# Patient Record
Sex: FEMALE | Race: WHITE | NOT HISPANIC OR LATINO | Employment: OTHER | URBAN - METROPOLITAN AREA
[De-identification: names, ages, dates, MRNs, and addresses within clinical notes are randomized per-mention and may not be internally consistent; named-entity substitution may affect disease eponyms.]

---

## 2017-01-10 ENCOUNTER — ANTICOAGULATION THERAPY VISIT (OUTPATIENT)
Dept: ANTICOAGULATION | Facility: CLINIC | Age: 82
End: 2017-01-10
Payer: COMMERCIAL

## 2017-01-10 DIAGNOSIS — I48.20 CHRONIC ATRIAL FIBRILLATION (H): ICD-10-CM

## 2017-01-10 DIAGNOSIS — Z79.01 LONG-TERM (CURRENT) USE OF ANTICOAGULANTS: Primary | ICD-10-CM

## 2017-01-10 LAB — INR POINT OF CARE: 2.1 (ref 0.86–1.14)

## 2017-01-10 PROCEDURE — 85610 PROTHROMBIN TIME: CPT | Mod: QW

## 2017-01-10 PROCEDURE — 99207 ZZC NO CHARGE NURSE ONLY: CPT

## 2017-01-10 PROCEDURE — 36416 COLLJ CAPILLARY BLOOD SPEC: CPT

## 2017-01-10 NOTE — PROGRESS NOTES
"  ANTICOAGULATION FOLLOW-UP CLINIC VISIT    Patient Name:  Leonor Mercado  Date:  1/10/2017  Contact Type:  Face to Face    SUBJECTIVE:     Patient Findings     Positives No Problem Findings           OBJECTIVE    INR PROTIME   Date Value Ref Range Status   01/10/2017 2.1* 0.86 - 1.14 Final       ASSESSMENT / PLAN  INR assessment THER    Recheck INR In: 4 WEEKS    INR Location Clinic      Anticoagulation Summary as of 1/10/2017     INR goal 2.0-3.0   Selected INR 2.1 (1/10/2017)   Maintenance plan 2 mg (2 mg x 1) every day   Full instructions 2 mg every day   Weekly total 14 mg   No change documented Dilma Rider, RN   Plan last modified Yoana Waddell RN (12/15/2016)   Next INR check 2/7/2017   Priority INR   Target end date     Indications   Long-term (current) use of anticoagulants [Z79.01] [Z79.01]  Chronic atrial fibrillation (H) [I48.2]         Anticoagulation Episode Summary     INR check location     Preferred lab     Send INR reminders to [facility]    Comments Pt's 1st name is pronounced \"ondray\"      Anticoagulation Care Providers     Provider Role Specialty Phone number    RavinderOracio Dannielle Fraser MD Responsible Internal Medicine [phone]            See the Encounter Report to view Anticoagulation Flowsheet and Dosing Calendar (Go to Encounters tab in chart review, and find the Anticoagulation Therapy Visit)        Dilma Rider RN                 "

## 2017-01-10 NOTE — MR AVS SNAPSHOT
Leonor Mercado   1/10/2017 2:30 PM   Anticoagulation Therapy Visit    Description:  89 year old female   Provider:  [facility]   Department:  [facility]           INR as of 1/10/2017     Selected INR 2.1 (1/10/2017)      Anticoagulation Summary as of 1/10/2017     INR goal 2.0-3.0   Selected INR 2.1 (1/10/2017)   Full instructions 2 mg every day   Next INR check 2/7/2017    Indications   Long-term (current) use of anticoagulants [Z79.01] [Z79.01]  Chronic atrial fibrillation (H) [I48.2]         Your next Anticoagulation Clinic appointment(s)     Feb 07, 2017  2:00 PM   [facility]tico[facility] Visit with [facility]   [facility]    [address] [facility] [address]  [facility] [address] [phone]              Contact Numbers     [facility] Phone Numbers:  Anticoagulation Clinic Appointments : [phone]  Anticoagulation Nurse: [phone]         January 2017 Details    Sun Mon Tue Wed Thu Fri Sat     1               2               3               4               5               6               7                 8               9               10      2 mg   See details      11      2 mg         12      2 mg         13      2 mg         14      2 mg           15      2 mg         16      2 mg         17      2 mg         18      2 mg         19      2 mg         20      2 mg         21      2 mg           22      2 mg         23      2 mg         24      2 mg         25      2 mg         26      2 mg         27      2 mg         28      2 mg           29      2 mg         30      2 mg         31      2 mg              Date Details   01/10 This INR check               How to take your warfarin dose     To take:  2 mg Take 1 of the 2 mg tablets.           February 2017 Details    Sun Mon Tue Wed Thu Fri Sat        1      2 mg         2      2 mg         3      2 mg         4      2 mg           5      2 mg         6      2  mg         7            8               9               10               11                 12               13               14               15               16               17               18                 19               20               21               22               23               24               25                 26               27               28                    Date Details   No additional details    Date of next INR:  2/7/2017         How to take your warfarin dose     To take:  2 mg Take 1 of the 2 mg tablets.

## 2017-01-13 ENCOUNTER — OFFICE VISIT (OUTPATIENT)
Dept: INTERNAL MEDICINE | Facility: CLINIC | Age: 82
End: 2017-01-13
Payer: COMMERCIAL

## 2017-01-13 VITALS
SYSTOLIC BLOOD PRESSURE: 130 MMHG | DIASTOLIC BLOOD PRESSURE: 66 MMHG | HEIGHT: 63 IN | HEART RATE: 86 BPM | BODY MASS INDEX: 23.74 KG/M2 | OXYGEN SATURATION: 96 % | WEIGHT: 134 LBS | TEMPERATURE: 98 F

## 2017-01-13 DIAGNOSIS — J44.9 CHRONIC OBSTRUCTIVE PULMONARY DISEASE, UNSPECIFIED COPD TYPE (H): ICD-10-CM

## 2017-01-13 DIAGNOSIS — M54.50 ACUTE LEFT-SIDED LOW BACK PAIN WITHOUT SCIATICA: Primary | ICD-10-CM

## 2017-01-13 LAB
ALBUMIN UR-MCNC: NEGATIVE MG/DL
APPEARANCE UR: ABNORMAL
BILIRUB UR QL STRIP: NEGATIVE
COLOR UR AUTO: YELLOW
GLUCOSE UR STRIP-MCNC: NEGATIVE MG/DL
HGB UR QL STRIP: NEGATIVE
KETONES UR STRIP-MCNC: NEGATIVE MG/DL
LEUKOCYTE ESTERASE UR QL STRIP: ABNORMAL
MUCOUS THREADS #/AREA URNS LPF: PRESENT /LPF
NITRATE UR QL: NEGATIVE
NON-SQ EPI CELLS #/AREA URNS LPF: ABNORMAL /LPF
PH UR STRIP: 6 PH (ref 5–7)
RBC #/AREA URNS AUTO: ABNORMAL /HPF (ref 0–2)
SP GR UR STRIP: 1.02 (ref 1–1.03)
URN SPEC COLLECT METH UR: ABNORMAL
UROBILINOGEN UR STRIP-ACNC: 0.2 EU/DL (ref 0.2–1)
WBC #/AREA URNS AUTO: ABNORMAL /HPF (ref 0–2)

## 2017-01-13 PROCEDURE — 81001 URINALYSIS AUTO W/SCOPE: CPT | Performed by: INTERNAL MEDICINE

## 2017-01-13 PROCEDURE — 99214 OFFICE O/P EST MOD 30 MIN: CPT | Performed by: INTERNAL MEDICINE

## 2017-01-13 NOTE — NURSING NOTE
"Chief Complaint   Patient presents with     Medication Problem     Back Pain     Imm/Inj       Initial /66 mmHg  Pulse 86  Temp(Src) 98  F (36.7  C) (Oral)  Ht 5' 3\" (1.6 m)  Wt 134 lb (60.782 kg)  BMI 23.74 kg/m2  SpO2 96%  LMP  (LMP Unknown)  Breastfeeding? No Estimated body mass index is 23.74 kg/(m^2) as calculated from the following:    Height as of this encounter: 5' 3\" (1.6 m).    Weight as of this encounter: 134 lb (60.782 kg).  BP completed using cuff size: christine Hillman CMA      "

## 2017-01-13 NOTE — PATIENT INSTRUCTIONS
Plan:  1. Use the nebulizers 3-4 times a day as needed  2. Lung disease doctor referral   [facility]N: [facility] [phone]    3. Use the Lidocaine patches to the back   4. Urine test today   5. Watch the skin for rash

## 2017-01-13 NOTE — PROGRESS NOTES
"Dr Young's note      Patient's instructions / PLAN:                                                        Plan:  1. Use the nebulizers 3-4 times a day as needed  2. Lung disease doctor referral   [facility]: [facility] [phone]    3. Use the Lidocaine patches to the back   4. Urine test today   5. Watch the skin for rash       ASSESSMENT & PLAN:                                                      (M54.5) Acute left-sided low back pain without sciatica  (primary encounter diagnosis)  Comment: MSK. UA is neg   Plan: UA with Microscopic reflex to Culture  as above             (J44.9) Chronic obstructive pulmonary disease, unspecified COPD type (H)  Comment:   Plan: PULMONARY MEDICINE REFERRAL               Chief complaint:                                                      COPD  Back pain    SUBJECTIVE:                                                    Leonor Mercado is a 89 year old female who presents to clinic today for the following health issues:      *COPD-She was on Spiriva for 10 years until recently the cost went from $100 to $700 and she was switched to Incruse. She does not feel this works nearly as well, has been having a lot of trouble breathing lately. \"The breathing has been just miserable\".  *Back Pain-  Severe pain under ribs in left side of back yesterday. Has improved today, but she still has a lot af achyness in what she thinks is her kidneys. Has been having a lot of trouble with urinating, she barely has anything come out, urine is very dark and smells.   Exam: Tender on the L paraspinal muscle. No rash. She has scoliosis  No nsaids because Coumadin  I discussed the muscle relaxant pros and possible side effects. Doesn't want muscle relaxant     *Imm/Inj-She is ok to have the Prevnar today if Dr. Young wants her to have this. Or she can get this when she comes for her monthly INR.       Review of Systems:                                                  " "    ROS: negative for fever, chills, cough, wheezes, chest pain,  vomiting, abdominal pain, leg swelling pos for chr SOB, as above     A 10-point review of systems was obtained.  Those pertinent are above and in the in the Subjective section.  The rest of the systems are negative.           OBJECTIVE:             Physical exam:  Blood pressure 130/66, pulse 86, temperature 98  F (36.7  C), temperature source Oral, height 5' 3\" (1.6 m), weight 134 lb (60.782 kg), SpO2 96 %, not currently breastfeeding.   NAD, appears comfortable  Skin: no rashes   Neck: supple, no JVD,No thyroidmegaly. Lymph nodes nonpalpable cervical and supraclavicular.  Chest: clear to auscultation bilaterally, good respiratory effort  Heart: S1 S2, RRR, no mgr appreciated  Abdomen: soft, not tender,   Extremities: no edema,   Neurologic: A, Ox3, no focal signs appreciated  Back: as above      PMHx: reviewed  Past Medical History   Diagnosis Date     Hypertension      COPD (chronic obstructive pulmonary disease) (H)      Atrial fibrillation (H)      Sick sinus syndrome, PAT, AF     Irritable bowel      BCC (basal cell carcinoma of skin)      Face     Vertigo      Pulmonary fibrosis (H)      do not use amiodarone     Coronary artery disease      2-05[place]-CAD-taxus stentx2 2.5-12,2.5-12 in LADp and LADm, 80%nonDomRCA-LcxDom-mild,EF60%     SSS (sick sinus syndrome) (H)      DDD pacer (see surgery history section)     Osteoporosis      Hyperlipidemia      Panic attack      questionable diagnosis     CHF (congestive heart failure) (H)      mild in past     IBS (irritable bowel syndrome)      Chronic renal insufficiency       PSHx: reviewed  Past Surgical History   Procedure Laterality Date     Appendectomy  1956     Hernia repair Left 1991     [facility]     Cardiac surgery       PPM, 2 STENTS2-05[place]-CAD-taxus stentx2 2.5-12,2.5-12 in LADp and LADm, 80%nonDomRCA-LcxDom-mild,EF60%     H lead revision dual  4/2003     Revised in [place]-due to diaphram stim " (original pacer placed in [place])     Replace pacemaker generator  6/27/2013     Dual-chamber pacemaker by Dr SETH SOMERS lead revision dual  7/2/2014     Correction of reversal of A and V leads in [place]     Implant pacemaker  2/19/2003     Placed in [place] for sick sinus syndrome     Coronary angiography adult order  7/1994     mild CAD,Normal LV function, No Mitral regurgitation, Prox LAD 30% stenosis. RCA prox and mid, 20-30%     Heart cath, angioplasty  02/2005     LEIGH ANN mid and proximal LAD, ongoing 80% RCA; mild circumflex     Esophagoscopy, gastroscopy, duodenoscopy (egd), combined N/A 8/19/2015     Procedure: COMBINED ESOPHAGOSCOPY, GASTROSCOPY, DUODENOSCOPY (EGD), BIOPSY SINGLE OR MULTIPLE;  Surgeon: Genevieve Leon MD;  Location:  GI        Meds: reviewed  Current Outpatient Prescriptions   Medication Sig Dispense Refill     umeclidinium (INCRUSE ELLIPTA) 62.5 MCG/INH oral inhaler Inhale 1 puff into the lungs daily 3 Inhaler 1     Acetaminophen (TYLENOL PO) Take 1,000 mg by mouth At Bedtime       DOCUSATE SODIUM PO Take 100 mg by mouth At Bedtime       menthol (ICY HOT) 5 % PADS Apply 1 patch topically every 8 hours as needed for muscle soreness  0     warfarin (COUMADIN) 2 MG tablet Take 1 tablet (2 mg) daily except take 1.5 tablet (3 mg) on Monday and Wednesday, or as instructed.  Hold your coumadin for (Thursday), then take 2mg until your INR is checked next week 102 tablet 1     polyethylene glycol (MIRALAX/GLYCOLAX) packet Take 1 packet by mouth 2 times daily        losartan (COZAAR) 50 MG tablet TAKE ONE TABLET BY MOUTH ONCE DAILY 90 tablet 0     diltiazem 240 MG 24 hr ER capsule Take 1 capsule (240 mg) by mouth daily 90 capsule 3     Misc Natural Products (TART CHERRY ADVANCED) CAPS Cherry tart liquid       ZOLPIDEM TARTRATE PO Take 5 mg by mouth nightly as needed for sleep       ASPIRIN NOT PRESCRIBED, INTENTIONAL, Antiplatelet medication not prescribed intentionally due to Current  anticoagulant therapy (warfarin/enoxaparin)  0     levalbuterol (XOPENEX) 0.63 MG/3ML nebulizer solution Take 3 mLs (0.63 mg) by nebulization every 6 hours as needed for shortness of breath / dyspnea or wheezing 120 mL 5     nitroglycerin (NITROSTAT) 0.4 MG SL tablet Place 1 tablet (0.4 mg) under the tongue every 5 minutes as needed 25 tablet 1     calcium carbonate (CALCIUM CARBONATE) 600 MG TABS tablet Take 1 tablet by mouth daily        folic acid (FOLVITE) 400 MCG tablet Take 400 mcg by mouth daily       Cholecalciferol (VITAMIN D) 2000 UNITS tablet Take 2,000 Units by mouth daily       ascorbic acid (VITAMIN C) 500 MG tablet Take 500 mg by mouth daily       Probiotic Product (PROBIOTIC & ACIDOPHILUS EX ST PO) Take 1 tablet by mouth daily.       Multiple Vitamins-Minerals (OCUVITE PO) Take 1 capsule by mouth daily.         Soc Hx: reviewed  Fam Hx: reviewed          Dannielle Young MD  Internal Medicine

## 2017-01-13 NOTE — MR AVS SNAPSHOT
After Visit Summary   1/13/2017    Leonor Mercado    MRN: [id number]           Patient Information     Date Of Birth          5/6/1926        Visit Information        Provider Department      1/13/2017 12:00 PM Dannielle Darby MD [facility]        Today's Diagnoses     Chronic obstructive pulmonary disease, unspecified COPD type (H)           Care Instructions    Plan:  1. Use the nebulizers 3-4 times a day as needed  2. Lung disease doctor referral   N: [facility] [phone]    3. Use the Lidocaine patches to the back   4. Urine test today   5. Watch the skin for rash         Follow-ups after your visit        Additional Services     PULMONARY MEDICINE REFERRAL       Your provider has referred you to: N: [facility] [phone]   [email or web address]    Please be aware that coverage of these services is subject to the terms and limitations of your health insurance plan.  Call member services at your health plan with any benefit or coverage questions.      Please bring the following with you to your appointment:    (1) Any X-Rays, CTs or MRIs which have been performed.  Contact the facility where they were done to arrange for  prior to your scheduled appointment.    (2) List of current medications   (3) This referral request   (4) Any documents/labs given to you for this referral                  Your next 10 appointments already scheduled     Feb 07, 2017  2:00 PM   Anticoagulation Visit with [facility]   [facility] ([facility])    [address]  [facility] [id number] [phone]            Mar 09, 2017  8:45 AM   Phone Device Check with NUÑEZ TECH2   [facility] [place] ([facility])    [address] [place] [address] [address]  [facility] [id number] [phone]              Who to contact     If you  "have questions or need follow up information about today's clinic visit or your schedule please contact [facility] [address] [facility] directly at [phone].  Normal or non-critical lab and imaging results will be communicated to you by MyChart, letter or phone within 4 business days after the clinic has received the results. If you do not hear from us within 7 days, please contact the clinic through [employer] or phone. If you have a critical or abnormal lab result, we will notify you by phone as soon as possible.  Submit refill requests through [employer] or call your pharmacy and they will forward the refill request to us. Please allow 3 business days for your refill to be completed.          Additional Information About Your Visit        [employer] Information     [employer] lets you send messages to your doctor, view your test results, renew your prescriptions, schedule appointments and more. To sign up, go to [email or web address] . Click on \"Log in\" on the left side of the screen, which will take you to the Welcome page. Then click on \"Sign up Now\" on the right side of the page.     You will be asked to enter the access code listed below, as well as some personal information. Please follow the directions to create your username and password.     Your access code is: [email or web address]  Expires: 2017  7:49 AM     Your access code will  in 90 days. If you need help or a new code, please call your [facility] clinic or [phone].        Care EveryWhere ID     This is your Care EveryWhere ID. This could be used by other organizations to access your [facility] medical records  [id number]        Your Vitals Were     Pulse Temperature Height BMI (Body Mass Index) Pulse Oximetry Last Period    86 98  F (36.7  C) (Oral) 5' 3\" (1.6 m) 23.74 kg/m2 96% (LMP Unknown)    Breastfeeding?                   No            Blood Pressure from Last 3 Encounters:   17 130/66   16 134/60   11/15/16 113/55    Weight from Last " 3 Encounters:   01/13/17 134 lb (60.782 kg)   12/08/16 136 lb 14.4 oz (62.097 kg)   11/15/16 131 lb (59.421 kg)              We Performed the Following     PULMONARY MEDICINE REFERRAL        Primary Care Provider Office Phone # Fax #    Dannielle Darby [phone]       [facility] [address] [facility] [address]        Thank you!     Thank you for choosing [facility]  for your care. Our goal is always to provide you with excellent care. Hearing back from our patients is one way we can continue to improve our services. Please take a few minutes to complete the written survey that you may receive in the mail after your visit with us. Thank you!             Your Updated Medication List - Protect others around you: Learn how to safely use, store and throw away your medicines at www.disposemymeds.org.          This list is accurate as of: 1/13/17 12:41 PM.  Always use your most recent med list.                   Brand Name Dispense Instructions for use    ascorbic acid 500 MG tablet    VITAMIN C     Take 500 mg by mouth daily       ASPIRIN NOT PRESCRIBED    INTENTIONAL     Antiplatelet medication not prescribed intentionally due to Current anticoagulant therapy (warfarin/enoxaparin)       calcium carbonate 600 MG tablet   Generic drug:  calcium carbonate      Take 1 tablet by mouth daily       COUMADIN 2 MG tablet   Generic drug:  warfarin     102 tablet    Take 1 tablet (2 mg) daily except take 1.5 tablet (3 mg) on Monday and Wednesday, or as instructed. Hold your coumadin for (Thursday), then take 2mg until your INR is checked next week       diltiazem 240 MG 24 hr capsule     90 capsule    Take 1 capsule (240 mg) by mouth daily       DOCUSATE SODIUM PO      Take 100 mg by mouth At Bedtime       folic acid 400 MCG tablet    FOLVITE     Take 400 mcg by mouth daily       levalbuterol 0.63 MG/3ML neb solution    XOPENEX    120 mL    Take 3 mLs (0.63 mg)  by nebulization every 6 hours as needed for shortness of breath / dyspnea or wheezing       losartan 50 MG tablet    COZAAR    90 tablet    TAKE ONE TABLET BY MOUTH ONCE DAILY       menthol 5 % Pads    ICY HOT     Apply 1 patch topically every 8 hours as needed for muscle soreness       nitroglycerin 0.4 MG sublingual tablet    NITROSTAT    25 tablet    Place 1 tablet (0.4 mg) under the tongue every 5 minutes as needed       OCUVITE PO      Take 1 capsule by mouth daily.       polyethylene glycol Packet    MIRALAX/GLYCOLAX     Take 1 packet by mouth 2 times daily       PROBIOTIC & ACIDOPHILUS EX ST PO      Take 1 tablet by mouth daily.       TART CHERRY ADVANCED Caps      Aiken tart liquid       TYLENOL PO      Take 1,000 mg by mouth At Bedtime       umeclidinium 62.5 MCG/INH oral inhaler    INCRUSE ELLIPTA    3 Inhaler    Inhale 1 puff into the lungs daily       vitamin D 2000 UNITS tablet      Take 2,000 Units by mouth daily       ZOLPIDEM TARTRATE PO      Take 5 mg by mouth nightly as needed for sleep

## 2017-01-24 ENCOUNTER — TELEPHONE (OUTPATIENT)
Dept: INTERNAL MEDICINE | Facility: CLINIC | Age: 82
End: 2017-01-24

## 2017-01-24 DIAGNOSIS — J44.9 CHRONIC OBSTRUCTIVE PULMONARY DISEASE, UNSPECIFIED COPD TYPE (H): Primary | ICD-10-CM

## 2017-01-24 DIAGNOSIS — I10 ESSENTIAL HYPERTENSION WITH GOAL BLOOD PRESSURE LESS THAN 140/90: ICD-10-CM

## 2017-01-24 RX ORDER — LOSARTAN POTASSIUM 50 MG/1
50 TABLET ORAL DAILY
Qty: 90 TABLET | Refills: 1 | Status: SHIPPED | OUTPATIENT
Start: 2017-01-24 | End: 2017-07-20

## 2017-01-24 RX ORDER — LEVALBUTEROL INHALATION SOLUTION 0.63 MG/3ML
1 SOLUTION RESPIRATORY (INHALATION) EVERY 6 HOURS PRN
Qty: 120 ML | Refills: 5 | Status: SHIPPED | OUTPATIENT
Start: 2017-01-24 | End: 2018-01-25

## 2017-01-24 NOTE — TELEPHONE ENCOUNTER
Losartan       Last Written Prescription Date: 10/26/16  Last Fill Quantity: 90, # refills: 1  Last Office Visit with [facility] or [facility] prescribing provider: 1/13/2017       POTASSIUM   Date Value Ref Range Status   11/15/2016 3.8 3.4 - 5.3 mmol/L Final     CREATININE   Date Value Ref Range Status   11/15/2016 1.17* 0.52 - 1.04 mg/dL Final     BP Readings from Last 3 Encounters:   01/13/17 130/66   12/08/16 134/60   11/15/16 113/55       Xopenex 0.63mg/ 3 ml       Last Written Prescription Date: 11/11/2015  Last Fill Quantity: 120, # refills: 5    Last Office Visit with [facility] or [facility] prescribing provider:  1/13/17   Future Office Visit:       Date of Last Asthma Action Plan Letter:   There are no preventive care reminders to display for this patient.   Asthma Control Test: No flowsheet data found.    Date of Last Spirometry Test:   No results found for this or any previous visit.

## 2017-01-24 NOTE — TELEPHONE ENCOUNTER
Letter/fax recieved from [facility] (in formulary folder/basket) states xopenex is not covered under patient's insurance.      Can medication be changed or should a PA be initiated?    (Provider may request we ask patient to check their formulary book or call their insurance company to find out what medications are on their formulary. Ask patient to call back within 2-3 days.  If they are not able to call back in this time frame this encounter will be closed.  Open new encounter when/if patient calls back.)      If PA call [phone]  Pt id [id number]

## 2017-01-25 NOTE — TELEPHONE ENCOUNTER
Ask the patient if she had side effects from Albuterol   If not - we will do albuterol Rx  If yes - do PA

## 2017-01-27 ENCOUNTER — TRANSFERRED RECORDS (OUTPATIENT)
Dept: HEALTH INFORMATION MANAGEMENT | Facility: CLINIC | Age: 82
End: 2017-01-27

## 2017-01-30 DIAGNOSIS — Z79.01 LONG TERM CURRENT USE OF ANTICOAGULANT THERAPY: Primary | ICD-10-CM

## 2017-01-30 NOTE — TELEPHONE ENCOUNTER
Coumadin 2 mg    Last Written Prescription Date: 11/3/2016  Last Fill Qty: 102, # refills: 1  Last Office Visit with [facility], [facility] or [facility] prescribing provider: 1/13/2017 [place]       Date and Result of Last PT/INR:   INR      2.1   1/10/2017  INR      2.7   12/15/2016  INR     2.60   11/15/2016  INR     3.25   11/3/2016  PT      21.1   9/6/2012  PT      21.2   8/7/2012           Lab Results   Component Value Date    INR 2.1* 01/10/2017    INR 2.7* 12/15/2016    INR 3.4* 12/08/2016    INR 5.4* 12/01/2016    INR 3.3* 11/22/2016

## 2017-02-01 RX ORDER — WARFARIN SODIUM 2 MG/1
TABLET ORAL
Qty: 102 TABLET | Refills: 1 | Status: SHIPPED | OUTPATIENT
Start: 2017-02-01 | End: 2017-08-25

## 2017-02-02 NOTE — TELEPHONE ENCOUNTER
Pharmacy called back saying they ran this under a different insurance and it went through.  No prior authorization needed at this time.

## 2017-02-07 ENCOUNTER — ANTICOAGULATION THERAPY VISIT (OUTPATIENT)
Dept: ANTICOAGULATION | Facility: CLINIC | Age: 82
End: 2017-02-07
Payer: COMMERCIAL

## 2017-02-07 DIAGNOSIS — Z79.01 LONG-TERM (CURRENT) USE OF ANTICOAGULANTS: Primary | ICD-10-CM

## 2017-02-07 DIAGNOSIS — I48.20 CHRONIC ATRIAL FIBRILLATION (H): ICD-10-CM

## 2017-02-07 LAB — INR POINT OF CARE: 2.1 (ref 0.86–1.14)

## 2017-02-07 PROCEDURE — 36416 COLLJ CAPILLARY BLOOD SPEC: CPT

## 2017-02-07 PROCEDURE — 85610 PROTHROMBIN TIME: CPT | Mod: QW

## 2017-02-07 PROCEDURE — 99207 ZZC NO CHARGE NURSE ONLY: CPT

## 2017-02-07 NOTE — PROGRESS NOTES
"  ANTICOAGULATION FOLLOW-UP CLINIC VISIT    Patient Name:  Leonor Mercado  Date:  2/7/2017  Contact Type:  Face to Face    SUBJECTIVE:     Patient Findings     Positives No Problem Findings           OBJECTIVE    INR PROTIME   Date Value Ref Range Status   02/07/2017 2.1* 0.86 - 1.14 Final       ASSESSMENT / PLAN  INR assessment THER    Recheck INR In: 4 WEEKS    INR Location Clinic      Anticoagulation Summary as of 2/7/2017     INR goal 2.0-3.0   Selected INR 2.1 (2/7/2017)   Maintenance plan 2 mg (2 mg x 1) every day   Full instructions 2 mg every day   Weekly total 14 mg   No change documented Dilma Rider, RN   Plan last modified Yoana Waddell RN (12/15/2016)   Next INR check 3/7/2017   Priority INR   Target end date     Indications   Long-term (current) use of anticoagulants [Z79.01] [Z79.01]  Chronic atrial fibrillation (H) [I48.2]         Anticoagulation Episode Summary     INR check location     Preferred lab     Send INR reminders to [facility]    Comments Pt's 1st name is pronounced \"ondray\"      Anticoagulation Care Providers     Provider Role Specialty Phone number    RavinderOracio Dannielle Fraser MD Responsible Internal Medicine [phone]            See the Encounter Report to view Anticoagulation Flowsheet and Dosing Calendar (Go to Encounters tab in chart review, and find the Anticoagulation Therapy Visit)        Dilma Rider RN                 "

## 2017-02-07 NOTE — MR AVS SNAPSHOT
Leonor Mercado   2/7/2017 2:00 PM   Anticoagulation Therapy Visit    Description:  89 year old female   Provider[facility]  [facility]   Department:  [facility]           INR as of 2/7/2017     Selected INR 2.1 (2/7/2017)      Anticoagulation Summary as of 2/7/2017     INR goal 2.0-3.0   Selected INR 2.1 (2/7/2017)   Full instructions 2 mg every day   Next INR check 3/7/2017    Indications   Long-term (current) use of anticoagulants [Z79.01] [Z79.01]  Chronic atrial fibrillation (H) [I48.2]         Your next Anticoagulation Clinic appointment(s)     Mar 07, 2017  2:00 PM   [facility] with [facility]   [facility] ([facility])    [address]  [facility] [address] [phone]              Contact Numbers     [facility] Phone Numbers:  Anticoagulation Clinic Appointments : [phone]  Anticoagulation Nurse: [phone]         February 2017 Details    Sun Mon Tue Wed Thu Fri Sat        1               2               3               4                 5               6               7      2 mg   See details      8      2 mg         9      2 mg         10      2 mg         11      2 mg           12      2 mg         13      2 mg         14      2 mg         15      2 mg         16      2 mg         17      2 mg         18      2 mg           19      2 mg         20      2 mg         21      2 mg         22      2 mg         23      2 mg         24      2 mg         25      2 mg           26      2 mg         27      2 mg         28      2 mg              Date Details   02/07 This INR check               How to take your warfarin dose     To take:  2 mg Take 1 of the 2 mg tablets.           March 2017 Details    Sun Mon Tue Wed Thu Fri Sat        1      2 mg         2      2 mg         3      2 mg         4      2 mg           5      2 mg         6      2 mg         7            8               9                10               11                 12               13               14               15               16               17               18                 19               20               21               22               23               24               25                 26               27               28               29               30               31                 Date Details   No additional details    Date of next INR:  3/7/2017         How to take your warfarin dose     To take:  2 mg Take 1 of the 2 mg tablets.

## 2017-03-07 ENCOUNTER — ANTICOAGULATION THERAPY VISIT (OUTPATIENT)
Dept: ANTICOAGULATION | Facility: CLINIC | Age: 82
End: 2017-03-07
Payer: COMMERCIAL

## 2017-03-07 DIAGNOSIS — Z79.01 LONG-TERM (CURRENT) USE OF ANTICOAGULANTS: ICD-10-CM

## 2017-03-07 DIAGNOSIS — I48.20 CHRONIC ATRIAL FIBRILLATION (H): ICD-10-CM

## 2017-03-07 LAB — INR POINT OF CARE: 1.5 (ref 0.86–1.14)

## 2017-03-07 PROCEDURE — 99207 ZZC NO CHARGE NURSE ONLY: CPT

## 2017-03-07 PROCEDURE — 36416 COLLJ CAPILLARY BLOOD SPEC: CPT

## 2017-03-07 PROCEDURE — 85610 PROTHROMBIN TIME: CPT | Mod: QW

## 2017-03-07 NOTE — PROGRESS NOTES
"  ANTICOAGULATION FOLLOW-UP CLINIC VISIT    Patient Name:  Leonor Mercado  Date:  3/7/2017  Contact Type:  Face to Face    SUBJECTIVE:     Patient Findings     Positives Missed doses (Pt reports missing at least 1 dose this week)           OBJECTIVE    INR Protime   Date Value Ref Range Status   03/07/2017 1.5 (A) 0.86 - 1.14 Final       ASSESSMENT / PLAN  INR assessment SUB    Recheck INR In: 2 WEEKS    INR Location Clinic      Anticoagulation Summary as of 3/7/2017     INR goal 2.0-3.0   Today's INR 1.5!   Maintenance plan 2 mg (2 mg x 1) every day   Full instructions 3/7: 4 mg; 3/8: 4 mg; Otherwise 2 mg every day   Weekly total 14 mg   Plan last modified Yoana Waddell RN (12/15/2016)   Next INR check 3/21/2017   Priority INR   Target end date     Indications   Long-term (current) use of anticoagulants [Z79.01] [Z79.01]  Chronic atrial fibrillation (H) [I48.2]         Anticoagulation Episode Summary     INR check location     Preferred lab     Send INR reminders to [facility]    Comments Pt's 1st name is pronounced \"ondray\"      Anticoagulation Care Providers     Provider Role Specialty Phone number    Dannielle Darby MD Responsible Internal Medicine [phone]            See the Encounter Report to view Anticoagulation Flowsheet and Dosing Calendar (Go to Encounters tab in chart review, and find the Anticoagulation Therapy Visit)    Dosage adjustment made based on physician directed care plan.    Dilma Rider RN               "

## 2017-03-07 NOTE — MR AVS SNAPSHOT
Leonor Mercado   3/7/2017 2:00 PM   Anticoagulation Therapy Visit    Description:  89 year old female   Provider[facility]   Department:  [facility]           INR as of 3/7/2017     Today's INR 1.5!      Anticoagulation Summary as of 3/7/2017     INR goal 2.0-3.0   Today's INR 1.5!   Full instructions 3/7: 4 mg; 3/8: 4 mg; Otherwise 2 mg every day   Next INR check 3/21/2017    Indications   Long-term (current) use of anticoagulants [Z79.01] [Z79.01]  Chronic atrial fibrillation (H) [I48.2]         Your next Anticoagulation Clinic appointment(s)     Mar 21, 2017  2:00 PM CDT   [facility]   [facility]    [address]  [facility] [id number] [phone]              Contact Numbers     [facility] Phone Numbers:  Anticoagulation Clinic Appointments : [phone]  Anticoagulation Nurse: [phone]         March 2017 Details    Sun Mon Tue Wed Thu Fri Sat        1               2               3               4                 5               6               7      4 mg   See details      8      4 mg         9      2 mg         10      2 mg         11      2 mg           12      2 mg         13      2 mg         14      2 mg         15      2 mg         16      2 mg         17      2 mg         18      2 mg           19      2 mg         20      2 mg         21            22               23               24               25                 26               27               28               29               30               31                 Date Details   03/07 This INR check       Date of next INR:  3/21/2017         How to take your warfarin dose     To take:  2 mg Take 1 of the 2 mg tablets.    To take:  4 mg Take 2 of the 2 mg tablets.

## 2017-03-09 ENCOUNTER — ALLIED HEALTH/NURSE VISIT (OUTPATIENT)
Dept: CARDIOLOGY | Facility: CLINIC | Age: 82
End: 2017-03-09
Payer: COMMERCIAL

## 2017-03-09 DIAGNOSIS — Z95.0 CARDIAC PACEMAKER IN SITU: Primary | ICD-10-CM

## 2017-03-09 PROCEDURE — 93293 PM PHONE R-STRIP DEVICE EVAL: CPT | Performed by: INTERNAL MEDICINE

## 2017-03-09 NOTE — MR AVS SNAPSHOT
"              After Visit Summary   3/9/2017    Leonor Mercado    MRN: [id number]           Patient Information     Date Of Birth          5/6/1926        Visit Information        Provider Department      3/9/2017 8:45 AM NUÑEZ TECH2 [facility]        Today's Diagnoses     Cardiac pacemaker in situ    -  1       Follow-ups after your visit        Your next 10 appointments already scheduled     Mar 21, 2017  2:00 PM CDT   [facility] Visit with [facility]   [facility]    [address]  [facility] [address] [phone]            Cristhian 15, 2017  8:45 AM CDT   Phone Device Check with NUÑEZ TECH2   [facility] ([facility])    [address]  [facility] [address] [phone]              Who to contact     If you have questions or need follow up information about today's clinic visit or your schedule please contact [facility] directly at [phone].  Normal or non-critical lab and imaging results will be communicated to you by InRiverhart, letter or phone within 4 business days after the clinic has received the results. If you do not hear from us within 7 days, please contact the clinic through InRiverhart or phone. If you have a critical or abnormal lab result, we will notify you by phone as soon as possible.  Submit refill requests through Talentology or call your pharmacy and they will forward the refill request to us. Please allow 3 business days for your refill to be completed.          Additional Information About Your Visit        InRiverhart Information     Talentology lets you send messages to your doctor, view your test results, renew your prescriptions, schedule appointments and more. To sign up, go to [email or web address] . Click on \"Log in\" on the left side of the screen, which will take " "you to the Welcome page. Then click on \"Sign up Now\" on the right side of the page.     You will be asked to enter the access code listed below, as well as some personal information. Please follow the directions to create your username and password.     Your access code is: [email or web address]  Expires: 2017  8:56 AM     Your access code will  in 90 days. If you need help or a new code, please call your [place] clinic or [phone].        Care EveryWhere ID     This is your Care EveryWhere ID. This could be used by other organizations to access your [place] medical records  [id number]        Your Vitals Were     Last Period                   (LMP Unknown)            Blood Pressure from Last 3 Encounters:   17 130/66   16 134/60   11/15/16 113/55    Weight from Last 3 Encounters:   17 60.8 kg (134 lb)   16 62.1 kg (136 lb 14.4 oz)   11/15/16 59.4 kg (131 lb)              We Performed the Following     PM PHONE R STRIP EVAL UP TO 90 DAYS ([phone])        Primary Care Provider Office Phone # Fax #    Dannielle Darby [phone]       [place] [address] [place] [address]        Thank you!     Thank you for choosing [facility] [place]  for your care. Our goal is always to provide you with excellent care. Hearing back from our patients is one way we can continue to improve our services. Please take a few minutes to complete the written survey that you may receive in the mail after your visit with us. Thank you!             Your Updated Medication List - Protect others around you: Learn how to safely use, store and throw away your medicines at www.disposemymeds.org.          This list is accurate as of: 3/9/17  8:56 AM.  Always use your most recent med list.                   Brand Name Dispense Instructions for use    ascorbic acid 500 MG tablet    VITAMIN C     Take 500 mg by mouth daily       " ASPIRIN NOT PRESCRIBED    INTENTIONAL     Antiplatelet medication not prescribed intentionally due to Current anticoagulant therapy (warfarin/enoxaparin)       calcium carbonate 600 MG tablet   Generic drug:  calcium carbonate      Take 1 tablet by mouth daily       diltiazem 240 MG 24 hr capsule     90 capsule    Take 1 capsule (240 mg) by mouth daily       DOCUSATE SODIUM PO      Take 100 mg by mouth At Bedtime       folic acid 400 MCG tablet    FOLVITE     Take 400 mcg by mouth daily       levalbuterol 0.63 MG/3ML neb solution    XOPENEX    120 mL    Take 3 mLs (0.63 mg) by nebulization every 6 hours as needed for shortness of breath / dyspnea or wheezing       losartan 50 MG tablet    COZAAR    90 tablet    Take 1 tablet (50 mg) by mouth daily       menthol 5 % Pads    ICY HOT     Apply 1 patch topically every 8 hours as needed for muscle soreness       nitroglycerin 0.4 MG sublingual tablet    NITROSTAT    25 tablet    Place 1 tablet (0.4 mg) under the tongue every 5 minutes as needed       OCUVITE PO      Take 1 capsule by mouth daily.       polyethylene glycol Packet    MIRALAX/GLYCOLAX     Take 1 packet by mouth 2 times daily       PROBIOTIC & ACIDOPHILUS EX ST PO      Take 1 tablet by mouth daily.       TART CHERRY ADVANCED Caps      Aiken tart liquid       TYLENOL PO      Take 1,000 mg by mouth At Bedtime       umeclidinium 62.5 MCG/INH oral inhaler    INCRUSE ELLIPTA    3 Inhaler    Inhale 1 puff into the lungs daily       vitamin D 2000 UNITS tablet      Take 2,000 Units by mouth daily       warfarin 2 MG tablet    COUMADIN    102 tablet    Take one tablet (2 mg) daily or as directed by INR Clinic       ZOLPIDEM TARTRATE PO      Take 5 mg by mouth nightly as needed for sleep

## 2017-03-09 NOTE — NURSING NOTE
PHONE TELETRAC  Intrinsic Rhythm at time of check. Magnet response WNL.  F/U scheduled q 3 months.  For annual threshold.

## 2017-03-21 ENCOUNTER — ANTICOAGULATION THERAPY VISIT (OUTPATIENT)
Dept: ANTICOAGULATION | Facility: CLINIC | Age: 82
End: 2017-03-21
Payer: COMMERCIAL

## 2017-03-21 DIAGNOSIS — I48.20 CHRONIC ATRIAL FIBRILLATION (H): ICD-10-CM

## 2017-03-21 DIAGNOSIS — Z79.01 LONG-TERM (CURRENT) USE OF ANTICOAGULANTS: ICD-10-CM

## 2017-03-21 LAB — INR POINT OF CARE: 1.9 (ref 0.86–1.14)

## 2017-03-21 PROCEDURE — 85610 PROTHROMBIN TIME: CPT | Mod: QW

## 2017-03-21 PROCEDURE — 36416 COLLJ CAPILLARY BLOOD SPEC: CPT

## 2017-03-21 PROCEDURE — 99207 ZZC NO CHARGE NURSE ONLY: CPT

## 2017-03-21 NOTE — PROGRESS NOTES
"  ANTICOAGULATION FOLLOW-UP CLINIC VISIT    Patient Name:  Leonor Mercado  Date:  3/21/2017  Contact Type:  Face to Face    SUBJECTIVE:     Patient Findings     Positives No Problem Findings           OBJECTIVE    INR Protime   Date Value Ref Range Status   03/21/2017 1.9 (A) 0.86 - 1.14 Final       ASSESSMENT / PLAN  INR assessment THER    Recheck INR In: 2 WEEKS    INR Location Clinic      Anticoagulation Summary as of 3/21/2017     INR goal 2.0-3.0   Today's INR 1.9!   Maintenance plan 2 mg (2 mg x 1) every day   Full instructions 3/21: 4 mg; 3/28: 4 mg; Otherwise 2 mg every day   Weekly total 14 mg   Plan last modified Yoana Waddell RN (12/15/2016)   Next INR check 4/4/2017   Priority INR   Target end date     Indications   Long-term (current) use of anticoagulants [Z79.01] [Z79.01]  Chronic atrial fibrillation (H) [I48.2]         Anticoagulation Episode Summary     INR check location     Preferred lab     Send INR reminders to [facility]    Comments Pt's 1st name is pronounced \"ondray\"      Anticoagulation Care Providers     Provider Role Specialty Phone number    Dannielle Darby MD Responsible Internal Medicine [phone]            See the Encounter Report to view Anticoagulation Flowsheet and Dosing Calendar (Go to Encounters tab in chart review, and find the Anticoagulation Therapy Visit)    Dosage adjustment made based on physician directed care plan.    Dilma Rider RN               "

## 2017-03-21 NOTE — MR AVS SNAPSHOT
Leonor Mercado   3/21/2017 2:00 PM   Anticoagulation Therapy Visit    Description:  89 year old female   [facility]   Department:  [facility]           INR as of 3/21/2017     Today's INR 1.9!      Anticoagulation Summary as of 3/21/2017     INR goal 2.0-3.0   Today's INR 1.9!   Full instructions 3/21: 4 mg; 3/28: 4 mg; Otherwise 2 mg every day   Next INR check 4/4/2017    Indications   Long-term (current) use of anticoagulants [Z79.01] [Z79.01]  Chronic atrial fibrillation (H) [I48.2]         Your next Anticoagulation Clinic appointment(s)     Apr 04, 2017  2:00 PM CDT   [facility] with [facility]   [facility] ([facility])    [address]  [facility] [address] [phone]              Contact Numbers     [facility] [address] Phone Numbers:  Anticoagulation Clinic Appointments : [phone]  Anticoagulation Nurse: [phone]         March 2017 Details    Sun Mon Tue Wed Thu Fri Sat        1               2               3               4                 5               6               7               8               9               10               11                 12               13               14               15               16               17               18                 19               20               21      4 mg   See details      22      2 mg         23      2 mg         24      2 mg         25      2 mg           26      2 mg         27      2 mg         28      4 mg         29      2 mg         30      2 mg         31      2 mg           Date Details   03/21 This INR check               How to take your warfarin dose     To take:  2 mg Take 1 of the 2 mg tablets.    To take:  4 mg Take 2 of the 2 mg tablets.           April 2017 Details    Sun Mon Tue Wed Thu Fri Sat           1      2 mg           2      2 mg         3      2 mg         4            5               6                7               8                 9               10               11               12               13               14               15                 16               17               18               19               20               21               22                 23               24               25               26               27               28               29                 30                      Date Details   No additional details    Date of next INR:  4/4/2017         How to take your warfarin dose     To take:  2 mg Take 1 of the 2 mg tablets.

## 2017-04-04 ENCOUNTER — ANTICOAGULATION THERAPY VISIT (OUTPATIENT)
Dept: ANTICOAGULATION | Facility: CLINIC | Age: 82
End: 2017-04-04
Payer: COMMERCIAL

## 2017-04-04 DIAGNOSIS — Z79.01 LONG-TERM (CURRENT) USE OF ANTICOAGULANTS: ICD-10-CM

## 2017-04-04 DIAGNOSIS — I48.20 CHRONIC ATRIAL FIBRILLATION (H): ICD-10-CM

## 2017-04-04 LAB — INR POINT OF CARE: 2.1 (ref 0.86–1.14)

## 2017-04-04 PROCEDURE — 99207 ZZC NO CHARGE NURSE ONLY: CPT

## 2017-04-04 PROCEDURE — 36416 COLLJ CAPILLARY BLOOD SPEC: CPT

## 2017-04-04 PROCEDURE — 85610 PROTHROMBIN TIME: CPT | Mod: QW

## 2017-04-04 NOTE — PROGRESS NOTES
"  ANTICOAGULATION FOLLOW-UP CLINIC VISIT    Patient Name:  Leonor Mercado  Date:  4/4/2017  Contact Type:  Face to Face    SUBJECTIVE:     Patient Findings     Positives No Problem Findings           OBJECTIVE    INR Protime   Date Value Ref Range Status   03/21/2017 1.9 (A) 0.86 - 1.14 Final       ASSESSMENT / PLAN  INR assessment THER    Recheck INR In: 4 WEEKS    INR Location Clinic      Anticoagulation Summary as of 4/4/2017     INR goal 2.0-3.0   Today's INR    Maintenance plan 2 mg (2 mg x 1) every day   Full instructions 2 mg every day   Weekly total 14 mg   No change documented Dilma Rider, RN   Plan last modified Yoana Waddell RN (12/15/2016)   Next INR check 5/2/2017   Priority INR   Target end date     Indications   Long-term (current) use of anticoagulants [Z79.01] [Z79.01]  Chronic atrial fibrillation (H) [I48.2]         Anticoagulation Episode Summary     INR check location     Preferred lab     Send INR reminders to [facility]    Comments Pt's 1st name is pronounced \"ondray\"      Anticoagulation Care Providers     Provider Role Specialty Phone number    Dannielle Darby MD Responsible Internal Medicine [phone]            See the Encounter Report to view Anticoagulation Flowsheet and Dosing Calendar (Go to Encounters tab in chart review, and find the Anticoagulation Therapy Visit)    Dosage adjustment made based on physician directed care plan.    Dilma Rider, RN               "

## 2017-04-04 NOTE — MR AVS SNAPSHOT
Leonor Mercado   4/4/2017 2:00 PM   Anticoagulation Therapy Visit    Description:  89 year old female   Provider[facility]   Department:  [facility]           INR as of 4/4/2017     Today's INR       Anticoagulation Summary as of 4/4/2017     INR goal 2.0-3.0   Today's INR    Full instructions 2 mg every day   Next INR check 5/2/2017    Indications   Long-term (current) use of anticoagulants [Z79.01] [Z79.01]  Chronic atrial fibrillation (H) [I48.2]         Your next Anticoagulation Clinic appointment(s)     May 02, 2017  2:00 PM CDT   [facility]    [address]  [facility] [address] [phone]              Contact Numbers     [facility] Phone Numbers:  Anticoagulation Clinic Appointments : [phone]  Anticoagulation Nurse: [phone]         April 2017 Details    Sun Mon Tue Wed Thu Fri Sat           1                 2               3               4      2 mg   See details      5      2 mg         6      2 mg         7      2 mg         8      2 mg           9      2 mg         10      2 mg         11      2 mg         12      2 mg         13      2 mg         14      2 mg         15      2 mg           16      2 mg         17      2 mg         18      2 mg         19      2 mg         20      2 mg         21      2 mg         22      2 mg           23      2 mg         24      2 mg         25      2 mg         26      2 mg         27      2 mg         28      2 mg         29      2 mg           30      2 mg                Date Details   04/04 This INR check               How to take your warfarin dose     To take:  2 mg Take 1 of the 2 mg tablets.           May 2017 Details    Sun Mon Tue Wed Thu Fri Sat      1      2 mg         2            3               4               5               6                 7               8               9                10               11               12               13                 14               15               16               17               18               19               20                 21               22               23               24               25               26               27                 28               29               30               31                   Date Details   No additional details    Date of next INR:  5/2/2017         How to take your warfarin dose     To take:  2 mg Take 1 of the 2 mg tablets.

## 2017-04-11 ENCOUNTER — TELEPHONE (OUTPATIENT)
Dept: INTERNAL MEDICINE | Facility: CLINIC | Age: 82
End: 2017-04-11

## 2017-04-11 NOTE — TELEPHONE ENCOUNTER
Shaye from [facility] calling ([phone]). Pt's copay for Incruse is $400/3 mo--tier 3 drug.  Pt is asking to get lowered to a tier 2 drug.    Shaye asked for diagnosis codes for med.  Given to her.    Was transferred to Ary @ [facility].  Ary was able to get med lowered to a tier 2 copay.    Ref # [id number]    Authorized from 4-11-17 thru 4-11-18.

## 2017-04-27 ENCOUNTER — OFFICE VISIT (OUTPATIENT)
Dept: INTERNAL MEDICINE | Facility: CLINIC | Age: 82
End: 2017-04-27
Payer: COMMERCIAL

## 2017-04-27 VITALS
HEART RATE: 84 BPM | OXYGEN SATURATION: 96 % | TEMPERATURE: 98.1 F | HEIGHT: 63 IN | WEIGHT: 132.7 LBS | SYSTOLIC BLOOD PRESSURE: 112 MMHG | DIASTOLIC BLOOD PRESSURE: 64 MMHG | BODY MASS INDEX: 23.51 KG/M2

## 2017-04-27 DIAGNOSIS — R07.9 CHEST PAIN, UNSPECIFIED TYPE: Primary | ICD-10-CM

## 2017-04-27 DIAGNOSIS — J44.9 CHRONIC OBSTRUCTIVE PULMONARY DISEASE, UNSPECIFIED COPD TYPE (H): ICD-10-CM

## 2017-04-27 DIAGNOSIS — E78.5 HYPERLIPIDEMIA LDL GOAL <100: ICD-10-CM

## 2017-04-27 DIAGNOSIS — I25.10 CORONARY ARTERY DISEASE INVOLVING NATIVE CORONARY ARTERY OF NATIVE HEART WITHOUT ANGINA PECTORIS: ICD-10-CM

## 2017-04-27 PROCEDURE — 99214 OFFICE O/P EST MOD 30 MIN: CPT | Performed by: INTERNAL MEDICINE

## 2017-04-27 RX ORDER — NITROGLYCERIN 0.4 MG/1
TABLET SUBLINGUAL
Qty: 25 TABLET | Refills: 0 | Status: SHIPPED | OUTPATIENT
Start: 2017-04-27 | End: 2017-06-27

## 2017-04-27 RX ORDER — ALBUTEROL SULFATE 90 UG/1
2 AEROSOL, METERED RESPIRATORY (INHALATION) EVERY 6 HOURS PRN
Qty: 1 INHALER | Refills: 1 | Status: SHIPPED | OUTPATIENT
Start: 2017-04-27 | End: 2018-11-30

## 2017-04-27 RX ORDER — PRAVASTATIN SODIUM 40 MG
40 TABLET ORAL DAILY
Qty: 30 TABLET | Refills: 1 | Status: SHIPPED | OUTPATIENT
Start: 2017-04-27 | End: 2017-06-20

## 2017-04-27 NOTE — MR AVS SNAPSHOT
After Visit Summary   4/27/2017    Leonor Mercado    MRN: [id number]           Patient Information     Date Of Birth          5/6/1926        Visit Information        Provider Department      4/27/2017 2:00 PM Dannielle Darby MD [facility]        Today's Diagnoses     Chest pain, unspecified type    -  1    Hyperlipidemia LDL goal <100        Coronary artery disease involving native coronary artery of native heart without angina pectoris        Chronic obstructive pulmonary disease, unspecified COPD type (H)          Care Instructions    Plan:  1. Heart stress test To schedule this test please call [facility] at: [phone]   2. Make an appointment with cardiologist   3. Use the Nitroglycerine if you have pain   4. Pravastatin 40 mg daily - for cholesterol         Follow-ups after your visit        Your next 10 appointments already scheduled     May 02, 2017  2:00 PM CDT   Anticoagulation Visit with [facility]   [facility]    [address]  [facility] [address] [phone]            Cristhian 15, 2017  8:45 AM CDT   Phone Device Check with NUÑEZ TECH2   [facility] [place] ([facility])    [address] [place] [address] [address]  [facility] [id number] [phone]              Future tests that were ordered for you today     Open Future Orders        Priority Expected Expires Ordered    NM Lexiscan stress test Routine  4/27/2018 4/27/2017            Who to contact     If you have questions or need follow up information about today's clinic visit or your schedule please contact [facility] directly at [phone].  Normal or non-critical lab and imaging results will be communicated to you by MyChart, letter or phone within 4 business days after the clinic has received the results. If you do not hear from us within 7 days, please contact the  "clinic through [employer] or phone. If you have a critical or abnormal lab result, we will notify you by phone as soon as possible.  Submit refill requests through [employer] or call your pharmacy and they will forward the refill request to us. Please allow 3 business days for your refill to be completed.          Additional Information About Your Visit        [employer] Information     [employer] lets you send messages to your doctor, view your test results, renew your prescriptions, schedule appointments and more. To sign up, go to [email or web address] . Click on \"Log in\" on the left side of the screen, which will take you to the Welcome page. Then click on \"Sign up Now\" on the right side of the page.     You will be asked to enter the access code listed below, as well as some personal information. Please follow the directions to create your username and password.     Your access code is: [email or web address]  Expires: 2017  9:56 AM     Your access code will  in 90 days. If you need help or a new code, please call your [facility] clinic or [phone].        Care EveryWhere ID     This is your Care EveryWhere ID. This could be used by other organizations to access your [facility] medical records  [id number]        Your Vitals Were     Pulse Temperature Height Last Period Pulse Oximetry Breastfeeding?    84 98.1  F (36.7  C) (Oral) 5' 3\" (1.6 m) (LMP Unknown) 96% No    BMI (Body Mass Index)                   23.51 kg/m2            Blood Pressure from Last 3 Encounters:   17 112/64   17 130/66   16 134/60    Weight from Last 3 Encounters:   17 132 lb 11.2 oz (60.2 kg)   17 134 lb (60.8 kg)   16 136 lb 14.4 oz (62.1 kg)                 Today's Medication Changes          These changes are accurate as of: 17  2:41 PM.  If you have any questions, ask your nurse or doctor.               Start taking these medicines.        Dose/Directions    albuterol 108 (90 BASE) MCG/ACT Inhaler "   Commonly known as:  PROAIR HFA/PROVENTIL HFA/VENTOLIN HFA   Used for:  Chronic obstructive pulmonary disease, unspecified COPD type (H)   Started by:  Dannielle Darby MD        Dose:  2 puff   Inhale 2 puffs into the lungs every 6 hours as needed for shortness of breath / dyspnea or wheezing   Quantity:  1 Inhaler   Refills:  1       pravastatin 40 MG tablet   Commonly known as:  PRAVACHOL   Used for:  Chest pain, unspecified type, Hyperlipidemia LDL goal <100, Coronary artery disease involving native coronary artery of native heart without angina pectoris   Started by:  Dannielle Darby MD        Dose:  40 mg   Take 1 tablet (40 mg) by mouth daily   Quantity:  30 tablet   Refills:  1            Where to get your medicines      These medications were sent to [facility] [address] [place] [address] [id number] [place] [address]  [id number] [place] [address]     Phone:  [phone]     albuterol 108 (90 BASE) MCG/ACT Inhaler    pravastatin 40 MG tablet                Primary Care Provider Office Phone # Fax #    Dannielle Darby [phone]       [facility] [address] [place] [address]        Thank you!     Thank you for choosing [facility]  for your care. Our goal is always to provide you with excellent care. Hearing back from our patients is one way we can continue to improve our services. Please take a few minutes to complete the written survey that you may receive in the mail after your visit with us. Thank you!             Your Updated Medication List - Protect others around you: Learn how to safely use, store and throw away your medicines at www.disposemymeds.org.          This list is accurate as of: 4/27/17  2:41 PM.  Always use your most recent med list.                   Brand Name Dispense Instructions for use    albuterol 108 (90 BASE) MCG/ACT Inhaler    PROAIR HFA/PROVENTIL HFA/VENTOLIN HFA     1 Inhaler    Inhale 2 puffs into the lungs every 6 hours as needed for shortness of breath / dyspnea or wheezing       ascorbic acid 500 MG tablet    VITAMIN C     Take 500 mg by mouth daily       ASPIRIN NOT PRESCRIBED    INTENTIONAL     Antiplatelet medication not prescribed intentionally due to Current anticoagulant therapy (warfarin/enoxaparin)       calcium carbonate 600 MG tablet   Generic drug:  calcium carbonate      Take 1 tablet by mouth daily       diltiazem 240 MG 24 hr capsule     90 capsule    Take 1 capsule (240 mg) by mouth daily       DOCUSATE SODIUM PO      Take 100 mg by mouth At Bedtime       folic acid 400 MCG tablet    FOLVITE     Take 400 mcg by mouth daily       levalbuterol 0.63 MG/3ML neb solution    XOPENEX    120 mL    Take 3 mLs (0.63 mg) by nebulization every 6 hours as needed for shortness of breath / dyspnea or wheezing       losartan 50 MG tablet    COZAAR    90 tablet    Take 1 tablet (50 mg) by mouth daily       menthol 5 % Pads    ICY HOT     Apply 1 patch topically every 8 hours as needed for muscle soreness       nitroglycerin 0.4 MG sublingual tablet    NITROSTAT    25 tablet    Place 1 tablet (0.4 mg) under the tongue every 5 minutes as needed       OCUVITE PO      Take 1 capsule by mouth daily.       polyethylene glycol Packet    MIRALAX/GLYCOLAX     Take 1 packet by mouth 2 times daily       pravastatin 40 MG tablet    PRAVACHOL    30 tablet    Take 1 tablet (40 mg) by mouth daily       PROBIOTIC & ACIDOPHILUS EX ST PO      Take 1 tablet by mouth daily.       TART CHERRY ADVANCED Caps      Aiken tart liquid       TYLENOL PO      Take 1,000 mg by mouth At Bedtime       umeclidinium 62.5 MCG/INH oral inhaler    INCRUSE ELLIPTA    3 Inhaler    Inhale 1 puff into the lungs daily       vitamin D 2000 UNITS tablet      Take 2,000 Units by mouth daily       warfarin 2 MG tablet    COUMADIN    102 tablet    Take one tablet (2 mg) daily or as directed by INR Clinic       ZOLPIDEM  TARTRATE PO      Take 5 mg by mouth nightly as needed for sleep

## 2017-04-27 NOTE — PATIENT INSTRUCTIONS
Plan:  1. Heart stress test To schedule this test please call [facility] at: [phone]   2. Make an appointment with cardiologist   3. Use the Nitroglycerine if you have pain   4. Pravastatin 40 mg daily - for cholesterol

## 2017-04-27 NOTE — PROGRESS NOTES
"Dr Young's note      Patient's instructions / PLAN:                                                        Plan:  1. Heart stress test To schedule this test please call [facility] at: [phone]   2. Make an appointment with cardiologist   3. Use the Nitroglycerine if you have pain   4. Pravastatin 40 mg daily - for cholesterol         ASSESSMENT & PLAN:                                                      (R07.9) Chest pain, unspecified type  (primary encounter diagnosis)  Comment: possible ungina or stress  Plan: NM Lexiscan stress test, pravastatin         (PRAVACHOL) 40 MG tablet            (I25.10) Coronary artery disease involving native coronary artery of native heart without angina pectoris  Comment:   Plan: NM Lexiscan stress test, pravastatin         (PRAVACHOL) 40 MG tablet, nitroglycerin         (NITROSTAT) 0.4 MG sublingual tablet            (E78.5) Hyperlipidemia LDL goal <100  Comment: Not controlled   Plan: NM Lexiscan stress test, pravastatin         (PRAVACHOL) 40 MG tablet            (J44.9) Chronic obstructive pulmonary disease, unspecified COPD type (H)  Comment: pulm fibrosis  Plan: albuterol (PROAIR HFA/PROVENTIL HFA/VENTOLIN         HFA) 108 (90 BASE) MCG/ACT Inhaler               Chief complaint:                                                      Spells     SUBJECTIVE:                                                    Leonor Mercado is a 89 year old female who presents to clinic today for the following health issues:    \"Spells\"  -- suddenly she feels hot from the waist up, pain across the chest and both shoulders. She feels disoriented/spacy  for a minute.   -- almost every day ( not yesterday)   -- for the last month  -- /84 and hours later 111/50  -- no palpitations when she has the spells  -- 2003 pacemaker  -- she had 2 stents 2 y ago ( at that time the symptoms were palpitations at bed time)  -- she feels more SOB then  A year ago.  -- she has been using more Albuterol " "inhaler. She can't use ALbuterol inhlaer because it makes her feel shaky     A Lot of stress:  of 67 y he needs to move to assisted living    Hyperlipidemia  -- had muscle aches and leg weakness with Lipitor       *Colon screening -She has seen commercials on TV for a colon cancer screening and is wondering what she would need to do to have this done? Reports she has had bowel issues her whole life. No screening at her age      Hypertension Follow-up      Outpatient blood pressures are being checked at home.  Results are varied greatly, even in one day can have drastically different readings, as high as 160's.    Low Salt Diet: not monitoring salt       Amount of exercise or physical activity: No formal exercise, but she stays active.    Problems taking medications regularly: No    Medication side effects: none    Diet: regular (no restrictions)        Review of Systems:                                                      ROS: negative for fever, chills, cough, wheezes,  vomiting, abdominal pain, leg swelling pos for SOB, CP, as above     A 10-point review of systems was obtained.  Those pertinent are above and in the in the Subjective section.  The rest of the systems are negative.           OBJECTIVE:             Physical exam:  Blood pressure 112/64, pulse 84, temperature 98.1  F (36.7  C), temperature source Oral, height 5' 3\" (1.6 m), weight 132 lb 11.2 oz (60.2 kg), SpO2 96 %, not currently breastfeeding.   NAD, appears comfortable  Skin: no rashes   Neck: supple, no JVD,  No thyroidmegaly. Lymph nodes nonpalpable cervical and supraclavicular.  Chest: clear to auscultation bilaterally, good respiratory effort  Heart: S1 S2, RRR, no mgr appreciated  Abdomen: soft, not tender, no hepatosplenomegaly or masses appreciated, no abdominal bruit, present bowel sounds  Extremities: no edema,   Neurologic: A, Ox3, no focal signs appreciated    PMHx: reviewed  Past Medical History:   Diagnosis Date     Atrial " fibrillation (H)     Sick sinus syndrome, PAT, AF     BCC (basal cell carcinoma of skin)     Face     CHF (congestive heart failure) (H)     mild in past     Chronic renal insufficiency      COPD (chronic obstructive pulmonary disease) (H)      Coronary artery disease     2-05[place]-CAD-taxus stentx2 2.5-12,2.5-12 in LADp and LADm, 80%nonDomRCA-LcxDom-mild,EF60%     Hyperlipidemia      Hypertension      IBS (irritable bowel syndrome)      Irritable bowel      Osteoporosis      Panic attack     questionable diagnosis     Pulmonary fibrosis (H)     do not use amiodarone     SSS (sick sinus syndrome) (H)     DDD pacer (see surgery history section)     Vertigo       PSHx: reviewed  Past Surgical History:   Procedure Laterality Date     APPENDECTOMY  1956     CARDIAC SURGERY      PPM, 2 STENTS2-05[place]-CAD-taxus stentx2 2.5-12,2.5-12 in LADp and LADm, 80%nonDomRCA-LcxDom-mild,EF60%     CORONARY ANGIOGRAPHY ADULT ORDER  7/1994    mild CAD,Normal LV function, No Mitral regurgitation, Prox LAD 30% stenosis. RCA prox and mid, 20-30%     ESOPHAGOSCOPY, GASTROSCOPY, DUODENOSCOPY (EGD), COMBINED N/A 8/19/2015    Procedure: COMBINED ESOPHAGOSCOPY, GASTROSCOPY, DUODENOSCOPY (EGD), BIOPSY SINGLE OR MULTIPLE;  Surgeon: Genevieve Leon MD;  Location: [facility]     H LEAD REVISION DUAL  4/2003    Revised in [place]-due to diaphram stim (original pacer placed in [place])     H LEAD REVISION DUAL  7/2/2014    Correction of reversal of A and V leads in Header     HEART CATH, ANGIOPLASTY  02/2005    LEIGH ANN mid and proximal LAD, ongoing 80% RCA; mild circumflex     HERNIA REPAIR Left 1991    [facility]     IMPLANT PACEMAKER  2/19/2003    Placed in [place] for sick sinus syndrome     REPLACE PACEMAKER GENERATOR  6/27/2013    Dual-chamber pacemaker by Dr SETH Pierre        Meds: reviewed  Current Outpatient Prescriptions   Medication Sig Dispense Refill     warfarin (COUMADIN) 2 MG tablet Take one tablet (2 mg) daily or as directed by INR Clinic 102 tablet 1      levalbuterol (XOPENEX) 0.63 MG/3ML neb solution Take 3 mLs (0.63 mg) by nebulization every 6 hours as needed for shortness of breath / dyspnea or wheezing 120 mL 5     losartan (COZAAR) 50 MG tablet Take 1 tablet (50 mg) by mouth daily 90 tablet 1     umeclidinium (INCRUSE ELLIPTA) 62.5 MCG/INH oral inhaler Inhale 1 puff into the lungs daily 3 Inhaler 1     Acetaminophen (TYLENOL PO) Take 1,000 mg by mouth At Bedtime       DOCUSATE SODIUM PO Take 100 mg by mouth At Bedtime       menthol (ICY HOT) 5 % PADS Apply 1 patch topically every 8 hours as needed for muscle soreness  0     polyethylene glycol (MIRALAX/GLYCOLAX) packet Take 1 packet by mouth 2 times daily        diltiazem 240 MG 24 hr ER capsule Take 1 capsule (240 mg) by mouth daily 90 capsule 3     Misc Natural Products (TART CHERRY ADVANCED) CAPS Cherry tart liquid       ZOLPIDEM TARTRATE PO Take 5 mg by mouth nightly as needed for sleep       ASPIRIN NOT PRESCRIBED, INTENTIONAL, Antiplatelet medication not prescribed intentionally due to Current anticoagulant therapy (warfarin/enoxaparin)  0     nitroglycerin (NITROSTAT) 0.4 MG SL tablet Place 1 tablet (0.4 mg) under the tongue every 5 minutes as needed 25 tablet 1     calcium carbonate (CALCIUM CARBONATE) 600 MG TABS tablet Take 1 tablet by mouth daily        folic acid (FOLVITE) 400 MCG tablet Take 400 mcg by mouth daily       Cholecalciferol (VITAMIN D) 2000 UNITS tablet Take 2,000 Units by mouth daily       ascorbic acid (VITAMIN C) 500 MG tablet Take 500 mg by mouth daily       Probiotic Product (PROBIOTIC & ACIDOPHILUS EX ST PO) Take 1 tablet by mouth daily.       Multiple Vitamins-Minerals (OCUVITE PO) Take 1 capsule by mouth daily.         Soc Hx: reviewed  Fam Hx: reviewed          Dannielle Young MD  Internal Medicine

## 2017-04-27 NOTE — NURSING NOTE
"Chief Complaint   Patient presents with     Hypertension     Blood Draw       Initial /64 (BP Location: Right arm, Patient Position: Chair, Cuff Size: Adult Regular)  Pulse 84  Temp 98.1  F (36.7  C) (Oral)  Ht 5' 3\" (1.6 m)  Wt 132 lb 11.2 oz (60.2 kg)  LMP  (LMP Unknown)  SpO2 96%  Breastfeeding? No  BMI 23.51 kg/m2 Estimated body mass index is 23.51 kg/(m^2) as calculated from the following:    Height as of this encounter: 5' 3\" (1.6 m).    Weight as of this encounter: 132 lb 11.2 oz (60.2 kg).  Medication Reconciliation: complete   Aurea Hillman CMA      "

## 2017-04-28 ASSESSMENT — PATIENT HEALTH QUESTIONNAIRE - PHQ9: SUM OF ALL RESPONSES TO PHQ QUESTIONS 1-9: 9

## 2017-05-02 ENCOUNTER — TELEPHONE (OUTPATIENT)
Dept: INTERNAL MEDICINE | Facility: CLINIC | Age: 82
End: 2017-05-02

## 2017-05-02 ENCOUNTER — ANTICOAGULATION THERAPY VISIT (OUTPATIENT)
Dept: ANTICOAGULATION | Facility: CLINIC | Age: 82
End: 2017-05-02
Payer: COMMERCIAL

## 2017-05-02 LAB — INR POINT OF CARE: 2 (ref 0.86–1.14)

## 2017-05-02 PROCEDURE — 85610 PROTHROMBIN TIME: CPT | Mod: QW

## 2017-05-02 PROCEDURE — 36416 COLLJ CAPILLARY BLOOD SPEC: CPT

## 2017-05-02 PROCEDURE — 99207 ZZC NO CHARGE NURSE ONLY: CPT

## 2017-05-02 NOTE — TELEPHONE ENCOUNTER
Pt stating have a NM Lexiscan stress test done Friday asking which medications should she hold for that day.  Provider please review and advise.

## 2017-05-02 NOTE — TELEPHONE ENCOUNTER
She doesn't take betablocker. I haven't told her to hold any meds. Did the cardiology told her to hold a med?

## 2017-05-02 NOTE — PROGRESS NOTES
"  ANTICOAGULATION FOLLOW-UP CLINIC VISIT    Patient Name:  Leonor Mercado  Date:  5/2/2017  Contact Type:  Face to Face    SUBJECTIVE:     Patient Findings     Positives No Problem Findings           OBJECTIVE    INR Protime   Date Value Ref Range Status   05/02/2017 2.0 (A) 0.86 - 1.14 Final       ASSESSMENT / PLAN  INR assessment THER    Recheck INR In: 4 WEEKS    INR Location Clinic      Anticoagulation Summary as of 5/2/2017     INR goal 2.0-3.0   Today's INR 2.0   Maintenance plan 2 mg (2 mg x 1) every day   Full instructions 2 mg every day   Weekly total 14 mg   No change documented Dilma Rider, RN   Plan last modified Yoana Waddell RN (12/15/2016)   Next INR check 5/30/2017   Priority INR   Target end date     Indications   Long-term (current) use of anticoagulants [Z79.01] [Z79.01]  Chronic atrial fibrillation (H) [I48.2]         Anticoagulation Episode Summary     INR check location     Preferred lab     Send INR reminders to [facility]    Comments Pt's 1st name is pronounced \"ondray\"      Anticoagulation Care Providers     Provider Role Specialty Phone number    Dannielle Darby MD Responsible Internal Medicine [phone]            See the Encounter Report to view Anticoagulation Flowsheet and Dosing Calendar (Go to Encounters tab in chart review, and find the Anticoagulation Therapy Visit)    Dosage adjustment made based on physician directed care plan.  Dilma Rider RN               "

## 2017-05-02 NOTE — MR AVS SNAPSHOT
Leonor Mercado   5/2/2017 2:00 PM   Anticoagulation Therapy Visit    Description:  89 year old female   Provider:  [facility]   Department:  [facility]           INR as of 5/2/2017     Today's INR 2.0      Anticoagulation Summary as of 5/2/2017     INR goal 2.0-3.0   Today's INR 2.0   Full instructions 2 mg every day   Next INR check 5/30/2017    Indications   Long-term (current) use of anticoagulants [Z79.01] [Z79.01]  Chronic atrial fibrillation (H) [I48.2]         Your next Anticoagulation Clinic appointment(s)     May 30, 2017  2:00 PM CDT   Anticoagulation Visit with [facility]   [facility]    [address]  [facility] [address] [phone]              Contact Numbers     [facility] Phone Numbers:  Anticoagulation Clinic Appointments : [phone]  Anticoagulation Nurse: [phone]         May 2017 Details    Sun Mon Tue Wed Thu Fri Sat      1               2      2 mg   See details      3      2 mg         4      2 mg         5      2 mg         6      2 mg           7      2 mg         8      2 mg         9      2 mg         10      2 mg         11      2 mg         12      2 mg         13      2 mg           14      2 mg         15      2 mg         16      2 mg         17      2 mg         18      2 mg         19      2 mg         20      2 mg           21      2 mg         22      2 mg         23      2 mg         24      2 mg         25      2 mg         26      2 mg         27      2 mg           28      2 mg         29      2 mg         30            31                   Date Details   05/02 This INR check       Date of next INR:  5/30/2017         How to take your warfarin dose     To take:  2 mg Take 1 of the 2 mg tablets.

## 2017-05-04 NOTE — TELEPHONE ENCOUNTER
Call to pt. States letter she received stated to check with PCP to see if she needs to hold anything. Advised MD did not recommend her to hold anything.

## 2017-05-05 ENCOUNTER — HOSPITAL ENCOUNTER (OUTPATIENT)
Dept: NUCLEAR MEDICINE | Facility: CLINIC | Age: 82
Setting detail: NUCLEAR MEDICINE
End: 2017-05-05
Attending: INTERNAL MEDICINE
Payer: MEDICARE

## 2017-05-05 ENCOUNTER — HOSPITAL ENCOUNTER (OUTPATIENT)
Dept: CARDIOLOGY | Facility: CLINIC | Age: 82
Discharge: HOME | End: 2017-05-05
Attending: INTERNAL MEDICINE | Admitting: INTERNAL MEDICINE
Payer: MEDICARE

## 2017-05-05 DIAGNOSIS — E78.5 HYPERLIPIDEMIA LDL GOAL <100: ICD-10-CM

## 2017-05-05 DIAGNOSIS — R07.9 CHEST PAIN, UNSPECIFIED TYPE: ICD-10-CM

## 2017-05-05 DIAGNOSIS — I25.10 CORONARY ARTERY DISEASE INVOLVING NATIVE CORONARY ARTERY OF NATIVE HEART WITHOUT ANGINA PECTORIS: ICD-10-CM

## 2017-05-05 PROCEDURE — 93018 CV STRESS TEST I&R ONLY: CPT | Performed by: INTERNAL MEDICINE

## 2017-05-05 PROCEDURE — 78452 HT MUSCLE IMAGE SPECT MULT: CPT | Mod: 26 | Performed by: INTERNAL MEDICINE

## 2017-05-05 PROCEDURE — 78452 HT MUSCLE IMAGE SPECT MULT: CPT

## 2017-05-05 PROCEDURE — 93016 CV STRESS TEST SUPVJ ONLY: CPT | Performed by: INTERNAL MEDICINE

## 2017-05-05 PROCEDURE — 34300033 ZZH RX 343: Performed by: INTERNAL MEDICINE

## 2017-05-05 PROCEDURE — A9502 TC99M TETROFOSMIN: HCPCS | Performed by: INTERNAL MEDICINE

## 2017-05-05 RX ORDER — REGADENOSON 0.08 MG/ML
INJECTION, SOLUTION INTRAVENOUS
Status: COMPLETED
Start: 2017-05-05 | End: 2017-05-05

## 2017-05-05 RX ADMIN — REGADENOSON 0.4 MG: 0.08 INJECTION, SOLUTION INTRAVENOUS at 12:43

## 2017-05-05 RX ADMIN — TETROFOSMIN 11 MCI.: 0.23 INJECTION, POWDER, LYOPHILIZED, FOR SOLUTION INTRAVENOUS at 11:00

## 2017-05-05 RX ADMIN — TETROFOSMIN 31 MCI.: 0.23 INJECTION, POWDER, LYOPHILIZED, FOR SOLUTION INTRAVENOUS at 13:35

## 2017-05-05 NOTE — PROGRESS NOTES
Pre-procedure:    Initial vital signs: /80, HR 95, RR 16  Allergies: reviewed   Rhythm: Sinus  Medications taken within 48 hours of procedure: NA   Last Caffeine: afternoon  Lung sounds: CTA, no wheezing, crackles or rtx  Health History (COPD, Asthma, etc): NA    Procedure: Lexiscan  Reaction/symptoms after receiving Noa injection: Shortness of breath, chest pressure 3/10  Vital Signs:/70, HR 89, RR 18  Reversal agent: N/A    Post:   Resolution of symptoms?: YES  Vital signs: /70, HR 93, RR 16  Walk: NO  Return to Radiology

## 2017-05-12 ENCOUNTER — TELEPHONE (OUTPATIENT)
Dept: INTERNAL MEDICINE | Facility: CLINIC | Age: 82
End: 2017-05-12

## 2017-05-12 NOTE — TELEPHONE ENCOUNTER
Called pt, relay provider note below.     Pt states she continues to have the symptoms of if she gets up to the BR and then lays back down at night she has pounding heartbeat with SOB similar to when she needed stents a couple years ago.     See 04/27 OV note.     Sent to MDs as JACI.

## 2017-05-12 NOTE — TELEPHONE ENCOUNTER
Plan was for her to see cardiology next for next steps   I see no appointment as yet for this   If she worsens should go to ER

## 2017-05-12 NOTE — TELEPHONE ENCOUNTER
Pt calls, left vm asking for her 05/05 stress test results. No result note at this time.    Please advise, thanks.

## 2017-05-15 NOTE — TELEPHONE ENCOUNTER
"Patient advised, states she will call cardiology now and schedule appt.  States she refused to be seen in ER stating \"they don't do the things you want them to do anyway\".  Again advised that MD states pt is to be seen in ER of worsening of symptoms.  JADIEL Engle R.N.    "

## 2017-05-16 ENCOUNTER — HOSPITAL ENCOUNTER (OUTPATIENT)
Dept: CARDIOLOGY | Facility: CLINIC | Age: 82
Discharge: HOME | End: 2017-05-16
Attending: INTERNAL MEDICINE | Admitting: INTERNAL MEDICINE
Payer: MEDICARE

## 2017-05-16 ENCOUNTER — OFFICE VISIT (OUTPATIENT)
Dept: CARDIOLOGY | Facility: CLINIC | Age: 82
End: 2017-05-16
Payer: COMMERCIAL

## 2017-05-16 VITALS
HEART RATE: 66 BPM | DIASTOLIC BLOOD PRESSURE: 62 MMHG | HEIGHT: 63 IN | SYSTOLIC BLOOD PRESSURE: 144 MMHG | BODY MASS INDEX: 23.92 KG/M2 | WEIGHT: 135 LBS

## 2017-05-16 DIAGNOSIS — R00.2 PALPITATIONS: ICD-10-CM

## 2017-05-16 DIAGNOSIS — R00.2 PALPITATIONS: Primary | ICD-10-CM

## 2017-05-16 PROCEDURE — 99215 OFFICE O/P EST HI 40 MIN: CPT | Mod: 25 | Performed by: INTERNAL MEDICINE

## 2017-05-16 PROCEDURE — 0298T ZZC EXT ECG > 48HR TO 21 DAY REVIEW AND INTERPRETATN: CPT | Performed by: INTERNAL MEDICINE

## 2017-05-16 PROCEDURE — 0296T ZIO PATCH HOLTER: CPT

## 2017-05-16 NOTE — PROGRESS NOTES
HPI and Plan:   See dictation    Orders Placed This Encounter   Procedures     Zio Patch Monitor     No orders of the defined types were placed in this encounter.    There are no discontinued medications.      Encounter Diagnosis   Name Primary?     Palpitations Yes       CURRENT MEDICATIONS:  Current Outpatient Prescriptions   Medication Sig Dispense Refill     pravastatin (PRAVACHOL) 40 MG tablet Take 1 tablet (40 mg) by mouth daily 30 tablet 1     albuterol (PROAIR HFA/PROVENTIL HFA/VENTOLIN HFA) 108 (90 BASE) MCG/ACT Inhaler Inhale 2 puffs into the lungs every 6 hours as needed for shortness of breath / dyspnea or wheezing 1 Inhaler 1     nitroglycerin (NITROSTAT) 0.4 MG sublingual tablet For chest pain place 1 tablet under the tongue every 5 minutes for 3 doses. If symptoms persist 5 minutes after 1st dose call 911. 25 tablet 0     warfarin (COUMADIN) 2 MG tablet Take one tablet (2 mg) daily or as directed by INR Clinic 102 tablet 1     levalbuterol (XOPENEX) 0.63 MG/3ML neb solution Take 3 mLs (0.63 mg) by nebulization every 6 hours as needed for shortness of breath / dyspnea or wheezing 120 mL 5     losartan (COZAAR) 50 MG tablet Take 1 tablet (50 mg) by mouth daily 90 tablet 1     umeclidinium (INCRUSE ELLIPTA) 62.5 MCG/INH oral inhaler Inhale 1 puff into the lungs daily 3 Inhaler 1     Acetaminophen (TYLENOL PO) Take 1,000 mg by mouth At Bedtime       DOCUSATE SODIUM PO Take 100 mg by mouth At Bedtime       menthol (ICY HOT) 5 % PADS Apply 1 patch topically every 8 hours as needed for muscle soreness  0     polyethylene glycol (MIRALAX/GLYCOLAX) packet Take 1 packet by mouth 2 times daily        diltiazem 240 MG 24 hr ER capsule Take 1 capsule (240 mg) by mouth daily 90 capsule 3     Misc Natural Products (TART CHERRY ADVANCED) CAPS Cherry tart liquid       ZOLPIDEM TARTRATE PO Take 5 mg by mouth nightly as needed for sleep       ASPIRIN NOT PRESCRIBED, INTENTIONAL, Antiplatelet medication not prescribed  intentionally due to Current anticoagulant therapy (warfarin/enoxaparin)  0     nitroglycerin (NITROSTAT) 0.4 MG SL tablet Place 1 tablet (0.4 mg) under the tongue every 5 minutes as needed 25 tablet 1     calcium carbonate (CALCIUM CARBONATE) 600 MG TABS tablet Take 1 tablet by mouth daily        folic acid (FOLVITE) 400 MCG tablet Take 400 mcg by mouth daily       Cholecalciferol (VITAMIN D) 2000 UNITS tablet Take 2,000 Units by mouth daily       ascorbic acid (VITAMIN C) 500 MG tablet Take 500 mg by mouth daily       Probiotic Product (PROBIOTIC & ACIDOPHILUS EX ST PO) Take 1 tablet by mouth daily.       Multiple Vitamins-Minerals (OCUVITE PO) Take 1 capsule by mouth daily.         ALLERGIES     Allergies   Allergen Reactions     Chocolate Shortness Of Breath     Peanuts [Nuts] Shortness Of Breath     Amiodarone      pulm fibrosis       Baclofen      Confusion      Bactrim [Sulfamethoxazole W-Trimethoprim]      Difficulty swallowing     Doxycycline Other (See Comments)     Multiple complaints; she does not want this again.      Levaquin [Levofloxacin] Other (See Comments)     Multiple complaints; she will not take this again     Linzess [Linaclotide] Diarrhea       PAST MEDICAL HISTORY:  Past Medical History:   Diagnosis Date     Atrial fibrillation (H)     Sick sinus syndrome, PAT, AF     BCC (basal cell carcinoma of skin)     Face     CHF (congestive heart failure) (H)     mild in past     Chronic renal insufficiency      COPD (chronic obstructive pulmonary disease) (H)      Coronary artery disease     2-05[place]-CAD-taxus stentx2 2.5-12,2.5-12 in L[place]p and [place]AD[place], 80%nonDomRCA-LcxDom-mild,EF60%     Hyperlipidemia      Hypertension      IBS (irritable bowel syndrome)      Irritable bowel      Osteoporosis      Panic attack     questionable diagnosis     Pulmonary fibrosis (H)     do not use amiodarone     SSS (sick sinus syndrome) (H)     DDD pacer (see surgery history section)     Vertigo        PAST SURGICAL  HISTORY:  Past Surgical History:   Procedure Laterality Date     APPENDECTOMY       CARDIAC SURGERY      PPM, 2 STENTS2-05[place]-CAD-taxus stentx2 2.5-12,2.5-12 in LADp and LADm, 80%nonDomRCA-LcxDom-mild,EF60%     CORONARY ANGIOGRAPHY ADULT ORDER  1994    mild CAD,Normal LV function, No Mitral regurgitation, Prox LAD 30% stenosis. RCA prox and mid, 20-30%     ESOPHAGOSCOPY, GASTROSCOPY, DUODENOSCOPY (EGD), COMBINED N/A 2015    Procedure: COMBINED ESOPHAGOSCOPY, GASTROSCOPY, DUODENOSCOPY (EGD), BIOPSY SINGLE OR MULTIPLE;  Surgeon: Genevieve Leon MD;  Location: [facility]     H LEAD REVISION DUAL  2003    Revised in [place]-due to diaphram stim (original pacer placed in [place])     H LEAD REVISION DUAL  2014    Correction of reversal of A and V leads in [place]     HEART CATH, ANGIOPLASTY  2005    LEIGH ANN mid and proximal LAD, ongoing 80% RCA; mild circumflex     HERNIA REPAIR Left     [facility]     IMPLANT PACEMAKER  2003    Placed in [place] for sick sinus syndrome     REPLACE PACEMAKER GENERATOR  2013    Dual-chamber pacemaker by Dr SETH Pierre       FAMILY HISTORY:  Family History   Problem Relation Age of Onset     HEART DISEASE Father       age 84     Neurologic Disorder Mother      dementia     GASTROINTESTINAL DISEASE Daughter      liver transplant     Crohn Disease Daughter        SOCIAL HISTORY:  Social History     Social History     Marital status:      Spouse name: N/A     Number of children: 3     Years of education: N/A     Occupational History      Retired     Social History Main Topics     Smoking status: Former Smoker     Quit date: 1987     Smokeless tobacco: Never Used     Alcohol use 0.0 oz/week     0 Standard drinks or equivalent per week      Comment: 1 wkly     Drug use: No     Sexual activity: No     Other Topics Concern     Parent/Sibling W/ Cabg, Mi Or Angioplasty Before 65f 55m? Yes     Caffeine Concern Yes     decaf - some 1/2 caff     Sleep Concern Yes      "nocturia every 2 hours     Special Diet No     Exercise No     some walking in the house     Social History Narrative       Review of Systems:  Skin:  Negative pigmentation;bruising   Eyes:  Positive for glasses  ENT:  Positive for hearing loss  Respiratory:  Positive for shortness of breath;dyspnea on exertion  Cardiovascular:  Negative Positive for;edema;fatigue  Gastroenterology: Positive for constipation  Genitourinary:  Positive for retention  Musculoskeletal:  Positive for joint pain;back pain  Neurologic:  Positive for headaches  Psychiatric:  Positive for sleep disturbances;anxiety;excessive stress  Heme/Lymph/Imm:  Negative    Endocrine:  Negative      Physical Exam:  Vitals: /62  Pulse 66  Ht 1.6 m (5' 3\")  Wt 61.2 kg (135 lb)  LMP  (LMP Unknown)  BMI 23.91 kg/m2    Constitutional:  cooperative        Skin:  warm and dry to the touch        Head:  normocephalic        Eyes:  no xanthalasma        ENT:  no pallor or cyanosis        Neck:  carotid pulses are full and equal bilaterally, JVP normal, no carotid bruit, no thyromegaly        Chest:  normal breath sounds, clear to auscultation, normal A-P diameter, normal symmetry, normal respiratory excursion, no use of accessory muscles        Cardiac: normal S1 and S2;no S3 or S4;apical impulse not displaced irregular rhythm     systolic murmur;grade 1;apical          Abdomen:  abdomen soft, non-tender, BS normoactive, no mass, no HSM, no bruits        Vascular: pulses full and equal                                      Extremities and Back:           Neurological:  affect appropriate, oriented to time, person and place;no gross motor deficits          Recent Lab Results:  LIPID RESULTS:  Lab Results   Component Value Date    CHOL 153 03/15/2015    HDL 52 03/15/2015    LDL 75 03/15/2015    TRIG 129 03/15/2015    CHOLHDLRATIO 2.9 03/15/2015       LIVER ENZYME RESULTS:  Lab Results   Component Value Date    AST 18 11/15/2016    ALT 18 11/15/2016 "       CBC RESULTS:  Lab Results   Component Value Date    WBC 6.6 11/15/2016    RBC 4.31 11/15/2016    HGB 12.7 11/15/2016    HCT 39.1 11/15/2016    MCV 91 11/15/2016    MCH 29.5 11/15/2016    MCHC 32.5 11/15/2016    RDW 14.0 11/15/2016     11/15/2016       BMP RESULTS:  Lab Results   Component Value Date     11/15/2016    POTASSIUM 3.8 11/15/2016    CHLORIDE 104 11/15/2016    CO2 28 11/15/2016    ANIONGAP 6 11/15/2016    GLC 98 11/15/2016    BUN 24 11/15/2016    CR 1.17 (H) 11/15/2016    GFRESTIMATED 43 (L) 11/15/2016    GFRESTBLACK 53 (L) 11/15/2016    TRI 8.9 11/15/2016        A1C RESULTS:  Lab Results   Component Value Date    A1C 6.1 (H) 04/30/2014       INR RESULTS:  Lab Results   Component Value Date    INR 2.0 (A) 05/02/2017    INR 2.1 (A) 04/04/2017    INR 2.60 (H) 11/15/2016    INR 3.25 (H) 11/03/2016           CC  Dannielle Darby MD  [facility]  [address] [place] [address]

## 2017-05-16 NOTE — LETTER
"5/16/2017    Dannielle Darby MD  [address] [place] [address]    RE: Leonor Mercado       Dear Colleague,    I had the pleasure of seeing Leonor Mercado in the [facility].    I am seeing Leonor Mercado in Cardiology Clinic for an add-on visit.  Ms. Mercado is a delightful 89-year-old female accompanied by her nice and very attentive daughter.  Ms. Mercado has been followed through our clinic, primarily by Dr. Horton, for more than a decade.  She has a history of coronary artery disease which presented itself in [place] back in about 2004 and required placement of 2 stents, exact details unknown.  She also has a history of sick sinus syndrome with paroxysmal atrial fibrillation for which she is anticoagulated, previous intermittent bradycardia with pacemaker placed in 2003, and intermittent PAT.   In addition, she has a history of some pulmonary fibrosis and had been on amiodarone for a short period of time.  Other medical problems include chronic renal insufficiency, bilateral lower extremity venostasis, hyperlipidemia, hypertension and a history of COPD.  She is an ex-smoker and quit 30 years ago and is no longer exposed to secondhand smoke.      Ms. Mercado has been  for over 60 years and her  has become progressively more ill.  She is his primary caregiver and he is extremely dependent on her.  She spends most of her time helping him and is very stressed by this.  [facility] is planning [facility] within the next week or 2.      Ms. Mercado is here to see me about 2 types of events that are occurring.  Starting 5 weeks ago, she began having what she describes as \"spells\" of feeling hot pressure in her chest radiating into both of her arms, nonpleuritic.  This tends to come on after large meals but also occurs randomly.  She has not noticed any association with activity and since she is relatively " "active in caring for her , she is quite certain it is not related to activity.  These episodes last only about a minute and are associated with profound shortness of breath but not nausea or diaphoresis.  She feels disoriented during these episodes and feels like she is \"out of my body.\"  Her daughter witnessed one of these episodes a day or 2 ago and described her as being \"not there.\"  The onset of these symptoms are abrupt and they resolve abruptly as well.  She does not have associated sense of palpitations. On some days she has multiple events, some days no events.     The patient also describes some episodes of racing, pounding heart at night.  This occurs when she gets up to go to the bathroom and can last anywhere from a few minutes up to hours.  These are sometimes associated with some of the pressure in her chest but not usually.  She has been having these episodes for many years and last year when seen by Dr. Horton, a ZIO Patch was ordered, but because of intervening other medical problems, she never had that study completed.      The patient reports that with neither of these types of spells does she have any notable increase in her wheezing.  When she exerts herself, she does not develop any of the chest, arm, neck, shoulder or jaw discomfort, but she does recall that prior to her stenting, she was having a pounding chest at night, similar to what she has been experiencing more recently.      A nuclear stress test was done through our office and demonstrated normal LV function with normal perfusion.      The patient's risk factors are reasonably well controlled with a blood pressure of 144/62 and the most recent cholesterol, now 2 years old, showing a total of 153 with an LDL of 75.  The patient continues to use Pravachol for control of cholesterol as well as warfarin for stroke prevention and Cozaar for hypertension. She is not a smoker and there is not a strong family history of " CAD.    Outpatient Encounter Prescriptions as of 5/16/2017   Medication Sig Dispense Refill     albuterol (PROAIR HFA/PROVENTIL HFA/VENTOLIN HFA) 108 (90 BASE) MCG/ACT Inhaler Inhale 2 puffs into the lungs every 6 hours as needed for shortness of breath / dyspnea or wheezing 1 Inhaler 1     [DISCONTINUED] pravastatin (PRAVACHOL) 40 MG tablet Take 1 tablet (40 mg) by mouth daily 30 tablet 1     [DISCONTINUED] nitroglycerin (NITROSTAT) 0.4 MG sublingual tablet For chest pain place 1 tablet under the tongue every 5 minutes for 3 doses. If symptoms persist 5 minutes after 1st dose call 911. (Patient not taking: Reported on 6/27/2017) 25 tablet 0     warfarin (COUMADIN) 2 MG tablet Take one tablet (2 mg) daily or as directed by INR Clinic 102 tablet 1     levalbuterol (XOPENEX) 0.63 MG/3ML neb solution Take 3 mLs (0.63 mg) by nebulization every 6 hours as needed for shortness of breath / dyspnea or wheezing 120 mL 5     [DISCONTINUED] losartan (COZAAR) 50 MG tablet Take 1 tablet (50 mg) by mouth daily 90 tablet 1     [DISCONTINUED] umeclidinium (INCRUSE ELLIPTA) 62.5 MCG/INH oral inhaler Inhale 1 puff into the lungs daily 3 Inhaler 1     Acetaminophen (TYLENOL PO) Take 1,000 mg by mouth At Bedtime       DOCUSATE SODIUM PO Take 100 mg by mouth At Bedtime       menthol (ICY HOT) 5 % PADS Apply 1 patch topically every 8 hours as needed for muscle soreness  0     polyethylene glycol (MIRALAX/GLYCOLAX) packet Take 1 packet by mouth 2 times daily        [DISCONTINUED] diltiazem 240 MG 24 hr ER capsule Take 1 capsule (240 mg) by mouth daily 90 capsule 3     Misc Natural Products (TART CHERRY ADVANCED) CAPS Cherry tart liquid       ZOLPIDEM TARTRATE PO Take 5 mg by mouth nightly as needed for sleep       ASPIRIN NOT PRESCRIBED, INTENTIONAL, Antiplatelet medication not prescribed intentionally due to Current anticoagulant therapy (warfarin/enoxaparin)  0     nitroglycerin (NITROSTAT) 0.4 MG SL tablet Place 1 tablet (0.4 mg) under  the tongue every 5 minutes as needed 25 tablet 1     calcium carbonate (CALCIUM CARBONATE) 600 MG TABS tablet Take 1 tablet by mouth daily        folic acid (FOLVITE) 400 MCG tablet Take 400 mcg by mouth daily       Cholecalciferol (VITAMIN D) 2000 UNITS tablet Take 2,000 Units by mouth daily       ascorbic acid (VITAMIN C) 500 MG tablet Take 500 mg by mouth daily       Probiotic Product (PROBIOTIC & ACIDOPHILUS EX ST PO) Take 1 tablet by mouth daily.       Multiple Vitamins-Minerals (OCUVITE PO) Take 1 capsule by mouth daily.       No facility-administered encounter medications on file as of 5/16/2017.       ASSESSMENT AND PLAN:   1.  Daytime spells.  These consist of disorientation, pressure in the chest and radiation into the arms with associated shortness of breath.  These episodes last only a minute and often come on randomly but big meals tend to bring them.  I am not certain what these spells represent.  She seems to be aware of when her heart races and does not have any associated sense of palpitations.  She reports that this pain is nonpleuritic.  Possibilities include that this may be rhythm related or related to coronary artery disease.  This also could be Pulmonary.  The rapid onset would argue against this, however.  This could also conceivably represent a manifestation of the severe stress that she is under with her 's current medical conditions or it may be musculoskeletal discomfort that is severe.     I think our approach should be to do an event monitor to make sure that this is not rhythm and if that study shows no associated arrhythmias, plan on doing a coronary angiogram.  The nuclear stress test could represent a false-negative study.  She has a history of coronary artery disease, and believes her symptoms are similar to those experienced before her stenting procedure 14 years ago.   2.  Nighttime heart pounding.  She describes really a different sensation at night which has been  bothering her for quite some time.  A ZIO Patch was ordered in the past to evaluate this and I have reordered it.  Again, we will correlate symptoms with rhythm and decide on medication adjustments from there.  She is using Cardizem now.  We certainly could add a beta blocker to her medical regimen without difficulty, especially since she is supported by a pacemaker.   3.  Hyperlipidemia.  Given her advanced age, I think do not think we need to carefully monitor lipid levels.  Continue Pravachol indefinitely.   4.  Hypertension.  Blood pressure today is a bit high at 144/62 but given her age of 89 years, this is very acceptable and no medication changes are needed.   5.  Intermittent atrial fibrillation.  Review of her last full pacemaker interrogation, which was done in December, showed a 1300 mode switches with 42 minutes  being the longest mode switch.  Her pacemaker mode switchs at 140 beats per minute.  She is currently anticoagulated, which is certainly appropriate and as far as I can tell from her history, she is not aware of  her atrial fibrillation.      I appreciate the opportunity of participating in Ms. Mercado's care.  We will arrange a monitor to look for heart rate changes and if there is no correlation with her symptoms, I think an angiogram will be the next step despite a normal nuclear stress test.  I spent 45 minutes face to face, greater than 50% counseling.  Please do not hesitate to call if I can be of further assistance.     Sincerely,    Bryan Garcia MD     [facility]

## 2017-05-16 NOTE — PROGRESS NOTES
"HISTORY OF PRESENT ILLNESS:  I am seeing Leonor Mercado in Cardiology Clinic for an add-on visit.  Ms. Mercado is a delightful 89-year-old female accompanied by her nice and very attentive daughter.  Ms. Mercado has been followed through our clinic, primarily by Dr. Horton, for more than a decade.  She has a history of coronary artery disease which presented itself in [place] back in about 2004 and required placement of 2 stents, exact details unknown.  She also has a history of sick sinus syndrome with paroxysmal atrial fibrillation for which she is anticoagulated, previous intermittent bradycardia with pacemaker placed in 2003, and intermittent PAT.   In addition, she has a history of some pulmonary fibrosis and had been on amiodarone for a short period of time.  Other medical problems include chronic renal insufficiency, bilateral lower extremity venostasis, hyperlipidemia, hypertension and a history of COPD.  She is an ex-smoker and quit 30 years ago and is no longer exposed to secondhand smoke.      Ms. Mercado has been  for over 60 years and her  has become progressively more ill.  She is his primary caregiver and he is extremely dependent on her.  She spends most of her time helping him and is very stressed by this.  [facility] is planning on [facility] within the next week or 2.      Ms. Mercado is here to see me about 2 types of events that are occurring.  Starting 5 weeks ago, she began having what she describes as \"spells\" of feeling hot pressure in her chest radiating into both of her arms, nonpleuritic.  This tends to come on after large meals but also occurs randomly.  She has not noticed any association with activity and since she is relatively active in caring for her , she is quite certain it is not related to activity.  These episodes last only about a minute and are associated with profound shortness of breath but not nausea or diaphoresis.  She " "feels disoriented during these episodes and feels like she is \"out of my body.\"  Her daughter witnessed one of these episodes a day or 2 ago and described her as being \"not there.\"  The onset of these symptoms are abrupt and they resolve abruptly as well.  She does not have associated sense of palpitations. On some days she has multiple events, some days no events.     The patient also describes some episodes of racing, pounding heart at night.  This occurs when she gets up to go to the bathroom and can last anywhere from a few minutes up to hours.  These are sometimes associated with some of the pressure in her chest but not usually.  She has been having these episodes for many years and last year when seen by Dr. Horton, a ZIO Patch was ordered, but because of intervening other medical problems, she never had that study completed.      The patient reports that with neither of these types of spells does she have any notable increase in her wheezing.  When she exerts herself, she does not develop any of the chest, arm, neck, shoulder or jaw discomfort, but she does recall that prior to her stenting, she was having a pounding chest at night, similar to what she has been experiencing more recently.      A nuclear stress test was done through our office and demonstrated normal LV function with normal perfusion.      The patient's risk factors are reasonably well controlled with a blood pressure of 144/62 and the most recent cholesterol, now 2 years old, showing a total of 153 with an LDL of 75.  The patient continues to use Pravachol for control of cholesterol as well as warfarin for stroke prevention and Cozaar for hypertension. She is not a smoker and there is not a strong family history of CAD.     ASSESSMENT AND PLAN:   1.  Daytime spells.  These consist of disorientation, pressure in the chest and radiation into the arms with associated shortness of breath.  These episodes last only a minute and often come on " randomly but big meals tend to bring them.  I am not certain what these spells represent.  She seems to be aware of when her heart races and does not have any associated sense of palpitations.  She reports that this pain is nonpleuritic.  Possibilities include that this may be rhythm related or related to coronary artery disease.  This also could be Pulmonary.  The rapid onset would argue against this, however.  This could also conceivably represent a manifestation of the severe stress that she is under with her 's current medical conditions or it may be musculoskeletal discomfort that is severe.     I think our approach should be to do an event monitor to make sure that this is not rhythm and if that study shows no associated arrhythmias, plan on doing a coronary angiogram.  The nuclear stress test could represent a false-negative study.  She has a history of coronary artery disease, and believes her symptoms are similar to those experienced before her stenting procedure 14 years ago.   2.  Nighttime heart pounding.  She describes really a different sensation at night which has been bothering her for quite some time.  A ZIO Patch was ordered in the past to evaluate this and I have reordered it.  Again, we will correlate symptoms with rhythm and decide on medication adjustments from there.  She is using Cardizem now.  We certainly could add a beta blocker to her medical regimen without difficulty, especially since she is supported by a pacemaker.   3.  Hyperlipidemia.  Given her advanced age, I think do not think we need to carefully monitor lipid levels.  Continue Pravachol indefinitely.   4.  Hypertension.  Blood pressure today is a bit high at 144/62 but given her age of 89 years, this is very acceptable and no medication changes are needed.   5.  Intermittent atrial fibrillation.  Review of her last full pacemaker interrogation, which was done in December, showed a 1300 mode switches with 42 minutes   being the longest mode switch.  Her pacemaker mode switchs at 140 beats per minute.  She is currently anticoagulated, which is certainly appropriate and as far as I can tell from her history, she is not aware of  her atrial fibrillation.      I appreciate the opportunity of participating in Ms. Marcus's care.  We will arrange a monitor to look for heart rate changes and if there is no correlation with her symptoms, I think an angiogram will be the next step despite a normal nuclear stress test.  I spent 45 minutes face to face, greater than 50% counseling.  Please do not hesitate to call if I can be of further assistance.      cc:      Dannielle Darby MD    [facility]   [address]   [place] [address]         LINNETTE BARTH [facility], [facility]             D: 2017 09:30   T: 2017 14:54   MT: [id number]      Name:     AILYN MARCUS   MRN:      [id number]        Account:      [id number]   :      1926           Service Date: 2017      Document: [id number]

## 2017-05-16 NOTE — MR AVS SNAPSHOT
After Visit Summary   5/16/2017    Leonor Mercado    MRN: [id number]           Patient Information     Date Of Birth          5/6/1926        Visit Information        Provider Department      5/16/2017 8:30 AM Bryan Garcia MD [facility] [place]        Today's Diagnoses     Palpitations    -  1       Follow-ups after your visit        Your next 10 appointments already scheduled     May 16, 2017  9:30 AM CDT   ZIOPATCH MONITOR with [facility] DEVICE [facility] ([facility]    [address]  [facility] [address]   [phone]           LOCATION - [address], [address] [place] [address] **Please check-in at the [facility] Registration Office, [address], in the [facility] building. When you are finished registering, please go to [address] and have a seat.            May 30, 2017  2:00 PM CDT   Anticoagulation Visit with [facility]   [facility]    [address]  [facility] [id number] [phone]            Cristhian 15, 2017  8:45 AM CDT   Phone Device Check with NUÑEZ TECH2   [facility] ([facility])    [address]  [facility] [id number] [phone]              Future tests that were ordered for you today     Open Future Orders        Priority Expected Expires Ordered    Zio Patch Monitor Routine 5/23/2017 5/16/2018 5/16/2017            Who to contact     If you have questions or need follow up information about today's clinic visit or your schedule please contact [facility] [place] directly at [phone].  Normal or non-critical lab and imaging results will be communicated to you by MyChart, letter or phone within 4 business days after the clinic has received the results. If you do not hear  "from us within 7 days, please contact the clinic through [facility] or phone. If you have a critical or abnormal lab result, we will notify you by phone as soon as possible.  Submit refill requests through [facility] Party or call your pharmacy and they will forward the refill request to us. Please allow 3 business days for your refill to be completed.          Additional Information About Your Visit        IForem[employer]ista Information     House Party lets you send messages to your doctor, view your test results, renew your prescriptions, schedule appointments and more. To sign up, go to [email or web address] . Click on \"Log in\" on the left side of the screen, which will take you to the Welcome page. Then click on \"Sign up Now\" on the right side of the page.     You will be asked to enter the access code listed below, as well as some personal information. Please follow the directions to create your username and password.     Your access code is: [email or web address]  Expires: 2017  9:56 AM     Your access code will  in 90 days. If you need help or a new code, please call your [place] clinic or [phone].        Care EveryWhere ID     This is your Care EveryWhere ID. This could be used by other organizations to access your [place] medical records  [id number]        Your Vitals Were     Pulse Height Last Period BMI (Body Mass Index)          66 1.6 m (5' 3\") (LMP Unknown) 23.91 kg/m2         Blood Pressure from Last 3 Encounters:   17 144/62   17 112/64   17 130/66    Weight from Last 3 Encounters:   17 61.2 kg (135 lb)   17 60.2 kg (132 lb 11.2 oz)   17 60.8 kg (134 lb)               Primary Care Provider Office Phone # Fax #    Dannielle Darby [phone]       [facility] [address] [facility] [address]        Thank you!     Thank you for choosing [facility] [place]  for your care. Our goal is always to " provide you with excellent care. Hearing back from our patients is one way we can continue to improve our services. Please take a few minutes to complete the written survey that you may receive in the mail after your visit with us. Thank you!             Your Updated Medication List - Protect others around you: Learn how to safely use, store and throw away your medicines at www.disposemymeds.org.          This list is accurate as of: 5/16/17  9:25 AM.  Always use your most recent med list.                   Brand Name Dispense Instructions for use    albuterol 108 (90 BASE) MCG/ACT Inhaler    PROAIR HFA/PROVENTIL HFA/VENTOLIN HFA    1 Inhaler    Inhale 2 puffs into the lungs every 6 hours as needed for shortness of breath / dyspnea or wheezing       ascorbic acid 500 MG tablet    VITAMIN C     Take 500 mg by mouth daily       ASPIRIN NOT PRESCRIBED    INTENTIONAL     Antiplatelet medication not prescribed intentionally due to Current anticoagulant therapy (warfarin/enoxaparin)       calcium carbonate 600 MG tablet   Generic drug:  calcium carbonate      Take 1 tablet by mouth daily       diltiazem 240 MG 24 hr capsule     90 capsule    Take 1 capsule (240 mg) by mouth daily       DOCUSATE SODIUM PO      Take 100 mg by mouth At Bedtime       folic acid 400 MCG tablet    FOLVITE     Take 400 mcg by mouth daily       levalbuterol 0.63 MG/3ML neb solution    XOPENEX    120 mL    Take 3 mLs (0.63 mg) by nebulization every 6 hours as needed for shortness of breath / dyspnea or wheezing       losartan 50 MG tablet    COZAAR    90 tablet    Take 1 tablet (50 mg) by mouth daily       menthol 5 % Pads    ICY HOT     Apply 1 patch topically every 8 hours as needed for muscle soreness       * nitroglycerin 0.4 MG sublingual tablet    NITROSTAT    25 tablet    Place 1 tablet (0.4 mg) under the tongue every 5 minutes as needed       * nitroglycerin 0.4 MG sublingual tablet    NITROSTAT    25 tablet    For chest pain place 1  tablet under the tongue every 5 minutes for 3 doses. If symptoms persist 5 minutes after 1st dose call 911.       OCUVITE PO      Take 1 capsule by mouth daily.       polyethylene glycol Packet    MIRALAX/GLYCOLAX     Take 1 packet by mouth 2 times daily       pravastatin 40 MG tablet    PRAVACHOL    30 tablet    Take 1 tablet (40 mg) by mouth daily       PROBIOTIC & ACIDOPHILUS EX ST PO      Take 1 tablet by mouth daily.       TART CHERRY ADVANCED Caps      Aiken tart liquid       TYLENOL PO      Take 1,000 mg by mouth At Bedtime       umeclidinium 62.5 MCG/INH oral inhaler    INCRUSE ELLIPTA    3 Inhaler    Inhale 1 puff into the lungs daily       vitamin D 2000 UNITS tablet      Take 2,000 Units by mouth daily       warfarin 2 MG tablet    COUMADIN    102 tablet    Take one tablet (2 mg) daily or as directed by INR Clinic       ZOLPIDEM TARTRATE PO      Take 5 mg by mouth nightly as needed for sleep       * Notice:  This list has 2 medication(s) that are the same as other medications prescribed for you. Read the directions carefully, and ask your doctor or other care provider to review them with you.

## 2017-05-30 ENCOUNTER — ANTICOAGULATION THERAPY VISIT (OUTPATIENT)
Dept: ANTICOAGULATION | Facility: CLINIC | Age: 82
End: 2017-05-30
Payer: COMMERCIAL

## 2017-05-30 DIAGNOSIS — I48.20 CHRONIC ATRIAL FIBRILLATION (H): ICD-10-CM

## 2017-05-30 DIAGNOSIS — Z79.01 LONG-TERM (CURRENT) USE OF ANTICOAGULANTS: ICD-10-CM

## 2017-05-30 LAB — INR POINT OF CARE: 2.1 (ref 0.86–1.14)

## 2017-05-30 PROCEDURE — 36416 COLLJ CAPILLARY BLOOD SPEC: CPT

## 2017-05-30 PROCEDURE — 85610 PROTHROMBIN TIME: CPT | Mod: QW

## 2017-05-30 PROCEDURE — 99207 ZZC NO CHARGE NURSE ONLY: CPT

## 2017-05-30 NOTE — MR AVS SNAPSHOT
Leonor Mercado   5/30/2017 2:00 PM   Anticoagulation Therapy Visit    Description:  89 year old female   Provider:  [facility]   Department:  [facility]           INR as of 5/30/2017     Today's INR 2.1      Anticoagulation Summary as of 5/30/2017     INR goal 2.0-3.0   Today's INR 2.1   Full instructions 2 mg every day   Next INR check 6/27/2017    Indications   Long-term (current) use of anticoagulants [Z79.01] [Z79.01]  Chronic atrial fibrillation (H) [I48.2]         Your next Anticoagulation Clinic appointment(s)     Jun 27, 2017  2:00 PM CDT   Anticoagulation Visit with [facility]   [facility]    [address] [facility] [address]  [facility] [address] [phone]              Contact Numbers     [facility] Phone Numbers:  Anticoagulation Clinic Appointments : [phone]  Anticoagulation Nurse: [phone]         May 2017 Details    Sun Mon Tue Wed Thu Fri Sat      1               2               3               4               5               6                 7               8               9               10               11               12               13                 14               15               16               17               18               19               20                 21               22               23               24               25               26               27                 28               29               30      2 mg   See details      31      2 mg             Date Details   05/30 This INR check               How to take your warfarin dose     To take:  2 mg Take 1 of the 2 mg tablets.           June 2017 Details    Sun Mon Tue Wed Thu Fri Sat         1      2 mg         2      2 mg         3      2 mg           4      2 mg         5      2 mg         6      2 mg         7      2 mg         8      2 mg         9      2 mg         10      2 mg           11      2 mg          12      2 mg         13      2 mg         14      2 mg         15      2 mg         16      2 mg         17      2 mg           18      2 mg         19      2 mg         20      2 mg         21      2 mg         22      2 mg         23      2 mg         24      2 mg           25      2 mg         26      2 mg         27            28               29               30                 Date Details   No additional details    Date of next INR:  6/27/2017         How to take your warfarin dose     To take:  2 mg Take 1 of the 2 mg tablets.

## 2017-05-30 NOTE — PROGRESS NOTES
"  ANTICOAGULATION FOLLOW-UP CLINIC VISIT    Patient Name:  Leonor Mercado  Date:  5/30/2017  Contact Type:  Face to Face    SUBJECTIVE:     Patient Findings     Positives No Problem Findings           OBJECTIVE    INR Protime   Date Value Ref Range Status   05/30/2017 2.1 (A) 0.86 - 1.14 Final       ASSESSMENT / PLAN  INR assessment THER    Recheck INR In: 4 WEEKS    INR Location Clinic      Anticoagulation Summary as of 5/30/2017     INR goal 2.0-3.0   Today's INR 2.1   Maintenance plan 2 mg (2 mg x 1) every day   Full instructions 2 mg every day   Weekly total 14 mg   No change documented Dilma Rider, RN   Plan last modified Yoana Waddell RN (12/15/2016)   Next INR check 6/27/2017   Priority INR   Target end date     Indications   Long-term (current) use of anticoagulants [Z79.01] [Z79.01]  Chronic atrial fibrillation (H) [I48.2]         Anticoagulation Episode Summary     INR check location     Preferred lab     Send INR reminders to [facility]    Comments Pt's 1st name is pronounced \"ondray\"      Anticoagulation Care Providers     Provider Role Specialty Phone number    Dannielle Darby MD Responsible Internal Medicine [phone]            See the Encounter Report to view Anticoagulation Flowsheet and Dosing Calendar (Go to Encounters tab in chart review, and find the Anticoagulation Therapy Visit)    Dosage adjustment made based on physician directed care plan.    Dilma Rider RN               "

## 2017-06-15 ENCOUNTER — ALLIED HEALTH/NURSE VISIT (OUTPATIENT)
Dept: CARDIOLOGY | Facility: CLINIC | Age: 82
End: 2017-06-15
Payer: COMMERCIAL

## 2017-06-15 DIAGNOSIS — Z95.0 CARDIAC PACEMAKER IN SITU: Primary | ICD-10-CM

## 2017-06-15 PROCEDURE — 93293 PM PHONE R-STRIP DEVICE EVAL: CPT | Performed by: INTERNAL MEDICINE

## 2017-06-15 NOTE — NURSING NOTE
PHONE TELETRACE  Appropriate pacemaker function at time of check. Magnet response WNL.  F/U scheduled q 3 months.

## 2017-06-15 NOTE — MR AVS SNAPSHOT
After Visit Summary   6/15/2017    Leonor Mercado    MRN: [id number]           Patient Information     Date Of Birth          5/6/1926        Visit Information        Provider Department      6/15/2017 8:45 AM NUÑEZ TECH2 [facility] HEART [facility]        Today's Diagnoses     Cardiac pacemaker in situ    -  1       Follow-ups after your visit        Your next 10 appointments already scheduled     Jun 27, 2017  1:40 PM CDT   SHORT with Dannielle Darby MD   [facility]    [address]  [facility] [id number] [phone]            Jun 27, 2017  2:00 PM CDT   Anticoagulation Visit with [facility]   [facility]    [address]  [facility] [id number] [phone]            Sep 21, 2017  9:00 AM CDT   Phone Device Check with NUÑEZ TECH2   [facility] ([facility])    [address] [place], [place] [id number]  [facility] [address] [phone]              Who to contact     If you have questions or need follow up information about today's clinic visit or your schedule please contact [facility] directly at [phone].  Normal or non-critical lab and imaging results will be communicated to you by MyChart, letter or phone within 4 business days after the clinic has received the results. If you do not hear from us within 7 days, please contact the clinic through MyChart or phone. If you have a critical or abnormal lab result, we will notify you by phone as soon as possible.  Submit refill requests through [employer] or call your pharmacy and they will forward the refill request to us. Please allow 3 business days for your refill to be completed.          Additional Information About Your Visit        [employer] Information     [employer] lets you send  "messages to your doctor, view your test results, renew your prescriptions, schedule appointments and more. To sign up, go to [email or web address] . Click on \"Log in\" on the left side of the screen, which will take you to the Welcome page. Then click on \"Sign up Now\" on the right side of the page.     You will be asked to enter the access code listed below, as well as some personal information. Please follow the directions to create your username and password.     Your access code is: [email or web address]  Expires: 2017  8:56 AM     Your access code will  in 90 days. If you need help or a new code, please call your [facility] clinic or [phone].        Care EveryWhere ID     This is your Care EveryWhere ID. This could be used by other organizations to access your [facility] medical records  [id number]        Your Vitals Were     Last Period                   (LMP Unknown)            Blood Pressure from Last 3 Encounters:   17 144/62   17 112/64   17 130/66    Weight from Last 3 Encounters:   17 61.2 kg (135 lb)   17 60.2 kg (132 lb 11.2 oz)   17 60.8 kg (134 lb)              We Performed the Following     PM PHONE R STRIP EVAL UP TO 90 DAYS ([phone])        Primary Care Provider Office Phone # Fax #    Dannielle Bria Darby [phone]       [facility] [address] [place] [address]        Thank you!     Thank you for choosing [facility] AT [place]  for your care. Our goal is always to provide you with excellent care. Hearing back from our patients is one way we can continue to improve our services. Please take a few minutes to complete the written survey that you may receive in the mail after your visit with us. Thank you!             Your Updated Medication List - Protect others around you: Learn how to safely use, store and throw away your medicines at www.disposemymeds.org.          This list " is accurate as of: 6/15/17  8:56 AM.  Always use your most recent med list.                   Brand Name Dispense Instructions for use    albuterol 108 (90 BASE) MCG/ACT Inhaler    PROAIR HFA/PROVENTIL HFA/VENTOLIN HFA    1 Inhaler    Inhale 2 puffs into the lungs every 6 hours as needed for shortness of breath / dyspnea or wheezing       ascorbic acid 500 MG tablet    VITAMIN C     Take 500 mg by mouth daily       ASPIRIN NOT PRESCRIBED    INTENTIONAL     Antiplatelet medication not prescribed intentionally due to Current anticoagulant therapy (warfarin/enoxaparin)       calcium carbonate 600 MG tablet   Generic drug:  calcium carbonate      Take 1 tablet by mouth daily       diltiazem 240 MG 24 hr capsule     90 capsule    Take 1 capsule (240 mg) by mouth daily       DOCUSATE SODIUM PO      Take 100 mg by mouth At Bedtime       folic acid 400 MCG tablet    FOLVITE     Take 400 mcg by mouth daily       levalbuterol 0.63 MG/3ML neb solution    XOPENEX    120 mL    Take 3 mLs (0.63 mg) by nebulization every 6 hours as needed for shortness of breath / dyspnea or wheezing       losartan 50 MG tablet    COZAAR    90 tablet    Take 1 tablet (50 mg) by mouth daily       menthol 5 % Pads    ICY HOT     Apply 1 patch topically every 8 hours as needed for muscle soreness       * nitroglycerin 0.4 MG sublingual tablet    NITROSTAT    25 tablet    Place 1 tablet (0.4 mg) under the tongue every 5 minutes as needed       * nitroglycerin 0.4 MG sublingual tablet    NITROSTAT    25 tablet    For chest pain place 1 tablet under the tongue every 5 minutes for 3 doses. If symptoms persist 5 minutes after 1st dose call 911.       OCUVITE PO      Take 1 capsule by mouth daily.       polyethylene glycol Packet    MIRALAX/GLYCOLAX     Take 1 packet by mouth 2 times daily       pravastatin 40 MG tablet    PRAVACHOL    30 tablet    Take 1 tablet (40 mg) by mouth daily       PROBIOTIC & ACIDOPHILUS EX ST PO      Take 1 tablet by mouth  daily.       TART CHERRY ADVANCED Caps      Aiken tart liquid       TYLENOL PO      Take 1,000 mg by mouth At Bedtime       umeclidinium 62.5 MCG/INH oral inhaler    INCRUSE ELLIPTA    3 Inhaler    Inhale 1 puff into the lungs daily       vitamin D 2000 UNITS tablet      Take 2,000 Units by mouth daily       warfarin 2 MG tablet    COUMADIN    102 tablet    Take one tablet (2 mg) daily or as directed by INR Clinic       ZOLPIDEM TARTRATE PO      Take 5 mg by mouth nightly as needed for sleep       * Notice:  This list has 2 medication(s) that are the same as other medications prescribed for you. Read the directions carefully, and ask your doctor or other care provider to review them with you.

## 2017-06-20 ENCOUNTER — TELEPHONE (OUTPATIENT)
Dept: CARDIOLOGY | Facility: CLINIC | Age: 82
End: 2017-06-20

## 2017-06-20 DIAGNOSIS — R07.9 CHEST PAIN, UNSPECIFIED TYPE: ICD-10-CM

## 2017-06-20 DIAGNOSIS — R07.89 CHEST PRESSURE: ICD-10-CM

## 2017-06-20 DIAGNOSIS — I25.10 CORONARY ARTERY DISEASE INVOLVING NATIVE CORONARY ARTERY OF NATIVE HEART WITHOUT ANGINA PECTORIS: ICD-10-CM

## 2017-06-20 DIAGNOSIS — E78.5 HYPERLIPIDEMIA LDL GOAL <100: ICD-10-CM

## 2017-06-20 DIAGNOSIS — R06.02 SOB (SHORTNESS OF BREATH): Primary | ICD-10-CM

## 2017-06-20 NOTE — TELEPHONE ENCOUNTER
Dr. Garcia reviewed Zio Patch from 05-30-17.     Notes Recorded by Malaika Yusuf RN on 6/15/2017 at 11:56 AM  Dr. Garcia- Patient was seen on 05-16-17 for daytime spells. Symptoms: disorientation, pressure on her chest radiating to arms, and shortness of breath. She also c/o night-time (heart) pounding. Patient under severe stress due to her husbands medical condition.      Review Zio Patch: 4-12 beat SVT, symptoms did not correlate with arrhythmia.      Order left coronary angiogram (per your notes)?     Recommendations? F/u? TGarbersRN    Notes Recorded by Bryan Garcia MD on 6/16/2017 at 8:56 AM  I was hoping the monitor would give us our answer.  She is old but still fit.  Let's move forward with the angiogram.    I called patient to review her results and recommendations. Patient was not available. I left patient a message to return my call.     TGarbers RN  [facility]

## 2017-06-20 NOTE — TELEPHONE ENCOUNTER
Pravastatin     Last Written Prescription Date: 04/27/17  Last Fill Quantity: 30, # refills: 1  Last Office Visit with G, P or [facility] prescribing provider: 04/27/17  Next 5 appointments (look out 90 days)     Jun 27, 2017  1:40 PM CDT   SHORT with Dannielle Darby MD   [facility] ([facility]    [address]  [facility] [address]   [phone]                   Lab Results   Component Value Date    CHOL 153 03/15/2015     Lab Results   Component Value Date    HDL 52 03/15/2015     Lab Results   Component Value Date    LDL 75 03/15/2015     Lab Results   Component Value Date    TRIG 129 03/15/2015     Lab Results   Component Value Date    CHOLHDLRATIO 2.9 03/15/2015       Labs showing if normal/abnormal  Lab Results   Component Value Date    CHOL 153 03/15/2015    TRIG 129 03/15/2015    HDL 52 03/15/2015    LDL 75 03/15/2015    VLDL 26 03/15/2015    CHOLHDLRATIO 2.9 03/15/2015

## 2017-06-20 NOTE — TELEPHONE ENCOUNTER
Last OV discussing hyperlipidemia: 04/27/17.    Routing refill request to provider for review/approval because:  Labs not current:  Lipid Panel    Pt's upcoming appt isn't for hyperlipidemia. Would you like pt to get fasting labs before upcoming appt?    Please advise, thanks.

## 2017-06-21 RX ORDER — PRAVASTATIN SODIUM 40 MG
TABLET ORAL
Qty: 30 TABLET | Refills: 3 | Status: SHIPPED | OUTPATIENT
Start: 2017-06-21 | End: 2017-11-05

## 2017-06-21 NOTE — TELEPHONE ENCOUNTER
I tried to call patient to review her recommendations. I left patient another message to return my call.  I also left a message for Les (patient's daughter) to return my call, Les my be able to assist me in getting a hold of patient.      Pamela SALGADO  [facility]

## 2017-06-22 NOTE — TELEPHONE ENCOUNTER
I spoke to patient. Zio Patch results and recommendations were reviewed with patient. Patient agreed to undergo a Left Heart Cath. Patient needs and H+P visit to review her risks and benefits. Messaged Dr. Garcia to review if he would like to review this over the phone or have patient coming in and see him or an BARNEY.    Pamela SALGADO  [facility]

## 2017-06-23 NOTE — TELEPHONE ENCOUNTER
Dr. Garcia would like to see patient to review her Zio Patch results and discuss his recommendations of proceeding with a left heart cath. I called patient to review. Patient was not available. I left patient a message to return my call. Dr. Garcia has openings on Thursday 06-29-17.     TGarbers RN  [facility]

## 2017-06-27 ENCOUNTER — OFFICE VISIT (OUTPATIENT)
Dept: INTERNAL MEDICINE | Facility: CLINIC | Age: 82
End: 2017-06-27
Payer: COMMERCIAL

## 2017-06-27 ENCOUNTER — ANTICOAGULATION THERAPY VISIT (OUTPATIENT)
Dept: ANTICOAGULATION | Facility: CLINIC | Age: 82
End: 2017-06-27
Payer: COMMERCIAL

## 2017-06-27 VITALS
HEIGHT: 63 IN | SYSTOLIC BLOOD PRESSURE: 118 MMHG | WEIGHT: 135 LBS | OXYGEN SATURATION: 96 % | HEART RATE: 91 BPM | TEMPERATURE: 98.4 F | DIASTOLIC BLOOD PRESSURE: 68 MMHG | BODY MASS INDEX: 23.92 KG/M2

## 2017-06-27 DIAGNOSIS — J44.9 CHRONIC OBSTRUCTIVE PULMONARY DISEASE, UNSPECIFIED COPD TYPE (H): ICD-10-CM

## 2017-06-27 DIAGNOSIS — J84.10 PULMONARY FIBROSIS (H): ICD-10-CM

## 2017-06-27 DIAGNOSIS — I48.20 CHRONIC ATRIAL FIBRILLATION (H): ICD-10-CM

## 2017-06-27 DIAGNOSIS — Z79.01 LONG-TERM (CURRENT) USE OF ANTICOAGULANTS: ICD-10-CM

## 2017-06-27 DIAGNOSIS — Z91.89 DRIVING SAFETY ISSUE: Primary | ICD-10-CM

## 2017-06-27 LAB — INR POINT OF CARE: 2.8 (ref 0.86–1.14)

## 2017-06-27 PROCEDURE — 99207 ZZC NO CHARGE LOS: CPT | Performed by: INTERNAL MEDICINE

## 2017-06-27 PROCEDURE — 99207 ZZC NO CHARGE NURSE ONLY: CPT

## 2017-06-27 PROCEDURE — 85610 PROTHROMBIN TIME: CPT | Mod: QW

## 2017-06-27 PROCEDURE — 36416 COLLJ CAPILLARY BLOOD SPEC: CPT
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
## 2017-06-27 NOTE — PROGRESS NOTES
"  ANTICOAGULATION FOLLOW-UP CLINIC VISIT    Patient Name:  Leonor Mercado  Date:  6/27/2017  Contact Type:  Face to Face    SUBJECTIVE:     Patient Findings     Positives No Problem Findings           OBJECTIVE    INR Protime   Date Value Ref Range Status   06/27/2017 2.8 (A) 0.86 - 1.14 Final       ASSESSMENT / PLAN  INR assessment THER    Recheck INR In: 3 WEEKS    INR Location Clinic      Anticoagulation Summary as of 6/27/2017     INR goal 2.0-3.0   Today's INR 2.8   Maintenance plan 2 mg (2 mg x 1) every day   Full instructions 2 mg every day   Weekly total 14 mg   No change documented Dilma Rider, RN   Plan last modified Yoana Waddell RN (12/15/2016)   Next INR check 7/18/2017   Priority INR   Target end date     Indications   Long-term (current) use of anticoagulants [Z79.01] [Z79.01]  Chronic atrial fibrillation (H) [I48.2]         Anticoagulation Episode Summary     INR check location     Preferred lab     Send INR reminders to [facility]    Comments Pt's 1st name is pronounced \"ondray\"      Anticoagulation Care Providers     Provider Role Specialty Phone number    Dannielle Darby MD Responsible Internal Medicine [phone]            See the Encounter Report to view Anticoagulation Flowsheet and Dosing Calendar (Go to Encounters tab in chart review, and find the Anticoagulation Therapy Visit)    Dosage adjustment made based on physician directed care plan.    Dilma Rider RN               "
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
## 2017-06-27 NOTE — NURSING NOTE
"Chief Complaint   Patient presents with     Forms     Driving form to be completed       Initial /68  Pulse 91  Temp 98.4  F (36.9  C) (Oral)  Ht 5' 3\" (1.6 m)  Wt 135 lb (61.2 kg)  LMP  (LMP Unknown)  SpO2 96%  BMI 23.91 kg/m2 Estimated body mass index is 23.91 kg/(m^2) as calculated from the following:    Height as of this encounter: 5' 3\" (1.6 m).    Weight as of this encounter: 135 lb (61.2 kg).  Medication Reconciliation: complete   Clementina Morin MA      "

## 2017-06-27 NOTE — MR AVS SNAPSHOT
Leonor Mercado   6/27/2017 2:00 PM   Anticoagulation Therapy Visit    Description:  89 year old female   Provider:  [facility]   Department:  [facility] Coagulation           INR as of 6/27/2017     Today's INR 2.8      Anticoagulation Summary as of 6/27/2017     INR goal 2.0-3.0   Today's INR 2.8   Full instructions 2 mg every day   Next INR check 7/18/2017    Indications   Long-term (current) use of anticoagulants [Z79.01] [Z79.01]  Chronic atrial fibrillation (H) [I48.2]         Your next Anticoagulation Clinic appointment(s)     Jul 18, 2017  2:00 PM CDT   Anticoagulation Visit with [facility]   [facility]    [address] [facility] [address]  [facility] [address] [phone]              Contact Numbers     [facility] Phone Numbers:  Anticoagulation Clinic Appointments : [phone]  Anticoagulation Nurse: [phone]         June 2017 Details    Sun Mon Tue Wed Thu Fri Sat         1               2               3                 4               5               6               7               8               9               10                 11               12               13               14               15               16               17                 18               19               20               21               22               23               24                 25               26               27      2 mg   See details      28      2 mg         29      2 mg         30      2 mg           Date Details   06/27 This INR check               How to take your warfarin dose     To take:  2 mg Take 1 of the 2 mg tablets.           July 2017 Details    Sun Mon Tue Wed Thu Fri Sat           1      2 mg           2      2 mg         3      2 mg         4      2 mg         5      2 mg         6      2 mg         7      2 mg         8      2 mg           9      2 mg         10      2 mg         11      2 mg          12      2 mg         13      2 mg         14      2 mg         15      2 mg           16      2 mg         17      2 mg         18            19               20               21               22                 23               24               25               26               27               28               29                 30               31                     Date Details   No additional details    Date of next INR:  7/18/2017         How to take your warfarin dose     To take:  2 mg Take 1 of the 2 mg tablets.
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
## 2017-06-29 NOTE — TELEPHONE ENCOUNTER
It has been difficult to reach patient. I left patient a detailed message on her phone today. I reviewed that Dr. Garcia would like to her to review her ZioPatch and go over his recommendations of proceeding with a coronary angiogram. I provided patient scheduling's number to call to set up her appt. Patient to call me back if she has any questions.     Pamela SALGADO  [facility]

## 2017-06-30 NOTE — TELEPHONE ENCOUNTER
Pt called back. Advised Dr. Garcia would like to see her in clinic to review ziopatch results and discuss left heart cath. Pt agreed and is now scheduled for 7/11/17 at 1515.   Celso SALGADO

## 2017-07-11 ENCOUNTER — OFFICE VISIT (OUTPATIENT)
Dept: CARDIOLOGY | Facility: CLINIC | Age: 82
End: 2017-07-11
Payer: COMMERCIAL

## 2017-07-11 VITALS
DIASTOLIC BLOOD PRESSURE: 64 MMHG | HEART RATE: 68 BPM | HEIGHT: 63 IN | WEIGHT: 136.4 LBS | BODY MASS INDEX: 24.17 KG/M2 | SYSTOLIC BLOOD PRESSURE: 130 MMHG

## 2017-07-11 DIAGNOSIS — R06.02 SHORTNESS OF BREATH: Primary | ICD-10-CM

## 2017-07-11 PROCEDURE — 99215 OFFICE O/P EST HI 40 MIN: CPT | Performed by: INTERNAL MEDICINE

## 2017-07-11 RX ORDER — LORAZEPAM 0.5 MG/1
0.5 TABLET ORAL EVERY 6 HOURS PRN
COMMUNITY
End: 2017-11-17 | Stop reason: ALTCHOICE

## 2017-07-11 NOTE — PROGRESS NOTES
"HISTORY:    Leonor Mercado is a pleasant 89-year-old female accompanied by her daughter today. She has a history of coronary artery disease with 2 stents placed in 2004 while living in [place]. Details unknown. She also has a history of sick sinus syndrome with paroxysmal atrial fibrillation for which she has long-term anticoagulation and a pacemaker which was placed in 2003 initially. She also has had intermittent PAT as well as pulmonary fibrosis and a history of use of amiodarone. Other medical problems include chronic renal insufficiency, bilateral lower extremity venous stasis, hyperlipidemia, hypertension, and COPD. She hasn't asked smoke or having quit 30 years ago    Since our last visit nearly 2 months ago, the patient's , who had been severely ill, has moved to a nursing home. This has been hard on the patient since she was his primary caregiver and dedicated most of her time to his care.    Mrs. Mercado's main complaint is that she continues to feel short of breath with activity. It sounds like much of the time she can remain active but particularly when she gets up to go to the bathroom at night she gets short of breath when she comes back. She denies any chest discomfort.  I reviewed the results of her nuclear stress test with her today. It shows an ejection fraction of 77% with normal perfusion, no ischemia or infarct.    Patient also complains of ankle edema, worse on the right than the left. She asked a doesn't notice the left but she states that the ankle edema on the right is new. This is a dependent edema and resolves overnight and then bothers her toward the end of the day. She doesn't recall ever noticing this before. She also complains of having to get up to go to the bathroom every 2 hours at night.    The patient complained of having spells of unresponsiveness in the past. She says that she stills has spells in which she feels \"funny from the waist up with numbness\". With these " spells she feels shaky and they tend to last about one to 2 minutes. She has these relatively frequently, up to 4-5 times per day. She underwent a 7 day monitor and had a couple of episodes which she reported and are noted on that study to have a normal rhythm. I reviewed the 7 day monitor with her in detail. There were multiple episodes of brief SVT which were not significantly symptomatic      ASSESSMENT/PLAN:    1.  Spells. These appear to have improved. She previously complained of disorientation, pressure in the chest with radiation to the arms and associated shortness of breath. She currently describes it as feeling funny from the waist up with a sense of numbness and shakiness. She had these spells while she was monitored in the were no associated arrhythmias. These do not appear to be of cardiac origin although I cannot definitively say what is the cause.  2. SVT. Episodes of SVT are noted on monitor but are asymptomatic. The patient denies significant palpitations. I don't think we should pursue these further at this time but certainly can consider treatment if they become symptomatic.  3. Chest pain. The patient had previously complained of chest pain but currently denies it. She had a negative nuclear stress test confirming adequate perfusion of her myocardium. No further plans are made for coronary artery disease evaluation.  4. Hyperlipidemia. Continue Pravachol indefinitely  5. Hypertension under good control, continue current medications  6. Paroxysmal atrial fibrillation. The patient is currently anticoagulated which is appropriate. No medication changes needed  7. Dyspnea on exertion. The patient is a somewhat poor historian and mixes up various symptoms but it sounds like she is having problems with shortness of breath, and I will have an echocardiogram checked to make sure she didn't has not developed worsening of her previously documented mild diastolic dysfunction, or other cardiac abnormalities  that might explain these symptoms.    I appreciate the opportunity of dissipating in Mrs. Mercado's care. She does not appear to have any active cardiology issues but I will have her back in 3 months. Her echocardiogram will be done and I will contact her by phone and adjust therapy if necessary.        Orders Placed This Encounter   Procedures     Follow-Up with Cardiologist     Echocardiogram     Orders Placed This Encounter   Medications     LORazepam (ATIVAN) 0.5 MG tablet     Sig: Take 0.5 mg by mouth every 6 hours as needed for anxiety     There are no discontinued medications.      Encounter Diagnosis   Name Primary?     Shortness of breath Yes       CURRENT MEDICATIONS:  Current Outpatient Prescriptions   Medication Sig Dispense Refill     LORazepam (ATIVAN) 0.5 MG tablet Take 0.5 mg by mouth every 6 hours as needed for anxiety       umeclidinium (INCRUSE ELLIPTA) 62.5 MCG/INH oral inhaler Inhale 1 puff into the lungs daily 1 Inhaler 11     pravastatin (PRAVACHOL) 40 MG tablet TAKE ONE TABLET BY MOUTH ONCE DAILY. 30 tablet 3     albuterol (PROAIR HFA/PROVENTIL HFA/VENTOLIN HFA) 108 (90 BASE) MCG/ACT Inhaler Inhale 2 puffs into the lungs every 6 hours as needed for shortness of breath / dyspnea or wheezing 1 Inhaler 1     warfarin (COUMADIN) 2 MG tablet Take one tablet (2 mg) daily or as directed by INR Clinic 102 tablet 1     levalbuterol (XOPENEX) 0.63 MG/3ML neb solution Take 3 mLs (0.63 mg) by nebulization every 6 hours as needed for shortness of breath / dyspnea or wheezing 120 mL 5     losartan (COZAAR) 50 MG tablet Take 1 tablet (50 mg) by mouth daily 90 tablet 1     Acetaminophen (TYLENOL PO) Take 1,000 mg by mouth At Bedtime       DOCUSATE SODIUM PO Take 100 mg by mouth At Bedtime       menthol (ICY HOT) 5 % PADS Apply 1 patch topically every 8 hours as needed for muscle soreness  0     polyethylene glycol (MIRALAX/GLYCOLAX) packet Take 1 packet by mouth 2 times daily        diltiazem 240 MG 24  hr ER capsule Take 1 capsule (240 mg) by mouth daily 90 capsule 3     Misc Natural Products (TART CHERRY ADVANCED) CAPS Cherry tart liquid       ZOLPIDEM TARTRATE PO Take 5 mg by mouth nightly as needed for sleep       ASPIRIN NOT PRESCRIBED, INTENTIONAL, Antiplatelet medication not prescribed intentionally due to Current anticoagulant therapy (warfarin/enoxaparin)  0     nitroglycerin (NITROSTAT) 0.4 MG SL tablet Place 1 tablet (0.4 mg) under the tongue every 5 minutes as needed 25 tablet 1     calcium carbonate (CALCIUM CARBONATE) 600 MG TABS tablet Take 1 tablet by mouth daily        folic acid (FOLVITE) 400 MCG tablet Take 400 mcg by mouth daily       Cholecalciferol (VITAMIN D) 2000 UNITS tablet Take 2,000 Units by mouth daily       ascorbic acid (VITAMIN C) 500 MG tablet Take 500 mg by mouth daily       Probiotic Product (PROBIOTIC & ACIDOPHILUS EX ST PO) Take 1 tablet by mouth daily.       Multiple Vitamins-Minerals (OCUVITE PO) Take 1 capsule by mouth daily.         ALLERGIES     Allergies   Allergen Reactions     Chocolate Shortness Of Breath     Peanuts [Nuts] Shortness Of Breath     Amiodarone      pulm fibrosis       Baclofen      Confusion      Bactrim [Sulfamethoxazole W-Trimethoprim]      Difficulty swallowing     Doxycycline Other (See Comments)     Multiple complaints; she does not want this again.      Levaquin [Levofloxacin] Other (See Comments)     Multiple complaints; she will not take this again     Linzess [Linaclotide] Diarrhea     Liquid Adhesive Itching and Rash     Bleeding when tape removed after pacemaker placement..itched and burned for weeks.       PAST MEDICAL HISTORY:  Past Medical History:   Diagnosis Date     Atrial fibrillation (H)     Sick sinus syndrome, PAT, AF     BCC (basal cell carcinoma of skin)     Face     CHF (congestive heart failure) (H)     mild in past     Chronic renal insufficiency      COPD (chronic obstructive pulmonary disease) (H)      Coronary artery disease      2-05[place]-CAD-taxus stentx2 2.5-12,2.5-12 in LADp and LADm, 80%nonDomRCA-LcxDom-mild,EF60%     Hyperlipidemia      Hypertension      IBS (irritable bowel syndrome)      Irritable bowel      Osteoporosis      Panic attack     questionable diagnosis     Pulmonary fibrosis (H)     do not use amiodarone     SSS (sick sinus syndrome) (H)     DDD pacer (see surgery history section)     Vertigo        PAST SURGICAL HISTORY:  Past Surgical History:   Procedure Laterality Date     APPENDECTOMY       CARDIAC SURGERY      PPM, 2 STENTS2-05[place]-CAD-taxus stentx2 2.5-12,2.5-12 in LADp and LADm, 80%nonDomRCA-LcxDom-mild,EF60%     CORONARY ANGIOGRAPHY ADULT ORDER  1994    mild CAD,Normal LV function, No Mitral regurgitation, Prox LAD 30% stenosis. RCA prox and mid, 20-30%     ESOPHAGOSCOPY, GASTROSCOPY, DUODENOSCOPY (EGD), COMBINED N/A 2015    Procedure: COMBINED ESOPHAGOSCOPY, GASTROSCOPY, DUODENOSCOPY (EGD), BIOPSY SINGLE OR MULTIPLE;  Surgeon: Genevieve Leon MD;  Location: [facility]     H LEAD REVISION DUAL  2003    Revised in [place]-due to diaphram stim (original pacer placed in [place])     H LEAD REVISION DUAL  2014    Correction of reversal of A and V leads in [place]     HEART CATH, ANGIOPLASTY  2005    LEIGH ANN mid and proximal LAD, ongoing 80% RCA; mild circumflex     HERNIA REPAIR Left     [facility]     IMPLANT PACEMAKER  2003    Placed in [place] for sick sinus syndrome     REPLACE PACEMAKER GENERATOR  2013    Dual-chamber pacemaker by Dr SETH Pierre       FAMILY HISTORY:  Family History   Problem Relation Age of Onset     HEART DISEASE Father       age 84     Neurologic Disorder Mother      dementia     GASTROINTESTINAL DISEASE Daughter      liver transplant     Crohn Disease Daughter        SOCIAL HISTORY:  Social History     Social History     Marital status:      Spouse name: N/A     Number of children: 3     Years of education: N/A     Occupational History      Retired     Social  "History Main Topics     Smoking status: Former Smoker     Quit date: 5/9/1987     Smokeless tobacco: Never Used     Alcohol use 0.0 oz/week     0 Standard drinks or equivalent per week      Comment: 1 wkly     Drug use: No     Sexual activity: No     Other Topics Concern     Parent/Sibling W/ Cabg, Mi Or Angioplasty Before 65f 55m? Yes     Caffeine Concern Yes     decaf - some 1/2 caff     Sleep Concern Yes     nocturia every 2 hours     Special Diet No     Exercise No     some walking in the house     Social History Narrative       Review of Systems:  Skin:  Positive for lumps or bumps;itching   Eyes:  Positive for glasses  ENT:  Positive for hearing loss  Respiratory:  Positive for shortness of breath;dyspnea on exertion;wheezing;cough  Cardiovascular:    edema;Positive for  Gastroenterology: Positive for constipation  Genitourinary:  Positive for urinary frequency  Musculoskeletal:  Positive for joint pain;back pain;neck pain  Neurologic:  Negative    Psychiatric:  Positive for sleep disturbances;anxiety;depression;excessive stress  Heme/Lymph/Imm:  Negative    Endocrine:  Negative      Physical Exam:  Vitals: /64 (BP Location: Left arm, Patient Position: Sitting, Cuff Size: Adult Regular)  Pulse 68  Ht 1.6 m (5' 3\")  Wt 61.9 kg (136 lb 6.4 oz)  LMP  (LMP Unknown)  Breastfeeding? No  BMI 24.16 kg/m2    Constitutional:  cooperative frail      Skin:  warm and dry to the touch        Head:  normocephalic        Eyes:  no xanthalasma        ENT:  no pallor or cyanosis        Neck:  carotid pulses are full and equal bilaterally, JVP normal, no carotid bruit, no thyromegaly        Chest:  normal breath sounds, clear to auscultation, normal A-P diameter, normal symmetry, normal respiratory excursion, no use of accessory muscles        Cardiac: regular rhythm;normal S1 and S2;no S3 or S4;apical impulse not displaced irregular rhythm     systolic murmur;grade 1          Abdomen:  abdomen soft, non-tender, BS " normoactive, no mass, no HSM, no bruits        Vascular:                                   reduced PT pulses bilateral    Extremities and Back:           Neurological:  affect appropriate, oriented to time, person and place;no gross motor deficits          Recent Lab Results:  LIPID RESULTS:  Lab Results   Component Value Date    CHOL 153 03/15/2015    HDL 52 03/15/2015    LDL 75 03/15/2015    TRIG 129 03/15/2015    CHOLHDLRATIO 2.9 03/15/2015       LIVER ENZYME RESULTS:  Lab Results   Component Value Date    AST 18 11/15/2016    ALT 18 11/15/2016       CBC RESULTS:  Lab Results   Component Value Date    WBC 6.6 11/15/2016    RBC 4.31 11/15/2016    HGB 12.7 11/15/2016    HCT 39.1 11/15/2016    MCV 91 11/15/2016    MCH 29.5 11/15/2016    MCHC 32.5 11/15/2016    RDW 14.0 11/15/2016     11/15/2016       BMP RESULTS:  Lab Results   Component Value Date     11/15/2016    POTASSIUM 3.8 11/15/2016    CHLORIDE 104 11/15/2016    CO2 28 11/15/2016    ANIONGAP 6 11/15/2016    GLC 98 11/15/2016    BUN 24 11/15/2016    CR 1.17 (H) 11/15/2016    GFRESTIMATED 43 (L) 11/15/2016    GFRESTBLACK 53 (L) 11/15/2016    TRI 8.9 11/15/2016        A1C RESULTS:  Lab Results   Component Value Date    A1C 6.1 (H) 04/30/2014       INR RESULTS:  Lab Results   Component Value Date    INR 2.8 (A) 06/27/2017    INR 2.1 (A) 05/30/2017    INR 2.60 (H) 11/15/2016    INR 3.25 (H) 11/03/2016         Bryan Garcia MD, [facility]    CC  No referring provider defined for this encounter.

## 2017-07-11 NOTE — MR AVS SNAPSHOT
After Visit Summary   7/11/2017    Leonor Mercado    MRN: [id number]           Patient Information     Date Of Birth          5/6/1926        Visit Information        Provider Department      7/11/2017 3:15 PM Bryan Garcia MD [facility] [place]        Today's Diagnoses     Shortness of breath    -  1       Follow-ups after your visit        Additional Services     Follow-Up with Cardiologist                 Your next 10 appointments already scheduled     Jul 18, 2017  2:00 PM CDT   Anticoagulation Visit with [facility]   [facility]    [address]  [facility] [id number] [phone]            Jul 25, 2017  3:00 PM CDT   Ech Complete with [facility] ([facility])    [address] [facility] [address]  [facility] [id number] [phone]           1.  Please bring or wear a comfortable two-piece outfit. 2.  You may eat, drink and take your normal medicines. 3.  For any questions that cannot be answered, please contact the ordering physician ***Please check-in at the [place] Registration Office located in [address] in the [facility] building. When you are finished registering, please go to [address] and have a seat. The technician will call your name for the test.            Sep 21, 2017  9:00 AM CDT   Phone Device Check with NUÑEZ TECH2   [facility] [place] ([facility])    [address] [place] [address] [address]  [facility] [id number] [phone]              Future tests that were ordered for you today     Open Future Orders        Priority Expected Expires Ordered    Follow-Up with Cardiologist Routine 10/9/2017 7/11/2018 7/11/2017    Echocardiogram Routine 7/18/2017 7/11/2018 7/11/2017            Who to contact     If you have questions or need follow up information  "about today's clinic visit or your schedule please contact [facility] [place] directly at [phone].  Normal or non-critical lab and imaging results will be communicated to you by [employer], letter or phone within 4 business days after the clinic has received the results. If you do not hear from us within 7 days, please contact the clinic through [employer] or phone. If you have a critical or abnormal lab result, we will notify you by phone as soon as possible.  Submit refill requests through [employer] or call your pharmacy and they will forward the refill request to us. Please allow 3 business days for your refill to be completed.          Additional Information About Your Visit        [employer] Information     [employer] lets you send messages to your doctor, view your test results, renew your prescriptions, schedule appointments and more. To sign up, go to [email or web address] . Click on \"Log in\" on the left side of the screen, which will take you to the Welcome page. Then click on \"Sign up Now\" on the right side of the page.     You will be asked to enter the access code listed below, as well as some personal information. Please follow the directions to create your username and password.     Your access code is: [email or web address]  Expires: 2017  8:56 AM     Your access code will  in 90 days. If you need help or a new code, please call your [place] clinic or [phone].        Care EveryWhere ID     This is your Care EveryWhere ID. This could be used by other organizations to access your [place] medical records  [id number]        Your Vitals Were     Pulse Height Last Period Breastfeeding? BMI (Body Mass Index)       68 1.6 m (5' 3\") (LMP Unknown) No 24.16 kg/m2        Blood Pressure from Last 3 Encounters:   17 130/64   17 118/68   17 144/62    Weight from Last 3 Encounters:   17 61.9 kg (136 lb 6.4 oz)   17 61.2 kg (135 lb)   17 61.2 kg " (135 lb)               Primary Care Provider Office Phone # Fax #    Dannielle Darby [phone]       [facility] [address] [facility] [address]        Equal Access to Services     NALDO OVALLE : Hadii aad ku hadgrego Soomaali, waaxda luqadaha, qaybta kaalmada adeegyada, waxousmane idiin hayaan shaquillepramod beasleywalker hwang. So [facility] [phone].    ATENCIÓN: Si habla español, tiene a spicer disposición servicios gratuitos de asistencia lingüística. Llame al [phone].    We comply with applicable federal civil rights laws and [place] laws. We do not discriminate on the basis of race, color, national origin, age, disability sex, sexual orientation or gender identity.            Thank you!     Thank you for choosing [facility] [place]  for your care. Our goal is always to provide you with excellent care. Hearing back from our patients is one way we can continue to improve our services. Please take a few minutes to complete the written survey that you may receive in the mail after your visit with us. Thank you!             Your Updated Medication List - Protect others around you: Learn how to safely use, store and throw away your medicines at www.disposemymeds.org.          This list is accurate as of: 7/11/17  4:09 PM.  Always use your most recent med list.                   Brand Name Dispense Instructions for use Diagnosis    albuterol 108 (90 BASE) MCG/ACT Inhaler    PROAIR HFA/PROVENTIL HFA/VENTOLIN HFA    1 Inhaler    Inhale 2 puffs into the lungs every 6 hours as needed for shortness of breath / dyspnea or wheezing    Chronic obstructive pulmonary disease, unspecified COPD type (H)       ascorbic acid 500 MG tablet    VITAMIN C     Take 500 mg by mouth daily        ASPIRIN NOT PRESCRIBED    INTENTIONAL     Antiplatelet medication not prescribed intentionally due to Current anticoagulant therapy (warfarin/enoxaparin)    Chronic atrial  fibrillation (H)       calcium carbonate 600 MG tablet   Generic drug:  calcium carbonate      Take 1 tablet by mouth daily        diltiazem 240 MG 24 hr capsule     90 capsule    Take 1 capsule (240 mg) by mouth daily    Chronic atrial fibrillation (H)       DOCUSATE SODIUM PO      Take 100 mg by mouth At Bedtime        folic acid 400 MCG tablet    FOLVITE     Take 400 mcg by mouth daily        levalbuterol 0.63 MG/3ML neb solution    XOPENEX    120 mL    Take 3 mLs (0.63 mg) by nebulization every 6 hours as needed for shortness of breath / dyspnea or wheezing    Chronic obstructive pulmonary disease, unspecified COPD type (H)       LORazepam 0.5 MG tablet    ATIVAN     Take 0.5 mg by mouth every 6 hours as needed for anxiety        losartan 50 MG tablet    COZAAR    90 tablet    Take 1 tablet (50 mg) by mouth daily    Essential hypertension with goal blood pressure less than 140/90       menthol 5 % Pads    ICY HOT     Apply 1 patch topically every 8 hours as needed for muscle soreness    Flank pain       nitroGLYcerin 0.4 MG sublingual tablet    NITROSTAT    25 tablet    Place 1 tablet (0.4 mg) under the tongue every 5 minutes as needed    Other chest pain       OCUVITE PO      Take 1 capsule by mouth daily.        polyethylene glycol Packet    MIRALAX/GLYCOLAX     Take 1 packet by mouth 2 times daily        pravastatin 40 MG tablet    PRAVACHOL    30 tablet    TAKE ONE TABLET BY MOUTH ONCE DAILY.    Chest pain, unspecified type, Hyperlipidemia LDL goal <100, Coronary artery disease involving native coronary artery of native heart without angina pectoris       PROBIOTIC & ACIDOPHILUS EX ST PO      Take 1 tablet by mouth daily.        TART CHERRY ADVANCED Caps      [place] tart liquid        TYLENOL PO      Take 1,000 mg by mouth At Bedtime        umeclidinium 62.5 MCG/INH oral inhaler    INCRUSE ELLIPTA    1 Inhaler    Inhale 1 puff into the lungs daily    Pulmonary fibrosis (H), Chronic obstructive pulmonary  disease, unspecified COPD type (H)       vitamin D 2000 UNITS tablet      Take 2,000 Units by mouth daily        warfarin 2 MG tablet    COUMADIN    102 tablet    Take one tablet (2 mg) daily or as directed by INR Clinic    Long term current use of anticoagulant therapy       ZOLPIDEM TARTRATE PO      Take 5 mg by mouth nightly as needed for sleep

## 2017-07-18 ENCOUNTER — ANTICOAGULATION THERAPY VISIT (OUTPATIENT)
Dept: ANTICOAGULATION | Facility: CLINIC | Age: 82
End: 2017-07-18
Payer: COMMERCIAL

## 2017-07-18 DIAGNOSIS — I48.20 CHRONIC ATRIAL FIBRILLATION (H): ICD-10-CM

## 2017-07-18 DIAGNOSIS — Z79.01 LONG-TERM (CURRENT) USE OF ANTICOAGULANTS: ICD-10-CM

## 2017-07-18 LAB — INR POINT OF CARE: 1.8 (ref 0.86–1.14)

## 2017-07-18 PROCEDURE — 99207 ZZC NO CHARGE NURSE ONLY: CPT

## 2017-07-18 PROCEDURE — 36416 COLLJ CAPILLARY BLOOD SPEC: CPT

## 2017-07-18 PROCEDURE — 85610 PROTHROMBIN TIME: CPT | Mod: QW

## 2017-07-18 NOTE — MR AVS SNAPSHOT
Leonor Mercado   7/18/2017 2:00 PM   Anticoagulation Therapy Visit    Description:  89 year old female   Provider[facility]  [facility]   Department:  [facility]           INR as of 7/18/2017     Today's INR 1.8!      Anticoagulation Summary as of 7/18/2017     INR goal 2.0-3.0   Today's INR 1.8!   Full instructions 7/18: 3 mg; Otherwise 2 mg every day   Next INR check 8/15/2017    Indications   Long-term (current) use of anticoagulants [Z79.01] [Z79.01]  Chronic atrial fibrillation (H) [I48.2]         Your next Anticoagulation Clinic appointment(s)     Aug 15, 2017  2:00 PM CDT   Anticoagulation Visit with [facility]   [facility]    [address] [facility] [address]  [facility] [address] [phone]              Contact Numbers     [facility] Phone Numbers:  Anticoagulation Clinic Appointments : [phone]  Anticoagulation Nurse: [phone]         July 2017 Details    Sun Mon Tue Wed Thu Fri Sat           1                 2               3               4               5               6               7               8                 9               10               11               12               13               14               15                 16               17               18      3 mg   See details      19      2 mg         20      2 mg         21      2 mg         22      2 mg           23      2 mg         24      2 mg         25      2 mg         26      2 mg         27      2 mg         28      2 mg         29      2 mg           30      2 mg         31      2 mg               Date Details   07/18 This INR check               How to take your warfarin dose     To take:  2 mg Take 1 of the 2 mg tablets.    To take:  3 mg Take 1.5 of the 2 mg tablets.           August 2017 Details    Sun Mon Tue Wed Thu Fri Sat       1      2 mg         2      2 mg         3      2 mg         4      2 mg         5      2  mg           6      2 mg         7      2 mg         8      2 mg         9      2 mg         10      2 mg         11      2 mg         12      2 mg           13      2 mg         14      2 mg         15            16               17               18               19                 20               21               22               23               24               25               26                 27               28               29               30               31                  Date Details   No additional details    Date of next INR:  8/15/2017         How to take your warfarin dose     To take:  2 mg Take 1 of the 2 mg tablets.

## 2017-07-20 DIAGNOSIS — I10 ESSENTIAL HYPERTENSION WITH GOAL BLOOD PRESSURE LESS THAN 140/90: ICD-10-CM

## 2017-07-20 RX ORDER — LOSARTAN POTASSIUM 50 MG/1
TABLET ORAL
Qty: 90 TABLET | Refills: 0 | Status: SHIPPED | OUTPATIENT
Start: 2017-07-20 | End: 2017-10-23

## 2017-07-20 NOTE — TELEPHONE ENCOUNTER
Losartan      Last Written Prescription Date: 1-24-17  Last Fill Quantity: 90, # refills: 1  Last Office Visit with [facility], [facility] or [facility] prescribing provider: 6-27-17       Potassium   Date Value Ref Range Status   11/15/2016 3.8 3.4 - 5.3 mmol/L Final     Creatinine   Date Value Ref Range Status   11/15/2016 1.17 (H) 0.52 - 1.04 mg/dL Final     BP Readings from Last 3 Encounters:   07/11/17 130/64   06/27/17 118/68   05/16/17 144/62       Routing refill request to provider for review/approval because:  Labs out of range:  Creat    Please advise, thanks.

## 2017-07-27 ENCOUNTER — TELEPHONE (OUTPATIENT)
Dept: INTERNAL MEDICINE | Facility: CLINIC | Age: 82
End: 2017-07-27

## 2017-07-27 DIAGNOSIS — I10 ESSENTIAL HYPERTENSION WITH GOAL BLOOD PRESSURE LESS THAN 140/90: ICD-10-CM

## 2017-07-27 NOTE — TELEPHONE ENCOUNTER
Patient's son dropped off these forms.  Patient requesting forms be completed as soon as possible so [facility]. She had been using [employer] under her husbands name but they will no longer allow that. Please call when done.

## 2017-07-31 RX ORDER — LOSARTAN POTASSIUM 50 MG/1
TABLET ORAL
Qty: 90 TABLET | Refills: 1 | OUTPATIENT
Start: 2017-07-31

## 2017-07-31 NOTE — TELEPHONE ENCOUNTER
She says DMV says all she really needs for 's license is a letter from Dr Young.  They took her drivers license today.  She says she doesn't drive much, but would like to have license in case of emergency.    Also since she doesn't have drivers license, she needs the forms completed even more.   She is asking if this can be done ASAP.

## 2017-08-02 NOTE — TELEPHONE ENCOUNTER
[employer] form completed/signed/mailed. Copied to chart.   form incomplete and given back to physician for completion.

## 2017-08-08 ENCOUNTER — HOSPITAL ENCOUNTER (OUTPATIENT)
Dept: CARDIOLOGY | Facility: CLINIC | Age: 82
Discharge: HOME | End: 2017-08-08
Attending: INTERNAL MEDICINE | Admitting: INTERNAL MEDICINE
Payer: MEDICARE

## 2017-08-08 DIAGNOSIS — R06.02 SHORTNESS OF BREATH: ICD-10-CM

## 2017-08-08 PROCEDURE — 93306 TTE W/DOPPLER COMPLETE: CPT | Mod: 26 | Performed by: INTERNAL MEDICINE

## 2017-08-08 PROCEDURE — 93306 TTE W/DOPPLER COMPLETE: CPT
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
## 2017-08-10 ENCOUNTER — TELEPHONE (OUTPATIENT)
Dept: CARDIOLOGY | Facility: CLINIC | Age: 82
End: 2017-08-10

## 2017-08-10 NOTE — TELEPHONE ENCOUNTER
Echo results were reviewed with patient over the phone. EF 60-65%. Compared to the prior study dated 3-, there have been no changes. Patient had no questions regarding her results. Patient to f/u as planned.     TGarbers RN  [facility]
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
## 2017-08-11 DIAGNOSIS — I48.20 CHRONIC ATRIAL FIBRILLATION (H): ICD-10-CM

## 2017-08-11 NOTE — TELEPHONE ENCOUNTER
Diltiazem         Last Written Prescription Date: 08/09/16  Last Fill Quantity: 90, # refills: 3    Last Office Visit with [facility], [facility] or [facility] prescribing provider:  06/27/17   Future Office Visit:    Next 5 appointments (look out 90 days)     Oct 09, 2017  9:00 AM CDT   Return Visit with Collins Horton MD   [facility] [place] ([facility])    [address] [address] [address] [phone]                  BP Readings from Last 3 Encounters:   07/11/17 130/64   06/27/17 118/68   05/16/17 144/62     Lab Results   Component Value Date    ALT 18 11/15/2016     Lab Results   Component Value Date    CHOL 153 03/15/2015     Lab Results   Component Value Date    HDL 52 03/15/2015     Lab Results   Component Value Date    LDL 75 03/15/2015     Lab Results   Component Value Date    TRIG 129 03/15/2015     Lab Results   Component Value Date    CHOLHDLRATIO 2.9 03/15/2015       Labs showing if normal/abnormal  Lab Results   Component Value Date    ALT 18 11/15/2016     Lab Results   Component Value Date    CHOL 153 03/15/2015    TRIG 129 03/15/2015    HDL 52 03/15/2015    LDL 75 03/15/2015    VLDL 26 03/15/2015    CHOLHDLRATIO 2.9 03/15/2015

## 2017-08-14 RX ORDER — DILTIAZEM HYDROCHLORIDE 240 MG/1
CAPSULE, EXTENDED RELEASE ORAL
Qty: 90 CAPSULE | Refills: 0 | Status: SHIPPED | OUTPATIENT
Start: 2017-08-14 | End: 2017-11-06

## 2017-08-15 ENCOUNTER — ANTICOAGULATION THERAPY VISIT (OUTPATIENT)
Dept: ANTICOAGULATION | Facility: CLINIC | Age: 82
End: 2017-08-15
Payer: COMMERCIAL

## 2017-08-15 DIAGNOSIS — I48.20 CHRONIC ATRIAL FIBRILLATION (H): ICD-10-CM

## 2017-08-15 DIAGNOSIS — Z79.01 LONG-TERM (CURRENT) USE OF ANTICOAGULANTS: ICD-10-CM

## 2017-08-15 LAB — INR POINT OF CARE: 3.5 (ref 0.86–1.14)

## 2017-08-15 PROCEDURE — 85610 PROTHROMBIN TIME: CPT | Mod: QW

## 2017-08-15 PROCEDURE — 99207 ZZC NO CHARGE NURSE ONLY: CPT

## 2017-08-15 PROCEDURE — 36416 COLLJ CAPILLARY BLOOD SPEC: CPT

## 2017-08-15 NOTE — MR AVS SNAPSHOT
Leonor Mercado   8/15/2017 2:00 PM   Anticoagulation Therapy Visit    Description:  89 year old female   Provider[facility]  [facility]   Department:  [facility]           INR as of 8/15/2017     Today's INR 3.5!      Anticoagulation Summary as of 8/15/2017     INR goal 2.0-3.0   Today's INR 3.5!   Full instructions 8/15: 1 mg; Otherwise 2 mg every day   Next INR check 8/29/2017    Indications   Long-term (current) use of anticoagulants [Z79.01] [Z79.01]  Chronic atrial fibrillation (H) [I48.2]         Your next Anticoagulation Clinic appointment(s)     Aug 29, 2017  1:45 PM CDT   Anticoagulation Visit with [facility]   [facility] ([facility]    [address] [facility] [address]  [facility] [address] [phone]              Contact Numbers     [facility] Phone Numbers:  Anticoagulation Clinic Appointments : [phone]  Anticoagulation Nurse: [phone]         August 2017 Details    Sun Mon Tue Wed Thu Fri Sat       1               2               3               4               5                 6               7               8               9               10               11               12                 13               14               15      1 mg   See details      16      2 mg         17      2 mg         18      2 mg         19      2 mg           20      2 mg         21      2 mg         22      2 mg         23      2 mg         24      2 mg         25      2 mg         26      2 mg           27      2 mg         28      2 mg         29            30               31                  Date Details   08/15 This INR check       Date of next INR:  8/29/2017         How to take your warfarin dose     To take:  1 mg Take 0.5 of a 2 mg tablet.    To take:  2 mg Take 1 of the 2 mg tablets.

## 2017-08-15 NOTE — PROGRESS NOTES
"  ANTICOAGULATION FOLLOW-UP CLINIC VISIT    Patient Name:  Leonor Mercado  Date:  8/15/2017  Contact Type:  Face to Face    SUBJECTIVE:     Patient Findings     Comments Pt has been under a lot of stress recently. Spouse is in a nursing home. Pt's son has been in the hospital for 10 days, was transferred to a different hospital yesterday and is going to have surgery to remove a lung today. Pt is unsure if pt will have a partial side removed or the entire lung.            OBJECTIVE    INR Protime   Date Value Ref Range Status   08/15/2017 3.5 (A) 0.86 - 1.14 Final       ASSESSMENT / PLAN  INR assessment SUPRA    Recheck INR In: 2 WEEKS    INR Location Clinic      Anticoagulation Summary as of 8/15/2017     INR goal 2.0-3.0   Today's INR 3.5!   Maintenance plan 2 mg (2 mg x 1) every day   Full instructions 8/15: 1 mg; Otherwise 2 mg every day   Weekly total 14 mg   Plan last modified Yoana Waddell RN (12/15/2016)   Next INR check 8/29/2017   Priority INR   Target end date     Indications   Long-term (current) use of anticoagulants [Z79.01] [Z79.01]  Chronic atrial fibrillation (H) [I48.2]         Anticoagulation Episode Summary     INR check location     Preferred lab     Send INR reminders to [facility]    Comments Pt's 1st name is pronounced \"ondray\"      Anticoagulation Care Providers     Provider Role Specialty Phone number    RavinderJuliánmindy Dannielle Fraser MD [facility] Internal Medicine [phone]            See the Encounter Report to view Anticoagulation Flowsheet and Dosing Calendar (Go to Encounters tab in chart review, and find the Anticoagulation Therapy Visit)    Dosage adjustment made based on physician directed care plan.    Dilma Rider RN               "

## 2017-08-25 DIAGNOSIS — Z79.01 LONG TERM CURRENT USE OF ANTICOAGULANT THERAPY: ICD-10-CM

## 2017-08-25 RX ORDER — WARFARIN SODIUM 2 MG/1
TABLET ORAL
Qty: 90 TABLET | Refills: 1 | Status: SHIPPED | OUTPATIENT
Start: 2017-08-25 | End: 2017-11-17

## 2017-08-25 NOTE — TELEPHONE ENCOUNTER
Warfarin 2 mg    Last Written Prescription Date: 2/1/17  Last Fill Qty: 102, # refills: 1  Last Office Visit with [facility], [facility] or [facility] prescribing provider: 6/27/17  Next 5 appointments (look out 90 days)     Oct 09, 2017  9:00 AM CDT   Return Visit with Collins Horton MD   [facility] ([facility])    [address] [facility] [address]  [facility] [address] [phone]                   Date and Result of Last PT/INR:   Lab Results   Component Value Date    INR 3.5 08/15/2017    INR 1.8 07/18/2017    INR 2.60 11/15/2016    INR 3.25 11/03/2016    PT 21.1 09/06/2012    PT 21.2 08/07/2012              Lab Results   Component Value Date    INR 3.5 (A) 08/15/2017    INR 1.8 (A) 07/18/2017    INR 2.8 (A) 06/27/2017    INR 2.1 (A) 05/30/2017    INR 2.0 (A) 05/02/2017     Routing refill request to provider for review/approval because:  Routed to INR nurse per protocol

## 2017-08-29 ENCOUNTER — ANTICOAGULATION THERAPY VISIT (OUTPATIENT)
Dept: ANTICOAGULATION | Facility: CLINIC | Age: 82
End: 2017-08-29
Payer: COMMERCIAL

## 2017-08-29 DIAGNOSIS — I48.20 CHRONIC ATRIAL FIBRILLATION (H): ICD-10-CM

## 2017-08-29 DIAGNOSIS — Z79.01 LONG-TERM (CURRENT) USE OF ANTICOAGULANTS: ICD-10-CM

## 2017-08-29 LAB — INR POINT OF CARE: 2.6 (ref 0.86–1.14)

## 2017-08-29 PROCEDURE — 99207 ZZC NO CHARGE NURSE ONLY: CPT

## 2017-08-29 PROCEDURE — 36416 COLLJ CAPILLARY BLOOD SPEC: CPT

## 2017-08-29 PROCEDURE — 85610 PROTHROMBIN TIME: CPT | Mod: QW

## 2017-08-29 NOTE — MR AVS SNAPSHOT
Leonor Mercado   8/29/2017 1:45 PM   Anticoagulation Therapy Visit    Description:  89 year old female   Provider[facility]  [facility]   Department:  [facility]           INR as of 8/29/2017     Today's INR 2.6      Anticoagulation Summary as of 8/29/2017     INR goal 2.0-3.0   Today's INR 2.6   Full instructions 2 mg every day   Next INR check 9/26/2017    Indications   Long-term (current) use of anticoagulants [Z79.01] [Z79.01]  Chronic atrial fibrillation (H) [I48.2]         Your next Anticoagulation Clinic appointment(s)     Sep 26, 2017  2:00 PM CDT   [facility]ticoagulation Visit with [facility]   [facility]    [address] [facility] [address]  [facility] [address] [phone]              Contact Numbers     [facility] Phone Numbers:  Anticoagulation Clinic Appointments : [phone]  Anticoagulation Nurse: [phone]         August 2017 Details    Sun Mon Tue Wed Thu Fri Sat       1               2               3               4               5                 6               7               8               9               10               11               12                 13               14               15               16               17               18               19                 20               21               22               23               24               25               26                 27               28               29      2 mg   See details      30      2 mg         31      2 mg            Date Details   08/29 This INR check               How to take your warfarin dose     To take:  2 mg Take 1 of the 2 mg tablets.           September 2017 Details    Sun Mon Tue Wed Thu Fri Sat          1      2 mg         2      2 mg           3      2 mg         4      2 mg         5      2 mg         6      2 mg         7      2 mg         8      2 mg         9      2 mg           10      2 mg          11      2 mg         12      2 mg         13      2 mg         14      2 mg         15      2 mg         16      2 mg           17      2 mg         18      2 mg         19      2 mg         20      2 mg         21      2 mg         22      2 mg         23      2 mg           24      2 mg         25      2 mg         26            27               28               29               30                Date Details   No additional details    Date of next INR:  9/26/2017         How to take your warfarin dose     To take:  2 mg Take 1 of the 2 mg tablets.

## 2017-08-29 NOTE — PROGRESS NOTES
"  ANTICOAGULATION FOLLOW-UP CLINIC VISIT    Patient Name:  Leonor Mercado  Date:  8/29/2017  Contact Type:  Face to Face    SUBJECTIVE:     Patient Findings     Positives No Problem Findings           OBJECTIVE    INR Protime   Date Value Ref Range Status   08/29/2017 2.6 (A) 0.86 - 1.14 Final       ASSESSMENT / PLAN  INR assessment THER    Recheck INR In: 4 WEEKS    INR Location Clinic      Anticoagulation Summary as of 8/29/2017     INR goal 2.0-3.0   Today's INR 2.6   Maintenance plan 2 mg (2 mg x 1) every day   Full instructions 2 mg every day   Weekly total 14 mg   Plan last modified Yoana Waddell RN (12/15/2016)   Next INR check 9/26/2017   Priority INR   Target end date     Indications   Long-term (current) use of anticoagulants [Z79.01] [Z79.01]  Chronic atrial fibrillation (H) [I48.2]         Anticoagulation Episode Summary     INR check location     Preferred lab     Send INR reminders to [facility]    Comments Pt's 1st name is pronounced \"ondray\"      Anticoagulation Care Providers     Provider Role Specialty Phone number    Dannielle Darby MD [facility] Internal Medicine [phone]            See the Encounter Report to view Anticoagulation Flowsheet and Dosing Calendar (Go to Encounters tab in chart review, and find the Anticoagulation Therapy Visit)    Dosage adjustment made based on physician directed care plan.    Dilma Rider RN               "

## 2017-09-21 ENCOUNTER — ALLIED HEALTH/NURSE VISIT (OUTPATIENT)
Dept: CARDIOLOGY | Facility: CLINIC | Age: 82
End: 2017-09-21
Payer: COMMERCIAL

## 2017-09-21 DIAGNOSIS — Z95.0 CARDIAC PACEMAKER IN SITU: Primary | ICD-10-CM

## 2017-09-21 PROCEDURE — 93293 PM PHONE R-STRIP DEVICE EVAL: CPT | Performed by: INTERNAL MEDICINE

## 2017-09-21 NOTE — NURSING NOTE
PHONE TELETRACE  Appropriate pacemaker function at time of check. Magnet response WNL.  F/U scheduled q 3 months. For annual threshold in [place].  Order placed for this annual threshold to be schdeuled.

## 2017-09-21 NOTE — MR AVS SNAPSHOT
After Visit Summary   9/21/2017    Leonor Mercado    MRN: [id number]           Patient Information     Date Of Birth          5/6/1926        Visit Information        Provider Department      9/21/2017 9:00 AM NUÑEZ TECH2 [facility] [place]        Today's Diagnoses     Cardiac pacemaker in situ    -  1       Follow-ups after your visit        Additional Services     Follow-Up with Device Clinic                 Your next 10 appointments already scheduled     Sep 26, 2017  2:00 PM CDT   [facility]ticoagulation [facility] with [facility]    [address]  [facility] [address] [phone]            Oct 09, 2017  9:00 AM CDT   Return Visit with Collins Horton MD   [facility] ([facility])    [address] [facility] [address]  [facility] [address] [phone]              Future tests that were ordered for you today     Open Future Orders        Priority Expected Expires Ordered    Follow-Up with Device Clinic Routine 12/21/2017 10/26/2018 9/21/2017            Who to contact     If you have questions or need follow up information about today's clinic visit or your schedule please contact [facility] directly at [phone].  Normal or non-critical lab and imaging results will be communicated to you by MyChart, letter or phone within 4 business days after the clinic has received the results. If you do not hear from us within 7 days, please contact the clinic through MyChart or phone. If you have a critical or abnormal lab result, we will notify you by phone as soon as possible.  Submit refill requests through [facility] or call your pharmacy and they will forward the refill request to us. Please allow 3 business days for your refill to be completed.          Additional Information About Your Visit    "     MyChart Information     [facility] lets you send messages to your doctor, view your test results, renew your prescriptions, schedule appointments and more. To sign up, go to [email or web address] . Click on \"Log in\" on the left side of the screen, which will take you to the Welcome page. Then click on \"Sign up Now\" on the right side of the page.     You will be asked to enter the access code listed below, as well as some personal information. Please follow the directions to create your username and password.     Your access code is: [email or web address]  Expires: 2017  9:15 AM     Your access code will  in 90 days. If you need help or a new code, please call your [place] clinic or [phone].        Care EveryWhere ID     This is your Care EveryWhere ID. This could be used by other organizations to access your [place] medical records  [id number]        Your Vitals Were     Last Period                   (LMP Unknown)            Blood Pressure from Last 3 Encounters:   17 130/64   17 118/68   17 144/62    Weight from Last 3 Encounters:   17 61.9 kg (136 lb 6.4 oz)   17 61.2 kg (135 lb)   17 61.2 kg (135 lb)              We Performed the Following     PM PHONE R STRIP EVAL UP TO 90 DAYS ([phone])        Primary Care Provider Office Phone # Fax #    Dannielle Bria Darby [phone]       [address] [facility] [address]        Equal Access to Services     [facility]: Hadii aad ku hadasho Soomaali, waaxda luqadaha, qaybta kaalmada kyara, antonia cruz . So [facility] [phone].    ATENCIÓN: Si habla español, tiene a spicer disposición servicios gratuitos de asistencia lingüística. Llame al [phone].    We comply with applicable federal civil rights laws and [place] laws. We do not discriminate on the basis of race, color, national origin, age, disability sex, sexual orientation or gender identity.          "   Thank you!     Thank you for choosing [facility] [place]  for your care. Our goal is always to provide you with excellent care. Hearing back from our patients is one way we can continue to improve our services. Please take a few minutes to complete the written survey that you may receive in the mail after your visit with us. Thank you!             Your Updated Medication List - Protect others around you: Learn how to safely use, store and throw away your medicines at www.disposemymeds.org.          This list is accurate as of: 9/21/17  9:15 AM.  Always use your most recent med list.                   Brand Name Dispense Instructions for use Diagnosis    albuterol 108 (90 BASE) MCG/ACT Inhaler    PROAIR HFA/PROVENTIL HFA/VENTOLIN HFA    1 Inhaler    Inhale 2 puffs into the lungs every 6 hours as needed for shortness of breath / dyspnea or wheezing    Chronic obstructive pulmonary disease, unspecified COPD type (H)       ascorbic acid 500 MG tablet    VITAMIN C     Take 500 mg by mouth daily        ASPIRIN NOT PRESCRIBED    INTENTIONAL     Antiplatelet medication not prescribed intentionally due to Current anticoagulant therapy (warfarin/enoxaparin)    Chronic atrial fibrillation (H)       calcium carbonate 600 MG tablet   Generic drug:  calcium carbonate      Take 1 tablet by mouth daily        diltiazem 240 MG 24 hr capsule     90 capsule    TAKE ONE CAPSULE BY MOUTH ONCE DAILY    Chronic atrial fibrillation (H)       DOCUSATE SODIUM PO      Take 100 mg by mouth At Bedtime        folic acid 400 MCG tablet    FOLVITE     Take 400 mcg by mouth daily        levalbuterol 0.63 MG/3ML neb solution    XOPENEX    120 mL    Take 3 mLs (0.63 mg) by nebulization every 6 hours as needed for shortness of breath / dyspnea or wheezing    Chronic obstructive pulmonary disease, unspecified COPD type (H)       LORazepam 0.5 MG tablet    ATIVAN     Take 0.5 mg by mouth every 6 hours as needed for  anxiety        losartan 50 MG tablet    COZAAR    90 tablet    TAKE ONE TABLET BY MOUTH ONCE DAILY    Essential hypertension with goal blood pressure less than 140/90       menthol 5 % Pads    ICY HOT     Apply 1 patch topically every 8 hours as needed for muscle soreness    Flank pain       nitroGLYcerin 0.4 MG sublingual tablet    NITROSTAT    25 tablet    Place 1 tablet (0.4 mg) under the tongue every 5 minutes as needed    Other chest pain       OCUVITE PO      Take 1 capsule by mouth daily.        polyethylene glycol Packet    MIRALAX/GLYCOLAX     Take 1 packet by mouth 2 times daily        pravastatin 40 MG tablet    PRAVACHOL    30 tablet    TAKE ONE TABLET BY MOUTH ONCE DAILY.    Chest pain, unspecified type, Hyperlipidemia LDL goal <100, Coronary artery disease involving native coronary artery of native heart without angina pectoris       PROBIOTIC & ACIDOPHILUS EX ST PO      Take 1 tablet by mouth daily.        TART CHERRY ADVANCED Caps      Aiken tart liquid        TYLENOL PO      Take 1,000 mg by mouth At Bedtime        umeclidinium 62.5 MCG/INH oral inhaler    INCRUSE ELLIPTA    1 Inhaler    Inhale 1 puff into the lungs daily    Pulmonary fibrosis (H), Chronic obstructive pulmonary disease, unspecified COPD type (H)       vitamin D 2000 UNITS tablet      Take 2,000 Units by mouth daily        warfarin 2 MG tablet    COUMADIN    90 tablet    TAKE ONE TABLET BY MOUTH ONCE DAILY OR  AS  DIRECTED  BY  INR  CLINIC    Long term current use of anticoagulant therapy       ZOLPIDEM TARTRATE PO      Take 5 mg by mouth nightly as needed for sleep

## 2017-09-26 ENCOUNTER — ANTICOAGULATION THERAPY VISIT (OUTPATIENT)
Dept: ANTICOAGULATION | Facility: CLINIC | Age: 82
End: 2017-09-26
Payer: COMMERCIAL

## 2017-09-26 ENCOUNTER — TELEPHONE (OUTPATIENT)
Dept: ANTICOAGULATION | Facility: CLINIC | Age: 82
End: 2017-09-26

## 2017-09-26 DIAGNOSIS — I48.20 CHRONIC ATRIAL FIBRILLATION (H): ICD-10-CM

## 2017-09-26 DIAGNOSIS — Z79.01 LONG-TERM (CURRENT) USE OF ANTICOAGULANTS: ICD-10-CM

## 2017-09-26 DIAGNOSIS — I48.20 CHRONIC ATRIAL FIBRILLATION (H): Primary | ICD-10-CM

## 2017-09-26 DIAGNOSIS — Z23 NEED FOR PROPHYLACTIC VACCINATION AND INOCULATION AGAINST INFLUENZA: Primary | ICD-10-CM

## 2017-09-26 LAB — INR POINT OF CARE: 2.8 (ref 0.86–1.14)

## 2017-09-26 PROCEDURE — G0008 ADMIN INFLUENZA VIRUS VAC: HCPCS

## 2017-09-26 PROCEDURE — 85610 PROTHROMBIN TIME: CPT | Mod: QW

## 2017-09-26 PROCEDURE — 99207 ZZC NO CHARGE NURSE ONLY: CPT | Mod: 25

## 2017-09-26 PROCEDURE — 90662 IIV NO PRSV INCREASED AG IM: CPT

## 2017-09-26 PROCEDURE — 36416 COLLJ CAPILLARY BLOOD SPEC: CPT

## 2017-09-26 NOTE — PROGRESS NOTES
"  ANTICOAGULATION FOLLOW-UP CLINIC VISIT    Patient Name:  Leonor Mercado  Date:  9/26/2017  Contact Type:  Face to Face    SUBJECTIVE:     Patient Findings     Positives No Problem Findings           OBJECTIVE    INR Protime   Date Value Ref Range Status   09/26/2017 2.8 (A) 0.86 - 1.14 Final       ASSESSMENT / PLAN  INR assessment THER    Recheck INR In: 4 WEEKS    INR Location Clinic      Anticoagulation Summary as of 9/26/2017     INR goal 2.0-3.0   Today's INR 2.8   Maintenance plan 2 mg (2 mg x 1) every day   Full instructions 2 mg every day   Weekly total 14 mg   No change documented Dilma Rider RN   Plan last modified Yoana Waddell RN (12/15/2016)   Next INR check 10/24/2017   Priority INR   Target end date     Indications   Long-term (current) use of anticoagulants [Z79.01] [Z79.01]  Chronic atrial fibrillation (H) [I48.2]         Anticoagulation Episode Summary     INR check location     Preferred lab     Send INR reminders to [facility]    Comments Pt's 1st name is pronounced \"ondray\"  Pt does not want print out, appt card only      Anticoagulation Care Providers     Provider Role Specialty Phone number    Dannielle Darby MD Responsible Internal Medicine [phone]            See the Encounter Report to view Anticoagulation Flowsheet and Dosing Calendar (Go to Encounters tab in chart review, and find the Anticoagulation Therapy Visit)    Dosage adjustment made based on physician directed care plan.    Dilma Rider RN               Injectable Influenza Immunization Documentation    1.  Is the person to be vaccinated sick today?   No    2. Does the person to be vaccinated have an allergy to a component   of the vaccine?   No    3. Has the person to be vaccinated ever had a serious reaction   to influenza vaccine in the past?   No    4. Has the person to be vaccinated ever had Guillain-Barré syndrome?   No    Form completed by Dilma Rider RN           "

## 2017-09-26 NOTE — TELEPHONE ENCOUNTER
For insurance purposes, an annual INR referral is required. Please sign the order and route back to the INR Clinic. Dilma Rider RN

## 2017-09-26 NOTE — MR AVS SNAPSHOT
Leonor Mercado   9/26/2017 2:00 PM   Anticoagulation Therapy Visit    Description:  89 year old female   Provider[facility]  [facility]   Department:  [facility]           INR as of 9/26/2017     Today's INR 2.8      Anticoagulation Summary as of 9/26/2017     INR goal 2.0-3.0   Today's INR 2.8   Full instructions 2 mg every day   Next INR check 10/24/2017    Indications   Long-term (current) use of anticoagulants [Z79.01] [Z79.01]  Chronic atrial fibrillation (H) [I48.2]         Your next Anticoagulation Clinic appointment(s)     Oct 24, 2017  2:00 PM CDT   Anticoagulation Visit with [facility]   [facility]    [address] [facility] [address]  [facility] [address] [phone]              Contact Numbers     [facility] Phone Numbers:  Anticoagulation Clinic Appointments : [phone]  Anticoagulation Nurse: [phone]         September 2017 Details    Sun Mon Tue Wed Thu Fri Sat          1               2                 3               4               5               6               7               8               9                 10               11               12               13               14               15               16                 17               18               19               20               21               22               23                 24               25               26      2 mg   See details      27      2 mg         28      2 mg         29      2 mg         30      2 mg          Date Details   09/26 This INR check               How to take your warfarin dose     To take:  2 mg Take 1 of the 2 mg tablets.           October 2017 Details    Sun Mon Tue Wed Thu Fri Sat     1      2 mg         2      2 mg         3      2 mg         4      2 mg         5      2 mg         6      2 mg         7      2 mg           8      2 mg         9      2 mg         10      2 mg         11      2 mg          12      2 mg         13      2 mg         14      2 mg           15      2 mg         16      2 mg         17      2 mg         18      2 mg         19      2 mg         20      2 mg         21      2 mg           22      2 mg         23      2 mg         24            25               26               27               28                 29               30               31                    Date Details   No additional details    Date of next INR:  10/24/2017         How to take your warfarin dose     To take:  2 mg Take 1 of the 2 mg tablets.

## 2017-10-09 ENCOUNTER — OFFICE VISIT (OUTPATIENT)
Dept: CARDIOLOGY | Facility: CLINIC | Age: 82
End: 2017-10-09
Attending: INTERNAL MEDICINE
Payer: COMMERCIAL

## 2017-10-09 VITALS
HEIGHT: 63 IN | BODY MASS INDEX: 24.56 KG/M2 | WEIGHT: 138.6 LBS | SYSTOLIC BLOOD PRESSURE: 140 MMHG | DIASTOLIC BLOOD PRESSURE: 70 MMHG | HEART RATE: 72 BPM

## 2017-10-09 DIAGNOSIS — R06.02 SHORTNESS OF BREATH: ICD-10-CM

## 2017-10-09 PROCEDURE — 99215 OFFICE O/P EST HI 40 MIN: CPT | Performed by: INTERNAL MEDICINE

## 2017-10-09 RX ORDER — HYDROCHLOROTHIAZIDE 25 MG/1
25 TABLET ORAL DAILY
Qty: 30 TABLET | Refills: 1 | Status: SHIPPED | OUTPATIENT
Start: 2017-10-09 | End: 2017-11-17

## 2017-10-09 NOTE — PROGRESS NOTES
HPI and Plan:   See dictation    Orders Placed This Encounter   Procedures     Basic metabolic panel     N terminal pro BNP outpatient     Follow-Up with Cardiologist     Orders Placed This Encounter   Medications     hydrochlorothiazide (HYDRODIURIL) 25 MG tablet     Sig: Take 1 tablet (25 mg) by mouth daily Take 1 tab by mouth at noon     Dispense:  30 tablet     Refill:  1     There are no discontinued medications.      Encounter Diagnosis   Name Primary?     Shortness of breath        CURRENT MEDICATIONS:  Current Outpatient Prescriptions   Medication Sig Dispense Refill     hydrochlorothiazide (HYDRODIURIL) 25 MG tablet Take 1 tablet (25 mg) by mouth daily Take 1 tab by mouth at noon 30 tablet 1     warfarin (COUMADIN) 2 MG tablet TAKE ONE TABLET BY MOUTH ONCE DAILY OR  AS  DIRECTED  BY  INR  CLINIC 90 tablet 1     diltiazem 240 MG 24 hr capsule TAKE ONE CAPSULE BY MOUTH ONCE DAILY 90 capsule 0     losartan (COZAAR) 50 MG tablet TAKE ONE TABLET BY MOUTH ONCE DAILY 90 tablet 0     LORazepam (ATIVAN) 0.5 MG tablet Take 0.5 mg by mouth every 6 hours as needed for anxiety       umeclidinium (INCRUSE ELLIPTA) 62.5 MCG/INH oral inhaler Inhale 1 puff into the lungs daily 1 Inhaler 11     pravastatin (PRAVACHOL) 40 MG tablet TAKE ONE TABLET BY MOUTH ONCE DAILY. 30 tablet 3     albuterol (PROAIR HFA/PROVENTIL HFA/VENTOLIN HFA) 108 (90 BASE) MCG/ACT Inhaler Inhale 2 puffs into the lungs every 6 hours as needed for shortness of breath / dyspnea or wheezing 1 Inhaler 1     levalbuterol (XOPENEX) 0.63 MG/3ML neb solution Take 3 mLs (0.63 mg) by nebulization every 6 hours as needed for shortness of breath / dyspnea or wheezing 120 mL 5     Acetaminophen (TYLENOL PO) Take 1,000 mg by mouth At Bedtime       DOCUSATE SODIUM PO Take 100 mg by mouth At Bedtime       menthol (ICY HOT) 5 % PADS Apply 1 patch topically every 8 hours as needed for muscle soreness  0     polyethylene glycol (MIRALAX/GLYCOLAX) packet Take 1 packet by  mouth 2 times daily        Misc Natural Products (TART CHERRY ADVANCED) CAPS Cherry tart liquid       ZOLPIDEM TARTRATE PO Take 5 mg by mouth nightly as needed for sleep       ASPIRIN NOT PRESCRIBED, INTENTIONAL, Antiplatelet medication not prescribed intentionally due to Current anticoagulant therapy (warfarin/enoxaparin)  0     nitroglycerin (NITROSTAT) 0.4 MG SL tablet Place 1 tablet (0.4 mg) under the tongue every 5 minutes as needed 25 tablet 1     calcium carbonate (CALCIUM CARBONATE) 600 MG TABS tablet Take 1 tablet by mouth daily        folic acid (FOLVITE) 400 MCG tablet Take 400 mcg by mouth daily       Cholecalciferol (VITAMIN D) 2000 UNITS tablet Take 2,000 Units by mouth daily       ascorbic acid (VITAMIN C) 500 MG tablet Take 500 mg by mouth daily       Probiotic Product (PROBIOTIC & ACIDOPHILUS EX ST PO) Take 1 tablet by mouth daily.       Multiple Vitamins-Minerals (OCUVITE PO) Take 1 capsule by mouth daily.         ALLERGIES     Allergies   Allergen Reactions     Chocolate Shortness Of Breath     Peanuts [Nuts] Shortness Of Breath     Amiodarone      pulm fibrosis       Baclofen      Confusion      Bactrim [Sulfamethoxazole W-Trimethoprim]      Difficulty swallowing     Doxycycline Other (See Comments)     Multiple complaints; she does not want this again.      Levaquin [Levofloxacin] Other (See Comments)     Multiple complaints; she will not take this again     Linzess [Linaclotide] Diarrhea     Liquid Adhesive Itching and Rash     Bleeding when tape removed after pacemaker placement..itched and burned for weeks.       PAST MEDICAL HISTORY:  Past Medical History:   Diagnosis Date     Atrial fibrillation (H)     Sick sinus syndrome, PAT, AF     BCC (basal cell carcinoma of skin)     Face     CHF (congestive heart failure) (H)     mild in past     Chronic renal insufficiency      COPD (chronic obstructive pulmonary disease) (H)      Coronary artery disease     2-05[place]-CAD-taxus stentx2  2.5-12,2.5-12 in [place] and [place], 80%nonDomRCA-LcxDom-mild,EF60%     Hyperlipidemia      Hypertension      IBS (irritable bowel syndrome)      Irritable bowel      Osteoporosis      Panic attack     questionable diagnosis     Pulmonary fibrosis (H)     do not use amiodarone     SSS (sick sinus syndrome) (H)     DDD pacer (see surgery history section)     Vertigo        PAST SURGICAL HISTORY:  Past Surgical History:   Procedure Laterality Date     APPENDECTOMY       CARDIAC SURGERY      PPM, 2 STENTS2-05[place]-CAD-taxus stentx2 2.5-12,2.5-12 in LADp and LADm, 80%nonDomRCA-LcxDom-mild,EF60%     CORONARY ANGIOGRAPHY ADULT ORDER  1994    mild CAD,Normal LV function, No Mitral regurgitation, Prox LAD 30% stenosis. RCA prox and mid, 20-30%     ESOPHAGOSCOPY, GASTROSCOPY, DUODENOSCOPY (EGD), COMBINED N/A 2015    Procedure: COMBINED ESOPHAGOSCOPY, GASTROSCOPY, DUODENOSCOPY (EGD), BIOPSY SINGLE OR MULTIPLE;  Surgeon: Genevieve Leon MD;  Location:  GI     H LEAD REVISION DUAL  2003    Revised in [place]-due to diaphram stim (original pacer placed in [place])     H LEAD REVISION DUAL  2014    Correction of reversal of A and V leads in [place]     HEART CATH, ANGIOPLASTY  2005    LEIGH ANN mid and proximal LAD, ongoing 80% RCA; mild circumflex     HERNIA REPAIR Left     [facility]     IMPLANT PACEMAKER  2003    Placed in [place] for sick sinus syndrome     REPLACE PACEMAKER GENERATOR  2013    Dual-chamber pacemaker by Dr SETH Pierre       FAMILY HISTORY:  Family History   Problem Relation Age of Onset     HEART DISEASE Father       age 84     Neurologic Disorder Mother      dementia     GASTROINTESTINAL DISEASE Daughter      liver transplant     Crohn Disease Daughter        SOCIAL HISTORY:  Social History     Social History     Marital status:      Spouse name: N/A     Number of children: 3     Years of education: N/A     Occupational History      Retired     Social History Main Topics     Smoking  "status: Former Smoker     Quit date: 5/9/1987     Smokeless tobacco: Never Used     Alcohol use 0.0 oz/week     0 Standard drinks or equivalent per week      Comment: 1 wkly     Drug use: No     Sexual activity: No     Other Topics Concern     Parent/Sibling W/ Cabg, Mi Or Angioplasty Before 65f 55m? Yes     Caffeine Concern Yes     decaf - some 1/2 caff     Sleep Concern Yes     nocturia every 2 hours     Special Diet No     Exercise No     some walking in the house     Social History Narrative       Review of Systems:  Skin:  Positive for itching   Eyes:  Positive for glasses  ENT:  Positive for hearing loss  Respiratory:  Positive for shortness of breath;dyspnea on exertion;wheezing;cough  Cardiovascular:    heaviness;Positive for  Gastroenterology: not assessed    Genitourinary:  not assessed    Musculoskeletal:  not assessed    Neurologic:  not assessed    Psychiatric:  not assessed    Heme/Lymph/Imm:  not assessed    Endocrine:  Negative      Physical Exam:  Vitals: /70 (BP Location: Right arm, Patient Position: Sitting, Cuff Size: Adult Regular)  Pulse 72  Ht 1.6 m (5' 3\")  Wt 62.9 kg (138 lb 9.6 oz)  LMP  (LMP Unknown)  Breastfeeding? No  BMI 24.55 kg/m2    Constitutional:  cooperative, alert and oriented, well developed, well nourished, in no acute distress        Skin:  warm and dry to the touch, no apparent skin lesions or masses noted        Head:  normocephalic, no masses or lesions        Eyes:  pupils equal and round, conjunctivae and lids unremarkable, sclera white, no xanthalasma, EOMS intact, no nystagmus        ENT:  no pallor or cyanosis, dentition good        Neck:  carotid pulses are full and equal bilaterally, JVP normal, no carotid bruit, no thyromegaly        Chest:  normal breath sounds, clear to auscultation, normal A-P diameter, normal symmetry, normal respiratory excursion, no use of accessory muscles   pacemaker incision in the left infraclavicular area was well-healed " distant BS, O2 sat 96% and after childress walk 95% and marked wheeze    Cardiac:         systolic murmur;grade 1     bigeminal rhythm    Abdomen:           Vascular:                                          Extremities and Back:      bilateral LE edema;1+;R greater than L          Neurological:  affect appropriate, oriented to time, person and place          Recent Lab Results:  LIPID RESULTS:  Lab Results   Component Value Date    CHOL 153 03/15/2015    HDL 52 03/15/2015    LDL 75 03/15/2015    TRIG 129 03/15/2015    CHOLHDLRATIO 2.9 03/15/2015       LIVER ENZYME RESULTS:  Lab Results   Component Value Date    AST 18 11/15/2016    ALT 18 11/15/2016       CBC RESULTS:  Lab Results   Component Value Date    WBC 6.6 11/15/2016    RBC 4.31 11/15/2016    HGB 12.7 11/15/2016    HCT 39.1 11/15/2016    MCV 91 11/15/2016    MCH 29.5 11/15/2016    MCHC 32.5 11/15/2016    RDW 14.0 11/15/2016     11/15/2016       BMP RESULTS:  Lab Results   Component Value Date     11/15/2016    POTASSIUM 3.8 11/15/2016    CHLORIDE 104 11/15/2016    CO2 28 11/15/2016    ANIONGAP 6 11/15/2016    GLC 98 11/15/2016    BUN 24 11/15/2016    CR 1.17 (H) 11/15/2016    GFRESTIMATED 43 (L) 11/15/2016    GFRESTBLACK 53 (L) 11/15/2016    TRI 8.9 11/15/2016        A1C RESULTS:  Lab Results   Component Value Date    A1C 6.1 (H) 04/30/2014       INR RESULTS:  Lab Results   Component Value Date    INR 2.8 (A) 09/26/2017    INR 2.6 (A) 08/29/2017    INR 2.60 (H) 11/15/2016    INR 3.25 (H) 11/03/2016           CC  Bryan Garcia MD  [address] [place] [id number]

## 2017-10-09 NOTE — MR AVS SNAPSHOT
After Visit Summary   10/9/2017    Leonor Mercado    MRN: [id number]           Patient Information     Date Of Birth          5/6/1926        Visit Information        Provider Department      10/9/2017 9:00 AM Collins Horton MD [facility]        Today's Diagnoses     Shortness of breath           Follow-ups after your visit        Additional Services     Follow-Up with Cardiologist                 Your next 10 appointments already scheduled     Oct 24, 2017  2:00 PM CDT   Anticoagulation Visit with [facility]   [facility]    [address]  [facility] [id number] [phone]              Future tests that were ordered for you today     Open Future Orders        Priority Expected Expires Ordered    Basic metabolic panel Routine 4/7/2018 10/9/2018 10/9/2017    N terminal pro BNP outpatient Routine 4/7/2018 10/9/2018 10/9/2017    Follow-Up with Cardiologist Routine 4/7/2018 10/9/2018 10/9/2017            Who to contact     If you have questions or need follow up information about today's clinic visit or your schedule please contact [facility] [place] directly at [phone].  Normal or non-critical lab and imaging results will be communicated to you by MyChart, letter or phone within 4 business days after the clinic has received the results. If you do not hear from us within 7 days, please contact the clinic through MyChart or phone. If you have a critical or abnormal lab result, we will notify you by phone as soon as possible.  Submit refill requests through [employer] or call your pharmacy and they will forward the refill request to us. Please allow 3 business days for your refill to be completed.          Additional Information About Your Visit        GIROPTIChart Information     [employer] lets you send messages to your doctor, view your test  "results, renew your prescriptions, schedule appointments and more. To sign up, go to [email or web address] . Click on \"Log in\" on the left side of the screen, which will take you to the Welcome page. Then click on \"Sign up Now\" on the right side of the page.     You will be asked to enter the access code listed below, as well as some personal information. Please follow the directions to create your username and password.     Your access code is: [email or web address]  Expires: 2017  9:15 AM     Your access code will  in 90 days. If you need help or a new code, please call your [place] clinic or [phone].        Care EveryWhere ID     This is your Care EveryWhere ID. This could be used by other organizations to access your [place] medical records  [id number]        Your Vitals Were     Pulse Height Last Period Breastfeeding? BMI (Body Mass Index)       72 1.6 m (5' 3\") (LMP Unknown) No 24.55 kg/m2        Blood Pressure from Last 3 Encounters:   10/09/17 140/70   17 130/64   17 118/68    Weight from Last 3 Encounters:   10/09/17 62.9 kg (138 lb 9.6 oz)   17 61.9 kg (136 lb 6.4 oz)   17 61.2 kg (135 lb)              We Performed the Following     Follow-Up with Cardiologist          Today's Medication Changes          These changes are accurate as of: 10/9/17  9:57 AM.  If you have any questions, ask your nurse or doctor.               Start taking these medicines.        Dose/Directions    hydrochlorothiazide 25 MG tablet   Commonly known as:  HYDRODIURIL   Used for:  Shortness of breath   Started by:  Collins Horton MD        Dose:  25 mg   Take 1 tablet (25 mg) by mouth daily Take 1 tab by mouth at noon   Quantity:  30 tablet   Refills:  1            Where to get your medicines      These medications were sent to [facility] [address] [place] [address] [id number] [place] [address]     Phone:  [phone]     hydrochlorothiazide 25 MG " tablet                Primary Care Provider Office Phone # Fax #    Dannielle Darby [phone]       [facility] [address]        Equal Access to Services     NALDO OVALLE : Leni joyner ammono Sonatividad, waaxda luqadaha, qaybta kaalmada adepramodyada, antonia vasquezfranco dexter marcum nancy hwang. So [facility] [phone].    ATENCIÓN: Si habla español, tiene a spicer disposición servicios gratuitos de asistencia lingüística. Llame al [phone].    We comply with applicable federal civil rights laws and [place] laws. We do not discriminate on the basis of race, color, national origin, age, disability, sex, sexual orientation, or gender identity.            Thank you!     Thank you for choosing [facility] [place]  for your care. Our goal is always to provide you with excellent care. Hearing back from our patients is one way we can continue to improve our services. Please take a few minutes to complete the written survey that you may receive in the mail after your visit with us. Thank you!             Your Updated Medication List - Protect others around you: Learn how to safely use, store and throw away your medicines at www.disposemymeds.org.          This list is accurate as of: 10/9/17  9:57 AM.  Always use your most recent med list.                   Brand Name Dispense Instructions for use Diagnosis    albuterol 108 (90 BASE) MCG/ACT Inhaler    PROAIR HFA/PROVENTIL HFA/VENTOLIN HFA    1 Inhaler    Inhale 2 puffs into the lungs every 6 hours as needed for shortness of breath / dyspnea or wheezing    Chronic obstructive pulmonary disease, unspecified COPD type (H)       ascorbic acid 500 MG tablet    VITAMIN C     Take 500 mg by mouth daily        ASPIRIN NOT PRESCRIBED    INTENTIONAL     Antiplatelet medication not prescribed intentionally due to Current anticoagulant therapy (warfarin/enoxaparin)    Chronic atrial fibrillation (H)        calcium carbonate 600 MG tablet   Generic drug:  calcium carbonate      Take 1 tablet by mouth daily        diltiazem 240 MG 24 hr capsule     90 capsule    TAKE ONE CAPSULE BY MOUTH ONCE DAILY    Chronic atrial fibrillation (H)       DOCUSATE SODIUM PO      Take 100 mg by mouth At Bedtime        folic acid 400 MCG tablet    FOLVITE     Take 400 mcg by mouth daily        hydrochlorothiazide 25 MG tablet    HYDRODIURIL    30 tablet    Take 1 tablet (25 mg) by mouth daily Take 1 tab by mouth at noon    Shortness of breath       levalbuterol 0.63 MG/3ML neb solution    XOPENEX    120 mL    Take 3 mLs (0.63 mg) by nebulization every 6 hours as needed for shortness of breath / dyspnea or wheezing    Chronic obstructive pulmonary disease, unspecified COPD type (H)       LORazepam 0.5 MG tablet    ATIVAN     Take 0.5 mg by mouth every 6 hours as needed for anxiety        losartan 50 MG tablet    COZAAR    90 tablet    TAKE ONE TABLET BY MOUTH ONCE DAILY    Essential hypertension with goal blood pressure less than 140/90       menthol 5 % Pads    ICY HOT     Apply 1 patch topically every 8 hours as needed for muscle soreness    Flank pain       nitroGLYcerin 0.4 MG sublingual tablet    NITROSTAT    25 tablet    Place 1 tablet (0.4 mg) under the tongue every 5 minutes as needed    Other chest pain       OCUVITE PO      Take 1 capsule by mouth daily.        polyethylene glycol Packet    MIRALAX/GLYCOLAX     Take 1 packet by mouth 2 times daily        pravastatin 40 MG tablet    PRAVACHOL    30 tablet    TAKE ONE TABLET BY MOUTH ONCE DAILY.    Chest pain, unspecified type, Hyperlipidemia LDL goal <100, Coronary artery disease involving native coronary artery of native heart without angina pectoris       PROBIOTIC & ACIDOPHILUS EX ST PO      Take 1 tablet by mouth daily.        TART CHERRY ADVANCED Caps      [place] tart liquid        TYLENOL PO      Take 1,000 mg by mouth At Bedtime        umeclidinium 62.5 MCG/INH oral inhaler     INCRUSE ELLIPTA    1 Inhaler    Inhale 1 puff into the lungs daily    Pulmonary fibrosis (H), Chronic obstructive pulmonary disease, unspecified COPD type (H)       vitamin D 2000 UNITS tablet      Take 2,000 Units by mouth daily        warfarin 2 MG tablet    COUMADIN    90 tablet    TAKE ONE TABLET BY MOUTH ONCE DAILY OR  AS  DIRECTED  BY  INR  CLINIC    Long term current use of anticoagulant therapy       ZOLPIDEM TARTRATE PO      Take 5 mg by mouth nightly as needed for sleep

## 2017-10-09 NOTE — PROGRESS NOTES
HISTORY OF PRESENT ILLNESS:  It was a pleasure following up on our mutual patient, Leonor Mercado.  She is a delightful 89-year-old woman accompanied by her daughter.  She has a remote history of stenting more than 10 years ago in [place].  We never had the full details of that.  She also has emphysema and I think that is going to be the main issue here.  She sees Dr. Lujan and Dr. Jackie Darby.  All of her questions today centered around her inhalers and her lung and I tried desperately to get her to go back to see Dr. Lujan.  A couple burnett findings I note today:  Her lungs were completely clear at rest and at her O2 sat was 96%.  I had her walk in the childress and her O2 sat went to 95% which is normal but then she had audible wheezing.  Cardiac-wise, she had an echocardiogram done on 08/08/2017.  It showed normal left ventricular size and systolic function, ejection fraction of 60% and grade 1 diastolic dysfunction which is almost normal for age.  The right ventricle was normal.  She had normal left and right atrial dimensions.  She had normal mitral valve function, normal tricuspid valve function.  Her RVSP was 17+ RA pressure suggesting no pulmonary hypertension.  Her aortic valve was normal.  Her IVC was normal in size suggesting normal central venous pressure.  She had a nuclear stress test done in 05/2017 and that was negative for ischemia and again showed normal ejection fraction.  Her pacer is working well.  She does have breakthrough episodes of atrial fibrillation and PAT, however.  She wore a heart monitor and her PAT or atrial fibrillation episodes did not correlate with her symptoms of dizziness.  She does note very mild ankle edema, right greater than left.  Today, it is trivial on the right.  She has a typical pattern where it is fine in the morning, worse at night.  She freely admits that she uses too much salt.  Her blood pressure numbers are within reasonable values.  She does not like the  "new inhaler Dr. Lujan gave her.  It has albuterol in it but it through a nebulizer and she gets shaky.  However, she took some Ventolin here after the wheeze with the childress walk and had no problems, so I think it is more of the dose.  She did well on Spiriva but the cost was prohibitive and she would like to get back on that.  Again, I cannot help her that much.  She is on an atropine-like inhaler now and apparently it is not working as well.  I suggested she call Dr. Lujan to determine if there are any other medications she might try.  I do not know if using a steroid inhaler for long-term suppression would be useful.  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  The mild ankle edema could be a transient fluid retention from mild right heart failure but again, as I mentioned, her RVSP and her RA-estimated pressures were both normal.  I do not think this represents an anginal equivalent, i.e. that she goes into heart failure with walking.  There was no chest pain and again a nuclear stress test recently was negative.  There was no valve dysfunction.  If Dr. Lujan is very concerned about that, we could potentially do a stress echo Doppler but I do not know how much she is actually going to be able to walk on the treadmill at age 89.  I am going to go ahead and put her on low-dose diuretic, hydrochlorothiazide 25 mg a day starting at noon time.  We will see if that helps with ankle edema and by any chance if it helps with the wheezing.  I gave her a 1-month supply with 1 refill and she can call us if she wants to continue it or not.  She is on diltiazem which potentially could lead to some ankle edema but she states she has been on it for years and years with no problem.  I will see her back in 6 months otherwise.      This was a 45-minute visit, greater than 50% counseling. "      Collins Galan MD       cc:   Dannielle Darby MD    [facility]    [address], [address]   [place] [address]      Rodrick Lujan MD    [facility] [address] [address]         COLLINS GALAN MD             D: 10/09/2017 09:56   T: 10/09/2017 12:00   MT: JOSE R      Name:     AILYN MARCUS   MRN:      [id number]        Account:      [id number]   :      1926           Service Date: 10/09/2017      Document: [id number]

## 2017-10-09 NOTE — LETTER
10/9/2017    aDnnielle Darby MD    [facility]    [address], [address]   [place] [address]     RE: Leonor Mercado       Dear Colleague,    It was a pleasure following up on our mutual patient, Leonor Mercado.  She is a delightful 89-year-old woman accompanied by her daughter.  She has a remote history of stenting more than 10 years ago in [place].  We never had the full details of that.  She also has emphysema and I think that is going to be the main issue here.  She sees Dr. Lujan and Dr. Jackie Darby.  All of her questions today centered around her inhalers and her lung and I tried desperately to get her to go back to see Dr. Lujan.  A couple burnett findings I note today:  Her lungs were completely clear at rest and at her O2 sat was 96%.  I had her walk in the childress and her O2 sat went to 95% which is normal but then she had audible wheezing.  Cardiac-wise, she had an echocardiogram done on 08/08/2017.  It showed normal left ventricular size and systolic function, ejection fraction of 60% and grade 1 diastolic dysfunction which is almost normal for age.  The right ventricle was normal.  She had normal left and right atrial dimensions.  She had normal mitral valve function, normal tricuspid valve function.  Her RVSP was 17+ RA pressure suggesting no pulmonary hypertension.  Her aortic valve was normal.  Her IVC was normal in size suggesting normal central venous pressure.  She had a nuclear stress test done in 05/2017 and that was negative for ischemia and again showed normal ejection fraction.  Her pacer is working well.  She does have breakthrough episodes of atrial fibrillation and PAT, however.  She wore a heart monitor and her PAT or atrial fibrillation episodes did not correlate with her symptoms of dizziness.  She does note very mild ankle edema, right greater than left.  Today, it is trivial on the right.  She has a typical pattern where it is fine in the morning,  worse at night.    Outpatient Encounter Prescriptions as of 10/9/2017   Medication Sig Dispense Refill     hydrochlorothiazide (HYDRODIURIL) 25 MG tablet Take 1 tablet (25 mg) by mouth daily Take 1 tab by mouth at noon 30 tablet 1     warfarin (COUMADIN) 2 MG tablet TAKE ONE TABLET BY MOUTH ONCE DAILY OR  AS  DIRECTED  BY  INR  CLINIC 90 tablet 1     diltiazem 240 MG 24 hr capsule TAKE ONE CAPSULE BY MOUTH ONCE DAILY 90 capsule 0     losartan (COZAAR) 50 MG tablet TAKE ONE TABLET BY MOUTH ONCE DAILY 90 tablet 0     LORazepam (ATIVAN) 0.5 MG tablet Take 0.5 mg by mouth every 6 hours as needed for anxiety       umeclidinium (INCRUSE ELLIPTA) 62.5 MCG/INH oral inhaler Inhale 1 puff into the lungs daily 1 Inhaler 11     pravastatin (PRAVACHOL) 40 MG tablet TAKE ONE TABLET BY MOUTH ONCE DAILY. 30 tablet 3     albuterol (PROAIR HFA/PROVENTIL HFA/VENTOLIN HFA) 108 (90 BASE) MCG/ACT Inhaler Inhale 2 puffs into the lungs every 6 hours as needed for shortness of breath / dyspnea or wheezing 1 Inhaler 1     levalbuterol (XOPENEX) 0.63 MG/3ML neb solution Take 3 mLs (0.63 mg) by nebulization every 6 hours as needed for shortness of breath / dyspnea or wheezing 120 mL 5     Acetaminophen (TYLENOL PO) Take 1,000 mg by mouth At Bedtime       DOCUSATE SODIUM PO Take 100 mg by mouth At Bedtime       menthol (ICY HOT) 5 % PADS Apply 1 patch topically every 8 hours as needed for muscle soreness  0     polyethylene glycol (MIRALAX/GLYCOLAX) packet Take 1 packet by mouth 2 times daily        Misc Natural Products (TART CHERRY ADVANCED) CAPS Cherry tart liquid       ZOLPIDEM TARTRATE PO Take 5 mg by mouth nightly as needed for sleep       ASPIRIN NOT PRESCRIBED, INTENTIONAL, Antiplatelet medication not prescribed intentionally due to Current anticoagulant therapy (warfarin/enoxaparin)  0     nitroglycerin (NITROSTAT) 0.4 MG SL tablet Place 1 tablet (0.4 mg) under the tongue every 5 minutes as needed 25 tablet 1     calcium carbonate  "(CALCIUM CARBONATE) 600 MG TABS tablet Take 1 tablet by mouth daily        folic acid (FOLVITE) 400 MCG tablet Take 400 mcg by mouth daily       Cholecalciferol (VITAMIN D) 2000 UNITS tablet Take 2,000 Units by mouth daily       ascorbic acid (VITAMIN C) 500 MG tablet Take 500 mg by mouth daily       Probiotic Product (PROBIOTIC & ACIDOPHILUS EX ST PO) Take 1 tablet by mouth daily.       Multiple Vitamins-Minerals (OCUVITE PO) Take 1 capsule by mouth daily.       No facility-administered encounter medications on file as of 10/9/2017.      She freely admits that she uses too much salt.  Her blood pressure numbers are within reasonable values.  She does not like the new inhaler Dr. Lujan gave her.    It has albuterol in it but it through a nebulizer and she gets shaky.  However, she took some Ventolin here after the wheeze with the childress walk and had no problems, so I think it is more of the dose.  She did well on Spiriva but the cost was prohibitive and she would like to get back on that.  Again, I cannot help her that much.    She is on an atropine-like inhaler now and apparently it is not working as well.  I suggested she call Dr. Lujan to determine if there are any other medications she might try.  I do not know if using a steroid inhaler for long-term suppression would be useful.  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  The mild ankle edema could be a transient fluid retention from mild right heart failure but again, as I mentioned, her RVSP and her RA-estimated pressures were both normal.  I do not think this represents an anginal equivalent, i.e. that she goes into heart failure with walking.  There was no chest pain and again a nuclear stress test recently was negative.  There was no valve dysfunction.  If Dr. Lujan is very concerned about that, we could " potentially do a stress echo Doppler but I do not know how much she is actually going to be able to walk on the treadmill at age 89.  I am going to go ahead and put her on low-dose diuretic, hydrochlorothiazide 25 mg a day starting at noon time.  We will see if that helps with ankle edema and by any chance if it helps with the wheezing.  I gave her a 1-month supply with 1 refill and she can call us if she wants to continue it or not.  She is on diltiazem which potentially could lead to some ankle edema but she states she has been on it for years and years with no problem.  I will see her back in 6 months otherwise.      This was a 45-minute visit, greater than 50% counseling.      Again, thank you for allowing me to participate in the care of your patient.      Sincerely,    Collins Horton MD     [facility]    cc:   Rodrick Lujan MD    [facility] [place] [phone]

## 2017-10-20 ENCOUNTER — TRANSFERRED RECORDS (OUTPATIENT)
Dept: HEALTH INFORMATION MANAGEMENT | Facility: CLINIC | Age: 82
End: 2017-10-20

## 2017-10-23 DIAGNOSIS — I10 ESSENTIAL HYPERTENSION WITH GOAL BLOOD PRESSURE LESS THAN 140/90: ICD-10-CM

## 2017-10-24 ENCOUNTER — ANTICOAGULATION THERAPY VISIT (OUTPATIENT)
Dept: ANTICOAGULATION | Facility: CLINIC | Age: 82
End: 2017-10-24
Payer: COMMERCIAL

## 2017-10-24 DIAGNOSIS — Z79.01 LONG-TERM (CURRENT) USE OF ANTICOAGULANTS: ICD-10-CM

## 2017-10-24 DIAGNOSIS — I48.20 CHRONIC ATRIAL FIBRILLATION (H): ICD-10-CM

## 2017-10-24 LAB — INR POINT OF CARE: 3.4 (ref 0.86–1.14)

## 2017-10-24 PROCEDURE — 99207 ZZC NO CHARGE NURSE ONLY: CPT

## 2017-10-24 PROCEDURE — 36416 COLLJ CAPILLARY BLOOD SPEC: CPT

## 2017-10-24 PROCEDURE — 85610 PROTHROMBIN TIME: CPT | Mod: QW

## 2017-10-24 NOTE — PROGRESS NOTES
"  ANTICOAGULATION FOLLOW-UP CLINIC VISIT    Patient Name:  Leonor Mercado  Date:  10/24/2017  Contact Type:  Face to Face    SUBJECTIVE:     Patient Findings     Positives Change in medications (Pt received Rx for Prednisone 10/20/17, will complete 10/27/17.), Change in diet/appetite (Pt reports decreased greens lately, will resume usual diet. ), Other complaints (Pt reports has had diarrhea the last 3 days)           OBJECTIVE    INR Protime   Date Value Ref Range Status   10/24/2017 3.4 (A) 0.86 - 1.14 Final       ASSESSMENT / PLAN  INR assessment SUPRA    Recheck INR In: 3 WEEKS    INR Location Clinic      Anticoagulation Summary as of 10/24/2017     INR goal 2.0-3.0   Today's INR 3.4!   Maintenance plan 2 mg (2 mg x 1) every day   Full instructions 10/24: 1 mg; Otherwise 2 mg every day   Weekly total 14 mg   Plan last modified Yoana Waddell RN (12/15/2016)   Next INR check 11/14/2017   Priority INR   Target end date     Indications   Long-term (current) use of anticoagulants [Z79.01] [Z79.01]  Chronic atrial fibrillation (H) [I48.2]         Anticoagulation Episode Summary     INR check location     Preferred lab     Send INR reminders to [facility]    Comments Pt's 1st name is pronounced \"ondray\"  Pt does not want print out, appt card only      Anticoagulation Care Providers     Provider Role Specialty Phone number    BarisueDannielle Narayanan MD Responsible Internal Medicine [phone]            See the Encounter Report to view Anticoagulation Flowsheet and Dosing Calendar (Go to Encounters tab in chart review, and find the Anticoagulation Therapy Visit)    Dosage adjustment made based on physician directed care plan.    Dilma Rider RN             "

## 2017-10-24 NOTE — MR AVS SNAPSHOT
Leonor Mercado   10/24/2017 2:00 PM   Anticoagulation Therapy Visit    Description:  89 year old female   Provider:  [facility]   Department:  [facility]           INR as of 10/24/2017     Today's INR 3.4!      Anticoagulation Summary as of 10/24/2017     INR goal 2.0-3.0   Today's INR 3.4!   Full instructions 10/24: 1 mg; Otherwise 2 mg every day   Next INR check 11/14/2017    Indications   Long-term (current) use of anticoagulants [Z79.01] [Z79.01]  Chronic atrial fibrillation (H) [I48.2]         Your next Anticoagulation Clinic appointment(s)     Nov 14, 2017  2:00 PM CST   Anticoagulation Visit with [facility]   [facility]    [address] [facility] [address]  [facility] [address] [phone]              Contact Numbers     [facility] Phone Numbers:  Anticoagulation Clinic Appointments : [phone]  Anticoagulation Nurse: [phone]         October 2017 Details    Sun Mon Tue Wed Thu Fri Sat     1               2               3               4               5               6               7                 8               9               10               11               12               13               14                 15               16               17               18               19               20               21                 22               23               24      1 mg   See details      25      2 mg         26      2 mg         27      2 mg         28      2 mg           29      2 mg         30      2 mg         31      2 mg              Date Details   10/24 This INR check               How to take your warfarin dose     To take:  1 mg Take 0.5 of a 2 mg tablet.    To take:  2 mg Take 1 of the 2 mg tablets.           November 2017 Details    Sun Mon Tue Wed Thu Fri Sat        1      2 mg         2      2 mg         3      2 mg         4      2 mg           5      2 mg         6      2 mg          7      2 mg         8      2 mg         9      2 mg         10      2 mg         11      2 mg           12      2 mg         13      2 mg         14            15               16               17               18                 19               20               21               22               23               24               25                 26               27               28               29               30                  Date Details   No additional details    Date of next INR:  11/14/2017         How to take your warfarin dose     To take:  2 mg Take 1 of the 2 mg tablets.

## 2017-10-25 RX ORDER — LOSARTAN POTASSIUM 50 MG/1
TABLET ORAL
Qty: 90 TABLET | Refills: 0 | Status: SHIPPED | OUTPATIENT
Start: 2017-10-25 | End: 2017-11-17

## 2017-10-26 ENCOUNTER — TELEPHONE (OUTPATIENT)
Dept: INTERNAL MEDICINE | Facility: CLINIC | Age: 82
End: 2017-10-26

## 2017-10-26 NOTE — TELEPHONE ENCOUNTER
"Patient is scheduled for extraction of an eye tooth tomorrow but just realized that she forgot to check about stopping her Coumadin.  Patient states that her gums bleed \"a lot\" even when brushing her teeth.  Last INR Tues. 10/24 was 3.1 and dose was reduced to 1 mg for 1 day then back to 2 mg daily and recheck in 3 wks.  Asking how long she really needs to be off Coumadin and if she needs to be bridged.  Knows she will need to cancel the extraction tomorrow.  JADIEL Engle R.N.    "

## 2017-10-26 NOTE — TELEPHONE ENCOUNTER
Hold coumadin 3 days   No bridging needed   Restart day of procedure   INR 3-5 days after restart

## 2017-10-27 NOTE — TELEPHONE ENCOUNTER
"Patient states she is going ahead with the extraction as dentist told her the days of holding coumadin for extractions is \"long gone\" and that they just pack the site.  Did advise pt of recommendations to hold but she states she is going to go ahead with extraction.  JADIEL Engle R.N.    "

## 2017-11-05 DIAGNOSIS — R07.9 CHEST PAIN, UNSPECIFIED TYPE: ICD-10-CM

## 2017-11-05 DIAGNOSIS — E78.5 HYPERLIPIDEMIA LDL GOAL <100: ICD-10-CM

## 2017-11-05 DIAGNOSIS — I25.10 CORONARY ARTERY DISEASE INVOLVING NATIVE CORONARY ARTERY OF NATIVE HEART WITHOUT ANGINA PECTORIS: ICD-10-CM

## 2017-11-06 DIAGNOSIS — I48.20 CHRONIC ATRIAL FIBRILLATION (H): ICD-10-CM

## 2017-11-07 RX ORDER — PRAVASTATIN SODIUM 40 MG
TABLET ORAL
Qty: 30 TABLET | Refills: 0 | Status: SHIPPED | OUTPATIENT
Start: 2017-11-07 | End: 2017-11-17

## 2017-11-07 NOTE — TELEPHONE ENCOUNTER
Hector-last FLP 2015. Also see below. Appt?         Per 6/27 dict-We will cancel the appointment today and we will reschedule after the driving eval if more papers to fill out or sign

## 2017-11-07 NOTE — TELEPHONE ENCOUNTER
Ok?      Per 6/27 dict-We will cancel the appointment today and we will reschedule after the driving eval if more papers to fill out or sign

## 2017-11-13 ENCOUNTER — TELEPHONE (OUTPATIENT)
Dept: CARDIOLOGY | Facility: CLINIC | Age: 82
End: 2017-11-13

## 2017-11-13 RX ORDER — DILTIAZEM HYDROCHLORIDE 240 MG/1
CAPSULE, EXTENDED RELEASE ORAL
Qty: 90 CAPSULE | Refills: 0 | Status: SHIPPED
Start: 2017-11-13 | End: 2017-11-14

## 2017-11-13 NOTE — TELEPHONE ENCOUNTER
[facility] pharmacist called to check on a refill request for diltiazem that was to have been transferred from Dr. Darby's office to Dr. Horton. I told them There is no note in the chart regarding this. I gave her the nursing fax number for Dr. Horton' nurse to have him order it.

## 2017-11-13 NOTE — TELEPHONE ENCOUNTER
Call from [facility] asking about below refill request. Updated on below. They will send request to cardiology.

## 2017-11-14 ENCOUNTER — ANTICOAGULATION THERAPY VISIT (OUTPATIENT)
Dept: ANTICOAGULATION | Facility: CLINIC | Age: 82
End: 2017-11-14
Payer: COMMERCIAL

## 2017-11-14 DIAGNOSIS — I48.20 CHRONIC ATRIAL FIBRILLATION (H): ICD-10-CM

## 2017-11-14 DIAGNOSIS — Z79.01 LONG-TERM (CURRENT) USE OF ANTICOAGULANTS: ICD-10-CM

## 2017-11-14 LAB — INR POINT OF CARE: 3.1 (ref 0.86–1.14)

## 2017-11-14 PROCEDURE — 36416 COLLJ CAPILLARY BLOOD SPEC: CPT

## 2017-11-14 PROCEDURE — 85610 PROTHROMBIN TIME: CPT | Mod: QW

## 2017-11-14 PROCEDURE — 99207 ZZC NO CHARGE NURSE ONLY: CPT

## 2017-11-14 RX ORDER — DILTIAZEM HYDROCHLORIDE 240 MG/1
240 CAPSULE, EXTENDED RELEASE ORAL DAILY
Qty: 90 CAPSULE | Refills: 3 | Status: SHIPPED | OUTPATIENT
Start: 2017-11-14 | End: 2018-01-11 | Stop reason: ALTCHOICE

## 2017-11-14 NOTE — PROGRESS NOTES
"  ANTICOAGULATION FOLLOW-UP CLINIC VISIT    Patient Name:  Leonor Mercado  Date:  11/14/2017  Contact Type:  Face to Face    SUBJECTIVE:     Patient Findings     Positives No Problem Findings           OBJECTIVE    INR Protime   Date Value Ref Range Status   11/14/2017 3.1 (A) 0.86 - 1.14 Final       ASSESSMENT / PLAN  INR assessment THER    Recheck INR In: 3 WEEKS    INR Location Clinic      Anticoagulation Summary as of 11/14/2017     INR goal 2.0-3.0   Today's INR 3.1!   Maintenance plan 2 mg (2 mg x 1) every day   Full instructions 2 mg every day   Weekly total 14 mg   No change documented Dilma Rider, RN   Plan last modified Yoana Waddell RN (12/15/2016)   Next INR check 12/5/2017   Priority INR   Target end date     Indications   Long-term (current) use of anticoagulants [Z79.01] [Z79.01]  Chronic atrial fibrillation (H) [I48.2]         Anticoagulation Episode Summary     INR check location     Preferred lab     Send INR reminders to [facility]    Comments Pt's 1st name is pronounced \"ondray\"  Pt does not want print out, appt card only      Anticoagulation Care Providers     Provider Role Specialty Phone number    Dannielle Darby MD Responsible Internal Medicine [phone]            See the Encounter Report to view Anticoagulation Flowsheet and Dosing Calendar (Go to Encounters tab in chart review, and find the Anticoagulation Therapy Visit)    Dosage adjustment made based on physician directed care plan.    Dilma Rider, RN               "

## 2017-11-14 NOTE — MR AVS SNAPSHOT
Leonor Mercado   11/14/2017 2:00 PM   Anticoagulation Therapy Visit    Description:  89 year old female   Provider[facility]  [facility]   Department:  [facility]           INR as of 11/14/2017     Today's INR 3.1!      Anticoagulation Summary as of 11/14/2017     INR goal 2.0-3.0   Today's INR 3.1!   Full instructions 2 mg every day   Next INR check 12/5/2017    Indications   Long-term (current) use of anticoagulants [Z79.01] [Z79.01]  Chronic atrial fibrillation (H) [I48.2]         Your next Anticoagulation Clinic appointment(s)     Dec 05, 2017  2:00 PM CST   [facility] Visit with [facility]   [facility]    [address]  [facility] [address] [phone]              Contact Numbers     [facility] Phone Numbers:  Anticoagulation Clinic Appointments : [phone]  Anticoagulation Nurse: [phone]         November 2017 Details    Sun Mon Tue Wed Thu Fri Sat        1               2               3               4                 5               6               7               8               9               10               11                 12               13               14      2 mg   See details      15      2 mg         16      2 mg         17      2 mg         18      2 mg           19      2 mg         20      2 mg         21      2 mg         22      2 mg         23      2 mg         24      2 mg         25      2 mg           26      2 mg         27      2 mg         28      2 mg         29      2 mg         30      2 mg            Date Details   11/14 This INR check               How to take your warfarin dose     To take:  2 mg Take 1 of the 2 mg tablets.           December 2017 Details    Sun Mon Tue Wed Thu Fri Sat          1      2 mg         2      2 mg           3      2 mg         4      2 mg         5            6               7               8               9                  10               11               12               13               14               15               16                 17               18               19               20               21               22               23                 24               25               26               27               28               29               30                 31                      Date Details   No additional details    Date of next INR:  12/5/2017         How to take your warfarin dose     To take:  2 mg Take 1 of the 2 mg tablets.

## 2017-11-17 ENCOUNTER — OFFICE VISIT (OUTPATIENT)
Dept: INTERNAL MEDICINE | Facility: CLINIC | Age: 82
End: 2017-11-17
Payer: COMMERCIAL

## 2017-11-17 VITALS
SYSTOLIC BLOOD PRESSURE: 112 MMHG | OXYGEN SATURATION: 95 % | DIASTOLIC BLOOD PRESSURE: 64 MMHG | HEIGHT: 63 IN | TEMPERATURE: 98.5 F | WEIGHT: 137.1 LBS | HEART RATE: 66 BPM | BODY MASS INDEX: 24.29 KG/M2

## 2017-11-17 DIAGNOSIS — R06.02 SHORTNESS OF BREATH: ICD-10-CM

## 2017-11-17 DIAGNOSIS — I25.10 CORONARY ARTERY DISEASE INVOLVING NATIVE CORONARY ARTERY OF NATIVE HEART WITHOUT ANGINA PECTORIS: ICD-10-CM

## 2017-11-17 DIAGNOSIS — J84.10 PULMONARY FIBROSIS (H): ICD-10-CM

## 2017-11-17 DIAGNOSIS — Z79.01 LONG TERM CURRENT USE OF ANTICOAGULANT THERAPY: ICD-10-CM

## 2017-11-17 DIAGNOSIS — R35.0 URINARY FREQUENCY: ICD-10-CM

## 2017-11-17 DIAGNOSIS — E78.5 HYPERLIPIDEMIA LDL GOAL <100: ICD-10-CM

## 2017-11-17 DIAGNOSIS — I10 ESSENTIAL HYPERTENSION WITH GOAL BLOOD PRESSURE LESS THAN 140/90: ICD-10-CM

## 2017-11-17 DIAGNOSIS — I50.32 CHRONIC DIASTOLIC CONGESTIVE HEART FAILURE (H): ICD-10-CM

## 2017-11-17 DIAGNOSIS — J44.9 CHRONIC OBSTRUCTIVE PULMONARY DISEASE, UNSPECIFIED COPD TYPE (H): Primary | ICD-10-CM

## 2017-11-17 LAB
ALBUMIN UR-MCNC: NEGATIVE MG/DL
AMORPH CRY #/AREA URNS HPF: ABNORMAL /HPF
APPEARANCE UR: CLEAR
BACTERIA #/AREA URNS HPF: ABNORMAL /HPF
BILIRUB UR QL STRIP: NEGATIVE
COLOR UR AUTO: YELLOW
GLUCOSE UR STRIP-MCNC: NEGATIVE MG/DL
HGB UR QL STRIP: ABNORMAL
KETONES UR STRIP-MCNC: NEGATIVE MG/DL
LEUKOCYTE ESTERASE UR QL STRIP: ABNORMAL
NITRATE UR QL: NEGATIVE
NON-SQ EPI CELLS #/AREA URNS LPF: ABNORMAL /LPF
PH UR STRIP: 6 PH (ref 5–7)
RBC #/AREA URNS AUTO: ABNORMAL /HPF
SOURCE: ABNORMAL
SP GR UR STRIP: 1.02 (ref 1–1.03)
UROBILINOGEN UR STRIP-ACNC: 0.2 EU/DL (ref 0.2–1)
WBC #/AREA URNS AUTO: ABNORMAL /HPF

## 2017-11-17 PROCEDURE — 81001 URINALYSIS AUTO W/SCOPE: CPT | Performed by: INTERNAL MEDICINE

## 2017-11-17 PROCEDURE — 99215 OFFICE O/P EST HI 40 MIN: CPT | Performed by: INTERNAL MEDICINE

## 2017-11-17 PROCEDURE — 87086 URINE CULTURE/COLONY COUNT: CPT | Performed by: INTERNAL MEDICINE

## 2017-11-17 RX ORDER — HYDROCHLOROTHIAZIDE 25 MG/1
25 TABLET ORAL DAILY
Qty: 90 TABLET | Refills: 0 | Status: SHIPPED | OUTPATIENT
Start: 2017-11-17 | End: 2018-01-11

## 2017-11-17 RX ORDER — LOSARTAN POTASSIUM 50 MG/1
25 TABLET ORAL DAILY
Qty: 1 TABLET | Refills: 0
Start: 2017-11-17 | End: 2018-10-05 | Stop reason: DRUGHIGH

## 2017-11-17 RX ORDER — PRAVASTATIN SODIUM 40 MG
40 TABLET ORAL DAILY
Qty: 90 TABLET | Refills: 0 | Status: SHIPPED | OUTPATIENT
Start: 2017-11-17 | End: 2018-04-16

## 2017-11-17 RX ORDER — WARFARIN SODIUM 2 MG/1
TABLET ORAL
Qty: 90 TABLET | Refills: 0 | Status: SHIPPED | OUTPATIENT
Start: 2017-11-17 | End: 2018-05-28

## 2017-11-17 ASSESSMENT — PATIENT HEALTH QUESTIONNAIRE - PHQ9: SUM OF ALL RESPONSES TO PHQ QUESTIONS 1-9: 12

## 2017-11-17 NOTE — PATIENT INSTRUCTIONS
Plan:  1. Continue Incruse. Later in the day you may take the inhaler or nebulizer  2. Decrease Losartan to 1/2 tab = 25 mg daily   3. Please make a lab appointment for fasting labs  By Jan 1  4. Continue same meds, same doses for now   5. Urine test today

## 2017-11-17 NOTE — PROGRESS NOTES
Dr Young's note      Patient's instructions / PLAN:                                                        Plan:  1. Continue Incruse. Later in the day you may take the inhaler or nebulizer  2. Decrease Losartan to 1/2 tab = 25 mg daily   3. Please make a lab appointment for fasting labs  By Jan 1  4. Continue same meds, same doses for now   5. Urine test today       ASSESSMENT & PLAN:                                                      89-year-old woman with complex medical problems: CAD, diastolic CHF, COPD, chr AFib, HTN, hyperlipidemia, osteoporosis     Today she has questions about COPD.  We clarify her inhalers and nebulizer.  She has weakness spells when her blood pressure is low.  We decreased her losartan dose.  For the foot pain she will take only Tylenol.  She had severe reaction to lorazepam in the past so we will not start gabapentin.  Chronic diastolic CHF.  She will continue the diuretics.  She will watch the salt intake she'll follow up with cardiology    (J44.9) Chronic obstructive pulmonary disease, unspecified COPD type (H)  (primary encounter diagnosis)  Comment:   Plan: umeclidinium (INCRUSE ELLIPTA) 62.5 MCG/INH         oral inhaler            (I50.32) Chronic diastolic congestive heart failure (H)  Comment:   Plan:     (E78.5) Hyperlipidemia LDL goal <100  Comment: Controlled    LDL Cholesterol Calculated   Date Value Ref Range Status   03/15/2015 75 0 - 129 mg/dL Final     Comment:     LDL Cholesterol is the primary guide to therapy: LDL-cholesterol goal in high   risk patients is <100 mg/dL and in very high risk patients is <70 mg/dL.     ]   Plan: pravastatin (PRAVACHOL) 40 MG tablet            (I25.10) Coronary artery disease involving native coronary artery of native heart without angina pectoris  Comment:   Plan: pravastatin (PRAVACHOL) 40 MG tablet            (R06.02) Shortness of breath  Comment:   Plan: hydrochlorothiazide (HYDRODIURIL) 25 MG tablet            (I10) Essential  hypertension with goal blood pressure less than 140/90  Comment:   Plan: losartan (COZAAR) 50 MG tablet            (J84.10) Pulmonary fibrosis  Comment:   Plan: umeclidinium (INCRUSE ELLIPTA) 62.5 MCG/INH         oral inhaler            (Z79.01) Long term current use of anticoagulant therapy  Comment:   Plan: warfarin (COUMADIN) 2 MG tablet            (R35.0) Urinary frequency  Comment:   Plan: UA with Microscopic reflex to Culture, Urine         Culture Aerobic Bacterial               Chief complaint:                                                      Follow up chronic medical problems      SUBJECTIVE:   Leonor Mercado is a 89 year old female who presents to clinic today for the following health issues:      Diastolic CHF  -- Cardiology started her on HCTZ for 10/9/17 for swelling in her legs, but she does not think this has much of a difference. Has not been able to urinate more, and when she does it is only a few drops and burns at times.     Hyperlipidemia Tries to take her Pravastatin 3 times per week (was advised she could do this by another doctor when she was having problems), but she admits she forgets this one a lot because she doesn't remember right before bed.       *Foot pain very painful at night. Takes Tylenol pm. I will not start Gabapentin since she had severe reaction with Lorazepam that brought her to emergency room    COPD  -- she was doing better with Spiriva, but the price was increased  -- the Incruse - she doesn't think it last 24h  -- she gets very shaky with the nebs   -- She also felt like the pulmonologist was very confusing about what she can take for her breathing medications? She knows that she feels very shaky after taking some of these medications. Feels like her breathing is very bad lately. She does not understand if she takes the Incruse, if she can still take any of the other medications?  I reviewed Dr. Lujan note and plan with the patient    Spells   -- she suddenly  "feels very weak. It starts in the legs and goes up in the arms  -- can come at rest  -- BP is 90s during the episodes  -- the spells lasts up to a minunte  -- no palpitations during these episodes  -- she gets rapid palpitaions that wake her up at night. She makes herself cough   -- she admits to anxciety since mary is in [place]. Yesterday it took 2h to get home with [employer]   -- chemo May 2017    Hypertension Follow-up      Outpatient blood pressures are being checked at home.  Results are usually in the 130's/70's. Has been concerned because she has been having some spells where she feels really weak. When she checks her BP at that time, it has been 90's/60's.    Low Salt Diet: low salt        Amount of exercise or physical activity: None.     Problems taking medications regularly: See above    Medication side effects: none    Diet: low salt    Doesn't drive , depends on fam mb        Review of Systems:                                                      ROS: negative for fever, chills, cough, wheezes, chest pain,  vomiting, abdominal pain, positive for leg swelling and chronic shortness of breath    A 10-point review of systems was obtained.  Those pertinent are above and in the in the Subjective section.  The rest of the systems are negative.           OBJECTIVE:             Physical exam:  Blood pressure 112/64, pulse 66, temperature 98.5  F (36.9  C), temperature source Oral, height 5' 3\" (1.6 m), weight 137 lb 1.6 oz (62.2 kg), SpO2 95 %, not currently breastfeeding.     NAD, appears comfortable  Chest: clear to auscultation bilaterally, good respiratory effort  Heart: S1 S2, RRR, no mgr appreciated  Abdomen: soft, not tender,   Extremities: Trace edema, no cellulitis, no tenderness on palpation, no signs of gout  Neurologic: A, Ox3, no focal signs appreciated    PMHx: reviewed  Past Medical History:   Diagnosis Date     Atrial fibrillation (H)     Sick sinus syndrome, PAT, AF     BCC (basal cell " carcinoma of skin)     Face     CHF (congestive heart failure) (H)     mild in past     Chronic renal insufficiency      COPD (chronic obstructive pulmonary disease) (H)      Coronary artery disease     2-05[place]-CAD-taxus stentx2 2.5-12,2.5-12 in LADp and LADm, 80%nonDomRCA-LcxDom-mild,EF60%     Hyperlipidemia      Hypertension      IBS (irritable bowel syndrome)      Irritable bowel      Osteoporosis      Panic attack     questionable diagnosis     Pulmonary fibrosis (H)     do not use amiodarone     SSS (sick sinus syndrome) (H)     DDD pacer (see surgery history section)     Vertigo       PSHx: reviewed  Past Surgical History:   Procedure Laterality Date     APPENDECTOMY  1956     CARDIAC SURGERY      PPM, 2 STENTS2-05[place]-CAD-taxus stentx2 2.5-12,2.5-12 in LADp and LADm, 80%nonDomRCA-LcxDom-mild,EF60%     CORONARY ANGIOGRAPHY ADULT ORDER  7/1994    mild CAD,Normal LV function, No Mitral regurgitation, Prox LAD 30% stenosis. RCA prox and mid, 20-30%     ESOPHAGOSCOPY, GASTROSCOPY, DUODENOSCOPY (EGD), COMBINED N/A 8/19/2015    Procedure: COMBINED ESOPHAGOSCOPY, GASTROSCOPY, DUODENOSCOPY (EGD), BIOPSY SINGLE OR MULTIPLE;  Surgeon: Genevieve Leon MD;  Location: [facility]     H LEAD REVISION DUAL  4/2003    Revised in [place]-due to diaphram stim (original pacer placed in [place])     H LEAD REVISION DUAL  7/2/2014    Correction of reversal of A and V leads in Header     HEART CATH, ANGIOPLASTY  02/2005    LEIGH ANN mid and proximal LAD, ongoing 80% RCA; mild circumflex     HERNIA REPAIR Left 1991    [facility]     IMPLANT PACEMAKER  2/19/2003    Placed in [place] for sick sinus syndrome     REPLACE PACEMAKER GENERATOR  6/27/2013    Dual-chamber pacemaker by Dr SETH Pierre        Meds: reviewed  Current Outpatient Prescriptions   Medication Sig Dispense Refill     diltiazem 240 MG 24 hr capsule Take 1 capsule (240 mg) by mouth daily 90 capsule 3     pravastatin (PRAVACHOL) 40 MG tablet TAKE ONE TABLET BY MOUTH ONCE DAILY 30 tablet 0      losartan (COZAAR) 50 MG tablet TAKE ONE TABLET BY MOUTH ONCE DAILY 90 tablet 0     hydrochlorothiazide (HYDRODIURIL) 25 MG tablet Take 1 tablet (25 mg) by mouth daily Take 1 tab by mouth at noon 30 tablet 1     warfarin (COUMADIN) 2 MG tablet TAKE ONE TABLET BY MOUTH ONCE DAILY OR  AS  DIRECTED  BY  INR  CLINIC 90 tablet 1     umeclidinium (INCRUSE ELLIPTA) 62.5 MCG/INH oral inhaler Inhale 1 puff into the lungs daily 1 Inhaler 11     albuterol (PROAIR HFA/PROVENTIL HFA/VENTOLIN HFA) 108 (90 BASE) MCG/ACT Inhaler Inhale 2 puffs into the lungs every 6 hours as needed for shortness of breath / dyspnea or wheezing 1 Inhaler 1     Acetaminophen (TYLENOL PO) Take 1,000 mg by mouth At Bedtime       DOCUSATE SODIUM PO Take 100 mg by mouth At Bedtime       polyethylene glycol (MIRALAX/GLYCOLAX) packet Take 1 packet by mouth 2 times daily        Misc Natural Products (TART CHERRY ADVANCED) CAPS Cherry tart liquid       ZOLPIDEM TARTRATE PO Take 5 mg by mouth nightly as needed for sleep       ASPIRIN NOT PRESCRIBED, INTENTIONAL, Antiplatelet medication not prescribed intentionally due to Current anticoagulant therapy (warfarin/enoxaparin)  0     nitroglycerin (NITROSTAT) 0.4 MG SL tablet Place 1 tablet (0.4 mg) under the tongue every 5 minutes as needed 25 tablet 1     calcium carbonate (CALCIUM CARBONATE) 600 MG TABS tablet Take 1 tablet by mouth daily        folic acid (FOLVITE) 400 MCG tablet Take 400 mcg by mouth daily       Cholecalciferol (VITAMIN D) 2000 UNITS tablet Take 2,000 Units by mouth daily       ascorbic acid (VITAMIN C) 500 MG tablet Take 500 mg by mouth daily       Probiotic Product (PROBIOTIC & ACIDOPHILUS EX ST PO) Take 1 tablet by mouth daily.       Multiple Vitamins-Minerals (OCUVITE PO) Take 1 capsule by mouth daily.       levalbuterol (XOPENEX) 0.63 MG/3ML neb solution Take 3 mLs (0.63 mg) by nebulization every 6 hours as needed for shortness of breath / dyspnea or wheezing 120 mL 5       Soc Hx:  reviewed  Fam Hx: reviewed      Time spent with the patient  40 min, more than 50% in counseling and coordinating care, Re above medical problems chronic diastolic CHF, COPD, spells, hyperlipidemia, hypertension, foot pain     Dannielle Young MD  Internal Medicine

## 2017-11-17 NOTE — NURSING NOTE
"Chief Complaint   Patient presents with     Recheck Medication     Fatigue     Foot Problems       Initial /64 (BP Location: Right arm, Patient Position: Chair, Cuff Size: Adult Regular)  Pulse 66  Temp 98.5  F (36.9  C) (Oral)  Ht 5' 3\" (1.6 m)  Wt 137 lb 1.6 oz (62.2 kg)  LMP  (LMP Unknown)  SpO2 95%  BMI 24.29 kg/m2 Estimated body mass index is 24.29 kg/(m^2) as calculated from the following:    Height as of this encounter: 5' 3\" (1.6 m).    Weight as of this encounter: 137 lb 1.6 oz (62.2 kg).  Medication Reconciliation: complete   Aurea Hillman CMA      "

## 2017-11-17 NOTE — MR AVS SNAPSHOT
After Visit Summary   11/17/2017    Leonor Mercado    MRN: [id number]           Patient Information     Date Of Birth          5/6/1926        Visit Information        Provider Department      11/17/2017 4:00 PM Dannielle Darby MD [facility]        Today's Diagnoses     Urinary frequency    -  1    Chest pain, unspecified type        Hyperlipidemia LDL goal <100        Coronary artery disease involving native coronary artery of native heart without angina pectoris        Shortness of breath        Essential hypertension with goal blood pressure less than 140/90        Pulmonary fibrosis        Chronic obstructive pulmonary disease, unspecified COPD type (H)        Long term current use of anticoagulant therapy          Care Instructions      Plan:  1. Continue Incruse. Later in the day you may take the inhaler or nebulizer  2. Decrease Losartan to 1/2 tab = 25 mg daily   3. Please make a lab appointment for fasting labs  By Jan 1  4. Continue same meds, same doses for now   5. Urine test today           Follow-ups after your visit        Your next 10 appointments already scheduled     Dec 05, 2017  2:00 PM CST   Anticoagulation Visit with [facility]   [facility] ([facility]    [address] [facility] [address]  [facility] [address] [phone]              Who to contact     If you have questions or need follow up information about today's clinic visit or your schedule please contact [facility] directly at [phone].  Normal or non-critical lab and imaging results will be communicated to you by MyChart, letter or phone within 4 business days after the clinic has received the results. If you do not hear from us within 7 days, please contact the clinic through MyChart or phone. If you have a critical or abnormal lab result, we will notify you by phone as soon as possible.  Submit refill requests  "through [employer] or call your pharmacy and they will forward the refill request to us. Please allow 3 business days for your refill to be completed.          Additional Information About Your Visit        [employer] [employer] Information     [employer] lets you send messages to your doctor, view your test results, renew your prescriptions, schedule appointments and more. To sign up, go to [email or web address] . Click on \"Log in\" on the left side of the screen, which will take you to the Welcome page. Then click on \"Sign up Now\" on the right side of the page.     You will be asked to enter the access code listed below, as well as some personal information. Please follow the directions to create your username and password.     Your access code is: [email or web address]  Expires: 2017  8:15 AM     Your access code will  in 90 days. If you need help or a new code, please call your [facility] clinic or [phone].        Care EveryWhere ID     This is your Care EveryWhere ID. This could be used by other organizations to access your [facility] medical records  [id number]        Your Vitals Were     Pulse Last Period                66 (LMP Unknown)           Blood Pressure from Last 3 Encounters:   17 112/64   10/09/17 140/70   17 130/64    Weight from Last 3 Encounters:   10/09/17 138 lb 9.6 oz (62.9 kg)   17 136 lb 6.4 oz (61.9 kg)   17 135 lb (61.2 kg)              We Performed the Following     UA with Microscopic reflex to Culture          Today's Medication Changes          These changes are accurate as of: 17  4:45 PM.  If you have any questions, ask your nurse or doctor.               These medicines have changed or have updated prescriptions.        Dose/Directions    losartan 50 MG tablet   Commonly known as:  COZAAR   This may have changed:  See the new instructions.   Used for:  Essential hypertension with goal blood pressure less than 140/90   Changed by:  Dannielle aDrby MD "        Dose:  25 mg   Take 0.5 tablets (25 mg) by mouth daily   Quantity:  1 tablet   Refills:  0       pravastatin 40 MG tablet   Commonly known as:  PRAVACHOL   This may have changed:  See the new instructions.   Used for:  Chest pain, unspecified type, Hyperlipidemia LDL goal <100, Coronary artery disease involving native coronary artery of native heart without angina pectoris   Changed by:  Dannielle Darby MD        Dose:  40 mg   Take 1 tablet (40 mg) by mouth daily   Quantity:  90 tablet   Refills:  0       warfarin 2 MG tablet   Commonly known as:  COUMADIN   This may have changed:  See the new instructions.   Used for:  Long term current use of anticoagulant therapy   Changed by:  Dannielle Darby MD        TAKE ONE TABLET BY MOUTH ONCE DAILY OR  AS  DIRECTED  BY  INR  CLINIC   Quantity:  90 tablet   Refills:  0         Stop taking these medicines if you haven't already. Please contact your care team if you have questions.     LORazepam 0.5 MG tablet   Commonly known as:  ATIVAN   Stopped by:  Dannielle Darby MD                Where to get your medicines      These medications were sent to [facility] [address] [place], [place] [address]     Phone:  [phone]     pravastatin 40 MG tablet    umeclidinium 62.5 MCG/INH oral inhaler    warfarin 2 MG tablet         Some of these will need a paper prescription and others can be bought over the counter.  Ask your nurse if you have questions.     You don't need a prescription for these medications     losartan 50 MG tablet                Primary Care Provider Office Phone # Fax #    Dannielle Darby [phone]       [address] [place] [address]        Equal Access to Services     NALDO OVALLE AH: Leni cervantes Sonatividad, waaxda luqadaha, qaybta kaalmachris sparrow, antonia hwang. So  [facility] [phone].    ATENCIÓN: Si nelala ashley, tiene a spicer disposición servicios gratuitos de asistencia lingüística. Pollo marina [phone].    We comply with applicable federal civil rights laws and [place] laws. We do not discriminate on the basis of race, color, national origin, age, disability, sex, sexual orientation, or gender identity.            Thank you!     Thank you for choosing [facility]  for your care. Our goal is always to provide you with excellent care. Hearing back from our patients is one way we can continue to improve our services. Please take a few minutes to complete the written survey that you may receive in the mail after your visit with us. Thank you!             Your Updated Medication List - Protect others around you: Learn how to safely use, store and throw away your medicines at www.disposemymeds.org.          This list is accurate as of: 11/17/17  4:45 PM.  Always use your most recent med list.                   Brand Name Dispense Instructions for use Diagnosis    albuterol 108 (90 BASE) MCG/ACT Inhaler    PROAIR HFA/PROVENTIL HFA/VENTOLIN HFA    1 Inhaler    Inhale 2 puffs into the lungs every 6 hours as needed for shortness of breath / dyspnea or wheezing    Chronic obstructive pulmonary disease, unspecified COPD type (H)       ascorbic acid 500 MG tablet    VITAMIN C     Take 500 mg by mouth daily        ASPIRIN NOT PRESCRIBED    INTENTIONAL     Antiplatelet medication not prescribed intentionally due to Current anticoagulant therapy (warfarin/enoxaparin)    Chronic atrial fibrillation (H)       calcium carbonate 600 MG tablet   Generic drug:  calcium carbonate      Take 1 tablet by mouth daily        diltiazem 240 MG 24 hr capsule     90 capsule    Take 1 capsule (240 mg) by mouth daily    Chronic atrial fibrillation (H)       DOCUSATE SODIUM PO      Take 100 mg by mouth At Bedtime        folic acid 400 MCG tablet    FOLVITE     Take 400 mcg by mouth daily         hydrochlorothiazide 25 MG tablet    HYDRODIURIL    30 tablet    Take 1 tablet (25 mg) by mouth daily Take 1 tab by mouth at noon    Shortness of breath       levalbuterol 0.63 MG/3ML neb solution    XOPENEX    120 mL    Take 3 mLs (0.63 mg) by nebulization every 6 hours as needed for shortness of breath / dyspnea or wheezing    Chronic obstructive pulmonary disease, unspecified COPD type (H)       losartan 50 MG tablet    COZAAR    1 tablet    Take 0.5 tablets (25 mg) by mouth daily    Essential hypertension with goal blood pressure less than 140/90       nitroGLYcerin 0.4 MG sublingual tablet    NITROSTAT    25 tablet    Place 1 tablet (0.4 mg) under the tongue every 5 minutes as needed    Other chest pain       OCUVITE PO      Take 1 capsule by mouth daily.        polyethylene glycol Packet    MIRALAX/GLYCOLAX     Take 1 packet by mouth 2 times daily        pravastatin 40 MG tablet    PRAVACHOL    90 tablet    Take 1 tablet (40 mg) by mouth daily    Chest pain, unspecified type, Hyperlipidemia LDL goal <100, Coronary artery disease involving native coronary artery of native heart without angina pectoris       PROBIOTIC & ACIDOPHILUS EX ST PO      Take 1 tablet by mouth daily.        TART CHERRY ADVANCED Caps      Aiken tart liquid        TYLENOL PO      Take 1,000 mg by mouth At Bedtime        umeclidinium 62.5 MCG/INH oral inhaler    INCRUSE ELLIPTA    3 Inhaler    Inhale 1 puff into the lungs daily    Pulmonary fibrosis (H), Chronic obstructive pulmonary disease, unspecified COPD type (H)       vitamin D 2000 UNITS tablet      Take 2,000 Units by mouth daily        warfarin 2 MG tablet    COUMADIN    90 tablet    TAKE ONE TABLET BY MOUTH ONCE DAILY OR  AS  DIRECTED  BY  INR  CLINIC    Long term current use of anticoagulant therapy       ZOLPIDEM TARTRATE PO      Take 5 mg by mouth nightly as needed for sleep

## 2017-11-18 LAB
BACTERIA SPEC CULT: NORMAL
SPECIMEN SOURCE: NORMAL

## 2017-11-21 PROBLEM — I50.32 CHRONIC DIASTOLIC CONGESTIVE HEART FAILURE (H): Status: ACTIVE | Noted: 2017-11-21

## 2017-11-29 ENCOUNTER — TELEPHONE (OUTPATIENT)
Dept: INTERNAL MEDICINE | Facility: CLINIC | Age: 82
End: 2017-11-29

## 2017-11-29 NOTE — TELEPHONE ENCOUNTER
1.  Complains of increased SOB over the past 3 months or so.  Can hardly walk from one room to another or the 65 steps to the elevator without wheezing and panting.  Would like CXR ordered to be done some time in the next 1-2 wks when she can get a ride.  She last had CXR a year ago and just wants to make sure it isn't something more than COPD going on.  Will not return to see pulmonary MD as   2.   Does not want to use the Incruse anymore as it makes her cough and choke so after using it.  Wants to go on a different med or back on Spiriva.  Even though Spiriva is not affordable through the [place] ($700 for 3 months) she wants to try to get it from [place] or [place].  JADIEL Engle R.N.

## 2017-12-01 NOTE — TELEPHONE ENCOUNTER
VM received from pt re: below. States she never heard back from us. Call to home number left brief message. Call to pt. Updated. Apt changed to same day and time with PCP per pt request. Of note, pt was noted to be very SOB while on phone.

## 2017-12-01 NOTE — TELEPHONE ENCOUNTER
Daughter Cleo, (Consent to Communicate in EPIC)  calls back,  informed of message below from provider. Scheduled pt with Dr. More 12/5/17 at 3 pm.  Daughter reports she was out of town for the last 12 days and has not seen her mother yet. Cleo Is going to see her in an hour and assess her breathing, Cleo states she will give patient a nebulizer treatment. Advised daughter if breathing is labored and nebulizer not effective to either call back to clinic or present to the ED for further evaluation. Patient's daughter verbalizes understanding, agrees to plan of care, and has no further questions.

## 2017-12-05 ENCOUNTER — OFFICE VISIT (OUTPATIENT)
Dept: INTERNAL MEDICINE | Facility: CLINIC | Age: 82
End: 2017-12-05
Payer: COMMERCIAL

## 2017-12-05 ENCOUNTER — ANTICOAGULATION THERAPY VISIT (OUTPATIENT)
Dept: ANTICOAGULATION | Facility: CLINIC | Age: 82
End: 2017-12-05
Payer: COMMERCIAL

## 2017-12-05 ENCOUNTER — RADIANT APPOINTMENT (OUTPATIENT)
Dept: GENERAL RADIOLOGY | Facility: CLINIC | Age: 82
End: 2017-12-05
Attending: INTERNAL MEDICINE
Payer: COMMERCIAL

## 2017-12-05 VITALS
SYSTOLIC BLOOD PRESSURE: 124 MMHG | TEMPERATURE: 98.4 F | DIASTOLIC BLOOD PRESSURE: 66 MMHG | BODY MASS INDEX: 24.7 KG/M2 | OXYGEN SATURATION: 97 % | HEIGHT: 63 IN | WEIGHT: 139.4 LBS | HEART RATE: 83 BPM

## 2017-12-05 DIAGNOSIS — I50.32 CHRONIC DIASTOLIC CONGESTIVE HEART FAILURE (H): Chronic | ICD-10-CM

## 2017-12-05 DIAGNOSIS — I25.10 CORONARY ARTERY DISEASE INVOLVING NATIVE CORONARY ARTERY OF NATIVE HEART WITHOUT ANGINA PECTORIS: Chronic | ICD-10-CM

## 2017-12-05 DIAGNOSIS — J44.9 CHRONIC OBSTRUCTIVE PULMONARY DISEASE, UNSPECIFIED COPD TYPE (H): ICD-10-CM

## 2017-12-05 DIAGNOSIS — I48.20 CHRONIC ATRIAL FIBRILLATION (H): Chronic | ICD-10-CM

## 2017-12-05 DIAGNOSIS — J44.9 CHRONIC OBSTRUCTIVE PULMONARY DISEASE, UNSPECIFIED COPD TYPE (H): Primary | ICD-10-CM

## 2017-12-05 DIAGNOSIS — Z79.01 LONG-TERM (CURRENT) USE OF ANTICOAGULANTS: ICD-10-CM

## 2017-12-05 DIAGNOSIS — J44.9 CHRONIC OBSTRUCTIVE PULMONARY DISEASE, UNSPECIFIED COPD TYPE (H): Chronic | ICD-10-CM

## 2017-12-05 DIAGNOSIS — I48.20 CHRONIC ATRIAL FIBRILLATION (H): ICD-10-CM

## 2017-12-05 LAB — INR POINT OF CARE: 2.6 (ref 0.86–1.14)

## 2017-12-05 PROCEDURE — 71020 XR CHEST 2 VW: CPT

## 2017-12-05 PROCEDURE — 99207 ZZC NO CHARGE NURSE ONLY: CPT

## 2017-12-05 PROCEDURE — 85610 PROTHROMBIN TIME: CPT | Mod: QW

## 2017-12-05 PROCEDURE — 36416 COLLJ CAPILLARY BLOOD SPEC: CPT

## 2017-12-05 PROCEDURE — 99214 OFFICE O/P EST MOD 30 MIN: CPT | Performed by: INTERNAL MEDICINE

## 2017-12-05 ASSESSMENT — ANXIETY QUESTIONNAIRES
7. FEELING AFRAID AS IF SOMETHING AWFUL MIGHT HAPPEN: NEARLY EVERY DAY
1. FEELING NERVOUS, ANXIOUS, OR ON EDGE: SEVERAL DAYS
6. BECOMING EASILY ANNOYED OR IRRITABLE: SEVERAL DAYS
3. WORRYING TOO MUCH ABOUT DIFFERENT THINGS: MORE THAN HALF THE DAYS
5. BEING SO RESTLESS THAT IT IS HARD TO SIT STILL: SEVERAL DAYS
GAD7 TOTAL SCORE: 11
IF YOU CHECKED OFF ANY PROBLEMS ON THIS QUESTIONNAIRE, HOW DIFFICULT HAVE THESE PROBLEMS MADE IT FOR YOU TO DO YOUR WORK, TAKE CARE OF THINGS AT HOME, OR GET ALONG WITH OTHER PEOPLE: SOMEWHAT DIFFICULT
2. NOT BEING ABLE TO STOP OR CONTROL WORRYING: MORE THAN HALF THE DAYS

## 2017-12-05 ASSESSMENT — PATIENT HEALTH QUESTIONNAIRE - PHQ9: 5. POOR APPETITE OR OVEREATING: SEVERAL DAYS

## 2017-12-05 NOTE — PROGRESS NOTES
"  ANTICOAGULATION FOLLOW-UP CLINIC VISIT    Patient Name:  Leonor Mercado  Date:  12/5/2017  Contact Type:  Face to Face    SUBJECTIVE:     Patient Findings     Positives No Problem Findings           OBJECTIVE    INR Protime   Date Value Ref Range Status   12/05/2017 2.6 (A) 0.86 - 1.14 Final       ASSESSMENT / PLAN  INR assessment THER    Recheck INR In: 4 WEEKS    INR Location Clinic      Anticoagulation Summary as of 12/5/2017     INR goal 2.0-3.0   Today's INR 2.6   Maintenance plan 2 mg (2 mg x 1) every day   Full instructions 2 mg every day   Weekly total 14 mg   No change documented Dilma Rider, RN   Plan last modified Yoana Waddell RN (12/15/2016)   Next INR check 1/2/2018   Priority INR   Target end date     Indications   Long-term (current) use of anticoagulants [Z79.01] [Z79.01]  Chronic atrial fibrillation (H) [I48.2]         Anticoagulation Episode Summary     INR check location     Preferred lab     Send INR reminders to [facility]    Comments Pt's 1st name is pronounced \"ondray\"  Pt does not want print out, appt card only      Anticoagulation Care Providers     Provider Role Specialty Phone number    Dannielle Darby MD Responsible Internal Medicine [phone]            See the Encounter Report to view Anticoagulation Flowsheet and Dosing Calendar (Go to Encounters tab in chart review, and find the Anticoagulation Therapy Visit)    Dosage adjustment made based on physician directed care plan.    Dilma Rider, RN               "

## 2017-12-05 NOTE — NURSING NOTE
"Chief Complaint   Patient presents with     Shortness of Breath       Initial /66 (BP Location: Right arm, Patient Position: Chair, Cuff Size: Adult Regular)  Pulse 83  Temp 98.4  F (36.9  C) (Oral)  Ht 5' 3\" (1.6 m)  Wt 139 lb 6.4 oz (63.2 kg)  LMP  (LMP Unknown)  SpO2 97%  Breastfeeding? No  BMI 24.69 kg/m2 Estimated body mass index is 24.69 kg/(m^2) as calculated from the following:    Height as of this encounter: 5' 3\" (1.6 m).    Weight as of this encounter: 139 lb 6.4 oz (63.2 kg).  Medication Reconciliation: complete   Aurea Hillman CMA      "

## 2017-12-05 NOTE — MR AVS SNAPSHOT
Leonor Mercado   12/5/2017 2:00 PM   Anticoagulation Therapy Visit    Description:  89 year old female   Provider:  [facility]   Department:  [facility]           INR as of 12/5/2017     Today's INR 2.6      Anticoagulation Summary as of 12/5/2017     INR goal 2.0-3.0   Today's INR 2.6   Full instructions 2 mg every day   Next INR check 1/2/2018    Indications   Long-term (current) use of anticoagulants [Z79.01] [Z79.01]  Chronic atrial fibrillation (H) [I48.2]         Your next Anticoagulation Clinic appointment(s)     Jan 02, 2018  2:00 PM CST   Anticoagulation Visit with [facility]   [facility] ([facility]    [address]  [facility] [address] [phone]              Contact Numbers     [facility] Phone Numbers:  Anticoagulation Clinic Appointments : [phone]  Anticoagulation Nurse: [phone]         December 2017 Details    Sun Mon Tue Wed Thu Fri Sat          1               2                 3               4               5      2 mg   See details      6      2 mg         7      2 mg         8      2 mg         9      2 mg           10      2 mg         11      2 mg         12      2 mg         13      2 mg         14      2 mg         15      2 mg         16      2 mg           17      2 mg         18      2 mg         19      2 mg         20      2 mg         21      2 mg         22      2 mg         23      2 mg           24      2 mg         25      2 mg         26      2 mg         27      2 mg         28      2 mg         29      2 mg         30      2 mg           31      2 mg                Date Details   12/05 This INR check               How to take your warfarin dose     To take:  2 mg Take 1 of the 2 mg tablets.           January 2018 Details    Sun Mon Tue Wed Thu Fri Sat      1      2 mg         2            3               4               5               6                 7                8               9               10               11               12               13                 14               15               16               17               18               19               20                 21               22               23               24               25               26               27                 28               29               30               31                   Date Details   No additional details    Date of next INR:  1/2/2018         How to take your warfarin dose     To take:  2 mg Take 1 of the 2 mg tablets.

## 2017-12-05 NOTE — PROGRESS NOTES
Dr Young's note      Patient's instructions / PLAN:                                                        Plan:  1. CXR today - [address]  2. I will ask Shakira Blackman our pharmacist to call you and see what suggestion she has   3.Continue same meds, same doses for now         ASSESSMENT & PLAN:                                                      89-year-old woman with complex medical problems: CAD ( 2 stents 2000s) , diastolic CHF, COPD, chr AFib, PPM ( 2003 for sinus sick syndrome), HTN, hyperlipidemia, osteoporosis   She has  been struggling with shortness of breath.  Her COPD is not controlled.  She has side effects from some medications (she does not tolerate duonebs more than 5 minutes), she can tolerate Incruse, the insurance does not cover Spiriva, which it helped better, and she presented better as well.  The lightheadedness is better whit decreasing the losartan dose      (J44.9) Chronic obstructive pulmonary disease, unspecified COPD type (H)  (primary encounter diagnosis)  Comment: Not controlled   Plan: XR Chest 2 Views, CBC with platelets    (I48.2) A Fib - chr Coumadin, s/p PPM (H)  Comment: Rate controlled  Plan: Continue same meds, same doses for now     (I50.32) CHF - Chr Diastolic (H)  Comment: stable no signs of fluid overload,   Plan: Continue same meds, same doses for now     (I25.10) CAD - 2 stents in [place] in 2000s.   Comment: stable   Plan: Continue same meds, same doses for now        Chief complaint:                                                      Shortness of breath    SUBJECTIVE:   Leonor Mercado is a 89 year old female who presents to clinic today for the following health issues:    89-year-old woman with complex medical problems: CAD ( 2 stents 2000s) , diastolic CHF, COPD, chr AFib, PPM ( 2003 for sinus sick syndrome), HTN, hyperlipidemia, osteoporosis      SOB:  --Chronic shortness of breath progressively got worse for the last 3 months.  She has more shortness of breath  "and wheezing is with short distances.  -- Echo Aug 2017: diast dysfunction - LVEF good   --Today We walked: POx remains 94-96%. HR 60 - 80s. But she wheezes a lot of walking.     COPD  -- spiriva used to help her a lot ( she was buying in [place]) It is not ccovered at all by insurance. Incruse causes a lot of cough She does not want to continue incruise  -- Nebs help. She can't take it more than 5 min, because she develps shakiness   -- She is frustrated and anxious because she does not know if her breathing problems are her lungs or her heart? The lung doctor told her it was her heart, the heart doctor said it was her lungs.     LOV:Plan:  1. Continue Incruse. Later in the day you may take the inhaler or nebulizer  2. Decrease Losartan to 1/2 tab = 25 mg daily   3. Please make a lab appointment for fasting labs  By Jan 1  4. Continue same meds, same doses for now   5. Urine test today       Review of Systems:                                                      ROS: negative for fever, chills,  wheezes, chest pain,  vomiting, abdominal pain, leg swelling positive for chronic cough and shortness of breath    A 10-point review of systems was obtained.  Those pertinent are above and in the in the Subjective section.  The rest of the systems are negative.           OBJECTIVE:             Physical exam:  Blood pressure 124/66, pulse 83, temperature 98.4  F (36.9  C), temperature source Oral, height 5' 3\" (1.6 m), weight 139 lb 6.4 oz (63.2 kg), SpO2 97 %, not currently breastfeeding.   NAD, appears comfortable, but she develops shortness of breath and feels exhausted with short distance walking with me  Skin: no rashes   Chest: clear to auscultation bilaterally, good respiratory effort  Heart: S1 S2, RRR, no mgr appreciated  Abdomen: soft, not tender,   Extremities: no edema,  Neurologic: A, Ox3, no focal signs appreciated    PMHx: reviewed  Past Medical History:   Diagnosis Date     Atrial fibrillation (H)     Sick " sinus syndrome, PAT, AF     BCC (basal cell carcinoma of skin)     Face     CHF (congestive heart failure) (H)     mild in past     Chronic renal insufficiency      COPD (chronic obstructive pulmonary disease) (H)      Coronary artery disease     2-05[place]-CAD-taxus stentx2 2.5-12,2.5-12 in LADp and LADm, 80%nonDomRCA-LcxDom-mild,EF60%     Hyperlipidemia      Hypertension      IBS (irritable bowel syndrome)      Irritable bowel      Osteoporosis      Panic attack     questionable diagnosis     Pulmonary fibrosis (H)     do not use amiodarone     SSS (sick sinus syndrome) (H)     DDD pacer (see surgery history section)     Vertigo       PSHx: reviewed  Past Surgical History:   Procedure Laterality Date     APPENDECTOMY  1956     CARDIAC SURGERY      PPM, 2 STENTS2-05[place]-CAD-taxus stentx2 2.5-12,2.5-12 in LADp and LADm, 80%nonDomRCA-LcxDom-mild,EF60%     CORONARY ANGIOGRAPHY ADULT ORDER  7/1994    mild CAD,Normal LV function, No Mitral regurgitation, Prox LAD 30% stenosis. RCA prox and mid, 20-30%     ESOPHAGOSCOPY, GASTROSCOPY, DUODENOSCOPY (EGD), COMBINED N/A 8/19/2015    Procedure: COMBINED ESOPHAGOSCOPY, GASTROSCOPY, DUODENOSCOPY (EGD), BIOPSY SINGLE OR MULTIPLE;  Surgeon: Genevieve Leon MD;  Location: [facility]     H LEAD REVISION DUAL  4/2003    Revised in [place]-due to diaphram stim (original pacer placed in [place])     H LEAD REVISION DUAL  7/2/2014    Correction of reversal of A and V leads in Header     HEART CATH, ANGIOPLASTY  02/2005    LEIGH ANN mid and proximal LAD, ongoing 80% RCA; mild circumflex     HERNIA REPAIR Left 1991    [facility]     IMPLANT PACEMAKER  2/19/2003    Placed in [place] for sick sinus syndrome     REPLACE PACEMAKER GENERATOR  6/27/2013    Dual-chamber pacemaker by Dr SETH Pierre        Meds: reviewed  Current Outpatient Prescriptions   Medication Sig Dispense Refill     pravastatin (PRAVACHOL) 40 MG tablet Take 1 tablet (40 mg) by mouth daily 90 tablet 0     losartan (COZAAR) 50 MG tablet Take 0.5  tablets (25 mg) by mouth daily 1 tablet 0     umeclidinium (INCRUSE ELLIPTA) 62.5 MCG/INH oral inhaler Inhale 1 puff into the lungs daily 3 Inhaler 0     warfarin (COUMADIN) 2 MG tablet TAKE ONE TABLET BY MOUTH ONCE DAILY OR  AS  DIRECTED  BY  INR  CLINIC 90 tablet 0     hydrochlorothiazide (HYDRODIURIL) 25 MG tablet Take 1 tablet (25 mg) by mouth daily Take 1 tab by mouth at noon 90 tablet 0     diltiazem 240 MG 24 hr capsule Take 1 capsule (240 mg) by mouth daily 90 capsule 3     albuterol (PROAIR HFA/PROVENTIL HFA/VENTOLIN HFA) 108 (90 BASE) MCG/ACT Inhaler Inhale 2 puffs into the lungs every 6 hours as needed for shortness of breath / dyspnea or wheezing 1 Inhaler 1     levalbuterol (XOPENEX) 0.63 MG/3ML neb solution Take 3 mLs (0.63 mg) by nebulization every 6 hours as needed for shortness of breath / dyspnea or wheezing 120 mL 5     Acetaminophen (TYLENOL PO) Take 1,000 mg by mouth At Bedtime       DOCUSATE SODIUM PO Take 100 mg by mouth At Bedtime       polyethylene glycol (MIRALAX/GLYCOLAX) packet Take 1 packet by mouth 2 times daily        Misc Natural Products (TART CHERRY ADVANCED) CAPS Cherry tart liquid       ZOLPIDEM TARTRATE PO Take 5 mg by mouth nightly as needed for sleep       ASPIRIN NOT PRESCRIBED, INTENTIONAL, Antiplatelet medication not prescribed intentionally due to Current anticoagulant therapy (warfarin/enoxaparin)  0     nitroglycerin (NITROSTAT) 0.4 MG SL tablet Place 1 tablet (0.4 mg) under the tongue every 5 minutes as needed 25 tablet 1     calcium carbonate (CALCIUM CARBONATE) 600 MG TABS tablet Take 1 tablet by mouth daily        folic acid (FOLVITE) 400 MCG tablet Take 400 mcg by mouth daily       Cholecalciferol (VITAMIN D) 2000 UNITS tablet Take 2,000 Units by mouth daily       ascorbic acid (VITAMIN C) 500 MG tablet Take 500 mg by mouth daily       Probiotic Product (PROBIOTIC & ACIDOPHILUS EX ST PO) Take 1 tablet by mouth daily.       Multiple Vitamins-Minerals (OCUVITE PO) Take  1 capsule by mouth daily.         Soc Hx: reviewed  Fam Hx: reviewed          Dannielle Young MD  Internal Medicine

## 2017-12-05 NOTE — LETTER
My Depression Action Plan  Name: Leonor Mercado   Date of Birth 5/6/1926  Date: 12/5/2017    My doctor: Dannielle Darby   [facility] [place] [address] [facility] [address]  [phone]          GREEN    ZONE   Good Control    What it looks like:     Things are going generally well. You have normal up s and down s. You may even feel depressed from time to time, but bad moods usually last less than a day.   What you need to do:  1. Continue to care for yourself (see self care plan)  2. Check your depression survival kit and update it as needed  3. Follow your physician s recommendations including any medication.  4. Do not stop taking medication unless you consult with your physician first.           YELLOW         ZONE Getting Worse    What it looks like:     Depression is starting to interfere with your life.     It may be hard to get out of bed; you may be starting to isolate yourself from others.    Symptoms of depression are starting to last most all day and this has happened for several days.     You may have suicidal thoughts but they are not constant.   What you need to do:     1. Call your care team, your response to treatment will improve if you keep your care team informed of your progress. Yellow periods are signs an adjustment may need to be made.     2. Continue your self-care, even if you have to fake it!    3. Talk to someone in your support network    4. Open up your depression survival kit           RED    ZONE Medical Alert - Get Help    What it looks like:     Depression is seriously interfering with your life.     You may experience these or other symptoms: You can t get out of bed most days, can t work or engage in other necessary activities, you have trouble taking care of basic hygiene, or basic responsibilities, thoughts of suicide or death that will not go away, self-injurious behavior.     What you need to do:  1. Call  your care team and request a same-day appointment. If they are not available (weekends or after hours) call your local crisis line, emergency room or 911.      Electronically signed by: Aurea Hillman, December 5, 2017    Depression Self Care Plan / Survival Kit    Self-Care for Depression  Here s the deal. Your body and mind are really not as separate as most people think.  What you do and think affects how you feel and how you feel influences what you do and think. This means if you do things that people who feel good do, it will help you feel better.  Sometimes this is all it takes.  There is also a place for medication and therapy depending on how severe your depression is, so be sure to consult with your medical provider and/ or Behavioral Health Consultant if your symptoms are worsening or not improving.     In order to better manage my stress, I will:    Exercise  Get some form of exercise, every day. This will help reduce pain and release endorphins, the  feel good  chemicals in your brain. This is almost as good as taking antidepressants!  This is not the same as joining a gym and then never going! (they count on that by the way ) It can be as simple as just going for a walk or doing some gardening, anything that will get you moving.      Hygiene   Maintain good hygiene (Get out of bed in the morning, Make your bed, Brush your teeth, Take a shower, and Get dressed like you were going to work, even if you are unemployed).  If your clothes don't fit try to get ones that do.    Diet  I will strive to eat foods that are good for me, drink plenty of water, and avoid excessive sugar, caffeine, alcohol, and other mood-altering substances.  Some foods that are helpful in depression are: complex carbohydrates, B vitamins, flaxseed, fish or fish oil, fresh fruits and vegetables.    Psychotherapy  I agree to participate in Individual Therapy (if recommended).    Medication  If prescribed medications, I agree to  take them.  Missing doses can result in serious side effects.  I understand that drinking alcohol, or other illicit drug use, may cause potential side effects.  I will not stop my medication abruptly without first discussing it with my provider.    Staying Connected With Others  I will stay in touch with my friends, family members, and my primary care provider/team.    Use your imagination  Be creative.  We all have a creative side; it doesn t matter if it s oil painting, sand castles, or mud pies! This will also kick up the endorphins.    Witness Beauty  (AKA stop and smell the roses) Take a look outside, even in mid-winter. Notice colors, textures. Watch the squirrels and birds.     Service to others  Be of service to others.  There is always someone else in need.  By helping others we can  get out of ourselves  and remember the really important things.  This also provides opportunities for practicing all the other parts of the program.    Humor  Laugh and be silly!  Adjust your TV habits for less news and crime-drama and more comedy.    Control your stress  Try breathing deep, massage therapy, biofeedback, and meditation. Find time to relax each day.     My support system    Clinic Contact:  Phone number:    Contact 1:  Phone number:    Contact 2:  Phone number:    Spiritism/:  Phone number:    Therapist:  Phone number:    Local crisis center:    Phone number:    Other community support:  Phone number:

## 2017-12-05 NOTE — MR AVS SNAPSHOT
After Visit Summary   12/5/2017    Leonor Mercado    MRN: [id number]           Patient Information     Date Of Birth          5/6/1926        Visit Information        Provider Department      12/5/2017 3:00 PM Dannielle Darby MD [facility]        Today's Diagnoses     Chronic obstructive pulmonary disease, unspecified COPD type (H)    -  1    Adjustment disorder with anxiety          Care Instructions    Plan:  1. CXR today - [address]  2. I will ask Shakira Blackman our pharmacist to call you and see what suggestion she has   3.Continue same meds, same doses for now           Follow-ups after your visit        Additional Services     MED THERAPY MANAGE REFERRAL       Your provider has referred you to: **[facility] Scheduling (numerous locations) [phone]   [email or web address]  [place]: [facility] [place] [phone]   [email or web address]    Reason for Referral: COPD -can't afford the present meds    The [facility] department will contact you to schedule an appointment.  You may also schedule the appointment by calling [phone].  For [facility] patients, please call [phone] to confirm/schedule your appointment on the next business day.    This service is designed to help you get the most from your medications.  A specially trained Pharmacist will work closely with you and your providers to solve any questions, concerns, issues or problems related to your medications.    Please bring all of your prescription and non-prescription medications (such as vitamins, over-the-counter medications, and herbals) or a detailed medication list to your appointment.    If you have a glucose meter or other home monitoring information, please also bring this to your appointment (i.e. blood glucose  "log, blood pressure log, pain log, etc.).                  Your next 10 appointments already scheduled     2018  2:00 PM CST   Anticoagulation Visit with [facility]   [facility] ([facility]    [address]  [facility] [id number] [phone]              Future tests that were ordered for you today     Open Future Orders        Priority Expected Expires Ordered    XR Chest 2 Views Routine 2017            Who to contact     If you have questions or need follow up information about today's clinic visit or your schedule please contact [facility] directly at [phone].  Normal or non-critical lab and imaging results will be communicated to you by MyChart, letter or phone within 4 business days after the clinic has received the results. If you do not hear from us within 7 days, please contact the clinic through Ticket ABChart or phone. If you have a critical or abnormal lab result, we will notify you by phone as soon as possible.  Submit refill requests through zoomsquare or call your pharmacy and they will forward the refill request to us. Please allow 3 business days for your refill to be completed.          Additional Information About Your Visit        MyChart Information     zoomsquare lets you send messages to your doctor, view your test results, renew your prescriptions, schedule appointments and more. To sign up, go to [email or web address] . Click on \"Log in\" on the left side of the screen, which will take you to the Welcome page. Then click on \"Sign up Now\" on the right side of the page.     You will be asked to enter the access code listed below, as well as some personal information. Please follow the directions to create your username and password.     Your access code is: [email or web address]  Expires: 2017  8:15 AM     Your access code will  in 90 days. If you need help or a new code, please " "call your [facility] or [phone].        Care EveryWhere ID     This is your Care EveryWhere ID. This could be used by other organizations to access your [place] medical records  [id number]        Your Vitals Were     Pulse Temperature Height Last Period Pulse Oximetry Breastfeeding?    83 98.4  F (36.9  C) (Oral) 5' 3\" (1.6 m) (LMP Unknown) 97% No    BMI (Body Mass Index)                   24.69 kg/m2            Blood Pressure from Last 3 Encounters:   12/05/17 124/66   11/17/17 112/64   10/09/17 140/70    Weight from Last 3 Encounters:   12/05/17 139 lb 6.4 oz (63.2 kg)   11/17/17 137 lb 1.6 oz (62.2 kg)   10/09/17 138 lb 9.6 oz (62.9 kg)              We Performed the Following     DEPRESSION ACTION PLAN (DAP)     MED THERAPY MANAGE REFERRAL        Primary Care Provider Office Phone # Fax #    Dannielle Bria Darby [phone]       [address] [facility] [address]        Equal Access to Services     [facility]: Hadii aad ku hadasho Soomaali, waaxda luqadaha, qaybta kaalmada adeegyada, antonia cruz . So [facility] [phone].    ATENCIÓN: Si habla español, tiene a spicer disposición servicios gratuitos de asistencia lingüística. Ll[facility] [phone].    We comply with applicable federal civil rights laws and [place] laws. We do not discriminate on the basis of race, color, national origin, age, disability, sex, sexual orientation, or gender identity.            Thank you!     Thank you for choosing [facility]  for your care. Our goal is always to provide you with excellent care. Hearing back from our patients is one way we can continue to improve our services. Please take a few minutes to complete the written survey that you may receive in the mail after your visit with us. Thank you!             Your Updated Medication List - Protect others around you: Learn how to safely use, store and throw away your medicines at " www.disposemymeds.org.          This list is accurate as of: 12/5/17  3:30 PM.  Always use your most recent med list.                   Brand Name Dispense Instructions for use Diagnosis    albuterol 108 (90 BASE) MCG/ACT Inhaler    PROAIR HFA/PROVENTIL HFA/VENTOLIN HFA    1 Inhaler    Inhale 2 puffs into the lungs every 6 hours as needed for shortness of breath / dyspnea or wheezing    Chronic obstructive pulmonary disease, unspecified COPD type (H)       ascorbic acid 500 MG tablet    VITAMIN C     Take 500 mg by mouth daily        ASPIRIN NOT PRESCRIBED    INTENTIONAL     Antiplatelet medication not prescribed intentionally due to Current anticoagulant therapy (warfarin/enoxaparin)    Chronic atrial fibrillation (H)       calcium carbonate 600 MG tablet   Generic drug:  calcium carbonate      Take 1 tablet by mouth daily        diltiazem 240 MG 24 hr capsule     90 capsule    Take 1 capsule (240 mg) by mouth daily    Chronic atrial fibrillation (H)       DOCUSATE SODIUM PO      Take 100 mg by mouth At Bedtime        folic acid 400 MCG tablet    FOLVITE     Take 400 mcg by mouth daily        hydrochlorothiazide 25 MG tablet    HYDRODIURIL    90 tablet    Take 1 tablet (25 mg) by mouth daily Take 1 tab by mouth at noon    Shortness of breath       levalbuterol 0.63 MG/3ML neb solution    XOPENEX    120 mL    Take 3 mLs (0.63 mg) by nebulization every 6 hours as needed for shortness of breath / dyspnea or wheezing    Chronic obstructive pulmonary disease, unspecified COPD type (H)       losartan 50 MG tablet    COZAAR    1 tablet    Take 0.5 tablets (25 mg) by mouth daily    Essential hypertension with goal blood pressure less than 140/90       nitroGLYcerin 0.4 MG sublingual tablet    NITROSTAT    25 tablet    Place 1 tablet (0.4 mg) under the tongue every 5 minutes as needed    Other chest pain       OCUVITE PO      Take 1 capsule by mouth daily.        polyethylene glycol Packet    MIRALAX/GLYCOLAX     Take 1  packet by mouth 2 times daily        pravastatin 40 MG tablet    PRAVACHOL    90 tablet    Take 1 tablet (40 mg) by mouth daily    Hyperlipidemia LDL goal <100, Coronary artery disease involving native coronary artery of native heart without angina pectoris       PROBIOTIC & ACIDOPHILUS EX ST PO      Take 1 tablet by mouth daily.        TART CHERRY ADVANCED Caps      Aiken tart liquid        TYLENOL PO      Take 1,000 mg by mouth At Bedtime        umeclidinium 62.5 MCG/INH oral inhaler    INCRUSE ELLIPTA    3 Inhaler    Inhale 1 puff into the lungs daily    Pulmonary fibrosis (H), Chronic obstructive pulmonary disease, unspecified COPD type (H)       vitamin D 2000 UNITS tablet      Take 2,000 Units by mouth daily        warfarin 2 MG tablet    COUMADIN    90 tablet    TAKE ONE TABLET BY MOUTH ONCE DAILY OR  AS  DIRECTED  BY  INR  CLINIC    Long term current use of anticoagulant therapy       ZOLPIDEM TARTRATE PO      Take 5 mg by mouth nightly as needed for sleep

## 2017-12-05 NOTE — PATIENT INSTRUCTIONS
Plan:  1. CXR today - [address]  2. I will ask Shakira Blackman our pharmacist to call you and see what suggestion she has   3.Continue same meds, same doses for now

## 2017-12-06 ENCOUNTER — TELEPHONE (OUTPATIENT)
Dept: PHARMACY | Facility: OTHER | Age: 82
End: 2017-12-06

## 2017-12-06 ASSESSMENT — ANXIETY QUESTIONNAIRES: GAD7 TOTAL SCORE: 11

## 2017-12-06 NOTE — TELEPHONE ENCOUNTER
MTM referral from: [place] clinic visit (referral by provider)    MTM referral outreach attempt #1 on December 6, 2017 at 12:39 PM      Outcome: Left Message    Tammie Jacobs MTM Coordinator

## 2017-12-11 ENCOUNTER — ALLIED HEALTH/NURSE VISIT (OUTPATIENT)
Dept: PHARMACY | Facility: CLINIC | Age: 82
End: 2017-12-11
Payer: COMMERCIAL

## 2017-12-11 DIAGNOSIS — R07.9 CHEST PAIN, UNSPECIFIED TYPE: ICD-10-CM

## 2017-12-11 DIAGNOSIS — E78.5 HYPERLIPIDEMIA LDL GOAL <100: ICD-10-CM

## 2017-12-11 DIAGNOSIS — I25.10 CORONARY ARTERY DISEASE INVOLVING NATIVE CORONARY ARTERY OF NATIVE HEART WITHOUT ANGINA PECTORIS: ICD-10-CM

## 2017-12-11 DIAGNOSIS — E63.9 NUTRITIONAL DEFICIENCY: ICD-10-CM

## 2017-12-11 DIAGNOSIS — I50.32 CHRONIC DIASTOLIC CONGESTIVE HEART FAILURE (H): ICD-10-CM

## 2017-12-11 DIAGNOSIS — I10 HYPERTENSION GOAL BP (BLOOD PRESSURE) < 140/80: ICD-10-CM

## 2017-12-11 DIAGNOSIS — K58.9 IRRITABLE BOWEL SYNDROME, UNSPECIFIED TYPE: ICD-10-CM

## 2017-12-11 DIAGNOSIS — J44.9 CHRONIC OBSTRUCTIVE PULMONARY DISEASE, UNSPECIFIED COPD TYPE (H): Primary | ICD-10-CM

## 2017-12-11 DIAGNOSIS — M54.5 LOW BACK PAIN, UNSPECIFIED BACK PAIN LATERALITY, UNSPECIFIED CHRONICITY, WITH SCIATICA PRESENCE UNSPECIFIED: ICD-10-CM

## 2017-12-11 DIAGNOSIS — I48.20 CHRONIC ATRIAL FIBRILLATION (H): ICD-10-CM

## 2017-12-11 DIAGNOSIS — J44.9 CHRONIC OBSTRUCTIVE PULMONARY DISEASE, UNSPECIFIED COPD TYPE (H): ICD-10-CM

## 2017-12-11 DIAGNOSIS — G47.00 INSOMNIA, UNSPECIFIED TYPE: ICD-10-CM

## 2017-12-11 LAB
ERYTHROCYTE [DISTWIDTH] IN BLOOD BY AUTOMATED COUNT: 14.5 % (ref 10–15)
HCT VFR BLD AUTO: 41.3 % (ref 35–47)
HGB BLD-MCNC: 13.3 G/DL (ref 11.7–15.7)
MCH RBC QN AUTO: 29.7 PG (ref 26.5–33)
MCHC RBC AUTO-ENTMCNC: 32.2 G/DL (ref 31.5–36.5)
MCV RBC AUTO: 92 FL (ref 78–100)
PLATELET # BLD AUTO: 217 10E9/L (ref 150–450)
RBC # BLD AUTO: 4.48 10E12/L (ref 3.8–5.2)
WBC # BLD AUTO: 6.6 10E9/L (ref 4–11)

## 2017-12-11 PROCEDURE — 80061 LIPID PANEL: CPT | Performed by: INTERNAL MEDICINE

## 2017-12-11 PROCEDURE — 99607 MTMS BY PHARM ADDL 15 MIN: CPT | Performed by: PHARMACIST

## 2017-12-11 PROCEDURE — 99605 MTMS BY PHARM NP 15 MIN: CPT | Performed by: PHARMACIST

## 2017-12-11 PROCEDURE — 36415 COLL VENOUS BLD VENIPUNCTURE: CPT | Performed by: INTERNAL MEDICINE

## 2017-12-11 PROCEDURE — 85027 COMPLETE CBC AUTOMATED: CPT | Performed by: INTERNAL MEDICINE

## 2017-12-11 NOTE — PATIENT INSTRUCTIONS
Recommendations from today's MTM visit:                                                    MTM (medication therapy management) is a service provided by a clinical pharmacist designed to help you get the most of out of your medicines.   Today we reviewed what your medicines are for, how to know if they are working, that your medicines are safe and how to make your medicine regimen as easy as possible.     1. Get Nitrostat refilled.     2. Stop taking Tylenol PM and Benadryl.     3. Can take plain Tylenol 500-1000 mg as needed for pain (max 3000 mg/day).     4. Can start melatonin 5 mg nightly as needed for sleep. Take 1-2 hours prior to bedtime.     5. Please follow-up with [facility] Prescription Assistance Program ([phone]) to see if they can help you find ways to better afford your medications.     Next MTM visit: 1 week by phone     To schedule another MTM appointment, please call the clinic directly or you may call the MTM scheduling line at [phone] or toll-free at [phone].     My Clinical Pharmacist's contact information:                                                      It was a pleasure seeing you today!  Please feel free to contact me with any questions or concerns you have.      Chiquita Abebe, PharmD  Pharmaceutical Care Resident   Pager: [phone]    You may receive a survey about the MTM services you received.  I would appreciate your feedback to help me serve you better in the future. Please fill it out and return it when you can. Your comments will be anonymous.

## 2017-12-11 NOTE — PROGRESS NOTES
SUBJECTIVE/OBJECTIVE:                           Leonor Mercado is a 89 year old female called for an initial visit for Medication Therapy Management.  She was referred to me from Dr. Young.     Chief Complaint: Breathing is getting worse after using the Incruse in AM.    Allergies/ADRs: Reviewed in Epic  Tobacco: History of tobacco dependence - quit 1987  Alcohol: 1-3 beverages / week  Caffeine: 1 cups/day of coffee (usually decaf)   Activity: Don't walk very well, will walk when she visits her  but doesn't do activity at home. When it was nice out, would push walker to grocery store and back. Will walk with daughter when running errands (difficulty with breathing).   PMH: Reviewed in Epic    Medication Adherence/Access  Pill box that she fills once weekly and puts Incruse in front of pill box to remember to take every day. Seldom forgets to take medications.   The patient misses their medication 0 times per week.    Patient is responsible for his/her own medications.   Adherence/Compliance is described as excellent.  Medication barriers: no issues reported by patient.  The patient [facility] at  [facility]     COPD: Current medications: Incruse Ellipta 1 puff daily in AM, Albuterol MDI and Levalbuterol (Xopenex) Nebs. If feeling SOB in afternoon, will take nebulizing solution (making sure at least 6 hours later from Incruse). Becomes more SOB when lying down at night. Was taking Spiriva Handihaler but became too expensive (really liked the Spiriva Handihaler and thought it really helped her breathing), so switched to Incruse Ellipta. When first started Incruse, it was working but now it doesn't work well. Had prednisone burst a month ago which helped breathing, but now worse again. Have an appointment with Dr. Mckenzie, Pulmonologist in February.   Pt reports the following symptoms: increased SOB upon exertion  and when she gets up and goes to the bathroom when lays back down she will start  becoming SOB.  COPD Action Plan on file has is overdue.     Atrial Fibrillation/HTN/CAD/HFpEF: Current medications include: warfarin 2 mg once daily, diltiazem 240 mg daily, losartan 25 mg daily (recent adjustment, was previously 50 mg daily), hydrochlorothiazide 25 mg daily in AM. Has been having dizzy/almost numbing spells for a long time, and patient said that it was thought to be attributed to BP, so losartan was lowered. Since lowering the losartan she does admit that these spells have lessened. Has Nitrostat but has not had to use in 1 year and thinks that her bottle is . Has previously had stentx2 placed 10+ years ago and has pacemaker. No swelling or ankle edema and attributes her SOB largely to her unmanaged COPD.     Lab Results   Component Value Date    INR 2.6 2017    INR 3.1 2017    INR 3.4 10/24/2017    INR 2.8 2017     ECHO:  Date 17, EF 60-65%  Pt is complaining of sx of HFof: shortness of breath and dyspnea on exertion.  Pt will sometimes measure weight, but not daily Is not aware of goal weight range.   Patient does not self-monitor BP.     Hyperlipidemia: Current therapy includes pravastatin 40mg once daily.  Pt reports no significant myalgias or other side effects. She finds that she has some weakness in her legs, but finds that it is more old age than side effect from mediation.     IBS: Currently taking probiotic daily, Miralax 1 heaping tablespoon daily, docusate 100 mg PRN (only uses rarely). Will take prune juice as well. Used to have issues with diarrhea, but now has issues with constipation. Finds that the regimen works well and has a bowel movement almost every day.    Supplement: Currently taking tart cherry supplement every day, multivitamin (Ocuvite) daily, vitamin C 500 mg daily, folic acid 400 mg daily. Is these supplements because she heard that they help with nutrition and health. She started up folic acid herself and thinks that they help with her  general health and well being. No medication side effects.     Senile Osteoporosis: Current therapy includes: calcium carbonate 600 mg/Vitamin D 2000 IU daily. Pt is experiencing side effects: constipation (thinks calcium is contributing factor). Was on alendronate from 2011 to 2014 but discontinued due to renal insufficiency.  Pt is getting the following sources of dietary calcium: cheese, some leafy vegetables, will put milk in her coffee and cooks with it often.   DEXA History: April 2013 - From scanned document: Osteopenia, T-score -2.3   Risk factors: post-menopausal    Pain: Currently taking acetaminophen 1000 mg HS prn. She will take as needed for pain. Sometimes she will take Tylenol PM at night for pain and to help her sleep. Sometimes has general pain, but usually in back. Tylenol does help with managing pain. She will only take at night (never more than 1000 mg at a time).     Insomnia: Current medications include: Tylenol PM or Benadryl 25 mg nightly as needed. Pt reports trouble falling asleep and difficulty getting back to sleep once she gets up to go to the bathroom. She finds that the tylenol PM is helpful for her sleep. Will not take every night, but does take relatively often.     Current labs include:BP Readings from Last 3 Encounters:   12/05/17 124/66   11/17/17 112/64   10/09/17 140/70     Today's Vitals: LMP  (LMP Unknown) Telemed Visit - no vitals  Lab Results   Component Value Date    A1C 6.1 04/30/2014   .  Lab Results   Component Value Date    CHOL 153 03/15/2015     Lab Results   Component Value Date    TRIG 129 03/15/2015     Lab Results   Component Value Date    HDL 52 03/15/2015     Lab Results   Component Value Date    LDL 75 03/15/2015       Liver Function Studies -   Recent Labs   Lab Test  11/15/16   0050   PROTTOTAL  6.7*   ALBUMIN  3.7   BILITOTAL  0.6   ALKPHOS  55   AST  18   ALT  18     Last Basic Metabolic Panel:  Lab Results   Component Value Date     11/15/2016       Lab Results   Component Value Date    POTASSIUM 3.8 11/15/2016     Lab Results   Component Value Date    CHLORIDE 104 11/15/2016     Lab Results   Component Value Date    BUN 24 11/15/2016     Lab Results   Component Value Date    CR 1.17 11/15/2016     GFR Estimate   Date Value Ref Range Status   11/15/2016 43 (L) >60 mL/min/1.7m2 Final     Comment:     Non  GFR Calc   11/02/2016 42 (L) >60 mL/min/1.7m2 Final     Comment:     Non  GFR Calc   11/01/2016 35 (L) >60 mL/min/1.7m2 Final     Comment:     Non  GFR Calc     TSH   Date Value Ref Range Status   03/14/2015 2.40 0.40 - 4.00 mU/L Final     Comment:     Effective 7/30/2014, the reference range for this assay has changed to reflect   new instrumentation/methodology.         Most Recent Immunizations   Administered Date(s) Administered     Influenza (High Dose) 3 valent vaccine 09/26/2017     Influenza (IIV3) PF 10/01/2014     Pneumococcal 23 valent 10/15/2012     Tdap (Adacel,Boostrix) 10/15/2012       ASSESSMENT:                             Current medications were reviewed today.       Medication Adherence: no issues identified    COPD: Needs Improvement. Patient would benefit from reaching out the [facility] Prescription Assistance Program - gave patient the phone # to reach out to them. This will hopefully assist patient with management of costs of medications. Since patient feels like the Incruse Ellipta is not working as well as the Spiriva had previously, should it be fiscally possible with the prescription assistance program, recommend changing back to Spiriva handihaler. If not, will need to discuss other medication options with patient.  Patient is due for COPD action plan.     Atrial Fibrillation/HTN/CAD/HFpEF: Stable. Patient is meeting BP goal of < 140/90mmHg. Patient recommended to refill Nitrostat and replace at least every 1 year.     Hyperlipidemia: Stable.    IBS: Stable.     Supplement:  "Stable.    Senile Osteoporosis: Stable. Pt is meeting RDI of calcium 1200mg/day with food and dietary supplementation.     Pain: Needs improvement. Due to patients age, recommend patient stop Tylenol PM and use solely Tylenol for as needed pain (500-1000 mg every 6 hours as needed, max 3000 mg/day). Diphenhydramine is on the Beer's List and has anticholinergic effects that have been associated with cognitive impairment and delirium in elderly.     Insomnia: Needs improvement. Patient recommended to stop Tylenol PM and Benadryl (reasoning above in \"pain\" section). Recommend trying melatonin 5 mg daily as needed for sleep.     PLAN:                            1. Patient to get Nitrostat refilled.     2. Patient should stop taking Tylenol PM and Benadryl.     3. Patient can take plain Tylenol 500-1000 mg as needed for pain (max 3000 mg/day).     4. Patient can start melatonin 5 mg nightly as needed for sleep. Take 1-2 hours prior to bedtime.     5. Patient to follow-up with [facility] Prescription Assistance Program ([phone]) to get help with medication costs.     Future considerations: Changing from Incruse Ellipta to Spiriva - depending on coverage and what develops from prescription assistance program.    I spent 60 minutes with this patient today. I offer these suggestions for consideration by the PCP. A copy of the visit note was provided to the patient's primary care provider.    Will follow up in 1 week via phone.    The patient was mailed a summary of these recommendations as an after visit summary.     I concur with the note as dictated above which reflects our joint assessment and plan.   Shakira Blackman, CelinaD    Chiquita Abebe, PharmD  Pharmaceutical Care Resident   Pager: [phone]    "

## 2017-12-11 NOTE — Clinical Note
Hi Dr. Young,   Please see note from MT appt with Leonor yesterday, 12/12. Will follow-up in 1 week to see if she called [facility] Prescription Savings Program. Having issues with Incruse (see note) - if insurance allows, what are your thoughts about changing back to Spiriva Handihaler?  Chiquita Abebe, PharmD Pharmaceutical Care Resident  Pager: [phone]

## 2017-12-12 LAB
CHOLEST SERPL-MCNC: 161 MG/DL
HDLC SERPL-MCNC: 85 MG/DL
LDLC SERPL CALC-MCNC: 54 MG/DL
NONHDLC SERPL-MCNC: 76 MG/DL
TRIGL SERPL-MCNC: 109 MG/DL

## 2017-12-16 PROBLEM — Z76.89 ENCOUNTER TO ESTABLISH CARE: Chronic | Status: ACTIVE | Noted: 2017-12-16

## 2017-12-16 PROBLEM — I49.5 SICK SINUS SYNDROME (H): Chronic | Status: ACTIVE | Noted: 2017-12-16

## 2017-12-16 PROBLEM — I50.32 CHRONIC DIASTOLIC CONGESTIVE HEART FAILURE (H): Chronic | Status: ACTIVE | Noted: 2017-11-21

## 2018-01-06 ENCOUNTER — HOSPITAL ENCOUNTER (EMERGENCY)
Facility: CLINIC | Age: 83
Discharge: HOME | End: 2018-01-06
Attending: EMERGENCY MEDICINE | Admitting: EMERGENCY MEDICINE
Payer: MEDICARE

## 2018-01-06 ENCOUNTER — APPOINTMENT (OUTPATIENT)
Dept: GENERAL RADIOLOGY | Facility: CLINIC | Age: 83
End: 2018-01-06
Attending: EMERGENCY MEDICINE
Payer: MEDICARE

## 2018-01-06 VITALS
HEIGHT: 60 IN | TEMPERATURE: 98.1 F | DIASTOLIC BLOOD PRESSURE: 74 MMHG | WEIGHT: 135 LBS | SYSTOLIC BLOOD PRESSURE: 120 MMHG | OXYGEN SATURATION: 96 % | RESPIRATION RATE: 20 BRPM | HEART RATE: 75 BPM | BODY MASS INDEX: 26.5 KG/M2

## 2018-01-06 DIAGNOSIS — J44.1 COPD EXACERBATION (H): ICD-10-CM

## 2018-01-06 LAB
ANION GAP SERPL CALCULATED.3IONS-SCNC: 8 MMOL/L (ref 3–14)
BASOPHILS # BLD AUTO: 0.1 10E9/L (ref 0–0.2)
BASOPHILS NFR BLD AUTO: 0.7 %
BUN SERPL-MCNC: 28 MG/DL (ref 7–30)
CALCIUM SERPL-MCNC: 10.2 MG/DL (ref 8.5–10.1)
CHLORIDE SERPL-SCNC: 98 MMOL/L (ref 94–109)
CO2 SERPL-SCNC: 29 MMOL/L (ref 20–32)
CREAT SERPL-MCNC: 1.51 MG/DL (ref 0.52–1.04)
DIFFERENTIAL METHOD BLD: NORMAL
EOSINOPHIL # BLD AUTO: 0.5 10E9/L (ref 0–0.7)
EOSINOPHIL NFR BLD AUTO: 6.4 %
ERYTHROCYTE [DISTWIDTH] IN BLOOD BY AUTOMATED COUNT: 13.6 % (ref 10–15)
FLUAV+FLUBV AG SPEC QL: NEGATIVE
FLUAV+FLUBV AG SPEC QL: NEGATIVE
GFR SERPL CREATININE-BSD FRML MDRD: 32 ML/MIN/1.7M2
GLUCOSE SERPL-MCNC: 101 MG/DL (ref 70–99)
HCT VFR BLD AUTO: 39.8 % (ref 35–47)
HGB BLD-MCNC: 12.7 G/DL (ref 11.7–15.7)
IMM GRANULOCYTES # BLD: 0 10E9/L (ref 0–0.4)
IMM GRANULOCYTES NFR BLD: 0.4 %
INR PPP: 3.3 (ref 0.86–1.14)
INTERPRETATION ECG - MUSE: NORMAL
LYMPHOCYTES # BLD AUTO: 1.4 10E9/L (ref 0.8–5.3)
LYMPHOCYTES NFR BLD AUTO: 18.4 %
MCH RBC QN AUTO: 29.1 PG (ref 26.5–33)
MCHC RBC AUTO-ENTMCNC: 31.9 G/DL (ref 31.5–36.5)
MCV RBC AUTO: 91 FL (ref 78–100)
MONOCYTES # BLD AUTO: 0.6 10E9/L (ref 0–1.3)
MONOCYTES NFR BLD AUTO: 7.8 %
NEUTROPHILS # BLD AUTO: 4.9 10E9/L (ref 1.6–8.3)
NEUTROPHILS NFR BLD AUTO: 66.3 %
NRBC # BLD AUTO: 0 10*3/UL
NRBC BLD AUTO-RTO: 0 /100
NT-PROBNP SERPL-MCNC: 5872 PG/ML (ref 0–1800)
PLATELET # BLD AUTO: 268 10E9/L (ref 150–450)
POTASSIUM SERPL-SCNC: 3.8 MMOL/L (ref 3.4–5.3)
RBC # BLD AUTO: 4.37 10E12/L (ref 3.8–5.2)
SODIUM SERPL-SCNC: 135 MMOL/L (ref 133–144)
SPECIMEN SOURCE: NORMAL
TROPONIN I SERPL-MCNC: <0.015 UG/L (ref 0–0.04)
WBC # BLD AUTO: 7.4 10E9/L (ref 4–11)

## 2018-01-06 PROCEDURE — 85025 COMPLETE CBC W/AUTO DIFF WBC: CPT | Performed by: EMERGENCY MEDICINE

## 2018-01-06 PROCEDURE — 87804 INFLUENZA ASSAY W/OPTIC: CPT | Mod: 91 | Performed by: EMERGENCY MEDICINE

## 2018-01-06 PROCEDURE — 83880 ASSAY OF NATRIURETIC PEPTIDE: CPT | Performed by: EMERGENCY MEDICINE

## 2018-01-06 PROCEDURE — 25000125 ZZHC RX 250: Performed by: EMERGENCY MEDICINE

## 2018-01-06 PROCEDURE — 85610 PROTHROMBIN TIME: CPT | Performed by: EMERGENCY MEDICINE

## 2018-01-06 PROCEDURE — 94640 AIRWAY INHALATION TREATMENT: CPT

## 2018-01-06 PROCEDURE — 93005 ELECTROCARDIOGRAM TRACING: CPT

## 2018-01-06 PROCEDURE — 84484 ASSAY OF TROPONIN QUANT: CPT | Performed by: EMERGENCY MEDICINE

## 2018-01-06 PROCEDURE — 71046 X-RAY EXAM CHEST 2 VIEWS: CPT

## 2018-01-06 PROCEDURE — 80048 BASIC METABOLIC PNL TOTAL CA: CPT | Performed by: EMERGENCY MEDICINE

## 2018-01-06 PROCEDURE — 99285 EMERGENCY DEPT VISIT HI MDM: CPT | Mod: 25

## 2018-01-06 RX ORDER — IPRATROPIUM BROMIDE AND ALBUTEROL SULFATE 2.5; .5 MG/3ML; MG/3ML
6 SOLUTION RESPIRATORY (INHALATION) ONCE
Status: COMPLETED | OUTPATIENT
Start: 2018-01-06 | End: 2018-01-06

## 2018-01-06 RX ORDER — PREDNISONE 20 MG/1
60 TABLET ORAL ONCE
Status: COMPLETED | OUTPATIENT
Start: 2018-01-06 | End: 2018-01-06

## 2018-01-06 RX ORDER — PREDNISONE 50 MG/1
TABLET ORAL
Qty: 4 TABLET | Refills: 0 | Status: SHIPPED | OUTPATIENT
Start: 2018-01-06 | End: 2018-01-11

## 2018-01-06 RX ORDER — IPRATROPIUM BROMIDE AND ALBUTEROL SULFATE 2.5; .5 MG/3ML; MG/3ML
3 SOLUTION RESPIRATORY (INHALATION) ONCE
Status: DISCONTINUED | OUTPATIENT
Start: 2018-01-06 | End: 2018-01-06 | Stop reason: HOSPADM

## 2018-01-06 RX ADMIN — PREDNISONE 60 MG: 20 TABLET ORAL at 15:36

## 2018-01-06 RX ADMIN — IPRATROPIUM BROMIDE AND ALBUTEROL SULFATE 6 ML: .5; 3 SOLUTION RESPIRATORY (INHALATION) at 15:00

## 2018-01-06 ASSESSMENT — ENCOUNTER SYMPTOMS
SHORTNESS OF BREATH: 1
VOMITING: 0
DYSURIA: 0
FEVER: 0
DIARRHEA: 0
COUGH: 1

## 2018-01-06 NOTE — ED AVS SNAPSHOT
[facility]    [address] [facility] [address]    Phone:  [phone]    Fax:  [phone]                                       Leonor Mercado   MRN: [id number]    Department:  [facility]   Date of Visit:  1/6/2018           Patient Information     Date Of Birth          5/6/1926        Your diagnoses for this visit were:     COPD exacerbation (H)        You were seen by Bryan De León MD, Adela Chen MD, and Nathalie Joseph MD.      Follow-up Information     Follow up with Dannielle Darby MD In 3 days.    Specialty:  Internal Medicine    Contact information:    [address] [facility] [address]  [phone]          Follow up with Pulmonology In 1 month.    Why:  As scheduled         Follow up with Cardiology In 1 week.        Follow up with [facility].    Specialty:  EMERGENCY MEDICINE    Why:  If symptoms worsen    Contact information:    [address] [facility] [address] [phone]        Discharge Instructions       Take steroids as prescribed. Continue nebulizer every 4-6 hours and albuterol inhaler as needed as well.   Follow up with your primary care provider to discuss your continued shortness of breath.   Also be sure to keep your appointment with the pulmonologist.  Also arrange to see your cardiologist to see if he thinks your shortness of breath may be related to your heart.  Continue INR checks. It was 3.3 today.  Return with new or concerning symptoms.    Discharge References/Attachments     COPD FLARE (ENGLISH)    COPD, WHAT IS (ENGLISH)    LUNG DISEASE, CHRONIC: AVOIDING IRRITANTS AND ALLERGENS (ENGLISH)    LUNG DISEASE, CHRONIC: TIPS FOR SAFE EXERCISE (ENGLISH)      Future Appointments        Provider Department Dept Phone Center    2/7/2018 10:10 AM SISSY COOKP Heart Device RN [facility]  [facility] [phone] [facility] CL[facility]      24 Hour Appointment Hotline       To make an appointment at any [facility], call [phone] ([phone]). If you don't have a family doctor or clinic, we will help you find one. [place] clinics are conveniently located to serve the needs of you and your family.             Review of your medicines      START taking        Dose / Directions Last dose taken    predniSONE 50 MG tablet   Commonly known as:  DELTASONE   Quantity:  4 tablet        Take 1 tablet by mouth daily for 4 days.   Refills:  0          Our records show that you are taking the medicines listed below. If these are incorrect, please call your family doctor or clinic.        Dose / Directions Last dose taken    albuterol 108 (90 BASE) MCG/ACT Inhaler   Commonly known as:  PROAIR HFA/PROVENTIL HFA/VENTOLIN HFA   Dose:  2 puff   Quantity:  1 Inhaler        Inhale 2 puffs into the lungs every 6 hours as needed for shortness of breath / dyspnea or wheezing   Refills:  1        ascorbic acid 500 MG tablet   Commonly known as:  VITAMIN C   Dose:  500 mg        Take 500 mg by mouth daily   Refills:  0        ASPIRIN NOT PRESCRIBED   Commonly known as:  INTENTIONAL        Antiplatelet medication not prescribed intentionally due to Current anticoagulant therapy (warfarin/enoxaparin)   Refills:  0        calcium carbonate 600 MG tablet   Dose:  1 tablet   Generic drug:  calcium carbonate        Take 1 tablet by mouth daily   Refills:  0        diltiazem 240 MG 24 hr capsule   Dose:  240 mg   Quantity:  90 capsule        Take 1 capsule (240 mg) by mouth daily   Refills:  3        DOCUSATE SODIUM PO   Dose:  100 mg        Take 100 mg by mouth daily as needed   Refills:  0        folic acid 400 MCG tablet   Commonly known as:  FOLVITE   Dose:  400 mcg        Take 400 mcg by mouth daily   Refills:  0        hydrochlorothiazide 25 MG tablet   Commonly known as:  HYDRODIURIL   Dose:  25 mg   Quantity:  90  tablet        Take 1 tablet (25 mg) by mouth daily Take 1 tab by mouth at noon   Refills:  0        levalbuterol 0.63 MG/3ML neb solution   Commonly known as:  XOPENEX   Dose:  1 ampule   Quantity:  120 mL        Take 3 mLs (0.63 mg) by nebulization every 6 hours as needed for shortness of breath / dyspnea or wheezing   Refills:  5        losartan 50 MG tablet   Commonly known as:  COZAAR   Dose:  25 mg   Quantity:  1 tablet        Take 0.5 tablets (25 mg) by mouth daily   Refills:  0        MELATONIN PO   Dose:  5 mg        Take 5 mg by mouth At Bedtime   Refills:  0        nitroGLYcerin 0.4 MG sublingual tablet   Commonly known as:  NITROSTAT   Dose:  0.4 mg   Quantity:  25 tablet        Place 1 tablet (0.4 mg) under the tongue every 5 minutes as needed   Refills:  1        OCUVITE PO   Dose:  1 capsule        Take 1 capsule by mouth daily.   Refills:  0        polyethylene glycol Packet   Commonly known as:  MIRALAX/GLYCOLAX   Dose:  1 packet        Take 1 packet by mouth 2 times daily   Refills:  0        pravastatin 40 MG tablet   Commonly known as:  PRAVACHOL   Dose:  40 mg   Quantity:  90 tablet        Take 1 tablet (40 mg) by mouth daily   Refills:  0        PROBIOTIC & ACIDOPHILUS EX ST PO   Dose:  1 tablet        Take 1 tablet by mouth daily.   Refills:  0        TART CHERRY ADVANCED Caps        Cherry tart liquid   Refills:  0        TYLENOL PO   Dose:  1000 mg        Take 1,000 mg by mouth At Bedtime   Refills:  0        umeclidinium 62.5 MCG/INH oral inhaler   Commonly known as:  INCRUSE ELLIPTA   Dose:  1 puff   Quantity:  3 Inhaler        Inhale 1 puff into the lungs daily   Refills:  0        vitamin D 2000 UNITS tablet   Dose:  2000 Units        Take 2,000 Units by mouth daily   Refills:  0        warfarin 2 MG tablet   Commonly known as:  COUMADIN   Quantity:  90 tablet        TAKE ONE TABLET BY MOUTH ONCE DAILY OR  AS  DIRECTED  BY  INR  CLINIC   Refills:  0                Prescriptions were sent  or printed at these locations (1 Prescription)                   Other Prescriptions                Printed at Department/Unit printer (1 of 1)         predniSONE (DELTASONE) 50 MG tablet                Procedures and tests performed during your visit     Basic metabolic panel    CBC with platelets differential    Cardiac Continuous Monitoring    EKG 12 lead    INR    Influenza A/B antigen    Nt probnp inpatient (BNP)    Peripheral IV catheter    Pulse oximetry nursing    Troponin I    XR Chest 2 Views      Orders Needing Specimen Collection     None      Pending Results     Date and Time Order Name Status Description    1/6/2018 1508 EKG 12 lead Preliminary             Pending Culture Results     No orders found from 1/4/2018 to 1/7/2018.            Pending Results Instructions     If you had any lab results that were not finalized at the time of your Discharge, you can call the ED Lab Result RN at [phone]. You will be contacted by this team for any positive Lab results or changes in treatment. The nurses are available 7 days a week from 10A to 6:30P.  You can leave a message 24 hours per day and they will return your call.        Test Results From Your Hospital Stay        1/6/2018  4:27 PM      Narrative     CHEST TWO VIEWS   1/6/2018 4:15 PM    HISTORY: Dyspnea, COPD, rule out CHF and pneumonia.      COMPARISON: 12/5/2017.        Impression     IMPRESSION: Again seen is a left subclavian intracardiac stimulator  device. No other mediastinal abnormality is noted. There is  hyperexpansion of the lungs suggesting obstructive pulmonary disease.  The lungs are otherwise clear. No other abnormality is noted. I see no  definite change since the previous examination.      GERRI CALIX MD         1/6/2018  4:19 PM      Component Results     Component Value Ref Range & Units Status    Influenza A/B Agn Specimen Nasal  Final    Influenza A Negative NEG^Negative Final    Influenza B Negative NEG^Negative Final     Test results must be correlated with clinical data. If necessary, results   should be confirmed by a molecular assay or viral culture.           1/6/2018  3:55 PM      Component Results     Component Value Ref Range & Units Status    N-Terminal Pro BNP Inpatient 5872 (H) 0 - 1800 pg/mL Final       Reference range shown and results flagged as abnormal are suggested inpatient   cut points for confirming diagnosis if CHF in an acute setting. Establishing a   baseline value for each individual patient is useful for follow-up. An   inpatient or emergency department NT-proPBNP <300 pg/mL effectively rules out   acute CHF, with 99% negative predictive value.  The outpatient non-acute reference range for ruling out CHF is:   0-125 pg/mL (age 18 to less than 75)   0-450 pg/mL (age 75 yrs and older)           1/6/2018  3:55 PM      Component Results     Component Value Ref Range & Units Status    Troponin I ES <0.015 0.000 - 0.045 ug/L Final    The 99th percentile for upper reference range is 0.045 ug/L.  Troponin values   in the range of 0.045 - 0.120 ug/L may be associated with risks of adverse   clinical events.           1/6/2018  3:55 PM      Component Results     Component Value Ref Range & Units Status    Sodium 135 133 - 144 mmol/L Final    Potassium 3.8 3.4 - 5.3 mmol/L Final    Chloride 98 94 - 109 mmol/L Final    Carbon Dioxide 29 20 - 32 mmol/L Final    Anion Gap 8 3 - 14 mmol/L Final    Glucose 101 (H) 70 - 99 mg/dL Final    Urea Nitrogen 28 7 - 30 mg/dL Final    Creatinine 1.51 (H) 0.52 - 1.04 mg/dL Final    GFR Estimate 32 (L) >60 mL/min/1.7m2 Final    Non  GFR Calc    GFR Estimate If Black 39 (L) >60 mL/min/1.7m2 Final    African American GFR Calc    Calcium 10.2 (H) 8.5 - 10.1 mg/dL Final         1/6/2018  3:37 PM      Component Results     Component Value Ref Range & Units Status    WBC 7.4 4.0 - 11.0 10e9/L Final    RBC Count 4.37 3.8 - 5.2 10e12/L Final    Hemoglobin 12.7 11.7 - 15.7  g/dL Final    Hematocrit 39.8 35.0 - 47.0 % Final    MCV 91 78 - 100 fl Final    MCH 29.1 26.5 - 33.0 pg Final    MCHC 31.9 31.5 - 36.5 g/dL Final    RDW 13.6 10.0 - 15.0 % Final    Platelet Count 268 150 - 450 10e9/L Final    Diff Method Automated Method  Final    % Neutrophils 66.3 % Final    % Lymphocytes 18.4 % Final    % Monocytes 7.8 % Final    % Eosinophils 6.4 % Final    % Basophils 0.7 % Final    % Immature Granulocytes 0.4 % Final    Nucleated RBCs 0 0 /100 Final    Absolute Neutrophil 4.9 1.6 - 8.3 10e9/L Final    Absolute Lymphocytes 1.4 0.8 - 5.3 10e9/L Final    Absolute Monocytes 0.6 0.0 - 1.3 10e9/L Final    Absolute Eosinophils 0.5 0.0 - 0.7 10e9/L Final    Absolute Basophils 0.1 0.0 - 0.2 10e9/L Final    Abs Immature Granulocytes 0.0 0 - 0.4 10e9/L Final    Absolute Nucleated RBC 0.0  Final         1/6/2018  5:23 PM      Component Results     Component Value Ref Range & Units Status    INR 3.30 (H) 0.86 - 1.14 Final                Clinical Quality Measure: Blood Pressure Screening     Your blood pressure was checked while you were in the emergency department today. The last reading we obtained was  BP: 120/74 . Please read the guidelines below about what these numbers mean and what you should do about them.  If your systolic blood pressure (the top number) is less than 120 and your diastolic blood pressure (the bottom number) is less than 80, then your blood pressure is normal. There is nothing more that you need to do about it.  If your systolic blood pressure (the top number) is 120-139 or your diastolic blood pressure (the bottom number) is 80-89, your blood pressure may be higher than it should be. You should have your blood pressure rechecked within a year by a primary care provider.  If your systolic blood pressure (the top number) is 140 or greater or your diastolic blood pressure (the bottom number) is 90 or greater, you may have high blood pressure. High blood pressure is treatable, but if  "left untreated over time it can put you at risk for heart attack, stroke, or kidney failure. You should have your blood pressure rechecked by a primary care provider within the next 4 weeks.  If your provider in the emergency department today gave you specific instructions to follow-up with your doctor or provider even sooner than that, you should follow that instruction and not wait for up to 4 weeks for your follow-up visit.        Thank you for choosing [facility]       Thank you for choosing [facility] for your care. Our goal is always to provide you with excellent care. Hearing back from our patients is one way we can continue to improve our services. Please take a few minutes to complete the written survey that you may receive in the mail after you visit with us. Thank you!        [employer] Information     [employer] lets you send messages to your doctor, view your test results, renew your prescriptions, schedule appointments and more. To sign up, go to [email or web address] . Click on \"Log in\" on the left side of the screen, which will take you to the Welcome page. Then click on \"Sign up Now\" on the right side of the page.     You will be asked to enter the access code listed below, as well as some personal information. Please follow the directions to create your username and password.     Your access code is: [email or web address]  Expires: 2018  6:20 PM     Your access code will  in 90 days. If you need help or a new code, please call your [facility] clinic or [phone].        Care EveryWhere ID     This is your Care EveryWhere ID. This could be used by other organizations to access your [facility] medical records  [id number]        Equal Access to Services     [facility]: Hadii marisel Dasilva, wamadiha bentley, qaybantonia leigh. So [facility] [phone].    ATENCIÓN: Si habla español, tiene a spicer disposición servicios gratuitos de asistencia " daysi [place]ohanh al [phone].    We comply with applicable federal civil rights laws and [place] laws. We do not discriminate on the basis of race, color, national origin, age, disability, sex, sexual orientation, or gender identity.            After Visit Summary       This is your record. Keep this with you and show to your community pharmacist(s) and doctor(s) at your next visit.

## 2018-01-06 NOTE — ED AVS SNAPSHOT
[facility] Emergency Department    [address]    [facility] [address]    Phone:  [phone]    Fax:  [phone]                                       Leonor Mercado   MRN: [id number]    Department:  [facility] Emergency Department   Date of Visit:  1/6/2018           After Visit Summary Signature Page     I have received my discharge instructions, and my questions have been answered. I have discussed any challenges I see with this plan with the nurse or doctor.    ..........................................................................................................................................  Patient/Patient Representative Signature      ..........................................................................................................................................  Patient Representative Print Name and Relationship to Patient    ..................................................               ................................................  Date                                            Time    ..........................................................................................................................................  Reviewed by Signature/Title    ...................................................              ..............................................  Date                                                            Time

## 2018-01-06 NOTE — ED NOTES
Patient arrives with daughter c/o increased shortness of breath.  Daughter states has progressively gotten worse since end of October.  Has been on steroids for 2 courses, now increased shortness of breath with any movement.      ABCs intact. AOx4

## 2018-01-06 NOTE — ED PROVIDER NOTES
"  History     Chief Complaint:  Shortness of Breath    The history is provided by the patient and a relative.      Leonor Mercado is an 89 year old female on Coumadin for atrial fibrillation with history of COPD who presents with shortness of breath. The patient reports that her shortness of breath has been worsening since October 2017 (3 months). She was seen by a pulmonologist and prescribed a 10 day course of steroids. She improved but as soon as her prescription was finished her shortness of breath returned. She was placed on another 7 day course of steroid (completing this about 2-3 weeks ago) with improvement, but again worsened after she was finished. Here in the ED the patient reports that her shortness of breath is worse with exertion and since October she has had to sleep with a \"doubled up pillow\" to help her sleep. She also endorses a chronic cough that is productive of yellow phlegm and she states that the mucous has possibly increased in the past 3 weeks, though overall cough is unchanged. Her last INR was checked 1 month ago and was therapeutic, but she missed her check this month due to her shortness of breath. Of note, the patient reports a brief episode of nausea yesterday afternoon that has since resolved. She denies chest pain, new leg swelling, fever, vomiting, diarrhea, dysuria, or other medical concerns. The patient has been using her at home nebulizer every 6 hours with no significant relief. Her daughter, who is here with the patient, has been sick with a cough/cold/runny nose for the past week. The patient states she did received her influenza vaccination this year.    Allergies:  Chocolate  Peanuts [Nuts]  Amiodarone  Baclofen  Bactrim [Sulfamethoxazole W-Trimethoprim]  Doxycycline  Levaquin [Levofloxacin]  Linzess [Linaclotide]  Lorazepam  Liquid Adhesive      Medications:    Pravachol  Cozaar  Umeclidinium inhaler  Coumadin  Hydrodiuril  Diltiazem  Albuterol " inhaler  Levalbuterol  Docusate  Miralax  Nitrostat  Folvite    Past Medical History:    A Fib - chr Coumadin, s/p PPM    Atrial fibrillation (H)   BCC (basal cell carcinoma of skin)   Coronary artery disease - 2 stents in [place] in 2000s.    CHF (congestive heart failure) - Diastolic      Chronic renal insufficiency   COPD (chronic obstructive pulmonary disease)         Hyperlipidemia   Hypertension   IBS (irritable bowel syndrome)      Osteoporosis   Panic attack   Pulmonary fibrosis    Sick sinus syndrome - s/p PPM 2003   SSS (sick sinus syndrome)    Vertigo     Past Surgical History:    Appendectomy  Cardiac surgery  EGD combined  Lead rescission dual  Heart cath, angioplasty  Implant pacemaker  Hernia repair [facility]  Replace pacemaker generator     Family History:    Heart disease  Dementia  GI Disease  Crohn disease    Social History:  Presents with daughter   Lives alone  Tobacco use: Former smoker 5/9/1987  Alcohol use: 1 weekly  PCP: Dannielle Darby    Marital Status:        Review of Systems   Constitutional: Negative for fever.   Respiratory: Positive for cough and shortness of breath.    Cardiovascular: Negative for chest pain and leg swelling.   Gastrointestinal: Negative for diarrhea, nausea (resolved) and vomiting.   Genitourinary: Negative for dysuria.   All other systems reviewed and are negative.    Physical Exam     Patient Vitals for the past 24 hrs:   BP Temp Temp src Pulse Heart Rate Resp SpO2 Height Weight   01/06/18 1821 - - - - - 20 - - -   01/06/18 1815 - - - - - - 96 % - -   01/06/18 1700 120/74 - - - - - 95 % - -   01/06/18 1645 132/72 - - - - - 96 % - -   01/06/18 1630 122/83 - - - - - 97 % - -   01/06/18 1600 122/69 - - - - - 96 % - -   01/06/18 1545 132/72 - - - - - - - -   01/06/18 1530 120/61 - - - - - - - -   01/06/18 1515 123/80 - - - - - 97 % - -   01/06/18 1503 - - - - - - 100 % - -   01/06/18 1502 140/65 98.1  F (36.7  C) Oral - 67 20 96 % - -   01/06/18 1459 134/73  96.9  F (36.1  C) Axillary 75 75 22 93 % 1.524 m (5') 61.2 kg (135 lb)      Physical Exam  General: Well-developed and well-nourished; well appearing elderly  female; cooperative  Head:  Atraumatic  Eyes:  Extraocular movements intact; conjunctivae, lids, and sclerae are normal  ENT:    Normal nose; moist mucous membranes  Neck:  Supple; normal range of motion  CV:  Regular rate and rhythm; normal heart sounds with no murmurs, rubs, or gallops detected  Resp:  No respiratory distress; expiratory wheezing in all lung fields without decreased breath sounds, rales, or rhonchi.  GI:  Soft; non-distended; non-tender    MS:  Normal ROM; no bilateral lower extremity edema  Skin:  Warm; non-diaphoretic; no pallor  Neuro:  Awake; A&Ox3; normal strength  Psych: Normal mood and affect; normal speech  Vitals reviewed.    Emergency Department Course   EKG  Indication: Shortness of Breath  Time: 15:01:52  Rate 70 bpm. QRS duration 76. QT/QTc 370/399   Atrial fibrillation. Nonspecific T wave abnormality. Abnormal ECG. Agree with computer interpretation.    No acute ST changes.  AFib has replaced sinus rhythm as compared to prior, dated 11/15/16.     Imaging:  Radiographic findings were communicated with the patient and family who voiced understanding of the findings.  XR Chest 2 Views  IMPRESSION: Again seen is a left subclavian intracardiac stimulator device. No other mediastinal abnormality is noted. There is hyperexpansion of the lungs suggesting obstructive pulmonary disease. The lungs are otherwise clear. No other abnormality is noted. I see no definite change since the previous examination.      GERRI CALIX MD    Imaging independently reviewed and agree with radiologist interpretation.     Laboratory:  CBC: WNL (WBC 7.4, HGB 12.7, )    BMP: Creatinine 1.51 (H), Glucose 101 (H), GFR Estimate 32 (L), Calcium 10.2 (H), o/w WNL   1555: Troponin: <0.015   BNP: 5872 (H)    Influenza A/B antigen: Both  "Negative    Interventions:  1500: Duoneb 6 mLs Nebulization  1536: Deltasone tablet 60 mg PO    Emergency Department Course:  Past medical records, nursing notes, and vitals reviewed.  1515: I performed an exam of the patient and obtained history, as documented above.    1702: I rechecked the patient. Explained findings to patient and daughter.   Patient ambulated with ED tech. No hypoxia on pulse oximetry.  1800: I rechecked the patient. Findings and plan explained to the patient and her daughter. Patient discharged home with instructions regarding supportive care, medications, and reasons to return. The importance of close follow-up was reviewed.      I personally reviewed the laboratory results with the patient and her daughter and answered all related questions prior to discharge.   Impression & Plan    Medical Decision Making:  Leonor Mercado is a 89 year old female who presents with her daughter for increasing shortness of breath over the last three months.  Patient states she has been on steroids twice and both times they helped improve her symptoms, though she had recurrence of dyspnea when she stopped steroids.  She notes somewhat increased mucus production with her cough though overall she states her biggest change is in her dyspnea.  Notes dyspnea is worse with exertion and she has had to \"double\" her pillow when sleeping which may be due to orthopnea.  However, she does not have any lower extremity edema, chest pain, or other concerns.  Of note, patient is anticoagulated on Coumadin for history of atrial fibrillation.  EKG shows rate controlled atrial fibrillation with no acute ST changes.  On exam, patient does not seem to be in any respiratory distress and she is not tachypneic. However, she does have expiratory wheezing in all lung fields and despite DuoNeb already administered.    Patient was given a second DuoNeb as well as 60 mg of prednisone during her workup.  Influenza is negative.  " Troponin is negative.  CBC is unremarkable with no leukocytosis.  BMP reveals a creatinine of 1.51 which appears to be within patient's range of normal.  BNP is elevated at 5872 although baseline is unknown.  I did review patient's most recent echocardiogram (<1 year ago) which does not reveal systolic dysfunction and only mild diastolic dysfunction.  Further, patient's chest x-ray shows no acute abnormality including no pulmonary edema or pneumonia.  INR is minimally supratherapeutic at 3.3 making PE highly unlikely as etiology of patient's dyspnea.    Patient was reevaluated after DuoNeb and states she feels about the same though she has not ambulated and she believes this is when her dyspnea will increase.  Lung exam is improved but she continues to have end expiratory wheezing in all fields.  Patient was then ambulated in the department with pulse oximetry.  She did not have any decreased SpO2 readings though she did have increased work of breathing per patient and ED tech.  Thus, I offered admission to the hospital for further workup as well as monitoring of patient's respiratory status, steroids, and breathing treatments.  However, patient states she would prefer to be discharged and follow up with her primary care provider.  She also notes she has a pulmonology appointment next month and believes they may be able to further help.  I discussed the patient that she should follow-up with her cardiologist as well although I favor a pulmonary etiology rather than cardiac etiology. She has no evidence of acute CHF exacerbation at this time though her BNP is elevated. Diuresis is not indicated at this time.  I discussed patient's minimally elevated INR with her and recommended she follow closely for continued checks.  I discussed appropriate use of her nebulizer and albuterol inhaler and provided prescription for prednisone for the next four days for a total of a five day burst for treatment of her COPD  exacerbation.  I provided strict return precautions and answered the patient and her daughter questions.  They verbalized understanding and are amenable to discharge.    Diagnosis:    ICD-10-CM   1. COPD exacerbation (H) J44.1       Disposition:  Discharged home with plan as outlined.    Discharge Medications:  New Prescriptions    PREDNISONE (DELTASONE) 50 MG TABLET    Take 1 tablet by mouth daily for 4 days.         Primo Parkinson  1/6/2018   [facility] EMERGENCY DEPARTMENT  I, Primo Parkinson, am serving as a scribe at 3:15 PM on 1/6/2018 to document services personally performed by Nathalie Joseph MD based on my observations and the provider's statements to me.       Nathalie Joseph MD  01/07/18 031

## 2018-01-06 NOTE — ED NOTES
Ambulated pt with pulse oximeter.  Pt waslked to bathroom A1 and back to room.  Audible wheezing but sats maintained at 96%.  Pt states she feels safe going home.  MD notified.

## 2018-01-07 ASSESSMENT — ENCOUNTER SYMPTOMS: NAUSEA: 0

## 2018-01-07 NOTE — DISCHARGE INSTRUCTIONS
Take steroids as prescribed. Continue nebulizer every 4-6 hours and albuterol inhaler as needed as well.   Follow up with your primary care provider to discuss your continued shortness of breath.   Also be sure to keep your appointment with the pulmonologist.  Also arrange to see your cardiologist to see if he thinks your shortness of breath may be related to your heart.  Continue INR checks. It was 3.3 today.  Return with new or concerning symptoms.

## 2018-01-10 PROBLEM — R06.02 SOB (SHORTNESS OF BREATH): Status: ACTIVE | Noted: 2018-01-10

## 2018-01-11 ENCOUNTER — OFFICE VISIT (OUTPATIENT)
Dept: CARDIOLOGY | Facility: CLINIC | Age: 83
End: 2018-01-11
Payer: COMMERCIAL

## 2018-01-11 ENCOUNTER — OFFICE VISIT (OUTPATIENT)
Dept: INTERNAL MEDICINE | Facility: CLINIC | Age: 83
End: 2018-01-11
Payer: COMMERCIAL

## 2018-01-11 VITALS
TEMPERATURE: 98.3 F | OXYGEN SATURATION: 97 % | BODY MASS INDEX: 27.05 KG/M2 | HEART RATE: 95 BPM | WEIGHT: 137.8 LBS | DIASTOLIC BLOOD PRESSURE: 50 MMHG | SYSTOLIC BLOOD PRESSURE: 116 MMHG | HEIGHT: 60 IN

## 2018-01-11 VITALS
WEIGHT: 139 LBS | BODY MASS INDEX: 24.63 KG/M2 | SYSTOLIC BLOOD PRESSURE: 131 MMHG | HEIGHT: 63 IN | DIASTOLIC BLOOD PRESSURE: 76 MMHG | HEART RATE: 84 BPM | OXYGEN SATURATION: 96 %

## 2018-01-11 DIAGNOSIS — I50.32 CHRONIC DIASTOLIC CONGESTIVE HEART FAILURE (H): Primary | ICD-10-CM

## 2018-01-11 DIAGNOSIS — I48.20 CHRONIC ATRIAL FIBRILLATION (H): Chronic | ICD-10-CM

## 2018-01-11 DIAGNOSIS — I50.32 CHRONIC DIASTOLIC CONGESTIVE HEART FAILURE (H): Chronic | ICD-10-CM

## 2018-01-11 DIAGNOSIS — J44.9 CHRONIC OBSTRUCTIVE PULMONARY DISEASE, UNSPECIFIED COPD TYPE (H): Primary | Chronic | ICD-10-CM

## 2018-01-11 PROCEDURE — 99214 OFFICE O/P EST MOD 30 MIN: CPT | Performed by: INTERNAL MEDICINE

## 2018-01-11 PROCEDURE — 99215 OFFICE O/P EST HI 40 MIN: CPT | Performed by: INTERNAL MEDICINE

## 2018-01-11 RX ORDER — PREDNISONE 10 MG/1
TABLET ORAL
Qty: 13 TABLET | Refills: 0 | Status: SHIPPED | OUTPATIENT
Start: 2018-01-11 | End: 2018-01-25

## 2018-01-11 RX ORDER — FUROSEMIDE 20 MG
20 TABLET ORAL DAILY
Qty: 30 TABLET | Refills: 3 | Status: SHIPPED | OUTPATIENT
Start: 2018-01-11 | End: 2018-02-20

## 2018-01-11 NOTE — NURSING NOTE
Chief Complaint   Patient presents with     Hospital F/U     COPD flared up       Initial /50 (BP Location: Right arm, Patient Position: Sitting, Cuff Size: Adult Regular)  Pulse 95  Temp 98.3  F (36.8  C) (Oral)  Ht 5' (1.524 m)  Wt 137 lb 12.8 oz (62.5 kg)  LMP  (LMP Unknown)  SpO2 97%  BMI 26.91 kg/m2 Estimated body mass index is 26.91 kg/(m^2) as calculated from the following:    Height as of this encounter: 5' (1.524 m).    Weight as of this encounter: 137 lb 12.8 oz (62.5 kg).  Medication Reconciliation: andrew Heredia, CMA

## 2018-01-11 NOTE — PATIENT INSTRUCTIONS
Plan:  1. Prednisone 10 mg tablet   -- take 2 tabs daily for 3 days then   -- take 1 tablet daily for 7 days, then stop it  2. Continue the other meds, same doses for now.  3. Follow up 2-3 weeks

## 2018-01-11 NOTE — PROGRESS NOTES
HISTORY:    Leonor Mercado is a very pleasant 89-year-old female with a complex past medical history. She underwent stenting in 2004 while living in [place], details unknown. She also has a history of sick sinus syndrome with atrial fibrillation for which she uses long-term anticoagulation. She has a pacemaker in place. She also has had previous intermittent PAT documented and has a history of severe emphysema, pulmonary fibrosis, and a history of amiodarone use. Other medical problems include renal insufficiency, bilateral lower extremity venous stasis, hyperlipidemia, hypertension. She is an ex-smoker having smoked for about 30 years and having quit about 30 years ago.    Today Leonor is seen in cardiology clinic at the request of the emergency room. She was seen in the emergency room over the weekend for worsening dyspnea. She had noticed that her shortness of breath had been worse over the last 3 months. She had been given several courses of steroid therapy and the ER report suggest that she felt that this improved her breathing but today she tells me that she didn't notice any difference. She has a chronic cough as well. She reports that she has had no episodes of PND or orthopnea. She gets up to go the bathroom about 3 times a night and is very short of breath when she gets back to her bed but after resting her while her dyspnea improves. She has wheezing most of the time. She is not having any exertional chest, arm, or neck discomfort. At the time of her visit to the emergency room she was not felt to be volume overloaded but a proBNP was found to be elevated to 3 times normal.    The patient admits that she loves salt but states that she tries to avoid salt in her diet. However, on careful questioning I'm not sure she is completely successful. One of her favorite foods is Prego spaghetti sauce, high in salt. She reports that she has good days and bad days from a breathing standpoint and her bad  days has trouble walking 5 or 10 feet, and on a good day she can walk further. Most days she walks to her mailbox which is 150-200 steps away. She uses a walker. She has some peripheral edema and wears compression stockings which seems to help.      ASSESSMENT/PLAN:    1.  Severe dyspnea. This is likely primarily secondary to underlying lung problems but there may be an element of diastolic heart failure. She had an echo done this summer showing normal ejection fraction and just grade 1 diastolic dysfunction. Her RV pressure was estimated to be normal. I think it would be worthwhile to try a little bit more aggressive diuresis to see if it helps her breathing. To that end I have asked her to stop her hydrochlorothiazide and instead have started her on furosemide 20 mg per day. I will arrange a BMP to be done in about a week, with special attention to her renal function. She is also scheduled to see pulmonology in the near future.  2. Atrial fibrillation. Irregular pulse today, on chronic anticoagulation, continue same. Rate is well controlled.  3. HFpEF. Her exam does not suggest severe volume overload, but she does have some lower extremity edema and borderline JVD. Her BNP was 3X normal at the time of her recent ER visit.  She may benefit from diuresis as discussed above with plans for Lasix and initiation, also discussed above.    Thank you for asking me to participate in your patient's care. Please don't hesitate to call if I can be of further assistance. She is a patient of Dr. Le and he will have a scheduled visit in about 3 months. I would be happy to see her sooner if the need arises.    Orders Placed This Encounter   Procedures     Basic metabolic panel     Orders Placed This Encounter   Medications     furosemide (LASIX) 20 MG tablet     Sig: Take 1 tablet (20 mg) by mouth daily     Dispense:  30 tablet     Refill:  3     Medications Discontinued During This Encounter   Medication Reason     diltiazem  240 MG 24 hr capsule Alternate therapy         Encounter Diagnosis   Name Primary?     Chronic diastolic congestive heart failure (H) Yes       CURRENT MEDICATIONS:  Current Outpatient Prescriptions   Medication Sig Dispense Refill     furosemide (LASIX) 20 MG tablet Take 1 tablet (20 mg) by mouth daily 30 tablet 3     predniSONE (DELTASONE) 50 MG tablet Take 1 tablet by mouth daily for 4 days. 4 tablet 0     MELATONIN PO Take 5 mg by mouth At Bedtime       pravastatin (PRAVACHOL) 40 MG tablet Take 1 tablet (40 mg) by mouth daily 90 tablet 0     losartan (COZAAR) 50 MG tablet Take 0.5 tablets (25 mg) by mouth daily 1 tablet 0     umeclidinium (INCRUSE ELLIPTA) 62.5 MCG/INH oral inhaler Inhale 1 puff into the lungs daily 3 Inhaler 0     warfarin (COUMADIN) 2 MG tablet TAKE ONE TABLET BY MOUTH ONCE DAILY OR  AS  DIRECTED  BY  INR  CLINIC 90 tablet 0     hydrochlorothiazide (HYDRODIURIL) 25 MG tablet Take 1 tablet (25 mg) by mouth daily Take 1 tab by mouth at noon 90 tablet 0     albuterol (PROAIR HFA/PROVENTIL HFA/VENTOLIN HFA) 108 (90 BASE) MCG/ACT Inhaler Inhale 2 puffs into the lungs every 6 hours as needed for shortness of breath / dyspnea or wheezing 1 Inhaler 1     levalbuterol (XOPENEX) 0.63 MG/3ML neb solution Take 3 mLs (0.63 mg) by nebulization every 6 hours as needed for shortness of breath / dyspnea or wheezing 120 mL 5     Acetaminophen (TYLENOL PO) Take 1,000 mg by mouth At Bedtime       DOCUSATE SODIUM PO Take 100 mg by mouth daily as needed        polyethylene glycol (MIRALAX/GLYCOLAX) packet Take 1 packet by mouth 2 times daily        Misc Natural Products (TART CHERRY ADVANCED) CAPS Cherry tart liquid       ASPIRIN NOT PRESCRIBED, INTENTIONAL, Antiplatelet medication not prescribed intentionally due to Current anticoagulant therapy (warfarin/enoxaparin)  0     nitroglycerin (NITROSTAT) 0.4 MG SL tablet Place 1 tablet (0.4 mg) under the tongue every 5 minutes as needed 25 tablet 1     calcium  carbonate (CALCIUM CARBONATE) 600 MG TABS tablet Take 1 tablet by mouth daily        folic acid (FOLVITE) 400 MCG tablet Take 400 mcg by mouth daily       Cholecalciferol (VITAMIN D) 2000 UNITS tablet Take 2,000 Units by mouth daily       ascorbic acid (VITAMIN C) 500 MG tablet Take 500 mg by mouth daily       Probiotic Product (PROBIOTIC & ACIDOPHILUS EX ST PO) Take 1 tablet by mouth daily.       Multiple Vitamins-Minerals (OCUVITE PO) Take 1 capsule by mouth daily.         ALLERGIES     Allergies   Allergen Reactions     Chocolate Shortness Of Breath     Peanuts [Nuts] Shortness Of Breath     Amiodarone      pulm fibrosis       Baclofen      Confusion      Bactrim [Sulfamethoxazole W-Trimethoprim]      Difficulty swallowing     Doxycycline Other (See Comments)     Multiple complaints; she does not want this again.      Levaquin [Levofloxacin] Other (See Comments)     Multiple complaints; she will not take this again     Linzess [Linaclotide] Diarrhea     Lorazepam        Prolonged drowsiness with ER visit      Liquid Adhesive Itching and Rash     Bleeding when tape removed after pacemaker placement..itched and burned for weeks.       PAST MEDICAL HISTORY:  Past Medical History:   Diagnosis Date     A Fib - chr Coumadin, s/p PPM (H) 5/8/2013     Atrial fibrillation (H)     Sick sinus syndrome, PAT, AF     BCC (basal cell carcinoma of skin)     Face     CAD - 2 stents in [place] in 2000s.  1/5/2016     CHF (congestive heart failure) (H)     mild in past     CHF - Chr Diastolic (H) 11/21/2017     Chronic renal insufficiency      COPD (chronic obstructive pulmonary disease) (H)      COPD (H) 5/13/2013     Coronary artery disease     2-05[place]-CAD-taxus stentx2 2.5-12,2.5-12 in LADp and LADm, 80%nonDomRCA-LcxDom-mild,EF60%     Hyperlipidemia      Hypertension      IBS (irritable bowel syndrome)      Irritable bowel      Osteoporosis      Panic attack     questionable diagnosis     Pulmonary fibrosis (H)     do not use  amiodarone     Shortness of breath      Sick sinus syndrome - s/p PPM 2003 (H) 2017     SSS (sick sinus syndrome) (H)     DDD pacer (see surgery history section)     Vertigo        PAST SURGICAL HISTORY:  Past Surgical History:   Procedure Laterality Date     APPENDECTOMY       CARDIAC SURGERY      PPM, 2 STENTS2-05[place]-CAD-taxus stentx2 2.5-12,2.5-12 in LADp and LADm, 80%nonDomRCA-LcxDom-mild,EF60%     CORONARY ANGIOGRAPHY ADULT ORDER  1994    mild CAD,Normal LV function, No Mitral regurgitation, Prox LAD 30% stenosis. RCA prox and mid, 20-30%     ESOPHAGOSCOPY, GASTROSCOPY, DUODENOSCOPY (EGD), COMBINED N/A 2015    Procedure: COMBINED ESOPHAGOSCOPY, GASTROSCOPY, DUODENOSCOPY (EGD), BIOPSY SINGLE OR MULTIPLE;  Surgeon: Genevieve Leon MD;  Location:  GI     H LEAD REVISION DUAL  2003    Revised in [place]-due to diaphram stim (original pacer placed in [place])     H LEAD REVISION DUAL  2014    Correction of reversal of A and V leads in Header     HEART CATH, ANGIOPLASTY  2005    LEIGH ANN mid and proximal LAD, ongoing 80% RCA; mild circumflex     HERNIA REPAIR Left     [facility]     IMPLANT PACEMAKER  2003    Placed in [place] for sick sinus syndrome     REPLACE PACEMAKER GENERATOR  2013    Dual-chamber pacemaker by Dr SETH Pierre       FAMILY HISTORY:  Family History   Problem Relation Age of Onset     HEART DISEASE Father       age 84     Neurologic Disorder Mother      dementia     GASTROINTESTINAL DISEASE Daughter      liver transplant     Crohn Disease Daughter        SOCIAL HISTORY:  Social History     Social History     Marital status:      Spouse name: N/A     Number of children: 3     Years of education: N/A     Occupational History      Retired     Social History Main Topics     Smoking status: Former Smoker     Types: Cigarettes     Quit date: 1987     Smokeless tobacco: Never Used     Alcohol use 0.0 oz/week     0 Standard drinks or equivalent per week       "Comment: 1 wkly     Drug use: No     Sexual activity: No     Other Topics Concern     Parent/Sibling W/ Cabg, Mi Or Angioplasty Before 65f 55m? Yes     Caffeine Concern Yes     decaf - some 1/2 caff     Sleep Concern Yes     nocturia every 2 hours     Special Diet No     Exercise No     some walking in the house     Social History Narrative       Review of Systems:  Skin:  Negative     Eyes:  Positive for glasses  ENT:  Positive for nasal congestion  Respiratory:  Positive for shortness of breath;dyspnea on exertion;cough;wheezing  Cardiovascular:  Negative    Gastroenterology: Negative    Genitourinary:  not assessed    Musculoskeletal:  Negative    Neurologic:  Negative    Psychiatric:  Positive for sleep disturbances  Heme/Lymph/Imm:  Negative    Endocrine:  Negative      Physical Exam:  Vitals: /76 (BP Location: Right arm, Patient Position: Sitting, Cuff Size: Adult Regular)  Pulse 84  Ht 1.6 m (5' 3\")  Wt 63 kg (139 lb)  LMP  (LMP Unknown)  SpO2 96%  Breastfeeding? No  BMI 24.62 kg/m2    Constitutional:           Skin:           Head:           Eyes:           ENT:           Neck:           Chest:           Cardiac:                    Abdomen:           Vascular:                                        Extremities and Back:           Neurological:             Recent Lab Results:  LIPID RESULTS:  Lab Results   Component Value Date    CHOL 161 12/11/2017    HDL 85 12/11/2017    LDL 54 12/11/2017    TRIG 109 12/11/2017    CHOLHDLRATIO 2.9 03/15/2015       LIVER ENZYME RESULTS:  Lab Results   Component Value Date    AST 18 11/15/2016    ALT 18 11/15/2016       CBC RESULTS:  Lab Results   Component Value Date    WBC 7.4 01/06/2018    RBC 4.37 01/06/2018    HGB 12.7 01/06/2018    HCT 39.8 01/06/2018    MCV 91 01/06/2018    MCH 29.1 01/06/2018    MCHC 31.9 01/06/2018    RDW 13.6 01/06/2018     01/06/2018       BMP RESULTS:  Lab Results   Component Value Date     01/06/2018    POTASSIUM 3.8 " 01/06/2018    CHLORIDE 98 01/06/2018    CO2 29 01/06/2018    ANIONGAP 8 01/06/2018     (H) 01/06/2018    BUN 28 01/06/2018    CR 1.51 (H) 01/06/2018    GFRESTIMATED 32 (L) 01/06/2018    GFRESTBLACK 39 (L) 01/06/2018    TRI 10.2 (H) 01/06/2018        A1C RESULTS:  Lab Results   Component Value Date    A1C 6.1 (H) 04/30/2014       INR RESULTS:  Lab Results   Component Value Date    INR 3.30 (H) 01/06/2018    INR 2.6 (A) 12/05/2017    INR 3.1 (A) 11/14/2017    INR 2.60 (H) 11/15/2016         Bryan Garcia MD, FAC    CC  Dannielle Darby MD  [address] [place] [address]

## 2018-01-11 NOTE — PROGRESS NOTES
"Patient's instructions / PLAN:                                                        Plan:  1. Prednisone 10 mg tablet   -- take 2 tabs daily for 3 days then   -- take 1 tablet daily for 7 days, then stop it  2. Continue the other meds, same doses for now.  3. Follow up 2-3 weeks        ASSESSMENT & PLAN:                                                      (J44.9) COPD (H)  (primary encounter diagnosis)  Comment: Not controlled   Plan: predniSONE (DELTASONE) 10 MG tablet        Continue nebulizer Incruse    (I50.32) CHF - Chr Diastolic (H)  Comment: stable   Plan: Continue diuretic as per cardiologist    (I48.2) A Fib - chr Coumadin, s/p PPM (H)  Comment: Rate controlled  Plan: Continue same meds, same doses for now        Chief Complaint:                                                      SOB      SUBJECTIVE:                                                    History of present illness     SOB  She presented in the emergency room on January 6 with shortness of breath. She refused admission because \" they don't do nothing on SUnday\"  She feels worse today   She feels well at rest. As soon as she does few steps she feels SOB. Last OV POx was stable. We checked it again today and it is 94% with walking  Finished Prednisone yesterday. Took 50 mg daily for 5 days takes Nebs q 4 h C/o that prednisone causes insomnia   She is confused about inhaler and nebulizer.  She states that after meeting with pulmonologist, Dr. Lujan, she did not understand all of which nebulizer and inhaler to use.  She is to feel better with Spiriva, but her insurance does not cover it.    Saw cardio this am . Stopped HCTZ. Added Furosemide.  She understood that her heart was fine but she did not understand why her meds were changed      Has many questions that they as she has questions about each med on her list        ROS:                                                      ROS: negative for fever, chills, wheezes, chest pain vomiting, " abdominal pain, leg swelling positive for shortness of breath, chronic cough as above      OBJECTIVE:                                                    Physical Exam :    Blood pressure 116/50, pulse 95, temperature 98.3  F (36.8  C), temperature source Oral, height 5' (1.524 m), weight 137 lb 12.8 oz (62.5 kg), SpO2 97 %, not currently breastfeeding.   NAD, appears comfortable and indeed, appears short of breath with walking  Skin: no rashes     Chest: clear to auscultation bilaterally, good respiratory effort  Heart: S1 S2, iRiRR, no mgr appreciated  Abdomen: soft, not tender,   Extremities: +1 edema,   Neurologic: A, Ox3, no focal signs appreciated    PMHx: reviewed  Past Medical History:   Diagnosis Date     A Fib - chr Coumadin, s/p PPM (H) 5/8/2013     Atrial fibrillation (H)     Sick sinus syndrome, PAT, AF     BCC (basal cell carcinoma of skin)     Face     CAD - 2 stents in [place] in 2000s.  1/5/2016     CHF (congestive heart failure) (H)     mild in past     CHF - Chr Diastolic (H) 11/21/2017     Chronic renal insufficiency      COPD (chronic obstructive pulmonary disease) (H)      COPD (H) 5/13/2013     Coronary artery disease     2-05[place]-CAD-taxus stentx2 2.5-12,2.5-12 in LADp and LADm, 80%nonDomRCA-LcxDom-mild,EF60%     Hyperlipidemia      Hypertension      IBS (irritable bowel syndrome)      Irritable bowel      Osteoporosis      Panic attack     questionable diagnosis     Pulmonary fibrosis (H)     do not use amiodarone     Shortness of breath      Sick sinus syndrome - s/p PPM 2003 (H) 12/16/2017     SSS (sick sinus syndrome) (H)     DDD pacer (see surgery history section)     Vertigo       PSHx: reviewed  Past Surgical History:   Procedure Laterality Date     APPENDECTOMY  1956     CARDIAC SURGERY      PPM, 2 STENTS2-05[place]-CAD-taxus stentx2 2.5-12,2.5-12 in LADp and LADm, 80%nonDomRCA-LcxDom-mild,EF60%     CORONARY ANGIOGRAPHY ADULT ORDER  7/1994    mild CAD,Normal LV function, No Mitral  regurgitation, Prox LAD 30% stenosis. RCA prox and mid, 20-30%     ESOPHAGOSCOPY, GASTROSCOPY, DUODENOSCOPY (EGD), COMBINED N/A 8/19/2015    Procedure: COMBINED ESOPHAGOSCOPY, GASTROSCOPY, DUODENOSCOPY (EGD), BIOPSY SINGLE OR MULTIPLE;  Surgeon: Genevieve Leon MD;  Location: [facility]     H LEAD REVISION DUAL  4/2003    Revised in [place]-due to diaphram stim (original pacer placed in [place])     H LEAD REVISION DUAL  7/2/2014    Correction of reversal of A and V leads in Header     HEART CATH, ANGIOPLASTY  02/2005    LEIGH ANN mid and proximal LAD, ongoing 80% RCA; mild circumflex     HERNIA REPAIR Left 1991    [facility]     IMPLANT PACEMAKER  2/19/2003    Placed in [place] for sick sinus syndrome     REPLACE PACEMAKER GENERATOR  6/27/2013    Dual-chamber pacemaker by Dr SETH Pierre        Meds: reviewed  Current Outpatient Prescriptions   Medication Sig Dispense Refill     furosemide (LASIX) 20 MG tablet Take 1 tablet (20 mg) by mouth daily 30 tablet 3     nebulizer nebulization 4 times daily instead of 6 times       MELATONIN PO Take 5 mg by mouth At Bedtime       pravastatin (PRAVACHOL) 40 MG tablet Take 1 tablet (40 mg) by mouth daily 90 tablet 0     losartan (COZAAR) 50 MG tablet Take 0.5 tablets (25 mg) by mouth daily 1 tablet 0     umeclidinium (INCRUSE ELLIPTA) 62.5 MCG/INH oral inhaler Inhale 1 puff into the lungs daily 3 Inhaler 0     warfarin (COUMADIN) 2 MG tablet TAKE ONE TABLET BY MOUTH ONCE DAILY OR  AS  DIRECTED  BY  INR  CLINIC 90 tablet 0     albuterol (PROAIR HFA/PROVENTIL HFA/VENTOLIN HFA) 108 (90 BASE) MCG/ACT Inhaler Inhale 2 puffs into the lungs every 6 hours as needed for shortness of breath / dyspnea or wheezing 1 Inhaler 1     levalbuterol (XOPENEX) 0.63 MG/3ML neb solution Take 3 mLs (0.63 mg) by nebulization every 6 hours as needed for shortness of breath / dyspnea or wheezing 120 mL 5     Acetaminophen (TYLENOL PO) Take 1,000 mg by mouth At Bedtime       DOCUSATE SODIUM PO Take 100 mg by mouth daily as  needed        polyethylene glycol (MIRALAX/GLYCOLAX) packet Take 1 packet by mouth 2 times daily        ASPIRIN NOT PRESCRIBED, INTENTIONAL, Antiplatelet medication not prescribed intentionally due to Current anticoagulant therapy (warfarin/enoxaparin)  0     nitroglycerin (NITROSTAT) 0.4 MG SL tablet Place 1 tablet (0.4 mg) under the tongue every 5 minutes as needed 25 tablet 1     calcium carbonate (CALCIUM CARBONATE) 600 MG TABS tablet Take 1 tablet by mouth daily        Cholecalciferol (VITAMIN D) 2000 UNITS tablet Take 2,000 Units by mouth daily       Probiotic Product (PROBIOTIC & ACIDOPHILUS EX ST PO) Take 1 tablet by mouth daily.       Multiple Vitamins-Minerals (OCUVITE PO) Take 1 capsule by mouth daily.         Soc Hx: reviewed  Fam Hx: reviewed      Time spent with the patient  40 min, more than 50% in counseling and coordinating care, Re above medical problems shortness of breath, cough, COPD, but most of the time reviewing medications and answering questions    Dannielle Young MD  Internal Medicine

## 2018-01-11 NOTE — NURSING NOTE
Faxed Rx Prednisone to [facility] pharmacy  [place] fax# [phone]  Damian Heredia, [facility]

## 2018-01-11 NOTE — MR AVS SNAPSHOT
After Visit Summary   1/11/2018    Leonor Mercado    MRN: [id number]           Patient Information     Date Of Birth          5/6/1926        Visit Information        Provider Department      1/11/2018 10:15 AM Bryan Garcia MD [facility]        Today's Diagnoses     Chronic diastolic congestive heart failure (H)    -  1       Follow-ups after your visit        Your next 10 appointments already scheduled     Jan 11, 2018  1:20 PM CST   [facility]by [facility]   [facility]    [address]  [facility] [id number] [phone]           Bring a current list of meds and any records pertaining to this visit. For Physicals, please bring immunization records and any forms needing to be filled out. Please arrive 10 minutes early to complete paperwork.            Feb 07, 2018 10:10 AM CST   Pacemaker Check with SISSY LEE   [facility] ([facility])    [address]  [facility] [id number] [phone]              Future tests that were ordered for you today     Open Future Orders        Priority Expected Expires Ordered    Basic metabolic panel Routine 1/18/2018 1/11/2019 1/11/2018            Who to contact     If you have questions or need follow up information about today's clinic visit or your schedule please contact [facility] directly at [phone].  Normal or non-critical lab and imaging results will be communicated to you by MyChart, letter or phone within 4 business days after the clinic has received the results. If you do not hear from us within 7 days, please contact the clinic through MyChart or phone. If you have a critical or abnormal lab result, we will notify you by phone as soon as possible.  Submit refill requests through [employer] or  "call your pharmacy and they will forward the refill request to us. Please allow 3 business days for your refill to be completed.          Additional Information About Your Visit        MyChart Information     [employer] lets you send messages to your doctor, view your test results, renew your prescriptions, schedule appointments and more. To sign up, go to [email or web address] . Click on \"Log in\" on the left side of the screen, which will take you to the Welcome page. Then click on \"Sign up Now\" on the right side of the page.     You will be asked to enter the access code listed below, as well as some personal information. Please follow the directions to create your username and password.     Your access code is: [email or web address]  Expires: 2018  6:20 PM     Your access code will  in 90 days. If you need help or a new code, please call your [facility] clinic or [phone].        Care EveryWhere ID     This is your Care EveryWhere ID. This could be used by other organizations to access your [facility] medical records  [id number]        Your Vitals Were     Pulse Height Last Period Pulse Oximetry Breastfeeding? BMI (Body Mass Index)    84 1.6 m (5' 3\") (LMP Unknown) 96% No 24.62 kg/m2       Blood Pressure from Last 3 Encounters:   18 131/76   18 120/74   17 124/66    Weight from Last 3 Encounters:   18 63 kg (139 lb)   18 61.2 kg (135 lb)   17 63.2 kg (139 lb 6.4 oz)                 Today's Medication Changes          These changes are accurate as of: 18 10:53 AM.  If you have any questions, ask your nurse or doctor.               Start taking these medicines.        Dose/Directions    furosemide 20 MG tablet   Commonly known as:  LASIX   Used for:  Chronic diastolic congestive heart failure (H)   Started by:  Bryan Garcia MD        Dose:  20 mg   Take 1 tablet (20 mg) by mouth daily   Quantity:  30 tablet   Refills:  3         Stop taking these medicines if " you haven't already. Please contact your care team if you have questions.     diltiazem 240 MG 24 hr capsule   Stopped by:  Bryan Garcia MD                Where to get your medicines      These medications were sent to [facility] - [place], [place] - [address] [address] [facility]  [address] [address] [facility] [address]     Phone:  [phone]     furosemide 20 MG tablet                Primary Care Provider Office Phone # Fax #    Dannielle Brai Darby [phone]       [address] [facility] [address]        Equal Access to Services     [facility]: Hadii aad ku hadasho Soomaali, waaxda luqadaha, qaybta kaalmada adeegyada, waxay idiin hayaan adeeg khdominik cruz . So [facility] [phone].    ATENCIÓN: Si habla español, tiene a spicer disposición servicios gratuitos de asistencia lingüística. Llame al [phone].    We comply with applicable federal civil rights laws and [place] laws. We do not discriminate on the basis of race, color, national origin, age, disability, sex, sexual orientation, or gender identity.            Thank you!     Thank you for choosing [facility]  for your care. Our goal is always to provide you with excellent care. Hearing back from our patients is one way we can continue to improve our services. Please take a few minutes to complete the written survey that you may receive in the mail after your visit with us. Thank you!             Your Updated Medication List - Protect others around you: Learn how to safely use, store and throw away your medicines at www.disposemymeds.org.          This list is accurate as of: 1/11/18 10:53 AM.  Always use your most recent med list.                   Brand Name Dispense Instructions for use Diagnosis    albuterol 108 (90 BASE) MCG/ACT Inhaler    PROAIR HFA/PROVENTIL HFA/VENTOLIN HFA    1 Inhaler    Inhale 2 puffs into the lungs every 6 hours as needed for  shortness of breath / dyspnea or wheezing    Chronic obstructive pulmonary disease, unspecified COPD type (H)       ascorbic acid 500 MG tablet    VITAMIN C     Take 500 mg by mouth daily        ASPIRIN NOT PRESCRIBED    INTENTIONAL     Antiplatelet medication not prescribed intentionally due to Current anticoagulant therapy (warfarin/enoxaparin)    Chronic atrial fibrillation (H)       calcium carbonate 600 MG tablet   Generic drug:  calcium carbonate      Take 1 tablet by mouth daily        DOCUSATE SODIUM PO      Take 100 mg by mouth daily as needed        folic acid 400 MCG tablet    FOLVITE     Take 400 mcg by mouth daily        furosemide 20 MG tablet    LASIX    30 tablet    Take 1 tablet (20 mg) by mouth daily    Chronic diastolic congestive heart failure (H)       hydrochlorothiazide 25 MG tablet    HYDRODIURIL    90 tablet    Take 1 tablet (25 mg) by mouth daily Take 1 tab by mouth at noon    Shortness of breath       levalbuterol 0.63 MG/3ML neb solution    XOPENEX    120 mL    Take 3 mLs (0.63 mg) by nebulization every 6 hours as needed for shortness of breath / dyspnea or wheezing    Chronic obstructive pulmonary disease, unspecified COPD type (H)       losartan 50 MG tablet    COZAAR    1 tablet    Take 0.5 tablets (25 mg) by mouth daily    Essential hypertension with goal blood pressure less than 140/90       MELATONIN PO      Take 5 mg by mouth At Bedtime        nitroGLYcerin 0.4 MG sublingual tablet    NITROSTAT    25 tablet    Place 1 tablet (0.4 mg) under the tongue every 5 minutes as needed    Other chest pain       OCUVITE PO      Take 1 capsule by mouth daily.        polyethylene glycol Packet    MIRALAX/GLYCOLAX     Take 1 packet by mouth 2 times daily        pravastatin 40 MG tablet    PRAVACHOL    90 tablet    Take 1 tablet (40 mg) by mouth daily    Hyperlipidemia LDL goal <100, Coronary artery disease involving native coronary artery of native heart without angina pectoris       predniSONE  50 MG tablet    DELTASONE    4 tablet    Take 1 tablet by mouth daily for 4 days.        PROBIOTIC & ACIDOPHILUS EX ST PO      Take 1 tablet by mouth daily.        TART CHERRY ADVANCED Caps      Aiken tart liquid        TYLENOL PO      Take 1,000 mg by mouth At Bedtime        umeclidinium 62.5 MCG/INH oral inhaler    INCRUSE ELLIPTA    3 Inhaler    Inhale 1 puff into the lungs daily    Pulmonary fibrosis (H), Chronic obstructive pulmonary disease, unspecified COPD type (H)       vitamin D 2000 UNITS tablet      Take 2,000 Units by mouth daily        warfarin 2 MG tablet    COUMADIN    90 tablet    TAKE ONE TABLET BY MOUTH ONCE DAILY OR  AS  DIRECTED  BY  INR  CLINIC    Long term current use of anticoagulant therapy

## 2018-01-11 NOTE — LETTER
1/11/2018    Dannielle Darby MD  [address] [facility] [address]    RE: Leonor Mercado       Dear Colleague,    I had the pleasure of seeing Leonor Mercado in the [facility].    HISTORY:    Leonor Mercado is a very pleasant 89-year-old female with a complex past medical history. She underwent stenting in 2004 while living in [place], details unknown. She also has a history of sick sinus syndrome with atrial fibrillation for which she uses long-term anticoagulation. She has a pacemaker in place. She also has had previous intermittent PAT documented and has a history of severe emphysema, pulmonary fibrosis, and a history of amiodarone use. Other medical problems include renal insufficiency, bilateral lower extremity venous stasis, hyperlipidemia, hypertension. She is an ex-smoker having smoked for about 30 years and having quit about 30 years ago.    Today Leonor is seen in cardiology clinic at the request of the emergency room. She was seen in the emergency room over the weekend for worsening dyspnea. She had noticed that her shortness of breath had been worse over the last 3 months. She had been given several courses of steroid therapy and the ER report suggest that she felt that this improved her breathing but today she tells me that she didn't notice any difference. She has a chronic cough as well. She reports that she has had no episodes of PND or orthopnea. She gets up to go the bathroom about 3 times a night and is very short of breath when she gets back to her bed but after resting her while her dyspnea improves. She has wheezing most of the time. She is not having any exertional chest, arm, or neck discomfort. At the time of her visit to the emergency room she was not felt to be volume overloaded but a proBNP was found to be elevated to 3 times normal.    The patient admits that she loves salt but states that she tries to avoid salt  in her diet. However, on careful questioning I'm not sure she is completely successful. One of her favorite foods is Prego spaghetti sauce, high in salt. She reports that she has good days and bad days from a breathing standpoint and her bad days has trouble walking 5 or 10 feet, and on a good day she can walk further. Most days she walks to her mailbox which is 150-200 steps away. She uses a walker. She has some peripheral edema and wears compression stockings which seems to help.      ASSESSMENT/PLAN:    1.  Severe dyspnea. This is likely primarily secondary to underlying lung problems but there may be an element of diastolic heart failure. She had an echo done this summer showing normal ejection fraction and just grade 1 diastolic dysfunction. Her RV pressure was estimated to be normal. I think it would be worthwhile to try a little bit more aggressive diuresis to see if it helps her breathing. To that end I have asked her to stop her hydrochlorothiazide and instead have started her on furosemide 20 mg per day. I will arrange a BMP to be done in about a week, with special attention to her renal function. She is also scheduled to see pulmonology in the near future.  2. Atrial fibrillation. Irregular pulse today, on chronic anticoagulation, continue same. Rate is well controlled.  3. HFpEF. Her exam does not suggest severe volume overload, but she does have some lower extremity edema and borderline JVD. She may benefit from diuresis as discussed above with plans for Lasix and initiation, also discussed above.    Thank you for asking me to participate in your patient's care. Please don't hesitate to call if I can be of further assistance. She is a patient of Dr. Le and he will have a scheduled visit in about 3 months. I would be happy to see her sooner if the need arises.    Orders Placed This Encounter   Procedures     Basic metabolic panel     Orders Placed This Encounter   Medications     furosemide (LASIX)  20 MG tablet     Sig: Take 1 tablet (20 mg) by mouth daily     Dispense:  30 tablet     Refill:  3     Medications Discontinued During This Encounter   Medication Reason     diltiazem 240 MG 24 hr capsule Alternate therapy         Encounter Diagnosis   Name Primary?     Chronic diastolic congestive heart failure (H) Yes       CURRENT MEDICATIONS:  Current Outpatient Prescriptions   Medication Sig Dispense Refill     furosemide (LASIX) 20 MG tablet Take 1 tablet (20 mg) by mouth daily 30 tablet 3     predniSONE (DELTASONE) 50 MG tablet Take 1 tablet by mouth daily for 4 days. 4 tablet 0     MELATONIN PO Take 5 mg by mouth At Bedtime       pravastatin (PRAVACHOL) 40 MG tablet Take 1 tablet (40 mg) by mouth daily 90 tablet 0     losartan (COZAAR) 50 MG tablet Take 0.5 tablets (25 mg) by mouth daily 1 tablet 0     umeclidinium (INCRUSE ELLIPTA) 62.5 MCG/INH oral inhaler Inhale 1 puff into the lungs daily 3 Inhaler 0     warfarin (COUMADIN) 2 MG tablet TAKE ONE TABLET BY MOUTH ONCE DAILY OR  AS  DIRECTED  BY  INR  CLINIC 90 tablet 0     hydrochlorothiazide (HYDRODIURIL) 25 MG tablet Take 1 tablet (25 mg) by mouth daily Take 1 tab by mouth at noon 90 tablet 0     albuterol (PROAIR HFA/PROVENTIL HFA/VENTOLIN HFA) 108 (90 BASE) MCG/ACT Inhaler Inhale 2 puffs into the lungs every 6 hours as needed for shortness of breath / dyspnea or wheezing 1 Inhaler 1     levalbuterol (XOPENEX) 0.63 MG/3ML neb solution Take 3 mLs (0.63 mg) by nebulization every 6 hours as needed for shortness of breath / dyspnea or wheezing 120 mL 5     Acetaminophen (TYLENOL PO) Take 1,000 mg by mouth At Bedtime       DOCUSATE SODIUM PO Take 100 mg by mouth daily as needed        polyethylene glycol (MIRALAX/GLYCOLAX) packet Take 1 packet by mouth 2 times daily        Misc Natural Products (TART CHERRY ADVANCED) CAPS Cherry tart liquid       ASPIRIN NOT PRESCRIBED, INTENTIONAL, Antiplatelet medication not prescribed intentionally due to Current  anticoagulant therapy (warfarin/enoxaparin)  0     nitroglycerin (NITROSTAT) 0.4 MG SL tablet Place 1 tablet (0.4 mg) under the tongue every 5 minutes as needed 25 tablet 1     calcium carbonate (CALCIUM CARBONATE) 600 MG TABS tablet Take 1 tablet by mouth daily        folic acid (FOLVITE) 400 MCG tablet Take 400 mcg by mouth daily       Cholecalciferol (VITAMIN D) 2000 UNITS tablet Take 2,000 Units by mouth daily       ascorbic acid (VITAMIN C) 500 MG tablet Take 500 mg by mouth daily       Probiotic Product (PROBIOTIC & ACIDOPHILUS EX ST PO) Take 1 tablet by mouth daily.       Multiple Vitamins-Minerals (OCUVITE PO) Take 1 capsule by mouth daily.         ALLERGIES     Allergies   Allergen Reactions     Chocolate Shortness Of Breath     Peanuts [Nuts] Shortness Of Breath     Amiodarone      pulm fibrosis       Baclofen      Confusion      Bactrim [Sulfamethoxazole W-Trimethoprim]      Difficulty swallowing     Doxycycline Other (See Comments)     Multiple complaints; she does not want this again.      Levaquin [Levofloxacin] Other (See Comments)     Multiple complaints; she will not take this again     Linzess [Linaclotide] Diarrhea     Lorazepam        Prolonged drowsiness with ER visit      Liquid Adhesive Itching and Rash     Bleeding when tape removed after pacemaker placement..itched and burned for weeks.       PAST MEDICAL HISTORY:  Past Medical History:   Diagnosis Date     A Fib - chr Coumadin, s/p PPM (H) 5/8/2013     Atrial fibrillation (H)     Sick sinus syndrome, PAT, AF     BCC (basal cell carcinoma of skin)     Face     CAD - 2 stents in [place] in 2000s.  1/5/2016     CHF (congestive heart failure) (H)     mild in past     CHF - Chr Diastolic (H) 11/21/2017     Chronic renal insufficiency      COPD (chronic obstructive pulmonary disease) (H)      COPD (H) 5/13/2013     Coronary artery disease     2-05[place]-CAD-taxus stentx2 2.5-12,2.5-12 in LADp and LADm, 80%nonDomRCA-LcxDom-mild,EF60%     Hyperlipidemia       Hypertension      IBS (irritable bowel syndrome)      Irritable bowel      Osteoporosis      Panic attack     questionable diagnosis     Pulmonary fibrosis (H)     do not use amiodarone     Shortness of breath      Sick sinus syndrome - s/p PPM  (H) 2017     SSS (sick sinus syndrome) (H)     DDD pacer (see surgery history section)     Vertigo        PAST SURGICAL HISTORY:  Past Surgical History:   Procedure Laterality Date     APPENDECTOMY       CARDIAC SURGERY      PPM, 2 STENTS2-05[place]-CAD-taxus stentx2 2.5-12,2.5-12 in LADp and LADm, 80%nonDomRCA-LcxDom-mild,EF60%     CORONARY ANGIOGRAPHY ADULT ORDER  1994    mild CAD,Normal LV function, No Mitral regurgitation, Prox LAD 30% stenosis. RCA prox and mid, 20-30%     ESOPHAGOSCOPY, GASTROSCOPY, DUODENOSCOPY (EGD), COMBINED N/A 2015    Procedure: COMBINED ESOPHAGOSCOPY, GASTROSCOPY, DUODENOSCOPY (EGD), BIOPSY SINGLE OR MULTIPLE;  Surgeon: Genevieve Leon MD;  Location: [facility]     H LEAD REVISION DUAL  2003    Revised in [place]-due to diaphram stim (original pacer placed in [place])     H LEAD REVISION DUAL  2014    Correction of reversal of A and V leads in [place]     HEART CATH, ANGIOPLASTY  2005    LEIGH ANN mid and proximal LAD, ongoing 80% RCA; mild circumflex     HERNIA REPAIR Left     [facility]     IMPLANT PACEMAKER  2003    Placed in [place] for sick sinus syndrome     REPLACE PACEMAKER GENERATOR  2013    Dual-chamber pacemaker by Dr SETH Pierre       FAMILY HISTORY:  Family History   Problem Relation Age of Onset     HEART DISEASE Father       age 84     Neurologic Disorder Mother      dementia     GASTROINTESTINAL DISEASE Daughter      liver transplant     Crohn Disease Daughter        SOCIAL HISTORY:  Social History     Social History     Marital status:      Spouse name: N/A     Number of children: 3     Years of education: N/A     Occupational History      Retired     Social History Main Topics     Smoking status:  "Former Smoker     Types: Cigarettes     Quit date: 5/9/1987     Smokeless tobacco: Never Used     Alcohol use 0.0 oz/week     0 Standard drinks or equivalent per week      Comment: 1 wkly     Drug use: No     Sexual activity: No     Other Topics Concern     Parent/Sibling W/ Cabg, Mi Or Angioplasty Before 65f 55m? Yes     Caffeine Concern Yes     decaf - some 1/2 caff     Sleep Concern Yes     nocturia every 2 hours     Special Diet No     Exercise No     some walking in the house     Social History Narrative       Review of Systems:  Skin:  Negative     Eyes:  Positive for glasses  ENT:  Positive for nasal congestion  Respiratory:  Positive for shortness of breath;dyspnea on exertion;cough;wheezing  Cardiovascular:  Negative    Gastroenterology: Negative    Genitourinary:  not assessed    Musculoskeletal:  Negative    Neurologic:  Negative    Psychiatric:  Positive for sleep disturbances  Heme/Lymph/Imm:  Negative    Endocrine:  Negative      Physical Exam:  Vitals: /76 (BP Location: Right arm, Patient Position: Sitting, Cuff Size: Adult Regular)  Pulse 84  Ht 1.6 m (5' 3\")  Wt 63 kg (139 lb)  LMP  (LMP Unknown)  SpO2 96%  Breastfeeding? No  BMI 24.62 kg/m2    Constitutional:           Skin:           Head:           Eyes:           ENT:           Neck:           Chest:           Cardiac:                    Abdomen:           Vascular:                                        Extremities and Back:           Neurological:             Recent Lab Results:  LIPID RESULTS:  Lab Results   Component Value Date    CHOL 161 12/11/2017    HDL 85 12/11/2017    LDL 54 12/11/2017    TRIG 109 12/11/2017    CHOLHDLRATIO 2.9 03/15/2015       LIVER ENZYME RESULTS:  Lab Results   Component Value Date    AST 18 11/15/2016    ALT 18 11/15/2016       CBC RESULTS:  Lab Results   Component Value Date    WBC 7.4 01/06/2018    RBC 4.37 01/06/2018    HGB 12.7 01/06/2018    HCT 39.8 01/06/2018    MCV 91 01/06/2018    MCH 29.1 " 01/06/2018    MCHC 31.9 01/06/2018    RDW 13.6 01/06/2018     01/06/2018       BMP RESULTS:  Lab Results   Component Value Date     01/06/2018    POTASSIUM 3.8 01/06/2018    CHLORIDE 98 01/06/2018    CO2 29 01/06/2018    ANIONGAP 8 01/06/2018     (H) 01/06/2018    BUN 28 01/06/2018    CR 1.51 (H) 01/06/2018    GFRESTIMATED 32 (L) 01/06/2018    GFRESTBLACK 39 (L) 01/06/2018    TRI 10.2 (H) 01/06/2018        A1C RESULTS:  Lab Results   Component Value Date    A1C 6.1 (H) 04/30/2014       INR RESULTS:  Lab Results   Component Value Date    INR 3.30 (H) 01/06/2018    INR 2.6 (A) 12/05/2017    INR 3.1 (A) 11/14/2017    INR 2.60 (H) 11/15/2016     Thank you for allowing me to participate in the care of your patient.    Sincerely,     Bryan Garcia MD     [facility]

## 2018-01-11 NOTE — MR AVS SNAPSHOT
After Visit Summary   1/11/2018    Leonor Mercado    MRN: [id number]           Patient Information     Date Of Birth          5/6/1926        Visit Information        Provider Department      1/11/2018 1:20 PM Dannielle Darby MD [facility]        Today's Diagnoses     COPD (H)    -  1      Care Instructions    Plan:  1. Prednisone 10 mg tablet   -- take 2 tabs daily for 3 days then   -- take 1 tablet daily for 7 days, then stop it  2. Continue the other meds, same doses for now.  3. Follow up 2-3 weeks          Follow-ups after your visit        Additional Services     MED THERAPY MANAGE REFERRAL       Your provider has referred you to: **[facility] (numerous locations) [phone]   [email or web address]  FMG: [facility] [phone]   [email or web address]    Reason for Referral: polypharmacy      The [facility] department will contact you to schedule an appointment.  You may also schedule the appointment by calling [phone].  For [facility] [place] patients, please call [phone] to confirm/schedule your appointment on the next business day.    This service is designed to help you get the most from your medications.  A specially trained Pharmacist will work closely with you and your providers to solve any questions, concerns, issues or problems related to your medications.    Please bring all of your prescription and non-prescription medications (such as vitamins, over-the-counter medications, and herbals) or a detailed medication list to your appointment.    If you have a glucose meter or other home monitoring information, please also bring this to your appointment (i.e. blood glucose log, blood pressure log, pain log, etc.).                  Your next 10 appointments  "already scheduled     2018 10:10 AM CST   Pacemaker Check with SISSY LEE   [facility] ([facility])    [address]  [facility] [address] [phone]              Future tests that were ordered for you today     Open Future Orders        Priority Expected Expires Ordered    Basic metabolic panel Routine 2018            Who to contact     If you have questions or need follow up information about today's clinic visit or your schedule please contact [facility] directly at [phone].  Normal or non-critical lab and imaging results will be communicated to you by Itiv[employer], letter or phone within 4 business days after the clinic has received the results. If you do not hear from us within 7 days, please contact the clinic through Itivahart or phone. If you have a critical or abnormal lab result, we will notify you by phone as soon as possible.  Submit refill requests through [employer] or call your pharmacy and they will forward the refill request to us. Please allow 3 business days for your refill to be completed.          Additional Information About Your Visit        MyChart Information     [employer] lets you send messages to your doctor, view your test results, renew your prescriptions, schedule appointments and more. To sign up, go to [email or web address] . Click on \"Log in\" on the left side of the screen, which will take you to the Welcome page. Then click on \"Sign up Now\" on the right side of the page.     You will be asked to enter the access code listed below, as well as some personal information. Please follow the directions to create your username and password.     Your access code is: [email or web address]  Expires: 2018  6:20 PM     Your access code will  in 90 days. If you need help or a new code, please call your [facility]) or [phone].        Care EveryWhere ID     This is your " Care EveryWhere ID. This could be used by other organizations to access your [facility] medical records  [id number]        Your Vitals Were     Pulse Temperature Height Last Period Pulse Oximetry BMI (Body Mass Index)    95 98.3  F (36.8  C) (Oral) 5' (1.524 m) (LMP Unknown) 97% 26.91 kg/m2       Blood Pressure from Last 3 Encounters:   01/11/18 116/50   01/11/18 131/76   01/06/18 120/74    Weight from Last 3 Encounters:   01/11/18 137 lb 12.8 oz (62.5 kg)   01/11/18 139 lb (63 kg)   01/06/18 135 lb (61.2 kg)              We Performed the Following     MED THERAPY MANAGE REFERRAL          Today's Medication Changes          These changes are accurate as of: 1/11/18  2:28 PM.  If you have any questions, ask your nurse or doctor.               Start taking these medicines.        Dose/Directions    furosemide 20 MG tablet   Commonly known as:  LASIX   Used for:  Chronic diastolic congestive heart failure (H)   Started by:  Bryan Garcia MD        Dose:  20 mg   Take 1 tablet (20 mg) by mouth daily   Quantity:  30 tablet   Refills:  3       predniSONE 10 MG tablet   Commonly known as:  DELTASONE   Used for:  Chronic obstructive pulmonary disease, unspecified COPD type (H)   Started by:  Dannielle Darby MD        -- take 2 tabs daily for 3 days then  -- take 1 tablet daily for 7 days, then stop it   Quantity:  13 tablet   Refills:  0         Stop taking these medicines if you haven't already. Please contact your care team if you have questions.     diltiazem 240 MG 24 hr capsule   Stopped by:  Bryan Garcia MD                Where to get your medicines      These medications were sent to [facility] [address] [place] [address] [address]  [address] [address] [address]     Phone:  [phone]     furosemide 20 MG tablet         Some of these will need a paper prescription and others can be bought over the counter.  Ask your nurse if you have questions.      Bring a paper prescription for each of these medications     predniSONE 10 MG tablet                Primary Care Provider Office Phone # Fax #    Dannielle Bria Darby [phone]       Dmitriy GALLOJASPREET STEFFEN  [facility] [address]        Equal Access to Services     NALDO OVALLE : Hadii aad ku hadasho Soomaali, waaxda luqadaha, qaybta kaalmada adeegyada, waxousmane kapoorin haywileyn adepramod galewalker hwang. So [facility] [phone].    ATENCIÓN: Si habla español, tiene a spicer disposición servicios gratuitos de asistencia lingüística. Llame al [phone].    We comply with applicable federal civil rights laws and [place] laws. We do not discriminate on the basis of race, color, national origin, age, disability, sex, sexual orientation, or gender identity.            Thank you!     Thank you for choosing [facility]  for your care. Our goal is always to provide you with excellent care. Hearing back from our patients is one way we can continue to improve our services. Please take a few minutes to complete the written survey that you may receive in the mail after your visit with us. Thank you!             Your Updated Medication List - Protect others around you: Learn how to safely use, store and throw away your medicines at www.disposemymeds.org.          This list is accurate as of: 1/11/18  2:28 PM.  Always use your most recent med list.                   Brand Name Dispense Instructions for use Diagnosis    albuterol 108 (90 BASE) MCG/ACT Inhaler    PROAIR HFA/PROVENTIL HFA/VENTOLIN HFA    1 Inhaler    Inhale 2 puffs into the lungs every 6 hours as needed for shortness of breath / dyspnea or wheezing    Chronic obstructive pulmonary disease, unspecified COPD type (H)       ASPIRIN NOT PRESCRIBED    INTENTIONAL     Antiplatelet medication not prescribed intentionally due to Current anticoagulant therapy (warfarin/enoxaparin)    Chronic atrial fibrillation (H)       calcium carbonate 600 MG  tablet   Generic drug:  calcium carbonate      Take 1 tablet by mouth daily        furosemide 20 MG tablet    LASIX    30 tablet    Take 1 tablet (20 mg) by mouth daily    Chronic diastolic congestive heart failure (H)       levalbuterol 0.63 MG/3ML neb solution    XOPENEX    120 mL    Take 3 mLs (0.63 mg) by nebulization every 6 hours as needed for shortness of breath / dyspnea or wheezing    Chronic obstructive pulmonary disease, unspecified COPD type (H)       losartan 50 MG tablet    COZAAR    1 tablet    Take 0.5 tablets (25 mg) by mouth daily    Essential hypertension with goal blood pressure less than 140/90       MELATONIN PO      Take 5 mg by mouth At Bedtime        nebulizer nebulization      4 times daily instead of 6 times        nitroGLYcerin 0.4 MG sublingual tablet    NITROSTAT    25 tablet    Place 1 tablet (0.4 mg) under the tongue every 5 minutes as needed    Other chest pain       OCUVITE PO      Take 1 capsule by mouth daily.        polyethylene glycol Packet    MIRALAX/GLYCOLAX     Take 1 packet by mouth 2 times daily        pravastatin 40 MG tablet    PRAVACHOL    90 tablet    Take 1 tablet (40 mg) by mouth daily    Hyperlipidemia LDL goal <100, Coronary artery disease involving native coronary artery of native heart without angina pectoris       predniSONE 10 MG tablet    DELTASONE    13 tablet    -- take 2 tabs daily for 3 days then  -- take 1 tablet daily for 7 days, then stop it    Chronic obstructive pulmonary disease, unspecified COPD type (H)       TYLENOL PO      Take 1,000 mg by mouth At Bedtime        umeclidinium 62.5 MCG/INH oral inhaler    INCRUSE ELLIPTA    3 Inhaler    Inhale 1 puff into the lungs daily    Pulmonary fibrosis (H), Chronic obstructive pulmonary disease, unspecified COPD type (H)       vitamin D 2000 UNITS tablet      Take 2,000 Units by mouth daily        warfarin 2 MG tablet    COUMADIN    90 tablet    TAKE ONE TABLET BY MOUTH ONCE DAILY OR  AS  DIRECTED  BY  INR   CLINIC    Long term current use of anticoagulant therapy

## 2018-01-16 ENCOUNTER — TELEPHONE (OUTPATIENT)
Dept: INTERNAL MEDICINE | Facility: CLINIC | Age: 83
End: 2018-01-16

## 2018-01-16 NOTE — TELEPHONE ENCOUNTER
JACI~ Jan 16, 2018 I spoke with Leonor, she is in need of financial assistance for medication.    We reviewed the Prescription Assistance Program for manfacturer brand name assistance programs, gross income, insurance and Rx list.    Leonor states she is on Incruse Ellipta because Spiriva is too expensive.  The Spiriva works very well for her.    I will be completing applications for Spiriva Handihaler and Proventil HFA.    When approved, Leonor will receive these medications free through December 2018.  I will note EPIC when approved and estimated delivery time to her home.    Reny Snachez  Prescription

## 2018-01-17 DIAGNOSIS — I50.32 CHRONIC DIASTOLIC CONGESTIVE HEART FAILURE (H): ICD-10-CM

## 2018-01-17 LAB
ANION GAP SERPL CALCULATED.3IONS-SCNC: 8 MMOL/L (ref 3–14)
BUN SERPL-MCNC: 38 MG/DL (ref 7–30)
CALCIUM SERPL-MCNC: 9 MG/DL (ref 8.5–10.1)
CHLORIDE SERPL-SCNC: 100 MMOL/L (ref 94–109)
CO2 SERPL-SCNC: 28 MMOL/L (ref 20–32)
CREAT SERPL-MCNC: 1.65 MG/DL (ref 0.52–1.04)
GFR SERPL CREATININE-BSD FRML MDRD: 29 ML/MIN/1.7M2
GLUCOSE SERPL-MCNC: 123 MG/DL (ref 70–99)
POTASSIUM SERPL-SCNC: 4.2 MMOL/L (ref 3.4–5.3)
SODIUM SERPL-SCNC: 136 MMOL/L (ref 133–144)

## 2018-01-17 PROCEDURE — 80048 BASIC METABOLIC PNL TOTAL CA: CPT | Performed by: INTERNAL MEDICINE

## 2018-01-17 PROCEDURE — 36415 COLL VENOUS BLD VENIPUNCTURE: CPT | Performed by: INTERNAL MEDICINE

## 2018-01-24 ENCOUNTER — TELEPHONE (OUTPATIENT)
Dept: CARDIOLOGY | Facility: CLINIC | Age: 83
End: 2018-01-24

## 2018-01-24 DIAGNOSIS — I50.32 CHRONIC DIASTOLIC HEART FAILURE (H): Primary | ICD-10-CM

## 2018-01-24 DIAGNOSIS — N28.9 RENAL INSUFFICIENCY: ICD-10-CM

## 2018-01-24 NOTE — TELEPHONE ENCOUNTER
RESULTS    Test: BMP    Component      Latest Ref Rng & Units 1/17/2018   Sodium      133 - 144 mmol/L 136   Potassium      3.4 - 5.3 mmol/L 4.2   Chloride      94 - 109 mmol/L 100   Carbon Dioxide      20 - 32 mmol/L 28   Anion Gap      3 - 14 mmol/L 8   Glucose      70 - 99 mg/dL 123 (H)   Urea Nitrogen      7 - 30 mg/dL 38 (H)   Creatinine      0.52 - 1.04 mg/dL 1.65 (H)   GFR Estimate      >60 mL/min/1.7m2 29 (L)   GFR Estimate If Black      >60 mL/min/1.7m2 35 (L)   Calcium      8.5 - 10.1 mg/dL 9.0        HCTZ was stopped  Furosemide 20 mg per day was added.   Creatinine went up from 1.5 to 1.6      Notes Recorded by Bryan Garcia MD on 1/18/2018 at 5:09 PM  Her Creat is up a bit, but not a great concern.  Her next visit with Dr. Horton is in April, quite a long time.  Let's have her see me with f/u BMP in another month.       TGaarchie SALGADO  [facility]

## 2018-01-25 ENCOUNTER — OFFICE VISIT (OUTPATIENT)
Dept: INTERNAL MEDICINE | Facility: CLINIC | Age: 83
End: 2018-01-25
Payer: COMMERCIAL

## 2018-01-25 ENCOUNTER — ANTICOAGULATION THERAPY VISIT (OUTPATIENT)
Dept: ANTICOAGULATION | Facility: CLINIC | Age: 83
End: 2018-01-25
Payer: COMMERCIAL

## 2018-01-25 ENCOUNTER — OFFICE VISIT (OUTPATIENT)
Dept: PHARMACY | Facility: CLINIC | Age: 83
End: 2018-01-25
Payer: COMMERCIAL

## 2018-01-25 VITALS
HEART RATE: 90 BPM | HEIGHT: 63 IN | BODY MASS INDEX: 23.71 KG/M2 | WEIGHT: 133.8 LBS | TEMPERATURE: 98 F | OXYGEN SATURATION: 95 % | DIASTOLIC BLOOD PRESSURE: 63 MMHG | SYSTOLIC BLOOD PRESSURE: 101 MMHG

## 2018-01-25 VITALS — OXYGEN SATURATION: 96 % | HEART RATE: 77 BPM | SYSTOLIC BLOOD PRESSURE: 101 MMHG | DIASTOLIC BLOOD PRESSURE: 63 MMHG

## 2018-01-25 DIAGNOSIS — J44.9 CHRONIC OBSTRUCTIVE PULMONARY DISEASE, UNSPECIFIED COPD TYPE (H): Primary | Chronic | ICD-10-CM

## 2018-01-25 DIAGNOSIS — I48.20 CHRONIC ATRIAL FIBRILLATION (H): Primary | Chronic | ICD-10-CM

## 2018-01-25 DIAGNOSIS — I25.10 CORONARY ARTERY DISEASE INVOLVING NATIVE CORONARY ARTERY OF NATIVE HEART WITHOUT ANGINA PECTORIS: ICD-10-CM

## 2018-01-25 DIAGNOSIS — J44.9 CHRONIC OBSTRUCTIVE PULMONARY DISEASE, UNSPECIFIED COPD TYPE (H): Chronic | ICD-10-CM

## 2018-01-25 DIAGNOSIS — N18.30 CKD (CHRONIC KIDNEY DISEASE) STAGE 3, GFR 30-59 ML/MIN (H): ICD-10-CM

## 2018-01-25 DIAGNOSIS — I10 HYPERTENSION GOAL BP (BLOOD PRESSURE) < 140/80: ICD-10-CM

## 2018-01-25 DIAGNOSIS — I48.20 CHRONIC ATRIAL FIBRILLATION (H): ICD-10-CM

## 2018-01-25 DIAGNOSIS — E78.5 HYPERLIPIDEMIA LDL GOAL <100: ICD-10-CM

## 2018-01-25 DIAGNOSIS — E63.9 NUTRITIONAL DEFICIENCY: ICD-10-CM

## 2018-01-25 DIAGNOSIS — I50.32 CHRONIC DIASTOLIC CONGESTIVE HEART FAILURE (H): ICD-10-CM

## 2018-01-25 DIAGNOSIS — Z79.01 LONG-TERM (CURRENT) USE OF ANTICOAGULANTS: ICD-10-CM

## 2018-01-25 DIAGNOSIS — G47.00 INSOMNIA, UNSPECIFIED TYPE: ICD-10-CM

## 2018-01-25 DIAGNOSIS — K58.9 IRRITABLE BOWEL SYNDROME, UNSPECIFIED TYPE: ICD-10-CM

## 2018-01-25 DIAGNOSIS — I50.32 CHRONIC DIASTOLIC CONGESTIVE HEART FAILURE (H): Chronic | ICD-10-CM

## 2018-01-25 DIAGNOSIS — M54.5 LOW BACK PAIN, UNSPECIFIED BACK PAIN LATERALITY, UNSPECIFIED CHRONICITY, WITH SCIATICA PRESENCE UNSPECIFIED: ICD-10-CM

## 2018-01-25 LAB — INR POINT OF CARE: 5.6 (ref 0.86–1.14)

## 2018-01-25 PROCEDURE — 99207 ZZC NO CHARGE NURSE ONLY: CPT

## 2018-01-25 PROCEDURE — 99606 MTMS BY PHARM EST 15 MIN: CPT | Performed by: PHARMACIST

## 2018-01-25 PROCEDURE — 99214 OFFICE O/P EST MOD 30 MIN: CPT | Performed by: INTERNAL MEDICINE

## 2018-01-25 PROCEDURE — 85610 PROTHROMBIN TIME: CPT | Mod: QW

## 2018-01-25 PROCEDURE — 36416 COLLJ CAPILLARY BLOOD SPEC: CPT

## 2018-01-25 PROCEDURE — 99607 MTMS BY PHARM ADDL 15 MIN: CPT | Performed by: PHARMACIST

## 2018-01-25 RX ORDER — GUAIFENESIN 600 MG/1
600 TABLET, EXTENDED RELEASE ORAL 2 TIMES DAILY PRN
Qty: 180 TABLET | Refills: 0 | Status: SHIPPED | OUTPATIENT
Start: 2018-01-25 | End: 2018-04-03

## 2018-01-25 RX ORDER — DILTIAZEM HYDROCHLORIDE 240 MG/1
240 CAPSULE, COATED, EXTENDED RELEASE ORAL DAILY
Qty: 30 CAPSULE
Start: 2018-01-25 | End: 2018-10-23

## 2018-01-25 RX ORDER — PREDNISONE 10 MG/1
TABLET ORAL
Qty: 30 TABLET | Refills: 0 | Status: SHIPPED | OUTPATIENT
Start: 2018-01-25 | End: 2018-02-20

## 2018-01-25 RX ORDER — IPRATROPIUM BROMIDE AND ALBUTEROL SULFATE 2.5; .5 MG/3ML; MG/3ML
1 SOLUTION RESPIRATORY (INHALATION) EVERY 6 HOURS PRN
Qty: 360 ML | Status: SHIPPED
Start: 2018-01-25 | End: 2018-11-30

## 2018-01-25 NOTE — MR AVS SNAPSHOT
Leonor Mercado   1/25/2018 2:45 PM   Anticoagulation Therapy Visit    Description:  89 year old female   Provider:  [facility]   Department:  [facility]           INR as of 1/25/2018     Today's INR 5.6!      Anticoagulation Summary as of 1/25/2018     INR goal 2.0-3.0   Today's INR 5.6!   Full instructions 1/25: Hold; 1/26: Hold; 1/27: 1 mg; Otherwise 2 mg every day   Next INR check 1/29/2018    Indications   Long-term (current) use of anticoagulants [Z79.01] [Z79.01]  A Fib - chr Coumadin  s/p PPM (H) [I48.2]         Your next Anticoagulation Clinic appointment(s)     Jan 29, 2018  4:15 PM CST   Anticoagulation Visit with [facility]   [facility]    [address]  [facility] [address] [phone]              Contact Numbers     [facility] Phone Numbers:  Anticoagulation Clinic Appointments : [phone]  Anticoagulation Nurse: [phone]         January 2018 Details    Sun Mon Tue Wed Thu Fri Sat      1               2               3               4               5               6                 7               8               9               10               11               12               13                 14               15               16               17               18               19               20                 21               22               23               24               25      Hold   See details      26      Hold         27      1 mg           28      2 mg         29            30               31                   Date Details   01/25 This INR check       Date of next INR:  1/29/2018         How to take your warfarin dose     To take:  1 mg Take 0.5 of a 2 mg tablet.    To take:  2 mg Take 1 of the 2 mg tablets.    Hold Do not take your warfarin dose. See the Details table to the right for additional instructions.

## 2018-01-25 NOTE — PROGRESS NOTES
Dr Young's note      Patient's instructions / PLAN:                                                        Plan:  1. Mucinex 600 mg twice a day  2. Prednisone 10 mg   -- take 2 tabs daily for 4 days then   -- take 1 tablet daily  -- ask dr Her about the next Prednisone dose  3. Increased Furosemide to 2 tabs daily = 40 mg for a week then go back to 1 tab = 20 mg daily   4.  Continue the other meds, same doses for now.  5. Follow up in a month        ASSESSMENT & PLAN:                                                      89-year-old woman with complex medical problems: CAD ( 2 stents 2000s) , diastolic CHF, COPD, chr AFib, PPM ( 2003 for sinus sick syndrome), HTN, hyperlipidemia, osteoporosis   She has  been struggling with shortness of breath.  Her COPD is not controlled.  She has side effects from some medications (she does not tolerate duonebs more than 5 minutes), she can tolerate Incruse, the insurance does not cover Spiriva, which it helped better, and she presented better as well.    She continues to struggle with her COPD symptoms and today she also has more leg edema than usual.  Her diuretic treatment will be a challenge because of CKD    (J44.9) COPD (H)  (primary encounter diagnosis)  Comment: Not controlled   Plan: guaiFENesin (MUCINEX) 600 MG 12 hr tablet,         predniSONE (DELTASONE) 10 MG tablet            (I50.32) CHF - Chr Diastolic (H)  Comment: Not controlled   Plan: as above     (N18.3) CKD (chronic kidney disease) stage 3, GFR 30-59 ml/min  Comment:   Plan:        Chief complaint:                                                      SOB          SUBJECTIVE:   Leonor Mercado is a 89 year old female who presents to clinic today for the following health issues:    *Results-Labs done 1/17/18. -Discussed    COPD  --  COPD exacerbation. Breathing is still very bad, still has swelling in feet (especially right one).  Has appt with Dr. Her 2/1/18. Met with Shakira Blackman earlier  "today  Shakira thought maybe Mucinex might help? Pt was wondering if more prednisone might help?  She feels that the Prednisone helped. She doesn't take any now     Quit taking Spiriva and Encruse. She uses duonebs and it helps     She is coughing and wheezing now in the office       Leg edema: + 1 left , + 2 on the R leg. Takes Furosemide 20 mg daily     Creatinine   Date Value Ref Range Status   01/17/2018 1.65 (H) 0.52 - 1.04 mg/dL Final   ]     LOV: Plan:  1. Prednisone 10 mg tablet   -- take 2 tabs daily for 3 days then   -- take 1 tablet daily for 7 days, then stop it  2. Continue the other meds, same doses for now.  3. Follow up 2-3 weeks       Review of Systems:                                                      ROS: negative for fever, chills,  chest pain,  vomiting, abdominal pain,  positive for cough, wheezes, shortness of breath and leg edema        OBJECTIVE:             Physical exam:  Blood pressure 101/63, pulse 90, temperature 98  F (36.7  C), temperature source Oral, height 5' 3\" (1.6 m), weight 133 lb 12.8 oz (60.7 kg), SpO2 95 %, not currently breastfeeding.   NAD, appears comfortable  Skin: no rashes   Neck: supple, no JVD,  No thyroidmegaly. Lymph nodes nonpalpable cervical and supraclavicular.  Chest: clear to auscultation bilaterally, good respiratory effort. A lot of wheezes with coughing, otherwise the lungs are clear  Heart: S1 S2, RRR, no mgr appreciated  Abdomen: soft, not tender,  Extremities: Leg edema: + 1 left , + 2 on the R leg.   Neurologic: A, Ox3, no focal signs appreciated    PMHx: reviewed  Past Medical History:   Diagnosis Date     A Fib - chr Coumadin, s/p PPM (H) 5/8/2013     Atrial fibrillation (H)     Sick sinus syndrome, PAT, AF     BCC (basal cell carcinoma of skin)     Face     CAD - 2 stents in [place] in 2000s.  1/5/2016     CHF (congestive heart failure) (H)     mild in past     CHF - Chr Diastolic (H) 11/21/2017     Chronic renal insufficiency      COPD (chronic " obstructive pulmonary disease) (H)      COPD (H) 5/13/2013     Coronary artery disease     2-05[place]-CAD-taxus stentx2 2.5-12,2.5-12 in LADp and LADm, 80%nonDomRCA-LcxDom-mild,EF60%     Hyperlipidemia      Hypertension      IBS (irritable bowel syndrome)      Irritable bowel      Osteoporosis      Panic attack     questionable diagnosis     Pulmonary fibrosis (H)     do not use amiodarone     Shortness of breath      Sick sinus syndrome - s/p PPM 2003 (H) 12/16/2017     SSS (sick sinus syndrome) (H)     DDD pacer (see surgery history section)     Vertigo       PSHx: reviewed  Past Surgical History:   Procedure Laterality Date     APPENDECTOMY  1956     CARDIAC SURGERY      PPM, 2 STENTS2-05[place]-CAD-taxus stentx2 2.5-12,2.5-12 in LADp and LADm, 80%nonDomRCA-LcxDom-mild,EF60%     CORONARY ANGIOGRAPHY ADULT ORDER  7/1994    mild CAD,Normal LV function, No Mitral regurgitation, Prox LAD 30% stenosis. RCA prox and mid, 20-30%     ESOPHAGOSCOPY, GASTROSCOPY, DUODENOSCOPY (EGD), COMBINED N/A 8/19/2015    Procedure: COMBINED ESOPHAGOSCOPY, GASTROSCOPY, DUODENOSCOPY (EGD), BIOPSY SINGLE OR MULTIPLE;  Surgeon: Genevieve Leon MD;  Location: [facility]     H LEAD REVISION DUAL  4/2003    Revised in [place]-due to diaphram stim (original pacer placed in [place])     H LEAD REVISION DUAL  7/2/2014    Correction of reversal of A and V leads in Header     HEART CATH, ANGIOPLASTY  02/2005    LEIGH ANN mid and proximal LAD, ongoing 80% RCA; mild circumflex     HERNIA REPAIR Left 1991    [facility]     IMPLANT PACEMAKER  2/19/2003    Placed in [place] for sick sinus syndrome     REPLACE PACEMAKER GENERATOR  6/27/2013    Dual-chamber pacemaker by Dr SETH Pierre        Meds: reviewed  Current Outpatient Prescriptions   Medication Sig Dispense Refill     Lactobacillus (PROBIOTIC ACIDOPHILUS) TABS Take 1 tablet by mouth daily       diltiazem (DILTIAZEM CD) 240 MG 24 hr capsule Take 1 capsule (240 mg) by mouth daily 30 capsule      ipratropium - albuterol 0.5  mg/2.5 mg/3 mL (DUONEB) 0.5-2.5 (3) MG/3ML neb solution Take 1 vial (3 mLs) by nebulization every 6 hours as needed for shortness of breath / dyspnea or wheezing 360 mL      furosemide (LASIX) 20 MG tablet Take 1 tablet (20 mg) by mouth daily 30 tablet 3     nebulizer nebulization 4 times daily instead of 6 times       MELATONIN PO Take 5 mg by mouth At Bedtime       pravastatin (PRAVACHOL) 40 MG tablet Take 1 tablet (40 mg) by mouth daily 90 tablet 0     losartan (COZAAR) 50 MG tablet Take 0.5 tablets (25 mg) by mouth daily 1 tablet 0     umeclidinium (INCRUSE ELLIPTA) 62.5 MCG/INH oral inhaler Inhale 1 puff into the lungs daily 3 Inhaler 0     warfarin (COUMADIN) 2 MG tablet TAKE ONE TABLET BY MOUTH ONCE DAILY OR  AS  DIRECTED  BY  INR  CLINIC 90 tablet 0     albuterol (PROAIR HFA/PROVENTIL HFA/VENTOLIN HFA) 108 (90 BASE) MCG/ACT Inhaler Inhale 2 puffs into the lungs every 6 hours as needed for shortness of breath / dyspnea or wheezing 1 Inhaler 1     Acetaminophen (TYLENOL PO) Take 1,000 mg by mouth At Bedtime       polyethylene glycol (MIRALAX/GLYCOLAX) packet Take 1 packet by mouth daily        ASPIRIN NOT PRESCRIBED, INTENTIONAL, Antiplatelet medication not prescribed intentionally due to Current anticoagulant therapy (warfarin/enoxaparin)  0     nitroglycerin (NITROSTAT) 0.4 MG SL tablet Place 1 tablet (0.4 mg) under the tongue every 5 minutes as needed 25 tablet 1     calcium carbonate (CALCIUM CARBONATE) 600 MG TABS tablet Take 1 tablet by mouth daily        Cholecalciferol (VITAMIN D) 2000 UNITS tablet Take 2,000 Units by mouth daily       Multiple Vitamins-Minerals (OCUVITE PO) Take 1 capsule by mouth daily.         Soc Hx: reviewed  Fam Hx: reviewed          Dannielle Young MD  Internal Medicine

## 2018-01-25 NOTE — PROGRESS NOTES
SUBJECTIVE/OBJECTIVE:                           Leonor Mercado is a 89 year old female seen for a follow-up visit for Medication Therapy Management.  She was referred to me from Dr. Young.   Her daughter, Cleo joined us today      Chief Complaint: questions about meds - when to take, any interactions    Allergies/ADRs: Reviewed in Epic  Tobacco: History of tobacco dependence - quit 1987  Alcohol: 1-3 beverages / week  Caffeine: 1 cups/day of coffee (usually decaf)   Activity: Don't walk very well, will walk when she visits her  but doesn't do activity at home. When it was nice out, would push walker to grocery store and back. Will walk with daughter when running errands (difficulty with breathing).   PMH: Reviewed in Epic    Medication Adherence/Access  Pill box that she fills once weekly.   Will forget meds taken at bedtime - pravastatin    The patient misses their medication 0 times per week.    Patient is responsible for his/her own medications.   Adherence/Compliance is described as excellent.  Medication barriers: no issues reported by patient.  The patient fills general medications at  [facility]     COPD: Current medications: DuoNeb Q6 hours and Albuterol MDI as needed.  Will not take nebs sooner than 6 hours or use albuterol inbetween, even if she is struggling to breath.  Feels like she has phlegm stuck between her nose and throat and unable to expel.    Stopped Incruse because it was making her feel worse.  Tried Spiriva again but had a coughing fit.  Have an appointment with Dr. Mckenzie, Pulmonologist in February.   Pt reports the following symptoms: increased SOB upon exertion  and when she gets up and goes to the bathroom when lays back down she will start becoming SOB.  COPD Action Plan on file has is overdue.   Just finished 4th course of prednisone.  Seeing PCP today.    Atrial Fibrillation/HTN/CAD/HFpEF: Current medications include: warfarin 2 mg once daily, diltiazem 240 mg daily, losartan  25 mg daily (recent adjustment, was previously 50 mg daily), furosemide 20 mg daily. No concerns at this time.  INR will be rechecked today.  Lab Results   Component Value Date    INR 3.30 01/06/2018    INR 2.6 12/05/2017    INR 3.1 11/14/2017    INR 3.4 10/24/2017    INR 2.8 09/26/2017    INR 2.6 08/29/2017     ECHO:  Date 8/8/17, EF 60-65%  Pt is complaining of sx of HFof: shortness of breath and dyspnea on exertion.  Pt will sometimes measure weight, but not daily Is not aware of goal weight range.   Patient does not self-monitor BP.     Hyperlipidemia: Current therapy includes pravastatin 40mg once daily - has this separate from other evening meds because she was told to take at bedtime.  Frequently forgets that dose.    IBS: Currently taking probiotic daily (brought 3 different bottles today), Miralax 1 heaping tablespoon daily. Will take prune juice as well. Used to have issues with diarrhea, but now has issues with constipation. Finds that the regimen works well and has a bowel movement almost every day.    Supplement: Currently taking multivitamin (Ocuvite) daily, vitamin C 500 mg daily, folic acid 400 mg daily. Unsure why she started some of these.    Senile Osteoporosis: Current therapy includes: calcium carbonate 600 mg/Vitamin D 2000 IU daily. Pt is experiencing side effects: constipation (thinks calcium is contributing factor). Was on alendronate from 2011 to 2014 but discontinued due to renal insufficiency.  Pt is getting the following sources of dietary calcium: cheese, some leafy vegetables, will put milk in her coffee and cooks with it often.   DEXA History: April 2013 - From scanned document: Osteopenia, T-score -2.3   Risk factors: post-menopausal    Pain: Currently taking acetaminophen 1000 mg HS prn. She will take as needed for pain. Sometimes has general pain, but usually in back. Tylenol does help with managing pain. She will only take at night (never more than 1000 mg at a time).      Insomnia: Current medications include: Melatonin 5 mg at 8pm. Able to fall asleep, but then awakens to use the bathroom. Unable to breath once she goes back to bed.      Current labs include:  BP Readings from Last 3 Encounters:   01/25/18 101/63   01/11/18 116/50   01/11/18 131/76     Today's Vitals: /63  Pulse 77  LMP  (LMP Unknown)  SpO2 96%   Lab Results   Component Value Date    A1C 6.1 04/30/2014   .  Lab Results   Component Value Date    CHOL 153 03/15/2015     Lab Results   Component Value Date    TRIG 129 03/15/2015     Lab Results   Component Value Date    HDL 52 03/15/2015     Lab Results   Component Value Date    LDL 75 03/15/2015       Liver Function Studies -   Recent Labs   Lab Test  11/15/16   0050   PROTTOTAL  6.7*   ALBUMIN  3.7   BILITOTAL  0.6   ALKPHOS  55   AST  18   ALT  18     Last Basic Metabolic Panel:  Lab Results   Component Value Date     11/15/2016      Lab Results   Component Value Date    POTASSIUM 3.8 11/15/2016     Lab Results   Component Value Date    CHLORIDE 104 11/15/2016     Lab Results   Component Value Date    BUN 24 11/15/2016     Lab Results   Component Value Date    CR 1.17 11/15/2016     GFR Estimate   Date Value Ref Range Status   01/17/2018 29 (L) >60 mL/min/1.7m2 Final     Comment:     Non  GFR Calc   01/06/2018 32 (L) >60 mL/min/1.7m2 Final     Comment:     Non  GFR Calc   11/15/2016 43 (L) >60 mL/min/1.7m2 Final     Comment:     Non  GFR Calc     TSH   Date Value Ref Range Status   03/14/2015 2.40 0.40 - 4.00 mU/L Final     Comment:     Effective 7/30/2014, the reference range for this assay has changed to reflect   new instrumentation/methodology.         Most Recent Immunizations   Administered Date(s) Administered     Influenza (High Dose) 3 valent vaccine 09/26/2017     Influenza (IIV3) PF 10/01/2014     Pneumococcal 23 valent 10/15/2012     Tdap (Adacel,Boostrix) 10/15/2012       ASSESSMENT:                              Current medications were reviewed today.       Medication Adherence: no issues identified    COPD: seeing PCP and scheduled with Pulmonologist.  Discussed using inhaler as needed, not waiting for scheduled neb time if really struggling for breath.  May benefit from Mucinex trial.    Atrial Fibrillation/HTN/CAD/HFpEF: Blood pressure at goal and plenty low for her age; not symptomatic today but will continue to assess for ongoing need of therapy. INR rechecked today.  Continue current regimen.    Hyperlipidemia: okay to take pravastatin with evening pills so she remembers.    IBS: stable    Supplement: No indication for vitamin C or folic acid     Senile Osteoporosis: stable    Pain: stable    Insomnia: stable    PLAN:                            1. Probiotic 2 Billion Active Cultures daily    2. Discontinue folic acid and vitamin C    3. Recommend 1200 mg calcium daily including diet    4. Pravastatin okay to take with supper pills    5. If you can't breath, use your albuterol or nebulizer DO NOT WAIT     6.  Ask Dr. Young and Dr. Her if Mucinex is worth a trial      I spent 45 minutes with this patient today. I offer these suggestions for consideration by the PCP. A copy of the visit note was provided to the patient's primary care provider.    Will follow up in 2 months.    The patient was given a summary of these recommendations as an after visit summary.       Shakira Blackman , Pharm D  [phone] (phone)  [phone] (pager)  Medication Therapy Management Pharmacist

## 2018-01-25 NOTE — MR AVS SNAPSHOT
After Visit Summary   1/25/2018    Leonor Mercado    MRN: [id number]           Patient Information     Date Of Birth          5/6/1926        Visit Information        Provider Department      1/25/2018 2:00 PM Dannielle Darby MD [facility]        Today's Diagnoses     COPD (H)    -  1      Care Instructions    Plan:  1. Mucinex 600 mg twice a day  2. Prednisone 10 mg   -- take 2 tabs daily for 4 days then   -- take 1 tablet daily  -- ask dr Her about the next Prednisone dose  3. Increased Furosemide to 2 tabs daily = 40 mg for a week then go back to 1 tab = 20 mg daily   4.  Continue the other meds, same doses for now.  5. Follow up in a month          Follow-ups after your visit        Your next 10 appointments already scheduled     Jan 25, 2018  2:45 PM CST   Anticoagulation Visit with [facility]   [facility]    [address]  [facility] [address]   [phone]            Feb 07, 2018 10:10 AM CST   Pacemaker Check with RU MAXXN   [facility] ([facility])    [id number] [place] [address] [address]  [facility] [address]   [phone]            Apr 24, 2018  9:00 AM CDT   LAB with RU LAB   [facility] ([facility])    [address] [place] [address] [address]  [facility] [address]   [phone]           Please do not eat 10-12 hours before your appointment if you are coming in fasting for labs on lipids, cholesterol, or glucose (sugar). This does not apply to pregnant women. Water, hot tea and black coffee (with nothing added) are okay. Do not drink other fluids, diet soda or chew gum.            Apr 24, 2018 10:00 AM CDT   Return Visit with Collins Horton MD   [facility] ([facility])    [address] [place] [address] [address]  [facility] [address]  "  [phone]              Who to contact     If you have questions or need follow up information about today's clinic visit or your schedule please contact [facility] directly at [phone].  Normal or non-critical lab and imaging results will be communicated to you by MyChart, letter or phone within 4 business days after the clinic has received the results. If you do not hear from us within 7 days, please contact the clinic through MyChart or phone. If you have a critical or abnormal lab result, we will notify you by phone as soon as possible.  Submit refill requests through [employer] or call your pharmacy and they will forward the refill request to us. Please allow 3 business days for your refill to be completed.          Additional Information About Your Visit        [facility] Information     [employer] lets you send messages to your doctor, view your test results, renew your prescriptions, schedule appointments and more. To sign up, go to [email or web address] . Click on \"Log in\" on the left side of the screen, which will take you to the Welcome page. Then click on \"Sign up Now\" on the right side of the page.     You will be asked to enter the access code listed below, as well as some personal information. Please follow the directions to create your username and password.     Your access code is: [email or web address]  Expires: 2018  6:20 PM     Your access code will  in 90 days. If you need help or a new code, please call your [facility] clinic or [phone].        Care EveryWhere ID     This is your Care EveryWhere ID. This could be used by other organizations to access your [facility] medical records  [id number]        Your Vitals Were     Pulse Temperature Height Last Period Pulse Oximetry Breastfeeding?    90 98  F (36.7  C) (Oral) 5' 3\" (1.6 m) (LMP Unknown) 95% No    BMI (Body Mass Index)                   23.7 kg/m2            Blood Pressure from Last 3 Encounters:   18 101/63 "   01/25/18 101/63   01/11/18 116/50    Weight from Last 3 Encounters:   01/25/18 133 lb 12.8 oz (60.7 kg)   01/11/18 137 lb 12.8 oz (62.5 kg)   01/11/18 139 lb (63 kg)              Today, you had the following     No orders found for display         Today's Medication Changes          These changes are accurate as of 1/25/18  2:35 PM.  If you have any questions, ask your nurse or doctor.               Start taking these medicines.        Dose/Directions    diltiazem 240 MG 24 hr capsule   Commonly known as:  DILTIAZEM CD   Used for:  Chronic atrial fibrillation (H)   Started by:  Shakira Blackman RPH        Dose:  240 mg   Take 1 capsule (240 mg) by mouth daily   Quantity:  30 capsule   Refills:  0       guaiFENesin 600 MG 12 hr tablet   Commonly known as:  MUCINEX   Used for:  Chronic obstructive pulmonary disease, unspecified COPD type (H)   Started by:  Dannielle Darby MD        Dose:  600 mg   Take 1 tablet (600 mg) by mouth 2 times daily as needed for congestion   Quantity:  180 tablet   Refills:  0       ipratropium - albuterol 0.5 mg/2.5 mg/3 mL 0.5-2.5 (3) MG/3ML neb solution   Commonly known as:  DUONEB   Used for:  Chronic obstructive pulmonary disease, unspecified COPD type (H)   Started by:  Shakira Blackman RPH        Dose:  1 vial   Take 1 vial (3 mLs) by nebulization every 6 hours as needed for shortness of breath / dyspnea or wheezing   Quantity:  360 mL   Refills:  0       predniSONE 10 MG tablet   Commonly known as:  DELTASONE   Used for:  Chronic obstructive pulmonary disease, unspecified COPD type (H)   Started by:  Dannielle Darby MD        Use as directed by the doctor   Quantity:  30 tablet   Refills:  0            Where to get your medicines      These medications were sent to [facility] [address] [place] [address] [id number] [place] [address]  [id number] [place] [address]     Phone:  [phone]     guaiFENesin 600 MG 12 hr tablet    predniSONE  10 MG tablet         Some of these will need a paper prescription and others can be bought over the counter.  Ask your nurse if you have questions.     You don't need a prescription for these medications     diltiazem 240 MG 24 hr capsule    ipratropium - albuterol 0.5 mg/2.5 mg/3 mL 0.5-2.5 (3) MG/3ML neb solution                Primary Care Provider Office Phone # Fax #    Dannielle Darby [phone]       [address] [facility] [address]        Equal Access to Services     [facility]: Hadii aad ku hadasho Soomaali, waaxda luqadaha, qaybta kaalmada aderichy, antonia cruz . So [facility] [phone].    ATENCIÓN: Si habla español, tiene a spicer disposición servicios gratuitos de asistencia lingüística. [facility] [phone].    We comply with applicable federal civil rights laws and [place] laws. We do not discriminate on the basis of race, color, national origin, age, disability, sex, sexual orientation, or gender identity.            Thank you!     Thank you for choosing [facility]  for your care. Our goal is always to provide you with excellent care. Hearing back from our patients is one way we can continue to improve our services. Please take a few minutes to complete the written survey that you may receive in the mail after your visit with us. Thank you!             Your Updated Medication List - Protect others around you: Learn how to safely use, store and throw away your medicines at www.disposemymeds.org.          This list is accurate as of 1/25/18  2:35 PM.  Always use your most recent med list.                   Brand Name Dispense Instructions for use Diagnosis    albuterol 108 (90 BASE) MCG/ACT Inhaler    PROAIR HFA/PROVENTIL HFA/VENTOLIN HFA    1 Inhaler    Inhale 2 puffs into the lungs every 6 hours as needed for shortness of breath / dyspnea or wheezing    Chronic obstructive pulmonary disease, unspecified COPD type  (H)       ASPIRIN NOT PRESCRIBED    INTENTIONAL     Antiplatelet medication not prescribed intentionally due to Current anticoagulant therapy (warfarin/enoxaparin)    Chronic atrial fibrillation (H)       calcium carbonate 600 MG tablet   Generic drug:  calcium carbonate      Take 1 tablet by mouth daily        diltiazem 240 MG 24 hr capsule    DILTIAZEM CD    30 capsule    Take 1 capsule (240 mg) by mouth daily    Chronic atrial fibrillation (H)       furosemide 20 MG tablet    LASIX    30 tablet    Take 1 tablet (20 mg) by mouth daily    Chronic diastolic congestive heart failure (H)       guaiFENesin 600 MG 12 hr tablet    MUCINEX    180 tablet    Take 1 tablet (600 mg) by mouth 2 times daily as needed for congestion    Chronic obstructive pulmonary disease, unspecified COPD type (H)       ipratropium - albuterol 0.5 mg/2.5 mg/3 mL 0.5-2.5 (3) MG/3ML neb solution    DUONEB    360 mL    Take 1 vial (3 mLs) by nebulization every 6 hours as needed for shortness of breath / dyspnea or wheezing    Chronic obstructive pulmonary disease, unspecified COPD type (H)       losartan 50 MG tablet    COZAAR    1 tablet    Take 0.5 tablets (25 mg) by mouth daily    Essential hypertension with goal blood pressure less than 140/90       MELATONIN PO      Take 5 mg by mouth At Bedtime        nebulizer nebulization      4 times daily instead of 6 times        nitroGLYcerin 0.4 MG sublingual tablet    NITROSTAT    25 tablet    Place 1 tablet (0.4 mg) under the tongue every 5 minutes as needed    Other chest pain       OCUVITE PO      Take 1 capsule by mouth daily.        polyethylene glycol Packet    MIRALAX/GLYCOLAX     Take 1 packet by mouth daily        pravastatin 40 MG tablet    PRAVACHOL    90 tablet    Take 1 tablet (40 mg) by mouth daily    Hyperlipidemia LDL goal <100, Coronary artery disease involving native coronary artery of native heart without angina pectoris       predniSONE 10 MG tablet    DELTASONE    30 tablet     Use as directed by the doctor    Chronic obstructive pulmonary disease, unspecified COPD type (H)       PROBIOTIC ACIDOPHILUS Tabs      Take 1 tablet by mouth daily        TYLENOL PO      Take 1,000 mg by mouth At Bedtime        umeclidinium 62.5 MCG/INH oral inhaler    INCRUSE ELLIPTA    3 Inhaler    Inhale 1 puff into the lungs daily    Pulmonary fibrosis (H), Chronic obstructive pulmonary disease, unspecified COPD type (H)       vitamin D 2000 UNITS tablet      Take 2,000 Units by mouth daily        warfarin 2 MG tablet    COUMADIN    90 tablet    TAKE ONE TABLET BY MOUTH ONCE DAILY OR  AS  DIRECTED  BY  INR  CLINIC    Long term current use of anticoagulant therapy

## 2018-01-25 NOTE — PROGRESS NOTES
"  ANTICOAGULATION FOLLOW-UP CLINIC VISIT    Patient Name:  Leonor Mercado  Date:  1/25/2018  Contact Type:  Face to Face    SUBJECTIVE:     Patient Findings     Positives Change in medications (Pt states has been taking Prednisone for breathing issue, has been out for a couple days. Pt saw MD today, Prednisone  Rx sent for 30 days.  Prednisone can increase INR. ), Change in diet/appetite (Pt states has decreased appetite)           OBJECTIVE    INR Protime   Date Value Ref Range Status   01/25/2018 5.6 (A) 0.86 - 1.14 Final       ASSESSMENT / PLAN  INR assessment SUPRA    Recheck INR In: 4 DAYS    INR Location Clinic      Anticoagulation Summary as of 1/25/2018     INR goal 2.0-3.0   Today's INR 5.6!   Maintenance plan 2 mg (2 mg x 1) every day   Full instructions 1/25: Hold; 1/26: Hold; 1/27: 1 mg; Otherwise 2 mg every day   Weekly total 14 mg   Plan last modified Yoana Waddell RN (12/15/2016)   Next INR check 1/29/2018   Priority INR   Target end date     Indications   Long-term (current) use of anticoagulants [Z79.01] [Z79.01]  A Fib - chr Coumadin  s/p PPM (H) [I48.2]         Anticoagulation Episode Summary     INR check location     Preferred lab     Send INR reminders to [facility]    Comments Pt's 1st name is pronounced \"ondray\"  Pt does not want print out, appt card only      Anticoagulation Care Providers     Provider Role Specialty Phone number    Dannielle Darby MD [facility] Internal Medicine [phone]            See the Encounter Report to view Anticoagulation Flowsheet and Dosing Calendar (Go to Encounters tab in chart review, and find the Anticoagulation Therapy Visit)    Dosage adjustment made based on physician directed care plan.    Dilma Rider RN               "

## 2018-01-25 NOTE — NURSING NOTE
"Chief Complaint   Patient presents with     Results       Initial /63 (BP Location: Right arm, Patient Position: Chair, Cuff Size: Adult Regular)  Pulse 90  Temp 98  F (36.7  C) (Oral)  Ht 5' 3\" (1.6 m)  Wt 133 lb 12.8 oz (60.7 kg)  LMP  (LMP Unknown)  SpO2 95%  Breastfeeding? No  BMI 23.7 kg/m2 Estimated body mass index is 23.7 kg/(m^2) as calculated from the following:    Height as of this encounter: 5' 3\" (1.6 m).    Weight as of this encounter: 133 lb 12.8 oz (60.7 kg).  Medication Reconciliation: complete   Aurea Hillman CMA      "

## 2018-01-25 NOTE — PATIENT INSTRUCTIONS
Plan:  1. Mucinex 600 mg twice a day  2. Prednisone 10 mg   -- take 2 tabs daily for 4 days then   -- take 1 tablet daily  -- ask dr Her about the next Prednisone dose  3. Increased Furosemide to 2 tabs daily = 40 mg for a week then go back to 1 tab = 20 mg daily   4.  Continue the other meds, same doses for now.  5. Follow up in a month

## 2018-01-25 NOTE — PATIENT INSTRUCTIONS
Recommendations from today's MTM visit:                                                        1. Probiotic 2 Billion Active Cultures daily    2. Discontinue folic acid and vitamin C    3. Recommend 1200 mg calcium daily including diet    4. Pravastatin okay to take with supper pills    5. If you can't breath, use your albuterol or nebulizer DO NOT WAIT     6.  Ask Dr. Young and Dr. Her if Mucinex is worth a trial      Next MTM visit: 2 months    To schedule another MTM appointment, please call the clinic directly or you may call the MTM scheduling line at [phone] or toll-free at [phone].     My Clinical Pharmacist's contact information:                                                      It was a pleasure seeing you today!  Please feel free to contact me with any questions or concerns you have.      Shakira Blackman , Pharm D  [phone] (phone)  [phone] (pager)  Medication Therapy Management Pharmacist     You may receive a survey about the MTM services you received.  I would appreciate your feedback to help me serve you better in the future. Please fill it out and return it when you can. Your comments will be anonymous.

## 2018-01-25 NOTE — MR AVS SNAPSHOT
After Visit Summary   1/25/2018    Leonor Mercado    MRN: [id number]           Patient Information     Date Of Birth          5/6/1926        Visit Information        Provider Department      1/25/2018 1:30 PM Shakira Blackman, [facility]        Today's Diagnoses     A Fib - chr Coumadin, s/p PPM (H)    -  1    COPD (H)          Care Instructions    Recommendations from today's MTM visit:                                                        1. Probiotic 2 Billion Active Cultures daily    2. Discontinue folic acid and vitamin C    3. Recommend 1200 mg calcium daily including diet    4. Pravastatin okay to take with supper pills    5. If you can't breath, use your albuterol or nebulizer DO NOT WAIT     6.  Ask Dr. Young and Dr. Her if Mucinex is worth a trial      Next MTM visit: 2 months    To schedule another MTM appointment, please call the clinic directly or you may call the MTM scheduling line at [phone] or toll-free at [phone].     My Clinical Pharmacist's contact information:                                                      It was a pleasure seeing you today!  Please feel free to contact me with any questions or concerns you have.      Shakira Blackman , Pharm D  [phone] (phone)  [phone] (pager)  Medication Therapy Management Pharmacist     You may receive a survey about the MTM services you received.  I would appreciate your feedback to help me serve you better in the future. Please fill it out and return it when you can. Your comments will be anonymous.                      Follow-ups after your visit        Your next 10 appointments already scheduled     Jan 25, 2018  2:00 PM CST   SHORT with Dannielle Darby MD   [facility] ([facility])    [address]  [facility] [address]   [phone]            Jan 25, 2018  2:45 PM CST   Anticoagulation Visit with [facility]    [facility] ([facility]    [address]  [facility] [id number] [phone]            Feb 07, 2018 10:10 AM CST   Pacemaker Check with SISSY LILLYN   [facility] ([facility])    [address]  [facility] [address]   [phone]            Apr 24, 2018  9:00 AM CDT   LAB with RU LAB   [facility] [place] ([facility])    [address]  [facility] [id number] [phone]           Please do not eat 10-12 hours before your appointment if you are coming in fasting for labs on lipids, cholesterol, or glucose (sugar). This does not apply to pregnant women. Water, hot tea and black coffee (with nothing added) are okay. Do not drink other fluids, diet soda or chew gum.            Apr 24, 2018 10:00 AM CDT   Return Visit with Collins Horton MD   [facility] ([facility])    [address]  [facility] [address]   [phone]              Who to contact     If you have questions or need follow up information about today's clinic visit or your schedule please contact [facility] directly at [phone].  Normal or non-critical lab and imaging results will be communicated to you by CereSofthart, letter or phone within 4 business days after the clinic has received the results. If you do not hear from us within 7 days, please contact the clinic through CereSofthart or phone. If you have a critical or abnormal lab result, we will notify you by phone as soon as possible.  Submit refill requests through Leversense or call your pharmacy and they will forward the refill request to us. Please allow 3 business days for your refill to be completed.          Additional Information About Your Visit        Leversense Information     Leversense lets you send messages to your doctor, view your test  "results, renew your prescriptions, schedule appointments and more. To sign up, go to [email or web address] . Click on \"Log in\" on the left side of the screen, which will take you to the Welcome page. Then click on \"Sign up Now\" on the right side of the page.     You will be asked to enter the access code listed below, as well as some personal information. Please follow the directions to create your username and password.     Your access code is: [email or web address]  Expires: 2018  6:20 PM     Your access code will  in 90 days. If you need help or a new code, please call your [facility] clinic or [phone].        Care EveryWhere ID     This is your Care EveryWhere ID. This could be used by other organizations to access your [facility] medical records  [id number]        Your Vitals Were     Last Period                   (LMP Unknown)            Blood Pressure from Last 3 Encounters:   18 116/50   18 131/76   18 120/74    Weight from Last 3 Encounters:   18 137 lb 12.8 oz (62.5 kg)   18 139 lb (63 kg)   18 135 lb (61.2 kg)              Today, you had the following     No orders found for display         Today's Medication Changes          These changes are accurate as of 18  1:53 PM.  If you have any questions, ask your nurse or doctor.               Start taking these medicines.        Dose/Directions    diltiazem 240 MG 24 hr capsule   Commonly known as:  DILTIAZEM CD   Used for:  Chronic atrial fibrillation (H)   Started by:  Shakira Blackman RPH        Dose:  240 mg   Take 1 capsule (240 mg) by mouth daily   Quantity:  30 capsule   Refills:  0       ipratropium - albuterol 0.5 mg/2.5 mg/3 mL 0.5-2.5 (3) MG/3ML neb solution   Commonly known as:  DUONEB   Used for:  Chronic obstructive pulmonary disease, unspecified COPD type (H)   Started by:  Shakira Blackman RPH        Dose:  1 vial   Take 1 vial (3 mLs) by nebulization every 6 hours as needed for shortness of " breath / dyspnea or wheezing   Quantity:  360 mL   Refills:  0            Where to get your medicines      Some of these will need a paper prescription and others can be bought over the counter.  Ask your nurse if you have questions.     You don't need a prescription for these medications     diltiazem 240 MG 24 hr capsule    ipratropium - albuterol 0.5 mg/2.5 mg/3 mL 0.5-2.5 (3) MG/3ML neb solution                Primary Care Provider Office Phone # Fax #    Dannielle Bria Darby [phone]       [address] [facility] [address]        Equal Access to Services     [facility]: Hadii aad anya hadgrego Vidya, waaxda lutaranadaha, qaybta kaalmada kyara, antonia cruz . So [facility] [phone].    ATENCIÓN: Si habla español, tiene a spicer disposición servicios gratuitos de asistencia lingüística. [facility] [phone].    We comply with applicable federal civil rights laws and [place] laws. We do not discriminate on the basis of race, color, national origin, age, disability, sex, sexual orientation, or gender identity.            Thank you!     Thank you for choosing [facility][note 3]  for your care. Our goal is always to provide you with excellent care. Hearing back from our patients is one way we can continue to improve our services. Please take a few minutes to complete the written survey that you may receive in the mail after your visit with us. Thank you!             Your Updated Medication List - Protect others around you: Learn how to safely use, store and throw away your medicines at www.disposemymeds.org.          This list is accurate as of 1/25/18  1:53 PM.  Always use your most recent med list.                   Brand Name Dispense Instructions for use Diagnosis    albuterol 108 (90 BASE) MCG/ACT Inhaler    PROAIR HFA/PROVENTIL HFA/VENTOLIN HFA    1 Inhaler    Inhale 2 puffs into the lungs every 6 hours as needed for shortness of breath / dyspnea  or wheezing    Chronic obstructive pulmonary disease, unspecified COPD type (H)       ASPIRIN NOT PRESCRIBED    INTENTIONAL     Antiplatelet medication not prescribed intentionally due to Current anticoagulant therapy (warfarin/enoxaparin)    Chronic atrial fibrillation (H)       calcium carbonate 600 MG tablet   Generic drug:  calcium carbonate      Take 1 tablet by mouth daily        diltiazem 240 MG 24 hr capsule    DILTIAZEM CD    30 capsule    Take 1 capsule (240 mg) by mouth daily    Chronic atrial fibrillation (H)       furosemide 20 MG tablet    LASIX    30 tablet    Take 1 tablet (20 mg) by mouth daily    Chronic diastolic congestive heart failure (H)       ipratropium - albuterol 0.5 mg/2.5 mg/3 mL 0.5-2.5 (3) MG/3ML neb solution    DUONEB    360 mL    Take 1 vial (3 mLs) by nebulization every 6 hours as needed for shortness of breath / dyspnea or wheezing    Chronic obstructive pulmonary disease, unspecified COPD type (H)       losartan 50 MG tablet    COZAAR    1 tablet    Take 0.5 tablets (25 mg) by mouth daily    Essential hypertension with goal blood pressure less than 140/90       MELATONIN PO      Take 5 mg by mouth At Bedtime        nebulizer nebulization      4 times daily instead of 6 times        nitroGLYcerin 0.4 MG sublingual tablet    NITROSTAT    25 tablet    Place 1 tablet (0.4 mg) under the tongue every 5 minutes as needed    Other chest pain       OCUVITE PO      Take 1 capsule by mouth daily.        polyethylene glycol Packet    MIRALAX/GLYCOLAX     Take 1 packet by mouth daily        pravastatin 40 MG tablet    PRAVACHOL    90 tablet    Take 1 tablet (40 mg) by mouth daily    Hyperlipidemia LDL goal <100, Coronary artery disease involving native coronary artery of native heart without angina pectoris       PROBIOTIC ACIDOPHILUS Tabs      Take 1 tablet by mouth daily        TYLENOL PO      Take 1,000 mg by mouth At Bedtime        umeclidinium 62.5 MCG/INH oral inhaler    INCRUSE ELLIPTA     3 Inhaler    Inhale 1 puff into the lungs daily    Pulmonary fibrosis (H), Chronic obstructive pulmonary disease, unspecified COPD type (H)       vitamin D 2000 UNITS tablet      Take 2,000 Units by mouth daily        warfarin 2 MG tablet    COUMADIN    90 tablet    TAKE ONE TABLET BY MOUTH ONCE DAILY OR  AS  DIRECTED  BY  INR  CLINIC    Long term current use of anticoagulant therapy

## 2018-01-26 NOTE — TELEPHONE ENCOUNTER
Outgoing Call    To: Patient.     Attempted to call patient x 2 days. Home line rings, than hangs up. Cell phone rings, no VM.     Called Les. Left Les a message, asking if she can reach patient and have her return my call.     TGarbers RN  [facility]

## 2018-01-28 PROBLEM — N18.30 CKD (CHRONIC KIDNEY DISEASE) STAGE 3, GFR 30-59 ML/MIN (H): Status: ACTIVE | Noted: 2018-01-28

## 2018-01-29 ENCOUNTER — ANTICOAGULATION THERAPY VISIT (OUTPATIENT)
Dept: ANTICOAGULATION | Facility: CLINIC | Age: 83
End: 2018-01-29
Payer: COMMERCIAL

## 2018-01-29 DIAGNOSIS — I48.20 CHRONIC ATRIAL FIBRILLATION (H): ICD-10-CM

## 2018-01-29 DIAGNOSIS — Z79.01 LONG-TERM (CURRENT) USE OF ANTICOAGULANTS: ICD-10-CM

## 2018-01-29 LAB — INR POINT OF CARE: 2.4 (ref 0.86–1.14)

## 2018-01-29 PROCEDURE — 99207 ZZC NO CHARGE NURSE ONLY: CPT

## 2018-01-29 PROCEDURE — 85610 PROTHROMBIN TIME: CPT | Mod: QW

## 2018-01-29 PROCEDURE — 36416 COLLJ CAPILLARY BLOOD SPEC: CPT

## 2018-01-29 NOTE — MR AVS SNAPSHOT
Leonor Mercado   1/29/2018 4:15 PM   Anticoagulation Therapy Visit    Description:  89 year old female   Provider:  [facility]   Department:  [facility] Anti Coagulation           INR as of 1/29/2018     Today's INR 2.4      Anticoagulation Summary as of 1/29/2018     INR goal 2.0-3.0   Today's INR 2.4   Full instructions 1/30: 1 mg; Otherwise 2 mg every day   Next INR check 2/6/2018    Indications   Long-term (current) use of anticoagulants [Z79.01] [Z79.01]  A Fib - chr Coumadin  s/p PPM (H) [I48.2]         Your next Anticoagulation Clinic appointment(s)     Feb 06, 2018  2:45 PM CST   Anticoagulation Visit with [facility]   [facility]    [address] [facility] [address]  [facility] [address] [phone]              Contact Numbers     [facility] Phone Numbers:  Anticoagulation Clinic Appointments : [phone]  Anticoagulation Nurse: [phone]         January 2018 Details    Sun Mon Tue Wed Thu Fri Sat      1               2               3               4               5               6                 7               8               9               10               11               12               13                 14               15               16               17               18               19               20                 21               22               23               24               25               26               27                 28               29      2 mg   See details      30      1 mg         31      2 mg             Date Details   01/29 This INR check               How to take your warfarin dose     To take:  1 mg Take 0.5 of a 2 mg tablet.    To take:  2 mg Take 1 of the 2 mg tablets.           February 2018 Details    Sun Mon Tue Wed Thu Fri Sat         1      2 mg         2      2 mg         3      2 mg           4      2 mg         5      2 mg         6            7               8                9               10                 11               12               13               14               15               16               17                 18               19               20               21               22               23               24                 25               26               27               28                   Date Details   No additional details    Date of next INR:  2/6/2018         How to take your warfarin dose     To take:  2 mg Take 1 of the 2 mg tablets.

## 2018-01-29 NOTE — PROGRESS NOTES
"  ANTICOAGULATION FOLLOW-UP CLINIC VISIT    Patient Name:  Leonor Mercado  Date:  1/29/2018  Contact Type:  Face to Face    SUBJECTIVE:     Patient Findings     Positives Change in medications (Pt reports beginning another round of Prednisone 1/25/18), No Problem Findings           OBJECTIVE    INR Protime   Date Value Ref Range Status   01/29/2018 2.4 (A) 0.86 - 1.14 Final       ASSESSMENT / PLAN  INR assessment THER    Recheck INR In: 8 DAYS    INR Location Clinic      Anticoagulation Summary as of 1/29/2018     INR goal 2.0-3.0   Today's INR 2.4   Maintenance plan 2 mg (2 mg x 1) every day   Full instructions 1/30: 1 mg; Otherwise 2 mg every day   Weekly total 14 mg   Plan last modified Yoana Waddell RN (12/15/2016)   Next INR check 2/6/2018   Priority INR   Target end date     Indications   Long-term (current) use of anticoagulants [Z79.01] [Z79.01]  A Fib - chr Coumadin  s/p PPM (H) [I48.2]         Anticoagulation Episode Summary     INR check location     Preferred lab     Send INR reminders to [facility]    Comments Pt's 1st name is pronounced \"ondray\"  Pt does not want print out, appt card only      Anticoagulation Care Providers     Provider Role Specialty Phone number    Dannielle Darby MD Responsible Internal Medicine [phone]            See the Encounter Report to view Anticoagulation Flowsheet and Dosing Calendar (Go to Encounters tab in chart review, and find the Anticoagulation Therapy Visit)    Dosage adjustment made based on physician directed care plan.    Dilma Rider RN               "

## 2018-02-01 ENCOUNTER — TELEPHONE (OUTPATIENT)
Dept: CARDIOLOGY | Facility: CLINIC | Age: 83
End: 2018-02-01

## 2018-02-01 ENCOUNTER — TELEPHONE (OUTPATIENT)
Dept: INTERNAL MEDICINE | Facility: CLINIC | Age: 83
End: 2018-02-01

## 2018-02-01 ENCOUNTER — TRANSFERRED RECORDS (OUTPATIENT)
Dept: HEALTH INFORMATION MANAGEMENT | Facility: CLINIC | Age: 83
End: 2018-02-01

## 2018-02-01 DIAGNOSIS — I50.32 CHRONIC DIASTOLIC CONGESTIVE HEART FAILURE (H): ICD-10-CM

## 2018-02-01 DIAGNOSIS — F41.9 ANXIETY: Primary | ICD-10-CM

## 2018-02-01 NOTE — TELEPHONE ENCOUNTER
Outgoing Call    To: Patient    Reviewed BMP and Dr. Garcia's recommendations.  Dr. Garcia would like to see patient this month with a BMP.   Patient was not feeling well today.   Patient wanted to wait until next week to schedule her appts.   I will have scheduling call patient next week.     Pamela SALGADO  [facility]

## 2018-02-01 NOTE — TELEPHONE ENCOUNTER
Received a call from [facility], requesting a refill for Lasix.     Dr. Darby increased patient's dose on 01-25-18. (Increased Furosemide to 2 tabs daily = 40 mg for a week then go back to 1 tab = 20 mg daily)    Patient was not given a prescription.     I will call Dr. Darby's office tomorrow, to let them know patient did not increase her dose, due to the fact that she did not have enough pills. If they want her to increase her dose. They will need to provide a prescription, since we did not make the change.     Les (patient's daughter) would like an update tomorrow. She would also like a call from Dr. Darby's RN when the prescription will be ready, so she can pick it up.     TGaarchie SALGADO  [facility]

## 2018-02-01 NOTE — TELEPHONE ENCOUNTER
Pt's daughter Les calling.  Pt saw Dr. Her today and Les is updating PCP.    1.  Dr. Her suggested to daughter to ask PCP to prescribe either an antidepressant with anxiolytic qualities or an anti-anxiety med.  Pt was prescribed Ativan in the past.  Dr. Her suggested Ativan 0.5mg BID for anxiety.    2.  Dr. Her started pt on Breo 200/25mcg 1 puff daily.  Historically entered into chart.    Please advise re: #1, thanks.

## 2018-02-02 RX ORDER — FUROSEMIDE 20 MG
20 TABLET ORAL 2 TIMES DAILY
Qty: 45 TABLET | Refills: 0 | Status: SHIPPED | OUTPATIENT
Start: 2018-02-02 | End: 2018-02-20

## 2018-02-02 RX ORDER — LORAZEPAM 0.5 MG/1
0.5 TABLET ORAL 2 TIMES DAILY PRN
Qty: 60 TABLET | Refills: 0 | Status: SHIPPED | OUTPATIENT
Start: 2018-02-02 | End: 2018-03-13

## 2018-02-02 NOTE — TELEPHONE ENCOUNTER
Outgoing Call    To: Les    Spoke to Susan SALGADO at Dr. Darby's office.   Susan sent a Lasix prescription.   Informed Les that she can call the pharmacy to set up a  time.   Scheduled patient to see Dr. Radha sky/Luis on 02-20-18.     Pamela SALGADO  [facility]

## 2018-02-02 NOTE — TELEPHONE ENCOUNTER
Spoke to Malaika, they request Rx for qty 45 so pt can double up for 1 week and then have enough left over in case she needs to do this again soon.  Sent in script for qty 45.

## 2018-02-06 ENCOUNTER — ANTICOAGULATION THERAPY VISIT (OUTPATIENT)
Dept: ANTICOAGULATION | Facility: CLINIC | Age: 83
End: 2018-02-06
Payer: COMMERCIAL

## 2018-02-06 DIAGNOSIS — Z79.01 LONG-TERM (CURRENT) USE OF ANTICOAGULANTS: ICD-10-CM

## 2018-02-06 DIAGNOSIS — I48.20 CHRONIC ATRIAL FIBRILLATION (H): ICD-10-CM

## 2018-02-06 LAB — INR POINT OF CARE: 6.9 (ref 0.86–1.14)

## 2018-02-06 PROCEDURE — 36416 COLLJ CAPILLARY BLOOD SPEC: CPT

## 2018-02-06 PROCEDURE — 99207 ZZC NO CHARGE NURSE ONLY: CPT

## 2018-02-06 PROCEDURE — 85610 PROTHROMBIN TIME: CPT | Mod: QW

## 2018-02-06 NOTE — MR AVS SNAPSHOT
Leonor Mercado   2/6/2018 2:45 PM   Anticoagulation Therapy Visit    Description:  89 year old female   Provider[facility]   Department:  [facility]           INR as of 2/6/2018     Today's INR 6.9!      Anticoagulation Summary as of 2/6/2018     INR goal 2.0-3.0   Today's INR 6.9!   Full instructions 2/6: Hold; 2/7: Hold; Otherwise 2 mg every day   Next INR check 2/8/2018    Indications   Long-term (current) use of anticoagulants [Z79.01] [Z79.01]  A Fib - chr Coumadin  s/p PPM (H) [I48.2]         Your next Anticoagulation Clinic appointment(s)     Feb 08, 2018  7:45 AM CST   Anticoagulation Visit with [facility]   [facility] ([facility])    [address] [facility] [address]  [facility] [address] [phone]              Contact Numbers     [facility] Phone Numbers:  Anticoagulation Clinic Appointments : [phone]  Anticoagulation Nurse: [phone]         February 2018 Details    Sun Mon Tue Wed Thu Fri Sat         1               2               3                 4               5               6      Hold   See details      7      Hold         8            9               10                 11               12               13               14               15               16               17                 18               19               20               21               22               23               24                 25               26               27               28                   Date Details   02/06 This INR check       Date of next INR:  2/8/2018         How to take your warfarin dose     To take:  2 mg Take 1 of the 2 mg tablets.    Hold Do not take your warfarin dose. See the Details table to the right for additional instructions.

## 2018-02-06 NOTE — PROGRESS NOTES
"  ANTICOAGULATION FOLLOW-UP CLINIC VISIT    Patient Name:  Leonor Mercado  Date:  2/6/2018  Contact Type:  Face to Face    SUBJECTIVE:     Patient Findings     Positives Change in medications (Pt began a Prednisone taper 1/25/18)           OBJECTIVE    INR Protime   Date Value Ref Range Status   02/06/2018 6.9 (A) 0.86 - 1.14 Final       ASSESSMENT / PLAN  No question data found.  Anticoagulation Summary as of 2/6/2018     INR goal 2.0-3.0   Today's INR 6.9!   Maintenance plan 2 mg (2 mg x 1) every day   Full instructions 2/6: Hold; 2/7: Hold; Otherwise 2 mg every day   Weekly total 14 mg   Plan last modified Yoana Waddell RN (12/15/2016)   Next INR check 2/8/2018   Priority INR   Target end date     Indications   Long-term (current) use of anticoagulants [Z79.01] [Z79.01]  A Fib - chr Coumadin  s/p PPM (H) [I48.2]         Anticoagulation Episode Summary     INR check location     Preferred lab     Send INR reminders to [facility]    Comments Pt's 1st name is pronounced \"ondray\"  Pt does not want print out, appt card only      Anticoagulation Care Providers     Provider Role Specialty Phone number    Dannielle Darby MD [facility] Internal Medicine [phone]            See the Encounter Report to view Anticoagulation Flowsheet and Dosing Calendar (Go to Encounters tab in chart review, and find the Anticoagulation Therapy Visit)    Dosage adjustment made based on physician directed care plan.    Dilma Rider RN               "

## 2018-02-07 ENCOUNTER — ALLIED HEALTH/NURSE VISIT (OUTPATIENT)
Dept: CARDIOLOGY | Facility: CLINIC | Age: 83
End: 2018-02-07
Payer: COMMERCIAL

## 2018-02-07 DIAGNOSIS — Z95.0 CARDIAC PACEMAKER IN SITU: Primary | ICD-10-CM

## 2018-02-07 PROCEDURE — 93280 PM DEVICE PROGR EVAL DUAL: CPT | Performed by: INTERNAL MEDICINE

## 2018-02-07 NOTE — MR AVS SNAPSHOT
After Visit Summary   2/7/2018    Leonor Mercado    MRN: [id number]           Patient Information     Date Of Birth          5/6/1926        Visit Information        Provider Department      2/7/2018 10:10 AM RU MAXXN [facility]        Today's Diagnoses     Cardiac pacemaker in situ    -  1       Follow-ups after your visit        Your next 10 appointments already scheduled     Feb 08, 2018  7:45 AM CST   Anticoagulation Visit with [facility]   [facility] ([facility]    [address]  [facility] [address]   [phone]            Feb 20, 2018  1:15 PM CST   LAB with RU LAB   [facility] ([facility])    [address]  [facility] [address]   [phone]           Please do not eat 10-12 hours before your appointment if you are coming in fasting for labs on lipids, cholesterol, or glucose (sugar). This does not apply to pregnant women. Water, hot tea and black coffee (with nothing added) are okay. Do not drink other fluids, diet soda or chew gum.            Feb 20, 2018  2:15 PM CST   Return Visit with Bryan Garcia MD   [facility] ([facility])    [address]  [facility] [address] [phone]            Apr 24, 2018  9:00 AM CDT   LAB with RU LAB   [facility] ([facility])    [address]  [facility] [address] [phone]           Please do not eat 10-12 hours before your appointment if you are coming in fasting for labs on lipids, cholesterol, or glucose (sugar). This does not apply to pregnant women. Water, hot tea and black coffee (with nothing added) are okay. Do not drink other fluids, diet soda or chew gum.            Apr 24, 2018 10:00 AM CDT   Return Visit with  "Collins Horton MD   [facility] ([facility])    [address]  [facility] [address] [phone]            May 10, 2018  9:30 AM CDT   Phone Device Check with NUÑEZ TECH2   [facility] ([facility])    [address]  [facility] [address] [phone]              Who to contact     If you have questions or need follow up information about today's clinic visit or your schedule please contact [facility] directly at [phone].  Normal or non-critical lab and imaging results will be communicated to you by [employer], letter or phone within 4 business days after the clinic has received the results. If you do not hear from us within 7 days, please contact the clinic through [employer] or phone. If you have a critical or abnormal lab result, we will notify you by phone as soon as possible.  Submit refill requests through [employer] or call your pharmacy and they will forward the refill request to us. Please allow 3 business days for your refill to be completed.          Additional Information About Your Visit        MyChart Information     [employer] lets you send messages to your doctor, view your test results, renew your prescriptions, schedule appointments and more. To sign up, go to [email or web address] . Click on \"Log in\" on the left side of the screen, which will take you to the Welcome page. Then click on \"Sign up Now\" on the right side of the page.     You will be asked to enter the access code listed below, as well as some personal information. Please follow the directions to create your username and password.     Your access code is: [email or web address]  Expires: 2018  6:20 PM     Your access code will  in 90 days. If you need help or a new code, please call your [place] clinic or [phone].        Care EveryWhere ID     This is your " Care EveryWhere ID. This could be used by other organizations to access your [place] medical records  [id number]        Your Vitals Were     Last Period                   (LMP Unknown)            Blood Pressure from Last 3 Encounters:   01/25/18 101/63   01/25/18 101/63   01/11/18 116/50    Weight from Last 3 Encounters:   01/25/18 60.7 kg (133 lb 12.8 oz)   01/11/18 62.5 kg (137 lb 12.8 oz)   01/11/18 63 kg (139 lb)              We Performed the Following     PM DEVICE PROGRAMMING EVAL, DUAL LEAD PACER (09404)        Primary Care Provider Office Phone # Fax #    Dannielle Darby [phone]       [address]  [facility] [address]        Equal Access to Services     NALDO OVALLE : Hadii aad ku hadasho Soomaali, waaxda luqadaha, qaybta kaalmada adeegyada, waxay antwonin hayaan dexter cruz . So [facility] [phone].    ATENCIÓN: Si habla español, tiene a spicer disposición servicios gratuitos de asistencia lingüística. Pollo al [phone].    We comply with applicable federal civil rights laws and [place] laws. We do not discriminate on the basis of race, color, national origin, age, disability, sex, sexual orientation, or gender identity.            Thank you!     Thank you for choosing [facility]  for your care. Our goal is always to provide you with excellent care. Hearing back from our patients is one way we can continue to improve our services. Please take a few minutes to complete the written survey that you may receive in the mail after your visit with us. Thank you!             Your Updated Medication List - Protect others around you: Learn how to safely use, store and throw away your medicines at www.disposemymeds.org.          This list is accurate as of 2/7/18 10:47 AM.  Always use your most recent med list.                   Brand Name Dispense Instructions for use Diagnosis    albuterol 108 (90 BASE) MCG/ACT Inhaler     PROAIR HFA/PROVENTIL HFA/VENTOLIN HFA    1 Inhaler    Inhale 2 puffs into the lungs every 6 hours as needed for shortness of breath / dyspnea or wheezing    Chronic obstructive pulmonary disease, unspecified COPD type (H)       ASPIRIN NOT PRESCRIBED    INTENTIONAL     Antiplatelet medication not prescribed intentionally due to Current anticoagulant therapy (warfarin/enoxaparin)    Chronic atrial fibrillation (H)       BREO ELLIPTA 200-25 MCG/INH oral inhaler   Generic drug:  fluticasone-vilanterol      Inhale 1 puff into the lungs daily    Anxiety       calcium carbonate 600 MG tablet   Generic drug:  calcium carbonate      Take 1 tablet by mouth daily        diltiazem 240 MG 24 hr capsule    DILTIAZEM CD    30 capsule    Take 1 capsule (240 mg) by mouth daily    Chronic atrial fibrillation (H)       * furosemide 20 MG tablet    LASIX    30 tablet    Take 1 tablet (20 mg) by mouth daily    Chronic diastolic congestive heart failure (H)       * furosemide 20 MG tablet    LASIX    45 tablet    Take 1 tablet (20 mg) by mouth 2 times daily For 1 week    Chronic diastolic congestive heart failure (H)       guaiFENesin 600 MG 12 hr tablet    MUCINEX    180 tablet    Take 1 tablet (600 mg) by mouth 2 times daily as needed for congestion    Chronic obstructive pulmonary disease, unspecified COPD type (H)       ipratropium - albuterol 0.5 mg/2.5 mg/3 mL 0.5-2.5 (3) MG/3ML neb solution    DUONEB    360 mL    Take 1 vial (3 mLs) by nebulization every 6 hours as needed for shortness of breath / dyspnea or wheezing    Chronic obstructive pulmonary disease, unspecified COPD type (H)       LORazepam 0.5 MG tablet    ATIVAN    60 tablet    Take 1 tablet (0.5 mg) by mouth 2 times daily as needed for anxiety    Anxiety       losartan 50 MG tablet    COZAAR    1 tablet    Take 0.5 tablets (25 mg) by mouth daily    Essential hypertension with goal blood pressure less than 140/90       MELATONIN PO      Take 5 mg by mouth At Bedtime         nebulizer nebulization      4 times daily instead of 6 times        nitroGLYcerin 0.4 MG sublingual tablet    NITROSTAT    25 tablet    Place 1 tablet (0.4 mg) under the tongue every 5 minutes as needed    Other chest pain       OCUVITE PO      Take 1 capsule by mouth daily.        polyethylene glycol Packet    MIRALAX/GLYCOLAX     Take 1 packet by mouth daily        pravastatin 40 MG tablet    PRAVACHOL    90 tablet    Take 1 tablet (40 mg) by mouth daily    Hyperlipidemia LDL goal <100, Coronary artery disease involving native coronary artery of native heart without angina pectoris       predniSONE 10 MG tablet    DELTASONE    30 tablet    Use as directed by the doctor    Chronic obstructive pulmonary disease, unspecified COPD type (H)       PROBIOTIC ACIDOPHILUS Tabs      Take 1 tablet by mouth daily        TYLENOL PO      Take 1,000 mg by mouth At Bedtime        umeclidinium 62.5 MCG/INH oral inhaler    INCRUSE ELLIPTA    3 Inhaler    Inhale 1 puff into the lungs daily    Pulmonary fibrosis (H), Chronic obstructive pulmonary disease, unspecified COPD type (H)       vitamin D 2000 UNITS tablet      Take 2,000 Units by mouth daily        warfarin 2 MG tablet    COUMADIN    90 tablet    TAKE ONE TABLET BY MOUTH ONCE DAILY OR  AS  DIRECTED  BY  INR  CLINIC    Long term current use of anticoagulant therapy       * Notice:  This list has 2 medication(s) that are the same as other medications prescribed for you. Read the directions carefully, and ask your doctor or other care provider to review them with you.

## 2018-02-07 NOTE — PROGRESS NOTES
Medtronic Adapta L ADDRL1 Pacemaker Device Check  AP: 1.6 % : 96.5 %  Mode: DDD  bpm        Underlying Rhythm: AF with variable VR  Heart Rate: Stable with good variability.  Sensing: WNL    Pacing Threshold: WNL   Impedance: WNL  Battery Status: Approximately 11.5 years longevity.  Device Site:  Intact  Atrial Arrhythmia: Since last reset 12/7/16, 2108 mode switches comprising of 8.8% of the time. EGMs showed a PAF with controlled VR, longest lasting >96 hours(has been in AF continuously since 1/2/18).  Patient also has had an exacerbation of COPD and is on prednisone.  Ventricular Arrhythmia: 0  Setting Change: 0    Care Plan: Follow up with Q 3 month Phone [employer]. DAMIAN Castillo

## 2018-02-08 ENCOUNTER — ANTICOAGULATION THERAPY VISIT (OUTPATIENT)
Dept: ANTICOAGULATION | Facility: CLINIC | Age: 83
End: 2018-02-08
Payer: COMMERCIAL

## 2018-02-08 DIAGNOSIS — Z79.01 LONG-TERM (CURRENT) USE OF ANTICOAGULANTS: ICD-10-CM

## 2018-02-08 DIAGNOSIS — I48.20 CHRONIC ATRIAL FIBRILLATION (H): ICD-10-CM

## 2018-02-08 LAB — INR POINT OF CARE: 2.9 (ref 0.86–1.14)

## 2018-02-08 PROCEDURE — 85610 PROTHROMBIN TIME: CPT | Mod: QW

## 2018-02-08 PROCEDURE — 36416 COLLJ CAPILLARY BLOOD SPEC: CPT

## 2018-02-08 PROCEDURE — 99207 ZZC NO CHARGE NURSE ONLY: CPT

## 2018-02-08 NOTE — MR AVS SNAPSHOT
Leonor Mercado   2/8/2018 7:45 AM   Anticoagulation Therapy Visit    Description:  89 year old female   Provider[facility]  [facility]   Department:  [facility] Anti Coagulation           INR as of 2/8/2018     Today's INR 2.9      Anticoagulation Summary as of 2/8/2018     INR goal 2.0-3.0   Today's INR 2.9   Full instructions 2/8: 1 mg; Otherwise 2 mg every day   Next INR check 2/14/2018    Indications   Long-term (current) use of anticoagulants [Z79.01] [Z79.01]  A Fib - chr Coumadin  s/p PPM (H) [I48.2]         Your next Anticoagulation Clinic appointment(s)     Feb 14, 2018  2:45 PM CST   Anticoagulation Visit with [facility]   [facility]    [address]  [facility] [address] [phone]              Contact Numbers     [facility] Phone Numbers:  Anticoagulation Clinic Appointments : [phone]  Anticoagulation Nurse: [phone]         February 2018 Details    Sun Mon Tue Wed Thu Fri Sat         1               2               3                 4               5               6               7               8      1 mg   See details      9      2 mg         10      2 mg           11      2 mg         12      2 mg         13      2 mg         14            15               16               17                 18               19               20               21               22               23               24                 25               26               27               28                   Date Details   02/08 This INR check       Date of next INR:  2/14/2018         How to take your warfarin dose     To take:  1 mg Take 0.5 of a 2 mg tablet.    To take:  2 mg Take 1 of the 2 mg tablets.

## 2018-02-08 NOTE — PROGRESS NOTES
"  ANTICOAGULATION FOLLOW-UP CLINIC VISIT    Patient Name:  Leonor Mercado  Date:  2/8/2018  Contact Type:  Face to Face    SUBJECTIVE:     Patient Findings     Positives Change in medications (Prednisone dose decrased. ), Intentional hold of therapy (Held 2/6/7/18 and 2/7/18 d/t elevated INR), No Problem Findings           OBJECTIVE    INR Protime   Date Value Ref Range Status   02/08/2018 2.9 (A) 0.86 - 1.14 Final       ASSESSMENT / PLAN  INR assessment THER    Recheck INR In: 6 DAYS    INR Location Clinic      Anticoagulation Summary as of 2/8/2018     INR goal 2.0-3.0   Today's INR 2.9   Maintenance plan 2 mg (2 mg x 1) every day   Full instructions 2/8: 1 mg; Otherwise 2 mg every day   Weekly total 14 mg   Plan last modified Yoana Waddell RN (12/15/2016)   Next INR check 2/14/2018   Priority INR   Target end date     Indications   Long-term (current) use of anticoagulants [Z79.01] [Z79.01]  A Fib - chr Coumadin  s/p PPM (H) [I48.2]         Anticoagulation Episode Summary     INR check location     Preferred lab     Send INR reminders to [facility]    Comments Pt's 1st name is pronounced \"ondray\"  Pt does not want print out, appt card only      Anticoagulation Care Providers     Provider Role Specialty Phone number    Dannielle Darby MD [facility] Internal Medicine [phone]            See the Encounter Report to view Anticoagulation Flowsheet and Dosing Calendar (Go to Encounters tab in chart review, and find the Anticoagulation Therapy Visit)    Dosage adjustment made based on physician directed care plan.    Dilma Rider RN               "

## 2018-02-15 ENCOUNTER — ANTICOAGULATION THERAPY VISIT (OUTPATIENT)
Dept: ANTICOAGULATION | Facility: CLINIC | Age: 83
End: 2018-02-15
Payer: COMMERCIAL

## 2018-02-15 DIAGNOSIS — I48.20 CHRONIC ATRIAL FIBRILLATION (H): ICD-10-CM

## 2018-02-15 DIAGNOSIS — Z79.01 LONG-TERM (CURRENT) USE OF ANTICOAGULANTS: ICD-10-CM

## 2018-02-15 LAB — INR POINT OF CARE: 4.4 (ref 0.86–1.14)

## 2018-02-15 PROCEDURE — 99207 ZZC NO CHARGE NURSE ONLY: CPT

## 2018-02-15 PROCEDURE — 36416 COLLJ CAPILLARY BLOOD SPEC: CPT

## 2018-02-15 PROCEDURE — 85610 PROTHROMBIN TIME: CPT | Mod: QW

## 2018-02-15 NOTE — MR AVS SNAPSHOT
Leonor Mercado   2/15/2018 11:00 AM   Anticoagulation Therapy Visit    Description:  89 year old female   Provider[facility]  [facility]   Department:  [facility]           INR as of 2/15/2018     Today's INR 4.4!      Anticoagulation Summary as of 2/15/2018     INR goal 2.0-3.0   Today's INR 4.4!   Full instructions 2/15: Hold; 2/16: 1 mg; Otherwise 2 mg every day   Next INR check 2/19/2018    Indications   Long-term (current) use of anticoagulants [Z79.01] [Z79.01]  A Fib - chr Coumadin  s/p PPM (H) [I48.2]         Your next Anticoagulation Clinic appointment(s)     Feb 19, 2018  2:45 PM CST   [facility]    [address]  [facility] [address] [phone]            Feb 22, 2018  1:45 PM CST   Anticoagulation Visit with [facility]   [facility]    [address]  [facility] [address] [phone]              Contact Numbers     [facility] Phone Numbers:  Anticoagulation Clinic Appointments : [phone]  Anticoagulation Nurse: [phone]         February 2018 Details    Sun Mon Tue Wed Thu Fri Sat         1               2               3                 4               5               6               7               8               9               10                 11               12               13               14               15      Hold   See details      16      1 mg         17      2 mg           18      2 mg         19            20               21               22               23               24                 25               26               27               28                   Date Details   02/15 This INR check       Date of next INR:  2/19/2018         How to take your warfarin dose     To take:  1 mg Take 0.5 of a 2 mg tablet.    To take:  2 mg Take 1 of the  2 mg tablets.    Hold Do not take your warfarin dose. See the Details table to the right for additional instructions.

## 2018-02-15 NOTE — PROGRESS NOTES
"  ANTICOAGULATION FOLLOW-UP CLINIC VISIT    Patient Name:  Leonor Mercado  Date:  2/15/2018  Contact Type:  Face to Face    SUBJECTIVE:     Patient Findings     Positives Change in medications (Pt continues on her Prednisone taper)           OBJECTIVE    INR Protime   Date Value Ref Range Status   02/15/2018 4.4 (A) 0.86 - 1.14 Final       ASSESSMENT / PLAN  INR assessment SUPRA    Recheck INR In: 4 DAYS    INR Location Clinic      Anticoagulation Summary as of 2/15/2018     INR goal 2.0-3.0   Today's INR 4.4!   Maintenance plan 2 mg (2 mg x 1) every day   Full instructions 2/15: Hold; 2/16: 1 mg; Otherwise 2 mg every day   Weekly total 14 mg   Plan last modified Yoana Waddell RN (12/15/2016)   Next INR check 2/19/2018   Priority INR   Target end date     Indications   Long-term (current) use of anticoagulants [Z79.01] [Z79.01]  A Fib - chr Coumadin  s/p PPM (H) [I48.2]         Anticoagulation Episode Summary     INR check location     Preferred lab     Send INR reminders to [facility]    Comments Pt's 1st name is pronounced \"ondray\"  Pt does not want print out, appt card only      Anticoagulation Care Providers     Provider Role Specialty Phone number    Dannielle Darby MD Responsible Internal Medicine [phone]            See the Encounter Report to view Anticoagulation Flowsheet and Dosing Calendar (Go to Encounters tab in chart review, and find the Anticoagulation Therapy Visit)    Dosage adjustment made based on physician directed care plan.    Prabha Deleon RN               "

## 2018-02-19 ENCOUNTER — ANTICOAGULATION THERAPY VISIT (OUTPATIENT)
Dept: ANTICOAGULATION | Facility: CLINIC | Age: 83
End: 2018-02-19
Payer: COMMERCIAL

## 2018-02-19 DIAGNOSIS — I48.20 CHRONIC ATRIAL FIBRILLATION (H): ICD-10-CM

## 2018-02-19 DIAGNOSIS — Z79.01 LONG-TERM (CURRENT) USE OF ANTICOAGULANTS: ICD-10-CM

## 2018-02-19 LAB — INR POINT OF CARE: 2.6 (ref 0.86–1.14)

## 2018-02-19 PROCEDURE — 99207 ZZC NO CHARGE NURSE ONLY: CPT

## 2018-02-19 PROCEDURE — 36416 COLLJ CAPILLARY BLOOD SPEC: CPT

## 2018-02-19 PROCEDURE — 85610 PROTHROMBIN TIME: CPT | Mod: QW

## 2018-02-19 NOTE — PROGRESS NOTES
"  ANTICOAGULATION FOLLOW-UP CLINIC VISIT    Patient Name:  Leonor Mercado  Date:  2/19/2018  Contact Type:  Face to Face    SUBJECTIVE:     Patient Findings     Positives Change in medications (Pt states she thinks she finished Prednisone about 3 days ago. )           OBJECTIVE    INR Protime   Date Value Ref Range Status   02/19/2018 2.6 (A) 0.86 - 1.14 Final       ASSESSMENT / PLAN  INR assessment THER    Recheck INR In: 8 DAYS    INR Location Clinic      Anticoagulation Summary as of 2/19/2018     INR goal 2.0-3.0   Today's INR 2.6   Maintenance plan 2 mg (2 mg x 1) every day   Full instructions 2 mg every day   Weekly total 14 mg   No change documented Dilma Rider RN   Plan last modified Yoana Waddell RN (12/15/2016)   Next INR check 2/27/2018   Priority INR   Target end date     Indications   Long-term (current) use of anticoagulants [Z79.01] [Z79.01]  A Fib - chr Coumadin  s/p PPM (H) [I48.2]         Anticoagulation Episode Summary     INR check location     Preferred lab     Send INR reminders to [facility]    Comments Pt's 1st name is pronounced \"ondray\"  Pt does not want print out, appt card only      Anticoagulation Care Providers     Provider Role Specialty Phone number    Dannielle Darby MD Responsible Internal Medicine [phone]            See the Encounter Report to view Anticoagulation Flowsheet and Dosing Calendar (Go to Encounters tab in chart review, and find the Anticoagulation Therapy Visit)    Dosage adjustment made based on physician directed care plan.    Dilma Rider RN               "

## 2018-02-19 NOTE — MR AVS SNAPSHOT
Leonor Mercado   2/19/2018 2:45 PM   Anticoagulation Therapy Visit    Description:  89 year old female   Provider[facility]  [facility]   Department:  [facility]           INR as of 2/19/2018     Today's INR 2.6      Anticoagulation Summary as of 2/19/2018     INR goal 2.0-3.0   Today's INR 2.6   Full instructions 2 mg every day   Next INR check 2/27/2018    Indications   Long-term (current) use of anticoagulants [Z79.01] [Z79.01]  A Fib - chr Coumadin  s/p PPM (H) [I48.2]         Your next Anticoagulation Clinic appointment(s)     Feb 22, 2018  1:45 PM CST   Anticoagulation Visit with [facility]   [facility]    [address] [facility] [address]  [facility] [address] [phone]            Feb 27, 2018  2:00 PM CST   Anticoagulation Visit with [facility]   [facility]    [address]  [facility] [address] [phone]              Contact Numbers     [facility] Phone Numbers:  Anticoagulation Clinic Appointments : [phone]  Anticoagulation Nurse: [phone]         February 2018 Details    Sun Mon Tue Wed Thu Fri Sat         1               2               3                 4               5               6               7               8               9               10                 11               12               13               14               15               16               17                 18               19      2 mg   See details      20      2 mg         21      2 mg         22      2 mg         23      2 mg         24      2 mg           25      2 mg         26      2 mg         27            28                   Date Details   02/19 This INR check       Date of next INR:  2/27/2018         How to take your warfarin dose     To take:  2 mg Take 1 of the 2 mg tablets.

## 2018-02-20 ENCOUNTER — OFFICE VISIT (OUTPATIENT)
Dept: CARDIOLOGY | Facility: CLINIC | Age: 83
End: 2018-02-20
Payer: COMMERCIAL

## 2018-02-20 VITALS
WEIGHT: 137.5 LBS | BODY MASS INDEX: 24.36 KG/M2 | DIASTOLIC BLOOD PRESSURE: 76 MMHG | HEIGHT: 63 IN | SYSTOLIC BLOOD PRESSURE: 128 MMHG | HEART RATE: 88 BPM

## 2018-02-20 DIAGNOSIS — I50.32 CHRONIC DIASTOLIC CONGESTIVE HEART FAILURE (H): ICD-10-CM

## 2018-02-20 DIAGNOSIS — I50.32 CHRONIC DIASTOLIC HEART FAILURE (H): ICD-10-CM

## 2018-02-20 DIAGNOSIS — N28.9 RENAL INSUFFICIENCY: ICD-10-CM

## 2018-02-20 LAB
ANION GAP SERPL CALCULATED.3IONS-SCNC: 4 MMOL/L (ref 3–14)
BUN SERPL-MCNC: 30 MG/DL (ref 7–30)
CALCIUM SERPL-MCNC: 9.2 MG/DL (ref 8.5–10.1)
CHLORIDE SERPL-SCNC: 102 MMOL/L (ref 94–109)
CO2 SERPL-SCNC: 33 MMOL/L (ref 20–32)
CREAT SERPL-MCNC: 1.48 MG/DL (ref 0.52–1.04)
GFR SERPL CREATININE-BSD FRML MDRD: 33 ML/MIN/1.7M2
GLUCOSE SERPL-MCNC: 133 MG/DL (ref 70–99)
POTASSIUM SERPL-SCNC: 4.3 MMOL/L (ref 3.4–5.3)
SODIUM SERPL-SCNC: 139 MMOL/L (ref 133–144)

## 2018-02-20 PROCEDURE — 99214 OFFICE O/P EST MOD 30 MIN: CPT | Performed by: INTERNAL MEDICINE

## 2018-02-20 PROCEDURE — 80048 BASIC METABOLIC PNL TOTAL CA: CPT | Performed by: INTERNAL MEDICINE

## 2018-02-20 PROCEDURE — 36415 COLL VENOUS BLD VENIPUNCTURE: CPT | Performed by: INTERNAL MEDICINE

## 2018-02-20 RX ORDER — FUROSEMIDE 20 MG
40 TABLET ORAL DAILY
Qty: 90 TABLET | Refills: 3 | Status: SHIPPED | OUTPATIENT
Start: 2018-02-20 | End: 2018-02-21

## 2018-02-20 NOTE — MR AVS SNAPSHOT
After Visit Summary   2/20/2018    Leonor Mercado    MRN: [id number]           Patient Information     Date Of Birth          5/6/1926        Visit Information        Provider Department      2/20/2018 2:15 PM Bryan Garcia MD [facility]        Today's Diagnoses     Chronic diastolic heart failure (H)        Renal insufficiency        Chronic diastolic congestive heart failure (H)           Follow-ups after your visit        Additional Services     Follow-Up with Cardiologist                 Your next 10 appointments already scheduled     Feb 22, 2018  1:45 PM CST   Anticoagulation Visit with [facility] CLINIC   [facility] ([facility])    [address]  [facility] [address]   [phone]            Feb 27, 2018  2:00 PM CST   Anticoagulation Visit with [facility] ANTICOAGULATION CLINIC   [facility] ([facility])    [address]  [facility] [address]   [phone]            Apr 24, 2018  9:00 AM CDT   LAB with RU LAB   [facility] ([facility])    [address]  [facility] [id number] [phone]           Please do not eat 10-12 hours before your appointment if you are coming in fasting for labs on lipids, cholesterol, or glucose (sugar). This does not apply to pregnant women. Water, hot tea and black coffee (with nothing added) are okay. Do not drink other fluids, diet soda or chew gum.            Apr 24, 2018 10:00 AM CDT   Return Visit with Collins Horton MD   [facility] ([facility])    [address]  [facility] [id number] [phone]            May 10, 2018  9:30 AM CDT   Phone Device Check with NUÑEZ TECH2   [facility]   [place] ([facility])    [address]  "[facility] [address]  [facility] [address]   [phone]              Future tests that were ordered for you today     Open Future Orders        Priority Expected Expires Ordered    Follow-Up with Cardiologist Routine 2018            Who to contact     If you have questions or need follow up information about today's clinic visit or your schedule please contact [facility] directly at [phone].  Normal or non-critical lab and imaging results will be communicated to you by MyChart, letter or phone within 4 business days after the clinic has received the results. If you do not hear from us within 7 days, please contact the clinic through [employer] or phone. If you have a critical or abnormal lab result, we will notify you by phone as soon as possible.  Submit refill requests through [employer] or call your pharmacy and they will forward the refill request to us. Please allow 3 business days for your refill to be completed.          Additional Information About Your Visit        [employer] Information     [employer] lets you send messages to your doctor, view your test results, renew your prescriptions, schedule appointments and more. To sign up, go to [email or web address] . Click on \"Log in\" on the left side of the screen, which will take you to the Welcome page. Then click on \"Sign up Now\" on the right side of the page.     You will be asked to enter the access code listed below, as well as some personal information. Please follow the directions to create your username and password.     Your access code is: [email or web address]  Expires: 2018  6:20 PM     Your access code will  in 90 days. If you need help or a new code, please call your [place] clinic or [phone].        Care EveryWhere ID     This is your Care EveryWhere ID. This could be used by other organizations to access your [facility] medical records  [id number]        Your Vitals Were     " "Pulse Height Last Period BMI (Body Mass Index)          88 1.6 m (5' 3\") (LMP Unknown) 24.36 kg/m2         Blood Pressure from Last 3 Encounters:   02/20/18 128/76   01/25/18 101/63   01/25/18 101/63    Weight from Last 3 Encounters:   02/20/18 62.4 kg (137 lb 8 oz)   01/25/18 60.7 kg (133 lb 12.8 oz)   01/11/18 62.5 kg (137 lb 12.8 oz)              We Performed the Following     Follow-Up with Cardiologist          Today's Medication Changes          These changes are accurate as of 2/20/18  3:14 PM.  If you have any questions, ask your nurse or doctor.               Stop taking these medicines if you haven't already. Please contact your care team if you have questions.     umeclidinium 62.5 MCG/INH oral inhaler   Commonly known as:  INCRUSE ELLIPTA   Stopped by:  Bryan Garcia MD                Where to get your medicines      These medications were sent to [facility] [address] [place], [place] [address]     Phone:  [phone]     furosemide 20 MG tablet                Primary Care Provider Office Phone # Fax #    Dannielle Bria Darby [phone]       [address] [place] [address]        Equal Access to Services     NALDO OVALLE AH: Hadii marisel cervantes Sonatividad, waaxda luqadaha, qaybta kaalmada kyara, antonia hwang. So [facility] [phone].    ATENCIÓN: Si habla español, tiene a spicer disposición servicios gratuitos de asistencia lingüística. Pollo al [phone].    We comply with applicable federal civil rights laws and [place] laws. We do not discriminate on the basis of race, color, national origin, age, disability, sex, sexual orientation, or gender identity.            Thank you!     Thank you for choosing [facility]  for your care. Our goal is always to provide you with excellent care. Hearing back from our patients is one " way we can continue to improve our services. Please take a few minutes to complete the written survey that you may receive in the mail after your visit with us. Thank you!             Your Updated Medication List - Protect others around you: Learn how to safely use, store and throw away your medicines at www.disposemymeds.org.          This list is accurate as of 2/20/18  3:14 PM.  Always use your most recent med list.                   Brand Name Dispense Instructions for use Diagnosis    albuterol 108 (90 BASE) MCG/ACT Inhaler    PROAIR HFA/PROVENTIL HFA/VENTOLIN HFA    1 Inhaler    Inhale 2 puffs into the lungs every 6 hours as needed for shortness of breath / dyspnea or wheezing    Chronic obstructive pulmonary disease, unspecified COPD type (H)       ASPIRIN NOT PRESCRIBED    INTENTIONAL     Antiplatelet medication not prescribed intentionally due to Current anticoagulant therapy (warfarin/enoxaparin)    Chronic atrial fibrillation (H)       BREO ELLIPTA 200-25 MCG/INH oral inhaler   Generic drug:  fluticasone-vilanterol      Inhale 1 puff into the lungs daily    Anxiety       calcium carbonate 600 MG tablet   Generic drug:  calcium carbonate      Take 1 tablet by mouth daily        diltiazem 240 MG 24 hr capsule    DILTIAZEM CD    30 capsule    Take 1 capsule (240 mg) by mouth daily    Chronic atrial fibrillation (H)       furosemide 20 MG tablet    LASIX    90 tablet    Take 2 tablets (40 mg) by mouth daily    Chronic diastolic congestive heart failure (H)       guaiFENesin 600 MG 12 hr tablet    MUCINEX    180 tablet    Take 1 tablet (600 mg) by mouth 2 times daily as needed for congestion    Chronic obstructive pulmonary disease, unspecified COPD type (H)       ipratropium - albuterol 0.5 mg/2.5 mg/3 mL 0.5-2.5 (3) MG/3ML neb solution    DUONEB    360 mL    Take 1 vial (3 mLs) by nebulization every 6 hours as needed for shortness of breath / dyspnea or wheezing    Chronic obstructive pulmonary disease,  unspecified COPD type (H)       LORazepam 0.5 MG tablet    ATIVAN    60 tablet    Take 1 tablet (0.5 mg) by mouth 2 times daily as needed for anxiety    Anxiety       losartan 50 MG tablet    COZAAR    1 tablet    Take 0.5 tablets (25 mg) by mouth daily    Essential hypertension with goal blood pressure less than 140/90       MELATONIN PO      Take 5 mg by mouth At Bedtime        nebulizer nebulization      4 times daily instead of 6 times        nitroGLYcerin 0.4 MG sublingual tablet    NITROSTAT    25 tablet    Place 1 tablet (0.4 mg) under the tongue every 5 minutes as needed    Other chest pain       OCUVITE PO      Take 1 capsule by mouth daily.        polyethylene glycol Packet    MIRALAX/GLYCOLAX     Take 1 packet by mouth daily        pravastatin 40 MG tablet    PRAVACHOL    90 tablet    Take 1 tablet (40 mg) by mouth daily    Hyperlipidemia LDL goal <100, Coronary artery disease involving native coronary artery of native heart without angina pectoris       PROBIOTIC ACIDOPHILUS Tabs      Take 1 tablet by mouth daily        STOOL SOFTENER PO      Take by mouth as needed        TYLENOL PO      Take 1,000 mg by mouth At Bedtime        vitamin D 2000 UNITS tablet      Take 2,000 Units by mouth daily        warfarin 2 MG tablet    COUMADIN    90 tablet    TAKE ONE TABLET BY MOUTH ONCE DAILY OR  AS  DIRECTED  BY  INR  CLINIC    Long term current use of anticoagulant therapy

## 2018-02-20 NOTE — LETTER
2/20/2018    Dannielle Darby MD  [address] [facility] [address]    RE: Leonor Mercado       Dear Colleague,    I had the pleasure of seeing Leonor Mercado in the [facility].    HISTORY:    Leonor Mercado is a pleasant elderly female accompanied by her daughter again today. She has a complex past medical history having undergone stenting in 2004 while living in [facility], exact details unknown. She has a history of sick sinus syndrome with atrial fibrillation and has a pacemaker in place. She uses long-term anticoagulation. She also has previous intermittent PAT documented and has a history of severe emphysema. Her previous records have suggested pulmonary fibrosis and she has a history of amiodarone use. In addition she has renal insufficiency, bilateral lower extremity venous stasis, hyperlipidemia, hypertension, and is an ex-smoker having smoked for just 30 years and having quit roughly 30 years ago.    Today the patient is seen in cardiology clinic for follow-up of a visit about 6 weeks ago. She has had significant problems with worsening dyspnea in the recent past. I reviewed a note from Dr. Her, pulmonology, discussing her recent office visit. She was put on nebulizers and is currently using those 2 or 3 times a day and gets very substantial relief from them. She has also been using intermittent prednisone with some improvement. She has been struggling with worsening dyspnea for the last few months. She is also now using nighttime oxygen but not daytime.    Overall the patient reports that she is weak and tired but is still staying very active. Her  was recently transferred to a nursing home and she is cleaning out some of his belongings. It seems that she is always busy with something. She is up on her feet most of the time. Her main concern is that she has noticed an increased in her edema. While she has had edema in the  past, she doesn't feel that is ever been as bad as it is now. She is continuing to use her furosemide dose of 40 mg in the morning. I reviewed her laboratory studies today and they show continued mild renal dysfunction but numbers are stable.    We've had some conflicting data in the past about possible heart failure. Her echocardiogram did not suggest such a problem but her BNP was elevated modestly. At her last visit I started her on Lasix thinking that she may have some degree of volume overload. This improved her peripheral edema some, but didn't really help her breathing to any significant degree.    Today's exam is listed below but other then some peripheral edema she does not seem to be volume overloaded.      ASSESSMENT/PLAN:    1.  COPD with severe dyspnea. It seems that her primary cause of her dyspnea is pulmonary and not cardiac. When we put her on diuretics her creatinine jeferson substantially but has remains stable. I don't want to push her diuretics any further. Continue Lasix 40 mg per day. Prescription renewed.  2. Peripheral edema. She is on her feet most of the day and I suggest that she try to take some breaks and elevate her feet I also talked her about the importance of sodium limitation and suggested that she get compression stockings to minimize edema. As stated above, I do not want to push diuretics any further because I expect it would worsen renal function further in her current GFR is just 30.  3. Atrial fibrillation. Chronically anticoagulated, having some trouble with her INR as she goes on and off prednisone. If this remains a problem we may consider one of the NOACS, all I'm hesitant to make the switch at her age.  4. HFpEF.  Table and probably contributing minimally to her current symptoms. See discussion above.    Thank you for allowing me to dissipate in your patient's care. Please don't hesitate to call if I can be of further assistance.    Orders Placed This Encounter   Procedures      Follow-Up with Cardiologist     Orders Placed This Encounter   Medications     Docusate Calcium (STOOL SOFTENER PO)     Sig: Take by mouth as needed     furosemide (LASIX) 20 MG tablet     Sig: Take 2 tablets (40 mg) by mouth daily     Dispense:  90 tablet     Refill:  3     Medications Discontinued During This Encounter   Medication Reason     umeclidinium (INCRUSE ELLIPTA) 62.5 MCG/INH oral inhaler Alternate therapy     furosemide (LASIX) 20 MG tablet Medication Reconciliation Clean Up     predniSONE (DELTASONE) 10 MG tablet Therapy completed     furosemide (LASIX) 20 MG tablet Reorder         Encounter Diagnoses   Name Primary?     Chronic diastolic heart failure (H)      Renal insufficiency      Chronic diastolic congestive heart failure (H)        CURRENT MEDICATIONS:  Current Outpatient Prescriptions   Medication Sig Dispense Refill     Docusate Calcium (STOOL SOFTENER PO) Take by mouth as needed       furosemide (LASIX) 20 MG tablet Take 2 tablets (40 mg) by mouth daily 90 tablet 3     LORazepam (ATIVAN) 0.5 MG tablet Take 1 tablet (0.5 mg) by mouth 2 times daily as needed for anxiety 60 tablet 0     fluticasone-vilanterol (BREO ELLIPTA) 200-25 MCG/INH oral inhaler Inhale 1 puff into the lungs daily       Lactobacillus (PROBIOTIC ACIDOPHILUS) TABS Take 1 tablet by mouth daily       diltiazem (DILTIAZEM CD) 240 MG 24 hr capsule Take 1 capsule (240 mg) by mouth daily 30 capsule      ipratropium - albuterol 0.5 mg/2.5 mg/3 mL (DUONEB) 0.5-2.5 (3) MG/3ML neb solution Take 1 vial (3 mLs) by nebulization every 6 hours as needed for shortness of breath / dyspnea or wheezing 360 mL      guaiFENesin (MUCINEX) 600 MG 12 hr tablet Take 1 tablet (600 mg) by mouth 2 times daily as needed for congestion 180 tablet 0     nebulizer nebulization 4 times daily instead of 6 times       MELATONIN PO Take 5 mg by mouth At Bedtime       pravastatin (PRAVACHOL) 40 MG tablet Take 1 tablet (40 mg) by mouth daily 90 tablet 0      losartan (COZAAR) 50 MG tablet Take 0.5 tablets (25 mg) by mouth daily 1 tablet 0     warfarin (COUMADIN) 2 MG tablet TAKE ONE TABLET BY MOUTH ONCE DAILY OR  AS  DIRECTED  BY  INR  CLINIC 90 tablet 0     albuterol (PROAIR HFA/PROVENTIL HFA/VENTOLIN HFA) 108 (90 BASE) MCG/ACT Inhaler Inhale 2 puffs into the lungs every 6 hours as needed for shortness of breath / dyspnea or wheezing 1 Inhaler 1     Acetaminophen (TYLENOL PO) Take 1,000 mg by mouth At Bedtime       polyethylene glycol (MIRALAX/GLYCOLAX) packet Take 1 packet by mouth daily        nitroglycerin (NITROSTAT) 0.4 MG SL tablet Place 1 tablet (0.4 mg) under the tongue every 5 minutes as needed 25 tablet 1     calcium carbonate (CALCIUM CARBONATE) 600 MG TABS tablet Take 1 tablet by mouth daily        Cholecalciferol (VITAMIN D) 2000 UNITS tablet Take 2,000 Units by mouth daily       Multiple Vitamins-Minerals (OCUVITE PO) Take 1 capsule by mouth daily.       [DISCONTINUED] furosemide (LASIX) 20 MG tablet Take 1 tablet (20 mg) by mouth 2 times daily For 1 week 45 tablet 0     [DISCONTINUED] furosemide (LASIX) 20 MG tablet Take 1 tablet (20 mg) by mouth daily (Patient taking differently: Take 40 mg by mouth daily ) 30 tablet 3     ASPIRIN NOT PRESCRIBED, INTENTIONAL, Antiplatelet medication not prescribed intentionally due to Current anticoagulant therapy (warfarin/enoxaparin)  0       ALLERGIES     Allergies   Allergen Reactions     Chocolate Shortness Of Breath     Peanuts [Nuts] Shortness Of Breath     Amiodarone      pulm fibrosis       Baclofen      Confusion      Bactrim [Sulfamethoxazole W-Trimethoprim]      Difficulty swallowing     Doxycycline Other (See Comments)     Multiple complaints; she does not want this again.      Levaquin [Levofloxacin] Other (See Comments)     Multiple complaints; she will not take this again     Linzess [Linaclotide] Diarrhea     Lorazepam        Prolonged drowsiness with ER visit      Liquid Adhesive Itching and Rash      Bleeding when tape removed after pacemaker placement..itched and burned for weeks.       PAST MEDICAL HISTORY:  Past Medical History:   Diagnosis Date     A Fib - chr Coumadin, s/p PPM (H) 5/8/2013     Atrial fibrillation (H)     Sick sinus syndrome, PAT, AF     BCC (basal cell carcinoma of skin)     Face     CAD - 2 stents in [place] in 2000s.  1/5/2016     CHF (congestive heart failure) (H)     mild in past     CHF - Chr Diastolic (H) 11/21/2017     Chronic renal insufficiency      COPD (chronic obstructive pulmonary disease) (H)      COPD (H) 5/13/2013     Coronary artery disease     2-05[place]-CAD-taxus stentx2 2.5-12,2.5-12 in LADp and LADm, 80%nonDomRCA-LcxDom-mild,EF60%     Hyperlipidemia      Hypertension      IBS (irritable bowel syndrome)      Irritable bowel      Osteoporosis      Panic attack     questionable diagnosis     Pulmonary fibrosis (H)     do not use amiodarone     Shortness of breath      Sick sinus syndrome - s/p PPM 2003 (H) 12/16/2017     SSS (sick sinus syndrome) (H)     DDD pacer (see surgery history section)     Vertigo        PAST SURGICAL HISTORY:  Past Surgical History:   Procedure Laterality Date     APPENDECTOMY  1956     CARDIAC SURGERY      PPM, 2 STENTS2-05[place]-CAD-taxus stentx2 2.5-12,2.5-12 in LADp and LADm, 80%nonDomRCA-LcxDom-mild,EF60%     CORONARY ANGIOGRAPHY ADULT ORDER  7/1994    mild CAD,Normal LV function, No Mitral regurgitation, Prox LAD 30% stenosis. RCA prox and mid, 20-30%     ESOPHAGOSCOPY, GASTROSCOPY, DUODENOSCOPY (EGD), COMBINED N/A 8/19/2015    Procedure: COMBINED ESOPHAGOSCOPY, GASTROSCOPY, DUODENOSCOPY (EGD), BIOPSY SINGLE OR MULTIPLE;  Surgeon: Genevieve Leon MD;  Location: [facility]     H LEAD REVISION DUAL  4/2003    Revised in [place]-due to diaphram stim (original pacer placed in [place])     H LEAD REVISION DUAL  7/2/2014    Correction of reversal of A and V leads in Header     HEART CATH, ANGIOPLASTY  02/2005    LEIGH ANN mid and proximal LAD, ongoing 80% RCA;  "mild circumflex     HERNIA REPAIR Left     LI     IMPLANT PACEMAKER  2003    Placed in [place] for sick sinus syndrome     REPLACE PACEMAKER GENERATOR  2013    Dual-chamber pacemaker by Dr SETH Pierre       FAMILY HISTORY:  Family History   Problem Relation Age of Onset     HEART DISEASE Father       age 84     Neurologic Disorder Mother      dementia     GASTROINTESTINAL DISEASE Daughter      liver transplant     Crohn Disease Daughter        SOCIAL HISTORY:  Social History     Social History     Marital status:      Spouse name: N/A     Number of children: 3     Years of education: N/A     Occupational History      Retired     Social History Main Topics     Smoking status: Former Smoker     Types: Cigarettes     Quit date: 1987     Smokeless tobacco: Never Used     Alcohol use 0.0 oz/week     0 Standard drinks or equivalent per week      Comment: 1 drink per month     Drug use: No     Sexual activity: No     Other Topics Concern     Parent/Sibling W/ Cabg, Mi Or Angioplasty Before 65f 55m? Yes     Caffeine Concern Yes     decaf - some 1/2 caff     Sleep Concern Yes     nocturia every 2 hours     Special Diet No     Exercise No     some walking in the house     Social History Narrative       Review of Systems:  Skin:  Negative     Eyes:  Positive for glasses  ENT:  Positive for hearing loss;nasal congestion  Respiratory:  Positive for dyspnea on exertion;cough;wheezing  Cardiovascular:    Positive for;edema;palpitations;chest pain;heaviness;fatigue;dizziness  Gastroenterology: Positive for nausea  Genitourinary:  Positive for nocturia;urinary frequency  Musculoskeletal:  Positive for muscular weakness;nocturnal cramping  Neurologic:       Psychiatric:  not assessed    Heme/Lymph/Imm:  Positive for easy bruising  Endocrine:  Negative      Physical Exam:  Vitals: /76 (BP Location: Left arm, Patient Position: Chair, Cuff Size: Adult Regular)  Pulse 88  Ht 1.6 m (5' 3\")  Wt 62.4 kg (137 " lb 8 oz)  LMP  (LMP Unknown)  BMI 24.36 kg/m2    Constitutional:  cooperative, alert and oriented, well developed, well nourished, in no acute distress frail      Skin:  warm and dry to the touch        Head:  normocephalic        Eyes:  no xanthalasma        ENT:  no pallor or cyanosis        Neck:  carotid pulses are full and equal bilaterally        Chest:      Lungs sounds are distant in all fields with dyspnea and diffuse expiratory wheezes    Cardiac: apical impulse not displaced;normal S1 and S2;no S3 or S4;no murmurs, gallops or rubs detected irregular rhythm                Abdomen:  abdomen soft, non-tender, BS normoactive, no mass, no HSM, no bruits        Vascular: pulses full and equal                                      Extremities and Back:           Neurological:  no gross motor deficits          Recent Lab Results:  LIPID RESULTS:  Lab Results   Component Value Date    CHOL 161 12/11/2017    HDL 85 12/11/2017    LDL 54 12/11/2017    TRIG 109 12/11/2017    CHOLHDLRATIO 2.9 03/15/2015       LIVER ENZYME RESULTS:  Lab Results   Component Value Date    AST 18 11/15/2016    ALT 18 11/15/2016       CBC RESULTS:  Lab Results   Component Value Date    WBC 7.4 01/06/2018    RBC 4.37 01/06/2018    HGB 12.7 01/06/2018    HCT 39.8 01/06/2018    MCV 91 01/06/2018    MCH 29.1 01/06/2018    MCHC 31.9 01/06/2018    RDW 13.6 01/06/2018     01/06/2018       BMP RESULTS:  Lab Results   Component Value Date     02/20/2018    POTASSIUM 4.3 02/20/2018    CHLORIDE 102 02/20/2018    CO2 33 (H) 02/20/2018    ANIONGAP 4 02/20/2018     (H) 02/20/2018    BUN 30 02/20/2018    CR 1.48 (H) 02/20/2018    GFRESTIMATED 33 (L) 02/20/2018    GFRESTBLACK 40 (L) 02/20/2018    TRI 9.2 02/20/2018        A1C RESULTS:  Lab Results   Component Value Date    A1C 6.1 (H) 04/30/2014       INR RESULTS:  Lab Results   Component Value Date    INR 2.6 (A) 02/19/2018    INR 4.4 (A) 02/15/2018    INR 3.30 (H) 01/06/2018    INR  2.60 (H) 11/15/2016       Thank you for allowing me to participate in the care of your patient.    Sincerely,     Bryan Garcia MD     [facility]

## 2018-02-20 NOTE — PROGRESS NOTES
HISTORY:    Leonor Mercado is a pleasant elderly female accompanied by her daughter again today. She has a complex past medical history having undergone stenting in 2004 while living in [facility], exact details unknown. She has a history of sick sinus syndrome with atrial fibrillation and has a pacemaker in place. She uses long-term anticoagulation. She also has previous intermittent PAT documented and has a history of severe emphysema. Her previous records have suggested pulmonary fibrosis and she has a history of amiodarone use. In addition she has renal insufficiency, bilateral lower extremity venous stasis, hyperlipidemia, hypertension, and is an ex-smoker having smoked for just 30 years and having quit roughly 30 years ago.    Today the patient is seen in cardiology clinic for follow-up of a visit about 6 weeks ago. She has had significant problems with worsening dyspnea in the recent past. I reviewed a note from Dr. Her, pulmonology, discussing her recent office visit. She was put on nebulizers and is currently using those 2 or 3 times a day and gets very substantial relief from them. She has also been using intermittent prednisone with some improvement. She has been struggling with worsening dyspnea for the last few months. She is also now using nighttime oxygen but not daytime.    Overall the patient reports that she is weak and tired but is still staying very active. Her  was recently transferred to a nursing home and she is cleaning out some of his belongings. It seems that she is always busy with something. She is up on her feet most of the time. Her main concern is that she has noticed an increased in her edema. While she has had edema in the past, she doesn't feel that is ever been as bad as it is now. She is continuing to use her furosemide dose of 40 mg in the morning. I reviewed her laboratory studies today and they show continued mild renal dysfunction but numbers are  stable.    We've had some conflicting data in the past about possible heart failure. Her echocardiogram did not suggest such a problem but her BNP was elevated modestly. At her last visit I started her on Lasix thinking that she may have some degree of volume overload. This improved her peripheral edema some, but didn't really help her breathing to any significant degree.    Today's exam is listed below but other then some peripheral edema she does not seem to be volume overloaded.      ASSESSMENT/PLAN:    1.  COPD with severe dyspnea. It seems that her primary cause of her dyspnea is pulmonary and not cardiac. When we put her on diuretics her creatinine jeferson substantially but has remains stable. I don't want to push her diuretics any further. Continue Lasix 40 mg per day. Prescription renewed.  2. Peripheral edema. She is on her feet most of the day and I suggest that she try to take some breaks and elevate her feet I also talked her about the importance of sodium limitation and suggested that she get compression stockings to minimize edema. As stated above, I do not want to push diuretics any further because I expect it would worsen renal function further in her current GFR is just 30.  3. Atrial fibrillation. Chronically anticoagulated, having some trouble with her INR as she goes on and off prednisone. If this remains a problem we may consider one of the NOACS, all I'm hesitant to make the switch at her age.  4. HFpEF.  Table and probably contributing minimally to her current symptoms. See discussion above.    Thank you for allowing me to dissipate in your patient's care. Please don't hesitate to call if I can be of further assistance.    Orders Placed This Encounter   Procedures     Follow-Up with Cardiologist     Orders Placed This Encounter   Medications     Docusate Calcium (STOOL SOFTENER PO)     Sig: Take by mouth as needed     furosemide (LASIX) 20 MG tablet     Sig: Take 2 tablets (40 mg) by mouth daily      Dispense:  90 tablet     Refill:  3     Medications Discontinued During This Encounter   Medication Reason     umeclidinium (INCRUSE ELLIPTA) 62.5 MCG/INH oral inhaler Alternate therapy     furosemide (LASIX) 20 MG tablet Medication Reconciliation Clean Up     predniSONE (DELTASONE) 10 MG tablet Therapy completed     furosemide (LASIX) 20 MG tablet Reorder         Encounter Diagnoses   Name Primary?     Chronic diastolic heart failure (H)      Renal insufficiency      Chronic diastolic congestive heart failure (H)        CURRENT MEDICATIONS:  Current Outpatient Prescriptions   Medication Sig Dispense Refill     Docusate Calcium (STOOL SOFTENER PO) Take by mouth as needed       furosemide (LASIX) 20 MG tablet Take 2 tablets (40 mg) by mouth daily 90 tablet 3     LORazepam (ATIVAN) 0.5 MG tablet Take 1 tablet (0.5 mg) by mouth 2 times daily as needed for anxiety 60 tablet 0     fluticasone-vilanterol (BREO ELLIPTA) 200-25 MCG/INH oral inhaler Inhale 1 puff into the lungs daily       Lactobacillus (PROBIOTIC ACIDOPHILUS) TABS Take 1 tablet by mouth daily       diltiazem (DILTIAZEM CD) 240 MG 24 hr capsule Take 1 capsule (240 mg) by mouth daily 30 capsule      ipratropium - albuterol 0.5 mg/2.5 mg/3 mL (DUONEB) 0.5-2.5 (3) MG/3ML neb solution Take 1 vial (3 mLs) by nebulization every 6 hours as needed for shortness of breath / dyspnea or wheezing 360 mL      guaiFENesin (MUCINEX) 600 MG 12 hr tablet Take 1 tablet (600 mg) by mouth 2 times daily as needed for congestion 180 tablet 0     nebulizer nebulization 4 times daily instead of 6 times       MELATONIN PO Take 5 mg by mouth At Bedtime       pravastatin (PRAVACHOL) 40 MG tablet Take 1 tablet (40 mg) by mouth daily 90 tablet 0     losartan (COZAAR) 50 MG tablet Take 0.5 tablets (25 mg) by mouth daily 1 tablet 0     warfarin (COUMADIN) 2 MG tablet TAKE ONE TABLET BY MOUTH ONCE DAILY OR  AS  DIRECTED  BY  INR  CLINIC 90 tablet 0     albuterol (PROAIR HFA/PROVENTIL  HFA/VENTOLIN HFA) 108 (90 BASE) MCG/ACT Inhaler Inhale 2 puffs into the lungs every 6 hours as needed for shortness of breath / dyspnea or wheezing 1 Inhaler 1     Acetaminophen (TYLENOL PO) Take 1,000 mg by mouth At Bedtime       polyethylene glycol (MIRALAX/GLYCOLAX) packet Take 1 packet by mouth daily        nitroglycerin (NITROSTAT) 0.4 MG SL tablet Place 1 tablet (0.4 mg) under the tongue every 5 minutes as needed 25 tablet 1     calcium carbonate (CALCIUM CARBONATE) 600 MG TABS tablet Take 1 tablet by mouth daily        Cholecalciferol (VITAMIN D) 2000 UNITS tablet Take 2,000 Units by mouth daily       Multiple Vitamins-Minerals (OCUVITE PO) Take 1 capsule by mouth daily.       [DISCONTINUED] furosemide (LASIX) 20 MG tablet Take 1 tablet (20 mg) by mouth 2 times daily For 1 week 45 tablet 0     [DISCONTINUED] furosemide (LASIX) 20 MG tablet Take 1 tablet (20 mg) by mouth daily (Patient taking differently: Take 40 mg by mouth daily ) 30 tablet 3     ASPIRIN NOT PRESCRIBED, INTENTIONAL, Antiplatelet medication not prescribed intentionally due to Current anticoagulant therapy (warfarin/enoxaparin)  0       ALLERGIES     Allergies   Allergen Reactions     Chocolate Shortness Of Breath     Peanuts [Nuts] Shortness Of Breath     Amiodarone      pulm fibrosis       Baclofen      Confusion      Bactrim [Sulfamethoxazole W-Trimethoprim]      Difficulty swallowing     Doxycycline Other (See Comments)     Multiple complaints; she does not want this again.      Levaquin [Levofloxacin] Other (See Comments)     Multiple complaints; she will not take this again     Linzess [Linaclotide] Diarrhea     Lorazepam        Prolonged drowsiness with ER visit      Liquid Adhesive Itching and Rash     Bleeding when tape removed after pacemaker placement..itched and burned for weeks.       PAST MEDICAL HISTORY:  Past Medical History:   Diagnosis Date     A Fib - chr Coumadin, s/p PPM (H) 5/8/2013     Atrial fibrillation (H)     Sick  sinus syndrome, PAT, AF     BCC (basal cell carcinoma of skin)     Face     CAD - 2 stents in [place] in 2000s.  1/5/2016     CHF (congestive heart failure) (H)     mild in past     CHF - Chr Diastolic (H) 11/21/2017     Chronic renal insufficiency      COPD (chronic obstructive pulmonary disease) (H)      COPD (H) 5/13/2013     Coronary artery disease     2-05[place]-CAD-taxus stentx2 2.5-12,2.5-12 in LADp and LADm, 80%nonDomRCA-LcxDom-mild,EF60%     Hyperlipidemia      Hypertension      IBS (irritable bowel syndrome)      Irritable bowel      Osteoporosis      Panic attack     questionable diagnosis     Pulmonary fibrosis (H)     do not use amiodarone     Shortness of breath      Sick sinus syndrome - s/p PPM 2003 (H) 12/16/2017     SSS (sick sinus syndrome) (H)     DDD pacer (see surgery history section)     Vertigo        PAST SURGICAL HISTORY:  Past Surgical History:   Procedure Laterality Date     APPENDECTOMY  1956     CARDIAC SURGERY      PPM, 2 STENTS2-05[place]-CAD-taxus stentx2 2.5-12,2.5-12 in LADp and LADm, 80%nonDomRCA-LcxDom-mild,EF60%     CORONARY ANGIOGRAPHY ADULT ORDER  7/1994    mild CAD,Normal LV function, No Mitral regurgitation, Prox LAD 30% stenosis. RCA prox and mid, 20-30%     ESOPHAGOSCOPY, GASTROSCOPY, DUODENOSCOPY (EGD), COMBINED N/A 8/19/2015    Procedure: COMBINED ESOPHAGOSCOPY, GASTROSCOPY, DUODENOSCOPY (EGD), BIOPSY SINGLE OR MULTIPLE;  Surgeon: Genevieve Leon MD;  Location: [facility]     H LEAD REVISION DUAL  4/2003    Revised in [place]-due to diaphram stim (original pacer placed in [place])     H LEAD REVISION DUAL  7/2/2014    Correction of reversal of A and V leads in Header     HEART CATH, ANGIOPLASTY  02/2005    LEIGH ANN mid and proximal LAD, ongoing 80% RCA; mild circumflex     HERNIA REPAIR Left 1991    [facility]     IMPLANT PACEMAKER  2/19/2003    Placed in [place] for sick sinus syndrome     REPLACE PACEMAKER GENERATOR  6/27/2013    Dual-chamber pacemaker by Dr SETH Pierre       FAMILY HISTORY:  Family  "History   Problem Relation Age of Onset     HEART DISEASE Father       age 84     Neurologic Disorder Mother      dementia     GASTROINTESTINAL DISEASE Daughter      liver transplant     Crohn Disease Daughter        SOCIAL HISTORY:  Social History     Social History     Marital status:      Spouse name: N/A     Number of children: 3     Years of education: N/A     Occupational History      Retired     Social History Main Topics     Smoking status: Former Smoker     Types: Cigarettes     Quit date: 1987     Smokeless tobacco: Never Used     Alcohol use 0.0 oz/week     0 Standard drinks or equivalent per week      Comment: 1 drink per month     Drug use: No     Sexual activity: No     Other Topics Concern     Parent/Sibling W/ Cabg, Mi Or Angioplasty Before 65f 55m? Yes     Caffeine Concern Yes     decaf - some 1/2 caff     Sleep Concern Yes     nocturia every 2 hours     Special Diet No     Exercise No     some walking in the house     Social History Narrative       Review of Systems:  Skin:  Negative     Eyes:  Positive for glasses  ENT:  Positive for hearing loss;nasal congestion  Respiratory:  Positive for dyspnea on exertion;cough;wheezing  Cardiovascular:    Positive for;edema;palpitations;chest pain;heaviness;fatigue;dizziness  Gastroenterology: Positive for nausea  Genitourinary:  Positive for nocturia;urinary frequency  Musculoskeletal:  Positive for muscular weakness;nocturnal cramping  Neurologic:       Psychiatric:  not assessed    Heme/Lymph/Imm:  Positive for easy bruising  Endocrine:  Negative      Physical Exam:  Vitals: /76 (BP Location: Left arm, Patient Position: Chair, Cuff Size: Adult Regular)  Pulse 88  Ht 1.6 m (5' 3\")  Wt 62.4 kg (137 lb 8 oz)  LMP  (LMP Unknown)  BMI 24.36 kg/m2    Constitutional:  cooperative, alert and oriented, well developed, well nourished, in no acute distress frail      Skin:  warm and dry to the touch        Head:  normocephalic        Eyes:  " no xanthalasma        ENT:  no pallor or cyanosis        Neck:  carotid pulses are full and equal bilaterally        Chest:      Lungs sounds are distant in all fields with dyspnea and diffuse expiratory wheezes    Cardiac: apical impulse not displaced;normal S1 and S2;no S3 or S4;no murmurs, gallops or rubs detected irregular rhythm                Abdomen:  abdomen soft, non-tender, BS normoactive, no mass, no HSM, no bruits        Vascular: pulses full and equal                                      Extremities and Back:           Neurological:  no gross motor deficits          Recent Lab Results:  LIPID RESULTS:  Lab Results   Component Value Date    CHOL 161 12/11/2017    HDL 85 12/11/2017    LDL 54 12/11/2017    TRIG 109 12/11/2017    CHOLHDLRATIO 2.9 03/15/2015       LIVER ENZYME RESULTS:  Lab Results   Component Value Date    AST 18 11/15/2016    ALT 18 11/15/2016       CBC RESULTS:  Lab Results   Component Value Date    WBC 7.4 01/06/2018    RBC 4.37 01/06/2018    HGB 12.7 01/06/2018    HCT 39.8 01/06/2018    MCV 91 01/06/2018    MCH 29.1 01/06/2018    MCHC 31.9 01/06/2018    RDW 13.6 01/06/2018     01/06/2018       BMP RESULTS:  Lab Results   Component Value Date     02/20/2018    POTASSIUM 4.3 02/20/2018    CHLORIDE 102 02/20/2018    CO2 33 (H) 02/20/2018    ANIONGAP 4 02/20/2018     (H) 02/20/2018    BUN 30 02/20/2018    CR 1.48 (H) 02/20/2018    GFRESTIMATED 33 (L) 02/20/2018    GFRESTBLACK 40 (L) 02/20/2018    TRI 9.2 02/20/2018        A1C RESULTS:  Lab Results   Component Value Date    A1C 6.1 (H) 04/30/2014       INR RESULTS:  Lab Results   Component Value Date    INR 2.6 (A) 02/19/2018    INR 4.4 (A) 02/15/2018    INR 3.30 (H) 01/06/2018    INR 2.60 (H) 11/15/2016         Bryan Garcia MD, FACC    CC  Bryan Garcia MD  [address] [place] [id number]

## 2018-02-21 DIAGNOSIS — I50.32 CHRONIC DIASTOLIC CONGESTIVE HEART FAILURE (H): ICD-10-CM

## 2018-02-21 RX ORDER — FUROSEMIDE 20 MG
40 TABLET ORAL DAILY
Qty: 180 TABLET | Refills: 3 | Status: SHIPPED | OUTPATIENT
Start: 2018-02-21 | End: 2018-07-31

## 2018-02-21 NOTE — TELEPHONE ENCOUNTER
Received refill request for:  Lasix clarification on dispensing amount  Last OV was: 2/20/2018 with Dr. Garcia  Labs/EKG: last BMP 2/20/2018   F/U scheduled: 4/24/2018 with Dr. Horton  New script sent to: [facility]

## 2018-02-27 ENCOUNTER — ANTICOAGULATION THERAPY VISIT (OUTPATIENT)
Dept: ANTICOAGULATION | Facility: CLINIC | Age: 83
End: 2018-02-27
Payer: COMMERCIAL

## 2018-02-27 DIAGNOSIS — I48.20 CHRONIC ATRIAL FIBRILLATION (H): ICD-10-CM

## 2018-02-27 DIAGNOSIS — Z79.01 LONG-TERM (CURRENT) USE OF ANTICOAGULANTS: ICD-10-CM

## 2018-02-27 LAB — INR POINT OF CARE: 3.1 (ref 0.86–1.14)

## 2018-02-27 PROCEDURE — 36416 COLLJ CAPILLARY BLOOD SPEC: CPT

## 2018-02-27 PROCEDURE — 85610 PROTHROMBIN TIME: CPT | Mod: QW

## 2018-02-27 PROCEDURE — 99207 ZZC NO CHARGE NURSE ONLY: CPT

## 2018-02-27 NOTE — PROGRESS NOTES
"  ANTICOAGULATION FOLLOW-UP CLINIC VISIT    Patient Name:  Leonor Mercado  Date:  2/27/2018  Contact Type:  Face to Face    SUBJECTIVE:     Patient Findings     Positives No Problem Findings    Comments Pt and dtr reports pt will be moving into a senior living, [facility] in [place].            OBJECTIVE    INR Protime   Date Value Ref Range Status   02/27/2018 3.1 (A) 0.86 - 1.14 Final       ASSESSMENT / PLAN  INR assessment THER    Recheck INR In: 2 WEEKS    INR Location Clinic      Anticoagulation Summary as of 2/27/2018     INR goal 2.0-3.0   Today's INR 3.1!   Maintenance plan 2 mg (2 mg x 1) every day   Full instructions 2 mg every day   Weekly total 14 mg   No change documented Dilma Rider RN   Plan last modified Yoana Waddell RN (12/15/2016)   Next INR check 3/13/2018   Priority INR   Target end date     Indications   Long-term (current) use of anticoagulants [Z79.01] [Z79.01]  A Fib - chr Coumadin  s/p PPM (H) [I48.2]         Anticoagulation Episode Summary     INR check location     Preferred lab     Send INR reminders to [facility]    Comments Pt's 1st name is pronounced \"ondray\"  Pt does not want print out, appt card only      Anticoagulation Care Providers     Provider Role Specialty Phone number    BarintmercedesNuzhatDannielle Narayanan MD [facility] Internal Medicine [phone]            See the Encounter Report to view Anticoagulation Flowsheet and Dosing Calendar (Go to Encounters tab in chart review, and find the Anticoagulation Therapy Visit)    Dosage adjustment made based on physician directed care plan.    Dilma Rider RN               "

## 2018-02-27 NOTE — MR AVS SNAPSHOT
Leonor Mercado   2/27/2018 2:00 PM   Anticoagulation Therapy Visit    Description:  89 year old female   Provider:  [facility]   Department:  [facility]           INR as of 2/27/2018     Today's INR 3.1!      Anticoagulation Summary as of 2/27/2018     INR goal 2.0-3.0   Today's INR 3.1!   Full instructions 2 mg every day   Next INR check 3/13/2018    Indications   Long-term (current) use of anticoagulants [Z79.01] [Z79.01]  A Fib - chr Coumadin  s/p PPM (H) [I48.2]         Your next Anticoagulation Clinic appointment(s)     Mar 13, 2018  1:45 PM CDT   [facility]ticoagulation Visit with [facility]   [facility]    [address] [facility] [address]  [facility] [address] [phone]              Contact Numbers     [facility] Phone Numbers:  Anticoagulation Clinic Appointments : [phone]  Anticoagulation Nurse: [phone]         February 2018 Details    Sun Mon Tue Wed Thu Fri Sat         1               2               3                 4               5               6               7               8               9               10                 11               12               13               14               15               16               17                 18               19               20               21               22               23               24                 25               26               27      2 mg   See details      28      2 mg             Date Details   02/27 This INR check               How to take your warfarin dose     To take:  2 mg Take 1 of the 2 mg tablets.           March 2018 Details    Sun Mon Tue Wed Thu Fri Sat         1      2 mg         2      2 mg         3      2 mg           4      2 mg         5      2 mg         6      2 mg         7      2 mg         8      2 mg         9      2 mg         10      2 mg           11      2 mg         12      2 mg         13             14               15               16               17                 18               19               20               21               22               23               24                 25               26               27               28               29               30               31                Date Details   No additional details    Date of next INR:  3/13/2018         How to take your warfarin dose     To take:  2 mg Take 1 of the 2 mg tablets.

## 2018-03-08 ENCOUNTER — TRANSFERRED RECORDS (OUTPATIENT)
Dept: HEALTH INFORMATION MANAGEMENT | Facility: CLINIC | Age: 83
End: 2018-03-08

## 2018-03-13 ENCOUNTER — ANTICOAGULATION THERAPY VISIT (OUTPATIENT)
Dept: ANTICOAGULATION | Facility: CLINIC | Age: 83
End: 2018-03-13
Payer: COMMERCIAL

## 2018-03-13 DIAGNOSIS — I48.20 CHRONIC ATRIAL FIBRILLATION (H): ICD-10-CM

## 2018-03-13 DIAGNOSIS — F41.9 ANXIETY: ICD-10-CM

## 2018-03-13 DIAGNOSIS — Z79.01 LONG-TERM (CURRENT) USE OF ANTICOAGULANTS: ICD-10-CM

## 2018-03-13 LAB — INR POINT OF CARE: 2.7 (ref 0.86–1.14)

## 2018-03-13 PROCEDURE — 99207 ZZC NO CHARGE NURSE ONLY: CPT

## 2018-03-13 PROCEDURE — 85610 PROTHROMBIN TIME: CPT | Mod: QW

## 2018-03-13 PROCEDURE — 36416 COLLJ CAPILLARY BLOOD SPEC: CPT

## 2018-03-13 NOTE — MR AVS SNAPSHOT
Leonor Mercado   3/13/2018 1:45 PM   Anticoagulation Therapy Visit    Description:  89 year old female   Provider:  [facility]   Department:  [facility]           INR as of 3/13/2018     Today's INR 2.7      Anticoagulation Summary as of 3/13/2018     INR goal 2.0-3.0   Today's INR 2.7   Full instructions 3/16: 1 mg; Otherwise 2 mg every day   Next INR check 4/3/2018    Indications   Long-term (current) use of anticoagulants [Z79.01] [Z79.01]  A Fib - chr Coumadin  s/p PPM (H) [I48.2]         Your next Anticoagulation Clinic appointment(s)     Apr 03, 2018  2:00 PM CDT   Anticoagulation Visit with [facility]   [facility]    [address]  [facility] [address] [phone]              Contact Numbers     [facility] Phone Numbers:  Anticoagulation Clinic Appointments : [phone]  Anticoagulation Nurse: [phone]         March 2018 Details    Sun Mon Tue Wed Thu Fri Sat         1               2               3                 4               5               6               7               8               9               10                 11               12               13      2 mg   See details      14      2 mg         15      2 mg         16      1 mg         17      2 mg           18      2 mg         19      2 mg         20      2 mg         21      2 mg         22      2 mg         23      2 mg         24      2 mg           25      2 mg         26      2 mg         27      2 mg         28      2 mg         29      2 mg         30      2 mg         31      2 mg          Date Details   03/13 This INR check               How to take your warfarin dose     To take:  1 mg Take 0.5 of a 2 mg tablet.    To take:  2 mg Take 1 of the 2 mg tablets.           April 2018 Details    Sun Mon Tue Wed Thu Fri Sat     1      2 mg         2      2 mg         3            4               5                6               7                 8               9               10               11               12               13               14                 15               16               17               18               19               20               21                 22               23               24               25               26               27               28                 29               30                     Date Details   No additional details    Date of next INR:  4/3/2018         How to take your warfarin dose     To take:  2 mg Take 1 of the 2 mg tablets.

## 2018-03-14 RX ORDER — LORAZEPAM 0.5 MG/1
TABLET ORAL
Qty: 60 TABLET | Refills: 0 | Status: SHIPPED | OUTPATIENT
Start: 2018-03-14 | End: 2018-04-11

## 2018-03-23 DIAGNOSIS — I10 ESSENTIAL HYPERTENSION WITH GOAL BLOOD PRESSURE LESS THAN 140/90: ICD-10-CM

## 2018-03-26 RX ORDER — LOSARTAN POTASSIUM 50 MG/1
TABLET ORAL
Qty: 90 TABLET | Refills: 0 | Status: SHIPPED | OUTPATIENT
Start: 2018-03-26 | End: 2018-04-03

## 2018-03-26 NOTE — TELEPHONE ENCOUNTER
"Requested Prescriptions   Pending Prescriptions Disp Refills     losartan (COZAAR) 50 MG tablet [Pharmacy Med Name: LOSARTAN 50MG TAB] 90 tablet 0     Sig: TAKE ONE TABLET BY MOUTH ONCE DAILY    Angiotensin-II Receptors Failed    3/23/2018  7:00 PM       Failed - Normal serum creatinine on file in past 12 months    Recent Labs   Lab Test  02/20/18   1317   CR  1.48*            Passed - Blood pressure under 140/90 in past 12 months    BP Readings from Last 3 Encounters:   02/20/18 128/76   01/25/18 101/63   01/25/18 101/63                Passed - Recent (12 mo) or future (30 days) visit within the authorizing provider's specialty    Patient had office visit in the last 12 months or has a visit in the next 30 days with authorizing provider or within the authorizing provider's specialty.  See \"Patient Info\" tab in inbasket, or \"Choose Columns\" in Meds & Orders section of the refill encounter.           Passed - Patient is age 18 or older       Passed - No active pregnancy on record       Passed - Normal serum potassium on file in past 12 months    Recent Labs   Lab Test  02/20/18   1317   POTASSIUM  4.3                   Passed - No positive pregnancy test in past 12 months      Migue morales from [facility] pharmacy. Asks to ras as urgent.  Routing refill request to provider for review/approval because:  Labs out of range:  Cr          "

## 2018-04-03 ENCOUNTER — OFFICE VISIT (OUTPATIENT)
Dept: PHARMACY | Facility: CLINIC | Age: 83
End: 2018-04-03
Payer: COMMERCIAL

## 2018-04-03 ENCOUNTER — ANTICOAGULATION THERAPY VISIT (OUTPATIENT)
Dept: ANTICOAGULATION | Facility: CLINIC | Age: 83
End: 2018-04-03
Payer: COMMERCIAL

## 2018-04-03 VITALS — SYSTOLIC BLOOD PRESSURE: 102 MMHG | DIASTOLIC BLOOD PRESSURE: 68 MMHG

## 2018-04-03 DIAGNOSIS — I25.10 CORONARY ARTERY DISEASE INVOLVING NATIVE CORONARY ARTERY OF NATIVE HEART WITHOUT ANGINA PECTORIS: ICD-10-CM

## 2018-04-03 DIAGNOSIS — K58.9 IRRITABLE BOWEL SYNDROME, UNSPECIFIED TYPE: ICD-10-CM

## 2018-04-03 DIAGNOSIS — Z79.01 LONG-TERM (CURRENT) USE OF ANTICOAGULANTS: ICD-10-CM

## 2018-04-03 DIAGNOSIS — J44.9 CHRONIC OBSTRUCTIVE PULMONARY DISEASE, UNSPECIFIED COPD TYPE (H): ICD-10-CM

## 2018-04-03 DIAGNOSIS — F43.22 ADJUSTMENT DISORDER WITH ANXIOUS MOOD: ICD-10-CM

## 2018-04-03 DIAGNOSIS — I50.32 CHRONIC DIASTOLIC CONGESTIVE HEART FAILURE (H): ICD-10-CM

## 2018-04-03 DIAGNOSIS — I48.20 CHRONIC ATRIAL FIBRILLATION (H): ICD-10-CM

## 2018-04-03 DIAGNOSIS — G47.00 INSOMNIA, UNSPECIFIED TYPE: ICD-10-CM

## 2018-04-03 DIAGNOSIS — F32.A DEPRESSION, UNSPECIFIED DEPRESSION TYPE: Primary | ICD-10-CM

## 2018-04-03 DIAGNOSIS — I10 HYPERTENSION GOAL BP (BLOOD PRESSURE) < 140/80: ICD-10-CM

## 2018-04-03 LAB — INR POINT OF CARE: 3 (ref 0.86–1.14)

## 2018-04-03 PROCEDURE — 85610 PROTHROMBIN TIME: CPT | Mod: QW

## 2018-04-03 PROCEDURE — 99607 MTMS BY PHARM ADDL 15 MIN: CPT | Performed by: PHARMACIST

## 2018-04-03 PROCEDURE — 36416 COLLJ CAPILLARY BLOOD SPEC: CPT

## 2018-04-03 PROCEDURE — 99207 ZZC NO CHARGE NURSE ONLY: CPT

## 2018-04-03 PROCEDURE — 99606 MTMS BY PHARM EST 15 MIN: CPT | Performed by: PHARMACIST

## 2018-04-03 RX ORDER — SERTRALINE HYDROCHLORIDE 25 MG/1
25 TABLET, FILM COATED ORAL DAILY
Qty: 30 TABLET | Refills: 1 | Status: SHIPPED | OUTPATIENT
Start: 2018-04-03 | End: 2018-04-17 | Stop reason: SINTOL

## 2018-04-03 NOTE — PROGRESS NOTES
SUBJECTIVE/OBJECTIVE:                           Leonor Mercado is a 89 year old female seen for a follow-up visit for Medication Therapy Management.  She was referred to me from Dr. Young.   Her daughter, lCeo joined us today    Chief Complaint: Last [facility] visit 1/25/18.  Depression is her and her daughter's main concern today    Allergies/ADRs: Reviewed in Epic  Tobacco: History of tobacco dependence - quit 1987  Alcohol: 1-3 beverages / week      Medication Adherence/Access  Pill box that she fills once weekly.   Will forget meds taken at bedtime - pravastatin    The patient misses their medication 0 times per week.    Patient is responsible for his/her own medications.   Adherence/Compliance is described as excellent.  Medication barriers: no issues reported by patient.  The patient fills general medications at  [facility]    Depression/Anxiety:  Current medications include: lorazepam BID. Pt reports that depression symptoms are worsened. Patient reports the following stressors:  recent move to senior living,  [facility]  Very tearful during visit.  Reports just not 'being happy'.  Feels confused at new home, like she is a visitor.  Has not met new friends yet.  The home offers activities and she has tried to join but no one else shows up.  Daughter really feels like she needs something for her mood.  Initially when asked if she has tried anything antidepressants in the past she replied no; then when writer told her the name sertraline (zoloft) she thought maybe she had tried that in the past but unable to recall response or why it was stopped.  Sleeping 2 hrs at the most.  PHQ-9 SCORE 4/27/2017 11/17/2017 4/3/2018   Total Score - - -   Total Score 9 12 16        COPD: Current medications: Breo once daily, DuoNeb BID, oxygen at night and Albuterol MDI as needed.  Tried Mucinex but didn't feel it was helpful so stopped.  Has met with Dr. Her.    Pt reports the following symptoms: minimal  SOB with exertion compared to how she was feeling this winter.    COPD Action Plan on file      Atrial Fibrillation/HTN/CAD/HFpEF: Current medications include: warfarin 2 mg once daily, diltiazem 240 mg daily, losartan 25 mg daily, furosemide 40 mg daily. No concerns at this time.    Lab Results   Component Value Date    INR 3.0 04/03/2018    INR 2.7 03/13/2018    INR 3.1 02/27/2018    INR 2.6 02/19/2018    INR 4.4 02/15/2018    INR 2.9 02/08/2018     ECHO:  Date 8/8/17, EF 60-65%  Pt is complaining of sx of HFof: shortness of breath and dyspnea on exertion.  Pt will sometimes measure weight, but not daily Is not aware of goal weight range.   Patient does not self-monitor BP.     IBS: Currently taking probiotic daily, Miralax 1 heaping tablespoon daily. Reports symptoms are okay.    Insomnia: Current medications include: Melatonin 5 mg at 4pm.   Not able to sleep very well at well, up throughout multiple times per night.      Current labs include:  BP Readings from Last 3 Encounters:   04/03/18 102/68   02/20/18 128/76   01/25/18 101/63     Today's Vitals: /68  LMP  (LMP Unknown)   Lab Results   Component Value Date    A1C 6.1 04/30/2014   .  Lab Results   Component Value Date    CHOL 153 03/15/2015     Lab Results   Component Value Date    TRIG 129 03/15/2015     Lab Results   Component Value Date    HDL 52 03/15/2015     Lab Results   Component Value Date    LDL 75 03/15/2015       Liver Function Studies -   Recent Labs   Lab Test  11/15/16   0050   PROTTOTAL  6.7*   ALBUMIN  3.7   BILITOTAL  0.6   ALKPHOS  55   AST  18   ALT  18     Last Basic Metabolic Panel:  Lab Results   Component Value Date     11/15/2016      Lab Results   Component Value Date    POTASSIUM 3.8 11/15/2016     Lab Results   Component Value Date    CHLORIDE 104 11/15/2016     Lab Results   Component Value Date    BUN 24 11/15/2016     Lab Results   Component Value Date    CR 1.17 11/15/2016     GFR Estimate   Date Value Ref Range  Status   02/20/2018 33 (L) >60 mL/min/1.7m2 Final     Comment:     Non  GFR Calc   01/17/2018 29 (L) >60 mL/min/1.7m2 Final     Comment:     Non  GFR Calc   01/06/2018 32 (L) >60 mL/min/1.7m2 Final     Comment:     Non  GFR Calc     TSH   Date Value Ref Range Status   03/14/2015 2.40 0.40 - 4.00 mU/L Final     Comment:     Effective 7/30/2014, the reference range for this assay has changed to reflect   new instrumentation/methodology.         Most Recent Immunizations   Administered Date(s) Administered     Influenza (High Dose) 3 valent vaccine 09/26/2017     Influenza (IIV3) PF 10/01/2014     Pneumococcal 23 valent 10/15/2012     Tdap (Adacel,Boostrix) 10/15/2012       ASSESSMENT:                             Current medications were reviewed today.       Medication Adherence: no issues identified    Depression/Anxiety:  Needs improvement.  PHQ9 not at goal.  After discussion with Dr. Young. Will start sertraline and reduce use of benzo.  Discussed short-term and long-term risk of daily benzo use today.    COPD: improved.  Continue current regimen    Atrial Fibrillation/HTN/CAD/HFpEF: stable.  Blood pressure remains on the lower side. Pt not symptomatic at this time.  May consider reducing her dose of losartan to prevent hypotension if reading remains low at follow-up.    IBS: stable    Insomnia: unimproved.  See depression section above.     PLAN:                            1. Sertraline 25 mg every other day for 7 days, then increase to once daily in the morning    2. Try to reduce use of lorazepam to once daily and then as needed    I spent 45 minutes with this patient today. I offer these suggestions for consideration by the PCP. A copy of the visit note was provided to the patient's primary care provider.    Follow-up with PCP in 2 weeks.  Will follow up in 2 months.    The patient was given a summary of these recommendations as an after visit summary.        Shakira Blackman , Pharm D  [phone] (phone)  [phone] (pager)  Medication Therapy Management Pharmacist

## 2018-04-03 NOTE — PROGRESS NOTES
"  ANTICOAGULATION FOLLOW-UP CLINIC VISIT    Patient Name:  Leonor Mercado  Date:  4/3/2018  Contact Type:  Face to Face    SUBJECTIVE:     Patient Findings     Positives Change in diet/appetite (Pt reports decreased green intake. ), No Problem Findings    Comments Pt reports extra stress d/t moving from home to apartment and really did not want to.            OBJECTIVE    INR Protime   Date Value Ref Range Status   04/03/2018 3.0 (A) 0.86 - 1.14 Final       ASSESSMENT / PLAN  INR assessment THER    Recheck INR In: 3 WEEKS    INR Location Clinic      Anticoagulation Summary as of 4/3/2018     INR goal 2.0-3.0   Today's INR 3.0   Maintenance plan 2 mg (2 mg x 1) every day   Full instructions 2 mg every day   Weekly total 14 mg   No change documented Dilma Rider RN   Plan last modified Yoana Waddell RN (12/15/2016)   Next INR check 4/24/2018   Priority INR   Target end date     Indications   Long-term (current) use of anticoagulants [Z79.01] [Z79.01]  A Fib - chr Coumadin  s/p PPM (H) [I48.2]         Anticoagulation Episode Summary     INR check location     Preferred lab     Send INR reminders to [facility]    Comments Pt's 1st name is pronounced \"ondray\"  Pt does not want print out, appt card only      Anticoagulation Care Providers     Provider Role Specialty Phone number    Dannielle Darby MD [facility] Internal Medicine [phone]            See the Encounter Report to view Anticoagulation Flowsheet and Dosing Calendar (Go to Encounters tab in chart review, and find the Anticoagulation Therapy Visit)    Dosage adjustment made based on physician directed care plan.    Dilma Rider RN               "

## 2018-04-03 NOTE — PATIENT INSTRUCTIONS
Recommendations from today's MTM visit:                                                        1. Sertraline 25 mg every other day for 7 days, then increase to once daily in the morning    2. Try to reduce use of lorazepam to once daily and then as needed      Follow-up with Dr. Young in 2-3 weeks    Next MTM visit: 2 months    To schedule another MTM appointment, please call the clinic directly or you may call the MTM scheduling line at [phone] or toll-free at [phone].     My Clinical Pharmacist's contact information:                                                      It was a pleasure seeing you today!  Please feel free to contact me with any questions or concerns you have.      Shakira Blackman , Pharm D  [phone] (phone)  [phone] (pager)  Medication Therapy Management Pharmacist     You may receive a survey about the MTM services you received.  I would appreciate your feedback to help me serve you better in the future. Please fill it out and return it when you can. Your comments will be anonymous.

## 2018-04-03 NOTE — MR AVS SNAPSHOT
After Visit Summary   4/3/2018    Leonor Mercado    MRN: [id number]           Patient Information     Date Of Birth          5/6/1926        Visit Information        Provider Department      4/3/2018 2:30 PM Shakira Blackman, [facility]        Today's Diagnoses     Depression, unspecified depression type    -  1      Care Instructions    Recommendations from today's MTM visit:                                                        1. Sertraline 25 mg every other day for 7 days, then increase to once daily in the morning    2. Try to reduce use of lorazepam to once daily and then as needed      Follow-up with Dr. Young in 2-3 weeks    Next MTM visit: 2 months    To schedule another MTM appointment, please call the clinic directly or you may call the MTM scheduling line at [phone] or toll-free at [phone].     My Clinical Pharmacist's contact information:                                                      It was a pleasure seeing you today!  Please feel free to contact me with any questions or concerns you have.      Shakira Blackman , Pharm D  [phone] (phone)  [phone] (pager)  Medication Therapy Management Pharmacist     You may receive a survey about the MTM services you received.  I would appreciate your feedback to help me serve you better in the future. Please fill it out and return it when you can. Your comments will be anonymous.                      Follow-ups after your visit        Your next 10 appointments already scheduled     Apr 20, 2018  1:40 PM CDT   SHORT with Dannielle Darby MD   [facility] ([facility])    [address]  [facility] [address] [phone]            Apr 24, 2018  9:00 AM CDT   LAB with [facility]   [facility] ([facility])    [address]  [facility] [address] [phone]           Please do not eat  "10-12 hours before your appointment if you are coming in fasting for labs on lipids, cholesterol, or glucose (sugar). This does not apply to pregnant women. Water, hot tea and black coffee (with nothing added) are okay. Do not drink other fluids, diet soda or chew gum.            Apr 24, 2018 10:00 AM CDT   Return Visit with Collins H[facility] MD   [facility]    [address]  [facility] [id number] [phone]            Apr 24, 2018  5:00 PM CDT   Anticoagulation Visit with [facility]   [facility] ([facility])    [address]  [facility] [address] [phone]            May 10, 2018  9:30 AM CDT   Phone Device Check with NUÑEZ TECH2   [facility] ([facility])    [address]  [facility] [address] [phone] OPT 2              Who to contact     If you have questions or need follow up information about today's clinic visit or your schedule please contact [facility] directly at [phone].  Normal or non-critical lab and imaging results will be communicated to you by [employer], letter or phone within 4 business days after the clinic has received the results. If you do not hear from us within 7 days, please contact the clinic through [employer] or phone. If you have a critical or abnormal lab result, we will notify you by phone as soon as possible.  Submit refill requests through [employer] or call your pharmacy and they will forward the refill request to us. Please allow 3 business days for your refill to be completed.          Additional Information About Your Visit        [employer] Information     [employer] lets you send messages to your doctor, view your test results, renew your prescriptions, schedule appointments and more. To sign up, go to [email or web address] . Click on \"Log in\" on the left " "side of the screen, which will take you to the Welcome page. Then click on \"Sign up Now\" on the right side of the page.     You will be asked to enter the access code listed below, as well as some personal information. Please follow the directions to create your username and password.     Your access code is: [email or web address]  Expires: 2018  7:20 PM     Your access code will  in 90 days. If you need help or a new code, please call your [facility] clinic or [phone].        Care EveryWhere ID     This is your Care EveryWhere ID. This could be used by other organizations to access your [facility] medical records  [id number]        Your Vitals Were     Last Period                   (LMP Unknown)            Blood Pressure from Last 3 Encounters:   18 102/68   18 128/76   18 101/63    Weight from Last 3 Encounters:   18 137 lb 8 oz (62.4 kg)   18 133 lb 12.8 oz (60.7 kg)   18 137 lb 12.8 oz (62.5 kg)              Today, you had the following     No orders found for display         Today's Medication Changes          These changes are accurate as of 4/3/18  3:09 PM.  If you have any questions, ask your nurse or doctor.               Start taking these medicines.        Dose/Directions    sertraline 25 MG tablet   Commonly known as:  ZOLOFT   Used for:  Depression, unspecified depression type   Started by:  Shakira Blackman RPH        Dose:  25 mg   Take 1 tablet (25 mg) by mouth daily   Quantity:  30 tablet   Refills:  1         Stop taking these medicines if you haven't already. Please contact your care team if you have questions.     guaiFENesin 600 MG 12 hr tablet   Commonly known as:  MUCINEX   Stopped by:  Shakira Blackman RPH                Where to get your medicines      These medications were sent to [facility] [place], [place] - [address]  [address] [facility] [address]     Phone:  [phone]     sertraline 25 MG tablet       "          Primary Care Provider Office Phone # Fax #    Dannielle Darby [phone]       [address]  [facility] [address]        Equal Access to Services     NALDO OVALLE : Hadii marisel ku hadgrego Soomaali, waaxda luqadaha, qaybta kaalmada adeegyada, antonia marcum nancy hwang. So [facility] [phone].    ATENCIÓN: Si habla español, tiene a spicer disposición servicios gratuitos de asistencia lingüística. Llame al [phone].    We comply with applicable federal civil rights laws and [place] laws. We do not discriminate on the basis of race, color, national origin, age, disability, sex, sexual orientation, or gender identity.            Thank you!     Thank you for choosing [facility]  for your care. Our goal is always to provide you with excellent care. Hearing back from our patients is one way we can continue to improve our services. Please take a few minutes to complete the written survey that you may receive in the mail after your visit with us. Thank you!             Your Updated Medication List - Protect others around you: Learn how to safely use, store and throw away your medicines at www.disposemymeds.org.          This list is accurate as of 4/3/18  3:09 PM.  Always use your most recent med list.                   Brand Name Dispense Instructions for use Diagnosis    albuterol 108 (90 BASE) MCG/ACT Inhaler    PROAIR HFA/PROVENTIL HFA/VENTOLIN HFA    1 Inhaler    Inhale 2 puffs into the lungs every 6 hours as needed for shortness of breath / dyspnea or wheezing    Chronic obstructive pulmonary disease, unspecified COPD type (H)       ASPIRIN NOT PRESCRIBED    INTENTIONAL     Antiplatelet medication not prescribed intentionally due to Current anticoagulant therapy (warfarin/enoxaparin)    Chronic atrial fibrillation (H)       BREO ELLIPTA 200-25 MCG/INH oral inhaler   Generic drug:  fluticasone-vilanterol      Inhale 1 puff into the lungs daily     Anxiety       calcium carbonate 600 MG tablet   Generic drug:  calcium carbonate      Take 1 tablet by mouth daily        diltiazem 240 MG 24 hr capsule    DILTIAZEM CD    30 capsule    Take 1 capsule (240 mg) by mouth daily    Chronic atrial fibrillation (H)       furosemide 20 MG tablet    LASIX    180 tablet    Take 2 tablets (40 mg) by mouth daily    Chronic diastolic congestive heart failure (H)       ipratropium - albuterol 0.5 mg/2.5 mg/3 mL 0.5-2.5 (3) MG/3ML neb solution    DUONEB    360 mL    Take 1 vial (3 mLs) by nebulization every 6 hours as needed for shortness of breath / dyspnea or wheezing    Chronic obstructive pulmonary disease, unspecified COPD type (H)       LORazepam 0.5 MG tablet    ATIVAN    60 tablet    TAKE 1 TABLET BY MOUTH TWICE DAILY AS NEEDED FOR ANXIETY    Anxiety       losartan 50 MG tablet    COZAAR    1 tablet    Take 0.5 tablets (25 mg) by mouth daily    Essential hypertension with goal blood pressure less than 140/90       MELATONIN PO      Take 5 mg by mouth At Bedtime        nebulizer nebulization      4 times daily instead of 6 times        nitroGLYcerin 0.4 MG sublingual tablet    NITROSTAT    25 tablet    Place 1 tablet (0.4 mg) under the tongue every 5 minutes as needed    Other chest pain       OCUVITE PO      Take 1 capsule by mouth daily.        polyethylene glycol Packet    MIRALAX/GLYCOLAX     Take 1 packet by mouth daily        pravastatin 40 MG tablet    PRAVACHOL    90 tablet    Take 1 tablet (40 mg) by mouth daily    Hyperlipidemia LDL goal <100, Coronary artery disease involving native coronary artery of native heart without angina pectoris       PROBIOTIC ACIDOPHILUS Tabs      Take 1 tablet by mouth daily        sertraline 25 MG tablet    ZOLOFT    30 tablet    Take 1 tablet (25 mg) by mouth daily    Depression, unspecified depression type       STOOL SOFTENER PO      Take by mouth as needed        TYLENOL PO      Take 1,000 mg by mouth At Bedtime        vitamin D  2000 UNITS tablet      Take 2,000 Units by mouth daily        warfarin 2 MG tablet    COUMADIN    90 tablet    TAKE ONE TABLET BY MOUTH ONCE DAILY OR  AS  DIRECTED  BY  INR  CLINIC    Long term current use of anticoagulant therapy

## 2018-04-03 NOTE — MR AVS SNAPSHOT
Leonor Mercado   4/3/2018 2:00 PM   Anticoagulation Therapy Visit    Description:  89 year old female   Provider:  [facility]   Department:  [facility]           INR as of 4/3/2018     Today's INR 3.0      Anticoagulation Summary as of 4/3/2018     INR goal 2.0-3.0   Today's INR 3.0   Full instructions 2 mg every day   Next INR check 4/24/2018    Indications   Long-term (current) use of anticoagulants [Z79.01] [Z79.01]  A Fib - chr Coumadin  s/p PPM (H) [I48.2]         Your next Anticoagulation Clinic appointment(s)     Apr 24, 2018  5:00 PM CDT   Anticoagulation Visit with [facility]   [facility] ([facility])    [address]  [facility] [address] [phone]              Contact Numbers     [facility] Phone Numbers:  Anticoagulation Clinic Appointments : [phone]  Anticoagulation Nurse: [phone]         April 2018 Details    Sun Mon Tue Wed Thu Fri Sat     1               2               3      2 mg   See details      4      2 mg         5      2 mg         6      2 mg         7      2 mg           8      2 mg         9      2 mg         10      2 mg         11      2 mg         12      2 mg         13      2 mg         14      2 mg           15      2 mg         16      2 mg         17      2 mg         18      2 mg         19      2 mg         20      2 mg         21      2 mg           22      2 mg         23      2 mg         24            25               26               27               28                 29               30                     Date Details   04/03 This INR check       Date of next INR:  4/24/2018         How to take your warfarin dose     To take:  2 mg Take 1 of the 2 mg tablets.

## 2018-04-04 ASSESSMENT — PATIENT HEALTH QUESTIONNAIRE - PHQ9: SUM OF ALL RESPONSES TO PHQ QUESTIONS 1-9: 16

## 2018-04-11 DIAGNOSIS — F41.9 ANXIETY: ICD-10-CM

## 2018-04-12 RX ORDER — LORAZEPAM 0.5 MG/1
TABLET ORAL
Qty: 60 TABLET | Refills: 0 | Status: SHIPPED | OUTPATIENT
Start: 2018-04-12 | End: 2018-04-24

## 2018-04-12 NOTE — TELEPHONE ENCOUNTER
Lorazepam      Last Written Prescription Date:  3-14-18  Last Fill Quantity: 60,   # refills: 0  Last Office Visit: 1-25-18  Future Office visit:    Next 5 appointments (look out 90 days)     Apr 20, 2018  1:40 PM CDT   SHORT with Dannielle Darby MD   [facility]    [address]  [facility] [address]   [phone]            Apr 24, 2018 10:00 AM CDT   Return Visit with Collins Horton MD   [facility] ([facility])    [address]  [facility] [address]   [phone]                   Routing refill request to provider for review/approval because:  Drug not on the [facility], [facility] or [facility] refill protocol or controlled substance    No signed CSA form in chart.    [place] reviewed:  reviewed- no concerns    [place]:  [email or web address]    Please advise, thanks.

## 2018-04-13 ENCOUNTER — TELEPHONE (OUTPATIENT)
Dept: INTERNAL MEDICINE | Facility: CLINIC | Age: 83
End: 2018-04-13

## 2018-04-13 NOTE — TELEPHONE ENCOUNTER
"Leonor Mercado is a 89 year old female who calls with abdominal pain.    NURSING ASSESSMENT:  The pain began 3 days ago.    Pain scale (0-10): 3/10  The pain is described as \"gnawing\"  and is located umbilical, which is without radiation.  Symptom associated with the abdominal pain: nausea, decreased appetite, green diarrhea x2 episodes (Monday and Wednesday).  Patient has had previous abdominal surgery, including hernia repair on left side of abd.  Pain is aggravated by pushing on umbilicus, and relieved by passing gas, eating, TUMs (both for a few minutes before pain returns).    Reports the pain is annoying and wakes her up at night at times. Having small (not hard or loose) stools with each bathroom trip. Feels mild hardness under umbilicus. States she started taking sertraline last week. Reports high anxiety.    Denies: blood/mucus in stool, fever, heart burn, change in gas.  Allergies:   Allergies   Allergen Reactions     Chocolate Shortness Of Breath     Peanuts [Nuts] Shortness Of Breath     Amiodarone      pulm fibrosis       Baclofen      Confusion      Bactrim [Sulfamethoxazole W-Trimethoprim]      Difficulty swallowing     Doxycycline Other (See Comments)     Multiple complaints; she does not want this again.      Levaquin [Levofloxacin] Other (See Comments)     Multiple complaints; she will not take this again     Linzess [Linaclotide] Diarrhea     Lorazepam        Prolonged drowsiness with ER visit      Liquid Adhesive Itching and Rash     Bleeding when tape removed after pacemaker placement..itched and burned for weeks.       MEDICATIONS:   Taking medication(s) as prescribed? Yes , takes docusate daily  Taking over the counter medication(s)? Yes, TUMS  Medication(s) improving/managing symptoms?  for a few minutes    NURSING PLAN: Nursing advice to patient do not take aspirin, ibuprofen, naproxen. Avoid caffeine. Discussed BRAT diet. Instructed to go to ER if symptoms worsening. Verbalized " understanding.    RECOMMENDED DISPOSITION: Discussed going to UC vs appt on Monday. Pt requested appt on Monday as she doesn't have access to a ride this weekend. Scheduled appt for pt on Monday in [place] per appt availability. Instructed to go to ER if symptoms worsening. Will comply with recommendation: Yes  If further questions/concerns or if symptoms do not improve, worsen or new symptoms develop, call your PCP or [facility] as soon as possible.    Guideline used:  Telephone Triage Protocols for Nurses, Fourth Edition, Vinita Chamorro RN

## 2018-04-15 ENCOUNTER — NURSE TRIAGE (OUTPATIENT)
Dept: NURSING | Facility: CLINIC | Age: 83
End: 2018-04-15

## 2018-04-16 ENCOUNTER — RADIANT APPOINTMENT (OUTPATIENT)
Dept: GENERAL RADIOLOGY | Facility: CLINIC | Age: 83
End: 2018-04-16
Attending: NURSE PRACTITIONER
Payer: COMMERCIAL

## 2018-04-16 ENCOUNTER — TELEPHONE (OUTPATIENT)
Dept: PEDIATRICS | Facility: CLINIC | Age: 83
End: 2018-04-16

## 2018-04-16 ENCOUNTER — OFFICE VISIT (OUTPATIENT)
Dept: PEDIATRICS | Facility: CLINIC | Age: 83
End: 2018-04-16
Payer: COMMERCIAL

## 2018-04-16 VITALS
HEIGHT: 63 IN | DIASTOLIC BLOOD PRESSURE: 58 MMHG | BODY MASS INDEX: 23.07 KG/M2 | SYSTOLIC BLOOD PRESSURE: 112 MMHG | WEIGHT: 130.2 LBS | HEART RATE: 82 BPM | OXYGEN SATURATION: 96 % | TEMPERATURE: 97.7 F

## 2018-04-16 DIAGNOSIS — R10.84 ABDOMINAL PAIN, GENERALIZED: Primary | ICD-10-CM

## 2018-04-16 DIAGNOSIS — R19.00 ABDOMINAL MASS, UNSPECIFIED ABDOMINAL LOCATION: ICD-10-CM

## 2018-04-16 DIAGNOSIS — F43.20 ADJUSTMENT DISORDER, UNSPECIFIED TYPE: ICD-10-CM

## 2018-04-16 DIAGNOSIS — E78.5 HYPERLIPIDEMIA LDL GOAL <100: ICD-10-CM

## 2018-04-16 DIAGNOSIS — I25.10 CORONARY ARTERY DISEASE INVOLVING NATIVE CORONARY ARTERY OF NATIVE HEART WITHOUT ANGINA PECTORIS: ICD-10-CM

## 2018-04-16 DIAGNOSIS — R82.90 NONSPECIFIC FINDING ON EXAMINATION OF URINE: ICD-10-CM

## 2018-04-16 LAB
ALBUMIN SERPL-MCNC: 3.8 G/DL (ref 3.4–5)
ALBUMIN UR-MCNC: NEGATIVE MG/DL
ALP SERPL-CCNC: 44 U/L (ref 40–150)
ALT SERPL W P-5'-P-CCNC: 22 U/L (ref 0–50)
ANION GAP SERPL CALCULATED.3IONS-SCNC: 11 MMOL/L (ref 3–14)
APPEARANCE UR: CLEAR
AST SERPL W P-5'-P-CCNC: 29 U/L (ref 0–45)
BACTERIA #/AREA URNS HPF: ABNORMAL /HPF
BASOPHILS # BLD AUTO: 0 10E9/L (ref 0–0.2)
BASOPHILS NFR BLD AUTO: 0.4 %
BILIRUB SERPL-MCNC: 1.1 MG/DL (ref 0.2–1.3)
BILIRUB UR QL STRIP: NEGATIVE
BUN SERPL-MCNC: 19 MG/DL (ref 7–30)
CALCIUM SERPL-MCNC: 9.7 MG/DL (ref 8.5–10.1)
CHLORIDE SERPL-SCNC: 95 MMOL/L (ref 94–109)
CO2 SERPL-SCNC: 29 MMOL/L (ref 20–32)
COLOR UR AUTO: YELLOW
CREAT SERPL-MCNC: 1.6 MG/DL (ref 0.52–1.04)
DIFFERENTIAL METHOD BLD: NORMAL
EOSINOPHIL # BLD AUTO: 0.1 10E9/L (ref 0–0.7)
EOSINOPHIL NFR BLD AUTO: 1.1 %
ERYTHROCYTE [DISTWIDTH] IN BLOOD BY AUTOMATED COUNT: 14.5 % (ref 10–15)
ERYTHROCYTE [SEDIMENTATION RATE] IN BLOOD BY WESTERGREN METHOD: 8 MM/H (ref 0–30)
GFR SERPL CREATININE-BSD FRML MDRD: 30 ML/MIN/1.7M2
GLUCOSE SERPL-MCNC: 101 MG/DL (ref 70–99)
GLUCOSE UR STRIP-MCNC: NEGATIVE MG/DL
HCT VFR BLD AUTO: 41.8 % (ref 35–47)
HGB BLD-MCNC: 13.3 G/DL (ref 11.7–15.7)
HGB UR QL STRIP: NEGATIVE
KETONES UR STRIP-MCNC: NEGATIVE MG/DL
LEUKOCYTE ESTERASE UR QL STRIP: ABNORMAL
LYMPHOCYTES # BLD AUTO: 1.4 10E9/L (ref 0.8–5.3)
LYMPHOCYTES NFR BLD AUTO: 17.5 %
MCH RBC QN AUTO: 29.2 PG (ref 26.5–33)
MCHC RBC AUTO-ENTMCNC: 31.8 G/DL (ref 31.5–36.5)
MCV RBC AUTO: 92 FL (ref 78–100)
MONOCYTES # BLD AUTO: 0.7 10E9/L (ref 0–1.3)
MONOCYTES NFR BLD AUTO: 8.9 %
MUCOUS THREADS #/AREA URNS LPF: PRESENT /LPF
NEUTROPHILS # BLD AUTO: 5.8 10E9/L (ref 1.6–8.3)
NEUTROPHILS NFR BLD AUTO: 72.1 %
NITRATE UR QL: NEGATIVE
NON-SQ EPI CELLS #/AREA URNS LPF: ABNORMAL /LPF
PH UR STRIP: 6 PH (ref 5–7)
PLATELET # BLD AUTO: 257 10E9/L (ref 150–450)
POTASSIUM SERPL-SCNC: 4.2 MMOL/L (ref 3.4–5.3)
PROT SERPL-MCNC: 6.6 G/DL (ref 6.8–8.8)
RBC # BLD AUTO: 4.56 10E12/L (ref 3.8–5.2)
RBC #/AREA URNS AUTO: ABNORMAL /HPF
SODIUM SERPL-SCNC: 135 MMOL/L (ref 133–144)
SOURCE: ABNORMAL
SP GR UR STRIP: 1.01 (ref 1–1.03)
UROBILINOGEN UR STRIP-ACNC: 0.2 EU/DL (ref 0.2–1)
WBC # BLD AUTO: 8 10E9/L (ref 4–11)
WBC #/AREA URNS AUTO: ABNORMAL /HPF

## 2018-04-16 PROCEDURE — 85025 COMPLETE CBC W/AUTO DIFF WBC: CPT | Performed by: NURSE PRACTITIONER

## 2018-04-16 PROCEDURE — 81001 URINALYSIS AUTO W/SCOPE: CPT | Performed by: NURSE PRACTITIONER

## 2018-04-16 PROCEDURE — 85652 RBC SED RATE AUTOMATED: CPT | Performed by: NURSE PRACTITIONER

## 2018-04-16 PROCEDURE — 36415 COLL VENOUS BLD VENIPUNCTURE: CPT | Performed by: NURSE PRACTITIONER

## 2018-04-16 PROCEDURE — 80053 COMPREHEN METABOLIC PANEL: CPT | Performed by: NURSE PRACTITIONER

## 2018-04-16 PROCEDURE — 74019 RADEX ABDOMEN 2 VIEWS: CPT

## 2018-04-16 PROCEDURE — 99215 OFFICE O/P EST HI 40 MIN: CPT | Performed by: NURSE PRACTITIONER

## 2018-04-16 PROCEDURE — 87086 URINE CULTURE/COLONY COUNT: CPT | Performed by: NURSE PRACTITIONER

## 2018-04-16 RX ORDER — PRAVASTATIN SODIUM 40 MG
40 TABLET ORAL DAILY
Qty: 90 TABLET | Refills: 0 | Status: SHIPPED | OUTPATIENT
Start: 2018-04-16 | End: 2018-06-04

## 2018-04-16 RX ORDER — CEPHALEXIN 500 MG/1
500 CAPSULE ORAL 2 TIMES DAILY
Qty: 20 CAPSULE | Refills: 0 | Status: SHIPPED | OUTPATIENT
Start: 2018-04-16 | End: 2018-04-16

## 2018-04-16 RX ORDER — CEPHALEXIN 500 MG/1
500 CAPSULE ORAL 2 TIMES DAILY
Qty: 20 CAPSULE | Refills: 0 | Status: SHIPPED | OUTPATIENT
Start: 2018-04-16 | End: 2018-04-20

## 2018-04-16 NOTE — MR AVS SNAPSHOT
After Visit Summary   4/16/2018    Leonor Mercado    MRN: [id number]           Patient Information     Date Of Birth          5/6/1926        Visit Information        Provider Department      4/16/2018 12:00 PM Maria Isabel Rosario APRN CNP [facility]        Today's Diagnoses     Abdominal mass, unspecified abdominal location    -  1    Nonspecific finding on examination of urine          Care Instructions    -Go back to taking Zoloft every other day then please follow up with your primary.        -Try to eat a little something every 2-3 hours  -Take another Senna today  -Call tomorrow with an update.          Follow-ups after your visit        Your next 10 appointments already scheduled     Apr 20, 2018  1:40 PM CDT   SHORT with Dannielle Darby MD   [facility] ([facility])    [address]  [facility] [id number] [phone]            Apr 24, 2018  9:00 AM CDT   LAB with RU LAB   [facility] ([facility])    [address]  [facility] [id number] [phone]           Please do not eat 10-12 hours before your appointment if you are coming in fasting for labs on lipids, cholesterol, or glucose (sugar). This does not apply to pregnant women. Water, hot tea and black coffee (with nothing added) are okay. Do not drink other fluids, diet soda or chew gum.            Apr 24, 2018 10:00 AM CDT   Return Visit with Collins Horton MD   [facility] ([facility])    [address]  [facility] [id number] [phone]            Apr 24, 2018  5:00 PM CDT   Anticoagulation Visit with [facility] ANTICOAGULATION CLINIC   [facility] ([facility])    [address]  [facility] [id number] [phone]            May 10, 2018  9:30 AM CDT   Phone Device Check  "with NUÑEZ TECH2   [facility] ([facility])    [address]  [facility] [address] [phone] OPT 2              Who to contact     If you have questions or need follow up information about today's clinic visit or your schedule please contact [facility] directly at [phone].  Normal or non-critical lab and imaging results will be communicated to you by MyChart, letter or phone within 4 business days after the clinic has received the results. If you do not hear from us within 7 days, please contact the clinic through [employer] or phone. If you have a critical or abnormal lab result, we will notify you by phone as soon as possible.  Submit refill requests through [employer] or call your pharmacy and they will forward the refill request to us. Please allow 3 business days for your refill to be completed.          Additional Information About Your Visit        [employer] Information     [employer] lets you send messages to your doctor, view your test results, renew your prescriptions, schedule appointments and more. To sign up, go to [email or web address] . Click on \"Log in\" on the left side of the screen, which will take you to the Welcome page. Then click on \"Sign up Now\" on the right side of the page.     You will be asked to enter the access code listed below, as well as some personal information. Please follow the directions to create your username and password.     Your access code is: [email or web address]  Expires: 7/15/2018 12:42 PM     Your access code will  in 90 days. If you need help or a new code, please call your [place] clinic or [phone].        Care EveryWhere ID     This is your Care EveryWhere ID. This could be used by other organizations to access your [facility] medical records  [id number]        Your Vitals Were     Pulse Temperature Height Last Period Pulse Oximetry BMI (Body Mass Index)    82 97.7  F (36.5  C) (Tympanic) " "5' 3\" (1.6 m) (LMP Unknown) 96% 23.06 kg/m2       Blood Pressure from Last 3 Encounters:   04/16/18 112/58   04/03/18 102/68   02/20/18 128/76    Weight from Last 3 Encounters:   04/16/18 130 lb 3.2 oz (59.1 kg)   02/20/18 137 lb 8 oz (62.4 kg)   01/25/18 133 lb 12.8 oz (60.7 kg)              We Performed the Following     *UA reflex to Microscopic and Culture ([facility] (except [place] and [place])     CBC with platelets differential     Comprehensive metabolic panel     ESR: Erythrocyte sedimentation rate     Urine Culture Aerobic Bacterial     Urine Microscopic        Primary Care Provider Office Phone # Fax #    Dannielle Darby [phone]       [address]  [facility] [address]        Equal Access to Services     [facility]: Hadii aad ku hadasho Soomaali, waaxda luqadaha, qaybta kaalmada adeegyada, waxay antwonin hayaan adepramod cruz . So [facility] [phone].    ATENCIÓN: Si habla español, tiene a spicer disposición servicios gratuitos de asistencia lingüística. Llame al [phone].    We comply with applicable federal civil rights laws and [place] laws. We do not discriminate on the basis of race, color, national origin, age, disability, sex, sexual orientation, or gender identity.            Thank you!     Thank you for choosing [facility]  for your care. Our goal is always to provide you with excellent care. Hearing back from our patients is one way we can continue to improve our services. Please take a few minutes to complete the written survey that you may receive in the mail after your visit with us. Thank you!             Your Updated Medication List - Protect others around you: Learn how to safely use, store and throw away your medicines at www.disposemymeds.org.          This list is accurate as of 4/16/18 12:42 PM.  Always use your most recent med list.                   Brand Name Dispense Instructions for use Diagnosis    " albuterol 108 (90 Base) MCG/ACT Inhaler    PROAIR HFA/PROVENTIL HFA/VENTOLIN HFA    1 Inhaler    Inhale 2 puffs into the lungs every 6 hours as needed for shortness of breath / dyspnea or wheezing    Chronic obstructive pulmonary disease, unspecified COPD type (H)       ASPIRIN NOT PRESCRIBED    INTENTIONAL     Antiplatelet medication not prescribed intentionally due to Current anticoagulant therapy (warfarin/enoxaparin)    Chronic atrial fibrillation (H)       BREO ELLIPTA 200-25 MCG/INH oral inhaler   Generic drug:  fluticasone-vilanterol      Inhale 1 puff into the lungs daily    Anxiety       calcium carbonate 600 MG tablet   Generic drug:  calcium carbonate      Take 1 tablet by mouth daily        diltiazem 240 MG 24 hr capsule    DILTIAZEM CD    30 capsule    Take 1 capsule (240 mg) by mouth daily    Chronic atrial fibrillation (H)       furosemide 20 MG tablet    LASIX    180 tablet    Take 2 tablets (40 mg) by mouth daily    Chronic diastolic congestive heart failure (H)       ipratropium - albuterol 0.5 mg/2.5 mg/3 mL 0.5-2.5 (3) MG/3ML neb solution    DUONEB    360 mL    Take 1 vial (3 mLs) by nebulization every 6 hours as needed for shortness of breath / dyspnea or wheezing    Chronic obstructive pulmonary disease, unspecified COPD type (H)       LORazepam 0.5 MG tablet    ATIVAN    60 tablet    TAKE 1 TABLET BY MOUTH TWICE DAILY AS NEEDED FOR ANXIETY    Anxiety       losartan 50 MG tablet    COZAAR    1 tablet    Take 0.5 tablets (25 mg) by mouth daily    Essential hypertension with goal blood pressure less than 140/90       MELATONIN PO      Take 5 mg by mouth At Bedtime        nebulizer nebulization      4 times daily instead of 6 times        nitroGLYcerin 0.4 MG sublingual tablet    NITROSTAT    25 tablet    Place 1 tablet (0.4 mg) under the tongue every 5 minutes as needed    Other chest pain       OCUVITE PO      Take 1 capsule by mouth daily.        polyethylene glycol Packet    MIRALAX/GLYCOLAX      Take 1 packet by mouth daily        pravastatin 40 MG tablet    PRAVACHOL    90 tablet    Take 1 tablet (40 mg) by mouth daily    Hyperlipidemia LDL goal <100, Coronary artery disease involving native coronary artery of native heart without angina pectoris       PROBIOTIC ACIDOPHILUS Tabs      Take 1 tablet by mouth daily        sertraline 25 MG tablet    ZOLOFT    30 tablet    Take 1 tablet (25 mg) by mouth daily    Depression, unspecified depression type       STOOL SOFTENER PO      Take by mouth as needed        TYLENOL PO      Take 1,000 mg by mouth At Bedtime        vitamin D 2000 units tablet      Take 2,000 Units by mouth daily        warfarin 2 MG tablet    COUMADIN    90 tablet    TAKE ONE TABLET BY MOUTH ONCE DAILY OR  AS  DIRECTED  BY  INR  CLINIC    Long term current use of anticoagulant therapy

## 2018-04-16 NOTE — PATIENT INSTRUCTIONS
-Go back to taking Zoloft every other day then please follow up with your primary.        -Try to eat a little something every 2-3 hours  -Take another Senna today  -Call tomorrow with an update.

## 2018-04-16 NOTE — TELEPHONE ENCOUNTER
Can you call this patient 4/17 around 10am to get an update on abdominal pain/pooping?    Thanks!    Maria Isabel

## 2018-04-16 NOTE — PROGRESS NOTES
"  SUBJECTIVE:   Leonor Mercado is a 89 year old female who presents to clinic today with daughter - Cleo - for the following health issues:    Abdominal Pain      Duration: over a week    Description (location/character/radiation): lower mid abdominal       Associated flank pain: yes lower back pain    Intensity:  severe    Accompanying signs and symptoms:        Fever/Chills: no        Gas/Bloating: YES- bloating, gas, burp       Nausea/vomitting: YES- nausea         Diarrhea: YES - had green diarrhea Wednesday morning  - not eating, no BM since Wednesday    History (previous similar pain/trauma/previous testing): had surgery Tuesday - dental, just started zoloft    Precipitating or alleviating factors:       Pain worse with eating/BM/urination: none         Pain relieved by BM: no - can't tell    Therapies tried and outcome: Tums, senokot, dulcolax, suppository, prune juice    LMP:  not applicable      Started Zoloft a few weeks ago.    Last week had an episode of green diarrhea and had low appetite. Throughout the week didn't eat much. Had progressively worsening umbilical abdominal pain. Had been taking miralax, prune juice, senna, and ducolax. No further BM until last night, when she had large a soft (semi-formed) brown stool. States her pain went down dramatically.    No fever, chills, blood in the stool, vomiting, etc.    Has a history of diverticulitis x 1 according to the patient.    No dysuria, vaginal dc, etc.     ROS: const/gi/gu/gyn/psych otherwise negative     OBJECTIVE:  /58 (Cuff Size: Adult Regular)  Pulse 82  Temp 97.7  F (36.5  C) (Tympanic)  Ht 5' 3\" (1.6 m)  Wt 130 lb 3.2 oz (59.1 kg)  LMP  (LMP Unknown)  SpO2 96%  BMI 23.06 kg/m2  CONSTITUTIONAL: Alert, well-nourished, well-groomed, NAD  RESP: Lungs CTA. No wheeze, rhonchi, rales.  CV: HRRR S1 S2 No MRG. No peripheral edema  GI: Abdomen flat. BS x 4. Diffuse TTP lower abdomen.  No HSM or masses. No CVAT  PSYCH: Bright affect. " Appropriate mentation and speech.       ASSESSMENT/PLAN:  (R10.84) Abdominal pain, generalized  (primary encounter diagnosis)  Comment: Patient with 1 week of progressive melanie-umbilical abdominal pain associated with nausea, constipation, and a green watery stool the day before the symptoms began. Her symptoms dramatically improved last night after taking laxatives and having a large, brown, semi-formed stool. XR today shows normal bowel gas pattern, labs are reassuring that there is no major infection/inflammation etiology. UA is positive, but likely false positive as she has no urinary sx.   Plan: CBC with platelets differential, ESR:         Erythrocyte sedimentation rate, Comprehensive         metabolic panel, *UA reflex to Microscopic and         Culture ([facility] [facility] (except         [place] and [place]), XR Abdomen 2 Views,         Urine Culture Aerobic Bacterial, Urine         Microscopic          -Continue Senna, miralax, water. Take senna again today and hopefully have another large BM  -RN to call pt tomorrow mid morning to get an update on stool and pain  -Eat every 2-3 hours, small bland snack to avoid weakness and to stimulate bowel movement.  -Discussed reasons to seek care urgently.   -If pain continues could consider CT to evaluate for possible diverticulitis.     (R82.90) Nonspecific finding on examination of urine  Comment: Likely false positive. Will treat then stop ABX if culture comes back negative.   Plan: Urine Culture Aerobic Bacterial, cephALEXin         (KEFLEX) 500 MG capsule, DISCONTINUED:         cephALEXin (KEFLEX) 500 MG capsule, CANCELED:         Urine Culture Aerobic Bacterial      (F43.20) Adjustment disorder, unspecified type  Comment: Has felt terrible since starting SSRI. Thinks constipation is related to zoloft.   Plan: Slowly wean zoloft. Ok to start taking it every other day again. Then discuss with PCP next week.       (E78.5) Hyperlipidemia LDL goal  <100  Comment: Needs refill.   Plan: pravastatin (PRAVACHOL) 40 MG tablet          50 minutes spent with patient, over 1/2 in counseling and coordination of care regarding abdominal pain                   Maria Isabel Rosario, BEKAHP-DNP.

## 2018-04-16 NOTE — TELEPHONE ENCOUNTER
"Caller has had abdominal discomfort for > 1 week; thought she was constipated, last nl BM  7 days ago) but has not been eating much  and continues to take stool softeners, suppositories and prune juice; passing small amounts  Loose green stools; has no  rectal pressure, abdominal distention or vomiting,or  fever; Does have  Mild nausea   Inquiring if she should take an enema or suppository tonight  Advised that she  should  not treat constipation any further until abdominal pain is evaluated by provider during scheduled appointment tomorrow  Advised to call EMS if  Has vomiting, fever  weakness or increased abdominal pain   Understands and will comply  Shanda Albarran RN  FNA    Reason for Disposition    [1] MODERATE (e.g., interferes with normal activities) AND [2] pain comes and goes (cramps) AND [3] present > 24 hours  (Exception: pain with Vomiting or Diarrhea - see that Guideline)    Additional Information    Negative: Shock suspected (e.g., cold/pale/clammy skin, too weak to stand, low BP, rapid pulse)    Negative: Difficult to awaken or acting confused  (e.g., disoriented, slurred speech)    Negative: Passed out (i.e., lost consciousness, collapsed and was not responding)    Negative: Sounds like a life-threatening emergency to the triager    Negative: Chest pain    Negative: Pain is mainly in upper abdomen  (if needed ask: \"is it mainly above the belly button?\")    Negative: Followed an abdomen (stomach) injury    Negative: [1] Abdominal pain AND [2] pregnant < 20 weeks    Negative: [1] Abdominal pain AND [2] pregnant > 20 weeks    Negative: [1] Abdominal pain AND [2] postpartum < 1 month since delivery    Negative: [1] SEVERE pain (e.g., excruciating) AND [2] present > 1 hour    Negative: [1] SEVERE pain AND [2] age > 60    Negative: [1] Vomiting AND [2] contains red blood or black (\"coffee ground\") material  (Exception: few red streaks in vomit that only happened once)    Negative: Blood in bowel movements "   (Exception: blood on surface of BM with constipation)    Negative: Black or tarry bowel movements  (Exception: chronic-unchanged  black-grey bowel movements AND is taking iron pills or Pepto-bismol)    Negative: Patient sounds very sick or weak to the triager    Negative: [1] MILD-MODERATE pain AND [2] constant AND [3] present > 2 hours    Negative: [1] Vomiting AND [2] abdomen looks much more swollen than usual    Negative: [1] Vomiting AND [2] contains bile (green color)    Negative: White of the eyes have turned yellow (i.e., jaundice)    Negative: Fever > 103 F (39.4 C)    Negative: [1] Fever > 101 F (38.3 C) AND [2] age > 60    Negative: [1] Fever > 101 F (38.3 C) AND [2] bedridden (e.g., nursing home patient, CVA, chronic illness, recovering from surgery)    Negative: [1] Fever > 100.5 F (38.1 C) AND [2] diabetes mellitus or weak immune system (e.g., HIV positive, cancer chemo, splenectomy, organ transplant, chronic steroids)    Negative: [1] SEVERE abdominal pain AND [2] present < 1 hour  (all triage questions negative)    Protocols used: ABDOMINAL PAIN - FEMALE-ADULT-

## 2018-04-17 ENCOUNTER — TELEPHONE (OUTPATIENT)
Dept: PHARMACY | Facility: CLINIC | Age: 83
End: 2018-04-17

## 2018-04-17 ENCOUNTER — HOSPITAL ENCOUNTER (OUTPATIENT)
Dept: CT IMAGING | Facility: CLINIC | Age: 83
Discharge: HOME | End: 2018-04-17
Attending: NURSE PRACTITIONER | Admitting: NURSE PRACTITIONER
Payer: MEDICARE

## 2018-04-17 ENCOUNTER — HOSPITAL ENCOUNTER (EMERGENCY)
Facility: CLINIC | Age: 83
Discharge: HOME | End: 2018-04-17
Attending: EMERGENCY MEDICINE | Admitting: EMERGENCY MEDICINE
Payer: MEDICARE

## 2018-04-17 ENCOUNTER — APPOINTMENT (OUTPATIENT)
Dept: GENERAL RADIOLOGY | Facility: CLINIC | Age: 83
End: 2018-04-17
Attending: EMERGENCY MEDICINE
Payer: MEDICARE

## 2018-04-17 VITALS
HEART RATE: 58 BPM | RESPIRATION RATE: 16 BRPM | SYSTOLIC BLOOD PRESSURE: 153 MMHG | DIASTOLIC BLOOD PRESSURE: 91 MMHG | TEMPERATURE: 98.1 F | OXYGEN SATURATION: 96 %

## 2018-04-17 DIAGNOSIS — K59.00 CONSTIPATION, UNSPECIFIED CONSTIPATION TYPE: ICD-10-CM

## 2018-04-17 DIAGNOSIS — R07.89 OTHER CHEST PAIN: ICD-10-CM

## 2018-04-17 DIAGNOSIS — R10.32 ABDOMINAL PAIN, LEFT LOWER QUADRANT: ICD-10-CM

## 2018-04-17 DIAGNOSIS — R10.32 ABDOMINAL PAIN, LEFT LOWER QUADRANT: Primary | ICD-10-CM

## 2018-04-17 DIAGNOSIS — R10.84 ABDOMINAL PAIN, GENERALIZED: ICD-10-CM

## 2018-04-17 DIAGNOSIS — R79.89 ELEVATED BRAIN NATRIURETIC PEPTIDE (BNP) LEVEL: ICD-10-CM

## 2018-04-17 LAB
ANION GAP SERPL CALCULATED.3IONS-SCNC: 8 MMOL/L (ref 3–14)
BACTERIA SPEC CULT: NORMAL
BACTERIA SPEC CULT: NORMAL
BASOPHILS # BLD AUTO: 0 10E9/L (ref 0–0.2)
BASOPHILS NFR BLD AUTO: 0.4 %
BUN SERPL-MCNC: 21 MG/DL (ref 7–30)
CALCIUM SERPL-MCNC: 9.4 MG/DL (ref 8.5–10.1)
CHLORIDE SERPL-SCNC: 97 MMOL/L (ref 94–109)
CO2 SERPL-SCNC: 27 MMOL/L (ref 20–32)
CREAT SERPL-MCNC: 1.37 MG/DL (ref 0.52–1.04)
DIFFERENTIAL METHOD BLD: NORMAL
EOSINOPHIL # BLD AUTO: 0.1 10E9/L (ref 0–0.7)
EOSINOPHIL NFR BLD AUTO: 1.4 %
ERYTHROCYTE [DISTWIDTH] IN BLOOD BY AUTOMATED COUNT: 14.5 % (ref 10–15)
GFR SERPL CREATININE-BSD FRML MDRD: 36 ML/MIN/1.7M2
GLUCOSE SERPL-MCNC: 100 MG/DL (ref 70–99)
HCT VFR BLD AUTO: 38.1 % (ref 35–47)
HGB BLD-MCNC: 12.5 G/DL (ref 11.7–15.7)
IMM GRANULOCYTES # BLD: 0 10E9/L (ref 0–0.4)
IMM GRANULOCYTES NFR BLD: 0.3 %
INR PPP: 3.61 (ref 0.86–1.14)
LIPASE SERPL-CCNC: 186 U/L (ref 73–393)
LYMPHOCYTES # BLD AUTO: 1.4 10E9/L (ref 0.8–5.3)
LYMPHOCYTES NFR BLD AUTO: 19.9 %
MCH RBC QN AUTO: 29.3 PG (ref 26.5–33)
MCHC RBC AUTO-ENTMCNC: 32.8 G/DL (ref 31.5–36.5)
MCV RBC AUTO: 89 FL (ref 78–100)
MONOCYTES # BLD AUTO: 0.7 10E9/L (ref 0–1.3)
MONOCYTES NFR BLD AUTO: 9.7 %
NEUTROPHILS # BLD AUTO: 4.8 10E9/L (ref 1.6–8.3)
NEUTROPHILS NFR BLD AUTO: 68.3 %
NRBC # BLD AUTO: 0 10*3/UL
NRBC BLD AUTO-RTO: 0 /100
NT-PROBNP SERPL-MCNC: 3247 PG/ML (ref 0–1800)
PLATELET # BLD AUTO: 244 10E9/L (ref 150–450)
POTASSIUM SERPL-SCNC: 4 MMOL/L (ref 3.4–5.3)
RBC # BLD AUTO: 4.27 10E12/L (ref 3.8–5.2)
SODIUM SERPL-SCNC: 132 MMOL/L (ref 133–144)
SPECIMEN SOURCE: NORMAL
TROPONIN I SERPL-MCNC: <0.015 UG/L (ref 0–0.04)
TROPONIN I SERPL-MCNC: <0.015 UG/L (ref 0–0.04)
WBC # BLD AUTO: 7.1 10E9/L (ref 4–11)

## 2018-04-17 PROCEDURE — 84484 ASSAY OF TROPONIN QUANT: CPT | Performed by: EMERGENCY MEDICINE

## 2018-04-17 PROCEDURE — 85025 COMPLETE CBC W/AUTO DIFF WBC: CPT | Performed by: EMERGENCY MEDICINE

## 2018-04-17 PROCEDURE — 93005 ELECTROCARDIOGRAM TRACING: CPT

## 2018-04-17 PROCEDURE — 83690 ASSAY OF LIPASE: CPT | Performed by: EMERGENCY MEDICINE

## 2018-04-17 PROCEDURE — 99285 EMERGENCY DEPT VISIT HI MDM: CPT | Mod: 25

## 2018-04-17 PROCEDURE — 74176 CT ABD & PELVIS W/O CONTRAST: CPT

## 2018-04-17 PROCEDURE — 71046 X-RAY EXAM CHEST 2 VIEWS: CPT

## 2018-04-17 PROCEDURE — 80048 BASIC METABOLIC PNL TOTAL CA: CPT | Performed by: EMERGENCY MEDICINE

## 2018-04-17 PROCEDURE — 83880 ASSAY OF NATRIURETIC PEPTIDE: CPT | Performed by: EMERGENCY MEDICINE

## 2018-04-17 PROCEDURE — 85610 PROTHROMBIN TIME: CPT | Performed by: EMERGENCY MEDICINE

## 2018-04-17 ASSESSMENT — ENCOUNTER SYMPTOMS
FEVER: 0
COUGH: 0
CONSTIPATION: 1
NAUSEA: 1
VOMITING: 0
ABDOMINAL PAIN: 1
SHORTNESS OF BREATH: 1

## 2018-04-17 NOTE — TELEPHONE ENCOUNTER
Well lets have her stop the Sertraline all together.  Based on other phone call from today it sounds like she has only been taking it every other day.    She needs to eat every few hours at least a small meal/large snack.  Stop the senna (could be causing cramping) but continue the Miralax, prune juice and fluids (at least 2L per day).  Call if still having cramping/pain tomorrow and will consider CT to look for diverticulitis.   I still think this is likely just constipation.

## 2018-04-17 NOTE — ED AVS SNAPSHOT
[facility] Emergency Department    [address]    [facility] [address]    Phone:  [phone]    Fax:  [phone]                                       Leonor Mercado   MRN: [id number]    Department:  [facility] Emergency Department   Date of Visit:  4/17/2018           Patient Information     Date Of Birth          5/6/1926        Your diagnoses for this visit were:     Other chest pain     Abdominal pain, generalized     Elevated brain natriuretic peptide (BNP) level     Constipation, unspecified constipation type        You were seen by Primo Zarate MD.      Follow-up Information     Follow up with Dannielle Darby MD. Schedule an appointment as soon as possible for a visit in 1 day.    Specialty:  Internal Medicine    Contact information:    [address]  [facility] [address]  [phone]          Discharge Instructions         *Abdominal Pain, Unknown Cause (Female)    The exact cause of your abdominal (stomach) pain is not certain. This does not mean that this is something to worry about, or the right tests were not done. Everyone likes to know the exact cause of the problem, but sometimes with abdominal pain, there is no clear-cut cause, and this could be a good thing. The good news is that your symptoms can be treated, and you will feel better.   Your condition does not seem serious now; however, sometimes the signs of a serious problem may take more time to appear. For this reason, it is important for you to watch for any new symptoms, problems, or worsening of your condition.  Over the next few days, the abdominal pain may come and go, or be continuous. Other common symptoms can include nausea and vomiting. Sometimes it can be difficult to tell if you feel nauseous, you may just feel bad and not associate that feeling with nausea. Constipation, diarrhea, and a fever may go along with the pain.  The pain may continue even if  treated correctly over the following days. Depending on how things go, sometimes the cause can become clear and may require further or different treatment. Additional evaluations, medications, or tests may be needed.  Home care  Your health care provider may prescribe medications for pain, symptoms, or an infection.  Follow the health care provider's instructions for taking these medications.  General care    Rest until your next exam. No strenuous activities.    Try to find positions that ease discomfort. A small pillow placed on the abdomen may help relieve pain.    Something warm on your abdomen (such as a heating pad) may help, but be careful not to burn yourself.  Diet    Do not force yourself to eat, especially if having cramps, vomiting, or diarrhea.    Water is important so you do not get dehydrated. Soup may also be good. Sports drinks may also help, especially if they are not too acidic. Make sure you don't drink sugary drinks as this can make things worse. Take liquids in small amounts. Do not guzzle them.    Caffeine sometimes makes the pain and cramping worse.    Avoid dairy products if you have vomiting or diarrhea.    Don't eat large amounts at a time. Wait a few minutes between bites.    Eat a diet low in fiber (called a low-residue diet). Foods allowed include refined breads, white rice, fruit and vegetable juices without pulp, tender meats. These foods will pass more easily through the intestine.    Avoid fried or fatty foods, dairy, alcohol and spicy foods until your symptoms go away.  Follow-up care  Follow up with your health care provider as instructed, or if your pain does not begin to improve in the next 24 hours.  When to seek medical care  Seek prompt medical care if any of the following occur:    Pain gets worse or moves to the right lower abdomen    New or worsening vomiting or diarrhea    Swelling of the abdomen    Unable to pass stool for more than three days    New fever over 101  F  (38.3 C), or rising fever    Blood in vomit or bowel movements (dark red or black color)    Jaundice (yellow color of eyes and skin)    Weakness, dizziness    Chest, arm, back, neck or jaw pain    Unexpected vaginal bleeding or missed period  Call 911  Call emergency services if any of the following occur:    Trouble breathing    Confusion    Fainting or loss of consciousness    Rapid heart rate    Seizure    5352-1984 [employer], [address] [place] [address]. All rights reserved. This information is not intended as a substitute for professional medical care. Always follow your healthcare professional's instructions.           *CHEST PAIN, UNCERTAIN CAUSE    Based on your exam today, the exact cause of your chest pain is not certain. Your condition does not seem serious at this time, and your pain does not appear to be coming from your heart. However, sometimes the signs of a serious problem take more time to appear. Therefore, watch for the warning signs listed below.  HOME CARE:  1. Rest today and avoid strenuous activity.  2. Take any prescribed medicine as directed.  FOLLOW UP with your doctor in 1-3 days.   GET PROMPT MEDICAL ATTENTION if any of the following occur:    A change in the type of pain: if it feels different, becomes more severe, lasts longer, or begins to spread into your shoulder, arm, neck, jaw or back    Shortness of breath or increased pain with breathing    Weakness, dizziness, or fainting    Cough with blood or dark colored sputum (phlegm)    Fever over 101  F (38.3  C)    Swelling, pain or redness in one leg    8820-6257 [employer]. [address] [place] [address]. All rights reserved. This information is not intended as a substitute for professional medical care. Always follow your healthcare professional's instructions.  This information has been modified by your health care provider with permission from the publisher.      Your next 10  appointments already scheduled     Apr 20, 2018  1:40 PM CDT   SHORT with Dannielle Darby MD   [facility]    [address]  [facility] [id number] [phone]            Apr 24, 2018  9:00 AM CDT   LAB with RU LAB   [facility] [place] ([facility])    [address]  [facility] [id number] [phone]           Please do not eat 10-12 hours before your appointment if you are coming in fasting for labs on lipids, cholesterol, or glucose (sugar). This does not apply to pregnant women. Water, hot tea and black coffee (with nothing added) are okay. Do not drink other fluids, diet soda or chew gum.            Apr 24, 2018 10:00 AM CDT   Return Visit with Collins Horton MD   [facility] ([facility])    [address]  [facility] [id number] [phone]            Apr 24, 2018  5:00 PM CDT   Anticoagulation Visit with [facility] CLINIC   [facility] ([facility])    [address]  [facility] [id number] [phone]            May 10, 2018  9:30 AM CDT   Phone Device Check with NUÑEZ TECH2   [facility] ([facility])    [address] [place] [address] [address]  [facility] [id number] [phone] OPT 2              24 Hour Appointment Hotline       To make an appointment at any [facility], call [phone] ([phone]). If you don't have a family doctor or clinic, we will help you find one. [facility] are conveniently located to serve the needs of you and your family.             Review of your medicines      START taking        Dose / Directions Last dose taken    polyethylene glycol 236 g suspension   Commonly known as:  GoLYTELY/NuLYTELY   Dose:  4 L   Quantity:  4000 mL        Take 4,000 mLs (4 L) by mouth  once for 1 dose Take in 8 oz cups until bowel movement then you can stop.   Refills:  0          Our records show that you are taking the medicines listed below. If these are incorrect, please call your family doctor or clinic.        Dose / Directions Last dose taken    albuterol 108 (90 Base) MCG/ACT Inhaler   Commonly known as:  PROAIR HFA/PROVENTIL HFA/VENTOLIN HFA   Dose:  2 puff   Quantity:  1 Inhaler        Inhale 2 puffs into the lungs every 6 hours as needed for shortness of breath / dyspnea or wheezing   Refills:  1        ASPIRIN NOT PRESCRIBED   Commonly known as:  INTENTIONAL        Antiplatelet medication not prescribed intentionally due to Current anticoagulant therapy (warfarin/enoxaparin)   Refills:  0        BREO ELLIPTA 200-25 MCG/INH oral inhaler   Dose:  1 puff   Generic drug:  fluticasone-vilanterol        Inhale 1 puff into the lungs daily   Refills:  0        calcium carbonate 600 MG tablet   Dose:  1 tablet   Generic drug:  calcium carbonate        Take 1 tablet by mouth daily   Refills:  0        cephALEXin 500 MG capsule   Commonly known as:  KEFLEX   Dose:  500 mg   Quantity:  20 capsule        Take 1 capsule (500 mg) by mouth 2 times daily   Refills:  0        diltiazem 240 MG 24 hr capsule   Commonly known as:  DILTIAZEM CD   Dose:  240 mg   Quantity:  30 capsule        Take 1 capsule (240 mg) by mouth daily   Refills:  0        furosemide 20 MG tablet   Commonly known as:  LASIX   Dose:  40 mg   Quantity:  180 tablet        Take 2 tablets (40 mg) by mouth daily   Refills:  3        ipratropium - albuterol 0.5 mg/2.5 mg/3 mL 0.5-2.5 (3) MG/3ML neb solution   Commonly known as:  DUONEB   Dose:  1 vial   Quantity:  360 mL        Take 1 vial (3 mLs) by nebulization every 6 hours as needed for shortness of breath / dyspnea or wheezing   Refills:  0        LORazepam 0.5 MG tablet   Commonly known as:  ATIVAN   Quantity:  60 tablet        TAKE 1 TABLET BY MOUTH TWICE DAILY AS NEEDED FOR  ANXIETY   Refills:  0        losartan 50 MG tablet   Commonly known as:  COZAAR   Dose:  25 mg   Quantity:  1 tablet        Take 0.5 tablets (25 mg) by mouth daily   Refills:  0        MELATONIN PO   Dose:  5 mg        Take 5 mg by mouth At Bedtime   Refills:  0        nebulizer nebulization        4 times daily instead of 6 times   Refills:  0        nitroGLYcerin 0.4 MG sublingual tablet   Commonly known as:  NITROSTAT   Dose:  0.4 mg   Quantity:  25 tablet        Place 1 tablet (0.4 mg) under the tongue every 5 minutes as needed   Refills:  1        OCUVITE PO   Dose:  1 capsule        Take 1 capsule by mouth daily.   Refills:  0        polyethylene glycol Packet   Commonly known as:  MIRALAX/GLYCOLAX   Dose:  1 packet        Take 1 packet by mouth daily   Refills:  0        pravastatin 40 MG tablet   Commonly known as:  PRAVACHOL   Dose:  40 mg   Quantity:  90 tablet        Take 1 tablet (40 mg) by mouth daily   Refills:  0        PROBIOTIC ACIDOPHILUS Tabs   Dose:  1 tablet        Take 1 tablet by mouth daily   Refills:  0        STOOL SOFTENER PO        Take by mouth as needed   Refills:  0        TYLENOL PO   Dose:  1000 mg        Take 1,000 mg by mouth At Bedtime   Refills:  0        vitamin D 2000 units tablet   Dose:  2000 Units        Take 2,000 Units by mouth daily   Refills:  0        warfarin 2 MG tablet   Commonly known as:  COUMADIN   Quantity:  90 tablet        TAKE ONE TABLET BY MOUTH ONCE DAILY OR  AS  DIRECTED  BY  INR  CLINIC   Refills:  0                Prescriptions were sent or printed at these locations (1 Prescription)                   Other Prescriptions                Printed at Department/Unit printer (1 of 1)         polyethylene glycol (GOLYTELY/NULYTELY) 236 g suspension                Procedures and tests performed during your visit     Procedure/Test Number of Times Performed    Basic metabolic panel 1    CBC with platelets differential 1    EKG 12 lead 1    INR 1    Lipase 1    Nt  probnp inpatient (BNP) 1    Troponin I 2    XR Chest 2 Views 1      Orders Needing Specimen Collection     None      Pending Results     Date and Time Order Name Status Description    4/17/2018 1832 EKG 12 lead Preliminary     4/17/2018 1730 CT Abdomen Pelvis w/o Contrast Preliminary     4/16/2018 1218 URINE CULTURE AEROBIC BACTERIAL In process             Pending Culture Results     Date and Time Order Name Status Description    4/16/2018 1218 URINE CULTURE AEROBIC BACTERIAL In process             Pending Results Instructions     If you had any lab results that were not finalized at the time of your Discharge, you can call the ED Lab Result RN at [phone]. You will be contacted by this team for any positive Lab results or changes in treatment. The nurses are available 7 days a week from 10A to 6:30P.  You can leave a message 24 hours per day and they will return your call.        Test Results From Your Hospital Stay        4/17/2018  6:22 PM      Component Results     Component Value Ref Range & Units Status    WBC 7.1 4.0 - 11.0 10e9/L Final    RBC Count 4.27 3.8 - 5.2 10e12/L Final    Hemoglobin 12.5 11.7 - 15.7 g/dL Final    Hematocrit 38.1 35.0 - 47.0 % Final    MCV 89 78 - 100 fl Final    MCH 29.3 26.5 - 33.0 pg Final    MCHC 32.8 31.5 - 36.5 g/dL Final    RDW 14.5 10.0 - 15.0 % Final    Platelet Count 244 150 - 450 10e9/L Final    Diff Method Automated Method  Final    % Neutrophils 68.3 % Final    % Lymphocytes 19.9 % Final    % Monocytes 9.7 % Final    % Eosinophils 1.4 % Final    % Basophils 0.4 % Final    % Immature Granulocytes 0.3 % Final    Nucleated RBCs 0 0 /100 Final    Absolute Neutrophil 4.8 1.6 - 8.3 10e9/L Final    Absolute Lymphocytes 1.4 0.8 - 5.3 10e9/L Final    Absolute Monocytes 0.7 0.0 - 1.3 10e9/L Final    Absolute Eosinophils 0.1 0.0 - 0.7 10e9/L Final    Absolute Basophils 0.0 0.0 - 0.2 10e9/L Final    Abs Immature Granulocytes 0.0 0 - 0.4 10e9/L Final    Absolute Nucleated RBC 0.0   Final         4/17/2018  6:34 PM      Component Results     Component Value Ref Range & Units Status    INR 3.61 (H) 0.86 - 1.14 Final         4/17/2018  6:40 PM      Component Results     Component Value Ref Range & Units Status    Sodium 132 (L) 133 - 144 mmol/L Final    Potassium 4.0 3.4 - 5.3 mmol/L Final    Chloride 97 94 - 109 mmol/L Final    Carbon Dioxide 27 20 - 32 mmol/L Final    Anion Gap 8 3 - 14 mmol/L Final    Glucose 100 (H) 70 - 99 mg/dL Final    Urea Nitrogen 21 7 - 30 mg/dL Final    Creatinine 1.37 (H) 0.52 - 1.04 mg/dL Final    GFR Estimate 36 (L) >60 mL/min/1.7m2 Final    Non  GFR Calc    GFR Estimate If Black 44 (L) >60 mL/min/1.7m2 Final    African American GFR Calc    Calcium 9.4 8.5 - 10.1 mg/dL Final         4/17/2018  6:40 PM      Component Results     Component Value Ref Range & Units Status    Lipase 186 73 - 393 U/L Final         4/17/2018  6:40 PM      Component Results     Component Value Ref Range & Units Status    Troponin I ES <0.015 0.000 - 0.045 ug/L Final    The 99th percentile for upper reference range is 0.045 ug/L.  Troponin values   in the range of 0.045 - 0.120 ug/L may be associated with risks of adverse   clinical events.           4/17/2018  6:40 PM      Component Results     Component Value Ref Range & Units Status    N-Terminal Pro BNP Inpatient 3247 (H) 0 - 1800 pg/mL Final       Reference range shown and results flagged as abnormal are suggested inpatient   cut points for confirming diagnosis if CHF in an acute setting. Establishing a   baseline value for each individual patient is useful for follow-up. An   inpatient or emergency department NT-proPBNP <300 pg/mL effectively rules out   acute CHF, with 99% negative predictive value.  The outpatient non-acute reference range for ruling out CHF is:   0-125 pg/mL (age 18 to less than 75)   0-450 pg/mL (age 75 yrs and older)           4/17/2018  6:57 PM      Narrative     CHEST TWO VIEWS  4/17/2018  6:44  PM     HISTORY: Chest pain.     COMPARISON: 1/6/2018        Impression     IMPRESSION: No active disease, unchanged. 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 Lungs are also hyperinflated and  degenerative changes noted.    TAMARA BRYAN MD         4/17/2018  8:22 PM      Component Results     Component Value Ref Range & Units Status    Troponin I ES <0.015 0.000 - 0.045 ug/L Final    The 99th percentile for upper reference range is 0.045 ug/L.  Troponin values   in the range of 0.045 - 0.120 ug/L may be associated with risks of adverse   clinical events.                  Clinical Quality Measure: Blood Pressure Screening     Your blood pressure was checked while you were in the emergency department today. The last reading we obtained was  BP: (!) 146/102 . Please read the guidelines below about what these numbers mean and what you should do about them.  If your systolic blood pressure (the top number) is less than 120 and your diastolic blood pressure (the bottom number) is less than 80, then your blood pressure is normal. There is nothing more that you need to do about it.  If your systolic blood pressure (the top number) is 120-139 or your diastolic blood pressure (the bottom number) is 80-89, your blood pressure may be higher than it should be. You should have your blood pressure rechecked within a year by a primary care provider.  If your systolic blood pressure (the top number) is 140 or greater or your diastolic blood pressure (the bottom number) is 90 or greater, you may have high blood pressure. High blood pressure is treatable, but if left untreated over time it can put you at risk for heart attack, stroke, or kidney failure. You should have your blood pressure rechecked by a primary care provider within the next 4 weeks.  If your provider in the emergency department today gave you specific instructions to follow-up with  "your doctor or provider even sooner than that, you should follow that instruction and not wait for up to 4 weeks for your follow-up visit.        Thank you for choosing [facility]       Thank you for choosing [facility] for your care. Our goal is always to provide you with excellent care. Hearing back from our patients is one way we can continue to improve our services. Please take a few minutes to complete the written survey that you may receive in the mail after you visit with us. Thank you!        [facility] Information     [facility] lets you send messages to your doctor, view your test results, renew your prescriptions, schedule appointments and more. To sign up, go to [email or web address] . Click on \"Log in\" on the left side of the screen, which will take you to the Welcome page. Then click on \"Sign up Now\" on the right side of the page.     You will be asked to enter the access code listed below, as well as some personal information. Please follow the directions to create your username and password.     Your access code is: [email or web address]  Expires: 7/15/2018 12:42 PM     Your access code will  in 90 days. If you need help or a new code, please call your [facility] clinic or [phone].        Care EveryWhere ID     This is your Care EveryWhere ID. This could be used by other organizations to access your [facility] medical records  [id number]        Equal Access to Services     NALDO OVALLE AH: Hadjane Dasilva, waaxda mc, qaybta leeroyalantonia mathur. So [facility] [phone].    ATENCIÓN: Si habla español, tiene a spicer disposición servicios gratuitos de asistencia lingüística. Pollo al [phone].    We comply with applicable federal civil rights laws and [place] laws. We do not discriminate on the basis of race, color, national origin, age, disability, sex, sexual orientation, or gender identity.            After Visit Summary       This is your record. " Keep this with you and show to your community pharmacist(s) and doctor(s) at your next visit.

## 2018-04-17 NOTE — ED NOTES
Pt reports having constipation and nausea that began 9 days ago. Pt has had difficulty eating and was seen for this in the clinic, where the PCP ordered an outpatient CT scan. Pt had CT done today, just prior to coming to the ED. Pt also has been complaining of chest pain and shoulder pain. Pt A&Ox4. ABCDs intact.

## 2018-04-17 NOTE — ED PROVIDER NOTES
History     Chief Complaint:  Abdominal Pain and Chest Pain      HPI   Leonor Mercado is a 89 year old female who presents with abdominal pain and chest pain. The patient reports having constipation, diffuse abdominal pain, and nausea for the last 9 days. She reports having a decreased appetite and was seen in clinic. Her primary had ordered an outpatient abdominal CT which she had completed today. After the CT scan, she presented to the ED due to bilateral shoulder and chest pain. She states that the chest pain has been intermittent. The patient reports shortness of breath at baseline and does require oxygen at night, however, she denies any increased shortness of breath. Additionally, the patient denies any fevers, cough, or leg swelling.      Outpatient CT Abdomen/Pelvis without contrast from Earlier Today:   No cause of acute pain identified in the abdomen or  pelvis.      Allergies:  Amiodarone  Baclofen  Bactrim   Doxycyline  Levaquin  Linzess  Lorazepam  Liquid adhesive     Medications:    Keflex  Pravachol  Ativan   Lasix  Stool softener  Breo ellipta  Diltiazem  Duoneb  Melatonin   Cozaar  Coumadin   Albuterol inhaler  Tylenol   Miralax  Nitrostat  Calcium Carbonate  Vitamin D    Past Medical History:    A fib  BCC  CAD  CHF  Chronic renal insufficiency   COPD  CAD  Hyperlipidemia  Hypertension   IBS  Osteoporosis   Panic attack  Pulmonary fibrosis   Sick sinus syndrome    Past Surgical History:    Appendectomy   Cardiac Surgery   Left hernia repair  Implant pacemaker  Replace pacemaker generator     Family History:    Heart Disease-Father  Neurologic Disorder-Mother  GI disease-Daughter    Social History:  Marital Status:   Presents to the ED with her daughter  Tobacco Use: Former Smoker  Alcohol Use: Yes  PCP: Dannielle Darby      Review of Systems   Constitutional: Negative for fever.   Respiratory: Positive for shortness of breath. Negative for cough.    Cardiovascular:  Positive for chest pain. Negative for leg swelling.   Gastrointestinal: Positive for abdominal pain, constipation and nausea. Negative for vomiting.   All other systems reviewed and are negative.    Physical Exam   First Vitals:  BP: 137/86  Pulse: 58  Heart Rate: 58  Temp: 98.1  F (36.7  C)  Resp: 16  SpO2: 98 %      Physical Exam  Constitutional: Patient is well appearing. No distress.  Head: Atraumatic.  Mouth/Throat: Oropharynx is clear and moist. No oropharyngeal exudate.  Eyes: Conjunctivae and EOM are normal. No scleral icterus.  Neck: Normal range of motion. Neck supple.   Cardiovascular: Normal rate, regular rhythm, normal heart sounds and intact distal pulses.   Pulmonary/Chest: Breath sounds normal. No respiratory distress.  Abdominal: Soft. Bowel sounds are normal. No distension. No tenderness. No rebound or guarding.   Musculoskeletal: Normal range of motion. No edema or tenderness.   Neurological: Alert and orientated to person only. No observable focal neuro deficit  Skin: Warm and dry. No rash noted. Not diaphoretic.      Emergency Department Course   ECG:  @ 1758  Indication: Chest pain  Vent. Rate 66 bpm. ID interval * ms. QRS duration 84 ms. QT/QTc 352/369 ms. P-R-T axis * 15 96.   Atrial fibrillation with frequent ventricular-paced complexes. ST & T wave abnormality, consider anterior ischemia.  Abnormal ECG.    Read by Dr. Zarate.     Imaging:  Chest x-ray, 2 views:   No active disease, unchanged. 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 Lungs are also hyperinflated and  degenerative changes noted.  Report per radiology.   Radiographic findings were communicated with the patient who voiced understanding of the findings.    Laboratory:  BNP: 3247 (H)   (1813) Troponin I: <0.015  Lipase: 186  BMP:  (L), Glucose 100 (H), Creatinine 1.37 (H), GFR 36 (L), otherwise WNL   CBC:  WBC 7.1, HGB 12.5, ,  otherwise WNL   INR: 3.61 (H)   (1943) Troponin I: <0.015    Emergency Department Course:  Nursing notes and vitals reviewed.  (1756) I performed an exam of the patient as documented above.    EKG was done, interpretation as above.   A peripheral IV was established. Blood was drawn from the patient. This was sent for laboratory testing, findings above.    The patient was sent for a chest x-ray while in the emergency department, findings above.    (2055) I updated the patient   Findings and plan explained to the patient. Patient discharged home with instructions regarding supportive care, medications, and reasons to return. The importance of close follow-up was reviewed.   I personally reviewed the laboratory results with the patient and answered all related questions prior to discharge.      Impression & Plan    Medical Decision Making:  Many days to weeks of abd pain and chest pain.  Although pt has fluctuating hx neither of which sounds acute.  Above the diaphragm ACS PE and other CV resp causes screening neg at this time with many hours to days to weeks of sx.  BNP appears in low range for pt and CXR clear and O2 sat perfect and not tachycardic.  Below the diaphragm outpt workup completed prior to arriving and reassured here with normal CT and labs.  Appears functional constipation.  Offered enema vs go lytely.  Going with latter.   All questions and concerns were answered. The patient was discharged home and recommended to follow up with her primary physician and return with any new or worsening symptoms.      Diagnosis:    ICD-10-CM    1. Other chest pain R07.89    2. Abdominal pain, generalized R10.84    3. Elevated brain natriuretic peptide (BNP) level R79.89    4. Constipation, unspecified constipation type K59.00        Disposition:  discharged to home      I, Uzma Muro, am serving as a scribe on 4/17/2018 at 5:56 PM to personally document services performed by  based on my observations and  the provider's statements to me.    4/17/2018   [facility] EMERGENCY DEPARTMENT       Primo Zarate MD  04/18/18 0046

## 2018-04-17 NOTE — TELEPHONE ENCOUNTER
"Patient took the Sennokot-had one small soft stool, and then has been passing small stools and flatus.  Abdominal cramping is continuous, but is worse when trying to have a bowel movement.  No known fever.  Has been \"shaking and starving\" for over one week.    Patient is going out to lunch with her  for his birthday.  Unable to call her back until after 3 pm.  MAXIMINO Lopez RN   .      "

## 2018-04-17 NOTE — TELEPHONE ENCOUNTER
Alfredo called to follow-up.  Seen by Maria Isabel Rosario yesterday for constipation, nausea and ab pain - was advised to reduce sertraline to every other day since symptoms seems to have started when she started the sertraline.  However she has been dosing every other day, never increased to daily as recommended.       Today asking what she should do because she is confused, was told not to abruptly stop the sertraline.    Awaiting results of urine culture.    Agreed that she should not stop SSRI abruptly but because she was only taking it every other day, okay to stop now and then follow-up with Dr. Young next week as scheduled.

## 2018-04-17 NOTE — ED AVS SNAPSHOT
[facility] Emergency Department    [address]    [facility] [address]    Phone:  [phone]    Fax:  [phone]                                       Leonor Mercado   MRN: [id number]    Department:  [facility] Emergency Department   Date of Visit:  4/17/2018           After Visit Summary Signature Page     I have received my discharge instructions, and my questions have been answered. I have discussed any challenges I see with this plan with the nurse or doctor.    ..........................................................................................................................................  Patient/Patient Representative Signature      ..........................................................................................................................................  Patient Representative Print Name and Relationship to Patient    ..................................................               ................................................  Date                                            Time    ..........................................................................................................................................  Reviewed by Signature/Title    ...................................................              ..............................................  Date                                                            Time

## 2018-04-17 NOTE — TELEPHONE ENCOUNTER
"Called patient back to notify of message below and check in on abdominal cramping. Patient stated she was not able to enjoy her 's birthday party and has not been eating or drinking much at all because of her extreme pain. At 1445 patient had a small juma and about 2 Tbsp of applesauce. When asked to clarify where her pain is, she said all over, but the cramping is the worse in lower abdomen. Due to the pain she has not slept since 0345.     Huddled with Maria Isabel De La Torre regarding increased pain. Abdominal CT was ordered. Suggested we call daughters to get clearer view on recent symptoms. Consent to discuss is signed for both Les and Cleo. Called Les who stated her mother often \"cries quintero\" but also stated they have had to take her into emergency in the past for obstructions. This is an ongoing issue. Les confirmed her mother's cramping is continuing today and that she did not eat or drink anything at the birthday party. Called Cleo who lives closer to discuss need for CT versus possible need to visit ED. Cleo felt comfortable with scheduling the CT and continuing from there and would be able to drive Leonor into the appointment.    Abdominal CT has been scheduled at [facility] for 1800. Patient and daughter, Cleo, notified to check in at 1730. Both were also notified to remain NPO from now until CT. Leonor and Cleo verbalized their understanding. Radiology notified patient did have small amount of food at 1430. They just verified she should remain NPO from now on.     Maria Isabel De La Torre notified of appointment. Dr. Teresa Camacho's pager given to Radiology for read-in call.  "

## 2018-04-17 NOTE — TELEPHONE ENCOUNTER
She can stop Sertraline - she was taking such a small dose    Urine culture is pending     The labs were ordered by Maria Isabel Rosario  And she will be the one to call her with results

## 2018-04-18 LAB — INTERPRETATION ECG - MUSE: NORMAL

## 2018-04-18 NOTE — DISCHARGE INSTRUCTIONS
*Abdominal Pain, Unknown Cause (Female)    The exact cause of your abdominal (stomach) pain is not certain. This does not mean that this is something to worry about, or the right tests were not done. Everyone likes to know the exact cause of the problem, but sometimes with abdominal pain, there is no clear-cut cause, and this could be a good thing. The good news is that your symptoms can be treated, and you will feel better.   Your condition does not seem serious now; however, sometimes the signs of a serious problem may take more time to appear. For this reason, it is important for you to watch for any new symptoms, problems, or worsening of your condition.  Over the next few days, the abdominal pain may come and go, or be continuous. Other common symptoms can include nausea and vomiting. Sometimes it can be difficult to tell if you feel nauseous, you may just feel bad and not associate that feeling with nausea. Constipation, diarrhea, and a fever may go along with the pain.  The pain may continue even if treated correctly over the following days. Depending on how things go, sometimes the cause can become clear and may require further or different treatment. Additional evaluations, medications, or tests may be needed.  Home care  Your health care provider may prescribe medications for pain, symptoms, or an infection.  Follow the health care provider's instructions for taking these medications.  General care    Rest until your next exam. No strenuous activities.    Try to find positions that ease discomfort. A small pillow placed on the abdomen may help relieve pain.    Something warm on your abdomen (such as a heating pad) may help, but be careful not to burn yourself.  Diet    Do not force yourself to eat, especially if having cramps, vomiting, or diarrhea.    Water is important so you do not get dehydrated. Soup may also be good. Sports drinks may also help, especially if they are not too acidic. Make sure you  don't drink sugary drinks as this can make things worse. Take liquids in small amounts. Do not guzzle them.    Caffeine sometimes makes the pain and cramping worse.    Avoid dairy products if you have vomiting or diarrhea.    Don't eat large amounts at a time. Wait a few minutes between bites.    Eat a diet low in fiber (called a low-residue diet). Foods allowed include refined breads, white rice, fruit and vegetable juices without pulp, tender meats. These foods will pass more easily through the intestine.    Avoid fried or fatty foods, dairy, alcohol and spicy foods until your symptoms go away.  Follow-up care  Follow up with your health care provider as instructed, or if your pain does not begin to improve in the next 24 hours.  When to seek medical care  Seek prompt medical care if any of the following occur:    Pain gets worse or moves to the right lower abdomen    New or worsening vomiting or diarrhea    Swelling of the abdomen    Unable to pass stool for more than three days    New fever over 101  F (38.3 C), or rising fever    Blood in vomit or bowel movements (dark red or black color)    Jaundice (yellow color of eyes and skin)    Weakness, dizziness    Chest, arm, back, neck or jaw pain    Unexpected vaginal bleeding or missed period  Call 911  Call emergency services if any of the following occur:    Trouble breathing    Confusion    Fainting or loss of consciousness    Rapid heart rate    Seizure    6674-1089 [facility], [address] [place] [address], [place] [address]. All rights reserved. This information is not intended as a substitute for professional medical care. Always follow your healthcare professional's instructions.           *CHEST PAIN, UNCERTAIN CAUSE    Based on your exam today, the exact cause of your chest pain is not certain. Your condition does not seem serious at this time, and your pain does not appear to be coming from your heart. However, sometimes the signs of a serious problem take  more time to appear. Therefore, watch for the warning signs listed below.  HOME CARE:  1. Rest today and avoid strenuous activity.  2. Take any prescribed medicine as directed.  FOLLOW UP with your doctor in 1-3 days.   GET PROMPT MEDICAL ATTENTION if any of the following occur:    A change in the type of pain: if it feels different, becomes more severe, lasts longer, or begins to spread into your shoulder, arm, neck, jaw or back    Shortness of breath or increased pain with breathing    Weakness, dizziness, or fainting    Cough with blood or dark colored sputum (phlegm)    Fever over 101  F (38.3  C)    Swelling, pain or redness in one leg    9938-1562 [employer]. [address] [place] [address], [place] [address]. All rights reserved. This information is not intended as a substitute for professional medical care. Always follow your healthcare professional's instructions.  This information has been modified by your health care provider with permission from the publisher.

## 2018-04-20 ENCOUNTER — OFFICE VISIT (OUTPATIENT)
Dept: INTERNAL MEDICINE | Facility: CLINIC | Age: 83
End: 2018-04-20
Payer: COMMERCIAL

## 2018-04-20 VITALS
RESPIRATION RATE: 16 BRPM | BODY MASS INDEX: 23.23 KG/M2 | HEART RATE: 85 BPM | WEIGHT: 131.1 LBS | HEIGHT: 63 IN | DIASTOLIC BLOOD PRESSURE: 58 MMHG | SYSTOLIC BLOOD PRESSURE: 96 MMHG | OXYGEN SATURATION: 96 % | TEMPERATURE: 97.5 F

## 2018-04-20 DIAGNOSIS — J44.9 CHRONIC OBSTRUCTIVE PULMONARY DISEASE, UNSPECIFIED COPD TYPE (H): Chronic | ICD-10-CM

## 2018-04-20 DIAGNOSIS — F41.9 ANXIETY: Primary | ICD-10-CM

## 2018-04-20 DIAGNOSIS — K59.00 CONSTIPATION, UNSPECIFIED CONSTIPATION TYPE: ICD-10-CM

## 2018-04-20 PROCEDURE — 99214 OFFICE O/P EST MOD 30 MIN: CPT | Performed by: INTERNAL MEDICINE

## 2018-04-20 RX ORDER — PAROXETINE 10 MG/1
10 TABLET, FILM COATED ORAL AT BEDTIME
Qty: 30 TABLET | Refills: 1 | Status: SHIPPED | OUTPATIENT
Start: 2018-04-20 | End: 2018-04-24

## 2018-04-20 ASSESSMENT — ANXIETY QUESTIONNAIRES
6. BECOMING EASILY ANNOYED OR IRRITABLE: SEVERAL DAYS
7. FEELING AFRAID AS IF SOMETHING AWFUL MIGHT HAPPEN: MORE THAN HALF THE DAYS
1. FEELING NERVOUS, ANXIOUS, OR ON EDGE: MORE THAN HALF THE DAYS
2. NOT BEING ABLE TO STOP OR CONTROL WORRYING: SEVERAL DAYS
3. WORRYING TOO MUCH ABOUT DIFFERENT THINGS: SEVERAL DAYS
GAD7 TOTAL SCORE: 10
IF YOU CHECKED OFF ANY PROBLEMS ON THIS QUESTIONNAIRE, HOW DIFFICULT HAVE THESE PROBLEMS MADE IT FOR YOU TO DO YOUR WORK, TAKE CARE OF THINGS AT HOME, OR GET ALONG WITH OTHER PEOPLE: SOMEWHAT DIFFICULT
5. BEING SO RESTLESS THAT IT IS HARD TO SIT STILL: SEVERAL DAYS

## 2018-04-20 ASSESSMENT — PATIENT HEALTH QUESTIONNAIRE - PHQ9: 5. POOR APPETITE OR OVEREATING: MORE THAN HALF THE DAYS

## 2018-04-20 NOTE — PATIENT INSTRUCTIONS
Plan:  1. Continue same meds, same doses for now   2. Avoid Lorazepam   3. Paroxetine 10 mg at bed time - for anxiety - if you decide to take

## 2018-04-20 NOTE — PROGRESS NOTES
Dr Young's note      Patient's instructions / PLAN:                                                        Plan:  1. Continue same meds, same doses for now   2. Avoid Lorazepam   3. Paroxetine 10 mg at bed time - for anxiety - if you decide to take       ASSESSMENT & PLAN:                                                        89-year-old woman with complex medical problems: CAD ( 2 stents 2000s) , diastolic CHF, COPD, chr AFib, PPM ( 2003 for sinus sick syndrome), HTN, hyperlipidemia, osteoporosis     Most recently she has been struggling with anxiety and depression.  The SSRIs are helping, but they cause constipation which brought her to emergency room    (F41.9) Anxiety  (primary encounter diagnosis)  Comment: Not controlled   Plan: PARoxetine (PAXIL) 10 MG tablet            (J44.9) COPD (H)  Comment: better Controlled    Plan: Continue same meds, same doses for now     (K59.00) Constipation, unspecified constipation type  Comment: sec SSRi   Plan: Continue MiraLAX and Colace as needed       Chief complaint:                                                      Depression   Follow up chronic medical problems      SUBJECTIVE:   Leonor Mercado is a 89 year old female who presents to clinic today for the following health issues:      She will have a party for her and  Age 89. Misses  [facility]     Constipation  -- Takes Miralax daily and still needs dulcolax or Mg Citrate   --The constipation gets worse when she tries SSRIs.  She ended up in the emergency room April 17 because of the constipation  --   COPD  The breathing much better since meds were changed by dr Her       Very unhappy she moved to an assisted living.  She hope she can go home.  With her COPD and getting older and more fragile, I doubt she can return to live safely at home.  She is very frustrated about this.  Daughter is involved with her care.    Depression Followup    Status since last visit: Worsened medication not  "working, looking for alternative meds  The meds that help the anxiety slow down the constipation.   She has tried Zoloft and Citalopram. She doesn't want to try a diff med     See PHQ-9 for current symptoms.  Other associated symptoms: None    Complicating factors:   Significant life event:  No   Current substance abuse:  None  Anxiety or Panic symptoms:  No    PHQ-9 4/27/2017 11/17/2017 4/3/2018   Total Score 9 12 16   Q9: Suicide Ideation Not at all Several days Not at all       LOV: Plan:  1. Mucinex 600 mg twice a day  2. Prednisone 10 mg   -- take 2 tabs daily for 4 days then   -- take 1 tablet daily  -- ask dr Her about the next Prednisone dose  3. Increased Furosemide to 2 tabs daily = 40 mg for a week then go back to 1 tab = 20 mg daily   4.  Continue the other meds, same doses for now.  5. Follow up in a month      Review of Systems:                                                      ROS: negative for fever, chills, cough, wheezes, chest pain,  vomiting, abdominal pain, leg swelling positive for chronic shortness of breath         OBJECTIVE:             Physical exam:  Blood pressure 96/58, pulse 85, temperature 97.5  F (36.4  C), temperature source Oral, resp. rate 16, height 5' 3\" (1.6 m), weight 131 lb 1.6 oz (59.5 kg), SpO2 96 %, not currently breastfeeding.   NAD, appears comfortable, but fragile  Skin: no rashes   Chest: clear to auscultation bilaterally, good respiratory effort  Heart: S1 S2, RRR, no mgr appreciated  Abdomen: soft, not tender,   Extremities: no edema,  Neurologic: A, Ox3, no focal signs appreciated    PMHx: reviewed  Past Medical History:   Diagnosis Date     A Fib - chr Coumadin, s/p PPM (H) 5/8/2013     Atrial fibrillation (H)     Sick sinus syndrome, PAT, AF     BCC (basal cell carcinoma of skin)     Face     CAD - 2 stents in [place] in 2000s.  1/5/2016     CHF (congestive heart failure) (H)     mild in past     CHF - Chr Diastolic (H) 11/21/2017     Chronic renal insufficiency  "     COPD (chronic obstructive pulmonary disease) (H)      COPD (H) 5/13/2013     Coronary artery disease     2-05[place]-CAD-taxus stentx2 2.5-12,2.5-12 in LADp and LADm, 80%nonDomRCA-LcxDom-mild,EF60%     Hyperlipidemia      Hypertension      IBS (irritable bowel syndrome)      Irritable bowel      Osteoporosis      Panic attack     questionable diagnosis     Pulmonary fibrosis (H)     do not use amiodarone     Shortness of breath      Sick sinus syndrome - s/p PPM 2003 (H) 12/16/2017     SSS (sick sinus syndrome) (H)     DDD pacer (see surgery history section)     Vertigo       PSHx: reviewed  Past Surgical History:   Procedure Laterality Date     APPENDECTOMY  1956     CARDIAC SURGERY      PPM, 2 STENTS2-05[place]-CAD-taxus stentx2 2.5-12,2.5-12 in LADp and LADm, 80%nonDomRCA-LcxDom-mild,EF60%     CORONARY ANGIOGRAPHY ADULT ORDER  7/1994    mild CAD,Normal LV function, No Mitral regurgitation, Prox LAD 30% stenosis. RCA prox and mid, 20-30%     ESOPHAGOSCOPY, GASTROSCOPY, DUODENOSCOPY (EGD), COMBINED N/A 8/19/2015    Procedure: COMBINED ESOPHAGOSCOPY, GASTROSCOPY, DUODENOSCOPY (EGD), BIOPSY SINGLE OR MULTIPLE;  Surgeon: Genevieve Leon MD;  Location: [facility]     H LEAD REVISION DUAL  4/2003    Revised in [place]-due to diaphram stim (original pacer placed in [place])     H LEAD REVISION DUAL  7/2/2014    Correction of reversal of A and V leads in Header     HEART CATH, ANGIOPLASTY  02/2005    LEIGH ANN mid and proximal LAD, ongoing 80% RCA; mild circumflex     HERNIA REPAIR Left 1991    [facility]     IMPLANT PACEMAKER  2/19/2003    Placed in [place] for sick sinus syndrome     REPLACE PACEMAKER GENERATOR  6/27/2013    Dual-chamber pacemaker by Dr SETH Pierre        Meds: reviewed  Current Outpatient Prescriptions   Medication Sig Dispense Refill     Acetaminophen (TYLENOL PO) Take 1,000 mg by mouth At Bedtime       albuterol (PROAIR HFA/PROVENTIL HFA/VENTOLIN HFA) 108 (90 BASE) MCG/ACT Inhaler Inhale 2 puffs into the lungs every 6 hours  as needed for shortness of breath / dyspnea or wheezing 1 Inhaler 1     calcium carbonate (CALCIUM CARBONATE) 600 MG TABS tablet Take 1 tablet by mouth daily        Cholecalciferol (VITAMIN D) 2000 UNITS tablet Take 2,000 Units by mouth daily       diltiazem (DILTIAZEM CD) 240 MG 24 hr capsule Take 1 capsule (240 mg) by mouth daily 30 capsule      Docusate Calcium (STOOL SOFTENER PO) Take by mouth as needed       fluticasone-vilanterol (BREO ELLIPTA) 200-25 MCG/INH oral inhaler Inhale 1 puff into the lungs daily       furosemide (LASIX) 20 MG tablet Take 2 tablets (40 mg) by mouth daily 180 tablet 3     ipratropium - albuterol 0.5 mg/2.5 mg/3 mL (DUONEB) 0.5-2.5 (3) MG/3ML neb solution Take 1 vial (3 mLs) by nebulization every 6 hours as needed for shortness of breath / dyspnea or wheezing 360 mL      Lactobacillus (PROBIOTIC ACIDOPHILUS) TABS Take 1 tablet by mouth daily       LORazepam (ATIVAN) 0.5 MG tablet TAKE 1 TABLET BY MOUTH TWICE DAILY AS NEEDED FOR ANXIETY 60 tablet 0     losartan (COZAAR) 50 MG tablet Take 0.5 tablets (25 mg) by mouth daily 1 tablet 0     MELATONIN PO Take 5 mg by mouth At Bedtime       Multiple Vitamins-Minerals (OCUVITE PO) Take 1 capsule by mouth daily.       nebulizer nebulization 4 times daily instead of 6 times       nitroglycerin (NITROSTAT) 0.4 MG SL tablet Place 1 tablet (0.4 mg) under the tongue every 5 minutes as needed 25 tablet 1     polyethylene glycol (MIRALAX/GLYCOLAX) packet Take 1 packet by mouth daily        pravastatin (PRAVACHOL) 40 MG tablet Take 1 tablet (40 mg) by mouth daily 90 tablet 0     warfarin (COUMADIN) 2 MG tablet TAKE ONE TABLET BY MOUTH ONCE DAILY OR  AS  DIRECTED  BY  INR  CLINIC 90 tablet 0       Soc Hx: reviewed  Fam Hx: reviewed          Dannielle Young MD  Internal Medicine

## 2018-04-20 NOTE — NURSING NOTE
"Chief Complaint   Patient presents with     Follow Up For     depression, medication is not working, looking for an alternative.       Initial BP 96/58 (BP Location: Right arm, Patient Position: Sitting, Cuff Size: Adult Regular)  Pulse 85  Temp 97.5  F (36.4  C) (Oral)  Resp 16  Ht 5' 3\" (1.6 m)  Wt 131 lb 1.6 oz (59.5 kg)  LMP  (LMP Unknown)  SpO2 96%  BMI 23.22 kg/m2 Estimated body mass index is 23.22 kg/(m^2) as calculated from the following:    Height as of this encounter: 5' 3\" (1.6 m).    Weight as of this encounter: 131 lb 1.6 oz (59.5 kg).  Medication Reconciliation: complete    "

## 2018-04-20 NOTE — MR AVS SNAPSHOT
After Visit Summary   4/20/2018    Leonor Mercado    MRN: [id number]           Patient Information     Date Of Birth          5/6/1926        Visit Information        Provider Department      4/20/2018 1:40 PM Dannielle Darby MD [facility]        Today's Diagnoses     Anxiety    -  1      Care Instructions    Plan:  1. Continue same meds, same doses for now   2. Avoid Lorazepam   3. Paroxetine 10 mg at bed time - for anxiety - if you decide to take           Follow-ups after your visit        Your next 10 appointments already scheduled     Apr 24, 2018  9:00 AM CDT   LAB with RU LAB   [facility] [place] ([facility])    [address]  [facility] [id number] [phone]           Please do not eat 10-12 hours before your appointment if you are coming in fasting for labs on lipids, cholesterol, or glucose (sugar). This does not apply to pregnant women. Water, hot tea and black coffee (with nothing added) are okay. Do not drink other fluids, diet soda or chew gum.            Apr 24, 2018 10:00 AM CDT   Return Visit with Collins Horton MD   [facility] ([facility])    [address]  [place] [id number] [phone]            Apr 24, 2018  5:00 PM CDT   Anticoagulation Visit with [facility]TICOAGULATION CLINIC   [facility]    [address]  [facility] [id number] [phone]            May 10, 2018  9:30 AM CDT   Phone Device Check with NUÑEZ TECH2   [facility] ([facility])    [address] [place] [address] [address]  [facility] [id number] [phone] OPT 2              Who to contact     If you have questions or need follow up information about today's clinic visit or your schedule please contact [facility] directly at  "[phone].  Normal or non-critical lab and imaging results will be communicated to you by Penemarie K [employer], letter or phone within 4 business days after the clinic has received the results. If you do not hear from us within 7 days, please contact the clinic through Penemarie K Murphyhart or phone. If you have a critical or abnormal lab result, we will notify you by phone as soon as possible.  Submit refill requests through [employer] or call your pharmacy and they will forward the refill request to us. Please allow 3 business days for your refill to be completed.          Additional Information About Your Visit        Penemarie K Murphyhart Information     [employer] lets you send messages to your doctor, view your test results, renew your prescriptions, schedule appointments and more. To sign up, go to [email or web address] . Click on \"Log in\" on the left side of the screen, which will take you to the Welcome page. Then click on \"Sign up Now\" on the right side of the page.     You will be asked to enter the access code listed below, as well as some personal information. Please follow the directions to create your username and password.     Your access code is: [email or web address]  Expires: 7/15/2018 12:42 PM     Your access code will  in 90 days. If you need help or a new code, please call your [facility] clinic or [phone].        Care EveryWhere ID     This is your Care EveryWhere ID. This could be used by other organizations to access your [facility] medical records  [id number]        Your Vitals Were     Pulse Temperature Respirations Height Last Period Pulse Oximetry    85 97.5  F (36.4  C) (Oral) 16 5' 3\" (1.6 m) (LMP Unknown) 96%    BMI (Body Mass Index)                   23.22 kg/m2            Blood Pressure from Last 3 Encounters:   18 96/58   18 (!) 153/91   18 112/58    Weight from Last 3 Encounters:   18 131 lb 1.6 oz (59.5 kg)   18 130 lb 3.2 oz (59.1 kg)   18 137 lb 8 oz (62.4 kg)              Today, you " had the following     No orders found for display         Today's Medication Changes          These changes are accurate as of 4/20/18  2:36 PM.  If you have any questions, ask your nurse or doctor.               Start taking these medicines.        Dose/Directions    PARoxetine 10 MG tablet   Commonly known as:  PAXIL   Used for:  Anxiety   Started by:  Dannielle Darby MD        Dose:  10 mg   Take 1 tablet (10 mg) by mouth At Bedtime   Quantity:  30 tablet   Refills:  1            Where to get your medicines      Some of these will need a paper prescription and others can be bought over the counter.  Ask your nurse if you have questions.     Bring a paper prescription for each of these medications     PARoxetine 10 MG tablet                Primary Care Provider Office Phone # Fax #    Dannielle Darby [phone]       [address] [facility] [address]        Equal Access to Services     [facility]: Hadii marisel joyner hadasho Soomaali, waaxda luqadaha, qaybta kaalmada adeegyada, antonia freeman haybryon cruz . So [facility] [phone].    ATENCIÓN: Si habla español, tiene a spicer disposición servicios gratuitos de asistencia lingüística. Llame al [phone].    We comply with applicable federal civil rights laws and [place] laws. We do not discriminate on the basis of race, color, national origin, age, disability, sex, sexual orientation, or gender identity.            Thank you!     Thank you for choosing [facility]  for your care. Our goal is always to provide you with excellent care. Hearing back from our patients is one way we can continue to improve our services. Please take a few minutes to complete the written survey that you may receive in the mail after your visit with us. Thank you!             Your Updated Medication List - Protect others around you: Learn how to safely use, store and throw away your medicines at  www.disposemymeds.org.          This list is accurate as of 4/20/18  2:36 PM.  Always use your most recent med list.                   Brand Name Dispense Instructions for use Diagnosis    albuterol 108 (90 Base) MCG/ACT Inhaler    PROAIR HFA/PROVENTIL HFA/VENTOLIN HFA    1 Inhaler    Inhale 2 puffs into the lungs every 6 hours as needed for shortness of breath / dyspnea or wheezing    Chronic obstructive pulmonary disease, unspecified COPD type (H)       BREO ELLIPTA 200-25 MCG/INH oral inhaler   Generic drug:  fluticasone-vilanterol      Inhale 1 puff into the lungs daily    Anxiety       calcium carbonate 600 MG tablet   Generic drug:  calcium carbonate      Take 1 tablet by mouth daily        diltiazem 240 MG 24 hr capsule    DILTIAZEM CD    30 capsule    Take 1 capsule (240 mg) by mouth daily    Chronic atrial fibrillation (H)       furosemide 20 MG tablet    LASIX    180 tablet    Take 2 tablets (40 mg) by mouth daily    Chronic diastolic congestive heart failure (H)       ipratropium - albuterol 0.5 mg/2.5 mg/3 mL 0.5-2.5 (3) MG/3ML neb solution    DUONEB    360 mL    Take 1 vial (3 mLs) by nebulization every 6 hours as needed for shortness of breath / dyspnea or wheezing    Chronic obstructive pulmonary disease, unspecified COPD type (H)       LORazepam 0.5 MG tablet    ATIVAN    60 tablet    TAKE 1 TABLET BY MOUTH TWICE DAILY AS NEEDED FOR ANXIETY    Anxiety       losartan 50 MG tablet    COZAAR    1 tablet    Take 0.5 tablets (25 mg) by mouth daily    Essential hypertension with goal blood pressure less than 140/90       MELATONIN PO      Take 5 mg by mouth At Bedtime        nebulizer nebulization      4 times daily instead of 6 times        nitroGLYcerin 0.4 MG sublingual tablet    NITROSTAT    25 tablet    Place 1 tablet (0.4 mg) under the tongue every 5 minutes as needed    Other chest pain       OCUVITE PO      Take 1 capsule by mouth daily.        PARoxetine 10 MG tablet    PAXIL    30 tablet    Take  1 tablet (10 mg) by mouth At Bedtime    Anxiety       polyethylene glycol Packet    MIRALAX/GLYCOLAX     Take 1 packet by mouth daily        pravastatin 40 MG tablet    PRAVACHOL    90 tablet    Take 1 tablet (40 mg) by mouth daily    Hyperlipidemia LDL goal <100, Coronary artery disease involving native coronary artery of native heart without angina pectoris       PROBIOTIC ACIDOPHILUS Tabs      Take 1 tablet by mouth daily        STOOL SOFTENER PO      Take by mouth as needed        TYLENOL PO      Take 1,000 mg by mouth At Bedtime        vitamin D 2000 units tablet      Take 2,000 Units by mouth daily        warfarin 2 MG tablet    COUMADIN    90 tablet    TAKE ONE TABLET BY MOUTH ONCE DAILY OR  AS  DIRECTED  BY  INR  CLINIC    Long term current use of anticoagulant therapy

## 2018-04-21 ASSESSMENT — PATIENT HEALTH QUESTIONNAIRE - PHQ9: SUM OF ALL RESPONSES TO PHQ QUESTIONS 1-9: 8

## 2018-04-21 ASSESSMENT — ANXIETY QUESTIONNAIRES: GAD7 TOTAL SCORE: 10

## 2018-04-24 ENCOUNTER — HOSPITAL ENCOUNTER (OUTPATIENT)
Facility: CLINIC | Age: 83
Setting detail: OBSERVATION
Discharge: HOME | End: 2018-04-29
Attending: EMERGENCY MEDICINE | Admitting: INTERNAL MEDICINE
Payer: MEDICARE

## 2018-04-24 ENCOUNTER — TELEPHONE (OUTPATIENT)
Dept: BEHAVIORAL HEALTH | Facility: CLINIC | Age: 83
End: 2018-04-24

## 2018-04-24 ENCOUNTER — APPOINTMENT (OUTPATIENT)
Dept: ULTRASOUND IMAGING | Facility: CLINIC | Age: 83
End: 2018-04-24
Attending: NURSE PRACTITIONER
Payer: MEDICARE

## 2018-04-24 DIAGNOSIS — R10.32 ABDOMINAL PAIN, LEFT LOWER QUADRANT: ICD-10-CM

## 2018-04-24 DIAGNOSIS — K59.00 CONSTIPATION, UNSPECIFIED CONSTIPATION TYPE: ICD-10-CM

## 2018-04-24 DIAGNOSIS — T50.904A DRUG OVERDOSE, UNDETERMINED INTENT, INITIAL ENCOUNTER: ICD-10-CM

## 2018-04-24 DIAGNOSIS — F43.20 ADJUSTMENT DISORDER, UNSPECIFIED TYPE: Primary | ICD-10-CM

## 2018-04-24 DIAGNOSIS — G47.00 INSOMNIA, UNSPECIFIED TYPE: ICD-10-CM

## 2018-04-24 PROBLEM — T65.91XA INGESTION OF SUBSTANCE: Status: ACTIVE | Noted: 2018-04-24

## 2018-04-24 LAB
AMPHETAMINES UR QL SCN: NEGATIVE
AMYLASE SERPL-CCNC: 24 U/L (ref 30–110)
ANION GAP SERPL CALCULATED.3IONS-SCNC: 8 MMOL/L (ref 3–14)
APAP SERPL-MCNC: 2 MG/L (ref 10–20)
BARBITURATES UR QL: NEGATIVE
BASOPHILS # BLD AUTO: 0 10E9/L (ref 0–0.2)
BASOPHILS NFR BLD AUTO: 0.3 %
BENZODIAZ UR QL: NEGATIVE
BUN SERPL-MCNC: 15 MG/DL (ref 7–30)
CALCIUM SERPL-MCNC: 8.2 MG/DL (ref 8.5–10.1)
CANNABINOIDS UR QL SCN: NEGATIVE
CHLORIDE SERPL-SCNC: 95 MMOL/L (ref 94–109)
CO2 SERPL-SCNC: 26 MMOL/L (ref 20–32)
COCAINE UR QL: NEGATIVE
CREAT SERPL-MCNC: 1.17 MG/DL (ref 0.52–1.04)
DIFFERENTIAL METHOD BLD: NORMAL
EOSINOPHIL # BLD AUTO: 0.1 10E9/L (ref 0–0.7)
EOSINOPHIL NFR BLD AUTO: 0.8 %
ERYTHROCYTE [DISTWIDTH] IN BLOOD BY AUTOMATED COUNT: 14.4 % (ref 10–15)
ETHANOL SERPL-MCNC: <0.01 G/DL
GFR SERPL CREATININE-BSD FRML MDRD: 43 ML/MIN/1.7M2
GLUCOSE SERPL-MCNC: 112 MG/DL (ref 70–99)
HCT VFR BLD AUTO: 35.7 % (ref 35–47)
HGB BLD-MCNC: 11.9 G/DL (ref 11.7–15.7)
IMM GRANULOCYTES # BLD: 0 10E9/L (ref 0–0.4)
IMM GRANULOCYTES NFR BLD: 0.2 %
INR PPP: 3.83 (ref 0.86–1.14)
INTERPRETATION ECG - MUSE: NORMAL
LACTATE BLD-SCNC: 0.8 MMOL/L (ref 0.7–2)
LIPASE SERPL-CCNC: 90 U/L (ref 73–393)
LYMPHOCYTES # BLD AUTO: 0.8 10E9/L (ref 0.8–5.3)
LYMPHOCYTES NFR BLD AUTO: 13.4 %
MCH RBC QN AUTO: 29.3 PG (ref 26.5–33)
MCHC RBC AUTO-ENTMCNC: 33.3 G/DL (ref 31.5–36.5)
MCV RBC AUTO: 88 FL (ref 78–100)
MONOCYTES # BLD AUTO: 0.4 10E9/L (ref 0–1.3)
MONOCYTES NFR BLD AUTO: 6.9 %
NEUTROPHILS # BLD AUTO: 4.6 10E9/L (ref 1.6–8.3)
NEUTROPHILS NFR BLD AUTO: 78.4 %
NRBC # BLD AUTO: 0 10*3/UL
NRBC BLD AUTO-RTO: 0 /100
OPIATES UR QL SCN: NEGATIVE
PCP UR QL SCN: NEGATIVE
PLATELET # BLD AUTO: 196 10E9/L (ref 150–450)
POTASSIUM SERPL-SCNC: 3.7 MMOL/L (ref 3.4–5.3)
RBC # BLD AUTO: 4.06 10E12/L (ref 3.8–5.2)
SALICYLATES SERPL-MCNC: <2 MG/DL
SODIUM SERPL-SCNC: 129 MMOL/L (ref 133–144)
TSH SERPL DL<=0.005 MIU/L-ACNC: 3.01 MU/L (ref 0.4–4)
WBC # BLD AUTO: 5.9 10E9/L (ref 4–11)

## 2018-04-24 PROCEDURE — 40000915 ZZH STATISTIC SITTER, EVENING HOURS

## 2018-04-24 PROCEDURE — 80329 ANALGESICS NON-OPIOID 1 OR 2: CPT | Performed by: EMERGENCY MEDICINE

## 2018-04-24 PROCEDURE — 96374 THER/PROPH/DIAG INJ IV PUSH: CPT

## 2018-04-24 PROCEDURE — 25000128 H RX IP 250 OP 636: Performed by: EMERGENCY MEDICINE

## 2018-04-24 PROCEDURE — 85610 PROTHROMBIN TIME: CPT | Performed by: EMERGENCY MEDICINE

## 2018-04-24 PROCEDURE — A9270 NON-COVERED ITEM OR SERVICE: HCPCS | Mod: GY | Performed by: NURSE PRACTITIONER

## 2018-04-24 PROCEDURE — 99285 EMERGENCY DEPT VISIT HI MDM: CPT | Mod: 25

## 2018-04-24 PROCEDURE — 76700 US EXAM ABDOM COMPLETE: CPT

## 2018-04-24 PROCEDURE — 25000125 ZZHC RX 250: Performed by: NURSE PRACTITIONER

## 2018-04-24 PROCEDURE — 93005 ELECTROCARDIOGRAM TRACING: CPT

## 2018-04-24 PROCEDURE — 40000916 ZZH STATISTIC SITTER, NIGHT HOURS

## 2018-04-24 PROCEDURE — 80048 BASIC METABOLIC PNL TOTAL CA: CPT | Performed by: EMERGENCY MEDICINE

## 2018-04-24 PROCEDURE — 99220 ZZC INITIAL OBSERVATION CARE,LEVL III: CPT | Performed by: NURSE PRACTITIONER

## 2018-04-24 PROCEDURE — 90791 PSYCH DIAGNOSTIC EVALUATION: CPT

## 2018-04-24 PROCEDURE — 83690 ASSAY OF LIPASE: CPT | Performed by: EMERGENCY MEDICINE

## 2018-04-24 PROCEDURE — 80320 DRUG SCREEN QUANTALCOHOLS: CPT | Mod: 59 | Performed by: EMERGENCY MEDICINE

## 2018-04-24 PROCEDURE — 99207 ZZC CDG-CHARGE REQUIRED MANUAL ENTRY: CPT | Performed by: NURSE PRACTITIONER

## 2018-04-24 PROCEDURE — 96361 HYDRATE IV INFUSION ADD-ON: CPT

## 2018-04-24 PROCEDURE — 83605 ASSAY OF LACTIC ACID: CPT | Performed by: NURSE PRACTITIONER

## 2018-04-24 PROCEDURE — G0378 HOSPITAL OBSERVATION PER HR: HCPCS

## 2018-04-24 PROCEDURE — 25000132 ZZH RX MED GY IP 250 OP 250 PS 637: Mod: GY | Performed by: NURSE PRACTITIONER

## 2018-04-24 PROCEDURE — 84443 ASSAY THYROID STIM HORMONE: CPT | Performed by: EMERGENCY MEDICINE

## 2018-04-24 PROCEDURE — 99222 1ST HOSP IP/OBS MODERATE 55: CPT | Performed by: PSYCHIATRY & NEUROLOGY

## 2018-04-24 PROCEDURE — 36415 COLL VENOUS BLD VENIPUNCTURE: CPT | Performed by: NURSE PRACTITIONER

## 2018-04-24 PROCEDURE — 85025 COMPLETE CBC W/AUTO DIFF WBC: CPT | Performed by: EMERGENCY MEDICINE

## 2018-04-24 PROCEDURE — 80307 DRUG TEST PRSMV CHEM ANLYZR: CPT | Performed by: EMERGENCY MEDICINE

## 2018-04-24 PROCEDURE — 82150 ASSAY OF AMYLASE: CPT | Performed by: EMERGENCY MEDICINE

## 2018-04-24 RX ORDER — ONDANSETRON 4 MG/1
4 TABLET, ORALLY DISINTEGRATING ORAL EVERY 6 HOURS PRN
Status: DISCONTINUED | OUTPATIENT
Start: 2018-04-24 | End: 2018-04-29 | Stop reason: HOSPADM

## 2018-04-24 RX ORDER — WARFARIN SODIUM 1 MG/1
1 TABLET ORAL
Status: DISCONTINUED | OUTPATIENT
Start: 2018-04-24 | End: 2018-04-24

## 2018-04-24 RX ORDER — POLYETHYLENE GLYCOL 3350 17 G/17G
17 POWDER, FOR SOLUTION ORAL
Status: DISCONTINUED | OUTPATIENT
Start: 2018-04-25 | End: 2018-04-29 | Stop reason: HOSPADM

## 2018-04-24 RX ORDER — SODIUM CHLORIDE 9 MG/ML
1000 INJECTION, SOLUTION INTRAVENOUS CONTINUOUS
Status: DISCONTINUED | OUTPATIENT
Start: 2018-04-24 | End: 2018-04-24 | Stop reason: CLARIF

## 2018-04-24 RX ORDER — DILTIAZEM HYDROCHLORIDE 240 MG/1
240 CAPSULE, EXTENDED RELEASE ORAL DAILY
Status: DISCONTINUED | OUTPATIENT
Start: 2018-04-24 | End: 2018-04-29 | Stop reason: HOSPADM

## 2018-04-24 RX ORDER — ACETAMINOPHEN 650 MG/1
650 SUPPOSITORY RECTAL EVERY 4 HOURS PRN
Status: DISCONTINUED | OUTPATIENT
Start: 2018-04-24 | End: 2018-04-29 | Stop reason: HOSPADM

## 2018-04-24 RX ORDER — IPRATROPIUM BROMIDE AND ALBUTEROL SULFATE 2.5; .5 MG/3ML; MG/3ML
1 SOLUTION RESPIRATORY (INHALATION) EVERY 6 HOURS PRN
Status: DISCONTINUED | OUTPATIENT
Start: 2018-04-24 | End: 2018-04-29 | Stop reason: HOSPADM

## 2018-04-24 RX ORDER — GUAIFENESIN 600 MG/1
1200 TABLET, EXTENDED RELEASE ORAL 2 TIMES DAILY PRN
COMMUNITY
End: 2018-05-07

## 2018-04-24 RX ORDER — NALOXONE HYDROCHLORIDE 0.4 MG/ML
.1-.4 INJECTION, SOLUTION INTRAMUSCULAR; INTRAVENOUS; SUBCUTANEOUS
Status: DISCONTINUED | OUTPATIENT
Start: 2018-04-24 | End: 2018-04-29 | Stop reason: HOSPADM

## 2018-04-24 RX ORDER — ACETAMINOPHEN 325 MG/1
650 TABLET ORAL EVERY 4 HOURS PRN
Status: DISCONTINUED | OUTPATIENT
Start: 2018-04-24 | End: 2018-04-29 | Stop reason: HOSPADM

## 2018-04-24 RX ORDER — LOSARTAN POTASSIUM 25 MG/1
25 TABLET ORAL DAILY
Status: DISCONTINUED | OUTPATIENT
Start: 2018-04-24 | End: 2018-04-29 | Stop reason: HOSPADM

## 2018-04-24 RX ORDER — FUROSEMIDE 40 MG
40 TABLET ORAL DAILY
Status: DISCONTINUED | OUTPATIENT
Start: 2018-04-24 | End: 2018-04-29 | Stop reason: HOSPADM

## 2018-04-24 RX ORDER — ONDANSETRON 2 MG/ML
4 INJECTION INTRAMUSCULAR; INTRAVENOUS EVERY 6 HOURS PRN
Status: DISCONTINUED | OUTPATIENT
Start: 2018-04-24 | End: 2018-04-29 | Stop reason: HOSPADM

## 2018-04-24 RX ORDER — ONDANSETRON 2 MG/ML
4 INJECTION INTRAMUSCULAR; INTRAVENOUS ONCE
Status: COMPLETED | OUTPATIENT
Start: 2018-04-24 | End: 2018-04-24

## 2018-04-24 RX ADMIN — FUROSEMIDE 40 MG: 40 TABLET ORAL at 17:56

## 2018-04-24 RX ADMIN — FLUTICASONE FUROATE AND VILANTEROL TRIFENATATE 1 PUFF: 200; 25 POWDER RESPIRATORY (INHALATION) at 17:58

## 2018-04-24 RX ADMIN — ONDANSETRON 4 MG: 2 INJECTION INTRAMUSCULAR; INTRAVENOUS at 06:43

## 2018-04-24 RX ADMIN — DILTIAZEM HYDROCHLORIDE 240 MG: 240 CAPSULE, EXTENDED RELEASE ORAL at 17:56

## 2018-04-24 RX ADMIN — LOSARTAN POTASSIUM 25 MG: 25 TABLET ORAL at 17:56

## 2018-04-24 RX ADMIN — ONDANSETRON 4 MG: 4 TABLET, ORALLY DISINTEGRATING ORAL at 17:57

## 2018-04-24 RX ADMIN — SODIUM CHLORIDE 1000 ML: 9 INJECTION, SOLUTION INTRAVENOUS at 06:43

## 2018-04-24 ASSESSMENT — ACTIVITIES OF DAILY LIVING (ADL)
RETIRED_COMMUNICATION: 0-->UNDERSTANDS/COMMUNICATES WITHOUT DIFFICULTY
RETIRED_EATING: 0-->INDEPENDENT
SWALLOWING: 0-->SWALLOWS FOODS/LIQUIDS WITHOUT DIFFICULTY
DRESS: 0-->INDEPENDENT
WHICH_OF_THE_ABOVE_FUNCTIONAL_RISKS_HAD_A_RECENT_ONSET_OR_CHANGE?: AMBULATION
BATHING: 0-->INDEPENDENT
TOILETING: 1-->ASSISTIVE EQUIPMENT

## 2018-04-24 ASSESSMENT — ENCOUNTER SYMPTOMS
NERVOUS/ANXIOUS: 1
SLEEP DISTURBANCE: 1

## 2018-04-24 NOTE — ED NOTES
Pt resting comfortably. C/o intermittent nausea. Pt chilled and shaking at times. Additional blankets given. Daughter at bedside tearful and discussing recent life change events for pt.

## 2018-04-24 NOTE — ED NOTES
[facility]  ED Nurse Handoff Report    ED Chief complaint: Drug Overdose (Patient recently given a bottle of paraxetine on 4/20 for some anxiety; tonight she was fed up with living and took the whole bottle- 25 tablet, 10mg each)      ED Diagnosis:   Final diagnoses:   Drug overdose, undetermined intent, initial encounter   Insomnia, unspecified type   Abdominal pain, left lower quadrant   Constipation, unspecified constipation type       Code Status: Full Code    Allergies:   Allergies   Allergen Reactions     Chocolate Shortness Of Breath     Peanuts [Nuts] Shortness Of Breath     Amiodarone      pulm fibrosis       Baclofen      Confusion      Bactrim [Sulfamethoxazole W-Trimethoprim]      Difficulty swallowing     Doxycycline Other (See Comments)     Multiple complaints; she does not want this again.      Levaquin [Levofloxacin] Other (See Comments)     Multiple complaints; she will not take this again     Linzess [Linaclotide] Diarrhea     Lorazepam        Prolonged drowsiness with ER visit      Liquid Adhesive Itching and Rash     Bleeding when tape removed after pacemaker placement..itched and burned for weeks.       Activity level - Baseline/Home:  Independent    Activity Level - Current:   Unable to Assess     Needed?: No    Isolation: No  Infection: Not Applicable    Bariatric?: No    Vital Signs:   Vitals:    04/24/18 0830 04/24/18 0900 04/24/18 0930 04/24/18 1000   BP: 112/71 125/85 120/69 130/78   Pulse:       Resp:  24 17 21   Temp:       TempSrc:       SpO2:  90% 97% 94%   Weight:       Height:           Cardiac Rhythm: ,   Cardiac  Cardiac Rhythm: Atrial flutter    Pain level: 0-10 Pain Scale: 3    Is this patient confused?: No     Patient Report: Initial Complaint: Pt is a 89 year old female with a history of atrial fibrillation anticoagulated on Coumadin who presents to the ED for evaluation of drug overdose. On 4/20, the patient was prescribed Paroxetine for anxiety.  "This morning at 0300, she was \"fed up with not being able to sleep\" and took the whole bottle-25 tablets of 10 mg each. Here in the ED, she says she is \"tired of my belly ache and of being sick\".   Focused Assessment: Pt denies suicide attempt. Pt has recently had to have  placed in another living facility and she just had to move as well. Pt has ongoing abdominal pain that keeps her from sleeping and is bothersome to pt per the daughter. Pt is sleepy in the ED but is arousable. Pt shaky at times. Placed on 2L oxygen to maintain sats in mid 90's due to sleepiness. Seen by DEC in the ED and was going to be admitted for nusrat-psych but Poison Control recommended medical observation for longer period of time first.  Tests Performed: bloodwork  Abnormal Results: see EPIC  Treatments provided: 1L NS, 4mg Zofran    Family Comments: daughter at bedside    OBS brochure/video discussed/provided to patient: N/A    ED Medications:   Medications   ondansetron (ZOFRAN) injection 4 mg (4 mg Intravenous Given 4/24/18 2643)   0.9% sodium chloride BOLUS (0 mLs Intravenous Stopped 4/24/18 0810)       Drips infusing?:  No    For the majority of the shift this patient was Green.   Interventions performed were N/A.    Severe Sepsis OR Septic Shock Diagnosis Present: No      ED NURSE PHONE NUMBER: [phone]         "

## 2018-04-24 NOTE — PHARMACY-ANTICOAGULATION SERVICE
Clinical Pharmacy - Warfarin Dosing Consult     Pharmacy has been consulted to manage this patient s warfarin therapy.  Indication: Atrial Fibrillation  Therapy Goal: INR 2-3  Provider/Team: Estephanie Roque APRN CNP  Warfarin Prior to Admission: Yes  Warfarin PTA Regimen: 2mg daily  Recent documented change in oral intake/nutrition: No    INR   Date Value Ref Range Status   04/24/2018 3.83 (H) 0.86 - 1.14 Final   04/17/2018 3.61 (H) 0.86 - 1.14 Final       Recommend no dose of warfarin since INR=3.83.  Pharmacy will monitor Leonor REBOLLEDO Brendanrogelio daily and order warfarin doses to achieve specified goal.      Please contact pharmacy as soon as possible if the warfarin needs to be held for a procedure or if the warfarin goals change.

## 2018-04-24 NOTE — PROGRESS NOTES
SW:  D:  Order for d/c planning.  Admitting H&P reviewed.   Will await psychiatry evaluation and recommendation prior to meeting with patient or family.

## 2018-04-24 NOTE — ED NOTES
Pt placed on 2 L oxygen per NC due to being sleepy and to keep sats above 94% consistently. Daughter states pt sometimes uses oxygen at night. Pt eating ice chips.

## 2018-04-24 NOTE — IP AVS SNAPSHOT
MRN:[id number]                      After Visit Summary   4/24/2018    Leonor Mercado    MRN: [id number]           Thank you!     Thank you for choosing [facility] for your care. Our goal is always to provide you with excellent care. Hearing back from our patients is one way we can continue to improve our services. Please take a few minutes to complete the written survey that you may receive in the mail after you visit with us. Thank you!        Patient Information     Date Of Birth          5/6/1926        Designated Caregiver       Most Recent Value    Caregiver    Will someone help with your care after discharge? yes    Name of designated caregiver Les     Phone number of caregiver [phone]    Caregiver address [place]      About your hospital stay     You were admitted on:  April 24, 2018 You last received care in the:  [place] [facility] [address] [facility]    You were discharged on:  April 29, 2018        Reason for your hospital stay       This is a 89 year old female admitted following an intentional Paxil overdose.                  Who to Call     For medical emergencies, please call 911.  For non-urgent questions about your medical care, please call your primary care provider or clinic, [phone]          Attending Provider     Provider Specialty    Kyle Sharp MD Emergency Medicine    [facility]       Primary Care Provider Office Phone # Fax #    Dannielle Bria Darby [phone]      After Care Instructions     Activity       Your activity upon discharge: activity as tolerated, up with wheeled walker            Activity       Your activity upon discharge: activity as tolerated            Diet       Follow this diet upon discharge: Orders Placed This Encounter      Regular Diet Adult            Diet       Follow this diet upon discharge: Orders Placed This Encounter      Regular Diet Adult      Diet                   Follow-up Appointments     Follow-up and recommended labs and tests        Follow up with primary care provider, Dannielle Darby, within 7 days for hospital follow- up.  The following labs/tests are recommended: BMP, INR.            Follow-up and recommended labs and tests        Follow up with primary care provider, Dannielle Darby, within 1-2 weeks, for hospital follow- up.   F/u OP psychiatry as recommended                  Your next 10 appointments already scheduled     May 10, 2018  9:30 AM CDT   Phone Device Check with NUÑEZ TECH2   [facility]   [place] ([facility])    [address]  [place] [address]   [phone] OPT 2            May 11, 2018  2:20 PM CDT   Office Visit with Dannielle Darby MD   [facility] ([facility]    [address]  [facility] [address]   [phone]           Bring a current list of meds and any records pertaining to this visit. For Physicals, please bring immunization records and any forms needing to be filled out. Please arrive 10 minutes early to complete paperwork.              Additional Services     Home Care OT Referral for Hospital Discharge       OT to eval and treat    Your provider has ordered home care - occupational therapy. If you have not been contacted within 2 days of your discharge please call the department phone number listed on the top of this document.            Home Care PT Referral for Hospital Discharge       PT to eval and treat    Your provider has ordered home care - physical therapy. If you have not been contacted within 2 days of your discharge please call the department phone number listed on the top of this document.            Home care nursing referral       RN   Home health aide    Your provider has ordered home care nursing services. If you have not been contacted within 2 days of your  "discharge please call the inpatient department phone number at [phone] .                  Further instructions from your care team       [facility] RN, OCCUPATIONAL AND PHYSICAL THERAPY AND HOME HEALTH AIDE FOR BATHING SUPERVISION.  THE NURSE WILL CALL TO SCHEDULE THE FIRST VISIT FOR EITHER Monday OR Tuesday.  THEIR STAFFING OFFICE  NUMBER [phone].    [facility] ARE RECOMMENDED. THE PROGRAM IS CALLED 55+.  TO FIND OUT MORE ABOUT THE PROGRAM AND E[place]OLL CALL [phone].        Pending Results     No orders found from 4/22/2018 to 4/25/2018.            Statement of Approval     Ordered          04/29/18 7687  I have reviewed and agree with all the recommendations and orders detailed in this document.  EFFECTIVE NOW     Approved and electronically signed by:  Justen Paiz MD           04/27/18 9167  I have reviewed and agree with all the recommendations and orders detailed in this document.  EFFECTIVE NOW     Approved and electronically signed by:  Quan Rodriguez MD             Admission Information     Date & Time Provider Department Dept. Phone    4/24/2018 Quan Rodriguez MD [place] [address] [facility] [phone]      Your Vitals Were     Blood Pressure Pulse Temperature Respirations Height Weight    110/62 (BP Location: Left arm) 56 97.7  F (36.5  C) (Oral) 16 1.6 m (5' 3\") 60.2 kg (132 lb 11.5 oz)    Last Period Pulse Oximetry BMI (Body Mass Index)             (LMP Unknown) 98% 23.51 kg/m2         MyChart Information     [employer] lets you send messages to your doctor, view your test results, renew your prescriptions, schedule appointments and more. To sign up, go to [email or web address] . Click on \"Log in\" on the left side of the screen, which will take you to the Welcome page. Then click on \"Sign up Now\" on the right side of the page.     You will be asked to enter the access code listed below, as well as some " personal information. Please follow the directions to create your username and password.     Your access code is: [email or web address]  Expires: 7/15/2018 12:42 PM     Your access code will  in 90 days. If you need help or a new code, please call your [place] clinic or [phone].        Care EveryWhere ID     This is your Care EveryWhere ID. This could be used by other organizations to access your [place] medical records  [id number]        Equal Access to Services     [facility] Hospital[facility]ENNIFER : Hadii marisel joyner hadasho Soomaali, waaxda luqadaha, qaybta kaalmada adeegyada, waxousmane cruz . So [facility] [phone].    ATENCIÓN: Si habla español, tiene a spicer disposición servicios gratuitos de asistencia lingüística. Llame al [phone].    We comply with applicable federal civil rights laws and [place] laws. We do not discriminate on the basis of race, color, national origin, age, disability, sex, sexual orientation, or gender identity.               Review of your medicines      START taking        Dose / Directions    mirtazapine 15 MG tablet   Commonly known as:  REMERON   Used for:  Adjustment disorder, unspecified type        Dose:  15 mg   Take 1 tablet (15 mg) by mouth At Bedtime   Quantity:  30 tablet   Refills:  0       simethicone 40 MG/0.6ML suspension   Commonly known as:  MYLICON   Used for:  Abdominal pain, left lower quadrant        Dose:  40 mg   Take 0.6 mLs (40 mg) by mouth every 6 hours as needed for cramping   Quantity:  500 mL   Refills:  0         CONTINUE these medicines which have NOT CHANGED        Dose / Directions    albuterol 108 (90 Base) MCG/ACT Inhaler   Commonly known as:  PROAIR HFA/PROVENTIL HFA/VENTOLIN HFA   Used for:  Chronic obstructive pulmonary disease, unspecified COPD type (H)   Notes to Patient:  Resume home dose         Dose:  2 puff   Inhale 2 puffs into the lungs every 6 hours as needed for shortness of breath / dyspnea or wheezing   Quantity:  1 Inhaler    Refills:  1       BREO ELLIPTA 200-25 MCG/INH oral inhaler   Used for:  Anxiety   Generic drug:  fluticasone-vilanterol        Dose:  1 puff   Inhale 1 puff into the lungs daily   Refills:  0       calcium carbonate 600 MG tablet   Generic drug:  calcium carbonate   Notes to Patient:  Resume home dose         Dose:  1 tablet   Take 1 tablet by mouth daily   Refills:  0       diltiazem 240 MG 24 hr capsule   Commonly known as:  DILTIAZEM CD   Used for:  Chronic atrial fibrillation (H)        Dose:  240 mg   Take 1 capsule (240 mg) by mouth daily   Quantity:  30 capsule   Refills:  0       furosemide 20 MG tablet   Commonly known as:  LASIX   Used for:  Chronic diastolic congestive heart failure (H)        Dose:  40 mg   Take 2 tablets (40 mg) by mouth daily   Quantity:  180 tablet   Refills:  3       guaiFENesin 600 MG 12 hr tablet   Commonly known as:  MUCINEX   Notes to Patient:  Resume home dose         Dose:  1200 mg   Take 1,200 mg by mouth 2 times daily as needed for congestion   Refills:  0       ipratropium - albuterol 0.5 mg/2.5 mg/3 mL 0.5-2.5 (3) MG/3ML neb solution   Commonly known as:  DUONEB   Used for:  Chronic obstructive pulmonary disease, unspecified COPD type (H)        Dose:  1 vial   Take 1 vial (3 mLs) by nebulization every 6 hours as needed for shortness of breath / dyspnea or wheezing   Quantity:  360 mL   Refills:  0       losartan 50 MG tablet   Commonly known as:  COZAAR   Used for:  Essential hypertension with goal blood pressure less than 140/90        Dose:  25 mg   Take 0.5 tablets (25 mg) by mouth daily   Quantity:  1 tablet   Refills:  0       MELATONIN PO        Dose:  5 mg   Take 5 mg by mouth nightly as needed   Refills:  0       nitroGLYcerin 0.4 MG sublingual tablet   Commonly known as:  NITROSTAT   Used for:  Other chest pain        Dose:  0.4 mg   Place 1 tablet (0.4 mg) under the tongue every 5 minutes as needed   Quantity:  25 tablet   Refills:  1       OCUVITE PO   Notes  to Patient:  Resume home dose         Dose:  1 capsule   Take 1 capsule by mouth daily.   Refills:  0       polyethylene glycol Packet   Commonly known as:  MIRALAX/GLYCOLAX        Dose:  1 packet   Take 1 packet by mouth daily (with breakfast)   Refills:  0       pravastatin 40 MG tablet   Commonly known as:  PRAVACHOL   Used for:  Hyperlipidemia LDL goal <100, Coronary artery disease involving native coronary artery of native heart without angina pectoris   Notes to Patient:  Resume home dose         Dose:  40 mg   Take 1 tablet (40 mg) by mouth daily   Quantity:  90 tablet   Refills:  0       PROBIOTIC ACIDOPHILUS Tabs   Notes to Patient:  Resume home dose         Dose:  1 tablet   Take 1 tablet by mouth daily   Refills:  0       STOOL SOFTENER PO   Notes to Patient:  Resume home schedule         Dose:  1 tablet   Take 1 tablet by mouth daily as needed   Refills:  0       TYLENOL PO        Dose:  1000 mg   Take 1,000 mg by mouth nightly as needed   Refills:  0       vitamin D 2000 units tablet   Notes to Patient:  Resume home schedule         Dose:  2000 Units   Take 2,000 Units by mouth daily   Refills:  0       warfarin 2 MG tablet   Commonly known as:  COUMADIN   Used for:  Long term current use of anticoagulant therapy   Notes to Patient:  Take 2 mg today        TAKE ONE TABLET BY MOUTH ONCE DAILY OR  AS  DIRECTED  BY  INR  CLINIC   Quantity:  90 tablet   Refills:  0            Where to get your medicines      These medications were sent to [facility] - [address]  [address] [facility] [address], [place] [address]     Phone:  [phone]     mirtazapine 15 MG tablet    simethicone 40 MG/0.6ML suspension                Protect others around you: Learn how to safely use, store and throw away your medicines at www.disposemymeds.org.             Medication List: This is a list of all your medications and when to take them. Check marks below indicate your daily home schedule. Keep  this list as a reference.      Medications           Morning Afternoon Evening Bedtime As Needed    albuterol 108 (90 Base) MCG/ACT Inhaler   Commonly known as:  PROAIR HFA/PROVENTIL HFA/VENTOLIN HFA   Inhale 2 puffs into the lungs every 6 hours as needed for shortness of breath / dyspnea or wheezing   Notes to Patient:  Resume home dose                                 BREO ELLIPTA 200-25 MCG/INH oral inhaler   Inhale 1 puff into the lungs daily   Last time this was given:  1 puff on 4/29/2018  8:17 AM   Generic drug:  fluticasone-vilanterol   Next Dose Due:  4/30/2018 9am                                 calcium carbonate 600 MG tablet   Take 1 tablet by mouth daily   Generic drug:  calcium carbonate   Notes to Patient:  Resume home dose                                 diltiazem 240 MG 24 hr capsule   Commonly known as:  DILTIAZEM CD   Take 1 capsule (240 mg) by mouth daily   Next Dose Due:  4/30/2018 9am                                   furosemide 20 MG tablet   Commonly known as:  LASIX   Take 2 tablets (40 mg) by mouth daily   Last time this was given:  40 mg on 4/29/2018  8:17 AM   Next Dose Due:  4/30/2018 9am                                    guaiFENesin 600 MG 12 hr tablet   Commonly known as:  MUCINEX   Take 1,200 mg by mouth 2 times daily as needed for congestion   Notes to Patient:  Resume home dose                                    ipratropium - albuterol 0.5 mg/2.5 mg/3 mL 0.5-2.5 (3) MG/3ML neb solution   Commonly known as:  DUONEB   Take 1 vial (3 mLs) by nebulization every 6 hours as needed for shortness of breath / dyspnea or wheezing   Last time this was given:  3 mLs on 4/25/2018  5:45 PM                                   losartan 50 MG tablet   Commonly known as:  COZAAR   Take 0.5 tablets (25 mg) by mouth daily   Last time this was given:  25 mg on 4/29/2018  8:17 AM   Next Dose Due:  4/30/2018 9am                                    MELATONIN PO   Take 5 mg by mouth nightly as needed                                    mirtazapine 15 MG tablet   Commonly known as:  REMERON   Take 1 tablet (15 mg) by mouth At Bedtime   Last time this was given:  15 mg on 4/28/2018  9:46 PM   Next Dose Due:  4/29/2018 bedtime                                   nitroGLYcerin 0.4 MG sublingual tablet   Commonly known as:  NITROSTAT   Place 1 tablet (0.4 mg) under the tongue every 5 minutes as needed                                   OCUVITE PO   Take 1 capsule by mouth daily.   Notes to Patient:  Resume home dose                                    polyethylene glycol Packet   Commonly known as:  MIRALAX/GLYCOLAX   Take 1 packet by mouth daily (with breakfast)   Last time this was given:  17 g on 4/29/2018  8:17 AM   Next Dose Due:  4/30/2018 9am                                    pravastatin 40 MG tablet   Commonly known as:  PRAVACHOL   Take 1 tablet (40 mg) by mouth daily   Notes to Patient:  Resume home dose                                 PROBIOTIC ACIDOPHILUS Tabs   Take 1 tablet by mouth daily   Notes to Patient:  Resume home dose                                 simethicone 40 MG/0.6ML suspension   Commonly known as:  MYLICON   Take 0.6 mLs (40 mg) by mouth every 6 hours as needed for cramping                                   STOOL SOFTENER PO   Take 1 tablet by mouth daily as needed   Notes to Patient:  Resume home schedule                                    TYLENOL PO   Take 1,000 mg by mouth nightly as needed                                   vitamin D 2000 units tablet   Take 2,000 Units by mouth daily   Notes to Patient:  Resume home schedule                                    warfarin 2 MG tablet   Commonly known as:  COUMADIN   TAKE ONE TABLET BY MOUTH ONCE DAILY OR  AS  DIRECTED  BY  INR  CLINIC   Last time this was given:  2 mg on 4/28/2018  5:58 PM   Next Dose Due:  4/29/2018 6pm   Notes to Patient:  Take 2 mg today

## 2018-04-24 NOTE — PLAN OF CARE
"Problem: Patient Care Overview  Goal: Plan of Care/Patient Progress Review  Outcome: No Change  RN [phone]  Pt alert and oriented x4.  Sleeping between cares, hypoactive. Does not endorse thought of self harm but did state \" last night I didn't care if I woke up\"-  States she has been \"not sleeping, eating, depressed for weeks:\".  C/o minimal abd pain, nausea, poor appetite.  VSS.  Up with 1-2, gaitbelt, walker-  tremulous BUE and generalized weakness.  IV SL.  Plan for psych consult tomorrow.      "

## 2018-04-24 NOTE — PHARMACY-ADMISSION MEDICATION HISTORY
Admission medication history interview status for the 4/24/2018  admission is complete. See EPIC admission navigator for prior to admission medications     Medication history source reliability:Good    Actions taken by pharmacist (provider contacted, etc):None     Additional medication history information not noted on PTA med list : daughter had an updated medication list and knew all about her mother's meds  Discontinued:  Lorazepam   Paxil    Added  Guaifenesin    Medication reconciliation/reorder completed by provider prior to medication history? No    Time spent in this activity: 20 mins    Prior to Admission medications    Medication Sig Last Dose Taking? Auth Provider   Acetaminophen (TYLENOL PO) Take 1,000 mg by mouth nightly as needed  prn at prn Yes Unknown, Entered By History   albuterol (PROAIR HFA/PROVENTIL HFA/VENTOLIN HFA) 108 (90 BASE) MCG/ACT Inhaler Inhale 2 puffs into the lungs every 6 hours as needed for shortness of breath / dyspnea or wheezing prn at prn Yes Dannielle Darby MD   calcium carbonate (CALCIUM CARBONATE) 600 MG TABS tablet Take 1 tablet by mouth daily  4/23/2018 at am Yes Unknown, Entered By History   Cholecalciferol (VITAMIN D) 2000 UNITS tablet Take 2,000 Units by mouth daily 4/23/2018 at am Yes Unknown, Entered By History   diltiazem (DILTIAZEM CD) 240 MG 24 hr capsule Take 1 capsule (240 mg) by mouth daily 4/23/2018 at am Yes Dannielle Darby MD   Docusate Calcium (STOOL SOFTENER PO) Take 1 tablet by mouth daily as needed  prn at prn Yes Reported, Patient   fluticasone-vilanterol (BREO ELLIPTA) 200-25 MCG/INH oral inhaler Inhale 1 puff into the lungs daily 4/23/2018 at am Yes Dannielle Darby MD   furosemide (LASIX) 20 MG tablet Take 2 tablets (40 mg) by mouth daily 4/23/2018 at am Yes Bryan Garcia MD   guaiFENesin (MUCINEX) 600 MG 12 hr tablet Take 1,200 mg by mouth 2 times daily as needed for congestion prn at prn Yes  Unknown, Entered By History   ipratropium - albuterol 0.5 mg/2.5 mg/3 mL (DUONEB) 0.5-2.5 (3) MG/3ML neb solution Take 1 vial (3 mLs) by nebulization every 6 hours as needed for shortness of breath / dyspnea or wheezing prn at prn Yes Dannielle Darby MD   Lactobacillus (PROBIOTIC ACIDOPHILUS) TABS Take 1 tablet by mouth daily 4/23/2018 at am Yes Reported, Patient   losartan (COZAAR) 50 MG tablet Take 0.5 tablets (25 mg) by mouth daily 4/23/2018 at am Yes Dannielle Darby MD   MELATONIN PO Take 5 mg by mouth nightly as needed  prn at prn Yes Reported, Patient   Multiple Vitamins-Minerals (OCUVITE PO) Take 1 capsule by mouth daily. 4/23/2018 at am Yes Reported, Patient   nitroglycerin (NITROSTAT) 0.4 MG SL tablet Place 1 tablet (0.4 mg) under the tongue every 5 minutes as needed prn at prn Yes Naty Clark APRN CNP   polyethylene glycol (MIRALAX/GLYCOLAX) packet Take 1 packet by mouth daily (with breakfast)  4/23/2018 at am Yes Unknown, Entered By History   pravastatin (PRAVACHOL) 40 MG tablet Take 1 tablet (40 mg) by mouth daily 4/23/2018 at pm Yes Maria Isabel Rosario APRN CNP   warfarin (COUMADIN) 2 MG tablet TAKE ONE TABLET BY MOUTH ONCE DAILY OR  AS  DIRECTED  BY  INR  CLINIC 4/23/2018 at pm Yes Dannielle Darby MD

## 2018-04-24 NOTE — H&P
"[facility]    History and Physical  Hospitalist       Date of Admission:  4/24/2018    Assessment & Plan   Leonor Mercado is a 89 year old female who presents with an intentional paxil ingestion overnight at 0300. The patient has a PMH of A Fib - chronic Coumadin, s/p PPM, basal cell carcinoma, CAD - 2 stents in [place] in 2000s, CHF - Chr Diastolic, chronic renal insufficiency, COPD on supplemental nighttime oxygen, Hyperlipidemia, HTN, IBS, osteoporosis, anxiety, depression, pulmonary fibrosis, sick sinus syndrome s/p PP< 2003 and vertigo.                          Intentional Substance Ingestion  Depression  Anxiety: The patient was recently evaluated by her primary care provider with complaints of increased anxiety and depression related to her  moving into a nursing home and her disdain for her current living situation (recently moved into assisted living near her daughter) on 4/20/18. She was prescribed Paxil for her anxiety and insomnia. Her most recent PHQ-9 score is decreased from 16 on 4/3/18 down to 9 on 4/27/18. At 0300 last night, the patient was suffering from her recurrent, persistent abdominal pain and ingested 25 tablets of 10mg Paxil in hopes she would \"fall asleep and not wake up\". The patient endorses she understands she could have ended her life by taking these, and states she was \"out of her mind\". ETOH negative, acetaminophen and salicylate negative. TSH 3.01. Poison control notified and recommends observation given the patient's age and drowsiness.  -- Psychiatry Consult  -- Care Coordinator consult for disposition  -- TSH add on    Chronic Abdominal Pain: Ms. Mercado states the generalized abdominal \"ache\" for 4 weeks. She endorses nausea, weight loss, constipation, but denies vomiting, chest pain. This pain was thoroughly evaluated in the ED on 4/17/18 including an abdominal CT scan which was inconclusive. EKG non ischemic, troponins negative x 2, chemistries and " CBC without significant abnormalities. She has never had pain like this before, and this has caused such distress she was willing to end her life last night. She endorses increased stressors in her life over the past 7 months including moving her  into a NH and moving herself into an assisted living.   DDx: The etiology of her abdominal pain is unclear. Her abdominal CT does not clearly described the gallbladder, so we will evaluate for cholecystitis. I have also considered chronic abdominal ischemia, or gastric/duodenal ulcerations. I suspect there may be a somatic component to her pain.   -- Abdominal ultrasound.   -- Miralax daily  -- antiemetics available  -- Follow up with GI  Addendum: I am highly suspicious this patient has a chronic mesenteric ischemia. Her lactic acid was normal when checked, however the patient was not having any abdominal pain when checked.   -- lipase and amylase add on  -- recommend asking vascular surgery to evaluate this patient to determine if there are any available options for her chronic pain.    Hyponatremia: Baseline is approximately 135-139. During her most recent ED admission she was 132 and today is 129. She endorses taking Golytely yesterday for constipation and has a history of diastolic heart failure on Lasix daily. I suspect this is also related to her Paxil overdose today.   -- Continue lasix daily  -- Recheck BMP tomorrow AM    Chronic Renal Insufficiency: Baseline creatinine 1.40-1.65. Her lasix has been titrated in the past but has caused a worsening creatnine. Today her creatinine is actually improved from last week and is 1.17.   -- BMP tomorrow    CAD s/p 2 stents in [place] in 2004  Chronic Diastolic Heart Failure  Sick Sinus Syndrome s/p Dual Chamber PPM 2003  Atrial Fibrillation on Chronic Coumadin  Hypertension: Follows with Dr. Garcia. She has had her most recent echocardgiogram demonstrates EF 60-65%, early diastolic dysfunction, no valvular disease,  otherwise stable echo. The patient has been having some difficulty managing her INR as she is on and off prednisone. She has a history of a LEIGH ANN mid and proximal LAD, ongoing 80% RCA; mild circ disease.   -- Pharmacy to dose warfarin for Afib, goal INR 2-3  -- continue diltiazem, losartan    Pulmonary Fibrosis  COPD: Follows with Dr. Her, pulmonology. She was recently started on Duonebs BID which has dramatically improved her symptoms and quality of life from a respiratory standpoint. She wears supplemental oxygen at night.   -- supplemental oxygen at night  -- continue Breo Elipta and Duonebs    # Pain Assessment:  Current Pain Score 4/24/2018   Patient currently in pain? -   Pain score (0-10) 3   Pain location -   Pain descriptors -   - Leonor is experiencing pain due to abdominal pain. Pain management was discussed with Leonor and her daughter and the plan was created in a collaborative fashion.  Leonor's response to the current recommendations: engaged  compliant  - Non-pharmacologic adjuvants: Heat    DVT Prophylaxis: Warfarin  Code Status: Full Code    Disposition: Expected discharge in 1 days once disposition has been managed.    [facility]  Hospitalist Service  House Officer  Pager: [phone] (7a - 6p)      Primary Care Physician   Dannielle Darby    Chief Complaint   Suicidal Ideation and Abdominal Pain    History is obtained from the patient and her daughter     History of Present Illness   Leonor Mercado is a 89 year old female who presents after ingesting a bottle of paxil in hopes to sleep. She has been suffering from chronic abdominal pain, and unable to sleep. She states her mindset was if she could fall asleep, she didn't care if she didn't wake up. The patient endorses increased stressors lately (discussed above). She is evaluated with her daughter in the Emergency Department. She appears depressed, speaks with her eyes closed most of the time and ruminates on  her continued abdominal pain. Given her mental health instability and paxil ingestions, she will be admitted overnight for observation while she is medically stabilized and requires bed placement for nusrat-psych.     Past Medical History    I have reviewed this patient's medical history and updated it with pertinent information if needed.   Past Medical History:   Diagnosis Date     A Fib - chr Coumadin, s/p PPM (H) 5/8/2013     Atrial fibrillation (H)     Sick sinus syndrome, PAT, AF     BCC (basal cell carcinoma of skin)     Face     CAD - 2 stents in [place] in 2000s.  1/5/2016     CHF (congestive heart failure) (H)     mild in past     CHF - Chr Diastolic (H) 11/21/2017     Chronic renal insufficiency      COPD (chronic obstructive pulmonary disease) (H)      COPD (H) 5/13/2013     Coronary artery disease     2-05[place]-CAD-taxus stentx2 2.5-12,2.5-12 in LADp and LADm, 80%nonDomRCA-LcxDom-mild,EF60%     Hyperlipidemia      Hypertension      IBS (irritable bowel syndrome)      Irritable bowel      Osteoporosis      Panic attack     questionable diagnosis     Pulmonary fibrosis (H)     do not use amiodarone     Shortness of breath      Sick sinus syndrome - s/p PPM 2003 (H) 12/16/2017     SSS (sick sinus syndrome) (H)     DDD pacer (see surgery history section)     Vertigo        Past Surgical History   I have reviewed this patient's surgical history and updated it with pertinent information if needed.  Past Surgical History:   Procedure Laterality Date     APPENDECTOMY  1956     CARDIAC SURGERY      PPM, 2 STENTS2-05[place]-CAD-taxus stentx2 2.5-12,2.5-12 in LADp and LADm, 80%nonDomRCA-LcxDom-mild,EF60%     CORONARY ANGIOGRAPHY ADULT ORDER  7/1994    mild CAD,Normal LV function, No Mitral regurgitation, Prox LAD 30% stenosis. RCA prox and mid, 20-30%     ESOPHAGOSCOPY, GASTROSCOPY, DUODENOSCOPY (EGD), COMBINED N/A 8/19/2015    Procedure: COMBINED ESOPHAGOSCOPY, GASTROSCOPY, DUODENOSCOPY (EGD), BIOPSY SINGLE OR MULTIPLE;   Surgeon: Genevieve Leon MD;  Location: [facility]     H LEAD REVISION DUAL  4/2003    Revised in [place]-due to diaphram stim (original pacer placed in [place])     H LEAD REVISION DUAL  7/2/2014    Correction of reversal of A and V leads in Head     HEART CATH, ANGIOPLASTY  02/2005    LEIGH ANN mid and proximal LAD, ongoing 80% RCA; mild circumflex     HERNIA REPAIR Left 1991    [facility]     IMPLANT PACEMAKER  2/19/2003    Placed in [place] for sick sinus syndrome     REPLACE PACEMAKER GENERATOR  6/27/2013    Dual-chamber pacemaker by Dr SETH Pierre       Prior to Admission Medications   Prior to Admission Medications   Prescriptions Last Dose Informant Patient Reported? Taking?   Acetaminophen (TYLENOL PO)   Yes No   Sig: Take 1,000 mg by mouth At Bedtime   Cholecalciferol (VITAMIN D) 2000 UNITS tablet   Yes No   Sig: Take 2,000 Units by mouth daily   Docusate Calcium (STOOL SOFTENER PO)   Yes No   Sig: Take by mouth as needed   LORazepam (ATIVAN) 0.5 MG tablet   No No   Sig: TAKE 1 TABLET BY MOUTH TWICE DAILY AS NEEDED FOR ANXIETY   Lactobacillus (PROBIOTIC ACIDOPHILUS) TABS   Yes No   Sig: Take 1 tablet by mouth daily   MELATONIN PO   Yes No   Sig: Take 5 mg by mouth At Bedtime   Multiple Vitamins-Minerals (OCUVITE PO)   Yes No   Sig: Take 1 capsule by mouth daily.   PARoxetine (PAXIL) 10 MG tablet   No No   Sig: Take 1 tablet (10 mg) by mouth At Bedtime   albuterol (PROAIR HFA/PROVENTIL HFA/VENTOLIN HFA) 108 (90 BASE) MCG/ACT Inhaler   No No   Sig: Inhale 2 puffs into the lungs every 6 hours as needed for shortness of breath / dyspnea or wheezing   calcium carbonate (CALCIUM CARBONATE) 600 MG TABS tablet   Yes No   Sig: Take 1 tablet by mouth daily    diltiazem (DILTIAZEM CD) 240 MG 24 hr capsule   No No   Sig: Take 1 capsule (240 mg) by mouth daily   fluticasone-vilanterol (BREO ELLIPTA) 200-25 MCG/INH oral inhaler   Yes No   Sig: Inhale 1 puff into the lungs daily   furosemide (LASIX) 20 MG tablet   No No   Sig: Take 2 tablets  (40 mg) by mouth daily   ipratropium - albuterol 0.5 mg/2.5 mg/3 mL (DUONEB) 0.5-2.5 (3) MG/3ML neb solution   No No   Sig: Take 1 vial (3 mLs) by nebulization every 6 hours as needed for shortness of breath / dyspnea or wheezing   losartan (COZAAR) 50 MG tablet   No No   Sig: Take 0.5 tablets (25 mg) by mouth daily   nebulizer nebulization   Yes No   Si times daily instead of 6 times   nitroglycerin (NITROSTAT) 0.4 MG SL tablet   No No   Sig: Place 1 tablet (0.4 mg) under the tongue every 5 minutes as needed   polyethylene glycol (MIRALAX/GLYCOLAX) packet   Yes No   Sig: Take 1 packet by mouth daily    pravastatin (PRAVACHOL) 40 MG tablet   No No   Sig: Take 1 tablet (40 mg) by mouth daily   warfarin (COUMADIN) 2 MG tablet   No No   Sig: TAKE ONE TABLET BY MOUTH ONCE DAILY OR  AS  DIRECTED  BY  INR  CLINIC      Facility-Administered Medications: None     Allergies   Allergies   Allergen Reactions     Chocolate Shortness Of Breath     Peanuts [Nuts] Shortness Of Breath     Amiodarone      pulm fibrosis       Baclofen      Confusion      Bactrim [Sulfamethoxazole W-Trimethoprim]      Difficulty swallowing     Doxycycline Other (See Comments)     Multiple complaints; she does not want this again.      Levaquin [Levofloxacin] Other (See Comments)     Multiple complaints; she will not take this again     Linzess [Linaclotide] Diarrhea     Lorazepam        Prolonged drowsiness with ER visit      Liquid Adhesive Itching and Rash     Bleeding when tape removed after pacemaker placement..itched and burned for weeks.       Social History   I have reviewed this patient's social history and updated it with pertinent information if needed. Leonor Mercado  reports that she quit smoking about 30 years ago. Her smoking use included Cigarettes. She has never used smokeless tobacco. She reports that she does not drink alcohol or use illicit drugs.    Family History   I have reviewed this patient's family history and  updated it with pertinent information if needed.   Family History   Problem Relation Age of Onset     HEART DISEASE Father       age 84     Neurologic Disorder Mother      dementia     GASTROINTESTINAL DISEASE Daughter      liver transplant     Crohn Disease Daughter        Review of Systems   CONSTITUTIONAL:POSITIVE  for weight loss  INTEGUMENTARY/SKIN: NEGATIVE for worrisome rashes, moles or lesions  EYES: NEGATIVE for vision changes or irritation  RESP: NEGATIVE for significant cough or SOB  CV: NEGATIVE for chest pain, palpitations or peripheral edema  GI: POSITIVE for abdominal pain generalized, constipation, gas or bloating, nausea, poor appetite and weight loss  : NEGATIVE for frequency, dysuria, or hematuria  ENDOCRINE: NEGATIVE for temperature intolerance, skin/hair changes  HEME/ALLERGY/IMMUNE: POSITIVE  for bruising  PSYCHIATRIC: NEGATIVE for changes in mood or affect    Physical Exam   Temp: 98.1  F (36.7  C) Temp src: Oral BP: 130/78 Pulse: 65 Heart Rate: 68 Resp: 21 SpO2: 94 % O2 Device: Nasal cannula Oxygen Delivery: 2 LPM  Vital Signs with Ranges  Temp:  [98.1  F (36.7  C)] 98.1  F (36.7  C)  Pulse:  [65] 65  Heart Rate:  [67-80] 68  Resp:  [14-26] 21  BP: (112-132)/(69-88) 130/78  SpO2:  [90 %-97 %] 94 %  131 lbs 0 oz    Constitutional: Awake, alert, cooperative, no apparent distress.  Eyes: Conjunctiva and pupils examined and normal.  HEENT: Dry mucous membranes, normal dentition.  Respiratory: Clear to auscultation bilaterally, no crackles or wheezing. Diminished air movement.  Cardiovascular: Regular rate and Irregular rhythm, normal S1 and S2, and no murmur noted.  GI: Soft, mildly distended, non-tender to palpation, normal bowel sounds. No rigidity. Tolerates abdominal exam easily.   Skin: Warm and dry.   Neurologic: No gross neuro deficits, normal strength and sensation.  Psychiatric: Alert, oriented to person, place and time, obvious depression.    Data   Data reviewed today:  I  personally reviewed the EKG tracing showing atrial fib CVR and the abdominal CT image(s) showing no acute findings.    Recent Labs  Lab 04/24/18  0625 04/17/18  1943 04/17/18  1813   WBC 5.9  --  7.1   HGB 11.9  --  12.5   MCV 88  --  89     --  244   INR 3.83*  --  3.61*   *  --  132*   POTASSIUM 3.7  --  4.0   CHLORIDE 95  --  97   CO2 26  --  27   BUN 15  --  21   CR 1.17*  --  1.37*   ANIONGAP 8  --  8   TRI 8.2*  --  9.4   *  --  100*   LIPASE  --   --  186   TROPI  --  <0.015 <0.015       Imaging:  No results found for this or any previous visit (from the past 24 hour(s)).

## 2018-04-24 NOTE — TELEPHONE ENCOUNTER
S: Pt is a 89 yo female in the  ED with SI    B:  Dealing with chronic stomach pain, unknown source. Difficulty sleeping due to pain. PCP gave paroxetine for anxiety and sleep. Decided to take the whole bottle(25 tablets at 10mg each). Stated she wanted to sleep and knew that she could also die and was ok with that. Upset that she wasn't able to sleep or die. Feels guilty about being burden on kids Did recently move into senior living facility about 1 month ago. Pt takes care of self, does all ADLS.  [facility] due [facility]. history of depression, post partum. At 34 yo she also OD on pills after last child was born. If she were going to go back home she wouldn't take more pills or commit suicide but also unsure on what she would do if she cant sleep. Pt is cooperative and wants admission. Good family support, kids alternate being on call just in case she needs them.     A: Voluntary, cleared by poison control. Pt on 2 L of o2 starting at 9am. Contacted DEC and they found out that pt intermittently uses O2 at night.    A: 02 sats at 90%@ 9am. Unit nurse stated this is of some concern due to being an overdose. Continue to monitor.      R: Pt going to medical for o2 sats being low,  admission not needed at this time.

## 2018-04-24 NOTE — IP AVS SNAPSHOT
[facility] [address] [facility]    [address] [place] [address]    Phone:  [phone]                                       After Visit Summary   4/24/2018    Leonor Mercado    MRN: [id number]           After Visit Summary Signature Page     I have received my discharge instructions, and my questions have been answered. I have discussed any challenges I see with this plan with the nurse or doctor.    ..........................................................................................................................................  Patient/Patient Representative Signature      ..........................................................................................................................................  Patient Representative Print Name and Relationship to Patient    ..................................................               ................................................  Date                                            Time    ..........................................................................................................................................  Reviewed by Signature/Title    ...................................................              ..............................................  Date                                                            Time

## 2018-04-24 NOTE — PROGRESS NOTES
Admission    Patient arrives to room 603-2 via cart from cart.  Care plan note: done    Inpatient nursing criteria listed below were met:    PCD's Documented: n/a  Skin issues/needs documented :yes  Isolation education started/completed n/a  Fall Prevention: Care plan updated, Education given and documented yes  Care Plan initiated:yes  Home medications documented in belongings flowsheet: n/a  Patient belongings documented in belongings flowsheet: yes  Reminder note (belongings/ medications) placed in discharge instructions:n/a  Admission profile/ required documentation complete: no-pt going to procedure

## 2018-04-24 NOTE — ED PROVIDER NOTES
"  History     Chief Complaint:  Drug Overdose       The history is provided by the patient and a relative.      Leonor Mercado is a 89 year old female with a history of atrial fibrillation currently anticoagulated on Coumadin  who presents to the ED for evaluation of drug overdose. On 4/20, the patient was recently prescribed a bottle of Paroxetine for anxiety. This morning at 0300, she was \"fed up with not being able to sleep, but I just kept going\" and took the whole bottle-25 tablets of 10 mg each. Here in the ED, she says she is \"tired of my belly ache and of being sick\".       Allergies:  Chocolate  Peanuts [Nuts]  Amiodarone  Baclofen  Bactrim [Sulfamethoxazole W-Trimethoprim]  Doxycycline  Levaquin [Levofloxacin]  Linzess [Linaclotide]  Lorazepam  Liquid Adhesive    Medications:    Albuterol inhaler  Vitamin d  Lasix  Albuterol neb  Ativan  Cozaar  Melatonin  paxil  Pravachol  Coumadin     Past Medical History:    A Fib - chr Coumadin, s/p PPM (H)   Atrial fibrillation (H)   BCC (basal cell carcinoma of skin)   CAD - 2 stents in [place] in 2000s.    CHF (congestive heart failure) (H)   CHF - Chr Diastolic (H)   Chronic renal insufficiency   COPD (chronic obstructive pulmonary disease) (H)   COPD (H)   Coronary artery disease   Hyperlipidemia   Hypertension   IBS (irritable bowel syndrome)   Irritable bowel   Osteoporosis   Panic attack   Pulmonary fibrosis (H)   Shortness of breath   Sick sinus syndrome - s/p PPM 2003 (H)   SSS (sick sinus syndrome)  Vertigo     Past Surgical History:    Appendectomy  Cardiac: PPM, 2 STENTS2-05[place]-CAD-taxus stentx2 2.5-12,2.5-12 in LADp and LADm, 80%nonDomRCA-LcxDom-mild,EF60%  Coronary angiography: mild CAD,Normal LV function, No Mitral regurgitation, Prox LAD 30% stenosis. RCA prox and mid, 20-30%  Lead revision dual: Revised in [place]-due to diaphram stim (original pacer placed in [place])  Lead revision dual: Correction of reversal of A and V leads in Header  Heart cath, " "angioplasty: LEIGH ANN mid and proximal LAD, ongoing 80% RCA; mild circumflex  Hernia repair: LIH, left  Implant pacemaker: for sick sinus syndrome  Replace pacemaker generator: dual-chamber     Family History:    Father: heart disease  Mother: dementia  Daughter: liver transplant, Crohn's disease    Social History:  Presents with her daughter.   Former smoker: quit in 1987  Negative for smokeless tobacco.   Negative for alcohol use.  Marital Status:   [2]     Review of Systems   Psychiatric/Behavioral: Positive for self-injury, sleep disturbance and suicidal ideas. The patient is nervous/anxious.    All other systems reviewed and are negative.    Physical Exam   First Vitals:  BP: 127/88  Pulse: 65  Heart Rate: 80  Temp: 98.1  F (36.7  C)  Resp: 14  Height: 160 cm (5' 3\")  Weight: 59.4 kg (131 lb)  SpO2: 93 %      Physical Exam  GENERAL: well developed, pleasant, eyes closed but awake and talks  HEAD: atraumatic  EYES: pupils reactive, extraocular muscles intact, conjunctivae normal  ENT:  mucus membranes moist  NECK:  trachea midline, normal range of motion  RESPIRATORY: no tachypnea, breath sounds clear to auscultation   CVS: normal S1/S2, no murmurs, intact distal pulses  ABDOMEN: soft, nondistention, Mild LLQ pain almost into hip  MUSCULOSKELETAL: no deformities  SKIN: warm and dry, no acute rashes or ulceration  NEURO: GCS 15, cranial nerves intact, alert and oriented x3  PSYCH:  Mood normal, flat affect  Emergency Department Course   ECG:  Indication: Drug overdose  Time: 0622  Vent. Rate 70 bpm. DC interval *. QRS duration 64. QT/QTc 410/442. P-R-T axis * 38 114. Atrial fibrillation. Anteroseptal infarct, age undetermined, Abnormal ECG. Read time: 0721.    Laboratory:  CBC: WBC: 5.9, HGB: 11.9, PLT: 196  BMP: Glucose 112,  (L), calcium 8.2 (L), creatinine 1.17 (H), GFR 43 (L), o/w WNL     Acetaminophen: 2  Alcohol ethyl: <0.01  INR: 3.83 (H)  Salicylate: <2  TSH: 3.01    Interventions:  0643 NS 1L " IV   Zofran 4 mg IV    Emergency Department Course:  Nursing notes and vitals reviewed. 0721 I performed an exam of the patient as documented above. EKG obtained in the ED, see results above.     IV inserted. Medicine administered as documented above. Blood drawn. This was sent to the lab for further testing, results above. The patient provided a urine sample here in the emergency department. This was sent for laboratory testing, findings above.     0728 I discussed the case with Poison control.    1100 I discussed the case with Poison control.    1102 I rechecked the patient and discussed the results of her workup thus far.     1112  I consulted with Dr. Rodriguez of the hospitalist services. They are in agreement to accept the patient for admission.    Findings and plan explained to the Patient who consents to admission. Discussed the patient with Dr. Rodriguez, who will admit the patient to a medical bed for further monitoring, evaluation, and treatment.    Impression & Plan    Medical Decision Making:  Leonor Mercado is a 89 year old female presents with ingestion of medication.  She notes that she has been struggling with chronic abdominal pain with no clear diagnosis.  I see that she just had a CT of her abdomen and pelvis and she notes that she had insomnia last night.  She stated taking Paxil and noted that a couple did not work to help her sleep so she took more and eventually emptied the bottle.  I did talk to poison control.  Patient has some chronic health issues, but looks to be cleared from the ingestion.  Certainly, the patient will need ongoing care given the intent and that she was tired of not being able to sleep due to the pain and feels that there is an intent to harm.  Patient seen by DEC and is currently waiting to see if we can get her into a senior mental health type situation.    Addendum: the patient initially was going to be admitted to a senior mental health unit. Poison control called  back inquiring about an update. Patient in general is asleep, but awakens with voice or some mild stimulation, but in general seems mildly sedated. Poison control's suggestion is, given the ingestion and mild sedation and that we are seeing peak affects, to admit to observation to continue to see that patient is safe. Spoke with the hospitalist regarding the patient. No change of plans.    Diagnosis:    ICD-10-CM   1. Drug overdose, undetermined intent, initial encounter T50.904A   2. Insomnia, unspecified type G47.00   3. Abdominal pain, left lower quadrant R10.32   4. Constipation, unspecified constipation type K59.00       Disposition:  Admitted to Dr. Michael ANGEL, Soraya Mejia, am serving as a scribe on 4/24/2018 at 7:21 AM to personally document services performed by Kyle Sharp MD based on my observations and the provider's statements to me.       Soraya Mejia  4/24/2018    EMERGENCY DEPARTMENT       Kyle Sharp MD  04/24/18 3948

## 2018-04-24 NOTE — ED NOTES
Bed: ED04  Expected date: 4/24/18  Expected time: 6:10 AM  Means of arrival: Ambulance  Comments:  HE Amb. 89F Abd. Pain; poss OD

## 2018-04-25 LAB
ANION GAP SERPL CALCULATED.3IONS-SCNC: 9 MMOL/L (ref 3–14)
BUN SERPL-MCNC: 17 MG/DL (ref 7–30)
CALCIUM SERPL-MCNC: 8.8 MG/DL (ref 8.5–10.1)
CHLORIDE SERPL-SCNC: 94 MMOL/L (ref 94–109)
CO2 SERPL-SCNC: 29 MMOL/L (ref 20–32)
CREAT SERPL-MCNC: 1.36 MG/DL (ref 0.52–1.04)
ERYTHROCYTE [DISTWIDTH] IN BLOOD BY AUTOMATED COUNT: 14.5 % (ref 10–15)
GFR SERPL CREATININE-BSD FRML MDRD: 36 ML/MIN/1.7M2
GLUCOSE SERPL-MCNC: 88 MG/DL (ref 70–99)
HCT VFR BLD AUTO: 37.6 % (ref 35–47)
HGB BLD-MCNC: 12.2 G/DL (ref 11.7–15.7)
INR PPP: 3.73 (ref 0.86–1.14)
MCH RBC QN AUTO: 28.7 PG (ref 26.5–33)
MCHC RBC AUTO-ENTMCNC: 32.4 G/DL (ref 31.5–36.5)
MCV RBC AUTO: 89 FL (ref 78–100)
PLATELET # BLD AUTO: 220 10E9/L (ref 150–450)
POTASSIUM SERPL-SCNC: 3.5 MMOL/L (ref 3.4–5.3)
RBC # BLD AUTO: 4.25 10E12/L (ref 3.8–5.2)
SODIUM SERPL-SCNC: 132 MMOL/L (ref 133–144)
WBC # BLD AUTO: 6.7 10E9/L (ref 4–11)

## 2018-04-25 PROCEDURE — 25000132 ZZH RX MED GY IP 250 OP 250 PS 637: Mod: GY | Performed by: PSYCHIATRY & NEUROLOGY

## 2018-04-25 PROCEDURE — G0378 HOSPITAL OBSERVATION PER HR: HCPCS

## 2018-04-25 PROCEDURE — A9270 NON-COVERED ITEM OR SERVICE: HCPCS | Mod: GY | Performed by: PSYCHIATRY & NEUROLOGY

## 2018-04-25 PROCEDURE — 80048 BASIC METABOLIC PNL TOTAL CA: CPT | Performed by: NURSE PRACTITIONER

## 2018-04-25 PROCEDURE — 25000125 ZZHC RX 250: Performed by: NURSE PRACTITIONER

## 2018-04-25 PROCEDURE — A9270 NON-COVERED ITEM OR SERVICE: HCPCS | Mod: GY | Performed by: NURSE PRACTITIONER

## 2018-04-25 PROCEDURE — 85027 COMPLETE CBC AUTOMATED: CPT | Performed by: NURSE PRACTITIONER

## 2018-04-25 PROCEDURE — 99207 ZZC CDG-CODE CATEGORY CHANGED: CPT | Performed by: INTERNAL MEDICINE

## 2018-04-25 PROCEDURE — 40000914 ZZH STATISTIC SITTER, DAY HOURS

## 2018-04-25 PROCEDURE — 85610 PROTHROMBIN TIME: CPT | Performed by: NURSE PRACTITIONER

## 2018-04-25 PROCEDURE — 36415 COLL VENOUS BLD VENIPUNCTURE: CPT | Performed by: NURSE PRACTITIONER

## 2018-04-25 PROCEDURE — 25000132 ZZH RX MED GY IP 250 OP 250 PS 637: Mod: GY | Performed by: NURSE PRACTITIONER

## 2018-04-25 PROCEDURE — 94640 AIRWAY INHALATION TREATMENT: CPT

## 2018-04-25 PROCEDURE — 99225 ZZC SUBSEQUENT OBSERVATION CARE,LEVEL II: CPT | Performed by: INTERNAL MEDICINE

## 2018-04-25 PROCEDURE — 40000275 ZZH STATISTIC RCP TIME EA 10 MIN

## 2018-04-25 RX ORDER — MIRTAZAPINE 7.5 MG/1
7.5 TABLET, FILM COATED ORAL AT BEDTIME
Status: DISCONTINUED | OUTPATIENT
Start: 2018-04-25 | End: 2018-04-27

## 2018-04-25 RX ORDER — SIMETHICONE 40MG/0.6ML
40 SUSPENSION, DROPS(FINAL DOSAGE FORM)(ML) ORAL EVERY 6 HOURS PRN
Status: DISCONTINUED | OUTPATIENT
Start: 2018-04-25 | End: 2018-04-29 | Stop reason: HOSPADM

## 2018-04-25 RX ADMIN — MIRTAZAPINE 7.5 MG: 7.5 TABLET, FILM COATED ORAL at 22:30

## 2018-04-25 RX ADMIN — FUROSEMIDE 40 MG: 40 TABLET ORAL at 10:03

## 2018-04-25 RX ADMIN — ONDANSETRON 4 MG: 4 TABLET, ORALLY DISINTEGRATING ORAL at 13:07

## 2018-04-25 RX ADMIN — FLUTICASONE FUROATE AND VILANTEROL TRIFENATATE 1 PUFF: 200; 25 POWDER RESPIRATORY (INHALATION) at 10:02

## 2018-04-25 RX ADMIN — LOSARTAN POTASSIUM 25 MG: 25 TABLET ORAL at 10:03

## 2018-04-25 RX ADMIN — POLYETHYLENE GLYCOL 3350 17 G: 17 POWDER, FOR SOLUTION ORAL at 10:03

## 2018-04-25 RX ADMIN — DILTIAZEM HYDROCHLORIDE 240 MG: 240 CAPSULE, EXTENDED RELEASE ORAL at 10:03

## 2018-04-25 RX ADMIN — IPRATROPIUM BROMIDE AND ALBUTEROL SULFATE 3 ML: .5; 3 SOLUTION RESPIRATORY (INHALATION) at 17:45

## 2018-04-25 NOTE — PROGRESS NOTES
SW:  D: DEC assessment/recommendation noted; referral made to  [facility] for [facility] once medically cleared.    Per [facility] [address] Charge RN, Dr Jameson is aware of above.  Charge nurse will wait till patient medically clear and Dr Jameson see's patient, then she will contact Central Intake.  P: Will follow to assist if needed with d/c plan.

## 2018-04-25 NOTE — PROGRESS NOTES
"[facility]    Hospitalist Progress Note    Date of Service (when I saw the patient): 04/25/2018    Assessment & Plan   Leonor Mercado is a 89 year old female who was admitted on 4/24/2018 following intentional Paxil overdose.    1. Paxil overdose.  Patient is hemodynamically stable.  Psychiatry recommends one additional night for stabilization.    2. Depression.  Started on Remeron.    3. Abdominal pain.  Long standing with extensive negative evaluation.  Abdominal US negative.  Likely somatization.   Follow up as outpatient.  Start simethicone for gas and bloating.    4. Hyponatremia.  Improving.  Follow.    5. Afib/CAD/chronic diastolic heart failure. Continue coumadin per pharmacy, diltiazem, furosemide, and losartan.    6. COPD/pulmonary fibrosis. Continue Breo Ellipta and Duonebs.    # Pain Assessment:  Current Pain Score 4/25/2018   Patient currently in pain? denies   Pain score (0-10) -   Pain location -   Pain descriptors -   Leonor watson pain level was assessed and she currently denies pain.        DVT Prophylaxis: Pneumatic Compression Devices  Code Status: Full Code    Disposition: Expected discharge in 1-2 days.    Quan Rodriguez  Text Page (7 am to 6 pm)    Interval History   The patient continues to complains of abdominal discomfort, \"Like a toothache\".  She complains of poor appetite, burping, bloating, and gas.    -Data reviewed today: I reviewed all new labs and imaging results over the last 24 hours. I personally reviewed no images or EKG's today.    Physical Exam   Temp: 98.9  F (37.2  C) Temp src: Oral BP: 119/73 Pulse: 64 Heart Rate: 67 Resp: 18 SpO2: 93 % O2 Device: None (Room air) Oxygen Delivery: 2 LPM  Vitals:    04/24/18 0624 04/24/18 1443   Weight: 59.4 kg (131 lb) 60.2 kg (132 lb 11.5 oz)     Vital Signs with Ranges  Temp:  [98.3  F (36.8  C)-99  F (37.2  C)] 98.9  F (37.2  C)  Pulse:  [64] 64  Heart Rate:  [67-85] 67  Resp:  [18-20] 18  BP: (119-133)/(63-84) " 119/73  SpO2:  [90 %-97 %] 93 %  I/O last 3 completed shifts:  In: 1120 [P.O.:120; IV Piggyback:1000]  Out: 1550 [Urine:1550]    Gen: Thin elderly female, alert and oriented x 3, no acute distressed  HEENT: Atraumatic, normocephalic  Lungs: Clear to ausculation without wheezes, rhonchi, or rales  Heart: Regular rate and rhythm, no murmurs, gallops, or rubs  GI: Bowel sound normal, no hepatosplenomegaly or masses  Lymph: No lymphadenopathy or edema  Skin: No rashes     Medications     Warfarin Therapy Reminder         diltiazem  240 mg Oral Daily     fluticasone-vilanterol  1 puff Inhalation Daily     furosemide  40 mg Oral Daily     losartan  25 mg Oral Daily     mirtazapine  7.5 mg Oral At Bedtime     polyethylene glycol  17 g Oral Daily with breakfast     sodium chloride (PF)  3 mL Intracatheter Q8H     warfarin-No DOSE today  1 each Does not apply no dose today (warfarin)       Data     Recent Labs  Lab 04/25/18  0750 04/24/18  0625   WBC 6.7 5.9   HGB 12.2 11.9   MCV 89 88    196   INR 3.73* 3.83*   * 129*   POTASSIUM 3.5 3.7   CHLORIDE 94 95   CO2 29 26   BUN 17 15   CR 1.36* 1.17*   ANIONGAP 9 8   TRI 8.8 8.2*   GLC 88 112*   LIPASE  --  90       Recent Results (from the past 24 hour(s))   US Abdomen Complete    Narrative    ABDOMINAL ULTRASOUND  4/24/2018 3:48 PM     HISTORY: Generalized abdominal pain, worse in the lower quadrants,  worse with eating.     COMPARISON: 4/17/2018    FINDINGS:    Gallbladder: Normal with no cholelithiasis, wall thickening or focal  tenderness.     Bile ducts: CHD is normal diameter. No intrahepatic biliary  dilatation.    Liver: Normal.     Pancreas: Normal    Spleen: Normal.     Right kidney: Simple 1.3 cm cyst noted. Otherwise normal.    Left kidney: Normal.    Aorta and IVC: Normal.       Impression    IMPRESSION: Unremarkable abdominal ultrasound. If there is concern for  mesenteric ischemia, consider Doppler evaluation of the mesenteric  arteries.    RAHUL RODRIGUEZ  MD [place]

## 2018-04-25 NOTE — PLAN OF CARE
Problem: Patient Care Overview  Goal: Plan of Care/Patient Progress Review  Outcome: Improving  Patient A&Ox4.  Calm and cooperative throughout shift.  Denies suicidal thoughts.  Daughters present at bedside throughout shift.  Vitals stable. Up with 1 assist with gait belt/walker. C/o intermittent abdominal pain, feeling full of gas and intermittent nausea.  Zofran given with relief.  Last BM yesterday.  +BS.  Sitter discontinued at bedside, VPM in place and bed alarm on.  Psyche saw pt and to start on Remeron at HS today.  Appetite fair for lunch.  Calls appropriately.  Continue to monitor.

## 2018-04-25 NOTE — PLAN OF CARE
Problem: Patient Care Overview  Goal: Plan of Care/Patient Progress Review  Outcome: No Change  [id number]: Suicide precautions with sitter.  A&Ox4.  Depressed mood.  Reportedly stating how stupid she is.  Fatigued and sleeping outside of when up to bathroom.  [place]; HA's sent home with family this afternoon.  Heavy Ax1 with ELSY walker to [facility].  Weak and tremulous in BUE and BLE's.  Regular diet, but no desire to eat.  Does drink fluids well.  No reports of pain.  Psych and SW consulted for safe discharge planning.

## 2018-04-25 NOTE — PLAN OF CARE
Problem: Patient Care Overview  Goal: Plan of Care/Patient Progress Review  Outcome: No Change   A&Ox4. VSS on room air tolerating in the lower 90's. Denies pain. CALDERON and slight dizziness reported upon standing. Up with assist of one walker and GB to bedside commode. Weak on feet with tremors in bilateral lower extremities. Suicide precautions maintained with sitter at bedside.  Calm and cooperative throughout shift, sleeping most of the night.  [place]; bilateral ears, hearing aides sent home with family yesterday. Tolerating regular diet, but very little desire to eat. Adequate fluid intake this shift.  Psych and SW consulted for discharge planning.

## 2018-04-25 NOTE — CONSULTS
"CLINICAL NUTRITION SERVICES  -  ASSESSMENT NOTE      Recommendations Ordered by Registered Dietitian (RD):   Medical Food Supplement: pt able to order oral nutrition supplements as desires with meals; chocolate Boost at 10am and GelateinPlus at 2pm    Malnutrition: Non-Severe malnutrition  In Context of:  Acute illness or injury        REASON FOR ASSESSMENT  Leonor Mercado is a 89 year old female seen by Registered Dietitian for Admission Nutrition Risk Screen - Unintentional loss of 10lbs or more over the last 2 months       NUTRITION HISTORY  - Information obtained from pt  -Pt states that she follows a regular diet consuming 3 meals per day. Pt lives alone and does all of her own grocery shopping and meal preparation.      CURRENT NUTRITION ORDERS  Diet Order:     Regular       Current Intake/Tolerance:  -Pt states that she has a very poor appetite and has been like this for the last 2-3 weeks due to abdominal pain  -Pt states that she has had about a 7 lbs wt loss over the last week and reports a UBW of 130-136 lbs  -Per RN flowsheet, pt consumed 25% of meal ordered       PHYSICAL FINDINGS  Observed  Muscle Wasting   Subcutaneous fat loss   Obtained from Chart/Interdisciplinary Team  None noted    ANTHROPOMETRICS  Height: 5' 3\"  Weight: 132 lbs 11.47 oz (60.2 kg)  Body mass index is 23.51 kg/(m^2).  Weight Status:  Normal BMI  IBW: 52.3 kg  % IBW: 115%  Weight History: wt loss of 2.2kg (3.5%) over the last 2 months   Wt Readings from Last 10 Encounters:   04/24/18 60.2 kg (132 lb 11.5 oz)   04/20/18 59.5 kg (131 lb 1.6 oz)   04/16/18 59.1 kg (130 lb 3.2 oz)   02/20/18 62.4 kg (137 lb 8 oz)   01/25/18 60.7 kg (133 lb 12.8 oz)   01/11/18 62.5 kg (137 lb 12.8 oz)   01/11/18 63 kg (139 lb)   01/06/18 61.2 kg (135 lb)   12/05/17 63.2 kg (139 lb 6.4 oz)   11/17/17 62.2 kg (137 lb 1.6 oz)       LABS  Labs reviewed    MEDICATIONS  Medications reviewed      ASSESSED NUTRITION NEEDS PER APPROVED PRACTICE " GUIDELINES:    Dosing Weight 60.2 kg (actual wt)   Estimated Energy Needs: 5472-8104 kcals (25-30 Kcal/Kg)  Justification: maintenance  Estimated Protein Needs: 60-72 grams protein (1-1.2 g pro/Kg)  Justification: preservation of lean body mass  Estimated Fluid Needs: 7498-7925 mL (1 mL/Kcal)  Justification: maintenance    MALNUTRITION:  % Weight Loss:  Up to 7.5% in 3 months (non-severe malnutrition)  % Intake:  <75% for >/= 1 month (non-severe malnutrition)  Subcutaneous Fat Loss:  Orbital region mild depletion  Muscle Loss:  Temporal region mild depletion and Clavicle bone region mild-moderate depletion  Fluid Retention:  None noted    Malnutrition Diagnosis: Non-Severe malnutrition  In Context of:  Acute illness or injury    NUTRITION DIAGNOSIS:  Inadequate oral intake related to decreased appetite and abdominal pain as evidenced by wt loss of 2.2kg (3.5%) over the last 2 months       NUTRITION INTERVENTIONS  Recommendations / Nutrition Prescription  Continue regular diet as ordered   Oral nutrition supplements as pt desires       Implementation  1. Nutrition education: Provided education on oral nutrition supplements and increasing protein   2. Medical Food Supplement: pt able to order oral nutrition supplements as desires with meals; chocolate Boost at 10am and GelateinPlus at 2pm   3. Collaboration and Referral of Nutrition care: spoke with RN about chocolate allergy- pt and family reports no true allergy to chocolate. Sound like pt had difficulty swallowing a chocolate bar one time however still consumes chocolate, pt reports recently consuming a chocolate Ensure recently PTA      Nutrition Goals  Pt to consume at least 75% of meals ordered TID and oral nutrition supplements BID       MONITORING AND EVALUATION:  Progress towards goals will be monitored and evaluated per protocol and Practice Guidelines        Adela De León RD, LD

## 2018-04-25 NOTE — CONSULTS
[facility] Initial Psychiatric Consult Note      TIME SPENT IN PSYCHIATRY INITIAL CONSULT: 55 MINUTES    Consult ordered by: BAY Schultz CNP.  Reason: Roxana-psych, intentional ingestion of Paxil.     Initial History     The patient's care was discussed, patient seen and chart notes were reviewed.    Patient examined for psychiatric consultation.     IDENTIFICATION    Pt is a 89 year old   female currently living in [place]. Pt sees PCP Dannielle Morfin. She has never seen a psychiatrist. Pt seen on 66 for intentional ingestion of Paxil.    HISTORY OF PRESENT ILLNESS    Ms. Leonor Mercado is a 89 year old female who presented after overdosing on several tablets of Paxil. She states that she had been unable to sleep for the past several weeks. She stated that on the night prior to admission, she decided to take approximately 25 tablets of Paxil 10mg in a desperate attempt to sleep. She had felt frustrated and angry after  She began to panic and vomited her medications. She called 911 to have EMS bring her to the hospital. Pt has severely depressed mood, motivation poor, concentration poor, low energy, no plan, able to contract for safety. She reports poor sleep architecture. Pt is an anxious person, a worrier. Pt endorses consistent, pervasive sense of anxiety and worry. Pt finds this anxiety difficult to control, often resulting in restlessness, feeling on edge, irritability, and sleep disturbance.  Pt confirms having panic attacks, where pt states they feel like their heart is racing, chest pounding, sweating palms, and cannot breathe. Another significant stressor is the fact that pt has been having abdominal pain for the past four weeks without relief. Abdominal CT was inconclusive and etiology is unclear.    CHEMICAL DEPENDENCY HISTORY    She states she has drank alcohol socially in the past, she does not drink now. No illicit substance use.    PAST PSYCHIATRIC  "HISTORY    Past medication trials include Ativan, Prozac, Paxil. History of depression and attempted suicide via OD at the age of 35.    FAMILY HISTORY    Mother with a \"nervous breakdown\" and locked in the hospital for several months.     SOCIAL HISTORY    Pt was born and raised as the youngest of three children with both parents. She described her parents as supportive. Pt graduated from high school and only attended three months of college. She worked a  for a number of years. Her  worked in computers. Pt has been  for 70 years and they had three children together. They have four grandchildren and seven great-grandchildren. She presently lives in [facility] living in [place] while [facility].     Medications     Prescriptions Prior to Admission   Medication Sig Dispense Refill Last Dose     Acetaminophen (TYLENOL PO) Take 1,000 mg by mouth nightly as needed    prn at prn     albuterol (PROAIR HFA/PROVENTIL HFA/VENTOLIN HFA) 108 (90 BASE) MCG/ACT Inhaler Inhale 2 puffs into the lungs every 6 hours as needed for shortness of breath / dyspnea or wheezing 1 Inhaler 1 prn at prn     calcium carbonate (CALCIUM CARBONATE) 600 MG TABS tablet Take 1 tablet by mouth daily    4/23/2018 at am     Cholecalciferol (VITAMIN D) 2000 UNITS tablet Take 2,000 Units by mouth daily   4/23/2018 at am     diltiazem (DILTIAZEM CD) 240 MG 24 hr capsule Take 1 capsule (240 mg) by mouth daily 30 capsule  4/23/2018 at am     Docusate Calcium (STOOL SOFTENER PO) Take 1 tablet by mouth daily as needed    prn at prn     fluticasone-vilanterol (BREO ELLIPTA) 200-25 MCG/INH oral inhaler Inhale 1 puff into the lungs daily   4/23/2018 at am     furosemide (LASIX) 20 MG tablet Take 2 tablets (40 mg) by mouth daily 180 tablet 3 4/23/2018 at am     guaiFENesin (MUCINEX) 600 MG 12 hr tablet Take 1,200 mg by mouth 2 times daily as needed for congestion   prn at prn     ipratropium - albuterol 0.5 mg/2.5 mg/3 mL " (DUONEB) 0.5-2.5 (3) MG/3ML neb solution Take 1 vial (3 mLs) by nebulization every 6 hours as needed for shortness of breath / dyspnea or wheezing 360 mL  prn at prn     Lactobacillus (PROBIOTIC ACIDOPHILUS) TABS Take 1 tablet by mouth daily   4/23/2018 at am     losartan (COZAAR) 50 MG tablet Take 0.5 tablets (25 mg) by mouth daily 1 tablet 0 4/23/2018 at am     MELATONIN PO Take 5 mg by mouth nightly as needed    prn at prn     Multiple Vitamins-Minerals (OCUVITE PO) Take 1 capsule by mouth daily.   4/23/2018 at am     nitroglycerin (NITROSTAT) 0.4 MG SL tablet Place 1 tablet (0.4 mg) under the tongue every 5 minutes as needed 25 tablet 1 prn at prn     polyethylene glycol (MIRALAX/GLYCOLAX) packet Take 1 packet by mouth daily (with breakfast)    4/23/2018 at am     pravastatin (PRAVACHOL) 40 MG tablet Take 1 tablet (40 mg) by mouth daily 90 tablet 0 4/23/2018 at pm     warfarin (COUMADIN) 2 MG tablet TAKE ONE TABLET BY MOUTH ONCE DAILY OR  AS  DIRECTED  BY  INR  CLINIC 90 tablet 0 4/23/2018 at pm       Scheduled Medications    diltiazem  240 mg Oral Daily     fluticasone-vilanterol  1 puff Inhalation Daily     furosemide  40 mg Oral Daily     losartan  25 mg Oral Daily     polyethylene glycol  17 g Oral Daily with breakfast     sodium chloride (PF)  3 mL Intracatheter Q8H     PRNs:  acetaminophen, acetaminophen, ipratropium - albuterol 0.5 mg/2.5 mg/3 mL, melatonin tablet 5 mg, naloxone, ondansetron **OR** ondansetron, sodium chloride (PF), Warfarin Therapy Reminder      Allergies        Allergies   Allergen Reactions     Peanuts [Nuts] Shortness Of Breath     Amiodarone      pulm fibrosis       Baclofen      Confusion      Bactrim [Sulfamethoxazole W-Trimethoprim]      Difficulty swallowing     Doxycycline Other (See Comments)     Multiple complaints; she does not want this again.      Levaquin [Levofloxacin] Other (See Comments)     Multiple complaints; she will not take this again     Linzess [Linaclotide]  "Diarrhea     Lorazepam        Prolonged drowsiness with ER visit      Liquid Adhesive Itching and Rash     Bleeding when tape removed after pacemaker placement..itched and burned for weeks.        Previous Medical History     Past Medical History:   Diagnosis Date     A Fib - chr Coumadin, s/p PPM (H) 5/8/2013     Atrial fibrillation (H)      BCC (basal cell carcinoma of skin)      CAD - 2 stents in [place] in 2000s.  1/5/2016     CHF (congestive heart failure) (H)      CHF - Chr Diastolic (H) 11/21/2017     Chronic renal insufficiency      COPD (chronic obstructive pulmonary disease) (H)      COPD (H) 5/13/2013     Coronary artery disease      Hyperlipidemia      Hypertension      IBS (irritable bowel syndrome)      Irritable bowel      Osteoporosis      Panic attack      Pulmonary fibrosis (H)      Shortness of breath      Sick sinus syndrome - s/p PPM 2003 (H) 12/16/2017     SSS (sick sinus syndrome) (H)      Vertigo         Medical Review of Systems     /73 (BP Location: Left arm)  Pulse 64  Temp 98.9  F (37.2  C) (Oral)  Resp 18  Ht 1.6 m (5' 3\")  Wt 60.2 kg (132 lb 11.5 oz)  LMP  (LMP Unknown)  SpO2 93%  BMI 23.51 kg/m2  Body mass index is 23.51 kg/(m^2).    Previous 10-point ROS completed by BAY Schultz CNP on 4/24/18 reviewed by Parker Jameson MD on April 25, 2018 and is unchanged except for those problems mentioned within the HPI.      Mental Status Examination     Appearance Lying in bed, dressed in gown. Appears stated age.   Attitude Cooperative   Orientation Oriented to person, place, time   Eye Contact Poor   Speech Lowered rate and prosody   Language Normal   Psychomotor Behavior Normal   Thought Process Intact   Associations Intact   Thought Content Positive for SI on admission   Mood Depressed, anxious   Affect Flat   Fund of Knowledge Average   Insight Fair   Judgement Impaired   Attention Span & Concentration Limited   Recent & Remote Memory Limited   Gait Unable to assess "   Muscle Tone Intact      Labs     Labs reviewed.  Recent Results (from the past 24 hour(s))   Lactic acid whole blood    Collection Time: 04/24/18  5:00 PM   Result Value Ref Range    Lactic Acid 0.8 0.7 - 2.0 mmol/L   Drug abuse screen 77 urine ([place],[facility])    Collection Time: 04/24/18  6:50 PM   Result Value Ref Range    Amphetamine Qual Urine Negative NEG^Negative    Barbiturates Qual Urine Negative NEG^Negative    Benzodiazepine Qual Urine Negative NEG^Negative    Cannabinoids Qual Urine Negative NEG^Negative    Cocaine Qual Urine Negative NEG^Negative    Opiates Qualitative Urine Negative NEG^Negative    PCP Qual Urine Negative NEG^Negative   Basic metabolic panel    Collection Time: 04/25/18  7:50 AM   Result Value Ref Range    Sodium 132 (L) 133 - 144 mmol/L    Potassium 3.5 3.4 - 5.3 mmol/L    Chloride 94 94 - 109 mmol/L    Carbon Dioxide 29 20 - 32 mmol/L    Anion Gap 9 3 - 14 mmol/L    Glucose 88 70 - 99 mg/dL    Urea Nitrogen 17 7 - 30 mg/dL    Creatinine 1.36 (H) 0.52 - 1.04 mg/dL    GFR Estimate 36 (L) >60 mL/min/1.7m2    GFR Estimate If Black 44 (L) >60 mL/min/1.7m2    Calcium 8.8 8.5 - 10.1 mg/dL   CBC with platelets    Collection Time: 04/25/18  7:50 AM   Result Value Ref Range    WBC 6.7 4.0 - 11.0 10e9/L    RBC Count 4.25 3.8 - 5.2 10e12/L    Hemoglobin 12.2 11.7 - 15.7 g/dL    Hematocrit 37.6 35.0 - 47.0 %    MCV 89 78 - 100 fl    MCH 28.7 26.5 - 33.0 pg    MCHC 32.4 31.5 - 36.5 g/dL    RDW 14.5 10.0 - 15.0 %    Platelet Count 220 150 - 450 10e9/L   INR    Collection Time: 04/25/18  7:50 AM   Result Value Ref Range    INR 3.73 (H) 0.86 - 1.14        Impression     This is a 89 year old female with a history of depression and anxiety who presented after an intentional overdose on Paxil in an effort to obtain sleep after several days of insomnia. Discussed starting pt on Remeron.  Reviewed the side effects, benefits, and complications of medication. Pt gave verbal agreement to begin Remeron 7.5mg qhs  with intent to titrate to 15mg qhs. She denies suicidal ideation, sitter to be discontinued but recommend VPM in place for further monitoring. Referral was made to the senior outpatient program at [facility] for further stabilization, pt and her family are in agreement with this plan.     Diagnoses     1. Major depression, recurrent, severe, without psychotic features.   2. Panic disorder without agoraphobia.  3. Paroxetine overdose.     Plan     1. Explained side effects, benefits and complications of medications to the patient.  2. Medication Changes: Begin Remeron 7.5mg qhs with intent to titrate to 15mg qhs.   3. Discussed treatment plan with patient and team.  4. Re-consult Psychiatry.  5. Sitter discontinued, VPM in place.  6. Referral made to [facility] senior inpatient problem.      TIME SPENT IN PSYCHIATRY INITIAL CONSULT: 55 MINUTES     Attestation:   Patient has been seen and evaluated by me, Parker Jameson MD.    Patient ID:  Name: Leonor Mercado MRN: [id number]  Admission: 4/24/2018   YOB: 1926

## 2018-04-25 NOTE — PLAN OF CARE
Problem: Patient Care Overview  Goal: Plan of Care/Patient Progress Review  Outcome: Improving  Pt A/O x4. VSS on RA ex bradycardic at times, asymptomatic. Denies suicidal ideation. Pt denies pain. Regular diet. Voiding adequately. IV SL. VPM in place, pt calling appropriately. Ambulated hallway x1, CALDERON, PRN neb with relief of SOB. Psych added Remeron for tonight. Anticipated D/C pending. Nursing continue to monitor.

## 2018-04-26 LAB
ANION GAP SERPL CALCULATED.3IONS-SCNC: 7 MMOL/L (ref 3–14)
BUN SERPL-MCNC: 23 MG/DL (ref 7–30)
CALCIUM SERPL-MCNC: 8.9 MG/DL (ref 8.5–10.1)
CHLORIDE SERPL-SCNC: 96 MMOL/L (ref 94–109)
CO2 SERPL-SCNC: 30 MMOL/L (ref 20–32)
CREAT SERPL-MCNC: 1.38 MG/DL (ref 0.52–1.04)
GFR SERPL CREATININE-BSD FRML MDRD: 36 ML/MIN/1.7M2
GLUCOSE SERPL-MCNC: 88 MG/DL (ref 70–99)
INR PPP: 3.08 (ref 0.86–1.14)
MAGNESIUM SERPL-MCNC: 1.6 MG/DL (ref 1.6–2.3)
POTASSIUM SERPL-SCNC: 3.1 MMOL/L (ref 3.4–5.3)
POTASSIUM SERPL-SCNC: 4.2 MMOL/L (ref 3.4–5.3)
SODIUM SERPL-SCNC: 133 MMOL/L (ref 133–144)

## 2018-04-26 PROCEDURE — 83735 ASSAY OF MAGNESIUM: CPT | Performed by: NURSE PRACTITIONER

## 2018-04-26 PROCEDURE — 36415 COLL VENOUS BLD VENIPUNCTURE: CPT | Performed by: NURSE PRACTITIONER

## 2018-04-26 PROCEDURE — 99225 ZZC SUBSEQUENT OBSERVATION CARE,LEVEL II: CPT | Performed by: INTERNAL MEDICINE

## 2018-04-26 PROCEDURE — A9270 NON-COVERED ITEM OR SERVICE: HCPCS | Mod: GY | Performed by: PSYCHIATRY & NEUROLOGY

## 2018-04-26 PROCEDURE — A9270 NON-COVERED ITEM OR SERVICE: HCPCS | Mod: GY | Performed by: INTERNAL MEDICINE

## 2018-04-26 PROCEDURE — 25000132 ZZH RX MED GY IP 250 OP 250 PS 637: Mod: GY | Performed by: NURSE PRACTITIONER

## 2018-04-26 PROCEDURE — 85610 PROTHROMBIN TIME: CPT | Performed by: NURSE PRACTITIONER

## 2018-04-26 PROCEDURE — 84132 ASSAY OF SERUM POTASSIUM: CPT | Performed by: INTERNAL MEDICINE

## 2018-04-26 PROCEDURE — 99207 ZZC CDG-CODE CATEGORY CHANGED: CPT | Performed by: INTERNAL MEDICINE

## 2018-04-26 PROCEDURE — A9270 NON-COVERED ITEM OR SERVICE: HCPCS | Mod: GY | Performed by: NURSE PRACTITIONER

## 2018-04-26 PROCEDURE — 25000132 ZZH RX MED GY IP 250 OP 250 PS 637: Mod: GY | Performed by: PSYCHIATRY & NEUROLOGY

## 2018-04-26 PROCEDURE — 80048 BASIC METABOLIC PNL TOTAL CA: CPT | Performed by: NURSE PRACTITIONER

## 2018-04-26 PROCEDURE — G0378 HOSPITAL OBSERVATION PER HR: HCPCS

## 2018-04-26 PROCEDURE — 25000132 ZZH RX MED GY IP 250 OP 250 PS 637: Mod: GY | Performed by: INTERNAL MEDICINE

## 2018-04-26 PROCEDURE — 36415 COLL VENOUS BLD VENIPUNCTURE: CPT | Performed by: INTERNAL MEDICINE

## 2018-04-26 RX ORDER — POTASSIUM CHLORIDE 29.8 MG/ML
20 INJECTION INTRAVENOUS
Status: DISCONTINUED | OUTPATIENT
Start: 2018-04-26 | End: 2018-04-29 | Stop reason: HOSPADM

## 2018-04-26 RX ORDER — POTASSIUM CL/LIDO/0.9 % NACL 10MEQ/0.1L
10 INTRAVENOUS SOLUTION, PIGGYBACK (ML) INTRAVENOUS
Status: DISCONTINUED | OUTPATIENT
Start: 2018-04-26 | End: 2018-04-29 | Stop reason: HOSPADM

## 2018-04-26 RX ORDER — WARFARIN SODIUM 1 MG/1
1 TABLET ORAL
Status: COMPLETED | OUTPATIENT
Start: 2018-04-26 | End: 2018-04-26

## 2018-04-26 RX ORDER — POTASSIUM CHLORIDE 1.5 G/1.58G
20-40 POWDER, FOR SOLUTION ORAL
Status: DISCONTINUED | OUTPATIENT
Start: 2018-04-26 | End: 2018-04-29 | Stop reason: HOSPADM

## 2018-04-26 RX ORDER — MAGNESIUM SULFATE HEPTAHYDRATE 40 MG/ML
4 INJECTION, SOLUTION INTRAVENOUS EVERY 4 HOURS PRN
Status: DISCONTINUED | OUTPATIENT
Start: 2018-04-26 | End: 2018-04-29 | Stop reason: HOSPADM

## 2018-04-26 RX ORDER — POTASSIUM CHLORIDE 1500 MG/1
20-40 TABLET, EXTENDED RELEASE ORAL
Status: DISCONTINUED | OUTPATIENT
Start: 2018-04-26 | End: 2018-04-29 | Stop reason: HOSPADM

## 2018-04-26 RX ORDER — POTASSIUM CHLORIDE 7.45 MG/ML
10 INJECTION INTRAVENOUS
Status: DISCONTINUED | OUTPATIENT
Start: 2018-04-26 | End: 2018-04-29 | Stop reason: HOSPADM

## 2018-04-26 RX ADMIN — DILTIAZEM HYDROCHLORIDE 240 MG: 240 CAPSULE, EXTENDED RELEASE ORAL at 09:01

## 2018-04-26 RX ADMIN — WARFARIN SODIUM 1 MG: 1 TABLET ORAL at 17:37

## 2018-04-26 RX ADMIN — FUROSEMIDE 40 MG: 40 TABLET ORAL at 09:01

## 2018-04-26 RX ADMIN — POTASSIUM CHLORIDE 20 MEQ: 1500 TABLET, EXTENDED RELEASE ORAL at 13:37

## 2018-04-26 RX ADMIN — FLUTICASONE FUROATE AND VILANTEROL TRIFENATATE 1 PUFF: 200; 25 POWDER RESPIRATORY (INHALATION) at 09:47

## 2018-04-26 RX ADMIN — MIRTAZAPINE 7.5 MG: 7.5 TABLET, FILM COATED ORAL at 22:06

## 2018-04-26 RX ADMIN — LOSARTAN POTASSIUM 25 MG: 25 TABLET ORAL at 09:01

## 2018-04-26 RX ADMIN — POLYETHYLENE GLYCOL 3350 17 G: 17 POWDER, FOR SOLUTION ORAL at 09:01

## 2018-04-26 RX ADMIN — POTASSIUM CHLORIDE 40 MEQ: 1500 TABLET, EXTENDED RELEASE ORAL at 11:31

## 2018-04-26 NOTE — PROGRESS NOTES
SW:  D:  Writer spoke with daughter Cleo.  She is aware Dr Jameson will see patient today and re-assess whether he recommends patient transfer to abdi-psychiatry.    Daughter  shares her mother's depression started last summer when she placed her  into the [facility] and gradually increased.  The move into [facility] in [facility] has not gone as well as daughter's or patient expected.  Patient views the other residents as old people.  Patient at baseline is independent within her apartment.    Both daughter and patient identify patient being below baseline from a strength perspective.  Nursing has walked patient in the childress with a walker.  Daughter does not feel patient is able to return directly to her apartment.  She would like to see her mother transfer to the  abdi-psychiatry in patient program.  Writer did call and place patient on the wait list based on both DEC and Dr Jameson's initial assessment.    P:  Awaiting Dr Jameson to round today.

## 2018-04-26 NOTE — PROGRESS NOTES
SW;  D:  The referral to the Senior Treatment program was made by the DEC worker in the ED.  Yesterday Dr Jameson wanted to wait till today to assess patient and then decide if he recommends the need to transfer to the [facility] in-patient program.    A:   Nursing notes state patient up with assist of one, gait belt and walker.  If she is d/c'd directly back to her [place] apartment we need to assess what her baseline mobility is.    P:  Unit care team will await Dr Jameson rounding today for his decision.

## 2018-04-26 NOTE — PLAN OF CARE
Problem: Patient Care Overview  Goal: Plan of Care/Patient Progress Review  Outcome: No Change  Patient A/O x4. VSS on RA. LS clear. Denies pain. Up with A1, GB and walker. Voiding adequately in BR. VPM in room, pt slept well overnight. Plan on possible discharge today.

## 2018-04-26 NOTE — PROGRESS NOTES
[facility]    Hospitalist Progress Note    Date of Service (when I saw the patient): 04/26/2018    Assessment & Plan   Leonor Mercado is a 89 year old female who was admitted on 4/24/2018 following intentional Paxil overdose.    1. Paxil overdose.  Patient is medically stable for discharge, awaiting Psychiatry consult for possible discharge to nusrat psych unit.    2. Depression.  Started on Remeron.    3. Abdominal pain.  Long standing with extensive negative evaluation.  Abdominal US negative.  Likely somatization.   Follow up as outpatient.  Simethicone for gas and bloating.    4. Hyponatremia.  Resolved.    5. Afib/CAD/chronic diastolic heart failure. Continue coumadin per pharmacy, diltiazem, furosemide, and losartan.    6. COPD/pulmonary fibrosis. Continue Breo Ellipta and Duonebs.    7. Hypokalemia.  Start K replacement protocol.    # Pain Assessment:  Current Pain Score 4/26/2018   Patient currently in pain? denies   Pain score (0-10) -   Pain location -   Pain descriptors -   Leonor watson pain level was assessed and she currently denies pain.        DVT Prophylaxis: Pneumatic Compression Devices  Code Status: Full Code    Disposition: Expected discharge in 1-2 days.    Quan Rodriguez  Text Page (7 am to 6 pm)    Interval History   The patient ate breakfast today.  She states she is feels weak today, but denies dizziness or lightheadedness.    -Data reviewed today: I reviewed all new labs and imaging results over the last 24 hours. I personally reviewed no images or EKG's today.    Physical Exam   Temp: 98.4  F (36.9  C) Temp src: Oral BP: 138/77 Pulse: 56 Heart Rate: 56 Resp: 16 SpO2: 91 % O2 Device: None (Room air)    Vitals:    04/24/18 0624 04/24/18 1443   Weight: 59.4 kg (131 lb) 60.2 kg (132 lb 11.5 oz)     Vital Signs with Ranges  Temp:  [98.4  F (36.9  C)-98.6  F (37  C)] 98.4  F (36.9  C)  Pulse:  [56] 56  Heart Rate:  [56-88] 56  Resp:  [14-16] 16  BP: (111-138)/(64-77)  138/77  SpO2:  [90 %-94 %] 91 %  I/O last 3 completed shifts:  In: 370 [P.O.:370]  Out: -     Gen: Thin elderly female, alert and oriented x 3, no acute distressed  HEENT: Atraumatic, normocephalic  Lungs: Clear to ausculation without wheezes, rhonchi, or rales  Heart: Regular rate and rhythm, no murmurs, gallops, or rubs  GI: Bowel sound normal, no hepatosplenomegaly or masses  Lymph: No lymphadenopathy or edema  Skin: No rashes     Medications     Warfarin Therapy Reminder         diltiazem  240 mg Oral Daily     fluticasone-vilanterol  1 puff Inhalation Daily     furosemide  40 mg Oral Daily     losartan  25 mg Oral Daily     mirtazapine  7.5 mg Oral At Bedtime     polyethylene glycol  17 g Oral Daily with breakfast     sodium chloride (PF)  3 mL Intracatheter Q8H     warfarin  1 mg Oral ONCE at 18:00       Data     Recent Labs  Lab 04/26/18  0825 04/25/18  0750 04/24/18  0625   WBC  --  6.7 5.9   HGB  --  12.2 11.9   MCV  --  89 88   PLT  --  220 196   INR 3.08* 3.73* 3.83*    132* 129*   POTASSIUM 3.1* 3.5 3.7   CHLORIDE 96 94 95   CO2 30 29 26   BUN 23 17 15   CR 1.38* 1.36* 1.17*   ANIONGAP 7 9 8   TRI 8.9 8.8 8.2*   GLC 88 88 112*   LIPASE  --   --  90       No results found for this or any previous visit (from the past 24 hour(s)).

## 2018-04-26 NOTE — PLAN OF CARE
Problem: Patient Care Overview  Goal: Plan of Care/Patient Progress Review  Outcome: Improving  A&O x4. Vital signs stable. Lung sounds clear. IV saline locked. Denies pain. Voids adequately. Small BM this AM. Walked down childress x1, standby assist, uses walker and gait belt. Potassium low, gave supplements per protocol, check again at 1730. Complains of weakness. Encourage ambulation. Daughter and grandson here to visit. Likes ice chips. Awaiting psyche eval for dc recommendations. Will continue to monitor.

## 2018-04-27 LAB
INR PPP: 2.29 (ref 0.86–1.14)
POTASSIUM SERPL-SCNC: 4.3 MMOL/L (ref 3.4–5.3)

## 2018-04-27 PROCEDURE — 99207 ZZC CDG-CODE CATEGORY CHANGED: CPT | Performed by: INTERNAL MEDICINE

## 2018-04-27 PROCEDURE — A9270 NON-COVERED ITEM OR SERVICE: HCPCS | Mod: GY | Performed by: INTERNAL MEDICINE

## 2018-04-27 PROCEDURE — A9270 NON-COVERED ITEM OR SERVICE: HCPCS | Mod: GY | Performed by: PSYCHIATRY & NEUROLOGY

## 2018-04-27 PROCEDURE — A9270 NON-COVERED ITEM OR SERVICE: HCPCS | Mod: GY | Performed by: NURSE PRACTITIONER

## 2018-04-27 PROCEDURE — 84132 ASSAY OF SERUM POTASSIUM: CPT | Performed by: INTERNAL MEDICINE

## 2018-04-27 PROCEDURE — 25000132 ZZH RX MED GY IP 250 OP 250 PS 637: Mod: GY | Performed by: PSYCHIATRY & NEUROLOGY

## 2018-04-27 PROCEDURE — G0378 HOSPITAL OBSERVATION PER HR: HCPCS

## 2018-04-27 PROCEDURE — 25000132 ZZH RX MED GY IP 250 OP 250 PS 637: Mod: GY | Performed by: INTERNAL MEDICINE

## 2018-04-27 PROCEDURE — 25000132 ZZH RX MED GY IP 250 OP 250 PS 637: Mod: GY | Performed by: NURSE PRACTITIONER

## 2018-04-27 PROCEDURE — 85610 PROTHROMBIN TIME: CPT | Performed by: NURSE PRACTITIONER

## 2018-04-27 PROCEDURE — 36415 COLL VENOUS BLD VENIPUNCTURE: CPT | Performed by: NURSE PRACTITIONER

## 2018-04-27 PROCEDURE — 99232 SBSQ HOSP IP/OBS MODERATE 35: CPT | Performed by: PSYCHIATRY & NEUROLOGY

## 2018-04-27 PROCEDURE — 99225 ZZC SUBSEQUENT OBSERVATION CARE,LEVEL II: CPT | Performed by: INTERNAL MEDICINE

## 2018-04-27 RX ORDER — SIMETHICONE 40MG/0.6ML
40 SUSPENSION, DROPS(FINAL DOSAGE FORM)(ML) ORAL EVERY 6 HOURS PRN
Qty: 500 ML | Refills: 0 | Status: SHIPPED | OUTPATIENT
Start: 2018-04-27 | End: 2018-06-14

## 2018-04-27 RX ORDER — MIRTAZAPINE 15 MG/1
15 TABLET, FILM COATED ORAL AT BEDTIME
Qty: 30 TABLET | Refills: 0 | Status: SHIPPED | OUTPATIENT
Start: 2018-04-27 | End: 2018-05-07

## 2018-04-27 RX ORDER — WARFARIN SODIUM 2 MG/1
2 TABLET ORAL
Status: COMPLETED | OUTPATIENT
Start: 2018-04-27 | End: 2018-04-27

## 2018-04-27 RX ORDER — MIRTAZAPINE 15 MG/1
15 TABLET, FILM COATED ORAL AT BEDTIME
Status: DISCONTINUED | OUTPATIENT
Start: 2018-04-27 | End: 2018-04-29 | Stop reason: HOSPADM

## 2018-04-27 RX ADMIN — MIRTAZAPINE 15 MG: 15 TABLET, FILM COATED ORAL at 21:02

## 2018-04-27 RX ADMIN — WARFARIN SODIUM 2 MG: 2 TABLET ORAL at 17:30

## 2018-04-27 RX ADMIN — FLUTICASONE FUROATE AND VILANTEROL TRIFENATATE 1 PUFF: 200; 25 POWDER RESPIRATORY (INHALATION) at 09:07

## 2018-04-27 RX ADMIN — LOSARTAN POTASSIUM 25 MG: 25 TABLET ORAL at 09:07

## 2018-04-27 RX ADMIN — FUROSEMIDE 40 MG: 40 TABLET ORAL at 09:07

## 2018-04-27 RX ADMIN — DILTIAZEM HYDROCHLORIDE 240 MG: 240 CAPSULE, EXTENDED RELEASE ORAL at 09:07

## 2018-04-27 RX ADMIN — POLYETHYLENE GLYCOL 3350 17 G: 17 POWDER, FOR SOLUTION ORAL at 09:07

## 2018-04-27 NOTE — CONSULTS
[facility] Psychiatric Consult Follow-up Note      TIME SPENT IN PSYCHIATRY CONSULT: 15 MINUTES.     Interim History     The patient's care was discussed, patient seen and chart notes were reviewed. Pt examined on 66. Tolerating medications without side effects. Side effects, risks, and benefits of medications reviewed with patient. She reports she did not sleep well last night. Plan to increase Remeron to 15mg qhs. She is presently amenable to transferring to the [facility] psychiatric program, however they will not take pt as they do not have a bed for her today. She will remain on the unit until a bed for her can be established, Psychiatry will re-assess tomorrow.      Medications     Current Facility-Administered Medications Ordered in Epic   Medication Dose Route Frequency Last Rate Last Dose     acetaminophen (TYLENOL) Suppository 650 mg  650 mg Rectal Q4H PRN         acetaminophen (TYLENOL) tablet 650 mg  650 mg Oral Q4H PRN         diltiazem 24 hr capsule 240 mg  240 mg Oral Daily   240 mg at 04/26/18 0901     fluticasone-vilanterol (BREO ELLIPTA) 200-25 MCG/INH oral inhaler 1 puff  1 puff Inhalation Daily   1 puff at 04/26/18 0947     furosemide (LASIX) tablet 40 mg  40 mg Oral Daily   40 mg at 04/26/18 0901     ipratropium - albuterol 0.5 mg/2.5 mg/3 mL (DUONEB) neb solution 3 mL  1 vial Nebulization Q6H PRN   3 mL at 04/25/18 1745     losartan (COZAAR) tablet 25 mg  25 mg Oral Daily   25 mg at 04/26/18 0901     magnesium sulfate 4 g in 100 mL sterile water (premade)  4 g Intravenous Q4H PRN         melatonin tablet 5 mg  5 mg Oral At Bedtime PRN         mirtazapine (REMERON) tablet TABS 7.5 mg  7.5 mg Oral At Bedtime   7.5 mg at 04/26/18 2206     naloxone (NARCAN) injection 0.1-0.4 mg  0.1-0.4 mg Intravenous Q2 Min PRN         ondansetron (ZOFRAN-ODT) ODT tab 4 mg  4 mg Oral Q6H PRN   4 mg at 04/25/18 1307    Or     ondansetron (ZOFRAN) injection 4 mg  4 mg Intravenous Q6H PRN      "    polyethylene glycol (MIRALAX/GLYCOLAX) Packet 17 g  17 g Oral Daily with breakfast   17 g at 04/26/18 0901     potassium chloride (KLOR-CON) Packet 20-40 mEq  20-40 mEq Oral or Feeding Tube Q2H PRN         potassium chloride 10 mEq in 100 mL intermittent infusion with 10 mg lidocaine  10 mEq Intravenous Q1H PRN         potassium chloride 10 mEq in 100 mL sterile water intermittent infusion (premix)  10 mEq Intravenous Q1H PRN         potassium chloride 20 mEq in 50 mL intermittent infusion  20 mEq Intravenous Q1H PRN         potassium chloride SA (K-DUR/KLOR-CON M) CR tablet 20-40 mEq  20-40 mEq Oral Q2H PRN   20 mEq at 04/26/18 1337     simethicone (MYLICON) suspension 40 mg  40 mg Oral Q6H PRN         sodium chloride (PF) 0.9% PF flush 3 mL  3 mL Intracatheter Q1H PRN         sodium chloride (PF) 0.9% PF flush 3 mL  3 mL Intracatheter Q8H   3 mL at 04/27/18 0438     Warfarin Therapy Reminder (Check START DATE - warfarin may be starting in the FUTURE)  1 each Does not apply Continuous PRN         No current [facility]-ordered outpatient prescriptions on file.         Allergies      Allergies   Allergen Reactions     Peanuts [Nuts] Shortness Of Breath     Amiodarone      pulm fibrosis       Baclofen      Confusion      Bactrim [Sulfamethoxazole W-Trimethoprim]      Difficulty swallowing     Doxycycline Other (See Comments)     Multiple complaints; she does not want this again.      Levaquin [Levofloxacin] Other (See Comments)     Multiple complaints; she will not take this again     Linzess [Linaclotide] Diarrhea     Lorazepam        Prolonged drowsiness with ER visit      Liquid Adhesive Itching and Rash     Bleeding when tape removed after pacemaker placement..itched and burned for weeks.        Medical Review of Systems     /67 (BP Location: Right arm)  Pulse 56  Temp 98.4  F (36.9  C) (Oral)  Resp 16  Ht 1.6 m (5' 3\")  Wt 60.2 kg (132 lb 11.5 oz)  LMP  (LMP Unknown)  SpO2 90%  BMI 23.51 kg/m2  Body " mass index is 23.51 kg/(m^2).  A 10-point review of systems was performed by Parker Jameson MD and is negative, no new findings.      Psychiatric Examination     Appearance Sitting in chair, dressed in gown. Appears stated age.   Attitude Cooperative   Orientation Oriented to person, place, time   Eye Contact Better   Speech Regular rate, rhythm, volume and tone   Language Normal   Psychomotor Behavior Normal   Mood Slightly less depressed   Affect Flat   Thought Process Goal Oriented, Intact   Associations Intact   Thought Content Patient is currently negative for suicidal ideation, negative for plan or intent, able to contract no self harm and identify barriers to suicide.  Negative for obsessions, compulsions or psychosis.      Fund of Knowledge Average   Insight Improving   Judgement Improving   Attention Span & Concentration Fair   Recent & Remote Memory Fair   Gait Unable to assess   Muscle Tone Intact on visual inspection       Labs     Labs reviewed.  Recent Results (from the past 24 hour(s))   Potassium    Collection Time: 04/26/18  5:52 PM   Result Value Ref Range    Potassium 4.2 3.4 - 5.3 mmol/L        Impression     This is a 89 year old female with a history of depression and anxiety who presented after an intentional overdose on Paxil in an effort to obtain sleep after several days of insomnia. Discussed starting pt on Remeron.  Reviewed the side effects, benefits, and complications of medication. Pt gave verbal agreement to begin Remeron 7.5mg qhs with intent to titrate to 15mg qhs. Referral was made to the senior outpatient program at [facility] for further stabilization, pt and her family are in agreement with this plan.       Diagnoses     1. Major depression, recurrent, severe, without psychotic features.   2. Panic disorder without agoraphobia.  3. Paroxetine overdose.     Plan     1. Explained Side effects, benefits and complications of medications to the patient.   2. Medication  changes: Increase Remeron to 15mg qhs.  3. Discussed treatment plan with patient and team.  4. Pt to remain on the unit until a bed can be arranged at [facility].  5. Re-consult Psychiatry.      TIME SPENT IN PSYCHIATRY CONSULT: 15 MINUTES.    Attestation:   Patient has been seen and evaluated by me, Parker Jameson MD.    Patient ID:  Name: Leonor Mercado MRN: [id number]  Admission: 4/24/2018   YOB: 1926

## 2018-04-27 NOTE — PLAN OF CARE
Problem: Patient Care Overview  Goal: Plan of Care/Patient Progress Review  Outcome: Improving  A&Ox4. VSS on RA. Lungs clear. Denies pain. IV saline locked. SBA with walker and GB. Bilateral hearing aides and glasses on. Regular diet tolerated, appetite increasing. VPM in room for safety. Denies suicidal thoughts. Walked down childress once. Daughter at bedside throughout shift. No transfer to [place] today due to no beds open, family not happy. Will continue to monitor.

## 2018-04-27 NOTE — PLAN OF CARE
Problem: Patient Care Overview  Goal: Plan of Care/Patient Progress Review  Outcome: Improving  A&Ox4.  SBA with GB, walker.  Walked halls x1.  Up to chair for most of evening.  [place]. Hearing aids out and glasses off at HS.  VPM in place.  Tolerates regular diet.  VSS on RA.  Missed urine output for output monitoring.  Recheck K 4.2 following AM replacement.  IV GRANT'd.  Psych consult.  Discharge pending, likely to [place].

## 2018-04-27 NOTE — PLAN OF CARE
Problem: Patient Care Overview  Goal: Plan of Care/Patient Progress Review  Outcome: Improving  Patient A/Ox4. Calm and cooperative no abnormal behavior. VSS on room air. Pt. Denies any pain. Up with SBA walker and GB to the bathroom. Bilateral hearing aides and glasses at bedside. Tolerating regular diet. LS clear. VPM in room for safety due to impulsivity getting out of bed. D/C pending pysch recommendation on placement. Possibly considering bed at [place]. Nursing continue to monitor.

## 2018-04-27 NOTE — DISCHARGE SUMMARY
"Discharge Summary    Leonor Mercado MRN# [id number]   YOB: 1926 Age: 89 year old     Date of Admission:  4/24/2018  Date of Discharge:  4/27/2018  Admitting Physician:  Quan Rodriguez MD  Discharge Physician:  Quan Rodriguez MD  Discharging Service:  Hospitalist     Primary Provider: Dannielle Darby          Admission Diagnoses:   Abdominal pain, left lower quadrant [R10.32]  Drug overdose, undetermined intent, initial encounter [T50.904A]  Constipation, unspecified constipation type [K59.00]  Insomnia, unspecified type [G47.00]          Discharge Diagnosis:   Patient Active Problem List   Diagnosis     Hyperlipidemia LDL goal <100     A Fib - chr Coumadin, s/p PPM (H)     Cardiac pacemaker in situ     Advanced directives, counseling/discussion     Senile osteoporosis     Irritable bowel syndrome (IBS)     Fear of flying     Anemia     Eosinophilia     COPD (H)     CAD - 2 stents in [place] in 2000s.      Long-term (current) use of anticoagulants [Z79.01]     Degeneration of lumbar intervertebral disc     CHF - Chr Diastolic (H)     Sick sinus syndrome - s/p PPM 2003 (H)     PMHx 2017: CAD ( 2 stents 2000s) , diastolic CHF, COPD, chr AFib, PPM ( 2003 for sinus sick syndrome), HTN, hyperlipidemia, osteoporosis      SOB (shortness of breath)     CKD (chronic kidney disease) stage 3, GFR 30-59 ml/min     Ingestion of substance             Condition on Discharge:       Discharge vitals: Blood pressure 131/76, pulse 56, temperature 98.5  F (36.9  C), temperature source Oral, resp. rate 16, height 1.6 m (5' 3\"), weight 60.2 kg (132 lb 11.5 oz), SpO2 94 %, not currently breastfeeding.         Gen: Well nourished, elderly female, alert and oriented x 3, no acute distressed  HEENT: Atraumatic, normocephalic  Lungs: Clear to ausculation without wheezes, rhonchi, or rales  Heart: Regular rate and rhythm, no murmurs, gallops, or rubs  GI: Bowel sound normal, no hepatosplenomegaly or " masses  Lymph: No lymphadenopathy or edema  Skin: No rashes    # Discharge Pain Plan:   - Patient currently has NO PAIN and is not being prescribed pain medications on discharge.            Procedures / Labs / Imaging:   Most Recent 3 CBC's:  Recent Labs   Lab Test  04/25/18   0750  04/24/18   0625  04/17/18   1813   WBC  6.7  5.9  7.1   HGB  12.2  11.9  12.5   MCV  89  88  89   PLT  220  196  244      Most Recent 3 BMP's:  Recent Labs   Lab Test  04/27/18   0845  04/26/18   1752  04/26/18   0825  04/25/18   0750  04/24/18   0625   NA   --    --   133  132*  129*   POTASSIUM  4.3  4.2  3.1*  3.5  3.7   CHLORIDE   --    --   96  94  95   CO2   --    --   30  29  26   BUN   --    --   23  17  15   CR   --    --   1.38*  1.36*  1.17*   ANIONGAP   --    --   7  9  8   TRI   --    --   8.9  8.8  8.2*   GLC   --    --   88  88  112*     Most Recent 3 Troponin's:  Recent Labs   Lab Test  04/17/18   1943  04/17/18   1813  01/06/18   1502   03/13/15   2234   TROPI  <0.015  <0.015  <0.015   < >   --    TROPONIN   --    --    --    --   0.01    < > = values in this interval not displayed.     Most Recent 3 INR's:  Recent Labs   Lab Test  04/27/18   0845  04/26/18   0825  04/25/18   0750   INR  2.29*  3.08*  3.73*     Most Recent 2 LFT's:  Recent Labs   Lab Test  04/16/18   1153  11/15/16   0050   AST  29  18   ALT  22  18   ALKPHOS  44  55   BILITOTAL  1.1  0.6     Most Recent Cholesterol Panel:  Recent Labs   Lab Test  12/11/17   0959   CHOL  161   LDL  54   HDL  85   TRIG  109     Most Recent 6 Bacteria Isolates From Any Culture (See EPIC Reports for Culture Details):  Recent Labs   Lab Test  04/16/18   1218  11/17/17   1650  11/15/16   0106  10/22/16   0800  06/10/16   1140  10/14/13   1216   CULT  <10,000 colonies/mL  urogenital dwaine    Susceptibility testing not routinely done  10,000 to 50,000 colonies/mL  mixed urogenital dwaine  Susceptibility testing not routinely done    No growth  10,000 to 50,000 colonies/mL mixed  urogenital dwaine  10,000 to 50,000 colonies/mL Coagulase negative Staphylococcus  10,000 to 50,000 colonies/mL Beta hemolytic Streptococcus group B Beta Hemolytic   Streptococcus groups A and B are susceptible to ampicillin, penicillin,   vancomycin, and the cephalosporins.  Susceptibility testing is not routinely   done on these organisms isolated from urine.  <10,000 colonies/mL mixed urogenital dwaine  *  50,000 to 100,000 colonies/mL Mixed gram positive dwaine Multiple species present, probable perineal contamination.     Most Recent TSH, T4 and HgbA1c:   Recent Labs   Lab Test  04/24/18   0625   04/30/14   1222   TSH  3.01   < >   --    A1C   --    --   6.1*    < > = values in this interval not displayed.     Results for orders placed or performed during the hospital encounter of 04/24/18   US Abdomen Complete    Narrative    ABDOMINAL ULTRASOUND  4/24/2018 3:48 PM     HISTORY: Generalized abdominal pain, worse in the lower quadrants,  worse with eating.     COMPARISON: 4/17/2018    FINDINGS:    Gallbladder: Normal with no cholelithiasis, wall thickening or focal  tenderness.     Bile ducts: CHD is normal diameter. No intrahepatic biliary  dilatation.    Liver: Normal.     Pancreas: Normal    Spleen: Normal.     Right kidney: Simple 1.3 cm cyst noted. Otherwise normal.    Left kidney: Normal.    Aorta and IVC: Normal.       Impression    IMPRESSION: Unremarkable abdominal ultrasound. If there is concern for  mesenteric ischemia, consider Doppler evaluation of the mesenteric  arteries.    RAHUL BARTON MD             Medications Prior to Admission:     Prescriptions Prior to Admission   Medication Sig Dispense Refill Last Dose     Acetaminophen (TYLENOL PO) Take 1,000 mg by mouth nightly as needed    prn at prn     albuterol (PROAIR HFA/PROVENTIL HFA/VENTOLIN HFA) 108 (90 BASE) MCG/ACT Inhaler Inhale 2 puffs into the lungs every 6 hours as needed for shortness of breath / dyspnea or wheezing 1 Inhaler 1 prn at  prn     calcium carbonate (CALCIUM CARBONATE) 600 MG TABS tablet Take 1 tablet by mouth daily    4/23/2018 at am     Cholecalciferol (VITAMIN D) 2000 UNITS tablet Take 2,000 Units by mouth daily   4/23/2018 at am     diltiazem (DILTIAZEM CD) 240 MG 24 hr capsule Take 1 capsule (240 mg) by mouth daily 30 capsule  4/23/2018 at am     Docusate Calcium (STOOL SOFTENER PO) Take 1 tablet by mouth daily as needed    prn at prn     fluticasone-vilanterol (BREO ELLIPTA) 200-25 MCG/INH oral inhaler Inhale 1 puff into the lungs daily   4/23/2018 at am     furosemide (LASIX) 20 MG tablet Take 2 tablets (40 mg) by mouth daily 180 tablet 3 4/23/2018 at am     guaiFENesin (MUCINEX) 600 MG 12 hr tablet Take 1,200 mg by mouth 2 times daily as needed for congestion   prn at prn     ipratropium - albuterol 0.5 mg/2.5 mg/3 mL (DUONEB) 0.5-2.5 (3) MG/3ML neb solution Take 1 vial (3 mLs) by nebulization every 6 hours as needed for shortness of breath / dyspnea or wheezing 360 mL  prn at prn     Lactobacillus (PROBIOTIC ACIDOPHILUS) TABS Take 1 tablet by mouth daily   4/23/2018 at am     losartan (COZAAR) 50 MG tablet Take 0.5 tablets (25 mg) by mouth daily 1 tablet 0 4/23/2018 at am     MELATONIN PO Take 5 mg by mouth nightly as needed    prn at prn     Multiple Vitamins-Minerals (OCUVITE PO) Take 1 capsule by mouth daily.   4/23/2018 at am     nitroglycerin (NITROSTAT) 0.4 MG SL tablet Place 1 tablet (0.4 mg) under the tongue every 5 minutes as needed 25 tablet 1 prn at prn     polyethylene glycol (MIRALAX/GLYCOLAX) packet Take 1 packet by mouth daily (with breakfast)    4/23/2018 at am     pravastatin (PRAVACHOL) 40 MG tablet Take 1 tablet (40 mg) by mouth daily 90 tablet 0 4/23/2018 at pm     warfarin (COUMADIN) 2 MG tablet TAKE ONE TABLET BY MOUTH ONCE DAILY OR  AS  DIRECTED  BY  INR  CLINIC 90 tablet 0 4/23/2018 at pm             Discharge Medications:     Current Discharge Medication List      START taking these medications     Details   mirtazapine (REMERON) 15 MG tablet Take 1 tablet (15 mg) by mouth At Bedtime  Qty: 30 tablet, Refills: 0    Associated Diagnoses: Adjustment disorder, unspecified type      simethicone (MYLICON) 40 MG/0.6ML suspension Take 0.6 mLs (40 mg) by mouth every 6 hours as needed for cramping  Qty: 500 mL, Refills: 0    Associated Diagnoses: Abdominal pain, left lower quadrant         CONTINUE these medications which have NOT CHANGED    Details   Acetaminophen (TYLENOL PO) Take 1,000 mg by mouth nightly as needed       albuterol (PROAIR HFA/PROVENTIL HFA/VENTOLIN HFA) 108 (90 BASE) MCG/ACT Inhaler Inhale 2 puffs into the lungs every 6 hours as needed for shortness of breath / dyspnea or wheezing  Qty: 1 Inhaler, Refills: 1    Associated Diagnoses: Chronic obstructive pulmonary disease, unspecified COPD type (H)      calcium carbonate (CALCIUM CARBONATE) 600 MG TABS tablet Take 1 tablet by mouth daily       Cholecalciferol (VITAMIN D) 2000 UNITS tablet Take 2,000 Units by mouth daily      diltiazem (DILTIAZEM CD) 240 MG 24 hr capsule Take 1 capsule (240 mg) by mouth daily  Qty: 30 capsule    Associated Diagnoses: Chronic atrial fibrillation (H)      Docusate Calcium (STOOL SOFTENER PO) Take 1 tablet by mouth daily as needed       fluticasone-vilanterol (BREO ELLIPTA) 200-25 MCG/INH oral inhaler Inhale 1 puff into the lungs daily    Associated Diagnoses: Anxiety      furosemide (LASIX) 20 MG tablet Take 2 tablets (40 mg) by mouth daily  Qty: 180 tablet, Refills: 3    Associated Diagnoses: Chronic diastolic congestive heart failure (H)      guaiFENesin (MUCINEX) 600 MG 12 hr tablet Take 1,200 mg by mouth 2 times daily as needed for congestion      ipratropium - albuterol 0.5 mg/2.5 mg/3 mL (DUONEB) 0.5-2.5 (3) MG/3ML neb solution Take 1 vial (3 mLs) by nebulization every 6 hours as needed for shortness of breath / dyspnea or wheezing  Qty: 360 mL    Associated Diagnoses: Chronic obstructive pulmonary disease,  unspecified COPD type (H)      Lactobacillus (PROBIOTIC ACIDOPHILUS) TABS Take 1 tablet by mouth daily      losartan (COZAAR) 50 MG tablet Take 0.5 tablets (25 mg) by mouth daily  Qty: 1 tablet, Refills: 0    Associated Diagnoses: Essential hypertension with goal blood pressure less than 140/90      MELATONIN PO Take 5 mg by mouth nightly as needed       Multiple Vitamins-Minerals (OCUVITE PO) Take 1 capsule by mouth daily.      nitroglycerin (NITROSTAT) 0.4 MG SL tablet Place 1 tablet (0.4 mg) under the tongue every 5 minutes as needed  Qty: 25 tablet, Refills: 1    Associated Diagnoses: Other chest pain      polyethylene glycol (MIRALAX/GLYCOLAX) packet Take 1 packet by mouth daily (with breakfast)       pravastatin (PRAVACHOL) 40 MG tablet Take 1 tablet (40 mg) by mouth daily  Qty: 90 tablet, Refills: 0    Associated Diagnoses: Hyperlipidemia LDL goal <100; Coronary artery disease involving native coronary artery of native heart without angina pectoris      warfarin (COUMADIN) 2 MG tablet TAKE ONE TABLET BY MOUTH ONCE DAILY OR  AS  DIRECTED  BY  INR  CLINIC  Qty: 90 tablet, Refills: 0    Associated Diagnoses: Long term current use of anticoagulant therapy                   Brief History of Illness:   Leonor Mercado is a 89 year old female who was admitted for intentional overdose.          Hospital Course:   Patient presented to the emergency room after intentionally taking approximately 25 tablets of 10 mg Paxil.  Poison control was notified and recommended observation given patient's drowsiness.  This resolved overnight and patient was awake and alert in the morning.  Psychiatry was consulted and recommended starting the patient on Remeron at bedtime.  Recommendation was made for hospitalization at a geriatric psychiatry facility.  During hospitalization patient complained of abdominal pain.  This is been a long-standing issue for the patient and has been evaluated extensively as an outpatient.  Recent  imaging including abdominal 2 view x-ray and CT of the abdomen and pelvis were unrevealing as to etiology of patient's abdominal pain.  Abdominal ultrasound was performed inpatient and was unremarkable.  Laboratory evaluation including liver function tests amylase lipase and urinalysis were unremarkable.  Patient was prescribed simethicone as needed at discharge due to abdominal bloating.  Recommend follow-up with primary physician.    Greater than 35 minutes were spent in discharge planning.             Pending Results:   Unresulted Labs Ordered in the Past 30 Days of this Admission     No orders found from 2/23/2018 to 4/25/2018.

## 2018-04-27 NOTE — PROGRESS NOTES
SW:  Per Charge nurse, [facility] has beds however due to the current acuity of patient's in their unit they are not accepting for today.  Writer contacted [facility], which is similar to 's. however the unit is at capacity today.  Writer ruled out other options with Dr Jameson as they are not in patient's best interest; these are the [facility]behavior[facility] units or [facility].  Writer explained to patient and daughter Cleo.  They are displeased with this information.  Writer explained I will be calling both units in the morning to check on availability however for now patient will stay here and be seen by psychiatry daily.  Daughter is hesitant to consider the  [facility] program.    Dr Jameson and Dr Rodriguez notified along with bedside nurse.

## 2018-04-27 NOTE — PROGRESS NOTES
[facility]    Hospitalist Progress Note    Date of Service (when I saw the patient): 04/27/2018    Assessment & Plan   Leonor Mercado is a 89 year old female who was admitted on 4/24/2018 following intentional Paxil overdose.    1. Paxil overdose.  Patient is medically stable for discharge, awaiting Psychiatry consult for possible discharge to [facility] unit.    2. Depression.  Started on Remeron.    3. Abdominal pain.  Long standing with extensive negative evaluation.  Abdominal US negative.  Likely somatization.   Follow up as outpatient.  Simethicone for gas and bloating.    4. Hyponatremia.  Resolved.    5. Afib/CAD/chronic diastolic heart failure. Continue coumadin per pharmacy, diltiazem, furosemide, and losartan.    6. COPD/pulmonary fibrosis. Continue Breo Ellipta and Duonebs.    7. Hypokalemia.  Start K replacement protocol.    # Pain Assessment:  Current Pain Score 4/27/2018   Patient currently in pain? denies   Pain score (0-10) -   Pain location -   Pain descriptors -   Leonor watson pain level was assessed and she currently denies pain.        DVT Prophylaxis: Pneumatic Compression Devices  Code Status: Prior    Disposition: Expected discharge in 1-2 days.    Quan Rodriguez  Text Page (7 am to 6 pm)    Interval History   The patient sitting comfortably in a chair.  She has no acute complaints.    -Data reviewed today: I reviewed all new labs and imaging results over the last 24 hours. I personally reviewed no images or EKG's today.    Physical Exam   Temp: 98.5  F (36.9  C) Temp src: Oral BP: 131/76   Heart Rate: 61 Resp: 16 SpO2: 94 % O2 Device: None (Room air)    Vitals:    04/24/18 0624 04/24/18 1443   Weight: 59.4 kg (131 lb) 60.2 kg (132 lb 11.5 oz)     Vital Signs with Ranges  Temp:  [97.8  F (36.6  C)-98.5  F (36.9  C)] 98.5  F (36.9  C)  Heart Rate:  [61-85] 61  Resp:  [16-18] 16  BP: (118-131)/(65-76) 131/76  SpO2:  [90 %-95 %] 94 %  I/O last 3 completed shifts:  In: 560  [P.O.:560]  Out: 50 [Urine:50]    Gen: Thin elderly female, alert and oriented x 3, no acute distressed  HEENT: Atraumatic, normocephalic  Lungs: Clear to ausculation without wheezes, rhonchi, or rales  Heart: Regular rate and rhythm, no murmurs, gallops, or rubs  GI: Bowel sound normal, no hepatosplenomegaly or masses  Lymph: No lymphadenopathy or edema  Skin: No rashes     Medications     Warfarin Therapy Reminder         diltiazem  240 mg Oral Daily     fluticasone-vilanterol  1 puff Inhalation Daily     furosemide  40 mg Oral Daily     losartan  25 mg Oral Daily     mirtazapine  15 mg Oral At Bedtime     polyethylene glycol  17 g Oral Daily with breakfast     sodium chloride (PF)  3 mL Intracatheter Q8H     warfarin  2 mg Oral ONCE at 18:00       Data     Recent Labs  Lab 04/27/18  0845 04/26/18  1752 04/26/18  0825 04/25/18  0750 04/24/18  0625   WBC  --   --   --  6.7 5.9   HGB  --   --   --  12.2 11.9   MCV  --   --   --  89 88   PLT  --   --   --  220 196   INR 2.29*  --  3.08* 3.73* 3.83*   NA  --   --  133 132* 129*   POTASSIUM 4.3 4.2 3.1* 3.5 3.7   CHLORIDE  --   --  96 94 95   CO2  --   --  30 29 26   BUN  --   --  23 17 15   CR  --   --  1.38* 1.36* 1.17*   ANIONGAP  --   --  7 9 8   TRI  --   --  8.9 8.8 8.2*   GLC  --   --  88 88 112*   LIPASE  --   --   --   --  90       No results found for this or any previous visit (from the past 24 hour(s)).

## 2018-04-28 ENCOUNTER — APPOINTMENT (OUTPATIENT)
Dept: GENERAL RADIOLOGY | Facility: CLINIC | Age: 83
End: 2018-04-28
Attending: INTERNAL MEDICINE
Payer: MEDICARE

## 2018-04-28 LAB — INR PPP: 2.17 (ref 0.86–1.14)

## 2018-04-28 PROCEDURE — 25000132 ZZH RX MED GY IP 250 OP 250 PS 637: Mod: GY | Performed by: NURSE PRACTITIONER

## 2018-04-28 PROCEDURE — A9270 NON-COVERED ITEM OR SERVICE: HCPCS | Mod: GY | Performed by: PSYCHIATRY & NEUROLOGY

## 2018-04-28 PROCEDURE — G0378 HOSPITAL OBSERVATION PER HR: HCPCS

## 2018-04-28 PROCEDURE — 99231 SBSQ HOSP IP/OBS SF/LOW 25: CPT | Performed by: PSYCHIATRY & NEUROLOGY

## 2018-04-28 PROCEDURE — 74019 RADEX ABDOMEN 2 VIEWS: CPT

## 2018-04-28 PROCEDURE — 85610 PROTHROMBIN TIME: CPT | Performed by: NURSE PRACTITIONER

## 2018-04-28 PROCEDURE — 99207 ZZC CDG-CODE CATEGORY CHANGED: CPT | Performed by: INTERNAL MEDICINE

## 2018-04-28 PROCEDURE — 99225 ZZC SUBSEQUENT OBSERVATION CARE,LEVEL II: CPT | Performed by: INTERNAL MEDICINE

## 2018-04-28 PROCEDURE — A9270 NON-COVERED ITEM OR SERVICE: HCPCS | Mod: GY | Performed by: INTERNAL MEDICINE

## 2018-04-28 PROCEDURE — 25000132 ZZH RX MED GY IP 250 OP 250 PS 637: Mod: GY | Performed by: PSYCHIATRY & NEUROLOGY

## 2018-04-28 PROCEDURE — A9270 NON-COVERED ITEM OR SERVICE: HCPCS | Mod: GY | Performed by: NURSE PRACTITIONER

## 2018-04-28 PROCEDURE — 25000132 ZZH RX MED GY IP 250 OP 250 PS 637: Mod: GY | Performed by: INTERNAL MEDICINE

## 2018-04-28 PROCEDURE — 36415 COLL VENOUS BLD VENIPUNCTURE: CPT | Performed by: NURSE PRACTITIONER

## 2018-04-28 PROCEDURE — 99207 ZZC MOONLIGHTING INDICATOR: CPT | Performed by: INTERNAL MEDICINE

## 2018-04-28 RX ORDER — WARFARIN SODIUM 2 MG/1
2 TABLET ORAL
Status: COMPLETED | OUTPATIENT
Start: 2018-04-28 | End: 2018-04-28

## 2018-04-28 RX ADMIN — FUROSEMIDE 40 MG: 40 TABLET ORAL at 10:28

## 2018-04-28 RX ADMIN — MIRTAZAPINE 15 MG: 15 TABLET, FILM COATED ORAL at 21:46

## 2018-04-28 RX ADMIN — POLYETHYLENE GLYCOL 3350 17 G: 17 POWDER, FOR SOLUTION ORAL at 10:28

## 2018-04-28 RX ADMIN — WARFARIN SODIUM 2 MG: 2 TABLET ORAL at 17:58

## 2018-04-28 RX ADMIN — LOSARTAN POTASSIUM 25 MG: 25 TABLET ORAL at 10:28

## 2018-04-28 RX ADMIN — FLUTICASONE FUROATE AND VILANTEROL TRIFENATATE 1 PUFF: 200; 25 POWDER RESPIRATORY (INHALATION) at 09:50

## 2018-04-28 RX ADMIN — DILTIAZEM HYDROCHLORIDE 240 MG: 240 CAPSULE, EXTENDED RELEASE ORAL at 10:28

## 2018-04-28 NOTE — CONSULTS
[facility] Psychiatric Consult Follow-up Note      TIME SPENT IN PSYCHIATRY CONSULT: 15 MINUTES.     Interim History     The patient's care was discussed, patient seen and chart notes were reviewed. Pt examined on 66. Tolerating medications without side effects. Side effects, risks, and benefits of medications reviewed with patient. She reports she did sleep well last night. She took awhile to fall asleep but slpet 9 hours through the night and did not wake up. Pt no longer suicidal. No Senior beds available in [facility] future [facility] help adjust to multiple losses. Pt complain of constipation, she is getting miralax daily.     Medications     Current Facility-Administered Medications Ordered in Epic   Medication Dose Route Frequency Last Rate Last Dose     acetaminophen (TYLENOL) Suppository 650 mg  650 mg Rectal Q4H PRN         acetaminophen (TYLENOL) tablet 650 mg  650 mg Oral Q4H PRN         diltiazem 24 hr capsule 240 mg  240 mg Oral Daily   240 mg at 04/28/18 1028     fluticasone-vilanterol (BREO ELLIPTA) 200-25 MCG/INH oral inhaler 1 puff  1 puff Inhalation Daily   1 puff at 04/28/18 0950     furosemide (LASIX) tablet 40 mg  40 mg Oral Daily   40 mg at 04/28/18 1028     ipratropium - albuterol 0.5 mg/2.5 mg/3 mL (DUONEB) neb solution 3 mL  1 vial Nebulization Q6H PRN   3 mL at 04/25/18 1745     losartan (COZAAR) tablet 25 mg  25 mg Oral Daily   25 mg at 04/28/18 1028     magnesium sulfate 4 g in 100 mL sterile water (premade)  4 g Intravenous Q4H PRN         melatonin tablet 5 mg  5 mg Oral At Bedtime PRN         mirtazapine (REMERON) tablet 15 mg  15 mg Oral At Bedtime   15 mg at 04/27/18 2102     naloxone (NARCAN) injection 0.1-0.4 mg  0.1-0.4 mg Intravenous Q2 Min PRN         ondansetron (ZOFRAN-ODT) ODT tab 4 mg  4 mg Oral Q6H PRN   4 mg at 04/25/18 1307    Or     ondansetron (ZOFRAN) injection 4 mg  4 mg Intravenous Q6H PRN         polyethylene  glycol (MIRALAX/GLYCOLAX) Packet 17 g  17 g Oral Daily with breakfast   17 g at 04/28/18 1028     potassium chloride (KLOR-CON) Packet 20-40 mEq  20-40 mEq Oral or Feeding Tube Q2H PRN         potassium chloride 10 mEq in 100 mL intermittent infusion with 10 mg lidocaine  10 mEq Intravenous Q1H PRN         potassium chloride 10 mEq in 100 mL sterile water intermittent infusion (premix)  10 mEq Intravenous Q1H PRN         potassium chloride 20 mEq in 50 mL intermittent infusion  20 mEq Intravenous Q1H PRN         potassium chloride SA (K-DUR/KLOR-CON M) CR tablet 20-40 mEq  20-40 mEq Oral Q2H PRN   20 mEq at 04/26/18 1337     simethicone (MYLICON) suspension 40 mg  40 mg Oral Q6H PRN         sodium chloride (PF) 0.9% PF flush 3 mL  3 mL Intracatheter Q1H PRN         sodium chloride (PF) 0.9% PF flush 3 mL  3 mL Intracatheter Q8H   3 mL at 04/28/18 0952     warfarin (COUMADIN) tablet 2 mg  2 mg Oral ONCE at 18:00         Warfarin Therapy Reminder (Check START DATE - warfarin may be starting in the FUTURE)  1 each Does not apply Continuous PRN         Current Outpatient Prescriptions Ordered in Epic   Medication     mirtazapine (REMERON) 15 MG tablet     simethicone (MYLICON) 40 MG/0.6ML suspension         Allergies      Allergies   Allergen Reactions     Peanuts [Nuts] Shortness Of Breath     Amiodarone      pulm fibrosis       Baclofen      Confusion      Bactrim [Sulfamethoxazole W-Trimethoprim]      Difficulty swallowing     Doxycycline Other (See Comments)     Multiple complaints; she does not want this again.      Levaquin [Levofloxacin] Other (See Comments)     Multiple complaints; she will not take this again     Linzess [Linaclotide] Diarrhea     Lorazepam        Prolonged drowsiness with ER visit      Liquid Adhesive Itching and Rash     Bleeding when tape removed after pacemaker placement..itched and burned for weeks.        Medical Review of Systems     /57 (BP Location: Left arm)  Pulse 56  Temp  "98.7  F (37.1  C)  Resp 16  Ht 1.6 m (5' 3\")  Wt 60.2 kg (132 lb 11.5 oz)  LMP  (LMP Unknown)  SpO2 96%  BMI 23.51 kg/m2  Body mass index is 23.51 kg/(m^2).  A 10-point review of systems was performed by Parker Jameson MD and is negative, no new findings.      Psychiatric Examination     Appearance Sitting in chair, dressed in gown. Appears stated age.   Attitude Cooperative   Orientation Oriented to person, place, time   Eye Contact Better   Speech Regular rate, rhythm, volume and tone   Language Normal   Psychomotor Behavior Normal   Mood Slightly less depressed   Affect Flat   Thought Process Goal Oriented, Intact   Associations Intact   Thought Content Patient is currently negative for suicidal ideation, negative for plan or intent, able to contract no self harm and identify barriers to suicide.  Negative for obsessions, compulsions or psychosis.      Fund of Knowledge Average   Insight Improving   Judgement Improving   Attention Span & Concentration Fair   Recent & Remote Memory Fair   Gait Unable to assess   Muscle Tone Intact on visual inspection       Labs     Labs reviewed.  Recent Results (from the past 24 hour(s))   INR    Collection Time: 04/28/18  7:40 AM   Result Value Ref Range    INR 2.17 (H) 0.86 - 1.14        Impression     This is a 89 year old female with a history of depression and anxiety who presented after an intentional overdose on Paxil in an effort to obtain sleep after several days of insomnia. Remeron is helping with sleep and she is less depressed.  She still has all the same circumstances that she has had a hard time adjusting to. ( in [place], no longer able to go to [place] every winte like she had done for 25 years etc.Pt would benefit in referral to [facility] to get support helping her adjust to losses. Pt able to contract no self-harm  Reviewed the side effects, benefits, and complications of medication. .       Diagnoses     1. 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   2. Panic disorder without agoraphobia.  3. Paroxetine overdose.     Plan     1. Explained Side effects, benefits and complications of medications to the patient.   2. Medication changes: Continue Remeron to 15mg qhs.  3. Discussed treatment plan with patient and team.  4. Pt discharged to out patient Senior program.  5.       TIME SPENT IN PSYCHIATRY CONSULT: 15 MINUTES.    Attestation:   Patient has been seen and evaluated by me, Parker Jameson MD.    Patient ID:  Name: Leonor Mercado MRN: [id number]  Admission: 4/24/2018   YOB: 1926

## 2018-04-28 NOTE — PROGRESS NOTES
[facility]    Hospitalist Progress Note    Date of Service (when I saw the patient): 04/28/2018    Assessment & Plan   Leonor Mercado is a 89 year old female who was admitted on 4/24/2018 following intentional Paxil overdose.    1. Paxil overdose.  Patient is medically stable for discharge, awaiting Psychiatry consult for possible discharge to nusrat psych unit.    2. Depression.  Continue  Remeron.    3. Abdominal pain.  Long standing with extensive negative evaluation.  Abdominal US negative.  Likely somatization.   Follow up as outpatient.  Simethicone for gas and bloating.    4. Hyponatremia.  Resolved.    5. Afib/CAD/chronic diastolic heart failure. Continue coumadin per pharmacy, diltiazem, furosemide, and losartan.    6. COPD/pulmonary fibrosis. Continue Breo Ellipta and Duonebs.    7. Hypokalemia.  Start K replacement protocol.     8. CONSTIPATION  abd soft and non tender. Pt says she is not eating much too. Continue bowel regimen.  -will get xray abd--if stool present, will try mag citrate       DVT Prophylaxis: Pneumatic Compression Devices  Code Status: Prior    Disposition: Expected discharge in 1-2 days.    Justen Paiz  Text Page (7 am to 6 pm)    Interval History   Laying in bed.  Has no BM 2 weeks--also not eating well    -Data reviewed today: I reviewed all new labs and imaging results over the last 24 hours. I personally reviewed no images or EKG's today.    Physical Exam   Temp: 98.7  F (37.1  C) Temp src: Oral BP: 119/57   Heart Rate: 53 Resp: 16 SpO2: 96 % O2 Device: None (Room air)    Vitals:    04/24/18 0624 04/24/18 1443   Weight: 59.4 kg (131 lb) 60.2 kg (132 lb 11.5 oz)     Vital Signs with Ranges  Temp:  [97.3  F (36.3  C)-98.7  F (37.1  C)] 98.7  F (37.1  C)  Heart Rate:  [53-75] 53  Resp:  [16] 16  BP: (109-140)/(57-67) 119/57  SpO2:  [95 %-97 %] 96 %  I/O last 3 completed shifts:  In: 800 [P.O.:800]  Out: 10 [Urine:10]    Gen: Thin elderly female, alert and oriented x  3, no acute distressed  HEENT: Atraumatic, normocephalic  Lungs: Clear to ausculation without wheezes, rhonchi, or rales  Heart: Regular rate and rhythm, no murmurs, gallops, or rubs  GI: Bowel sound normal, no hepatosplenomegaly or masses  Lymph: No lymphadenopathy or edema  Skin: No rashes     Medications     Warfarin Therapy Reminder         diltiazem  240 mg Oral Daily     fluticasone-vilanterol  1 puff Inhalation Daily     furosemide  40 mg Oral Daily     losartan  25 mg Oral Daily     mirtazapine  15 mg Oral At Bedtime     polyethylene glycol  17 g Oral Daily with breakfast     sodium chloride (PF)  3 mL Intracatheter Q8H     warfarin  2 mg Oral ONCE at 18:00       Data     Recent Labs  Lab 04/28/18  0740 04/27/18  0845 04/26/18  1752 04/26/18  0825 04/25/18  0750 04/24/18  0625   WBC  --   --   --   --  6.7 5.9   HGB  --   --   --   --  12.2 11.9   MCV  --   --   --   --  89 88   PLT  --   --   --   --  220 196   INR 2.17* 2.29*  --  3.08* 3.73* 3.83*   NA  --   --   --  133 132* 129*   POTASSIUM  --  4.3 4.2 3.1* 3.5 3.7   CHLORIDE  --   --   --  96 94 95   CO2  --   --   --  30 29 26   BUN  --   --   --  23 17 15   CR  --   --   --  1.38* 1.36* 1.17*   ANIONGAP  --   --   --  7 9 8   TRI  --   --   --  8.9 8.8 8.2*   GLC  --   --   --  88 88 112*   LIPASE  --   --   --   --   --  90       No results found for this or any previous visit (from the past 24 hour(s)).

## 2018-04-28 NOTE — PLAN OF CARE
Problem: Patient Care Overview  Goal: Plan of Care/Patient Progress Review  Outcome: No Change  A&O x4 but impulsive @ times jumping out of bed when needing to void.  Up w/assist of 1 +gb/walker.  VSS on room air.  Denies pain.  Regular diet.  VPM in the room for safety.  PIV saline locked.  Nursing will continue to monitor.

## 2018-04-28 NOTE — PROGRESS NOTES
SW:  D:  Writer contacted  [facility] at [phone] regarding status of the In-patient abdi-psychiatry program.  The unit continues to be closed for admissions due to the acuity of current patients.  Writer contacted [facility] Intake line at [phone] and their unit is at capacity with no vacancies thru the w/e.  The unit does have possible vacancy on Monday based on planned discharges.  The [facility] unit will not assess patient until they have a vacancy.    On Friday, bedside nurse worked with patient as assessed her as being independent with walking while use her walker and patient was able to provide for her personal cares.    A: Depending upon patient progress here and lack of in-patient psychiatry, discharge back to patient's senior apartment with out patient abdi psychiatry support may become the plan.        P: Daily psychiatry consults to assess for medication efficacy and ongoing assessment of depression and to assess when patient becomes stable for d/c back to her senior apartment.  ([facility] with Assisted Living services available)

## 2018-04-28 NOTE — PROGRESS NOTES
Sx of numbness all over body on and off--for a long lime when she is anxious/ stressed. Discharge plan was discussed with her and daughter--immediately pt started having sx of numbness in legs. Quickly resolved. Able to walk with nurse. Feel pt is unsafe to discharge to senior living and needs geripsych. Will discuss with psych again today.

## 2018-04-28 NOTE — PROGRESS NOTES
SW:  I:  : Prior to patient's episode of numbness in her legs, writer spoke with daughter Cleo over the phone explaining psychiatry is satisfied with patient's progress and recommends d/c back to her assisted living facility and attend out patient [facility].    Cleo was strongly opposed to an out patient plan and did not feel her mother was mentally or physically ready to return to living alone in her [place] apartment.  Shortly later, her other daughter Les came and we have a lengthy conversation to address the daughter's concerns and patient's concern. Daughter expressed the opinion that patient's multiple physical health complaints are related to her mental health.  Patient reports she is unhappy.  Daughter shared patient has said she wants to move back to her Condo.  Daughter would like patient to be more physically strong and mentally better before she makes the decision.Daughter told patient that she doesn't think moving back to her condo will make her happy. She believes there is a larger component of depression that won't be fixed by moving back to the condo.    Daughter is not comfortable with patient returning home alone and wondering if she will take too many pills again.      Following d/c options reviewed; going to TCU and paying privately for the daily room and board charges. going and staying with one of her daughter's temporarily or going back to her snf apartment with a referral to [facility] for RN, PT and OT.  Explained [facility] does have behavioral health home RN's.  Per [facility], they also work with [facility] which can provide home mental health counseling to home bound patients.   Patient declined TCU due to the cost.  She states she doesn't believe in staying with her children for any period of time.  She stated she had no choice but to go back to her apartment.  Writer corrected this by saying patient has choices and she is choosing the home option as it will not  "have an additional expenses.  Writer asked patient what her safety plan is if she becomes extremely despondent and wants to take an overdose again, she responded \"I won't do that again\", however could not name a plan such as calling one of her daughter's for help.    Writer suggested consideration of the [facility] nurse managing her medication so she would not have large quanities available should she become despondent again.    Patient was willing to accept this service.    Writer explained about the [facility] Out Patient program which meets 2 times a week for 5 hours and provides both individual and group therapy.  Patient was in favor of the home care plan and then transitioning to the [facility] when there is a vacancy.  \"I'll do what ever I need to do\".    Thus d/c plan appeared to be  home with home care, behavioral nurse, PT and OT.  Possible referral to home counseling or attending the [facility] Out Patient Mental Health Program.   On Monday daughter would talk with the [facility] nurse regarding medication management and talk with the [facility] program.    Daughter Les appeared accepting of the plan.  As plan was reviewed again, patient stated \"I am having one of those spells again, my legs are numb\".  Writer left room to find nurse.  Later daughter came out crying, saying her mother started crying after writer left and once again blamed her for patient's move to the [facility].  Daughter said they have been to the ED multiple times in the last six months with patient complaining of health problems that have no physical etiology.  Only once did a provider say the numbness may be due to a drop in blood pressure.  Daughter feels strongly that her mother's medical complaints are related to her mental health.  Daughter said after  her mother have this outburst she continues to not be comfortable with d/c home, further she does not feel her mother is mentally stable to d/c home alone.   In speaking with Dr Paiz, she does not plan to " discharge patient.  Writer did place a call to Dr Jameson to speak with Dr Paiz.    Updated daughter that patient will not discharge today.  Les has left and updated dtr. Cleo.  Cleo will be the  from here on in.      A: Patient will benefit from mental health support.  Daughters strongly prefer a transfer in in-patient mental health before discharging back to her apartment.  Currently no vacancies and Dr Jameson feels patient is stable for out patient support.  Prior to d/c, a PT evaluation will be recommended as both patient's and daughter's are concerned with her weakness.    P:  Will follow tomorrow.

## 2018-04-28 NOTE — PLAN OF CARE
Problem: Patient Care Overview  Goal: Plan of Care/Patient Progress Review  Outcome: Improving  Pt A&Ox4. VSS. On RA. Cooperative, pleasant, with some anxiety at times. Pt aware of reasons that caused hospitalization. Good appetite and oral intake. 1PA with walker and belt. Pt ambulated in unit x2 this evening. Some c/o constipation. Up in chair most of evening. Calls as needed. Bed alarm on for safety.

## 2018-04-29 ENCOUNTER — APPOINTMENT (OUTPATIENT)
Dept: PHYSICAL THERAPY | Facility: CLINIC | Age: 83
End: 2018-04-29
Attending: INTERNAL MEDICINE
Payer: MEDICARE

## 2018-04-29 VITALS
BODY MASS INDEX: 23.52 KG/M2 | WEIGHT: 132.72 LBS | TEMPERATURE: 97.7 F | RESPIRATION RATE: 16 BRPM | HEART RATE: 56 BPM | OXYGEN SATURATION: 98 % | DIASTOLIC BLOOD PRESSURE: 62 MMHG | HEIGHT: 63 IN | SYSTOLIC BLOOD PRESSURE: 110 MMHG

## 2018-04-29 LAB — INR PPP: 2.14 (ref 0.86–1.14)

## 2018-04-29 PROCEDURE — 99207 ZZC CDG-CODE CATEGORY CHANGED: CPT | Performed by: INTERNAL MEDICINE

## 2018-04-29 PROCEDURE — 40000894 ZZH STATISTIC OT IP EVAL DEFER: Performed by: OCCUPATIONAL THERAPIST

## 2018-04-29 PROCEDURE — A9270 NON-COVERED ITEM OR SERVICE: HCPCS | Mod: GY | Performed by: NURSE PRACTITIONER

## 2018-04-29 PROCEDURE — 99217 ZZC OBSERVATION CARE DISCHARGE: CPT | Performed by: INTERNAL MEDICINE

## 2018-04-29 PROCEDURE — 36415 COLL VENOUS BLD VENIPUNCTURE: CPT | Performed by: NURSE PRACTITIONER

## 2018-04-29 PROCEDURE — 97161 PT EVAL LOW COMPLEX 20 MIN: CPT | Mod: GP | Performed by: PHYSICAL THERAPIST

## 2018-04-29 PROCEDURE — 40000193 ZZH STATISTIC PT WARD VISIT: Performed by: PHYSICAL THERAPIST

## 2018-04-29 PROCEDURE — 99232 SBSQ HOSP IP/OBS MODERATE 35: CPT | Performed by: PSYCHIATRY & NEUROLOGY

## 2018-04-29 PROCEDURE — G0378 HOSPITAL OBSERVATION PER HR: HCPCS

## 2018-04-29 PROCEDURE — 97110 THERAPEUTIC EXERCISES: CPT | Mod: GP | Performed by: PHYSICAL THERAPIST

## 2018-04-29 PROCEDURE — 85610 PROTHROMBIN TIME: CPT | Performed by: NURSE PRACTITIONER

## 2018-04-29 PROCEDURE — 25000132 ZZH RX MED GY IP 250 OP 250 PS 637: Mod: GY | Performed by: NURSE PRACTITIONER

## 2018-04-29 RX ORDER — WARFARIN SODIUM 2 MG/1
2 TABLET ORAL
Status: DISCONTINUED | OUTPATIENT
Start: 2018-04-29 | End: 2018-04-29 | Stop reason: HOSPADM

## 2018-04-29 RX ADMIN — LOSARTAN POTASSIUM 25 MG: 25 TABLET ORAL at 08:17

## 2018-04-29 RX ADMIN — DILTIAZEM HYDROCHLORIDE 240 MG: 240 CAPSULE, EXTENDED RELEASE ORAL at 08:17

## 2018-04-29 RX ADMIN — POLYETHYLENE GLYCOL 3350 17 G: 17 POWDER, FOR SOLUTION ORAL at 08:17

## 2018-04-29 RX ADMIN — FLUTICASONE FUROATE AND VILANTEROL TRIFENATATE 1 PUFF: 200; 25 POWDER RESPIRATORY (INHALATION) at 08:17

## 2018-04-29 RX ADMIN — FUROSEMIDE 40 MG: 40 TABLET ORAL at 08:17

## 2018-04-29 NOTE — CONSULTS
[facility] Psychiatric Consult Follow-up Note      TIME SPENT IN PSYCHIATRY CONSULT: 25 MINUTES.     Interim History     The patient's care was discussed, patient seen and chart notes were reviewed. Pt examined on 66. Tolerating medications without side effects. Side effects, risks, and benefits of medications reviewed with patient. She reports she did sleep well last night. She took awhile to fall asleep but slpet 9 hours through the night and did not wake up. Pt continues to complain of constipation which her daughter confirms is a chronic complaint. Pt was seen by and examined abdomen by Dr. Newell and unremarkable exam. Pt was oferred Magnesium Citrate that was available as a prn but she did not take it. Pt lv suicidal ideation and contracted no self-harm. Pt only overdosed on non-lethal medication Paxil and did not overdose on the multiple other medications which could have been lethal. Pt's daughter Cleo was contacted and reviewed her medications and treatment plan. Cleo confirmed pt has been self defeating and self-sabotaging for some time. But she was not always like that and was in fact the person who was always in control.  Her  was much more accepting of his being sent to nursing home. Pointed out that since pt was always the one that was in control, and she now was not, that this been a bif loss of independence and could easily by a precipitant of her frustration. When pt was told she could benefit from the 55 and above program she complained she had no way to get there. Her daughter Cleo indicated that she would make sure she got there.     Current Facility-Administered Medications Ordered in Epic   Medication Dose Route Frequency Last Rate Last Dose     acetaminophen (TYLENOL) Suppository 650 mg  650 mg Rectal Q4H PRN         acetaminophen (TYLENOL) tablet 650 mg  650 mg Oral Q4H PRN         diltiazem 24 hr capsule 240 mg  240 mg Oral Daily   240 mg at 04/29/18 0817      fluticasone-vilanterol (BREO ELLIPTA) 200-25 MCG/INH oral inhaler 1 puff  1 puff Inhalation Daily   1 puff at 04/29/18 0817     furosemide (LASIX) tablet 40 mg  40 mg Oral Daily   40 mg at 04/29/18 0817     ipratropium - albuterol 0.5 mg/2.5 mg/3 mL (DUONEB) neb solution 3 mL  1 vial Nebulization Q6H PRN   3 mL at 04/25/18 1745     losartan (COZAAR) tablet 25 mg  25 mg Oral Daily   25 mg at 04/29/18 0817     magnesium sulfate 4 g in 100 mL sterile water (premade)  4 g Intravenous Q4H PRN         melatonin tablet 5 mg  5 mg Oral At Bedtime PRN         mirtazapine (REMERON) tablet 15 mg  15 mg Oral At Bedtime   15 mg at 04/28/18 2146     naloxone (NARCAN) injection 0.1-0.4 mg  0.1-0.4 mg Intravenous Q2 Min PRN         ondansetron (ZOFRAN-ODT) ODT tab 4 mg  4 mg Oral Q6H PRN   4 mg at 04/25/18 1307    Or     ondansetron (ZOFRAN) injection 4 mg  4 mg Intravenous Q6H PRN         polyethylene glycol (MIRALAX/GLYCOLAX) Packet 17 g  17 g Oral Daily with breakfast   17 g at 04/29/18 0817     potassium chloride (KLOR-CON) Packet 20-40 mEq  20-40 mEq Oral or Feeding Tube Q2H PRN         potassium chloride 10 mEq in 100 mL intermittent infusion with 10 mg lidocaine  10 mEq Intravenous Q1H PRN         potassium chloride 10 mEq in 100 mL sterile water intermittent infusion (premix)  10 mEq Intravenous Q1H PRN         potassium chloride 20 mEq in 50 mL intermittent infusion  20 mEq Intravenous Q1H PRN         potassium chloride SA (K-DUR/KLOR-CON M) CR tablet 20-40 mEq  20-40 mEq Oral Q2H PRN   20 mEq at 04/26/18 1337     simethicone (MYLICON) suspension 40 mg  40 mg Oral Q6H PRN         sodium chloride (PF) 0.9% PF flush 3 mL  3 mL Intracatheter Q1H PRN         sodium chloride (PF) 0.9% PF flush 3 mL  3 mL Intracatheter Q8H   3 mL at 04/29/18 0815     Warfarin Therapy Reminder (Check START DATE - warfarin may be starting in the FUTURE)  1 each Does not apply Continuous PRN         Current Outpatient Prescriptions Ordered in Epic  "  Medication     mirtazapine (REMERON) 15 MG tablet     simethicone (MYLICON) 40 MG/0.6ML suspension         Allergies      Allergies   Allergen Reactions     Peanuts [Nuts] Shortness Of Breath     Amiodarone      pulm fibrosis       Baclofen      Confusion      Bactrim [Sulfamethoxazole W-Trimethoprim]      Difficulty swallowing     Doxycycline Other (See Comments)     Multiple complaints; she does not want this again.      Levaquin [Levofloxacin] Other (See Comments)     Multiple complaints; she will not take this again     Linzess [Linaclotide] Diarrhea     Lorazepam        Prolonged drowsiness with ER visit      Liquid Adhesive Itching and Rash     Bleeding when tape removed after pacemaker placement..itched and burned for weeks.        Medical Review of Systems     /62 (BP Location: Left arm)  Pulse 56  Temp 97.7  F (36.5  C) (Oral)  Resp 16  Ht 1.6 m (5' 3\")  Wt 60.2 kg (132 lb 11.5 oz)  LMP  (LMP Unknown)  SpO2 98%  BMI 23.51 kg/m2  Body mass index is 23.51 kg/(m^2).  A 10-point review of systems was performed by Parker Jameson MD and is negative, no new findings.      Psychiatric Examination     Appearance Sitting in chair, dressed in gown. Appears stated age.   Attitude Cooperative   Orientation Oriented to person, place, time   Eye Contact Better   Speech Regular rate, rhythm, volume and tone   Language Normal   Psychomotor Behavior Normal   Mood Slightly less depressed   Affect Flat   Thought Process Goal Oriented, Intact   Associations Intact   Thought Content Patient is currently negative for suicidal ideation, negative for plan or intent, able to contract no self harm and identify barriers to suicide.  Negative for obsessions, compulsions or psychosis.      Fund of Knowledge Average   Insight Improving   Judgement Improving   Attention Span & Concentration Fair   Recent & Remote Memory Fair   Gait Unable to assess   Muscle Tone Intact on visual inspection       Labs     Labs " reviewed.  No results found for this or any previous visit (from the past 24 hour(s)).     Impression     This is a 89 year old female with a history of depression and anxiety who presented after an intentional overdose on Paxil in an effort to obtain sleep after several days of insomnia. Remeron is helping with sleep and she is less depressed.  She still has all the same circumstances that she has had a hard time adjusting to. ( in [place], no longer able to go to [place] every winte like she had done for 25 years etc.Pt would benefit in referral to [facility] to get support helping her adjust to losses. Pt able to contract no self-harm  Reviewed the side effects, benefits, and complications of medication. .       Diagnoses     1. Major depression, recurrent, severe, without psychotic features.   2. Panic disorder without agoraphobia.  3. Paroxetine overdose.     Plan     1. Explained Side effects, benefits and complications of medications to the patient.   2. Medication changes: Continue Remeron to 15mg qhs.  3. Discussed treatment plan with patient and team.  4. Dr Jameson Contacted Pt's daughter and reviewed hospital course and treatment plan  5.       TIME SPENT IN PSYCHIATRY CONSULT: 25 MINUTES.    Attestation:   Patient has been seen and evaluated by me, Parker Jameson MD.    Patient ID:  Name: Leonor Mercado MRN: [id number]  Admission: 4/24/2018   YOB: 1926

## 2018-04-29 NOTE — DISCHARGE INSTRUCTIONS
[facility] RN, OCCUPATIONAL AND PHYSICAL THERAPY AND HOME HEALTH AIDE FOR BATHING SUPERVISION.  THE NURSE WILL CALL TO SCHEDULE THE FIRST VISIT FOR EITHER Monday OR Tuesday.  THEIR STAFFING OFFICE  NUMBER [phone].    [facility] ARE RECOMMENDED. THE PROGRAM IS CALLED 55+.  TO FIND OUT MORE ABOUT THE PROGRAM AND E[place]OLL CALL [phone].

## 2018-04-29 NOTE — PROGRESS NOTES
SW:  I:  Contacted daughter Cleo and reviewed d/c arrangements with her.  Cleo plans to call 55+ on Monday to check into insurance coverage and availability.  Explained sister Les was seeking referrals for abdi-psychiatrist.  Only abdi-psychiatrist writer is familiar with is Dr Feroz Ceballos and per Dr Jameson, Dr Ceballos is not accepting new patients.Suggested she will have access to psychiatry care if she attends the 55+ or is seen thru [facility].    Reviewed home care referral with patient.  Patient is fine with [facility] as her PCP is thru .  Patient updated that Cleo will be here to transport her home today.  Patient continues to have a flat affect however did listen and thanked writer.

## 2018-04-29 NOTE — PROGRESS NOTES
SW:  D:  4/28 note documents d/c planning conversation with patient and daughter.  Today writer requested PT and or OT see patient as she and daughters yesterday continued to express concern that patient is weak.   Nursing notes reflect patient cooperative but withdrawn.  Dr Newell has met with patient.  She reports she told patient today she may be discharged home today and patient did not voice concern.  Psychiatry has been consulted again given patient's episode of leg numbness with no apparent physical etiology.

## 2018-04-29 NOTE — DISCHARGE SUMMARY
[facility] Discharge Summary    Leonor Mercado MRN# [id number]   Age: 89 year old YOB: 1926     Date of Admission:  4/24/2018  Date of Discharge::  4/29/2018  Admitting Physician:  Quan Rodriguez MD  Discharge Physician:  Justen Paiz MD  Primary Physician: Dannielle Darby  Transferring Facility: N/A     Home clinic: unknown          Admission Diagnoses:   Abdominal pain, left lower quadrant [R10.32]  Drug overdose, undetermined intent, initial encounter [T50.904A]  Constipation, unspecified constipation type [K59.00]  Insomnia, unspecified type [G47.00]          Discharge Diagnosis:   Principle diagnosis:  Paxil overdose  Secondary diagnoses:  Patient Active Problem List   Diagnosis     Hyperlipidemia LDL goal <100     A Fib - chr Coumadin, s/p PPM (H)     Cardiac pacemaker in situ     Advanced directives, counseling/discussion     Senile osteoporosis     Irritable bowel syndrome (IBS)     Fear of flying     Anemia     Eosinophilia     COPD (H)     CAD - 2 stents in [place] in 2000s.      Long-term (current) use of anticoagulants [Z79.01]     Degeneration of lumbar intervertebral disc     CHF - Chr Diastolic (H)     Sick sinus syndrome - s/p PPM 2003 (H)     PMHx 2017: CAD ( 2 stents 2000s) , diastolic CHF, COPD, chr AFib, PPM ( 2003 for sinus sick syndrome), HTN, hyperlipidemia, osteoporosis      SOB (shortness of breath)     CKD (chronic kidney disease) stage 3, GFR 30-59 ml/min     Ingestion of substance          Brief History of Presenting Illness:   As per admit hx  Leonor Mercado is a 89 year old female who presents after ingesting a bottle of paxil in hopes to sleep. She has been suffering from chronic abdominal pain, and unable to sleep. She states her mindset was if she could fall asleep, she didn't care if she didn't wake up. The patient endorses increased stressors lately (discussed above). She is evaluated with her daughter in the Emergency Department.  She appears depressed, speaks with her eyes closed most of the time and ruminates on her continued abdominal pain. Given her mental health instability and paxil ingestions, she will be admitted overnight for observation while she is medically stabilized and requires bed placement for nusrat-psych.        Recent Results (from the past 24 hour(s))   XR Abdomen 2 Views    Narrative    ABDOMEN TWO VIEWS  4/28/2018 1:12 PM     COMPARISON: Two-view abdomen 4/16/2018.    HISTORY: No bowel movements x two weeks. Rule out constipation.    FINDINGS: Abdominal bowel gas pattern is nonspecific. There is no  evidence for free intraperitoneal air.      Impression    IMPRESSION: No evidence for bowel obstruction or free air. No  significant stool noted in the colon.    MEY EVANS MD            Hospital Course:   Leonor Mercado is a 89 year old female who was admitted on 4/24/2018 following intentional Paxil overdose.     1. Paxil overdose.   Was intentional. Psych seen pt-- dont think she is suicidal at this time. remeron increased to 15 mg qhs.     2. Depression.  Continue  Remeron--dose as above.     3. Abdominal pain.  Long standing with extensive negative evaluation.  Abdominal US negative.  Likely somatization.   Follow up as outpatient.  Simethicone for gas and bloating.     4. Hyponatremia.  Resolved.     5. Afib/CAD/chronic diastolic heart failure. Continue coumadin per pharmacy, diltiazem, furosemide, and losartan.     6. COPD/pulmonary fibrosis. Continue Breo Ellipta and Duonebs.     7. Hypokalemia.  resolved      8. CONSTIPATION  abd soft and non tender. Pt says she is not eating much too. Continue bowel regimen.  xrays did not show any significant stool.    DISPO  Pt was awaiting bed at [facility] last few days. Bed unavailable. Psych reassessed pt and felt she was ok to be discharged to [facility] with services. SW/pt/ot seen. Pt will be discharged to [facility] today.             Procedures:   No  procedures performed during this admission         Allergies:      Allergies   Allergen Reactions     Peanuts [Nuts] Shortness Of Breath     Amiodarone      pulm fibrosis       Baclofen      Confusion      Bactrim [Sulfamethoxazole W-Trimethoprim]      Difficulty swallowing     Doxycycline Other (See Comments)     Multiple complaints; she does not want this again.      Levaquin [Levofloxacin] Other (See Comments)     Multiple complaints; she will not take this again     Linzess [Linaclotide] Diarrhea     Lorazepam        Prolonged drowsiness with ER visit      Liquid Adhesive Itching and Rash     Bleeding when tape removed after pacemaker placement..itched and burned for weeks.             Medications Prior to Admission:     Prescriptions Prior to Admission   Medication Sig Dispense Refill Last Dose     Acetaminophen (TYLENOL PO) Take 1,000 mg by mouth nightly as needed    prn at prn     albuterol (PROAIR HFA/PROVENTIL HFA/VENTOLIN HFA) 108 (90 BASE) MCG/ACT Inhaler Inhale 2 puffs into the lungs every 6 hours as needed for shortness of breath / dyspnea or wheezing 1 Inhaler 1 prn at prn     calcium carbonate (CALCIUM CARBONATE) 600 MG TABS tablet Take 1 tablet by mouth daily    4/23/2018 at am     Cholecalciferol (VITAMIN D) 2000 UNITS tablet Take 2,000 Units by mouth daily   4/23/2018 at am     diltiazem (DILTIAZEM CD) 240 MG 24 hr capsule Take 1 capsule (240 mg) by mouth daily 30 capsule  4/23/2018 at am     Docusate Calcium (STOOL SOFTENER PO) Take 1 tablet by mouth daily as needed    prn at prn     fluticasone-vilanterol (BREO ELLIPTA) 200-25 MCG/INH oral inhaler Inhale 1 puff into the lungs daily   4/23/2018 at am     furosemide (LASIX) 20 MG tablet Take 2 tablets (40 mg) by mouth daily 180 tablet 3 4/23/2018 at am     guaiFENesin (MUCINEX) 600 MG 12 hr tablet Take 1,200 mg by mouth 2 times daily as needed for congestion   prn at prn     ipratropium - albuterol 0.5 mg/2.5 mg/3 mL (DUONEB) 0.5-2.5 (3) MG/3ML neb  solution Take 1 vial (3 mLs) by nebulization every 6 hours as needed for shortness of breath / dyspnea or wheezing 360 mL  prn at prn     Lactobacillus (PROBIOTIC ACIDOPHILUS) TABS Take 1 tablet by mouth daily   4/23/2018 at am     losartan (COZAAR) 50 MG tablet Take 0.5 tablets (25 mg) by mouth daily 1 tablet 0 4/23/2018 at am     MELATONIN PO Take 5 mg by mouth nightly as needed    prn at prn     Multiple Vitamins-Minerals (OCUVITE PO) Take 1 capsule by mouth daily.   4/23/2018 at am     nitroglycerin (NITROSTAT) 0.4 MG SL tablet Place 1 tablet (0.4 mg) under the tongue every 5 minutes as needed 25 tablet 1 prn at prn     polyethylene glycol (MIRALAX/GLYCOLAX) packet Take 1 packet by mouth daily (with breakfast)    4/23/2018 at am     pravastatin (PRAVACHOL) 40 MG tablet Take 1 tablet (40 mg) by mouth daily 90 tablet 0 4/23/2018 at pm     warfarin (COUMADIN) 2 MG tablet TAKE ONE TABLET BY MOUTH ONCE DAILY OR  AS  DIRECTED  BY  INR  CLINIC 90 tablet 0 4/23/2018 at pm             Discharge Medications:     Current Discharge Medication List      START taking these medications    Details   mirtazapine (REMERON) 15 MG tablet Take 1 tablet (15 mg) by mouth At Bedtime  Qty: 30 tablet, Refills: 0    Associated Diagnoses: Adjustment disorder, unspecified type      simethicone (MYLICON) 40 MG/0.6ML suspension Take 0.6 mLs (40 mg) by mouth every 6 hours as needed for cramping  Qty: 500 mL, Refills: 0    Associated Diagnoses: Abdominal pain, left lower quadrant         CONTINUE these medications which have NOT CHANGED    Details   Acetaminophen (TYLENOL PO) Take 1,000 mg by mouth nightly as needed       albuterol (PROAIR HFA/PROVENTIL HFA/VENTOLIN HFA) 108 (90 BASE) MCG/ACT Inhaler Inhale 2 puffs into the lungs every 6 hours as needed for shortness of breath / dyspnea or wheezing  Qty: 1 Inhaler, Refills: 1    Associated Diagnoses: Chronic obstructive pulmonary disease, unspecified COPD type (H)      calcium carbonate  (CALCIUM CARBONATE) 600 MG TABS tablet Take 1 tablet by mouth daily       Cholecalciferol (VITAMIN D) 2000 UNITS tablet Take 2,000 Units by mouth daily      diltiazem (DILTIAZEM CD) 240 MG 24 hr capsule Take 1 capsule (240 mg) by mouth daily  Qty: 30 capsule    Associated Diagnoses: Chronic atrial fibrillation (H)      Docusate Calcium (STOOL SOFTENER PO) Take 1 tablet by mouth daily as needed       fluticasone-vilanterol (BREO ELLIPTA) 200-25 MCG/INH oral inhaler Inhale 1 puff into the lungs daily    Associated Diagnoses: Anxiety      furosemide (LASIX) 20 MG tablet Take 2 tablets (40 mg) by mouth daily  Qty: 180 tablet, Refills: 3    Associated Diagnoses: Chronic diastolic congestive heart failure (H)      guaiFENesin (MUCINEX) 600 MG 12 hr tablet Take 1,200 mg by mouth 2 times daily as needed for congestion      ipratropium - albuterol 0.5 mg/2.5 mg/3 mL (DUONEB) 0.5-2.5 (3) MG/3ML neb solution Take 1 vial (3 mLs) by nebulization every 6 hours as needed for shortness of breath / dyspnea or wheezing  Qty: 360 mL    Associated Diagnoses: Chronic obstructive pulmonary disease, unspecified COPD type (H)      Lactobacillus (PROBIOTIC ACIDOPHILUS) TABS Take 1 tablet by mouth daily      losartan (COZAAR) 50 MG tablet Take 0.5 tablets (25 mg) by mouth daily  Qty: 1 tablet, Refills: 0    Associated Diagnoses: Essential hypertension with goal blood pressure less than 140/90      MELATONIN PO Take 5 mg by mouth nightly as needed       Multiple Vitamins-Minerals (OCUVITE PO) Take 1 capsule by mouth daily.      nitroglycerin (NITROSTAT) 0.4 MG SL tablet Place 1 tablet (0.4 mg) under the tongue every 5 minutes as needed  Qty: 25 tablet, Refills: 1    Associated Diagnoses: Other chest pain      polyethylene glycol (MIRALAX/GLYCOLAX) packet Take 1 packet by mouth daily (with breakfast)       pravastatin (PRAVACHOL) 40 MG tablet Take 1 tablet (40 mg) by mouth daily  Qty: 90 tablet, Refills: 0    Associated Diagnoses:  "Hyperlipidemia LDL goal <100; Coronary artery disease involving native coronary artery of native heart without angina pectoris      warfarin (COUMADIN) 2 MG tablet TAKE ONE TABLET BY MOUTH ONCE DAILY OR  AS  DIRECTED  BY  INR  CLINIC  Qty: 90 tablet, Refills: 0    Associated Diagnoses: Long term current use of anticoagulant therapy                   Consultations:   Consultation during this admission received from psychiatry            Discharge Exam:   Blood pressure 110/62, pulse 56, temperature 97.7  F (36.5  C), temperature source Oral, resp. rate 16, height 1.6 m (5' 3\"), weight 60.2 kg (132 lb 11.5 oz), SpO2 98 %, not currently breastfeeding.  GENERAL APPEARANCE: healthy, alert and no distress  EYES: conjunctiva clear, eyes grossly normal  HENT: external ears and nose normal   NECK: supple, no masses or adenopathy  RESP: lungs clear to auscultation - no rales, rhonchi or wheezes  CV: regular rate and rhythm, normal S1 S2, no S3 or S4 and no murmur, click or rub   ABDOMEN: soft, nontender, no HSM or masses and bowel sounds normal  MS: no clubbing, cyanosis; no edema  SKIN: clear without significant rashes or lesions  NEURO: Normal strength and tone, sensory exam grossly normal, mentation intact and speech normal    Unresulted Labs Ordered in the Past 30 Days of this Admission     No orders found from 2/23/2018 to 4/25/2018.          Recent Results (from the past 24 hour(s))   XR Abdomen 2 Views    Narrative    ABDOMEN TWO VIEWS  4/28/2018 1:12 PM     COMPARISON: Two-view abdomen 4/16/2018.    HISTORY: No bowel movements x two weeks. Rule out constipation.    FINDINGS: Abdominal bowel gas pattern is nonspecific. There is no  evidence for free intraperitoneal air.      Impression    IMPRESSION: No evidence for bowel obstruction or free air. No  significant stool noted in the colon.    MEY EVANS MD            Pending Tests at Discharge:   None         Discharge Instructions and Follow-Up:   Discharge diet: " Regular   Discharge activity: Activity as tolerated   Discharge follow-up: Follow up with primary care provider in 2 weeks           Discharge Disposition:   Discharged to home      Attestation:  I have reviewed today's vital signs, notes, medications, labs and imaging.    Time Spent on this Encounter   I, Justen aPiz, personally saw the patient today and spent greater than 30 minutes discharging this patient.    Justen Paiz MD

## 2018-04-29 NOTE — PLAN OF CARE
"Problem: Patient Care Overview  Goal: Plan of Care/Patient Progress Review  Outcome: Improving  Pt A&Ox4. VSS. On RA. Good appetite and oral intake. Some moments of anxiety and withdrawn, but pleasant and cooperative. 1 small BM today, voiding WDL. Up with SBA, walker, and belt. Walked unit x2 with no SOB or fatigue. Pt reports that \"I'm not 100% yet\". Calls as needed. Bed and chair alarm on for safety. VPM in room for safety.       "

## 2018-04-29 NOTE — PLAN OF CARE
Problem: Patient Care Overview  Goal: Plan of Care/Patient Progress Review  Outcome: Improving  Pt A&Ox4.  VSS on RA, ex allison at times. [place], uses HA. Denied pain. Up with SBA, walker to bathroom. Regular diet, good appetite. Withdrawn, but cooperative. VPM for safety. Discharge pending, continue to monitor.

## 2018-04-29 NOTE — PROGRESS NOTES
"   04/29/18 1130   Quick Adds   Type of Visit Initial PT Evaluation   Living Environment   Lives With alone   Living Arrangements assisted living   Home Accessibility no concerns   Transportation Available family or friend will provide   Living Environment Comment Daugher provides, pt states she just moved to the [place] and is not fond of it. STates she doesn't like the bathtub, \"I had a better one in my condo.\"    Self-Care   Usual Activity Tolerance moderate   Current Activity Tolerance moderate   Regular Exercise no   Equipment Currently Used at Home (4WW)   Activity/Exercise/Self-Care Comment Pt states there are activities she can participate in, just doesn't. Walks to get the mail and for the paper daily.    Functional Level Prior   Ambulation 1-->assistive equipment   Transferring 1-->assistive equipment   Toileting 0-->independent   Bathing 0-->independent   Dressing 0-->independent   Eating 0-->independent   Fall history within last six months no   Prior Functional Level Comment Pt is independent/mod I with all funcitonal mobility tasks at baseline. Admits she has had difficulty getting out of the tub as of late.   General Information   Onset of Illness/Injury or Date of Surgery - Date 04/24/18   Referring Physician Justen Paiz MD   Patient/Family Goals Statement None stated.   Pertinent History of Current Problem (include personal factors and/or comorbidities that impact the POC) 89 yo female adm following ingestion of 25 Paxil pills, with the hope that she would \"fall asleep and not wake up.\" PMH includes anxiety and depression.   General Observations Up in the chair, facing the sun, snoozing. Awakens and participates.   Cognitive Status Examination   Orientation orientation to person, place and time   Level of Consciousness alert  (flat affect)   Follows Commands and Answers Questions 100% of the time;able to follow multistep instructions   Personal Safety and Judgment intact  (From a mobility stand point, " "makes good safety choices)   Posture    Posture Forward head position   Range of Motion (ROM)   ROM Comment B LEs and UEs WFL, some limitations in juan pablo fingers and decreased cervical rotation, slight trunk flexion likely secohndary to arthritic changes.   Strength   Strength Comments DEmonstrates antigravity strength, able to hold SLR up  x 3 seconds, alternating x 3 reps each with ease.  equal B   Transfer Skills   Transfer Comments Mod I, use of hands on chair up to walker.   Gait   Gait Comments Mod I with ambulation of 500+ feet and use of WW.   Balance   Balance Comments Static stance no increase in sway, dynamic stance, pt moves chair in juan pablo room while standing-pushing it over and adjusting for a better view-no LOB, moves well.   Sensory Examination   Sensory Perception Comments Reports some numbness in the hands, had been asleep with arms onarm rests of the chair upon PT entry- still intact.   General Therapy Interventions   Planned Therapy Interventions home program guidelines;ROM;strengthening;stretching   Clinical Impression   Criteria for Skilled Therapeutic Intervention yes, treatment indicated   PT Diagnosis Impaired activity tolerance   Influenced by the following impairments Prolonged time without mobility, generalized weakness   Functional limitations due to impairments Decreased functional lindependence.   Clinical Presentation Stable/Uncomplicated   Clinical Presentation Rationale see MR   Clinical Decision Making (Complexity) Low complexity   Predicted Duration of Therapy Intervention (days/wks) Eval and single treatment.   Anticipated Discharge Disposition Home with Home Therapy   Risk & Benefits of therapy have been explained Yes   Patient, Family & other staff in agreement with plan of care Yes   Therapy Certification   Start of care date 04/24/18   Certification date from 04/29/18   Certification date to 04/29/18   Medical Diagnosis Adjustment Disorder   [facility] AM-PAC TM \"6 " "Clicks\"   2016, Trustees of [facility], under license to [employer].  All rights reserved.   6 Clicks Short Forms Basic Mobility Inpatient Short Form   [facility] AM-PAC  \"6 Clicks\" V.2 Basic Mobility Inpatient Short Form   1. Turning from your back to your side while in a flat bed without using bedrails? 4 - None   2. Moving from lying on your back to sitting on the side of a flat bed without using bedrails? 4 - None   3. Moving to and from a bed to a chair (including a wheelchair)? 4 - None   4. Standing up from a chair using your arms (e.g., wheelchair, or bedside chair)? 4 - None   5. To walk in hospital room? 4 - None   6. Climbing 3-5 steps with a railing? 4 - None   Basic Mobility Raw Score (Score out of 24.Lower scores equate to lower levels of function) 24   Total Evaluation Time   Total Evaluation Time (Minutes) 10     "

## 2018-04-29 NOTE — PROGRESS NOTES
"SW:  D: Patient seen by PT this morning and assessed as safe to return to return to her snf apartment.  Patient asked PT for \"Nanci, so I can get my d/c papers\".  Writer met briefly with patient.  Explained Dr Paiz would like Dr Jameson to visit her before d/c.  Asked patient if she feels she needs to be here or discharge home.  Patient replied \"I don't know, I don't feel anything, I might as well go home\".  Flat affect.    P:  Will try to reach Dr Jameson to see patient as soon as possible today.  Also if she discharges home have asked if the [facility] RN can make their first visit on Monday.  "

## 2018-04-29 NOTE — PLAN OF CARE
Discharge    Patient discharged to home via own transportaion with daughter   Care plan note:  vss, alert x4, denied pain. +BS, lungs clear, on RA. PIV patent and SL. PT evaluated and singed off. SBA x1 with GB/WK to bathroom. Ambulated once in hallway with PT, otherwise, refused with writer. Signed off by psych. Will dc/ to home. D/c instruction given to pt and daughter, whom verbalized understanding.        Listed belongings gathered and returned to patient. Yes  Care Plan and Patient education resolved: Yes  Prescriptions if needed, hard copies sent with patient  No  Home and hospital acquired medications returned to patient: Yes  Medication Bin checked and emptied on discharge Yes  Follow up appointment made for patient: Yes

## 2018-04-29 NOTE — PLAN OF CARE
"Problem: Patient Care Overview  Goal: Plan of Care/Patient Progress Review  Discharge Planner PT   Patient plan for discharge: Home (Return to her current apartment).  Current status: Evaluation completed, treatment provided. 89 yo female adm following an intentional paxil ingestion, notes indicate the pt stated she thought she could  \"fall asleep and not wake up\".  Currently lives in an retirement, but sounds like she does not have assist with much. Reports use of a 4WW, no falls in the last 6 months. States she has grab bars in her bathroom, likes to take baths, but \"I don't like the bathtub I have now.\"  States her daughter drives her places as needed, she eats one meal a day in the retirement, otherwise eats in her apartment. Uses a 4WW at baseline.   Modified independent with transfers, sit<>stand up to and from WW. Able to complete 5x sit<> <20 seconds from 17\" jaren chair. No LOB with ambulation x 500+ feet. Edu pt on benefits of activity, walking, getting outside, participating in activities in the [place]. Pt states \"I've just been laying aronud for 7 days, I'm weak.\" Discussed with pt the motivation to want to move, participate and get out. Pt states tearfully, \"It's my head, it's just blank these days.\" Provided re-assurance and recommended more activity, walking and changing positions frequently. Discussed the tools for improving her strength and activity tolerance, but that she would need to carry them out-pt verbalized understanding and states: \"You can lead a horse to water, but you can't make them drink it.\"   No further acute skilled PT needs identified, screened for OT needs-pt able to bend down and adjust socks, reach behind back and straightens blankets without LOB or assist.    Barriers to return to prior living situation: From a PT perspective, based on functional mobility, no barriers identified.  Recommendations for discharge: Return to retirement, would benefit from home PT services.  Rationale for " recommendations:  To implement a HEP for strength and activity tolerance benefits.       Entered by: Allie Milligan 04/29/2018 11:17 AM        Physical Therapy Discharge Summary    Reason for therapy discharge:    All goals and outcomes met, no further needs identified.    Progress towards therapy goal(s). See goals on Care Plan in [facility] electronic health record for goal details.  Goals met    Therapy recommendation(s):    Continued therapy is recommended.  Rationale/Recommendations:  Pt will benefit from HOME PT SERVICES to continue to progress HEP and increase strength and activity tolerance with daily activities. .

## 2018-04-29 NOTE — PROGRESS NOTES
[facility]      OUTPATIENT PHYSICAL THERAPY EVALUATION  PLAN OF TREATMENT FOR OUTPATIENT REHABILITATION  (COMPLETE FOR INITIAL CLAIMS ONLY)  Patient's Last Name, First Name, M.I.  YOB: 1926  Leonor Mercado                        Provider's Name  [facility] Medical Record No.  [id number]                               Onset Date:  04/24/18   Start of Care Date:  04/24/18      Type:     _X_PT   ___OT   ___SLP Medical Diagnosis:  Adjustment Disorder                        PT Diagnosis:  Impaired activity tolerance   Visits from SOC:  1   _________________________________________________________________________________  Plan of Treatment/Functional Goals    Planned Interventions:  ,    home program guidelines, ROM, strengthening, stretching,       Goals: See Physical Therapy Goals on Care Plan in [facility] electronic health record.    Therapy Frequency:    Predicted Duration of Therapy Intervention: Eval and single treatment.  _________________________________________________________________________________    I CERTIFY THE NEED FOR THESE SERVICES FURNISHED UNDER        THIS PLAN OF TREATMENT AND WHILE UNDER MY CARE     (Physician co-signature of this document indicates review and certification of the therapy plan).                Certification date from: 04/29/18, Certification date to: 04/29/18    Referring Physician: Justen Paiz MD            Initial Assessment        See Physical Therapy evaluation dated 04/24/18 in Epic electronic health record.

## 2018-04-30 ENCOUNTER — TELEPHONE (OUTPATIENT)
Dept: INTERNAL MEDICINE | Facility: CLINIC | Age: 83
End: 2018-04-30

## 2018-04-30 ENCOUNTER — PATIENT OUTREACH (OUTPATIENT)
Dept: CARE COORDINATION | Facility: CLINIC | Age: 83
End: 2018-04-30

## 2018-04-30 ASSESSMENT — ACTIVITIES OF DAILY LIVING (ADL): DEPENDENT_IADLS:: TRANSPORTATION;MEDICATION MANAGEMENT

## 2018-04-30 NOTE — TELEPHONE ENCOUNTER
ED / Discharge Outreach Protocol    Patient Contact    Attempt # 1    Was call answered?  No.  Unable to leave message.

## 2018-04-30 NOTE — LETTER
Health Care Home - Access Care Plan    About Me  Patient Name:  Leonor Mercado    YOB: 1926  Age:                             89 year old   Yesica MRN:            [id number] Telephone Information:     Home Phone [phone]   Mobile [phone]       Address:    [address] [place] [address] Email address:  No e-mail address on record      Emergency Contact(s)  Name Relationship Lgl Grd Work Phone Home Phone Mobile Phone   1. ZOE COULTER Daughter  [phone]   2. YOVANY EMRCADO Son   [phone]   3. FERNANDA BUTLER Daughter   [phone]             Health Maintenance: Routine Health maintenance Reviewed: Not assessed    My Access Plan  Medical Emergency 911   Questions or concerns during clinic hours Primary Clinic Line, I will call the clinic directly: [facility] - [phone]   24 Hour Appointment Line [phone] or  [phone]) (toll free)   24 Hour Nurse Line [phone] (toll free)   Questions or concerns outside clinic hours 24 Hour Appointment Line, I will call the after-hours on-call line:   [facility] [phone] or [phone]) (toll-free)   Preferred Urgent Care     Preferred Hospital [facility]  [phone]   Preferred Pharmacy [facility] [address] [place] [address] [facility]     Behavioral Health Crisis Line The National Suicide Prevention Lifeline at 1-716.825.1438 or 911     My Care Team Members  Patient Care Team       Relationship Specialty Notifications Start End    Hector-Dannielle Narayanan MD PCP - General Internal Medicine  12/1/16     Phone: [phone] Pager: [phone] Fax: [phone]        [address] [facility] [address]    Collins Horton MD MD Cardiology  3/17/15     Phone: [phone] Fax: [phone] [address] [place] [address]    [facility]  Jennifer Mace   Gastroenterology  3/17/15     Phone: [phone] Fax: [phone]         [facility] [address] [place] [address]    [facility] ([facility]), ([place])  4/29/18     Fax: [phone]          [facility] [address] [address]    Yaquelin Varela, RN Clinic Care Coordinator Primary Care - [facility] Admissions 4/30/18     Phone: [phone] Fax: [phone]               My Medical and Care Information  Problem List   Patient Active Problem List   Diagnosis     Hyperlipidemia LDL goal <100     A Fib - chr Coumadin, s/p PPM (H)     Cardiac pacemaker in situ     Advanced directives, counseling/discussion     Senile osteoporosis     Irritable bowel syndrome (IBS)     Fear of flying     Anemia     Eosinophilia     COPD (H)     CAD - 2 stents in [place] in 2000s.      Long-term (current) use of anticoagulants [Z79.01]     Degeneration of lumbar intervertebral disc     CHF - Chr Diastolic (H)     Sick sinus syndrome - s/p PPM 2003 (H)     PMHx 2017: CAD ( 2 stents 2000s) , diastolic CHF, COPD, chr AFib, PPM ( 2003 for sinus sick syndrome), HTN, hyperlipidemia, osteoporosis      SOB (shortness of breath)     CKD (chronic kidney disease) stage 3, GFR 30-59 ml/min     Ingestion of substance      Current Medications and Allergies:  See printed Medication Report

## 2018-04-30 NOTE — PROGRESS NOTES
Clinic Care Coordination Contact  Care Coordination Communication    Referral Source: IP Handoff    Clinical Data: Patient was hospitalized at [facility] from 4/24/18 to 4/29/18 with diagnosis of drug overdose, chronic abdominal pain.     Home Care Contact:              Home Care Agency: [facility]              Contact name () and phone number: Beata KAY  [phone]              Care Coordination contacted home care: Yes              Anticipated start of care date: 4/30/18    Patient Contact:               Introduced self and role of care coordination.               Discharge instructions were reviewed with patient/caregiver.               Do you have any questions about your medications? No              Follow up appointment is scheduled for 5-11-18.              Provided 24 Hour Nurse Line and/or 24 Hour Appointment Scheduling: Yes              Home care has contacted patient: Yes              Patient questions/concerns: Denies questions. Patient is very short with responses and does not choose to talk.    Plan: RN/SW Care Coordinator will await notification from home care staff informing RN/SW Care Coordinator of patients discharge plans/needs. RN/SW Care Coordinator will review chart and outreach to home care every 4 weeks and as needed.      Yaquelin Varela RN, CCM - Care Coordinator     4/30/2018    10:16 AM  [phone]

## 2018-04-30 NOTE — TELEPHONE ENCOUNTER
IP F/U    Date: 04/29/18  Diagnosis: Drug Overdose, Undertermined Intent, Initial Encounter, Adjustment Disorder  Is patient active in care coordination? No  Was patient in TCU? No    Next 5 appointments (look out 90 days)     May 11, 2018  2:20 PM CDT   Office Visit with Dannielle Darby MD   [facility]    [address]  [facility] [address] [phone]

## 2018-04-30 NOTE — TELEPHONE ENCOUNTER
Cleo, RN with [facility] calls to request admission orders:  Skilled nursing - 2w3, 1w1, 2PRN  PT, OT, SW - eval and treat.     Face to face done and pt has hospital f/u scheduled with Dr. Young on 5/11/18. Verbal authorization given for home care orders per [facility] protocol.

## 2018-05-02 NOTE — TELEPHONE ENCOUNTER
"ED/Discharge Protocol    \"Hi, my name is Maria G Pedro Pablo, a registered nurse, and I am calling on behalf of Dr. Young's office at [facility].  I am calling to follow up and see how things are going for you after your recent visit.\"    \"I see that you were in the (ER/UC/IP) on 04/24.    How are you doing now that you are home?\" \"pretty good\"    Is patient experiencing symptoms that may require a hospital visit?  No    Discharge Instructions    \"Let's review your discharge instructions.  What is/are the follow-up recommendations?  Pt. Response: f/u with PCP, home care    \"Were you instructed to make a follow-up appointment?\"  Pt. Response: Yes.  Has appointment been made?   Yes      \"When you see the provider, I would recommend that you bring your discharge instructions with you.    Medications    \"How many new medications are you on since your hospitalization/ED visit?\"    2 or more - Epic MTM referral needed  \"How many of your current medicines changed (dose, timing, name, etc.) while you were in the hospital/ED visit?\"   0-1  \"Do you have questions about your medications?\"   No  \"Were you newly diagnosed with heart failure, COPD, diabetes or did you have a heart attack?\"   No  For patients on insulin: \"Did you start on insulin in the hospital or did you have your insulin dose changed?\"   No    Medication reconciliation completed? Yes    Was MTM referral placed (*Make sure to put transitions as reason for referral)?   No, pt reports she's not taking the simethicone    Call Summary    \"Do you have any questions or concerns about your condition or care plan at the moment?\"    No  Triage nurse advice given: Call clinic with questions/concerns.    Patient was in ER 3 in the past year (assess appropriateness of ER visits.)      \"If you have questions or things don't continue to improve, we encourage you contact us through the main clinic number,  [phone].  Even if the clinic is not open, triage nurses are available " "24/7 to help you.     We would like you to know that our clinic has extended hours (provide information).  We also have urgent care (provide details on closest location and hours/contact info)\"      \"Thank you for your time and take care!\"        "

## 2018-05-03 DIAGNOSIS — E78.5 HYPERLIPIDEMIA LDL GOAL <100: ICD-10-CM

## 2018-05-03 DIAGNOSIS — I25.10 CORONARY ARTERY DISEASE INVOLVING NATIVE CORONARY ARTERY OF NATIVE HEART WITHOUT ANGINA PECTORIS: ICD-10-CM

## 2018-05-04 ENCOUNTER — TELEPHONE (OUTPATIENT)
Dept: INTERNAL MEDICINE | Facility: CLINIC | Age: 83
End: 2018-05-04

## 2018-05-04 DIAGNOSIS — F41.9 ANXIETY: Primary | ICD-10-CM

## 2018-05-04 DIAGNOSIS — F33.2 SEVERE EPISODE OF RECURRENT MAJOR DEPRESSIVE DISORDER, WITHOUT PSYCHOTIC FEATURES (H): ICD-10-CM

## 2018-05-04 NOTE — TELEPHONE ENCOUNTER
Laura--soc worker with [facility] calling.  Pt was recently hospitalized for over-dose of Paxil.      Would like a referral for pt to the collaborative psych program. Pt is very anxious and over-whelmed with home care.    She has an appt with a counselor on 5-10-18 @ the [facility].  Laura states she was told that psychiatry was very sparse in that clinic.    Please advise if appropriate for Collaborative psych care. Please place order if so.     Next 5 appointments (look out 90 days)     May 07, 2018  1:20 PM CDT   SHORT with Dannielle Darby MD   [facility]    [address]  [facility] [address] [phone]

## 2018-05-04 NOTE — TELEPHONE ENCOUNTER
G: [facility] [phone]    Laura informed referral made.

## 2018-05-05 RX ORDER — PRAVASTATIN SODIUM 40 MG
TABLET ORAL
Qty: 90 TABLET | Refills: 0 | OUTPATIENT
Start: 2018-05-05

## 2018-05-05 NOTE — TELEPHONE ENCOUNTER
"Requested Prescriptions   Pending Prescriptions Disp Refills     pravastatin (PRAVACHOL) 40 MG tablet [Pharmacy Med Name: PRAVASTATIN 40MG    TAB] 90 tablet 0     Sig: TAKE ONE TABLET BY MOUTH ONCE DAILY    Statins Protocol Passed    5/3/2018  4:50 PM       Passed - LDL on file in past 12 months    Recent Labs   Lab Test  12/11/17   0959   LDL  54          Passed - No abnormal creatine kinase in past 12 months    No lab results found.        Passed - Recent (12 mo) or future (30 days) visit within the authorizing provider's specialty    Patient had office visit in the last 12 months or has a visit in the next 30 days with authorizing provider or within the authorizing provider's specialty.  See \"Patient Info\" tab in inbasket, or \"Choose Columns\" in Meds & Orders section of the refill encounter.    Last OV: 04/20/18       Passed - Patient is age 18 or older       Passed - No active pregnancy on record       Passed - No positive pregnancy test in past 12 months        Last rx sent 04/16/18 for 3 month supply to [facility]. Denial sent to pharm.        "

## 2018-05-07 ENCOUNTER — OFFICE VISIT (OUTPATIENT)
Dept: INTERNAL MEDICINE | Facility: CLINIC | Age: 83
End: 2018-05-07
Payer: COMMERCIAL

## 2018-05-07 VITALS
TEMPERATURE: 99.2 F | DIASTOLIC BLOOD PRESSURE: 60 MMHG | OXYGEN SATURATION: 92 % | HEART RATE: 94 BPM | BODY MASS INDEX: 23.87 KG/M2 | SYSTOLIC BLOOD PRESSURE: 104 MMHG | HEIGHT: 63 IN | WEIGHT: 134.7 LBS | RESPIRATION RATE: 16 BRPM

## 2018-05-07 DIAGNOSIS — J44.9 CHRONIC OBSTRUCTIVE PULMONARY DISEASE, UNSPECIFIED COPD TYPE (H): Chronic | ICD-10-CM

## 2018-05-07 DIAGNOSIS — Z09 HOSPITAL DISCHARGE FOLLOW-UP: Primary | ICD-10-CM

## 2018-05-07 DIAGNOSIS — F43.20 ADJUSTMENT DISORDER, UNSPECIFIED TYPE: ICD-10-CM

## 2018-05-07 PROCEDURE — 99495 TRANSJ CARE MGMT MOD F2F 14D: CPT | Performed by: INTERNAL MEDICINE

## 2018-05-07 RX ORDER — MIRTAZAPINE 15 MG/1
15 TABLET, FILM COATED ORAL AT BEDTIME
Qty: 30 TABLET | Refills: 3 | Status: SHIPPED | OUTPATIENT
Start: 2018-05-07 | End: 2018-09-17

## 2018-05-07 NOTE — NURSING NOTE
"Patient here today to follow up on recent hospital stay. Vitals are as follows:  Vital Signs 5/7/2018   Systolic 104   Diastolic 60   Pulse 94   Temperature 99.2   Respirations 16   Weight (LB) 134 lb 11.2 oz   Height 5' 3\"   BMI (Calculated) 23.91   O2 92   Medication reconciliation: completed.   Aurea Hillman CMA      "

## 2018-05-07 NOTE — MR AVS SNAPSHOT
After Visit Summary   5/7/2018    Leonor Mercado    MRN: [id number]           Patient Information     Date Of Birth          5/6/1926        Visit Information        Provider Department      5/7/2018 1:20 PM Dannielle Darby MD [facility]        Today's Diagnoses     Adjustment disorder, unspecified type          Care Instructions    Plan:  1. Continue same meds, same doses for now   2. Follow up in about 3 months          Follow-ups after your visit        Your next 10 appointments already scheduled     May 10, 2018  9:30 AM CDT   Phone Device Check with NUÑEZ TECH2   [facility] ([facility])    [address]  [facility] [id number] [phone] OPT 2            May 15, 2018  1:00 PM CDT   Evaluation with JEFFREY Baires   [facility] ([facility])    [address] [id number] [phone]              Who to contact     If you have questions or need follow up information about today's clinic visit or your schedule please contact [facility] directly at [phone].  Normal or non-critical lab and imaging results will be communicated to you by MyChart, letter or phone within 4 business days after the clinic has received the results. If you do not hear from us within 7 days, please contact the clinic through MyChart or phone. If you have a critical or abnormal lab result, we will notify you by phone as soon as possible.  Submit refill requests through [employer] or call your pharmacy and they will forward the refill request to us. Please allow 3 business days for your refill to be completed.          Additional Information About Your Visit        MyChart Information     [employer] lets you send messages to your doctor, view your test results, renew your prescriptions, schedule appointments and more.  "To sign up, go to [email or web address] . Click on \"Log in\" on the left side of the screen, which will take you to the Welcome page. Then click on \"Sign up Now\" on the right side of the page.     You will be asked to enter the access code listed below, as well as some personal information. Please follow the directions to create your username and password.     Your access code is: [email or web address]  Expires: 7/15/2018 12:42 PM     Your access code will  in 90 days. If you need help or a new code, please call your [place] clinic or [phone].        Care EveryWhere ID     This is your Care EveryWhere ID. This could be used by other organizations to access your [place] medical records  [id number]        Your Vitals Were     Pulse Temperature Respirations Height Last Period Pulse Oximetry    94 99.2  F (37.3  C) (Oral) 16 5' 3\" (1.6 m) (LMP Unknown) 92%    Breastfeeding? BMI (Body Mass Index)                No 23.86 kg/m2           Blood Pressure from Last 3 Encounters:   18 104/60   18 110/62   18 96/58    Weight from Last 3 Encounters:   18 134 lb 11.2 oz (61.1 kg)   18 132 lb 11.5 oz (60.2 kg)   18 131 lb 1.6 oz (59.5 kg)              Today, you had the following     No orders found for display         Where to get your medicines      These medications were sent to [facility] [address] [place] [address]  [id number] [place] [address]     Phone:  [phone]     mirtazapine 15 MG tablet          Primary Care Provider Office Phone # Fax #    Dannielle Darby [phone]       [address] [place] [address]        Goals        General    Medication 1 (pt-stated)     Notes - Note created  2018 10:05 AM by Yaquelin Varela RN    Goal Statement: I will take medications only are ordered.     Measure of Success: No more overdose episodes    Supportive Steps to Achieve: I will accept help from home " care.     Barriers: Depressed     Strengths: Family support    Date to Achieve By: 6/1/18        Equal Access to Services     NALDO OVALLE : Hadii marisel Dasilva, wamadiha bentley, qaybevelyn umañaoseaschris sparrow, antonia abywardwalker hwang. So [facility] [phone].    ATENCIÓN: Si habla español, tiene a spicer disposición servicios gratuitos de asistencia lingüística. Llame al [phone].    We comply with applicable federal civil rights laws and [place] laws. We do not discriminate on the basis of race, color, national origin, age, disability, sex, sexual orientation, or gender identity.            Thank you!     Thank you for choosing [facility]  for your care. Our goal is always to provide you with excellent care. Hearing back from our patients is one way we can continue to improve our services. Please take a few minutes to complete the written survey that you may receive in the mail after your visit with us. Thank you!             Your Updated Medication List - Protect others around you: Learn how to safely use, store and throw away your medicines at www.disposemymeds.org.          This list is accurate as of 5/7/18  2:02 PM.  Always use your most recent med list.                   Brand Name Dispense Instructions for use Diagnosis    albuterol 108 (90 Base) MCG/ACT Inhaler    PROAIR HFA/PROVENTIL HFA/VENTOLIN HFA    1 Inhaler    Inhale 2 puffs into the lungs every 6 hours as needed for shortness of breath / dyspnea or wheezing    Chronic obstructive pulmonary disease, unspecified COPD type (H)       BREO ELLIPTA 200-25 MCG/INH oral inhaler   Generic drug:  fluticasone-vilanterol      Inhale 1 puff into the lungs daily    Anxiety       calcium carbonate 600 MG tablet   Generic drug:  calcium carbonate      Take 1 tablet by mouth daily        diltiazem 240 MG 24 hr capsule    DILTIAZEM CD    30 capsule    Take 1 capsule (240 mg) by mouth daily    Chronic atrial fibrillation (H)        furosemide 20 MG tablet    LASIX    180 tablet    Take 2 tablets (40 mg) by mouth daily    Chronic diastolic congestive heart failure (H)       ipratropium - albuterol 0.5 mg/2.5 mg/3 mL 0.5-2.5 (3) MG/3ML neb solution    DUONEB    360 mL    Take 1 vial (3 mLs) by nebulization every 6 hours as needed for shortness of breath / dyspnea or wheezing    Chronic obstructive pulmonary disease, unspecified COPD type (H)       losartan 50 MG tablet    COZAAR    1 tablet    Take 0.5 tablets (25 mg) by mouth daily    Essential hypertension with goal blood pressure less than 140/90       MELATONIN PO      Take 5 mg by mouth nightly as needed        mirtazapine 15 MG tablet    REMERON    30 tablet    Take 1 tablet (15 mg) by mouth At Bedtime    Adjustment disorder, unspecified type       nitroGLYcerin 0.4 MG sublingual tablet    NITROSTAT    25 tablet    Place 1 tablet (0.4 mg) under the tongue every 5 minutes as needed    Other chest pain       OCUVITE PO      Take 1 capsule by mouth daily.        pravastatin 40 MG tablet    PRAVACHOL    90 tablet    Take 1 tablet (40 mg) by mouth daily    Hyperlipidemia LDL goal <100, Coronary artery disease involving native coronary artery of native heart without angina pectoris       PROBIOTIC ACIDOPHILUS Tabs      Take 1 tablet by mouth daily        simethicone 40 MG/0.6ML suspension    MYLICON    500 mL    Take 0.6 mLs (40 mg) by mouth every 6 hours as needed for cramping    Abdominal pain, left lower quadrant       STOOL SOFTENER PO      Take 1 tablet by mouth daily as needed        TYLENOL PO      Take 1,000 mg by mouth nightly as needed        vitamin D 2000 units tablet      Take 2,000 Units by mouth daily        warfarin 2 MG tablet    COUMADIN    90 tablet    TAKE ONE TABLET BY MOUTH ONCE DAILY OR  AS  DIRECTED  BY  INR  CLINIC    Long term current use of anticoagulant therapy

## 2018-05-07 NOTE — PROGRESS NOTES
"Dr Young's note      Patient's instructions / PLAN:                                                        Plan:  1. Continue same meds, same doses for now   2. Follow up in about 3 months      ASSESSMENT & PLAN:                                                      89-year-old woman with complex medical problems: CAD ( 2 stents 2000s) , diastolic CHF, COPD, chr AFib, PPM ( 2003 for sinus sick syndrome), HTN, hyperlipidemia, osteoporosis      She has been struggling with anxiety and depression.  The SSRIs are helping, but they cause constipation which brought her to emergency room.  Most recently she took overdose of paroxetine. She was discharged on Remeron and she feels better     (Z09) Hospital discharge follow-up  (primary encounter diagnosis)  (F43.20) Adjustment disorder, unspecified type  Comment: much better  Plan: mirtazapine (REMERON) 15 MG tablet            (J44.9) COPD (H)  Comment: stable   Plan: Continue same meds, same doses for now        Chief complaint:                                                      hosp f/u     Daughter present     SUBJECTIVE:   Leonor Mercado is a 89 year old female who presents to clinic today for the following health issues:    \"Dam thing to do\" and now she pays the price: social workers and counselor are coming to her home. She states she is really doing well. She doesn't know why that incident happeneded     Tolerates Remeron Sleeps better       Breathing is much better    She really wants to go back to the old apt. She talked to her family - pros and cons. I told her that her problem is that Her mind is too sharp to leave in an assisted living. And she gets bored there       DcDx: Abdominal pain, left lower quadrant [R10.32]  Drug overdose, undetermined intent, initial encounter [T50.904A]  Constipation, unspecified constipation type [K59.00]  Insomnia, unspecified type [G47.00]    Review of Systems:                                                      ROS: " "negative for fever, chills, cough, wheezes, chest pain, vomiting, abdominal pain, leg swelling Pos for chr SOB          OBJECTIVE:             Physical exam:  Blood pressure 104/60, pulse 94, temperature 99.2  F (37.3  C), temperature source Oral, resp. rate 16, height 5' 3\" (1.6 m), weight 134 lb 11.2 oz (61.1 kg), SpO2 92 %, not currently breastfeeding.   NAD, appears comfortable  Skin: no rashes   Neck: supple, no JVD, No thyroidmegaly. Lymph nodes nonpalpable cervical and supraclavicular.  Chest: clear to auscultation bilaterally, good respiratory effort  Heart: S1 S2, RRR, no mgr appreciated  Abdomen: soft, not tender  Extremities: trace L leg edema -- chr   Neurologic: A, Ox3, no focal signs appreciated    PMHx: reviewed  Past Medical History:   Diagnosis Date     A Fib - chr Coumadin, s/p PPM (H) 5/8/2013     Atrial fibrillation (H)     Sick sinus syndrome, PAT, AF     BCC (basal cell carcinoma of skin)     Face     CAD - 2 stents in [place] in 2000s.  1/5/2016     CHF (congestive heart failure) (H)     mild in past     CHF - Chr Diastolic (H) 11/21/2017     Chronic renal insufficiency      COPD (chronic obstructive pulmonary disease) (H)      COPD (H) 5/13/2013     Coronary artery disease     2-05[place]-CAD-taxus stentx2 2.5-12,2.5-12 in LADp and LADm, 80%nonDomRCA-LcxDom-mild,EF60%     Hyperlipidemia      Hypertension      IBS (irritable bowel syndrome)      Irritable bowel      Osteoporosis      Panic attack     questionable diagnosis     Pulmonary fibrosis (H)     do not use amiodarone     Shortness of breath      Sick sinus syndrome - s/p PPM 2003 (H) 12/16/2017     SSS (sick sinus syndrome) (H)     DDD pacer (see surgery history section)     Vertigo       PSHx: reviewed  Past Surgical History:   Procedure Laterality Date     APPENDECTOMY  1956     CARDIAC SURGERY      PPM, 2 STENTS2-05[place]-CAD-taxus stentx2 2.5-12,2.5-12 in LADp and LADm, 80%nonDomRCA-LcxDom-mild,EF60%     CORONARY ANGIOGRAPHY ADULT ORDER  " 7/1994    mild CAD,Normal LV function, No Mitral regurgitation, Prox LAD 30% stenosis. RCA prox and mid, 20-30%     ESOPHAGOSCOPY, GASTROSCOPY, DUODENOSCOPY (EGD), COMBINED N/A 8/19/2015    Procedure: COMBINED ESOPHAGOSCOPY, GASTROSCOPY, DUODENOSCOPY (EGD), BIOPSY SINGLE OR MULTIPLE;  Surgeon: Genevieve Leon MD;  Location: [facility]     H LEAD REVISION DUAL  4/2003    Revised in [place]-due to diaphram stim (original pacer placed in [place])     H LEAD REVISION DUAL  7/2/2014    Correction of reversal of A and V leads in Header     HEART CATH, ANGIOPLASTY  02/2005    LEIGH ANN mid and proximal LAD, ongoing 80% RCA; mild circumflex     HERNIA REPAIR Left 1991    [facility]     IMPLANT PACEMAKER  2/19/2003    Placed in [place] for sick sinus syndrome     REPLACE PACEMAKER GENERATOR  6/27/2013    Dual-chamber pacemaker by Dr SETH Pierre        Meds: reviewed  Current Outpatient Prescriptions   Medication Sig Dispense Refill     Acetaminophen (TYLENOL PO) Take 1,000 mg by mouth nightly as needed        albuterol (PROAIR HFA/PROVENTIL HFA/VENTOLIN HFA) 108 (90 BASE) MCG/ACT Inhaler Inhale 2 puffs into the lungs every 6 hours as needed for shortness of breath / dyspnea or wheezing 1 Inhaler 1     calcium carbonate (CALCIUM CARBONATE) 600 MG TABS tablet Take 1 tablet by mouth daily        Cholecalciferol (VITAMIN D) 2000 UNITS tablet Take 2,000 Units by mouth daily       diltiazem (DILTIAZEM CD) 240 MG 24 hr capsule Take 1 capsule (240 mg) by mouth daily 30 capsule      Docusate Calcium (STOOL SOFTENER PO) Take 1 tablet by mouth daily as needed        fluticasone-vilanterol (BREO ELLIPTA) 200-25 MCG/INH oral inhaler Inhale 1 puff into the lungs daily       furosemide (LASIX) 20 MG tablet Take 2 tablets (40 mg) by mouth daily 180 tablet 3     ipratropium - albuterol 0.5 mg/2.5 mg/3 mL (DUONEB) 0.5-2.5 (3) MG/3ML neb solution Take 1 vial (3 mLs) by nebulization every 6 hours as needed for shortness of breath / dyspnea or wheezing 360 mL       Lactobacillus (PROBIOTIC ACIDOPHILUS) TABS Take 1 tablet by mouth daily       losartan (COZAAR) 50 MG tablet Take 0.5 tablets (25 mg) by mouth daily 1 tablet 0     MELATONIN PO Take 5 mg by mouth nightly as needed        mirtazapine (REMERON) 15 MG tablet Take 1 tablet (15 mg) by mouth At Bedtime 30 tablet 0     Multiple Vitamins-Minerals (OCUVITE PO) Take 1 capsule by mouth daily.       nitroglycerin (NITROSTAT) 0.4 MG SL tablet Place 1 tablet (0.4 mg) under the tongue every 5 minutes as needed 25 tablet 1     pravastatin (PRAVACHOL) 40 MG tablet Take 1 tablet (40 mg) by mouth daily 90 tablet 0     simethicone (MYLICON) 40 MG/0.6ML suspension Take 0.6 mLs (40 mg) by mouth every 6 hours as needed for cramping 500 mL 0     warfarin (COUMADIN) 2 MG tablet TAKE ONE TABLET BY MOUTH ONCE DAILY OR  AS  DIRECTED  BY  INR  CLINIC 90 tablet 0       Soc Hx: reviewed  Fam Hx: reviewed          Dannielle Young MD  Internal Medicine       Hospital Follow-up Visit:    Hospital/Nursing Home/IP Rehab Facility: [facility]  Date of Admission: 4/24/18  Date of Discharge: 4/29/18  Reason(s) for Admission: Abdominal pain, left lower quadrant [R10.32]  Drug overdose, undetermined intent, initial encounter [T50.904A]  Constipation, unspecified constipation type [K59.00]  Insomnia, unspecified type [G47.00]            Problems taking medications regularly:  None       Medication changes since discharge: None       Problems adhering to non-medication therapy:  None    Summary of hospitalization:  [facility] discharge summary reviewed  Diagnostic Tests/Treatments reviewed.  Follow up needed: as above   Other Healthcare Providers Involved in Patient s Care:         None  Update since discharge: improved.     Post Discharge Medication Reconciliation: discharge medications reconciled and changed, per note/orders (see AVS).  Plan of care communicated with patient and family     Coding guidelines for this visit:  Type of  Medical   Decision Making Face-to-Face Visit       within 7 Days of discharge Face-to-Face Visit        within 14 days of discharge   Moderate Complexity [id number]   High Complexity [id number] [id number]

## 2018-05-10 ENCOUNTER — ALLIED HEALTH/NURSE VISIT (OUTPATIENT)
Dept: CARDIOLOGY | Facility: CLINIC | Age: 83
End: 2018-05-10
Payer: COMMERCIAL

## 2018-05-10 DIAGNOSIS — Z95.0 CARDIAC PACEMAKER IN SITU: Primary | ICD-10-CM

## 2018-05-10 PROCEDURE — 93293 PM PHONE R-STRIP DEVICE EVAL: CPT | Performed by: INTERNAL MEDICINE

## 2018-05-10 NOTE — MR AVS SNAPSHOT
"              After Visit Summary   5/10/2018    Leonor Mercado    MRN: [id number]           Patient Information     Date Of Birth          5/6/1926        Visit Information        Provider Department      5/10/2018 9:30 AM SAVANNAH ROB [facility]        Today's Diagnoses     Cardiac pacemaker in situ    -  1       Follow-ups after your visit        Your next 10 appointments already scheduled     May 15, 2018  1:00 PM CDT   Evaluation with JEFFREY Baires   [facility] ([facility])    [address] [address] [phone]            Aug 16, 2018  9:30 AM CDT   Phone Device Check with SAVANNAH ROB   [facility]   [place] [facility])    [id number]  [facility] [id number] [phone] OPT 2              Who to contact     If you have questions or need follow up information about today's clinic visit or your schedule please contact [facility] directly at [phone].  Normal or non-critical lab and imaging results will be communicated to you by [employer], letter or phone within 4 business days after the clinic has received the results. If you do not hear from us within 7 days, please contact the clinic through [employer] or phone. If you have a critical or abnormal lab result, we will notify you by phone as soon as possible.  Submit refill requests through [employer] or call your pharmacy and they will forward the refill request to us. Please allow 3 business days for your refill to be completed.          Additional Information About Your Visit        MyChart Information     [employer] lets you send messages to your doctor, view your test results, renew your prescriptions, schedule appointments and more. To sign up, go to [email or web address] . Click on \"Log in\" on the left side of the screen, which " "will take you to the Welcome page. Then click on \"Sign up Now\" on the right side of the page.     You will be asked to enter the access code listed below, as well as some personal information. Please follow the directions to create your username and password.     Your access code is: [email or web address]  Expires: 7/15/2018 12:42 PM     Your access code will  in 90 days. If you need help or a new code, please call your [place] clinic or [phone].        Care EveryWhere ID     This is your Care EveryWhere ID. This could be used by other organizations to access your [place] medical records  [id number]        Your Vitals Were     Last Period                   (LMP Unknown)            Blood Pressure from Last 3 Encounters:   18 104/60   18 110/62   18 96/58    Weight from Last 3 Encounters:   18 61.1 kg (134 lb 11.2 oz)   18 60.2 kg (132 lb 11.5 oz)   18 59.5 kg (131 lb 1.6 oz)              We Performed the Following     PM PHONE R STRIP EVAL UP TO 90 DAYS ([phone])        Primary Care Provider Office Phone # Fax #    Dannielle Bria Darby [phone]       [address] [facility] [address]        Goals        General    Medication 1 (pt-stated)     Notes - Note created  2018 10:05 AM by Yaquelin Varela RN    Goal Statement: I will take medications only are ordered.     Measure of Success: No more overdose episodes    Supportive Steps to Achieve: I will accept help from home care.     Barriers: Depressed     Strengths: Family support    Date to Achieve By: 18        Equal Access to Services     NALDO OVALLE AH: Hadjane Dasilva, william bentley, keily umañaalantonia mathur. So [facility] [phone].    ATENCIÓN: Si habla español, tiene a spicer disposición servicios gratuitos de asistencia lingüística. Llame al [phone].    We comply with applicable federal civil rights laws and [place] laws. " We do not discriminate on the basis of race, color, national origin, age, disability, sex, sexual orientation, or gender identity.            Thank you!     Thank you for choosing [facility]  for your care. Our goal is always to provide you with excellent care. Hearing back from our patients is one way we can continue to improve our services. Please take a few minutes to complete the written survey that you may receive in the mail after your visit with us. Thank you!             Your Updated Medication List - Protect others around you: Learn how to safely use, store and throw away your medicines at www.disposemymeds.org.          This list is accurate as of 5/10/18  9:49 AM.  Always use your most recent med list.                   Brand Name Dispense Instructions for use Diagnosis    albuterol 108 (90 Base) MCG/ACT Inhaler    PROAIR HFA/PROVENTIL HFA/VENTOLIN HFA    1 Inhaler    Inhale 2 puffs into the lungs every 6 hours as needed for shortness of breath / dyspnea or wheezing    Chronic obstructive pulmonary disease, unspecified COPD type (H)       BREO ELLIPTA 200-25 MCG/INH oral inhaler   Generic drug:  fluticasone-vilanterol      Inhale 1 puff into the lungs daily    Anxiety       calcium carbonate 600 MG tablet   Generic drug:  calcium carbonate      Take 1 tablet by mouth daily        diltiazem 240 MG 24 hr capsule    DILTIAZEM CD    30 capsule    Take 1 capsule (240 mg) by mouth daily    Chronic atrial fibrillation (H)       furosemide 20 MG tablet    LASIX    180 tablet    Take 2 tablets (40 mg) by mouth daily    Chronic diastolic congestive heart failure (H)       ipratropium - albuterol 0.5 mg/2.5 mg/3 mL 0.5-2.5 (3) MG/3ML neb solution    DUONEB    360 mL    Take 1 vial (3 mLs) by nebulization every 6 hours as needed for shortness of breath / dyspnea or wheezing    Chronic obstructive pulmonary disease, unspecified COPD type (H)       losartan 50 MG tablet    COZAAR     1 tablet    Take 0.5 tablets (25 mg) by mouth daily    Essential hypertension with goal blood pressure less than 140/90       MELATONIN PO      Take 5 mg by mouth nightly as needed        mirtazapine 15 MG tablet    REMERON    30 tablet    Take 1 tablet (15 mg) by mouth At Bedtime    Adjustment disorder, unspecified type       nitroGLYcerin 0.4 MG sublingual tablet    NITROSTAT    25 tablet    Place 1 tablet (0.4 mg) under the tongue every 5 minutes as needed    Other chest pain       OCUVITE PO      Take 1 capsule by mouth daily.        pravastatin 40 MG tablet    PRAVACHOL    90 tablet    Take 1 tablet (40 mg) by mouth daily    Hyperlipidemia LDL goal <100, Coronary artery disease involving native coronary artery of native heart without angina pectoris       PROBIOTIC ACIDOPHILUS Tabs      Take 1 tablet by mouth daily        simethicone 40 MG/0.6ML suspension    MYLICON    500 mL    Take 0.6 mLs (40 mg) by mouth every 6 hours as needed for cramping    Abdominal pain, left lower quadrant       STOOL SOFTENER PO      Take 1 tablet by mouth daily as needed        TYLENOL PO      Take 1,000 mg by mouth nightly as needed        vitamin D 2000 units tablet      Take 2,000 Units by mouth daily        warfarin 2 MG tablet    COUMADIN    90 tablet    TAKE ONE TABLET BY MOUTH ONCE DAILY OR  AS  DIRECTED  BY  INR  CLINIC    Long term current use of anticoagulant therapy

## 2018-05-11 ENCOUNTER — ANTICOAGULATION THERAPY VISIT (OUTPATIENT)
Dept: ANTICOAGULATION | Facility: CLINIC | Age: 83
End: 2018-05-11
Payer: COMMERCIAL

## 2018-05-11 DIAGNOSIS — Z79.01 LONG-TERM (CURRENT) USE OF ANTICOAGULANTS: ICD-10-CM

## 2018-05-11 DIAGNOSIS — I48.20 CHRONIC ATRIAL FIBRILLATION (H): ICD-10-CM

## 2018-05-11 LAB — INR PPP: 3.1

## 2018-05-11 PROCEDURE — 99207 ZZC NO CHARGE NURSE ONLY: CPT | Performed by: INTERNAL MEDICINE

## 2018-05-11 NOTE — PROGRESS NOTES
"  ANTICOAGULATION FOLLOW-UP CLINIC VISIT    Patient Name:  Leonor Mercado  Date:  5/11/2018  Contact Type:  Telephone/ orders given to Beata with [facility]    SUBJECTIVE:     Patient Findings     Positives No Problem Findings (dischaged from hospital (4/29) s/p overdose.  Pt has been doing well since discharge)           OBJECTIVE    INR   Date Value Ref Range Status   05/11/2018 3.1  Final       ASSESSMENT / PLAN  INR assessment THER    Recheck INR In: 2 WEEKS    INR Location [facility] INR      Anticoagulation Summary as of 5/11/2018     INR goal 2.0-3.0   Today's INR 3.1!   Maintenance plan 2 mg (2 mg x 1) every day   Full instructions 2 mg every day   Weekly total 14 mg   No change documented Yoana Waddell RN   Plan last modified Yoana Waddell RN (12/15/2016)   Next INR check 5/25/2018   Priority INR   Target end date     Indications   Long-term (current) use of anticoagulants [Z79.01] [Z79.01]  A Fib - chr Coumadin  s/p PPM (H) [I48.2]         Anticoagulation Episode Summary     INR check location     Preferred lab     Send INR reminders to [facility]    Comments Pt's 1st name is pronounced \"ondray\"  Pt does not want print out, appt card only      Anticoagulation Care Providers     Provider Role Specialty Phone number    Dannielle Darby MD [facility] Internal Medicine [phone]            See the Encounter Report to view Anticoagulation Flowsheet and Dosing Calendar (Go to Encounters tab in chart review, and find the Anticoagulation Therapy Visit)    Dosage adjustment made based on physician directed care plan.    Yoana Waddell RN               "

## 2018-05-11 NOTE — MR AVS SNAPSHOT
Leonor AMAURY Mercado   5/11/2018   Anticoagulation Therapy Visit    Description:  89 year old female   Provider:  Dannielle Darby MD   Department:  [facility]           INR as of 5/11/2018     Today's INR 3.1!      Anticoagulation Summary as of 5/11/2018     INR goal 2.0-3.0   Today's INR 3.1!   Full instructions 2 mg every day   Next INR check 5/25/2018    Indications   Long-term (current) use of anticoagulants [Z79.01] [Z79.01]  A Fib - chr Coumadin  s/p PPM (H) [I48.2]         Contact Numbers     [facility] Phone Numbers:  Anticoagulation Clinic Appointments : [phone]  Anticoagulation Nurse: [phone]         May 2018 Details    Sun Mon Tue Wed Thu Fri Sat       1               2               3               4               5                 6               7               8               9               10               11      2 mg   See details      12      2 mg           13      2 mg         14      2 mg         15      2 mg         16      2 mg         17      2 mg         18      2 mg         19      2 mg           20      2 mg         21      2 mg         22      2 mg         23      2 mg         24      2 mg         25            26                 27               28               29               30               31                  Date Details   05/11 This INR check       Date of next INR:  5/25/2018         How to take your warfarin dose     To take:  2 mg Take 1 of the 2 mg tablets.

## 2018-05-16 ENCOUNTER — TELEPHONE (OUTPATIENT)
Dept: INTERNAL MEDICINE | Facility: CLINIC | Age: 83
End: 2018-05-16

## 2018-05-18 ENCOUNTER — MEDICAL CORRESPONDENCE (OUTPATIENT)
Dept: HEALTH INFORMATION MANAGEMENT | Facility: CLINIC | Age: 83
End: 2018-05-18

## 2018-05-18 ENCOUNTER — TELEPHONE (OUTPATIENT)
Dept: INTERNAL MEDICINE | Facility: CLINIC | Age: 83
End: 2018-05-18

## 2018-05-21 DIAGNOSIS — Z53.9 DIAGNOSIS NOT YET DEFINED: Primary | ICD-10-CM

## 2018-05-21 PROCEDURE — G0180 MD CERTIFICATION HHA PATIENT: HCPCS | Performed by: INTERNAL MEDICINE

## 2018-05-25 ENCOUNTER — ANTICOAGULATION THERAPY VISIT (OUTPATIENT)
Dept: ANTICOAGULATION | Facility: CLINIC | Age: 83
End: 2018-05-25
Payer: COMMERCIAL

## 2018-05-25 DIAGNOSIS — I48.20 CHRONIC ATRIAL FIBRILLATION (H): ICD-10-CM

## 2018-05-25 DIAGNOSIS — Z79.01 LONG-TERM (CURRENT) USE OF ANTICOAGULANTS: ICD-10-CM

## 2018-05-25 LAB — INR PPP: 3.6

## 2018-05-25 PROCEDURE — 99207 ZZC NO CHARGE NURSE ONLY: CPT | Performed by: INTERNAL MEDICINE

## 2018-05-25 NOTE — MR AVS SNAPSHOT
Leonor AMAURY Mercado   5/25/2018   Anticoagulation Therapy Visit    Description:  89 year old female   Provider:  Dannielle Darby MD   Department:  [facility]           INR as of 5/25/2018     Today's INR 3.6!      Anticoagulation Summary as of 5/25/2018     INR goal 2.0-3.0   Today's INR 3.6!   Full warfarin instructions 5/25: Hold; 6/1: 1 mg; Otherwise 2 mg every day   Next INR check 6/4/2018    Indications   Long-term (current) use of anticoagulants [Z79.01] [Z79.01]  A Fib - chr Coumadin  s/p PPM (H) [I48.2]         Contact Numbers     [facility] Phone Numbers:  Anticoagulation Clinic Appointments : [phone]  Anticoagulation Nurse: [phone]         May 2018 Details    Sun Mon Tue Wed Thu Fri Sat       1               2               3               4               5                 6               7               8               9               10               11               12                 13               14               15               16               17               18               19                 20               21               22               23               24               25      Hold   See details      26      2 mg           27      2 mg         28      2 mg         29      2 mg         30      2 mg         31      2 mg            Date Details   05/25 This INR check               How to take your warfarin dose     To take:  2 mg Take 1 of the 2 mg tablets.    Hold Do not take your warfarin dose. See the Details table to the right for additional instructions.                June 2018 Details    Sun Mon Tue Wed Thu Fri Sat          1      1 mg         2      2 mg           3      2 mg         4            5               6               7               8               9                 10               11               12               13               14               15               16                 17               18               19                20               21               22               23                 24               25               26               27               28               29               30                Date Details   No additional details    Date of next INR:  6/4/2018         How to take your warfarin dose     To take:  1 mg Take 0.5 of a 2 mg tablet.    To take:  2 mg Take 1 of the 2 mg tablets.

## 2018-05-25 NOTE — PROGRESS NOTES
"  ANTICOAGULATION FOLLOW-UP CLINIC VISIT    Patient Name:  Leonor Mercado  Date:  5/25/2018  Contact Type:  Telephone/ Beata with [facility] advised.    SUBJECTIVE:     Patient Findings     Positives Change in diet/appetite (Pt has been eating less green veggies recently)    Comments Pt will be discharged from home care today.             OBJECTIVE    INR   Date Value Ref Range Status   05/25/2018 3.6  Final       ASSESSMENT / PLAN  INR assessment SUPRA    Recheck INR In: 10 DAYS    INR Location [facility]R      Anticoagulation Summary as of 5/25/2018     INR goal 2.0-3.0   Today's INR 3.6!   Warfarin maintenance plan 2 mg (2 mg x 1) every day   Full warfarin instructions 5/25: Hold; 6/1: 1 mg; Otherwise 2 mg every day   Weekly warfarin total 14 mg   Plan last modified Yoana Waddell RN (12/15/2016)   Next INR check 6/4/2018   Priority INR   Target end date     Indications   Long-term (current) use of anticoagulants [Z79.01] [Z79.01]  A Fib - chr Coumadin  s/p PPM (H) [I48.2]         Anticoagulation Episode Summary     INR check location     Preferred lab     Send INR reminders to [facility]    Comments Pt's 1st name is pronounced \"ondray\"  Pt does not want print out, appt card only      Anticoagulation Care Providers     Provider Role Specialty Phone number    Dannielle Darby MD [facility] Internal Medicine [phone]            See the Encounter Report to view Anticoagulation Flowsheet and Dosing Calendar (Go to Encounters tab in chart review, and find the Anticoagulation Therapy Visit)    Dosage adjustment made based on physician directed care plan.    Yoana Waddell RN               "

## 2018-05-28 DIAGNOSIS — Z79.01 LONG TERM CURRENT USE OF ANTICOAGULANT THERAPY: ICD-10-CM

## 2018-05-30 RX ORDER — WARFARIN SODIUM 2 MG/1
TABLET ORAL
Qty: 90 TABLET | Refills: 1 | Status: SHIPPED | OUTPATIENT
Start: 2018-05-30 | End: 2018-11-23

## 2018-05-30 NOTE — TELEPHONE ENCOUNTER
"Warfarin   2 mg    Last Written Prescription Date:  11/17/17  Last Fill Quantity: 90,   # refills: 0  Last Office Visit: 5/7/18  Future Office visit:       Requested Prescriptions   Pending Prescriptions Disp Refills     warfarin (COUMADIN) 2 MG tablet [Pharmacy Med Name: WARFARIN 2MG        TAB] 90 tablet 0     Sig: TAKE ONE TABLET BY MOUTH ONCE DAILY OR  AS  DIRECTED  BY  INR  CLINIC    Vitamin K Antagonists Failed    5/28/2018  9:53 AM       Failed - INR is within goal in the past 6 weeks    Confirm INR is within goal in the past 6 weeks.     Recent Labs   Lab Test 05/25/18   INR  3.6                      Passed - Recent (12 mo) or future (30 days) visit within the authorizing provider's specialty    Patient had office visit in the last 12 months or has a visit in the next 30 days with authorizing provider or within the authorizing provider's specialty.  See \"Patient Info\" tab in inbasket, or \"Choose Columns\" in Meds & Orders section of the refill encounter.           Passed - Patient is 18 years of age or older       Passed - Patient is not pregnant       Passed - No positive pregnancy on file in past 12 months        Prescription approved per [facility] Refill Protocol.  Dilma Rider RN    "

## 2018-05-31 ENCOUNTER — TELEPHONE (OUTPATIENT)
Dept: INTERNAL MEDICINE | Facility: CLINIC | Age: 83
End: 2018-05-31

## 2018-05-31 DIAGNOSIS — I25.10 CORONARY ARTERY DISEASE INVOLVING NATIVE CORONARY ARTERY OF NATIVE HEART WITHOUT ANGINA PECTORIS: ICD-10-CM

## 2018-05-31 DIAGNOSIS — E78.5 HYPERLIPIDEMIA LDL GOAL <100: ICD-10-CM

## 2018-06-04 ENCOUNTER — ALLIED HEALTH/NURSE VISIT (OUTPATIENT)
Dept: PHARMACY | Facility: CLINIC | Age: 83
End: 2018-06-04
Payer: COMMERCIAL

## 2018-06-04 ENCOUNTER — PATIENT OUTREACH (OUTPATIENT)
Dept: CARE COORDINATION | Facility: CLINIC | Age: 83
End: 2018-06-04

## 2018-06-04 DIAGNOSIS — E78.5 HYPERLIPIDEMIA LDL GOAL <100: ICD-10-CM

## 2018-06-04 DIAGNOSIS — J44.9 CHRONIC OBSTRUCTIVE PULMONARY DISEASE, UNSPECIFIED COPD TYPE (H): Primary | ICD-10-CM

## 2018-06-04 DIAGNOSIS — F41.9 ANXIETY: ICD-10-CM

## 2018-06-04 DIAGNOSIS — G47.00 INSOMNIA, UNSPECIFIED TYPE: ICD-10-CM

## 2018-06-04 DIAGNOSIS — I25.10 CORONARY ARTERY DISEASE INVOLVING NATIVE CORONARY ARTERY OF NATIVE HEART WITHOUT ANGINA PECTORIS: ICD-10-CM

## 2018-06-04 DIAGNOSIS — F43.22 ADJUSTMENT DISORDER WITH ANXIOUS MOOD: ICD-10-CM

## 2018-06-04 PROCEDURE — 99606 MTMS BY PHARM EST 15 MIN: CPT | Performed by: PHARMACIST

## 2018-06-04 RX ORDER — PRAVASTATIN SODIUM 40 MG
40 TABLET ORAL DAILY
Qty: 90 TABLET | Refills: 0 | Status: SHIPPED | OUTPATIENT
Start: 2018-06-04 | End: 2018-10-21

## 2018-06-04 RX ORDER — PRAVASTATIN SODIUM 40 MG
TABLET ORAL
Qty: 90 TABLET | Refills: 0 | OUTPATIENT
Start: 2018-06-04

## 2018-06-04 NOTE — PROGRESS NOTES
"Clinic Care Coordination Contact  Care Team Conversations    RN CC follow up call.  Verified pt is no long being seen by  home care.      Spoke with patient.  States she is doing ok. Uses her walker \"all the time\".  She has had 2 recent falls, no serious injury just \"sore legs\".  Patient states she has had increased swelling in her legs, tried to get into see PCP but cold not get anything soon.  Denies CP or SOB.  Patient states everything else is fine.  She eats a regular diet.  Encouraged her to eat low last due to her hx of CHF.  Pt states she does watch her weight closely and takes her lasix.  She has no questions about her medications.      Pt seemed in a hurry.  When asked, she states she has a lot going on.  She is trying to pack up her Condo so she can sell it. She would like to move into an long-term, but can't afford it.  Her monthly income is $1700/month.  She had also asked about house keeping, stating its very difficult for her to maintain her apartment.      RN CC will send info to patient regarding county services and how to get assessment done. Will also include DARTS, and senior linkage line.    Patient has f/u scheduled with PCP on 6/14.  Patient encouraged to keep this appointment. RN CC will f/u with patient after PCP appointment to see if there are any other needs.  Care coordination letter and access plan to be mailed.  Pt given contact info and encouraged to call with any other questions or needs.    Yaz Ojeda RN-BSN   Care Coordination  Phone:  [phone]  Email: [email or web address]      "

## 2018-06-04 NOTE — LETTER
Health Care Home - Access Care Plan    About Me  Patient Name:  Leonor Mercado    YOB: 1926  Age:                             89 year old   Yesica MRN:            [id number] Telephone Information:     Home Phone [phone]   Mobile [phone]       Address:    [address] [place] [address] Email address:  No e-mail address on record      Emergency Contact(s)  Name Relationship Lgl Grd Work Phone Home Phone Mobile Phone   1. FERNANDA BUTLER Daughter   [phone]   2. ZOE COULTER Daughter  [phone]   3. YOVANY MERCADO Son   [phone]             Health Maintenance:      My Access Plan  Medical Emergency 911   Questions or concerns during clinic hours Primary Clinic Line, I will call the clinic directly: [facility] - [phone]   24 Hour Appointment Line [phone] or  [phone]) (toll free)   24 Hour Nurse Line [phone] (toll free)   Questions or concerns outside clinic hours 24 Hour Appointment Line, I will call the after-hours on-call line:   [facility] [phone] or [phone]) (toll-free)   Preferred Urgent Care     Preferred Hospital     Preferred Pharmacy [facility] [address] [address] [facility]     Behavioral Health Crisis Line The National Suicide Prevention Lifeline at 1-165.844.1713 or 911     My Care Team Members  Patient Care Team       Relationship Specialty Notifications Start End    Dannielle Darby MD PCP - General Internal Medicine  12/1/16     Phone: [phone] Pager: [phone] Fax: [phone]        [address] [facility] [address]    Collins Horton MD MD Cardiology  3/17/15     Phone: [phone] Fax: [phone] [address] [place] [address]    Jennifer Payne   Gastroenterology  3/17/15     Phone: [phone] Fax: [phone] [place]  [facility] [address] [place] [address]    Yaz Ojeda, RN Clinic Care Coordinator Primary Care - CC  5/1/18     Phone: [phone] Fax: [phone]               My Medical and Care Information  Problem List   Patient Active Problem List   Diagnosis     Hyperlipidemia LDL goal <100     A Fib - chr Coumadin, s/p PPM (H)     Cardiac pacemaker in situ     Advanced directives, counseling/discussion     Senile osteoporosis     Irritable bowel syndrome (IBS)     Fear of flying     Anemia     Eosinophilia     COPD (H)     CAD - 2 stents in [place] in 2000s.      Long-term (current) use of anticoagulants [Z79.01]     Degeneration of lumbar intervertebral disc     CHF - Chr Diastolic (H)     Sick sinus syndrome - s/p PPM 2003 (H)     PMHx 2017: CAD ( 2 stents 2000s) , diastolic CHF, COPD, chr AFib, PPM ( 2003 for sinus sick syndrome), HTN, hyperlipidemia, osteoporosis      SOB (shortness of breath)     CKD (chronic kidney disease) stage 3, GFR 30-59 ml/min     Ingestion of substance      Current Medications and Allergies:  See printed Medication Report

## 2018-06-04 NOTE — TELEPHONE ENCOUNTER
"  Per chart-Pharm D did refill     Requested Prescriptions   Pending Prescriptions Disp Refills     pravastatin (PRAVACHOL) 40 MG tablet [Pharmacy Med Name: PRAVASTATIN 40MG    TAB] 90 tablet 0     Sig: TAKE ONE TABLET BY MOUTH ONCE DAILY    Statins Protocol Passed    5/31/2018  5:30 AM       Passed - LDL on file in past 12 months    Recent Labs   Lab Test  12/11/17   0959   LDL  54            Passed - No abnormal creatine kinase in past 12 months    No lab results found.            Passed - Recent (12 mo) or future (30 days) visit within the authorizing provider's specialty    Patient had office visit in the last 12 months or has a visit in the next 30 days with authorizing provider or within the authorizing provider's specialty.  See \"Patient Info\" tab in inbasket, or \"Choose Columns\" in Meds & Orders section of the refill encounter.           Passed - Patient is age 18 or older       Passed - No active pregnancy on record       Passed - No positive pregnancy test in past 12 months          "

## 2018-06-04 NOTE — PROGRESS NOTES
SUBJECTIVE/OBJECTIVE:                           Leonor Mercado is a 89 year old female called for a follow-up visit for Medication Therapy Management.  She was referred to me from Dr. Young.     Chief Complaint: Last MTM visit 4/3/18.  She has been out of pravastatin    Allergies/ADRs: Reviewed in Epic  Tobacco: History of tobacco dependence - quit 1987  Alcohol: 1-3 beverages / week      Hyperlipidemia: Current therapy includes pravastatin 40mg once daily - per patient she has been out of this; I did call the  [facility] pharmacy and they have records of it being filled on 4/16/18 for 90 days.  Pt doesn't recall ever picking anything up from the [facility] pharmacy. She was seen that day by Maria Isabel Rosario for an acute issue.    Depression/Anxiety/Insomnia:  Current medications include: mirtazapine 15 mg HS and Melatonin 5 mg HS.  Prior she was on both sertraline (constipation, nausea) and paroxetine; she was admitted in April for an intentional overdose after swallowing the bottle of paroxetine because she wanted to sleep.  Reports sleep is on and off.  She told her family she was going to stop the mirtazapine because she wasn't sleeping but they wanted her to continue because it has made a difference in her mood.  No concerns for side effects.  PHQ-9 SCORE 11/17/2017 4/3/2018 4/20/2018   Total Score - - -   Total Score 12 16 8        COPD: Current medications: Breo once daily, DuoNeb BID, oxygen at night and Albuterol MDI as needed.  Tried Mucinex but didn't feel it was helpful so stopped.  Has met with Dr. Her.    Pt reports the following symptoms: minimal SOB with exertion compared to how she was feeling this winter.    COPD Action Plan on file      Current labs include:  BP Readings from Last 3 Encounters:   05/07/18 104/60   04/29/18 110/62   04/20/18 96/58     Today's Vitals: LMP  (LMP Unknown) phone visit      Liver Function Studies -   Recent Labs   Lab Test  11/15/16   0050   PROTTOTAL  6.7*   ALBUMIN  3.7    BILITOTAL  0.6   ALKPHOS  55   AST  18   ALT  18     Last Basic Metabolic Panel:    GFR Estimate   Date Value Ref Range Status   04/26/2018 36 (L) >60 mL/min/1.7m2 Final     Comment:     Non  GFR Calc   04/25/2018 36 (L) >60 mL/min/1.7m2 Final     Comment:     Non  GFR Calc   04/24/2018 43 (L) >60 mL/min/1.7m2 Final     Comment:     Non  GFR Calc     TSH   Date Value Ref Range Status   04/24/2018 3.01 0.40 - 4.00 mU/L Final       Most Recent Immunizations   Administered Date(s) Administered     Influenza (High Dose) 3 valent vaccine 09/26/2017     Influenza (IIV3) PF 10/01/2014     Pneumococcal 23 valent 10/15/2012     Tdap (Adacel,Boostrix) 10/15/2012       ASSESSMENT:                             Current medications were reviewed today.       Medication Adherence: issues identified    Hyperlipidemia: Needs Improvement. Sent refill for pravastatin for pt to resume daily    Depression/Anxiety/Insomnia:  Improved.  Continue current regimen    COPD: improved.  Continue current regimen.  Refill sent for Breo  .     PLAN:                            1. Pravastatin and Breo refill sent today    I spent 15 minutes with this patient today. I offer these suggestions for consideration by the PCP. A copy of the visit note was provided to the patient's primary care provider.    Will follow up in 2 months.      Shakira Blackman , Pharm D  [phone] (phone)  [phone] (pager)  Medication Therapy Management Pharmacist

## 2018-06-04 NOTE — LETTER
[place] CARE COORDINATION  [address]  [facility] [address]    June 4, 2018    Leonor Mercado  [address]    [facility] [address]      Dear Leonor,    I am a clinic care coordinator who works with Dannielle Darby MD at [facility]. I wanted to thank you for spending the time to talk with me.  I wanted to introduce myself and provide you with my contact information so that you can call me with questions or concerns about your health care. Below is a description of clinic care coordination and how I can further assist you.     The clinic care coordinator is a registered nurse and/or  who understand the health care system. The goal of clinic care coordination is to help you manage your health and improve access to the [facility] system in the most efficient manner. The registered nurse can assist you in meeting your health care goals by providing education, coordinating services, and strengthening the communication among your providers. The  can assist you with financial, behavioral, psychosocial, chemical dependency, counseling, and/or psychiatric resources.    Please feel free to contact me at [phone], with any questions or concerns. We at [facility] are focused on providing you with the highest-quality healthcare experience possible and that all starts with you.     Sincerely,     Yaz Ojeda RN-BSN   Care Coordination  Phone:  [phone]  Email: [email or web address]    Enclosed: I have enclosed a copy of a 24 Hour Access Plan. This has helpful phone numbers for you to call when needed. Please keep this in an easy to access place to use as needed.

## 2018-06-04 NOTE — MR AVS SNAPSHOT
After Visit Summary   6/4/2018    Leonor Mercado    MRN: [id number]           Patient Information     Date Of Birth          5/6/1926        Visit Information        Provider Department      6/4/2018 1:00 PM Shakira Blackman, [facility]        Today's Diagnoses     COPD (H)    -  1    Anxiety        Hyperlipidemia LDL goal <100        Coronary artery disease involving native coronary artery of native heart without angina pectoris        Adjustment disorder with anxious mood        Insomnia, unspecified type           Follow-ups after your visit        Your next 10 appointments already scheduled     Jun 04, 2018  1:00 PM CDT   TELEMEDICINE with Shakira Blackman [facility] ([facility])    [address]  [address]  [facility] [address] [phone]           Note: this is not an onsite visit; there is no need to come to the facility.            Jun 06, 2018 10:00 AM CDT   Anticoagulation Visit with [facility]   [facility] ([facility])    [address]  [facility] [address] [phone]            Jun 14, 2018 12:00 PM CDT   SHORT with Dannielle Darby MD   [facility] ([facility])    [address] [place] [address] [phone]            Aug 16, 2018  9:30 AM CDT   Phone Device Check with NUÑEZ TECH2   [facility] ([facility])    [address] [place], [place] [address]  [facility] [id number] [phone] OPT 2              Who to contact     If you have questions or need follow up information about today's clinic visit or your schedule please contact [facility] directly at [phone].  Normal or non-critical lab and imaging results will be communicated to you by MyChart, letter or phone within 4 business days after the clinic has received the results. If  "you do not hear from us within 7 days, please contact the clinic through [employer] or phone. If you have a critical or abnormal lab result, we will notify you by phone as soon as possible.  Submit refill requests through [employer] or call your pharmacy and they will forward the refill request to us. Please allow 3 business days for your refill to be completed.          Additional Information About Your Visit        [email or web address] Information     [employer] lets you send messages to your doctor, view your test results, renew your prescriptions, schedule appointments and more. To sign up, go to [email or web address] . Click on \"Log in\" on the left side of the screen, which will take you to the Welcome page. Then click on \"Sign up Now\" on the right side of the page.     You will be asked to enter the access code listed below, as well as some personal information. Please follow the directions to create your username and password.     Your access code is: [email or web address]  Expires: 7/15/2018 12:42 PM     Your access code will  in 90 days. If you need help or a new code, please call your [place] clinic or [phone].        Care EveryWhere ID     This is your Care EveryWhere ID. This could be used by other organizations to access your [facility] medical records  [id number]        Your Vitals Were     Last Period                   (LMP Unknown)            Blood Pressure from Last 3 Encounters:   18 104/60   18 110/62   18 96/58    Weight from Last 3 Encounters:   18 134 lb 11.2 oz (61.1 kg)   18 132 lb 11.5 oz (60.2 kg)   18 131 lb 1.6 oz (59.5 kg)              Today, you had the following     No orders found for display         Where to get your medicines      These medications were sent to [facility] [place], [place] - [address]  [address] [facility] [address]     Phone:  [phone]     fluticasone-vilanterol 200-25 MCG/INH oral inhaler    " pravastatin 40 MG tablet          Primary Care Provider Office Phone # Fax #    Dannielle Bria Darby [phone]       [address] [facility] [address]        Goals        General    Medication 1 (pt-stated)     Notes - Note created  4/30/2018 10:05 AM by Yaquelin Varela, RN    Goal Statement: I will take medications only are ordered.     Measure of Success: No more overdose episodes    Supportive Steps to Achieve: I will accept help from home care.     Barriers: Depressed     Strengths: Family support    Date to Achieve By: 6/1/18        Equal Access to Services     [facility]: Hadii aad ku hadasho Soomaali, waaxda luqadaha, qaybta kaalmada adeegyada, antonia cruz . So [facility] [phone].    ATENCIÓN: Si habla español, tiene a spicer disposición servicios gratuitos de asistencia lingüística. Llame al [phone].    We comply with applicable federal civil rights laws and [place] laws. We do not discriminate on the basis of race, color, national origin, age, disability, sex, sexual orientation, or gender identity.            Thank you!     Thank you for choosing [facility]  for your care. Our goal is always to provide you with excellent care. Hearing back from our patients is one way we can continue to improve our services. Please take a few minutes to complete the written survey that you may receive in the mail after your visit with us. Thank you!             Your Updated Medication List - Protect others around you: Learn how to safely use, store and throw away your medicines at www.disposemymeds.org.          This list is accurate as of 6/4/18 11:06 AM.  Always use your most recent med list.                   Brand Name Dispense Instructions for use Diagnosis    albuterol 108 (90 Base) MCG/ACT Inhaler    PROAIR HFA/PROVENTIL HFA/VENTOLIN HFA    1 Inhaler    Inhale 2 puffs into the lungs every 6 hours as needed for shortness of breath / dyspnea  or wheezing    Chronic obstructive pulmonary disease, unspecified COPD type (H)       calcium carbonate 600 MG tablet   Generic drug:  calcium carbonate      Take 1 tablet by mouth daily        diltiazem 240 MG 24 hr capsule    DILTIAZEM CD    30 capsule    Take 1 capsule (240 mg) by mouth daily    Chronic atrial fibrillation (H)       fluticasone-vilanterol 200-25 MCG/INH oral inhaler    BREO ELLIPTA    1 Inhaler    Inhale 1 puff into the lungs daily    Anxiety       furosemide 20 MG tablet    LASIX    180 tablet    Take 2 tablets (40 mg) by mouth daily    Chronic diastolic congestive heart failure (H)       ipratropium - albuterol 0.5 mg/2.5 mg/3 mL 0.5-2.5 (3) MG/3ML neb solution    DUONEB    360 mL    Take 1 vial (3 mLs) by nebulization every 6 hours as needed for shortness of breath / dyspnea or wheezing    Chronic obstructive pulmonary disease, unspecified COPD type (H)       losartan 50 MG tablet    COZAAR    1 tablet    Take 0.5 tablets (25 mg) by mouth daily    Essential hypertension with goal blood pressure less than 140/90       MELATONIN PO      Take 5 mg by mouth nightly as needed        mirtazapine 15 MG tablet    REMERON    30 tablet    Take 1 tablet (15 mg) by mouth At Bedtime    Adjustment disorder, unspecified type       nitroGLYcerin 0.4 MG sublingual tablet    NITROSTAT    25 tablet    Place 1 tablet (0.4 mg) under the tongue every 5 minutes as needed    Other chest pain       OCUVITE PO      Take 1 capsule by mouth daily.        pravastatin 40 MG tablet    PRAVACHOL    90 tablet    Take 1 tablet (40 mg) by mouth daily    Hyperlipidemia LDL goal <100, Coronary artery disease involving native coronary artery of native heart without angina pectoris       PROBIOTIC ACIDOPHILUS Tabs      Take 1 tablet by mouth daily        simethicone 40 MG/0.6ML suspension    MYLICON    500 mL    Take 0.6 mLs (40 mg) by mouth every 6 hours as needed for cramping    Abdominal pain, left lower quadrant       STOOL  SOFTENER PO      Take 1 tablet by mouth daily as needed        TYLENOL PO      Take 1,000 mg by mouth nightly as needed        vitamin D 2000 units tablet      Take 2,000 Units by mouth daily        warfarin 2 MG tablet    COUMADIN    90 tablet    TAKE ONE TABLET BY MOUTH ONCE DAILY OR  AS  DIRECTED  BY  INR  CLINIC    Long term current use of anticoagulant therapy

## 2018-06-06 ENCOUNTER — ANTICOAGULATION THERAPY VISIT (OUTPATIENT)
Dept: ANTICOAGULATION | Facility: CLINIC | Age: 83
End: 2018-06-06
Payer: COMMERCIAL

## 2018-06-06 DIAGNOSIS — I48.20 CHRONIC ATRIAL FIBRILLATION (H): ICD-10-CM

## 2018-06-06 DIAGNOSIS — Z79.01 LONG-TERM (CURRENT) USE OF ANTICOAGULANTS: ICD-10-CM

## 2018-06-06 LAB — INR POINT OF CARE: 1.7 (ref 0.86–1.14)

## 2018-06-06 PROCEDURE — 99207 ZZC NO CHARGE NURSE ONLY: CPT

## 2018-06-06 PROCEDURE — 36416 COLLJ CAPILLARY BLOOD SPEC: CPT

## 2018-06-06 PROCEDURE — 85610 PROTHROMBIN TIME: CPT | Mod: QW

## 2018-06-06 NOTE — PROGRESS NOTES
"  ANTICOAGULATION FOLLOW-UP CLINIC VISIT    Patient Name:  Leonor Mercado  Date:  6/6/2018  Contact Type:  Face to Face    SUBJECTIVE:     Patient Findings     Comments Recent dose decrease d/t elevated INR.            OBJECTIVE    INR Protime   Date Value Ref Range Status   06/06/2018 1.7 (A) 0.86 - 1.14 Final       ASSESSMENT / PLAN  INR assessment SUB    Recheck INR In: 8 DAYS    INR Location Clinic      Anticoagulation Summary as of 6/6/2018     INR goal 2.0-3.0   Today's INR 1.7!   Warfarin maintenance plan 2 mg (2 mg x 1) every day   Full warfarin instructions 6/6: 3 mg; Otherwise 2 mg every day   Weekly warfarin total 14 mg   Plan last modified Yoana Waddell RN (12/15/2016)   Next INR check 6/14/2018   Priority INR   Target end date     Indications   Long-term (current) use of anticoagulants [Z79.01] [Z79.01]  A Fib - chr Coumadin  s/p PPM (H) [I48.2]         Anticoagulation Episode Summary     INR check location     Preferred lab     Send INR reminders to [facility]    Comments Pt's 1st name is pronounced \"ondray\"  Pt does not want print out, appt card only      Anticoagulation Care Providers     Provider Role Specialty Phone number    Dannielle Darby MD [facility] [phone]            See the Encounter Report to view Anticoagulation Flowsheet and Dosing Calendar (Go to Encounters tab in chart review, and find the Anticoagulation Therapy Visit)    Dosage adjustment made based on physician directed care plan.    Dilma Rider RN               "

## 2018-06-06 NOTE — MR AVS SNAPSHOT
Leonor Mercado   6/6/2018 10:00 AM   Anticoagulation Therapy Visit    Description:  89 year old female   Provider:  [facility]   Department:  [facility] Anti Coagulation           INR as of 6/6/2018     Today's INR 1.7!      Anticoagulation Summary as of 6/6/2018     INR goal 2.0-3.0   Today's INR 1.7!   Full warfarin instructions 6/6: 3 mg; Otherwise 2 mg every day   Next INR check 6/14/2018    Indications   Long-term (current) use of anticoagulants [Z79.01] [Z79.01]  A Fib - chr Coumadin  s/p PPM (H) [I48.2]         Your next Anticoagulation Clinic appointment(s)     Jun 14, 2018  5:30 PM CDT   Anticoagulation Visit with [facility]   [facility]    [address] [facility] [address]  [facility] [id number] [phone]              Contact Numbers     [facility] Phone Numbers:  Anticoagulation Clinic Appointments : [phone]  Anticoagulation Nurse: [phone]         June 2018 Details    Sun Mon Tue Wed Thu Fri Sat          1               2                 3               4               5               6      3 mg   See details      7      2 mg         8      2 mg         9      2 mg           10      2 mg         11      2 mg         12      2 mg         13      2 mg         14            15               16                 17               18               19               20               21               22               23                 24               25               26               27               28               29               30                Date Details   06/06 This INR check       Date of next INR:  6/14/2018         How to take your warfarin dose     To take:  2 mg Take 1 of the 2 mg tablets.    To take:  3 mg Take 1.5 of the 2 mg tablets.

## 2018-06-14 ENCOUNTER — OFFICE VISIT (OUTPATIENT)
Dept: INTERNAL MEDICINE | Facility: CLINIC | Age: 83
End: 2018-06-14
Payer: COMMERCIAL

## 2018-06-14 ENCOUNTER — ANTICOAGULATION THERAPY VISIT (OUTPATIENT)
Dept: ANTICOAGULATION | Facility: CLINIC | Age: 83
End: 2018-06-14
Payer: COMMERCIAL

## 2018-06-14 VITALS
TEMPERATURE: 98 F | WEIGHT: 138 LBS | BODY MASS INDEX: 24.45 KG/M2 | DIASTOLIC BLOOD PRESSURE: 72 MMHG | RESPIRATION RATE: 20 BRPM | OXYGEN SATURATION: 97 % | HEART RATE: 79 BPM | SYSTOLIC BLOOD PRESSURE: 118 MMHG

## 2018-06-14 DIAGNOSIS — I48.20 CHRONIC ATRIAL FIBRILLATION (H): ICD-10-CM

## 2018-06-14 DIAGNOSIS — Z79.01 LONG-TERM (CURRENT) USE OF ANTICOAGULANTS: ICD-10-CM

## 2018-06-14 DIAGNOSIS — M25.511 ACUTE PAIN OF RIGHT SHOULDER: Primary | ICD-10-CM

## 2018-06-14 DIAGNOSIS — H61.23 BILATERAL IMPACTED CERUMEN: ICD-10-CM

## 2018-06-14 LAB — INR POINT OF CARE: 2.8 (ref 0.86–1.14)

## 2018-06-14 PROCEDURE — 85610 PROTHROMBIN TIME: CPT | Mod: QW

## 2018-06-14 PROCEDURE — 99213 OFFICE O/P EST LOW 20 MIN: CPT | Mod: 25 | Performed by: INTERNAL MEDICINE

## 2018-06-14 PROCEDURE — 99207 ZZC NO CHARGE NURSE ONLY: CPT

## 2018-06-14 PROCEDURE — 69210 REMOVE IMPACTED EAR WAX UNI: CPT | Performed by: INTERNAL MEDICINE

## 2018-06-14 PROCEDURE — 36416 COLLJ CAPILLARY BLOOD SPEC: CPT

## 2018-06-14 NOTE — MR AVS SNAPSHOT
After Visit Summary   6/14/2018    Leonor Mercado    MRN: [id number]           Patient Information     Date Of Birth          5/6/1926        Visit Information        Provider Department      6/14/2018 12:00 PM Dannielle Darby MD [facility]        Today's Diagnoses     Acute pain of right shoulder    -  1    Bilateral impacted cerumen          Care Instructions    Plan:  1. Use the Lidocaine patches   2. Your provider has referred you to: [facility], [phone]  3. The following vaccines are recommended for you.   Medicare covers better if you have these vaccines at the pharmacy:  -- Prevnar 13 ( pneumonia vaccine)  -- Shingerix vaccine - the newest vaccine for shingles           Follow-ups after your visit        Additional Services     [facility] PT, HAND, AND CHIROPRACTIC REFERRAL       === This order will print in the [facility] Scheduling  Office ===    Your provider has referred you to: [facility], [phone]     Indication/Reason for Referral: R shoulder   Onset of Illness:  1 month  Therapy Orders: Physical Therapy per protocol or clinical pathway.  Special Programs None   Special Request:Eval & Treat  Modalities: As indicated  Equipment: As indicated    Additional Comments:       Please be aware that coverage of these services is subject to the terms and limitations of your health insurance plan.  Call member services at your health plan with any benefit or coverage questions.      Please bring the following to your appointment:    >>   Any x-rays, CTs or MRIs which have been performed.  Contact the facility where they were done to arrange for  prior to your scheduled appointment.  Any new CT, MRI or other procedures ordered by your specialist must be performed at a [facility] [place] facility or coordinated by your clinic's referral office.    >>   List of current medications   >>   This referral request   >>   Any  documents/labs given to you for this referral                  Your next 10 appointments already scheduled     Jul 03, 2018  2:00 PM CDT   Anticoagulation [facility] with [facility]    [address]  [facility] [address] [phone]            Aug 16, 2018  9:30 AM CDT   Phone Device Check with NUÑEZ TECH2   [facility]   [place] ([facility])    [address]  [place] [address] [phone] OPT 2              Who to contact     If you have questions or need follow up information about today's clinic visit or your schedule please contact [facility] directly at [phone].  Normal or non-critical lab and imaging results will be communicated to you by MyChart, letter or phone within 4 business days after the clinic has received the results. If you do not hear from us within 7 days, please contact the clinic through MyChart or phone. If you have a critical or abnormal lab result, we will notify you by phone as soon as possible.  Submit refill requests through [employer] or call your pharmacy and they will forward the refill request to us. Please allow 3 business days for your refill to be completed.          Additional Information About Your Visit        Care EveryWhere ID     This is your Care EveryWhere ID. This could be used by other organizations to access your [place] medical records  [id number]        Your Vitals Were     Pulse Temperature Respirations Last Period Pulse Oximetry BMI (Body Mass Index)    79 98  F (36.7  C) (Oral) 20 (LMP Unknown) 97% 24.45 kg/m2       Blood Pressure from Last 3 Encounters:   06/14/18 118/72   05/07/18 104/60   04/29/18 110/62    Weight from Last 3 Encounters:   06/14/18 138 lb (62.6 kg)   05/07/18 134 lb 11.2 oz (61.1 kg)   04/24/18 132 lb 11.5 oz (60.2 kg)              We Performed the Following     [facility] PT, HAND, AND  CHIROPRACTIC REFERRAL          Today's Medication Changes          These changes are accurate as of 6/14/18 12:33 PM.  If you have any questions, ask your nurse or doctor.               Stop taking these medicines if you haven't already. Please contact your care team if you have questions.     simethicone 40 MG/0.6ML suspension   Commonly known as:  MYLICON   Stopped by:  Dannielle Darby MD                    Primary Care Provider Office Phone # Fax #    Dannielle Darby [phone]       [address] [facility] [address]        Goals        General    Medication 1 (pt-stated)     Notes - Note created  4/30/2018 10:05 AM by Yaquelin Varela RN    Goal Statement: I will take medications only are ordered.     Measure of Success: No more overdose episodes    Supportive Steps to Achieve: I will accept help from home care.     Barriers: Depressed     Strengths: Family support    Date to Achieve By: 6/1/18        Equal Access to Services     [facility]: Hadii aad ku hadasho Soomaali, waaxda luqadaha, qaybta kaalmada adeegyada, waxay idiin haywileyn dexter cruz . So [facility] [phone].    ATENCIÓN: Si habla español, tiene a spicer disposición servicios gratuitos de asistencia lingüística. Llame al [phone].    We comply with applicable federal civil rights laws and [place] laws. We do not discriminate on the basis of race, color, national origin, age, disability, sex, sexual orientation, or gender identity.            Thank you!     Thank you for choosing [facility]  for your care. Our goal is always to provide you with excellent care. Hearing back from our patients is one way we can continue to improve our services. Please take a few minutes to complete the written survey that you may receive in the mail after your visit with us. Thank you!             Your Updated Medication List - Protect others around you: Learn how to safely use, store and  throw away your medicines at www.disposemymeds.org.          This list is accurate as of 6/14/18 12:33 PM.  Always use your most recent med list.                   Brand Name Dispense Instructions for use Diagnosis    albuterol 108 (90 Base) MCG/ACT Inhaler    PROAIR HFA/PROVENTIL HFA/VENTOLIN HFA    1 Inhaler    Inhale 2 puffs into the lungs every 6 hours as needed for shortness of breath / dyspnea or wheezing    Chronic obstructive pulmonary disease, unspecified COPD type (H)       calcium carbonate 600 MG tablet   Generic drug:  calcium carbonate      Take 1 tablet by mouth daily        diltiazem 240 MG 24 hr capsule    DILTIAZEM CD    30 capsule    Take 1 capsule (240 mg) by mouth daily    Chronic atrial fibrillation (H)       fluticasone-vilanterol 200-25 MCG/INH oral inhaler    BREO ELLIPTA    1 Inhaler    Inhale 1 puff into the lungs daily    Anxiety       furosemide 20 MG tablet    LASIX    180 tablet    Take 2 tablets (40 mg) by mouth daily    Chronic diastolic congestive heart failure (H)       ipratropium - albuterol 0.5 mg/2.5 mg/3 mL 0.5-2.5 (3) MG/3ML neb solution    DUONEB    360 mL    Take 1 vial (3 mLs) by nebulization every 6 hours as needed for shortness of breath / dyspnea or wheezing    Chronic obstructive pulmonary disease, unspecified COPD type (H)       losartan 50 MG tablet    COZAAR    1 tablet    Take 0.5 tablets (25 mg) by mouth daily    Essential hypertension with goal blood pressure less than 140/90       MELATONIN PO      Take 5 mg by mouth nightly as needed        mirtazapine 15 MG tablet    REMERON    30 tablet    Take 1 tablet (15 mg) by mouth At Bedtime    Adjustment disorder, unspecified type       nitroGLYcerin 0.4 MG sublingual tablet    NITROSTAT    25 tablet    Place 1 tablet (0.4 mg) under the tongue every 5 minutes as needed    Other chest pain       OCUVITE PO      Take 1 capsule by mouth daily.        pravastatin 40 MG tablet    PRAVACHOL    90 tablet    Take 1 tablet (40  mg) by mouth daily    Hyperlipidemia LDL goal <100, Coronary artery disease involving native coronary artery of native heart without angina pectoris       PROBIOTIC ACIDOPHILUS Tabs      Take 1 tablet by mouth daily        STOOL SOFTENER PO      Take 1 tablet by mouth daily as needed        TYLENOL PO      Take 1,000 mg by mouth nightly as needed        vitamin D 2000 units tablet      Take 2,000 Units by mouth daily        warfarin 2 MG tablet    COUMADIN    90 tablet    TAKE ONE TABLET BY MOUTH ONCE DAILY OR  AS  DIRECTED  BY  INR  CLINIC    Long term current use of anticoagulant therapy

## 2018-06-14 NOTE — PROGRESS NOTES
Dr Young's note      Patient's instructions / PLAN:                                                        Plan:  1. Use the Lidocaine patches   2. Your provider has referred you to: [facility], [phone]  3. The following vaccines are recommended for you.   Medicare covers better if you have these vaccines at the pharmacy:  -- Prevnar 13 ( pneumonia vaccine)  -- Shingerix vaccine - the newest vaccine for shingles         ASSESSMENT & PLAN:                                                      (G25.568) Acute pain of right shoulder  (primary encounter diagnosis)  Comment:   Plan: [facility], [facility]CTIC REFERRAL            (H61.23) Bilateral impacted cerumen  Comment:   Plan: REMOVE IMPACTED CERUMEN               Chief complaint:                                                      Right shoulder pain  Decreased hearing      SUBJECTIVE:                                                    Leonor Mercado is a 89 year old female who presents to clinic today for the following health issues:    Right shoulder pain   -- can't sleep on the R shoulder   -- x 1-2 weeks  --No trauma  --No swelling  --     Decreased hearing  --She has hearing aids and the cardiologist told her she needs to have the ears checked  -- The auditory external canals have impacted wax bilaterally  The patient gave me verbal consent and I remove part of it with a curet and the rest was removed by MA by washing.  No complications       Review of Systems:                                                      ROS: negative for fever, chills, cough, wheezes, chest pain,  vomiting, abdominal pain, leg swelling positive for chronic shortness of breath, stable      OBJECTIVE:             Physical exam:  Blood pressure 118/72, pulse 79, temperature 98  F (36.7  C), temperature source Oral, resp. rate 20, weight 138 lb (62.6 kg), SpO2 97 %, not currently breastfeeding.   NAD, appears comfortable  Skin: no rashes   Neck:  supple, no JVD,  No thyroidmegaly. Lymph nodes nonpalpable cervical and supraclavicular.  Chest: clear to auscultation bilaterally, good respiratory effort  Heart: S1 S2, RRR, no mgr appreciated  Abdomen: soft, not tender,   Extremities: no edema  Neurologic: A, Ox3, no focal signs appreciated  Right shoulder with no swelling.  Some tenderness on the posterior side and over the deltoid muscles.  Decreased range of motion because of the pain    PMHx: reviewed  Past Medical History:   Diagnosis Date     A Fib - chr Coumadin, s/p PPM (H) 5/8/2013     Atrial fibrillation (H)     Sick sinus syndrome, PAT, AF     BCC (basal cell carcinoma of skin)     Face     CAD - 2 stents in [place] in 2000s.  1/5/2016     CHF (congestive heart failure) (H)     mild in past     CHF - Chr Diastolic (H) 11/21/2017     Chronic renal insufficiency      COPD (chronic obstructive pulmonary disease) (H)      COPD (H) 5/13/2013     Coronary artery disease     2-05[place]-CAD-taxus stentx2 2.5-12,2.5-12 in LADp and LADm, 80%nonDomRCA-LcxDom-mild,EF60%     Hyperlipidemia      Hypertension      IBS (irritable bowel syndrome)      Irritable bowel      Osteoporosis      Panic attack     questionable diagnosis     Pulmonary fibrosis (H)     do not use amiodarone     Shortness of breath      Sick sinus syndrome - s/p PPM 2003 (H) 12/16/2017     SSS (sick sinus syndrome) (H)     DDD pacer (see surgery history section)     Vertigo       PSHx: reviewed  Past Surgical History:   Procedure Laterality Date     APPENDECTOMY  1956     CARDIAC SURGERY      PPM, 2 STENTS2-05[place]-CAD-taxus stentx2 2.5-12,2.5-12 in LADp and LADm, 80%nonDomRCA-LcxDom-mild,EF60%     CORONARY ANGIOGRAPHY ADULT ORDER  7/1994    mild CAD,Normal LV function, No Mitral regurgitation, Prox LAD 30% stenosis. RCA prox and mid, 20-30%     ESOPHAGOSCOPY, GASTROSCOPY, DUODENOSCOPY (EGD), COMBINED N/A 8/19/2015    Procedure: COMBINED ESOPHAGOSCOPY, GASTROSCOPY, DUODENOSCOPY (EGD), BIOPSY SINGLE OR  MULTIPLE;  Surgeon: Genevieve Leon MD;  Location: [facility]     H LEAD REVISION DUAL  4/2003    Revised in [place]-due to diaphram stim (original pacer placed in [place])     H LEAD REVISION DUAL  7/2/2014    Correction of reversal of A and V leads in [place]     HEART CATH, ANGIOPLASTY  02/2005    LEIGH ANN mid and proximal LAD, ongoing 80% RCA; mild circumflex     HERNIA REPAIR Left 1991    [facility]     IMPLANT PACEMAKER  2/19/2003    Placed in [place] for sick sinus syndrome     REPLACE PACEMAKER GENERATOR  6/27/2013    Dual-chamber pacemaker by Dr SETH Pierre        Meds: reviewed  Current Outpatient Prescriptions   Medication Sig Dispense Refill     Acetaminophen (TYLENOL PO) Take 1,000 mg by mouth nightly as needed        albuterol (PROAIR HFA/PROVENTIL HFA/VENTOLIN HFA) 108 (90 BASE) MCG/ACT Inhaler Inhale 2 puffs into the lungs every 6 hours as needed for shortness of breath / dyspnea or wheezing 1 Inhaler 1     calcium carbonate (CALCIUM CARBONATE) 600 MG TABS tablet Take 1 tablet by mouth daily        Cholecalciferol (VITAMIN D) 2000 UNITS tablet Take 2,000 Units by mouth daily       diltiazem (DILTIAZEM CD) 240 MG 24 hr capsule Take 1 capsule (240 mg) by mouth daily 30 capsule      Docusate Calcium (STOOL SOFTENER PO) Take 1 tablet by mouth daily as needed        fluticasone-vilanterol (BREO ELLIPTA) 200-25 MCG/INH oral inhaler Inhale 1 puff into the lungs daily 1 Inhaler 3     furosemide (LASIX) 20 MG tablet Take 2 tablets (40 mg) by mouth daily 180 tablet 3     ipratropium - albuterol 0.5 mg/2.5 mg/3 mL (DUONEB) 0.5-2.5 (3) MG/3ML neb solution Take 1 vial (3 mLs) by nebulization every 6 hours as needed for shortness of breath / dyspnea or wheezing 360 mL      Lactobacillus (PROBIOTIC ACIDOPHILUS) TABS Take 1 tablet by mouth daily       losartan (COZAAR) 50 MG tablet Take 0.5 tablets (25 mg) by mouth daily 1 tablet 0     MELATONIN PO Take 5 mg by mouth nightly as needed        mirtazapine (REMERON) 15 MG tablet Take 1 tablet  (15 mg) by mouth At Bedtime 30 tablet 3     Multiple Vitamins-Minerals (OCUVITE PO) Take 1 capsule by mouth daily.       nitroglycerin (NITROSTAT) 0.4 MG SL tablet Place 1 tablet (0.4 mg) under the tongue every 5 minutes as needed 25 tablet 1     pravastatin (PRAVACHOL) 40 MG tablet Take 1 tablet (40 mg) by mouth daily 90 tablet 0     warfarin (COUMADIN) 2 MG tablet TAKE ONE TABLET BY MOUTH ONCE DAILY OR  AS  DIRECTED  BY  INR  CLINIC 90 tablet 1       Soc Hx: reviewed  Fam Hx: reviewed          Dannielle Young MD  Internal Medicine

## 2018-06-14 NOTE — MR AVS SNAPSHOT
Leonor Mercado   6/14/2018 11:30 AM   Anticoagulation Therapy Visit    Description:  89 year old female   Provider[facility]  [facility]   Department:  [facility]           INR as of 6/14/2018     Today's INR 2.8      Anticoagulation Summary as of 6/14/2018     INR goal 2.0-3.0   Today's INR 2.8   Full warfarin instructions 2 mg every day   Next INR check 7/3/2018    Indications   Long-term (current) use of anticoagulants [Z79.01] [Z79.01]  A Fib - chr Coumadin  s/p PPM (H) [I48.2]         Your next Anticoagulation Clinic appointment(s)     Jul 03, 2018  2:00 PM CDT   [facility] with [facility]    [address] [facility] [address]  [facility] [address] [phone]              Contact Numbers     [facility] Phone Numbers:  Anticoagulation Clinic Appointments : [phone]  Anticoagulation Nurse: [phone]         June 2018 Details    Sun Mon Tue Wed Thu Fri Sat          1               2                 3               4               5               6               7               8               9                 10               11               12               13               14      2 mg   See details      15      2 mg         16      2 mg           17      2 mg         18      2 mg         19      2 mg         20      2 mg         21      2 mg         22      2 mg         23      2 mg           24      2 mg         25      2 mg         26      2 mg         27      2 mg         28      2 mg         29      2 mg         30      2 mg          Date Details   06/14 This INR check               How to take your warfarin dose     To take:  2 mg Take 1 of the 2 mg tablets.           July 2018 Details    Sun Mon Tue Wed Thu Fri Sat     1      2 mg         2      2 mg         3            4               5               6               7                 8               9               10                11               12               13               14                 15               16               17               18               19               20               21                 22               23               24               25               26               27               28                 29               30               31                    Date Details   No additional details    Date of next INR:  7/3/2018         How to take your warfarin dose     To take:  2 mg Take 1 of the 2 mg tablets.

## 2018-06-14 NOTE — PROGRESS NOTES
"  ANTICOAGULATION FOLLOW-UP CLINIC VISIT    Patient Name:  Leonor Mercado  Date:  6/14/2018  Contact Type:  Face to Face    SUBJECTIVE:     Patient Findings     Positives No Problem Findings           OBJECTIVE    INR Protime   Date Value Ref Range Status   06/14/2018 2.8 (A) 0.86 - 1.14 Final       ASSESSMENT / PLAN  INR assessment THER    Recheck INR In: 3 WEEKS    INR Location Clinic      Anticoagulation Summary as of 6/14/2018     INR goal 2.0-3.0   Today's INR 2.8   Warfarin maintenance plan 2 mg (2 mg x 1) every day   Full warfarin instructions 2 mg every day   Weekly warfarin total 14 mg   No change documented Dilma Rider, RN   Plan last modified Yoana Waddell RN (12/15/2016)   Next INR check 7/3/2018   Priority INR   Target end date     Indications   Long-term (current) use of anticoagulants [Z79.01] [Z79.01]  A Fib - chr Coumadin  s/p PPM (H) [I48.2]         Anticoagulation Episode Summary     INR check location     Preferred lab     Send INR reminders to [facility]    Comments Pt's 1st name is pronounced \"ondray\"  Pt does not want print out, appt card only      Anticoagulation Care Providers     Provider Role Specialty Phone number    Dannielle Darby MD [facility] Internal Medicine [phone]            See the Encounter Report to view Anticoagulation Flowsheet and Dosing Calendar (Go to Encounters tab in chart review, and find the Anticoagulation Therapy Visit)    Dosage adjustment made based on physician directed care plan.    Dilma Rider RN               "

## 2018-06-14 NOTE — PATIENT INSTRUCTIONS
Plan:  1. Use the Lidocaine patches   2. Your provider has referred you to: [facility], [phone]  3. The following vaccines are recommended for you.   Medicare covers better if you have these vaccines at the pharmacy:  -- Prevnar 13 ( pneumonia vaccine)  -- Shingerix vaccine - the newest vaccine for shingles

## 2018-06-21 ENCOUNTER — PATIENT OUTREACH (OUTPATIENT)
Dept: CARE COORDINATION | Facility: CLINIC | Age: 83
End: 2018-06-21

## 2018-06-21 ASSESSMENT — ACTIVITIES OF DAILY LIVING (ADL): DEPENDENT_IADLS:: TRANSPORTATION;SHOPPING

## 2018-06-21 NOTE — PROGRESS NOTES
"Clinic Care Coordination Contact    Clinic Care Coordination Contact  OUTREACH    RN CC Follow UP.  Chart reviewed.   Spoke with patient over phone.  Patient has recently moved to [facility].  Patient states she is in the independent living building and does not have any services through them other than 1 meal per day and call pendant.       Chief Complaint   Patient presents with     Clinic Care Coordination - Follow-up        Universal Utilization: Patient states sometimes she has transportation issues.  Patient does not drive.  Clinic Utilization  Difficulty keeping appointments:: Yes  Utilization    Last refreshed: 6/21/2018 10:04 AM:  No Show Count (past year) 2       Last refreshed: 6/21/2018 10:04 AM:  ED visits 2       Last refreshed: 6/21/2018 10:04 AM:  Hospital admissions 1          Current as of: 6/21/2018 10:04 AM             Clinical Concerns:  Current Medical Concerns:  Can't sleep.  Patient states she has been having trouble sleeping for past few days.  She has gone 30+ hours with no sleep.  Patient states she just doesn't feel tired.  \"my mind won't shut off\".  Rightfully, patient is concerned about this.  Patient states she has tried melatonin, benadryl, Tylenol PM, warm milk, but nothing is working.  She would like to sleep at least 8 hours per night.  This is now her goal.  RNCC will contact PCP about this concern.    Current Behavioral Concerns: Patient seemed very anxious and very exhausted.  Patient is at risk for delirium.    Education Provided to patient: taking meds as prescribed.  RN CC role.  The need for using walker all the time.        Health Maintenance Reviewed: Due/Overdue    HF ACTION PLAN Q3 YR  5[birth year]6/1926       PNEUMOCOCCAL (2 of 2 - PCV13)  10/15/2013       OP ANNUAL INR REFERRAL  7/23/2016       COPD ACTION PLAN Q1 YR  8/18/2017         Medication Management:  Reviewed.  Patient manages own mediations.    Functional Status:  Dependent ADLs:: Ambulation-walker -  Does " not use as she should.  At risk for falls.    Dependent IADLs:: Transportation, Shopping  Mobility Status: Independent w/Device  Fallen 2 or more times in the past year?: No  Any fall with injury in the past year?: No    Living Situation:  Recently moved.  Current living arrangement:: I live alone  Type of residence:: Apartment ([facility])    Diet/Exercise/Sleep:  Significant changes in sleep pattern (GOAL): Yes    Transportation:  Transportation concerns (GOAL):: Yes  Transportation means:: [employer], Family  Patient has used [employer] in past, but does't like it because they are always late picking her up.  Patient does not like to burden family with rides either, but daughter has been taking her to appointments.  Will send some other transport options to patient in mail.    Patient has referral out for  out patient physical therapy, but has not scheduled appointment due to needing ride.     Psychosocial:  Baptism or spiritual beliefs that impact treatment:: No  Mental health DX:: No  Mental health management concern (GOAL):: No  Informal Support system:: Children     Financial/Insurance:   Financial/Insurance concerns (GOAL):: No  Patient on fixed income.     Resources and Interventions:  Current Resources:    ;   Community Resources:  (OP PT)  Supplies used at home:: Hearing Aid Batteries  Equipment Currently Used at Home: walker, rolling               Goals:   Goals        General    Medication 1 (pt-stated)     Notes - Note created  4/30/2018 10:05 AM by Yaquelin Varela, RN    Goal Statement: I will take medications only are ordered.     Measure of Success: No more overdose episodes    Supportive Steps to Achieve: I will accept help from home care.     Barriers: Depressed     Strengths: Family support    Date to Achieve By: 6/1/18         Lifestyle    #1 Patient achieves sleep pattern objective     Notes - Note created  6/21/2018 10:10 AM by Yaz Ojeda, RN    Goal Statement: I will  sleep all night without waking.  Measure of Success: Patient will feel sleepy at night and sleep at least 8 hours without waking.  Supportive Steps to Achieve: RN CC support.  Take medications as prescribed.  Will speak with PCP about sleep aids. No napping during day.  Barriers: patient feels exhausted, low energy.    Strengths: good family support.  No pain or restless legs.  Date to Achieve By: 15 July 2018              Pt reports understanding and denies any additional questions or concerns at this times. RN CC engaged in AIDET communication during encounter.    Outreach Frequency: monthly  Future Appointments              In 1 week [facility] [place], [place]    In 1 month [address] - [place], [facility]          Plan: Patient will continue to follow up with providers as scheduled.  Patient will address transportation issue and review resources mailed to her.  Patient will continue to work on goal of sleeping at night. RN CC will message patient about this issue.  RN CC will continue to offer support and outreach monthly.    Yaz Ojeda RN-BSN   Care Coordination  Phone:  [phone]  Email: [email or web address]

## 2018-06-22 NOTE — PROGRESS NOTES
"Clinic Care Coordination Contact  Care Team Conversations    Spoke with patient and advised her of provider recommendation to increase Mirtazapine to 2 tabs at night.  Patient states that she has tried this before and \"it does nothing\".  Patient states she had another horrible night of no sleep.  She thinks she may have finally fell asleep around 5am. Patient voices frustration and feels exhausted.  RN CC will message PCP to update her and see if she has any other recommendations.    Yaz Ojeda RN-BSN   Care Coordination  Phone:  [phone]  Email: [email or web address]  [facility]        "

## 2018-07-03 ENCOUNTER — THERAPY VISIT (OUTPATIENT)
Dept: PHYSICAL THERAPY | Facility: CLINIC | Age: 83
End: 2018-07-03
Payer: MEDICARE

## 2018-07-03 ENCOUNTER — ANTICOAGULATION THERAPY VISIT (OUTPATIENT)
Dept: ANTICOAGULATION | Facility: CLINIC | Age: 83
End: 2018-07-03
Payer: COMMERCIAL

## 2018-07-03 DIAGNOSIS — M25.511 ACUTE PAIN OF RIGHT SHOULDER: Primary | ICD-10-CM

## 2018-07-03 DIAGNOSIS — I48.20 CHRONIC ATRIAL FIBRILLATION (H): ICD-10-CM

## 2018-07-03 DIAGNOSIS — Z79.01 LONG-TERM (CURRENT) USE OF ANTICOAGULANTS: ICD-10-CM

## 2018-07-03 LAB — INR POINT OF CARE: 3.3 (ref 0.86–1.14)

## 2018-07-03 PROCEDURE — G8985 CARRY GOAL STATUS: HCPCS | Mod: GP | Performed by: PHYSICAL THERAPIST

## 2018-07-03 PROCEDURE — 99207 ZZC NO CHARGE NURSE ONLY: CPT

## 2018-07-03 PROCEDURE — 36416 COLLJ CAPILLARY BLOOD SPEC: CPT

## 2018-07-03 PROCEDURE — 97110 THERAPEUTIC EXERCISES: CPT | Mod: GP | Performed by: PHYSICAL THERAPIST

## 2018-07-03 PROCEDURE — 97161 PT EVAL LOW COMPLEX 20 MIN: CPT | Mod: GP | Performed by: PHYSICAL THERAPIST

## 2018-07-03 PROCEDURE — 85610 PROTHROMBIN TIME: CPT | Mod: QW

## 2018-07-03 PROCEDURE — G8984 CARRY CURRENT STATUS: HCPCS | Mod: GP | Performed by: PHYSICAL THERAPIST

## 2018-07-03 NOTE — MR AVS SNAPSHOT
Leonor Mercado   7/3/2018 2:00 PM   Anticoagulation Therapy Visit    Description:  89 year old female   Provider[facility]   Department:  [facility]           INR as of 7/3/2018     Today's INR 3.3!      Anticoagulation Summary as of 7/3/2018     INR goal 2.0-3.0   Today's INR 3.3!   Full warfarin instructions 2 mg every day   Next INR check 7/24/2018    Indications   Long-term (current) use of anticoagulants [Z79.01] [Z79.01]  A Fib - chr Coumadin  s/p PPM (H) [I48.2]         Your next Anticoagulation Clinic appointment(s)     Jul 24, 2018  1:45 PM CDT   [facility] Visit with [facility]   [facility]    [address] [facility] [address]  [facility] [address] [phone]              Contact Numbers     [facility] Phone Numbers:  Anticoagulation Clinic Appointments : [phone]  Anticoagulation Nurse: [phone]         July 2018 Details    Sun Mon Tue Wed Thu Fri Sat     1               2               3      2 mg   See details      4      2 mg         5      2 mg         6      2 mg         7      2 mg           8      2 mg         9      2 mg         10      2 mg         11      2 mg         12      2 mg         13      2 mg         14      2 mg           15      2 mg         16      2 mg         17      2 mg         18      2 mg         19      2 mg         20      2 mg         21      2 mg           22      2 mg         23      2 mg         24            25               26               27               28                 29               30               31                    Date Details   07/03 This INR check       Date of next INR:  7/24/2018         How to take your warfarin dose     To take:  2 mg Take 1 of the 2 mg tablets.

## 2018-07-03 NOTE — LETTER
DEPARTMENT OF HEALTH AND HUMAN SERVICES  CENTERS FOR MEDICARE & MEDICAID SERVICES    PLAN/UPDATED PLAN OF PROGRESS FOR OUTPATIENT REHABILITATION    PATIENTS NAME:  Leonor Mercado     : 1926    [facility] NUMBER:    [id number]    [id number]     PROVIDER NAME: [employer] [facility]    MEDICAL RECORD NUMBER: [id number]     START OF CARE DATE:  SOC Date: 18   TYPE:  PT    PRIMARY/TREATMENT DIAGNOSIS: (Pertinent Medical Diagnosis)  Acute pain of right shoulder    VISITS FROM START OF CARE:  Rxs Used: 1     [facility] Initial Evaluation  Subjective:  Patient is a 89 year old female presenting with rehab right shoulder hpi. The history is provided by the patient. No  was used.   Leonor Mercado is a 89 year old female with a right shoulder condition.  Condition occurred with:  Lifting.  Condition occurred: at home.  This is a new condition  Pt was lifting a foot peddler in 2018 and had pain right lateral shoulder. MD orders from 18.She reports numbness in right arm at night. Pain is increased with reaching and lifting with arm extended. Shoulder feels better at rest. She has been using a rolling walker for the past 4-5 months. She states sh is using this for balance as she had fallen a few times. This does not increase shoulder pain. .    Patient reports pain:  Lateral.  Radiates to:  Lower arm.  Pain is described as aching and is intermittent and reported as 3/10.  Associated symptoms:  Painful arc and numbness.   Symptoms are exacerbated by certain positions, lying on extremity, carrying, lifting, using arm at shoulder level and using arm behind back           General health as reported by patient is good.  Pertinent medical history includes:  Sleep disorder/apnea, emphysema, depression, heart problems, implaned devices and other (pacemaker).    Other surgeries include:  Heart surgery and other (2 stents, pacemaker,  appendectomy).  Current medications:  Cardiac medication, other, high blood pressure medication and anti-depressants (melatonin).  Current occupation is retired.        Barriers include:  Lives alone (lives in senior building).    Red flags:  None as reported by the patient.       Shoulder Evaluation:  ROM:  AROM:    Flexion:  Left:  135    Right:  70  Extension: Left: 60Right: 40  Abduction:  Left: 130   Right:  70    Internal Rotation:  Left:  60    Right:  50  External Rotation:  Left:  73    Right:  70  PATIENTS NAME:  Leonor Mercado   : 1926    Strength:    Flexion: Left:4+/5    Pain: -    Right: 4-/5      Pain:  ++  Extension:  Right: 5-/5     Pain:-  Abduction:  Right: 3+/5     Pain:    Internal Rotation:  Right: 5-/5      Pain:-  External Rotation:   Right:4-/5      Pain:-      Elbow Flexion:  Left:4+/5     Pain:    Right:4-/5     Pain:  Elbow Extension:  Left:5-/5      Pain:-    Right:4/5     Pain:    Palpation:    Right shoulder tenderness present at: Biceps; Supraspinatus and Bicipital Groove     Assessment/Plan:    Patient is a 89 year old female with right side shoulder complaints.    Patient has the following significant findings with corresponding treatment plan.                Diagnosis 1:  Right shoulder impingment  Pain -  hot/cold therapy, self management, education and home program  Decreased ROM/flexibility - manual therapy, therapeutic exercise and home program  Decreased strength - therapeutic exercise, therapeutic activities and home program  Decreased function - therapeutic activities and home program    Therapy Evaluation Codes:   1) History comprised of:   Personal factors that impact the plan of care:      Age.    Comorbidity factors that impact the plan of care are:      Depression, Emphysema, Heart problems, Implanted device and Sleep disorder/apnea.     Medications impacting care: Anti-depressant, Cardiac and High blood pressure.  2) Examination of Body Systems comprised  "of:   Body structures and functions that impact the plan of care:      Shoulder.   Activity limitations that impact the plan of care are:      Bathing, Cooking, Dressing, Lifting and Sleeping.  3) Clinical presentation characteristics are:   Stable/Uncomplicated.  4) Decision-Making    Low complexity using standardized patient assessment instrument and/or measureable assessment of functional outcome.  Cumulative Therapy Evaluation is: Low complexity.    Previous and current functional limitations:  (See Goal Flow Sheet for this information)    Short term and Long term goals: (See Goal Flow Sheet for this information)     Communication ability:  Patient appears to be able to clearly communicate and understand verbal and written communication and follow directions correctly.  Treatment Explanation - The following has been discussed with the patient:   RX ordered/plan of care  Anticipated outcomes  Possible risks and side effects  This patient would benefit from PT intervention to resume normal activities.   Rehab potential is good.      PATIENTS NAME:  Leonor Mercado   : 1926    Frequency:  1 X week, once daily  Duration:  for 8 weeks  Discharge Plan:  Achieve all LTG.  Independent in home treatment program.  Reach maximal therapeutic benefit.    Please refer to the daily flowsheet for treatment today, total treatment time and time spent performing 1:1 timed codes.         Caregiver Signature/Credentials _____________________________ Date ________       Treating Provider: Radha Coker, PT   I have reviewed and certified the need for these services and plan of treatment while under my care.        PHYSICIAN'S SIGNATURE:   _____________________________________  Date___________     Dannielle Darby MD    Certification period:  Beginning of Cert date period: 18 to End of Cert period date: 10/01/18     Functional Level Progress Report: Please see attached \"Goal Flow sheet for Functional " "level.\"    ____X____ Continue Services or       ________ DC Services                Service dates: From  SOC Date: 07/03/18 date to present                         "

## 2018-07-03 NOTE — PROGRESS NOTES
[facility] Initial Evaluation  Subjective:  Patient is a 89 year old female presenting with rehab right shoulder hpi. The history is provided by the patient. No  was used.   Leonor Mercado is a 89 year old female with a right shoulder condition.  Condition occurred with:  Lifting.  Condition occurred: at home.  This is a new condition  Pt was lifting a foot peddler in June 2018 and had pain right lateral shoulder. MD orders from 6-14-18.She reports numbness in right arm at night. Pain is increased with reaching and lifting with arm extended. Shoulder feels better at rest. She has been using a rolling walker for the past 4-5 months. She states sh is using this for balance as she had fallen a few times. This does not increase shoulder pain. .    Patient reports pain:  Lateral.  Radiates to:  Lower arm.  Pain is described as aching and is intermittent and reported as 3/10.  Associated symptoms:  Painful arc and numbness.   Symptoms are exacerbated by certain positions, lying on extremity, carrying, lifting, using arm at shoulder level and using arm behind back           General health as reported by patient is good.  Pertinent medical history includes:  Sleep disorder/apnea, emphysema, depression, heart problems, implaned devices and other (pacemaker).    Other surgeries include:  Heart surgery and other (2 stents, pacemaker, appendectomy).  Current medications:  Cardiac medication, other, high blood pressure medication and anti-depressants (melatonin).  Current occupation is retired.        Barriers include:  Lives alone (lives in senior building).    Red flags:  None as reported by the patient.                        Objective:  System                   Shoulder Evaluation:  ROM:  AROM:    Flexion:  Left:  135    Right:  70  Extension: Left: 60Right: 40  Abduction:  Left: 130   Right:  70    Internal Rotation:  Left:  60    Right:  50  External Rotation:  Left:  73     Right:  70                      Strength:    Flexion: Left:4+/5    Pain: -    Right: 4-/5      Pain:  ++  Extension:  Right: 5-/5     Pain:-  Abduction:  Right: 3+/5     Pain:    Internal Rotation:  Right: 5-/5      Pain:-  External Rotation:   Right:4-/5      Pain:-        Elbow Flexion:  Left:4+/5     Pain:    Right:4-/5     Pain:  Elbow Extension:  Left:5-/5      Pain:-    Right:4/5     Pain:      Palpation:      Right shoulder tenderness present at: Biceps; Supraspinatus and Bicipital Groove                                     General     ROS    Assessment/Plan:    Patient is a 89 year old female with right side shoulder complaints.    Patient has the following significant findings with corresponding treatment plan.                Diagnosis 1:  Right shoulder impingment  Pain -  hot/cold therapy, self management, education and home program  Decreased ROM/flexibility - manual therapy, therapeutic exercise and home program  Decreased strength - therapeutic exercise, therapeutic activities and home program  Decreased function - therapeutic activities and home program    Therapy Evaluation Codes:   1) History comprised of:   Personal factors that impact the plan of care:      Age.    Comorbidity factors that impact the plan of care are:      Depression, Emphysema, Heart problems, Implanted device and Sleep disorder/apnea.     Medications impacting care: Anti-depressant, Cardiac and High blood pressure.  2) Examination of Body Systems comprised of:   Body structures and functions that impact the plan of care:      Shoulder.   Activity limitations that impact the plan of care are:      Bathing, Cooking, Dressing, Lifting and Sleeping.  3) Clinical presentation characteristics are:   Stable/Uncomplicated.  4) Decision-Making    Low complexity using standardized patient assessment instrument and/or measureable assessment of functional outcome.  Cumulative Therapy Evaluation is: Low complexity.    Previous and current  functional limitations:  (See Goal Flow Sheet for this information)    Short term and Long term goals: (See Goal Flow Sheet for this information)     Communication ability:  Patient appears to be able to clearly communicate and understand verbal and written communication and follow directions correctly.  Treatment Explanation - The following has been discussed with the patient:   RX ordered/plan of care  Anticipated outcomes  Possible risks and side effects  This patient would benefit from PT intervention to resume normal activities.   Rehab potential is good.    Frequency:  1 X week, once daily  Duration:  for 8 weeks  Discharge Plan:  Achieve all LTG.  Independent in home treatment program.  Reach maximal therapeutic benefit.    Please refer to the daily flowsheet for treatment today, total treatment time and time spent performing 1:1 timed codes.

## 2018-07-12 ENCOUNTER — TELEPHONE (OUTPATIENT)
Dept: INTERNAL MEDICINE | Facility: CLINIC | Age: 83
End: 2018-07-12

## 2018-07-13 ENCOUNTER — MEDICAL CORRESPONDENCE (OUTPATIENT)
Dept: HEALTH INFORMATION MANAGEMENT | Facility: CLINIC | Age: 83
End: 2018-07-13

## 2018-07-17 ENCOUNTER — OFFICE VISIT (OUTPATIENT)
Dept: PEDIATRICS | Facility: CLINIC | Age: 83
End: 2018-07-17
Payer: COMMERCIAL

## 2018-07-17 ENCOUNTER — TELEPHONE (OUTPATIENT)
Dept: INTERNAL MEDICINE | Facility: CLINIC | Age: 83
End: 2018-07-17

## 2018-07-17 ENCOUNTER — THERAPY VISIT (OUTPATIENT)
Dept: PHYSICAL THERAPY | Facility: CLINIC | Age: 83
End: 2018-07-17
Payer: MEDICARE

## 2018-07-17 VITALS
TEMPERATURE: 97.4 F | OXYGEN SATURATION: 96 % | BODY MASS INDEX: 25.33 KG/M2 | HEART RATE: 77 BPM | DIASTOLIC BLOOD PRESSURE: 70 MMHG | SYSTOLIC BLOOD PRESSURE: 124 MMHG | WEIGHT: 143 LBS

## 2018-07-17 DIAGNOSIS — R60.9 EDEMA, UNSPECIFIED TYPE: Primary | ICD-10-CM

## 2018-07-17 DIAGNOSIS — M25.511 ACUTE PAIN OF RIGHT SHOULDER: ICD-10-CM

## 2018-07-17 DIAGNOSIS — I50.32 CHRONIC DIASTOLIC CONGESTIVE HEART FAILURE (H): ICD-10-CM

## 2018-07-17 DIAGNOSIS — I48.20 CHRONIC ATRIAL FIBRILLATION (H): ICD-10-CM

## 2018-07-17 PROCEDURE — 80048 BASIC METABOLIC PNL TOTAL CA: CPT | Performed by: INTERNAL MEDICINE

## 2018-07-17 PROCEDURE — 97140 MANUAL THERAPY 1/> REGIONS: CPT | Mod: GP | Performed by: PHYSICAL THERAPIST

## 2018-07-17 PROCEDURE — 97110 THERAPEUTIC EXERCISES: CPT | Mod: GP | Performed by: PHYSICAL THERAPIST

## 2018-07-17 PROCEDURE — 36415 COLL VENOUS BLD VENIPUNCTURE: CPT | Performed by: INTERNAL MEDICINE

## 2018-07-17 PROCEDURE — 99214 OFFICE O/P EST MOD 30 MIN: CPT | Performed by: INTERNAL MEDICINE

## 2018-07-17 NOTE — TELEPHONE ENCOUNTER
Form received from: [facility]    Form requesting following info/need: Plan/updated plan of progress    ALLI needed?: No    Location of form: Dr. Young's in basket    When completed the route for return: Fax

## 2018-07-17 NOTE — PROGRESS NOTES
SUBJECTIVE:   Leonor Mercado is a 89 year old female who presents to clinic today for the following health issues:    Leg swelling      Duration: Concerns regarding 3 week history of swelling involving right lower leg.  Denies associated pain.  States that the swelling generally resolves overnight when supine.  Notes the right lower leg is slightly larger than left by the end of each day.  On chronic anticoagulation for atrial fibrillation.  Denies calf pain shortness of breath or chest discomfort.  History of diastolic dysfunction- most recent echocardiogram normal ejection fraction, denies orthopnea.  Habits: more sodium than usual lately.      Description (location/character/radiation): right leg    Intensity:  Mild-moderate    Accompanying signs and symptoms: none    History (similar episodes/previous evaluation): as above    Precipitating or alleviating factors: None    Therapies tried and outcome: None       Patient Active Problem List   Diagnosis     Hyperlipidemia LDL goal <100     A Fib - chr Coumadin, s/p PPM (H)     Cardiac pacemaker in situ     Advanced directives, counseling/discussion     Senile osteoporosis     Irritable bowel syndrome (IBS)     Fear of flying     Anemia     Eosinophilia     COPD (H)     CAD - 2 stents in [place] in 2000s.      Long-term (current) use of anticoagulants [Z79.01]     Degeneration of lumbar intervertebral disc     CHF - Chr Diastolic (H)     Sick sinus syndrome - s/p PPM 2003 (H)     PMHx 2017: CAD ( 2 stents 2000s) , diastolic CHF, COPD, chr AFib, PPM ( 2003 for sinus sick syndrome), HTN, hyperlipidemia, osteoporosis      SOB (shortness of breath)     CKD (chronic kidney disease) stage 3, GFR 30-59 ml/min     Ingestion of substance     Acute pain of right shoulder     Past Surgical History:   Procedure Laterality Date     APPENDECTOMY  1956     CARDIAC SURGERY      PPM, 2 STENTS2-05[place]-CAD-taxus stentx2 2.5-12,2.5-12 in LADp and LADm, 80%nonDomRCA-LcxDom-mild,EF60%      CORONARY ANGIOGRAPHY ADULT ORDER  1994    mild CAD,Normal LV function, No Mitral regurgitation, Prox LAD 30% stenosis. RCA prox and mid, 20-30%     ESOPHAGOSCOPY, GASTROSCOPY, DUODENOSCOPY (EGD), COMBINED N/A 2015    Procedure: COMBINED ESOPHAGOSCOPY, GASTROSCOPY, DUODENOSCOPY (EGD), BIOPSY SINGLE OR MULTIPLE;  Surgeon: Genevieve Leon MD;  Location: [facility]     H LEAD REVISION DUAL  2003    Revised in [place]-due to diaphram stim (original pacer placed in [place])     H LEAD REVISION DUAL  2014    Correction of reversal of A and V leads in Header     HEART CATH, ANGIOPLASTY  2005    LEIGH ANN mid and proximal LAD, ongoing 80% RCA; mild circumflex     HERNIA REPAIR Left     [facility]     IMPLANT PACEMAKER  2003    Placed in [place] for sick sinus syndrome     REPLACE PACEMAKER GENERATOR  2013    Dual-chamber pacemaker by Dr SETH Pierre       Social History   Substance Use Topics     Smoking status: Former Smoker     Types: Cigarettes     Quit date: 1987     Smokeless tobacco: Never Used     Alcohol use No     Family History   Problem Relation Age of Onset     HEART DISEASE Father       age 84     Neurologic Disorder Mother      dementia     GASTROINTESTINAL DISEASE Daughter      liver transplant     Crohn Disease Daughter          Current Outpatient Prescriptions   Medication Sig Dispense Refill     Acetaminophen (TYLENOL PO) Take 1,000 mg by mouth nightly as needed        albuterol (PROAIR HFA/PROVENTIL HFA/VENTOLIN HFA) 108 (90 BASE) MCG/ACT Inhaler Inhale 2 puffs into the lungs every 6 hours as needed for shortness of breath / dyspnea or wheezing 1 Inhaler 1     calcium carbonate (CALCIUM CARBONATE) 600 MG TABS tablet Take 1 tablet by mouth daily        Cholecalciferol (VITAMIN D) 2000 UNITS tablet Take 2,000 Units by mouth daily       diltiazem (DILTIAZEM CD) 240 MG 24 hr capsule Take 1 capsule (240 mg) by mouth daily 30 capsule      Docusate Calcium (STOOL SOFTENER PO) Take 1 tablet by  mouth daily as needed        fluticasone-vilanterol (BREO ELLIPTA) 200-25 MCG/INH oral inhaler Inhale 1 puff into the lungs daily 1 Inhaler 3     furosemide (LASIX) 20 MG tablet Take 2 tablets (40 mg) by mouth daily 180 tablet 3     ipratropium - albuterol 0.5 mg/2.5 mg/3 mL (DUONEB) 0.5-2.5 (3) MG/3ML neb solution Take 1 vial (3 mLs) by nebulization every 6 hours as needed for shortness of breath / dyspnea or wheezing 360 mL      Lactobacillus (PROBIOTIC ACIDOPHILUS) TABS Take 1 tablet by mouth daily       losartan (COZAAR) 50 MG tablet Take 0.5 tablets (25 mg) by mouth daily 1 tablet 0     MELATONIN PO Take 5 mg by mouth nightly as needed        mirtazapine (REMERON) 15 MG tablet Take 1 tablet (15 mg) by mouth At Bedtime 30 tablet 3     Multiple Vitamins-Minerals (OCUVITE PO) Take 1 capsule by mouth daily.       nitroglycerin (NITROSTAT) 0.4 MG SL tablet Place 1 tablet (0.4 mg) under the tongue every 5 minutes as needed 25 tablet 1     pravastatin (PRAVACHOL) 40 MG tablet Take 1 tablet (40 mg) by mouth daily 90 tablet 0     warfarin (COUMADIN) 2 MG tablet TAKE ONE TABLET BY MOUTH ONCE DAILY OR  AS  DIRECTED  BY  INR  CLINIC 90 tablet 1       ROS: The following systems have been completely reviewed and are negative except as noted in the HPI: CONSTITUTIONAL,  CARDIOVASCULAR, PULMONARY    OBJECTIVE:                                                    /70 (Cuff Size: Adult Regular)  Pulse 77  Temp 97.4  F (36.3  C) (Oral)  Wt 143 lb (64.9 kg)  LMP  (LMP Unknown)  SpO2 96%  BMI 25.33 kg/m2 Body mass index is 25.33 kg/(m^2).  GENERAL:  alert,  no distress  NECK: no tenderness, no adenopathy  RESP: lungs clear to auscultation - no rales, no rhonchi, no wheezes  CV: regular rates and rhythm, normal S1 S2  MS: extremities-very mild edema and asymmetry right lower leg, left lower extremity without edema   right lower extremity is nontender without erythema or warmth     ASSESSMENT/PLAN:                                                         ICD-10-CM    1. Edema, unspecified type R60.9 Basic metabolic panel       Differential discussed: CHF, renal/liver disease, lymphedema, hypoproteinemia, DVT, medication side effect, venous insufficiency.   History of diastolic heart failure which appears well compensated.  Check BMP and renal function.  No history of liver abnormality.  DVT unlikely on chronic anti-coagulation.  Edema may be associated with calcium channel blocker, but has otherwise been stable on diltiazem for a number of years.     2. Chronic atrial fibrillation (H) I48.2  stable, rate controlled.    on chronic warfarin.     3. Chronic diastolic congestive heart failure (H) I50.32  well compensated      Edema-suspect venous insufficiency   start wearing compression stocking during daytime, off at night   elevated leg as much as possible   reduce sodium intake   check BMP   follow-up in 2 weeks if no improvement     Arnel Garcia MD  [facility]

## 2018-07-17 NOTE — LETTER
[facility]  [address]  [place] [address]                  [phone]   July 24, 2018    Leonor Mercado  [address]    [place] [address]      Dear Leonor,      Regarding the labs from your most recent visit:         The blood chemistries including blood sugar are stable.     Your test results are enclosed.      Please contact me if you have any questions.           Thank you very much for choosing [facility]    Best regards,    Arnel Garcia MD        Results for orders placed or performed in visit on 07/17/18   Basic metabolic panel   Result Value Ref Range    Sodium 140 133 - 144 mmol/L    Potassium 4.5 3.4 - 5.3 mmol/L    Chloride 102 94 - 109 mmol/L    Carbon Dioxide 32 20 - 32 mmol/L    Anion Gap 6 3 - 14 mmol/L    Glucose 75 70 - 99 mg/dL    Urea Nitrogen 37 (H) 7 - 30 mg/dL    Creatinine 1.49 (H) 0.52 - 1.04 mg/dL    GFR Estimate 33 (L) >60 mL/min/1.7m2    GFR Estimate If Black 40 (L) >60 mL/min/1.7m2    Calcium 9.3 8.5 - 10.1 mg/dL

## 2018-07-17 NOTE — MR AVS SNAPSHOT
After Visit Summary   7/17/2018    Leonor Mercado    MRN: [id number]           Patient Information     Date Of Birth          5/6/1926        Visit Information        Provider Department      7/17/2018 11:00 AM Arnel Garcia MD [facility]        Today's Diagnoses     Right leg swelling    -  1      Care Instructions    INSTRUCTIONS FOR TODAY:     start wearing compression stocking during daytime, off at night   elevated leg as much as possible   reduce sodium intake   we are checking kidney function labwork today   follow-up in 2 weeks if no improvement     Dr Garcia          Follow-ups after your visit        Your next 10 appointments already scheduled     Jul 24, 2018 10:20 AM CDT   FRACISCO Extremity with Radha Coker PT   [facility] ([facility]  )    [address]  [address]  [facility] [address]   [phone]            Jul 24, 2018  1:45 PM CDT   Anticoagulation Visit with [facility]   [facility] ([facility])    [address]  [facility] [id number] [phone]            Jul 31, 2018 10:20 AM CDT   FRACISCO Extremity with Radha Coker PT   [facility] ([facility]  )    [address]  [address]  [facility] [address]   [phone]            Aug 16, 2018  9:30 AM CDT   Phone Device Check with NUÑEZ TECH2   [facility]    [address] [place] [address] [address]  [facility] [id number] [phone] OPT 2              Who to contact     If you have questions or need follow up information about today's clinic visit or your schedule please contact [facility] directly at [phone].  Normal or non-critical lab and imaging results will be communicated to you by MyChart, letter or phone within 4 business days after the clinic has received the results. If you do not hear from us within 7  days, please contact the clinic through [employer] or phone. If you have a critical or abnormal lab result, we will notify you by phone as soon as possible.  Submit refill requests through [employer] or call your pharmacy and they will forward the refill request to us. Please allow 3 business days for your refill to be completed.          Additional Information About Your Visit        Care EveryWhere ID     This is your Care EveryWhere ID. This could be used by other organizations to access your [place] [facility] medical records  [id number]        Your Vitals Were     Pulse Temperature Last Period Pulse Oximetry BMI (Body Mass Index)       77 97.4  F (36.3  C) (Oral) (LMP Unknown) 96% 25.33 kg/m2        Blood Pressure from Last 3 Encounters:   07/17/18 124/70   06/14/18 118/72   05/07/18 104/60    Weight from Last 3 Encounters:   07/17/18 143 lb (64.9 kg)   06/14/18 138 lb (62.6 kg)   05/07/18 134 lb 11.2 oz (61.1 kg)              We Performed the Following     Basic metabolic panel        Primary Care Provider Office Phone # Fax #    Dannielle Bria Darby [phone]       [address] [place] [address]        Goals        General    Medication 1 (pt-stated)     Notes - Note created  4/30/2018 10:05 AM by Yaquelin Varela, DAMIAN    Goal Statement: I will take medications only are ordered.     Measure of Success: No more overdose episodes    Supportive Steps to Achieve: I will accept help from home care.     Barriers: Depressed     Strengths: Family support    Date to Achieve By: 6/1/18         Lifestyle    #1 Patient achieves sleep pattern objective     Notes - Note created  6/21/2018 10:10 AM by Yaz Ojeda RN    Goal Statement: I will sleep all night without waking.  Measure of Success: Patient will feel sleepy at night and sleep at least 8 hours without waking.  Supportive Steps to Achieve: RN CC support.  Take medications as prescribed.  Will speak with PCP about sleep aids. No napping  during day.  Barriers: patient feels exhausted, low energy.    Strengths: good family support.  No pain or restless legs.  Date to Achieve By: 15 July 2018          Equal Access to Services     NALDO OVALLE : Leni Dasilva, william bentley, keily leeroynoe sparrow, antonia antwonin hayaan shaquillepramod marcum nancy hwang. So [facility] [phone].    ATENCIÓN: Si habla español, tiene a spicer disposición servicios gratuitos de asistencia lingüística. Llame al [phone].    We comply with applicable federal civil rights laws and [place] laws. We do not discriminate on the basis of race, color, national origin, age, disability, sex, sexual orientation, or gender identity.            Thank you!     Thank you for choosing [facility]  for your care. Our goal is always to provide you with excellent care. Hearing back from our patients is one way we can continue to improve our services. Please take a few minutes to complete the written survey that you may receive in the mail after your visit with us. Thank you!             Your Updated Medication List - Protect others around you: Learn how to safely use, store and throw away your medicines at www.disposemymeds.org.          This list is accurate as of 7/17/18 11:32 AM.  Always use your most recent med list.                   Brand Name Dispense Instructions for use Diagnosis    albuterol 108 (90 Base) MCG/ACT Inhaler    PROAIR HFA/PROVENTIL HFA/VENTOLIN HFA    1 Inhaler    Inhale 2 puffs into the lungs every 6 hours as needed for shortness of breath / dyspnea or wheezing    Chronic obstructive pulmonary disease, unspecified COPD type (H)       calcium carbonate 600 MG tablet   Generic drug:  calcium carbonate      Take 1 tablet by mouth daily        diltiazem 240 MG 24 hr capsule    DILTIAZEM CD    30 capsule    Take 1 capsule (240 mg) by mouth daily    Chronic atrial fibrillation (H)       fluticasone-vilanterol 200-25 MCG/INH oral inhaler    BREO ELLIPTA    1  Inhaler    Inhale 1 puff into the lungs daily    Anxiety       furosemide 20 MG tablet    LASIX    180 tablet    Take 2 tablets (40 mg) by mouth daily    Chronic diastolic congestive heart failure (H)       ipratropium - albuterol 0.5 mg/2.5 mg/3 mL 0.5-2.5 (3) MG/3ML neb solution    DUONEB    360 mL    Take 1 vial (3 mLs) by nebulization every 6 hours as needed for shortness of breath / dyspnea or wheezing    Chronic obstructive pulmonary disease, unspecified COPD type (H)       losartan 50 MG tablet    COZAAR    1 tablet    Take 0.5 tablets (25 mg) by mouth daily    Essential hypertension with goal blood pressure less than 140/90       MELATONIN PO      Take 5 mg by mouth nightly as needed        mirtazapine 15 MG tablet    REMERON    30 tablet    Take 1 tablet (15 mg) by mouth At Bedtime    Adjustment disorder, unspecified type       nitroGLYcerin 0.4 MG sublingual tablet    NITROSTAT    25 tablet    Place 1 tablet (0.4 mg) under the tongue every 5 minutes as needed    Other chest pain       OCUVITE PO      Take 1 capsule by mouth daily.        pravastatin 40 MG tablet    PRAVACHOL    90 tablet    Take 1 tablet (40 mg) by mouth daily    Hyperlipidemia LDL goal <100, Coronary artery disease involving native coronary artery of native heart without angina pectoris       PROBIOTIC ACIDOPHILUS Tabs      Take 1 tablet by mouth daily        STOOL SOFTENER PO      Take 1 tablet by mouth daily as needed        TYLENOL PO      Take 1,000 mg by mouth nightly as needed        vitamin D 2000 units tablet      Take 2,000 Units by mouth daily        warfarin 2 MG tablet    COUMADIN    90 tablet    TAKE ONE TABLET BY MOUTH ONCE DAILY OR  AS  DIRECTED  BY  INR  CLINIC    Long term current use of anticoagulant therapy

## 2018-07-17 NOTE — PATIENT INSTRUCTIONS
INSTRUCTIONS FOR TODAY:     start wearing compression stocking during daytime, off at night   elevated leg as much as possible   reduce sodium intake   we are checking kidney function labwork today   follow-up in 2 weeks if no improvement     Dr Garcia

## 2018-07-18 LAB
ANION GAP SERPL CALCULATED.3IONS-SCNC: 6 MMOL/L (ref 3–14)
BUN SERPL-MCNC: 37 MG/DL (ref 7–30)
CALCIUM SERPL-MCNC: 9.3 MG/DL (ref 8.5–10.1)
CHLORIDE SERPL-SCNC: 102 MMOL/L (ref 94–109)
CO2 SERPL-SCNC: 32 MMOL/L (ref 20–32)
CREAT SERPL-MCNC: 1.49 MG/DL (ref 0.52–1.04)
GFR SERPL CREATININE-BSD FRML MDRD: 33 ML/MIN/1.7M2
GLUCOSE SERPL-MCNC: 75 MG/DL (ref 70–99)
POTASSIUM SERPL-SCNC: 4.5 MMOL/L (ref 3.4–5.3)
SODIUM SERPL-SCNC: 140 MMOL/L (ref 133–144)

## 2018-07-24 ENCOUNTER — ANTICOAGULATION THERAPY VISIT (OUTPATIENT)
Dept: ANTICOAGULATION | Facility: CLINIC | Age: 83
End: 2018-07-24
Payer: COMMERCIAL

## 2018-07-24 ENCOUNTER — THERAPY VISIT (OUTPATIENT)
Dept: PHYSICAL THERAPY | Facility: CLINIC | Age: 83
End: 2018-07-24
Payer: MEDICARE

## 2018-07-24 DIAGNOSIS — Z79.01 LONG-TERM (CURRENT) USE OF ANTICOAGULANTS: ICD-10-CM

## 2018-07-24 DIAGNOSIS — M25.511 ACUTE PAIN OF RIGHT SHOULDER: ICD-10-CM

## 2018-07-24 DIAGNOSIS — I48.20 CHRONIC ATRIAL FIBRILLATION (H): ICD-10-CM

## 2018-07-24 LAB — INR POINT OF CARE: 2.4 (ref 0.86–1.14)

## 2018-07-24 PROCEDURE — 85610 PROTHROMBIN TIME: CPT | Mod: QW

## 2018-07-24 PROCEDURE — 97110 THERAPEUTIC EXERCISES: CPT | Mod: GP | Performed by: PHYSICAL THERAPIST

## 2018-07-24 PROCEDURE — 99207 ZZC NO CHARGE NURSE ONLY: CPT

## 2018-07-24 PROCEDURE — 97140 MANUAL THERAPY 1/> REGIONS: CPT | Mod: GP | Performed by: PHYSICAL THERAPIST

## 2018-07-24 PROCEDURE — 36416 COLLJ CAPILLARY BLOOD SPEC: CPT

## 2018-07-24 NOTE — PROGRESS NOTES
"  ANTICOAGULATION FOLLOW-UP CLINIC VISIT    Patient Name:  Leonor Mercado  Date:  7/24/2018  Contact Type:  Face to Face    SUBJECTIVE:     Patient Findings     Positives No Problem Findings           OBJECTIVE    INR Protime   Date Value Ref Range Status   07/24/2018 2.4 (A) 0.86 - 1.14 Final       ASSESSMENT / PLAN  INR assessment THER    Recheck INR In: 4 WEEKS    INR Location Clinic      Anticoagulation Summary as of 7/24/2018     INR goal 2.0-3.0   Today's INR 2.4   Warfarin maintenance plan 2 mg (2 mg x 1) every day   Full warfarin instructions 2 mg every day   Weekly warfarin total 14 mg   No change documented Dilma Rider, RN   Plan last modified Yoana Waddell RN (12/15/2016)   Next INR check 8/21/2018   Priority INR   Target end date     Indications   Long-term (current) use of anticoagulants [Z79.01] [Z79.01]  A Fib - chr Coumadin  s/p PPM (H) [I48.2]         Anticoagulation Episode Summary     INR check location     Preferred lab     Send INR reminders to [facility]    Comments Pt's 1st name is pronounced \"ondray\"  Pt does not want print out, appt card only      Anticoagulation Care Providers     Provider Role Specialty Phone number    Dannielle Darby MD [facility] [phone]            See the Encounter Report to view Anticoagulation Flowsheet and Dosing Calendar (Go to Encounters tab in chart review, and find the Anticoagulation Therapy Visit)    Dosage adjustment made based on physician directed care plan.    Dilma Rider RN               "

## 2018-07-24 NOTE — MR AVS SNAPSHOT
Leonor Mercado   7/24/2018 1:45 PM   Anticoagulation Therapy Visit    Description:  89 year old female   Provider:  [facility]   Department:  [facility] Anti Coagulation           INR as of 7/24/2018     Today's INR 2.4      Anticoagulation Summary as of 7/24/2018     INR goal 2.0-3.0   Today's INR 2.4   Full warfarin instructions 2 mg every day   Next INR check 8/21/2018    Indications   Long-term (current) use of anticoagulants [Z79.01] [Z79.01]  A Fib - chr Coumadin  s/p PPM (H) [I48.2]         Your next Anticoagulation Clinic appointment(s)     Aug 21, 2018  2:00 PM CDT   Anticoagulation [facility] with [facility]    [address] [facility] [address]  [facility] [address] [phone]              Contact Numbers     [facility] Phone Numbers:  Anticoagulation Clinic Appointments : [phone]  Anticoagulation Nurse: [phone]         July 2018 Details    Sun Mon Tue Wed Thu Fri Sat     1               2               3               4               5               6               7                 8               9               10               11               12               13               14                 15               16               17               18               19               20               21                 22               23               24      2 mg   See details      25      2 mg         26      2 mg         27      2 mg         28      2 mg           29      2 mg         30      2 mg         31      2 mg              Date Details   07/24 This INR check               How to take your warfarin dose     To take:  2 mg Take 1 of the 2 mg tablets.           August 2018 Details    Sun Mon Tue Wed Thu Fri Sat        1      2 mg         2      2 mg         3      2 mg         4      2 mg           5      2 mg         6      2 mg         7      2 mg         8      2 mg         9      2 mg          10      2 mg         11      2 mg           12      2 mg         13      2 mg         14      2 mg         15      2 mg         16      2 mg         17      2 mg         18      2 mg           19      2 mg         20      2 mg         21            22               23               24               25                 26               27               28               29               30               31                 Date Details   No additional details    Date of next INR:  8/21/2018         How to take your warfarin dose     To take:  2 mg Take 1 of the 2 mg tablets.

## 2018-07-31 ENCOUNTER — OFFICE VISIT (OUTPATIENT)
Dept: PEDIATRICS | Facility: CLINIC | Age: 83
End: 2018-07-31
Payer: COMMERCIAL

## 2018-07-31 VITALS
WEIGHT: 145 LBS | SYSTOLIC BLOOD PRESSURE: 119 MMHG | OXYGEN SATURATION: 97 % | BODY MASS INDEX: 25.69 KG/M2 | TEMPERATURE: 97.5 F | DIASTOLIC BLOOD PRESSURE: 70 MMHG | HEART RATE: 76 BPM

## 2018-07-31 DIAGNOSIS — I51.89 DIASTOLIC DYSFUNCTION: ICD-10-CM

## 2018-07-31 DIAGNOSIS — R35.0 URINARY FREQUENCY: Primary | ICD-10-CM

## 2018-07-31 DIAGNOSIS — I49.5 SICK SINUS SYNDROME (H): ICD-10-CM

## 2018-07-31 DIAGNOSIS — I48.20 CHRONIC ATRIAL FIBRILLATION (H): ICD-10-CM

## 2018-07-31 DIAGNOSIS — Z95.0 CARDIAC PACEMAKER IN SITU: ICD-10-CM

## 2018-07-31 DIAGNOSIS — N18.30 CKD (CHRONIC KIDNEY DISEASE) STAGE 3, GFR 30-59 ML/MIN (H): ICD-10-CM

## 2018-07-31 DIAGNOSIS — I25.10 CORONARY ARTERY DISEASE INVOLVING NATIVE CORONARY ARTERY OF NATIVE HEART WITHOUT ANGINA PECTORIS: ICD-10-CM

## 2018-07-31 DIAGNOSIS — J44.9 CHRONIC OBSTRUCTIVE PULMONARY DISEASE, UNSPECIFIED COPD TYPE (H): ICD-10-CM

## 2018-07-31 DIAGNOSIS — I50.32 CHRONIC DIASTOLIC CONGESTIVE HEART FAILURE (H): ICD-10-CM

## 2018-07-31 PROBLEM — T65.91XA INGESTION OF SUBSTANCE: Status: RESOLVED | Noted: 2018-04-24 | Resolved: 2018-07-31

## 2018-07-31 PROBLEM — M25.511 ACUTE PAIN OF RIGHT SHOULDER: Status: RESOLVED | Noted: 2018-07-03 | Resolved: 2018-07-31

## 2018-07-31 PROBLEM — Z76.89 ENCOUNTER TO ESTABLISH CARE: Chronic | Status: RESOLVED | Noted: 2017-12-16 | Resolved: 2018-07-31

## 2018-07-31 PROBLEM — R06.02 SOB (SHORTNESS OF BREATH): Status: RESOLVED | Noted: 2018-01-10 | Resolved: 2018-07-31

## 2018-07-31 LAB
ALBUMIN UR-MCNC: NEGATIVE MG/DL
APPEARANCE UR: CLEAR
BACTERIA #/AREA URNS HPF: ABNORMAL /HPF
BILIRUB UR QL STRIP: NEGATIVE
COLOR UR AUTO: YELLOW
GLUCOSE UR STRIP-MCNC: NEGATIVE MG/DL
HGB UR QL STRIP: NEGATIVE
KETONES UR STRIP-MCNC: NEGATIVE MG/DL
LEUKOCYTE ESTERASE UR QL STRIP: ABNORMAL
NITRATE UR QL: NEGATIVE
NON-SQ EPI CELLS #/AREA URNS LPF: ABNORMAL /LPF
PH UR STRIP: 7 PH (ref 5–7)
RBC #/AREA URNS AUTO: ABNORMAL /HPF
SOURCE: ABNORMAL
SP GR UR STRIP: 1.01 (ref 1–1.03)
UROBILINOGEN UR STRIP-ACNC: 0.2 EU/DL (ref 0.2–1)
WBC #/AREA URNS AUTO: ABNORMAL /HPF

## 2018-07-31 PROCEDURE — 87086 URINE CULTURE/COLONY COUNT: CPT | Performed by: INTERNAL MEDICINE

## 2018-07-31 PROCEDURE — 81001 URINALYSIS AUTO W/SCOPE: CPT | Performed by: INTERNAL MEDICINE

## 2018-07-31 PROCEDURE — 99214 OFFICE O/P EST MOD 30 MIN: CPT | Performed by: INTERNAL MEDICINE

## 2018-07-31 RX ORDER — FUROSEMIDE 20 MG
20 TABLET ORAL DAILY
Qty: 90 TABLET | Refills: 3 | Status: SHIPPED | OUTPATIENT
Start: 2018-07-31 | End: 2019-02-05

## 2018-07-31 NOTE — PATIENT INSTRUCTIONS
INSTRUCTIONS FOR TODAY:     reduce furosemide to 20mg daily (one tablet rather than 2)   we will contact you with the urine test results     Dr Garcia

## 2018-07-31 NOTE — MR AVS SNAPSHOT
After Visit Summary   7/31/2018    Leonor Mercado    MRN: [id number]           Patient Information     Date Of Birth          5/6/1926        Visit Information        Provider Department      7/31/2018 9:40 AM Arnel Garcia MD [facility]        Today's Diagnoses     Chronic atrial fibrillation (H)    -  1    Chronic obstructive pulmonary disease, unspecified COPD type (H)        Coronary artery disease involving native coronary artery of native heart without angina pectoris        Diastolic dysfunction        Sick sinus syndrome (H)        CKD (chronic kidney disease) stage 3, GFR 30-59 ml/min        Cardiac pacemaker in situ        Chronic diastolic congestive heart failure (H)        Urinary frequency          Care Instructions    INSTRUCTIONS FOR TODAY:     reduce furosemide to 20mg daily (one tablet rather than 2)   we will contact you with the urine test results     Dr Garcia            Follow-ups after your visit        Your next 10 appointments already scheduled     Aug 16, 2018  9:30 AM CDT   Phone Device Check with NUÑEZ TECH2   [facility] ([facility])    [address] [place], [place] [address]  [facility] [id number] [phone] OPT 2            Aug 21, 2018  2:00 PM CDT   Anticoagulation Visit with [facility]   [facility]    [address]  [facility] [address] [phone]              Who to contact     If you have questions or need follow up information about today's clinic visit or your schedule please contact [facility] directly at [phone].  Normal or non-critical lab and imaging results will be communicated to you by MyChart, letter or phone within 4 business days after the clinic has received the results. If you do not hear from us within 7 days, please contact the clinic through MyChart or phone. If you have a critical or  abnormal lab result, we will notify you by phone as soon as possible.  Submit refill requests through "[employer] or call your pharmacy and they will forward the refill request to us. Please allow 3 business days for your refill to be completed.          Additional Information About Your Visit        Care EveryWhere ID     This is your Care EveryWhere ID. This could be used by other organizations to access your [facility] medical records  [id number]        Your Vitals Were     Pulse Temperature Last Period Pulse Oximetry BMI (Body Mass Index)       76 97.5  F (36.4  C) (Oral) (LMP Unknown) 97% 25.69 kg/m2        Blood Pressure from Last 3 Encounters:   07/31/18 119/70   07/17/18 124/70   06/14/18 118/72    Weight from Last 3 Encounters:   07/31/18 145 lb (65.8 kg)   07/17/18 143 lb (64.9 kg)   06/14/18 138 lb (62.6 kg)              We Performed the Following     UA reflex to Microscopic     Urine Culture Aerobic Bacterial          Today's Medication Changes          These changes are accurate as of 7/31/18 10:28 AM.  If you have any questions, ask your nurse or doctor.               These medicines have changed or have updated prescriptions.        Dose/Directions    furosemide 20 MG tablet   Commonly known as:  LASIX   This may have changed:  how much to take   Used for:  Chronic diastolic congestive heart failure (H)   Changed by:  Arnel Garcia MD        Dose:  20 mg   Take 1 tablet (20 mg) by mouth daily   Quantity:  90 tablet   Refills:  3            Where to get your medicines      These medications were sent to [facility] [address] [place] [address] [facility]  [address] [facility] [address]     Phone:  [phone]     furosemide 20 MG tablet                Primary Care Provider Office Phone # Fax #    Arnel Garcia [phone] [address] [place] [address]        Goals        General    Medication 1 (pt-stated)     Notes - Note  created  4/30/2018 10:05 AM by Yaquelin Varela, DAMIAN    Goal Statement: I will take medications only are ordered.     Measure of Success: No more overdose episodes    Supportive Steps to Achieve: I will accept help from home care.     Barriers: Depressed     Strengths: Family support    Date to Achieve By: 6/1/18         Lifestyle    #1 Patient achieves sleep pattern objective     Notes - Note created  6/21/2018 10:10 AM by Yaz Ojeda RN    Goal Statement: I will sleep all night without waking.  Measure of Success: Patient will feel sleepy at night and sleep at least 8 hours without waking.  Supportive Steps to Achieve: RN CC support.  Take medications as prescribed.  Will speak with PCP about sleep aids. No napping during day.  Barriers: patient feels exhausted, low energy.    Strengths: good family support.  No pain or restless legs.  Date to Achieve By: 15 July 2018          Equal Access to Services     NALDO OVALLE : Hadii aad ku hadasho Sonatividad, waaxda luqadaha, qaybta kaalmada adepramodyachris, antonia cruz . So [facility] [phone].    ATENCIÓN: Si habla español, tiene a spicer disposición servicios gratuitos de asistencia lingüística. Llame al [phone].    We comply with applicable federal civil rights laws and [place] laws. We do not discriminate on the basis of race, color, national origin, age, disability, sex, sexual orientation, or gender identity.            Thank you!     Thank you for choosing [facility]  for your care. Our goal is always to provide you with excellent care. Hearing back from our patients is one way we can continue to improve our services. Please take a few minutes to complete the written survey that you may receive in the mail after your visit with us. Thank you!             Your Updated Medication List - Protect others around you: Learn how to safely use, store and throw away your medicines at www.disposemymeds.org.          This list is accurate as  of 7/31/18 10:28 AM.  Always use your most recent med list.                   Brand Name Dispense Instructions for use Diagnosis    albuterol 108 (90 Base) MCG/ACT Inhaler    PROAIR HFA/PROVENTIL HFA/VENTOLIN HFA    1 Inhaler    Inhale 2 puffs into the lungs every 6 hours as needed for shortness of breath / dyspnea or wheezing    Chronic obstructive pulmonary disease, unspecified COPD type (H)       calcium carbonate 600 MG tablet   Generic drug:  calcium carbonate      Take 1 tablet by mouth daily        diltiazem 240 MG 24 hr capsule    DILTIAZEM CD    30 capsule    Take 1 capsule (240 mg) by mouth daily    Chronic atrial fibrillation (H)       fluticasone-vilanterol 200-25 MCG/INH oral inhaler    BREO ELLIPTA    1 Inhaler    Inhale 1 puff into the lungs daily    Anxiety       furosemide 20 MG tablet    LASIX    90 tablet    Take 1 tablet (20 mg) by mouth daily    Chronic diastolic congestive heart failure (H)       ipratropium - albuterol 0.5 mg/2.5 mg/3 mL 0.5-2.5 (3) MG/3ML neb solution    DUONEB    360 mL    Take 1 vial (3 mLs) by nebulization every 6 hours as needed for shortness of breath / dyspnea or wheezing    Chronic obstructive pulmonary disease, unspecified COPD type (H)       losartan 50 MG tablet    COZAAR    1 tablet    Take 0.5 tablets (25 mg) by mouth daily    Essential hypertension with goal blood pressure less than 140/90       MELATONIN PO      Take 5 mg by mouth nightly as needed        mirtazapine 15 MG tablet    REMERON    30 tablet    Take 1 tablet (15 mg) by mouth At Bedtime    Adjustment disorder, unspecified type       nitroGLYcerin 0.4 MG sublingual tablet    NITROSTAT    25 tablet    Place 1 tablet (0.4 mg) under the tongue every 5 minutes as needed    Other chest pain       OCUVITE PO      Take 1 capsule by mouth daily.        pravastatin 40 MG tablet    PRAVACHOL    90 tablet    Take 1 tablet (40 mg) by mouth daily    Hyperlipidemia LDL goal <100, Coronary artery disease involving  native coronary artery of native heart without angina pectoris       PROBIOTIC ACIDOPHILUS Tabs      Take 1 tablet by mouth daily        STOOL SOFTENER PO      Take 1 tablet by mouth daily as needed        TYLENOL PO      Take 1,000 mg by mouth nightly as needed        vitamin D 2000 units tablet      Take 2,000 Units by mouth daily        warfarin 2 MG tablet    COUMADIN    90 tablet    TAKE ONE TABLET BY MOUTH ONCE DAILY OR  AS  DIRECTED  BY  INR  CLINIC    Long term current use of anticoagulant therapy

## 2018-07-31 NOTE — PROGRESS NOTES
SUBJECTIVE:                                                    Leonor Mercado is a 89 year old female who presents to clinic today for the following health issues:    Urinary frequency.   Follow-up urinary sx.  C/o voiding frequently, small amounts.  Denies dysuria hematuria flank pain or fevers    Chronic atrial fibrillation (H)   Rate controlled, on chronic warfarin    Chronic obstructive pulmonary disease, unspecified COPD type (H)    SOB at baseline, denies cough.      Coronary artery disease involving native coronary artery of native heart without angina pectoris      Patient denies any exertional chest pain, dyspnea, palpitations, orthopnea, edema      CKD (chronic kidney disease) stage 3, GFR 30-59 ml/min      H/o CKD.  , renal function has declined slightly.  Med review: lasix 40mg daily  Lab Results   Component Value Date    CR 1.49 07/17/2018    CR 1.38 04/26/2018     Heart Failure/diastolic dysfunction Follow-up    Symptoms:    Shortness of breath: mild    Lower extremity edema: none    Chest pain: No    Using more pillows than normal: No    Cough at night: No    Weight:  Wt Readings from Last 4 Encounters:   07/31/18 145 lb (65.8 kg)   07/17/18 143 lb (64.9 kg)   06/14/18 138 lb (62.6 kg)   05/07/18 134 lb 11.2 oz (61.1 kg)          Cardiology visits, ER/UC, or hospital admissions since last visit: None    Medication side effects: urinary frequency      Patient Active Problem List   Diagnosis     Hyperlipidemia LDL goal <100     Cardiac pacemaker in situ     Advanced directives, counseling/discussion     Senile osteoporosis     Irritable bowel syndrome (IBS)     Anemia     Long-term (current) use of anticoagulants [Z79.01]     Degeneration of lumbar intervertebral disc     CKD (chronic kidney disease) stage 3, GFR 30-59 ml/min     Chronic atrial fibrillation (H)     Chronic obstructive pulmonary disease, unspecified COPD type (H)     Coronary artery disease involving native coronary artery of  native heart without angina pectoris     Diastolic dysfunction     Sick sinus syndrome (H)     Past Surgical History:   Procedure Laterality Date     APPENDECTOMY       CARDIAC SURGERY      PPM, 2 STENTS2-05[place]-CAD-taxus stentx2 2.5-12,2.5-12 in LADp and LADm, 80%nonDomRCA-LcxDom-mild,EF60%     CORONARY ANGIOGRAPHY ADULT ORDER  1994    mild CAD,Normal LV function, No Mitral regurgitation, Prox LAD 30% stenosis. RCA prox and mid, 20-30%     ESOPHAGOSCOPY, GASTROSCOPY, DUODENOSCOPY (EGD), COMBINED N/A 2015    Procedure: COMBINED ESOPHAGOSCOPY, GASTROSCOPY, DUODENOSCOPY (EGD), BIOPSY SINGLE OR MULTIPLE;  Surgeon: Genevieve Leon MD;  Location: [facility]     H LEAD REVISION DUAL  2003    Revised in [place]-due to diaphram stim (original pacer placed in [place])     H LEAD REVISION DUAL  2014    Correction of reversal of A and V leads in Header     HEART CATH, ANGIOPLASTY  2005    LEIGH ANN mid and proximal LAD, ongoing 80% RCA; mild circumflex     HERNIA REPAIR Left     LIH     IMPLANT PACEMAKER  2003    Placed in [place] for sick sinus syndrome     REPLACE PACEMAKER GENERATOR  2013    Dual-chamber pacemaker by Dr SETH Pierre       Social History   Substance Use Topics     Smoking status: Former Smoker     Types: Cigarettes     Quit date: 1987     Smokeless tobacco: Never Used     Alcohol use No     Family History   Problem Relation Age of Onset     HEART DISEASE Father       age 84     Neurologic Disorder Mother      dementia     GASTROINTESTINAL DISEASE Daughter      liver transplant     Crohn Disease Daughter          Current Outpatient Prescriptions   Medication Sig Dispense Refill     Acetaminophen (TYLENOL PO) Take 1,000 mg by mouth nightly as needed        albuterol (PROAIR HFA/PROVENTIL HFA/VENTOLIN HFA) 108 (90 BASE) MCG/ACT Inhaler Inhale 2 puffs into the lungs every 6 hours as needed for shortness of breath / dyspnea or wheezing 1 Inhaler 1     calcium carbonate (CALCIUM CARBONATE)  600 MG TABS tablet Take 1 tablet by mouth daily        Cholecalciferol (VITAMIN D) 2000 UNITS tablet Take 2,000 Units by mouth daily       diltiazem (DILTIAZEM CD) 240 MG 24 hr capsule Take 1 capsule (240 mg) by mouth daily 30 capsule      Docusate Calcium (STOOL SOFTENER PO) Take 1 tablet by mouth daily as needed        fluticasone-vilanterol (BREO ELLIPTA) 200-25 MCG/INH oral inhaler Inhale 1 puff into the lungs daily 1 Inhaler 3     furosemide (LASIX) 20 MG tablet Take 1 tablet (20 mg) by mouth daily 90 tablet 3     ipratropium - albuterol 0.5 mg/2.5 mg/3 mL (DUONEB) 0.5-2.5 (3) MG/3ML neb solution Take 1 vial (3 mLs) by nebulization every 6 hours as needed for shortness of breath / dyspnea or wheezing 360 mL      Lactobacillus (PROBIOTIC ACIDOPHILUS) TABS Take 1 tablet by mouth daily       losartan (COZAAR) 50 MG tablet Take 0.5 tablets (25 mg) by mouth daily 1 tablet 0     MELATONIN PO Take 5 mg by mouth nightly as needed        mirtazapine (REMERON) 15 MG tablet Take 1 tablet (15 mg) by mouth At Bedtime 30 tablet 3     Multiple Vitamins-Minerals (OCUVITE PO) Take 1 capsule by mouth daily.       nitroglycerin (NITROSTAT) 0.4 MG SL tablet Place 1 tablet (0.4 mg) under the tongue every 5 minutes as needed 25 tablet 1     pravastatin (PRAVACHOL) 40 MG tablet Take 1 tablet (40 mg) by mouth daily 90 tablet 0     warfarin (COUMADIN) 2 MG tablet TAKE ONE TABLET BY MOUTH ONCE DAILY OR  AS  DIRECTED  BY  INR  CLINIC 90 tablet 1     ROS: The following systems have been completely reviewed and are negative except as noted in the HPI: CONSTITUTIONAL, CARDIOVASCULAR, PULMONARY    OBJECTIVE:                                                    /70 (Cuff Size: Adult Regular)  Pulse 76  Temp 97.5  F (36.4  C) (Oral)  Wt 145 lb (65.8 kg)  LMP  (LMP Unknown)  SpO2 97%  BMI 25.69 kg/m2 Body mass index is 25.69 kg/(m^2).  GENERAL:  alert,  no distress  NECK: no tenderness, no adenopathy  RESP: lungs clear to auscultation  - no rales, no rhonchi, no wheezes  CV: regular rates and rhythm, normal S1 S2, no S3 or S4 and no murmur, no click or rub   MS: extremities- no edema       ASSESSMENT/PLAN:                                                        ICD-10-CM    1. Urinary frequency R35.0 UA reflex to Microscopic         Ua, ucx sent today  Will contact pt with results     2. Chronic atrial fibrillation (H) I48.2 Chronic warfarin.   Rate control: diltiazem     3. Chronic obstructive pulmonary disease, unspecified COPD type (H) J44.9 Continue Duonebs, Breo Ellipta.  Stable       4. Coronary artery disease involving native coronary artery of native heart without angina pectoris I25.10 2 prior LAD stents  Lab Results   Component Value Date    LDL 54 12/11/2017   Stable on current regimen      5. Diastolic dysfunction I51.9 Stable. Reduce furosemide from 40 to 20mg daily     6. Sick sinus syndrome (H) I49.5 H/o pacemaker, stable     7. CKD (chronic kidney disease) stage 3, GFR 30-59 ml/min N18.3 Reducing diuretic regimen         Arnel Garcia MD  [facility]

## 2018-08-01 LAB
BACTERIA SPEC CULT: NORMAL
SPECIMEN SOURCE: NORMAL

## 2018-08-06 ENCOUNTER — TELEPHONE (OUTPATIENT)
Dept: PEDIATRICS | Facility: CLINIC | Age: 83
End: 2018-08-06

## 2018-08-06 DIAGNOSIS — N39.0 URINARY TRACT INFECTION WITHOUT HEMATURIA, SITE UNSPECIFIED: ICD-10-CM

## 2018-08-06 DIAGNOSIS — R35.0 URINARY FREQUENCY: Primary | ICD-10-CM

## 2018-08-06 RX ORDER — CEFDINIR 300 MG/1
300 CAPSULE ORAL 2 TIMES DAILY
Qty: 10 CAPSULE | Refills: 0 | Status: SHIPPED | OUTPATIENT
Start: 2018-08-06 | End: 2018-08-28

## 2018-08-06 NOTE — TELEPHONE ENCOUNTER
Recent discussed with pt.  Pyuria (50-100WBC)  Ucx normal dwaine.  Still c/o urinary frequency    A/p:   empiric course abx for UTI.  Chronic AC, pt will schedule visit for INR recheck this week after first few days of abx.    Arnel Garcia MD

## 2018-08-14 ENCOUNTER — PATIENT OUTREACH (OUTPATIENT)
Dept: CARE COORDINATION | Facility: CLINIC | Age: 83
End: 2018-08-14

## 2018-08-14 NOTE — PROGRESS NOTES
"Clinic Care Coordination Contact  Care Team Conversations    RNCC follow up outreach.  Spoke to briefly over phone.  Patient states she is \"a mess' right now and didn't want to talk.  Patient explains her  passed away last week.  Writer explained this was just a follow up call and wanted to see how she was doing.  Patient stated she is no longer going to [facility] due to fact it was too hard to get appointments with PCP.  Patient states she will be going to [facility] from now on.  Writer asked if it would be ok if the [facility] care coordinator could outreach to her in a few weeks.  Patient stated that would be fine.    Warm hand off provided to [facility] RNCC.    Yaz Ojeda RN-BSN   Care Coordination  Phone:  [phone]  Email: [email or web address]  [facility]        "

## 2018-08-23 ENCOUNTER — ALLIED HEALTH/NURSE VISIT (OUTPATIENT)
Dept: CARDIOLOGY | Facility: CLINIC | Age: 83
End: 2018-08-23
Payer: COMMERCIAL

## 2018-08-23 DIAGNOSIS — Z95.0 CARDIAC PACEMAKER IN SITU: Primary | ICD-10-CM

## 2018-08-23 PROCEDURE — 93293 PM PHONE R-STRIP DEVICE EVAL: CPT | Performed by: INTERNAL MEDICINE

## 2018-08-23 NOTE — MR AVS SNAPSHOT
After Visit Summary   8/23/2018    Leonor Mercado    MRN: [id number]           Patient Information     Date Of Birth          5/6/1926        Visit Information        Provider Department      8/23/2018 10:15 AM SAVANNAH [facility]        Today's Diagnoses     Cardiac pacemaker in situ    -  1       Follow-ups after your visit        Your next 10 appointments already scheduled     Nov 29, 2018 10:30 AM CST   Phone Device Check with NUÑEZ TECH2   [facility] ([facility])    [address] [place], [place] [address]  [facility] [address] [phone] OPT 2              Who to contact     If you have questions or need follow up information about today's clinic visit or your schedule please contact [facility] directly at [phone].  Normal or non-critical lab and imaging results will be communicated to you by MyChart, letter or phone within 4 business days after the clinic has received the results. If you do not hear from us within 7 days, please contact the clinic through MyChart or phone. If you have a critical or abnormal lab result, we will notify you by phone as soon as possible.  Submit refill requests through [employer] or call your pharmacy and they will forward the refill request to us. Please allow 3 business days for your refill to be completed.          Additional Information About Your Visit        Care EveryWhere ID     This is your Care EveryWhere ID. This could be used by other organizations to access your [place] medical records  [id number]        Your Vitals Were     Last Period                   (LMP Unknown)            Blood Pressure from Last 3 Encounters:   07/31/18 119/70   07/17/18 124/70   06/14/18 118/72    Weight from Last 3 Encounters:   07/31/18 65.8 kg (145 lb)   07/17/18 64.9 kg (143 lb)   06/14/18 62.6 kg (138 lb)              We Performed the Following      PM PHONE R STRIP EVAL UP TO 90 DAYS ([id number])        Primary Care Provider Office Phone # Fax #    Arnel Tj Garcia [phone]       [facility] [address] [place] [address]        Goals        General    Medication 1 (pt-stated)     Notes - Note created  4/30/2018 10:05 AM by Yaquelin Varela, RN    Goal Statement: I will take medications only are ordered.     Measure of Success: No more overdose episodes    Supportive Steps to Achieve: I will accept help from home care.     Barriers: Depressed     Strengths: Family support    Date to Achieve By: 6/1/18         Lifestyle    #1 Patient achieves sleep pattern objective     Notes - Note created  6/21/2018 10:10 AM by Yaz Ojeda RN    Goal Statement: I will sleep all night without waking.  Measure of Success: Patient will feel sleepy at night and sleep at least 8 hours without waking.  Supportive Steps to Achieve: RN CC support.  Take medications as prescribed.  Will speak with PCP about sleep aids. No napping during day.  Barriers: patient feels exhausted, low energy.    Strengths: good family support.  No pain or restless legs.  Date to Achieve By: 15 July 2018          Equal Access to Services     YOSVANY [facility] Hospital[facility]ENNIFER : Hadii marisel joyner hadasho Sonatividad, waaxda luqadaha, qaybta kaalmada aderichy, antonia cruz . So [facility] [phone].    ATENCIÓN: Si habla español, tiene a spicer disposición servicios gratuitos de asistencia lingüística. Llame al [phone].    We comply with applicable federal civil rights laws and [place] laws. We do not discriminate on the basis of race, color, national origin, age, disability, sex, sexual orientation, or gender identity.            Thank you!     Thank you for choosing [facility]  for your care. Our goal is always to provide you with excellent care. Hearing back from our patients is one way we can continue to improve our services. Please take a few  minutes to complete the written survey that you may receive in the mail after your visit with us. Thank you!             Your Updated Medication List - Protect others around you: Learn how to safely use, store and throw away your medicines at www.disposemymeds.org.          This list is accurate as of 8/23/18 10:25 AM.  Always use your most recent med list.                   Brand Name Dispense Instructions for use Diagnosis    albuterol 108 (90 Base) MCG/ACT inhaler    PROAIR HFA/PROVENTIL HFA/VENTOLIN HFA    1 Inhaler    Inhale 2 puffs into the lungs every 6 hours as needed for shortness of breath / dyspnea or wheezing    Chronic obstructive pulmonary disease, unspecified COPD type (H)       calcium carbonate 600 MG tablet   Generic drug:  calcium carbonate      Take 1 tablet by mouth daily        cefdinir 300 MG capsule    OMNICEF    10 capsule    Take 1 capsule (300 mg) by mouth 2 times daily    Urinary tract infection without hematuria, site unspecified       diltiazem 240 MG 24 hr capsule    DILTIAZEM CD    30 capsule    Take 1 capsule (240 mg) by mouth daily    Chronic atrial fibrillation (H)       fluticasone-vilanterol 200-25 MCG/INH inhaler    BREO ELLIPTA    1 Inhaler    Inhale 1 puff into the lungs daily    Anxiety       furosemide 20 MG tablet    LASIX    90 tablet    Take 1 tablet (20 mg) by mouth daily    Chronic diastolic congestive heart failure (H)       ipratropium - albuterol 0.5 mg/2.5 mg/3 mL 0.5-2.5 (3) MG/3ML neb solution    DUONEB    360 mL    Take 1 vial (3 mLs) by nebulization every 6 hours as needed for shortness of breath / dyspnea or wheezing    Chronic obstructive pulmonary disease, unspecified COPD type (H)       losartan 50 MG tablet    COZAAR    1 tablet    Take 0.5 tablets (25 mg) by mouth daily    Essential hypertension with goal blood pressure less than 140/90       MELATONIN PO      Take 5 mg by mouth nightly as needed        mirtazapine 15 MG tablet    REMERON    30 tablet     Take 1 tablet (15 mg) by mouth At Bedtime    Adjustment disorder, unspecified type       nitroGLYcerin 0.4 MG sublingual tablet    NITROSTAT    25 tablet    Place 1 tablet (0.4 mg) under the tongue every 5 minutes as needed    Other chest pain       OCUVITE PO      Take 1 capsule by mouth daily.        pravastatin 40 MG tablet    PRAVACHOL    90 tablet    Take 1 tablet (40 mg) by mouth daily    Hyperlipidemia LDL goal <100, Coronary artery disease involving native coronary artery of native heart without angina pectoris       PROBIOTIC ACIDOPHILUS Tabs      Take 1 tablet by mouth daily        STOOL SOFTENER PO      Take 1 tablet by mouth daily as needed        TYLENOL PO      Take 1,000 mg by mouth nightly as needed        vitamin D 2000 units tablet      Take 2,000 Units by mouth daily        warfarin 2 MG tablet    COUMADIN    90 tablet    TAKE ONE TABLET BY MOUTH ONCE DAILY OR  AS  DIRECTED  BY  INR  CLINIC    Long term current use of anticoagulant therapy

## 2018-08-28 ENCOUNTER — ANTICOAGULATION THERAPY VISIT (OUTPATIENT)
Dept: ANTICOAGULATION | Facility: CLINIC | Age: 83
End: 2018-08-28
Payer: COMMERCIAL

## 2018-08-28 ENCOUNTER — OFFICE VISIT (OUTPATIENT)
Dept: PHARMACY | Facility: CLINIC | Age: 83
End: 2018-08-28
Payer: COMMERCIAL

## 2018-08-28 VITALS — DIASTOLIC BLOOD PRESSURE: 81 MMHG | SYSTOLIC BLOOD PRESSURE: 137 MMHG | HEART RATE: 73 BPM | OXYGEN SATURATION: 99 %

## 2018-08-28 DIAGNOSIS — I48.20 CHRONIC ATRIAL FIBRILLATION (H): ICD-10-CM

## 2018-08-28 DIAGNOSIS — E78.5 HYPERLIPIDEMIA LDL GOAL <100: ICD-10-CM

## 2018-08-28 DIAGNOSIS — R60.9 EDEMA, UNSPECIFIED TYPE: ICD-10-CM

## 2018-08-28 DIAGNOSIS — F41.9 ANXIETY: ICD-10-CM

## 2018-08-28 DIAGNOSIS — J44.9 CHRONIC OBSTRUCTIVE PULMONARY DISEASE, UNSPECIFIED COPD TYPE (H): ICD-10-CM

## 2018-08-28 DIAGNOSIS — F32.A DEPRESSION, UNSPECIFIED DEPRESSION TYPE: ICD-10-CM

## 2018-08-28 DIAGNOSIS — I10 HYPERTENSION GOAL BP (BLOOD PRESSURE) < 140/80: ICD-10-CM

## 2018-08-28 DIAGNOSIS — I25.10 CORONARY ARTERY DISEASE INVOLVING NATIVE CORONARY ARTERY OF NATIVE HEART WITHOUT ANGINA PECTORIS: ICD-10-CM

## 2018-08-28 DIAGNOSIS — Z79.01 LONG-TERM (CURRENT) USE OF ANTICOAGULANTS: ICD-10-CM

## 2018-08-28 DIAGNOSIS — K58.9 IRRITABLE BOWEL SYNDROME, UNSPECIFIED TYPE: ICD-10-CM

## 2018-08-28 DIAGNOSIS — G47.00 INSOMNIA, UNSPECIFIED TYPE: Primary | ICD-10-CM

## 2018-08-28 DIAGNOSIS — E63.9 NUTRITIONAL DEFICIENCY: ICD-10-CM

## 2018-08-28 LAB — INR POINT OF CARE: 2 (ref 0.86–1.14)

## 2018-08-28 PROCEDURE — 85610 PROTHROMBIN TIME: CPT | Mod: QW

## 2018-08-28 PROCEDURE — 99207 ZZC NO CHARGE NURSE ONLY: CPT

## 2018-08-28 PROCEDURE — 36416 COLLJ CAPILLARY BLOOD SPEC: CPT

## 2018-08-28 PROCEDURE — 99606 MTMS BY PHARM EST 15 MIN: CPT | Performed by: PHARMACIST

## 2018-08-28 PROCEDURE — 99607 MTMS BY PHARM ADDL 15 MIN: CPT | Performed by: PHARMACIST

## 2018-08-28 RX ORDER — POLYETHYLENE GLYCOL 3350 17 G/17G
POWDER, FOR SOLUTION ORAL DAILY
COMMUNITY

## 2018-08-28 NOTE — PROGRESS NOTES
SUBJECTIVE/OBJECTIVE:                           Leonor Mercado is a 89 year old female coming in for a follow-up visit for Medication Therapy Management.  She was referred to me from Dr. Young. Accompanied by her daughter at the visit.     Chief Complaint: Last MTM visit 6/4/18. Pt brought all of meds and would like us to review - specifically probiotic and pravastatin.     Allergies/ADRs: Reviewed in Epic  Tobacco: History of tobacco dependence - quit 1987  Alcohol: 1-3 beverages / week    Medication Adherence:   Pt denies any issues besides social stressors (changing living situation,  passed away).      Hyperlipidemia: Current therapy includes pravastatin 40mg once daily.  Denies any concerns.  Liver Function Studies -   Recent Labs   Lab Test  04/16/18   1153   PROTTOTAL  6.6*   ALBUMIN  3.8   BILITOTAL  1.1   ALKPHOS  44   AST  29   ALT  22     Recent Labs   Lab Test  12/11/17   0959  03/15/15   0745   CHOL  161  153   HDL  85  52   LDL  54  75   TRIG  109  129   CHOLHDLRATIO   --   2.9       Depression/Anxiety/Insomnia:  Current medications include: mirtazapine 15 mg HS and Melatonin 5 mg HS PRN. Prior she was on both sertraline (constipation, nausea) and paroxetine; she was admitted in April for an intentional overdose after swallowing the bottle of paroxetine because she wanted to sleep. Sleep has been very poor. Pt is having issues with staying asleep. Pt has nocturia and not able to fall asleep. Pt has a lot of life stressors (mention above) which is impacting her mood.Pt doesn't feel like Remeron makes a difference but her daughter states the family does notice a difference and it has improved her mood.  Has not been taking the melatonin. . No concerns for side effects.    PHQ-9 SCORE 11/17/2017 4/3/2018 4/20/2018   Total Score - - -   Total Score 12 16 8        COPD: Current medications: Breo once daily in the morning, DuoNeb daily-BID, oxygen at night and Albuterol MDI as needed. Tried  Mucinex but didn't feel it was helpful so stopped. Humidity - trigger now uses Duoneb BID rather than every day. Very seldom needs to use rescue albuterol and when she uses it does work. Pt was on Spiriva Handihaler but insurance no longer covers it. Pt would like to go back to Spir[place] if she could. Pt denies rinsing her mouth after uses and knows she is suppose but does not feel she needs to as she has no problems with this. Has plan to see pulmonalogist soon - Dr. Her.    Pt reports the following symptoms: minimal SOB with exertion compared to how she was feeling this winter.    COPD Action Plan on file.    IBS: Currently taking Miralax QAM and prune juice QAM. Has docusate at home and uses PRN. No concerns at this time.     Hypertension/afib/edema/CAD: Current medications include losartan 25 mg daily, furosemide 20 mg daily, warfarin as directed by anticoagulation clinic. Endorses swelling even if not on her feet much that day. Pt elevates her feet regularly and is wearing compression stocks at the visit. Discussed sodium intake, however, pt is not interested in changing that at the moment. Pt has NTG in her purse - has taken in the past but not recently. Patient reports no current medication side effects.  BP Readings from Last 3 Encounters:   08/28/18 137/81   07/31/18 119/70   07/17/18 124/70   INR (today): 2.0    Supplements: Currently taking Ocuvite daily for eye health, calcium daily, vitamin D daily, vitamin C daily. Drinks milk once a while, cheese, creamer in coffee. Taking Ocuvite for the Lutein component per recommended by eye doctor in [place]. Pt states she was instructed to take vitamin with vitamin D and possibly calcium. No side effects noted.     Current labs include:  Today's Vitals: /81  Pulse 73  LMP  (LMP Unknown)  SpO2 99%     GFR Estimate   Date Value Ref Range Status   07/17/2018 33 (L) >60 mL/min/1.7m2 Final     Comment:     Non  GFR Calc   04/26/2018 36 (L) >60  "mL/min/1.7m2 Final     Comment:     Non  GFR Calc   04/25/2018 36 (L) >60 mL/min/1.7m2 Final     Comment:     Non  GFR Calc     TSH   Date Value Ref Range Status   04/24/2018 3.01 0.40 - 4.00 mU/L Final       Most Recent Immunizations   Administered Date(s) Administered     Influenza (High Dose) 3 valent vaccine 09/26/2017     Influenza (IIV3) PF 10/01/2014     Pneumococcal 23 valent 10/15/2012     Tdap (Adacel,Boostrix) 10/15/2012       ASSESSMENT:                             Current medications were reviewed today.       Medication Adherence: issues identified    Hyperlipidemia: Stable.     Depression/Anxiety/Insomnia:  Needs Improvement. Pt would benefit from a trial of melatonin daily to see if this is effective for sleep. May consider increasing Remeron dose in the future - note, generally at higher doses Remeron may not be as effective for sleep but more efficacious as an antidepressant. Discussed sleep hygiene with pt, however pt is not interested in changing sleep habits.     COPD: Stable. Plans to follow up with Dr. Her.     IBS: stable.    Hypertension/afib/edema/CAD: Stable. Pt is at goal BP < 150/90. At goal INR 2-3. Pt has a recent history of kidney infection, which increasing furosemide for edema at this time may not outweigh the risks at this time.     Supplements: Pt would benefit from stopping vitamin C as there's not clear indication for supplementation and pt would like to reduce pill burden. May consider pt to discontinue Ocuvite as cost is a concern and supplement with Lutein only formulation (may be more affordable). Per [facility], lutein is \"possibly effective\" for cataract and age-related macular degeneration.     PLAN:                            1. Pt to discontinue vitamin C daily.     2. Pt to take melatonin 5-7.5 mg daily.     3. Encourage pt to reduce sodium intake to help with edema.     Next visit:  - May recommend finishing Ocuvite and have pt " to discuss her eye doctor if she can take Lutein supplement only formulation instead.       I spent 45 minutes with this patient today. I offer these suggestions for consideration by the PCP. A copy of the visit note was provided to the patient's primary care provider.    Will follow up in 6 months.      Shakira Blackman , Pharm D  [phone] (phone)  [phone] (pager)  Medication Therapy Management Pharmacist     [place] Mistry PharmD  Pharmaceutical Care Resident  Pager: [phone]

## 2018-08-28 NOTE — PATIENT INSTRUCTIONS
Recommendations from today's MTM visit:                                                        1. You can stop taking vitamin C daily to decrease the number of pills you take in a day.     2. Try avoiding additional salt intake. Decreasing salt (sodium) intake may help decrease your lower leg swelling. Keep elevating your feet.     3. Try taking melatonin every night to see if this helps with your sleep. If you need to increase the dose, you can take melatonin 1.5 tablets (7.5 mg) at night as needed.       Next MTM visit: 6 months    To schedule another MTM appointment, please call the clinic directly or you may call the MTM scheduling line at [phone] or toll-free at [phone].     My Clinical Pharmacist's contact information:                                                      It was a pleasure seeing you today!  Please feel free to contact me with any questions or concerns you have.      Shakira Blackman , Pharm D  [phone] (phone)  [phone] (pager)  Medication Therapy Management Pharmacist    You may receive a survey about the MTM services you received.  I would appreciate your feedback to help me serve you better in the future. Please fill it out and return it when you can. Your comments will be anonymous.

## 2018-08-28 NOTE — PROGRESS NOTES
"  ANTICOAGULATION FOLLOW-UP CLINIC VISIT    Patient Name:  Leonor Mercado  Date:  8/28/2018  Contact Type:  Face to Face    SUBJECTIVE:     Patient Findings     Positives No Problem Findings           OBJECTIVE    INR Protime   Date Value Ref Range Status   08/28/2018 2.0 (A) 0.86 - 1.14 Final       ASSESSMENT / PLAN  INR assessment THER    Recheck INR In: 4 WEEKS    INR Location Clinic      Anticoagulation Summary as of 8/28/2018     INR goal 2.0-3.0   Today's INR 2.0   Warfarin maintenance plan 2 mg (2 mg x 1) every day   Full warfarin instructions 2 mg every day   Weekly warfarin total 14 mg   No change documented Yoana Waddell RN   Plan last modified Yoana Waddell RN (12/15/2016)   Next INR check 9/25/2018   Priority INR   Target end date     Indications   Long-term (current) use of anticoagulants [Z79.01] [Z79.01]  A Fib - chr Coumadin  s/p PPM (H) (Resolved) [I48.2]         Anticoagulation Episode Summary     INR check location     Preferred lab     Send INR reminders to [facility]    Comments Pt's 1st name is pronounced \"ondray\"  Pt does not want print out, appt card only      Anticoagulation Care Providers     Provider Role Specialty Phone number    Dannielle Darby MD Responsible Internal Medicine [phone]            See the Encounter Report to view Anticoagulation Flowsheet and Dosing Calendar (Go to Encounters tab in chart review, and find the Anticoagulation Therapy Visit)    Dosage adjustment made based on physician directed care plan.    Yoana Waddell RN               "

## 2018-08-28 NOTE — MR AVS SNAPSHOT
Leonor Mercado   8/28/2018 12:45 PM   Anticoagulation Therapy Visit    Description:  89 year old female   Provider[facility]  [facility]   Department:  [facility]           INR as of 8/28/2018     Today's INR 2.0      Anticoagulation Summary as of 8/28/2018     INR goal 2.0-3.0   Today's INR 2.0   Full warfarin instructions 2 mg every day   Next INR check 9/25/2018    Indications   Long-term (current) use of anticoagulants [Z79.01] [Z79.01]  A Fib - chr Coumadin  s/p PPM (H) (Resolved) [I48.2]         Your next Anticoagulation Clinic appointment(s)     Sep 25, 2018 11:00 AM CDT   Anticoagulation Visit with [facility]   [facility]    [address] [facility] [address]  [facility] [address] [phone]              Contact Numbers     [facility] Phone Numbers:  Anticoagulation Clinic Appointments : [phone]  Anticoagulation Nurse: [phone]         August 2018 Details    Sun Mon Tue Wed Thu Fri Sat        1               2               3               4                 5               6               7               8               9               10               11                 12               13               14               15               16               17               18                 19               20               21               22               23               24               25                 26               27               28      2 mg   See details      29      2 mg         30      2 mg         31      2 mg           Date Details   08/28 This INR check               How to take your warfarin dose     To take:  2 mg Take 1 of the 2 mg tablets.           September 2018 Details    Sun Mon Tue Wed Thu Fri Sat           1      2 mg           2      2 mg         3      2 mg         4      2 mg         5      2 mg         6      2 mg         7      2 mg         8      2 mg           9      2 mg          10      2 mg         11      2 mg         12      2 mg         13      2 mg         14      2 mg         15      2 mg           16      2 mg         17      2 mg         18      2 mg         19      2 mg         20      2 mg         21      2 mg         22      2 mg           23      2 mg         24      2 mg         25            26               27               28               29                 30                      Date Details   No additional details    Date of next INR:  9/25/2018         How to take your warfarin dose     To take:  2 mg Take 1 of the 2 mg tablets.

## 2018-08-28 NOTE — MR AVS SNAPSHOT
After Visit Summary   8/28/2018    Leonor Mercado    MRN: [id number]           Patient Information     Date Of Birth          5/6/1926        Visit Information        Provider Department      8/28/2018 2:00 PM Shakira Blackman, [facility] MTM        Care Instructions    Recommendations from today's MTM visit:                                                        1. You can stop taking vitamin C daily to decrease the number of pills you take in a day.     2. Try avoiding additional salt intake. Decreasing salt (sodium) intake may help decrease your lower leg swelling. Keep elevating your feet.     3. Try taking melatonin every night to see if this helps with your sleep. If you need to increase the dose, you can take melatonin 1.5 tablets (7.5 mg) at night as needed.       Next MTM visit: 6 months    To schedule another MTM appointment, please call the clinic directly or you may call the MTM scheduling line at [phone] or toll-free at [phone].     My Clinical Pharmacist's contact information:                                                      It was a pleasure seeing you today!  Please feel free to contact me with any questions or concerns you have.      Shakira Blackman , Pharm D  [phone] (phone)  [phone] (pager)  Medication Therapy Management Pharmacist    You may receive a survey about the MTM services you received.  I would appreciate your feedback to help me serve you better in the future. Please fill it out and return it when you can. Your comments will be anonymous.                  Follow-ups after your visit        Your next 10 appointments already scheduled     Sep 25, 2018 11:00 AM CDT   Anticoagulation Visit with [facility]   [facility]    [address]  [facility] [address] [phone]            Nov 29, 2018 10:30 AM CST   Phone Device Check with NUÑEZ TECH2   [facility]  [facility] ([facility]    [address]  [place] [id number] [phone] OPT 2              Who to contact     If you have questions or need follow up information about today's clinic visit or your schedule please contact [facility] directly at [phone].  Normal or non-critical lab and imaging results will be communicated to you by MyChart, letter or phone within 4 business days after the clinic has received the results. If you do not hear from us within 7 days, please contact the clinic through MyChart or phone. If you have a critical or abnormal lab result, we will notify you by phone as soon as possible.  Submit refill requests through [employer] or call your pharmacy and they will forward the refill request to us. Please allow 3 business days for your refill to be completed.          Additional Information About Your Visit        Care EveryWhere ID     This is your Care EveryWhere ID. This could be used by other organizations to access your [place] medical records  [id number]        Your Vitals Were     Pulse Last Period Pulse Oximetry             73 (LMP Unknown) 99%          Blood Pressure from Last 3 Encounters:   08/28/18 137/81   07/31/18 119/70   07/17/18 124/70    Weight from Last 3 Encounters:   07/31/18 145 lb (65.8 kg)   07/17/18 143 lb (64.9 kg)   06/14/18 138 lb (62.6 kg)              Today, you had the following     No orders found for display       Primary Care Provider Office Phone # Fax #    Arnel Garcia [phone] [address] [place] [address]        Goals        General    Medication 1 (pt-stated)     Notes - Note created  4/30/2018 10:05 AM by Yaquelin Varela, DAMIAN    Goal Statement: I will take medications only are ordered.     Measure of Success: No more overdose episodes    Supportive Steps to Achieve: I will accept help from home care.     Barriers: Depressed     Strengths: Family  support    Date to Achieve By: 6/1/18         Lifestyle    #1 Patient achieves sleep pattern objective     Notes - Note created  6/21/2018 10:10 AM by Yaz Ojeda RN    Goal Statement: I will sleep all night without waking.  Measure of Success: Patient will feel sleepy at night and sleep at least 8 hours without waking.  Supportive Steps to Achieve: RN CC support.  Take medications as prescribed.  Will speak with PCP about sleep aids. No napping during day.  Barriers: patient feels exhausted, low energy.    Strengths: good family support.  No pain or restless legs.  Date to Achieve By: 15 July 2018          Equal Access to Services     [facility]: Hadii marisel joyner hadasho Soomaali, waaxda luqadaha, qaybta kaalmada dexteryachris, antonia cruz . So [facility] [phone].    ATENCIÓN: Si habla español, tiene a spicer disposición servicios gratuitos de asistencia lingüística. Llame al [phone].    We comply with applicable federal civil rights laws and [place] laws. We do not discriminate on the basis of race, color, national origin, age, disability, sex, sexual orientation, or gender identity.            Thank you!     Thank you for choosing [facility]  for your care. Our goal is always to provide you with excellent care. Hearing back from our patients is one way we can continue to improve our services. Please take a few minutes to complete the written survey that you may receive in the mail after your visit with us. Thank you!             Your Updated Medication List - Protect others around you: Learn how to safely use, store and throw away your medicines at www.disposemymeds.org.          This list is accurate as of 8/28/18  2:32 PM.  Always use your most recent med list.                   Brand Name Dispense Instructions for use Diagnosis    albuterol 108 (90 Base) MCG/ACT inhaler    PROAIR HFA/PROVENTIL HFA/VENTOLIN HFA    1 Inhaler    Inhale 2 puffs into the lungs every 6 hours as  needed for shortness of breath / dyspnea or wheezing    Chronic obstructive pulmonary disease, unspecified COPD type (H)       calcium carbonate 600 MG tablet   Generic drug:  calcium carbonate      Take 1 tablet by mouth daily        diltiazem 240 MG 24 hr capsule    DILTIAZEM CD    30 capsule    Take 1 capsule (240 mg) by mouth daily    Chronic atrial fibrillation (H)       fluticasone-vilanterol 200-25 MCG/INH inhaler    BREO ELLIPTA    1 Inhaler    Inhale 1 puff into the lungs daily    Anxiety       furosemide 20 MG tablet    LASIX    90 tablet    Take 1 tablet (20 mg) by mouth daily    Chronic diastolic congestive heart failure (H)       ipratropium - albuterol 0.5 mg/2.5 mg/3 mL 0.5-2.5 (3) MG/3ML neb solution    DUONEB    360 mL    Take 1 vial (3 mLs) by nebulization every 6 hours as needed for shortness of breath / dyspnea or wheezing    Chronic obstructive pulmonary disease, unspecified COPD type (H)       losartan 50 MG tablet    COZAAR    1 tablet    Take 0.5 tablets (25 mg) by mouth daily    Essential hypertension with goal blood pressure less than 140/90       MELATONIN PO      Take 5 mg by mouth nightly as needed        mirtazapine 15 MG tablet    REMERON    30 tablet    Take 1 tablet (15 mg) by mouth At Bedtime    Adjustment disorder, unspecified type       nitroGLYcerin 0.4 MG sublingual tablet    NITROSTAT    25 tablet    Place 1 tablet (0.4 mg) under the tongue every 5 minutes as needed    Other chest pain       OCUVITE PO      Take 1 capsule by mouth daily.        polyethylene glycol Packet    MIRALAX/GLYCOLAX     Take 1 packet by mouth daily as needed for constipation        pravastatin 40 MG tablet    PRAVACHOL    90 tablet    Take 1 tablet (40 mg) by mouth daily    Hyperlipidemia LDL goal <100, Coronary artery disease involving native coronary artery of native heart without angina pectoris       PROBIOTIC ACIDOPHILUS Tabs      Take 1 tablet by mouth daily        STOOL SOFTENER PO      Take 1  tablet by mouth daily as needed        TYLENOL PO      Take 1,000 mg by mouth nightly as needed        VITAMIN C PO      Take 500 mg by mouth daily        vitamin D 2000 units tablet      Take 2,000 Units by mouth daily        warfarin 2 MG tablet    COUMADIN    90 tablet    TAKE ONE TABLET BY MOUTH ONCE DAILY OR  AS  DIRECTED  BY  INR  CLINIC    Long term current use of anticoagulant therapy

## 2018-09-04 ENCOUNTER — PATIENT OUTREACH (OUTPATIENT)
Dept: CARE COORDINATION | Facility: CLINIC | Age: 83
End: 2018-09-04

## 2018-09-04 NOTE — PROGRESS NOTES
Clinic Care Coordination Contact  [facility]/Voicemail    Patient recently changed primary care providers; RN Clinic Care Coordinator received warm hand off from Care Coordination colleague, Yaz Ojeda.   Outreached to introduce self and role on behalf of [facility] primary care clinic.  Patient is presently enrolled in Care Coordination to promote healthy sleeping pattern.   Chart Reviewed: patient had a recent visit with MTM (08/28/18) and alterations to medication regimen were made.      Clinical Data: Care Coordinator Outreach  Outreach attempted x 1.  Left message on voicemail with call back information and requested return call.  Plan: Care Coordinator will mail out care coordination introduction letter with care coordinator contact information and explanation of care coordination services. Care Coordinator will try to reach patient again in 3-5 business days.    Mariola Hahn RN  Clinic Care Coordinator  [phone]

## 2018-09-04 NOTE — LETTER
[place] CARE COORDINATION  September 4, 2018     Leonor Mercado  [address]    [facility] [address]      Dear Leonor,    I am a clinic care coordinator who works with Arnel Garcia MD. I recently tried to call and was unable to reach you. I wanted to introduce myself and provide you with my contact information so that you can call me with questions or concerns about your health care. Below is a description of clinic care coordination and how I can further assist you.     The clinic care coordinator is a registered nurse and/or  who understand the health care system. The goal of clinic care coordination is to help you manage your health and improve access to the [facility] system in the most efficient manner. The registered nurse can assist you in meeting your health care goals by providing education, coordinating services, and strengthening the communication among your providers. The  can assist you with financial, behavioral, psychosocial, chemical dependency, counseling, and/or psychiatric resources.    Please feel free to contact me at [phone], with any questions or concerns. We at [facility] are focused on providing you with the highest-quality healthcare experience possible and that all starts with you.     Sincerely,     Mariola Hahn

## 2018-09-11 ASSESSMENT — ACTIVITIES OF DAILY LIVING (ADL): DEPENDENT_IADLS:: TRANSPORTATION;SHOPPING

## 2018-09-11 NOTE — PROGRESS NOTES
Clinic Care Coordination Contact    Clinic Care Coordination Contact  OUTREACH    Referral Information:  RN Clinic Care Coordination follow-up outreach the patient was previously enrolled in Care Coordination while receiving primary care under Dr. Young at the [facility];the patient has since transitioned care to [facility] and RN CCC is completing a follow up outreach and introduction to Care Coordination.            Chief Complaint   Patient presents with     Clinic Care Coordination - Follow-up        Universal Utilization:      Utilization    Last refreshed: 9/4/2018  4:22 PM:  No Show Count (past year) 2       Last refreshed: 9/4/2018  4:22 PM:  ED visits 2       Last refreshed: 9/4/2018  4:22 PM:  Hospital admissions 1          Current as of: 9/4/2018  4:22 PM             Clinical Concerns:  Current Medical Concerns:  None reported; the patient states she occasionally gets UTI's, but has not had once recently.  Current Behavioral Concerns: Alert, oriented; speech was clear; no behavioral concerns identified    Functional Status:  Dependent ADLs:: Ambulation-walker  Dependent IADLs:: Transportation, Shopping  Mobility Status: Independent w/Device    Living Situation:  Current living arrangement:: I live alone  Type of residence:: Apartment ([facility])    Diet/Exercise/Sleep:  The patient has identified poor sleeping patterns; she has worked closely with Celina Rosenberg (MTM Program) recently and has added Melatonin to her regimen; she still describes her sleep patterns as somewhat erratic.    Transportation:   Daughters help with most transportation        Psychosocial:  Buddhism or spiritual beliefs that impact treatment:: No  Mental health DX:: No  Mental health management concern (GOAL):: No  Informal Support system:: Children       Community Resources:  (OP PT)  Supplies used at home:: Hearing Aid Batteries  Equipment Currently Used at Home: walker, rolling           Future  Appointments              In 2 weeks [facility] [place]    In 2 months  TE[facility]          Plan:   At this time, the patient denies any outstanding need for resources or assistance navigating follow up care. No further care coordination outreaches will be scheduled unless patient initiates or new referral for care coordination is placed.     Mariola Hahn, RN  Clinic Care Coordinator  [phone]

## 2018-09-17 DIAGNOSIS — F43.20 ADJUSTMENT DISORDER, UNSPECIFIED TYPE: ICD-10-CM

## 2018-09-17 NOTE — TELEPHONE ENCOUNTER
"Requested Prescriptions   Pending Prescriptions Disp Refills     mirtazapine (REMERON) 15 MG tablet [Pharmacy Med Name: MIRTAZAPINE 15MG    TAB]  Last Written Prescription Date:  5/7/2018  Last Fill Quantity: 30,  # refills: 3   Last office visit: 6/14/2018 with prescribing provider:     Future Office Visit:   30 tablet 3     Sig: TAKE 1 TABLET BY MOUTH AT BEDTIME    Atypical Antidepressants Protocol Passed    9/17/2018  5:30 AM       Passed - Recent (12 mo) or future (30 days) visit within the authorizing provider's specialty    Patient had office visit in the last 12 months or has a visit in the next 30 days with authorizing provider or within the authorizing provider's specialty.  See \"Patient Info\" tab in inbasket, or \"Choose Columns\" in Meds & Orders section of the refill encounter.           Passed - Patient is age 18 or older       Passed - No active pregnancy on record       Passed - No positive pregnancy test in past 12 mos        "

## 2018-09-19 DIAGNOSIS — I10 ESSENTIAL HYPERTENSION WITH GOAL BLOOD PRESSURE LESS THAN 140/90: ICD-10-CM

## 2018-09-19 RX ORDER — MIRTAZAPINE 15 MG/1
TABLET, FILM COATED ORAL
Qty: 90 TABLET | Refills: 1 | Status: SHIPPED | OUTPATIENT
Start: 2018-09-19 | End: 2018-10-23

## 2018-09-19 NOTE — TELEPHONE ENCOUNTER
Prescription approved per [facility] Refill Protocol. JADIEL Engle R.N.

## 2018-09-20 NOTE — TELEPHONE ENCOUNTER
"Requested Prescriptions   Pending Prescriptions Disp Refills     losartan (COZAAR) 50 MG tablet [Pharmacy Med Name: LOSARTAN 50MG TAB] 90 tablet 0    Last Written Prescription Date:  11/17/2017  Last Fill Quantity: 1,  # refills: 0   Last office visit: 6/14/2018 with prescribing provider:     Future Office Visit:   Sig: TAKE 1 TABLET BY MOUTH ONCE DAILY    Angiotensin-II Receptors Failed    9/19/2018  6:33 PM       Failed - Normal serum creatinine on file in past 12 months    Recent Labs   Lab Test  07/17/18   1139   06/01/16   2146   CR  1.49*   < >   --    CREAT   --    --   1.7*    < > = values in this interval not displayed.            Passed - Blood pressure under 140/90 in past 12 months    BP Readings from Last 3 Encounters:   08/28/18 137/81   07/31/18 119/70   07/17/18 124/70                Passed - Recent (12 mo) or future (30 days) visit within the authorizing provider's specialty    Patient had office visit in the last 12 months or has a visit in the next 30 days with authorizing provider or within the authorizing provider's specialty.  See \"Patient Info\" tab in inbasket, or \"Choose Columns\" in Meds & Orders section of the refill encounter.           Passed - Patient is age 18 or older       Passed - No active pregnancy on record       Passed - Normal serum potassium on file in past 12 months    Recent Labs   Lab Test  07/17/18   1139   POTASSIUM  4.5                   Passed - No positive pregnancy test in past 12 months        "

## 2018-09-21 NOTE — TELEPHONE ENCOUNTER
Routing refill request to provider for review/approval because:  Labs out of range:  Creatinine  Medication is reported/historical  Anita REED RN

## 2018-09-22 RX ORDER — LOSARTAN POTASSIUM 50 MG/1
TABLET ORAL
Qty: 90 TABLET | Refills: 11 | Status: SHIPPED | OUTPATIENT
Start: 2018-09-22 | End: 2018-10-23

## 2018-09-25 ENCOUNTER — ANTICOAGULATION THERAPY VISIT (OUTPATIENT)
Dept: ANTICOAGULATION | Facility: CLINIC | Age: 83
End: 2018-09-25
Payer: COMMERCIAL

## 2018-09-25 DIAGNOSIS — Z79.01 LONG-TERM (CURRENT) USE OF ANTICOAGULANTS: ICD-10-CM

## 2018-09-25 LAB — INR POINT OF CARE: 2.1 (ref 0.86–1.14)

## 2018-09-25 PROCEDURE — 85610 PROTHROMBIN TIME: CPT | Mod: QW

## 2018-09-25 PROCEDURE — 99207 ZZC NO CHARGE NURSE ONLY: CPT

## 2018-09-25 PROCEDURE — 36416 COLLJ CAPILLARY BLOOD SPEC: CPT

## 2018-09-25 NOTE — MR AVS SNAPSHOT
Leonor Mercado   9/25/2018 11:00 AM   Anticoagulation Therapy Visit    Description:  89 year old female   Provider[facility]  [facility]   Department:  [facility]           INR as of 9/25/2018     Today's INR 2.1      Anticoagulation Summary as of 9/25/2018     INR goal 2.0-3.0   Today's INR 2.1   Full warfarin instructions 2 mg every day   Next INR check 10/23/2018    Indications   Long-term (current) use of anticoagulants [Z79.01] [Z79.01]  A Fib - chr Coumadin  s/p PPM (H) (Resolved) [I48.2]         Your next Anticoagulation Clinic appointment(s)     Oct 23, 2018  1:45 PM CDT   [facility]    [address] [facility] [address]  [facility] [address] [phone]              Contact Numbers     [facility] Phone Numbers:  Anticoagulation Clinic Appointments : [phone]  Anticoagulation Nurse: [phone]         September 2018 Details    Sun Mon Tue Wed Thu Fri Sat           1                 2               3               4               5               6               7               8                 9               10               11               12               13               14               15                 16               17               18               19               20               21               22                 23               24               25      2 mg   See details      26      2 mg         27      2 mg         28      2 mg         29      2 mg           30      2 mg                Date Details   09/25 This INR check               How to take your warfarin dose     To take:  2 mg Take 1 of the 2 mg tablets.           October 2018 Details    Sun Mon Tue Wed Thu Fri Sat      1      2 mg         2      2 mg         3      2 mg         4      2 mg         5      2 mg         6      2 mg           7      2 mg         8      2 mg         9      2 mg          10      2 mg         11      2 mg         12      2 mg         13      2 mg           14      2 mg         15      2 mg         16      2 mg         17      2 mg         18      2 mg         19      2 mg         20      2 mg           21      2 mg         22      2 mg         23            24               25               26               27                 28               29               30               31                   Date Details   No additional details    Date of next INR:  10/23/2018         How to take your warfarin dose     To take:  2 mg Take 1 of the 2 mg tablets.

## 2018-09-25 NOTE — PROGRESS NOTES
"  ANTICOAGULATION FOLLOW-UP CLINIC VISIT    Patient Name:  Leonor Mercado  Date:  9/25/2018  Contact Type:  Face to Face    SUBJECTIVE:     Patient Findings     Positives No Problem Findings    Comments Pt may change to the [facility], it is closer to where she lives.            OBJECTIVE    INR Protime   Date Value Ref [facility] Status   09/25/2018 2.1 (A) 0.86 - 1.14 Final       ASSESSMENT / PLAN  INR assessment THER    Recheck INR In: 4 WEEKS    INR Location Clinic      Anticoagulation Summary as of 9/25/2018     INR goal 2.0-3.0   Today's INR 2.1   Warfarin maintenance plan 2 mg (2 mg x 1) every day   Full warfarin instructions 2 mg every day   Weekly warfarin total 14 mg   No change documented Dilma Rider RN   Plan last modified Yoana Waddell RN (12/15/2016)   Next INR check 10/23/2018   Priority INR   Target end date     Indications   Long-term (current) use of anticoagulants [Z79.01] [Z79.01]  A Fib - chr Coumadin  s/p PPM (H) (Resolved) [I48.2]         Anticoagulation Episode Summary     INR check location     Preferred lab     Send INR reminders to [facility]    Comments Pt's 1st name is pronounced \"ondray\"  Pt does not want print out, appt card only      Anticoagulation Care Providers     Provider Role Specialty Phone number    Dannielle Darby MD [facility] Internal Medicine [phone]            See the Encounter Report to view Anticoagulation Flowsheet and Dosing Calendar (Go to Encounters tab in chart review, and find the Anticoagulation Therapy Visit)    Dosage adjustment made based on physician directed care plan.    Dilma Rider RN               "

## 2018-10-05 ENCOUNTER — OFFICE VISIT (OUTPATIENT)
Dept: PEDIATRICS | Facility: CLINIC | Age: 83
End: 2018-10-05
Payer: COMMERCIAL

## 2018-10-05 VITALS
SYSTOLIC BLOOD PRESSURE: 115 MMHG | BODY MASS INDEX: 25.53 KG/M2 | WEIGHT: 144.1 LBS | HEART RATE: 76 BPM | DIASTOLIC BLOOD PRESSURE: 70 MMHG | TEMPERATURE: 97.8 F

## 2018-10-05 DIAGNOSIS — N39.0 URINARY TRACT INFECTION WITHOUT HEMATURIA, SITE UNSPECIFIED: ICD-10-CM

## 2018-10-05 DIAGNOSIS — R30.0 DYSURIA: Primary | ICD-10-CM

## 2018-10-05 LAB
ALBUMIN UR-MCNC: NEGATIVE MG/DL
APPEARANCE UR: CLEAR
BACTERIA #/AREA URNS HPF: ABNORMAL /HPF
BILIRUB UR QL STRIP: NEGATIVE
COLOR UR AUTO: YELLOW
GLUCOSE UR STRIP-MCNC: NEGATIVE MG/DL
HGB UR QL STRIP: ABNORMAL
KETONES UR STRIP-MCNC: NEGATIVE MG/DL
LEUKOCYTE ESTERASE UR QL STRIP: ABNORMAL
NITRATE UR QL: NEGATIVE
NON-SQ EPI CELLS #/AREA URNS LPF: ABNORMAL /LPF
PH UR STRIP: 6 PH (ref 5–7)
RBC #/AREA URNS AUTO: ABNORMAL /HPF
SOURCE: ABNORMAL
SP GR UR STRIP: 1.01 (ref 1–1.03)
UROBILINOGEN UR STRIP-ACNC: 0.2 EU/DL (ref 0.2–1)
WBC #/AREA URNS AUTO: ABNORMAL /HPF

## 2018-10-05 PROCEDURE — 99213 OFFICE O/P EST LOW 20 MIN: CPT | Performed by: NURSE PRACTITIONER

## 2018-10-05 PROCEDURE — 81001 URINALYSIS AUTO W/SCOPE: CPT | Performed by: NURSE PRACTITIONER

## 2018-10-05 PROCEDURE — 87086 URINE CULTURE/COLONY COUNT: CPT | Performed by: NURSE PRACTITIONER

## 2018-10-05 RX ORDER — CEFDINIR 300 MG/1
300 CAPSULE ORAL 2 TIMES DAILY
Qty: 10 CAPSULE | Refills: 0 | Status: SHIPPED | OUTPATIENT
Start: 2018-10-05 | End: 2019-03-21

## 2018-10-05 RX ORDER — CEFDINIR 300 MG/1
300 CAPSULE ORAL 2 TIMES DAILY
Qty: 20 CAPSULE | Refills: 0 | Status: SHIPPED | OUTPATIENT
Start: 2018-10-05 | End: 2018-10-05

## 2018-10-05 NOTE — PATIENT INSTRUCTIONS
Understanding Urinary Tract Infections (UTIs)  Most UTIs are caused by bacteria, although they may also be caused by viruses or fungi. Bacteria from the bowel are the most common source of infection. The infection may start because of any of the following:    Sexual activity. During sex, bacteria can travel from the penis, vagina, or rectum into the urethra.     Bacteria on the skin outside the rectum may travel into the urethra. This is more common in women since the rectum and urethra are closer to each other than in men. Wiping from front to back after using the toilet and keeping the area clean can help prevent germs from getting to the urethra.    Blockage of urine flow through the urinary tract. If urine sits too long, germs may start to grow out of control.      Parts of the urinary tract  The infection can occur in any part of the urinary tract.    The kidneys collect and store urine.    The ureters carry urine from the kidneys to the bladder.    The bladder holds urine until you are ready to let it out.    The urethra carries urine from the bladder out of the body. It is shorter in women, so bacteria can move through it more easily. The urethra is longer in men, so a UTI is less likely to reach the bladder or kidneys in men.  Date Last Reviewed: 1/1/2017 2000-2017 [employer]. [address] [place] [address], [place] [address]. All rights reserved. This information is not intended as a substitute for professional medical care. Always follow your healthcare professional's instructions.

## 2018-10-05 NOTE — MR AVS SNAPSHOT
After Visit Summary   10/5/2018    Leonor Mercado    MRN: [id number]           Patient Information     Date Of Birth          5/6/1926        Visit Information        Provider Department      10/5/2018 1:15 PM Yuridia Titus APRN [facility]        Today's Diagnoses     Dysuria    -  1    Urinary tract infection without hematuria, site unspecified          Care Instructions      Understanding Urinary Tract Infections (UTIs)  Most UTIs are caused by bacteria, although they may also be caused by viruses or fungi. Bacteria from the bowel are the most common source of infection. The infection may start because of any of the following:    Sexual activity. During sex, bacteria can travel from the penis, vagina, or rectum into the urethra.     Bacteria on the skin outside the rectum may travel into the urethra. This is more common in women since the rectum and urethra are closer to each other than in men. Wiping from front to back after using the toilet and keeping the area clean can help prevent germs from getting to the urethra.    Blockage of urine flow through the urinary tract. If urine sits too long, germs may start to grow out of control.      Parts of the urinary tract  The infection can occur in any part of the urinary tract.    The kidneys collect and store urine.    The ureters carry urine from the kidneys to the bladder.    The bladder holds urine until you are ready to let it out.    The urethra carries urine from the bladder out of the body. It is shorter in women, so bacteria can move through it more easily. The urethra is longer in men, so a UTI is less likely to reach the bladder or kidneys in men.  Date Last Reviewed: 1/1/2017 2000-2017 [employer]. [address] [place] [address], [place] [address]. All rights reserved. This information is not intended as a substitute for professional medical care. Always follow your healthcare professional's  instructions.                Follow-ups after your visit        Your next 10 appointments already scheduled     Oct 23, 2018  1:45 PM CDT   Anticoagulation [facility] with [facility]    [address]  [facility] [address] [phone]            Nov 29, 2018 10:30 AM CST   Phone Device Check with NUÑEZ TECH2   [facility]   [place] ([facility])    [address]  [place] [address] [phone] OPT 2              Who to contact     If you have questions or need follow up information about today's clinic visit or your schedule please contact [facility] directly at [phone].  Normal or non-critical lab and imaging results will be communicated to you by MyChart, letter or phone within 4 business days after the clinic has received the results. If you do not hear from us within 7 days, please contact the clinic through MyChart or phone. If you have a critical or abnormal lab result, we will notify you by phone as soon as possible.  Submit refill requests through [employer] or call your pharmacy and they will forward the refill request to us. Please allow 3 business days for your refill to be completed.          Additional Information About Your Visit        Care EveryWhere ID     This is your Care EveryWhere ID. This could be used by other organizations to access your [facility] medical records  [id number]        Your Vitals Were     Pulse Temperature Last Period BMI (Body Mass Index)          76 97.8  F (36.6  C) (Tympanic) (LMP Unknown) 25.53 kg/m2         Blood Pressure from Last 3 Encounters:   10/05/18 115/70   08/28/18 137/81   07/31/18 119/70    Weight from Last 3 Encounters:   10/05/18 144 lb 1.6 oz (65.4 kg)   07/31/18 145 lb (65.8 kg)   07/17/18 143 lb (64.9 kg)              We Performed the Following     *UA reflex to Microscopic and Culture ([facility] (except [facility] and [place])     Urine Culture Aerobic Bacterial     Urine Microscopic          Today's Medication Changes          These changes are accurate as of 10/5/18  1:36 PM.  If you have any questions, ask your nurse or doctor.               Start taking these medicines.        Dose/Directions    cefdinir 300 MG capsule   Commonly known as:  OMNICEF   Used for:  Urinary tract infection without hematuria, site unspecified   Started by:  Yuridia Titus APRN CNP        Dose:  300 mg   Take 1 capsule (300 mg) by mouth 2 times daily for 5 days   Quantity:  10 capsule   Refills:  0         These medicines have changed or have updated prescriptions.        Dose/Directions    losartan 50 MG tablet   Commonly known as:  COZAAR   This may have changed:  Another medication with the same name was removed. Continue taking this medication, and follow the directions you see here.   Used for:  Essential hypertension with goal blood pressure less than 140/90   Changed by:  Yuridia Titus APRN CNP        TAKE 1 TABLET BY MOUTH ONCE DAILY   Quantity:  90 tablet   Refills:  11            Where to get your medicines      These medications were sent to [facility] - [address]   [address], [place] [address]     Phone:  [phone]     cefdinir 300 MG capsule                Primary Care Provider Office Phone # Fax #    Arnel Garcia [phone]       [facility] [place] [address]        Equal Access to Services     [facility]: Hadii marisel ku hadasho Soomaali, waaxda luqadaha, qaybta kaalmada dexteryada, antonia hwang. So [facility] [phone].    ATENCIÓN: Si habla español, tiene a spicer disposición servicios gratuitos de asistencia lingüística. Pollo al [phone].    We comply with applicable federal civil rights laws and [place] laws. We do not discriminate on  the basis of race, color, national origin, age, disability, sex, sexual orientation, or gender identity.            Thank you!     Thank you for choosing [facility]  for your care. Our goal is always to provide you with excellent care. Hearing back from our patients is one way we can continue to improve our services. Please take a few minutes to complete the written survey that you may receive in the mail after your visit with us. Thank you!             Your Updated Medication List - Protect others around you: Learn how to safely use, store and throw away your medicines at www.disposemymeds.org.          This list is accurate as of 10/5/18  1:36 PM.  Always use your most recent med list.                   Brand Name Dispense Instructions for use Diagnosis    albuterol 108 (90 Base) MCG/ACT inhaler    PROAIR HFA/PROVENTIL HFA/VENTOLIN HFA    1 Inhaler    Inhale 2 puffs into the lungs every 6 hours as needed for shortness of breath / dyspnea or wheezing    Chronic obstructive pulmonary disease, unspecified COPD type (H)       calcium carbonate 600 MG tablet   Generic drug:  calcium carbonate      Take 1 tablet by mouth daily        cefdinir 300 MG capsule    OMNICEF    10 capsule    Take 1 capsule (300 mg) by mouth 2 times daily for 5 days    Urinary tract infection without hematuria, site unspecified       diltiazem 240 MG 24 hr capsule    DILTIAZEM CD    30 capsule    Take 1 capsule (240 mg) by mouth daily    Chronic atrial fibrillation (H)       fluticasone-vilanterol 200-25 MCG/INH inhaler    BREO ELLIPTA    1 Inhaler    Inhale 1 puff into the lungs daily    Anxiety       furosemide 20 MG tablet    LASIX    90 tablet    Take 1 tablet (20 mg) by mouth daily    Chronic diastolic congestive heart failure (H)       ipratropium - albuterol 0.5 mg/2.5 mg/3 mL 0.5-2.5 (3) MG/3ML neb solution    DUONEB    360 mL    Take 1 vial (3 mLs) by nebulization every 6 hours as needed for shortness of breath / dyspnea or  wheezing    Chronic obstructive pulmonary disease, unspecified COPD type (H)       losartan 50 MG tablet    COZAAR    90 tablet    TAKE 1 TABLET BY MOUTH ONCE DAILY    Essential hypertension with goal blood pressure less than 140/90       MELATONIN PO      Take 5 mg by mouth nightly as needed        mirtazapine 15 MG tablet    REMERON    90 tablet    TAKE 1 TABLET BY MOUTH AT BEDTIME    Adjustment disorder, unspecified type       nitroGLYcerin 0.4 MG sublingual tablet    NITROSTAT    25 tablet    Place 1 tablet (0.4 mg) under the tongue every 5 minutes as needed    Other chest pain       OCUVITE PO      Take 1 capsule by mouth daily.        polyethylene glycol Packet    MIRALAX/GLYCOLAX     Take 1 packet by mouth daily as needed for constipation        pravastatin 40 MG tablet    PRAVACHOL    90 tablet    Take 1 tablet (40 mg) by mouth daily    Hyperlipidemia LDL goal <100, Coronary artery disease involving native coronary artery of native heart without angina pectoris       PROBIOTIC ACIDOPHILUS Tabs      Take 1 tablet by mouth daily        STOOL SOFTENER PO      Take 1 tablet by mouth daily as needed        TYLENOL PO      Take 1,000 mg by mouth nightly as needed        VITAMIN C PO      Take 500 mg by mouth daily        vitamin D 2000 units tablet      Take 2,000 Units by mouth daily        warfarin 2 MG tablet    COUMADIN    90 tablet    TAKE ONE TABLET BY MOUTH ONCE DAILY OR  AS  DIRECTED  BY  INR  CLINIC    Long term current use of anticoagulant therapy

## 2018-10-05 NOTE — Clinical Note
She would like to transfer INR order to this clinic - her next one is due 10/22 and she has an appointment with you. If you're ok with this, can you check it then and place an order so she can do it here from then on. Thanks! --haresh

## 2018-10-05 NOTE — PROGRESS NOTES
SUBJECTIVE:   Leonor Mercado is a 89 year old female who presents to clinic today for the following health issues    URINARY TRACT SYMPTOMS      Duration: one month ago    Description  frequency, urgency, odor and back pain    Intensity:  mild    Accompanying signs and symptoms:  Fever/chills: no   Flank pain no   Nausea and vomiting: no   Vaginal symptoms: none  Abdominal/Pelvic Pain: no     History  History of frequent UTI's: no   History of kidney stones: no   Sexually Active: no   Possibility of pregnancy: Yes    Precipitating or alleviating factors: None    Therapies tried and outcome: Tylenol PM   Outcome: not sure, usually in bed        Problem list and histories reviewed & adjusted, as indicated.  Additional history: as documented    Patient Active Problem List   Diagnosis     Hyperlipidemia LDL goal <100     Cardiac pacemaker in situ     Advanced directives, counseling/discussion     Senile osteoporosis     Irritable bowel syndrome (IBS)     Anemia     Long-term (current) use of anticoagulants [Z79.01]     Degeneration of lumbar intervertebral disc     CKD (chronic kidney disease) stage 3, GFR 30-59 ml/min (H)     Chronic atrial fibrillation (H)     Chronic obstructive pulmonary disease, unspecified COPD type (H)     Coronary artery disease involving native coronary artery of native heart without angina pectoris     Diastolic dysfunction     Sick sinus syndrome (H)     Past Surgical History:   Procedure Laterality Date     APPENDECTOMY  1956     CARDIAC SURGERY      PPM, 2 STENTS2-05[place]-CAD-taxus stentx2 2.5-12,2.5-12 in LADp and LADm, 80%nonDomRCA-LcxDom-mild,EF60%     CORONARY ANGIOGRAPHY ADULT ORDER  7/1994    mild CAD,Normal LV function, No Mitral regurgitation, Prox LAD 30% stenosis. RCA prox and mid, 20-30%     ESOPHAGOSCOPY, GASTROSCOPY, DUODENOSCOPY (EGD), COMBINED N/A 8/19/2015    Procedure: COMBINED ESOPHAGOSCOPY, GASTROSCOPY, DUODENOSCOPY (EGD), BIOPSY SINGLE OR MULTIPLE;  Surgeon:  Genevieve Leon MD;  Location: [facility]     H LEAD REVISION DUAL  2003    Revised in [place]-due to diaphram stim (original pacer placed in [place])     H LEAD REVISION DUAL  2014    Correction of reversal of A and V leads in Header     HEART CATH, ANGIOPLASTY  2005    LEIGH ANN mid and proximal LAD, ongoing 80% RCA; mild circumflex     HERNIA REPAIR Left     LIH     IMPLANT PACEMAKER  2003    Placed in [place] for sick sinus syndrome     REPLACE PACEMAKER GENERATOR  2013    Dual-chamber pacemaker by Dr SETH Pierre       Social History   Substance Use Topics     Smoking status: Former Smoker     Types: Cigarettes     Quit date: 1987     Smokeless tobacco: Never Used     Alcohol use No     Family History   Problem Relation Age of Onset     HEART DISEASE Father       age 84     Neurologic Disorder Mother      dementia     GASTROINTESTINAL DISEASE Daughter      liver transplant     Crohn Disease Daughter            Reviewed and updated as needed this visit by clinical staff  Tobacco  Allergies  Meds  Med Hx  Surg Hx  Fam Hx  Soc Hx      Reviewed and updated as needed this visit by Provider         ROS:  Constitutional, HEENT, cardiovascular, pulmonary, gi and gu systems are negative, except as otherwise noted.    OBJECTIVE:     /70  Pulse 76  Temp 97.8  F (36.6  C) (Tympanic)  Wt 144 lb 1.6 oz (65.4 kg)  LMP  (LMP Unknown)  BMI 25.53 kg/m2  Body mass index is 25.53 kg/(m^2).  GENERAL: healthy, alert and no distress    Diagnostic Test Results:  Results for orders placed or performed in visit on 10/05/18 (from the past 24 hour(s))   *UA reflex to Microscopic and Culture ([place] [facility] (except [place] and [place])   Result Value Ref [facility]    Color Urine Yellow     Appearance Urine Clear     Glucose Urine Negative NEG^Negative mg/dL    Bilirubin Urine Negative NEG^Negative    Ketones Urine Negative NEG^Negative mg/dL    Specific Gravity Urine 1.010 1.003 - 1.035    Blood  Urine Trace (A) NEG^Negative    pH Urine 6.0 5.0 - 7.0 pH    Protein Albumin Urine Negative NEG^Negative mg/dL    Urobilinogen Urine 0.2 0.2 - 1.0 EU/dL    Nitrite Urine Negative NEG^Negative    Leukocyte Esterase Urine Moderate (A) NEG^Negative    Source Midstream Urine    Urine Microscopic   Result Value Ref [facility]    WBC Urine 25-50 (A) OTO5^0 - 5 /HPF    RBC Urine O - 2 OTO2^O - 2 /HPF    Squamous Epithelial /LPF Urine Few FEW^Few /LPF    Bacteria Urine Moderate (A) NEG^Negative /HPF       ASSESSMENT/PLAN:     1. Dysuria    - *UA reflex to Microscopic and Culture ([place] and [facility] (except [place] and [place])  - Urine Microscopic  - Urine Culture Aerobic Bacterial    2. Urinary tract infection without hematuria, site unspecified    - cefdinir (OMNICEF) 300 MG capsule; Take 1 capsule (300 mg) by mouth 2 times daily for 5 days  Dispense: 10 capsule; Refill: 0    Patient Instructions       Understanding Urinary Tract Infections (UTIs)  Most UTIs are caused by bacteria, although they may also be caused by viruses or fungi. Bacteria from the bowel are the most common source of infection. The infection may start because of any of the following:    Sexual activity. During sex, bacteria can travel from the penis, vagina, or rectum into the urethra.     Bacteria on the skin outside the rectum may travel into the urethra. This is more common in women since the rectum and urethra are closer to each other than in men. Wiping from front to back after using the toilet and keeping the area clean can help prevent germs from getting to the urethra.    Blockage of urine flow through the urinary tract. If urine sits too long, germs may start to grow out of control.      Parts of the urinary tract  The infection can occur in any part of the urinary tract.    The kidneys collect and store urine.    The ureters carry urine from the kidneys to the bladder.    The bladder holds urine until you are ready to let it  out.    The urethra carries urine from the bladder out of the body. It is shorter in women, so bacteria can move through it more easily. The urethra is longer in men, so a UTI is less likely to reach the bladder or kidneys in men.  Date Last Reviewed: 1/1/2017 2000-2017 [employer]. [address] [place] [address]. All rights reserved. This information is not intended as a substitute for professional medical care. Always follow your healthcare professional's instructions.            BAY Jorgensen [facility]

## 2018-10-06 ENCOUNTER — TELEPHONE (OUTPATIENT)
Dept: PEDIATRICS | Facility: CLINIC | Age: 83
End: 2018-10-06

## 2018-10-06 DIAGNOSIS — I48.20 CHRONIC ATRIAL FIBRILLATION (H): Primary | ICD-10-CM

## 2018-10-06 LAB
BACTERIA SPEC CULT: NORMAL
SPECIMEN SOURCE: NORMAL

## 2018-10-06 NOTE — TELEPHONE ENCOUNTER
Please call pt.   She recently saw [facility] and requested INR checks through [facility].   Please schedule her for a visit with INR clinic later this month

## 2018-10-21 DIAGNOSIS — I25.10 CORONARY ARTERY DISEASE INVOLVING NATIVE CORONARY ARTERY OF NATIVE HEART WITHOUT ANGINA PECTORIS: ICD-10-CM

## 2018-10-21 DIAGNOSIS — E78.5 HYPERLIPIDEMIA LDL GOAL <100: ICD-10-CM

## 2018-10-22 NOTE — TELEPHONE ENCOUNTER
"Requested Prescriptions   Pending Prescriptions Disp Refills     pravastatin (PRAVACHOL) 40 MG tablet [Pharmacy Med Name: PRAVASTATIN 40MG    TAB]  Last Written Prescription Date:  6/4/2018  Last Fill Quantity: 90,  # refills: 0   Last office visit: 6/14/2018 with prescribing provider:     Future Office Visit:   Next 5 appointments (look out 90 days)     Oct 23, 2018  2:00 PM CDT   [facility]   [facility]    [address] [place] [address]   [phone]                90 tablet 0     Sig: TAKE 1 TABLET BY MOUTH ONCE DAILY    Statins Protocol Passed    10/21/2018  4:44 PM       Passed - LDL on file in past 12 months    Recent Labs   Lab Test  12/11/17   0959   LDL  54            Passed - No abnormal creatine kinase in past 12 months    No lab results found.            Passed - Recent (12 mo) or future (30 days) visit within the authorizing provider's specialty    Patient had office visit in the last 12 months or has a visit in the next 30 days with authorizing provider or within the authorizing provider's specialty.  See \"Patient Info\" tab in inbasket, or \"Choose Columns\" in Meds & Orders section of the refill encounter.             Passed - Patient is age 18 or older       Passed - No active pregnancy on record       Passed - No positive pregnancy test in past 12 months        "

## 2018-10-23 ENCOUNTER — ANTICOAGULATION THERAPY VISIT (OUTPATIENT)
Dept: NURSING | Facility: CLINIC | Age: 83
End: 2018-10-23
Payer: COMMERCIAL

## 2018-10-23 ENCOUNTER — OFFICE VISIT (OUTPATIENT)
Dept: PEDIATRICS | Facility: CLINIC | Age: 83
End: 2018-10-23
Payer: COMMERCIAL

## 2018-10-23 VITALS
WEIGHT: 145 LBS | DIASTOLIC BLOOD PRESSURE: 60 MMHG | TEMPERATURE: 98.1 F | SYSTOLIC BLOOD PRESSURE: 108 MMHG | OXYGEN SATURATION: 95 % | BODY MASS INDEX: 25.69 KG/M2 | HEART RATE: 65 BPM

## 2018-10-23 DIAGNOSIS — I51.89 DIASTOLIC DYSFUNCTION: Primary | ICD-10-CM

## 2018-10-23 DIAGNOSIS — R35.0 URINARY FREQUENCY: ICD-10-CM

## 2018-10-23 DIAGNOSIS — Z79.01 LONG TERM CURRENT USE OF ANTICOAGULANT THERAPY: Primary | ICD-10-CM

## 2018-10-23 DIAGNOSIS — I25.10 CORONARY ARTERY DISEASE INVOLVING NATIVE CORONARY ARTERY OF NATIVE HEART WITHOUT ANGINA PECTORIS: ICD-10-CM

## 2018-10-23 DIAGNOSIS — F43.20 ADJUSTMENT DISORDER, UNSPECIFIED TYPE: ICD-10-CM

## 2018-10-23 DIAGNOSIS — I48.20 CHRONIC ATRIAL FIBRILLATION (H): ICD-10-CM

## 2018-10-23 DIAGNOSIS — E78.5 HYPERLIPIDEMIA LDL GOAL <100: ICD-10-CM

## 2018-10-23 DIAGNOSIS — I48.20 CHRONIC ATRIAL FIBRILLATION (H): Chronic | ICD-10-CM

## 2018-10-23 DIAGNOSIS — I10 ESSENTIAL HYPERTENSION WITH GOAL BLOOD PRESSURE LESS THAN 140/90: ICD-10-CM

## 2018-10-23 DIAGNOSIS — J44.9 CHRONIC OBSTRUCTIVE PULMONARY DISEASE, UNSPECIFIED COPD TYPE (H): ICD-10-CM

## 2018-10-23 LAB
ANION GAP SERPL CALCULATED.3IONS-SCNC: 7 MMOL/L (ref 3–14)
BUN SERPL-MCNC: 32 MG/DL (ref 7–30)
CALCIUM SERPL-MCNC: 9.7 MG/DL (ref 8.5–10.1)
CHLORIDE SERPL-SCNC: 103 MMOL/L (ref 94–109)
CO2 SERPL-SCNC: 29 MMOL/L (ref 20–32)
CREAT SERPL-MCNC: 1.52 MG/DL (ref 0.52–1.04)
GFR SERPL CREATININE-BSD FRML MDRD: 32 ML/MIN/1.7M2
GLUCOSE SERPL-MCNC: 93 MG/DL (ref 70–99)
INR PPP: 1.3 (ref 0.86–1.14)
POTASSIUM SERPL-SCNC: 5.1 MMOL/L (ref 3.4–5.3)
SODIUM SERPL-SCNC: 139 MMOL/L (ref 133–144)

## 2018-10-23 PROCEDURE — 36415 COLL VENOUS BLD VENIPUNCTURE: CPT | Performed by: INTERNAL MEDICINE

## 2018-10-23 PROCEDURE — 99214 OFFICE O/P EST MOD 30 MIN: CPT | Performed by: INTERNAL MEDICINE

## 2018-10-23 PROCEDURE — 99207 ZZC NO CHARGE NURSE ONLY: CPT

## 2018-10-23 PROCEDURE — 85610 PROTHROMBIN TIME: CPT | Performed by: INTERNAL MEDICINE

## 2018-10-23 PROCEDURE — 80048 BASIC METABOLIC PNL TOTAL CA: CPT | Performed by: INTERNAL MEDICINE

## 2018-10-23 RX ORDER — PRAVASTATIN SODIUM 40 MG
40 TABLET ORAL DAILY
Qty: 90 TABLET | Refills: 3 | Status: SHIPPED | OUTPATIENT
Start: 2018-10-23 | End: 2019-05-03

## 2018-10-23 RX ORDER — LOSARTAN POTASSIUM 50 MG/1
25 TABLET ORAL DAILY
Qty: 45 TABLET | Refills: 3 | Status: SHIPPED | OUTPATIENT
Start: 2018-10-23 | End: 2019-03-25

## 2018-10-23 RX ORDER — MIRTAZAPINE 15 MG/1
15 TABLET, FILM COATED ORAL AT BEDTIME
Qty: 90 TABLET | Refills: 3 | Status: SHIPPED | OUTPATIENT
Start: 2018-10-23 | End: 2019-02-05

## 2018-10-23 RX ORDER — DILTIAZEM HYDROCHLORIDE 240 MG/1
240 CAPSULE, COATED, EXTENDED RELEASE ORAL DAILY
Qty: 90 CAPSULE | Refills: 3 | Status: SHIPPED | OUTPATIENT
Start: 2018-10-23 | End: 2019-11-11

## 2018-10-23 RX ORDER — PRAVASTATIN SODIUM 40 MG
TABLET ORAL
Qty: 90 TABLET | Refills: 0 | Status: SHIPPED | OUTPATIENT
Start: 2018-10-23 | End: 2018-10-23

## 2018-10-23 NOTE — PATIENT INSTRUCTIONS
INSTRUCTIONS FOR TODAY:     frequent urination:        Start cutting furosemide tablets in half and take one half tablet daily (or 10mg)        Schedule visit with Urology   follow-up visit in 1 month     Dr Garcia

## 2018-10-23 NOTE — TELEPHONE ENCOUNTER
Medication is being filled for 1 time refill only due to:  Patient needs labs --lipid panel due in Dec 2018. Future labs ordered in chart.    Letter mailed.

## 2018-10-23 NOTE — PROGRESS NOTES
SUBJECTIVE:                                                    Leonor Mercado is a 89 year old female who presents to clinic today for the following health issues:    89-year-old woman with a history of diastolic dysfunction, CAD, COPD, chronic atrial fibrillation, hypertension and hyperlipidemia presents today for follow-up.  Most recent visit, furosemide dose was reduced.  Since most recent visit patient has continued complaints of urinary frequency.  Denies dysuria fevers chills, chest pain , cough or shortness of breath.  Wt Readings from Last 4 Encounters:   10/23/18 145 lb (65.8 kg)   10/05/18 144 lb 1.6 oz (65.4 kg)   07/31/18 145 lb (65.8 kg)   07/17/18 143 lb (64.9 kg)        Hypertension Follow-up      Outpatient blood pressures are not being checked.    Low Salt Diet: not monitoring salt    Vascular Disease:  Coronary Artery Disease (CAD)      Chest pain or pressure, left side neck or arm pain: No    Shortness of breath/increased sweats/nausea with exertion: No    Pain in calves walking 1-2 blocks: No    Worsened or new symptoms since last visit: No    Nitroglycerin use: no    Daily aspirin use: no    COPD Follow-Up    Symptoms are currently: stable    Current fatigue or dyspnea with ambulation: none    Shortness of breath: stable    Increased or change in Cough/Sputum: No    Fever(s): No    History   Smoking Status     Former Smoker     Types: Cigarettes     Quit date: 5/9/1987   Smokeless Tobacco     Never Used     Lab Results   Component Value Date    FEV1 0.86 05/09/2013    SHD8NEE 0.56 05/09/2013       Patient Active Problem List   Diagnosis     Hyperlipidemia LDL goal <100     Cardiac pacemaker in situ     Advanced directives, counseling/discussion     Senile osteoporosis     Irritable bowel syndrome (IBS)     Anemia     Long term current use of anticoagulant therapy     Degeneration of lumbar intervertebral disc     CKD (chronic kidney disease) stage 3, GFR 30-59 ml/min (H)     Chronic atrial  fibrillation (H)     Chronic obstructive pulmonary disease, unspecified COPD type (H)     Coronary artery disease involving native coronary artery of native heart without angina pectoris     Diastolic dysfunction     Sick sinus syndrome (H)     Past Surgical History:   Procedure Laterality Date     APPENDECTOMY       CARDIAC SURGERY      PPM, 2 STENTS2-05[place]-CAD-taxus stentx2 2.5-12,2.5-12 in LADp and LADm, 80%nonDomRCA-LcxDom-mild,EF60%     CORONARY ANGIOGRAPHY ADULT ORDER  1994    mild CAD,Normal LV function, No Mitral regurgitation, Prox LAD 30% stenosis. RCA prox and mid, 20-30%     ESOPHAGOSCOPY, GASTROSCOPY, DUODENOSCOPY (EGD), COMBINED N/A 2015    Procedure: COMBINED ESOPHAGOSCOPY, GASTROSCOPY, DUODENOSCOPY (EGD), BIOPSY SINGLE OR MULTIPLE;  Surgeon: Genevieve Leon MD;  Location: [facility]     H LEAD REVISION DUAL  2003    Revised in [place]-due to diaphram stim (original pacer placed in [place])     H LEAD REVISION DUAL  2014    Correction of reversal of A and V leads in Header     HEART CATH, ANGIOPLASTY  2005    LEIGH ANN mid and proximal LAD, ongoing 80% RCA; mild circumflex     HERNIA REPAIR Left     [facility]     IMPLANT PACEMAKER  2003    Placed in [place] for sick sinus syndrome     REPLACE PACEMAKER GENERATOR  2013    Dual-chamber pacemaker by Dr SETH Pierre       Social History   Substance Use Topics     Smoking status: Former Smoker     Types: Cigarettes     Quit date: 1987     Smokeless tobacco: Never Used     Alcohol use No     Family History   Problem Relation Age of Onset     HEART DISEASE Father       age 84     Neurologic Disorder Mother      dementia     GASTROINTESTINAL DISEASE Daughter      liver transplant     Crohn Disease Daughter          Current Outpatient Prescriptions   Medication Sig Dispense Refill     Acetaminophen (TYLENOL PO) Take 1,000 mg by mouth nightly as needed        albuterol (PROAIR HFA/PROVENTIL HFA/VENTOLIN HFA) 108 (90 BASE) MCG/ACT Inhaler  Inhale 2 puffs into the lungs every 6 hours as needed for shortness of breath / dyspnea or wheezing 1 Inhaler 1     Ascorbic Acid (VITAMIN C PO) Take 500 mg by mouth daily       ASPIRIN NOT PRESCRIBED (INTENTIONAL) Please choose reason not prescribed, below 0 each 0     calcium carbonate (CALCIUM CARBONATE) 600 MG TABS tablet Take 1 tablet by mouth daily        Cholecalciferol (VITAMIN D) 2000 UNITS tablet Take 2,000 Units by mouth daily       diltiazem (DILTIAZEM CD) 240 MG 24 hr capsule Take 1 capsule (240 mg) by mouth daily 90 capsule 3     Docusate Calcium (STOOL SOFTENER PO) Take 1 tablet by mouth daily as needed        fluticasone-vilanterol (BREO ELLIPTA) 200-25 MCG/INH oral inhaler Inhale 1 puff into the lungs daily 1 Inhaler 3     furosemide (LASIX) 20 MG tablet Take 1 tablet (20 mg) by mouth daily 90 tablet 3     ipratropium - albuterol 0.5 mg/2.5 mg/3 mL (DUONEB) 0.5-2.5 (3) MG/3ML neb solution Take 1 vial (3 mLs) by nebulization every 6 hours as needed for shortness of breath / dyspnea or wheezing 360 mL      Lactobacillus (PROBIOTIC ACIDOPHILUS) TABS Take 1 tablet by mouth daily       losartan (COZAAR) 50 MG tablet Take 0.5 tablets (25 mg) by mouth daily 45 tablet 3     MELATONIN PO Take 5 mg by mouth nightly as needed        mirtazapine (REMERON) 15 MG tablet Take 1 tablet (15 mg) by mouth At Bedtime 90 tablet 3     Multiple Vitamins-Minerals (OCUVITE PO) Take 1 capsule by mouth daily.       nitroglycerin (NITROSTAT) 0.4 MG SL tablet Place 1 tablet (0.4 mg) under the tongue every 5 minutes as needed 25 tablet 1     polyethylene glycol (MIRALAX/GLYCOLAX) Packet Take 1 packet by mouth daily as needed for constipation       pravastatin (PRAVACHOL) 40 MG tablet Take 1 tablet (40 mg) by mouth daily 90 tablet 3     warfarin (COUMADIN) 2 MG tablet TAKE ONE TABLET BY MOUTH ONCE DAILY OR  AS  DIRECTED  BY  INR  CLINIC 90 tablet 1       ROS: The following systems have been completely reviewed and are negative  except as noted in the HPI: CONSTITUTIONAL,  CARDIOVASCULAR, PULMONARY      OBJECTIVE:                                                    /60 (Cuff Size: Adult Regular)  Pulse 65  Temp 98.1  F (36.7  C) (Oral)  Wt 145 lb (65.8 kg)  LMP  (LMP Unknown)  SpO2 95%  BMI 25.69 kg/m2 Body mass index is 25.69 kg/(m^2).  GENERAL:  alert,  no distress  HENT: ear canals- normal; TMs- normal; oropharynx-normal  NECK: no tenderness, no adenopathy  RESP: lungs clear to auscultation - no rales, no rhonchi, no wheezes  CV: regular rates and rhythm, normal S1 S2  MS: extremities- no edema       ASSESSMENT/PLAN:                                                        ICD-10-CM    1. Diastolic dysfunction I51.9  weight stable.  Tolerating recent reduction in furosemide dose.  Plan to reduce furosemide from 20 to 10 mg daily     2. Urinary frequency R35.0 UROLOGY ADULT REFERRAL  reducing Lasix dose.  If urinary symptoms persist, patient will schedule a visit with urology     3. Coronary artery disease involving native coronary artery of native heart without angina pectoris I25.10 pravastatin (PRAVACHOL) 40 MG tablet     ASPIRIN NOT PRESCRIBED (INTENTIONAL)     Adequate control.  Continue current regimen without changes.      4. Chronic obstructive pulmonary disease, unspecified COPD type (H) J44.9 Stable on current rx       5. A Fib - chr Coumadin, s/p PPM (H) I48.2 diltiazem (DILTIAZEM CD) 240 MG 24 hr capsule     INR      Stable     6. Essential hypertension with goal blood pressure less than 140/90 I10 losartan (COZAAR) 50 MG tablet     Basic metabolic panel     Adequate control.  Continue current regimen without changes.      7. Hyperlipidemia LDL goal <100 E78.5 pravastatin (PRAVACHOL) 40 MG tablet  Lab Results   Component Value Date    LDL 54 12/11/2017     Adequate control.  Continue current regimen without changes.      8. Adjustment disorder, unspecified type F43.20 mirtazapine (REMERON) 15 MG tablet      refill       Follow-up visit in 1 month    Arnel Garcia MD  [facility] [place]

## 2018-10-23 NOTE — PROGRESS NOTES
"  ANTICOAGULATION FOLLOW-UP     Patient Name:  Leonor Mercado  Date:  10/23/2018  Contact Type:  Telephone  Patient had office visit with Dr. Garcia today and had her INR done at lab.  Instructions given to patient over the phone. She verbalized understanding.      SUBJECTIVE:     Patient Findings     Positives Unexplained INR or factor level change    Comments Patient denies missing any doses, or   excess intake of green vegetables, or   medication changes.             OBJECTIVE    INR   Date Value Ref Range Status   10/23/2018 1.30 (H) 0.86 - 1.14 Final     Comment:     This test is intended for monitoring Coumadin therapy.  Results are not   accurate in patients with prolonged INR due to factor deficiency.         ASSESSMENT / PLAN  INR assessment SUB    Recheck INR In: 2 WEEKS    INR Location [facility]     Anticoagulation Summary as of 10/23/2018     INR goal 2.0-3.0   Today's INR 1.30!   Warfarin maintenance plan 2 mg (2 mg x 1) every day   Full warfarin instructions 10/23: 4 mg; Otherwise 2 mg every day   Weekly warfarin total 14 mg   Plan last modified Yoana Waddell RN (12/15/2016)   Next INR check 11/6/2018   Priority INR   Target end date     Indications   Long-term (current) use of anticoagulants [Z79.01] [Z79.01]  A Fib - chr Coumadin  s/p PPM (H) (Resolved) [I48.2]         Anticoagulation Episode Summary     INR check location     Preferred lab     Send INR reminders to [facility]    Comments Pt's 1st name is pronounced \"ondray\"  Pt does not want print out, appt card only      Anticoagulation Care Providers     Provider Role Specialty Phone number    Arnel Garcia MD  Internal Medicine [phone]            See the Encounter Report to view Anticoagulation Flowsheet and Dosing Calendar (Go to Encounters tab in chart review, and find the Anticoagulation Therapy Visit)    Dosage adjustment made based on physician directed care plan.    Ebony Doshi RN               "

## 2018-10-23 NOTE — MR AVS SNAPSHOT
Leonor Mercado   10/23/2018 3:30 PM   Anticoagulation Therapy Visit    Description:  89 year old female   Provider:   ANTICOAGULATION CLINIC   Department:  [facility]           INR as of 10/23/2018     Today's INR 1.30!      Anticoagulation Summary as of 10/23/2018     INR goal 2.0-3.0   Today's INR 1.30!   Full warfarin instructions 10/23: 4 mg; Otherwise 2 mg every day   Next INR check 11/6/2018    Indications   Long term current use of anticoagulant therapy [Z79.01]  A Fib - chr Coumadin  s/p PPM (H) (Resolved) [I48.2]         Your next Anticoagulation Clinic appointment(s)     Nov 06, 2018 11:00 AM CST   Anticoagulation Visit with  ANTICOAGULATION CLINIC   [facility]    [address]  [address]  [place] [address] [phone]              Contact Numbers     [facility]  Please call  [phone] to cancel and/or reschedule your appointment   Please call  [phone] with any problems or questions regarding your therapy.        October 2018 Details    Sun Mon Tue Wed Thu Fri Sat      1               2               3               4               5               6                 7               8               9               10               11               12               13                 14               15               16               17               18               19               20                 21               22               23      4 mg   See details      24      2 mg         25      2 mg         26      2 mg         27      2 mg           28      2 mg         29      2 mg         30      2 mg         31      2 mg             Date Details   10/23 This INR check               How to take your warfarin dose     To take:  2 mg Take 1 of the 2 mg tablets.    To take:  4 mg Take 2 of the 2 mg tablets.           November 2018 Details    Sun Mon Tue Wed Thu Fri Sat         1      2 mg         2      2 mg         3      2  mg           4      2 mg         5      2 mg         6            7               8               9               10                 11               12               13               14               15               16               17                 18               19               20               21               22               23               24                 25               26               27               28               29               30                 Date Details   No additional details    Date of next INR:  11/6/2018         How to take your warfarin dose     To take:  2 mg Take 1 of the 2 mg tablets.

## 2018-10-23 NOTE — LETTER
[facility]  [address] [address]                  [phone]   October 29, 2018    Leonor Mercado  [address]   [place] [address]      Dear Leonor,        Regarding the labs from your most recent visit:       The blood chemistries including blood sugar are stable.   You have impaired kidney function, or CKD (chronic kidney disease).  Your kidney function is stable overall compared to prior labwork.     Your test results are enclosed.      Please contact me if you have any questions.           Thank you very much for choosing [facility]    Best regards,    Arnel Garcia MD        Results for orders placed or performed in visit on 10/23/18   Basic metabolic panel   Result Value Ref Range    Sodium 139 133 - 144 mmol/L    Potassium 5.1 3.4 - 5.3 mmol/L    Chloride 103 94 - 109 mmol/L    Carbon Dioxide 29 20 - 32 mmol/L    Anion Gap 7 3 - 14 mmol/L    Glucose 93 70 - 99 mg/dL    Urea Nitrogen 32 (H) 7 - 30 mg/dL    Creatinine 1.52 (H) 0.52 - 1.04 mg/dL    GFR Estimate 32 (L) >60 mL/min/1.7m2    GFR Estimate If Black 39 (L) >60 mL/min/1.7m2    Calcium 9.7 8.5 - 10.1 mg/dL   INR   Result Value Ref Range    INR 1.30 (H) 0.86 - 1.14

## 2018-10-23 NOTE — MR AVS SNAPSHOT
After Visit Summary   10/23/2018    Leonor Mercado    MRN: [id number]           Patient Information     Date Of Birth          5/6/1926        Visit Information        Provider Department      10/23/2018 2:00 PM Arnel Garcia MD [facility] [place]        Today's Diagnoses     A Fib - chr Coumadin, s/p PPM (H)    -  1    Adjustment disorder, unspecified type        Hyperlipidemia LDL goal <100        Coronary artery disease involving native coronary artery of native heart without angina pectoris        Chronic obstructive pulmonary disease, unspecified COPD type (H)        Dysuria        Essential hypertension with goal blood pressure less than 140/90        Urinary frequency          Care Instructions    INSTRUCTIONS FOR TODAY:     frequent urination:        Start cutting furosemide tablets in half and take one half tablet daily (or 10mg)        Schedule visit with Urology   follow-up visit in 1 month     Dr Garcia            Follow-ups after your visit        Additional Services     UROLOGY ADULT REFERRAL       Your provider has referred you to: [facility]: [facility] [place] [phone]   [email or web address]    Please be aware that coverage of these services is subject to the terms and limitations of your health insurance plan.  Call member services at your health plan with any benefit or coverage questions.      Please bring the following with you to your appointment:    (1) Any X-Rays, CTs or MRIs which have been performed.  Contact the facility where they were done to arrange for  prior to your scheduled appointment.    (2) List of current medications  (3) This referral request   (4) Any documents/labs given to you for this referral                  Your next 10 appointments already scheduled     Oct 23, 2018  3:30 PM CDT   Anticoagulation Visit with  ANTICOAGULATION CLINIC   [facility] ([facility]) [address]  [address]  [place] [address] [phone]            Nov 29, 2018 10:30 AM CST   Phone Device Check with NUÑEZ TECH2   [facility] ([facility])    [address]  [place] [id number] [phone] OPT 2              Who to contact     If you have questions or need follow up information about today's clinic visit or your schedule please contact [facility] directly at [phone].  Normal or non-critical lab and imaging results will be communicated to you by MyChart, letter or phone within 4 business days after the clinic has received the results. If you do not hear from us within 7 days, please contact the clinic through MyChart or phone. If you have a critical or abnormal lab result, we will notify you by phone as soon as possible.  Submit refill requests through [employer] or call your pharmacy and they will forward the refill request to us. Please allow 3 business days for your refill to be completed.          Additional Information About Your Visit        Care EveryWhere ID     This is your Care EveryWhere ID. This could be used by other organizations to access your [facility] medical records  [id number]        Your Vitals Were     Pulse Temperature Last Period Pulse Oximetry BMI (Body Mass Index)       65 98.1  F (36.7  C) (Oral) (LMP Unknown) 95% 25.69 kg/m2        Blood Pressure from Last 3 Encounters:   10/23/18 108/60   10/05/18 115/70   08/28/18 137/81    Weight from Last 3 Encounters:   10/23/18 145 lb (65.8 kg)   10/05/18 144 lb 1.6 oz (65.4 kg)   07/31/18 145 lb (65.8 kg)              We Performed the Following     UROLOGY ADULT REFERRAL          Today's Medication Changes          These changes are accurate as of 10/23/18  2:49 PM.  If you have any questions, ask your nurse or doctor.               These medicines have changed or have updated prescriptions.        Dose/Directions    losartan 50 MG  tablet   Commonly known as:  COZAAR   This may have changed:  See the new instructions.   Used for:  Essential hypertension with goal blood pressure less than 140/90   Changed by:  Arnel Garcia MD        Dose:  25 mg   Take 0.5 tablets (25 mg) by mouth daily   Quantity:  45 tablet   Refills:  3       mirtazapine 15 MG tablet   Commonly known as:  REMERON   This may have changed:  See the new instructions.   Used for:  Adjustment disorder, unspecified type   Changed by:  Arnel Garcia MD        Dose:  15 mg   Take 1 tablet (15 mg) by mouth At Bedtime   Quantity:  90 tablet   Refills:  3       pravastatin 40 MG tablet   Commonly known as:  PRAVACHOL   This may have changed:  See the new instructions.   Used for:  Hyperlipidemia LDL goal <100, Coronary artery disease involving native coronary artery of native heart without angina pectoris   Changed by:  Arnel Garcia MD        Dose:  40 mg   Take 1 tablet (40 mg) by mouth daily   Quantity:  90 tablet   Refills:  3            Where to get your medicines      These medications were sent to [facility] [place], [place] - [address]  [address] [address]     Phone:  [phone]     diltiazem 240 MG 24 hr capsule    losartan 50 MG tablet    mirtazapine 15 MG tablet    pravastatin 40 MG tablet                Primary Care Provider Office Phone # Fax #    Arnel Garcia [phone]       [address] [place] [address]        Equal Access to Services     [facility]: Hadii aad ku hadasho Soomaali, waaxda luqadaha, qaybta kaalmada adeegyada, antonia freeman haybryon cruz . So [facility] [phone].    ATENCIÓN: Si habla español, tiene a spicer disposición servicios gratuitos de asistencia lingüística. Llame al [phone].    We comply with applicable federal civil rights laws and [place] laws. We do not discriminate on the basis of race, color, national origin, age, disability,  sex, sexual orientation, or gender identity.            Thank you!     Thank you for choosing [facility] [place]  for your care. Our goal is always to provide you with excellent care. Hearing back from our patients is one way we can continue to improve our services. Please take a few minutes to complete the written survey that you may receive in the mail after your visit with us. Thank you!             Your Updated Medication List - Protect others around you: Learn how to safely use, store and throw away your medicines at www.disposemymeds.org.          This list is accurate as of 10/23/18  2:49 PM.  Always use your most recent med list.                   Brand Name Dispense Instructions for use Diagnosis    albuterol 108 (90 Base) MCG/ACT inhaler    PROAIR HFA/PROVENTIL HFA/VENTOLIN HFA    1 Inhaler    Inhale 2 puffs into the lungs every 6 hours as needed for shortness of breath / dyspnea or wheezing    Chronic obstructive pulmonary disease, unspecified COPD type (H)       calcium carbonate 600 MG tablet   Generic drug:  calcium carbonate      Take 1 tablet by mouth daily        diltiazem 240 MG 24 hr capsule    DILTIAZEM CD    90 capsule    Take 1 capsule (240 mg) by mouth daily    Chronic atrial fibrillation (H)       fluticasone-vilanterol 200-25 MCG/INH inhaler    BREO ELLIPTA    1 Inhaler    Inhale 1 puff into the lungs daily    Anxiety       furosemide 20 MG tablet    LASIX    90 tablet    Take 1 tablet (20 mg) by mouth daily    Chronic diastolic congestive heart failure (H)       ipratropium - albuterol 0.5 mg/2.5 mg/3 mL 0.5-2.5 (3) MG/3ML neb solution    DUONEB    360 mL    Take 1 vial (3 mLs) by nebulization every 6 hours as needed for shortness of breath / dyspnea or wheezing    Chronic obstructive pulmonary disease, unspecified COPD type (H)       losartan 50 MG tablet    COZAAR    45 tablet    Take 0.5 tablets (25 mg) by mouth daily    Essential hypertension with goal blood pressure less than  140/90       MELATONIN PO      Take 5 mg by mouth nightly as needed        mirtazapine 15 MG tablet    REMERON    90 tablet    Take 1 tablet (15 mg) by mouth At Bedtime    Adjustment disorder, unspecified type       nitroGLYcerin 0.4 MG sublingual tablet    NITROSTAT    25 tablet    Place 1 tablet (0.4 mg) under the tongue every 5 minutes as needed    Other chest pain       OCUVITE PO      Take 1 capsule by mouth daily.        polyethylene glycol Packet    MIRALAX/GLYCOLAX     Take 1 packet by mouth daily as needed for constipation        pravastatin 40 MG tablet    PRAVACHOL    90 tablet    Take 1 tablet (40 mg) by mouth daily    Hyperlipidemia LDL goal <100, Coronary artery disease involving native coronary artery of native heart without angina pectoris       PROBIOTIC ACIDOPHILUS Tabs      Take 1 tablet by mouth daily        STOOL SOFTENER PO      Take 1 tablet by mouth daily as needed        TYLENOL PO      Take 1,000 mg by mouth nightly as needed        VITAMIN C PO      Take 500 mg by mouth daily        vitamin D 2000 units tablet      Take 2,000 Units by mouth daily        warfarin 2 MG tablet    COUMADIN    90 tablet    TAKE ONE TABLET BY MOUTH ONCE DAILY OR  AS  DIRECTED  BY  INR  CLINIC    Long term current use of anticoagulant therapy

## 2018-11-06 ENCOUNTER — ANTICOAGULATION THERAPY VISIT (OUTPATIENT)
Dept: NURSING | Facility: CLINIC | Age: 83
End: 2018-11-06
Payer: COMMERCIAL

## 2018-11-06 DIAGNOSIS — Z79.01 LONG TERM CURRENT USE OF ANTICOAGULANT THERAPY: Primary | ICD-10-CM

## 2018-11-06 DIAGNOSIS — I48.20 CHRONIC ATRIAL FIBRILLATION (H): ICD-10-CM

## 2018-11-06 LAB — INR POINT OF CARE: 2.3 (ref 0.86–1.14)

## 2018-11-06 PROCEDURE — 99207 ZZC NO CHARGE NURSE ONLY: CPT

## 2018-11-06 PROCEDURE — 36416 COLLJ CAPILLARY BLOOD SPEC: CPT

## 2018-11-06 PROCEDURE — 85610 PROTHROMBIN TIME: CPT | Mod: QW

## 2018-11-06 NOTE — PROGRESS NOTES
"  ANTICOAGULATION FOLLOW-UP CLINIC VISIT    Patient Name:  Leonor Mercado  Date:  11/6/2018  Contact Type:  Face to Face  Patient is accompanied in the office by her daughter.    SUBJECTIVE:     Patient Findings     Positives No Problem Findings           OBJECTIVE    INR Protime   Date Value Ref Range Status   11/06/2018 2.3 (A) 0.86 - 1.14 Final       ASSESSMENT / PLAN  INR assessment THER    Recheck INR In: 4 WEEKS    INR Location Clinic      Anticoagulation Summary as of 11/6/2018     INR goal 2.0-3.0   Today's INR 2.3   Warfarin maintenance plan 2 mg (2 mg x 1) every day   Full warfarin instructions 2 mg every day   Weekly warfarin total 14 mg   Plan last modified Yoana Waddell RN (12/15/2016)   Next INR check 12/4/2018   Priority INR   Target end date     Indications   Chronic atrial fibrillation (H) [I48.2]  Long term current use of anticoagulant therapy [Z79.01]         Anticoagulation Episode Summary     INR check location     Preferred lab     Send INR reminders to [facility]    Comments 2mg tabs - nancy / 1st name is pronounced \"ondray\" / APPT CARD ONLY / comes on the bus on Tues      Anticoagulation Care Providers     Provider Role Specialty Phone number    Arnel Garcia MD  Internal Medicine [phone]            See the Encounter Report to view Anticoagulation Flowsheet and Dosing Calendar (Go to Encounters tab in chart review, and find the Anticoagulation Therapy Visit)        Ebony Doshi RN               "

## 2018-11-19 ENCOUNTER — OFFICE VISIT (OUTPATIENT)
Dept: UROLOGY | Facility: CLINIC | Age: 83
End: 2018-11-19
Payer: COMMERCIAL

## 2018-11-19 VITALS — HEART RATE: 62 BPM | OXYGEN SATURATION: 98 % | WEIGHT: 145 LBS | BODY MASS INDEX: 25.69 KG/M2 | HEIGHT: 63 IN

## 2018-11-19 DIAGNOSIS — N18.30 CKD (CHRONIC KIDNEY DISEASE) STAGE 3, GFR 30-59 ML/MIN (H): Primary | ICD-10-CM

## 2018-11-19 LAB
ALBUMIN UR-MCNC: NEGATIVE MG/DL
APPEARANCE UR: CLEAR
BILIRUB UR QL STRIP: NEGATIVE
COLOR UR AUTO: YELLOW
GLUCOSE UR STRIP-MCNC: NEGATIVE MG/DL
HGB UR QL STRIP: NEGATIVE
KETONES UR STRIP-MCNC: NEGATIVE MG/DL
LEUKOCYTE ESTERASE UR QL STRIP: ABNORMAL
NITRATE UR QL: NEGATIVE
PH UR STRIP: 6 PH (ref 5–7)
RESIDUAL VOLUME (RV) (EXTERNAL): 0
SOURCE: ABNORMAL
SP GR UR STRIP: 1.01 (ref 1–1.03)
UROBILINOGEN UR STRIP-ACNC: 0.2 EU/DL (ref 0.2–1)

## 2018-11-19 PROCEDURE — 99203 OFFICE O/P NEW LOW 30 MIN: CPT | Mod: 25 | Performed by: PHYSICIAN ASSISTANT

## 2018-11-19 PROCEDURE — 51798 US URINE CAPACITY MEASURE: CPT | Performed by: PHYSICIAN ASSISTANT

## 2018-11-19 PROCEDURE — 81003 URINALYSIS AUTO W/O SCOPE: CPT | Mod: QW | Performed by: PHYSICIAN ASSISTANT

## 2018-11-19 RX ORDER — ESTRADIOL 0.1 MG/G
CREAM VAGINAL
Qty: 42.5 G | Refills: 3 | Status: SHIPPED | OUTPATIENT
Start: 2018-11-19 | End: 2019-03-25

## 2018-11-19 ASSESSMENT — PAIN SCALES - GENERAL: PAINLEVEL: MODERATE PAIN (4)

## 2018-11-19 NOTE — NURSING NOTE
Referred by dr. Garcia.  Pt has constant back pain.  Pt doesn't think she has an infection now.  Pt denies gross hem.  MAXIMINO Irving, CMA

## 2018-11-19 NOTE — MR AVS SNAPSHOT
After Visit Summary   11/19/2018    Leonor Mercado    MRN: [id number]           Patient Information     Date Of Birth          5/6/1926        Visit Information        Provider Department      11/19/2018 3:00 PM Anushka Tenorio PA-C [facility] [place]        Today's Diagnoses     CKD (chronic kidney disease) stage 3, GFR 30-59 ml/min (H)    -  1      Care Instructions    Below is a list of things that can irritate the bladder and should be avoided:      Caffeinated soft drinks.    Coffee.    Tea.    Chocolate.    Tomato-based foods.    Acidic juices and fruits. (includes cranberry juice)    Alcohol.    Carbonated drinks.    Aspartame/Nutrasweet.    Estrace Cream three times a week at bed time (call me if >$41 and we will send to [facility] pharmacy) [phone]          Follow-ups after your visit        Follow-up notes from your care team     Return in about 3 months (around 2/19/2019).      Your next 10 appointments already scheduled     Nov 29, 2018 10:30 AM CST   Phone Device Check with NUÑEZ TECH2   [facility] ([facility])    [address] [place] [address] [address]  [facility] [address] [phone] OPT 2            Dec 04, 2018  1:30 PM CST   Anticoagulation Visit with  ANTICOAGULATION CLINIC   [facility] ([facility]    [address]  [address]  [place] [id number] [phone]            Dec 11, 2018 12:15 PM CST   Return Visit with Bryan Garcia MD   [facility] ([facility])    [address]  [facility] [id number] [phone]              Who to contact     If you have questions or need follow up information about today's clinic visit or your schedule please contact [facility] [place] directly at [phone].  Normal or non-critical lab and imaging  "results will be communicated to you by MyChart, letter or phone within 4 business days after the clinic has received the results. If you do not hear from us within 7 days, please contact the clinic through MyChart or phone. If you have a critical or abnormal lab result, we will notify you by phone as soon as possible.  Submit refill requests through [employer] or call your pharmacy and they will forward the refill request to us. Please allow 3 business days for your refill to be completed.          Additional Information About Your Visit        Care EveryWhere ID     This is your Care EveryWhere ID. This could be used by other organizations to access your [facility] medical records  [id number]        Your Vitals Were     Pulse Height Last Period Pulse Oximetry BMI (Body Mass Index)       62 1.6 m (5' 3\") (LMP Unknown) 98% 25.69 kg/m2        Blood Pressure from Last 3 Encounters:   10/23/18 108/60   10/05/18 115/70   08/28/18 137/81    Weight from Last 3 Encounters:   11/19/18 65.8 kg (145 lb)   10/23/18 65.8 kg (145 lb)   10/05/18 65.4 kg (144 lb 1.6 oz)              We Performed the Following     Bladder scan     UA without Microscopic          Today's Medication Changes          These changes are accurate as of 11/19/18  4:13 PM.  If you have any questions, ask your nurse or doctor.               Start taking these medicines.        Dose/Directions    estradiol 0.1 MG/GM cream   Commonly known as:  ESTRACE VAGINAL   Used for:  CKD (chronic kidney disease) stage 3, GFR 30-59 ml/min (H)   Started by:  Anushka Tenorio PA-C        Apply small amount to the vaginal opening and urethra M, W, F q. h.s.   Quantity:  42.5 g   Refills:  3            Where to get your medicines      These medications were [facility] [address] [place] [facility] [address]  [address], [place] [address]     Phone:  [phone]     estradiol 0.1 MG/GM cream                Primary Care Provider Office Phone # Fax #    " Arnel Garcia [phone] [address] [place] [address]        Equal Access to Services     NALDO OVALLE : Hadii aad ku hadgregabilio Dasilva, farrahda netteailynha, jeyta leeroynoe sparrow, antonia marcum layudifranco hwang. So [facility] [phone].    ATENCIÓN: Si habla español, tiene a spicer disposición servicios gratuitos de asistencia lingüística. Llame al [phone].    We comply with applicable federal civil rights laws and [place] laws. We do not discriminate on the basis of race, color, national origin, age, disability, sex, sexual orientation, or gender identity.            Thank you!     Thank you for choosing [facility] [place]  for your care. Our goal is always to provide you with excellent care. Hearing back from our patients is one way we can continue to improve our services. Please take a few minutes to complete the written survey that you may receive in the mail after your visit with us. Thank you!             Your Updated Medication List - Protect others around you: Learn how to safely use, store and throw away your medicines at www.disposemymeds.org.          This list is accurate as of 11/19/18  4:13 PM.  Always use your most recent med list.                   Brand Name Dispense Instructions for use Diagnosis    albuterol 108 (90 Base) MCG/ACT inhaler    PROAIR HFA/PROVENTIL HFA/VENTOLIN HFA    1 Inhaler    Inhale 2 puffs into the lungs every 6 hours as needed for shortness of breath / dyspnea or wheezing    Chronic obstructive pulmonary disease, unspecified COPD type (H)       ASPIRIN NOT PRESCRIBED    INTENTIONAL    0 each    Please choose reason not prescribed, below    Coronary artery disease involving native coronary artery of native heart without angina pectoris       calcium carbonate 600 MG tablet   Generic drug:  calcium carbonate      Take 1 tablet by mouth daily        diltiazem 240 MG 24 hr capsule     DILTIAZEM CD    90 capsule    Take 1 capsule (240 mg) by mouth daily    Chronic atrial fibrillation (H)       estradiol 0.1 MG/GM cream    ESTRACE VAGINAL    42.5 g    Apply small amount to the vaginal opening and urethra M, W, F q. h.s.    CKD (chronic kidney disease) stage 3, GFR 30-59 ml/min (H)       fluticasone-vilanterol 200-25 MCG/INH inhaler    BREO ELLIPTA    1 Inhaler    Inhale 1 puff into the lungs daily    Anxiety       furosemide 20 MG tablet    LASIX    90 tablet    Take 1 tablet (20 mg) by mouth daily    Chronic diastolic congestive heart failure (H)       ipratropium - albuterol 0.5 mg/2.5 mg/3 mL 0.5-2.5 (3) MG/3ML neb solution    DUONEB    360 mL    Take 1 vial (3 mLs) by nebulization every 6 hours as needed for shortness of breath / dyspnea or wheezing    Chronic obstructive pulmonary disease, unspecified COPD type (H)       losartan 50 MG tablet    COZAAR    45 tablet    Take 0.5 tablets (25 mg) by mouth daily    Essential hypertension with goal blood pressure less than 140/90       MELATONIN PO      Take 5 mg by mouth nightly as needed        mirtazapine 15 MG tablet    REMERON    90 tablet    Take 1 tablet (15 mg) by mouth At Bedtime    Adjustment disorder, unspecified type       nitroGLYcerin 0.4 MG sublingual tablet    NITROSTAT    25 tablet    Place 1 tablet (0.4 mg) under the tongue every 5 minutes as needed    Other chest pain       OCUVITE PO      Take 1 capsule by mouth daily.        polyethylene glycol Packet    MIRALAX/GLYCOLAX     Take 1 packet by mouth daily as needed for constipation        pravastatin 40 MG tablet    PRAVACHOL    90 tablet    Take 1 tablet (40 mg) by mouth daily    Hyperlipidemia LDL goal <100, Coronary artery disease involving native coronary artery of native heart without angina pectoris       PROBIOTIC ACIDOPHILUS Tabs      Take 1 tablet by mouth daily        STOOL SOFTENER PO      Take 1 tablet by mouth daily as needed        TYLENOL PO      Take 1,000 mg by  mouth nightly as needed        VITAMIN C PO      Take 500 mg by mouth daily        vitamin D3 2000 units tablet    CHOLECALCIFEROL     Take 2,000 Units by mouth daily        warfarin 2 MG tablet    COUMADIN    90 tablet    TAKE ONE TABLET BY MOUTH ONCE DAILY OR  AS  DIRECTED  BY  INR  CLINIC    Long term current use of anticoagulant therapy

## 2018-11-19 NOTE — LETTER
11/19/2018       RE: Leonor Mercado  [address]  [facility] [address]     Dear Colleague,    Thank you for referring your patient, Leonor Mercado, to the [facility] [place] [facility]. Please see a copy of my visit note below.    November 19, 2018      CC: Urgency, frequency    HPI:  Leonor Mercado is a 89 year old female who presents in consultation from Dr. Garcia for evaluation of the above. Ongoing for the past several months. Several UC's with mixed dwaine. Feeling well today. Drinks cranberry juice and is on Lasix. Bowels regular on bowel regimen.       Past Medical History:   Diagnosis Date     A Fib - chr Coumadin, s/p PPM (H) 5/8/2013     Atrial fibrillation (H)     Sick sinus syndrome, PAT, AF     BCC (basal cell carcinoma of skin)     Face     CAD - 2 stents in [place] in 2000s.  1/5/2016     CHF (congestive heart failure) (H)     mild in past     CHF - Chr Diastolic (H) 11/21/2017     Chronic renal insufficiency      COPD (chronic obstructive pulmonary disease) (H)      COPD (H) 5/13/2013     Coronary artery disease     2-05[place]-CAD-taxus stentx2 2.5-12,2.5-12 in LADp and LADm, 80%nonDomRCA-LcxDom-mild,EF60%     Hyperlipidemia      Hypertension      IBS (irritable bowel syndrome)      Irritable bowel      Mumps      Osteoporosis      Palpitations      Panic attack     questionable diagnosis     Pulmonary fibrosis (H)     do not use amiodarone     Shortness of breath      Sick sinus syndrome - s/p PPM 2003 (H) 12/16/2017     SSS (sick sinus syndrome) (H)     DDD pacer (see surgery history section)     Vertigo        Past Surgical History:   Procedure Laterality Date     APPENDECTOMY  1956     CARDIAC SURGERY      PPM, 2 STENTS2-05[place]-CAD-taxus stentx2 2.5-12,2.5-12 in LADp and LADm, 80%nonDomRCA-LcxDom-mild,EF60%     CORONARY ANGIOGRAPHY ADULT ORDER  7/1994    mild CAD,Normal LV function, No Mitral  regurgitation, Prox LAD 30% stenosis. RCA prox and mid, 20-30%     ESOPHAGOSCOPY, GASTROSCOPY, DUODENOSCOPY (EGD), COMBINED N/A 2015    Procedure: COMBINED ESOPHAGOSCOPY, GASTROSCOPY, DUODENOSCOPY (EGD), BIOPSY SINGLE OR MULTIPLE;  Surgeon: Genevieve Leon MD;  Location: [facility]     H LEAD REVISION DUAL  2003    Revised in [place]-due to diaphram stim (original pacer placed in [place])     H LEAD REVISION DUAL  2014    Correction of reversal of A and V leads in Header     HEART CATH, ANGIOPLASTY  2005    LEIGH ANN mid and proximal LAD, ongoing 80% RCA; mild circumflex     HERNIA REPAIR Left     LIH     IMPLANT PACEMAKER  2003    Placed in [place] for sick sinus syndrome     REPLACE PACEMAKER GENERATOR  2013    Dual-chamber pacemaker by Dr SETH Pierre       Social History     Social History     Marital status:      Spouse name: N/A     Number of children: 3     Years of education: N/A     Occupational History      Retired     Social History Main Topics     Smoking status: Former Smoker     Types: Cigarettes     Quit date: 1987     Smokeless tobacco: Never Used     Alcohol use No     Drug use: No     Sexual activity: No     Other Topics Concern     Parent/Sibling W/ Cabg, Mi Or Angioplasty Before 65f 55m? Yes     Caffeine Concern Yes     decaf - some 1/2 caff     Sleep Concern Yes     nocturia every 2 hours     Special Diet No     Exercise No     some walking in the house     Social History Narrative       Family History   Problem Relation Age of Onset     HEART DISEASE Father       age 84     Neurologic Disorder Mother      dementia     GASTROINTESTINAL DISEASE Daughter      liver transplant     Crohn Disease Daughter        Allergies   Allergen Reactions     Peanuts [Nuts] Shortness Of Breath     Amiodarone      pulm fibrosis       Baclofen      Confusion      Bactrim [Sulfamethoxazole W-Trimethoprim]      Difficulty swallowing     Doxycycline Other (See Comments)     Multiple  "complaints; she does not want this again.      Levaquin [Levofloxacin] Other (See Comments)     Multiple complaints; she will not take this again     Linzess [Linaclotide] Diarrhea     Lorazepam        Prolonged drowsiness with ER visit      Liquid Adhesive Itching and Rash     Bleeding when tape removed after pacemaker placement..itched and burned for weeks.       Current Outpatient Prescriptions   Medication     Acetaminophen (TYLENOL PO)     albuterol (PROAIR HFA/PROVENTIL HFA/VENTOLIN HFA) 108 (90 BASE) MCG/ACT Inhaler     Ascorbic Acid (VITAMIN C PO)     ASPIRIN NOT PRESCRIBED (INTENTIONAL)     calcium carbonate (CALCIUM CARBONATE) 600 MG TABS tablet     Cholecalciferol (VITAMIN D) 2000 UNITS tablet     diltiazem (DILTIAZEM CD) 240 MG 24 hr capsule     Docusate Calcium (STOOL SOFTENER PO)     estradiol (ESTRACE VAGINAL) 0.1 MG/GM cream     fluticasone-vilanterol (BREO ELLIPTA) 200-25 MCG/INH oral inhaler     furosemide (LASIX) 20 MG tablet     ipratropium - albuterol 0.5 mg/2.5 mg/3 mL (DUONEB) 0.5-2.5 (3) MG/3ML neb solution     Lactobacillus (PROBIOTIC ACIDOPHILUS) TABS     losartan (COZAAR) 50 MG tablet     MELATONIN PO     mirtazapine (REMERON) 15 MG tablet     Multiple Vitamins-Minerals (OCUVITE PO)     polyethylene glycol (MIRALAX/GLYCOLAX) Packet     pravastatin (PRAVACHOL) 40 MG tablet     warfarin (COUMADIN) 2 MG tablet     nitroglycerin (NITROSTAT) 0.4 MG SL tablet     No current facility-administered medications for this visit.          PEx:   Pulse 62, height 1.6 m (5' 3\"), weight 65.8 kg (145 lb), SpO2 98 %, not currently breastfeeding.  5' 3\", Body mass index is 25.69 kg/(m^2)., 145 lbs 0 oz  GEN: NAD  EYES: EOMI  MOUTH: MMM  NECK: Supple  RESP: Unlabored breathing  CARDIAC: No LE edema  SKIN: Warm  ABD: soft  NEURO: AAO    Urine: Smal leuk est      A/P: Leonor Mercado is a 89 year old female with urgency and frequency.  -Eliminate irritants  -Trial of estrogen cream  -Hesitant to use " anticholinergic given age, but may need to consider if no relief from above.   -Cultures reviewed.     Anushka Tenorio PA-C  [facility]    30 minutes were spent with the patient today, > 50% in counseling and coordination of care

## 2018-11-19 NOTE — PATIENT INSTRUCTIONS
Below is a list of things that can irritate the bladder and should be avoided:      Caffeinated soft drinks.    Coffee.    Tea.    Chocolate.    Tomato-based foods.    Acidic juices and fruits. (includes cranberry juice)    Alcohol.    Carbonated drinks.    Aspartame/Nutrasweet.    Estrace Cream three times a week at bed time (call me if >$41 and we will send to [facility] pharmacy) [phone]

## 2018-11-19 NOTE — PROGRESS NOTES
November 19, 2018      CC: Urgency, frequency    HPI:  Leonor Mercado is a 89 year old female who presents in consultation from Dr. Garcia for evaluation of the above. Ongoing for the past several months. Several UC's with mixed dwaine. Feeling well today. Drinks cranberry juice and is on Lasix. Bowels regular on bowel regimen.       Past Medical History:   Diagnosis Date     A Fib - chr Coumadin, s/p PPM (H) 5/8/2013     Atrial fibrillation (H)     Sick sinus syndrome, PAT, AF     BCC (basal cell carcinoma of skin)     Face     CAD - 2 stents in [place] in 2000s.  1/5/2016     CHF (congestive heart failure) (H)     mild in past     CHF - Chr Diastolic (H) 11/21/2017     Chronic renal insufficiency      COPD (chronic obstructive pulmonary disease) (H)      COPD (H) 5/13/2013     Coronary artery disease     2-05[place]-CAD-taxus stentx2 2.5-12,2.5-12 in LADp and LADm, 80%nonDomRCA-LcxDom-mild,EF60%     Hyperlipidemia      Hypertension      IBS (irritable bowel syndrome)      Irritable bowel      Mumps      Osteoporosis      Palpitations      Panic attack     questionable diagnosis     Pulmonary fibrosis (H)     do not use amiodarone     Shortness of breath      Sick sinus syndrome - s/p PPM 2003 (H) 12/16/2017     SSS (sick sinus syndrome) (H)     DDD pacer (see surgery history section)     Vertigo        Past Surgical History:   Procedure Laterality Date     APPENDECTOMY  1956     CARDIAC SURGERY      PPM, 2 STENTS2-05[place]-CAD-taxus stentx2 2.5-12,2.5-12 in LADp and LADm, 80%nonDomRCA-LcxDom-mild,EF60%     CORONARY ANGIOGRAPHY ADULT ORDER  7/1994    mild CAD,Normal LV function, No Mitral regurgitation, Prox LAD 30% stenosis. RCA prox and mid, 20-30%     ESOPHAGOSCOPY, GASTROSCOPY, DUODENOSCOPY (EGD), COMBINED N/A 8/19/2015    Procedure: COMBINED ESOPHAGOSCOPY, GASTROSCOPY, DUODENOSCOPY (EGD), BIOPSY SINGLE OR MULTIPLE;  Surgeon: Genevieve Leon MD;  Location: [facility]     H LEAD REVISION DUAL  4/2003    Revised in  [place]-due to diaphram stim (original pacer placed in [place])     H LEAD REVISION DUAL  2014    Correction of reversal of A and V leads in Header     HEART CATH, ANGIOPLASTY  2005    LEIGH ANN mid and proximal LAD, ongoing 80% RCA; mild circumflex     HERNIA REPAIR Left     LIH     IMPLANT PACEMAKER  2003    Placed in [place] for sick sinus syndrome     REPLACE PACEMAKER GENERATOR  2013    Dual-chamber pacemaker by Dr SETH Pierre       Social History     Social History     Marital status:      Spouse name: N/A     Number of children: 3     Years of education: N/A     Occupational History      Retired     Social History Main Topics     Smoking status: Former Smoker     Types: Cigarettes     Quit date: 1987     Smokeless tobacco: Never Used     Alcohol use No     Drug use: No     Sexual activity: No     Other Topics Concern     Parent/Sibling W/ Cabg, Mi Or Angioplasty Before 65f 55m? Yes     Caffeine Concern Yes     decaf - some 1/2 caff     Sleep Concern Yes     nocturia every 2 hours     Special Diet No     Exercise No     some walking in the house     Social History Narrative       Family History   Problem Relation Age of Onset     HEART DISEASE Father       age 84     Neurologic Disorder Mother      dementia     GASTROINTESTINAL DISEASE Daughter      liver transplant     Crohn Disease Daughter        ROS:14 point ROS neg other than the symptoms noted above in the HPI.    Allergies   Allergen Reactions     Peanuts [Nuts] Shortness Of Breath     Amiodarone      pulm fibrosis       Baclofen      Confusion      Bactrim [Sulfamethoxazole W-Trimethoprim]      Difficulty swallowing     Doxycycline Other (See Comments)     Multiple complaints; she does not want this again.      Levaquin [Levofloxacin] Other (See Comments)     Multiple complaints; she will not take this again     Linzess [Linaclotide] Diarrhea     Lorazepam        Prolonged drowsiness with ER visit      Liquid Adhesive Itching and Rash  "    Bleeding when tape removed after pacemaker placement..itched and burned for weeks.       Current Outpatient Prescriptions   Medication     Acetaminophen (TYLENOL PO)     albuterol (PROAIR HFA/PROVENTIL HFA/VENTOLIN HFA) 108 (90 BASE) MCG/ACT Inhaler     Ascorbic Acid (VITAMIN C PO)     ASPIRIN NOT PRESCRIBED (INTENTIONAL)     calcium carbonate (CALCIUM CARBONATE) 600 MG TABS tablet     Cholecalciferol (VITAMIN D) 2000 UNITS tablet     diltiazem (DILTIAZEM CD) 240 MG 24 hr capsule     Docusate Calcium (STOOL SOFTENER PO)     estradiol (ESTRACE VAGINAL) 0.1 MG/GM cream     fluticasone-vilanterol (BREO ELLIPTA) 200-25 MCG/INH oral inhaler     furosemide (LASIX) 20 MG tablet     ipratropium - albuterol 0.5 mg/2.5 mg/3 mL (DUONEB) 0.5-2.5 (3) MG/3ML neb solution     Lactobacillus (PROBIOTIC ACIDOPHILUS) TABS     losartan (COZAAR) 50 MG tablet     MELATONIN PO     mirtazapine (REMERON) 15 MG tablet     Multiple Vitamins-Minerals (OCUVITE PO)     polyethylene glycol (MIRALAX/GLYCOLAX) Packet     pravastatin (PRAVACHOL) 40 MG tablet     warfarin (COUMADIN) 2 MG tablet     nitroglycerin (NITROSTAT) 0.4 MG SL tablet     No current facility-administered medications for this visit.          PEx:   Pulse 62, height 1.6 m (5' 3\"), weight 65.8 kg (145 lb), SpO2 98 %, not currently breastfeeding.  5' 3\", Body mass index is 25.69 kg/(m^2)., 145 lbs 0 oz  GEN: NAD  EYES: EOMI  MOUTH: MMM  NECK: Supple  RESP: Unlabored breathing  CARDIAC: No LE edema  SKIN: Warm  ABD: soft  NEURO: AAO    Urine: Smal leuk est      A/P: Leonor Mercado is a 89 year old female with urgency and frequency.  -Eliminate irritants  -Trial of estrogen cream  -Hesitant to use anticholinergic given age, but may need to consider if no relief from above.   -Cultures reviewed.     SAVANNAH Cruz [facility]    30 minutes were spent with the patient today, > 50% in counseling and coordination of care                "

## 2018-11-23 DIAGNOSIS — Z79.01 LONG TERM CURRENT USE OF ANTICOAGULANT THERAPY: ICD-10-CM

## 2018-11-23 RX ORDER — WARFARIN SODIUM 2 MG/1
TABLET ORAL
Qty: 90 TABLET | Refills: 1 | Status: ON HOLD | OUTPATIENT
Start: 2018-11-23 | End: 2019-04-06

## 2018-11-23 NOTE — TELEPHONE ENCOUNTER
Refilled.    Anticoagulation Summary as of 11/6/2018            INR goal 2.0-3.0   Today's INR 2.3   Warfarin maintenance plan 2 mg (2 mg x 1) every day   Full warfarin instructions 2 mg every day   Weekly warfarin total 14 mg   Plan last modified Yoana Waddell RN (12/15/2016)   Next INR check 12/4/2018         [place], RN  Triage Nurse

## 2018-11-23 NOTE — TELEPHONE ENCOUNTER
The pt would like to get this refilled today if possible. Thank you.   Teresa Del Rio on 11/23/2018 at 3:25 PM

## 2018-11-23 NOTE — TELEPHONE ENCOUNTER
"Requested Prescriptions   Pending Prescriptions Disp Refills     warfarin (COUMADIN) 2 MG tablet  Last Written Prescription Date:  05/30/2018  Last Fill Quantity: 90 tablet,  # refills: 1   Last office visit: 10/23/2018 with prescribing provider:      Arnel Garcia MD         Future Office Visit:   Next 5 appointments (look out 90 days)     Dec 11, 2018 12:15 PM CST   Return Visit with Bryan Garcia MD   [facility] ([facility])    [address] [address] [phone]                  90 tablet 1    Vitamin K Antagonists Failed    11/23/2018  3:25 PM       Failed - INR is within goal in the past 6 weeks    Confirm INR is within goal in the past 6 weeks.     Recent Labs   Lab Test 11/06/18   INR  2.3*                      Passed - Recent (12 mo) or future (30 days) visit within the authorizing provider's specialty    Patient had office visit in the last 12 months or has a visit in the next 30 days with authorizing provider or within the authorizing provider's specialty.  See \"Patient Info\" tab in inbasket, or \"Choose Columns\" in Meds & Orders section of the refill encounter.             Passed - Patient is 18 years of age or older       Passed - Patient is not pregnant       Passed - No positive pregnancy on file in past 12 months          "

## 2018-11-29 ENCOUNTER — ALLIED HEALTH/NURSE VISIT (OUTPATIENT)
Dept: CARDIOLOGY | Facility: CLINIC | Age: 83
End: 2018-11-29
Payer: COMMERCIAL

## 2018-11-29 DIAGNOSIS — Z95.0 CARDIAC PACEMAKER IN SITU: Primary | ICD-10-CM

## 2018-11-29 PROCEDURE — 93293 PM PHONE R-STRIP DEVICE EVAL: CPT | Performed by: INTERNAL MEDICINE

## 2018-11-29 NOTE — MR AVS SNAPSHOT
After Visit Summary   11/29/2018    Leonor Mercado    MRN: [id number]           Patient Information     Date Of Birth          5/6/1926        Visit Information        Provider Department      11/29/2018 10:30 AM [facility]        Today's Diagnoses     Cardiac pacemaker in situ    -  1       Follow-ups after your visit        Additional Services     Follow-Up with Device Clinic                 Your next 10 appointments already scheduled     Dec 04, 2018  1:30 PM CST   Anticoagulation Visit with  ANTICOAGULATION CLINIC   [facility]    [address]  [address]  [facility] [address] [phone]            Dec 11, 2018 12:15 PM CST   Return Visit with Bryan Garcia MD   [facility] ([facility])    [address]  [facility] [id number] [phone]              Future tests that were ordered for you today     Open Future Orders        Priority Expected Expires Ordered    Follow-Up with Device Clinic Routine 3/4/2019 11/29/2019 11/29/2018            Who to contact     If you have questions or need follow up information about today's clinic visit or your schedule please contact [facility] directly at [phone].  Normal or non-critical lab and imaging results will be communicated to you by MyChart, letter or phone within 4 business days after the clinic has received the results. If you do not hear from us within 7 days, please contact the clinic through MyChart or phone. If you have a critical or abnormal lab result, we will notify you by phone as soon as possible.  Submit refill requests through [employer] or call your pharmacy and they will forward the refill request to us. Please allow 3 business days for your refill to be completed.          Additional Information About Your  Visit        Care EveryWhere ID     This is your Care EveryWhere ID. This could be used by other organizations to access your [place] medical records  [id number]        Your Vitals Were     Last Period                   (LMP Unknown)            Blood Pressure from Last 3 Encounters:   10/23/18 108/60   10/05/18 115/70   08/28/18 137/81    Weight from Last 3 Encounters:   11/19/18 65.8 kg (145 lb)   10/23/18 65.8 kg (145 lb)   10/05/18 65.4 kg (144 lb 1.6 oz)              We Performed the Following     PM PHONE R STRIP EVAL UP TO 90 DAYS ([phone])        Primary Care Provider Office Phone # Fax #    Arnel Garcia [phone] [address] [place] [address]        Equal Access to Services     [facility]: Hadii aad ku hadasho Soomaali, waaxda luqadaha, qaybta kaalmada adeegyada, waxay antwonin hayaafranco cruz . So [facility] [phone].    ATENCIÓN: Si habla español, tiene a spicer disposición servicios gratuitos de asistencia lingüística. Llame al [phone].    We comply with applicable federal civil rights laws and [place] laws. We do not discriminate on the basis of race, color, national origin, age, disability, sex, sexual orientation, or gender identity.            Thank you!     Thank you for choosing [facility]  for your care. Our goal is always to provide you with excellent care. Hearing back from our patients is one way we can continue to improve our services. Please take a few minutes to complete the written survey that you may receive in the mail after your visit with us. Thank you!             Your Updated Medication List - Protect others around you: Learn how to safely use, store and throw away your medicines at www.disposemymeds.org.          This list is accurate as of 11/29/18 11:01 AM.  Always use your most recent med list.                   Brand Name Dispense Instructions for use Diagnosis    albuterol 108 (90  Base) MCG/ACT inhaler    PROAIR HFA/PROVENTIL HFA/VENTOLIN HFA    1 Inhaler    Inhale 2 puffs into the lungs every 6 hours as needed for shortness of breath / dyspnea or wheezing    Chronic obstructive pulmonary disease, unspecified COPD type (H)       ASPIRIN NOT PRESCRIBED    INTENTIONAL    0 each    Please choose reason not prescribed, below    Coronary artery disease involving native coronary artery of native heart without angina pectoris       calcium carbonate 600 MG tablet   Generic drug:  calcium carbonate      Take 1 tablet by mouth daily        diltiazem ER COATED BEADS 240 MG 24 hr capsule    DILTIAZEM CD    90 capsule    Take 1 capsule (240 mg) by mouth daily    Chronic atrial fibrillation (H)       estradiol 0.1 MG/GM vaginal cream    ESTRACE VAGINAL    42.5 g    Apply small amount to the vaginal opening and urethra M, W, F q. h.s.    CKD (chronic kidney disease) stage 3, GFR 30-59 ml/min (H)       fluticasone-vilanterol 200-25 MCG/INH inhaler    BREO ELLIPTA    1 Inhaler    Inhale 1 puff into the lungs daily    Anxiety       furosemide 20 MG tablet    LASIX    90 tablet    Take 1 tablet (20 mg) by mouth daily    Chronic diastolic congestive heart failure (H)       ipratropium - albuterol 0.5 mg/2.5 mg/3 mL 0.5-2.5 (3) MG/3ML neb solution    DUONEB    360 mL    Take 1 vial (3 mLs) by nebulization every 6 hours as needed for shortness of breath / dyspnea or wheezing    Chronic obstructive pulmonary disease, unspecified COPD type (H)       losartan 50 MG tablet    COZAAR    45 tablet    Take 0.5 tablets (25 mg) by mouth daily    Essential hypertension with goal blood pressure less than 140/90       MELATONIN PO      Take 5 mg by mouth nightly as needed        mirtazapine 15 MG tablet    REMERON    90 tablet    Take 1 tablet (15 mg) by mouth At Bedtime    Adjustment disorder, unspecified type       nitroGLYcerin 0.4 MG sublingual tablet    NITROSTAT    25 tablet    Place 1 tablet (0.4 mg) under the tongue  every 5 minutes as needed    Other chest pain       OCUVITE PO      Take 1 capsule by mouth daily.        polyethylene glycol packet    MIRALAX/GLYCOLAX     Take 1 packet by mouth daily as needed for constipation        pravastatin 40 MG tablet    PRAVACHOL    90 tablet    Take 1 tablet (40 mg) by mouth daily    Hyperlipidemia LDL goal <100, Coronary artery disease involving native coronary artery of native heart without angina pectoris       PROBIOTIC ACIDOPHILUS Tabs      Take 1 tablet by mouth daily        STOOL SOFTENER PO      Take 1 tablet by mouth daily as needed        TYLENOL PO      Take 1,000 mg by mouth nightly as needed        VITAMIN C PO      Take 500 mg by mouth daily        vitamin D3 2000 units tablet    CHOLECALCIFEROL     Take 2,000 Units by mouth daily        warfarin 2 MG tablet    COUMADIN    90 tablet    TAKE ONE TABLET BY MOUTH ONCE DAILY OR  AS  DIRECTED  BY  INR  CLINIC    Long term current use of anticoagulant therapy

## 2018-11-29 NOTE — PROGRESS NOTES
~90 day phone teletrace  Intrinsic Rhythm at time of check. Magnet response WNL.  F/U annual threshold in 3 months, orders placed for BV. E.Mcclellan,CVT

## 2018-11-30 ENCOUNTER — OFFICE VISIT (OUTPATIENT)
Dept: PEDIATRICS | Facility: CLINIC | Age: 83
End: 2018-11-30
Payer: COMMERCIAL

## 2018-11-30 ENCOUNTER — RADIANT APPOINTMENT (OUTPATIENT)
Dept: GENERAL RADIOLOGY | Facility: CLINIC | Age: 83
End: 2018-11-30
Attending: NURSE PRACTITIONER
Payer: COMMERCIAL

## 2018-11-30 VITALS
OXYGEN SATURATION: 96 % | BODY MASS INDEX: 25.91 KG/M2 | WEIGHT: 146.2 LBS | TEMPERATURE: 98.6 F | DIASTOLIC BLOOD PRESSURE: 60 MMHG | HEART RATE: 77 BPM | SYSTOLIC BLOOD PRESSURE: 122 MMHG | HEIGHT: 63 IN

## 2018-11-30 DIAGNOSIS — J44.9 CHRONIC OBSTRUCTIVE PULMONARY DISEASE, UNSPECIFIED COPD TYPE (H): ICD-10-CM

## 2018-11-30 DIAGNOSIS — R05.9 COUGH: ICD-10-CM

## 2018-11-30 DIAGNOSIS — R05.9 COUGH: Primary | ICD-10-CM

## 2018-11-30 PROCEDURE — 71046 X-RAY EXAM CHEST 2 VIEWS: CPT

## 2018-11-30 PROCEDURE — 99214 OFFICE O/P EST MOD 30 MIN: CPT | Mod: 25 | Performed by: NURSE PRACTITIONER

## 2018-11-30 PROCEDURE — 94640 AIRWAY INHALATION TREATMENT: CPT | Performed by: NURSE PRACTITIONER

## 2018-11-30 RX ORDER — ALBUTEROL SULFATE 90 UG/1
2 AEROSOL, METERED RESPIRATORY (INHALATION) EVERY 6 HOURS PRN
Qty: 1 INHALER | Refills: 1 | Status: SHIPPED | OUTPATIENT
Start: 2018-11-30 | End: 2019-03-25

## 2018-11-30 RX ORDER — AZITHROMYCIN 250 MG/1
TABLET, FILM COATED ORAL
Qty: 6 TABLET | Refills: 0 | Status: SHIPPED | OUTPATIENT
Start: 2018-11-30 | End: 2018-11-30

## 2018-11-30 RX ORDER — IPRATROPIUM BROMIDE AND ALBUTEROL SULFATE 2.5; .5 MG/3ML; MG/3ML
1 SOLUTION RESPIRATORY (INHALATION) EVERY 6 HOURS PRN
Qty: 360 ML | Refills: 1 | Status: SHIPPED | OUTPATIENT
Start: 2018-11-30 | End: 2019-06-19

## 2018-11-30 RX ORDER — PREDNISONE 20 MG/1
40 TABLET ORAL DAILY
Qty: 10 TABLET | Refills: 0 | Status: SHIPPED | OUTPATIENT
Start: 2018-11-30 | End: 2018-11-30

## 2018-11-30 RX ORDER — PREDNISONE 20 MG/1
40 TABLET ORAL DAILY
Qty: 10 TABLET | Refills: 0 | Status: SHIPPED | OUTPATIENT
Start: 2018-11-30 | End: 2018-12-11

## 2018-11-30 RX ORDER — AZITHROMYCIN 250 MG/1
TABLET, FILM COATED ORAL
Qty: 6 TABLET | Refills: 0 | Status: SHIPPED | OUTPATIENT
Start: 2018-11-30 | End: 2018-12-11

## 2018-11-30 ASSESSMENT — PATIENT HEALTH QUESTIONNAIRE - PHQ9: SUM OF ALL RESPONSES TO PHQ QUESTIONS 1-9: 14

## 2018-11-30 NOTE — MR AVS SNAPSHOT
After Visit Summary   11/30/2018    Leonor Mercado    MRN: [id number]           Patient Information     Date Of Birth          5/6/1926        Visit Information        Provider Department      11/30/2018 3:40 PM Maria Isabel Rosario APRN CNP [facility]        Today's Diagnoses     Cough    -  1    Chronic obstructive pulmonary disease, unspecified COPD type (H)        COPD (H)          Care Instructions    I don't see signs of pneumonia. I think you are having bronchitis or a COPD flare.    Please start steroid pills-2 pills a day for 5 days  Please increase your nebs to every 4 hours until you start feeling better  Please take antibiotics-Azithromycin  If you notice you are bleeding more please have your INR checked  Please schedule with the resident doctors next week. Dr. Garcia will be supervising them so you will be able to see him.   To the ER for worsening shortness of breath, chest pain, etc.           Follow-ups after your visit        Follow-up notes from your care team     Return for Schedule with residents Thursday. .      Your next 10 appointments already scheduled     Dec 04, 2018  1:30 PM CST   Anticoagulation Visit with  ANTICOAGULATION CLINIC   [facility] ([facility])    [address]  [facility] [place] [facility] [id number] [phone]            Dec 11, 2018 12:15 PM CST   Return Visit with Bryan Garcia MD   [facility] ([facility])    [address]  [facility] [id number] [phone]              Future tests that were ordered for you today     Open Future Orders        Priority Expected Expires Ordered    Follow-Up with Device Clinic Routine 3/4/2019 11/29/2019 11/29/2018            Who to contact     If you have questions or need follow up information about today's clinic visit or your schedule please contact [facility] directly at  "[phone].  Normal or non-critical lab and imaging results will be communicated to you by MyChart, letter or phone within 4 business days after the clinic has received the results. If you do not hear from us within 7 days, please contact the clinic through MyChart or phone. If you have a critical or abnormal lab result, we will notify you by phone as soon as possible.  Submit refill requests through [employer] or call your pharmacy and they will forward the refill request to us. Please allow 3 business days for your refill to be completed.          Additional Information About Your Visit        Care EveryWhere ID     This is your Care EveryWhere ID. This could be used by other organizations to access your [place] medical records  [id number]        Your Vitals Were     Pulse Temperature Height Last Period Pulse Oximetry BMI (Body Mass Index)    77 98.6  F (37  C) (Tympanic) 5' 3\" (1.6 m) (LMP Unknown) 96% 25.9 kg/m2       Blood Pressure from Last 3 Encounters:   11/30/18 122/60   10/23/18 108/60   10/05/18 115/70    Weight from Last 3 Encounters:   11/30/18 146 lb 3.2 oz (66.3 kg)   11/19/18 145 lb (65.8 kg)   10/23/18 145 lb (65.8 kg)              We Performed the Following     ALBUTEROL/IPRATROPIUM 3ML NEB - .001     INHALATION/NEBULIZER TREATMENT, INITIAL          Today's Medication Changes          These changes are accurate as of 11/30/18  4:23 PM.  If you have any questions, ask your nurse or doctor.               Start taking these medicines.        Dose/Directions    azithromycin 250 MG tablet   Commonly known as:  ZITHROMAX   Used for:  Cough   Started by:  Maria Isabel Rosario APRN CNP        Two tablets first day, then one tablet daily for four days.   Quantity:  6 tablet   Refills:  0       predniSONE 20 MG tablet   Commonly known as:  DELTASONE   Used for:  Cough   Started by:  Maria Isabel Rosario APRN CNP        Dose:  40 mg   Take 2 tablets (40 mg) by mouth daily for 5 days   Quantity:  " 10 tablet   Refills:  0            Where to get your medicines      These medications [id number] [place], [place] - [address]  [address], [place] [address]     Phone:  [phone]     albuterol 108 (90 Base) MCG/ACT inhaler    azithromycin 250 MG tablet    predniSONE 20 MG tablet         Call your pharmacy to confirm that your medication is ready for pickup. It may take up to 24 hours for them to receive the prescription. If the prescription is not ready within 3 business days, please contact your clinic or your provider.     We will let you know when these medications are ready. If you don't hear back within 3 business days, please contact us.     ipratropium - albuterol 0.5 mg/2.5 mg/3 mL 0.5-2.5 (3) MG/3ML neb solution                Primary Care Provider Office Phone # Fax #    Arnel Garcia [phone] [address] [place] [address]        Equal Access to Services     [facility]: Hadii aad ku hadasho Soomaali, waaxda luqadaha, qaybta kaalmada adeegyada, waxay idiin haywileyn dexter cruz . So [facility] [phone].    ATENCIÓN: Si habla español, tiene a spicer disposición servicios gratuitos de asistencia lingüística. [facility] [phone].    We comply with applicable federal civil rights laws and [place] laws. We do not discriminate on the basis of race, color, national origin, age, disability, sex, sexual orientation, or gender identity.            Thank you!     Thank you for choosing [facility] [place]  for your care. Our goal is always to provide you with excellent care. Hearing back from our patients is one way we can continue to improve our services. Please take a few minutes to complete the written survey that you may receive in the mail after your visit with us. Thank you!             Your Updated Medication List - Protect others around you: Learn how to safely use, store and throw away your medicines at  www.disposemymeds.org.          This list is accurate as of 11/30/18  4:23 PM.  Always use your most recent med list.                   Brand Name Dispense Instructions for use Diagnosis    albuterol 108 (90 Base) MCG/ACT inhaler    PROAIR HFA/PROVENTIL HFA/VENTOLIN HFA    1 Inhaler    Inhale 2 puffs into the lungs every 6 hours as needed for shortness of breath / dyspnea or wheezing    Chronic obstructive pulmonary disease, unspecified COPD type (H)       ASPIRIN NOT PRESCRIBED    INTENTIONAL    0 each    Please choose reason not prescribed, below    Coronary artery disease involving native coronary artery of native heart without angina pectoris       azithromycin 250 MG tablet    ZITHROMAX    6 tablet    Two tablets first day, then one tablet daily for four days.    Cough       calcium carbonate 600 MG tablet   Generic drug:  calcium carbonate      Take 1 tablet by mouth daily        diltiazem ER COATED BEADS 240 MG 24 hr capsule    DILTIAZEM CD    90 capsule    Take 1 capsule (240 mg) by mouth daily    Chronic atrial fibrillation (H)       estradiol 0.1 MG/GM vaginal cream    ESTRACE VAGINAL    42.5 g    Apply small amount to the vaginal opening and urethra M, W, F q. h.s.    CKD (chronic kidney disease) stage 3, GFR 30-59 ml/min (H)       fluticasone-vilanterol 200-25 MCG/INH inhaler    BREO ELLIPTA    1 Inhaler    Inhale 1 puff into the lungs daily    Anxiety       furosemide 20 MG tablet    LASIX    90 tablet    Take 1 tablet (20 mg) by mouth daily    Chronic diastolic congestive heart failure (H)       ipratropium - albuterol 0.5 mg/2.5 mg/3 mL 0.5-2.5 (3) MG/3ML neb solution    DUONEB    360 mL    Take 1 vial (3 mLs) by nebulization every 6 hours as needed for shortness of breath / dyspnea or wheezing    Chronic obstructive pulmonary disease, unspecified COPD type (H)       losartan 50 MG tablet    COZAAR    45 tablet    Take 0.5 tablets (25 mg) by mouth daily    Essential hypertension with goal blood  pressure less than 140/90       MELATONIN PO      Take 5 mg by mouth nightly as needed        mirtazapine 15 MG tablet    REMERON    90 tablet    Take 1 tablet (15 mg) by mouth At Bedtime    Adjustment disorder, unspecified type       nitroGLYcerin 0.4 MG sublingual tablet    NITROSTAT    25 tablet    Place 1 tablet (0.4 mg) under the tongue every 5 minutes as needed    Other chest pain       OCUVITE PO      Take 1 capsule by mouth daily.        polyethylene glycol packet    MIRALAX/GLYCOLAX     Take 1 packet by mouth daily as needed for constipation        pravastatin 40 MG tablet    PRAVACHOL    90 tablet    Take 1 tablet (40 mg) by mouth daily    Hyperlipidemia LDL goal <100, Coronary artery disease involving native coronary artery of native heart without angina pectoris       predniSONE 20 MG tablet    DELTASONE    10 tablet    Take 2 tablets (40 mg) by mouth daily for 5 days    Cough       PROBIOTIC ACIDOPHILUS Tabs      Take 1 tablet by mouth daily        STOOL SOFTENER PO      Take 1 tablet by mouth daily as needed        TYLENOL PO      Take 1,000 mg by mouth nightly as needed        VITAMIN C PO      Take 500 mg by mouth daily        vitamin D3 2000 units tablet    CHOLECALCIFEROL     Take 2,000 Units by mouth daily        warfarin 2 MG tablet    COUMADIN    90 tablet    TAKE ONE TABLET BY MOUTH ONCE DAILY OR  AS  DIRECTED  BY  INR  CLINIC    Long term current use of anticoagulant therapy

## 2018-11-30 NOTE — PATIENT INSTRUCTIONS
I don't see signs of pneumonia. I think you are having bronchitis or a COPD flare.    Please start steroid pills-2 pills a day for 5 days  Please increase your nebs to every 4 hours until you start feeling better  Please take antibiotics-Azithromycin  If you notice you are bleeding more please have your INR checked  Please schedule with the resident doctors next week. Dr. Garcia will be supervising them so you will be able to see him.   To the ER for worsening shortness of breath, chest pain, etc.

## 2018-11-30 NOTE — NURSING NOTE
The following nebulizer treatment was given:     MEDICATION: Duoneb  : [employer]  LOT #: [id number]  EXPIRATION DATE:  5/31/20  NDC # 9085-6937-11     Nebulizer Start Time:  3:48pm   Nebulizer Stop Time:  3:54pm  See Vital Signs Flowsheet  Karen Rider CMA

## 2018-11-30 NOTE — PROGRESS NOTES
"  SUBJECTIVE:   Leonor Mercado is a 89 year old female who presents to clinic today for the following health issues:  Patient here with daughter Les today.    Acute Illness   Acute illness concerns: cough, greenish phlegm  Onset: since Monday    Fever: no    Chills/Sweats: YES- chills    Headache (location?): YES- intermittent    Sinus Pressure:no    Conjunctivitis:  no    Ear Pain: YES: right    Rhinorrhea: YES    Congestion: YES    Sore Throat: no     Cough: YES-productive of green sputum    Wheeze: YES    Decreased Appetite: YES    Nausea: no    Vomiting: no    Diarrhea:  no    Dysuria/Freq.: no    Fatigue/Achiness: YES- fatigue    Sick/Strep Exposure: no     Therapies Tried and outcome: nebulizer, tylenol    ROS: const/heent/cv/resp otherwise negative     OBJECTIVE:  /60 (BP Location: Right arm, Cuff Size: Adult Regular)  Pulse 77  Temp 98.6  F (37  C) (Tympanic)  Ht 5' 3\" (1.6 m)  Wt 146 lb 3.2 oz (66.3 kg)  LMP  (LMP Unknown)  SpO2 96%  BMI 25.9 kg/m2  CONSTITUTIONAL: Alert, well-nourished, well-groomed, NAD  RESP: Lungs diminished. Occasional expiratory wheeze. No rales. Subjective improvement after duoneb  CV: HRRR S1 S2 No MRG. No peripheral edema  HEENT: Eyes: Conjunctiva pink and moist. Ears: Ear canals unremarkable. TMs pearly gray bilaterally. Bony landmarks and light reflexes intact. No erythema. Nose: Turbinates pink and moist. Throat: OP pink and moist. No tonsillar enlargement or exudates. No postnasal drip.  Neck: No lymphadenopathy or masses. Thyroid smooth, non-tender, and non-enlarged.      ASSESSMENT/PLAN:  (R05) Cough  (primary encounter diagnosis)  Comment: Patient with 4 days of cough, increased sputum production, chills, and fatigue. She does not appear dyspneic but her oxygen is slightly lower than her usual.  No rales. CXR clear. Will treat for COPD exacerbation and have close f/u with PCP  Plan: INHALATION/NEBULIZER TREATMENT, INITIAL, XR         Chest 2 Views, " azithromycin (ZITHROMAX) 250 MG         tablet, predniSONE (DELTASONE) 20 MG tablet,         DISCONTINUED: predniSONE (DELTASONE) 20 MG         tablet, DISCONTINUED: azithromycin (ZITHROMAX)         250 MG tablet          -F/U PCP (with residents) next Thursday  -Discussed r/b/se of therapies  -Discussed reasons to seek care urgently.   -Discussed potential for azithromycin or prednisone to affect her INRs. She is to monitor for easy bleeding, etc and have INR checked if she notices this. However, typically azithromycin doesn't affect the INR as much as other antibiotics. Fall prevention measures discussed    (J44.9) Chronic obstructive pulmonary disease, unspecified COPD type (H)  Plan: albuterol (PROAIR HFA/PROVENTIL HFA/VENTOLIN         HFA) 108 (90 Base) MCG/ACT inhaler,         ALBUTEROL/IPRATROPIUM 3ML [facility] - .001              YESSI Kimbrough-DNP.

## 2018-11-30 NOTE — NURSING NOTE
Called [facility] pharmacy and cancelled prednisone and zithromax.  Karen Rider, CMA

## 2018-12-04 ENCOUNTER — ANTICOAGULATION THERAPY VISIT (OUTPATIENT)
Dept: NURSING | Facility: CLINIC | Age: 83
End: 2018-12-04
Payer: COMMERCIAL

## 2018-12-04 DIAGNOSIS — Z79.01 LONG TERM CURRENT USE OF ANTICOAGULANT THERAPY: Primary | ICD-10-CM

## 2018-12-04 DIAGNOSIS — I48.20 CHRONIC ATRIAL FIBRILLATION (H): ICD-10-CM

## 2018-12-04 LAB — INR POINT OF CARE: 3.9 (ref 0.86–1.14)

## 2018-12-04 PROCEDURE — 85610 PROTHROMBIN TIME: CPT | Mod: QW

## 2018-12-04 PROCEDURE — 36416 COLLJ CAPILLARY BLOOD SPEC: CPT

## 2018-12-04 PROCEDURE — 99207 ZZC NO CHARGE NURSE ONLY: CPT

## 2018-12-04 NOTE — PROGRESS NOTES
"  ANTICOAGULATION FOLLOW-UP CLINIC VISIT    Patient Name:  Leonor Mercado  Date:  12/4/2018  Contact Type:  Face to Face  Patient is accompanied in the office by her daughter.    SUBJECTIVE:     Patient Findings     Positives Change in medications, Antibiotic use or infection    Comments She was seen on Friday for a cough and COPD.  Started on Prednisone and azithromycin.  Is finished with both meds today.    Per micromedex: Severity-moderate,   Concurrent use of PREDNISONE and WARFARIN   may result in increased risk of bleeding or diminished effects of warfarin.   Per micromedex: Severity: Major.   Concurrent use of AZITHROMYCIN and WARFARIN   may result in an increased risk of bleeding.              OBJECTIVE    INR Protime   Date Value Ref Range Status   12/04/2018 3.9 (A) 0.86 - 1.14 Final       ASSESSMENT / PLAN  INR assessment SUPRA    Recheck INR In: 2 WEEKS    INR Location Clinic      Anticoagulation Summary as of 12/4/2018     INR goal 2.0-3.0   Today's INR 3.9!   Warfarin maintenance plan 2 mg (2 mg x 1) every day   Full warfarin instructions 12/4: Hold; Otherwise 2 mg every day   Weekly warfarin total 14 mg   Plan last modified Yoana Waddell, RN (12/15/2016)   Next INR check 12/18/2018   Priority INR   Target end date     Indications   Chronic atrial fibrillation (H) [I48.2]  Long term current use of anticoagulant therapy [Z79.01]         Anticoagulation Episode Summary     INR check location     Preferred lab     Send INR reminders to [facility]    Comments 2mg tabs - nancy / 1st name is pronounced \"ondray\" / APPT CARD ONLY / comes on the bus on Tues      Anticoagulation Care Providers     Provider Role Specialty Phone number    Arnel Garcia MD  Internal Medicine [phone]            See the Encounter Report to view Anticoagulation Flowsheet and Dosing Calendar (Go to Encounters tab in chart review, and find the Anticoagulation Therapy Visit)    Dosage adjustment made based on " physician directed care plan.    Ebony Doshi RN

## 2018-12-04 NOTE — MR AVS SNAPSHOT
Leonor Mercado   12/4/2018 1:30 PM   Anticoagulation Therapy Visit    Description:  89 year old female   Provider:   ANTICOAGULATION CLINIC   Department:   Nurse           INR as of 12/4/2018     Today's INR 3.9!      Anticoagulation Summary as of 12/4/2018     INR goal 2.0-3.0   Today's INR 3.9!   Full warfarin instructions 12/4: Hold; Otherwise 2 mg every day   Next INR check 12/18/2018    Indications   Chronic atrial fibrillation (H) [I48.2]  Long term current use of anticoagulant therapy [Z79.01]         Your next Anticoagulation Clinic appointment(s)     Dec 18, 2018 11:00 AM CST   Anticoagulation Visit with  ANTICOAGULATION CLINIC   [facility]    [address]  [address]  [place] [address] [phone]              Contact Numbers     [facility]  Please call  [phone] to cancel and/or reschedule your appointment   Please call  [phone] with any problems or questions regarding your therapy.        December 2018 Details    Sun Mon Tue Wed Thu Fri Sat           1                 2               3               4      Hold   See details      5      2 mg         6      2 mg         7      2 mg         8      2 mg           9      2 mg         10      2 mg         11      2 mg         12      2 mg         13      2 mg         14      2 mg         15      2 mg           16      2 mg         17      2 mg         18            19               20               21               22                 23               24               25               26               27               28               29                 30               31                     Date Details   12/04 This INR check       Date of next INR:  12/18/2018         How to take your warfarin dose     To take:  2 mg Take 1 of the 2 mg tablets.    Hold Do not take your warfarin dose. See the Details table to the right for additional instructions.

## 2018-12-06 ENCOUNTER — OFFICE VISIT (OUTPATIENT)
Dept: PEDIATRICS | Facility: CLINIC | Age: 83
End: 2018-12-06
Payer: COMMERCIAL

## 2018-12-06 VITALS
SYSTOLIC BLOOD PRESSURE: 132 MMHG | HEART RATE: 79 BPM | TEMPERATURE: 97.9 F | OXYGEN SATURATION: 98 % | WEIGHT: 153 LBS | BODY MASS INDEX: 27.1 KG/M2 | DIASTOLIC BLOOD PRESSURE: 70 MMHG

## 2018-12-06 DIAGNOSIS — J44.1 COPD EXACERBATION (H): Primary | ICD-10-CM

## 2018-12-06 PROCEDURE — 99213 OFFICE O/P EST LOW 20 MIN: CPT | Mod: GE | Performed by: STUDENT IN AN ORGANIZED HEALTH CARE EDUCATION/TRAINING PROGRAM

## 2018-12-06 NOTE — PROGRESS NOTES
SUBJECTIVE:   Leonor Mercado is a 89 year old female who presents to clinic today for the following health issues:    Follow up cough    Patient was seen in clinic on 11/30 for productive cough, dyspnea, URI symptoms.  Diagnosed with COPD exacerbation and prescribed 5 days azithromycin and prednisone burst.  She was told to use duo nebs every 4 hours.  Chest x-ray negative for infiltrate.  She returns for follow-up.    Patient states she is doing well she denies any fevers, chills, chest pain.  Continues to have shortness of breath which is not yet back to her baseline.  She has not needed to use oxygen at home, continues to use oxygen at nighttime at her baseline level.  She has a cough that has persisted but sputum production and color her back to her baseline.  She is not using her duo nebs 2-3 times a day, which is her baseline.  Has not needed to use albuterol for rescue since last week.  She feels the azithromycin and prednisone helped.      Problem list and histories reviewed & adjusted, as indicated.  Additional history: as documented    Patient Active Problem List   Diagnosis     Hyperlipidemia LDL goal <100     Cardiac pacemaker in situ     Advanced directives, counseling/discussion     Senile osteoporosis     Irritable bowel syndrome (IBS)     Anemia     Long term current use of anticoagulant therapy     Degeneration of lumbar intervertebral disc     CKD (chronic kidney disease) stage 3, GFR 30-59 ml/min (H)     Chronic atrial fibrillation (H)     Chronic obstructive pulmonary disease, unspecified COPD type (H)     Coronary artery disease involving native coronary artery of native heart without angina pectoris     Diastolic dysfunction     Sick sinus syndrome (H)     Past Surgical History:   Procedure Laterality Date     APPENDECTOMY  1956     CARDIAC SURGERY      PPM, 2 STENTS2-05[place]-CAD-tax[place] stentx2 2.5-12,2.5-12 in LADp and LADm, 80%nonDomRCA-LcxDom-mild,EF60%     CORONARY ANGIOGRAPHY ADULT  ORDER  1994    mild CAD,Normal LV function, No Mitral regurgitation, Prox LAD 30% stenosis. RCA prox and mid, 20-30%     ESOPHAGOSCOPY, GASTROSCOPY, DUODENOSCOPY (EGD), COMBINED N/A 2015    Procedure: COMBINED ESOPHAGOSCOPY, GASTROSCOPY, DUODENOSCOPY (EGD), BIOPSY SINGLE OR MULTIPLE;  Surgeon: Genevieve Leon MD;  Location: [facility]     H LEAD REVISION DUAL  2003    Revised in [place]-due to diaphram stim (original pacer placed in [place])     H LEAD REVISION DUAL  2014    Correction of reversal of A and V leads in Header     HEART CATH, ANGIOPLASTY  2005    LEIGH ANN mid and proximal LAD, ongoing 80% RCA; mild circumflex     HERNIA REPAIR Left     [facility]     IMPLANT PACEMAKER  2003    Placed in [place] for sick sinus syndrome     REPLACE PACEMAKER GENERATOR  2013    Dual-chamber pacemaker by Dr SETH Pierre       Social History   Substance Use Topics     Smoking status: Former Smoker     Types: Cigarettes     Quit date: 1987     Smokeless tobacco: Never Used     Alcohol use No     Family History   Problem Relation Age of Onset     Heart Disease Father       age 84     Neurologic Disorder Mother      dementia     GASTROINTESTINAL DISEASE Daughter      liver transplant     Crohn Disease Daughter          Current Outpatient Prescriptions   Medication Sig Dispense Refill     Acetaminophen (TYLENOL PO) Take 1,000 mg by mouth nightly as needed        albuterol (PROAIR HFA/PROVENTIL HFA/VENTOLIN HFA) 108 (90 Base) MCG/ACT inhaler Inhale 2 puffs into the lungs every 6 hours as needed for shortness of breath / dyspnea or wheezing 1 Inhaler 1     Ascorbic Acid (VITAMIN C PO) Take 500 mg by mouth daily       ASPIRIN NOT PRESCRIBED (INTENTIONAL) Please choose reason not prescribed, below 0 each 0     azithromycin (ZITHROMAX) 250 MG tablet Two tablets first day, then one tablet daily for four days. 6 tablet 0     calcium carbonate (CALCIUM CARBONATE) 600 MG TABS tablet Take 1 tablet by mouth daily         Cholecalciferol (VITAMIN D) 2000 UNITS tablet Take 2,000 Units by mouth daily       diltiazem (DILTIAZEM CD) 240 MG 24 hr capsule Take 1 capsule (240 mg) by mouth daily 90 capsule 3     Docusate Calcium (STOOL SOFTENER PO) Take 1 tablet by mouth daily as needed        estradiol (ESTRACE VAGINAL) 0.1 MG/GM cream Apply small amount to the vaginal opening and urethra M, W, F q. h.s. 42.5 g 3     fluticasone-vilanterol (BREO ELLIPTA) 200-25 MCG/INH oral inhaler Inhale 1 puff into the lungs daily 1 Inhaler 3     furosemide (LASIX) 20 MG tablet Take 1 tablet (20 mg) by mouth daily 90 tablet 3     ipratropium - albuterol 0.5 mg/2.5 mg/3 mL (DUONEB) 0.5-2.5 (3) MG/3ML neb solution Take 1 vial (3 mLs) by nebulization every 6 hours as needed for shortness of breath / dyspnea or wheezing 360 mL 1     Lactobacillus (PROBIOTIC ACIDOPHILUS) TABS Take 1 tablet by mouth daily       losartan (COZAAR) 50 MG tablet Take 0.5 tablets (25 mg) by mouth daily 45 tablet 3     MELATONIN PO Take 5 mg by mouth nightly as needed        mirtazapine (REMERON) 15 MG tablet Take 1 tablet (15 mg) by mouth At Bedtime 90 tablet 3     Multiple Vitamins-Minerals (OCUVITE PO) Take 1 capsule by mouth daily.       nitroglycerin (NITROSTAT) 0.4 MG SL tablet Place 1 tablet (0.4 mg) under the tongue every 5 minutes as needed 25 tablet 1     polyethylene glycol (MIRALAX/GLYCOLAX) Packet Take 1 packet by mouth daily as needed for constipation       pravastatin (PRAVACHOL) 40 MG tablet Take 1 tablet (40 mg) by mouth daily 90 tablet 3     predniSONE (DELTASONE) 20 MG tablet Take 2 tablets (40 mg) by mouth daily 10 tablet 0     warfarin (COUMADIN) 2 MG tablet TAKE ONE TABLET BY MOUTH ONCE DAILY OR  AS  DIRECTED  BY  INR  CLINIC 90 tablet 1     Allergies   Allergen Reactions     Peanuts [Nuts] Shortness Of Breath     Amiodarone      pulm fibrosis       Baclofen      Confusion      Bactrim [Sulfamethoxazole W-Trimethoprim]      Difficulty swallowing     Doxycycline  Other (See Comments)     Multiple complaints; she does not want this again.      Levaquin [Levofloxacin] Other (See Comments)     Multiple complaints; she will not take this again     Linzess [Linaclotide] Diarrhea     Lorazepam        Prolonged drowsiness with ER visit      Liquid Adhesive Itching and Rash     Bleeding when tape removed after pacemaker placement..itched and burned for weeks.     BP Readings from Last 3 Encounters:   12/06/18 132/70   11/30/18 122/60   10/23/18 108/60    Wt Readings from Last 3 Encounters:   12/06/18 153 lb (69.4 kg)   11/30/18 146 lb 3.2 oz (66.3 kg)   11/19/18 145 lb (65.8 kg)                    Reviewed and updated as needed this visit by clinical staff  Tobacco  Allergies  Meds       Reviewed and updated as needed this visit by Provider         ROS:  Constitutional, HEENT, cardiovascular, pulmonary, GI, , musculoskeletal, neuro, skin, endocrine and psych systems are negative, except as otherwise noted.    OBJECTIVE:     /70 (Cuff Size: Adult Regular)  Pulse 79  Temp 97.9  F (36.6  C) (Oral)  Wt 153 lb (69.4 kg)  LMP  (LMP Unknown)  SpO2 98%  BMI 27.1 kg/m2  Body mass index is 27.1 kg/(m^2).  Physical Exam    General: awake, alert, in no acute distress  HEENT: NCAT, PERRL, EOMI, sclera anicteric, no nasal discharge, MMM, posterior pharynx without erythema or exudates, no cervical lymphadenopathy  CV: RRR, no murmurs noted, peripheral pulses strong  Resp: Fine crackles in bilateral bases, no increased WOB, no wheezing.  Exhales through pursed lips.  Abd: Soft, nontender, nondistended, +BS  MSK: Trace peripheral edema, extremities warm and well perfused, normal pulses  Skin: warm, dry, no jaundice  Neuro: CN II-XII grossly intact. No focal deficits. Alert and oriented x4.    Diagnostic Test Results:  none     ASSESSMENT/PLAN:     1. COPD exacerbation (H), resolved  - Continue Duonebs BID (baseline) and albuterol PRN for rescue.  - Offered medication for cough,  patient declined.  - Instructed to return to clinic if symptoms worsen.      FUTURE APPOINTMENTS:       - Follow-up for annual visit or as needed    Patient was seen and discussed with attending, Dr. Fran Garcia, who agrees with the above assessment and plan.    Yanira Jiménez MD  PGY - 3  Internal Medicine/Pediatrics  Pager [phone]  [facility] [place]    ===========  STAFF NOTE:  Patient discussed with resident physician today.  We discussed burnett portions of the visit and I participated in the evaluation and management of the patient today.     Arnel Garcia MD

## 2018-12-06 NOTE — PATIENT INSTRUCTIONS
Thank you for coming to clinic today. It was a pleasure to see you and I would be happy to see you back at any time for follow up or for new health issues.    1. COPD exacerbation, resolved  - Continue Duonebs twice a day as per your baseline.  - Albuterol for rescue if needed.    Please let me know if there is anything I can do to help!    Yanira Jiménez MD

## 2018-12-11 ENCOUNTER — OFFICE VISIT (OUTPATIENT)
Dept: CARDIOLOGY | Facility: CLINIC | Age: 83
End: 2018-12-11
Payer: COMMERCIAL

## 2018-12-11 VITALS
DIASTOLIC BLOOD PRESSURE: 80 MMHG | SYSTOLIC BLOOD PRESSURE: 118 MMHG | HEIGHT: 63 IN | WEIGHT: 149.6 LBS | BODY MASS INDEX: 26.51 KG/M2 | HEART RATE: 64 BPM

## 2018-12-11 DIAGNOSIS — R06.02 SHORTNESS OF BREATH: ICD-10-CM

## 2018-12-11 DIAGNOSIS — I50.32 CHRONIC DIASTOLIC HEART FAILURE (H): ICD-10-CM

## 2018-12-11 DIAGNOSIS — I48.20 CHRONIC ATRIAL FIBRILLATION (H): ICD-10-CM

## 2018-12-11 DIAGNOSIS — Z95.0 CARDIAC PACEMAKER IN SITU: Primary | ICD-10-CM

## 2018-12-11 LAB
ANION GAP SERPL CALCULATED.3IONS-SCNC: 5 MMOL/L (ref 3–14)
BUN SERPL-MCNC: 33 MG/DL (ref 7–30)
CALCIUM SERPL-MCNC: 9.3 MG/DL (ref 8.5–10.1)
CHLORIDE SERPL-SCNC: 102 MMOL/L (ref 94–109)
CO2 SERPL-SCNC: 30 MMOL/L (ref 20–32)
CREAT SERPL-MCNC: 1.63 MG/DL (ref 0.52–1.04)
GFR SERPL CREATININE-BSD FRML MDRD: 29 ML/MIN/1.7M2
GLUCOSE SERPL-MCNC: 84 MG/DL (ref 70–99)
NT-PROBNP SERPL-MCNC: 2538 PG/ML (ref 0–450)
POTASSIUM SERPL-SCNC: 4.6 MMOL/L (ref 3.4–5.3)
SODIUM SERPL-SCNC: 137 MMOL/L (ref 133–144)

## 2018-12-11 PROCEDURE — 36415 COLL VENOUS BLD VENIPUNCTURE: CPT | Performed by: INTERNAL MEDICINE

## 2018-12-11 PROCEDURE — 83880 ASSAY OF NATRIURETIC PEPTIDE: CPT | Performed by: INTERNAL MEDICINE

## 2018-12-11 PROCEDURE — 99214 OFFICE O/P EST MOD 30 MIN: CPT | Performed by: INTERNAL MEDICINE

## 2018-12-11 PROCEDURE — 80048 BASIC METABOLIC PNL TOTAL CA: CPT | Performed by: INTERNAL MEDICINE

## 2018-12-11 ASSESSMENT — MIFFLIN-ST. JEOR: SCORE: 1057.71

## 2018-12-11 NOTE — LETTER
"12/11/2018    Arnel Garcia MD, MD  [address] [place] [address]    RE: Leonor Mercado       Dear Colleague,    I had the pleasure of seeing Leonor Mrecado in the [facility].        HISTORY:    Leonor Mercado is a very pleasant 89-year-old female with a history of coronary artery disease with stenting in 2004, exact details unknown, as well as a history of sick sinus syndrome with atrial fibrillation and permanent pacemaker.  She is on long-term anticoagulation.  Records document previous PAT and she has a history of her records suggest some history of pulmonary fibrosis.  In addition she has mild renal insufficiency, bilateral lower extremity venous stasis, hyperlipidemia, hypertension, and a history of cigarette use having quit 30 years ago.  She is accompanied by her daughter once again today.    Leonor reports that she continues to do well.  She has been using nebulizers on a regular basis under the care of Dr. Her.  She has had a significant improvement in her dyspnea with this.  She continues to use compression stockings on a daily basis and uses oxygen at night.    Leonor denies any exertional chest, arm, neck, or jaw discomfort.  She does describe some occasional random episodes of pressure involving the left side of her neck and lasting 15-30 minutes at a time.  These generally occur while she is sitting still.  There is no sense of discomfort in her chest with this and there is no associated nausea shortness of breath or diaphoresis.  She last underwent cardiac evaluation summer 2017 with a nuclear stress test showing no inducible ischemia.  She recalls that being a \"horrible\" test.    Laboratory values done today show a sodium of 137, potassium 4.6, BUN 33, creatinine 1.63, estimated GFR 29.  She has had gradual worsening of her renal function throughout the past year.    ASSESSMENT/PLAN:    1.  COPD with severe dyspnea.  Cares per " pulmonary.  2.  Peripheral edema.  Adequately controlled with pression stockings which she uses daily.  There appears to be minimal edema today and the patient is comfortable with this.  3.  Atrial fibrillation.  Chronically anticoagulated and unaware of any sense of palpitations.  4.  HFpEF.  We have tried higher dose diuretics in the past and a cause further renal dysfunction but no improvement in her dyspnea.  I do not think this is a major contributing factor to her sense of dyspnea.  5.  Renal insufficiency.  Her primary care doctor asked her to cut her dose of Lasix in half but she has not done so.  I suggested that she try this since this may be contributing to her rising creatinine.  See no evidence of volume overload on exam today so I do not think decreasing her Lasix will cause any cardiovascular issues.    Thank you for inviting me to participate in your patient's care.  Please do not hesitate to call if I can be of further assistance.    Orders Placed This Encounter   Procedures     Follow-Up with Cardiologist     No orders of the defined types were placed in this encounter.    Medications Discontinued During This Encounter   Medication Reason     azithromycin (ZITHROMAX) 250 MG tablet Therapy completed     predniSONE (DELTASONE) 20 MG tablet Therapy completed       10 year ASCVD risk: The ASCVD Risk score (Burbank DC Jr., et al., 2013) failed to calculate for the following reasons:    The 2013 ASCVD risk score is only valid for ages 40 to 79    Encounter Diagnoses   Name Primary?     Cardiac pacemaker in situ Yes     Chronic diastolic congestive heart failure (H)      Chronic diastolic heart failure (H)      Chronic atrial fibrillation (H)        CURRENT MEDICATIONS:  Current Outpatient Medications   Medication Sig Dispense Refill     Acetaminophen (TYLENOL PO) Take 1,000 mg by mouth nightly as needed        albuterol (PROAIR HFA/PROVENTIL HFA/VENTOLIN HFA) 108 (90 Base) MCG/ACT inhaler Inhale 2 puffs  into the lungs every 6 hours as needed for shortness of breath / dyspnea or wheezing 1 Inhaler 1     Ascorbic Acid (VITAMIN C PO) Take 500 mg by mouth daily       calcium carbonate (CALCIUM CARBONATE) 600 MG TABS tablet Take 1 tablet by mouth daily        Cholecalciferol (VITAMIN D) 2000 UNITS tablet Take 2,000 Units by mouth daily       diltiazem (DILTIAZEM CD) 240 MG 24 hr capsule Take 1 capsule (240 mg) by mouth daily 90 capsule 3     Docusate Calcium (STOOL SOFTENER PO) Take 1 tablet by mouth daily as needed        estradiol (ESTRACE VAGINAL) 0.1 MG/GM cream Apply small amount to the vaginal opening and urethra M, W, F q. h.s. 42.5 g 3     fluticasone-vilanterol (BREO ELLIPTA) 200-25 MCG/INH oral inhaler Inhale 1 puff into the lungs daily 1 Inhaler 3     furosemide (LASIX) 20 MG tablet Take 1 tablet (20 mg) by mouth daily 90 tablet 3     ipratropium - albuterol 0.5 mg/2.5 mg/3 mL (DUONEB) 0.5-2.5 (3) MG/3ML neb solution Take 1 vial (3 mLs) by nebulization every 6 hours as needed for shortness of breath / dyspnea or wheezing 360 mL 1     Lactobacillus (PROBIOTIC ACIDOPHILUS) TABS Take 1 tablet by mouth daily       losartan (COZAAR) 50 MG tablet Take 0.5 tablets (25 mg) by mouth daily 45 tablet 3     MELATONIN PO Take 5 mg by mouth nightly as needed        mirtazapine (REMERON) 15 MG tablet Take 1 tablet (15 mg) by mouth At Bedtime 90 tablet 3     Multiple Vitamins-Minerals (OCUVITE PO) Take 1 capsule by mouth daily.       nitroglycerin (NITROSTAT) 0.4 MG SL tablet Place 1 tablet (0.4 mg) under the tongue every 5 minutes as needed 25 tablet 1     polyethylene glycol (MIRALAX/GLYCOLAX) Packet Take 1 packet by mouth daily as needed for constipation       pravastatin (PRAVACHOL) 40 MG tablet Take 1 tablet (40 mg) by mouth daily 90 tablet 3     warfarin (COUMADIN) 2 MG tablet TAKE ONE TABLET BY MOUTH ONCE DAILY OR  AS  DIRECTED  BY  INR  CLINIC 90 tablet 1     ASPIRIN NOT PRESCRIBED (INTENTIONAL) Please choose reason  not prescribed, below (Patient not taking: Reported on 12/11/2018) 0 each 0       ALLERGIES     Allergies   Allergen Reactions     Peanuts [Nuts] Shortness Of Breath     Amiodarone      pulm fibrosis       Baclofen      Confusion      Bactrim [Sulfamethoxazole W-Trimethoprim]      Difficulty swallowing     Doxycycline Other (See Comments)     Multiple complaints; she does not want this again.      Levaquin [Levofloxacin] Other (See Comments)     Multiple complaints; she will not take this again     Linzess [Linaclotide] Diarrhea     Lorazepam        Prolonged drowsiness with ER visit      Liquid Adhesive Itching and Rash     Bleeding when tape removed after pacemaker placement..itched and burned for weeks.       PAST MEDICAL HISTORY:  Past Medical History:   Diagnosis Date     A Fib - chr Coumadin, s/p PPM (H) 5/8/2013     Atrial fibrillation (H)     Sick sinus syndrome, PAT, AF     BCC (basal cell carcinoma of skin)     Face     CAD - 2 stents in [place] in 2000s.  1/5/2016     CHF (congestive heart failure) (H)     mild in past     CHF - Chr Diastolic (H) 11/21/2017     Chronic renal insufficiency      COPD (chronic obstructive pulmonary disease) (H)      COPD (H) 5/13/2013     Coronary artery disease     2-05[place]-CAD-taxus stentx2 2.5-12,2.5-12 in LADp and LADm, 80%nonDomRCA-LcxDom-mild,EF60%     Hyperlipidemia      Hypertension      IBS (irritable bowel syndrome)      Irritable bowel      Mumps      Osteoporosis      Palpitations      Panic attack     questionable diagnosis     Pulmonary fibrosis (H)     do not use amiodarone     Shortness of breath      Sick sinus syndrome - s/p PPM 2003 (H) 12/16/2017     SSS (sick sinus syndrome) (H)     DDD pacer (see surgery history section)     Vertigo        PAST SURGICAL HISTORY:  Past Surgical History:   Procedure Laterality Date     APPENDECTOMY  1956     CARDIAC SURGERY      PPM, 2 STENTS2-05[place]-CAD-taxus stentx2 2.5-12,2.5-12 in LADp and LADm,  80%nonDomRCA-LcxDom-mild,EF60%     CORONARY ANGIOGRAPHY ADULT ORDER  1994    mild CAD,Normal LV function, No Mitral regurgitation, Prox LAD 30% stenosis. RCA prox and mid, 20-30%     ESOPHAGOSCOPY, GASTROSCOPY, DUODENOSCOPY (EGD), COMBINED N/A 2015    Procedure: COMBINED ESOPHAGOSCOPY, GASTROSCOPY, DUODENOSCOPY (EGD), BIOPSY SINGLE OR MULTIPLE;  Surgeon: Genevieve Leon MD;  Location: [facility]     H LEAD REVISION DUAL  2003    Revised in [place]-due to diaphram stim (original pacer placed in [place])     H LEAD REVISION DUAL  2014    Correction of reversal of A and V leads in [place]     HEART CATH, ANGIOPLASTY  2005    LEIGH ANN mid and proximal LAD, ongoing 80% RCA; mild circumflex     HERNIA REPAIR Left     [facility]     IMPLANT PACEMAKER  2003    Placed in [place] for sick sinus syndrome     REPLACE PACEMAKER GENERATOR  2013    Dual-chamber pacemaker by Dr SETH Pierre       FAMILY HISTORY:  Family History   Problem Relation Age of Onset     Heart Disease Father          age 84     Neurologic Disorder Mother         dementia     Gastrointestinal Disease Daughter         liver transplant     Crohn's Disease Daughter        SOCIAL HISTORY:  Social History     Socioeconomic History     Marital status:      Spouse name: None     Number of children: 3     Years of education: None     Highest education level: None   Social Needs     Financial resource strain: None     Food insecurity - worry: None     Food insecurity - inability: None     Transportation needs - medical: None     Transportation needs - non-medical: None   Occupational History     Employer: RETIRED   Tobacco Use     Smoking status: Former Smoker     Types: Cigarettes     Last attempt to quit: 1987     Years since quittin.6     Smokeless tobacco: Never Used   Substance and Sexual Activity     Alcohol use: No     Alcohol/week: 0.0 oz     Drug use: No     Sexual activity: No     Partners: Male   Other Topics Concern      "Parent/sibling w/ CABG, MI or angioplasty before 65F 55M? Yes      Service Not Asked     Blood Transfusions Not Asked     Caffeine Concern Yes     Comment: decaf - some 1/2 caff     Occupational Exposure Not Asked     Hobby Hazards Not Asked     Sleep Concern Yes     Comment: nocturia every 2 hours     Stress Concern Not Asked     Weight Concern Not Asked     Special Diet No     Back Care Not Asked     Exercise No     Comment: some walking in the house     Bike Helmet Not Asked     Seat Belt Not Asked     Self-Exams Not Asked   Social History Narrative     None       Review of Systems:  Skin:  Negative     Eyes:  Positive for glasses  ENT:  Positive for hearing loss  Respiratory:  Positive for dyspnea on exertion;shortness of breath  Cardiovascular:    palpitations;Positive for;fatigue;dizziness  Gastroenterology: Negative    Genitourinary:       Musculoskeletal:  Positive for arthritis  Neurologic:  Negative    Psychiatric:  Positive for sleep disturbances  Heme/Lymph/Imm:  Negative    Endocrine:  Negative      Physical Exam:  Vitals: /80 (BP Location: Right arm, Patient Position: Sitting, Cuff Size: Adult Regular)   Pulse 64   Ht 1.6 m (5' 3\")   Wt 67.9 kg (149 lb 9.6 oz)   LMP  (LMP Unknown)   Breastfeeding? No   BMI 26.50 kg/m       Constitutional:  cooperative, alert and oriented, well developed, well nourished, in no acute distress frail      Skin:  warm and dry to the touch        Head:  normocephalic        Eyes:  no xanthalasma        ENT:  no pallor or cyanosis        Neck:  carotid pulses are full and equal bilaterally        Chest:  normal breath sounds, clear to auscultation, normal A-P diameter, normal symmetry, normal respiratory excursion, no use of accessory muscles   Lungs sounds are distant in all fields     Cardiac: apical impulse not displaced;normal S1 and S2;no S3 or S4;no murmurs, gallops or rubs detected irregularly irregular rhythm                Abdomen:  abdomen soft, " non-tender, BS normoactive, no mass, no HSM, no bruits        Vascular: pulses full and equal                                      Extremities and Back:  no edema   Wearing compression stockings, not removed.    Neurological:  no gross motor deficits          Recent Lab Results:  LIPID RESULTS:  Lab Results   Component Value Date    CHOL 161 12/11/2017    HDL 85 12/11/2017    LDL 54 12/11/2017    TRIG 109 12/11/2017    CHOLHDLRATIO 2.9 03/15/2015       LIVER ENZYME RESULTS:  Lab Results   Component Value Date    AST 29 04/16/2018    ALT 22 04/16/2018       CBC RESULTS:  Lab Results   Component Value Date    WBC 6.7 04/25/2018    RBC 4.25 04/25/2018    HGB 12.2 04/25/2018    HCT 37.6 04/25/2018    MCV 89 04/25/2018    MCH 28.7 04/25/2018    MCHC 32.4 04/25/2018    RDW 14.5 04/25/2018     04/25/2018       BMP RESULTS:  Lab Results   Component Value Date     12/11/2018    POTASSIUM 4.6 12/11/2018    CHLORIDE 102 12/11/2018    CO2 30 12/11/2018    ANIONGAP 5 12/11/2018    GLC 84 12/11/2018    BUN 33 (H) 12/11/2018    CR 1.63 (H) 12/11/2018    GFRESTIMATED 29 (L) 12/11/2018    GFRESTBLACK 36 (L) 12/11/2018    TRI 9.3 12/11/2018        A1C RESULTS:  Lab Results   Component Value Date    A1C 6.1 (H) 04/30/2014       INR RESULTS:  Lab Results   Component Value Date    INR 3.9 (A) 12/04/2018    INR 2.3 (A) 11/06/2018    INR 1.30 (H) 10/23/2018    INR 3.6 05/25/2018         Bryan Garcia MD, [facility]    CC  No referring provider defined for this encounter.                Thank you for allowing me to participate in the care of your patient.      Sincerely,     Bryan Garcia MD     [facility]    cc:   No referring provider defined for this encounter.

## 2018-12-11 NOTE — PROGRESS NOTES
"HISTORY:    Leonor Mercado is a very pleasant 89-year-old female with a history of coronary artery disease with stenting in 2004, exact details unknown, as well as a history of sick sinus syndrome with atrial fibrillation and permanent pacemaker.  She is on long-term anticoagulation.  Records document previous PAT and she has a history of her records suggest some history of pulmonary fibrosis.  In addition she has mild renal insufficiency, bilateral lower extremity venous stasis, hyperlipidemia, hypertension, and a history of cigarette use having quit 30 years ago.  She is accompanied by her daughter once again today.    Leonor reports that she continues to do well.  She has been using nebulizers on a regular basis under the care of Dr. Her.  She has had a significant improvement in her dyspnea with this.  She continues to use compression stockings on a daily basis and uses oxygen at night.    Leonor denies any exertional chest, arm, neck, or jaw discomfort.  She does describe some occasional random episodes of pressure involving the left side of her neck and lasting 15-30 minutes at a time.  These generally occur while she is sitting still.  There is no sense of discomfort in her chest with this and there is no associated nausea shortness of breath or diaphoresis.  She last underwent cardiac evaluation summer 2017 with a nuclear stress test showing no inducible ischemia.  She recalls that being a \"horrible\" test.    Laboratory values done today show a sodium of 137, potassium 4.6, BUN 33, creatinine 1.63, estimated GFR 29.  She has had gradual worsening of her renal function throughout the past year.    ASSESSMENT/PLAN:    1.  COPD with severe dyspnea.  Cares per pulmonary.  2.  Peripheral edema.  Adequately controlled with pression stockings which she uses daily.  There appears to be minimal edema today and the patient is comfortable with this.  3.  Atrial fibrillation.  Chronically anticoagulated and " unaware of any sense of palpitations.  4.  HFpEF.  We have tried higher dose diuretics in the past and a cause further renal dysfunction but no improvement in her dyspnea.  I do not think this is a major contributing factor to her sense of dyspnea.  5.  Renal insufficiency.  Her primary care doctor asked her to cut her dose of Lasix in half but she has not done so.  I suggested that she try this since this may be contributing to her rising creatinine.  See no evidence of volume overload on exam today so I do not think decreasing her Lasix will cause any cardiovascular issues.    Thank you for inviting me to participate in your patient's care.  Please do not hesitate to call if I can be of further assistance.    Orders Placed This Encounter   Procedures     Follow-Up with Cardiologist     No orders of the defined types were placed in this encounter.    Medications Discontinued During This Encounter   Medication Reason     azithromycin (ZITHROMAX) 250 MG tablet Therapy completed     predniSONE (DELTASONE) 20 MG tablet Therapy completed       10 year ASCVD risk: The ASCVD Risk score (Windham BHANU Jr., et al., 2013) failed to calculate for the following reasons:    The 2013 ASCVD risk score is only valid for ages 40 to 79    Encounter Diagnoses   Name Primary?     Cardiac pacemaker in situ Yes     Chronic diastolic congestive heart failure (H)      Chronic diastolic heart failure (H)      Chronic atrial fibrillation (H)        CURRENT MEDICATIONS:  Current Outpatient Medications   Medication Sig Dispense Refill     Acetaminophen (TYLENOL PO) Take 1,000 mg by mouth nightly as needed        albuterol (PROAIR HFA/PROVENTIL HFA/VENTOLIN HFA) 108 (90 Base) MCG/ACT inhaler Inhale 2 puffs into the lungs every 6 hours as needed for shortness of breath / dyspnea or wheezing 1 Inhaler 1     Ascorbic Acid (VITAMIN C PO) Take 500 mg by mouth daily       calcium carbonate (CALCIUM CARBONATE) 600 MG TABS tablet Take 1 tablet by mouth  daily        Cholecalciferol (VITAMIN D) 2000 UNITS tablet Take 2,000 Units by mouth daily       diltiazem (DILTIAZEM CD) 240 MG 24 hr capsule Take 1 capsule (240 mg) by mouth daily 90 capsule 3     Docusate Calcium (STOOL SOFTENER PO) Take 1 tablet by mouth daily as needed        estradiol (ESTRACE VAGINAL) 0.1 MG/GM cream Apply small amount to the vaginal opening and urethra M, W, F q. h.s. 42.5 g 3     fluticasone-vilanterol (BREO ELLIPTA) 200-25 MCG/INH oral inhaler Inhale 1 puff into the lungs daily 1 Inhaler 3     furosemide (LASIX) 20 MG tablet Take 1 tablet (20 mg) by mouth daily 90 tablet 3     ipratropium - albuterol 0.5 mg/2.5 mg/3 mL (DUONEB) 0.5-2.5 (3) MG/3ML neb solution Take 1 vial (3 mLs) by nebulization every 6 hours as needed for shortness of breath / dyspnea or wheezing 360 mL 1     Lactobacillus (PROBIOTIC ACIDOPHILUS) TABS Take 1 tablet by mouth daily       losartan (COZAAR) 50 MG tablet Take 0.5 tablets (25 mg) by mouth daily 45 tablet 3     MELATONIN PO Take 5 mg by mouth nightly as needed        mirtazapine (REMERON) 15 MG tablet Take 1 tablet (15 mg) by mouth At Bedtime 90 tablet 3     Multiple Vitamins-Minerals (OCUVITE PO) Take 1 capsule by mouth daily.       nitroglycerin (NITROSTAT) 0.4 MG SL tablet Place 1 tablet (0.4 mg) under the tongue every 5 minutes as needed 25 tablet 1     polyethylene glycol (MIRALAX/GLYCOLAX) Packet Take 1 packet by mouth daily as needed for constipation       pravastatin (PRAVACHOL) 40 MG tablet Take 1 tablet (40 mg) by mouth daily 90 tablet 3     warfarin (COUMADIN) 2 MG tablet TAKE ONE TABLET BY MOUTH ONCE DAILY OR  AS  DIRECTED  BY  INR  CLINIC 90 tablet 1     ASPIRIN NOT PRESCRIBED (INTENTIONAL) Please choose reason not prescribed, below (Patient not taking: Reported on 12/11/2018) 0 each 0       ALLERGIES     Allergies   Allergen Reactions     Peanuts [Nuts] Shortness Of Breath     Amiodarone      pulm fibrosis       Baclofen      Confusion      Bactrim  [Sulfamethoxazole W-Trimethoprim]      Difficulty swallowing     Doxycycline Other (See Comments)     Multiple complaints; she does not want this again.      Levaquin [Levofloxacin] Other (See Comments)     Multiple complaints; she will not take this again     Linzess [Linaclotide] Diarrhea     Lorazepam        Prolonged drowsiness with ER visit      Liquid Adhesive Itching and Rash     Bleeding when tape removed after pacemaker placement..itched and burned for weeks.       PAST MEDICAL HISTORY:  Past Medical History:   Diagnosis Date     A Fib - chr Coumadin, s/p PPM (H) 5/8/2013     Atrial fibrillation (H)     Sick sinus syndrome, PAT, AF     BCC (basal cell carcinoma of skin)     Face     CAD - 2 stents in [place] in 2000s.  1/5/2016     CHF (congestive heart failure) (H)     mild in past     CHF - Chr Diastolic (H) 11/21/2017     Chronic renal insufficiency      COPD (chronic obstructive pulmonary disease) (H)      COPD (H) 5/13/2013     Coronary artery disease     2-05[place]-CAD-taxus stentx2 2.5-12,2.5-12 in LADp and LADm, 80%nonDomRCA-LcxDom-mild,EF60%     Hyperlipidemia      Hypertension      IBS (irritable bowel syndrome)      Irritable bowel      Mumps      Osteoporosis      Palpitations      Panic attack     questionable diagnosis     Pulmonary fibrosis (H)     do not use amiodarone     Shortness of breath      Sick sinus syndrome - s/p PPM 2003 (H) 12/16/2017     SSS (sick sinus syndrome) (H)     DDD pacer (see surgery history section)     Vertigo        PAST SURGICAL HISTORY:  Past Surgical History:   Procedure Laterality Date     APPENDECTOMY  1956     CARDIAC SURGERY      PPM, 2 STENTS2-05[place]-CAD-taxus stentx2 2.5-12,2.5-12 in LADp and LADm, 80%nonDomRCA-LcxDom-mild,EF60%     CORONARY ANGIOGRAPHY ADULT ORDER  7/1994    mild CAD,Normal LV function, No Mitral regurgitation, Prox LAD 30% stenosis. RCA prox and mid, 20-30%     ESOPHAGOSCOPY, GASTROSCOPY, DUODENOSCOPY (EGD), COMBINED N/A 8/19/2015     Procedure: COMBINED ESOPHAGOSCOPY, GASTROSCOPY, DUODENOSCOPY (EGD), BIOPSY SINGLE OR MULTIPLE;  Surgeon: Genevieve Leon MD;  Location: [facility]     H LEAD REVISION DUAL  2003    Revised in [place]-due to diaphram stim (original pacer placed in [place])     H LEAD REVISION DUAL  2014    Correction of reversal of A and V leads in [place]     HEART CATH, ANGIOPLASTY  2005    LEIGH ANN mid and proximal LAD, ongoing 80% RCA; mild circumflex     HERNIA REPAIR Left     [facility]     IMPLANT PACEMAKER  2003    Placed in [place] for sick sinus syndrome     REPLACE PACEMAKER GENERATOR  2013    Dual-chamber pacemaker by Dr SETH Pierre       FAMILY HISTORY:  Family History   Problem Relation Age of Onset     Heart Disease Father          age 84     Neurologic Disorder Mother         dementia     Gastrointestinal Disease Daughter         liver transplant     Crohn's Disease Daughter        SOCIAL HISTORY:  Social History     Socioeconomic History     Marital status:      Spouse name: None     Number of children: 3     Years of education: None     Highest education level: None   Social Needs     Financial resource strain: None     Food insecurity - worry: None     Food insecurity - inability: None     Transportation needs - medical: None     Transportation needs - non-medical: None   Occupational History     Employer: RETIRED   Tobacco Use     Smoking status: Former Smoker     Types: Cigarettes     Last attempt to quit: 1987     Years since quittin.6     Smokeless tobacco: Never Used   Substance and Sexual Activity     Alcohol use: No     Alcohol/week: 0.0 oz     Drug use: No     Sexual activity: No     Partners: Male   Other Topics Concern     Parent/sibling w/ CABG, MI or angioplasty before 65F 55M? Yes      Service Not Asked     Blood Transfusions Not Asked     Caffeine Concern Yes     Comment: decaf - some 1/2 caff     Occupational Exposure Not Asked     Hobby Hazards Not Asked     Sleep Concern  "Yes     Comment: nocturia every 2 hours     Stress Concern Not Asked     Weight Concern Not Asked     Special Diet No     Back Care Not Asked     Exercise No     Comment: some walking in the house     Bike Helmet Not Asked     Seat Belt Not Asked     Self-Exams Not Asked   Social History Narrative     None       Review of Systems:  Skin:  Negative     Eyes:  Positive for glasses  ENT:  Positive for hearing loss  Respiratory:  Positive for dyspnea on exertion;shortness of breath  Cardiovascular:    palpitations;Positive for;fatigue;dizziness  Gastroenterology: Negative    Genitourinary:       Musculoskeletal:  Positive for arthritis  Neurologic:  Negative    Psychiatric:  Positive for sleep disturbances  Heme/Lymph/Imm:  Negative    Endocrine:  Negative      Physical Exam:  Vitals: /80 (BP Location: Right arm, Patient Position: Sitting, Cuff Size: Adult Regular)   Pulse 64   Ht 1.6 m (5' 3\")   Wt 67.9 kg (149 lb 9.6 oz)   LMP  (LMP Unknown)   Breastfeeding? No   BMI 26.50 kg/m      Constitutional:  cooperative, alert and oriented, well developed, well nourished, in no acute distress frail      Skin:  warm and dry to the touch        Head:  normocephalic        Eyes:  no xanthalasma        ENT:  no pallor or cyanosis        Neck:  carotid pulses are full and equal bilaterally        Chest:  normal breath sounds, clear to auscultation, normal A-P diameter, normal symmetry, normal respiratory excursion, no use of accessory muscles   Lungs sounds are distant in all fields     Cardiac: apical impulse not displaced;normal S1 and S2;no S3 or S4;no murmurs, gallops or rubs detected irregularly irregular rhythm                Abdomen:  abdomen soft, non-tender, BS normoactive, no mass, no HSM, no bruits        Vascular: pulses full and equal                                      Extremities and Back:  no edema   Wearing compression stockings, not removed.    Neurological:  no gross motor deficits          Recent " Lab Results:  LIPID RESULTS:  Lab Results   Component Value Date    CHOL 161 12/11/2017    HDL 85 12/11/2017    LDL 54 12/11/2017    TRIG 109 12/11/2017    CHOLHDLRATIO 2.9 03/15/2015       LIVER ENZYME RESULTS:  Lab Results   Component Value Date    AST 29 04/16/2018    ALT 22 04/16/2018       CBC RESULTS:  Lab Results   Component Value Date    WBC 6.7 04/25/2018    RBC 4.25 04/25/2018    HGB 12.2 04/25/2018    HCT 37.6 04/25/2018    MCV 89 04/25/2018    MCH 28.7 04/25/2018    MCHC 32.4 04/25/2018    RDW 14.5 04/25/2018     04/25/2018       BMP RESULTS:  Lab Results   Component Value Date     12/11/2018    POTASSIUM 4.6 12/11/2018    CHLORIDE 102 12/11/2018    CO2 30 12/11/2018    ANIONGAP 5 12/11/2018    GLC 84 12/11/2018    BUN 33 (H) 12/11/2018    CR 1.63 (H) 12/11/2018    GFRESTIMATED 29 (L) 12/11/2018    GFRESTBLACK 36 (L) 12/11/2018    TRI 9.3 12/11/2018        A1C RESULTS:  Lab Results   Component Value Date    A1C 6.1 (H) 04/30/2014       INR RESULTS:  Lab Results   Component Value Date    INR 3.9 (A) 12/04/2018    INR 2.3 (A) 11/06/2018    INR 1.30 (H) 10/23/2018    INR 3.6 05/25/2018         Bryan Garcia MD, [facility]    CC  No referring provider defined for this encounter.

## 2018-12-11 NOTE — LETTER
"12/11/2018    Arnel Garcia MD, MD  [address] [place] [address]    RE: Leonor Mercado       Dear Colleague,    I had the pleasure of seeing Leonor Mercado in the [facility].    HISTORY:    Leonor Mercado is a very pleasant 89-year-old female with a history of coronary artery disease with stenting in 2004, exact details unknown, as well as a history of sick sinus syndrome with atrial fibrillation and permanent pacemaker.  She is on long-term anticoagulation.  Records document previous PAT and she has a history of her records suggest some history of pulmonary fibrosis.  In addition she has mild renal insufficiency, bilateral lower extremity venous stasis, hyperlipidemia, hypertension, and a history of cigarette use having quit 30 years ago.  She is accompanied by her daughter once again today.    Leonor reports that she continues to do well.  She has been using nebulizers on a regular basis under the care of Dr. Her.  She has had a significant improvement in her dyspnea with this.  She continues to use compression stockings on a daily basis and uses oxygen at night.    Leonor denies any exertional chest, arm, neck, or jaw discomfort.  She does describe some occasional random episodes of pressure involving the left side of her neck and lasting 15-30 minutes at a time.  These generally occur while she is sitting still.  There is no sense of discomfort in her chest with this and there is no associated nausea shortness of breath or diaphoresis.  She last underwent cardiac evaluation summer 2017 with a nuclear stress test showing no inducible ischemia.  She recalls that being a \"horrible\" test.    Laboratory values done today show a sodium of 137, potassium 4.6, BUN 33, creatinine 1.63, estimated GFR 29.  She has had gradual worsening of her renal function throughout the past year.    ASSESSMENT/PLAN:    1.  COPD with severe dyspnea.  Cares per " pulmonary.  2.  Peripheral edema.  Adequately controlled with pression stockings which she uses daily.  There appears to be minimal edema today and the patient is comfortable with this.  3.  Atrial fibrillation.  Chronically anticoagulated and unaware of any sense of palpitations.  4.  HFpEF.  We have tried higher dose diuretics in the past and a cause further renal dysfunction but no improvement in her dyspnea.  I do not think this is a major contributing factor to her sense of dyspnea.  5.  Renal insufficiency.  Her primary care doctor asked her to cut her dose of Lasix in half but she has not done so.  I suggested that she try this since this may be contributing to her rising creatinine.  See no evidence of volume overload on exam today so I do not think decreasing her Lasix will cause any cardiovascular issues.    Thank you for inviting me to participate in your patient's care.  Please do not hesitate to call if I can be of further assistance.    Orders Placed This Encounter   Procedures     Follow-Up with Cardiologist     No orders of the defined types were placed in this encounter.    Medications Discontinued During This Encounter   Medication Reason     azithromycin (ZITHROMAX) 250 MG tablet Therapy completed     predniSONE (DELTASONE) 20 MG tablet Therapy completed       10 year ASCVD risk: The ASCVD Risk score (Lead DC Jr., et al., 2013) failed to calculate for the following reasons:    The 2013 ASCVD risk score is only valid for ages 40 to 79    Encounter Diagnoses   Name Primary?     Cardiac pacemaker in situ Yes     Chronic diastolic congestive heart failure (H)      Chronic diastolic heart failure (H)      Chronic atrial fibrillation (H)        CURRENT MEDICATIONS:  Current Outpatient Medications   Medication Sig Dispense Refill     Acetaminophen (TYLENOL PO) Take 1,000 mg by mouth nightly as needed        albuterol (PROAIR HFA/PROVENTIL HFA/VENTOLIN HFA) 108 (90 Base) MCG/ACT inhaler Inhale 2 puffs  into the lungs every 6 hours as needed for shortness of breath / dyspnea or wheezing 1 Inhaler 1     Ascorbic Acid (VITAMIN C PO) Take 500 mg by mouth daily       calcium carbonate (CALCIUM CARBONATE) 600 MG TABS tablet Take 1 tablet by mouth daily        Cholecalciferol (VITAMIN D) 2000 UNITS tablet Take 2,000 Units by mouth daily       diltiazem (DILTIAZEM CD) 240 MG 24 hr capsule Take 1 capsule (240 mg) by mouth daily 90 capsule 3     Docusate Calcium (STOOL SOFTENER PO) Take 1 tablet by mouth daily as needed        estradiol (ESTRACE VAGINAL) 0.1 MG/GM cream Apply small amount to the vaginal opening and urethra M, W, F q. h.s. 42.5 g 3     fluticasone-vilanterol (BREO ELLIPTA) 200-25 MCG/INH oral inhaler Inhale 1 puff into the lungs daily 1 Inhaler 3     furosemide (LASIX) 20 MG tablet Take 1 tablet (20 mg) by mouth daily 90 tablet 3     ipratropium - albuterol 0.5 mg/2.5 mg/3 mL (DUONEB) 0.5-2.5 (3) MG/3ML neb solution Take 1 vial (3 mLs) by nebulization every 6 hours as needed for shortness of breath / dyspnea or wheezing 360 mL 1     Lactobacillus (PROBIOTIC ACIDOPHILUS) TABS Take 1 tablet by mouth daily       losartan (COZAAR) 50 MG tablet Take 0.5 tablets (25 mg) by mouth daily 45 tablet 3     MELATONIN PO Take 5 mg by mouth nightly as needed        mirtazapine (REMERON) 15 MG tablet Take 1 tablet (15 mg) by mouth At Bedtime 90 tablet 3     Multiple Vitamins-Minerals (OCUVITE PO) Take 1 capsule by mouth daily.       nitroglycerin (NITROSTAT) 0.4 MG SL tablet Place 1 tablet (0.4 mg) under the tongue every 5 minutes as needed 25 tablet 1     polyethylene glycol (MIRALAX/GLYCOLAX) Packet Take 1 packet by mouth daily as needed for constipation       pravastatin (PRAVACHOL) 40 MG tablet Take 1 tablet (40 mg) by mouth daily 90 tablet 3     warfarin (COUMADIN) 2 MG tablet TAKE ONE TABLET BY MOUTH ONCE DAILY OR  AS  DIRECTED  BY  INR  CLINIC 90 tablet 1     ASPIRIN NOT PRESCRIBED (INTENTIONAL) Please choose reason  not prescribed, below (Patient not taking: Reported on 12/11/2018) 0 each 0       ALLERGIES     Allergies   Allergen Reactions     Peanuts [Nuts] Shortness Of Breath     Amiodarone      pulm fibrosis       Baclofen      Confusion      Bactrim [Sulfamethoxazole W-Trimethoprim]      Difficulty swallowing     Doxycycline Other (See Comments)     Multiple complaints; she does not want this again.      Levaquin [Levofloxacin] Other (See Comments)     Multiple complaints; she will not take this again     Linzess [Linaclotide] Diarrhea     Lorazepam        Prolonged drowsiness with ER visit      Liquid Adhesive Itching and Rash     Bleeding when tape removed after pacemaker placement..itched and burned for weeks.       PAST MEDICAL HISTORY:  Past Medical History:   Diagnosis Date     A Fib - chr Coumadin, s/p PPM (H) 5/8/2013     Atrial fibrillation (H)     Sick sinus syndrome, PAT, AF     BCC (basal cell carcinoma of skin)     Face     CAD - 2 stents in [place] in 2000s.  1/5/2016     CHF (congestive heart failure) (H)     mild in past     CHF - Chr Diastolic (H) 11/21/2017     Chronic renal insufficiency      COPD (chronic obstructive pulmonary disease) (H)      COPD (H) 5/13/2013     Coronary artery disease     2-05[place]-CAD-taxus stentx2 2.5-12,2.5-12 in LADp and LADm, 80%nonDomRCA-LcxDom-mild,EF60%     Hyperlipidemia      Hypertension      IBS (irritable bowel syndrome)      Irritable bowel      Mumps      Osteoporosis      Palpitations      Panic attack     questionable diagnosis     Pulmonary fibrosis (H)     do not use amiodarone     Shortness of breath      Sick sinus syndrome - s/p PPM 2003 (H) 12/16/2017     SSS (sick sinus syndrome) (H)     DDD pacer (see surgery history section)     Vertigo        PAST SURGICAL HISTORY:  Past Surgical History:   Procedure Laterality Date     APPENDECTOMY  1956     CARDIAC SURGERY      PPM, 2 STENTS2-05[place]-CAD-taxus stentx2 2.5-12,2.5-12 in LADp and LADm,  80%nonDomRCA-LcxDom-mild,EF60%     CORONARY ANGIOGRAPHY ADULT ORDER  1994    mild CAD,Normal LV function, No Mitral regurgitation, Prox LAD 30% stenosis. RCA prox and mid, 20-30%     ESOPHAGOSCOPY, GASTROSCOPY, DUODENOSCOPY (EGD), COMBINED N/A 2015    Procedure: COMBINED ESOPHAGOSCOPY, GASTROSCOPY, DUODENOSCOPY (EGD), BIOPSY SINGLE OR MULTIPLE;  Surgeon: Genevieve Leon MD;  Location: [facility]     H LEAD REVISION DUAL  2003    Revised in [place]-due to diaphram stim (original pacer placed in [place])     H LEAD REVISION DUAL  2014    Correction of reversal of A and V leads in [place]     HEART CATH, ANGIOPLASTY  2005    LEIGH ANN mid and proximal LAD, ongoing 80% RCA; mild circumflex     HERNIA REPAIR Left     [facility]     IMPLANT PACEMAKER  2003    Placed in [place] for sick sinus syndrome     REPLACE PACEMAKER GENERATOR  2013    Dual-chamber pacemaker by Dr SETH Pierre       FAMILY HISTORY:  Family History   Problem Relation Age of Onset     Heart Disease Father          age 84     Neurologic Disorder Mother         dementia     Gastrointestinal Disease Daughter         liver transplant     Crohn's Disease Daughter        SOCIAL HISTORY:  Social History     Socioeconomic History     Marital status:      Spouse name: None     Number of children: 3     Years of education: None     Highest education level: None   Social Needs     Financial resource strain: None     Food insecurity - worry: None     Food insecurity - inability: None     Transportation needs - medical: None     Transportation needs - non-medical: None   Occupational History     Employer: RETIRED   Tobacco Use     Smoking status: Former Smoker     Types: Cigarettes     Last attempt to quit: 1987     Years since quittin.6     Smokeless tobacco: Never Used   Substance and Sexual Activity     Alcohol use: No     Alcohol/week: 0.0 oz     Drug use: No     Sexual activity: No     Partners: Male   Other Topics Concern      "Parent/sibling w/ CABG, MI or angioplasty before 65F 55M? Yes      Service Not Asked     Blood Transfusions Not Asked     Caffeine Concern Yes     Comment: decaf - some 1/2 caff     Occupational Exposure Not Asked     Hobby Hazards Not Asked     Sleep Concern Yes     Comment: nocturia every 2 hours     Stress Concern Not Asked     Weight Concern Not Asked     Special Diet No     Back Care Not Asked     Exercise No     Comment: some walking in the house     Bike Helmet Not Asked     Seat Belt Not Asked     Self-Exams Not Asked   Social History Narrative     None       Review of Systems:  Skin:  Negative     Eyes:  Positive for glasses  ENT:  Positive for hearing loss  Respiratory:  Positive for dyspnea on exertion;shortness of breath  Cardiovascular:    palpitations;Positive for;fatigue;dizziness  Gastroenterology: Negative    Genitourinary:       Musculoskeletal:  Positive for arthritis  Neurologic:  Negative    Psychiatric:  Positive for sleep disturbances  Heme/Lymph/Imm:  Negative    Endocrine:  Negative      Physical Exam:  Vitals: /80 (BP Location: Right arm, Patient Position: Sitting, Cuff Size: Adult Regular)   Pulse 64   Ht 1.6 m (5' 3\")   Wt 67.9 kg (149 lb 9.6 oz)   LMP  (LMP Unknown)   Breastfeeding? No   BMI 26.50 kg/m       Constitutional:  cooperative, alert and oriented, well developed, well nourished, in no acute distress frail      Skin:  warm and dry to the touch        Head:  normocephalic        Eyes:  no xanthalasma        ENT:  no pallor or cyanosis        Neck:  carotid pulses are full and equal bilaterally        Chest:  normal breath sounds, clear to auscultation, normal A-P diameter, normal symmetry, normal respiratory excursion, no use of accessory muscles   Lungs sounds are distant in all fields     Cardiac: apical impulse not displaced;normal S1 and S2;no S3 or S4;no murmurs, gallops or rubs detected irregularly irregular rhythm                Abdomen:  abdomen soft, " non-tender, BS normoactive, no mass, no HSM, no bruits        Vascular: pulses full and equal                                      Extremities and Back:  no edema   Wearing compression stockings, not removed.    Neurological:  no gross motor deficits          Recent Lab Results:  LIPID RESULTS:  Lab Results   Component Value Date    CHOL 161 12/11/2017    HDL 85 12/11/2017    LDL 54 12/11/2017    TRIG 109 12/11/2017    CHOLHDLRATIO 2.9 03/15/2015       LIVER ENZYME RESULTS:  Lab Results   Component Value Date    AST 29 04/16/2018    ALT 22 04/16/2018       CBC RESULTS:  Lab Results   Component Value Date    WBC 6.7 04/25/2018    RBC 4.25 04/25/2018    HGB 12.2 04/25/2018    HCT 37.6 04/25/2018    MCV 89 04/25/2018    MCH 28.7 04/25/2018    MCHC 32.4 04/25/2018    RDW 14.5 04/25/2018     04/25/2018       BMP RESULTS:  Lab Results   Component Value Date     12/11/2018    POTASSIUM 4.6 12/11/2018    CHLORIDE 102 12/11/2018    CO2 30 12/11/2018    ANIONGAP 5 12/11/2018    GLC 84 12/11/2018    BUN 33 (H) 12/11/2018    CR 1.63 (H) 12/11/2018    GFRESTIMATED 29 (L) 12/11/2018    GFRESTBLACK 36 (L) 12/11/2018    TRI 9.3 12/11/2018        A1C RESULTS:  Lab Results   Component Value Date    A1C 6.1 (H) 04/30/2014       INR RESULTS:  Lab Results   Component Value Date    INR 3.9 (A) 12/04/2018    INR 2.3 (A) 11/06/2018    INR 1.30 (H) 10/23/2018    INR 3.6 05/25/2018             Thank you for allowing me to participate in the care of your patient.    Sincerely,     Bryan Garcia MD     [facility]

## 2018-12-18 ENCOUNTER — TRANSFERRED RECORDS (OUTPATIENT)
Dept: HEALTH INFORMATION MANAGEMENT | Facility: CLINIC | Age: 83
End: 2018-12-18

## 2018-12-18 ENCOUNTER — ANTICOAGULATION THERAPY VISIT (OUTPATIENT)
Dept: NURSING | Facility: CLINIC | Age: 83
End: 2018-12-18
Payer: COMMERCIAL

## 2018-12-18 DIAGNOSIS — Z79.01 LONG TERM CURRENT USE OF ANTICOAGULANT THERAPY: Primary | ICD-10-CM

## 2018-12-18 DIAGNOSIS — I48.20 CHRONIC ATRIAL FIBRILLATION (H): ICD-10-CM

## 2018-12-18 LAB — INR POINT OF CARE: 2.3 (ref 0.86–1.14)

## 2018-12-18 PROCEDURE — 36416 COLLJ CAPILLARY BLOOD SPEC: CPT

## 2018-12-18 PROCEDURE — 99207 ZZC NO CHARGE NURSE ONLY: CPT

## 2018-12-18 PROCEDURE — 85610 PROTHROMBIN TIME: CPT | Mod: QW

## 2018-12-18 NOTE — PROGRESS NOTES
"ANTICOAGULATION FOLLOW-UP CLINIC VISIT    Patient Name:  Leonor Mercado  Date:  2018  Contact Type:  Face to Face  Patient is accompanied in the office by her daughter.    SUBJECTIVE:     Patient Findings     Positives:   No Problem Findings           OBJECTIVE    INR Protime   Date Value Ref Range Status   2018 2.3 (A) 0.86 - 1.14 Final       ASSESSMENT / PLAN  INR assessment THER    Recheck INR In: 4 WEEKS    INR Location Clinic      Anticoagulation Summary  As of 2018    INR goal:   2.0-3.0   TTR:   65.5 % (2.7 y)   INR used for dosin.3 (2018)   Warfarin maintenance plan:   2 mg (2 mg x 1) every day   Full warfarin instructions:   2 mg every day   Weekly warfarin total:   14 mg   No change documented:   Ebony Doshi RN   Plan last modified:   Yoana Waddell RN (12/15/2016)   Next INR check:   1/15/2019   Priority:   INR   Target end date:       Indications    Chronic atrial fibrillation (H) [I48.2]  Long term current use of anticoagulant therapy [Z79.01]             Anticoagulation Episode Summary     INR check location:       Preferred lab:       Send INR reminders to:   [facility]    Comments:   2mg tabs - nancy / 1st name is pronounced \"ondray\" / APPT CARD ONLY / comes on the bus on       Anticoagulation Care Providers     Provider Role Specialty Phone number    Arnel Garcia MD  Internal Medicine [phone]            See the Encounter Report to view Anticoagulation Flowsheet and Dosing Calendar (Go to Encounters tab in chart review, and find the Anticoagulation Therapy Visit)        Ebony Doshi RN                 "

## 2019-01-10 NOTE — PROGRESS NOTES
Discharge Note    Progress reporting period is from initial eval to Jul 24, 2018.     Leonor failed to return for next follow up visit and current status is unknown.  Please see information below for last relevant information on current status.  Patient seen for 3 visits.  SUBJECTIVE  Subjective changes noted by patient:  Pt reports improvements with right shoulder, less pain and increased motion.  .  Current pain level is 2/10.     Previous pain level was   .   Changes in function:  Yes (See Goal flowsheet attached for changes in current functional level)  Adverse reaction to treatment or activity: None    OBJECTIVE  Changes noted in objective findings: AROM right shoulder flex 98, ext 55, abd 105, PROM right shoulder flex 138, abd 150, ER 70, IR 60     ASSESSMENT/PLAN  Diagnosis: right shoulder pain   DIAGP:  The encounter diagnosis was Acute pain of right shoulder.  Updated problem list and treatment plan:   Pain - HEP  Decreased ROM/flexibility - HEP  Decreased strength - HEP  STG/LTGs have been met or progress has been made towards goals:  Yes, please see goal flowsheet for most current information  Assessment of Progress: current status is unknown.    Last current status: Pt is progressing as expected   Self Management Plans:  HEP  I have re-evaluated this patient and find that the nature, scope, duration and intensity of the therapy is appropriate for the medical condition of the patient.  Leonor continues to require the following intervention to meet STG and LTG's:  HEP.    Recommendations:  Discharge with current home program.  Patient to follow up with MD as needed.    Please refer to the daily flowsheet for treatment today, total treatment time and time spent performing 1:1 timed codes.

## 2019-01-15 ENCOUNTER — ANTICOAGULATION THERAPY VISIT (OUTPATIENT)
Dept: NURSING | Facility: CLINIC | Age: 84
End: 2019-01-15
Payer: MEDICARE

## 2019-01-15 DIAGNOSIS — I48.20 CHRONIC ATRIAL FIBRILLATION (H): ICD-10-CM

## 2019-01-15 DIAGNOSIS — Z79.01 LONG TERM CURRENT USE OF ANTICOAGULANT THERAPY: Primary | ICD-10-CM

## 2019-01-15 LAB — INR POINT OF CARE: 2.9 (ref 0.86–1.14)

## 2019-01-15 PROCEDURE — 36416 COLLJ CAPILLARY BLOOD SPEC: CPT

## 2019-01-15 PROCEDURE — 99207 ZZC NO CHARGE NURSE ONLY: CPT

## 2019-01-15 PROCEDURE — 85610 PROTHROMBIN TIME: CPT | Mod: QW

## 2019-01-15 NOTE — PROGRESS NOTES
"ANTICOAGULATION FOLLOW-UP CLINIC VISIT    Patient Name:  Leonor Mercado  Date:  1/15/2019  Contact Type:  Face to Face  Patient is accompanied in the office by her daughter.    SUBJECTIVE:     Patient Findings     Positives:   No Problem Findings           OBJECTIVE    INR Protime   Date Value Ref Range Status   01/15/2019 2.9 (A) 0.86 - 1.14 Final       ASSESSMENT / PLAN  INR assessment THER    Recheck INR In: 4 WEEKS    INR Location Clinic      Anticoagulation Summary  As of 1/15/2019    INR goal:   2.0-3.0   TTR:   66.4 % (2.7 y)   INR used for dosin.9 (1/15/2019)   Warfarin maintenance plan:   2 mg (2 mg x 1) every day   Full warfarin instructions:   2 mg every day   Weekly warfarin total:   14 mg   No change documented:   Ebony Doshi RN   Plan last modified:   Yoana Waddell RN (12/15/2016)   Next INR check:   2019   Priority:   INR   Target end date:       Indications    Chronic atrial fibrillation (H) [I48.2]  Long term current use of anticoagulant therapy [Z79.01]             Anticoagulation Episode Summary     INR check location:       Preferred lab:       Send INR reminders to:   [facility]    Comments:   2mg tabs - nancy / 1st name is pronounced \"ondray\" / APPT CARD ONLY / comes on the bus on       Anticoagulation Care Providers     Provider Role Specialty Phone number    Arnel Garcia MD  Internal Medicine [phone]            See the Encounter Report to view Anticoagulation Flowsheet and Dosing Calendar (Go to Encounters tab in chart review, and find the Anticoagulation Therapy Visit)        Ebony Doshi RN                 "

## 2019-02-05 ENCOUNTER — OFFICE VISIT (OUTPATIENT)
Dept: PEDIATRICS | Facility: CLINIC | Age: 84
End: 2019-02-05
Payer: MEDICARE

## 2019-02-05 VITALS
BODY MASS INDEX: 28.03 KG/M2 | HEIGHT: 63 IN | HEART RATE: 86 BPM | RESPIRATION RATE: 16 BRPM | TEMPERATURE: 97.9 F | WEIGHT: 158.2 LBS | SYSTOLIC BLOOD PRESSURE: 138 MMHG | OXYGEN SATURATION: 97 % | DIASTOLIC BLOOD PRESSURE: 82 MMHG

## 2019-02-05 DIAGNOSIS — I48.20 CHRONIC ATRIAL FIBRILLATION (H): ICD-10-CM

## 2019-02-05 DIAGNOSIS — I50.32 CHRONIC DIASTOLIC CONGESTIVE HEART FAILURE (H): Primary | ICD-10-CM

## 2019-02-05 DIAGNOSIS — N18.30 CKD (CHRONIC KIDNEY DISEASE) STAGE 3, GFR 30-59 ML/MIN (H): ICD-10-CM

## 2019-02-05 DIAGNOSIS — F43.20 ADJUSTMENT DISORDER, UNSPECIFIED TYPE: ICD-10-CM

## 2019-02-05 DIAGNOSIS — H61.22 IMPACTED CERUMEN OF LEFT EAR: ICD-10-CM

## 2019-02-05 PROCEDURE — 99214 OFFICE O/P EST MOD 30 MIN: CPT | Performed by: INTERNAL MEDICINE

## 2019-02-05 RX ORDER — MIRTAZAPINE 15 MG/1
15 TABLET, FILM COATED ORAL AT BEDTIME
Qty: 90 TABLET | Refills: 3 | Status: SHIPPED | OUTPATIENT
Start: 2019-02-05 | End: 2020-04-29

## 2019-02-05 RX ORDER — FUROSEMIDE 20 MG
20 TABLET ORAL DAILY
Qty: 30 TABLET | Refills: 0 | Status: SHIPPED | OUTPATIENT
Start: 2019-02-05 | End: 2019-02-11

## 2019-02-05 RX ORDER — MIRTAZAPINE 15 MG/1
15 TABLET, FILM COATED ORAL AT BEDTIME
Qty: 30 TABLET | Refills: 0 | Status: SHIPPED | OUTPATIENT
Start: 2019-02-05 | End: 2019-02-05

## 2019-02-05 ASSESSMENT — MIFFLIN-ST. JEOR: SCORE: 1096.72

## 2019-02-05 NOTE — PROGRESS NOTES
SUBJECTIVE:   Leonor Mercado is a 89 year old female who presents to clinic today for the following health issues:    Diastolic dysfunction    Symptoms:    Shortness of breath: no hx orthopnea    Lower extremity edema: none    Chest pain: No    Using more pillows than normal: No    Cough at night: No    Weight:    Checking weight daily:   Wt Readings from Last 4 Encounters:   02/05/19 71.8 kg (158 lb 3.2 oz)   12/11/18 67.9 kg (149 lb 9.6 oz)   12/06/18 69.4 kg (153 lb)   11/30/18 66.3 kg (146 lb 3.2 oz)          Medication side effects: none    Depression Followup    Status since last visit: improved on remeron    See PHQ-9 for current symptoms.  Other associated symptoms: None    Complicating factors:   Significant life event:  No   Current substance abuse:  None  Anxiety or Panic symptoms:  No    PHQ 4/3/2018 4/20/2018 11/30/2018   PHQ-9 Total Score 16 8 14   Q9: Suicide Ideation Not at all Not at all Several days   PHQ-9  English  PHQ-9   Any Language  Suicide Assessment Five-step Evaluation and Treatment (SAFE-T)    Chronic Kidney Disease Follow-up  Lab Results   Component Value Date    CR 1.63 12/11/2018    CR 1.52 10/23/2018     Concern - Left Ear Blockage   Onset: Ongoing for years     Description:   Left Ear is plugged.     Intensity: No pain     Progression of Symptoms:  same    Accompanying Signs & Symptoms:  Hearing troubles out of effected ear    Previous history of similar problem:   Yes-attempted to get cerumen out in the last summer but it was unsuccessful.     Precipitating factors:   Worsened by: See Below     Alleviating factors:  Improved by: See Below     Therapies Tried and outcome: Olive oil has been put in her ears.       Patient Active Problem List   Diagnosis     Hyperlipidemia LDL goal <100     Cardiac pacemaker in situ     Advanced directives, counseling/discussion     Senile osteoporosis     Irritable bowel syndrome (IBS)     Anemia     Long term current use of anticoagulant  therapy     Degeneration of lumbar intervertebral disc     CKD (chronic kidney disease) stage 3, GFR 30-59 ml/min (H)     Chronic atrial fibrillation (H)     Chronic obstructive pulmonary disease, unspecified COPD type (H)     Coronary artery disease involving native coronary artery of native heart without angina pectoris     Diastolic dysfunction     Sick sinus syndrome (H)     Adjustment disorder, unspecified type     Past Surgical History:   Procedure Laterality Date     APPENDECTOMY       CARDIAC SURGERY      PPM, 2 STENTS2-05[place]-CAD-taxus stentx2 2.5-12,2.5-12 in LADp and LADm, 80%nonDomRCA-LcxDom-mild,EF60%     CORONARY ANGIOGRAPHY ADULT ORDER  1994    mild CAD,Normal LV function, No Mitral regurgitation, Prox LAD 30% stenosis. RCA prox and mid, 20-30%     ESOPHAGOSCOPY, GASTROSCOPY, DUODENOSCOPY (EGD), COMBINED N/A 2015    Procedure: COMBINED ESOPHAGOSCOPY, GASTROSCOPY, DUODENOSCOPY (EGD), BIOPSY SINGLE OR MULTIPLE;  Surgeon: Genevieve Leon MD;  Location:  GI     H LEAD REVISION DUAL  2003    Revised in [place]-due to diaphram stim (original pacer placed in [place])     H LEAD REVISION DUAL  2014    Correction of reversal of A and V leads in [place]     HEART CATH, ANGIOPLASTY  2005    LEIGH ANN mid and proximal LAD, ongoing 80% RCA; mild circumflex     HERNIA REPAIR Left     [facility]     IMPLANT PACEMAKER  2003    Placed in [place] for sick sinus syndrome     REPLACE PACEMAKER GENERATOR  2013    Dual-chamber pacemaker by Dr SETH Pierre       Social History     Tobacco Use     Smoking status: Former Smoker     Types: Cigarettes     Last attempt to quit: 1987     Years since quittin.7     Smokeless tobacco: Never Used   Substance Use Topics     Alcohol use: No     Alcohol/week: 0.0 oz     Family History   Problem Relation Age of Onset     Heart Disease Father          age 84     Neurologic Disorder Mother         dementia     Gastrointestinal Disease Daughter         liver  transplant     Crohn's Disease Daughter          Current Outpatient Medications   Medication Sig Dispense Refill     Acetaminophen (TYLENOL PO) Take 1,000 mg by mouth nightly as needed        albuterol (PROAIR HFA/PROVENTIL HFA/VENTOLIN HFA) 108 (90 Base) MCG/ACT inhaler Inhale 2 puffs into the lungs every 6 hours as needed for shortness of breath / dyspnea or wheezing 1 Inhaler 1     Ascorbic Acid (VITAMIN C PO) Take 500 mg by mouth daily       ASPIRIN NOT PRESCRIBED (INTENTIONAL) Please choose reason not prescribed, below 0 each 0     calcium carbonate (CALCIUM CARBONATE) 600 MG TABS tablet Take 1 tablet by mouth daily        Cholecalciferol (VITAMIN D) 2000 UNITS tablet Take 2,000 Units by mouth daily       diltiazem (DILTIAZEM CD) 240 MG 24 hr capsule Take 1 capsule (240 mg) by mouth daily 90 capsule 3     Docusate Calcium (STOOL SOFTENER PO) Take 1 tablet by mouth daily as needed        furosemide (LASIX) 20 MG tablet Take 1 tablet (20 mg) by mouth daily 30 tablet 0     ipratropium - albuterol 0.5 mg/2.5 mg/3 mL (DUONEB) 0.5-2.5 (3) MG/3ML neb solution Take 1 vial (3 mLs) by nebulization every 6 hours as needed for shortness of breath / dyspnea or wheezing 360 mL 1     Lactobacillus (PROBIOTIC ACIDOPHILUS) TABS Take 1 tablet by mouth daily       losartan (COZAAR) 50 MG tablet Take 0.5 tablets (25 mg) by mouth daily 45 tablet 3     MELATONIN PO Take 5 mg by mouth nightly as needed        mirtazapine (REMERON) 15 MG tablet Take 1 tablet (15 mg) by mouth At Bedtime 90 tablet 3     Multiple Vitamins-Minerals (OCUVITE PO) Take 1 capsule by mouth daily.       nitroglycerin (NITROSTAT) 0.4 MG SL tablet Place 1 tablet (0.4 mg) under the tongue every 5 minutes as needed 25 tablet 1     polyethylene glycol (MIRALAX/GLYCOLAX) Packet Take 1 packet by mouth daily as needed for constipation       pravastatin (PRAVACHOL) 40 MG tablet Take 1 tablet (40 mg) by mouth daily 90 tablet 3     warfarin (COUMADIN) 2 MG tablet TAKE ONE  "TABLET BY MOUTH ONCE DAILY OR  AS  DIRECTED  BY  INR  CLINIC 90 tablet 1     estradiol (ESTRACE VAGINAL) 0.1 MG/GM cream Apply small amount to the vaginal opening and urethra M, W, F q. h.s. (Patient not taking: Reported on 2/5/2019) 42.5 g 3     fluticasone-vilanterol (BREO ELLIPTA) 200-25 MCG/INH oral inhaler Inhale 1 puff into the lungs daily (Patient not taking: Reported on 2/5/2019) 1 Inhaler 3       ROS: The following systems have been completely reviewed and are negative except as noted in the HPI: CONSTITUTIONAL, HEAD AND NECK, CARDIOVASCULAR, PULMONARY    OBJECTIVE:                                                    /82 (BP Location: Right arm, Patient Position: Chair, Cuff Size: Adult Regular)   Pulse 86   Temp 97.9  F (36.6  C) (Oral)   Resp 16   Ht 1.6 m (5' 3\")   Wt 71.8 kg (158 lb 3.2 oz)   LMP  (LMP Unknown)   SpO2 97%   BMI 28.02 kg/m   Body mass index is 28.02 kg/m .  GENERAL:  alert,  no distress  HENT: Left TM: Dry impacted cerumen partially occluding EAC  NECK: no tenderness, no adenopathy  RESP: lungs clear to auscultation - no rales, no rhonchi, no wheezes  CV: regular rates and rhythm, normal S1 S2  MS: extremities- no edema     ASSESSMENT/PLAN:                                                        ICD-10-CM    1. Chronic diastolic congestive heart failure (H) I50.32 furosemide (LASIX) 20 MG tablet     Stable continue current rx     2. Adjustment disorder, unspecified type F43.20 mirtazapine (REMERON) 15 MG tablet        Adequate control.  Continue current regimen without changes.      3. Chronic atrial fibrillation (H) I48.2 AC-warfarin.  Rate control-diltiazem     4. CKD (chronic kidney disease) stage 3, GFR 30-59 ml/min (H) N18.3 Stable       5. Impacted cerumen of left ear H61.22  MA unable to flush out impacted cerumen today-patient will try OTC Debrox      Arnel Garcia MD  [facility] [place]    "

## 2019-02-11 ENCOUNTER — TELEPHONE (OUTPATIENT)
Dept: PEDIATRICS | Facility: CLINIC | Age: 84
End: 2019-02-11

## 2019-02-11 DIAGNOSIS — I50.32 CHRONIC DIASTOLIC CONGESTIVE HEART FAILURE (H): ICD-10-CM

## 2019-02-11 RX ORDER — FUROSEMIDE 20 MG
10 TABLET ORAL DAILY
Qty: 45 TABLET | Refills: 1 | Status: SHIPPED | OUTPATIENT
Start: 2019-02-11 | End: 2019-04-16

## 2019-02-11 NOTE — TELEPHONE ENCOUNTER
[facility] pharmacy is calling to clarify Lasix dosing.  Order written on 02/05 for Lasix 20 mg-one tablet daily.  Patient informed pharmacy that she takes 1/2 tablet daily.  Please clarify.  Please see lab result for BNP done on 12/11/18    Per Cardiology note of 12/11/18-Renal insufficiency.  Her primary care doctor asked her to cut her dose of Lasix in half but she has not done so.  I suggested that she try this since this may be contributing to her rising creatinine.  See no evidence of volume overload on exam today so I do not think decreasing her Lasix will cause any cardiovascular issues    Call [facility] pharmacy at [phone] with clarification.  MAXIMINO Lopez RN

## 2019-02-18 ENCOUNTER — ANTICOAGULATION THERAPY VISIT (OUTPATIENT)
Dept: NURSING | Facility: CLINIC | Age: 84
End: 2019-02-18
Payer: MEDICARE

## 2019-02-18 DIAGNOSIS — Z79.01 LONG TERM CURRENT USE OF ANTICOAGULANT THERAPY: ICD-10-CM

## 2019-02-18 DIAGNOSIS — I48.20 CHRONIC ATRIAL FIBRILLATION (H): ICD-10-CM

## 2019-02-18 LAB — INR POINT OF CARE: 2 (ref 0.86–1.14)

## 2019-02-18 PROCEDURE — 36416 COLLJ CAPILLARY BLOOD SPEC: CPT

## 2019-02-18 PROCEDURE — 85610 PROTHROMBIN TIME: CPT | Mod: QW

## 2019-02-18 PROCEDURE — 99207 ZZC NO CHARGE NURSE ONLY: CPT

## 2019-02-18 NOTE — PROGRESS NOTES
"ANTICOAGULATION FOLLOW-UP CLINIC VISIT    Patient Name:  Leonor Mercado  Date:  2019  Contact Type:  Face to Face    SUBJECTIVE:     Patient Findings     Positives:   No Problem Findings           OBJECTIVE    INR Protime   Date Value Ref Range Status   2019 2.0 (A) 0.86 - 1.14 Final       ASSESSMENT / PLAN  INR assessment THER    Recheck INR In: 4 WEEKS    INR Location Clinic      Anticoagulation Summary  As of 2019    INR goal:   2.0-3.0   TTR:   67.5 % (2.8 y)   INR used for dosin.0 (2019)   Warfarin maintenance plan:   2 mg (2 mg x 1) every day   Full warfarin instructions:   2 mg every day   Weekly warfarin total:   14 mg   No change documented:   Ethel Kilgore RN   Plan last modified:   Yoana Waddell RN (12/15/2016)   Next INR check:   3/21/2019   Priority:   INR   Target end date:       Indications    Chronic atrial fibrillation (H) [I48.2]  Long term current use of anticoagulant therapy [Z79.01]             Anticoagulation Episode Summary     INR check location:       Preferred lab:       Send INR reminders to:   [facility] [place] [facility]    Comments:   2mg tabs - nancy / 1st name is pronounced \"ondray\" / APPT CARD ONLY / comes on the bus on       Anticoagulation Care Providers     Provider Role Specialty Phone number    Arnel Garcia MD  Internal Medicine [phone]            See the Encounter Report to view Anticoagulation Flowsheet and Dosing Calendar (Go to Encounters tab in chart review, and find the Anticoagulation Therapy Visit)    Patient INR is therapeutic.  Patient will continue to take 14 mg weekly dosing and follow up in 4 weeks or sooner for any problems or concerns.        Ethel Walter RN                 "

## 2019-03-06 ENCOUNTER — ANCILLARY PROCEDURE (OUTPATIENT)
Dept: CARDIOLOGY | Facility: CLINIC | Age: 84
End: 2019-03-06
Attending: INTERNAL MEDICINE
Payer: MEDICARE

## 2019-03-06 DIAGNOSIS — Z95.0 CARDIAC PACEMAKER IN SITU: ICD-10-CM

## 2019-03-06 PROCEDURE — 93280 PM DEVICE PROGR EVAL DUAL: CPT | Performed by: INTERNAL MEDICINE

## 2019-03-07 ENCOUNTER — TELEPHONE (OUTPATIENT)
Dept: CARDIOLOGY | Facility: CLINIC | Age: 84
End: 2019-03-07

## 2019-03-07 NOTE — TELEPHONE ENCOUNTER
Pt left voicemail about device check yesterday. Called her back. She wanted to know what AF was and what days she had it. I explained what AF is, and that she is on warfarin to help prevent strokes. I told her she has been in AF 49% of the time since last year. I told her that these are the dates she went into AF    And that we don't know how long the episodes lasted, just that it was >96 hours.     Pt states understanding.

## 2019-03-13 LAB
MDC_IDC_LEAD_IMPLANT_DT: NORMAL
MDC_IDC_LEAD_IMPLANT_DT: NORMAL
MDC_IDC_LEAD_LOCATION: NORMAL
MDC_IDC_LEAD_LOCATION: NORMAL
MDC_IDC_LEAD_MFG: NORMAL
MDC_IDC_LEAD_MFG: NORMAL
MDC_IDC_LEAD_MODEL: NORMAL
MDC_IDC_LEAD_MODEL: NORMAL
MDC_IDC_LEAD_POLARITY_TYPE: NORMAL
MDC_IDC_LEAD_POLARITY_TYPE: NORMAL
MDC_IDC_LEAD_SERIAL: NORMAL
MDC_IDC_LEAD_SERIAL: NORMAL
MDC_IDC_LEAD_SPECIAL_FUNCTION: NORMAL
MDC_IDC_LEAD_SPECIAL_FUNCTION: NORMAL
MDC_IDC_MSMT_BATTERY_DTM: NORMAL
MDC_IDC_MSMT_BATTERY_IMPEDANCE: 403 OHM
MDC_IDC_MSMT_BATTERY_REMAINING_LONGEVITY: 106 MO
MDC_IDC_MSMT_BATTERY_STATUS: NORMAL
MDC_IDC_MSMT_BATTERY_VOLTAGE: 2.79 V
MDC_IDC_MSMT_LEADCHNL_RA_IMPEDANCE_VALUE: 459 OHM
MDC_IDC_MSMT_LEADCHNL_RA_PACING_THRESHOLD_AMPLITUDE: 0.38 V
MDC_IDC_MSMT_LEADCHNL_RA_PACING_THRESHOLD_PULSEWIDTH: 0.4 MS
MDC_IDC_MSMT_LEADCHNL_RA_SENSING_INTR_AMPL: 0.35 MV
MDC_IDC_MSMT_LEADCHNL_RV_IMPEDANCE_VALUE: 654 OHM
MDC_IDC_MSMT_LEADCHNL_RV_PACING_THRESHOLD_AMPLITUDE: 1 V
MDC_IDC_MSMT_LEADCHNL_RV_PACING_THRESHOLD_PULSEWIDTH: 0.52 MS
MDC_IDC_MSMT_LEADCHNL_RV_SENSING_INTR_AMPL: 8 MV
MDC_IDC_PG_IMPLANT_DTM: NORMAL
MDC_IDC_PG_MFG: NORMAL
MDC_IDC_PG_MODEL: NORMAL
MDC_IDC_PG_SERIAL: NORMAL
MDC_IDC_PG_TYPE: NORMAL
MDC_IDC_SESS_CLINIC_NAME: NORMAL
MDC_IDC_SESS_DTM: NORMAL
MDC_IDC_SESS_TYPE: NORMAL
MDC_IDC_SET_BRADY_AT_MODE_SWITCH_MODE: NORMAL
MDC_IDC_SET_BRADY_AT_MODE_SWITCH_RATE: 140 {BEATS}/MIN
MDC_IDC_SET_BRADY_LOWRATE: 40 {BEATS}/MIN
MDC_IDC_SET_BRADY_MAX_SENSOR_RATE: 120 {BEATS}/MIN
MDC_IDC_SET_BRADY_MAX_TRACKING_RATE: 120 {BEATS}/MIN
MDC_IDC_SET_BRADY_MODE: NORMAL
MDC_IDC_SET_BRADY_PAV_DELAY_LOW: 250 MS
MDC_IDC_SET_BRADY_SAV_DELAY_LOW: 220 MS
MDC_IDC_SET_LEADCHNL_RA_PACING_AMPLITUDE: 1.5 V
MDC_IDC_SET_LEADCHNL_RA_PACING_ANODE_ELECTRODE_1: NORMAL
MDC_IDC_SET_LEADCHNL_RA_PACING_ANODE_LOCATION_1: NORMAL
MDC_IDC_SET_LEADCHNL_RA_PACING_CAPTURE_MODE: NORMAL
MDC_IDC_SET_LEADCHNL_RA_PACING_CATHODE_ELECTRODE_1: NORMAL
MDC_IDC_SET_LEADCHNL_RA_PACING_CATHODE_LOCATION_1: NORMAL
MDC_IDC_SET_LEADCHNL_RA_PACING_POLARITY: NORMAL
MDC_IDC_SET_LEADCHNL_RA_PACING_PULSEWIDTH: 0.4 MS
MDC_IDC_SET_LEADCHNL_RA_SENSING_ANODE_ELECTRODE_1: NORMAL
MDC_IDC_SET_LEADCHNL_RA_SENSING_ANODE_LOCATION_1: NORMAL
MDC_IDC_SET_LEADCHNL_RA_SENSING_CATHODE_ELECTRODE_1: NORMAL
MDC_IDC_SET_LEADCHNL_RA_SENSING_CATHODE_LOCATION_1: NORMAL
MDC_IDC_SET_LEADCHNL_RA_SENSING_POLARITY: NORMAL
MDC_IDC_SET_LEADCHNL_RA_SENSING_SENSITIVITY: 0.18 MV
MDC_IDC_SET_LEADCHNL_RV_PACING_AMPLITUDE: 2 V
MDC_IDC_SET_LEADCHNL_RV_PACING_ANODE_ELECTRODE_1: NORMAL
MDC_IDC_SET_LEADCHNL_RV_PACING_ANODE_LOCATION_1: NORMAL
MDC_IDC_SET_LEADCHNL_RV_PACING_CAPTURE_MODE: NORMAL
MDC_IDC_SET_LEADCHNL_RV_PACING_CATHODE_ELECTRODE_1: NORMAL
MDC_IDC_SET_LEADCHNL_RV_PACING_CATHODE_LOCATION_1: NORMAL
MDC_IDC_SET_LEADCHNL_RV_PACING_POLARITY: NORMAL
MDC_IDC_SET_LEADCHNL_RV_PACING_PULSEWIDTH: 0.52 MS
MDC_IDC_SET_LEADCHNL_RV_SENSING_ANODE_ELECTRODE_1: NORMAL
MDC_IDC_SET_LEADCHNL_RV_SENSING_ANODE_LOCATION_1: NORMAL
MDC_IDC_SET_LEADCHNL_RV_SENSING_CATHODE_ELECTRODE_1: NORMAL
MDC_IDC_SET_LEADCHNL_RV_SENSING_CATHODE_LOCATION_1: NORMAL
MDC_IDC_SET_LEADCHNL_RV_SENSING_POLARITY: NORMAL
MDC_IDC_SET_LEADCHNL_RV_SENSING_SENSITIVITY: 4 MV
MDC_IDC_SET_ZONE_DETECTION_INTERVAL: 333.33 MS
MDC_IDC_SET_ZONE_DETECTION_INTERVAL: 428.57 MS
MDC_IDC_SET_ZONE_TYPE: NORMAL
MDC_IDC_SET_ZONE_TYPE: NORMAL
MDC_IDC_STAT_AT_BURDEN_PERCENT: 49.5 %
MDC_IDC_STAT_AT_DTM_END: NORMAL
MDC_IDC_STAT_AT_DTM_START: NORMAL
MDC_IDC_STAT_AT_MODE_SW_COUNT: 8
MDC_IDC_STAT_BRADY_AP_VP_PERCENT: 2 %
MDC_IDC_STAT_BRADY_AP_VS_PERCENT: 0 %
MDC_IDC_STAT_BRADY_AS_VP_PERCENT: 0 %
MDC_IDC_STAT_BRADY_AS_VS_PERCENT: 98 %
MDC_IDC_STAT_BRADY_DTM_END: NORMAL
MDC_IDC_STAT_BRADY_DTM_START: NORMAL
MDC_IDC_STAT_EPISODE_RECENT_COUNT: 0
MDC_IDC_STAT_EPISODE_RECENT_COUNT: 690
MDC_IDC_STAT_EPISODE_RECENT_COUNT_DTM_END: NORMAL
MDC_IDC_STAT_EPISODE_RECENT_COUNT_DTM_END: NORMAL
MDC_IDC_STAT_EPISODE_RECENT_COUNT_DTM_START: NORMAL
MDC_IDC_STAT_EPISODE_RECENT_COUNT_DTM_START: NORMAL
MDC_IDC_STAT_EPISODE_TYPE: NORMAL
MDC_IDC_STAT_EPISODE_TYPE: NORMAL

## 2019-03-21 ENCOUNTER — ANTICOAGULATION THERAPY VISIT (OUTPATIENT)
Dept: NURSING | Facility: CLINIC | Age: 84
End: 2019-03-21
Payer: MEDICARE

## 2019-03-21 DIAGNOSIS — I48.20 CHRONIC ATRIAL FIBRILLATION (H): ICD-10-CM

## 2019-03-21 DIAGNOSIS — Z79.01 LONG TERM CURRENT USE OF ANTICOAGULANT THERAPY: Primary | ICD-10-CM

## 2019-03-21 LAB — INR POINT OF CARE: 2.4 (ref 0.86–1.14)

## 2019-03-21 PROCEDURE — 85610 PROTHROMBIN TIME: CPT | Mod: QW

## 2019-03-21 PROCEDURE — 36416 COLLJ CAPILLARY BLOOD SPEC: CPT

## 2019-03-21 NOTE — PROGRESS NOTES
"ANTICOAGULATION FOLLOW-UP CLINIC VISIT    Patient Name:  Leonor Mercado  Date:  3/21/2019  Contact Type:  Face to Face  Patient is accompanied in the office by her daughter.    SUBJECTIVE:     Patient Findings     Positives:   Change in medications    Comments:   Per patient:   Started on Spiriva and Breo inhalers.    The patient was assessed for diet,   activity level changes, missed or extra doses,   bruising or bleeding, with no problem findings.             OBJECTIVE    INR Protime   Date Value Ref Range Status   2019 2.4 (A) 0.86 - 1.14 Final       ASSESSMENT / PLAN  INR assessment THER    Recheck INR In: 4 WEEKS    INR Location Clinic      Anticoagulation Summary  As of 3/21/2019    INR goal:   2.0-3.0   TTR:   68.5 % (2.9 y)   INR used for dosin.4 (3/21/2019)   Warfarin maintenance plan:   2 mg (2 mg x 1) every day   Full warfarin instructions:   2 mg every day   Weekly warfarin total:   14 mg   No change documented:   Ebony Doshi RN   Plan last modified:   Yoana Waddell RN (12/15/2016)   Next INR check:   2019   Priority:   INR   Target end date:       Indications    Chronic atrial fibrillation (H) [I48.2]  Long term current use of anticoagulant therapy [Z79.01]             Anticoagulation Episode Summary     INR check location:       Preferred lab:       Send INR reminders to:   [facility]    Comments:   2mg tabs - nancy / 1st name is pronounced \"ondray\" / APPT CARD ONLY / comes on the bus on       Anticoagulation Care Providers     Provider Role Specialty Phone number    Arnel Garcia MD  Internal Medicine [phone]            See the Encounter Report to view Anticoagulation Flowsheet and Dosing Calendar (Go to Encounters tab in chart review, and find the Anticoagulation Therapy Visit)        Ebony Doshi RN                 "

## 2019-03-25 ENCOUNTER — OFFICE VISIT (OUTPATIENT)
Dept: PHARMACY | Facility: CLINIC | Age: 84
End: 2019-03-25

## 2019-03-25 DIAGNOSIS — M81.0 SENILE OSTEOPOROSIS: ICD-10-CM

## 2019-03-25 DIAGNOSIS — G47.00 INSOMNIA, UNSPECIFIED TYPE: ICD-10-CM

## 2019-03-25 DIAGNOSIS — K58.9 IRRITABLE BOWEL SYNDROME, UNSPECIFIED TYPE: ICD-10-CM

## 2019-03-25 DIAGNOSIS — R60.9 EDEMA, UNSPECIFIED TYPE: ICD-10-CM

## 2019-03-25 DIAGNOSIS — F41.9 ANXIETY: ICD-10-CM

## 2019-03-25 DIAGNOSIS — R07.89 OTHER CHEST PAIN: ICD-10-CM

## 2019-03-25 DIAGNOSIS — Z71.85 VACCINE COUNSELING: ICD-10-CM

## 2019-03-25 DIAGNOSIS — I25.10 CORONARY ARTERY DISEASE INVOLVING NATIVE CORONARY ARTERY OF NATIVE HEART WITHOUT ANGINA PECTORIS: ICD-10-CM

## 2019-03-25 DIAGNOSIS — I48.20 CHRONIC ATRIAL FIBRILLATION (H): ICD-10-CM

## 2019-03-25 DIAGNOSIS — I10 ESSENTIAL HYPERTENSION WITH GOAL BLOOD PRESSURE LESS THAN 140/90: Primary | ICD-10-CM

## 2019-03-25 DIAGNOSIS — E78.5 HYPERLIPIDEMIA LDL GOAL <100: ICD-10-CM

## 2019-03-25 DIAGNOSIS — E63.9 NUTRITIONAL DEFICIENCY: ICD-10-CM

## 2019-03-25 DIAGNOSIS — F32.A DEPRESSION, UNSPECIFIED DEPRESSION TYPE: ICD-10-CM

## 2019-03-25 DIAGNOSIS — J44.9 CHRONIC OBSTRUCTIVE PULMONARY DISEASE, UNSPECIFIED COPD TYPE (H): ICD-10-CM

## 2019-03-25 PROCEDURE — 99605 MTMS BY PHARM NP 15 MIN: CPT | Performed by: PHARMACIST

## 2019-03-25 PROCEDURE — 99607 MTMS BY PHARM ADDL 15 MIN: CPT | Performed by: PHARMACIST

## 2019-03-25 RX ORDER — PREDNISONE 20 MG/1
TABLET ORAL
Status: ON HOLD | COMMUNITY
Start: 2018-11-30 | End: 2019-04-06

## 2019-03-25 RX ORDER — AZITHROMYCIN 250 MG/1
TABLET, FILM COATED ORAL
COMMUNITY
Start: 2018-11-30 | End: 2019-03-25

## 2019-03-25 RX ORDER — LOSARTAN POTASSIUM 25 MG/1
25 TABLET ORAL DAILY
Qty: 30 TABLET | Refills: 3 | Status: SHIPPED | OUTPATIENT
Start: 2019-03-25 | End: 2019-03-25

## 2019-03-25 RX ORDER — DOXYCYCLINE HYCLATE 100 MG
100 TABLET ORAL PRN
Status: ON HOLD | COMMUNITY
End: 2019-04-06

## 2019-03-25 RX ORDER — LOSARTAN POTASSIUM 25 MG/1
25 TABLET ORAL DAILY
Qty: 90 TABLET | Refills: 1 | Status: SHIPPED | OUTPATIENT
Start: 2019-03-25 | End: 2019-07-18

## 2019-03-25 NOTE — PROGRESS NOTES
SUBJECTIVE/OBJECTIVE:                Leonor Mercado is a 89 year old female coming in for a follow-up visit for Medication Therapy Management.  She was referred to me from Dr. Darby. She is joined with her daughter today. First of 2019.    Chief Complaint: Follow up from our visit on 8/28/18. Albuterol inhaler is running low. Wondering about the recall letter she received for losartan. Using more albuterol lately.     Allergies/ADRs: Reviewed in Epic - pt denied having throat swelling or anaphylaxis to doxycycline. Pt cannot recall what the intolerance symptoms were of doxycycline.  Tobacco: History of tobacco dependence - quit 1987  Alcohol: 1-3 beverages / week    Medication Adherence:   Pt denies any issues besides social stressors (changing living situation,  passed away).  However, has a good support from daughters.    Hyperlipidemia: Current therapy includes pravastatin 40mg once daily.  Denies any concerns.  Liver Function Studies -   Recent Labs   Lab Test  04/16/18   1153   PROTTOTAL  6.6*   ALBUMIN  3.8   BILITOTAL  1.1   ALKPHOS  44   AST  29   ALT  22     Recent Labs   Lab Test 12/11/17  0959 03/15/15  0745   CHOL 161 153   HDL 85 52   LDL 54 75   TRIG 109 129   CHOLHDLRATIO  --  2.9       Depression/Anxiety/Insomnia:  Current medications include: mirtazapine 15 mg HS and Melatonin 5 mg HS PRN (forgets to take around 7p, feels it is only effective when taken at this time). She continues to endorse sleep has been very poor. Pt is having issues with staying asleep, still having nocturia and not able to fall asleep. Pt doesn't feel her mood has changed, but again daughter strongly believes her mood has improved. Prior she was on both sertraline (constipation, nausea) and paroxetine; she was admitted in April for an intentional overdose after swallowing the bottle of paroxetine because she wanted to sleep.   No concerns for side effects.    PHQ-9 SCORE 11/17/2017 4/3/2018 4/20/2018    Total Score - - -   Total Score 12 16 8        COPD: Current medications: Spiriva Handihaler: 1 puff once daily, Breo once daily in the morning, DuoNeb daily-BID, oxygen at night (unknown Liters) and Albuterol MDI as needed (using more lately, but still only using 1-2/day). Pt is rinsing mouth after Breo. Previously on Trelegy, but due to cost and no difference in efficacy switch back to 2 inhalers. Recently saw pulmonalogist soon - Dr. Her.  Was prescribed doxycycline and prednisone for PRN exacerbations. Pt has not noticed a difference between Spiriva/Breo combo vs Trelegy switch. Reports Trelegy is not affordable (~$600).  Pt reports the following symptoms: SOB with exertion, but improved as winter is almost over  COPD Action Plan on file, but never actually completed in .  PIF completed today: yes: Spiriva - did not reach green zone (too low of PIF); Breo Ellipta - green zone    IBS-constipation: Currently taking Miralax QAM and docusate daily. No concerns at this time. Having regular BMs.    Hypertension/afib/edema/CAD: Current medications include losartan 25 mg daily, furosemide 20 mg daily, warfarin as directed by anticoagulation clinic. Pt is also frustrated that losartan is not filled for the right directions (filled for losartan 50 mg 1 tablet daily). Pt has NTG in her purse - has taken, last filled maybe 3 years ago (NTG in her purse  10/2018). Patient reports no current medication side effects. Only concern is that she received a letter from pharmacy regarding losartan recall. Also having difficulty splitting furosemide tablets (very small).  BP Readings from Last 3 Encounters:   19 128/68   19 138/82   18 118/80     Lab Results   Component Value Date    INR 2.4 2019    INR 2.0 2019       Supplements/Senile osteoporosis: Currently taking Ocuvite daily for eye health, calcium 600 mg + vitamin D 800 units, vitamin D 800u daily, vitamin C daily still. Pt cannot  remember being instructed to stop vitamin C. No side effects noted.   Diet calcium intake: milk with coffee, leafy greens  Previous osteoporosis agents: tried alendronate [id number], discharge due to renal function.    4/5/2013 DXA:  Lumbar spine T-score = -1.6  Left hip = -2.3    Immunizations:  Pt has not received either pneumococcal vaccines or shingrix. Pt has had shingles before and reports it was terrible.     Most Recent Immunizations   Administered Date(s) Administered     Influenza (High Dose) 3 valent vaccine 08/28/2018     Influenza (IIV3) PF 10/01/2014     Pneumococcal 23 valent 10/15/2012     Tdap (Adacel,Boostrix) 10/15/2012       Today's Vitals: /68   Pulse 85   LMP  (LMP Unknown)   SpO2 99%    Weight declined    Last Comprehensive Metabolic Panel:  Sodium   Date Value Ref Range Status   12/11/2018 137 133 - 144 mmol/L Final     Potassium   Date Value Ref Range Status   12/11/2018 4.6 3.4 - 5.3 mmol/L Final     Chloride   Date Value Ref Range Status   12/11/2018 102 94 - 109 mmol/L Final     Carbon Dioxide   Date Value Ref Range Status   12/11/2018 30 20 - 32 mmol/L Final     Anion Gap   Date Value Ref Range Status   12/11/2018 5 3 - 14 mmol/L Final     Glucose   Date Value Ref Range Status   12/11/2018 84 70 - 99 mg/dL Final     Urea Nitrogen   Date Value Ref Range Status   12/11/2018 33 (H) 7 - 30 mg/dL Final     Creatinine   Date Value Ref Range Status   12/11/2018 1.63 (H) 0.52 - 1.04 mg/dL Final     GFR Estimate   Date Value Ref Range Status   12/11/2018 29 (L) >60 mL/min/1.7m2 Final     Comment:     Non  GFR Calc     Calcium   Date Value Ref Range Status   12/11/2018 9.3 8.5 - 10.1 mg/dL Final           ASSESSMENT:              Current medications were reviewed today as discussed above.      Medication Adherence: fair, no issues identified. May consider discussion of risk vs benefit vs quality of life of some therapies in the future if concern for pill burden (does not  seem to be of concern today).     Hyperlipidemia:  Stable.     Depression/Anxiety/Insomnia:  Needs improved. Pt would benefit from working on adherence techniques such as moving melatonin near other pills to help with adherence. Future considerations, if mood is stable may consider decreasing mirtazapine to help with insomnia. However, would need to monitor mood changes closely.      COPD: Needs improvement. Inhaler technique for Spiriva provided. Pt likely not getting full dose with poor inspiration (severe obstruction) of medication prior to educate on inhaler technique. Would benefit from inhaler technique reinforcement. Would also benefit from a COPD action plan today as pt is unsure when to use prednisone and doxycycline. If low PIF continues to be questionable at next visit, need to consider alternate delivery mechanism (Spiriva respimat?)    IBS-constipation: Stable.    HTN/afib/edema/CAD: Needs improvement. Pt educated on NTG expiration and would benefit from a refill. Pharmacy has been filling wrong Rx for patient, pt would benefit from having pharmacy fill accurate losartan dose (confirmed pt is taking correct dose of losartan). Pt would also benefit from filling larger furosemide tablets to help with splitting pills (current tablets are very small and difficult to split for patient). INR at goal 2-3.    Supplements/Senile Osteoporosis: Stable. May consider DXA in future if patient is considering starting therapy. Would recommend against repeat DXA if pt is not interested in starting therapy. No additional benefit of screening DXA. No indication for vitamin C.    Immunizations: Needs improvement. Pt is due for pneumococcal and shingles vaccine. Discussion of benefits of each (low evidence of use of vaccine in 90 year age group of vaccines, research generally grouping participants in 70 or older group). Pt seemed strongly interested in these vaccines and may benefit from prevention of future episodes.       PLAN:                  1. Reminded pt can stop vitamin c (discussed at last visit).    2. Educated on losartan recall. Refill for losartan 25 mg tablets: Take 1 tablet by mouth once daily sent to pharmacy. Confirmed with pharmacy.    3. MTM called pharmacy to make note to fill furosemide tablets that are larger.    4. Please ask the pharmacy about the following vaccines:   Please get the Prevnar 13 and then 1 year later you can get the Pneumovax.   Please get the Shingrix to prevent shingles.     5. MTM will send Dr. Garcia about a refill request for albuterol inhaler and nitroglycerin.    6. Pt educated/trained inspiration technique with Spiriva resistance on In Check DIAL. COPD action completed.     Next visit:  Re-assess PIF with Spiriva to ensure improved in inspiration.    I spent 75 minutes with this patient today. All changes were made via collaborative practice agreement with Dr. Garcia. A copy of the visit note was provided to the patient's primary care provider.     Will follow up in 6 months.    I concur with the note as dictated above.   Shakira Blackman, CelinaD      The patient was given a summary of these recommendations as an after visit summary.      [place] Mistry, PharmD  Pharmaceutical Care Resident  Pager: [phone]

## 2019-03-25 NOTE — LETTER
My COPD Action Plan     Name: Leonor Mercado    YOB: 1926   Date: 3/25/2019    My doctor: Shakira Blackman [facility]   My clinic: [facility]  [address]  [facility] [id number] [phone]  My Controller Medicine: Tiotropium (Spiriva)  Vilanterol/fluticasone (Breo Ellipta)   Dose: Breo Ellipta 1 puff once daily. Spiriva 1 capsule daily.     My Rescue Medicine: Albuterol (Proair/Ventolin/Proventil) inhaler and DuoNeb.   Dose: albuterol 2 puffs every 6 hours as needed. DuoNeb (ipratropium-albuterol) 1 vial every 6 hours as needed     My Flare Up Medicine: Prednisone and Doxycycline (Doryx, Monodox, Vibramycin)   Dose: as directed FEV-1 (no units)   Date Value   05/09/2013 0.86     FEV1/FVC (no units)   Date Value   05/09/2013 0.56      My COPD Severity: Severe = FeV1 < 30%-49%      Use of Oxygen: oxygen overnight as directed.     Make sure you've had your pneumonia   vaccines.          GREEN ZONE       Doing well today      Usual level of activity and exercise    Usual amount of cough and mucus    No shortness of breath    Usual level of health (thinking clearly, sleeping well, feel like eating) Actions:      Take daily medicines    Use oxygen as prescribed    Follow regular exercise and diet plan    Avoid cigarette smoke and other irritants that harm the lungs           YELLOW ZONE          Having a bad day or flare up      Short of breath more than usual    A lot more sputum (mucus) than usual    Sputum looks yellow, green, tan, brown or bloody    More coughing or wheezing    Fever or chills    Less energy; trouble completing activities    Trouble thinking or focusing    Using quick relief inhaler or nebulizer more often    Poor sleep; symptoms wake me up    Do not feel like eating Actions:      Get plenty of rest    Take daily medicines    Use quick relief inhaler every 6 hours    If you use oxygen, call you doctor to see if you should adjust your  oxygen    Do breathing exercises or other things to help you relax    Let a loved one, friend or neighbor know you are feeling worse    Call your care team if you have 2 or more symptoms.  Start taking steroids or antibiotics if directed by your care team           RED ZONE       Need medical care now      Severe shortness of breath (feel you can't breathe)    Fever, chills    Not enough breath to do any activity    Trouble coughing up mucus, walking or talking    Blood in mucus    Frequent coughing   Rescue medicines are not working    Not able to sleep because of breathing    Feel confused or drowsy    Chest pain    Actions:      Call your health care team.  If you cannot reach your care team, call 911 or go to the emergency room.        Annual Reminders:  Meet with Care Team, Flu Shot every Fall  Pharmacy:    [facility] - [place], [place] - [address] [facility] [place], [place] - [address] [facility]

## 2019-03-25 NOTE — PATIENT INSTRUCTIONS
"Recommendations from today's MTM visit:                                                        1. The accuflora is the similar to acidophilus, which is to provide \"good\" bacteria.     2. After you finish the vitamin c, you do not need to restart this.     3. Pharmacy to fill losartan 25 mg tablets: Take 1 tablet by mouth once daily.     4. I will check with pharmacy to see if they can fill a different brand of the furosemide.    5. Please ask the pharmacy about the following vaccines:   Please get the Prevnar 13 and then 1 year later you can get the Pneumovax.   Please get the Shingrix to prevent shingles.     6. I will send Dr. Garcia about a refill request for albuterol inhaler and nitroglycerin.    Next MTM visit: 6 months    To schedule another MTM appointment, please call the clinic directly or you may call the MTM scheduling line at [phone] or toll-free at [phone].     My Clinical Pharmacist's contact information:                                                      It was a pleasure talking with you today!  Please feel free to contact me with any questions or concerns you have.      Albania Mistry, PharmD  Pharmaceutical Care Resident  Pager: [phone]    Celina Carroll  [phone] (phone)  [phone] (pager)  Medication Therapy Management Pharmacist    You may receive a survey about the MTM services you received by email and/or US Mail.  I would appreciate your feedback to help me serve you better in the future. Your comments will be anonymous.    "

## 2019-03-25 NOTE — TELEPHONE ENCOUNTER
Pt request refill for nitroglycerin (current Rx ) and albuterol HFA PRN. Ordered pended.    Thank you,    Albania Mistry, PharmD  Pharmaceutical Care Resident  Pager: [phone]

## 2019-03-26 VITALS — OXYGEN SATURATION: 99 % | HEART RATE: 85 BPM | DIASTOLIC BLOOD PRESSURE: 68 MMHG | SYSTOLIC BLOOD PRESSURE: 128 MMHG

## 2019-03-26 RX ORDER — ALBUTEROL SULFATE 90 UG/1
2 AEROSOL, METERED RESPIRATORY (INHALATION) EVERY 6 HOURS PRN
Qty: 18 G | Refills: 3 | Status: SHIPPED | OUTPATIENT
Start: 2019-03-26 | End: 2024-08-28

## 2019-03-26 RX ORDER — NITROGLYCERIN 0.4 MG/1
0.4 TABLET SUBLINGUAL EVERY 5 MIN PRN
Qty: 25 TABLET | Refills: 1 | Status: SHIPPED | OUTPATIENT
Start: 2019-03-26 | End: 2019-08-16

## 2019-04-06 ENCOUNTER — HOSPITAL ENCOUNTER (OUTPATIENT)
Facility: CLINIC | Age: 84
Setting detail: OBSERVATION
Discharge: HOME | End: 2019-04-06
Attending: EMERGENCY MEDICINE | Admitting: INTERNAL MEDICINE
Payer: MEDICARE

## 2019-04-06 ENCOUNTER — APPOINTMENT (OUTPATIENT)
Dept: GENERAL RADIOLOGY | Facility: CLINIC | Age: 84
End: 2019-04-06
Attending: EMERGENCY MEDICINE
Payer: MEDICARE

## 2019-04-06 ENCOUNTER — APPOINTMENT (OUTPATIENT)
Dept: CARDIOLOGY | Facility: CLINIC | Age: 84
End: 2019-04-06
Attending: INTERNAL MEDICINE
Payer: MEDICARE

## 2019-04-06 VITALS
RESPIRATION RATE: 18 BRPM | SYSTOLIC BLOOD PRESSURE: 140 MMHG | WEIGHT: 160.2 LBS | HEART RATE: 71 BPM | DIASTOLIC BLOOD PRESSURE: 74 MMHG | BODY MASS INDEX: 28.38 KG/M2 | OXYGEN SATURATION: 95 % | TEMPERATURE: 96.9 F

## 2019-04-06 DIAGNOSIS — R07.9 CHEST PAIN, UNSPECIFIED TYPE: ICD-10-CM

## 2019-04-06 DIAGNOSIS — I25.10 CORONARY ARTERY DISEASE INVOLVING NATIVE CORONARY ARTERY OF NATIVE HEART WITHOUT ANGINA PECTORIS: Primary | ICD-10-CM

## 2019-04-06 DIAGNOSIS — Z86.79 HISTORY OF CORONARY ARTERY DISEASE: ICD-10-CM

## 2019-04-06 LAB
ANION GAP SERPL CALCULATED.3IONS-SCNC: 6 MMOL/L (ref 3–14)
BASOPHILS # BLD AUTO: 0 10E9/L (ref 0–0.2)
BASOPHILS NFR BLD AUTO: 0.7 %
BUN SERPL-MCNC: 33 MG/DL (ref 7–30)
CALCIUM SERPL-MCNC: 9.2 MG/DL (ref 8.5–10.1)
CHLORIDE SERPL-SCNC: 104 MMOL/L (ref 94–109)
CO2 SERPL-SCNC: 29 MMOL/L (ref 20–32)
CREAT SERPL-MCNC: 1.45 MG/DL (ref 0.52–1.04)
DIFFERENTIAL METHOD BLD: NORMAL
EOSINOPHIL # BLD AUTO: 0.2 10E9/L (ref 0–0.7)
EOSINOPHIL NFR BLD AUTO: 2.9 %
ERYTHROCYTE [DISTWIDTH] IN BLOOD BY AUTOMATED COUNT: 14.4 % (ref 10–15)
GFR SERPL CREATININE-BSD FRML MDRD: 31 ML/MIN/{1.73_M2}
GLUCOSE SERPL-MCNC: 100 MG/DL (ref 70–99)
HCT VFR BLD AUTO: 41.2 % (ref 35–47)
HGB BLD-MCNC: 13.5 G/DL (ref 11.7–15.7)
IMM GRANULOCYTES # BLD: 0 10E9/L (ref 0–0.4)
IMM GRANULOCYTES NFR BLD: 0.3 %
INR PPP: 3.02 (ref 0.86–1.14)
LYMPHOCYTES # BLD AUTO: 1.8 10E9/L (ref 0.8–5.3)
LYMPHOCYTES NFR BLD AUTO: 28.8 %
MCH RBC QN AUTO: 30.5 PG (ref 26.5–33)
MCHC RBC AUTO-ENTMCNC: 32.8 G/DL (ref 31.5–36.5)
MCV RBC AUTO: 93 FL (ref 78–100)
MONOCYTES # BLD AUTO: 0.6 10E9/L (ref 0–1.3)
MONOCYTES NFR BLD AUTO: 10.1 %
NEUTROPHILS # BLD AUTO: 3.5 10E9/L (ref 1.6–8.3)
NEUTROPHILS NFR BLD AUTO: 57.2 %
NRBC # BLD AUTO: 0 10*3/UL
NRBC BLD AUTO-RTO: 0 /100
PLATELET # BLD AUTO: 185 10E9/L (ref 150–450)
POTASSIUM SERPL-SCNC: 4.8 MMOL/L (ref 3.4–5.3)
RBC # BLD AUTO: 4.43 10E12/L (ref 3.8–5.2)
SODIUM SERPL-SCNC: 139 MMOL/L (ref 133–144)
TROPONIN I SERPL-MCNC: <0.015 UG/L (ref 0–0.04)
TROPONIN I SERPL-MCNC: NORMAL UG/L (ref 0–0.04)
WBC # BLD AUTO: 6.1 10E9/L (ref 4–11)

## 2019-04-06 PROCEDURE — 94640 AIRWAY INHALATION TREATMENT: CPT

## 2019-04-06 PROCEDURE — 84484 ASSAY OF TROPONIN QUANT: CPT | Performed by: INTERNAL MEDICINE

## 2019-04-06 PROCEDURE — 84484 ASSAY OF TROPONIN QUANT: CPT | Performed by: EMERGENCY MEDICINE

## 2019-04-06 PROCEDURE — 71046 X-RAY EXAM CHEST 2 VIEWS: CPT

## 2019-04-06 PROCEDURE — 93005 ELECTROCARDIOGRAM TRACING: CPT

## 2019-04-06 PROCEDURE — G0378 HOSPITAL OBSERVATION PER HR: HCPCS

## 2019-04-06 PROCEDURE — 85610 PROTHROMBIN TIME: CPT | Performed by: EMERGENCY MEDICINE

## 2019-04-06 PROCEDURE — 85025 COMPLETE CBC W/AUTO DIFF WBC: CPT | Performed by: EMERGENCY MEDICINE

## 2019-04-06 PROCEDURE — 40000264 ECHOCARDIOGRAM COMPLETE

## 2019-04-06 PROCEDURE — 80048 BASIC METABOLIC PNL TOTAL CA: CPT | Performed by: EMERGENCY MEDICINE

## 2019-04-06 PROCEDURE — 25500064 ZZH RX 255 OP 636: Performed by: INTERNAL MEDICINE

## 2019-04-06 PROCEDURE — 93306 TTE W/DOPPLER COMPLETE: CPT | Mod: 26 | Performed by: INTERNAL MEDICINE

## 2019-04-06 PROCEDURE — 99285 EMERGENCY DEPT VISIT HI MDM: CPT | Mod: 25

## 2019-04-06 PROCEDURE — 25000125 ZZHC RX 250

## 2019-04-06 PROCEDURE — 25000132 ZZH RX MED GY IP 250 OP 250 PS 637: Mod: GY | Performed by: INTERNAL MEDICINE

## 2019-04-06 PROCEDURE — A9270 NON-COVERED ITEM OR SERVICE: HCPCS | Mod: GY | Performed by: INTERNAL MEDICINE

## 2019-04-06 PROCEDURE — A9270 NON-COVERED ITEM OR SERVICE: HCPCS | Mod: GY | Performed by: EMERGENCY MEDICINE

## 2019-04-06 PROCEDURE — 25000132 ZZH RX MED GY IP 250 OP 250 PS 637: Mod: GY | Performed by: EMERGENCY MEDICINE

## 2019-04-06 PROCEDURE — 36415 COLL VENOUS BLD VENIPUNCTURE: CPT | Performed by: INTERNAL MEDICINE

## 2019-04-06 PROCEDURE — 99236 HOSP IP/OBS SAME DATE HI 85: CPT | Performed by: INTERNAL MEDICINE

## 2019-04-06 RX ORDER — LOSARTAN POTASSIUM 25 MG/1
25 TABLET ORAL DAILY
Status: DISCONTINUED | OUTPATIENT
Start: 2019-04-06 | End: 2019-04-06 | Stop reason: HOSPADM

## 2019-04-06 RX ORDER — WARFARIN SODIUM 1 MG/1
1 TABLET ORAL
Status: DISCONTINUED | OUTPATIENT
Start: 2019-04-06 | End: 2019-04-06 | Stop reason: HOSPADM

## 2019-04-06 RX ORDER — MIRTAZAPINE 15 MG/1
15 TABLET, FILM COATED ORAL AT BEDTIME
Status: DISCONTINUED | OUTPATIENT
Start: 2019-04-06 | End: 2019-04-06 | Stop reason: HOSPADM

## 2019-04-06 RX ORDER — ALBUTEROL SULFATE 90 UG/1
2 AEROSOL, METERED RESPIRATORY (INHALATION) EVERY 6 HOURS PRN
Status: DISCONTINUED | OUTPATIENT
Start: 2019-04-06 | End: 2019-04-06 | Stop reason: HOSPADM

## 2019-04-06 RX ORDER — ISOSORBIDE MONONITRATE 30 MG/1
30 TABLET, EXTENDED RELEASE ORAL DAILY
Qty: 30 TABLET | Refills: 0 | Status: SHIPPED | OUTPATIENT
Start: 2019-04-06 | End: 2019-04-06

## 2019-04-06 RX ORDER — NALOXONE HYDROCHLORIDE 0.4 MG/ML
.1-.4 INJECTION, SOLUTION INTRAMUSCULAR; INTRAVENOUS; SUBCUTANEOUS
Status: DISCONTINUED | OUTPATIENT
Start: 2019-04-06 | End: 2019-04-06 | Stop reason: HOSPADM

## 2019-04-06 RX ORDER — FUROSEMIDE 20 MG/1
10 TABLET ORAL DAILY
Status: DISCONTINUED | OUTPATIENT
Start: 2019-04-06 | End: 2019-04-06 | Stop reason: HOSPADM

## 2019-04-06 RX ORDER — WARFARIN SODIUM 2 MG/1
2 TABLET ORAL EVERY EVENING
COMMUNITY
End: 2019-07-10

## 2019-04-06 RX ORDER — NITROGLYCERIN 0.4 MG/1
0.4 TABLET SUBLINGUAL EVERY 5 MIN PRN
Status: DISCONTINUED | OUTPATIENT
Start: 2019-04-06 | End: 2019-04-06 | Stop reason: HOSPADM

## 2019-04-06 RX ORDER — ISOSORBIDE MONONITRATE 30 MG/1
30 TABLET, EXTENDED RELEASE ORAL DAILY
Qty: 30 TABLET | Refills: 0 | Status: SHIPPED | OUTPATIENT
Start: 2019-04-06 | End: 2019-05-28

## 2019-04-06 RX ORDER — ACETAMINOPHEN 650 MG/1
650 SUPPOSITORY RECTAL EVERY 4 HOURS PRN
Status: DISCONTINUED | OUTPATIENT
Start: 2019-04-06 | End: 2019-04-06 | Stop reason: HOSPADM

## 2019-04-06 RX ORDER — ASPIRIN 81 MG/1
324 TABLET, CHEWABLE ORAL ONCE
Status: COMPLETED | OUTPATIENT
Start: 2019-04-06 | End: 2019-04-06

## 2019-04-06 RX ORDER — IPRATROPIUM BROMIDE AND ALBUTEROL SULFATE 2.5; .5 MG/3ML; MG/3ML
1 SOLUTION RESPIRATORY (INHALATION) EVERY 6 HOURS PRN
Status: DISCONTINUED | OUTPATIENT
Start: 2019-04-06 | End: 2019-04-06 | Stop reason: HOSPADM

## 2019-04-06 RX ORDER — ONDANSETRON 2 MG/ML
4 INJECTION INTRAMUSCULAR; INTRAVENOUS EVERY 6 HOURS PRN
Status: DISCONTINUED | OUTPATIENT
Start: 2019-04-06 | End: 2019-04-06 | Stop reason: HOSPADM

## 2019-04-06 RX ORDER — IPRATROPIUM BROMIDE AND ALBUTEROL SULFATE 2.5; .5 MG/3ML; MG/3ML
3 SOLUTION RESPIRATORY (INHALATION) ONCE
Status: COMPLETED | OUTPATIENT
Start: 2019-04-06 | End: 2019-04-06

## 2019-04-06 RX ORDER — DILTIAZEM HYDROCHLORIDE 240 MG/1
240 CAPSULE, EXTENDED RELEASE ORAL DAILY
Status: DISCONTINUED | OUTPATIENT
Start: 2019-04-06 | End: 2019-04-06 | Stop reason: HOSPADM

## 2019-04-06 RX ORDER — PRAVASTATIN SODIUM 40 MG
40 TABLET ORAL DAILY
Status: DISCONTINUED | OUTPATIENT
Start: 2019-04-06 | End: 2019-04-06 | Stop reason: HOSPADM

## 2019-04-06 RX ORDER — IPRATROPIUM BROMIDE AND ALBUTEROL SULFATE 2.5; .5 MG/3ML; MG/3ML
SOLUTION RESPIRATORY (INHALATION)
Status: COMPLETED
Start: 2019-04-06 | End: 2019-04-06

## 2019-04-06 RX ORDER — ONDANSETRON 4 MG/1
4 TABLET, ORALLY DISINTEGRATING ORAL EVERY 6 HOURS PRN
Status: DISCONTINUED | OUTPATIENT
Start: 2019-04-06 | End: 2019-04-06 | Stop reason: HOSPADM

## 2019-04-06 RX ORDER — LACTOBACILLUS RHAMNOSUS GG 10B CELL
1 CAPSULE ORAL DAILY
Status: DISCONTINUED | OUTPATIENT
Start: 2019-04-06 | End: 2019-04-06 | Stop reason: HOSPADM

## 2019-04-06 RX ORDER — ACETAMINOPHEN 325 MG/1
650 TABLET ORAL EVERY 4 HOURS PRN
Status: DISCONTINUED | OUTPATIENT
Start: 2019-04-06 | End: 2019-04-06 | Stop reason: HOSPADM

## 2019-04-06 RX ADMIN — LOSARTAN POTASSIUM 25 MG: 25 TABLET ORAL at 08:07

## 2019-04-06 RX ADMIN — FLUTICASONE FUROATE AND VILANTEROL TRIFENATATE 1 PUFF: 200; 25 POWDER RESPIRATORY (INHALATION) at 08:10

## 2019-04-06 RX ADMIN — IPRATROPIUM BROMIDE AND ALBUTEROL SULFATE 3 ML: .5; 2.5 SOLUTION RESPIRATORY (INHALATION) at 03:41

## 2019-04-06 RX ADMIN — DILTIAZEM HYDROCHLORIDE 240 MG: 240 CAPSULE, EXTENDED RELEASE ORAL at 08:07

## 2019-04-06 RX ADMIN — IPRATROPIUM BROMIDE AND ALBUTEROL SULFATE 3 ML: 2.5; .5 SOLUTION RESPIRATORY (INHALATION) at 03:41

## 2019-04-06 RX ADMIN — UMECLIDINIUM 1 PUFF: 62.5 AEROSOL, POWDER ORAL at 08:10

## 2019-04-06 RX ADMIN — PRAVASTATIN SODIUM 40 MG: 40 TABLET ORAL at 08:07

## 2019-04-06 RX ADMIN — ASPIRIN 81 MG 324 MG: 81 TABLET ORAL at 03:33

## 2019-04-06 RX ADMIN — FUROSEMIDE 10 MG: 20 TABLET ORAL at 08:07

## 2019-04-06 RX ADMIN — Medication 1 CAPSULE: at 08:07

## 2019-04-06 RX ADMIN — HUMAN ALBUMIN MICROSPHERES AND PERFLUTREN 3 ML: 10; .22 INJECTION, SOLUTION INTRAVENOUS at 08:35

## 2019-04-06 ASSESSMENT — ENCOUNTER SYMPTOMS
DIAPHORESIS: 0
LIGHT-HEADEDNESS: 0
FEVER: 0
PALPITATIONS: 0
VOMITING: 0
SHORTNESS OF BREATH: 0
NAUSEA: 0
ABDOMINAL PAIN: 0

## 2019-04-06 NOTE — PLAN OF CARE
Arrived at 545am. A/O x4. AVSS on 2L of O2 NC. Tele A-fib w/CVR. Denies pain. Troponin neg x2. Up SBA, GB and walker. Regular diet. Echo today. PT consult. Will continue to monitor.

## 2019-04-06 NOTE — ED PROVIDER NOTES
History     Chief Complaint:  Chest Pain    HPI:   The history is provided by the patient.      Leonor Mercado is a 89 year old female with history of COPD on nightly home O2, chronic atrial fibrillation on coumadin s/p pacemaker, CHF, and hyperlipidemia who presents via EMS for evaluation of chest pain. The patient states that she had an episode of sudden onset chest pain around her right breast around 8:30 PM. She states she took a Tylenol and the pain resolved, but about 30 minutes after going to sleep she woke up with slightly more severe chest pain across her entire chest. She took a nitroglycerin around 12:30 am, which improved her pain for about 10 minutes before the chest pain exacerbated again, and she took another nitroglycerin around 12:40 am and contacted EMS. She denies any shortness of breath, nausea, lightheadedness, or sweating with these episodes. Here in the ED, she states she is chest pain free.     Allergies:  Peanuts   Amiodarone   Baclofen   Bactrim   Doxycycline    Levaquin   Linzess  Lorazepam   Liquid adhesive      Medications:    Acetaminophen    Albuterol   Diltiazem   VIBRA tabs  Breo Ellipta inhaler  Lasix  Cozaar  Melatonin   Remeron   Nitroglycerin   Pravastatin   Coumadin     Past Medical History:    Atrial fibrillation  Basal cell carcinoma of skin  CAD  CHF  Chronic renal insufficiency   COPD  Hyperlipidemia   Hypertension  IBS  IBS  Osteoporosis   Panic attack   Pulmonary fibrosis   Sick sinus syndrome   Vertigo      Past Surgical History:    Appendectomy   Cardiac surgery  (2 stents)  Coronary angiography   LEAD revision dual   Hernia repair   Implant pacemaker     Family History:    Heart disease: father  Dementia: mother  Crohn's disease: daughter    Social History:  PCP: Arnel Garcia MD   Marital Status:    Smoking status: former smoker (quit 1987)  Alcohol use: No      Review of Systems   Constitutional: Negative for diaphoresis and fever.   Respiratory: Negative  for shortness of breath.    Cardiovascular: Positive for chest pain. Negative for palpitations and leg swelling.   Gastrointestinal: Negative for abdominal pain, nausea and vomiting.   Neurological: Negative for light-headedness.   All other systems reviewed and are negative.      Physical Exam     Patient Vitals for the past 24 hrs:   BP Temp Temp src Pulse Heart Rate Resp SpO2 Weight   04/06/19 0542 141/77 95.8  F (35.4  C) Oral -- 74 -- 98 % 72.7 kg (160 lb 3.2 oz)   04/06/19 0400 (!) 132/95 -- -- 71 69 27 94 % --   04/06/19 0305 145/82 97.6  F (36.4  C) Oral -- 84 16 96 % 68 kg (150 lb)        Physical Exam    Eye:  Pupils are equal, round, and reactive.  Extraocular movements intact.    ENT:  No rhinorrhea.  Moist mucus membranes.  Normal tongue and tonsil.    Cardiac:  Regular rate and rhythm.  No murmurs, gallops, or rubs.    Pulmonary:  Clear to auscultation bilaterally.  No wheezes, rales, or rhonchi.    Abdomen:  Positive bowel sounds.  Abdomen is soft and non-distended, without focal tenderness.    Musculoskeletal:  Normal movement of all extremities without evidence for deficit. No lower extremity edema noted.    Skin:  Warm and dry without rashes.    Neurologic:  Non-focal exam without asymmetric weakness or numbness.     Psychiatric:  Normal affect with appropriate interaction with examiner.     Emergency Department Course   ECG (03:02:41):  Rate 76 bpm. NE interval *. QRS duration 72. QT/QTc 382/429. P-R-T axes * 24 55.   Atrial fibrillation with occasional ventricular-paced complexes  Nonspecific T wave abnormality  Abnormal ECG  Interpreted at 0306 by Trierweiler, Chad A, MD.     Imaging:  Radiographic findings were communicated with the patient who voiced understanding of the findings.    XR Chest 2 Views  Impression: No convincing evidence of active cardiopulmonary disease.  As read by Radiology.     Laboratory:  CBC: WNL (WBC 6.1, HGB 13.5, )  BMP: glucose 100, BUN 33, creatinine 1.45, GFR  estimate 31, o/w WNL    Troponin I: <0.015  INR: 3.02    Interventions:  0333: aspirin 324 mg, PO  0341: Duoneb, 3 mL, nebulization       Emergency Department Course:  The patient arrived in the emergency department via EMS.   Past medical records, nursing notes, and vitals reviewed.  0312: I performed an exam of the patient and obtained history, as documented above.  IV started and blood drawn for laboratory testing. Results are as above.    The patient was sent for X-ray imaging while in the emergency department, findings above.       0400: I rechecked the patient. Explained findings to the patinet.    Findings and plan explained to the Patient who consents to admission.     Discussed the patient with Dr. Garner, who will admit the patient to a telemetry bed for further monitoring, evaluation, and treatment.       Impression & Plan      Medical Decision Making:  This delightful elderly woman presents to us with complaints of having chest pain tonight.  She had a spell at 830 and then another that woke her from sleep at midnight which required nitroglycerin to make it go away.  She describes chronic shortness of breath, but no other associated symptoms.  Initial EKG is unchanged from prior.  Her initial troponin is normal.  Her chest x-ray is clear.  However, considering her chest pain was fairly short lasting and resolved with nitroglycerin, I do believe that she requires a formal rule out with possible provocative testing.  Last Lexiscan was 2 years ago which was normal.  The patient is comfortable with the plan for admission to observation as his the admitting physician, Dr. Garner.  Aspirin was provided.  Heparin would not be indicated at this time.  Her INR is therapeutic.    Diagnosis:    ICD-10-CM    1. Chest pain, unspecified type R07.9    2. History of coronary artery disease Z86.79        Disposition:  Admitted to hospital, telemetry bed      I, Megan Beh, am serving as a scribe at 3:12 AM on 4/6/2019 to  document services personally performed by Trierweiler, Chad A, MD based on my observations and the provider's statements to me.      Sherine Beh  4/6/2019    EMERGENCY DEPARTMENT       Trierweiler, Chad A, MD  04/06/19 0733

## 2019-04-06 NOTE — PLAN OF CARE
Discharge Planner PT   Patient plan for discharge: Return to prior living situation  Current status: PT orders received, chart reviewed. Pt is 89 year old female adm under observation on 4/6/19 with c/o chest pain. Cardiology work up currently in process. Spoke with RN caring for patient. Pt is up with nursing staff SBA, appears to be at baseline level of function. No PT indicated at this time, orders completed.  Barriers to return to prior living situation: Defer to medical team  Recommendations for discharge: Defer to medical team  Rationale for recommendations: Pt appears to be at baseline level of function, is mobilizing with nursing staff, no PT indicated at this time.       Entered by: Adela Avila 04/06/2019 9:20 AM

## 2019-04-06 NOTE — H&P
Admitted:     04/06/2019      PRIMARY CARE PHYSICIAN:  Arnel Garcia MD      CHIEF COMPLAINT:  Chest pain.      HISTORY OF PRESENT ILLNESS:  Leonor Mercado is a very pleasant 89-year-old  female with COPD who is at nighttime been on oxygen.  Also, history of chronic atrial fibrillation on Coumadin with history of pacemaker, heart failure., hyperlipidemia who presents to [facility] with multiple episodes of chest pain.      The patient normally has chronic shortness of breath due to her COPD.  Around 8:30, the patient had, while she was watching TV, right-sided chest discomfort over the right breast, going up into her right neck.  This lasted for maybe about 5 minutes and went away on its own.  Later on that night the patient was going to sleep when she was awoken with chest pain across the precordium.  The patient ended up taking nitroglycerin, which did improve her pain for about 10 minutes.  The patient started having chest pain again.  She ended up taking another nitroglycerin and calling EMS.      The patient was brought to [facility] for further assessment.  In the Emergency Department, the patient was seen by Dr. Chad Trierweiler.  The patient was afebrile with stable vital signs and oxygen saturations were at baseline.  Blood work revealed normal electrolytes, BUN of 33, creatinine 1.45 with calculated GFR of 31.  Troponin is negative.  CBC is normal.  Glucose 100, INR 1.  A 2-view chest x-ray showed no convincing evidence of any active cardiopulmonary disease.  EKG showed atrial fibrillation with occasional PVCs, nonspecific T-wave abnormalities.  The patient was given aspirin and DuoNeb and admitted under observation status for rule out.      PAST MEDICAL HISTORY:   1.  History of atrial fibrillation status post permanent pacemaker placement.   2.  Basal cell cancer.   3.  ASCVD.   4.  History of congestive heart failure.   5.  Chronic kidney  disease stage 3.   6.  Chronic obstructive pulmonary disease with nocturnal oxygen dependency.   7.  Hyperlipidemia.   8.  Hypertension.   9.  Irritable bowel syndrome.   10. Osteoporosis.   11.  History of panic attacks.   12.  History of sick sinus syndrome, status post pacemaker placement.   13.  Vertigo.      PAST SURGICAL HISTORY:   1.  Appendectomy.   2.  Two coronary stents.   3.  History of pacemaker with lead revision.   4.  Hernia repair.      FAMILY HISTORY:  Father with coronary artery disease, mother with dementia.  Daughter with Crohn's.      SOCIAL HISTORY:  She is .  Former smoker, quit in 1987.  No alcohol.  She is full code.  She lives in a senior building.  She uses a front-wheeled walker.      ALLERGIES:  PEANUTS, AMIODARONE,  BACLOFEN, BACTRIM, DOXYCYCLINE, LEVAQUIN,  LINZESS, LORAZEPAM, LIQUID ADHESIVE.      CURRENT MEDICATIONS:   1.  Tylenol 1000 mg at nighttime.     2.  Albuterol dose inhaler 2 puffs every 6 hours.   3.  Calcium 1 tablet once a day.   4.  Vitamin D 800 units daily.   5.  Diltiazem  mg daily.   6.  Docusate 1 tablet at bedtime.   7.  Doxycycline 100 mg as needed for COPD exacerbation.   8.  Breo Ellipta 1 puff daily.   8.  Lasix 10 mg daily.   9.  Lactobacillus 1 tablet daily.   10.  Cozaar 25 mg daily.   11.  Melatonin 5 mg at bedtime.   12.  Remeron 50 mg at bedtime.   13.  Multivitamin/Ocuvite 1 capsule once a day.   13.  Sublingual nitroglycerin 0.4 mg under the tongue as needed every 5 minutes.   14.  MiraLax 17 grams daily.   15.  Pravachol 40 mg daily.   16.  Prednisone used for chronic obstructive pulmonary disease exacerbation.   17.  Spiriva 1 puff daily.   18.  Warfarin per nomogram.      REVIEW OF SYSTEMS:  A 10-point review of systems were reviewed.  Positive for chest pain, chronic shortness of breath, no orthopnea, PND or leg edema, syncope or presyncope.  No nausea, vomiting, abdominal pain, no diarrhea, no headache.  No cough or sputum production.   Otherwise, 10-point review of systems were reviewed and otherwise unremarkable.      PHYSICAL EXAMINATION:   VITAL SIGNS:  Temperature 95.8, heart rate is 71, respiration 16, blood pressure is 132/95, sats are 94% on room air.   GENERAL:  The patient is a 89-year-old  female.  She is pleasant, cooperative, in no distress.   HEENT:  Pupils equal.  Sclerae are anicteric.  Oropharynx without lesions.  Mucous members are moist.   NECK:  No JVP elevation.  No cervical lymphadenopathy.   PULMONARY:  Lungs are clear to auscultation.   CARDIOVASCULAR:  S1, S2 irregularly irregular.   ABDOMEN:  Soft, normoactive bowel sounds.   MUSCULOSKELETAL:  No edema.   NEUROLOGIC:  She is grossly nonfocal.  Cranial nerves are grossly intact.   SKIN:  Warm, dry, well perfused.   PSYCHIATRIC:  Mood and affect are stable.      LABORATORY AND IMAGING STUDIES:  As dictated in the history of present illness.      ASSESSMENT:  Leonor Mercado is a 89-year-old with known coronary artery disease and COPD off with nocturnal oxygen dependency and other risk factors including hypertension, hyperlipidemia and atrial fibrillation for which she has pacemaker, admitted with chest pain, angina, better with nitroglycerin, being admitted for further evaluation under observation status.      PLAN:   1.  Chest pain rule out.  The patient will undergo serial troponins in the observation unit.  Currently, she is chest pain-free.  We will obtain echocardiogram looking for any significant structural abnormalities or wall motion abnormalities.  The patient is not interested in any sort of invasive testing or stress testing.  The patient will be seen by Cardiology and I imagine the patient will be treated for medical management and likely initiation of long-acting nitrates.   2.  Chronic obstructive pulmonary disease.  The patient is on nocturnal oxygen.  Currently, her exam is stable and at baseline.  She is not in any COPD exacerbation.  No  significant wheezing.  We will continue the patient on her home inhalers including Incruse DuoNebs and Breo Ellipta   3.  History of atrial fibrillation.  The patient's rate is controlled.  We will continue the patient on her diltiazem.   4.  Hyperlipidemia.  We will continue the patient on her Pravachol.   5.  Depression, anxiety.  We will keep the patient on her Remeron.      CODE STATUS:  Full.  Observation status.         DEO CARBAJAL MD             D: 2019   T: 2019   MT: HARSHA      Name:     AILYN MARCUS   MRN:      [id number]        Account:      [id number]   :      1926        Admitted:     2019                   Document: [id number]       cc: Arnel Garcia MD

## 2019-04-06 NOTE — PLAN OF CARE
PRIMARY DIAGNOSIS: CHEST PAIN  OUTPATIENT/OBSERVATION GOALS TO BE MET BEFORE DISCHARGE:  1. Ruled out acute coronary syndrome (negative or stable Troponin):  No  2. Pain Status: Pain free.  3. Appropriate provocative testing performed: No, echo in process  - Stress Test Procedure: n/a   - Interpretation of cardiac rhythm per telemetry tech: A-fib CVR    4. Cleared by Consultants (if applicable):N/A  5. Return to near baseline physical activity: Yes  Discharge Planner Nurse   Safe discharge environment identified: Yes  Barriers to discharge: Yes       Entered by: Kong Gregorio 04/06/2019 11:14 AM     Please review provider order for any additional goals.   Nurse to notify provider when observation goals have been met and patient is ready for discharge.

## 2019-04-06 NOTE — PHARMACY-ADMISSION MEDICATION HISTORY
Admission medication history interview status for the 4/6/2019  admission is complete. See EPIC admission navigator for prior to admission medications     Medication history source reliability:Good    Actions taken by pharmacist (provider contacted, etc): spoke with patient who knew her meds     Additional medication history information not noted on PTA med list :None    Medication reconciliation/reorder completed by provider prior to medication history? Yes    Time spent in this activity: 20 min    Prior to Admission medications    Medication Sig Last Dose Taking? Auth Provider   Acetaminophen (TYLENOL PO) Take 1,000 mg by mouth nightly as needed  prn Yes Unknown, Entered By History   albuterol (PROAIR HFA/PROVENTIL HFA/VENTOLIN HFA) 108 (90 Base) MCG/ACT inhaler Inhale 2 puffs into the lungs every 6 hours as needed for shortness of breath / dyspnea or wheezing prn Yes Maria Isabel Rosario APRN CNP   calcium carbonate (CALCIUM CARBONATE) 600 MG TABS tablet Take 1 tablet by mouth daily  4/5/2019 at Unknown time Yes Unknown, Entered By History   Cholecalciferol (VITAMIN D PO) Take 800 Units by mouth daily  4/5/2019 at Unknown time Yes Unknown, Entered By History   diltiazem (DILTIAZEM CD) 240 MG 24 hr capsule Take 1 capsule (240 mg) by mouth daily 4/5/2019 at Unknown time Yes Arnel Garcia MD   Docusate Calcium (STOOL SOFTENER PO) Take 1 tablet by mouth At Bedtime  4/5/2019 at Unknown time Yes Reported, Patient   fluticasone-vilanterol (BREO ELLIPTA) 200-25 MCG/INH oral inhaler Inhale 1 puff into the lungs daily 4/5/2019 at Unknown time Yes Dannielle Darby MD   furosemide (LASIX) 20 MG tablet Take 0.5 tablets (10 mg) by mouth daily 4/5/2019 at Unknown time Yes Arnel Garcia MD   ipratropium - albuterol 0.5 mg/2.5 mg/3 mL (DUONEB) 0.5-2.5 (3) MG/3ML neb solution Take 1 vial (3 mLs) by nebulization every 6 hours as needed for shortness of breath / dyspnea or wheezing prn Yes Marlene  BAY Marcelo CNP   Lactobacillus (PROBIOTIC ACIDOPHILUS) TABS Take 1 tablet by mouth daily  4/5/2019 at Unknown time Yes Reported, Patient   losartan (COZAAR) 25 MG tablet Take 1 tablet (25 mg) by mouth daily 4/5/2019 at Unknown time Yes Arnel Garcia MD   MELATONIN PO Take 5 mg by mouth nightly as needed  prn Yes Reported, Patient   mirtazapine (REMERON) 15 MG tablet Take 1 tablet (15 mg) by mouth At Bedtime 4/5/2019 at Unknown time Yes Arnel Garcia MD   Multiple Vitamins-Minerals (OCUVITE PO) Take 1 capsule by mouth daily. 4/5/2019 at Unknown time Yes Reported, Patient   nitroGLYcerin (NITROSTAT) 0.4 MG sublingual tablet Place 1 tablet (0.4 mg) under the tongue every 5 minutes as needed for chest pain 4/5/2019 at Unknown time Yes Maria Isabel Rosario APRN CNP   polyethylene glycol (MIRALAX/GLYCOLAX) Packet Take 1 packet by mouth daily  4/5/2019 at AM Yes Reported, Patient   pravastatin (PRAVACHOL) 40 MG tablet Take 1 tablet (40 mg) by mouth daily 4/5/2019 at Unknown time Yes Arnel Garcia MD   Tiotropium Bromide Monohydrate (SPIRIVA HANDIHALER IN) Inhale 1 puff into the lungs daily  4/5/2019 at Unknown time Yes Reported, Patient   UNABLE TO FIND Apply 1 drop to eye daily as needed MEDICATION NAME:  Eye drop samples from MD, uses PRN in left eye.  Unsure what it's for. prn Yes Unknown, Entered By History   warfarin (COUMADIN) 2 MG tablet Take 2 mg by mouth every evening 4/5/2019 at Unknown time Yes Unknown, Entered By History   ASPIRIN NOT PRESCRIBED (INTENTIONAL) Please choose reason not prescribed, below   Arnel Garcia MD Tiffany M. Reinitz, PharmD

## 2019-04-06 NOTE — PLAN OF CARE
PRIMARY DIAGNOSIS: CHEST PAIN  OUTPATIENT/OBSERVATION GOALS TO BE MET BEFORE DISCHARGE:  1. Ruled out acute coronary syndrome (negative or stable Troponin):  No  2. Pain Status: Pain free.  3. Appropriate provocative testing performed: Yes, echo completed, results pending  - Stress Test Procedure: n/a   - Interpretation of cardiac rhythm per telemetry tech: A-fib CVR    4. Cleared by Consultants (if applicable):N/A  5. Return to near baseline physical activity: Yes  Discharge Planner Nurse   Safe discharge environment identified: Yes  Barriers to discharge: Yes       Entered by: Kong Gregorio 04/06/2019 11:13 AM     Please review provider order for any additional goals.   Nurse to notify provider when observation goals have been met and patient is ready for discharge.

## 2019-04-06 NOTE — PLAN OF CARE
Alert and oriented x 4 , up with SBA. Denies CP, SOB, dizziness. Trops negative X 3. A-fib with CVR on tele. Echo completed, results grossly unremarkable. Tolerating regular diet. Denies current suicidal thoughts/depression.  Discharge pending clearance from hospitalist.

## 2019-04-06 NOTE — ED NOTES
Bed: ED19  Expected date: 4/6/19  Expected time: 2:55 AM  Means of arrival: Ambulance  Comments:  MARK 89F CP

## 2019-04-06 NOTE — PLAN OF CARE
RN:  Patient discharged to home after meeting all observation goals.  VSS on RA.  No further episodes of chest pain since arrival to the unit.  Tolerating diet.  Up at baseline with SBA.  Reviewed all discharge medications and instructions with the patient prior to the discharge to home.  Questions and concerns addressed.  Patient A/O x4 and very on top of her self care needs and medications.  Daughter of the patient set to transport the patient home.

## 2019-04-06 NOTE — PROGRESS NOTES
RECEIVING UNIT ED HANDOFF REVIEW    ED Nurse Handoff Report was reviewed by: Zita Dykes on April 6, 2019 at 5:19 AM

## 2019-04-06 NOTE — ED NOTES
"[facility]  ED Nurse Handoff Report    ED Chief complaint: Chest Pain (Chest pain at home.  8:30pm Right breast and right arm pain.  Went to bed and woke up at midnight with chest pain.  Took 2 Nitro and pain subsided.  Denies CP now.  On Coumadin for afib.  EMS states she mentioned a suicide attempt last year and spent a week in ICU and mentioned current depressed feelings)      ED Diagnosis:   Final diagnoses:   Chest pain, unspecified type   History of coronary artery disease       Code Status: Full Code    Allergies:   Allergies   Allergen Reactions     Peanuts [Nuts] Shortness Of Breath     Amiodarone      pulm fibrosis       Baclofen      Confusion      Bactrim [Sulfamethoxazole W-Trimethoprim]      Difficulty swallowing     Doxycycline Other (See Comments)     Multiple complaints; she does not want this again.      Levaquin [Levofloxacin] Other (See Comments)     Multiple complaints; she will not take this again     Linzess [Linaclotide] Diarrhea     Lorazepam        Prolonged drowsiness with ER visit      Liquid Adhesive Itching and Rash     Bleeding when tape removed after pacemaker placement..itched and burned for weeks.       Activity level - Baseline/Home:  Independent    Activity Level - Current:   Stand with Assist     Needed?: No    Isolation: No  Infection: Not Applicable  Bariatric?: No    Vital Signs:   Vitals:    04/06/19 0305 04/06/19 0400   BP: 145/82 (!) 132/95   Pulse:  71   Resp: 16 27   Temp: 97.6  F (36.4  C)    TempSrc: Oral    SpO2: 96% 94%   Weight: 68 kg (150 lb)        Cardiac Rhythm: ,   Cardiac  Cardiac Rhythm: Atrial fibrillation    Pain level: 0-10 Pain Scale: 0    Is this patient confused?: No   Does this patient have a guardian?  No         If yes, is there guardianship documents in the Epic \"Code/ACP\" activity?  N/A         Guardian Notified?  N/A  [place] - Suicide Severity Rating Scale Completed?  Yes  If yes, what color did the patient score?  " White    Patient Report: Initial Complaint: Pt is a 89 yo female who had sharp CP this evening for a few minutes that initially resolved on it's own and then came back approx 30 min later. Pt took 2 nitroglycerin at home with relief of pain but when it returned she called EMS. Pain had resolved by the time EMS arrived, and pt has not had pain in the ED. Pt did at one point c/o SOB but states it was how she normally feels and it resolved with a breathing treatment.   Focused Assessment: Pt is alert and oriented, c/o SOB at her baseline, wears O2 at home at night. Denies current CP, states it was earlier in her chest and into her right arm, and that it resolved with 2 nitroglycerin.   Tests Performed:   Results for orders placed or performed during the hospital encounter of 04/06/19   XR Chest 2 Views    Narrative    CHEST 2 VIEWS  4/6/2019 3:30 AM     HISTORY: Chest pain.    COMPARISON: 11/30/2018 and 11/11/2016.    FINDINGS: A 5.5 x 3.1 cm hazy ovoid opacity in the left parahilar  region. In retrospect, this has been present on prior studies dating  back to 11/11/2016. It is nonspecific, but likely of benign etiology  given the stability. The lungs are otherwise clear. Normal-sized  cardiac silhouette. Atherosclerotic calcification in the thoracic  aorta. Left anterior chest wall cardiac device with lead tips in the  right atrium and ventricle.      Impression    IMPRESSION: No convincing evidence of active cardiopulmonary disease.    DANIKA GRAVES MD   CBC with platelets differential   Result Value Ref Range    WBC 6.1 4.0 - 11.0 10e9/L    RBC Count 4.43 3.8 - 5.2 10e12/L    Hemoglobin 13.5 11.7 - 15.7 g/dL    Hematocrit 41.2 35.0 - 47.0 %    MCV 93 78 - 100 fl    MCH 30.5 26.5 - 33.0 pg    MCHC 32.8 31.5 - 36.5 g/dL    RDW 14.4 10.0 - 15.0 %    Platelet Count 185 150 - 450 10e9/L    Diff Method Automated Method     % Neutrophils 57.2 %    % Lymphocytes 28.8 %    % Monocytes 10.1 %    % Eosinophils 2.9 %    %  Basophils 0.7 %    % Immature Granulocytes 0.3 %    Nucleated RBCs 0 0 /100    Absolute Neutrophil 3.5 1.6 - 8.3 10e9/L    Absolute Lymphocytes 1.8 0.8 - 5.3 10e9/L    Absolute Monocytes 0.6 0.0 - 1.3 10e9/L    Absolute Eosinophils 0.2 0.0 - 0.7 10e9/L    Absolute Basophils 0.0 0.0 - 0.2 10e9/L    Abs Immature Granulocytes 0.0 0 - 0.4 10e9/L    Absolute Nucleated RBC 0.0    Basic metabolic panel   Result Value Ref Range    Sodium 139 133 - 144 mmol/L    Potassium 4.8 3.4 - 5.3 mmol/L    Chloride 104 94 - 109 mmol/L    Carbon Dioxide 29 20 - 32 mmol/L    Anion Gap 6 3 - 14 mmol/L    Glucose 100 (H) 70 - 99 mg/dL    Urea Nitrogen 33 (H) 7 - 30 mg/dL    Creatinine 1.45 (H) 0.52 - 1.04 mg/dL    GFR Estimate 31 (L) >60 mL/min/[1.73_m2]    GFR Estimate If Black 36 (L) >60 mL/min/[1.73_m2]    Calcium 9.2 8.5 - 10.1 mg/dL   Troponin I   Result Value Ref Range    Troponin I ES <0.015 0.000 - 0.045 ug/L   INR   Result Value Ref Range    INR 3.02 (H) 0.86 - 1.14       Abnormal Results: INR 3.02  Treatments provided: ASA, duoneb, cardiac monitoring    Family Comments: No family present     OBS brochure/video discussed/provided to patient/family: N/A              Name of person given brochure if not patient:               Relationship to patient:     ED Medications:   Medications   aspirin (ASA) chewable tablet 324 mg (324 mg Oral Given 4/6/19 0623)   ipratropium - albuterol 0.5 mg/2.5 mg/3 mL (DUONEB) neb solution 3 mL (3 mLs Nebulization Given 4/6/19 2541)       Drips infusing?:  No    For the majority of the shift this patient was Green.   Interventions performed were .    Severe Sepsis OR Septic Shock Diagnosis Present: No    To be done/followed up on inpatient unit:  Continue to monitor     ED NURSE PHONE NUMBER: *[phone]

## 2019-04-06 NOTE — DISCHARGE SUMMARY
[facility]  Hospitalist Discharge Summary       Date of Admission:  4/6/2019  Date of Discharge:  4/6/2019  Discharging Provider: Marv Hernandez MD      Discharge Diagnoses   Coronary artery disease with chest pain  Chronic obstructive pulmonary disease  Atrial fibrillation  Hyperlipidemia  Depression  Anxiety    Follow-ups Needed After Discharge   Follow-up Appointments     Follow-up and recommended labs and tests       Follow up with primary care provider, Arnel Garcia MD, within 7 days for   hospital follow- up.  No follow up labs or test are needed. Follow up with   cardiology if recurrent chest pain symptoms.               Hospital Course   Leonor Mercado is a 89 year-old with known coronary artery disease, COPD on nocturnal oxygen, hypertension, hyperlipidemia and atrial fibrillation, s/p PPM admitted to observation 4/6/2019 with chest pain.      Coronary artery disease with chest pain  Admitted for chest pain rule out after presenting with pain across her chest awakening her from sleep, improved with nitroglycerin, though would recur and after third nitroglycerin she sought care as she had been directed. Chest pain free by time of arrival. Serial troponins undetectable. Echocardiogram with no new wall motion abnormalities, EF 55-60%. Discussed stress testing for risk stratification, as well as possible angiogram. She was not interested in pursuing either, unless absolutely necessary. She was started on Imdur on discharge and instructed to follow-up with cardiology if she has recurrent symptoms.     Chronic obstructive pulmonary disease  No signs of exacerbation. Continue prior to admission inhaler regimen.     Atrial fibrillation  Continue prior to admission diltiazem, warfarin.     Hyperlipidemia  Continue prior to admission statin.     Depression  Anxiety  Continue prior to admission mirtazapine.    Consultations This Hospital Stay   PHYSICAL THERAPY ADULT IP CONSULT    Code Status    Full Code    Time Spent on this Encounter   I, Marv Hernandez, personally saw the patient today and spent greater than 30 minutes discharging this patient.       Marv Hernandez MD  [facility]  ______________________________________________________________________    Physical Exam   Vital Signs: Temp: 96.9  F (36.1  C) Temp src: Oral BP: 140/74 Pulse: 71 Heart Rate: 70 Resp: 18 SpO2: 95 % O2 Device: None (Room air) Oxygen Delivery: 2 LPM  Weight: 160 lbs 3.2 oz    Constitutional: Elderly female, NAD  Respiratory: Clear to auscultation bilaterally, good air movement bilaterally  Cardiovascular: RRR, no m/r/g.   GI: Soft, non-tender, non-distended.    Skin/Integumen: Warm, dry  Other:         Primary Care Physician   Arnel Garcia MD    Discharge Disposition   Discharged to home  Condition at discharge: Stable      Discharge Orders      Reason for your hospital stay    You were hospitalized for episode of chest pain.     Follow-up and recommended labs and tests     Follow up with primary care provider, Arnel Garcia MD, within 7 days for hospital follow- up.  No follow up labs or test are needed. Follow up with cardiology if recurrent chest pain symptoms.     Activity    Your activity upon discharge: activity as tolerated     Full Code     Diet    Follow this diet upon discharge: Regular diet     Discharge Medications   Discharge Medication List as of 4/6/2019  4:15 PM      CONTINUE these medications which have CHANGED    Details   isosorbide mononitrate (IMDUR) 30 MG 24 hr tablet Take 1 tablet (30 mg) by mouth daily, Disp-30 tablet, R-0, E-Prescribe         CONTINUE these medications which have NOT CHANGED    Details   Acetaminophen (TYLENOL PO) Take 1,000 mg by mouth nightly as needed , Historical      albuterol (PROAIR HFA/PROVENTIL HFA/VENTOLIN HFA) 108 (90 Base) MCG/ACT inhaler Inhale 2 puffs into the lungs every 6 hours as needed for shortness of breath / dyspnea or wheezing, Disp-18 g, R-3,  E-Prescribe      calcium carbonate (CALCIUM CARBONATE) 600 MG TABS tablet Take 1 tablet by mouth daily , Historical      Cholecalciferol (VITAMIN D PO) Take 800 Units by mouth daily , Historical      diltiazem (DILTIAZEM CD) 240 MG 24 hr capsule Take 1 capsule (240 mg) by mouth daily, Disp-90 capsule, R-3, E-Prescribe      Docusate Calcium (STOOL SOFTENER PO) Take 1 tablet by mouth At Bedtime , Historical      fluticasone-vilanterol (BREO ELLIPTA) 200-25 MCG/INH oral inhaler Inhale 1 puff into the lungs daily, Disp-1 Inhaler, R-3, E-Prescribe      furosemide (LASIX) 20 MG tablet Take 0.5 tablets (10 mg) by mouth daily, Disp-45 tablet, R-1, E-Prescribe      ipratropium - albuterol 0.5 mg/2.5 mg/3 mL (DUONEB) 0.5-2.5 (3) MG/3ML neb solution Take 1 vial (3 mLs) by nebulization every 6 hours as needed for shortness of breath / dyspnea or wheezing, Disp-360 mL, R-1, E-Prescribe      Lactobacillus (PROBIOTIC ACIDOPHILUS) TABS Take 1 tablet by mouth daily , Historical      losartan (COZAAR) 25 MG tablet Take 1 tablet (25 mg) by mouth daily, Disp-90 tablet, R-1, E-Prescribe      MELATONIN PO Take 5 mg by mouth nightly as needed , Historical      mirtazapine (REMERON) 15 MG tablet Take 1 tablet (15 mg) by mouth At Bedtime, Disp-90 tablet, R-3, E-Prescribe      Multiple Vitamins-Minerals (OCUVITE PO) Take 1 capsule by mouth daily., Historical      nitroGLYcerin (NITROSTAT) 0.4 MG sublingual tablet Place 1 tablet (0.4 mg) under the tongue every 5 minutes as needed for chest pain, Disp-25 tablet, R-1, E-Prescribe      polyethylene glycol (MIRALAX/GLYCOLAX) Packet Take 1 packet by mouth daily , Historical      pravastatin (PRAVACHOL) 40 MG tablet Take 1 tablet (40 mg) by mouth daily, Disp-90 tablet, R-3, E-Prescribe      Tiotropium Bromide Monohydrate (SPIRIVA HANDIHALER IN) Inhale 1 puff into the lungs daily , Historical      !! UNABLE TO FIND Apply 1 drop to eye daily as needed MEDICATION NAME:  Eye drop samples from MD, uses  PRN in left eye.  Unsure what it's for., Historical      warfarin (COUMADIN) 2 MG tablet Take 2 mg by mouth every evening, Historical      !! ASPIRIN NOT PRESCRIBED (INTENTIONAL) Reason ASA was Not Prescribed: Current anticoagulant therapy (warfarin/enoxaparin)       !! - Potential duplicate medications found. Please discuss with provider.          Significant Results and Procedures   Most Recent 3 CBC's:  Recent Labs   Lab Test 04/06/19  0312 04/25/18  0750 04/24/18  0625   WBC 6.1 6.7 5.9   HGB 13.5 12.2 11.9   MCV 93 89 88    220 196     Most Recent 3 BMP's:  Recent Labs   Lab Test 04/06/19  0312 12/11/18  1110 10/23/18  1456    137 139   POTASSIUM 4.8 4.6 5.1   CHLORIDE 104 102 103   CO2 29 30 29   BUN 33* 33* 32*   CR 1.45* 1.63* 1.52*   ANIONGAP 6 5 7   TRI 9.2 9.3 9.7   * 84 93     Most Recent 2 LFT's:  Recent Labs   Lab Test 04/16/18  1153 11/15/16  0050   AST 29 18   ALT 22 18   ALKPHOS 44 55   BILITOTAL 1.1 0.6     Most Recent 3 INR's:  Recent Labs   Lab Test 04/06/19  0312 03/21/19  1105 02/18/19   INR 3.02* 2.4* 2.0*     Most Recent 3 Troponin's:  Recent Labs   Lab Test 04/06/19  1144 04/06/19  1033 04/06/19  0621  03/13/15  2234   TROPI <0.015 Canceled, Test credited <0.015   < >  --    TROPONIN  --   --   --   --  0.01    < > = values in this interval not displayed.     Most Recent 3 BNP's:  Recent Labs   Lab Test 12/11/18  1110 04/17/18  1813 01/06/18  1502 02/17/15  1636   NTBNPI  --  3,247* 5,872*  --    NTBNP 2,538*  --   --  1,762*     Most Recent Cholesterol Panel:  Recent Labs   Lab Test 12/11/17  0959   CHOL 161   LDL 54   HDL 85   TRIG 109     Most Recent 6 Bacteria Isolates From Any Culture (See EPIC Reports for Culture Details):  Recent Labs   Lab Test 10/05/18  1330 07/31/18  1033 04/16/18  1218 11/17/17  1650 11/15/16  0106 10/22/16  0800   CULT 50,000 to 100,000 colonies/mL  mixed urogenital dwaine   50,000 to 100,000 colonies/mL  mixed urogenital dwaine  Susceptibility  testing not routinely done   <10,000 colonies/mL  urogenital dwaine    Susceptibility testing not routinely done 10,000 to 50,000 colonies/mL  mixed urogenital dwaine  Susceptibility testing not routinely done   No growth 10,000 to 50,000 colonies/mL mixed urogenital dwaine     Most Recent TSH and T4:  Recent Labs   Lab Test 04/24/18  0625   TSH 3.01     Most Recent Hemoglobin A1c:  Recent Labs   Lab Test 04/30/14  1222   A1C 6.1*     Most Recent Urinalysis:  Recent Labs   Lab Test 11/19/18  1504 10/05/18  1307   COLOR Yellow Yellow   APPEARANCE Clear Clear   URINEGLC Negative Negative   URINEBILI Negative Negative   URINEKETONE Negative Negative   SG 1.010 1.010   UBLD Negative Trace*   URINEPH 6.0 6.0   PROTEIN Negative Negative   UROBILINOGEN 0.2 0.2   NITRITE Negative Negative   LEUKEST Small* Moderate*   RBCU  --  O - 2   WBCU  --  25-50*     Most Recent ABG:  Recent Labs   Lab Test 03/14/15  1114   PH 7.44   PO2 99   PCO2 38   HCO3 26   KALPANA 1.7     Most Recent ESR & CRP:  Recent Labs   Lab Test 04/16/18  1153 11/01/16  1100   SED 8  --    CRP  --  <2.9   ,   Results for orders placed or performed during the hospital encounter of 04/06/19   XR Chest 2 Views    Narrative    CHEST 2 VIEWS  4/6/2019 3:30 AM     HISTORY: Chest pain.    COMPARISON: 11/30/2018 and 11/11/2016.    FINDINGS: A 5.5 x 3.1 cm hazy ovoid opacity in the left parahilar  region. In retrospect, this has been present on prior studies dating  back to 11/11/2016. It is nonspecific, but likely of benign etiology  given the stability. The lungs are otherwise clear. Normal-sized  cardiac silhouette. Atherosclerotic calcification in the thoracic  aorta. Left anterior chest wall cardiac device with lead tips in the  right atrium and ventricle.      Impression    IMPRESSION: No convincing evidence of active cardiopulmonary disease.    DANIKA GRAVES MD       Allergies   Allergies   Allergen Reactions     Peanuts [Nuts] Shortness Of Breath     Amiodarone       pulm fibrosis       Baclofen      Confusion      Bactrim [Sulfamethoxazole W-Trimethoprim]      Difficulty swallowing     Doxycycline Other (See Comments)     Multiple complaints; she does not want this again.      Levaquin [Levofloxacin] Other (See Comments)     Multiple complaints; she will not take this again     Linzess [Linaclotide] Diarrhea     Lorazepam        Prolonged drowsiness with ER visit      Liquid Adhesive Itching and Rash     Bleeding when tape removed after pacemaker placement..itched and burned for weeks.

## 2019-04-08 ENCOUNTER — TELEPHONE (OUTPATIENT)
Dept: PEDIATRICS | Facility: CLINIC | Age: 84
End: 2019-04-08

## 2019-04-08 NOTE — TELEPHONE ENCOUNTER
"ED / Discharge Outreach Protocol    Patient Contact    Attempt # 1    Was call answered?  Yes.  \"May I please speak with <patient name>\"  Is patient available?   Yes              ED/Discharge Protocol    \"Hi, my name is Tiffanie Yang, a registered nurse, and I am calling on behalf of Dr. Garcia's office at [place].  I am calling to follow up and see how things are going for you after your recent visit.\"    \"I see that you were in the (ER/UC/IP) on 4/6/19.    How are you doing now that you are home?\" \"doing well\"    Is patient experiencing symptoms that may require a hospital visit?  no    Discharge Instructions    \"Let's review your discharge instructions.  What is/are the follow-up recommendations?  Pt. Response: Reviewed discharge instructions.    \"Were you instructed to make a follow-up appointment?\"  Pt. Response: Yes.  Has appointment been made?   Yes      \"When you see the provider, I would recommend that you bring your discharge instructions with you.    Medications    \"How many new medications are you on since your hospitalization/ED visit?\"    2 or more - Epic MTM referral needed  \"How many of your current medicines changed (dose, timing, name, etc.) while you were in the hospital/ED visit?\"   0-1  \"Do you have questions about your medications?\"   No  \"Were you newly diagnosed with heart failure, COPD, diabetes or did you have a heart attack?\"   No  For patients on insulin: \"Did you start on insulin in the hospital or did you have your insulin dose changed?\"   No    Medication reconciliation completed? Yes    Was MTM referral placed (*Make sure to put transitions as reason for referral)?   No    Call Summary    \"Do you have any questions or concerns about your condition or care plan at the moment?\"    No      Patient was in ER 3 in the past year (assess appropriateness of ER visits.)      \"If you have questions or things don't continue to improve, we encourage you contact us through the main clinic number,  " "[phone].  Even if the clinic is not open, triage nurses are available 24/7 to help you.     We would like you to know that our clinic has extended hours (provide information).  We also have urgent care (provide details on closest location and hours/contact info)\"      \"Thank you for your time and take care!\"        "

## 2019-04-08 NOTE — TELEPHONE ENCOUNTER
Please contact patient for In-patient follow up.  [phone] (home)     Visit date: [id number]  Diagnosis listed: Coronary Artery Disease Involving Native Coronary Artery Of Native Heart Without Angina Pectoris, Chest Pain, Unspeci  Number of visits in past 12 months: 0 ED / 0 IP

## 2019-04-16 ENCOUNTER — OFFICE VISIT (OUTPATIENT)
Dept: PEDIATRICS | Facility: CLINIC | Age: 84
End: 2019-04-16
Payer: MEDICARE

## 2019-04-16 VITALS
WEIGHT: 156 LBS | TEMPERATURE: 97.6 F | BODY MASS INDEX: 27.64 KG/M2 | SYSTOLIC BLOOD PRESSURE: 118 MMHG | HEIGHT: 63 IN | OXYGEN SATURATION: 98 % | HEART RATE: 69 BPM | DIASTOLIC BLOOD PRESSURE: 70 MMHG

## 2019-04-16 DIAGNOSIS — R13.10 DYSPHAGIA, UNSPECIFIED TYPE: ICD-10-CM

## 2019-04-16 DIAGNOSIS — R35.0 URINARY FREQUENCY: ICD-10-CM

## 2019-04-16 DIAGNOSIS — I48.20 CHRONIC ATRIAL FIBRILLATION (H): ICD-10-CM

## 2019-04-16 DIAGNOSIS — J44.9 CHRONIC OBSTRUCTIVE PULMONARY DISEASE, UNSPECIFIED COPD TYPE (H): ICD-10-CM

## 2019-04-16 DIAGNOSIS — M79.671 PAIN IN BOTH FEET: ICD-10-CM

## 2019-04-16 DIAGNOSIS — I25.10 CORONARY ARTERY DISEASE INVOLVING NATIVE CORONARY ARTERY OF NATIVE HEART WITHOUT ANGINA PECTORIS: Primary | ICD-10-CM

## 2019-04-16 DIAGNOSIS — M79.672 PAIN IN BOTH FEET: ICD-10-CM

## 2019-04-16 DIAGNOSIS — N18.30 CKD (CHRONIC KIDNEY DISEASE) STAGE 3, GFR 30-59 ML/MIN (H): ICD-10-CM

## 2019-04-16 PROCEDURE — 99214 OFFICE O/P EST MOD 30 MIN: CPT | Performed by: INTERNAL MEDICINE

## 2019-04-16 ASSESSMENT — MIFFLIN-ST. JEOR: SCORE: 1086.74

## 2019-04-16 NOTE — PATIENT INSTRUCTIONS
1- stop furosemide (should reduce urinary symptoms)  2- schedule with Speech Pathology --they will test your swallowing  3- suspect foot pain is due to severe bunions --schedule visit with Dr Acosta or Sirena if your pain persists

## 2019-04-16 NOTE — PROGRESS NOTES
SUBJECTIVE:   Leonor Mercado is a 89 year old female who presents to clinic today for the following   health issues:    Hospital Follow-up Visit:    Hospital/Nursing Home/ Rehab Facility: [facility]  Date of Admission: 4/6/19  Date of Discharge: 4/6/19  Reason(s) for Admission: CP            Problems taking medications regularly:  None       Medication changes since discharge: None       Problems adhering to non-medication therapy:  None    Summary of hospitalization:  [facility] discharge summary reviewed:    Discharge Diagnoses     Coronary artery disease    Chronic obstructive pulmonary disease  Atrial fibrillation  Hyperlipidemia  Depression  Anxiety      Hospital Course     Leonor Mercado is a 89 year-old with known coronary artery disease, COPD on nocturnal oxygen, hypertension, hyperlipidemia and atrial fibrillation admitted to observation 4/6/2019 with chest pain.      Coronary artery disease with chest pain  Admitted for chest pain rule out after presenting with pain across her chest awakening her from sleep, improved with nitroglycerin, though would recur and after third nitroglycerin she sought care as she had been directed. Chest pain free by time of arrival. Serial troponins undetectable. Echocardiogram with no new wall motion abnormalities, EF 55-60%. Discussed stress testing for risk stratification, as well as possible angiogram. She was not interested in pursuing either, unless absolutely necessary. She was started on Imdur on discharge and instructed to follow-up with cardiology if she has recurrent symptoms.      Chronic obstructive pulmonary disease  No signs of exacerbation. Continue prior to admission inhaler regimen.      Atrial fibrillation  Continued prior to admission diltiazem, warfarin.      Hyperlipidemia  Continue prior to admission statin.   Diagnostic Tests/Treatments reviewed.  Follow up needed:   Other Healthcare Providers Involved in Patient s Care:          cardiology  Update since discharge: improved.     Post Discharge Medication Reconciliation: discharge medications reconciled and changed, per note/orders (see AVS).  Plan of care communicated with patient     Coding guidelines for this visit:  Type of Medical   Decision Making Face-to-Face Visit       within 7 Days of discharge Face-to-Face Visit        within 14 days of discharge   Moderate Complexity [id number]   High Complexity [id number]            Patient Active Problem List   Diagnosis     Hyperlipidemia LDL goal <100     Cardiac pacemaker in situ     Advanced directives, counseling/discussion     Senile osteoporosis     Irritable bowel syndrome (IBS)     Anemia     Long term current use of anticoagulant therapy     Degeneration of lumbar intervertebral disc     CKD (chronic kidney disease) stage 3, GFR 30-59 ml/min (H)     Chronic atrial fibrillation (H)     Chronic obstructive pulmonary disease, unspecified COPD type (H)     Coronary artery disease involving native coronary artery of native heart without angina pectoris     Diastolic dysfunction     Sick sinus syndrome (H)     Adjustment disorder, unspecified type     Past Surgical History:   Procedure Laterality Date     APPENDECTOMY  1956     CARDIAC SURGERY      PPM, 2 STENTS2-05[place]-CAD-taxus stentx2 2.5-12,2.5-12 in LADp and LADm, 80%nonDomRCA-LcxDom-mild,EF60%     CORONARY ANGIOGRAPHY ADULT ORDER  7/1994    mild CAD,Normal LV function, No Mitral regurgitation, Prox LAD 30% stenosis. RCA prox and mid, 20-30%     ESOPHAGOSCOPY, GASTROSCOPY, DUODENOSCOPY (EGD), COMBINED N/A 8/19/2015    Procedure: COMBINED ESOPHAGOSCOPY, GASTROSCOPY, DUODENOSCOPY (EGD), BIOPSY SINGLE OR MULTIPLE;  Surgeon: Genevieve Leon MD;  Location:  GI     H LEAD REVISION DUAL  4/2003    Revised in [place]-due to diaphram stim (original pacer placed in [place])     H LEAD REVISION DUAL  7/2/2014    Correction of reversal of A and V leads in Header     HEART CATH,  ANGIOPLASTY  2005    LEIGH ANN mid and proximal LAD, ongoing 80% RCA; mild circumflex     HERNIA REPAIR Left     LIH     IMPLANT PACEMAKER  2003    Placed in [place] for sick sinus syndrome     REPLACE PACEMAKER GENERATOR  2013    Dual-chamber pacemaker by Dr SETH Pierre       Social History     Tobacco Use     Smoking status: Former Smoker     Types: Cigarettes     Last attempt to quit: 1987     Years since quittin.9     Smokeless tobacco: Never Used   Substance Use Topics     Alcohol use: No     Alcohol/week: 0.0 oz     Family History   Problem Relation Age of Onset     Heart Disease Father          age 84     Neurologic Disorder Mother         dementia     Gastrointestinal Disease Daughter         liver transplant     Crohn's Disease Daughter          Current Outpatient Medications   Medication Sig Dispense Refill     Acetaminophen (TYLENOL PO) Take 1,000 mg by mouth nightly as needed        albuterol (PROAIR HFA/PROVENTIL HFA/VENTOLIN HFA) 108 (90 Base) MCG/ACT inhaler Inhale 2 puffs into the lungs every 6 hours as needed for shortness of breath / dyspnea or wheezing 18 g 3     ASPIRIN NOT PRESCRIBED (INTENTIONAL) Please choose reason not prescribed, below 0 each 0     calcium carbonate (CALCIUM CARBONATE) 600 MG TABS tablet Take 1 tablet by mouth daily        Cholecalciferol (VITAMIN D PO) Take 800 Units by mouth daily        diltiazem (DILTIAZEM CD) 240 MG 24 hr capsule Take 1 capsule (240 mg) by mouth daily 90 capsule 3     Docusate Calcium (STOOL SOFTENER PO) Take 1 tablet by mouth At Bedtime        fluticasone-vilanterol (BREO ELLIPTA) 200-25 MCG/INH oral inhaler Inhale 1 puff into the lungs daily 1 Inhaler 3     ipratropium - albuterol 0.5 mg/2.5 mg/3 mL (DUONEB) 0.5-2.5 (3) MG/3ML neb solution Take 1 vial (3 mLs) by nebulization every 6 hours as needed for shortness of breath / dyspnea or wheezing 360 mL 1     isosorbide mononitrate (IMDUR) 30 MG 24 hr tablet Take 1 tablet (30 mg) by mouth  "daily 30 tablet 0     Lactobacillus (PROBIOTIC ACIDOPHILUS) TABS Take 1 tablet by mouth daily        losartan (COZAAR) 25 MG tablet Take 1 tablet (25 mg) by mouth daily 90 tablet 1     MELATONIN PO Take 5 mg by mouth nightly as needed        mirtazapine (REMERON) 15 MG tablet Take 1 tablet (15 mg) by mouth At Bedtime 90 tablet 3     Multiple Vitamins-Minerals (OCUVITE PO) Take 1 capsule by mouth daily.       nitroGLYcerin (NITROSTAT) 0.4 MG sublingual tablet Place 1 tablet (0.4 mg) under the tongue every 5 minutes as needed for chest pain 25 tablet 1     polyethylene glycol (MIRALAX/GLYCOLAX) Packet Take 1 packet by mouth daily        pravastatin (PRAVACHOL) 40 MG tablet Take 1 tablet (40 mg) by mouth daily 90 tablet 3     Tiotropium Bromide Monohydrate (SPIRIVA HANDIHALER IN) Inhale 1 puff into the lungs daily        UNABLE TO FIND Apply 1 drop to eye daily as needed MEDICATION NAME:  Eye drop samples from MD, uses PRN in left eye.  Unsure what it's for.       warfarin (COUMADIN) 2 MG tablet Take 2 mg by mouth every evening         ROS: The following systems have been completely reviewed and are negative except as noted in the HPI: CONSTITUTIONAL,  CARDIOVASCULAR, PULMONARY, GASTROINTESTINAL     OBJECTIVE:                                                    /70 (Cuff Size: Adult Regular)   Pulse 69   Temp 97.6  F (36.4  C) (Oral)   Ht 1.6 m (5' 3\")   Wt 70.8 kg (156 lb)   LMP  (LMP Unknown)   SpO2 98%   BMI 27.63 kg/m   Body mass index is 27.63 kg/m .  GENERAL:  alert,  no distress  NECK: no tenderness, no adenopathy  RESP: lungs clear to auscultation - no rales, no rhonchi, no wheezes  CV: irregular rates and rhythm, normal S1 S2, no S3 or S4 and no murmur, no click or rub  MS: extremities- no edema     ASSESSMENT/PLAN:                                                        ICD-10-CM    1. Coronary artery disease involving native coronary artery of native heart without angina pectoris I25.10  recent " chest pain evaluation reviewed.  Patient declined stress testing or further cardiac work-up.  Isosorbide added to regimen.     2. Chronic obstructive pulmonary disease, unspecified COPD type (H) J44.9  stable on current regimen, chronic home oxygen.     3. CKD (chronic kidney disease) stage 3, GFR 30-59 ml/min (H) N18.3 Lab Results   Component Value Date    CR 1.45 04/06/2019    CR 1.63 12/11/2018   Stable.        4. Chronic atrial fibrillation (H) I48.2 Rate controlled-diltiazem, chronic anticoagulation     5. Pain in both feet M79.671 PODIATRY/FOOT & ANKLE SURGERY REFERRAL    M79.672    complaints of bilateral foot pain mainly involving the arch and first metatarsal.  Prominent bunion deformities bilaterally.  Recommended adequate support and wide toe box.  Patient will see podiatry if pain persists     6. Dysphagia, unspecified type R13.10 SPEECH THERAPY REFERRAL   additional concerns today regarding difficulty swallowing solid foods several months-improves with adequate fluid intake.  Recommended swallow study-referred to speech therapy         7. Urinary frequency R35.0    complaints of urinary frequency associated with diuretic regimen.  Denies dysuria or urgency.  Recent cardiac work-up reviewed- recommended patient discontinue furosemide        Arnel Garcia MD  [facility]      Patient Instructions   1- stop furosemide (should reduce urinary symptoms)  2- schedule with Speech Pathology --they will test your swallowing  3- suspect foot pain is due to severe bunions --schedule visit with Dr Acosta or Sirena if your pain persists

## 2019-04-18 ENCOUNTER — ANTICOAGULATION THERAPY VISIT (OUTPATIENT)
Dept: NURSING | Facility: CLINIC | Age: 84
End: 2019-04-18
Payer: MEDICARE

## 2019-04-18 DIAGNOSIS — Z79.01 LONG TERM CURRENT USE OF ANTICOAGULANT THERAPY: Primary | ICD-10-CM

## 2019-04-18 DIAGNOSIS — I48.20 CHRONIC ATRIAL FIBRILLATION (H): ICD-10-CM

## 2019-04-18 LAB — INR POINT OF CARE: 3.1 (ref 0.86–1.14)

## 2019-04-18 PROCEDURE — 36416 COLLJ CAPILLARY BLOOD SPEC: CPT

## 2019-04-18 PROCEDURE — 85610 PROTHROMBIN TIME: CPT | Mod: QW

## 2019-04-18 NOTE — PROGRESS NOTES
"ANTICOAGULATION FOLLOW-UP CLINIC VISIT    Patient Name:  Leonor Mercado  Date:  4/18/2019  Contact Type:  Face to Face  Patient is accompanied in the office by her daughter.    SUBJECTIVE:     Patient Findings     Comments:   The patient was assessed for   diet, medication,   missed or extra doses,   bruising or bleeding,   with no problem findings.             OBJECTIVE    INR Protime   Date Value Ref Range Status   04/18/2019 3.1 (A) 0.86 - 1.14 Final       ASSESSMENT / PLAN  INR assessment THER    Recheck INR In: 4 WEEKS    INR Location Clinic      Anticoagulation Summary  As of 4/18/2019    INR goal:   2.0-3.0   TTR:   68.1 % (3 y)   INR used for dosing:   3.1! (4/18/2019)   Warfarin maintenance plan:   2 mg (2 mg x 1) every day   Full warfarin instructions:   2 mg every day   Weekly warfarin total:   14 mg   Plan last modified:   Yoana Waddell RN (12/15/2016)   Next INR check:   5/16/2019   Priority:   INR   Target end date:       Indications    Chronic atrial fibrillation (H) [I48.2]  Long term current use of anticoagulant therapy [Z79.01]             Anticoagulation Episode Summary     INR check location:       Preferred lab:       Send INR reminders to:   [facility]    Comments:   2mg tabs - nancy / 1st name is pronounced \"ondray\" / APPT CARD ONLY / comes on the bus on Tues / RF 5/19      Anticoagulation Care Providers     Provider Role Specialty Phone number    Arnel Garcia MD  Internal Medicine [phone]            See the Encounter Report to view Anticoagulation Flowsheet and Dosing Calendar (Go to Encounters tab in chart review, and find the Anticoagulation Therapy Visit)    INR is therapeutic today. Patient will continue same dose. Follow up in 4 weeks or sooner if needed.    Ebony Doshi RN                 "

## 2019-04-21 PROBLEM — R07.9 CHEST PAIN: Status: RESOLVED | Noted: 2019-04-06 | Resolved: 2019-04-21

## 2019-05-01 DIAGNOSIS — I25.10 CORONARY ARTERY DISEASE INVOLVING NATIVE CORONARY ARTERY OF NATIVE HEART WITHOUT ANGINA PECTORIS: ICD-10-CM

## 2019-05-01 DIAGNOSIS — E78.5 HYPERLIPIDEMIA LDL GOAL <100: ICD-10-CM

## 2019-05-01 NOTE — TELEPHONE ENCOUNTER
"Requested Prescriptions   Pending Prescriptions Disp Refills     pravastatin (PRAVACHOL) 40 MG tablet [Pharmacy Med Name: PRAVASTATIN 40MG    TAB] 90 tablet 0     Sig: TAKE 1 TABLET BY MOUTH ONCE DAILY   Last Written Prescription Date:  10/23/2018  Last Fill Quantity: 90,  # refills: 3   Last office visit: 6/14/2018 with prescribing provider:     Future Office Visit:      Statins Protocol Failed - 5/1/2019  2:03 PM        Failed - LDL on file in past 12 months     Recent Labs   Lab Test 12/11/17  0959   LDL 54             Passed - No abnormal creatine kinase in past 12 months     No lab results found.             Passed - Recent (12 mo) or future (30 days) visit within the authorizing provider's specialty     Patient had office visit in the last 12 months or has a visit in the next 30 days with authorizing provider or within the authorizing provider's specialty.  See \"Patient Info\" tab in inbasket, or \"Choose Columns\" in Meds & Orders section of the refill encounter.              Passed - Medication is active on med list        Passed - Patient is age 18 or older        Passed - No active pregnancy on record        Passed - No positive pregnancy test in past 12 months        "

## 2019-05-02 DIAGNOSIS — I25.10 CORONARY ARTERY DISEASE INVOLVING NATIVE CORONARY ARTERY OF NATIVE HEART WITHOUT ANGINA PECTORIS: ICD-10-CM

## 2019-05-02 DIAGNOSIS — E78.5 HYPERLIPIDEMIA LDL GOAL <100: ICD-10-CM

## 2019-05-02 RX ORDER — PRAVASTATIN SODIUM 40 MG
TABLET ORAL
Qty: 90 TABLET | Refills: 3 | Status: SHIPPED | OUTPATIENT
Start: 2019-05-02 | End: 2020-02-19

## 2019-05-03 RX ORDER — PRAVASTATIN SODIUM 40 MG
40 TABLET ORAL DAILY
Qty: 90 TABLET | Refills: 3 | Status: SHIPPED | OUTPATIENT
Start: 2019-05-03 | End: 2019-06-19

## 2019-05-03 NOTE — TELEPHONE ENCOUNTER
Prescription approved per [facility] Refill Protocol.    LDL is within normal range of <100.    Misa Hernandez RN

## 2019-05-03 NOTE — TELEPHONE ENCOUNTER
"Requested Prescriptions   Pending Prescriptions Disp Refills     pravastatin (PRAVACHOL) 40 MG tablet  Last Written Prescription Date:  05/02/2019  Last Fill Quantity: 90 tablet,  # refills: 3    Last office visit: 4/16/2019 with prescribing provider:  Arnel Garcia MD        Future Office Visit:     90 tablet 3     Sig: Take 1 tablet (40 mg) by mouth daily       Statins Protocol Failed - 5/2/2019  4:53 PM        Failed - LDL on file in past 12 months     Recent Labs   Lab Test 12/11/17  0959   LDL 54             Passed - No abnormal creatine kinase in past 12 months     No lab results found.             Passed - Recent (12 mo) or future (30 days) visit within the authorizing provider's specialty     Patient had office visit in the last 12 months or has a visit in the next 30 days with authorizing provider or within the authorizing provider's specialty.  See \"Patient Info\" tab in inbasket, or \"Choose Columns\" in Meds & Orders section of the refill encounter.              Passed - Medication is active on med list        Passed - Patient is age 18 or older        Passed - No active pregnancy on record        Passed - No positive pregnancy test in past 12 months          "

## 2019-05-16 ENCOUNTER — ANTICOAGULATION THERAPY VISIT (OUTPATIENT)
Dept: NURSING | Facility: CLINIC | Age: 84
End: 2019-05-16
Payer: MEDICARE

## 2019-05-16 DIAGNOSIS — I48.20 CHRONIC ATRIAL FIBRILLATION (H): ICD-10-CM

## 2019-05-16 DIAGNOSIS — Z79.01 LONG TERM CURRENT USE OF ANTICOAGULANT THERAPY: Primary | ICD-10-CM

## 2019-05-16 LAB — INR POINT OF CARE: 3.4 (ref 0.86–1.14)

## 2019-05-16 PROCEDURE — 85610 PROTHROMBIN TIME: CPT | Mod: QW

## 2019-05-16 PROCEDURE — 99207 ZZC NO CHARGE NURSE ONLY: CPT

## 2019-05-16 PROCEDURE — 36416 COLLJ CAPILLARY BLOOD SPEC: CPT

## 2019-05-16 NOTE — PROGRESS NOTES
"ANTICOAGULATION FOLLOW-UP CLINIC VISIT    Patient Name:  Leonor Mercado  Date:  5/16/2019  Contact Type:  Face to Face  Patient is accompanied in the office by her daughter.    SUBJECTIVE:  Patient Findings     Positives:   Change in diet/appetite    Comments:   Has been eating less green vegetables.  Patient denies: extra doses, illness, inflammation,   bleeding/bruises, medication changes        Clinical Outcomes     Comments:   Has been eating less green vegetables.  Patient denies: extra doses, illness, inflammation,   bleeding/bruises, medication changes           OBJECTIVE    INR Protime   Date Value Ref Range Status   05/16/2019 3.4 (A) 0.86 - 1.14 Final       ASSESSMENT / PLAN  INR assessment SUPRA    Recheck INR In: 4 WEEKS    INR Location Clinic      Anticoagulation Summary  As of 5/16/2019    INR goal:   2.0-3.0   TTR:   66.4 % (3.1 y)   INR used for dosing:   3.4! (5/16/2019)   Warfarin maintenance plan:   1 mg (2 mg x 0.5) every Mon; 2 mg (2 mg x 1) all other days   Full warfarin instructions:   5/16: 1 mg; Otherwise 1 mg every Mon; 2 mg all other days   Weekly warfarin total:   13 mg   Plan last modified:   Ebony Doshi, RN (5/16/2019)   Next INR check:   6/13/2019   Priority:   INR   Target end date:       Indications    Chronic atrial fibrillation (H) [I48.2]  Long term current use of anticoagulant therapy [Z79.01]             Anticoagulation Episode Summary     INR check location:       Preferred lab:       Send INR reminders to:   [facility]    Comments:   2mg tabs - nancy / 1st name pronounced \"ondray\" / APPT CARD / comes on the bus on Tues or daughter brings on Thurs      Anticoagulation Care Providers     Provider Role Specialty Phone number    Arnel Garcia MD  Internal Medicine [phone]            See the Encounter Report to view Anticoagulation Flowsheet and Dosing Calendar (Go to Encounters tab in chart review, and find the Anticoagulation Therapy Visit)    INR is " supra therapeutic today.  Patient will take 1 mg today, then maintenance dose will be decreased by 7.1%. Her last 3 INRs were elevated.   Will follow up in 4 weeks (has transportation issues) or sooner if needed.    Dosage adjustment made based on physician directed care plan.      Ebony Doshi RN

## 2019-05-17 ENCOUNTER — TELEPHONE (OUTPATIENT)
Dept: CARDIOLOGY | Facility: CLINIC | Age: 84
End: 2019-05-17

## 2019-05-17 NOTE — TELEPHONE ENCOUNTER
Pt called in stating she had been taken to ER by ambulance 4/6/19 and they sent her home. She saaid she has had pain a few more times since and today had to use nitroglycerin x 3 before pain was gone. Informed Pt she should really go to ER told her ER 3 or 4 times, and she refused and said they laughed at her so she would not go. Gave her an appointment for 5/22/19 but again told her to go to ER if pain comes back and to use nitroglycerin. RAIN Ceja RN

## 2019-05-21 ENCOUNTER — HOSPITAL ENCOUNTER (OUTPATIENT)
Dept: SPEECH THERAPY | Facility: CLINIC | Age: 84
Setting detail: THERAPIES SERIES
End: 2019-05-21
Attending: INTERNAL MEDICINE
Payer: MEDICARE

## 2019-05-21 DIAGNOSIS — R13.10 DYSPHAGIA, UNSPECIFIED TYPE: ICD-10-CM

## 2019-05-21 PROCEDURE — 92526 ORAL FUNCTION THERAPY: CPT | Mod: GN | Performed by: SPEECH-LANGUAGE PATHOLOGIST

## 2019-05-21 PROCEDURE — 92610 EVALUATE SWALLOWING FUNCTION: CPT | Mod: GN | Performed by: SPEECH-LANGUAGE PATHOLOGIST

## 2019-05-22 ENCOUNTER — OFFICE VISIT (OUTPATIENT)
Dept: CARDIOLOGY | Facility: CLINIC | Age: 84
End: 2019-05-22
Payer: MEDICARE

## 2019-05-22 VITALS
WEIGHT: 155 LBS | HEART RATE: 90 BPM | HEIGHT: 63 IN | BODY MASS INDEX: 27.46 KG/M2 | DIASTOLIC BLOOD PRESSURE: 68 MMHG | SYSTOLIC BLOOD PRESSURE: 132 MMHG

## 2019-05-22 DIAGNOSIS — I25.9 CHEST PAIN DUE TO MYOCARDIAL ISCHEMIA, UNSPECIFIED ISCHEMIC CHEST PAIN TYPE: Primary | ICD-10-CM

## 2019-05-22 DIAGNOSIS — R07.9 CHEST PAIN: Primary | ICD-10-CM

## 2019-05-22 PROCEDURE — 93000 ELECTROCARDIOGRAM COMPLETE: CPT | Performed by: INTERNAL MEDICINE

## 2019-05-22 PROCEDURE — 99214 OFFICE O/P EST MOD 30 MIN: CPT | Performed by: INTERNAL MEDICINE

## 2019-05-22 ASSESSMENT — MIFFLIN-ST. JEOR: SCORE: 1077.08

## 2019-05-22 NOTE — PROGRESS NOTES
HPI and Plan:   See dictation    Orders Placed This Encounter   Procedures     Lipid Profile     Basic metabolic panel     Follow-Up with Cardiac Advanced Practice Provider     EKG 12-lead complete w/read - Clinics (performed today)     Exercise Stress Echocardiogram     No orders of the defined types were placed in this encounter.    There are no discontinued medications.      Encounter Diagnoses   Name Primary?     Chest pain Yes     Chest pain due to myocardial ischemia, unspecified ischemic chest pain type        CURRENT MEDICATIONS:  Current Outpatient Medications   Medication Sig Dispense Refill     Acetaminophen (TYLENOL PO) Take 1,000 mg by mouth nightly as needed        albuterol (PROAIR HFA/PROVENTIL HFA/VENTOLIN HFA) 108 (90 Base) MCG/ACT inhaler Inhale 2 puffs into the lungs every 6 hours as needed for shortness of breath / dyspnea or wheezing 18 g 3     ASPIRIN NOT PRESCRIBED (INTENTIONAL) Please choose reason not prescribed, below 0 each 0     calcium carbonate (CALCIUM CARBONATE) 600 MG TABS tablet Take 1 tablet by mouth daily        Cholecalciferol (VITAMIN D PO) Take 800 Units by mouth daily        diltiazem (DILTIAZEM CD) 240 MG 24 hr capsule Take 1 capsule (240 mg) by mouth daily 90 capsule 3     Docusate Calcium (STOOL SOFTENER PO) Take 1 tablet by mouth At Bedtime        fluticasone-vilanterol (BREO ELLIPTA) 200-25 MCG/INH oral inhaler Inhale 1 puff into the lungs daily 1 Inhaler 3     ipratropium - albuterol 0.5 mg/2.5 mg/3 mL (DUONEB) 0.5-2.5 (3) MG/3ML neb solution Take 1 vial (3 mLs) by nebulization every 6 hours as needed for shortness of breath / dyspnea or wheezing 360 mL 1     isosorbide mononitrate (IMDUR) 30 MG 24 hr tablet Take 1 tablet (30 mg) by mouth daily 30 tablet 0     Lactobacillus (PROBIOTIC ACIDOPHILUS) TABS Take 1 tablet by mouth daily        losartan (COZAAR) 25 MG tablet Take 1 tablet (25 mg) by mouth daily 90 tablet 1     MELATONIN PO Take 5 mg by mouth nightly as needed         mirtazapine (REMERON) 15 MG tablet Take 1 tablet (15 mg) by mouth At Bedtime 90 tablet 3     Multiple Vitamins-Minerals (OCUVITE PO) Take 1 capsule by mouth daily.       nitroGLYcerin (NITROSTAT) 0.4 MG sublingual tablet Place 1 tablet (0.4 mg) under the tongue every 5 minutes as needed for chest pain 25 tablet 1     polyethylene glycol (MIRALAX/GLYCOLAX) Packet Take 1 packet by mouth daily        pravastatin (PRAVACHOL) 40 MG tablet Take 1 tablet (40 mg) by mouth daily 90 tablet 3     pravastatin (PRAVACHOL) 40 MG tablet TAKE 1 TABLET BY MOUTH ONCE DAILY 90 tablet 3     Tiotropium Bromide Monohydrate (SPIRIVA HANDIHALER IN) Inhale 1 puff into the lungs daily        UNABLE TO FIND Apply 1 drop to eye daily as needed MEDICATION NAME:  Eye drop samples from MD, uses PRN in left eye.  Unsure what it's for.       warfarin (COUMADIN) 2 MG tablet Take 2 mg by mouth every evening         ALLERGIES     Allergies   Allergen Reactions     Peanuts [Nuts] Shortness Of Breath     Amiodarone      pulm fibrosis       Baclofen      Confusion      Bactrim [Sulfamethoxazole W-Trimethoprim]      Difficulty swallowing     Doxycycline Other (See Comments)     Multiple complaints; she does not want this again.      Levaquin [Levofloxacin] Other (See Comments)     Multiple complaints; she will not take this again     Linzess [Linaclotide] Diarrhea     Lorazepam        Prolonged drowsiness with ER visit      Liquid Adhesive Itching and Rash     Bleeding when tape removed after pacemaker placement..itched and burned for weeks.       PAST MEDICAL HISTORY:  Past Medical History:   Diagnosis Date     A Fib - chr Coumadin, s/p PPM (H) 5/8/2013     Atrial fibrillation (H)     Sick sinus syndrome, PAT, AF     BCC (basal cell carcinoma of skin)     Face     CAD - 2 stents in [place] in 2000s.  1/5/2016     CHF (congestive heart failure) (H)     mild in past     CHF - Chr Diastolic (H) 11/21/2017     Chronic renal insufficiency      COPD (chronic  obstructive pulmonary disease) (H)      COPD (H) 2013     Coronary artery disease     2-05[place]-CAD-taxus stentx2 2.5-12,2.5-12 in LADp and LADm, 80%nonDomRCA-LcxDom-mild,EF60%     Hyperlipidemia      Hypertension      IBS (irritable bowel syndrome)      Irritable bowel      Mumps      Osteoporosis      Palpitations      Panic attack     questionable diagnosis     Pulmonary fibrosis (H)     do not use amiodarone     Shortness of breath      Sick sinus syndrome - s/p PPM  (H) 2017     SSS (sick sinus syndrome) (H)     DDD pacer (see surgery history section)     Vertigo        PAST SURGICAL HISTORY:  Past Surgical History:   Procedure Laterality Date     APPENDECTOMY       CARDIAC SURGERY      PPM, 2 STENTS2-05[place]-CAD-taxus stentx2 2.5-12,2.5-12 in LADp and LADm, 80%nonDomRCA-LcxDom-mild,EF60%     CORONARY ANGIOGRAPHY ADULT ORDER  1994    mild CAD,Normal LV function, No Mitral regurgitation, Prox LAD 30% stenosis. RCA prox and mid, 20-30%     ESOPHAGOSCOPY, GASTROSCOPY, DUODENOSCOPY (EGD), COMBINED N/A 2015    Procedure: COMBINED ESOPHAGOSCOPY, GASTROSCOPY, DUODENOSCOPY (EGD), BIOPSY SINGLE OR MULTIPLE;  Surgeon: Genevieve Leon MD;  Location:  GI     H LEAD REVISION DUAL  2003    Revised in [place]-due to diaphram stim (original pacer placed in [place])     H LEAD REVISION DUAL  2014    Correction of reversal of A and V leads in [place]     HEART CATH, ANGIOPLASTY  2005    LEIGH ANN mid and proximal LAD, ongoing 80% RCA; mild circumflex     HERNIA REPAIR Left     [facility]     IMPLANT PACEMAKER  2003    Placed in [place] for sick sinus syndrome     REPLACE PACEMAKER GENERATOR  2013    Dual-chamber pacemaker by Dr SETH Pierre       FAMILY HISTORY:  Family History   Problem Relation Age of Onset     Heart Disease Father          age 84     Neurologic Disorder Mother         dementia     Gastrointestinal Disease Daughter         liver transplant     Crohn's Disease Daughter         SOCIAL HISTORY:  Social History     Socioeconomic History     Marital status:      Spouse name: None     Number of children: 3     Years of education: None     Highest education level: None   Occupational History     Employer: RETIRED   Social Needs     Financial resource strain: None     Food insecurity:     Worry: None     Inability: None     Transportation needs:     Medical: None     Non-medical: None   Tobacco Use     Smoking status: Former Smoker     Types: Cigarettes     Last attempt to quit: 1987     Years since quittin.0     Smokeless tobacco: Never Used   Substance and Sexual Activity     Alcohol use: No     Alcohol/week: 0.0 oz     Drug use: No     Sexual activity: Never     Partners: Male   Lifestyle     Physical activity:     Days per week: None     Minutes per session: None     Stress: None   Relationships     Social connections:     Talks on phone: None     Gets together: None     Attends Hinduism service: None     Active member of club or organization: None     Attends meetings of clubs or organizations: None     Relationship status: None     Intimate partner violence:     Fear of current or ex partner: None     Emotionally abused: None     Physically abused: None     Forced sexual activity: None   Other Topics Concern     Parent/sibling w/ CABG, MI or angioplasty before 65F 55M? Yes      Service Not Asked     Blood Transfusions Not Asked     Caffeine Concern Yes     Comment: decaf - some 1/2 caff     Occupational Exposure Not Asked     Hobby Hazards Not Asked     Sleep Concern Yes     Comment: nocturia every 2 hours     Stress Concern Not Asked     Weight Concern Not Asked     Special Diet No     Back Care Not Asked     Exercise No     Comment: some walking in the house     Bike Helmet Not Asked     Seat Belt Not Asked     Self-Exams Not Asked   Social History Narrative     None       Review of Systems:  Skin:  Negative     Eyes:  Positive for glasses  ENT:  Positive  "for hearing loss  Respiratory:  Positive for dyspnea on exertion;shortness of breath  Cardiovascular:    palpitations;Positive for;fatigue;dizziness;chest pain  Gastroenterology: Negative    Genitourinary:       Musculoskeletal:  Positive for arthritis  Neurologic:  Negative    Psychiatric:  Positive for sleep disturbances  Heme/Lymph/Imm:  Negative    Endocrine:  Negative      Physical Exam:  Vitals: /68   Pulse 90   Ht 1.6 m (5' 2.99\")   Wt 70.3 kg (155 lb)   LMP  (LMP Unknown)   BMI 27.46 kg/m      Constitutional:  cooperative, alert and oriented, well developed, well nourished, in no acute distress        Skin:  warm and dry to the touch, no apparent skin lesions or masses noted   pacemaker incision in the left infraclavicular area was well-healed      Head:  normocephalic, no masses or lesions        Eyes:  pupils equal and round, conjunctivae and lids unremarkable, sclera white, no xanthalasma, EOMS intact, no nystagmus        Lymph:No Cervical lymphadenopathy present;No thyromegaly     ENT:  no pallor or cyanosis, dentition good        Neck:  carotid pulses are full and equal bilaterally, JVP normal, no carotid bruit        Respiratory:       few wheeze, no rales    Cardiac:   irregularly irregular rhythm   no presence of murmur                        1+             1+   right femoral bruit (-) left femoral bruit (-)      GI:  abdomen soft, non-tender, BS normoactive, no mass, no HSM, no bruits        Extremities and Muscular Skeletal:  no deformities, clubbing, cyanosis, erythema observed;no edema              Neurological:  no gross motor deficits   needs assistance to get to exam table, uses walker    Psych:  Alert and Oriented x 3      Recent Lab Results:  LIPID RESULTS:  Lab Results   Component Value Date    CHOL 161 12/11/2017    HDL 85 12/11/2017    LDL 54 12/11/2017    TRIG 109 12/11/2017    CHOLHDLRATIO 2.9 03/15/2015       LIVER ENZYME RESULTS:  Lab Results   Component Value Date    AST " 29 04/16/2018    ALT 22 04/16/2018       CBC RESULTS:  Lab Results   Component Value Date    WBC 6.1 04/06/2019    RBC 4.43 04/06/2019    HGB 13.5 04/06/2019    HCT 41.2 04/06/2019    MCV 93 04/06/2019    MCH 30.5 04/06/2019    MCHC 32.8 04/06/2019    RDW 14.4 04/06/2019     04/06/2019       BMP RESULTS:  Lab Results   Component Value Date     04/06/2019    POTASSIUM 4.8 04/06/2019    CHLORIDE 104 04/06/2019    CO2 29 04/06/2019    ANIONGAP 6 04/06/2019     (H) 04/06/2019    BUN 33 (H) 04/06/2019    CR 1.45 (H) 04/06/2019    GFRESTIMATED 31 (L) 04/06/2019    GFRESTBLACK 36 (L) 04/06/2019    TRI 9.2 04/06/2019        A1C RESULTS:  Lab Results   Component Value Date    A1C 6.1 (H) 04/30/2014       INR RESULTS:  Lab Results   Component Value Date    INR 3.4 (A) 05/16/2019    INR 3.1 (A) 04/18/2019    INR 3.02 (H) 04/06/2019    INR 1.30 (H) 10/23/2018           CC  No referring provider defined for this encounter.

## 2019-05-22 NOTE — PROGRESS NOTES
"Service Date: 2019      Arnel Garcia MD   [facility]    [address] [place] [address]      RE: Leonor Mercado    MRN: [id number]   : 1926      Dear Dr. Garcia:       I had the pleasure of following up on our mutual patient Leonor Mercado, a delightful, surprisingly young 89 year old accompanied by her daughter.  She has a history of coronary artery disease with stents placed more than a decade ago in [place]; the details were never known to us.  Her echocardiogram has been normal with normal LV size and function.  She was hospitalized overnight at [facility] last month for chest pain.  She has had 1-2 more episodes since that time with variable relief with nitroglycerin.  One time it occurred at night; the other time it occurred during the day.  It should be noted that she has new onset \"food sticking\" in her mid chest.  I am actually more suspicious this is esophageal spasm, and in fact, she is going to be seen by Gastroenterology to have a barium swallow in the near future.  She also has significant COPD, and there was a whole question about if some of that was heart failure, and we have discussed that previously.  We reviewed her echocardiogram, the tests that were done at the hospital and her creatinine, which is slightly high.  In the hospital, she refused stress testing and an angiogram, but she has continued to have episodes since the hospital.  She does not want a Lexiscan nuclear stress test because it makes her breathing so much worse.  The better option probably would be an angiogram to find out if she has coronary artery disease or not.  We believe in [place] in , she had some stenting to her LAD proximal and mid, and she had an 80% nondominant right, and the circumflex, we believe, was normal, but we do not have the actual reports.  I am going to go ahead and order a dobutamine stress echo on diltiazem, although that is not the ideal test.  The " family did not want to go through an angiogram.  They understood that there is a slightly higher risk because she would have to be off Coumadin for that, and they said at age 89, they do not want to be too aggressive, but they want to know if we are sitting on a significant cardiac problem or not.  We will plan on scheduling the dobutamine stress echo at [facility] in the upcoming week, and I encouraged her to see her gastroenterologist and follow through on that.  We will have her call our office for the results of that test after it is complete, and I will see her back in 6 months for blood work at a routine office visit.      Thank you for allowing me to see Ms. Marcus.        Today's visit was 30 minutes, greater than 50% counseling.      Sincerely,      MD JIMMY Clemente MD             D: 2019   T: 2019   MT: asher      Name:     AILYN MARCUS   MRN:      [id number]        Account:      [id number]   :      1926           Service Date: 2019      Document: [id number]

## 2019-05-22 NOTE — LETTER
5/22/2019    Arnel Garcia MD, MD  [address] [place] [address]    RE: Leonor Mercado       Dear Colleague,    I had the pleasure of seeing Leonor Mercado in the [facility].    HPI and Plan:   See dictation    Orders Placed This Encounter   Procedures     Lipid Profile     Basic metabolic panel     Follow-Up with Cardiac Advanced Practice Provider     EKG 12-lead complete w/read - Clinics (performed today)     Exercise Stress Echocardiogram     No orders of the defined types were placed in this encounter.    There are no discontinued medications.      Encounter Diagnoses   Name Primary?     Chest pain Yes     Chest pain due to myocardial ischemia, unspecified ischemic chest pain type        CURRENT MEDICATIONS:  Current Outpatient Medications   Medication Sig Dispense Refill     Acetaminophen (TYLENOL PO) Take 1,000 mg by mouth nightly as needed        albuterol (PROAIR HFA/PROVENTIL HFA/VENTOLIN HFA) 108 (90 Base) MCG/ACT inhaler Inhale 2 puffs into the lungs every 6 hours as needed for shortness of breath / dyspnea or wheezing 18 g 3     ASPIRIN NOT PRESCRIBED (INTENTIONAL) Please choose reason not prescribed, below 0 each 0     calcium carbonate (CALCIUM CARBONATE) 600 MG TABS tablet Take 1 tablet by mouth daily        Cholecalciferol (VITAMIN D PO) Take 800 Units by mouth daily        diltiazem (DILTIAZEM CD) 240 MG 24 hr capsule Take 1 capsule (240 mg) by mouth daily 90 capsule 3     Docusate Calcium (STOOL SOFTENER PO) Take 1 tablet by mouth At Bedtime        fluticasone-vilanterol (BREO ELLIPTA) 200-25 MCG/INH oral inhaler Inhale 1 puff into the lungs daily 1 Inhaler 3     ipratropium - albuterol 0.5 mg/2.5 mg/3 mL (DUONEB) 0.5-2.5 (3) MG/3ML neb solution Take 1 vial (3 mLs) by nebulization every 6 hours as needed for shortness of breath / dyspnea or wheezing 360 mL 1     isosorbide mononitrate (IMDUR) 30 MG 24 hr tablet Take 1 tablet (30 mg) by mouth  daily 30 tablet 0     Lactobacillus (PROBIOTIC ACIDOPHILUS) TABS Take 1 tablet by mouth daily        losartan (COZAAR) 25 MG tablet Take 1 tablet (25 mg) by mouth daily 90 tablet 1     MELATONIN PO Take 5 mg by mouth nightly as needed        mirtazapine (REMERON) 15 MG tablet Take 1 tablet (15 mg) by mouth At Bedtime 90 tablet 3     Multiple Vitamins-Minerals (OCUVITE PO) Take 1 capsule by mouth daily.       nitroGLYcerin (NITROSTAT) 0.4 MG sublingual tablet Place 1 tablet (0.4 mg) under the tongue every 5 minutes as needed for chest pain 25 tablet 1     polyethylene glycol (MIRALAX/GLYCOLAX) Packet Take 1 packet by mouth daily        pravastatin (PRAVACHOL) 40 MG tablet Take 1 tablet (40 mg) by mouth daily 90 tablet 3     pravastatin (PRAVACHOL) 40 MG tablet TAKE 1 TABLET BY MOUTH ONCE DAILY 90 tablet 3     Tiotropium Bromide Monohydrate (SPIRIVA HANDIHALER IN) Inhale 1 puff into the lungs daily        UNABLE TO FIND Apply 1 drop to eye daily as needed MEDICATION NAME:  Eye drop samples from MD, uses PRN in left eye.  Unsure what it's for.       warfarin (COUMADIN) 2 MG tablet Take 2 mg by mouth every evening         ALLERGIES     Allergies   Allergen Reactions     Peanuts [Nuts] Shortness Of Breath     Amiodarone      pulm fibrosis       Baclofen      Confusion      Bactrim [Sulfamethoxazole W-Trimethoprim]      Difficulty swallowing     Doxycycline Other (See Comments)     Multiple complaints; she does not want this again.      Levaquin [Levofloxacin] Other (See Comments)     Multiple complaints; she will not take this again     Linzess [Linaclotide] Diarrhea     Lorazepam        Prolonged drowsiness with ER visit      Liquid Adhesive Itching and Rash     Bleeding when tape removed after pacemaker placement..itched and burned for weeks.       PAST MEDICAL HISTORY:  Past Medical History:   Diagnosis Date     A Fib - chr Coumadin, s/p PPM (H) 5/8/2013     Atrial fibrillation (H)     Sick sinus syndrome, PAT, AF      BCC (basal cell carcinoma of skin)     Face     CAD - 2 stents in [place] in 2000s.  1/5/2016     CHF (congestive heart failure) (H)     mild in past     CHF - Chr Diastolic (H) 11/21/2017     Chronic renal insufficiency      COPD (chronic obstructive pulmonary disease) (H)      COPD (H) 5/13/2013     Coronary artery disease     2-05[place]-CAD-taxus stentx2 2.5-12,2.5-12 in LADp and LADm, 80%nonDomRCA-LcxDom-mild,EF60%     Hyperlipidemia      Hypertension      IBS (irritable bowel syndrome)      Irritable bowel      Mumps      Osteoporosis      Palpitations      Panic attack     questionable diagnosis     Pulmonary fibrosis (H)     do not use amiodarone     Shortness of breath      Sick sinus syndrome - s/p PPM 2003 (H) 12/16/2017     SSS (sick sinus syndrome) (H)     DDD pacer (see surgery history section)     Vertigo        PAST SURGICAL HISTORY:  Past Surgical History:   Procedure Laterality Date     APPENDECTOMY  1956     CARDIAC SURGERY      PPM, 2 STENTS2-05[place]-CAD-taxus stentx2 2.5-12,2.5-12 in LADp and LADm, 80%nonDomRCA-LcxDom-mild,EF60%     CORONARY ANGIOGRAPHY ADULT ORDER  7/1994    mild CAD,Normal LV function, No Mitral regurgitation, Prox LAD 30% stenosis. RCA prox and mid, 20-30%     ESOPHAGOSCOPY, GASTROSCOPY, DUODENOSCOPY (EGD), COMBINED N/A 8/19/2015    Procedure: COMBINED ESOPHAGOSCOPY, GASTROSCOPY, DUODENOSCOPY (EGD), BIOPSY SINGLE OR MULTIPLE;  Surgeon: Genevieve Leon MD;  Location: [facility]     H LEAD REVISION DUAL  4/2003    Revised in [place]-due to diaphram stim (original pacer placed in [place])     H LEAD REVISION DUAL  7/2/2014    Correction of reversal of A and V leads in Header     HEART CATH, ANGIOPLASTY  02/2005    LEIGH ANN mid and proximal LAD, ongoing 80% RCA; mild circumflex     HERNIA REPAIR Left 1991    [facility]     IMPLANT PACEMAKER  2/19/2003    Placed in [place] for sick sinus syndrome     REPLACE PACEMAKER GENERATOR  6/27/2013    Dual-chamber pacemaker by Dr SETH MCKEON  HISTORY:  Family History   Problem Relation Age of Onset     Heart Disease Father          age 84     Neurologic Disorder Mother         dementia     Gastrointestinal Disease Daughter         liver transplant     Crohn's Disease Daughter        SOCIAL HISTORY:  Social History     Socioeconomic History     Marital status:      Spouse name: None     Number of children: 3     Years of education: None     Highest education level: None   Occupational History     Employer: RETIRED   Social Needs     Financial resource strain: None     Food insecurity:     Worry: None     Inability: None     Transportation needs:     Medical: None     Non-medical: None   Tobacco Use     Smoking status: Former Smoker     Types: Cigarettes     Last attempt to quit: 1987     Years since quittin.0     Smokeless tobacco: Never Used   Substance and Sexual Activity     Alcohol use: No     Alcohol/week: 0.0 oz     Drug use: No     Sexual activity: Never     Partners: Male   Lifestyle     Physical activity:     Days per week: None     Minutes per session: None     Stress: None   Relationships     Social connections:     Talks on phone: None     Gets together: None     Attends Sikh service: None     Active member of club or organization: None     Attends meetings of clubs or organizations: None     Relationship status: None     Intimate partner violence:     Fear of current or ex partner: None     Emotionally abused: None     Physically abused: None     Forced sexual activity: None   Other Topics Concern     Parent/sibling w/ CABG, MI or angioplasty before 65F 55M? Yes      Service Not Asked     Blood Transfusions Not Asked     Caffeine Concern Yes     Comment: decaf - some 1/2 caff     Occupational Exposure Not Asked     Hobby Hazards Not Asked     Sleep Concern Yes     Comment: nocturia every 2 hours     Stress Concern Not Asked     Weight Concern Not Asked     Special Diet No     Back Care Not Asked     Exercise  "No     Comment: some walking in the house     Bike Helmet Not Asked     Seat Belt Not Asked     Self-Exams Not Asked   Social History Narrative     None       Review of Systems:  Skin:  Negative     Eyes:  Positive for glasses  ENT:  Positive for hearing loss  Respiratory:  Positive for dyspnea on exertion;shortness of breath  Cardiovascular:    palpitations;Positive for;fatigue;dizziness;chest pain  Gastroenterology: Negative    Genitourinary:       Musculoskeletal:  Positive for arthritis  Neurologic:  Negative    Psychiatric:  Positive for sleep disturbances  Heme/Lymph/Imm:  Negative    Endocrine:  Negative      Physical Exam:  Vitals: /68   Pulse 90   Ht 1.6 m (5' 2.99\")   Wt 70.3 kg (155 lb)   LMP  (LMP Unknown)   BMI 27.46 kg/m       Constitutional:  cooperative, alert and oriented, well developed, well nourished, in no acute distress        Skin:  warm and dry to the touch, no apparent skin lesions or masses noted   pacemaker incision in the left infraclavicular area was well-healed      Head:  normocephalic, no masses or lesions        Eyes:  pupils equal and round, conjunctivae and lids unremarkable, sclera white, no xanthalasma, EOMS intact, no nystagmus        Lymph:No Cervical lymphadenopathy present;No thyromegaly     ENT:  no pallor or cyanosis, dentition good        Neck:  carotid pulses are full and equal bilaterally, JVP normal, no carotid bruit        Respiratory:       few wheeze, no rales    Cardiac:   irregularly irregular rhythm   no presence of murmur                        1+             1+   right femoral bruit (-) left femoral bruit (-)      GI:  abdomen soft, non-tender, BS normoactive, no mass, no HSM, no bruits        Extremities and Muscular Skeletal:  no deformities, clubbing, cyanosis, erythema observed;no edema              Neurological:  no gross motor deficits   needs assistance to get to exam table, uses walker    Psych:  Alert and Oriented x 3      Recent Lab " Results:  LIPID RESULTS:  Lab Results   Component Value Date    CHOL 161 12/11/2017    HDL 85 12/11/2017    LDL 54 12/11/2017    TRIG 109 12/11/2017    CHOLHDLRATIO 2.9 03/15/2015       LIVER ENZYME RESULTS:  Lab Results   Component Value Date    AST 29 04/16/2018    ALT 22 04/16/2018       CBC RESULTS:  Lab Results   Component Value Date    WBC 6.1 04/06/2019    RBC 4.43 04/06/2019    HGB 13.5 04/06/2019    HCT 41.2 04/06/2019    MCV 93 04/06/2019    MCH 30.5 04/06/2019    MCHC 32.8 04/06/2019    RDW 14.4 04/06/2019     04/06/2019       BMP RESULTS:  Lab Results   Component Value Date     04/06/2019    POTASSIUM 4.8 04/06/2019    CHLORIDE 104 04/06/2019    CO2 29 04/06/2019    ANIONGAP 6 04/06/2019     (H) 04/06/2019    BUN 33 (H) 04/06/2019    CR 1.45 (H) 04/06/2019    GFRESTIMATED 31 (L) 04/06/2019    GFRESTBLACK 36 (L) 04/06/2019    TRI 9.2 04/06/2019        A1C RESULTS:  Lab Results   Component Value Date    A1C 6.1 (H) 04/30/2014       INR RESULTS:  Lab Results   Component Value Date    INR 3.4 (A) 05/16/2019    INR 3.1 (A) 04/18/2019    INR 3.02 (H) 04/06/2019    INR 1.30 (H) 10/23/2018           CC  No referring provider defined for this encounter.    Thank you for allowing me to participate in the care of your patient.      Sincerely,     Collins Horton MD     [facility]    cc:   No referring provider defined for this encounter.

## 2019-05-22 NOTE — PROGRESS NOTES
"     Speech-Language Pathology Department  Clinical Dysphagia Evaluation  [facility]  [phone]    05/21/19 1500   General Information   Type Of Visit Initial   Start Of Care Date 05/21/19   Referring Physician Dr. Arnel Garcia   Orders Evaluate And Treat   Medical Diagnosis Dysphagia   Onset Of Illness/injury Or Date Of Surgery 04/16/19  (per MD order date)   Pertinent History of Current Problem/OT: Additional Occupational Profile Info Ms. Mercado is a 89 year old female with a medical history of COPD, CAD, a-fib, and hyperlipidemia.  She is here today for an outpatient clinical swallow evaluation with SLP due to difficulty with swallow over the last 2 months.  Ms. Mercado reports difficulty with swallowing dry textures like bread and potatoes which results in what she describes as \"sticking\" in her esophagus and \"piles up\" near UES, and then she develops severe pain and will stop eating until the pain subsides.  Usually if foods are moist she is able to swallow more easily.  Because of this problem with swallowing she is avoiding certain foods and reports not having much of an appetite. She was recently [facility] in early April 2019 and discharged home the same day.    Respiratory Status Room air   Prior Level Of Function Swallowing   Prior Level Of Function Comment The patient does not have a history of dysphagia and has been eating a regular diet with thin liquids.    Patient Role/employment History Retired   Living Environment Apartment/condo  (Senior apartment)   General Observations Patient is pleasant and cooperative   Patient/family Goals Patient would like to determine why she is having difficulty swallowing certain foods.   Pain Assessment   Pain Reported No   Fall Risk Screen   Fall screen completed by SLP   Have you fallen 2 or more times in the past year? No   Have you fallen and had an injury in the past year? No "   Is patient a fall risk? No   Fall screen comments Patient uses a wheeled walker   Abuse Screen (yes response referral indicated)   Feels Unsafe at Home or Work/School no   Feels Threatened by Someone no   Does Anyone Try to Keep You From Having Contact with Others or Doing Things Outside Your Home? no   Physical Signs of Abuse Present no   Clinical Swallow Evaluation   Oral Musculature generally intact   Structural Abnormalities none present   Dentition present and adequate   Mucosal Quality good   Mandibular Strength and Mobility intact   Oral Labial Strength and Mobility WFL   Lingual Strength and Mobility WFL   Velar Elevation intact   Buccal Strength and Mobility intact   Laryngeal Function Cough;Throat clear;Swallow;Voicing initiated;Dry swallow palpated   Oral Musculature Comments Within normal limits   Additional Documentation Yes   Swallow Eval   Feeding Assistance no assistance needed   Clinical Swallow Eval: Thin Liquid Texture Trial   Mode of Presentation, Thin Liquids cup;self-fed   Volume of Liquid or Food Presented 3 oz water via cup   Oral Phase of Swallow WFL   Pharyngeal Phase of Swallow intact;throat clearing   Diagnostic Statement No overt s/s of aspiration.  Pt clears her throat betwee trials but reports that is out of habit.    Clinical Swallow Eval: Puree Solid Texture Trial   Mode of Presentation, Puree self-fed;cup;spoon   Volume of Puree Presented 3 tbsp of applesauce   Oral Phase, Puree WFL   Pharyngeal Phase, Puree throat clearing;intact   Diagnostic Statement No clinical s/s of aspiration or c/o pharyngeal residue.    Clinical Swallow Eval: Semisolid Texture Trial   Mode of Presentation, Semisolid self-fed;cup   Volume of Semisolid Food Presented 1/2 cup mixed fruit cocktail   Oral Phase, Semisolid WFL   Pharyngeal Phase, Semisolid intact;throat clearing   Diagnostic Statement No clinical s/s of aspiration or c/o pharyngeal residue.    Clinical Swallow Eval: Solid Food Texture Trial  "  Mode of Presentation, Solid self-fed   Volume of Solid Food Presented 1 solid kvng cracker square   Oral Phase, Solid WFL   Pharyngeal Phase, Solid intact;throat clearing   Successful Strategies Trialed During Procedure, Solid other (see comments)  (alternate liquid with solid)   Diagnostic Statement No clinical s/s of aspiration.  Pt needing liquids to help with swallow and \"clear\" residue as patient reports slight \"sticking\" after swallowing.   Swallow Compensations   Swallow Compensations Alternate viscosity of consistencies;Reduce amounts;Multiple swallow   Results Other (see comments)  (Recommend Videoswallow study)   Educational Assessment   Barriers to Learning No barriers   Preferred Learning Style Listening;Reading;Demonstration;Pictures/video   Esophageal Phase of Swallow   Patient reports or presents with symptoms of esophageal dysphagia Yes   Esophageal comments Patient reports problems with esophageal dysphagia, recommend referral for esophagram and/or GI medicine after Videoswallow study   General Therapy Interventions   Planned Therapy Interventions Dysphagia Treatment   Dysphagia treatment Modified diet education;Compensatory strategies for swallowing;Instruction of safe swallow strategies   Intervention Comments Dysphagia treatment only if indicated after results of VFSS.    Swallow Eval: Clinical Impressions   Skilled Criteria for Therapy Intervention Skilled criteria met.  Treatment indicated.   Functional Assessment Scale (FAS) 6   Dysphagia Outcome Severity Scale (RIMA) Level 6 - RIMA   Treatment Diagnosis mild pharyngeal dysphagia   Diet texture recommendations Regular diet;Thin liquids  (moist foods)   Recommended Feeding/Eating Techniques alternate between small bites and sips of food/liquid;small sips/bites;hard swallow w/ each bite or sip;maintain upright posture during/after eating for 30 mins  (Complete Videoswallow study)   Rehab Potential good, to achieve stated therapy goals "   Demonstrates Need for Referral to Another Service   (Videoswallow study)   Therapy Frequency other (see comments)   Predicted Duration of Therapy Intervention (days/wks) pending results of VFSS, if indicated recommend 1x/week x 4 weeks   Risks and Benefits of Treatment have been explained. Yes   Patient, family and/or staff in agreement with Plan of Care Yes   Clinical Impression Comments Based on results of clinical dysphagia evaluation, patient presents with mild pharyngeal dysphagia.  Her oral swallow is within normal limits with adequate mastication and without pocketing or oral residue.  Clinically, her pharyngeal swallow is timely without s/s of aspiration.  She demonstrates frequent throat clearing which she attributes to dry mouth and a habit.  There is clinical s/s of pharyngeal residue per patient report and need for liquid wash after swallowing solid textures.  Based on results of this evaluation and patient report of difficulty with swallowing solid dry textures like breads and potatoes, it is recommended that she complete a videoswallow study with esophagram to fully assess her oral-pharyngeal swallow and further determine etiology of dysphagia.  May also consider GI consult to fully assess esophageal swallow based on her swallowing difficulties after completion of videoswallow study with MD order.      Swallow Goal 1   Goal Identifier LTG    Goal Description Patient will tolerate regular textures with thin liquids without s/s of aspiration or c/o pharyngeal residue   Target Date 08/21/19   Swallow Goal 2   Goal Identifier STG 1: Swallowing   Goal Description Patient will verbalize understanding and demonstrate accuracy of completion of safe swallow strategies during therapy sessions with at least 95% accuracy.   Target Date 07/19/19   Swallow Goal 3   Goal Identifier STG 2: Swallowing   Goal Description Patient will tolerate therapeutic po trials of various solid textures without s/s of aspiration or  c/o pharyngeal residue or pain during therapy sessions with SLP.   Target Date 07/19/19   Swallow Goal 4   Goal Identifier STG 3: Swallowing   Goal Description Patient will verbalize understanding of results of today's evaluation including follow up recommendations, safe swallow strategies, oral-pharyngeal swallow function and aspiration risks.   Target Date 07/21/19   Total Session Time   SLP Eval: oral/pharyngeal swallow function, clinical minutes (84402) 30   Total Evaluation Time 30 min    Therapy Certification   Certification date from 05/21/19   Certification date to 07/19/19   Medical Diagnosis dysphagia   Thank you for the referral of this patient.  If you have any questions regarding the information in this report, please feel free to contact me at [phone].     Elizabeth Last MA, CCC-SLP  Speech-Language Pathologist  [address] [address]  Fax:  [phone]

## 2019-05-22 NOTE — LETTER
"2019      Arnel Garcia MD, MD  [address] [place] [address]      RE: Leonor Mercado       Dear Colleague,    I had the pleasure of seeing Leonor Mercado in the [facility].    Service Date: 2019      Arnel Garcia MD   [facility]    [address] [place] [address]      RE: Leonor Mercado    MRN: [id number]   : 1926      Dear Dr. Garcia:       I had the pleasure of following up on our mutual patient Leonor Mercado, a delightful, surprisingly young 89 year old accompanied by her daughter.  She has a history of coronary artery disease with stents placed more than a decade ago in [place]; the details were never known to us.  Her echocardiogram has been normal with normal LV size and function.  She was hospitalized overnight at [facility] last month for chest pain.  She has had 1-2 more episodes since that time with variable relief with nitroglycerin.  One time it occurred at night; the other time it occurred during the day.  It should be noted that she has new onset \"food sticking\" in her mid chest.  I am actually more suspicious this is esophageal spasm, and in fact, she is going to be seen by Gastroenterology to have a barium swallow in the near future.  She also has significant COPD, and there was a whole question about if some of that was heart failure, and we have discussed that previously.  We reviewed her echocardiogram, the tests that were done at the hospital and her creatinine, which is slightly high.  In the hospital, she refused stress testing and an angiogram, but she has continued to have episodes since the hospital.  She does not want a Lexiscan nuclear stress test because it makes her breathing so much worse.  The better option probably would be an angiogram to find out if she has coronary artery disease or not.  We believe in [place] in , she had some stenting to her LAD proximal and mid, " and she had an 80% nondominant right, and the circumflex, we believe, was normal, but we do not have the actual reports.  I am going to go ahead and order a dobutamine stress echo on diltiazem, although that is not the ideal test.  The family did not want to go through an angiogram.  They understood that there is a slightly higher risk because she would have to be off Coumadin for that, and they said at age 89, they do not want to be too aggressive, but they want to know if we are sitting on a significant cardiac problem or not.  We will plan on scheduling the dobutamine stress echo at [facility] in the upcoming week, and I encouraged her to see her gastroenterologist and follow through on that.  We will have her call our office for the results of that test after it is complete, and I will see her back in 6 months for blood work at a routine office visit.      Thank you for allowing me to see Ms. Marcus.        Today's visit was 30 minutes, greater than 50% counseling.      Sincerely,      MD JIMMY Clemente MD             D: 2019   T: 2019   MT: asher      Name:     AILYN MARCUS   MRN:      [id number]        Account:      [id number]   :      1926           Service Date: 2019      Document: [id number]         Outpatient Encounter Medications as of 2019   Medication Sig Dispense Refill     Acetaminophen (TYLENOL PO) Take 1,000 mg by mouth nightly as needed        albuterol (PROAIR HFA/PROVENTIL HFA/VENTOLIN HFA) 108 (90 Base) MCG/ACT inhaler Inhale 2 puffs into the lungs every 6 hours as needed for shortness of breath / dyspnea or wheezing 18 g 3     ASPIRIN NOT PRESCRIBED (INTENTIONAL) Please choose reason not prescribed, below 0 each 0     calcium carbonate (CALCIUM CARBONATE) 600 MG TABS tablet Take 1 tablet by mouth daily        Cholecalciferol (VITAMIN D PO) Take 800 Units by mouth daily        diltiazem (DILTIAZEM CD) 240 MG 24 hr capsule Take 1  capsule (240 mg) by mouth daily 90 capsule 3     Docusate Calcium (STOOL SOFTENER PO) Take 1 tablet by mouth At Bedtime        fluticasone-vilanterol (BREO ELLIPTA) 200-25 MCG/INH oral inhaler Inhale 1 puff into the lungs daily 1 Inhaler 3     ipratropium - albuterol 0.5 mg/2.5 mg/3 mL (DUONEB) 0.5-2.5 (3) MG/3ML neb solution Take 1 vial (3 mLs) by nebulization every 6 hours as needed for shortness of breath / dyspnea or wheezing 360 mL 1     isosorbide mononitrate (IMDUR) 30 MG 24 hr tablet Take 1 tablet (30 mg) by mouth daily 30 tablet 0     Lactobacillus (PROBIOTIC ACIDOPHILUS) TABS Take 1 tablet by mouth daily        losartan (COZAAR) 25 MG tablet Take 1 tablet (25 mg) by mouth daily 90 tablet 1     MELATONIN PO Take 5 mg by mouth nightly as needed        mirtazapine (REMERON) 15 MG tablet Take 1 tablet (15 mg) by mouth At Bedtime 90 tablet 3     Multiple Vitamins-Minerals (OCUVITE PO) Take 1 capsule by mouth daily.       nitroGLYcerin (NITROSTAT) 0.4 MG sublingual tablet Place 1 tablet (0.4 mg) under the tongue every 5 minutes as needed for chest pain 25 tablet 1     polyethylene glycol (MIRALAX/GLYCOLAX) Packet Take 1 packet by mouth daily        pravastatin (PRAVACHOL) 40 MG tablet Take 1 tablet (40 mg) by mouth daily 90 tablet 3     pravastatin (PRAVACHOL) 40 MG tablet TAKE 1 TABLET BY MOUTH ONCE DAILY 90 tablet 3     Tiotropium Bromide Monohydrate (SPIRIVA HANDIHALER IN) Inhale 1 puff into the lungs daily        UNABLE TO FIND Apply 1 drop to eye daily as needed MEDICATION NAME:  Eye drop samples from MD, uses PRN in left eye.  Unsure what it's for.       warfarin (COUMADIN) 2 MG tablet Take 2 mg by mouth every evening       No facility-administered encounter medications on file as of 5/22/2019.                Again, thank you for allowing me to participate in the care of your patient.      Sincerely,    Collins Horton MD     [facility]

## 2019-05-22 NOTE — PROGRESS NOTES
[facility]          OUTPATIENT SWALLOW  EVALUATION  PLAN OF TREATMENT FOR OUTPATIENT REHABILITATION  (COMPLETE FOR INITIAL CLAIMS ONLY)  Patient's Last Name, First Name, M.I.  YOB: 1926  Leonor Mercado     Provider's Name   [facility]   Medical Record No.  [id number]     Start of Care Date:  05/21/19   Onset Date:  04/16/19(per MD order date)   Type:     ___PT   ____OT  ___X_SLP Medical Diagnosis:  dysphagia     Treatment Diagnosis:  mild pharyngeal dysphagia Visits from SOC:  1     _________________________________________________________________________________  Plan of Treatment/Functional Goals:  Planned Therapy Interventions: Dysphagia Treatment  Dysphagia treatment: Modified diet education, Compensatory strategies for swallowing, Instruction of safe swallow strategies              Intervention Comments: Dysphagia treatment only if indicated after results of VFSS.       Goals   1. Goal Identifier: LTG        Goal Description: Patient will tolerate regular textures with thin liquids without s/s of aspiration or c/o pharyngeal residue       Target Date: 08/21/19           2. Goal Identifier: STG 1: Swallowing       Goal Description: Patient will verbalize understanding and demonstrate accuracy of completion of safe swallow strategies during therapy sessions with at least 95% accuracy.       Target Date: 07/19/19           3. Goal Identifier: STG 2: Swallowing       Goal Description: Patient will tolerate therapeutic po trials of various solid textures without s/s of aspiration or c/o pharyngeal residue or pain during therapy sessions with SLP.       Target Date: 07/19/19           4. Goal Identifier: STG 3: Swallowing       Goal Description: Patient will verbalize understanding of results of today's evaluation including follow up recommendations, safe swallow strategies,  oral-pharyngeal swallow function and aspiration risks.       Target Date: 07/21/19   Therapy Frequency: other (see comments)  Predicted Duration of Therapy Intervention (days/wks): pending results of VFSS, if indicated recommend 1x/week x 4 weeks    Elizabeth Last, SLP       I CERTIFY THE NEED FOR THESE SERVICES FURNISHED UNDER        THIS PLAN OF TREATMENT AND WHILE UNDER MY CARE     (Physician co-signature of this document indicates review and certification of the therapy plan).                  Certification date from: 05/21/19 Certification date to: 07/19/19          Referring Physician: Dr. Arnel Garcia    Initial Assessment        See Epic Evaluation Start Of Care Date: 05/21/19

## 2019-05-28 ENCOUNTER — HOSPITAL ENCOUNTER (OUTPATIENT)
Dept: CARDIOLOGY | Facility: CLINIC | Age: 84
End: 2019-05-28
Attending: INTERNAL MEDICINE | Admitting: INTERNAL MEDICINE
Payer: MEDICARE

## 2019-05-28 ENCOUNTER — HOSPITAL ENCOUNTER (OUTPATIENT)
Facility: CLINIC | Age: 84
Discharge: HOME | End: 2019-05-28
Attending: INTERNAL MEDICINE | Admitting: INTERNAL MEDICINE
Payer: MEDICARE

## 2019-05-28 ENCOUNTER — TELEPHONE (OUTPATIENT)
Dept: CARDIOLOGY | Facility: CLINIC | Age: 84
End: 2019-05-28

## 2019-05-28 VITALS
HEART RATE: 89 BPM | OXYGEN SATURATION: 97 % | RESPIRATION RATE: 18 BRPM | SYSTOLIC BLOOD PRESSURE: 139 MMHG | DIASTOLIC BLOOD PRESSURE: 83 MMHG

## 2019-05-28 DIAGNOSIS — R07.9 CHEST PAIN: ICD-10-CM

## 2019-05-28 DIAGNOSIS — I25.10 CORONARY ARTERY DISEASE INVOLVING NATIVE CORONARY ARTERY OF NATIVE HEART WITHOUT ANGINA PECTORIS: ICD-10-CM

## 2019-05-28 PROCEDURE — 93325 DOPPLER ECHO COLOR FLOW MAPG: CPT | Mod: 26 | Performed by: INTERNAL MEDICINE

## 2019-05-28 PROCEDURE — 40000264 ECHO STRESS ECHOCARDIOGRAM

## 2019-05-28 PROCEDURE — 93018 CV STRESS TEST I&R ONLY: CPT | Performed by: INTERNAL MEDICINE

## 2019-05-28 PROCEDURE — 40000855 ZZH STATISTIC ECHO STRESS OR NM NPI

## 2019-05-28 PROCEDURE — 25000128 H RX IP 250 OP 636: Performed by: INTERNAL MEDICINE

## 2019-05-28 PROCEDURE — 93350 STRESS TTE ONLY: CPT | Mod: 26 | Performed by: INTERNAL MEDICINE

## 2019-05-28 PROCEDURE — 93016 CV STRESS TEST SUPVJ ONLY: CPT | Performed by: INTERNAL MEDICINE

## 2019-05-28 PROCEDURE — 25500064 ZZH RX 255 OP 636: Performed by: INTERNAL MEDICINE

## 2019-05-28 PROCEDURE — 93321 DOPPLER ECHO F-UP/LMTD STD: CPT | Mod: 26 | Performed by: INTERNAL MEDICINE

## 2019-05-28 RX ORDER — DOBUTAMINE HYDROCHLORIDE 200 MG/100ML
10-50 INJECTION INTRAVENOUS CONTINUOUS
Status: ACTIVE | OUTPATIENT
Start: 2019-05-28 | End: 2019-05-28

## 2019-05-28 RX ORDER — METOPROLOL TARTRATE 1 MG/ML
1-20 INJECTION, SOLUTION INTRAVENOUS
Status: ACTIVE | OUTPATIENT
Start: 2019-05-28 | End: 2019-05-28

## 2019-05-28 RX ORDER — SODIUM CHLORIDE 9 MG/ML
INJECTION, SOLUTION INTRAVENOUS CONTINUOUS
Status: ACTIVE | OUTPATIENT
Start: 2019-05-28 | End: 2019-05-28

## 2019-05-28 RX ORDER — ATROPINE SULFATE 0.1 MG/ML
.2-2 INJECTION INTRAVENOUS
Status: ACTIVE | OUTPATIENT
Start: 2019-05-28 | End: 2019-05-28

## 2019-05-28 RX ORDER — ISOSORBIDE MONONITRATE 30 MG/1
30 TABLET, EXTENDED RELEASE ORAL DAILY
Qty: 90 TABLET | Refills: 3 | Status: SHIPPED | OUTPATIENT
Start: 2019-05-28 | End: 2020-05-14

## 2019-05-28 RX ADMIN — DOBUTAMINE IN DEXTROSE 10 MCG/KG/MIN: 200 INJECTION, SOLUTION INTRAVENOUS at 13:58

## 2019-05-28 RX ADMIN — HUMAN ALBUMIN MICROSPHERES AND PERFLUTREN 7 ML: 10; .22 INJECTION, SOLUTION INTRAVENOUS at 14:30

## 2019-05-28 NOTE — TELEPHONE ENCOUNTER
Routing refill request to provider for review/approval because:  New medication at 4/16/2019 OV  Pt to follow up in July 2019  Yoana Ac, RN, BSN

## 2019-05-28 NOTE — TELEPHONE ENCOUNTER
"Requested Prescriptions   Pending Prescriptions Disp Refills     isosorbide mononitrate (IMDUR) 30 MG 24 hr tablet  Last Written Prescription Date:  4/16/2019  Last Fill Quantity: 30,  # refills: 0   Last office visit: 4/16/2019 with prescribing provider:  Arnel Garcia     Future Office Visit:     30 tablet 0     Sig: Take 1 tablet (30 mg) by mouth daily       Nitrates Passed - 5/28/2019 11:36 AM        Passed - Blood pressure under 140/90 in past 12 months     BP Readings from Last 3 Encounters:   05/22/19 132/68   04/16/19 118/70   04/06/19 140/74                 Passed - Pt is not on erectile dysfunction medications        Passed - Recent (12 mo) or future (30 days) visit within the authorizing provider's specialty     Patient had office visit in the last 12 months or has a visit in the next 30 days with authorizing provider or within the authorizing provider's specialty.  See \"Patient Info\" tab in inbasket, or \"Choose Columns\" in Meds & Orders section of the refill encounter.              Passed - Medication is active on med list        Passed - Patient is age 18 or older          "

## 2019-05-28 NOTE — TELEPHONE ENCOUNTER
Called Pt left message asking she call for test result:  Dobutamin stress test-   Interpretation Summary  There was a maximum 1.0mm ST segment depression in the inferior lead(s).  No chest pain and only borderline ST change with dobutamine  Normal resting wall motion and no stress-induced wall motion abnormality.  This was a normal stress echocardiogram with no evidence of stress-induced  Ischemia.  RAIN Ceja RN

## 2019-06-05 DIAGNOSIS — I48.20 CHRONIC ATRIAL FIBRILLATION (H): ICD-10-CM

## 2019-06-05 DIAGNOSIS — Z79.01 LONG TERM CURRENT USE OF ANTICOAGULANT THERAPY: Primary | ICD-10-CM

## 2019-06-06 RX ORDER — WARFARIN SODIUM 2 MG/1
TABLET ORAL
Qty: 90 TABLET | Refills: 1 | Status: SHIPPED | OUTPATIENT
Start: 2019-06-06 | End: 2019-06-19

## 2019-06-06 NOTE — TELEPHONE ENCOUNTER
"Requested Prescriptions   Pending Prescriptions Disp Refills     warfarin (COUMADIN) 2 MG tablet [Pharmacy Med Name: WARFARIN 2MG        TAB]     Last Written Prescription Date:  11/23/18  Last Fill Quantity: 90,  # refills: 1   Last office visit: 4/16/2019 with prescribing provider:  Arnel Garcia MD   Future Office Visit:     90 tablet 1     Sig: TAKE 1 TABLET BY MOUTH ONCE DAILY OR AS DIRECTED BY INR CLINIC       Vitamin K Antagonists Failed - 6/6/2019 11:44 AM        Failed - INR is within goal in the past 6 weeks     Confirm INR is within goal in the past 6 weeks.     Recent Labs   Lab Test 05/16/19  1108   INR 3.4*                       Passed - Recent (12 mo) or future (30 days) visit within the authorizing provider's specialty     Patient had office visit in the last 12 months or has a visit in the next 30 days with authorizing provider or within the authorizing provider's specialty.  See \"Patient Info\" tab in inbasket, or \"Choose Columns\" in Meds & Orders section of the refill encounter.              Passed - Medication is active on med list        Passed - Patient is 18 years of age or older        Passed - Patient is not pregnant        Passed - No positive pregnancy on file in past 12 months          "

## 2019-06-11 NOTE — LETTER
"Pt to ER via EMS from airport after being called for confusion and HA; pt presents to ER AAOx4 states "they told me it's 2019" pt reports having generalized HA, popping in ears and feelings of being trapped in a tube upon descend while on airplane; states she asked for a drink of water prior to episode of confusion and was denied; pt denies use of alcohol or drugs; pt currently appears mildly anxious, AAOx4; cardiac monitor, BP, and pulse ox applied; no acute distress noted; no neuro deficits noted; will monitor closely  " [facility]  [address], [address]  [place] [address]  [phone]        November 22, 2017    Leonor Mercado  [address]  [facility] [address]            Dear Ms. Leonor Mercado:      The recent urine test did not show signs of infection.    Sincerely,    Dannielle Young MD  Internal Medicine

## 2019-06-12 ENCOUNTER — ANCILLARY PROCEDURE (OUTPATIENT)
Dept: CARDIOLOGY | Facility: CLINIC | Age: 84
End: 2019-06-12
Attending: INTERNAL MEDICINE
Payer: MEDICARE

## 2019-06-12 DIAGNOSIS — Z95.0 CARDIAC PACEMAKER IN SITU: ICD-10-CM

## 2019-06-12 PROCEDURE — 93294 REM INTERROG EVL PM/LDLS PM: CPT | Performed by: INTERNAL MEDICINE

## 2019-06-12 PROCEDURE — 93296 REM INTERROG EVL PM/IDS: CPT | Performed by: INTERNAL MEDICINE

## 2019-06-13 ENCOUNTER — ANTICOAGULATION THERAPY VISIT (OUTPATIENT)
Dept: NURSING | Facility: CLINIC | Age: 84
End: 2019-06-13
Payer: MEDICARE

## 2019-06-13 DIAGNOSIS — I48.20 CHRONIC ATRIAL FIBRILLATION (H): ICD-10-CM

## 2019-06-13 DIAGNOSIS — Z79.01 LONG TERM CURRENT USE OF ANTICOAGULANT THERAPY: Primary | ICD-10-CM

## 2019-06-13 LAB — INR POINT OF CARE: 1.9 (ref 0.86–1.14)

## 2019-06-13 PROCEDURE — 36416 COLLJ CAPILLARY BLOOD SPEC: CPT

## 2019-06-13 PROCEDURE — 85610 PROTHROMBIN TIME: CPT | Mod: QW

## 2019-06-13 PROCEDURE — 99207 ZZC NO CHARGE NURSE ONLY: CPT

## 2019-06-13 NOTE — PROGRESS NOTES
"ANTICOAGULATION FOLLOW-UP CLINIC VISIT    Patient Name:  Leonor Mercado  Date:  2019  Contact Type:  Face to Face  Patient is accompanied in the office by her daughter.    SUBJECTIVE:  Patient Findings     Positives:   Change in health    Comments:   She reports that she had abdominal pain and bloating yesterday.  Lasted about 8 hours, but then resolved.  Urinating ok, and had a BM after using a suppository.    Patient denies: any missed doses,   increased intake of green vegetables,   medication changes, bleeding/bruising,   or signs/symptoms of a clot.          Clinical Outcomes     Comments:   She reports that she had abdominal pain and bloating yesterday.  Lasted about 8 hours, but then resolved.  Urinating ok, and had a BM after using a suppository.    Patient denies: any missed doses,   increased intake of green vegetables,   medication changes, bleeding/bruising,   or signs/symptoms of a clot.             OBJECTIVE    INR Protime   Date Value Ref Range Status   2019 1.9 (A) 0.86 - 1.14 Final       ASSESSMENT / PLAN  INR assessment SUB    Recheck INR In: 4 WEEKS    INR Location Clinic      Anticoagulation Summary  As of 2019    INR goal:   2.0-3.0   TTR:   66.4 % (3.2 y)   INR used for dosin.9! (2019)   Warfarin maintenance plan:   1 mg (2 mg x 0.5) every Mon; 2 mg (2 mg x 1) all other days   Full warfarin instructions:   1 mg every Mon; 2 mg all other days   Weekly warfarin total:   13 mg   No change documented:   Ebony Doshi RN   Plan last modified:   Ebony Doshi RN (2019)   Next INR check:   2019   Priority:   INR   Target end date:       Indications    Chronic atrial fibrillation (H) [I48.2]  Long term current use of anticoagulant therapy [Z79.01]             Anticoagulation Episode Summary     INR check location:       Preferred lab:       Send INR reminders to:   [facility]    Comments:   2mg tabs - nancy / 1st name pronounced \"ondray\" / APPT " CARD / comes on the bus on Tues or daughter brings on Thurs      Anticoagulation Care Providers     Provider Role Specialty Phone number    Arnel Garcia MD  Internal Medicine [phone]            See the Encounter Report to view Anticoagulation Flowsheet and Dosing Calendar (Go to Encounters tab in chart review, and find the Anticoagulation Therapy Visit)    INR is sub-therapeutic today at 1.9. Patient will continue same maintenance dose.   Follow up in 4 weeks or sooner if needed.        Ebony Doshi RN

## 2019-06-17 ENCOUNTER — TELEPHONE (OUTPATIENT)
Dept: PEDIATRICS | Facility: CLINIC | Age: 84
End: 2019-06-17

## 2019-06-17 DIAGNOSIS — R13.10 DYSPHAGIA, UNSPECIFIED TYPE: Primary | ICD-10-CM

## 2019-06-19 ENCOUNTER — HOSPITAL ENCOUNTER (INPATIENT)
Facility: CLINIC | Age: 84
LOS: 5 days | Discharge: NURSING, REHAB OR LONG-TERM CARE | DRG: 871 | End: 2019-06-24
Attending: EMERGENCY MEDICINE | Admitting: INTERNAL MEDICINE
Payer: MEDICARE

## 2019-06-19 ENCOUNTER — APPOINTMENT (OUTPATIENT)
Dept: GENERAL RADIOLOGY | Facility: CLINIC | Age: 84
DRG: 871 | End: 2019-06-19
Attending: EMERGENCY MEDICINE
Payer: MEDICARE

## 2019-06-19 DIAGNOSIS — J44.9 CHRONIC OBSTRUCTIVE PULMONARY DISEASE, UNSPECIFIED COPD TYPE (H): ICD-10-CM

## 2019-06-19 DIAGNOSIS — J15.9 COMMUNITY ACQUIRED BACTERIAL PNEUMONIA: ICD-10-CM

## 2019-06-19 DIAGNOSIS — J18.9 PNEUMONIA OF LEFT LOWER LOBE DUE TO INFECTIOUS ORGANISM: ICD-10-CM

## 2019-06-19 DIAGNOSIS — R52 PAIN: ICD-10-CM

## 2019-06-19 DIAGNOSIS — K59.00 CONSTIPATION, UNSPECIFIED CONSTIPATION TYPE: Primary | ICD-10-CM

## 2019-06-19 DIAGNOSIS — I48.91 ATRIAL FIBRILLATION WITH RAPID VENTRICULAR RESPONSE (H): ICD-10-CM

## 2019-06-19 LAB
ALBUMIN SERPL-MCNC: 3.6 G/DL (ref 3.4–5)
ALBUMIN UR-MCNC: 20 MG/DL
ALP SERPL-CCNC: 84 U/L (ref 40–150)
ALT SERPL W P-5'-P-CCNC: 20 U/L (ref 0–50)
ANION GAP SERPL CALCULATED.3IONS-SCNC: 8 MMOL/L (ref 3–14)
APPEARANCE UR: CLEAR
APTT PPP: 45 SEC (ref 22–37)
AST SERPL W P-5'-P-CCNC: 22 U/L (ref 0–45)
BASOPHILS # BLD AUTO: 0 10E9/L (ref 0–0.2)
BASOPHILS NFR BLD AUTO: 0.2 %
BILIRUB SERPL-MCNC: 1.8 MG/DL (ref 0.2–1.3)
BILIRUB UR QL STRIP: NEGATIVE
BUN SERPL-MCNC: 20 MG/DL (ref 7–30)
CALCIUM SERPL-MCNC: 9.5 MG/DL (ref 8.5–10.1)
CHLORIDE SERPL-SCNC: 99 MMOL/L (ref 94–109)
CO2 BLDCOV-SCNC: 23 MMOL/L (ref 21–28)
CO2 BLDCOV-SCNC: 26 MMOL/L (ref 21–28)
CO2 SERPL-SCNC: 27 MMOL/L (ref 20–32)
COLOR UR AUTO: YELLOW
CREAT SERPL-MCNC: 1.25 MG/DL (ref 0.52–1.04)
DIFFERENTIAL METHOD BLD: ABNORMAL
EOSINOPHIL # BLD AUTO: 0 10E9/L (ref 0–0.7)
EOSINOPHIL NFR BLD AUTO: 0 %
ERYTHROCYTE [DISTWIDTH] IN BLOOD BY AUTOMATED COUNT: 14.4 % (ref 10–15)
GFR SERPL CREATININE-BSD FRML MDRD: 37 ML/MIN/{1.73_M2}
GLUCOSE SERPL-MCNC: 116 MG/DL (ref 70–99)
GLUCOSE UR STRIP-MCNC: NEGATIVE MG/DL
HCT VFR BLD AUTO: 42.7 % (ref 35–47)
HGB BLD-MCNC: 14 G/DL (ref 11.7–15.7)
HGB UR QL STRIP: NEGATIVE
IMM GRANULOCYTES # BLD: 0.1 10E9/L (ref 0–0.4)
IMM GRANULOCYTES NFR BLD: 0.5 %
INR PPP: 2.25 (ref 0.86–1.14)
INTERPRETATION ECG - MUSE: NORMAL
KETONES UR STRIP-MCNC: 20 MG/DL
LACTATE BLD-SCNC: 1.1 MMOL/L (ref 0.7–2.1)
LACTATE BLD-SCNC: 2 MMOL/L (ref 0.7–2.1)
LEUKOCYTE ESTERASE UR QL STRIP: ABNORMAL
LYMPHOCYTES # BLD AUTO: 1 10E9/L (ref 0.8–5.3)
LYMPHOCYTES NFR BLD AUTO: 5.6 %
MCH RBC QN AUTO: 30.4 PG (ref 26.5–33)
MCHC RBC AUTO-ENTMCNC: 32.8 G/DL (ref 31.5–36.5)
MCV RBC AUTO: 93 FL (ref 78–100)
MDC_IDC_LEAD_IMPLANT_DT: NORMAL
MDC_IDC_LEAD_IMPLANT_DT: NORMAL
MDC_IDC_LEAD_LOCATION: NORMAL
MDC_IDC_LEAD_LOCATION: NORMAL
MDC_IDC_LEAD_MFG: NORMAL
MDC_IDC_LEAD_MFG: NORMAL
MDC_IDC_LEAD_MODEL: NORMAL
MDC_IDC_LEAD_MODEL: NORMAL
MDC_IDC_LEAD_POLARITY_TYPE: NORMAL
MDC_IDC_LEAD_POLARITY_TYPE: NORMAL
MDC_IDC_LEAD_SERIAL: NORMAL
MDC_IDC_LEAD_SERIAL: NORMAL
MDC_IDC_LEAD_SPECIAL_FUNCTION: NORMAL
MDC_IDC_LEAD_SPECIAL_FUNCTION: NORMAL
MDC_IDC_PG_IMPLANT_DTM: NORMAL
MDC_IDC_PG_MFG: NORMAL
MDC_IDC_PG_MODEL: NORMAL
MDC_IDC_PG_SERIAL: NORMAL
MDC_IDC_PG_TYPE: NORMAL
MDC_IDC_SESS_CLINIC_NAME: NORMAL
MDC_IDC_SESS_DTM: NORMAL
MDC_IDC_SESS_TYPE: NORMAL
MONOCYTES # BLD AUTO: 1.1 10E9/L (ref 0–1.3)
MONOCYTES NFR BLD AUTO: 6.6 %
MUCOUS THREADS #/AREA URNS LPF: PRESENT /LPF
NEUTROPHILS # BLD AUTO: 14.9 10E9/L (ref 1.6–8.3)
NEUTROPHILS NFR BLD AUTO: 87.1 %
NITRATE UR QL: NEGATIVE
NRBC # BLD AUTO: 0 10*3/UL
NRBC BLD AUTO-RTO: 0 /100
PCO2 BLDV: 33 MM HG (ref 40–50)
PCO2 BLDV: 41 MM HG (ref 40–50)
PH BLDV: 7.41 PH (ref 7.32–7.43)
PH BLDV: 7.45 PH (ref 7.32–7.43)
PH UR STRIP: 5.5 PH (ref 5–7)
PLATELET # BLD AUTO: 215 10E9/L (ref 150–450)
PO2 BLDV: 32 MM HG (ref 25–47)
PO2 BLDV: 47 MM HG (ref 25–47)
POTASSIUM SERPL-SCNC: 4.1 MMOL/L (ref 3.4–5.3)
PROT SERPL-MCNC: 7.6 G/DL (ref 6.8–8.8)
RBC # BLD AUTO: 4.61 10E12/L (ref 3.8–5.2)
RBC #/AREA URNS AUTO: 2 /HPF (ref 0–2)
SAO2 % BLDV FROM PO2: 62 %
SAO2 % BLDV FROM PO2: 85 %
SODIUM SERPL-SCNC: 134 MMOL/L (ref 133–144)
SOURCE: ABNORMAL
SP GR UR STRIP: 1.02 (ref 1–1.03)
SQUAMOUS #/AREA URNS AUTO: 1 /HPF (ref 0–1)
UROBILINOGEN UR STRIP-MCNC: NORMAL MG/DL (ref 0–2)
WBC # BLD AUTO: 17.1 10E9/L (ref 4–11)
WBC #/AREA URNS AUTO: 11 /HPF (ref 0–5)

## 2019-06-19 PROCEDURE — 71045 X-RAY EXAM CHEST 1 VIEW: CPT

## 2019-06-19 PROCEDURE — 25800030 ZZH RX IP 258 OP 636: Performed by: EMERGENCY MEDICINE

## 2019-06-19 PROCEDURE — A9270 NON-COVERED ITEM OR SERVICE: HCPCS | Mod: GY | Performed by: EMERGENCY MEDICINE

## 2019-06-19 PROCEDURE — 83605 ASSAY OF LACTIC ACID: CPT

## 2019-06-19 PROCEDURE — 82803 BLOOD GASES ANY COMBINATION: CPT

## 2019-06-19 PROCEDURE — 25000128 H RX IP 250 OP 636: Performed by: EMERGENCY MEDICINE

## 2019-06-19 PROCEDURE — 25000125 ZZHC RX 250: Performed by: INTERNAL MEDICINE

## 2019-06-19 PROCEDURE — 96366 THER/PROPH/DIAG IV INF ADDON: CPT

## 2019-06-19 PROCEDURE — 94640 AIRWAY INHALATION TREATMENT: CPT

## 2019-06-19 PROCEDURE — 96367 TX/PROPH/DG ADDL SEQ IV INF: CPT

## 2019-06-19 PROCEDURE — 99223 1ST HOSP IP/OBS HIGH 75: CPT | Mod: AI | Performed by: INTERNAL MEDICINE

## 2019-06-19 PROCEDURE — 40000275 ZZH STATISTIC RCP TIME EA 10 MIN

## 2019-06-19 PROCEDURE — A9270 NON-COVERED ITEM OR SERVICE: HCPCS | Mod: GY | Performed by: INTERNAL MEDICINE

## 2019-06-19 PROCEDURE — 12000000 ZZH R&B MED SURG/OB

## 2019-06-19 PROCEDURE — 81001 URINALYSIS AUTO W/SCOPE: CPT | Performed by: EMERGENCY MEDICINE

## 2019-06-19 PROCEDURE — 94640 AIRWAY INHALATION TREATMENT: CPT | Mod: 76

## 2019-06-19 PROCEDURE — 85025 COMPLETE CBC W/AUTO DIFF WBC: CPT | Performed by: EMERGENCY MEDICINE

## 2019-06-19 PROCEDURE — 87040 BLOOD CULTURE FOR BACTERIA: CPT | Performed by: EMERGENCY MEDICINE

## 2019-06-19 PROCEDURE — 25000132 ZZH RX MED GY IP 250 OP 250 PS 637: Mod: GY | Performed by: INTERNAL MEDICINE

## 2019-06-19 PROCEDURE — 25000132 ZZH RX MED GY IP 250 OP 250 PS 637: Mod: GY | Performed by: EMERGENCY MEDICINE

## 2019-06-19 PROCEDURE — 25000125 ZZHC RX 250

## 2019-06-19 PROCEDURE — 25000128 H RX IP 250 OP 636: Performed by: INTERNAL MEDICINE

## 2019-06-19 PROCEDURE — 25800030 ZZH RX IP 258 OP 636: Performed by: INTERNAL MEDICINE

## 2019-06-19 PROCEDURE — 99285 EMERGENCY DEPT VISIT HI MDM: CPT | Mod: 25

## 2019-06-19 PROCEDURE — 85610 PROTHROMBIN TIME: CPT | Performed by: EMERGENCY MEDICINE

## 2019-06-19 PROCEDURE — 40000274 ZZH STATISTIC RCP CONSULT EA 30 MIN

## 2019-06-19 PROCEDURE — 80053 COMPREHEN METABOLIC PANEL: CPT | Performed by: EMERGENCY MEDICINE

## 2019-06-19 PROCEDURE — 36415 COLL VENOUS BLD VENIPUNCTURE: CPT | Performed by: EMERGENCY MEDICINE

## 2019-06-19 PROCEDURE — 93005 ELECTROCARDIOGRAM TRACING: CPT

## 2019-06-19 PROCEDURE — 96365 THER/PROPH/DIAG IV INF INIT: CPT

## 2019-06-19 PROCEDURE — 87086 URINE CULTURE/COLONY COUNT: CPT | Performed by: EMERGENCY MEDICINE

## 2019-06-19 PROCEDURE — 85730 THROMBOPLASTIN TIME PARTIAL: CPT | Performed by: EMERGENCY MEDICINE

## 2019-06-19 RX ORDER — CEFTRIAXONE 2 G/1
2 INJECTION, POWDER, FOR SOLUTION INTRAMUSCULAR; INTRAVENOUS EVERY 24 HOURS
Status: DISCONTINUED | OUTPATIENT
Start: 2019-06-20 | End: 2019-06-21

## 2019-06-19 RX ORDER — WARFARIN SODIUM 1 MG/1
1 TABLET ORAL
Status: COMPLETED | OUTPATIENT
Start: 2019-06-19 | End: 2019-06-19

## 2019-06-19 RX ORDER — PROCHLORPERAZINE 25 MG
12.5 SUPPOSITORY, RECTAL RECTAL EVERY 12 HOURS PRN
Status: DISCONTINUED | OUTPATIENT
Start: 2019-06-19 | End: 2019-06-24 | Stop reason: HOSPADM

## 2019-06-19 RX ORDER — IPRATROPIUM BROMIDE AND ALBUTEROL SULFATE 2.5; .5 MG/3ML; MG/3ML
1 SOLUTION RESPIRATORY (INHALATION) EVERY EVENING
Status: ON HOLD | COMMUNITY
End: 2019-06-24

## 2019-06-19 RX ORDER — PRAVASTATIN SODIUM 20 MG
40 TABLET ORAL DAILY
Status: DISCONTINUED | OUTPATIENT
Start: 2019-06-19 | End: 2019-06-24 | Stop reason: HOSPADM

## 2019-06-19 RX ORDER — BISACODYL 10 MG
10 SUPPOSITORY, RECTAL RECTAL DAILY PRN
Status: DISCONTINUED | OUTPATIENT
Start: 2019-06-19 | End: 2019-06-24 | Stop reason: HOSPADM

## 2019-06-19 RX ORDER — ONDANSETRON 4 MG/1
4 TABLET, ORALLY DISINTEGRATING ORAL EVERY 6 HOURS PRN
Status: DISCONTINUED | OUTPATIENT
Start: 2019-06-19 | End: 2019-06-24 | Stop reason: HOSPADM

## 2019-06-19 RX ORDER — DILTIAZEM HYDROCHLORIDE 240 MG/1
240 CAPSULE, COATED, EXTENDED RELEASE ORAL DAILY
Status: DISCONTINUED | OUTPATIENT
Start: 2019-06-19 | End: 2019-06-24 | Stop reason: HOSPADM

## 2019-06-19 RX ORDER — ALBUTEROL SULFATE 90 UG/1
2 AEROSOL, METERED RESPIRATORY (INHALATION) EVERY 6 HOURS PRN
Status: DISCONTINUED | OUTPATIENT
Start: 2019-06-19 | End: 2019-06-24 | Stop reason: HOSPADM

## 2019-06-19 RX ORDER — ONDANSETRON 2 MG/ML
4 INJECTION INTRAMUSCULAR; INTRAVENOUS EVERY 6 HOURS PRN
Status: DISCONTINUED | OUTPATIENT
Start: 2019-06-19 | End: 2019-06-24 | Stop reason: HOSPADM

## 2019-06-19 RX ORDER — ACETAMINOPHEN 325 MG/1
650 TABLET ORAL ONCE
Status: COMPLETED | OUTPATIENT
Start: 2019-06-19 | End: 2019-06-19

## 2019-06-19 RX ORDER — IPRATROPIUM BROMIDE AND ALBUTEROL SULFATE 2.5; .5 MG/3ML; MG/3ML
SOLUTION RESPIRATORY (INHALATION)
Status: COMPLETED
Start: 2019-06-19 | End: 2019-06-19

## 2019-06-19 RX ORDER — GUAIFENESIN/DEXTROMETHORPHAN 100-10MG/5
5 SYRUP ORAL EVERY 4 HOURS PRN
Status: DISCONTINUED | OUTPATIENT
Start: 2019-06-19 | End: 2019-06-24 | Stop reason: HOSPADM

## 2019-06-19 RX ORDER — WARFARIN SODIUM 2 MG/1
1 TABLET ORAL AT BEDTIME
COMMUNITY
End: 2019-07-12

## 2019-06-19 RX ORDER — POLYETHYLENE GLYCOL 3350 17 G/17G
17 POWDER, FOR SOLUTION ORAL DAILY
Status: DISCONTINUED | OUTPATIENT
Start: 2019-06-19 | End: 2019-06-24 | Stop reason: HOSPADM

## 2019-06-19 RX ORDER — MIRTAZAPINE 15 MG/1
15 TABLET, FILM COATED ORAL AT BEDTIME
Status: DISCONTINUED | OUTPATIENT
Start: 2019-06-19 | End: 2019-06-24 | Stop reason: HOSPADM

## 2019-06-19 RX ORDER — CALCIUM CARBONATE 500(1250)
1 TABLET ORAL DAILY
Status: DISCONTINUED | OUTPATIENT
Start: 2019-06-19 | End: 2019-06-21

## 2019-06-19 RX ORDER — NALOXONE HYDROCHLORIDE 0.4 MG/ML
.1-.4 INJECTION, SOLUTION INTRAMUSCULAR; INTRAVENOUS; SUBCUTANEOUS
Status: DISCONTINUED | OUTPATIENT
Start: 2019-06-19 | End: 2019-06-24 | Stop reason: HOSPADM

## 2019-06-19 RX ORDER — ALBUTEROL SULFATE 0.83 MG/ML
2.5 SOLUTION RESPIRATORY (INHALATION)
Status: DISCONTINUED | OUTPATIENT
Start: 2019-06-19 | End: 2019-06-19

## 2019-06-19 RX ORDER — IPRATROPIUM BROMIDE AND ALBUTEROL SULFATE 2.5; .5 MG/3ML; MG/3ML
6 SOLUTION RESPIRATORY (INHALATION) ONCE
Status: COMPLETED | OUTPATIENT
Start: 2019-06-19 | End: 2019-06-19

## 2019-06-19 RX ORDER — LACTOBACILLUS RHAMNOSUS GG 10B CELL
1 CAPSULE ORAL DAILY
Status: DISCONTINUED | OUTPATIENT
Start: 2019-06-19 | End: 2019-06-24 | Stop reason: HOSPADM

## 2019-06-19 RX ORDER — NITROGLYCERIN 0.4 MG/1
0.4 TABLET SUBLINGUAL EVERY 5 MIN PRN
Status: DISCONTINUED | OUTPATIENT
Start: 2019-06-19 | End: 2019-06-24 | Stop reason: HOSPADM

## 2019-06-19 RX ORDER — WARFARIN SODIUM 2 MG/1
2 TABLET ORAL
Status: DISCONTINUED | OUTPATIENT
Start: 2019-06-19 | End: 2019-06-19 | Stop reason: DRUGHIGH

## 2019-06-19 RX ORDER — ALBUTEROL SULFATE 0.83 MG/ML
2.5 SOLUTION RESPIRATORY (INHALATION) EVERY 4 HOURS
Status: DISCONTINUED | OUTPATIENT
Start: 2019-06-19 | End: 2019-06-22

## 2019-06-19 RX ORDER — ISOSORBIDE MONONITRATE 30 MG/1
30 TABLET, EXTENDED RELEASE ORAL DAILY
Status: DISCONTINUED | OUTPATIENT
Start: 2019-06-19 | End: 2019-06-24 | Stop reason: HOSPADM

## 2019-06-19 RX ORDER — ALBUTEROL SULFATE 0.83 MG/ML
2.5 SOLUTION RESPIRATORY (INHALATION)
Status: DISCONTINUED | OUTPATIENT
Start: 2019-06-19 | End: 2019-06-24 | Stop reason: HOSPADM

## 2019-06-19 RX ORDER — LOSARTAN POTASSIUM 25 MG/1
25 TABLET ORAL DAILY
Status: DISCONTINUED | OUTPATIENT
Start: 2019-06-19 | End: 2019-06-24 | Stop reason: HOSPADM

## 2019-06-19 RX ORDER — ACETAMINOPHEN 325 MG/1
975 TABLET ORAL EVERY 6 HOURS PRN
Status: DISCONTINUED | OUTPATIENT
Start: 2019-06-19 | End: 2019-06-23

## 2019-06-19 RX ORDER — PROCHLORPERAZINE MALEATE 5 MG
5 TABLET ORAL EVERY 6 HOURS PRN
Status: DISCONTINUED | OUTPATIENT
Start: 2019-06-19 | End: 2019-06-24 | Stop reason: HOSPADM

## 2019-06-19 RX ORDER — LIDOCAINE 40 MG/G
CREAM TOPICAL
Status: DISCONTINUED | OUTPATIENT
Start: 2019-06-19 | End: 2019-06-24 | Stop reason: HOSPADM

## 2019-06-19 RX ADMIN — SODIUM CHLORIDE, POTASSIUM CHLORIDE, SODIUM LACTATE AND CALCIUM CHLORIDE 1000 ML: 600; 310; 30; 20 INJECTION, SOLUTION INTRAVENOUS at 17:10

## 2019-06-19 RX ADMIN — ALBUTEROL SULFATE 2.5 MG: 2.5 SOLUTION RESPIRATORY (INHALATION) at 19:44

## 2019-06-19 RX ADMIN — WARFARIN SODIUM 1 MG: 1 TABLET ORAL at 19:17

## 2019-06-19 RX ADMIN — TAZOBACTAM SODIUM AND PIPERACILLIN SODIUM 3.38 G: 375; 3 INJECTION, SOLUTION INTRAVENOUS at 14:49

## 2019-06-19 RX ADMIN — PRAVASTATIN SODIUM 40 MG: 20 TABLET ORAL at 19:16

## 2019-06-19 RX ADMIN — ACETAMINOPHEN 650 MG: 325 TABLET, FILM COATED ORAL at 15:53

## 2019-06-19 RX ADMIN — SODIUM CHLORIDE, POTASSIUM CHLORIDE, SODIUM LACTATE AND CALCIUM CHLORIDE 1000 ML: 600; 310; 30; 20 INJECTION, SOLUTION INTRAVENOUS at 14:19

## 2019-06-19 RX ADMIN — POLYETHYLENE GLYCOL 3350 17 G: 17 POWDER, FOR SOLUTION ORAL at 19:11

## 2019-06-19 RX ADMIN — ACETAMINOPHEN 975 MG: 325 TABLET, FILM COATED ORAL at 21:17

## 2019-06-19 RX ADMIN — LOSARTAN POTASSIUM 25 MG: 25 TABLET, FILM COATED ORAL at 19:17

## 2019-06-19 RX ADMIN — MIRTAZAPINE 15 MG: 15 TABLET, FILM COATED ORAL at 21:17

## 2019-06-19 RX ADMIN — CALCIUM 500 MG: 500 TABLET ORAL at 19:17

## 2019-06-19 RX ADMIN — IPRATROPIUM BROMIDE AND ALBUTEROL SULFATE 3 ML: .5; 3 SOLUTION RESPIRATORY (INHALATION) at 14:21

## 2019-06-19 RX ADMIN — IPRATROPIUM BROMIDE AND ALBUTEROL SULFATE 3 ML: 2.5; .5 SOLUTION RESPIRATORY (INHALATION) at 14:21

## 2019-06-19 RX ADMIN — Medication 1 CAPSULE: at 19:17

## 2019-06-19 RX ADMIN — ISOSORBIDE MONONITRATE 30 MG: 30 TABLET, EXTENDED RELEASE ORAL at 19:17

## 2019-06-19 RX ADMIN — AZITHROMYCIN MONOHYDRATE 500 MG: 500 INJECTION, POWDER, LYOPHILIZED, FOR SOLUTION INTRAVENOUS at 19:12

## 2019-06-19 RX ADMIN — CHOLECALCIFEROL TAB 10 MCG (400 UNIT) 800 UNITS: 10 TAB at 19:28

## 2019-06-19 RX ADMIN — VANCOMYCIN HYDROCHLORIDE 1500 MG: 10 INJECTION, POWDER, LYOPHILIZED, FOR SOLUTION INTRAVENOUS at 15:07

## 2019-06-19 RX ADMIN — DILTIAZEM HYDROCHLORIDE 240 MG: 240 CAPSULE, COATED, EXTENDED RELEASE ORAL at 19:17

## 2019-06-19 ASSESSMENT — ENCOUNTER SYMPTOMS
FEVER: 1
NAUSEA: 1
COUGH: 1
DIFFICULTY URINATING: 0
DYSURIA: 0
VOMITING: 1
SHORTNESS OF BREATH: 1
WEAKNESS: 1

## 2019-06-19 ASSESSMENT — ACTIVITIES OF DAILY LIVING (ADL)
COGNITION: 0 - NO COGNITION ISSUES REPORTED
NUMBER_OF_TIMES_PATIENT_HAS_FALLEN_WITHIN_LAST_SIX_MONTHS: 1
AMBULATION: 1-->ASSISTIVE EQUIPMENT
TRANSFERRING: 1-->ASSISTIVE EQUIPMENT
TOILETING: 0-->INDEPENDENT
SWALLOWING: 0-->SWALLOWS FOODS/LIQUIDS WITHOUT DIFFICULTY
FALL_HISTORY_WITHIN_LAST_SIX_MONTHS: YES
DRESS: 0-->INDEPENDENT
ADLS_ACUITY_SCORE: 16
RETIRED_EATING: 0-->INDEPENDENT
WHICH_OF_THE_ABOVE_FUNCTIONAL_RISKS_HAD_A_RECENT_ONSET_OR_CHANGE?: AMBULATION
RETIRED_COMMUNICATION: 0-->UNDERSTANDS/COMMUNICATES WITHOUT DIFFICULTY
BATHING: 0-->INDEPENDENT

## 2019-06-19 ASSESSMENT — MIFFLIN-ST. JEOR
SCORE: 1048.62
SCORE: 1083.37

## 2019-06-19 NOTE — ED PROVIDER NOTES
History     Chief Complaint:  Shortness of Breath & Generalized Weakness    The history is provided by the patient and the spouse. The history is limited by the condition of the patient.      Leonor Mercado is a 89 year old female with a history of CHF, CAD, COPD, atrial fibrillation, on Coumadin, and s/p pacemaker implant who presents with her daughter for evaluation of shortness of breath and generalized weakness. For the last 4 days, the patient has been feeling generally ill with weakness, nausea, shortness of breath, and a mostly dry cough. Per her daughter, she has not been taking her medications during this period due to the persistence of nausea and vomiting. On arrival in the ED, she is hypoxic. The patient reports that she thinks she has pneumonia. She does deny any urinary symptoms.     Allergies:  Amiodarone  Baclofen   Bactrim  Doxycycline  Levaquin  Linzess  Lorazepam     Medications:    Diltiazem  Coumadin  Imdur  Lactobacillus  Losartan  Remeron  Pravastatin  Spiriva HandiHaler  Albuterol inhaler  Breo ellipta inhaler    Past Medical History:    A. Fib.  BCC  CAD  CHF  Chronic renal insufficiency  COPD  CAD  Hyperlipidemia  HTN  IBS  Osteoporosis  Pulmonary fibrosis   Sick sinus syndrome  Vertigo  Anemia    Past Surgical History:    Appendectomy  Cardiac surgery, 2 stents  EGD  Pacemaker implant  Hernia repair  Heart cath angioplasty    Family History:    Heart disease  Dementia  Crohn's disease    Social History:  Marital Status:   [5]  Daughter, Les, at bedside.   Former smoker.   Negative for alcohol use.     Review of Systems   Unable to perform ROS: Acuity of condition   Constitutional: Positive for fever.   Respiratory: Positive for cough and shortness of breath.    Gastrointestinal: Positive for nausea and vomiting.   Genitourinary: Negative for difficulty urinating and dysuria.   Neurological: Positive for weakness (general).     Physical Exam     Patient Vitals for the past  "24 hrs:   BP Temp Temp src Pulse Heart Rate Resp SpO2 Height Weight   06/19/19 1508 -- 100  F (37.8  C) Oral -- 115 18 96 % -- --   06/19/19 1507 -- -- -- -- 112 (!) 31 95 % -- --   06/19/19 1506 -- -- -- -- 110 17 95 % -- --   06/19/19 1500 142/80 -- -- 114 114 29 96 % -- --   06/19/19 1420 -- -- -- -- -- -- 96 % -- --   06/19/19 1400 147/90 -- -- -- 131 (!) 34 96 % -- --   06/19/19 1359 -- 101.1  F (38.4  C) Temporal -- -- -- -- -- --   06/19/19 1355 -- -- -- -- 122 30 (!) 88 % 1.575 m (5' 2\") 69 kg (152 lb 3.2 oz)      Physical Exam  Constitutional: Vital signs reviewed as above.   HEENT:    Head: No external signs of trauma. No lesions noted.   Eyes: PEERL, EOMI B/L, no pain or limitation of superior gaze   Ears: Normal B/L TM and external canals   Nose: Noncongested, no exudates. No rhinorrhea. No FB noted   Mouth/Throat:     Mucous membranes are moist and normal.     No Oropharyngeal exudate. No oropharyngeal erythema noted.    No tonsilar swelling noted.     No uvular deviation noted.    No swelling noted on the floor of the mouth  Neck: FROM. Neck is supple  Cardiovascular: Tachycardic rate, irregular rhythm and normal heart sounds.  No murmur heard. Equal B/L peripheral pulses.  Pulmonary/Chest: Effort normal and breath sounds normal. No respiratory distress. Patient has faint wheezes in the B/L lower fields on my exam. Patient has no rales noted.  Gastrointestinal: Soft. There is no tenderness.   Musculoskeletal/Extremities: No edema noted. Normal tone.  Neurological: Patient is alert and oriented to person, place, and time.   Skin: Skin is warm and dry. There is no diaphoresis noted.   Psychiatric: The patient appears calm.      Emergency Department Course   ECG:  Indication: SOB  Time: 1355  Vent. Rate 132 bpm. LA interval *. QRS duration 68. QT/QTc 278/411. P-R-T axis * 28 187.    Atrial fibrillation with rapid ventricular response  ST & T wave abnormality, consider lateral ischemia  Abnormal ECG. New " A. Fib. With RVR when compared to EKG dated 5/22/19. Read time: 1359.    Imaging:  Radiographic findings were communicated with the patient and family who voiced understanding of the findings.  XR Chest Portable 1 view:   Left perihilar pneumonia, as per radiology.    Laboratory:  1419 ISTAT gases lactate bo: All WNL    CBC: WBC: 17.1 (H), HGB: 14.0, PLT: 215  CMP: Glucose 116 (H), GFR: 37 (L), Bilirubin: 1.8 (H), o/w WNL (Creatinine: 1.25 (H))  INR: 2.25  PTT: 45 (H)  Blood cultures x2: Pending    UA with Microscopic: Pending  Urine culture: Pending    Interventions:  1419 LR, 1 L, IV  1421 Albuterol-Ipratropium 2.5mg-0.5mg/3ml, 3 mL solution, Nebulizer   1449 Zosyn, 3.375 g, IV infusion  1507 Vancomycin, 1500 mg, IV infusion  1553 Tylenol, 650 mg, PO    Emergency Department Course:  Nursing notes and vitals reviewed. EKG obtained in the ED, see results above.   (1405) I performed an exam of the patient as documented above. Patient was placed on oxygen mask and cannula.      IV inserted. Medicine administered as documented above. Blood drawn. This was sent to the lab for further testing, results above.     A portable chest x-ray was taken while the patient was in the emergency department, findings above.      (1610) I rechecked the patient and discussed the results of her workup thus far.     (1640) I rechecked the patient and updated her and her daughter. HR improved.      (1641) I consulted with Dr. Duckworth of the hospitalist services. He is agreement to accept the patient for admission.    Findings and plan explained to the Patient and daughter who consents to admission. Discussed the patient with Dr. Duckworth, who will admit the patient to a medical bed for further monitoring, evaluation, and treatment.    I personally reviewed the laboratory and imaging results with the Patient and daughter and answered all related questions prior to admission.     Impression & Plan    Medical Decision Making:  Leonor REBOLLEDO  Rogelio is a 89 year old female who presents to the ED due to fever and shortness of breath.  Please see the HPI and exam for specifics.  Patient is improved during her ED stay.  She was found to have pneumonia.  I believe this explains the fever, hypoxia, productive cough, and perhaps even the small amount of hemoptysis that she had in the ED.  Certainly all of those things could have contributed to her A. fib with RVR.  I discussed the case with the hospitalist who agrees to admit the patient to the hospital for further monitoring and care.    Critical Care Note:    Organ systems at risk for life threatening failure: Respiratory and Cardiovascular  Associated problems: Acidosis, Arrhythmia, Hypoxia, Infection and Sepsis  Critical Interventions: Antibiotics,Inhaled Bronchodilators, IV Fluids and Oxygen    Total Time: 35 minutes (excluding separately billed procedures)     Includes: Bedside management, Case discussion related to critical care, Documentation, Multiple re-evaluations, Record review, and Test review.     Excludes: EKG Interpretation          Diagnosis:    ICD-10-CM    1. Pneumonia of left lower lobe due to infectious organism (H) J18.1    2. Atrial fibrillation with rapid ventricular response (H) I48.91        Disposition:  Admitted to a monitored bed    Scribe Disclosure:  IGlenys, am serving as a scribe on 6/19/2019 at 2:20 PM to personally document services performed by Joseph Ace DO based on my observations and the provider's statements to me.        Glenys Reynolds  6/19/2019   [facility] EMERGENCY DEPARTMENT       Joseph Ace DO  06/19/19 [id number]

## 2019-06-19 NOTE — ED TRIAGE NOTES
Dtr arrives with pt c/o weakness, emesis, urinary frequency, and SOB for 4 days. 88% on RA on arrival, tachypnea, tachycardia. A/OX4.

## 2019-06-19 NOTE — PHARMACY-ANTICOAGULATION SERVICE
Changed dose of warfarin tonight to 1 mg - per med rec [facility] states has not taken meds for 4 days.  IF accurate INR is therapeutic so will give lower dose tonight.    Clinical Pharmacy - Warfarin Dosing Consult     Pharmacy has been consulted to manage this patient s warfarin therapy.  Indication: Atrial Fibrillation  Therapy Goal: INR 2-3  Warfarin Prior to Admission: Yes  Warfarin PTA Regimen: 2 mg every day except Monday 1 mg  Significant drug interactions: Zithromax (one time dose to start) , pravachol  Recent documented change in oral intake/nutrition: Unknown    INR   Date Value Ref Range Status   06/19/2019 2.25 (H) 0.86 - 1.14 Final     INR Protime   Date Value Ref Range Status   06/13/2019 1.9 (A) 0.86 - 1.14 Final       Recommend warfarin 2 mg today.  Pharmacy will monitor Leonor Mercado daily and order warfarin doses to achieve specified goal.      Please contact pharmacy as soon as possible if the warfarin needs to be held for a procedure or if the warfarin goals change.

## 2019-06-19 NOTE — ED NOTES
[facility]  ED Nurse Handoff Report    Leonor Mercado is a 89 year old female   ED Chief complaint: Shortness of Breath and Generalized Weakness  . ED Diagnosis:   Final diagnoses:   Pneumonia of left lower lobe due to infectious organism (H)   Atrial fibrillation with rapid ventricular response (H)     Allergies:   Allergies   Allergen Reactions     Peanuts [Nuts] Shortness Of Breath     Amiodarone      pulm fibrosis       Baclofen      Confusion      Bactrim [Sulfamethoxazole W-Trimethoprim]      Difficulty swallowing     Doxycycline Other (See Comments)     Multiple complaints; she does not want this again.      Levaquin [Levofloxacin] Other (See Comments)     Multiple complaints; she will not take this again     Linzess [Linaclotide] Diarrhea     Lorazepam        Prolonged drowsiness with ER visit      Liquid Adhesive Itching and Rash     Bleeding when tape removed after pacemaker placement..itched and burned for weeks.       Code Status: Full Code  Activity level - Baseline/Home:  Assist X 1. Activity Level - Current:   Assist X 1. Lift room needed: No. Bariatric: No   Needed: No   Isolation: Yes. Infection: PNA      Vital Signs:   Vitals:    06/19/19 1500 06/19/19 1506 06/19/19 1507 06/19/19 1508   BP: 142/80      Pulse: 114      Resp: 29 17 (!) 31 18   Temp:    100  F (37.8  C)   TempSrc:    Oral   SpO2: 96% 95% 95% 96%   Weight:       Height:           Cardiac Rhythm:  ,   Cardiac  Cardiac Rhythm: Atrial fibrillation  Pain level:    Patient confused: No. Patient Falls Risk: Yes.   Elimination Status: Unable to void   Patient Report - Initial Complaint: SOB, weakness, cough. Focused Assessment:   16:13 Cardiac Cardiac - Cardiac WDL: -WDL except   Review of Systems (Cardiac) - Treatment/Device/Implant: pacemaker   Cardiac Monitoring - EKG Monitoring: Yes  Cardiac Regularity: Irregular  Cardiac Rhythm: Atrial fibrillation  TM     16:11 Respiratory Respiratory - Respiratory WDL:  -WDL except; breath sounds; cough; effort/expansion; rhythm/pattern; secretions  Rhythm/Pattern, Respiratory: shortness of breath; tachypneic; grunting; labored; dyspnea on exertion  Breath Sounds: All Fields (wheezing bilaterally expiratory and inspiratory all lobes ) Cough Frequency: frequent  Cough Type: weak; productive; loose (scant blood with coughing) Secretions Amount: scant  Secretions Color: red; clear  Secretions Characteristics: thin   Respiratory Secretions Assessment - Secretions Amount: scant  Secretions Color: red; clear  Secretions Characteristics: thin   Respiratory Assistance - Treatment/Device/Implant: pacemaker  Oxygen Therapy Device: nasal cannula            Tests Performed: Labs, Imaging, EKG. Abnormal Results:   Abnormal Labs Reviewed   CBC WITH PLATELETS DIFFERENTIAL - Abnormal; Notable for the following components:       Result Value    WBC 17.1 (*)     Absolute Neutrophil 14.9 (*)     All other components within normal limits   COMPREHENSIVE METABOLIC PANEL - Abnormal; Notable for the following components:    Glucose 116 (*)     Creatinine 1.25 (*)     GFR Estimate 37 (*)     GFR Estimate If Black 43 (*)     Bilirubin Total 1.8 (*)     All other components within normal limits   INR - Abnormal; Notable for the following components:    INR 2.25 (*)     All other components within normal limits   PARTIAL THROMBOPLASTIN TIME - Abnormal; Notable for the following components:    PTT 45 (*)     All other components within normal limits     XR Chest Port 1 View   Final Result   IMPRESSION: Left perihilar pneumonia.      VITA SIERRA MD           Treatments provided: see mar, monitoring, oxygen   Family Comments: Dtr at bedside   OBS brochure/video discussed/provided to patient:  No  ED Medications:   Medications   vancomycin 1500 mg in 0.9% NaCl 250 ml intermittent infusion 1,500 mg (1,500 mg Intravenous Given 6/19/19 5799)   piperacillin-tazobactam (ZOSYN) infusion 3.375 g (0 g Intravenous  Stopped 6/19/19 1507)   lactated ringers BOLUS 1,000 mL ( Intravenous Restarted 6/19/19 1507)   ipratropium - albuterol 0.5 mg/2.5 mg/3 mL (DUONEB) neb solution 6 mL (3 mLs Nebulization Given 6/19/19 1421)   acetaminophen (TYLENOL) tablet 650 mg (650 mg Oral Given 6/19/19 1553)     Drips infusing:  Yes  For the majority of the shift, the patient's behavior Green. Interventions performed were n/a.     Severe Sepsis OR Septic Shock Diagnosis Present:   Yes    Per the ED Provider, Time Zero for severe sepsis or septic shock is:  1355    3 Hour Severe Sepsis Bundle Completion:  1. Initial Lactic Acid Result:   Recent Labs   Lab Test 06/19/19  1419 04/24/18  1700 11/01/16  1100   LACT 2.0 0.8 1.0     2. Blood Cultures before Antibiotics: Yes  3. Broad Spectrum Antibiotics Administered:     Anti-infectives (From now, onward)    Start     Dose/Rate Route Frequency Ordered Stop    06/19/19 1419  vancomycin 1500 mg in 0.9% NaCl 250 ml intermittent infusion 1,500 mg      1,500 mg  over 90 Minutes Intravenous ONCE 06/19/19 1418          4. 1000 ml of IV fluids have been given so far      6 Hour Severe Sepsis Bundle Completion:    1. Repeat Lactic Acid Level: Not drawn, pending   2. Patient currently on Vasopressors =  No      ED Nurse Name/Phone Number: Catie Booth,   4:15 PM  RECEIVING UNIT ED HANDOFF REVIEW    Above ED Nurse Handoff Report was reviewed: Yes  Reviewed by: Sophia Lopez on June 19, 2019 at 5:30 PM

## 2019-06-19 NOTE — PHARMACY-ADMISSION MEDICATION HISTORY
Admission medication history interview status for this patient is complete. See [facility] admission navigator for allergy information, prior to admission medications and immunization status.     Medication history interview source(s):Patient and Family  Medication history resources (including written lists, pill bottles, clinic record):Patient clinic list  Primary pharmacy: [facility]    Changes made to PTA medication list:  Added: -  Deleted: -  Changed: -    Actions taken by pharmacist (provider contacted, etc):None     Additional medication history information:None    Medication reconciliation/reorder completed by provider prior to medication history? No    Do you take OTC medications (eg tylenol, ibuprofen, fish oil, eye/ear drops, etc)? Y    Prior to Admission medications    Medication Sig Last Dose Taking? Auth Provider   albuterol (PROAIR HFA/PROVENTIL HFA/VENTOLIN HFA) 108 (90 Base) MCG/ACT inhaler Inhale 2 puffs into the lungs every 6 hours as needed for shortness of breath / dyspnea or wheezing Past Week at Unknown time Yes Maria Isabel Rosario APRN CNP   ASPIRIN NOT PRESCRIBED (INTENTIONAL) Please choose reason not prescribed, below  Yes Arnel Garcia MD   calcium carbonate (CALCIUM CARBONATE) 600 MG TABS tablet Take 1 tablet by mouth daily  Past Week at Unknown time Yes Unknown, Entered By History   Cholecalciferol (VITAMIN D PO) Take 800 Units by mouth daily  Past Week at Unknown time Yes Unknown, Entered By History   diltiazem (DILTIAZEM CD) 240 MG 24 hr capsule Take 1 capsule (240 mg) by mouth daily Past Week at Unknown time Yes Arnel Garcia MD   Docusate Calcium (STOOL SOFTENER PO) Take 1 tablet by mouth At Bedtime  Past Week at Unknown time Yes Reported, Patient   fluticasone-vilanterol (BREO ELLIPTA) 200-25 MCG/INH oral inhaler Inhale 1 puff into the lungs daily Past Week at Unknown time Yes Dannielle Darby MD   ipratropium - albuterol 0.5 mg/2.5 mg/3 mL  (DUONEB) 0.5-2.5 (3) MG/3ML neb solution Take 1 vial by nebulization every evening Past Week at Unknown time Yes Unknown, Entered By History   isosorbide mononitrate (IMDUR) 30 MG 24 hr tablet Take 1 tablet (30 mg) by mouth daily Past Week at Unknown time Yes Arnel Garcia MD   Lactobacillus (PROBIOTIC ACIDOPHILUS) TABS Take 1 tablet by mouth daily  Past Week at Unknown time Yes Reported, Patient   losartan (COZAAR) 25 MG tablet Take 1 tablet (25 mg) by mouth daily Past Week at Unknown time Yes Arnel Garcia MD   mirtazapine (REMERON) 15 MG tablet Take 1 tablet (15 mg) by mouth At Bedtime Past Week at Unknown time Yes Arnel Garcia MD   Multiple Vitamins-Minerals (OCUVITE PO) Take 1 capsule by mouth daily. Past Week at Unknown time Yes Reported, Patient   polyethylene glycol (MIRALAX/GLYCOLAX) Packet Take 1 packet by mouth daily  Past Week at Unknown time Yes Reported, Patient   pravastatin (PRAVACHOL) 40 MG tablet TAKE 1 TABLET BY MOUTH ONCE DAILY Past Week at Unknown time Yes Arnel Garcia MD   Tiotropium Bromide Monohydrate (SPIRIVA HANDIHALER IN) Inhale 1 puff into the lungs daily  Past Week at Unknown time Yes Reported, Patient   warfarin (COUMADIN) 2 MG tablet Take 1 mg by mouth once a week on Monday Past Week at Unknown time Yes Unknown, Entered By History   warfarin (COUMADIN) 2 MG tablet Take 2 mg by mouth every evening except on Mon Past Week at Unknown time Yes Unknown, Entered By History   Acetaminophen (TYLENOL PO) Take 1,000 mg by mouth nightly as needed  Unknown at Unknown time  Unknown, Entered By History   MELATONIN PO Take 5 mg by mouth nightly as needed  Unknown at Unknown time  Reported, Patient   nitroGLYcerin (NITROSTAT) 0.4 MG sublingual tablet Place 1 tablet (0.4 mg) under the tongue every 5 minutes as needed for chest pain Unknown at Unknown time  Maria Isabel Rosario, BAY CNP

## 2019-06-19 NOTE — H&P
[facility]    History and Physical - Hospitalist Service       Date of Admission:  6/19/2019    Assessment & Plan   Leonor Mercado is a 89 year old female admitted on 6/19/2019. She has been ill for about 5 days with progressive generalized weakness, progressive dyspnea cough and chills.  X-ray reveals evidence of left-sided pneumonia.  She is also in atrial fibrillation with rapid ventricular response, has had some vomiting and some scant hemoptysis.  She has a history of chronic atrial fibrillation, chronic kidney disease a baseline creatinine of 1.4-1.5, pacemaker for sick sinus syndrome, dyslipidemia, osteoporosis, irritable bowel syndrome, COPD on oxygen at night only, coronary artery disease with 2 stents, diastolic dysfunction with normal ejection fraction and constipation.    1.  Community-acquired pneumonia with sepsis and hypoxic respiratory failure:  --She presents with fever, leukocytosis and tachycardia and oxygen saturation on room air of 88%.  --She was treated with Zosyn and vancomycin in the emergency department.  --We will switch to ceftriaxone and azithromycin.  She does have several antibiotic sensitivities.  --Supplemental oxygen as needed.  --Cough suppressant  --We will defer steroids for now.  She is not improving by tomorrow consider steroid.  --Albuterol nebs scheduled and as needed.  --Resume Spiriva and Breo Ellipta.      2.  Atrial fibrillation with rapid ventricular response:  --Heart rates are in the 110-120 range.  --She had not been taking her medications due to feeling ill.  We will resume diltiazem which will hopefully control her heart rate adequately.  --Monitor on telemetry for 24 hours.  --Resume warfarin.    3.  Scant hemoptysis:  --She does have some erythema in the nare on the right which may be the source of the blood.  --It is minimal and we will defer further work-up for now.  Continue warfarin for now.    4.  Vomiting:  --This seems somehow related to  her pneumonia.  She is not currently nauseous.  --Treat with antiemetics as needed and begin a regular diet.    5.  Chronic kidney disease:  --Her current creatinine of 1.25 is below her baseline of 1.4-1.5.  This may be partially because she stopped taking her angiotensin receptor blocker.  --We will monitor    6.  Coronary artery disease:  --Stable.  Resume Imdur, Cozaar, pravastatin and Coumadin.  She is not on a beta-blocker.     Diet: Regular  DVT Prophylaxis: Friend  Nj Catheter: not present  Code Status: DNR/DNI.  This was confirmed with the patient and her daughter.    Disposition Plan   Expected discharge: 2 - 3 days, recommended to prior living arrangement once Functional status near baseline and afebrile..  Entered: Modesto Duckworth MD 06/19/2019, 5:39 PM     The patient's care was discussed with the ER physician and the patient's daughter.    Modesto Duckworth MD  [facility]    ______________________________________________________________________    Chief Complaint   Cough, weakness, fever    History is obtained from the patient    History of Present Illness   Leonor Mercado is a 89 year old female who lives in an independent senior apartment and presents with 5 or 6 days of feeling ill.  She has had dry cough, chills dyspnea and started vomiting a few days ago.  She is usually quite independent however she was unable to get out of bed today due to feeling ill and weak.  She has 2 daughters who check on her almost daily.  They were gone over the weekend but as she continued to worsen they decided to seek medical attention.  She has not taken any food or medication for the past couple of days.  She has been drinking plenty of fluids.  She denies any pain and says she never had pneumonia before.  She does have a history of COPD and uses oxygen at night only.  She has had some scant hemoptysis with coughing since arrival to emergency department.    Review of Systems    A  complete 10 point review of system was performed was negative except for the pertinent positives which can be seen in the history of present illness.    Past Medical History    I have reviewed this patient's medical history and updated it with pertinent information if needed.   Past Medical History:   Diagnosis Date     A Fib - chr Coumadin, s/p PPM (H) 5/8/2013     Atrial fibrillation (H)     Sick sinus syndrome, PAT, AF     BCC (basal cell carcinoma of skin)     Face     CAD - 2 stents in [place] in 2000s.  1/5/2016     CHF (congestive heart failure) (H)     mild in past     CHF - Chr Diastolic (H) 11/21/2017     Chronic renal insufficiency      COPD (chronic obstructive pulmonary disease) (H)      COPD (H) 5/13/2013     Coronary artery disease     2-05[place]-CAD-taxus stentx2 2.5-12,2.5-12 in LADp and LADm, 80%nonDomRCA-LcxDom-mild,EF60%     Hyperlipidemia      Hypertension      IBS (irritable bowel syndrome)      Irritable bowel      Mumps      Osteoporosis      Palpitations      Panic attack     questionable diagnosis     Pulmonary fibrosis (H)     do not use amiodarone     Shortness of breath      Sick sinus syndrome - s/p PPM 2003 (H) 12/16/2017     SSS (sick sinus syndrome) (H)     DDD pacer (see surgery history section)     Vertigo    --Constipation  --Echocardiogram in April 2019 showed normal ejection fraction and atrial fibrillation.    Past Surgical History   I have reviewed this patient's surgical history and updated it with pertinent information if needed.  Past Surgical History:   Procedure Laterality Date     APPENDECTOMY  1956     CARDIAC SURGERY      PPM, 2 STENTS2-05[place]-CAD-taxus stentx2 2.5-12,2.5-12 in LADp and LADm, 80%nonDomRCA-LcxDom-mild,EF60%     CORONARY ANGIOGRAPHY ADULT ORDER  7/1994    mild CAD,Normal LV function, No Mitral regurgitation, Prox LAD 30% stenosis. RCA prox and mid, 20-30%     ESOPHAGOSCOPY, GASTROSCOPY, DUODENOSCOPY (EGD), COMBINED N/A 8/19/2015    Procedure: COMBINED  ESOPHAGOSCOPY, GASTROSCOPY, DUODENOSCOPY (EGD), BIOPSY SINGLE OR MULTIPLE;  Surgeon: Genevieve Leon MD;  Location: [facility]     H LEAD REVISION DUAL  2003    Revised in [place]-due to diaphram stim (original pacer placed in [place])     H LEAD REVISION DUAL  2014    Correction of reversal of A and V leads in [place]     HEART CATH, ANGIOPLASTY  2005    LEIGH ANN mid and proximal LAD, ongoing 80% RCA; mild circumflex     HERNIA REPAIR Left     [facility]     IMPLANT PACEMAKER  2003    Placed in [place] for sick sinus syndrome     REPLACE PACEMAKER GENERATOR  2013    Dual-chamber pacemaker by Dr SETH Pierre       Social History   I have reviewed this patient's social history and updated it with pertinent information if needed.  Social History     Tobacco Use     Smoking status: Former Smoker     Types: Cigarettes     Last attempt to quit: 1987     Years since quittin.1     Smokeless tobacco: Never Used   Substance Use Topics     Alcohol use: No     Alcohol/week: 0.0 oz     Drug use: No   --.  Her  of over 70 years  about 10 months ago.  --She lives alone in a senior apartment.  She has 2 daughters who check on her almost daily.  --She walks with a walker and is quite active.    Family History   I have reviewed this patient's family history and updated it with pertinent information if needed.   Family History   Problem Relation Age of Onset     Heart Disease Father          age 84     Neurologic Disorder Mother         dementia     Gastrointestinal Disease Daughter         liver transplant     Crohn's Disease Daughter        Prior to Admission Medications   Prior to Admission Medications   Prescriptions Last Dose Informant Patient Reported? Taking?   ASPIRIN NOT PRESCRIBED (INTENTIONAL)  Self No Yes   Sig: Please choose reason not prescribed, below   Acetaminophen (TYLENOL PO) Unknown at Unknown time Self Yes No   Sig: Take 1,000 mg by mouth nightly as needed    Cholecalciferol (VITAMIN  D PO) Past Week at Unknown time Self Yes Yes   Sig: Take 800 Units by mouth daily    Docusate Calcium (STOOL SOFTENER PO) Past Week at Unknown time Self Yes Yes   Sig: Take 1 tablet by mouth At Bedtime    Lactobacillus (PROBIOTIC ACIDOPHILUS) TABS Past Week at Unknown time Self Yes Yes   Sig: Take 1 tablet by mouth daily    MELATONIN PO Unknown at Unknown time Self Yes No   Sig: Take 5 mg by mouth nightly as needed    Multiple Vitamins-Minerals (OCUVITE PO) Past Week at Unknown time Self Yes Yes   Sig: Take 1 capsule by mouth daily.   Tiotropium Bromide Monohydrate (SPIRIVA HANDIHALER IN) Past Week at Unknown time Self Yes Yes   Sig: Inhale 1 puff into the lungs daily    albuterol (PROAIR HFA/PROVENTIL HFA/VENTOLIN HFA) 108 (90 Base) MCG/ACT inhaler Past Week at Unknown time Self No Yes   Sig: Inhale 2 puffs into the lungs every 6 hours as needed for shortness of breath / dyspnea or wheezing   calcium carbonate (CALCIUM CARBONATE) 600 MG TABS tablet Past Week at Unknown time Self Yes Yes   Sig: Take 1 tablet by mouth daily    diltiazem (DILTIAZEM CD) 240 MG 24 hr capsule Past Week at Unknown time Self No Yes   Sig: Take 1 capsule (240 mg) by mouth daily   fluticasone-vilanterol (BREO ELLIPTA) 200-25 MCG/INH oral inhaler Past Week at Unknown time Self No Yes   Sig: Inhale 1 puff into the lungs daily   ipratropium - albuterol 0.5 mg/2.5 mg/3 mL (DUONEB) 0.5-2.5 (3) MG/3ML neb solution Past Week at Unknown time  Yes Yes   Sig: Take 1 vial by nebulization every evening   isosorbide mononitrate (IMDUR) 30 MG 24 hr tablet Past Week at Unknown time  No Yes   Sig: Take 1 tablet (30 mg) by mouth daily   losartan (COZAAR) 25 MG tablet Past Week at Unknown time Self No Yes   Sig: Take 1 tablet (25 mg) by mouth daily   mirtazapine (REMERON) 15 MG tablet Past Week at Unknown time Self No Yes   Sig: Take 1 tablet (15 mg) by mouth At Bedtime   nitroGLYcerin (NITROSTAT) 0.4 MG sublingual tablet Unknown at Unknown time Self No No    Sig: Place 1 tablet (0.4 mg) under the tongue every 5 minutes as needed for chest pain   polyethylene glycol (MIRALAX/GLYCOLAX) Packet Past Week at Unknown time Self Yes Yes   Sig: Take 1 packet by mouth daily    pravastatin (PRAVACHOL) 40 MG tablet Past Week at Unknown time  No Yes   Sig: TAKE 1 TABLET BY MOUTH ONCE DAILY   warfarin (COUMADIN) 2 MG tablet Past Week at Unknown time Self Yes Yes   Sig: Take 2 mg by mouth every evening except on Mon   warfarin (COUMADIN) 2 MG tablet Past Week at Unknown time  Yes Yes   Sig: Take 1 mg by mouth once a week on Monday      Facility-Administered Medications: None     Allergies   Allergies   Allergen Reactions     Peanuts [Nuts] Shortness Of Breath     Amiodarone      pulm fibrosis       Baclofen      Confusion      Bactrim [Sulfamethoxazole W-Trimethoprim]      Difficulty swallowing     Doxycycline Other (See Comments)     Multiple complaints; she does not want this again.      Levaquin [Levofloxacin] Other (See Comments)     Multiple complaints; she will not take this again     Linzess [Linaclotide] Diarrhea     Lorazepam        Prolonged drowsiness with ER visit      Liquid Adhesive Itching and Rash     Bleeding when tape removed after pacemaker placement..itched and burned for weeks.       Physical Exam   Vital Signs: Temp: 101  F (38.3  C) Temp src: Oral BP: 107/61 Pulse: 101 Heart Rate: 100 Resp: 18 SpO2: 96 % O2 Device: None (Room air) Oxygen Delivery: 2 LPM  Weight: 152 lbs 3.2 oz      Vital signs reviewed  General:  Alert, calm, NAD  CV: Irregularly irregular rhythm, no murmurs or rubs  Lungs:  Clear to ascultation bilaterally, normal respiratory effort, coughing occasionally  HEENT:  Pupil round, equal, conjuctivae, sclerae and lids normal, neck is supple  Abdomen:  Soft, nontender, nondistended, no masses, normal bowel sounds  Extremities:  No edema  Neuro: normal strength and sensation in all 4 extremities, cranial nerves grossly intact  Psychiatric:  Mood and  affect within normal limits  Skin:  No rashes or wounds evident  Heme:  No lymphandenopathy appreciated    Data   Data reviewed today: I reviewed all medications, new labs and imaging results over the last 24 hours. I personally reviewed the chest x-ray image(s) showing Left lower lobe lung infiltrate.    Recent Labs   Lab 06/19/19  1419 06/13/19  1117   WBC 17.1*  --    HGB 14.0  --    MCV 93  --      --    INR 2.25* 1.9*     --    POTASSIUM 4.1  --    CHLORIDE 99  --    CO2 27  --    BUN 20  --    CR 1.25*  --    ANIONGAP 8  --    TRI 9.5  --    *  --    ALBUMIN 3.6  --    PROTTOTAL 7.6  --    BILITOTAL 1.8*  --    ALKPHOS 84  --    ALT 20  --    AST 22  --      Recent Results (from the past 24 hour(s))   XR Chest Port 1 View    Narrative    CHEST PORTABLE ONE VIEW   6/19/2019 3:33 PM     HISTORY: Fever, hypoxia.    COMPARISON: 4/6/2019.    FINDINGS: Upright portable chest. Left subclavian cardiac device in  place. No pneumothorax. The heart size is normal. The thoracic aorta  is calcified. There is a left perihilar and lower lobe infiltrate.  Probable left pleural effusion. The right lung is clear.      Impression    IMPRESSION: Left perihilar pneumonia.    VITA SIERRA MD

## 2019-06-20 LAB
ANION GAP SERPL CALCULATED.3IONS-SCNC: 8 MMOL/L (ref 3–14)
BACTERIA SPEC CULT: NO GROWTH
BUN SERPL-MCNC: 24 MG/DL (ref 7–30)
CALCIUM SERPL-MCNC: 8.1 MG/DL (ref 8.5–10.1)
CHLORIDE SERPL-SCNC: 103 MMOL/L (ref 94–109)
CO2 SERPL-SCNC: 24 MMOL/L (ref 20–32)
CREAT SERPL-MCNC: 1.43 MG/DL (ref 0.52–1.04)
ERYTHROCYTE [DISTWIDTH] IN BLOOD BY AUTOMATED COUNT: 14.6 % (ref 10–15)
GFR SERPL CREATININE-BSD FRML MDRD: 31 ML/MIN/{1.73_M2}
GLUCOSE SERPL-MCNC: 88 MG/DL (ref 70–99)
HCT VFR BLD AUTO: 35.6 % (ref 35–47)
HGB BLD-MCNC: 11.5 G/DL (ref 11.7–15.7)
INR PPP: 3.14 (ref 0.86–1.14)
LACTATE BLD-SCNC: 1.4 MMOL/L (ref 0.7–2)
Lab: NORMAL
MCH RBC QN AUTO: 30.4 PG (ref 26.5–33)
MCHC RBC AUTO-ENTMCNC: 32.3 G/DL (ref 31.5–36.5)
MCV RBC AUTO: 94 FL (ref 78–100)
PLATELET # BLD AUTO: 165 10E9/L (ref 150–450)
POTASSIUM SERPL-SCNC: 3.8 MMOL/L (ref 3.4–5.3)
RBC # BLD AUTO: 3.78 10E12/L (ref 3.8–5.2)
SODIUM SERPL-SCNC: 135 MMOL/L (ref 133–144)
SPECIMEN SOURCE: NORMAL
WBC # BLD AUTO: 14.3 10E9/L (ref 4–11)

## 2019-06-20 PROCEDURE — 94640 AIRWAY INHALATION TREATMENT: CPT | Mod: 76

## 2019-06-20 PROCEDURE — 83605 ASSAY OF LACTIC ACID: CPT | Performed by: INTERNAL MEDICINE

## 2019-06-20 PROCEDURE — 80048 BASIC METABOLIC PNL TOTAL CA: CPT | Performed by: INTERNAL MEDICINE

## 2019-06-20 PROCEDURE — 25000128 H RX IP 250 OP 636: Performed by: INTERNAL MEDICINE

## 2019-06-20 PROCEDURE — 99233 SBSQ HOSP IP/OBS HIGH 50: CPT | Performed by: INTERNAL MEDICINE

## 2019-06-20 PROCEDURE — 36415 COLL VENOUS BLD VENIPUNCTURE: CPT | Performed by: INTERNAL MEDICINE

## 2019-06-20 PROCEDURE — A9270 NON-COVERED ITEM OR SERVICE: HCPCS | Mod: GY | Performed by: INTERNAL MEDICINE

## 2019-06-20 PROCEDURE — 85610 PROTHROMBIN TIME: CPT | Performed by: INTERNAL MEDICINE

## 2019-06-20 PROCEDURE — 85027 COMPLETE CBC AUTOMATED: CPT | Performed by: INTERNAL MEDICINE

## 2019-06-20 PROCEDURE — 12000000 ZZH R&B MED SURG/OB

## 2019-06-20 PROCEDURE — 25000132 ZZH RX MED GY IP 250 OP 250 PS 637: Mod: GY | Performed by: INTERNAL MEDICINE

## 2019-06-20 PROCEDURE — 40000275 ZZH STATISTIC RCP TIME EA 10 MIN

## 2019-06-20 PROCEDURE — 94640 AIRWAY INHALATION TREATMENT: CPT

## 2019-06-20 PROCEDURE — 25000125 ZZHC RX 250: Performed by: INTERNAL MEDICINE

## 2019-06-20 RX ORDER — AZITHROMYCIN 250 MG/1
250 TABLET, FILM COATED ORAL EVERY 24 HOURS
Status: COMPLETED | OUTPATIENT
Start: 2019-06-20 | End: 2019-06-23

## 2019-06-20 RX ADMIN — ALBUTEROL SULFATE 2.5 MG: 2.5 SOLUTION RESPIRATORY (INHALATION) at 15:44

## 2019-06-20 RX ADMIN — CHOLECALCIFEROL TAB 10 MCG (400 UNIT) 800 UNITS: 10 TAB at 09:35

## 2019-06-20 RX ADMIN — PRAVASTATIN SODIUM 40 MG: 20 TABLET ORAL at 09:36

## 2019-06-20 RX ADMIN — ALBUTEROL SULFATE 2.5 MG: 2.5 SOLUTION RESPIRATORY (INHALATION) at 07:43

## 2019-06-20 RX ADMIN — CALCIUM 500 MG: 500 TABLET ORAL at 09:36

## 2019-06-20 RX ADMIN — SODIUM CHLORIDE 500 ML: 9 INJECTION, SOLUTION INTRAVENOUS at 00:07

## 2019-06-20 RX ADMIN — ALBUTEROL SULFATE 2.5 MG: 2.5 SOLUTION RESPIRATORY (INHALATION) at 23:45

## 2019-06-20 RX ADMIN — ONDANSETRON HYDROCHLORIDE 4 MG: 2 INJECTION, SOLUTION INTRAMUSCULAR; INTRAVENOUS at 18:35

## 2019-06-20 RX ADMIN — ALBUTEROL SULFATE 2.5 MG: 2.5 SOLUTION RESPIRATORY (INHALATION) at 11:53

## 2019-06-20 RX ADMIN — ALBUTEROL SULFATE 2.5 MG: 2.5 SOLUTION RESPIRATORY (INHALATION) at 19:13

## 2019-06-20 RX ADMIN — ISOSORBIDE MONONITRATE 30 MG: 30 TABLET, EXTENDED RELEASE ORAL at 09:36

## 2019-06-20 RX ADMIN — CEFTRIAXONE SODIUM 2 G: 2 INJECTION, POWDER, FOR SOLUTION INTRAMUSCULAR; INTRAVENOUS at 00:48

## 2019-06-20 RX ADMIN — AZITHROMYCIN MONOHYDRATE 250 MG: 250 TABLET ORAL at 16:24

## 2019-06-20 RX ADMIN — Medication 1 CAPSULE: at 09:36

## 2019-06-20 RX ADMIN — MIRTAZAPINE 15 MG: 15 TABLET, FILM COATED ORAL at 21:20

## 2019-06-20 RX ADMIN — SODIUM CHLORIDE 500 ML: 9 INJECTION, SOLUTION INTRAVENOUS at 20:07

## 2019-06-20 ASSESSMENT — ACTIVITIES OF DAILY LIVING (ADL)
ADLS_ACUITY_SCORE: 18
ADLS_ACUITY_SCORE: 16
ADLS_ACUITY_SCORE: 18
ADLS_ACUITY_SCORE: 19
ADLS_ACUITY_SCORE: 17
ADLS_ACUITY_SCORE: 17

## 2019-06-20 NOTE — PLAN OF CARE
Pt arrived to unit at 1810 accompanied by Daughter Les. Pt A/Ox4, up with assist of 1, gb and walker. Pt denies pain, N/V, lightheadedness, and dizziness. C/o CALDERON and SOB. On 2LPM nc 95%. Pt stats she does use home oxygen at night. LS coarse with exp. Wheezes.  IV SL. Pt is on IV abx Xithromax and Rocephin. Temp. 101. Tele A. Fib Variable Ventricular Response. Continue with POC.

## 2019-06-20 NOTE — PLAN OF CARE
"Pt continues to feel very tired with generalized weakness.  Was able to tolerate bites of toasts today, denies nausea.  Pt reported diarrhea, using purewick due to weakness.  Encouraged patient, along with MD at patient's bedside to slowly increase her activity, and to get to chair for meals. Lung sounds are diminished in general throughout.  Educated patient on use of IS, she got level to 500.  Will continue to monitor.  BP 98/51 (BP Location: Left arm)   Pulse 87   Temp 99.3  F (37.4  C) (Oral)   Resp 28   Ht 1.6 m (5' 3\")   Wt 70.9 kg (156 lb 5.8 oz)   LMP  (LMP Unknown)   SpO2 97%   BMI 27.70 kg/m     "

## 2019-06-20 NOTE — PROGRESS NOTES
"[facility]AT     Date:6/19/2019  Admission Dx:PNA  Pulmonary History:COPD  Home Nebulizer/MDI Use:Spiriva, Breo, Alb   Home Oxygen:2L @ noc  Acuity Level (RCAT flow sheet):4  Aerosol Therapy initiated:Alb Q4 & Q2prn      Pulmonary Hygiene initiated:Deep breath and coughing techniques      Volume Expansion initiated:IS      Current Oxygen Requirements:2L  Current SpO2:99%    Re-evaluation date:6/22/2019    Patient Education:Education performed with patient in regards to indications/benefits of bronchodilators. Will continue to educate with patient as needed.         See \"RT Assessments\" flow sheet for patient assessment scoring and Acuity Level Details.             "

## 2019-06-20 NOTE — CONSULTS
Care Transition Initial Assessment - RN        Met with: Patient.  DATA   Active Problems:    Community acquired bacterial pneumonia       Cognitive Status: awake, alert and oriented.  Primary Care [facility] Name: [facility]  Primary Care MD Name: Dr Garcia  Contact information and PCP information verified: Yes  Lives With: alone, facility resident([facility])      Quality of Family Relationships: helpful, supportive  Description of Support System: Supportive, Involved   Who is your support system?: Children   Support Assessment: Adequate family and caregiver support   Insurance concerns: No Insurance issues identified  ASSESSMENT  Patient currently receives the following services:  Pt lives at [facility], she has home Oxygen at 3L overnight but is not sure what company supplies her O2        Identified issues/concerns regarding health management: Pt is currently very tired and weak with her PNA    CTS following for PNA diagnosis and discharge planning. Met with pt at bedside to discuss PNA action plan and plan of care. Pt is very tired and weak at this time. She verifies that she lives independently at [facility] in [place]. She gets one meal/day supplied for her and does her other cooking on her own. She uses home oxygen overnight at 3L. She states she got this prescribed from her Pulmonologist Dr Her. She is independent with all cares and ADL's. She ambulates independently with her 4WW. Her daugters live near by and are supportive to her. They provide transportation when needed. Pt prefers to make her own follow up appts.    Pt lives in the [facility]. She is able to increase her services if needed and pt and family are agreeable to do so. It will take a couple of days prior to her return to change her services if that is needed.    PLAN  Patient anticipates discharging to [facility] .        Patient anticipates needs for home equipment: no  Transportation/person available to  transport on day of discharge  is her daughters.     Plan/Disposition: Home to her ILF  Appointments: pt prefers to make her own follow up appts. She follows PCP Dr Garcia at [facility]      Care  (CTS) will continue to follow as needed for discharge plan and recommendations. Anticipate she will get PT recommendations prior to discharge when she is feeling better.    Karen Reilly RN CTS  Care Transitions  [phone]

## 2019-06-20 NOTE — PROGRESS NOTES
[facility]  Hospitalist Progress Note  Name: Leonor Mercado    MRN: [id number]  YOB: 1926    Age: 89 year old  Date of admission: 6/19/2019  Primary care provider: Arnel Garcia      Reason for Stay (Diagnosis): Community-acquired pneumonia/sepsis/acute hypoxic respiratory failure         Assessment and Plan:      Summary of Stay:  Leonor Mercado is a 89 year old female admitted on 6/19/2019. She has been ill for about 5 days with progressive generalized weakness, progressive dyspnea cough and chills.  X-ray reveals evidence of left-sided pneumonia.  She is also in atrial fibrillation with rapid ventricular response, has had some vomiting and some scant hemoptysis.  She has a history of chronic atrial fibrillation, chronic kidney disease a baseline creatinine of 1.4-1.5, pacemaker for sick sinus syndrome, dyslipidemia, osteoporosis, irritable bowel syndrome, COPD on oxygen at night only, coronary artery disease with 2 stents, diastolic dysfunction with normal ejection fraction and constipation.     Problem List/Plan:  1. Acute sepsis with acute hypoxic respiratory failure secondary to community-acquired pneumonia: This is evidenced by fever, leukocytosis, tachycardia, hypoxemia, and notable source of infection.  Patient was initially started on vancomycin and Zosyn but do not suspect multidrug-resistant organisms so we will continue ceftriaxone and azithromycin.  Wean off O2 as able.  PRN antitussives.  Continue duo nebs and encourage pulmonary toileting.  2. Atrial fibrillation with RVR: Now rate controlled and in the 70s.  We will continue diltiazem and Coumadin per pharmacy.  3. Scant hemoptysis: This occurring in the setting of erythema in the right nare which may be the source of the blood.  No further work-up at this time.  4. Chronic kidney disease: Baseline creatinine is around 1.25.  We will continue to monitor after IV fluid  "resuscitation.      DVT Prophylaxis: Warfarin  Code Status: DNR / DNI  Discharge Dispo: Suspect back to usual living arrangement  Estimated Disch Date / # of Days until Disch: In 2 to 3 days  Time spent: Greater than 35 minutes      Interval History (Subjective):      Breathing better while she is on oxygen.  Still reports a nonproductive cough         Physical Exam:      Vital signs:  Temp: 99.3  F (37.4  C) Temp src: Oral BP: 98/51 Pulse: 87 Heart Rate: 66 Resp: 28 SpO2: 97 % O2 Device: Nasal cannula Oxygen Delivery: 2 LPM Height: 160 cm (5' 3\") Weight: 70.9 kg (156 lb 5.8 oz)  Estimated body mass index is 27.7 kg/m  as calculated from the following:    Height as of this encounter: 1.6 m (5' 3\").    Weight as of this encounter: 70.9 kg (156 lb 5.8 oz).    I/O last 3 completed shifts:  In: 200 [P.O.:200]  Out: 70 [Urine:70]  Vitals:    06/19/19 1355 06/19/19 1812   Weight: 69 kg (152 lb 3.2 oz) 70.9 kg (156 lb 5.8 oz)       Constitutional: Awake, alert, cooperative, no apparent distress   Respiratory: Nl work of breathing.  Diminished breath sounds at the bases   Cardiovascular: Regular rate and rhythm, normal S1 and S2, and no murmur noted   Abdomen: Normal bowel sounds, soft, non-distended, non-tender   Skin: No rashes, no cyanosis, dry to touch   Neuro: CN 2-12 intact, no localizing weakness   Extremities: No edema, normal range of motion   HEENT Normocephalic, atraumatic, normal nasal turbinates; oropharynx clear   Neck Supple; nl inspection; trachea midline; no thryomegaly   Psychiatric: A+O x3. Normal affect          Medications:      All current medications were reviewed with changes reflected in problem list.         Data:      All new lab and imaging data was reviewed.   Labs:  Recent Labs   Lab 06/19/19  1723 06/19/19  1457 06/19/19  1419   CULT Culture negative < 24 hours, reincubate No growth after 16 hours No growth after 16 hours     Recent Labs   Lab 06/20/19  0621 06/19/19  1419   WBC 14.3* 17.1* "   HGB 11.5* 14.0   HCT 35.6 42.7   MCV 94 93    215     Recent Labs   Lab 06/20/19  0621 06/19/19  1419    134   POTASSIUM 3.8 4.1   CHLORIDE 103 99   CO2 24 27   ANIONGAP 8 8   GLC 88 116*   BUN 24 20   CR 1.43* 1.25*   GFRESTIMATED 31* 37*   GFRESTBLACK 36* 43*   TRI 8.1* 9.5   PROTTOTAL  --  7.6   ALBUMIN  --  3.6   BILITOTAL  --  1.8*   ALKPHOS  --  84   AST  --  22   ALT  --  20      Imaging:   Recent Results (from the past 24 hour(s))   XR Chest Port 1 View    Narrative    CHEST PORTABLE ONE VIEW   6/19/2019 3:33 PM     HISTORY: Fever, hypoxia.    COMPARISON: 4/6/2019.    FINDINGS: Upright portable chest. Left subclavian cardiac device in  place. No pneumothorax. The heart size is normal. The thoracic aorta  is calcified. There is a left perihilar and lower lobe infiltrate.  Probable left pleural effusion. The right lung is clear.      Impression    IMPRESSION: Left perihilar pneumonia.    MD Amor LUEVANO -[phone]

## 2019-06-20 NOTE — PLAN OF CARE
Lethargic at start of shift, arousing to voice. Oriented x4. BP soft (88/49); pagedarryl TRIPATHI and received orders for  mL bolus, BP up to 109/61. All other VSS. 98% 02 on 2L. LS dim.Triggered sepsis, lactate 1.4. Continues Rocephin Q24H. Tele a fib CVR.

## 2019-06-21 ENCOUNTER — APPOINTMENT (OUTPATIENT)
Dept: PHYSICAL THERAPY | Facility: CLINIC | Age: 84
DRG: 871 | End: 2019-06-21
Attending: INTERNAL MEDICINE
Payer: MEDICARE

## 2019-06-21 ENCOUNTER — APPOINTMENT (OUTPATIENT)
Dept: SPEECH THERAPY | Facility: CLINIC | Age: 84
DRG: 871 | End: 2019-06-21
Attending: INTERNAL MEDICINE
Payer: MEDICARE

## 2019-06-21 LAB
ANION GAP SERPL CALCULATED.3IONS-SCNC: 6 MMOL/L (ref 3–14)
BUN SERPL-MCNC: 23 MG/DL (ref 7–30)
CALCIUM SERPL-MCNC: 7.9 MG/DL (ref 8.5–10.1)
CHLORIDE SERPL-SCNC: 103 MMOL/L (ref 94–109)
CO2 SERPL-SCNC: 27 MMOL/L (ref 20–32)
CREAT SERPL-MCNC: 1.21 MG/DL (ref 0.52–1.04)
ERYTHROCYTE [DISTWIDTH] IN BLOOD BY AUTOMATED COUNT: 14.5 % (ref 10–15)
GFR SERPL CREATININE-BSD FRML MDRD: 39 ML/MIN/{1.73_M2}
GLUCOSE SERPL-MCNC: 88 MG/DL (ref 70–99)
HCT VFR BLD AUTO: 34.3 % (ref 35–47)
HGB BLD-MCNC: 10.8 G/DL (ref 11.7–15.7)
INR PPP: 2.69 (ref 0.86–1.14)
LACTATE BLD-SCNC: 1.1 MMOL/L (ref 0.7–2)
MCH RBC QN AUTO: 29.7 PG (ref 26.5–33)
MCHC RBC AUTO-ENTMCNC: 31.5 G/DL (ref 31.5–36.5)
MCV RBC AUTO: 94 FL (ref 78–100)
PLATELET # BLD AUTO: 166 10E9/L (ref 150–450)
POTASSIUM SERPL-SCNC: 3.7 MMOL/L (ref 3.4–5.3)
RBC # BLD AUTO: 3.64 10E12/L (ref 3.8–5.2)
SODIUM SERPL-SCNC: 136 MMOL/L (ref 133–144)
WBC # BLD AUTO: 12.6 10E9/L (ref 4–11)

## 2019-06-21 PROCEDURE — 12000000 ZZH R&B MED SURG/OB

## 2019-06-21 PROCEDURE — 94640 AIRWAY INHALATION TREATMENT: CPT

## 2019-06-21 PROCEDURE — 94640 AIRWAY INHALATION TREATMENT: CPT | Mod: 76

## 2019-06-21 PROCEDURE — 25000132 ZZH RX MED GY IP 250 OP 250 PS 637: Mod: GY | Performed by: INTERNAL MEDICINE

## 2019-06-21 PROCEDURE — 36415 COLL VENOUS BLD VENIPUNCTURE: CPT | Performed by: INTERNAL MEDICINE

## 2019-06-21 PROCEDURE — 40000275 ZZH STATISTIC RCP TIME EA 10 MIN

## 2019-06-21 PROCEDURE — 97161 PT EVAL LOW COMPLEX 20 MIN: CPT | Mod: GP

## 2019-06-21 PROCEDURE — 25000128 H RX IP 250 OP 636: Performed by: INTERNAL MEDICINE

## 2019-06-21 PROCEDURE — 97110 THERAPEUTIC EXERCISES: CPT | Mod: GP

## 2019-06-21 PROCEDURE — 80048 BASIC METABOLIC PNL TOTAL CA: CPT | Performed by: INTERNAL MEDICINE

## 2019-06-21 PROCEDURE — 99233 SBSQ HOSP IP/OBS HIGH 50: CPT | Performed by: INTERNAL MEDICINE

## 2019-06-21 PROCEDURE — 85610 PROTHROMBIN TIME: CPT | Performed by: INTERNAL MEDICINE

## 2019-06-21 PROCEDURE — 25000125 ZZHC RX 250: Performed by: INTERNAL MEDICINE

## 2019-06-21 PROCEDURE — 97116 GAIT TRAINING THERAPY: CPT | Mod: GP

## 2019-06-21 PROCEDURE — 85027 COMPLETE CBC AUTOMATED: CPT | Performed by: INTERNAL MEDICINE

## 2019-06-21 PROCEDURE — 83605 ASSAY OF LACTIC ACID: CPT | Performed by: INTERNAL MEDICINE

## 2019-06-21 PROCEDURE — 92610 EVALUATE SWALLOWING FUNCTION: CPT | Mod: GN

## 2019-06-21 RX ORDER — CEFDINIR 300 MG/1
300 CAPSULE ORAL DAILY
Status: DISCONTINUED | OUTPATIENT
Start: 2019-06-21 | End: 2019-06-21

## 2019-06-21 RX ORDER — CALCIUM CARBONATE 500(1250)
1 TABLET ORAL DAILY
Status: DISCONTINUED | OUTPATIENT
Start: 2019-06-22 | End: 2019-06-24 | Stop reason: HOSPADM

## 2019-06-21 RX ORDER — WARFARIN SODIUM 1 MG/1
1 TABLET ORAL
Status: COMPLETED | OUTPATIENT
Start: 2019-06-21 | End: 2019-06-21

## 2019-06-21 RX ORDER — CEFDINIR 300 MG/1
300 CAPSULE ORAL DAILY
Status: DISCONTINUED | OUTPATIENT
Start: 2019-06-21 | End: 2019-06-24 | Stop reason: HOSPADM

## 2019-06-21 RX ADMIN — Medication 1 CAPSULE: at 09:25

## 2019-06-21 RX ADMIN — AZITHROMYCIN MONOHYDRATE 250 MG: 250 TABLET ORAL at 16:10

## 2019-06-21 RX ADMIN — LOSARTAN POTASSIUM 25 MG: 25 TABLET, FILM COATED ORAL at 09:26

## 2019-06-21 RX ADMIN — WARFARIN SODIUM 1 MG: 1 TABLET ORAL at 18:15

## 2019-06-21 RX ADMIN — CHOLECALCIFEROL TAB 10 MCG (400 UNIT) 800 UNITS: 10 TAB at 09:25

## 2019-06-21 RX ADMIN — FLUTICASONE FUROATE AND VILANTEROL TRIFENATATE 1 PUFF: 200; 25 POWDER RESPIRATORY (INHALATION) at 08:51

## 2019-06-21 RX ADMIN — CEFDINIR 300 MG: 300 CAPSULE ORAL at 12:05

## 2019-06-21 RX ADMIN — CALCIUM 500 MG: 500 TABLET ORAL at 09:25

## 2019-06-21 RX ADMIN — ISOSORBIDE MONONITRATE 30 MG: 30 TABLET, EXTENDED RELEASE ORAL at 09:26

## 2019-06-21 RX ADMIN — PRAVASTATIN SODIUM 40 MG: 20 TABLET ORAL at 09:25

## 2019-06-21 RX ADMIN — UMECLIDINIUM 1 PUFF: 62.5 AEROSOL, POWDER ORAL at 08:51

## 2019-06-21 RX ADMIN — ALBUTEROL SULFATE 2.5 MG: 2.5 SOLUTION RESPIRATORY (INHALATION) at 16:26

## 2019-06-21 RX ADMIN — MIRTAZAPINE 15 MG: 15 TABLET, FILM COATED ORAL at 22:04

## 2019-06-21 RX ADMIN — CEFTRIAXONE SODIUM 2 G: 2 INJECTION, POWDER, FOR SOLUTION INTRAMUSCULAR; INTRAVENOUS at 00:55

## 2019-06-21 RX ADMIN — ALBUTEROL SULFATE 2.5 MG: 2.5 SOLUTION RESPIRATORY (INHALATION) at 12:19

## 2019-06-21 RX ADMIN — ALBUTEROL SULFATE 2.5 MG: 2.5 SOLUTION RESPIRATORY (INHALATION) at 20:46

## 2019-06-21 RX ADMIN — ALBUTEROL SULFATE 2.5 MG: 2.5 SOLUTION RESPIRATORY (INHALATION) at 08:51

## 2019-06-21 RX ADMIN — DILTIAZEM HYDROCHLORIDE 240 MG: 240 CAPSULE, COATED, EXTENDED RELEASE ORAL at 09:25

## 2019-06-21 ASSESSMENT — ACTIVITIES OF DAILY LIVING (ADL)
ADLS_ACUITY_SCORE: 19
ADLS_ACUITY_SCORE: 18
ADLS_ACUITY_SCORE: 18
ADLS_ACUITY_SCORE: 19
ADLS_ACUITY_SCORE: 18
ADLS_ACUITY_SCORE: 18

## 2019-06-21 NOTE — PLAN OF CARE
All VSS, LS dim and satting well on 2L NC.  Tele A fib CVR.  Straight cathed x1 for 600 mL.  Received 500 mL bolus this shift per MD.  Receiving Rocephin and zithro.  Continue with POC.

## 2019-06-21 NOTE — PLAN OF CARE
Discharge Planner SLP   Patient plan for discharge: did not state  Current status: Clinical swallow evaluation completed with thin liquids, puree solids, soft/moist solids and general solids. She currently presents with functional oropharyngeal swallow at bedside. Oral phase WNL. Pharyngeal swallow appeared generally timely, functional hyolaryngeal ROM for pt's age. No overt signs/sx aspiration noted. Globus sensation with dryer solids detected, and given hx of tortuous esophagus do suspect dysphagia may be esophageal in nature. Educated pt on diet modifications (DD2 vs DD3) that could be provided during hospitalization, though pt pleasantly declining, wishing to pursue regular diet with choosing/ordering softer/moister items herself. Pt would benefit from strict following of these strategies: slow pace, small bites, chew thoroughly, add extra moisture to solids, alternate solids/liquids, sit upright for all PO/30-60 minutes after. Discussion re: current presentation held with MD who placed esophagram orders into [facility]. If cause of dysphagia is not identified during esophagram, or any oropharyngeal deficits/aspiration noted during that examination, will pursue a video swallow study as well. Pt in agreement. Will follow.   Barriers to return to prior living situation: no SLP barriers  Recommendations for discharge: home with possible SLP follow up (was being seen by OP SLP)  Rationale for recommendations: SLP to follow 5x/week to monitor diet tolerance, reinforce education, complete video swallow study if needed.        Entered by: Anushka Young 06/21/2019 3:45 PM

## 2019-06-21 NOTE — PLAN OF CARE
DX :   CAP   HX : COPD, CKD with 2 stents, dyslipidemia, osteoporosis, irritable bowel syndrome, Afib.   LABS : , WBC 12.6, K 3.7, INR 2.69, Pato 7.9, CR 1.21  TEL: Afib/ sinus Arrhythmia   GI/ : Continent of B&B.  DIET : Regular diet  ASSESS; A&OX4. Assist of 1 with walker and gait belt, LS expiratory wheezing.Dyspnea with ambulation. 02 sat was down to 85% on 3L per nc during ambulation. 93% on 1L of 02 per nc at rest. No hemoptysis noted and T max 99.0  Ambulated down the [address] way X1.    PAIN : Denied pain.     ACTIVITY :  Assist of 1 with walker and gait belt for ambulation.   TEACHING : Educated to use I&S with C&DB. Safety to call for help.  PLAN FOR DISCHARGE : plan to discharge to prior living in 1 to 2 days if passes PT.

## 2019-06-21 NOTE — PROGRESS NOTES
[facility]  Hospitalist Progress Note  Name: Leonor Mercado    MRN: [id number]  YOB: 1926    Age: 89 year old  Date of admission: 6/19/2019  Primary care provider: Arnel Garcia      Reason for Stay (Diagnosis): Community-acquired pneumonia/sepsis/acute hypoxic respiratory failure         Assessment and Plan:      Summary of Stay:  Leonor Mrecado is a 89 year old female admitted on 6/19/2019. She has been ill for about 5 days with progressive generalized weakness, progressive dyspnea cough and chills.  X-ray revealed evidence of left-sided pneumonia.  She is also in atrial fibrillation with rapid ventricular response, has had some vomiting and some scant hemoptysis.  She has a history of chronic atrial fibrillation, chronic kidney disease a baseline creatinine of 1.4-1.5, pacemaker for sick sinus syndrome, dyslipidemia, osteoporosis, irritable bowel syndrome, COPD on oxygen at night only, coronary artery disease with 2 stents, diastolic dysfunction with normal ejection fraction and constipation.     Problem List/Plan:  1. Acute sepsis with acute hypoxic respiratory failure secondary to community-acquired pneumonia: This is evidenced by fever, leukocytosis, tachycardia, hypoxemia, and notable source of infection.  Patient was initially started on vancomycin and Zosyn but do not suspect multidrug-resistant organisms was transitioned to ceftriaxone and azithromycin. Will transition to po omnicef for 7 more days and complete course of zpak.Wean off O2 as able. PRN antitussives.  Continue duo nebs and encourage pulmonary toileting.  2. Atrial fibrillation with RVR: Now rate controlled and in the 70s.  We will continue diltiazem and Coumadin per pharmacy.  3. Scant hemoptysis: This occurring in the setting of erythema in the right nare which may be the source of the blood.  No further work-up at this time.  4. Chronic kidney disease: Baseline creatinine is  "around 1.25.  We will continue to monitor after IV fluid resuscitation.      DVT Prophylaxis: Warfarin  Code Status: DNR / DNI  Discharge Dispo: Suspect back to usual living arrangement  Estimated Disch Date / # of Days until Disch: In 1-2 days if passes PT    Time spent: Greater than 35 minutes    Addendum: Per daughter's report to nursing staff, patient has had difficulties with swallowing.  Speech pathology did assess the patient and I discussed the case with speech pathology.  In review of her records, she does have a tortuous esophagus on prior EGD and suspect that this is likely the cause and unlikely oropharyngeal dysphagia.  We will start with an esophagram and if that is unremarkable, may proceed with a video swallow.      Interval History (Subjective):      Breathing slowly improved.         Physical Exam:      Vital signs:  Temp: 97.9  F (36.6  C) Temp src: Oral BP: 125/77 Pulse: 116 Heart Rate: 102 Resp: 20 SpO2: 97 % O2 Device: Nasal cannula Oxygen Delivery: 2 LPM Height: 160 cm (5' 3\") Weight: 70.9 kg (156 lb 5.8 oz)  Estimated body mass index is 27.7 kg/m  as calculated from the following:    Height as of this encounter: 1.6 m (5' 3\").    Weight as of this encounter: 70.9 kg (156 lb 5.8 oz).    I/O last 3 completed shifts:  In: 700 [P.O.:700]  Out: 600 [Urine:600]  Vitals:    06/19/19 1355 06/19/19 1812   Weight: 69 kg (152 lb 3.2 oz) 70.9 kg (156 lb 5.8 oz)       Constitutional: Awake, alert, cooperative, no apparent distress   Respiratory: Nl work of breathing.  Diminished breath sounds at the bases   Cardiovascular: Regular rate and rhythm, normal S1 and S2, and no murmur noted   Abdomen: Normal bowel sounds, soft, non-distended, non-tender   Skin: No rashes, no cyanosis, dry to touch   Neuro: CN 2-12 intact, no localizing weakness   Extremities: No edema, normal range of motion   HEENT Normocephalic, atraumatic, normal nasal turbinates; oropharynx clear   Neck Supple; nl inspection; trachea " midline; no thryomegaly   Psychiatric: A+O x3. Normal affect          Medications:      All current medications were reviewed with changes reflected in problem list.         Data:      All new lab and imaging data was reviewed.   Labs:  Recent Labs   Lab 06/19/19  1723 06/19/19  1457 06/19/19  1419   CULT No growth No growth after 2 days No growth after 2 days     Recent Labs   Lab 06/21/19  0625 06/20/19  0621 06/19/19  1419   WBC 12.6* 14.3* 17.1*   HGB 10.8* 11.5* 14.0   HCT 34.3* 35.6 42.7   MCV 94 94 93    165 215     Recent Labs   Lab 06/21/19  0625 06/20/19  0621 06/19/19  1419    135 134   POTASSIUM 3.7 3.8 4.1   CHLORIDE 103 103 99   CO2 27 24 27   ANIONGAP 6 8 8   GLC 88 88 116*   BUN 23 24 20   CR 1.21* 1.43* 1.25*   GFRESTIMATED 39* 31* 37*   GFRESTBLACK 45* 36* 43*   TRI 7.9* 8.1* 9.5   PROTTOTAL  --   --  7.6   ALBUMIN  --   --  3.6   BILITOTAL  --   --  1.8*   ALKPHOS  --   --  84   AST  --   --  22   ALT  --   --  20      Imaging:   Recent Results (from the past 24 hour(s))   XR Chest Port 1 View    Narrative    CHEST PORTABLE ONE VIEW   6/19/2019 3:33 PM     HISTORY: Fever, hypoxia.    COMPARISON: 4/6/2019.    FINDINGS: Upright portable chest. Left subclavian cardiac device in  place. No pneumothorax. The heart size is normal. The thoracic aorta  is calcified. There is a left perihilar and lower lobe infiltrate.  Probable left pleural effusion. The right lung is clear.      Impression    IMPRESSION: Left perihilar pneumonia.    MD Amor LUEVANO -[phone]

## 2019-06-21 NOTE — PROGRESS NOTES
"Clinical Swallow Evaluation:      06/21/19 1539   General Information   Onset Date 06/19/19   Start of Care Date 06/21/19   Referring Physician Dr. Lemons.    Patient Profile Review/OT: Additional Occupational Profile Info See Profile for full history and prior level of function   Patient/Family Goals Statement to swallow better   Swallowing Evaluation Bedside swallow evaluation   Behaviorial Observations WFL (within functional limits)   Mode of current nutrition Oral diet   Type of oral diet Regular;Thin liquid   Respiratory Status O2 Supply   Type of O2 supply Nasal cannula  (2 L)   Comments Per MD \" Leonor Mercado is a 89 year old female admitted on 6/19/2019. She has been ill for about 5 days with progressive generalized weakness, progressive dyspnea cough and chills.  X-ray revealed evidence of left-sided pneumonia.  She is also in atrial fibrillation with rapid ventricular response, has had some vomiting and some scant hemoptysis.  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 She had an EGD completed in 2015 d/t dysphagia sx which revealed a tortuous esophagus that could explain her dysphagia symptoms. They recommended small bites, chew food thoroughly, avoid dry foods such as toast and chicken. Pt reports moist foods are easier for her but she still eats dry breads/dry potatoes which does cause her difficulty. A clinical swallow evaluation at OP was completed 5/2019 which revealed mild pharyngeal vs esophageal dysphagia where fluoroscopic evaluations were recommended.    Clinical Swallow Evaluation   Oral Musculature generally intact "   Structural Abnormalities none present   Dentition present and adequate   Mucosal Quality good   Mandibular Strength and Mobility intact   Oral Labial Strength and Mobility WFL   Lingual Strength and Mobility WFL   Velar Elevation intact   Buccal Strength and Mobility intact   Laryngeal Function Cough;Swallow;Voicing initiated;Dry swallow palpated   Additional Documentation Yes   Clinical Swallow Eval: Thin Liquid Texture Trial   Mode of Presentation, Thin Liquids straw;self-fed   Volume of Liquid or Food Presented 4 oz   Oral Phase of Swallow WFL   Pharyngeal Phase of Swallow intact   Diagnostic Statement no overt signs/sx aspiration   Clinical Swallow Eval: Puree Solid Texture Trial   Mode of Presentation, Puree spoon;self-fed   Volume of Puree Presented ~ 1 oz   Oral Phase, Puree WFL   Pharyngeal Phase, Puree intact   Diagnostic Statement no overt signs/sx aspiration noted   Clinical Swallow Eval: Semisolid Texture Trial   Mode of Presentation, Semisolid self-fed   Volume of Semisolid Food Presented kvng cracker in pudding   Oral Phase, Semisolid WFL   Pharyngeal Phase, Semisolid intact   Diagnostic Statement no overt signs/sx aspiration noted   Clinical Swallow Eval: Solid Food Texture Trial   Mode of Presentation, Solid self-fed   Volume of Solid Food Presented kvng crackers    Oral Phase, Solid WFL   Pharyngeal Phase, Solid feeling of something stuck in throat;repeated swallows   Diagnostic Statement globus sensation only with larger bites without liquid wash   Esophageal Phase of Swallow   Patient reports or presents with symptoms of esophageal dysphagia Yes   Esophageal comments hx of tortuous esophagus, globus sensation   General Therapy Interventions   Planned Therapy Interventions Dysphagia Treatment   Swallow Eval: Clinical Impressions   Skilled Criteria for Therapy Intervention Skilled criteria met.  Treatment indicated.   Functional Assessment Scale (FAS) 6   Treatment Diagnosis dysphagia   Diet  texture recommendations Regular diet;Thin liquids  (softer/moister choices)   Recommended Feeding/Eating Techniques alternate between small bites and sips of food/liquid;maintain upright posture during/after eating for 30 mins;small sips/bites   Demonstrates Need for Referral to Another Service   (GI? )   Therapy Frequency 5x/week   Predicted Duration of Therapy Intervention (days/wks) 1 week   Anticipated Discharge Disposition home   Risks and Benefits of Treatment have been explained. Yes   Patient, family and/or staff in agreement with Plan of Care Yes   Clinical Impression Comments Clinical swallow evaluation completed with thin liquids, puree solids, soft/moist solids and general solids. She currently presents with functional oropharyngeal swallow at bedside. Oral phase WNL. Pharyngeal swallow appeared generally timely, functional hyolaryngeal ROM for pt's age. No overt signs/sx aspiration noted. Globus sensation with dryer solids detected, and given hx of tortuous esophagus do suspect dysphagia may be esophageal in nature. Educated pt on diet modifications (DD2 vs DD3) that could be provided during hospitalization, though pt pleasantly declining, wishing to pursue regular diet with choosing/ordering softer/moister items herself. Pt would benefit from strict following of these strategies: slow pace, small bites, chew thoroughly, add extra moisture to solids, alternate solids/liquids, sit upright for all PO/30-60 minutes after. Discussion re: current presentation held with MD who placed esophagram orders into [facility]. If cause of dysphagia is not identified on same, or any oropharyngeal deficits/aspiration noted during that examination, will pursue a video swallow study as well. Pt in agreement. Will follow.    Total Evaluation Time   Total Evaluation Time (Minutes) 32

## 2019-06-21 NOTE — PROGRESS NOTES
06/21/19 1400   Quick Adds   Type of Visit Initial PT Evaluation   Living Environment   Lives With alone   Living Arrangements independent living facility   Home Accessibility no concerns   Transportation Anticipated family or friend will provide   Living Environment Comment Pt lives alone in senior living, does not recieve services   Self-Care   Usual Activity Tolerance moderate   Current Activity Tolerance fair   Regular Exercise   (chair yoga)   Equipment Currently Used at Home walker, rolling   Activity/Exercise/Self-Care Comment IND with ADLs, recieve one meal per day   Functional Level Prior   Ambulation 1-->assistive equipment   Transferring 1-->assistive equipment   Toileting 0-->independent   Bathing 1-->assistive equipment   Fall history within last six months yes   Number of times patient has fallen within last six months 1   Which of the above functional risks had a recent onset or change? ambulation   Prior Functional Level Comment Pt typically IND in apartment, uses walker for mobility   General Information   Onset of Illness/Injury or Date of Surgery - Date 06/19/19   Referring Physician Amor Lemons MD   Patient/Family Goals Statement Hopeful for home   Pertinent History of Current Problem (include personal factors and/or comorbidities that impact the POC) 89 year old female admitted on 6/19/2019. She has been ill for about 5 days with progressive generalized weakness, progressive dyspnea cough and chills.  X-ray revealed evidence of left-sided pneumonia.   Precautions/Limitations fall precautions   General Info Comments Pt on 2L O2, only on O2 at night at baseline   Cognitive Status Examination   Orientation orientation to person, place and time   Level of Consciousness alert   Follows Commands and Answers Questions 100% of the time   Pain Assessment   Patient Currently in Pain No   Integumentary/Edema   Integumentary/Edema no deficits were identifed   Posture    Posture Kyphosis;Protracted  "shoulders;Forward head position   Range of Motion (ROM)   ROM Comment B LE WFL   Strength   Strength Comments B hip flex 3+/5, quad 4/5, DF 4/5   Bed Mobility   Bed Mobility Comments Not assessed, up in chair, pt reports needing help   Transfer Skills   Transfer Comments Min-A sit<>stand   Gait   Gait Comments CGA with 4WW, pt leans forearms on handles, home walker multiple inches too tall   Balance   Balance Comments Requires UE support for dynamic balance   Sensory Examination   Sensory Perception no deficits were identified   General Therapy Interventions   Planned Therapy Interventions progressive activity/exercise;risk factor education;home program guidelines;transfer training   Clinical Impression   Criteria for Skilled Therapeutic Intervention yes, treatment indicated   PT Diagnosis Impaired functional mobilty   Influenced by the following impairments Decreased activity tolerance, generalized weakness   Functional limitations due to impairments Gait, functional endurance   Clinical Presentation Stable/Uncomplicated   Clinical Presentation Rationale Medically stable   Clinical Decision Making (Complexity) Low complexity   Therapy Frequency Daily   Predicted Duration of Therapy Intervention (days/wks) 3 days   Anticipated Discharge Disposition Transitional Care Facility   Risk & Benefits of therapy have been explained Yes   Patient, Family & other staff in agreement with plan of care Yes   [facility] [employer] TM \"6 Clicks\"   2016, Trustees of [facility], under license to [employer].  All rights reserved.   6 Clicks Short Forms Basic Mobility Inpatient Short Form   [facility]  \"6 Clicks\" V.2 Basic Mobility Inpatient Short Form   1. Turning from your back to your side while in a flat bed without using bedrails? 3 - A Little   2. Moving from lying on your back to sitting on the side of a flat bed without using bedrails? 3 - A Little   3. Moving to and from a bed to a chair (including a " wheelchair)? 3 - A Little   4. Standing up from a chair using your arms (e.g., wheelchair, or bedside chair)? 3 - A Little   5. To walk in hospital room? 3 - A Little   6. Climbing 3-5 steps with a railing? 2 - A Lot   Basic Mobility Raw Score (Score out of 24.Lower scores equate to lower levels of function) 17   Total Evaluation Time   Total Evaluation Time (Minutes) 10

## 2019-06-21 NOTE — PLAN OF CARE
Patient alert and oriented  Peripheral IV saline locked  No reports of pain  Tolerating diet, no nausea  Up assist x1 with walker and gait belt  VSS, on 2L of O2, chronic  Switched to Oral abx this shift  Lungs Clear/Dim  Positive BS, loose stools, did not give miralax this AM  Voided this shift without issue, retained overnight, hat in bathroom  Tele: A Fib RVR, HR of 104  PT to evaluate is able to return to prior living, [facility] in [place]  Warfarin education given, not a new medication for patient  Continue with plan of care

## 2019-06-21 NOTE — PLAN OF CARE
Discharge Planner PT     PT: Orders received, eval completed, tx initiated.  Pt lives in a ILF alone. Does not receive outside help at baseline At baseline ambulates with a 4WW.    Patient plan for discharge: Hopeful for home  Current status: Min-A sit<>stand progressing to CGA. Pt declines attempting bed mobility, reports requiring assist at this time. On 2L, typically only uses O2 at night. SPO2 96%, remains 96% on RA. Pt ambulates 150' CGA with 4WW, leans forearm on handles to to high height of her walker. Significant fatigue and SOB with mobility. SPO2 92% on RA with activity, returned to 2L for comfort. Discussed possible TCU need pending progress. Pt just up for first time since admission today, discussed plan to increase activity.  Barriers to return to prior living situation: Lives alone, current mobility status and level of assist  Recommendations for discharge: TCU  Rationale for recommendations: At this time recommending TCU at pt lives alone and is requiring assist with mobility and cares. Pending LOS and progress with increased mobility, pt could progress to home with  PT.       Entered by: Otto Kamara 06/21/2019 6:00 PM

## 2019-06-21 NOTE — PROVIDER NOTIFICATION
Patient/daughter wondering if Swallow Eval can be completed as patient gets food stuck in throat at times    Dr Her paged    Speech consulted

## 2019-06-22 ENCOUNTER — APPOINTMENT (OUTPATIENT)
Dept: GENERAL RADIOLOGY | Facility: CLINIC | Age: 84
DRG: 871 | End: 2019-06-22
Attending: INTERNAL MEDICINE
Payer: MEDICARE

## 2019-06-22 ENCOUNTER — APPOINTMENT (OUTPATIENT)
Dept: SPEECH THERAPY | Facility: CLINIC | Age: 84
DRG: 871 | End: 2019-06-22
Payer: MEDICARE

## 2019-06-22 LAB
ANION GAP SERPL CALCULATED.3IONS-SCNC: 5 MMOL/L (ref 3–14)
BUN SERPL-MCNC: 25 MG/DL (ref 7–30)
CALCIUM SERPL-MCNC: 8.2 MG/DL (ref 8.5–10.1)
CHLORIDE SERPL-SCNC: 102 MMOL/L (ref 94–109)
CO2 SERPL-SCNC: 26 MMOL/L (ref 20–32)
CREAT SERPL-MCNC: 1.14 MG/DL (ref 0.52–1.04)
ERYTHROCYTE [DISTWIDTH] IN BLOOD BY AUTOMATED COUNT: 14.4 % (ref 10–15)
GFR SERPL CREATININE-BSD FRML MDRD: 41 ML/MIN/{1.73_M2}
GLUCOSE SERPL-MCNC: 109 MG/DL (ref 70–99)
HCT VFR BLD AUTO: 33.3 % (ref 35–47)
HGB BLD-MCNC: 10.4 G/DL (ref 11.7–15.7)
INR PPP: 2.08 (ref 0.86–1.14)
MCH RBC QN AUTO: 29.3 PG (ref 26.5–33)
MCHC RBC AUTO-ENTMCNC: 31.2 G/DL (ref 31.5–36.5)
MCV RBC AUTO: 94 FL (ref 78–100)
PLATELET # BLD AUTO: 167 10E9/L (ref 150–450)
POTASSIUM SERPL-SCNC: 3.9 MMOL/L (ref 3.4–5.3)
RBC # BLD AUTO: 3.55 10E12/L (ref 3.8–5.2)
SODIUM SERPL-SCNC: 133 MMOL/L (ref 133–144)
WBC # BLD AUTO: 9.9 10E9/L (ref 4–11)

## 2019-06-22 PROCEDURE — 80048 BASIC METABOLIC PNL TOTAL CA: CPT | Performed by: INTERNAL MEDICINE

## 2019-06-22 PROCEDURE — 36415 COLL VENOUS BLD VENIPUNCTURE: CPT | Performed by: INTERNAL MEDICINE

## 2019-06-22 PROCEDURE — 85610 PROTHROMBIN TIME: CPT | Performed by: INTERNAL MEDICINE

## 2019-06-22 PROCEDURE — 99232 SBSQ HOSP IP/OBS MODERATE 35: CPT | Performed by: INTERNAL MEDICINE

## 2019-06-22 PROCEDURE — 92526 ORAL FUNCTION THERAPY: CPT | Mod: GN | Performed by: SPEECH-LANGUAGE PATHOLOGIST

## 2019-06-22 PROCEDURE — 25000132 ZZH RX MED GY IP 250 OP 250 PS 637: Mod: GY | Performed by: INTERNAL MEDICINE

## 2019-06-22 PROCEDURE — 40000275 ZZH STATISTIC RCP TIME EA 10 MIN

## 2019-06-22 PROCEDURE — 12000000 ZZH R&B MED SURG/OB

## 2019-06-22 PROCEDURE — 85027 COMPLETE CBC AUTOMATED: CPT | Performed by: INTERNAL MEDICINE

## 2019-06-22 PROCEDURE — 94640 AIRWAY INHALATION TREATMENT: CPT | Mod: 76

## 2019-06-22 PROCEDURE — 40000274 ZZH STATISTIC RCP CONSULT EA 30 MIN

## 2019-06-22 PROCEDURE — 74220 X-RAY XM ESOPHAGUS 1CNTRST: CPT

## 2019-06-22 PROCEDURE — 94640 AIRWAY INHALATION TREATMENT: CPT

## 2019-06-22 PROCEDURE — 40000225 ZZH STATISTIC SLP WARD VISIT: Performed by: SPEECH-LANGUAGE PATHOLOGIST

## 2019-06-22 PROCEDURE — 25000125 ZZHC RX 250: Performed by: INTERNAL MEDICINE

## 2019-06-22 RX ORDER — FUROSEMIDE 10 MG/ML
10 INJECTION INTRAMUSCULAR; INTRAVENOUS ONCE
Status: DISCONTINUED | OUTPATIENT
Start: 2019-06-22 | End: 2019-06-22

## 2019-06-22 RX ORDER — WARFARIN SODIUM 2 MG/1
2 TABLET ORAL
Status: COMPLETED | OUTPATIENT
Start: 2019-06-22 | End: 2019-06-22

## 2019-06-22 RX ORDER — ALBUTEROL SULFATE 0.83 MG/ML
2.5 SOLUTION RESPIRATORY (INHALATION) 4 TIMES DAILY
Status: DISCONTINUED | OUTPATIENT
Start: 2019-06-23 | End: 2019-06-24 | Stop reason: HOSPADM

## 2019-06-22 RX ORDER — FUROSEMIDE 20 MG
20 TABLET ORAL ONCE
Status: COMPLETED | OUTPATIENT
Start: 2019-06-22 | End: 2019-06-22

## 2019-06-22 RX ADMIN — ACETAMINOPHEN 975 MG: 325 TABLET, FILM COATED ORAL at 09:46

## 2019-06-22 RX ADMIN — ACETAMINOPHEN 975 MG: 325 TABLET, FILM COATED ORAL at 04:02

## 2019-06-22 RX ADMIN — UMECLIDINIUM 1 PUFF: 62.5 AEROSOL, POWDER ORAL at 07:53

## 2019-06-22 RX ADMIN — POLYETHYLENE GLYCOL 3350 17 G: 17 POWDER, FOR SOLUTION ORAL at 09:41

## 2019-06-22 RX ADMIN — ALBUTEROL SULFATE 2.5 MG: 2.5 SOLUTION RESPIRATORY (INHALATION) at 20:49

## 2019-06-22 RX ADMIN — WARFARIN SODIUM 2 MG: 2 TABLET ORAL at 19:23

## 2019-06-22 RX ADMIN — MIRTAZAPINE 15 MG: 15 TABLET, FILM COATED ORAL at 21:17

## 2019-06-22 RX ADMIN — ISOSORBIDE MONONITRATE 30 MG: 30 TABLET, EXTENDED RELEASE ORAL at 09:40

## 2019-06-22 RX ADMIN — LOSARTAN POTASSIUM 25 MG: 25 TABLET, FILM COATED ORAL at 09:40

## 2019-06-22 RX ADMIN — FUROSEMIDE 20 MG: 20 TABLET ORAL at 15:52

## 2019-06-22 RX ADMIN — AZITHROMYCIN MONOHYDRATE 250 MG: 250 TABLET ORAL at 15:52

## 2019-06-22 RX ADMIN — CEFDINIR 300 MG: 300 CAPSULE ORAL at 09:40

## 2019-06-22 RX ADMIN — CHOLECALCIFEROL TAB 10 MCG (400 UNIT) 800 UNITS: 10 TAB at 09:40

## 2019-06-22 RX ADMIN — ALBUTEROL SULFATE 2.5 MG: 2.5 SOLUTION RESPIRATORY (INHALATION) at 12:05

## 2019-06-22 RX ADMIN — ALBUTEROL SULFATE 2.5 MG: 2.5 SOLUTION RESPIRATORY (INHALATION) at 16:32

## 2019-06-22 RX ADMIN — FLUTICASONE FUROATE AND VILANTEROL TRIFENATATE 1 PUFF: 200; 25 POWDER RESPIRATORY (INHALATION) at 07:53

## 2019-06-22 RX ADMIN — ACETAMINOPHEN 975 MG: 325 TABLET, FILM COATED ORAL at 22:16

## 2019-06-22 RX ADMIN — DICLOFENAC 2 G: 10 GEL TOPICAL at 21:18

## 2019-06-22 RX ADMIN — CALCIUM 500 MG: 500 TABLET ORAL at 19:23

## 2019-06-22 RX ADMIN — DILTIAZEM HYDROCHLORIDE 240 MG: 240 CAPSULE, COATED, EXTENDED RELEASE ORAL at 09:40

## 2019-06-22 RX ADMIN — PRAVASTATIN SODIUM 40 MG: 20 TABLET ORAL at 09:40

## 2019-06-22 RX ADMIN — DICLOFENAC 2 G: 10 GEL TOPICAL at 16:24

## 2019-06-22 RX ADMIN — Medication 1 CAPSULE: at 09:40

## 2019-06-22 RX ADMIN — ALBUTEROL SULFATE 2.5 MG: 2.5 SOLUTION RESPIRATORY (INHALATION) at 07:53

## 2019-06-22 ASSESSMENT — ACTIVITIES OF DAILY LIVING (ADL)
ADLS_ACUITY_SCORE: 20
ADLS_ACUITY_SCORE: 19
ADLS_ACUITY_SCORE: 18
ADLS_ACUITY_SCORE: 20
ADLS_ACUITY_SCORE: 18
ADLS_ACUITY_SCORE: 17

## 2019-06-22 ASSESSMENT — MIFFLIN-ST. JEOR: SCORE: 1087.64

## 2019-06-22 NOTE — PLAN OF CARE
"Pt somnolent for duration of night, arouses to voice. VSS on 1L O2 per NC . Tele a-fib CVR , denied CP . LS coarse with exp wheezes, CALDERON and SOB reported. PIV to left hand intact, SL . Shuffled to bathroom assist x 1, W and GB, pt reported \"my feet feel stiff like ice, they hurt so bad and I can't move them.\"  Writer had Yamilet garcia brought to room and utilized to get pt back to bed. Writer applied lotion to bilateral feet per pt request, and PRN PO tylenol 975 mg was given with some relief. Voiding well. Anticipated discharge 1-2 days, PT following. Esophagram planned for 6/22, continue POC.   "

## 2019-06-22 NOTE — PLAN OF CARE
Alert, oriented, up with walker. Complaints of bilateral foot pain. Tylenol with relief. Room air at 96%. Baseline O2 at night. Oral abx.  Lives in  [facility]. Gets one meal a day provided in the lunch room.  Continue POC.  Feet feel better with the Voltaren.

## 2019-06-22 NOTE — PROGRESS NOTES
[facility]  Hospitalist Progress Note  Patient Name: Leonor Mercado    MRN: [id number]  Provider: Modesto Howard MD  06/22/19    Initial presenting complaint/issue to hospital (Diagnosis): weakness, cough, and chills         Assessment and Plan:      Summary of Stay:  Leonor Mercado is a 89 year old female with history of chronic atrial fibrillation, chronic kidney disease a baseline creatinine of 1.4-1.5, pacemaker for sick sinus syndrome, dyslipidemia, osteoporosis, irritable bowel syndrome, COPD on oxygen at night only, coronary artery disease with 2 stents, diastolic dysfunction with normal ejection fraction, and constipation. She presented to the ED on 6/19/2019 for evaluation of 5 days with progressive generalized weakness, dyspnea, cough, and chills.  Chest X-ray suggested left-sided pneumonia.  She was also in atrial fibrillation with rapid ventricular response and had some vomiting and scant hemoptysis. She was admitted to the hospital and treated for sepsis due to pneumonia with Rocephin and Zithromax. She improved over the next 3 days but remained weak. Alfredo was noted to have some difficulty swallowing so Speech was consulted. Esophogram was obtained and showed only mild presbyesophagus. Speech provided education regarding swallow precautions.  PT and OT recommended TCU on discharge. SW was consulted.      Problem List/Plan:  1. Acute sepsis with acute hypoxic respiratory failure secondary to community-acquired pneumonia: This was supported by fever, leukocytosis, tachycardia, hypoxemia, and notable source of infection.  Patient was initially started on vancomycin and Zosyn but multidrug-resistant organisms were not suspected so she was transitioned to ceftriaxone and azithromycin. Continue Omnicef for 6 more days and completed Z pack. Wean off O2 as able.  2. Atrial fibrillation with RVR: Now rate controlled and in the 70s.  We will continue diltiazem and Coumadin per  "pharmacy.  3. Scant hemoptysis: Likely due to pneumonia. Seems resolved.  No further work-up at this time.  4. Chronic kidney disease: Baseline creatinine is around 1.25.  We will continue to monitor after IV fluid resuscitation.  5. Bilateral feet pain. Suspect due to fluid, mild edema, bare foot walking, etc. Lasix 20 mg PO once. Trial of Voltaren gel.         DVT Prophylaxis: Warfarin  Code Status: DNR / DNI  Discharge Dispo:  consult for TCU on discharge           Interval History:      Complains of bilateral feet pain                  Physical Exam:      Last Vital Signs:  /79 (BP Location: Left arm)   Pulse 116   Temp 98.3  F (36.8  C) (Oral)   Resp 16   Ht 1.6 m (5' 3\")   Wt 71.4 kg (157 lb 4.8 oz)   LMP  (LMP Unknown)   SpO2 95%   BMI 27.86 kg/m      Intake/Output Summary (Last 24 hours) at 6/22/2019 1621  Last data filed at 6/22/2019 1032  Gross per 24 hour   Intake 240 ml   Output 1200 ml   Net -960 ml       GENERAL:  Comfortable. Cooperative.  PSYCH: pleasant, oriented, No acute distress.  EYES: PERRLA, Normal conjunctiva.  HEART:  Regular rate and rhythm. No JVD. Pulses normal. No edema.  LUNGS:  Clear to auscultation, normal Respiratory effort.  ABDOMEN:  Soft, no hepatosplenomegaly, normal bowel sounds.  EXTREMETIES: No clubbing, cyanosis or ischemia. No obvious discoloration or deformity of feet. Slight edema of feet.   SKIN:  Dry to touch, No rash.           Medications:      All current medications were reviewed.         Data:      All new lab and imaging data was reviewed.   Labs:  Recent Labs   Lab 06/19/19  1723 06/19/19  1457 06/19/19  1419   CULT No growth No growth after 3 days No growth after 3 days          Lab Results   Component Value Date     06/22/2019     06/21/2019     06/20/2019    Lab Results   Component Value Date    CHLORIDE 102 06/22/2019    CHLORIDE 103 06/21/2019    CHLORIDE 103 06/20/2019    Lab Results   Component Value Date    BUN 25 " 06/22/2019    BUN 23 06/21/2019    BUN 24 06/20/2019      Lab Results   Component Value Date    POTASSIUM 3.9 06/22/2019    POTASSIUM 3.7 06/21/2019    POTASSIUM 3.8 06/20/2019    Lab Results   Component Value Date    CO2 26 06/22/2019    CO2 27 06/21/2019    CO2 24 06/20/2019    Lab Results   Component Value Date    CR 1.14 06/22/2019    CR 1.21 06/21/2019    CR 1.43 06/20/2019        Recent Labs   Lab 06/22/19  0611 06/21/19  0625 06/20/19  0621   WBC 9.9 12.6* 14.3*   HGB 10.4* 10.8* 11.5*   HCT 33.3* 34.3* 35.6   MCV 94 94 94    166 165

## 2019-06-22 NOTE — PLAN OF CARE
Discharge Planner SLP   Patient plan for discharge: Not stated  Current status: Esophogram completed this AM. Mild presbyesophagus noted. Hx of tortuous esophagus on EGD. RN notes pt with no observed difficulty tolerating PO (regular diet/thin liquids), pt describes intermittent difficulty with bread/potatoes which improves when drinking liquids. Pt education re: swallow precautions including:  soft, moist foods  alternate liquids/solids  Small, frequent meals  Upright 30 min after meals to aid in digestion  Barriers to return to prior living situation: None per SLP  Recommendations for discharge: TCU per PT note  Rationale for recommendations: SLP goals met; no further SLP needs at this time.       Entered by: Kris Holder 06/22/2019 2:08 PM     Speech Language Therapy Discharge Summary    Reason for therapy discharge:    All goals and outcomes met, no further needs identified.    Progress towards therapy goal(s). See goals on Care Plan in [facility] electronic health record for goal details.  Goals met    Therapy recommendation(s):    No further therapy is recommended.

## 2019-06-23 ENCOUNTER — APPOINTMENT (OUTPATIENT)
Dept: PHYSICAL THERAPY | Facility: CLINIC | Age: 84
DRG: 871 | End: 2019-06-23
Payer: MEDICARE

## 2019-06-23 LAB
ANION GAP SERPL CALCULATED.3IONS-SCNC: 4 MMOL/L (ref 3–14)
BUN SERPL-MCNC: 21 MG/DL (ref 7–30)
CALCIUM SERPL-MCNC: 8.6 MG/DL (ref 8.5–10.1)
CHLORIDE SERPL-SCNC: 103 MMOL/L (ref 94–109)
CO2 SERPL-SCNC: 30 MMOL/L (ref 20–32)
CREAT SERPL-MCNC: 1.15 MG/DL (ref 0.52–1.04)
GFR SERPL CREATININE-BSD FRML MDRD: 41 ML/MIN/{1.73_M2}
GLUCOSE SERPL-MCNC: 101 MG/DL (ref 70–99)
INR PPP: 2.2 (ref 0.86–1.14)
POTASSIUM SERPL-SCNC: 3.6 MMOL/L (ref 3.4–5.3)
SODIUM SERPL-SCNC: 137 MMOL/L (ref 133–144)

## 2019-06-23 PROCEDURE — 25000132 ZZH RX MED GY IP 250 OP 250 PS 637: Mod: GY | Performed by: INTERNAL MEDICINE

## 2019-06-23 PROCEDURE — 85610 PROTHROMBIN TIME: CPT | Performed by: INTERNAL MEDICINE

## 2019-06-23 PROCEDURE — 25000125 ZZHC RX 250: Performed by: INTERNAL MEDICINE

## 2019-06-23 PROCEDURE — 94640 AIRWAY INHALATION TREATMENT: CPT

## 2019-06-23 PROCEDURE — 80048 BASIC METABOLIC PNL TOTAL CA: CPT | Performed by: INTERNAL MEDICINE

## 2019-06-23 PROCEDURE — 40000275 ZZH STATISTIC RCP TIME EA 10 MIN

## 2019-06-23 PROCEDURE — 99232 SBSQ HOSP IP/OBS MODERATE 35: CPT | Performed by: INTERNAL MEDICINE

## 2019-06-23 PROCEDURE — 12000000 ZZH R&B MED SURG/OB

## 2019-06-23 PROCEDURE — 97116 GAIT TRAINING THERAPY: CPT | Mod: GP | Performed by: PHYSICAL THERAPIST

## 2019-06-23 PROCEDURE — 36415 COLL VENOUS BLD VENIPUNCTURE: CPT | Performed by: INTERNAL MEDICINE

## 2019-06-23 PROCEDURE — 97530 THERAPEUTIC ACTIVITIES: CPT | Mod: GP | Performed by: PHYSICAL THERAPIST

## 2019-06-23 PROCEDURE — 97110 THERAPEUTIC EXERCISES: CPT | Mod: GP | Performed by: PHYSICAL THERAPIST

## 2019-06-23 PROCEDURE — 94640 AIRWAY INHALATION TREATMENT: CPT | Mod: 76

## 2019-06-23 RX ORDER — WARFARIN SODIUM 1 MG/1
1 TABLET ORAL
Status: COMPLETED | OUTPATIENT
Start: 2019-06-23 | End: 2019-06-23

## 2019-06-23 RX ORDER — ACETAMINOPHEN 325 MG/1
325 TABLET ORAL EVERY 6 HOURS PRN
Status: DISCONTINUED | OUTPATIENT
Start: 2019-06-23 | End: 2019-06-24 | Stop reason: HOSPADM

## 2019-06-23 RX ORDER — ACETAMINOPHEN 325 MG/1
650 TABLET ORAL 4 TIMES DAILY
Status: DISCONTINUED | OUTPATIENT
Start: 2019-06-23 | End: 2019-06-24 | Stop reason: HOSPADM

## 2019-06-23 RX ADMIN — Medication 1 CAPSULE: at 09:43

## 2019-06-23 RX ADMIN — FLUTICASONE FUROATE AND VILANTEROL TRIFENATATE 1 PUFF: 200; 25 POWDER RESPIRATORY (INHALATION) at 08:37

## 2019-06-23 RX ADMIN — ACETAMINOPHEN 650 MG: 325 TABLET, FILM COATED ORAL at 12:19

## 2019-06-23 RX ADMIN — PRAVASTATIN SODIUM 40 MG: 20 TABLET ORAL at 09:42

## 2019-06-23 RX ADMIN — ALBUTEROL SULFATE 2.5 MG: 2.5 SOLUTION RESPIRATORY (INHALATION) at 12:07

## 2019-06-23 RX ADMIN — WARFARIN SODIUM 1 MG: 1 TABLET ORAL at 17:42

## 2019-06-23 RX ADMIN — CALCIUM 500 MG: 500 TABLET ORAL at 17:42

## 2019-06-23 RX ADMIN — DICLOFENAC 2 G: 10 GEL TOPICAL at 21:24

## 2019-06-23 RX ADMIN — ACETAMINOPHEN 650 MG: 325 TABLET, FILM COATED ORAL at 21:24

## 2019-06-23 RX ADMIN — ISOSORBIDE MONONITRATE 30 MG: 30 TABLET, EXTENDED RELEASE ORAL at 09:43

## 2019-06-23 RX ADMIN — MIRTAZAPINE 15 MG: 15 TABLET, FILM COATED ORAL at 21:24

## 2019-06-23 RX ADMIN — POLYETHYLENE GLYCOL 3350 17 G: 17 POWDER, FOR SOLUTION ORAL at 09:42

## 2019-06-23 RX ADMIN — CEFDINIR 300 MG: 300 CAPSULE ORAL at 09:43

## 2019-06-23 RX ADMIN — ALBUTEROL SULFATE 2.5 MG: 2.5 SOLUTION RESPIRATORY (INHALATION) at 15:39

## 2019-06-23 RX ADMIN — AZITHROMYCIN MONOHYDRATE 250 MG: 250 TABLET ORAL at 15:36

## 2019-06-23 RX ADMIN — UMECLIDINIUM 1 PUFF: 62.5 AEROSOL, POWDER ORAL at 08:37

## 2019-06-23 RX ADMIN — DICLOFENAC 2 G: 10 GEL TOPICAL at 09:46

## 2019-06-23 RX ADMIN — ACETAMINOPHEN 650 MG: 325 TABLET, FILM COATED ORAL at 17:42

## 2019-06-23 RX ADMIN — DILTIAZEM HYDROCHLORIDE 240 MG: 240 CAPSULE, COATED, EXTENDED RELEASE ORAL at 09:42

## 2019-06-23 RX ADMIN — ACETAMINOPHEN 325 MG: 325 TABLET, FILM COATED ORAL at 09:51

## 2019-06-23 RX ADMIN — CHOLECALCIFEROL TAB 10 MCG (400 UNIT) 800 UNITS: 10 TAB at 09:43

## 2019-06-23 RX ADMIN — ALBUTEROL SULFATE 2.5 MG: 2.5 SOLUTION RESPIRATORY (INHALATION) at 08:37

## 2019-06-23 RX ADMIN — LOSARTAN POTASSIUM 25 MG: 25 TABLET, FILM COATED ORAL at 09:42

## 2019-06-23 RX ADMIN — DICLOFENAC 2 G: 10 GEL TOPICAL at 17:47

## 2019-06-23 ASSESSMENT — ACTIVITIES OF DAILY LIVING (ADL)
ADLS_ACUITY_SCORE: 18
ADLS_ACUITY_SCORE: 17
ADLS_ACUITY_SCORE: 17
ADLS_ACUITY_SCORE: 18
ADLS_ACUITY_SCORE: 17
ADLS_ACUITY_SCORE: 17

## 2019-06-23 ASSESSMENT — MIFFLIN-ST. JEOR: SCORE: 1126.65

## 2019-06-23 NOTE — PLAN OF CARE
A&O x 4, assist x 1 w/gb & walker or Yamilet Steady, regular diet, LS anterior expiratory wheezes, O2 91-97% 2L, only wears O2 at night, tele A Fib CVR, HR 70-90's, purewick in place, will continue with POC.

## 2019-06-23 NOTE — PLAN OF CARE
"Discharge Planner PT     Patient plan for discharge: Hopeful for home  Current status:  On RA upon entering room(O2 tubing pulled off face), Sats at 94%. Pt reports \"well that's not very good, I'm usually at 100%\". Educated pt on normal O2 sats, and reviewed pt's O2 sats with her. Pt completes sit<>stand with Jose. Ambulates 20' with use of FWW and CGA, limited 2/2 complaints of foot pain. Reassured pt that the MD is aware of her pain. Pt completes LE strengthening exercises. Tolerates well, instructed in completing buttock and thigh squeezes intermittently throughout day-added to white board.   Barriers to return to prior living situation: Lives alone, current mobility status and level of assist  Recommendations for discharge: TCU  Rationale for recommendations: Pt is not currently at baseline for mobility, and is unsafe to discharge home. With continued PT, both IP and after discharge, pt is likely to obtain mobility goals.        Entered by: Arina Bell 06/23/2019 11:28 AM       "

## 2019-06-23 NOTE — PROGRESS NOTES
Care Transition Initial Assessment - SW     Met with: Patient    Active Problems:    Community acquired bacterial pneumonia     Deconditioning  DATA  Lives With: alone   Living Arrangements: independent living facility at [facility]  Quality of Family Relationships: helpful, supportive  Description of Support System: Supportive, Involved  Who is your support system?: Children  Support Assessment: Adequate family and caregiver support.   Identified issues/concerns regarding health management: Patient in need of rehab and is will to go to TCU.     Quality of Family Relationships: helpful, supportive  Transportation Anticipated: family or friend will provide    ASSESSMENT  Cognitive Status:  Pleasant alert, hearing impaired  Concerns to be addressed: choices for rehab presented and patient requests ER and not [facility].     PLAN  Financial costs for the patient includes cost of private room when patient said she would prefer but is willing to accepted shared if at ER.  Patient given options and choices for discharge Yes  Patient/family is agreeable to the plan?  Yes  Transportation/person available to transport on day of discharge:  Family will provide.    Patient Goals and Preferences: to go to rehab and then return home at [facility].   Patient anticipates discharging to:  TCU.

## 2019-06-23 NOTE — PROGRESS NOTES
[facility]  Hospitalist Progress Note  Patient Name: Leonor Mercado    MRN: [id number]  Provider: Modesto Howard MD  06/23/19    Initial presenting complaint/issue to hospital (Diagnosis): weakness, cough, and chills         Assessment and Plan:      Summary of Stay:  Leonor Mercado is a 89 year old female with history of chronic atrial fibrillation, chronic kidney disease a baseline creatinine of 1.4-1.5, pacemaker for sick sinus syndrome, dyslipidemia, osteoporosis, irritable bowel syndrome, COPD on oxygen at night only, coronary artery disease with 2 stents, diastolic dysfunction with normal ejection fraction, and constipation. She presented to the ED on 6/19/2019 for evaluation of 5 days with progressive generalized weakness, dyspnea, cough, and chills.  Chest X-ray suggested left-sided pneumonia.  She was also in atrial fibrillation with rapid ventricular response and had some vomiting and scant hemoptysis. She was admitted to the hospital and treated for sepsis due to pneumonia with Rocephin and Zithromax. She improved over the next 3 days but remained weak. Alfredo was noted to have some difficulty swallowing so Speech was consulted. Esophogram was obtained and showed only mild presbyesophagus. Speech provided education regarding swallow precautions.  PT and OT recommended TCU on discharge. SW was consulted.      Problem List/Plan:  1. Acute sepsis with acute hypoxic respiratory failure secondary to community-acquired pneumonia: This was supported by fever, leukocytosis, tachycardia, hypoxemia, and notable source of infection.  Patient was initially started on vancomycin and Zosyn but multidrug-resistant organisms were not suspected so she was transitioned to ceftriaxone and azithromycin. Continue Omnicef for 5 more days and complete Z pack. Wean off O2 as able.  2. Atrial fibrillation with RVR: Now rate controlled. We will continue diltiazem and Coumadin per pharmacy.  3. Scant  "hemoptysis: Likely due to pneumonia. Seems resolved.  No further work-up at this time.  4. Chronic kidney disease: Resolved with IVF hydration. (creatinine is 1.15 with baseline creatinine around 1.25).    5. Bilateral feet pain. Suspect due to fluid, mild edema, bare foot walking, etc. Improved some. Lasix 20 mg PO once was given yesterday. Continue trial of Voltaren gel.         DVT Prophylaxis: Warfarin  Code Status: DNR / DNI  Discharge Dispo:  consult for TCU on discharge- could go tomorrow if bed is available        Interval History:      No new problems. Feet still hurt, but less. Breathing is better. Still weak.                   Physical Exam:      Last Vital Signs:  /58 (BP Location: Left arm)   Pulse 75   Temp 98.1  F (36.7  C) (Oral)   Resp 18   Ht 1.6 m (5' 3\")   Wt 75.3 kg (165 lb 14.4 oz)   LMP  (LMP Unknown)   SpO2 96%   BMI 29.39 kg/m      Intake/Output Summary (Last 24 hours) at 6/23/2019 1633  Last data filed at 6/23/2019 1144  Gross per 24 hour   Intake 340 ml   Output 800 ml   Net -460 ml       GENERAL:  Comfortable. Cooperative.  PSYCH: pleasant, oriented, No acute distress.  EYES: PERRLA, Normal conjunctiva.  HEART:  Regular rate and rhythm. No JVD. Pulses normal. No edema.  LUNGS:  Clear to auscultation, normal Respiratory effort.  ABDOMEN:  Soft, no hepatosplenomegaly, normal bowel sounds.  EXTREMETIES: No clubbing, cyanosis or ischemia  SKIN:  Dry to touch, No rash.           Medications:      All current medications were reviewed.         Data:      All new lab and imaging data was reviewed.   Labs:  No results for input(s): PH, PHARTERIAL, PO2, HN2TDORHGPO, SAT, PCO2, HCO3, BASEEXCESS, KALPANA, BEB in the last 168 hours.    Invalid input(s): [id number]  Recent Labs   Lab 06/22/19  0611 06/21/19  0625 06/20/19  0621   WBC 9.9 12.6* 14.3*   HGB 10.4* 10.8* 11.5*   HCT 33.3* 34.3* 35.6   MCV 94 94 94    166 165     Recent Labs   Lab 06/23/19  0611 06/22/19  0611 " 06/21/19  0625   INR 2.20* 2.08* 2.69*

## 2019-06-23 NOTE — PLAN OF CARE
Afib, chronic kidney disease and PNA. On oral abx.  Recently c/o bilateral foot pain. Tylenol and voltaren cream started with relief. Did have a dose of lasix yesterday and a pure wick. Today has been up to the bathroom. She is continent at home as well.  Up with walker and gait belt. She comes from senior [facility] and would like TCU at discharge. SW consulted and has met with patient. Likely  Discharge tomorrow if placement found.

## 2019-06-23 NOTE — PROGRESS NOTES
"                                                            Count includes the [facility] RCAT    Date: 9/22/19    Admission Dx: CAP    Pulmonary History: COPD    Home Nebulizer/MDI Use: Spiriva, Breo and Albuterol     Home Oxygen: none during the day 2 L at night    Acuity Level (RCAT flow sheet): 3    Aerosol Therapy initiated: Albuterol QID, Albuterol Q2 prn    Pulmonary Hygiene initiated: coughing techniques    Volume Expansion initiated: incentive spirometry    Current Oxygen Requirements:  None on RA 21%. Wears 2L at night per home routine    Current SpO2: 93%    Re-evaluation date: 6/25/19    Patient Education: Will educate pt on the indications and benefits of nebulizer's as well as deep breathing and coughing techniques    Dona Sandoval RT on 6/22/2019 at 9:41 PM      See \"RT Assessments\" flow sheet for patient assessment scoring and Acuity Level Details.             "

## 2019-06-24 VITALS
HEIGHT: 63 IN | SYSTOLIC BLOOD PRESSURE: 126 MMHG | WEIGHT: 165.9 LBS | RESPIRATION RATE: 22 BRPM | HEART RATE: 75 BPM | TEMPERATURE: 98.8 F | BODY MASS INDEX: 29.39 KG/M2 | OXYGEN SATURATION: 90 % | DIASTOLIC BLOOD PRESSURE: 73 MMHG

## 2019-06-24 LAB — INR PPP: 2.74 (ref 0.86–1.14)

## 2019-06-24 PROCEDURE — 99238 HOSP IP/OBS DSCHRG MGMT 30/<: CPT | Performed by: INTERNAL MEDICINE

## 2019-06-24 PROCEDURE — 40000275 ZZH STATISTIC RCP TIME EA 10 MIN

## 2019-06-24 PROCEDURE — 85610 PROTHROMBIN TIME: CPT | Performed by: INTERNAL MEDICINE

## 2019-06-24 PROCEDURE — 25000132 ZZH RX MED GY IP 250 OP 250 PS 637: Mod: GY | Performed by: INTERNAL MEDICINE

## 2019-06-24 PROCEDURE — 25000125 ZZHC RX 250: Performed by: INTERNAL MEDICINE

## 2019-06-24 PROCEDURE — 94640 AIRWAY INHALATION TREATMENT: CPT

## 2019-06-24 PROCEDURE — 36415 COLL VENOUS BLD VENIPUNCTURE: CPT | Performed by: INTERNAL MEDICINE

## 2019-06-24 PROCEDURE — 94640 AIRWAY INHALATION TREATMENT: CPT | Mod: 76

## 2019-06-24 RX ORDER — GUAIFENESIN/DEXTROMETHORPHAN 100-10MG/5
5 SYRUP ORAL EVERY 4 HOURS PRN
DISCHARGE
Start: 2019-06-24 | End: 2019-07-10

## 2019-06-24 RX ORDER — DOCUSATE SODIUM 100 MG/1
100 CAPSULE, LIQUID FILLED ORAL AT BEDTIME
DISCHARGE
Start: 2019-06-24 | End: 2019-07-10

## 2019-06-24 RX ORDER — GUAIFENESIN 600 MG/1
600 TABLET, EXTENDED RELEASE ORAL 2 TIMES DAILY
DISCHARGE
Start: 2019-06-24 | End: 2019-07-12

## 2019-06-24 RX ORDER — CEFDINIR 300 MG/1
300 CAPSULE ORAL DAILY
DISCHARGE
Start: 2019-06-25 | End: 2019-07-04

## 2019-06-24 RX ORDER — WARFARIN SODIUM 1 MG/1
1 TABLET ORAL
Status: DISCONTINUED | OUTPATIENT
Start: 2019-06-24 | End: 2019-06-24 | Stop reason: HOSPADM

## 2019-06-24 RX ORDER — ACETAMINOPHEN 325 MG/1
325-650 TABLET ORAL EVERY 6 HOURS PRN
DISCHARGE
Start: 2019-06-24 | End: 2019-06-30

## 2019-06-24 RX ORDER — GUAIFENESIN 600 MG/1
600 TABLET, EXTENDED RELEASE ORAL 2 TIMES DAILY
Status: DISCONTINUED | OUTPATIENT
Start: 2019-06-24 | End: 2019-06-24 | Stop reason: HOSPADM

## 2019-06-24 RX ORDER — IPRATROPIUM BROMIDE AND ALBUTEROL SULFATE 2.5; .5 MG/3ML; MG/3ML
1 SOLUTION RESPIRATORY (INHALATION) 4 TIMES DAILY
DISCHARGE
Start: 2019-06-24 | End: 2019-08-16

## 2019-06-24 RX ADMIN — CEFDINIR 300 MG: 300 CAPSULE ORAL at 08:42

## 2019-06-24 RX ADMIN — GUAIFENESIN 600 MG: 600 TABLET, EXTENDED RELEASE ORAL at 13:42

## 2019-06-24 RX ADMIN — POLYETHYLENE GLYCOL 3350 17 G: 17 POWDER, FOR SOLUTION ORAL at 08:41

## 2019-06-24 RX ADMIN — ALBUTEROL SULFATE 2.5 MG: 2.5 SOLUTION RESPIRATORY (INHALATION) at 11:30

## 2019-06-24 RX ADMIN — PRAVASTATIN SODIUM 40 MG: 20 TABLET ORAL at 08:41

## 2019-06-24 RX ADMIN — DICLOFENAC 2 G: 10 GEL TOPICAL at 08:48

## 2019-06-24 RX ADMIN — DILTIAZEM HYDROCHLORIDE 240 MG: 240 CAPSULE, COATED, EXTENDED RELEASE ORAL at 08:42

## 2019-06-24 RX ADMIN — ISOSORBIDE MONONITRATE 30 MG: 30 TABLET, EXTENDED RELEASE ORAL at 08:42

## 2019-06-24 RX ADMIN — UMECLIDINIUM 1 PUFF: 62.5 AEROSOL, POWDER ORAL at 07:46

## 2019-06-24 RX ADMIN — ACETAMINOPHEN 650 MG: 325 TABLET, FILM COATED ORAL at 08:42

## 2019-06-24 RX ADMIN — ALBUTEROL SULFATE 2.5 MG: 2.5 SOLUTION RESPIRATORY (INHALATION) at 07:46

## 2019-06-24 RX ADMIN — FLUTICASONE FUROATE AND VILANTEROL TRIFENATATE 1 PUFF: 200; 25 POWDER RESPIRATORY (INHALATION) at 07:46

## 2019-06-24 RX ADMIN — Medication 1 CAPSULE: at 08:42

## 2019-06-24 RX ADMIN — LOSARTAN POTASSIUM 25 MG: 25 TABLET, FILM COATED ORAL at 08:42

## 2019-06-24 RX ADMIN — CHOLECALCIFEROL TAB 10 MCG (400 UNIT) 800 UNITS: 10 TAB at 08:42

## 2019-06-24 ASSESSMENT — ACTIVITIES OF DAILY LIVING (ADL)
ADLS_ACUITY_SCORE: 18

## 2019-06-24 NOTE — PLAN OF CARE
A&O x 4, assist x 1 w/gb & walker, regular diet, LS anterior crackles RU, O2 98-99% on 2 L, dyspnea on exertion, tele A Fib CVR pt complains of left foot pain,voltaren gel applied, plan is to discharge to TCU today, will continue with POC.

## 2019-06-24 NOTE — PLAN OF CARE
"VS /73   Pulse 75   Temp 98.8  F (37.1  C) (Axillary)   Resp 22   Ht 1.6 m (5' 3\")   Wt 75.3 kg (165 lb 14.4 oz)   LMP  (LMP Unknown)   SpO2 90%   BMI 29.39 kg/m    Lung sounds expiratory wheezes  O2 maintaining 90% on room air, uses 2L O2 at nigh  Tele A fib CVR  Bowel sounds active, BM this shift  Tolerating regular diet  IVF no IV access   CMS intact, A&Ox4  Activity up with 1 assist with gait belt and walker  Pain denies  Patient/family centered care patient involved with plan of care rounding  Discharge plan plan to discharge to [facility] TCU with family transport this afternoon.    "

## 2019-06-24 NOTE — PLAN OF CARE
Physical Therapy Discharge Summary    Reason for therapy discharge:    Discharged to transitional care facility.    Progress towards therapy goal(s). See goals on Care Plan in [facility] electronic health record for goal details.  Goals not met.  Barriers to achieving goals:   discharge from facility.    Therapy recommendation(s):    Continued therapy is recommended.  Rationale/Recommendations:  PT as indicated at TCU.     Note: Pt not seen by documenting PT on this date. Information obtained from chart review.

## 2019-06-24 NOTE — PROGRESS NOTES
Your information has been submitted on June 24th, 2019 at 10:42:50 AM CDT. The confirmation number is [id number]

## 2019-06-24 NOTE — DISCHARGE SUMMARY
"Discharge Summary    Leonor Mercado MRN# [id number]   YOB: 1926 Age: 89 year old     Date of Admission:  6/19/2019  Date of Discharge:  6/24/2019  Admitting Physician:  Modesto Duckworth MD  Discharge Physician:  Modesto Howard MD  Discharging Service:  Hospitalist     [place] clinic: [facility]  Primary Provider: Arnel Garcia          Discharge Diagnosis:   1. Acute sepsis and acute hypoxic respiratory failure secondary to community-acquired pneumonia.  2. Atrial fibrillation with RVR: Now rate controlled.   3. Scant hemoptysis: Likely due to pneumonia. Resolved.  4. Acute kidney injury with chronic kidney disease: Resolved with IVF hydration.   5. Bilateral feet pain. Suspect due to mild edema from IVF hydration and bare foot walking. Improving.               Discharge Disposition:   Discharged to nursing home           Allergies:   Allergies   Allergen Reactions     Peanuts [Nuts] Shortness Of Breath     Amiodarone      pulm fibrosis       Baclofen      Confusion      Bactrim [Sulfamethoxazole W-Trimethoprim]      Difficulty swallowing     Doxycycline Other (See Comments)     Multiple complaints; she does not want this again.      Levaquin [Levofloxacin] Other (See Comments)     Multiple complaints; she will not take this again     Linzess [Linaclotide] Diarrhea     Lorazepam        Prolonged drowsiness with ER visit      Liquid Adhesive Itching and Rash     Bleeding when tape removed after pacemaker placement..itched and burned for weeks.                Condition on Discharge:   Discharge condition: Stable   Discharge vitals: Blood pressure 126/73, pulse 75, temperature 98.8  F (37.1  C), temperature source Axillary, resp. rate 22, height 1.6 m (5' 3\"), weight 75.3 kg (165 lb 14.4 oz), SpO2 96 %, not currently breastfeeding.   Code status on discharge: DNR / DNI   Physical exam on day of discharge:   GENERAL:  Comfortable. Cooperative. Coughing with sputum production. "   PSYCH: pleasant, oriented, No acute distress.  EYES: PERRLA, Normal conjunctiva.  HEART:  Regular rate and rhythm. No JVD. Pulses normal. Trace pedal edema.  LUNGS:  Clear to auscultation, normal Respiratory effort.  ABDOMEN:  Soft, no hepatosplenomegaly, normal bowel sounds.  EXTREMETIES: No clubbing, cyanosis or ischemia  SKIN:  Dry to touch, No rash.         History of Present Illness and Hospital Course:     See detailed admission note for full details.  Leonor Mercado is a 89 year old female with history of chronic atrial fibrillation, chronic kidney disease a baseline creatinine of 1.25, pacemaker for sick sinus syndrome, dyslipidemia, osteoporosis, irritable bowel syndrome, COPD for which she is on 2 lpm of oxygen at night only, coronary artery disease with 2 stents placed previously, diastolic heart dysfunction with normal ejection fraction, and constipation. She presented to the ED on 6/19/2019 for evaluation of 5 days with progressive generalized weakness, dyspnea, cough, and chills.  Chest X-ray suggested left-sided pneumonia.  She was also in atrial fibrillation with rapid ventricular response and had some vomiting and scant hemoptysis. She was admitted to the hospital and treated with Rocephin and Zithromax for sepsis and acute on chronic respiratory due to pneumonia. She improved over the next 3 days but remained weak. Alfredo was noted to have some difficulty swallowing so Speech was consulted. Esophogram was obtained and showed only mild presbyesophagus. Speech provided education regarding swallow precautions.  PT and OT recommended TCU on discharge. SW was consulted. Alfredo is discharging to TCU today for ongoing therapies.            Procedures / Imaging:   CXR  Esophagram           Consultations:   Consultation during this admission received from Speech Therapy, Physical Therapy, and Occupational Therapy.             Pending Results:   None           Discharge Instructions and Follow-Up:    Discharge diet: Regular   Discharge activity: Activity as tolerated   Discharge follow-up: Follow up with nursing home physician in 1 week   Outpatient therapy: PT and OT    Other instructions: Continue home oxygen at 2 lpm with sleep as prior to admission            Discharge Medications:   Current Discharge Medication List      START taking these medications    Details   cefdinir (OMNICEF) 300 MG capsule Take 1 capsule (300 mg) by mouth daily for 4 days    Associated Diagnoses: Community acquired bacterial pneumonia      diclofenac (VOLTAREN) 1 % topical gel Apply 2 g topically 4 times daily    Associated Diagnoses: Pain      guaiFENesin (MUCINEX) 600 MG 12 hr tablet Take 1 tablet (600 mg) by mouth 2 times daily    Associated Diagnoses: Community acquired bacterial pneumonia      guaiFENesin-dextromethorphan (ROBITUSSIN DM) 100-10 MG/5ML syrup Take 5 mLs by mouth every 4 hours as needed for cough    Associated Diagnoses: Community acquired bacterial pneumonia      magnesium hydroxide (MILK OF MAGNESIA) 400 MG/5ML suspension Take 30 mLs by mouth daily as needed for constipation    Associated Diagnoses: Constipation, unspecified constipation type         CONTINUE these medications which have CHANGED    Details   acetaminophen (TYLENOL) 325 MG tablet Take 1-2 tablets (325-650 mg) by mouth every 6 hours as needed for fever or pain    Associated Diagnoses: Pain      docusate sodium (STOOL SOFTENER) 100 MG capsule Take 1 capsule (100 mg) by mouth At Bedtime    Comments: Hold for loose stools  Associated Diagnoses: Constipation, unspecified constipation type      ipratropium - albuterol 0.5 mg/2.5 mg/3 mL (DUONEB) 0.5-2.5 (3) MG/3ML neb solution Take 1 vial (3 mLs) by nebulization 4 times daily    Associated Diagnoses: Chronic obstructive pulmonary disease, unspecified COPD type (H)         CONTINUE these medications which have NOT CHANGED    Details   albuterol (PROAIR HFA/PROVENTIL HFA/VENTOLIN HFA) 108 (90 Base)  MCG/ACT inhaler Inhale 2 puffs into the lungs every 6 hours as needed for shortness of breath / dyspnea or wheezing  Qty: 18 g, Refills: 3    Associated Diagnoses: Chronic obstructive pulmonary disease, unspecified COPD type (H)      ASPIRIN NOT PRESCRIBED (INTENTIONAL) Please choose reason not prescribed, below  Qty: 0 each, Refills: 0    Associated Diagnoses: Coronary artery disease involving native coronary artery of native heart without angina pectoris      calcium carbonate (CALCIUM CARBONATE) 600 MG TABS tablet Take 1 tablet by mouth daily       Cholecalciferol (VITAMIN D PO) Take 800 Units by mouth daily       diltiazem (DILTIAZEM CD) 240 MG 24 hr capsule Take 1 capsule (240 mg) by mouth daily  Qty: 90 capsule, Refills: 3    Associated Diagnoses: Chronic atrial fibrillation (H)      fluticasone-vilanterol (BREO ELLIPTA) 200-25 MCG/INH oral inhaler Inhale 1 puff into the lungs daily  Qty: 1 Inhaler, Refills: 3    Associated Diagnoses: Anxiety      isosorbide mononitrate (IMDUR) 30 MG 24 hr tablet Take 1 tablet (30 mg) by mouth daily  Qty: 90 tablet, Refills: 3    Associated Diagnoses: Coronary artery disease involving native coronary artery of native heart without angina pectoris      Lactobacillus (PROBIOTIC ACIDOPHILUS) TABS Take 1 tablet by mouth daily       losartan (COZAAR) 25 MG tablet Take 1 tablet (25 mg) by mouth daily  Qty: 90 tablet, Refills: 1    Associated Diagnoses: Essential hypertension with goal blood pressure less than 140/90      mirtazapine (REMERON) 15 MG tablet Take 1 tablet (15 mg) by mouth At Bedtime  Qty: 90 tablet, Refills: 3    Associated Diagnoses: Adjustment disorder, unspecified type      Multiple Vitamins-Minerals (OCUVITE PO) Take 1 capsule by mouth daily.      polyethylene glycol (MIRALAX/GLYCOLAX) Packet Take 1 packet by mouth daily       pravastatin (PRAVACHOL) 40 MG tablet TAKE 1 TABLET BY MOUTH ONCE DAILY  Qty: 90 tablet, Refills: 3    Associated Diagnoses: Hyperlipidemia  LDL goal <100; Coronary artery disease involving native coronary artery of native heart without angina pectoris      Tiotropium Bromide Monohydrate (SPIRIVA HANDIHALER IN) Inhale 1 puff into the lungs daily       !! warfarin (COUMADIN) 2 MG tablet Take 1 mg by mouth once a week on Monday      !! warfarin (COUMADIN) 2 MG tablet Take 2 mg by mouth every evening except on Mon      MELATONIN PO Take 5 mg by mouth nightly as needed       nitroGLYcerin (NITROSTAT) 0.4 MG sublingual tablet Place 1 tablet (0.4 mg) under the tongue every 5 minutes as needed for chest pain  Qty: 25 tablet, Refills: 1    Associated Diagnoses: Other chest pain       !! - Potential duplicate medications found. Please discuss with provider.             Total time spent in face to face contact with the patient and coordinating discharge was:  25 Minutes

## 2019-06-24 NOTE — PROGRESS NOTES
Discharge Planner   Discharge Plans in progress: Pt will discharge to [facility] today.  Sw called pt's dtr Les per pt's request to discuss transport [phone].  Pt's dtr Les will provide transport at 1400.  Pt will have a transport O2 tank sent with her.  Facility was updated on the discharge time and orders will be sent.     06/24/19 1032   Final Resources   Resources List Skilled Nursing Facility   Skilled Nursing Facility [facility] [phone], Fax: [phone]   PAS Number [id number]       Barriers to discharge plan: None  Follow up plan: Sw will continue to be available as needed until discharge.       Entered by: Lissy Fournier 06/24/2019 10:33 AM

## 2019-06-24 NOTE — PHARMACY-ANTICOAGULATION SERVICE
Clinical Pharmacy- Warfarin Discharge Note      Warfarin PTA Regimen: 2 mg every day except Monday 1 mg      Anticoagulation Dose History     Recent Dosing and Labs Latest Ref Rng & Units 6/13/2019 6/19/2019 6/20/2019 6/21/2019 6/22/2019 6/23/2019 6/24/2019    Warfarin 1 mg - - 1 mg - 1 mg - 1 mg -    Warfarin 2 mg - - - - - 2 mg - -    INR 0.86 - 1.14 - 2.25(H) 3.14(H) 2.69(H) 2.08(H) 2.20(H) 2.74(H)    INR 0.86 - 1.14 1.9(A) - - - - - -        Patient admitted with warfarin as PTA med.  Needs here have been lower than pta dosing.  Asked MD to decrease dose slightly but kept home dose with weekly monitoring.  Patient may be having interaction with antibiotics so close monitoring warranted.    The patient should have an INR checked weekly on Thursday.

## 2019-06-24 NOTE — PROGRESS NOTES
Patient discharged to [facility] via private car with family, transported via wheelchair with nursing staff.  Patient verbalized and received copy of discharge instructions.  Personal belongings gathered and sent with patient.  VSS. IV removed prior to discharge.  Pt short of breath with activity, takes a few minutes to recover with cueing for pursed lip breathing.

## 2019-06-25 ENCOUNTER — NURSING HOME VISIT (OUTPATIENT)
Dept: GERIATRICS | Facility: CLINIC | Age: 84
End: 2019-06-25
Payer: MEDICARE

## 2019-06-25 ENCOUNTER — PATIENT OUTREACH (OUTPATIENT)
Dept: CARE COORDINATION | Facility: CLINIC | Age: 84
End: 2019-06-25

## 2019-06-25 VITALS
RESPIRATION RATE: 18 BRPM | BODY MASS INDEX: 27.96 KG/M2 | TEMPERATURE: 98.5 F | DIASTOLIC BLOOD PRESSURE: 90 MMHG | SYSTOLIC BLOOD PRESSURE: 148 MMHG | WEIGHT: 157.8 LBS | OXYGEN SATURATION: 95 % | HEIGHT: 63 IN | HEART RATE: 74 BPM

## 2019-06-25 DIAGNOSIS — R53.81 PHYSICAL DECONDITIONING: ICD-10-CM

## 2019-06-25 DIAGNOSIS — R04.2 HEMOPTYSIS: ICD-10-CM

## 2019-06-25 DIAGNOSIS — N18.30 STAGE 3 CHRONIC KIDNEY DISEASE (H): ICD-10-CM

## 2019-06-25 DIAGNOSIS — M79.671 BILATERAL FOOT PAIN: ICD-10-CM

## 2019-06-25 DIAGNOSIS — J96.21 ACUTE AND CHRONIC RESPIRATORY FAILURE WITH HYPOXIA (H): ICD-10-CM

## 2019-06-25 DIAGNOSIS — M79.672 BILATERAL FOOT PAIN: ICD-10-CM

## 2019-06-25 DIAGNOSIS — N18.30 CKD (CHRONIC KIDNEY DISEASE) STAGE 3, GFR 30-59 ML/MIN (H): ICD-10-CM

## 2019-06-25 DIAGNOSIS — J15.9 COMMUNITY ACQUIRED BACTERIAL PNEUMONIA: ICD-10-CM

## 2019-06-25 DIAGNOSIS — A41.9 SEPSIS, DUE TO UNSPECIFIED ORGANISM: Primary | ICD-10-CM

## 2019-06-25 DIAGNOSIS — I48.91 ATRIAL FIBRILLATION WITH RVR (H): ICD-10-CM

## 2019-06-25 LAB
BACTERIA SPEC CULT: NO GROWTH
BACTERIA SPEC CULT: NO GROWTH
Lab: NORMAL
Lab: NORMAL
SPECIMEN SOURCE: NORMAL
SPECIMEN SOURCE: NORMAL

## 2019-06-25 PROCEDURE — 99310 SBSQ NF CARE HIGH MDM 45: CPT | Performed by: NURSE PRACTITIONER

## 2019-06-25 ASSESSMENT — MIFFLIN-ST. JEOR: SCORE: 1089.91

## 2019-06-25 NOTE — PROGRESS NOTES
Clinic Care Coordination Contact  Care Coordination Transition Communication    Referral Source: IP Report    Clinical Data: Patient was hospitalized at [facility] from 6/19/19 to 6/24/19 with diagnosis of community acquired bacterial pneumonia and deconditioning.     Transition to Facility:              Facility Name: [facility]               Contact name and phone number/fax: [phone], Fax: [phone]    Covering SW CC outreached to MARY Sebastian in the TCU at [facility]. Introduced care coordination services to Uzma and provided contact information for the care coordination team [facility]. Requested an outreach when Pt will be discharged from the TCU and Uzma was in agreement with this plan.     Plan: RN/SW Care Coordinator will await notification from facility staff informing RN/SW Care Coordinator of patient's discharge plans/needs. RN/SW Care Coordinator will review chart and outreach to facility staff every 4 weeks and as needed.     Sherine ELDER Care Coordination Team  [facility]

## 2019-06-25 NOTE — LETTER
"    6/25/2019        RE: Leonor Mercado  [address]  [place] [address]        [facility]  PRIMARY CARE PROVIDER AND CLINIC:  Arnel Garcia MD, MD, [address]  / [place] [address]  Chief Complaint   Patient presents with     Hospital F/U     [facility] Medical Record Number:  [id number]  Place of Service where encounter took place:  [facility]  ([facility]) [[id number]]    Leonor Mercado  is a 89 year old  (5/6/1926), admitted to the above facility from  [facility]. Hospital stay 6/19/2019 through 6/24/2019..  Admitted to this facility for  rehab, medical management and nursing care.    HPI:    HPI information obtained from: facility chart records, facility staff, patient report and [facility] chart review.   Brief Summary of Hospital Course:     89 y.o female with PMH afib, pacer for SSS, CAD- h/o 2 stents), HLD, DCHF with pEF, CKD, COPD (O2 dep 2LNC at night only), osteoporosis admitted to hospital as above 2/2 progressie weakness, dyspnea, cough and chills. Dx with left sided CAP. Also with afib with RVR and hemoptysis. Treated with Rocephin and Zirhomax and improved. Some dysphagia noted and esophogram revealed mild presbyesophagus. ST evaled and educated regarding swallow precautions. Transferred to TCU for admission for acute rehab.     Updates on Status Since Skilled nursing Admission:     - Patient found in room. Alert, calm, NAD. Denies pain at this time. Reports fatigue and persistent cough. Declines cough syrup \"I don't like it and it doesn't do anything.\"  Reports constipation. Reports though overall feeling better since hospitalization. Denies fever, though does report feel \"cold all the time.\" States appetite is improving and sleeping well. VS reviewed and stable.     CODE STATUS/ADVANCE DIRECTIVES DISCUSSION:   DNR / DNI  Patient's living condition: lives alone in an independent living facility  ALLERGIES: Peanuts [nuts]; " Amiodarone; Baclofen; Bactrim [sulfamethoxazole w-trimethoprim]; Doxycycline; Levaquin [levofloxacin]; Linzess [linaclotide]; Lorazepam; and Liquid adhesive  PAST MEDICAL HISTORY:  has a past medical history of A Fib - chr Coumadin, s/p PPM (H) (5/8/2013), Atrial fibrillation (H), BCC (basal cell carcinoma of skin), CAD - 2 stents in [place] in 2000s.  (1/5/2016), CHF (congestive heart failure) (H), CHF - Chr Diastolic (H) (11/21/2017), Chronic renal insufficiency, COPD (chronic obstructive pulmonary disease) (H), COPD (H) (5/13/2013), Coronary artery disease, Hyperlipidemia, Hypertension, IBS (irritable bowel syndrome), Irritable bowel, Mumps, Osteoporosis, Palpitations, Panic attack, Pulmonary fibrosis (H), Shortness of breath, Sick sinus syndrome - s/p PPM 2003 (H) (12/16/2017), SSS (sick sinus syndrome) (H), and Vertigo. She also has no past medical history of Asymptomatic human immunodeficiency virus (HIV) infection status (H), Cerebral palsy (H), Complication of anesthesia, Congenital renal agenesis and dysgenesis, Difficult intubation, Goiter, Gout, Hernia, abdominal, History of spinal cord injury, History of thrombophlebitis, Horseshoe kidney, Hydrocephalus, Malignant hyperthermia, Parkinsons disease (H), PONV (postoperative nausea and vomiting), Spider veins, Spina bifida (H), Spinal headache, STD (sexually transmitted disease), Tethered cord (H), or Tuberculosis.  PAST SURGICAL HISTORY:   has a past surgical history that includes appendectomy (1956); hernia repair (Left, 1991); Cardiac surgery; EP Lead revision dual (4/2003); Replace pacemaker generator (6/27/2013); EP Lead revision dual (7/2/2014); Implant pacemaker (2/19/2003); Coronary Angiography Adult Order (7/1994); Heart Cath, Angioplasty (02/2005); and Esophagoscopy, gastroscopy, duodenoscopy (EGD), combined (N/A, 8/19/2015).  FAMILY HISTORY: family history includes Crohn's Disease in her daughter; Gastrointestinal Disease in her daughter; Heart Disease  in her father; Neurologic Disorder in her mother.  SOCIAL HISTORY:   reports that she quit smoking about 32 years ago. Her smoking use included cigarettes. She has never used smokeless tobacco. She reports that she does not drink alcohol or use drugs.    Post Discharge Medication Reconciliation Status: discharge medications reconciled and changed, per note/orders (see AVS)    Current Outpatient Medications   Medication Sig Dispense Refill     acetaminophen (TYLENOL) 325 MG tablet Take 1-2 tablets (325-650 mg) by mouth every 6 hours as needed for fever or pain       albuterol (PROAIR HFA/PROVENTIL HFA/VENTOLIN HFA) 108 (90 Base) MCG/ACT inhaler Inhale 2 puffs into the lungs every 6 hours as needed for shortness of breath / dyspnea or wheezing 18 g 3     ASPIRIN NOT PRESCRIBED (INTENTIONAL) Please choose reason not prescribed, below 0 each 0     calcium carbonate (CALCIUM CARBONATE) 600 MG TABS tablet Take 1 tablet by mouth daily        cefdinir (OMNICEF) 300 MG capsule Take 1 capsule (300 mg) by mouth daily for 4 days       Cholecalciferol (VITAMIN D PO) Take 800 Units by mouth daily        diclofenac (VOLTAREN) 1 % topical gel Apply 2 g topically 4 times daily       diltiazem (DILTIAZEM CD) 240 MG 24 hr capsule Take 1 capsule (240 mg) by mouth daily 90 capsule 3     docusate sodium (STOOL SOFTENER) 100 MG capsule Take 1 capsule (100 mg) by mouth At Bedtime       fluticasone-vilanterol (BREO ELLIPTA) 200-25 MCG/INH oral inhaler Inhale 1 puff into the lungs daily 1 Inhaler 3     guaiFENesin (MUCINEX) 600 MG 12 hr tablet Take 1 tablet (600 mg) by mouth 2 times daily       guaiFENesin-dextromethorphan (ROBITUSSIN DM) 100-10 MG/5ML syrup Take 5 mLs by mouth every 4 hours as needed for cough       ipratropium - albuterol 0.5 mg/2.5 mg/3 mL (DUONEB) 0.5-2.5 (3) MG/3ML neb solution Take 1 vial (3 mLs) by nebulization 4 times daily       isosorbide mononitrate (IMDUR) 30 MG 24 hr tablet Take 1 tablet (30 mg) by mouth daily  "90 tablet 3     Lactobacillus (PROBIOTIC ACIDOPHILUS) TABS Take 1 tablet by mouth daily        losartan (COZAAR) 25 MG tablet Take 1 tablet (25 mg) by mouth daily 90 tablet 1     magnesium hydroxide (MILK OF MAGNESIA) 400 MG/5ML suspension Take 30 mLs by mouth daily as needed for constipation       MELATONIN PO Take 5 mg by mouth nightly as needed        mirtazapine (REMERON) 15 MG tablet Take 1 tablet (15 mg) by mouth At Bedtime 90 tablet 3     Multiple Vitamins-Minerals (OCUVITE PO) Take 1 capsule by mouth daily.       nitroGLYcerin (NITROSTAT) 0.4 MG sublingual tablet Place 1 tablet (0.4 mg) under the tongue every 5 minutes as needed for chest pain 25 tablet 1     polyethylene glycol (MIRALAX/GLYCOLAX) Packet Take 1 packet by mouth daily        pravastatin (PRAVACHOL) 40 MG tablet TAKE 1 TABLET BY MOUTH ONCE DAILY 90 tablet 3     Tiotropium Bromide Monohydrate (SPIRIVA HANDIHALER IN) Inhale 1 puff into the lungs daily        WARFARIN SODIUM PO PER INR ORDERS       warfarin (COUMADIN) 2 MG tablet Take 1 mg by mouth once a week on Monday       warfarin (COUMADIN) 2 MG tablet Take 2 mg by mouth every evening except on Mon         ROS:  10 point ROS of systems including Constitutional, Eyes, Respiratory, Cardiovascular, Gastroenterology, Genitourinary, Integumentary, Musculoskeletal, Psychiatric were all negative except for pertinent positives noted in my HPI.    Vitals:  /90   Pulse 74   Temp 98.5  F (36.9  C)   Resp 18   Ht 1.6 m (5' 3\")   Wt 71.6 kg (157 lb 12.8 oz)   LMP  (LMP Unknown)   SpO2 95%   BMI 27.95 kg/m     Exam:  GENERAL APPEARANCE: Alert, in no distress   ENT: Mouth and posterior oropharynx normal, moist mucous membranes   EYES: EOM, conjunctivae, lids, pupils and irises normal   NECK: No adenopathy,masses or thyromegaly   RESP: respiratory effort and palpation of chest normal, Lungs clear to auscultation  CV: Palpation and auscultation of heart done , regular rate and rhythm, no murmur, " rub, or gallop, peripheral edema - 2+ bilateral LE edema, wearing own compression  ABDOMEN: normal bowel sounds, soft, nontender, no hepatosplenomegaly or other masses   : palpation of bladder WNL   M/S: Gait and station normal   Digits and nails normal - MEEK  SKIN: Inspection of skin and subcutaneous tissue baseline, Palpation of skin and subcutaneous tissue baseline  NEURO: Cranial nerves 2-12 are normal tested and grossly at patient's baseline, Examination of sensation by touch normal   PSYCH: oriented X 3, affect and mood normal             Lab/Diagnostic data:  Recent labs in [facility] reviewed by me today.     ASSESSMENT/PLAN:  Sepsis (H)  Acute and chronic respiratory failure with hypoxia (H)  Community acquired bacterial pneumonia  - afebrile. Tx with Rocephin and Zithromax inpatient for Left side PNA. Now completing 4 more days Cefdinir and will be done 6/27  - wean daytime O2- is on chronic NOC O2 2LNC  - continue on Duonebs QID (does at home)  - continue fluticasone-vilanterol and Spiriva  - pulmonary toilet/mobilize  - VS per unit protocol  - follow clinically    Atrial fibrillation with RVR (H)  - rate controlled currently  - continue on diltiazem, warfarin with INR goal 2-3      Hemoptysis  - none in TCU  - observe    CKD (chronic kidney disease) stage 3, GFR 30-59 ml/min (H)  Baseline Cr 1.1- 1.4- currently stable in range  - avoid nephrotoxins  - recheck periodic BMP      Bilateral foot pain  - chronic, stable  - continue on acetaminophen    Physical deconditioning-  - 2/2 above  - lives in A/L  - PT/OT  - ongoing discharge planning, SW follow and care conferences per unit protocol    Constipation  - Add Senna S scheduled and prn  - monitor bowel pattern     Total unit floor time 65 minutes- Time consisted of examination of patient, review of patient medical records, labs, imaging and current admission orders/clarification of orders and documentation. 35 minutes spent on counseling and care coordination  with patient and nursing regarding plan of care, clinical status, prognosis, and medications and interventions as outlined above      Electronically signed by:  BAY Henderson CNP                           Sincerely,        BAY Henderson CNP

## 2019-06-26 ENCOUNTER — DOCUMENTATION ONLY (OUTPATIENT)
Dept: OTHER | Facility: CLINIC | Age: 84
End: 2019-06-26

## 2019-06-27 ENCOUNTER — NURSING HOME VISIT (OUTPATIENT)
Dept: GERIATRICS | Facility: CLINIC | Age: 84
End: 2019-06-27
Payer: MEDICARE

## 2019-06-27 ENCOUNTER — HOSPITAL LABORATORY (OUTPATIENT)
Dept: OTHER | Facility: CLINIC | Age: 84
End: 2019-06-27

## 2019-06-27 VITALS
HEART RATE: 87 BPM | RESPIRATION RATE: 18 BRPM | BODY MASS INDEX: 27.93 KG/M2 | OXYGEN SATURATION: 95 % | SYSTOLIC BLOOD PRESSURE: 132 MMHG | HEIGHT: 63 IN | WEIGHT: 157.6 LBS | DIASTOLIC BLOOD PRESSURE: 80 MMHG | TEMPERATURE: 98.6 F

## 2019-06-27 VITALS
DIASTOLIC BLOOD PRESSURE: 80 MMHG | OXYGEN SATURATION: 95 % | SYSTOLIC BLOOD PRESSURE: 132 MMHG | TEMPERATURE: 98.6 F | HEIGHT: 63 IN | RESPIRATION RATE: 18 BRPM | HEART RATE: 87 BPM | WEIGHT: 157.4 LBS | BODY MASS INDEX: 27.89 KG/M2

## 2019-06-27 DIAGNOSIS — I48.20 CHRONIC ATRIAL FIBRILLATION (H): Primary | ICD-10-CM

## 2019-06-27 DIAGNOSIS — Z51.81 ENCOUNTER FOR THERAPEUTIC DRUG MONITORING: ICD-10-CM

## 2019-06-27 DIAGNOSIS — Z79.01 LONG TERM CURRENT USE OF ANTICOAGULANT THERAPY: ICD-10-CM

## 2019-06-27 LAB
ANION GAP SERPL CALCULATED.3IONS-SCNC: 4 MMOL/L (ref 3–14)
BUN SERPL-MCNC: 19 MG/DL (ref 7–30)
CALCIUM SERPL-MCNC: 9 MG/DL (ref 8.5–10.1)
CHLORIDE SERPL-SCNC: 101 MMOL/L (ref 94–109)
CO2 SERPL-SCNC: 30 MMOL/L (ref 20–32)
CREAT SERPL-MCNC: 1.16 MG/DL (ref 0.52–1.04)
ERYTHROCYTE [DISTWIDTH] IN BLOOD BY AUTOMATED COUNT: 14.6 % (ref 10–15)
GFR SERPL CREATININE-BSD FRML MDRD: 41 ML/MIN/{1.73_M2}
GLUCOSE SERPL-MCNC: 138 MG/DL (ref 70–99)
HCT VFR BLD AUTO: 34.6 % (ref 35–47)
HGB BLD-MCNC: 11.4 G/DL (ref 11.7–15.7)
MCH RBC QN AUTO: 29.6 PG (ref 26.5–33)
MCHC RBC AUTO-ENTMCNC: 32.9 G/DL (ref 31.5–36.5)
MCV RBC AUTO: 90 FL (ref 78–100)
PLATELET # BLD AUTO: 281 10E9/L (ref 150–450)
POTASSIUM SERPL-SCNC: 4.5 MMOL/L (ref 3.4–5.3)
RBC # BLD AUTO: 3.85 10E12/L (ref 3.8–5.2)
SODIUM SERPL-SCNC: 135 MMOL/L (ref 133–144)
WBC # BLD AUTO: 12.1 10E9/L (ref 4–11)

## 2019-06-27 PROCEDURE — 99308 SBSQ NF CARE LOW MDM 20: CPT | Performed by: NURSE PRACTITIONER

## 2019-06-27 RX ORDER — ACETAMINOPHEN 500 MG
500 TABLET ORAL 4 TIMES DAILY
Status: ON HOLD | COMMUNITY
End: 2021-01-04

## 2019-06-27 RX ORDER — AMOXICILLIN 250 MG
1 CAPSULE ORAL 2 TIMES DAILY PRN
COMMUNITY
End: 2019-07-18

## 2019-06-27 ASSESSMENT — MIFFLIN-ST. JEOR
SCORE: 1089
SCORE: 1088.09

## 2019-06-27 NOTE — PROGRESS NOTES
"[facility]  [facility] Medical Record Number:  [id number]  Place of Service where encounter took place: [facility]  (S) [[id number]]    HPI:    Leonor Mercado is a 89 year old  (5/6/1926), who is being seen today for an episodic care visit at the above location.   HPI information obtained from: facility chart records, facility staff, patient report and [facility] chart review. Today's concern is INR/Coumadin management for A. Fib    ROS/Subjective:  Bleeding Signs/Symptoms:  None  Thromboembolic Signs/Symptoms:  None  Medication Changes:  No  Dietary Changes:  No  Activity Changes: No  Bacterial/Viral Infection:  No  Missed Coumadin Doses:  None  On ASA: No  Other Concerns:  No    OBJECTIVE:  /80   Pulse 87   Temp 98.6  F (37  C)   Resp 18   Ht 1.6 m (5' 3\")   Wt 71.5 kg (157 lb 9.6 oz)   LMP  (LMP Unknown)   SpO2 95%   BMI 27.92 kg/m    Last INR: 2.9 on 6/24  INR Today:  2.4  Current Dose:  1 mg daily  INR Flow sheet at [facility]:    ASSESSMENT:     Chronic atrial fibrillation (H)  Long term current use of anticoagulant therapy  Encounter for therapeutic drug monitoring  Therapeutic INR for goal of 2-3    PLAN:   Orders written by provider at facility  New Dose: coumadin 2 mg tonight then 1 mg po daily    Next INR: 7/1        Electronically signed by:  BAY Henderson CNP         "

## 2019-07-01 ENCOUNTER — APPOINTMENT (OUTPATIENT)
Dept: GENERAL RADIOLOGY | Facility: CLINIC | Age: 84
End: 2019-07-01
Attending: EMERGENCY MEDICINE
Payer: MEDICARE

## 2019-07-01 ENCOUNTER — NURSING HOME VISIT (OUTPATIENT)
Dept: GERIATRICS | Facility: CLINIC | Age: 84
End: 2019-07-01
Payer: MEDICARE

## 2019-07-01 ENCOUNTER — HOSPITAL ENCOUNTER (EMERGENCY)
Facility: CLINIC | Age: 84
Discharge: HOME | End: 2019-07-01
Attending: EMERGENCY MEDICINE | Admitting: EMERGENCY MEDICINE
Payer: MEDICARE

## 2019-07-01 VITALS
TEMPERATURE: 98.2 F | SYSTOLIC BLOOD PRESSURE: 133 MMHG | DIASTOLIC BLOOD PRESSURE: 73 MMHG | OXYGEN SATURATION: 94 % | RESPIRATION RATE: 18 BRPM | HEART RATE: 78 BPM

## 2019-07-01 DIAGNOSIS — I50.32 CHRONIC DIASTOLIC CONGESTIVE HEART FAILURE (H): ICD-10-CM

## 2019-07-01 DIAGNOSIS — J90 PLEURAL EFFUSION: ICD-10-CM

## 2019-07-01 DIAGNOSIS — Z53.9 ERRONEOUS ENCOUNTER--DISREGARD: Primary | ICD-10-CM

## 2019-07-01 DIAGNOSIS — R60.0 BILATERAL LOWER EXTREMITY EDEMA: ICD-10-CM

## 2019-07-01 LAB
ANION GAP SERPL CALCULATED.3IONS-SCNC: 5 MMOL/L (ref 3–14)
BASOPHILS # BLD AUTO: 0.1 10E9/L (ref 0–0.2)
BASOPHILS NFR BLD AUTO: 0.7 %
BUN SERPL-MCNC: 18 MG/DL (ref 7–30)
CALCIUM SERPL-MCNC: 9.5 MG/DL (ref 8.5–10.1)
CHLORIDE SERPL-SCNC: 105 MMOL/L (ref 94–109)
CO2 SERPL-SCNC: 28 MMOL/L (ref 20–32)
CREAT SERPL-MCNC: 1.16 MG/DL (ref 0.52–1.04)
DIFFERENTIAL METHOD BLD: ABNORMAL
EOSINOPHIL # BLD AUTO: 0.3 10E9/L (ref 0–0.7)
EOSINOPHIL NFR BLD AUTO: 2.7 %
ERYTHROCYTE [DISTWIDTH] IN BLOOD BY AUTOMATED COUNT: 14.6 % (ref 10–15)
GFR SERPL CREATININE-BSD FRML MDRD: 41 ML/MIN/{1.73_M2}
GLUCOSE SERPL-MCNC: 98 MG/DL (ref 70–99)
HCT VFR BLD AUTO: 36.5 % (ref 35–47)
HGB BLD-MCNC: 11.5 G/DL (ref 11.7–15.7)
IMM GRANULOCYTES # BLD: 0.1 10E9/L (ref 0–0.4)
IMM GRANULOCYTES NFR BLD: 1.5 %
INR PPP: 2.11 (ref 0.86–1.14)
INTERPRETATION ECG - MUSE: NORMAL
LACTATE SERPL-SCNC: 1.1 MMOL/L (ref 0.4–2)
LYMPHOCYTES # BLD AUTO: 1.2 10E9/L (ref 0.8–5.3)
LYMPHOCYTES NFR BLD AUTO: 12.8 %
MCH RBC QN AUTO: 29.6 PG (ref 26.5–33)
MCHC RBC AUTO-ENTMCNC: 31.5 G/DL (ref 31.5–36.5)
MCV RBC AUTO: 94 FL (ref 78–100)
MONOCYTES # BLD AUTO: 0.6 10E9/L (ref 0–1.3)
MONOCYTES NFR BLD AUTO: 6.6 %
NEUTROPHILS # BLD AUTO: 7 10E9/L (ref 1.6–8.3)
NEUTROPHILS NFR BLD AUTO: 75.7 %
NRBC # BLD AUTO: 0 10*3/UL
NRBC BLD AUTO-RTO: 0 /100
NT-PROBNP SERPL-MCNC: 4201 PG/ML (ref 0–1800)
PLATELET # BLD AUTO: 340 10E9/L (ref 150–450)
POTASSIUM SERPL-SCNC: 4.3 MMOL/L (ref 3.4–5.3)
RBC # BLD AUTO: 3.89 10E12/L (ref 3.8–5.2)
SODIUM SERPL-SCNC: 138 MMOL/L (ref 133–144)
TROPONIN I SERPL-MCNC: <0.015 UG/L (ref 0–0.04)
WBC # BLD AUTO: 9.2 10E9/L (ref 4–11)

## 2019-07-01 PROCEDURE — 96374 THER/PROPH/DIAG INJ IV PUSH: CPT

## 2019-07-01 PROCEDURE — 93005 ELECTROCARDIOGRAM TRACING: CPT

## 2019-07-01 PROCEDURE — 85025 COMPLETE CBC W/AUTO DIFF WBC: CPT | Performed by: EMERGENCY MEDICINE

## 2019-07-01 PROCEDURE — 85610 PROTHROMBIN TIME: CPT | Performed by: EMERGENCY MEDICINE

## 2019-07-01 PROCEDURE — 83880 ASSAY OF NATRIURETIC PEPTIDE: CPT | Performed by: EMERGENCY MEDICINE

## 2019-07-01 PROCEDURE — 71046 X-RAY EXAM CHEST 2 VIEWS: CPT

## 2019-07-01 PROCEDURE — 80048 BASIC METABOLIC PNL TOTAL CA: CPT | Performed by: EMERGENCY MEDICINE

## 2019-07-01 PROCEDURE — 83605 ASSAY OF LACTIC ACID: CPT | Performed by: EMERGENCY MEDICINE

## 2019-07-01 PROCEDURE — 84484 ASSAY OF TROPONIN QUANT: CPT | Performed by: EMERGENCY MEDICINE

## 2019-07-01 PROCEDURE — 99285 EMERGENCY DEPT VISIT HI MDM: CPT | Mod: 25

## 2019-07-01 PROCEDURE — 25000128 H RX IP 250 OP 636: Performed by: EMERGENCY MEDICINE

## 2019-07-01 RX ORDER — FUROSEMIDE 10 MG/ML
40 INJECTION INTRAMUSCULAR; INTRAVENOUS ONCE
Status: COMPLETED | OUTPATIENT
Start: 2019-07-01 | End: 2019-07-01

## 2019-07-01 RX ORDER — FUROSEMIDE 20 MG
20 TABLET ORAL DAILY
Qty: 3 TABLET | Refills: 0 | Status: SHIPPED | OUTPATIENT
Start: 2019-07-01 | End: 2019-07-10

## 2019-07-01 RX ADMIN — FUROSEMIDE 40 MG: 10 INJECTION, SOLUTION INTRAVENOUS at 10:51

## 2019-07-01 ASSESSMENT — ENCOUNTER SYMPTOMS: SHORTNESS OF BREATH: 1

## 2019-07-01 NOTE — TELEPHONE ENCOUNTER
Reason for call:  Medication   If this is a refill request, has the caller requested the refill from the pharmacy already? No  Will the patient be using a [facility]? No  Name of the pharmacy and phone number for the current request: [facility] [phone]    Name of the medication requested: Furosemide 20 mg     Other request: Patient was sent to ER 7-1-19 from Rehab for swelling of her feet. She was prescribed furosemide was given 3 days worth. She was advised to see her primary doctor for more medication. Please call her daughter Les at [phone]    Phone number to reach patient:  Cell number on file:    Telephone Information:   Mobile [phone]       Best Time:  Anytime     Can we leave a detailed message on this number?  YES

## 2019-07-01 NOTE — TELEPHONE ENCOUNTER
"See MyChart message.    Pt seen in ED today for chest pain, SOB, and leg swelling. Received lasix, via IV.    Per 04/16/19 visit, furosemide discontinued, to reduce urinary symptoms.    Earliest appt I see is on the 11th. Pt also has an INR appt that day at [place].    Furosemide teed up.    Emmanuel \"Juvenal\" Dee RN - Triage  [facility]    "

## 2019-07-01 NOTE — ED NOTES
Pt complained of being cold, warm blanket brought to pt. Pt continued to feel cold, applied Bear hugger to pt.

## 2019-07-01 NOTE — ED TRIAGE NOTES
Patient BIBA from TCU recovering from pneumonia/sepsis. Patient feeling short of breath and having chest pain on the right side. Unknown fevers. Patient needing supplemental O2, which is abnormal for her. She does normally use O2, 2L N/C at night, but not during the day.

## 2019-07-01 NOTE — ED PROVIDER NOTES
History     Chief Complaint:  Chest Pain and Shortness of Breath    HPI   Leonor Mercado is a 89 year old female with a history of COPD, CAD, CHF and a fib who is on coumadin who presents with chest pain and shortness of breath. She had sepsis pneumonia  in her left lung and was placed in [facility] one week ago to regain strength. The patient states that last night she started to experience some chest pain in her right side. She states that the shortness of breath has remained, she states that it is constant and worsens with any movement. The patient also states she has had some leg swelling since her pneumonia diagnosis that has not gone down. The patient denies any trauma or any other symptoms. The patient is on oxygen at night.     Allergies:  Peanuts  amiodarone  Baclofen  Bactrim   Doxycycline  Levaquin  lorazepam    Medications:    Voltaren  diltiazem  breo ellipta  Robitussin  Cozaar  Nitrostat  Remeron  Senokot  Spiriva  Coumadin    Past Medical History:    Atrial fibrillation   BCC (basal cell carcinoma of skin)   CAD - 2 stents in [place] in 2000s.    CHF (congestive heart failure)    Chronic renal insufficiency   COPD (chronic obstructive pulmonary disease)   Coronary artery disease   Hyperlipidemia   Hypertension   IBS (irritable bowel syndrome)   Irritable bowel   Mumps   Osteoporosis   Palpitations   Panic attack   Pulmonary fibrosis     Sick sinus syndrome - s/p PPM 2003     Vertigo    Past Surgical History:    appendectomy  Cardiac surgery   Angiography  hernia repair  Pacemaker implant    Family History:    Heart disease- father, mother  Dementia- mother  Crohn's disease- daughter  Liver disease- daughter    Social History:  Smoking Status: Former smoker quit 1987  Smokeless Tobacco: Never Used  Alcohol Use: No  Marital Status:         Review of Systems   Respiratory: Positive for shortness of breath.    Cardiovascular: Positive for chest pain and leg swelling.   All other systems reviewed and  are negative.      Physical Exam     Patient Vitals for the past 24 hrs:   BP Temp Temp src Pulse Heart Rate Resp SpO2   07/01/19 1103 133/73 -- -- 78 78 18 94 %   07/01/19 1100 (!) 133/102 -- -- 77 83 21 96 %   07/01/19 1045 133/73 -- -- 82 78 25 93 %   07/01/19 1030 133/64 -- -- 82 90 22 94 %   07/01/19 1015 (!) 145/129 -- -- 99 85 23 95 %   07/01/19 1000 (!) 134/103 -- -- 104 87 17 92 %   07/01/19 0945 145/78 -- -- 87 99 22 93 %   07/01/19 0930 (!) 146/91 -- -- 90 86 22 91 %   07/01/19 0900 (!) 153/98 -- -- 82 89 15 95 %   07/01/19 0846 151/90 98.2  F (36.8  C) Oral 95 95 20 95 %   07/01/19 0845 151/90 -- -- 98 -- -- 96 %       Physical Exam  Constitutional: Alert, attentive  HENT:    Nose: Nose normal.    Mouth/Throat: Oropharynx is clear, mucous membranes are moist   Eyes: EOM are normal.   CV: regular rate and rhythm; no murmurs, rubs or gallups  Chest: Effort normal, breath sounds normal except for trace crackles at the bases  GI:  There is no tenderness. No distension. Normal bowel sounds  MSK: Normal range of motion. Trace pedal edema bilaterally  Neurological: Alert, attentive  Skin: Skin is warm and dry.      Emergency Department Course     ECG:  ECG taken at 0848, ECG read at 0848  A fib  Low voltage QRS  Nonspecific T wave abnormality  abnormal ECG  Rate 97 bpm. TN interval * ms. QRS duration 66 ms. QT/QTc 334/424 ms. P-R-T axes * 3 269.  No significant change when compared to EKG dated 6/19/19.    Imaging:  Radiology findings were communicated with the patient who voiced understanding of the findings.    XR Chest 2 views:  Mild left pleural effusion.  Reading per radiology    Laboratory:  Laboratory findings were communicated with the patient who voiced understanding of the findings.    CBC: WBC 9.2, HGB 11.5(L),   BMP: GFR 41 (L) o/w WNL (Creatinine 1.16 (H))  INR: 2.11 (H)  BNP: 4,201 (H)  Troponin 0858: <0.015  Lactic acid: 1.1    Interventions:  1051 Lasix 40 mg IV    Emergency Department  Course:     Nursing notes and vitals reviewed.    0845 I performed an exam of the patient as documented above.     0858 IV was inserted and blood was drawn for laboratory testing, results above.    0917 The patient was sent for a XR chest while in the emergency department, results above.     1047 I returned to check on patient.     1052 I spoke to the patient's daughter who is able to pick her up.     1108 I personally reviewed the laboratory imaging results with the patient and answered all related questions prior to discharge.      Impression & Plan      Medical Decision Making:  Leonor Mercado is a 89 year old female with history of COPD, CAD, and A. fib with recent TTE showing ejection fraction of 55 to 60%, who presents for evaluation of shortness of breath and atypical chest pain.  Given her long-standing chest pain, her negative EKG and troponin essentially rule out AMI.  She is anticoagulated appropriately, making PE and chronically unlikely.  She is afebrile without leukocytosis and chest x-ray shows no convincing consolidation appears to been resolved since recent admission.  Of note, during her hospitalization she did have lower extremity edema related to resuscitation.  It appears her symptoms are most consistent with mild CHF and water retention.  She is breathing comfortably here, saturating in the mid 90s on room air, and I believe is safe for discharge.  We will plan for Lasix the next several days and primary care recheck in 2 to 3 days.  I doubt component of COPD at this time.  She should return immediately for difficulty breathing, chest pain, or any other concerns.    Diagnosis:    ICD-10-CM    1. Bilateral lower extremity edema R60.0    2. Pleural effusion J90      Disposition:   The patient is discharged to home.    Discharge Medications:  START taking      Dose / Directions   furosemide 20 MG tablet  Commonly known as:  LASIX      Dose:  20 mg  Take 1 tablet (20 mg) by mouth  daily  Quantity:  3 tablet  Refills:  0       Scribe Disclosure:  I, Cher Diego, am serving as a scribe at 9:21 AM on 7/1/2019 to document services personally performed by Prosper Johnson MD based on my observations and the provider's statements to me.  [facility] EMERGENCY DEPARTMENT       Prosper Johnson MD  07/01/19 8059

## 2019-07-01 NOTE — ED AVS SNAPSHOT
[facility] Emergency Department  [address]  [facility] [address]  Phone:  [phone]  Fax:  [phone]                                    Leonor Mercado   MRN: [id number]    Department:  [facility] Emergency Department   Date of Visit:  7/1/2019           After Visit Summary Signature Page    I have received my discharge instructions, and my questions have been answered. I have discussed any challenges I see with this plan with the nurse or doctor.    ..........................................................................................................................................  Patient/Patient Representative Signature      ..........................................................................................................................................  Patient Representative Print Name and Relationship to Patient    ..................................................               ................................................  Date                                   Time    ..........................................................................................................................................  Reviewed by Signature/Title    ...................................................              ..............................................  Date                                               Time          22EPIC Rev 08/18

## 2019-07-02 ENCOUNTER — NURSING HOME VISIT (OUTPATIENT)
Dept: GERIATRICS | Facility: CLINIC | Age: 84
End: 2019-07-02
Payer: MEDICARE

## 2019-07-02 VITALS
TEMPERATURE: 98.1 F | DIASTOLIC BLOOD PRESSURE: 74 MMHG | WEIGHT: 155.4 LBS | SYSTOLIC BLOOD PRESSURE: 132 MMHG | OXYGEN SATURATION: 93 % | BODY MASS INDEX: 27.54 KG/M2 | HEART RATE: 68 BPM | HEIGHT: 63 IN | RESPIRATION RATE: 18 BRPM

## 2019-07-02 DIAGNOSIS — R60.0 LEG EDEMA: ICD-10-CM

## 2019-07-02 DIAGNOSIS — R07.9 CHEST PAIN, UNSPECIFIED TYPE: Primary | ICD-10-CM

## 2019-07-02 DIAGNOSIS — R06.02 SOB (SHORTNESS OF BREATH): ICD-10-CM

## 2019-07-02 PROCEDURE — 99309 SBSQ NF CARE MODERATE MDM 30: CPT | Performed by: NURSE PRACTITIONER

## 2019-07-02 RX ORDER — FUROSEMIDE 20 MG
20 TABLET ORAL DAILY
Qty: 90 TABLET | Refills: 3 | Status: SHIPPED | OUTPATIENT
Start: 2019-07-02 | End: 2020-05-14

## 2019-07-02 ASSESSMENT — MIFFLIN-ST. JEOR: SCORE: 1079.02

## 2019-07-02 NOTE — PROGRESS NOTES
"[facility]  [facility] Record Number:  [id number]  Place of Service where encounter took place:  [facility]  ([facility]) [[id number]]  Chief Complaint   Patient presents with     Er F/u       HPI:    Leonor Mercado  is a 89 year old (5/6/1926), who is being seen today for an episodic care visit.  HPI information obtained from: facility chart records, facility staff, patient report and [facility] chart review. Today's concern is:  89 y.o with PMH of COPD, CAD, CHF and afib (coumadin) sent to ED from TCU with chest pain and SOB. Recent hosp and in TCU after sepsis PNA of Left lung. Was doing well in rehab until today's events.W/U essentially negative.  Dx with mild CHF. Tx with one time dose of IV lsix 40 mg and discharged on low dose lasix x 3 days. Readmitted to TCU.    Today patient found in room. Alert, calm, NAD. Denies pain. Reports chronic and long standing mild SOB. Denies chest pain or dizziness. Reports persistent LE edema. Reports had a few days of recent nausea and decreased appetite which is now resolving. Wondering out loud if had a \"bug.\" Reports ate good today. Denies n/v currently. States is moving bowels and noted isolated \"dark stool\" yesterday, and none since. States voiding without issue.. VS reviewed and stable    Past Medical and Surgical History reviewed in Epic today.    MEDICATIONS:  Current Outpatient Medications   Medication Sig Dispense Refill     acetaminophen (TYLENOL) 500 MG tablet Take 500 mg by mouth 4 times daily And Q6H PRN BID       albuterol (PROAIR HFA/PROVENTIL HFA/VENTOLIN HFA) 108 (90 Base) MCG/ACT inhaler Inhale 2 puffs into the lungs every 6 hours as needed for shortness of breath / dyspnea or wheezing 18 g 3     calcium carbonate (CALCIUM CARBONATE) 600 MG TABS tablet Take 1 tablet by mouth daily        Cholecalciferol (VITAMIN D PO) Take 800 Units by mouth daily        diclofenac (VOLTAREN) 1 % topical gel Apply 2 g topically 4 times " daily       diltiazem (DILTIAZEM CD) 240 MG 24 hr capsule Take 1 capsule (240 mg) by mouth daily 90 capsule 3     docusate sodium (STOOL SOFTENER) 100 MG capsule Take 1 capsule (100 mg) by mouth At Bedtime       fluticasone-vilanterol (BREO ELLIPTA) 200-25 MCG/INH oral inhaler Inhale 1 puff into the lungs daily 1 Inhaler 3     furosemide (LASIX) 20 MG tablet Take 1 tablet (20 mg) by mouth daily 90 tablet 3     guaiFENesin (MUCINEX) 600 MG 12 hr tablet Take 1 tablet (600 mg) by mouth 2 times daily       guaiFENesin-dextromethorphan (ROBITUSSIN DM) 100-10 MG/5ML syrup Take 5 mLs by mouth every 4 hours as needed for cough       ipratropium - albuterol 0.5 mg/2.5 mg/3 mL (DUONEB) 0.5-2.5 (3) MG/3ML neb solution Take 1 vial (3 mLs) by nebulization 4 times daily       isosorbide mononitrate (IMDUR) 30 MG 24 hr tablet Take 1 tablet (30 mg) by mouth daily 90 tablet 3     Lactobacillus (PROBIOTIC ACIDOPHILUS) TABS Take 1 tablet by mouth daily        losartan (COZAAR) 25 MG tablet Take 1 tablet (25 mg) by mouth daily 90 tablet 1     magnesium hydroxide (MILK OF MAGNESIA) 400 MG/5ML suspension Take 30 mLs by mouth daily as needed for constipation       MELATONIN PO Take 5 mg by mouth nightly as needed        mirtazapine (REMERON) 15 MG tablet Take 1 tablet (15 mg) by mouth At Bedtime 90 tablet 3     Multiple Vitamins-Minerals (OCUVITE PO) Take 1 capsule by mouth daily.       nitroGLYcerin (NITROSTAT) 0.4 MG sublingual tablet Place 1 tablet (0.4 mg) under the tongue every 5 minutes as needed for chest pain 25 tablet 1     polyethylene glycol (MIRALAX/GLYCOLAX) Packet Take 1 packet by mouth daily        pravastatin (PRAVACHOL) 40 MG tablet TAKE 1 TABLET BY MOUTH ONCE DAILY 90 tablet 3     senna-docusate (SENOKOT-S/PERICOLACE) 8.6-50 MG tablet Take 1 tablet by mouth 2 times daily as needed for constipation       Tiotropium Bromide Monohydrate (SPIRIVA HANDIHALER IN) Inhale 1 puff into the lungs daily        WARFARIN SODIUM PO PER  "INR ORDERS       ASPIRIN NOT PRESCRIBED (INTENTIONAL) Please choose reason not prescribed, below 0 each 0     furosemide (LASIX) 20 MG tablet Take 1 tablet (20 mg) by mouth daily 3 tablet 0     warfarin (COUMADIN) 2 MG tablet Take 1 mg by mouth once a week on Monday       warfarin (COUMADIN) 2 MG tablet Take 2 mg by mouth every evening except on Mon           REVIEW OF SYSTEMS:  10 point ROS of systems including Constitutional, Eyes, Respiratory, Cardiovascular, Gastroenterology, Genitourinary, Integumentary, Musculoskeletal, Psychiatric were all negative except for pertinent positives noted in my HPI.    Objective:  /74   Pulse 68   Temp 98.1  F (36.7  C)   Resp 18   Ht 1.6 m (5' 3\")   Wt 70.5 kg (155 lb 6.4 oz)   LMP  (LMP Unknown)   SpO2 93%   BMI 27.53 kg/m    Exam:  Chief Complaint   Patient presents with     Er F/u       HPI:    Leonor Mercado is a 89 year old female, (5/6/1926), who is being seen today for an episodic care visit at  [facility]. Today's concern is:        Chest pain  SOB (shortness of breath)  Leg edema    Allergies   Allergen Reactions     Peanuts [Nuts] Shortness Of Breath     Amiodarone      pulm fibrosis       Baclofen      Confusion      Bactrim [Sulfamethoxazole W-Trimethoprim]      Difficulty swallowing     Doxycycline Other (See Comments)     Multiple complaints; she does not want this again.      Levaquin [Levofloxacin] Other (See Comments)     Multiple complaints; she will not take this again     Linzess [Linaclotide] Diarrhea     Lorazepam        Prolonged drowsiness with ER visit      Liquid Adhesive Itching and Rash     Bleeding when tape removed after pacemaker placement..itched and burned for weeks.       Past Medical, Surgical, Family and Social History reviewed and updated: YES    Current Outpatient Medications   Medication Sig     acetaminophen (TYLENOL) 500 MG tablet Take 500 mg by mouth 4 times daily And Q6H PRN BID     albuterol " (PROAIR HFA/PROVENTIL HFA/VENTOLIN HFA) 108 (90 Base) MCG/ACT inhaler Inhale 2 puffs into the lungs every 6 hours as needed for shortness of breath / dyspnea or wheezing     calcium carbonate (CALCIUM CARBONATE) 600 MG TABS tablet Take 1 tablet by mouth daily      Cholecalciferol (VITAMIN D PO) Take 800 Units by mouth daily      diclofenac (VOLTAREN) 1 % topical gel Apply 2 g topically 4 times daily     diltiazem (DILTIAZEM CD) 240 MG 24 hr capsule Take 1 capsule (240 mg) by mouth daily     docusate sodium (STOOL SOFTENER) 100 MG capsule Take 1 capsule (100 mg) by mouth At Bedtime     fluticasone-vilanterol (BREO ELLIPTA) 200-25 MCG/INH oral inhaler Inhale 1 puff into the lungs daily     furosemide (LASIX) 20 MG tablet Take 1 tablet (20 mg) by mouth daily     guaiFENesin (MUCINEX) 600 MG 12 hr tablet Take 1 tablet (600 mg) by mouth 2 times daily     guaiFENesin-dextromethorphan (ROBITUSSIN DM) 100-10 MG/5ML syrup Take 5 mLs by mouth every 4 hours as needed for cough     ipratropium - albuterol 0.5 mg/2.5 mg/3 mL (DUONEB) 0.5-2.5 (3) MG/3ML neb solution Take 1 vial (3 mLs) by nebulization 4 times daily     isosorbide mononitrate (IMDUR) 30 MG 24 hr tablet Take 1 tablet (30 mg) by mouth daily     Lactobacillus (PROBIOTIC ACIDOPHILUS) TABS Take 1 tablet by mouth daily      losartan (COZAAR) 25 MG tablet Take 1 tablet (25 mg) by mouth daily     magnesium hydroxide (MILK OF MAGNESIA) 400 MG/5ML suspension Take 30 mLs by mouth daily as needed for constipation     MELATONIN PO Take 5 mg by mouth nightly as needed      mirtazapine (REMERON) 15 MG tablet Take 1 tablet (15 mg) by mouth At Bedtime     Multiple Vitamins-Minerals (OCUVITE PO) Take 1 capsule by mouth daily.     nitroGLYcerin (NITROSTAT) 0.4 MG sublingual tablet Place 1 tablet (0.4 mg) under the tongue every 5 minutes as needed for chest pain     polyethylene glycol (MIRALAX/GLYCOLAX) Packet Take 1 packet by mouth daily      pravastatin (PRAVACHOL) 40 MG tablet  "TAKE 1 TABLET BY MOUTH ONCE DAILY     senna-docusate (SENOKOT-S/PERICOLACE) 8.6-50 MG tablet Take 1 tablet by mouth 2 times daily as needed for constipation     Tiotropium Bromide Monohydrate (SPIRIVA HANDIHALER IN) Inhale 1 puff into the lungs daily      WARFARIN SODIUM PO PER INR ORDERS     ASPIRIN NOT PRESCRIBED (INTENTIONAL) Please choose reason not prescribed, below     furosemide (LASIX) 20 MG tablet Take 1 tablet (20 mg) by mouth daily     warfarin (COUMADIN) 2 MG tablet Take 1 mg by mouth once a week on Monday     warfarin (COUMADIN) 2 MG tablet Take 2 mg by mouth every evening except on Mon     No current facility-administered medications for this visit.          REVIEW OF SYSTEMS:  Positive noted above, otherwise negative    /74   Pulse 68   Temp 98.1  F (36.7  C)   Resp 18   Ht 1.6 m (5' 3\")   Wt 70.5 kg (155 lb 6.4 oz)   LMP  (LMP Unknown)   SpO2 93%   BMI 27.53 kg/m      GENERAL APPEARANCE: Alert, in no distress   ENT: Mouth and posterior oropharynx normal, moist mucous membranes   EYES: EOM, conjunctivae, lids, pupils and irises normal   NECK: No adenopathy,masses or thyromegaly   RESP: respiratory effort and palpation of chest normal, Lungs clear to auscultation  CV: Palpation and auscultation of heart done , regular rate  and rhythm, no murmur, rub, or gallop, peripheral edema 2+ bilateral LE- nude TEDS on bilaterally  ABDOMEN: normal bowel sounds, soft, nontender, no hepatosplenomegaly or other masses   : palpation of bladder WNL   M/S: Gait and station normal   Digits and nails normal - MEEK  SKIN: Inspection of skin and subcutaneous tissue baseline, Palpation of skin and subcutaneous tissue baseline,   NEURO: Cranial nerves 2-12 are normal tested and grossly at patient's baseline, Examination of sensation by touch normal   PSYCH: oriented X 3, affect and mood normal     Recent Labs:  None    ASSESSMENT:    1. Chest pain    2. SOB (shortness of breath)    3. Leg edema  "       PLAN:      BAY Henderson CNP                    Labs:   Recent labs in [facility] reviewed by me today.     ASSESSMENT/PLAN:  Chest pain  - w/u AMI r/o in ED. ? Mild CHF- tx with lasix. No further s/s  - follow clinically    SOB (shortness of breath)  - chronic  - continue current med/tx regimen  - VS per unit protocol  - follow clinically    Leg edema  - continue on low dose lasix  - continue compression  - recheck BMP this week  - VS per unit protocol      Dark stool  - recheck CBC  - guaiac stools x 3  - follow VS/clinically      Orders written by provider at facility        Electronically signed by:  BAY Henderson CNP

## 2019-07-02 NOTE — TELEPHONE ENCOUNTER
Spoke with Daughter, Les, to schedule the pt per Dr Garcia's request. Les said that [facility] (Pt is currently at this location) was pushing back on administering the Lasix and would re-evaluate after 3 days. Les is worried with how swollen the pt's legs are if only 3 days is enough? Les would like to know if she should let [facility] decide this or if involving Dr Garcia and making the appt is the best solution. Please advise. Thanks!

## 2019-07-02 NOTE — LETTER
"    7/2/2019        RE: Leonor Mercado  [address]  [place] [address]        [place] [facility]  [place] Medical Record Number:  [id number]  Place of Service where encounter took place:  [facility]  ([facility]) [[id number]]  Chief Complaint   Patient presents with     Er F/u       HPI:    Leonor Mercado  is a 89 year old (5/6/1926), who is being seen today for an episodic care visit.  HPI information obtained from: facility chart records, facility staff, patient report and [facility] chart review. Today's concern is:  89 y.o with PMH of COPD, CAD, CHF and afib (coumadin) sent to ED from TCU with chest pain and SOB. Recent hosp and in TCU after sepsis PNA of Left lung. Was doing well in rehab until today's events.W/U essentially negative.  Dx with mild CHF. Tx with one time dose of IV lsix 40 mg and discharged on low dose lasix x 3 days. Readmitted to TCU.    Today patient found in room. Alert, calm, NAD. Denies pain. Reports chronic and long standing mild SOB. Denies chest pain or dizziness. Reports persistent LE edema. Reports had a few days of recent nausea and decreased appetite which is now resolving. Wondering out loud if had a \"bug.\" Reports ate good today. Denies n/v currently. States is moving bowels and noted isolated \"dark stool\" yesterday, and none since. States voiding without issue.. VS reviewed and stable    Past Medical and Surgical History reviewed in Epic today.    MEDICATIONS:  Current Outpatient Medications   Medication Sig Dispense Refill     acetaminophen (TYLENOL) 500 MG tablet Take 500 mg by mouth 4 times daily And Q6H PRN BID       albuterol (PROAIR HFA/PROVENTIL HFA/VENTOLIN HFA) 108 (90 Base) MCG/ACT inhaler Inhale 2 puffs into the lungs every 6 hours as needed for shortness of breath / dyspnea or wheezing 18 g 3     calcium carbonate (CALCIUM CARBONATE) 600 MG TABS tablet Take 1 tablet by mouth daily        Cholecalciferol (VITAMIN D PO) Take " 800 Units by mouth daily        diclofenac (VOLTAREN) 1 % topical gel Apply 2 g topically 4 times daily       diltiazem (DILTIAZEM CD) 240 MG 24 hr capsule Take 1 capsule (240 mg) by mouth daily 90 capsule 3     docusate sodium (STOOL SOFTENER) 100 MG capsule Take 1 capsule (100 mg) by mouth At Bedtime       fluticasone-vilanterol (BREO ELLIPTA) 200-25 MCG/INH oral inhaler Inhale 1 puff into the lungs daily 1 Inhaler 3     furosemide (LASIX) 20 MG tablet Take 1 tablet (20 mg) by mouth daily 90 tablet 3     guaiFENesin (MUCINEX) 600 MG 12 hr tablet Take 1 tablet (600 mg) by mouth 2 times daily       guaiFENesin-dextromethorphan (ROBITUSSIN DM) 100-10 MG/5ML syrup Take 5 mLs by mouth every 4 hours as needed for cough       ipratropium - albuterol 0.5 mg/2.5 mg/3 mL (DUONEB) 0.5-2.5 (3) MG/3ML neb solution Take 1 vial (3 mLs) by nebulization 4 times daily       isosorbide mononitrate (IMDUR) 30 MG 24 hr tablet Take 1 tablet (30 mg) by mouth daily 90 tablet 3     Lactobacillus (PROBIOTIC ACIDOPHILUS) TABS Take 1 tablet by mouth daily        losartan (COZAAR) 25 MG tablet Take 1 tablet (25 mg) by mouth daily 90 tablet 1     magnesium hydroxide (MILK OF MAGNESIA) 400 MG/5ML suspension Take 30 mLs by mouth daily as needed for constipation       MELATONIN PO Take 5 mg by mouth nightly as needed        mirtazapine (REMERON) 15 MG tablet Take 1 tablet (15 mg) by mouth At Bedtime 90 tablet 3     Multiple Vitamins-Minerals (OCUVITE PO) Take 1 capsule by mouth daily.       nitroGLYcerin (NITROSTAT) 0.4 MG sublingual tablet Place 1 tablet (0.4 mg) under the tongue every 5 minutes as needed for chest pain 25 tablet 1     polyethylene glycol (MIRALAX/GLYCOLAX) Packet Take 1 packet by mouth daily        pravastatin (PRAVACHOL) 40 MG tablet TAKE 1 TABLET BY MOUTH ONCE DAILY 90 tablet 3     senna-docusate (SENOKOT-S/PERICOLACE) 8.6-50 MG tablet Take 1 tablet by mouth 2 times daily as needed for constipation       Tiotropium Bromide  "Monohydrate (SPIRIVA HANDIHALER IN) Inhale 1 puff into the lungs daily        WARFARIN SODIUM PO PER INR ORDERS       ASPIRIN NOT PRESCRIBED (INTENTIONAL) Please choose reason not prescribed, below 0 each 0     furosemide (LASIX) 20 MG tablet Take 1 tablet (20 mg) by mouth daily 3 tablet 0     warfarin (COUMADIN) 2 MG tablet Take 1 mg by mouth once a week on Monday       warfarin (COUMADIN) 2 MG tablet Take 2 mg by mouth every evening except on Mon           REVIEW OF SYSTEMS:  10 point ROS of systems including Constitutional, Eyes, Respiratory, Cardiovascular, Gastroenterology, Genitourinary, Integumentary, Musculoskeletal, Psychiatric were all negative except for pertinent positives noted in my HPI.    Objective:  /74   Pulse 68   Temp 98.1  F (36.7  C)   Resp 18   Ht 1.6 m (5' 3\")   Wt 70.5 kg (155 lb 6.4 oz)   LMP  (LMP Unknown)   SpO2 93%   BMI 27.53 kg/m     Exam:  Chief Complaint   Patient presents with     Er F/u       HPI:    Leonor Mercado is a 89 year old female, (5/6/1926), who is being seen today for an episodic care visit at  [facility]. Today's concern is:        Chest pain  SOB (shortness of breath)  Leg edema    Allergies   Allergen Reactions     Peanuts [Nuts] Shortness Of Breath     Amiodarone      pulm fibrosis       Baclofen      Confusion      Bactrim [Sulfamethoxazole W-Trimethoprim]      Difficulty swallowing     Doxycycline Other (See Comments)     Multiple complaints; she does not want this again.      Levaquin [Levofloxacin] Other (See Comments)     Multiple complaints; she will not take this again     Linzess [Linaclotide] Diarrhea     Lorazepam        Prolonged drowsiness with ER visit      Liquid Adhesive Itching and Rash     Bleeding when tape removed after pacemaker placement..itched and burned for weeks.       Past Medical, Surgical, Family and Social History reviewed and updated: YES    Current Outpatient Medications   Medication Sig     " acetaminophen (TYLENOL) 500 MG tablet Take 500 mg by mouth 4 times daily And Q6H PRN BID     albuterol (PROAIR HFA/PROVENTIL HFA/VENTOLIN HFA) 108 (90 Base) MCG/ACT inhaler Inhale 2 puffs into the lungs every 6 hours as needed for shortness of breath / dyspnea or wheezing     calcium carbonate (CALCIUM CARBONATE) 600 MG TABS tablet Take 1 tablet by mouth daily      Cholecalciferol (VITAMIN D PO) Take 800 Units by mouth daily      diclofenac (VOLTAREN) 1 % topical gel Apply 2 g topically 4 times daily     diltiazem (DILTIAZEM CD) 240 MG 24 hr capsule Take 1 capsule (240 mg) by mouth daily     docusate sodium (STOOL SOFTENER) 100 MG capsule Take 1 capsule (100 mg) by mouth At Bedtime     fluticasone-vilanterol (BREO ELLIPTA) 200-25 MCG/INH oral inhaler Inhale 1 puff into the lungs daily     furosemide (LASIX) 20 MG tablet Take 1 tablet (20 mg) by mouth daily     guaiFENesin (MUCINEX) 600 MG 12 hr tablet Take 1 tablet (600 mg) by mouth 2 times daily     guaiFENesin-dextromethorphan (ROBITUSSIN DM) 100-10 MG/5ML syrup Take 5 mLs by mouth every 4 hours as needed for cough     ipratropium - albuterol 0.5 mg/2.5 mg/3 mL (DUONEB) 0.5-2.5 (3) MG/3ML neb solution Take 1 vial (3 mLs) by nebulization 4 times daily     isosorbide mononitrate (IMDUR) 30 MG 24 hr tablet Take 1 tablet (30 mg) by mouth daily     Lactobacillus (PROBIOTIC ACIDOPHILUS) TABS Take 1 tablet by mouth daily      losartan (COZAAR) 25 MG tablet Take 1 tablet (25 mg) by mouth daily     magnesium hydroxide (MILK OF MAGNESIA) 400 MG/5ML suspension Take 30 mLs by mouth daily as needed for constipation     MELATONIN PO Take 5 mg by mouth nightly as needed      mirtazapine (REMERON) 15 MG tablet Take 1 tablet (15 mg) by mouth At Bedtime     Multiple Vitamins-Minerals (OCUVITE PO) Take 1 capsule by mouth daily.     nitroGLYcerin (NITROSTAT) 0.4 MG sublingual tablet Place 1 tablet (0.4 mg) under the tongue every 5 minutes as needed for chest pain     polyethylene  "glycol (MIRALAX/GLYCOLAX) Packet Take 1 packet by mouth daily      pravastatin (PRAVACHOL) 40 MG tablet TAKE 1 TABLET BY MOUTH ONCE DAILY     senna-docusate (SENOKOT-S/PERICOLACE) 8.6-50 MG tablet Take 1 tablet by mouth 2 times daily as needed for constipation     Tiotropium Bromide Monohydrate (SPIRIVA HANDIHALER IN) Inhale 1 puff into the lungs daily      WARFARIN SODIUM PO PER INR ORDERS     ASPIRIN NOT PRESCRIBED (INTENTIONAL) Please choose reason not prescribed, below     furosemide (LASIX) 20 MG tablet Take 1 tablet (20 mg) by mouth daily     warfarin (COUMADIN) 2 MG tablet Take 1 mg by mouth once a week on Monday     warfarin (COUMADIN) 2 MG tablet Take 2 mg by mouth every evening except on Mon     No current facility-administered medications for this visit.          REVIEW OF SYSTEMS:  Positive noted above, otherwise negative    /74   Pulse 68   Temp 98.1  F (36.7  C)   Resp 18   Ht 1.6 m (5' 3\")   Wt 70.5 kg (155 lb 6.4 oz)   LMP  (LMP Unknown)   SpO2 93%   BMI 27.53 kg/m       GENERAL APPEARANCE: Alert, in no distress   ENT: Mouth and posterior oropharynx normal, moist mucous membranes   EYES: EOM, conjunctivae, lids, pupils and irises normal   NECK: No adenopathy,masses or thyromegaly   RESP: respiratory effort and palpation of chest normal, Lungs clear to auscultation  CV: Palpation and auscultation of heart done , regular rate  and rhythm, no murmur, rub, or gallop, peripheral edema 2+ bilateral LE- nude TEDS on bilaterally  ABDOMEN: normal bowel sounds, soft, nontender, no hepatosplenomegaly or other masses   : palpation of bladder WNL   M/S: Gait and station normal   Digits and nails normal - MEEK  SKIN: Inspection of skin and subcutaneous tissue baseline, Palpation of skin and subcutaneous tissue baseline,   NEURO: Cranial nerves 2-12 are normal tested and grossly at patient's baseline, Examination of sensation by touch normal   PSYCH: oriented X 3, affect and mood normal     Recent " Labs:  None    ASSESSMENT:    1. Chest pain    2. SOB (shortness of breath)    3. Leg edema        PLAN:      BAY Henderson CNP                    Labs:   Recent labs in [facility] reviewed by me today.     ASSESSMENT/PLAN:  Chest pain  - w/u AMI r/o in ED. ? Mild CHF- tx with lasix. No further s/s  - follow clinically    SOB (shortness of breath)  - chronic  - continue current med/tx regimen  - VS per unit protocol  - follow clinically    Leg edema  - continue on low dose lasix  - continue compression  - recheck BMP this week  - VS per unit protocol      Dark stool  - recheck CBC  - guaiac stools x 3  - follow VS/clinically      Orders written by provider at facility        Electronically signed by:  BAY Henderson CNP                 Sincerely,        BAY Henderson CNP

## 2019-07-02 NOTE — TELEPHONE ENCOUNTER
Spoke with patient's daughter.  Patient was recently evaluated in the ER and furosemide was resumed.  Patient had taken furosemide for a number of years prior to discontinuing it from the regimen at her most recent clinic visit.  At this point would recommend resuming  Furosemide as per prior regimen    Please contact care center, recommend patient continue daily furosemide.

## 2019-07-03 ENCOUNTER — HOSPITAL LABORATORY (OUTPATIENT)
Dept: OTHER | Facility: CLINIC | Age: 84
End: 2019-07-03

## 2019-07-03 ENCOUNTER — NURSING HOME VISIT (OUTPATIENT)
Dept: GERIATRICS | Facility: CLINIC | Age: 84
End: 2019-07-03
Payer: MEDICARE

## 2019-07-03 ENCOUNTER — TELEPHONE (OUTPATIENT)
Dept: GERIATRICS | Facility: CLINIC | Age: 84
End: 2019-07-03

## 2019-07-03 VITALS
RESPIRATION RATE: 18 BRPM | SYSTOLIC BLOOD PRESSURE: 127 MMHG | DIASTOLIC BLOOD PRESSURE: 75 MMHG | WEIGHT: 153 LBS | HEART RATE: 93 BPM | BODY MASS INDEX: 27.11 KG/M2 | OXYGEN SATURATION: 94 % | TEMPERATURE: 98.2 F | HEIGHT: 63 IN

## 2019-07-03 DIAGNOSIS — Z51.81 ENCOUNTER FOR THERAPEUTIC DRUG MONITORING: ICD-10-CM

## 2019-07-03 DIAGNOSIS — Z79.01 LONG TERM CURRENT USE OF ANTICOAGULANT THERAPY: ICD-10-CM

## 2019-07-03 DIAGNOSIS — I48.20 CHRONIC ATRIAL FIBRILLATION (H): Primary | ICD-10-CM

## 2019-07-03 LAB
ANION GAP SERPL CALCULATED.3IONS-SCNC: 4 MMOL/L (ref 3–14)
BASOPHILS # BLD AUTO: 0.1 10E9/L (ref 0–0.2)
BASOPHILS NFR BLD AUTO: 0.8 %
BUN SERPL-MCNC: 13 MG/DL (ref 7–30)
CALCIUM SERPL-MCNC: 9.3 MG/DL (ref 8.5–10.1)
CHLORIDE SERPL-SCNC: 104 MMOL/L (ref 94–109)
CO2 SERPL-SCNC: 29 MMOL/L (ref 20–32)
CREAT SERPL-MCNC: 1.2 MG/DL (ref 0.52–1.04)
DIFFERENTIAL METHOD BLD: NORMAL
EOSINOPHIL # BLD AUTO: 0.2 10E9/L (ref 0–0.7)
EOSINOPHIL NFR BLD AUTO: 2.8 %
ERYTHROCYTE [DISTWIDTH] IN BLOOD BY AUTOMATED COUNT: 15 % (ref 10–15)
GFR SERPL CREATININE-BSD FRML MDRD: 39 ML/MIN/{1.73_M2}
GLUCOSE SERPL-MCNC: 105 MG/DL (ref 70–99)
HCT VFR BLD AUTO: 36 % (ref 35–47)
HGB BLD-MCNC: 11.7 G/DL (ref 11.7–15.7)
IMM GRANULOCYTES # BLD: 0.1 10E9/L (ref 0–0.4)
IMM GRANULOCYTES NFR BLD: 0.9 %
LYMPHOCYTES # BLD AUTO: 1.4 10E9/L (ref 0.8–5.3)
LYMPHOCYTES NFR BLD AUTO: 17.6 %
MCH RBC QN AUTO: 29.3 PG (ref 26.5–33)
MCHC RBC AUTO-ENTMCNC: 32.5 G/DL (ref 31.5–36.5)
MCV RBC AUTO: 90 FL (ref 78–100)
MONOCYTES # BLD AUTO: 0.8 10E9/L (ref 0–1.3)
MONOCYTES NFR BLD AUTO: 10 %
NEUTROPHILS # BLD AUTO: 5.3 10E9/L (ref 1.6–8.3)
NEUTROPHILS NFR BLD AUTO: 67.9 %
NRBC # BLD AUTO: 0 10*3/UL
NRBC BLD AUTO-RTO: 0 /100
PLATELET # BLD AUTO: 355 10E9/L (ref 150–450)
POTASSIUM SERPL-SCNC: 4.3 MMOL/L (ref 3.4–5.3)
RBC # BLD AUTO: 4 10E12/L (ref 3.8–5.2)
SODIUM SERPL-SCNC: 137 MMOL/L (ref 133–144)
WBC # BLD AUTO: 7.8 10E9/L (ref 4–11)

## 2019-07-03 PROCEDURE — 99308 SBSQ NF CARE LOW MDM 20: CPT | Performed by: NURSE PRACTITIONER

## 2019-07-03 ASSESSMENT — MIFFLIN-ST. JEOR: SCORE: 1068.13

## 2019-07-03 NOTE — LETTER
"    7/3/2019        RE: Leonor Mercado  [address]  [place] [address]        [place] [facility]  [place] Medical Record Number:  [id number]  Place of Service where encounter took place: [facility]  (S) [[id number]]    HPI:    Leonor Mercado is a 89 year old  (5/6/1926), who is being seen today for an episodic care visit at the above location.   HPI information obtained from: facility chart records, facility staff, patient report and [facility] chart review. Today's concern is INR/Coumadin management for A. Fib    ROS/Subjective:  Bleeding Signs/Symptoms:  YES, and positive Guiaic  Thromboembolic Signs/Symptoms:  None  Medication Changes:  No  Dietary Changes:  No  Activity Changes: No  Bacterial/Viral Infection:  No  Missed Coumadin Doses:  None  On ASA: Yes - 81 mg po q day and No  Other Concerns:  Yes    OBJECTIVE:  /75   Pulse 93   Temp 98.2  F (36.8  C)   Resp 18   Ht 1.6 m (5' 3\")   Wt 69.4 kg (153 lb)   LMP  (LMP Unknown)   SpO2 94%   BMI 27.10 kg/m       INR Today:  2.5  Current Dose:  1, 2, 1, 2      ASSESSMENT:  1. Chronic atrial fibrillation (H)    2. Long term current use of anticoagulant therapy    3. Encounter for therapeutic drug monitoring      Therapeutic INR for goal of 2-3    PLAN:     New Dose: held due to positive guiaic    Next INR: 7/5 and hemoglobin  {Coumadin Dx/Z51.81/Z79.01***}    Electronically signed by:  Glenys Dixon NP         Sincerely,        Glenys Dixon NP    "

## 2019-07-03 NOTE — LETTER
"    7/3/2019        RE: Leonor Mercado  [address]  [place] [address]        [place] [facility]  [place] Medical Record Number:  [id number]  Place of Service where encounter took place: [facility]  (S) [[id number]]    HPI:    Leonor Mercado is a 89 year old  (5/6/1926), who is being seen today for an episodic care visit at the above location.   HPI information obtained from: facility chart records, facility staff, patient report and [facility] chart review. Today's concern is INR/Coumadin management for A. Fib    ROS/Subjective:  Bleeding Signs/Symptoms:  YES, and positive Guiaic  Thromboembolic Signs/Symptoms:  None  Medication Changes:  No  Dietary Changes:  No  Activity Changes: No  Bacterial/Viral Infection:  No  Missed Coumadin Doses:  None  On ASA: Yes - 81 mg po q day and No  Other Concerns:  Yes    OBJECTIVE:  /75   Pulse 93   Temp 98.2  F (36.8  C)   Resp 18   Ht 1.6 m (5' 3\")   Wt 69.4 kg (153 lb)   LMP  (LMP Unknown)   SpO2 94%   BMI 27.10 kg/m       INR Today:  2.5  Current Dose:  1, 2, 1, 2      ASSESSMENT:  1. Chronic atrial fibrillation (H)    2. Long term current use of anticoagulant therapy    3. Encounter for therapeutic drug monitoring      Therapeutic INR for goal of 2-3    PLAN:     New Dose: held due to positive guiaic    Next INR: 7/5 and hemoglobin      Electronically signed by:  Glenys Dixon NP         Sincerely,        Glenys Dixon NP    "

## 2019-07-03 NOTE — PROGRESS NOTES
"[place] [facility]  [place] Medical Record Number:  [id number]  Place of Service where encounter took place: [facility]  (S) [[id number]]    HPI:    Leonor Mercado is a 89 year old  (5/6/1926), who is being seen today for an episodic care visit at the above location.   HPI information obtained from: facility chart records, facility staff, patient report and [facility] chart review. Today's concern is INR/Coumadin management for A. Fib    ROS/Subjective:  Bleeding Signs/Symptoms:  YES, and positive Guiaic  Thromboembolic Signs/Symptoms:  None  Medication Changes:  No  Dietary Changes:  No  Activity Changes: No  Bacterial/Viral Infection:  No  Missed Coumadin Doses:  None  On ASA: Yes - 81 mg po q day and No  Other Concerns:  Yes    OBJECTIVE:  /75   Pulse 93   Temp 98.2  F (36.8  C)   Resp 18   Ht 1.6 m (5' 3\")   Wt 69.4 kg (153 lb)   LMP  (LMP Unknown)   SpO2 94%   BMI 27.10 kg/m      INR Today:  2.5  Current Dose:  1, 2, 1, 2      ASSESSMENT:  1. Chronic atrial fibrillation (H)    2. Long term current use of anticoagulant therapy    3. Encounter for therapeutic drug monitoring      Therapeutic INR for goal of 2-3    PLAN:     New Dose: held due to positive guiaic    Next INR: 7/5 and hemoglobin      Electronically signed by:  Glenys Dixon NP     "

## 2019-07-04 RX ORDER — PANTOPRAZOLE SODIUM 40 MG/1
40 TABLET, DELAYED RELEASE ORAL 2 TIMES DAILY
Qty: 60 TABLET | Refills: 11 | COMMUNITY
Start: 2019-07-04 | End: 2019-07-18

## 2019-07-04 NOTE — TELEPHONE ENCOUNTER
Nursing staff called to report positive guaiac. She is on warfarin for atrial fibrillation. Hemoglobin currently stable, no abdominal pressure, vitals WNL. Discussed with Dr Woodard who agrees with holding warfarin, starting protonix, and monitoring hemoglobin in 2 days. If condition changes will need to be seen in ED

## 2019-07-05 ENCOUNTER — HOSPITAL LABORATORY (OUTPATIENT)
Dept: OTHER | Facility: CLINIC | Age: 84
End: 2019-07-05

## 2019-07-05 VITALS
HEIGHT: 63 IN | HEART RATE: 80 BPM | SYSTOLIC BLOOD PRESSURE: 146 MMHG | TEMPERATURE: 97.7 F | WEIGHT: 152.4 LBS | DIASTOLIC BLOOD PRESSURE: 81 MMHG | OXYGEN SATURATION: 93 % | BODY MASS INDEX: 27 KG/M2 | RESPIRATION RATE: 17 BRPM

## 2019-07-05 LAB — HGB BLD-MCNC: 11.8 G/DL (ref 11.7–15.7)

## 2019-07-05 ASSESSMENT — MIFFLIN-ST. JEOR: SCORE: 1065.41

## 2019-07-06 ENCOUNTER — TELEPHONE (OUTPATIENT)
Dept: GERIATRICS | Facility: CLINIC | Age: 84
End: 2019-07-06

## 2019-07-06 NOTE — TELEPHONE ENCOUNTER
Patient's INR 1.3 today - Coumadin has been on hold for 2 days following positive giuac and initiation of protonix on 7/3 - Hgb recheck 7/5 was 11.8. Patient VSS. Asymptomatic.    Orders:  Coumadin 2mg PO at bedtime   INR 7/8    Dr. Dea Weinberg, [place], [facility]  Phone: [phone]  Fax: [phone]

## 2019-07-08 ENCOUNTER — NURSING HOME VISIT (OUTPATIENT)
Dept: GERIATRICS | Facility: CLINIC | Age: 84
End: 2019-07-08
Payer: MEDICARE

## 2019-07-08 DIAGNOSIS — R53.81 PHYSICAL DECONDITIONING: ICD-10-CM

## 2019-07-08 DIAGNOSIS — I25.10 CORONARY ARTERY DISEASE INVOLVING NATIVE HEART WITHOUT ANGINA PECTORIS, UNSPECIFIED VESSEL OR LESION TYPE: ICD-10-CM

## 2019-07-08 DIAGNOSIS — J44.9 CHRONIC OBSTRUCTIVE PULMONARY DISEASE, UNSPECIFIED COPD TYPE (H): ICD-10-CM

## 2019-07-08 DIAGNOSIS — J15.9 COMMUNITY ACQUIRED BACTERIAL PNEUMONIA: Primary | ICD-10-CM

## 2019-07-08 DIAGNOSIS — N18.30 CKD (CHRONIC KIDNEY DISEASE) STAGE 3, GFR 30-59 ML/MIN (H): ICD-10-CM

## 2019-07-08 DIAGNOSIS — I48.0 PAF (PAROXYSMAL ATRIAL FIBRILLATION) (H): ICD-10-CM

## 2019-07-08 PROCEDURE — 99309 SBSQ NF CARE MODERATE MDM 30: CPT | Performed by: INTERNAL MEDICINE

## 2019-07-08 NOTE — LETTER
7/8/2019        RE: Leonor Mercado  [address]  [place] [address]          PRIMARY CARE PROVIDER AND CLINIC RESPONSIBLE:  Arnel Garcia, [address] [facility]  / [place] [id number]        ADMISSION HISTORY AND PHYSICAL EXAMINATION     Chief Complaint   Patient presents with     Er F/u         HISTORY OF PRESENT ILLNESS:  89 year old female, (5/6/1926), [facility] of medical care and rehab.    Pt admitted [facility] hospital 6/19 to 6/24 for weakness, fever, chills. Found to have L sided CAP. Hospital course complicated by a fib with RVR. Was seen in ED for chest pain on 7/1.    Pt denies any chest pain/SOB/N/V/fever/chills.    Please see Esla Hoang's CNP  admit noted dated 6/25 for details of admission, past medical history, family history, allergies, medication list, social history and other details pertinent with this admission. Hospital admission and dc summary reviewed.      Past Medical History:   Diagnosis Date     A Fib - chr Coumadin, s/p PPM (H) 5/8/2013     Atrial fibrillation (H)     Sick sinus syndrome, PAT, AF     BCC (basal cell carcinoma of skin)     Face     CAD - 2 stents in [place] in 2000s.  1/5/2016     CHF (congestive heart failure) (H)     mild in past     CHF - Chr Diastolic (H) 11/21/2017     Chronic renal insufficiency      COPD (chronic obstructive pulmonary disease) (H)      COPD (H) 5/13/2013     Coronary artery disease     2-05[place]-CAD-taxus stentx2 2.5-12,2.5-12 in LADp and LADm, 80%nonDomRCA-LcxDom-mild,EF60%     Hyperlipidemia      Hypertension      IBS (irritable bowel syndrome)      Irritable bowel      Mumps      Osteoporosis      Palpitations      Panic attack     questionable diagnosis     Pulmonary fibrosis (H)     do not use amiodarone     Shortness of breath      Sick sinus syndrome - s/p PPM 2003 (H) 12/16/2017     SSS (sick sinus syndrome) (H)     DDD pacer (see surgery history section)     Vertigo        Past Surgical  History:   Procedure Laterality Date     APPENDECTOMY  1956     CARDIAC SURGERY      PPM, 2 STENTS2-05[place]-CAD-taxus stentx2 2.5-12,2.5-12 in LADp and LADm, 80%nonDomRCA-LcxDom-mild,EF60%     CORONARY ANGIOGRAPHY ADULT ORDER  7/1994    mild CAD,Normal LV function, No Mitral regurgitation, Prox LAD 30% stenosis. RCA prox and mid, 20-30%     ESOPHAGOSCOPY, GASTROSCOPY, DUODENOSCOPY (EGD), COMBINED N/A 8/19/2015    Procedure: COMBINED ESOPHAGOSCOPY, GASTROSCOPY, DUODENOSCOPY (EGD), BIOPSY SINGLE OR MULTIPLE;  Surgeon: Genevieve Leon MD;  Location: [facility]     H LEAD REVISION DUAL  4/2003    Revised in [place]-due to diaphram stim (original pacer placed in [place])     H LEAD REVISION DUAL  7/2/2014    Correction of reversal of A and V leads in Header     HEART CATH, ANGIOPLASTY  02/2005    LEIGH ANN mid and proximal LAD, ongoing 80% RCA; mild circumflex     HERNIA REPAIR Left 1991    [facility]     IMPLANT PACEMAKER  2/19/2003    Placed in [place] for sick sinus syndrome     REPLACE PACEMAKER GENERATOR  6/27/2013    Dual-chamber pacemaker by Dr SETH Pierre       Current Outpatient Medications   Medication Sig     acetaminophen (TYLENOL) 500 MG tablet Take 500 mg by mouth 4 times daily And Q6H PRN BID     albuterol (PROAIR HFA/PROVENTIL HFA/VENTOLIN HFA) 108 (90 Base) MCG/ACT inhaler Inhale 2 puffs into the lungs every 6 hours as needed for shortness of breath / dyspnea or wheezing     calcium carbonate (CALCIUM CARBONATE) 600 MG TABS tablet Take 1 tablet by mouth daily      Cholecalciferol (VITAMIN D PO) Take 800 Units by mouth daily      diclofenac (VOLTAREN) 1 % topical gel Apply 2 g topically 4 times daily     diltiazem (DILTIAZEM CD) 240 MG 24 hr capsule Take 1 capsule (240 mg) by mouth daily     docusate sodium (STOOL SOFTENER) 100 MG capsule Take 1 capsule (100 mg) by mouth At Bedtime     fluticasone-vilanterol (BREO ELLIPTA) 200-25 MCG/INH oral inhaler Inhale 1 puff into the lungs daily     furosemide (LASIX) 20 MG tablet Take 1  tablet (20 mg) by mouth daily     guaiFENesin (MUCINEX) 600 MG 12 hr tablet Take 1 tablet (600 mg) by mouth 2 times daily     guaiFENesin-dextromethorphan (ROBITUSSIN DM) 100-10 MG/5ML syrup Take 5 mLs by mouth every 4 hours as needed for cough     ipratropium - albuterol 0.5 mg/2.5 mg/3 mL (DUONEB) 0.5-2.5 (3) MG/3ML neb solution Take 1 vial (3 mLs) by nebulization 4 times daily     isosorbide mononitrate (IMDUR) 30 MG 24 hr tablet Take 1 tablet (30 mg) by mouth daily     Lactobacillus (PROBIOTIC ACIDOPHILUS) TABS Take 1 tablet by mouth daily      losartan (COZAAR) 25 MG tablet Take 1 tablet (25 mg) by mouth daily     magnesium hydroxide (MILK OF MAGNESIA) 400 MG/5ML suspension Take 30 mLs by mouth daily as needed for constipation     MELATONIN PO Take 5 mg by mouth nightly as needed      mirtazapine (REMERON) 15 MG tablet Take 1 tablet (15 mg) by mouth At Bedtime     Multiple Vitamins-Minerals (OCUVITE PO) Take 1 capsule by mouth daily.     nitroGLYcerin (NITROSTAT) 0.4 MG sublingual tablet Place 1 tablet (0.4 mg) under the tongue every 5 minutes as needed for chest pain     pantoprazole (PROTONIX) 40 MG EC tablet Take 1 tablet (40 mg) by mouth 2 times daily     polyethylene glycol (MIRALAX/GLYCOLAX) Packet Take 1 packet by mouth daily      pravastatin (PRAVACHOL) 40 MG tablet TAKE 1 TABLET BY MOUTH ONCE DAILY     senna-docusate (SENOKOT-S/PERICOLACE) 8.6-50 MG tablet Take 1 tablet by mouth 2 times daily as needed for constipation     Tiotropium Bromide Monohydrate (SPIRIVA HANDIHALER IN) Inhale 1 puff into the lungs daily      warfarin (COUMADIN) 2 MG tablet Take 1 mg by mouth once a week on Monday     warfarin (COUMADIN) 2 MG tablet Take 2 mg by mouth every evening except on Mon     WARFARIN SODIUM PO PER INR ORDERS     ASPIRIN NOT PRESCRIBED (INTENTIONAL) Please choose reason not prescribed, below     furosemide (LASIX) 20 MG tablet Take 1 tablet (20 mg) by mouth daily     No current facility-administered  medications for this visit.        Allergies   Allergen Reactions     Peanuts [Nuts] Shortness Of Breath     Amiodarone      pulm fibrosis       Baclofen      Confusion      Bactrim [Sulfamethoxazole W-Trimethoprim]      Difficulty swallowing     Doxycycline Other (See Comments)     Multiple complaints; she does not want this again.      Levaquin [Levofloxacin] Other (See Comments)     Multiple complaints; she will not take this again     Linzess [Linaclotide] Diarrhea     Lorazepam        Prolonged drowsiness with ER visit      Liquid Adhesive Itching and Rash     Bleeding when tape removed after pacemaker placement..itched and burned for weeks.       Social History     Socioeconomic History     Marital status:      Spouse name: Not on file     Number of children: 3     Years of education: Not on file     Highest education level: Not on file   Occupational History     Employer: RETIRED   Social Needs     Financial resource strain: Not on file     Food insecurity:     Worry: Not on file     Inability: Not on file     Transportation needs:     Medical: Not on file     Non-medical: Not on file   Tobacco Use     Smoking status: Former Smoker     Types: Cigarettes     Last attempt to quit: 1987     Years since quittin.1     Smokeless tobacco: Never Used   Substance and Sexual Activity     Alcohol use: No     Alcohol/week: 0.0 oz     Drug use: No     Sexual activity: Never     Partners: Male   Lifestyle     Physical activity:     Days per week: Not on file     Minutes per session: Not on file     Stress: Not on file   Relationships     Social connections:     Talks on phone: Not on file     Gets together: Not on file     Attends [employer] service: Not on file     Active member of club or organization: Not on file     Attends meetings of clubs or organizations: Not on file     Relationship status: Not on file     Intimate partner violence:     Fear of current or ex partner: Not on file     Emotionally  "abused: Not on file     Physically abused: Not on file     Forced sexual activity: Not on file   Other Topics Concern     Parent/sibling w/ CABG, MI or angioplasty before 65F 55M? Yes      Service Not Asked     Blood Transfusions Not Asked     Caffeine Concern Yes     Comment: decaf - some 1/2 caff     Occupational Exposure Not Asked     Hobby Hazards Not Asked     Sleep Concern Yes     Comment: nocturia every 2 hours     Stress Concern Not Asked     Weight Concern Not Asked     Special Diet No     Back Care Not Asked     Exercise No     Comment: some walking in the house     Bike Helmet Not Asked     Seat Belt Not Asked     Self-Exams Not Asked   Social History Narrative     Not on file          Information reviewed:  Medications, vital signs, orders, nursing notes, problem list, hospital information.     ROS: All 10 point review of system completed, those pertinent positive, please see H&P, the remaining ROS is negative.    /81   Pulse 80   Temp 97.7  F (36.5  C)   Resp 17   Ht 1.6 m (5' 3\")   Wt 69.1 kg (152 lb 6.4 oz)   LMP  (LMP Unknown)   SpO2 93%   BMI 27.00 kg/m       PHYSICAL EXAMINATION:   GENERAL:  No acute distress. Sitting on a recliner. Wife visiting.  SKIN:  Dry and warm.  There is no rash, lesions, ulcers or juandice at area of skin examined.  HEENT:  Head without trauma.  Pupils round, reactive. Exam of conjunctiva and lids are normal. Sclera without icterus. There is no oral thrush.  NECK:  Supple.  There is no cervical adenopathy, no thyromegaly. No jugular venous distension.  CHEST: No reproducible chest tenderness.   LUNGS:  Normal respiratory effort. + LLL crackles.  HEART:  Regular rate and rhythm.  No murmur, gallops or rubs auscultated.  ABDOMEN:  Soft, bowel sounds positive.  There is no tenderness or guarding.   EXTREMITIES: 1+  edema.   NEUROLOGIC:  Alert and oriented x3.      Lab/Diagnostic data:  Reviewed    Lab Results   Component Value Date    WBC 5.4 07/13/2019 "     Lab Results   Component Value Date    RBC 3.97 07/13/2019     Lab Results   Component Value Date    HGB 11.6 07/13/2019     Lab Results   Component Value Date    HCT 35.8 07/13/2019     Lab Results   Component Value Date    MCV 90 07/13/2019     Lab Results   Component Value Date    MCH 29.2 07/13/2019     Lab Results   Component Value Date    MCHC 32.4 07/13/2019     Lab Results   Component Value Date    RDW 15.1 07/13/2019     Lab Results   Component Value Date     07/13/2019       Last Comprehensive Metabolic Panel:  Sodium   Date Value Ref Range Status   07/13/2019 134 133 - 144 mmol/L Final     Potassium   Date Value Ref Range Status   07/13/2019 3.9 3.4 - 5.3 mmol/L Final     Chloride   Date Value Ref Range Status   07/13/2019 97 94 - 109 mmol/L Final     Carbon Dioxide   Date Value Ref Range Status   07/13/2019 30 20 - 32 mmol/L Final     Anion Gap   Date Value Ref Range Status   07/13/2019 7 3 - 14 mmol/L Final     Glucose   Date Value Ref Range Status   07/13/2019 76 70 - 99 mg/dL Final     Urea Nitrogen   Date Value Ref Range Status   07/13/2019 20 7 - 30 mg/dL Final     Creatinine   Date Value Ref Range Status   07/13/2019 1.37 (H) 0.52 - 1.04 mg/dL Final     GFR Estimate   Date Value Ref Range Status   07/13/2019 33 (L) >60 mL/min/[1.73_m2] Final     Comment:     Non  GFR Calc  Starting 12/18/2018, serum creatinine based estimated GFR (eGFR) will be   calculated using the Chronic Kidney Disease Epidemiology Collaboration   (CKD-EPI) equation.       Calcium   Date Value Ref Range Status   07/13/2019 9.3 8.5 - 10.1 mg/dL Final       Lab Results   Component Value Date    INR 2.11 07/01/2019    INR 2.74 06/24/2019    INR 2.20 06/23/2019    INR 2.08 06/22/2019    INR 2.69 06/21/2019    INR 3.14 06/20/2019    INR 2.25 06/19/2019    INR 1.9 06/13/2019    INR 3.4 05/16/2019    INR 3.1 04/18/2019    INR 3.02 04/06/2019    INR 2.4 03/21/2019    INR 2.0 02/18/2019    INR 2.9 01/15/2019     INR 2.3 12/18/2018    INR 3.9 12/04/2018    INR 2.3 11/06/2018    INR 1.30 10/23/2018    INR 2.1 09/25/2018    INR 2.0 08/28/2018    INR 2.4 07/24/2018    INR 3.6 05/25/2018    INR 3.1 05/11/2018    INR 2.14 04/29/2018       ASSESSMENT / PLAN:     Community acquired bacterial pneumonia  - CXR showed L perihilar pneumonia.  - s/p ABx.  - f/u with PCP to ensure resolution.    PAF (paroxysmal atrial fibrillation) (H)  - on diltiazem for rate control.  - On coumadin for CVA ppx, INR goal 2-3.    Chronic obstructive pulmonary disease, unspecified COPD type (H)  - On spiriva, nebs and breo.    CKD (chronic kidney disease) stage 3, GFR 30-59 ml/min (H)  - avoid nephrotoxins.    Coronary artery disease involving native heart without angina pectoris, unspecified vessel or lesion type  - On diltiazem, imdur and pravachol.    Chronic diastolic heart failure.  - On diltiazem, lasix and cozaar.  - daily weights.  - low salt diet.    Physical deconditioning  -Plan: PT/OT, fall precautions. Care conference with patient and family for the progress of rehab and disposition issues will be discussed as planned. Rehab evaluation and other evaluations including CPT are at rehab logs, to be reviewed separately.  Fall risk assessment as well as cognitive evaluation will be formed during rehab stay if indicated.      Other problems with same care. Primary care doctor and other specialists to address those chronic problems in next clinic appointment to be scheduled upon discharge from the TCU.    Total time spent with patient visit was 41 min including patient visit, review of past records, 1/2 time on patients counseling and coordinating care.        Chapis Woodard MD      Sincerely,        Chapis Woodard MD

## 2019-07-08 NOTE — PROGRESS NOTES
PRIMARY CARE PROVIDER AND CLINIC RESPONSIBLE:  Arnel Garcia, [address] [facility]  / [place] [address]        ADMISSION HISTORY AND PHYSICAL EXAMINATION     Chief Complaint   Patient presents with     Er F/u         HISTORY OF PRESENT ILLNESS:  89 year old female, (5/6/1926), admitted to the [facility] TCU for continuation of medical care and rehab.    Pt admitted [facility] 6/19 to 6/24 for weakness, fever, chills. Found to have L sided CAP. Hospital course complicated by a fib with RVR. Was seen in ED for chest pain on 7/1.    Pt denies any chest pain/SOB/N/V/fever/chills.    Please see Elsa Hoang's CNP  admit noted dated 6/25 for details of admission, past medical history, family history, allergies, medication list, social history and other details pertinent with this admission. Hospital admission and dc summary reviewed.      Past Medical History:   Diagnosis Date     A Fib - chr Coumadin, s/p PPM (H) 5/8/2013     Atrial fibrillation (H)     Sick sinus syndrome, PAT, AF     BCC (basal cell carcinoma of skin)     Face     CAD - 2 stents in [place] in 2000s.  1/5/2016     CHF (congestive heart failure) (H)     mild in past     CHF - Chr Diastolic (H) 11/21/2017     Chronic renal insufficiency      COPD (chronic obstructive pulmonary disease) (H)      COPD (H) 5/13/2013     Coronary artery disease     2-05[place]-CAD-taxus stentx2 2.5-12,2.5-12 in LADp and LADm, 80%nonDomRCA-LcxDom-mild,EF60%     Hyperlipidemia      Hypertension      IBS (irritable bowel syndrome)      Irritable bowel      Mumps      Osteoporosis      Palpitations      Panic attack     questionable diagnosis     Pulmonary fibrosis (H)     do not use amiodarone     Shortness of breath      Sick sinus syndrome - s/p PPM 2003 (H) 12/16/2017     SSS (sick sinus syndrome) (H)     DDD pacer (see surgery history section)     Vertigo        Past Surgical History:   Procedure Laterality Date     APPENDECTOMY  1956     CARDIAC SURGERY      PPM, 2  BFQDMS1-32Mgtbm-EGN-taxus stentx2 2.5-12,2.5-12 in LADp and LADm, 80%nonDomRCA-LcxDom-mild,EF60%     CORONARY ANGIOGRAPHY ADULT ORDER  7/1994    mild CAD,Normal LV function, No Mitral regurgitation, Prox LAD 30% stenosis. RCA prox and mid, 20-30%     ESOPHAGOSCOPY, GASTROSCOPY, DUODENOSCOPY (EGD), COMBINED N/A 8/19/2015    Procedure: COMBINED ESOPHAGOSCOPY, GASTROSCOPY, DUODENOSCOPY (EGD), BIOPSY SINGLE OR MULTIPLE;  Surgeon: Genevieve Leon MD;  Location: [facility]     H LEAD REVISION DUAL  4/2003    Revised in [place]-due to diaphram stim (original pacer placed in [place])     H LEAD REVISION DUAL  7/2/2014    Correction of reversal of A and V leads in Header     HEART CATH, ANGIOPLASTY  02/2005    LEIGH ANN mid and proximal LAD, ongoing 80% RCA; mild circumflex     HERNIA REPAIR Left 1991    [facility]     IMPLANT PACEMAKER  2/19/2003    Placed in [place] for sick sinus syndrome     REPLACE PACEMAKER GENERATOR  6/27/2013    Dual-chamber pacemaker by Dr SETH Pierre       Current Outpatient Medications   Medication Sig     acetaminophen (TYLENOL) 500 MG tablet Take 500 mg by mouth 4 times daily And Q6H PRN BID     albuterol (PROAIR HFA/PROVENTIL HFA/VENTOLIN HFA) 108 (90 Base) MCG/ACT inhaler Inhale 2 puffs into the lungs every 6 hours as needed for shortness of breath / dyspnea or wheezing     calcium carbonate (CALCIUM CARBONATE) 600 MG TABS tablet Take 1 tablet by mouth daily      Cholecalciferol (VITAMIN D PO) Take 800 Units by mouth daily      diclofenac (VOLTAREN) 1 % topical gel Apply 2 g topically 4 times daily     diltiazem (DILTIAZEM CD) 240 MG 24 hr capsule Take 1 capsule (240 mg) by mouth daily     docusate sodium (STOOL SOFTENER) 100 MG capsule Take 1 capsule (100 mg) by mouth At Bedtime     fluticasone-vilanterol (BREO ELLIPTA) 200-25 MCG/INH oral inhaler Inhale 1 puff into the lungs daily     furosemide (LASIX) 20 MG tablet Take 1 tablet (20 mg) by mouth daily     guaiFENesin (MUCINEX) 600 MG 12 hr tablet Take 1 tablet  (600 mg) by mouth 2 times daily     guaiFENesin-dextromethorphan (ROBITUSSIN DM) 100-10 MG/5ML syrup Take 5 mLs by mouth every 4 hours as needed for cough     ipratropium - albuterol 0.5 mg/2.5 mg/3 mL (DUONEB) 0.5-2.5 (3) MG/3ML neb solution Take 1 vial (3 mLs) by nebulization 4 times daily     isosorbide mononitrate (IMDUR) 30 MG 24 hr tablet Take 1 tablet (30 mg) by mouth daily     Lactobacillus (PROBIOTIC ACIDOPHILUS) TABS Take 1 tablet by mouth daily      losartan (COZAAR) 25 MG tablet Take 1 tablet (25 mg) by mouth daily     magnesium hydroxide (MILK OF MAGNESIA) 400 MG/5ML suspension Take 30 mLs by mouth daily as needed for constipation     MELATONIN PO Take 5 mg by mouth nightly as needed      mirtazapine (REMERON) 15 MG tablet Take 1 tablet (15 mg) by mouth At Bedtime     Multiple Vitamins-Minerals (OCUVITE PO) Take 1 capsule by mouth daily.     nitroGLYcerin (NITROSTAT) 0.4 MG sublingual tablet Place 1 tablet (0.4 mg) under the tongue every 5 minutes as needed for chest pain     pantoprazole (PROTONIX) 40 MG EC tablet Take 1 tablet (40 mg) by mouth 2 times daily     polyethylene glycol (MIRALAX/GLYCOLAX) Packet Take 1 packet by mouth daily      pravastatin (PRAVACHOL) 40 MG tablet TAKE 1 TABLET BY MOUTH ONCE DAILY     senna-docusate (SENOKOT-S/PERICOLACE) 8.6-50 MG tablet Take 1 tablet by mouth 2 times daily as needed for constipation     Tiotropium Bromide Monohydrate (SPIRIVA HANDIHALER IN) Inhale 1 puff into the lungs daily      warfarin (COUMADIN) 2 MG tablet Take 1 mg by mouth once a week on Monday     warfarin (COUMADIN) 2 MG tablet Take 2 mg by mouth every evening except on Mon     WARFARIN SODIUM PO PER INR ORDERS     ASPIRIN NOT PRESCRIBED (INTENTIONAL) Please choose reason not prescribed, below     furosemide (LASIX) 20 MG tablet Take 1 tablet (20 mg) by mouth daily     No current facility-administered medications for this visit.        Allergies   Allergen Reactions     Peanuts [Nuts] Shortness  Of Breath     Amiodarone      pulm fibrosis       Baclofen      Confusion      Bactrim [Sulfamethoxazole W-Trimethoprim]      Difficulty swallowing     Doxycycline Other (See Comments)     Multiple complaints; she does not want this again.      Levaquin [Levofloxacin] Other (See Comments)     Multiple complaints; she will not take this again     Linzess [Linaclotide] Diarrhea     Lorazepam        Prolonged drowsiness with ER visit      Liquid Adhesive Itching and Rash     Bleeding when tape removed after pacemaker placement..itched and burned for weeks.       Social History     Socioeconomic History     Marital status:      Spouse name: Not on file     Number of children: 3     Years of education: Not on file     Highest education level: Not on file   Occupational History     Employer: RETIRED   Social Needs     Financial resource strain: Not on file     Food insecurity:     Worry: Not on file     Inability: Not on file     Transportation needs:     Medical: Not on file     Non-medical: Not on file   Tobacco Use     Smoking status: Former Smoker     Types: Cigarettes     Last attempt to quit: 1987     Years since quittin.1     Smokeless tobacco: Never Used   Substance and Sexual Activity     Alcohol use: No     Alcohol/week: 0.0 oz     Drug use: No     Sexual activity: Never     Partners: Male   Lifestyle     Physical activity:     Days per week: Not on file     Minutes per session: Not on file     Stress: Not on file   Relationships     Social connections:     Talks on phone: Not on file     Gets together: Not on file     Attends Confucianism service: Not on file     Active member of club or organization: Not on file     Attends meetings of clubs or organizations: Not on file     Relationship status: Not on file     Intimate partner violence:     Fear of current or ex partner: Not on file     Emotionally abused: Not on file     Physically abused: Not on file     Forced sexual activity: Not on file  "  Other Topics Concern     Parent/sibling w/ CABG, MI or angioplasty before 65F 55M? Yes      Service Not Asked     Blood Transfusions Not Asked     Caffeine Concern Yes     Comment: decaf - some 1/2 caff     Occupational Exposure Not Asked     Hobby Hazards Not Asked     Sleep Concern Yes     Comment: nocturia every 2 hours     Stress Concern Not Asked     Weight Concern Not Asked     Special Diet No     Back Care Not Asked     Exercise No     Comment: some walking in the house     Bike Helmet Not Asked     Seat Belt Not Asked     Self-Exams Not Asked   Social History Narrative     Not on file          Information reviewed:  Medications, vital signs, orders, nursing notes, problem list, hospital information.     ROS: All 10 point review of system completed, those pertinent positive, please see H&P, the remaining ROS is negative.    /81   Pulse 80   Temp 97.7  F (36.5  C)   Resp 17   Ht 1.6 m (5' 3\")   Wt 69.1 kg (152 lb 6.4 oz)   LMP  (LMP Unknown)   SpO2 93%   BMI 27.00 kg/m      PHYSICAL EXAMINATION:   GENERAL:  No acute distress. Sitting on a recliner. Wife visiting.  SKIN:  Dry and warm.  There is no rash, lesions, ulcers or juandice at area of skin examined.  HEENT:  Head without trauma.  Pupils round, reactive. Exam of conjunctiva and lids are normal. Sclera without icterus. There is no oral thrush.  NECK:  Supple.  There is no cervical adenopathy, no thyromegaly. No jugular venous distension.  CHEST: No reproducible chest tenderness.   LUNGS:  Normal respiratory effort. + LLL crackles.  HEART:  Regular rate and rhythm.  No murmur, gallops or rubs auscultated.  ABDOMEN:  Soft, bowel sounds positive.  There is no tenderness or guarding.   EXTREMITIES: 1+  edema.   NEUROLOGIC:  Alert and oriented x3.      Lab/Diagnostic data:  Reviewed    Lab Results   Component Value Date    WBC 5.4 07/13/2019     Lab Results   Component Value Date    RBC 3.97 07/13/2019     Lab Results   Component Value " Date    HGB 11.6 07/13/2019     Lab Results   Component Value Date    HCT 35.8 07/13/2019     Lab Results   Component Value Date    MCV 90 07/13/2019     Lab Results   Component Value Date    MCH 29.2 07/13/2019     Lab Results   Component Value Date    MCHC 32.4 07/13/2019     Lab Results   Component Value Date    RDW 15.1 07/13/2019     Lab Results   Component Value Date     07/13/2019       Last Comprehensive Metabolic Panel:  Sodium   Date Value Ref Range Status   07/13/2019 134 133 - 144 mmol/L Final     Potassium   Date Value Ref Range Status   07/13/2019 3.9 3.4 - 5.3 mmol/L Final     Chloride   Date Value Ref Range Status   07/13/2019 97 94 - 109 mmol/L Final     Carbon Dioxide   Date Value Ref Range Status   07/13/2019 30 20 - 32 mmol/L Final     Anion Gap   Date Value Ref Range Status   07/13/2019 7 3 - 14 mmol/L Final     Glucose   Date Value Ref Range Status   07/13/2019 76 70 - 99 mg/dL Final     Urea Nitrogen   Date Value Ref Range Status   07/13/2019 20 7 - 30 mg/dL Final     Creatinine   Date Value Ref Range Status   07/13/2019 1.37 (H) 0.52 - 1.04 mg/dL Final     GFR Estimate   Date Value Ref Range Status   07/13/2019 33 (L) >60 mL/min/[1.73_m2] Final     Comment:     Non  GFR Calc  Starting 12/18/2018, serum creatinine based estimated GFR (eGFR) will be   calculated using the Chronic Kidney Disease Epidemiology Collaboration   (CKD-EPI) equation.       Calcium   Date Value Ref Range Status   07/13/2019 9.3 8.5 - 10.1 mg/dL Final       Lab Results   Component Value Date    INR 2.11 07/01/2019    INR 2.74 06/24/2019    INR 2.20 06/23/2019    INR 2.08 06/22/2019    INR 2.69 06/21/2019    INR 3.14 06/20/2019    INR 2.25 06/19/2019    INR 1.9 06/13/2019    INR 3.4 05/16/2019    INR 3.1 04/18/2019    INR 3.02 04/06/2019    INR 2.4 03/21/2019    INR 2.0 02/18/2019    INR 2.9 01/15/2019    INR 2.3 12/18/2018    INR 3.9 12/04/2018    INR 2.3 11/06/2018    INR 1.30 10/23/2018    INR  2.1 09/25/2018    INR 2.0 08/28/2018    INR 2.4 07/24/2018    INR 3.6 05/25/2018    INR 3.1 05/11/2018    INR 2.14 04/29/2018       ASSESSMENT / PLAN:     Community acquired bacterial pneumonia  - CXR showed L perihilar pneumonia.  - s/p ABx.  - f/u with PCP to ensure resolution.    PAF (paroxysmal atrial fibrillation) (H)  - on diltiazem for rate control.  - On coumadin for CVA ppx, INR goal 2-3.    Chronic obstructive pulmonary disease, unspecified COPD type (H)  - On spiriva, nebs and breo.    CKD (chronic kidney disease) stage 3, GFR 30-59 ml/min (H)  - avoid nephrotoxins.    Coronary artery disease involving native heart without angina pectoris, unspecified vessel or lesion type  - On diltiazem, imdur and pravachol.    Chronic diastolic heart failure.  - On diltiazem, lasix and cozaar.  - daily weights.  - low salt diet.    Physical deconditioning  -Plan: PT/OT, fall precautions. Care conference with patient and family for the progress of rehab and disposition issues will be discussed as planned. Rehab evaluation and other evaluations including CPT are at rehab logs, to be reviewed separately.  Fall risk assessment as well as cognitive evaluation will be formed during rehab stay if indicated.      Other problems with same care. Primary care doctor and other specialists to address those chronic problems in next clinic appointment to be scheduled upon discharge from the TCU.    Total time spent with patient visit was 41 min including patient visit, review of past records, 1/2 time on patients counseling and coordinating care.        Chapis Woodard MD

## 2019-07-10 ENCOUNTER — NURSING HOME VISIT (OUTPATIENT)
Dept: GERIATRICS | Facility: CLINIC | Age: 84
End: 2019-07-10
Payer: MEDICARE

## 2019-07-10 VITALS
BODY MASS INDEX: 26.58 KG/M2 | RESPIRATION RATE: 16 BRPM | HEIGHT: 63 IN | SYSTOLIC BLOOD PRESSURE: 127 MMHG | HEART RATE: 73 BPM | WEIGHT: 150 LBS | DIASTOLIC BLOOD PRESSURE: 84 MMHG | OXYGEN SATURATION: 95 % | TEMPERATURE: 98.4 F

## 2019-07-10 DIAGNOSIS — I48.20 CHRONIC ATRIAL FIBRILLATION (H): Primary | ICD-10-CM

## 2019-07-10 DIAGNOSIS — Z79.01 LONG TERM CURRENT USE OF ANTICOAGULANT THERAPY: ICD-10-CM

## 2019-07-10 DIAGNOSIS — Z51.81 ENCOUNTER FOR THERAPEUTIC DRUG MONITORING: ICD-10-CM

## 2019-07-10 PROCEDURE — 99308 SBSQ NF CARE LOW MDM 20: CPT | Performed by: NURSE PRACTITIONER

## 2019-07-10 ASSESSMENT — MIFFLIN-ST. JEOR: SCORE: 1054.53

## 2019-07-10 NOTE — TELEPHONE ENCOUNTER
Daughter Les came into clinic to schedule an appt. For Leonor. 7/18/19 at 1:00. She said the care center has started the furosemide.  Rosi, CMA

## 2019-07-10 NOTE — PROGRESS NOTES
"[facility]  [place] Medical Record Number:  [id number]  Place of Service where encounter took place: [facility]  (S) [[id number]]    HPI:    Leonro Mercado is a 89 year old  (5/6/1926), who is being seen today for an episodic care visit at the above location.   HPI information obtained from: facility chart records, facility staff and [facility] chart review. Today's concern is INR/Coumadin management for A. Fib    ROS/Subjective:  Bleeding Signs/Symptoms:  None  Thromboembolic Signs/Symptoms:  None  Medication Changes:  No  Dietary Changes:  No  Activity Changes: No  Bacterial/Viral Infection:  No  Missed Coumadin Doses:  This week - held coumadin x 2 days  On ASA: No  Other Concerns:  No    OBJECTIVE:  /84   Pulse 73   Temp 98.4  F (36.9  C)   Resp 16   Ht 1.6 m (5' 3\")   Wt 68 kg (150 lb)   LMP  (LMP Unknown)   SpO2 95%   BMI 26.57 kg/m    Last INR: 1/3 on 7/6  INR Today:  2.5  Current Dose:  Took total of 5 mg of coumadin in the past seven days    ASSESSMENT:  1. Chronic atrial fibrillation (H)    2. Encounter for therapeutic drug monitoring    3. Long term current use of anticoagulant therapy      Therapeutic INR for goal of 2-3    PLAN:   Orders written by provider at facility  New Dose: Coumadin 0.5 mg PO daily    Next INR: 7/12/19    Also, per patient request changing voltaren gel and mucinex to PRN.     Electronically signed by:  BAY Begum CNP       "

## 2019-07-10 NOTE — LETTER
"    7/10/2019        RE: Leonor Mercado  [address]  [place] [address]        [place] [facility] [facility]  [place] Medical Record Number:  [id number]  Place of Service where encounter took place: [facility]  (S) [[id number]]    HPI:    Leonor Mercado is a 89 year old  (5/6/1926), who is being seen today for an episodic care visit at the above location.   HPI information obtained from: facility chart records, facility staff and [facility] chart review. Today's concern is INR/Coumadin management for A. Fib    ROS/Subjective:  Bleeding Signs/Symptoms:  None  Thromboembolic Signs/Symptoms:  None  Medication Changes:  No  Dietary Changes:  No  Activity Changes: No  Bacterial/Viral Infection:  No  Missed Coumadin Doses:  This week - held coumadin x 2 days  On ASA: No  Other Concerns:  No    OBJECTIVE:  /84   Pulse 73   Temp 98.4  F (36.9  C)   Resp 16   Ht 1.6 m (5' 3\")   Wt 68 kg (150 lb)   LMP  (LMP Unknown)   SpO2 95%   BMI 26.57 kg/m     Last INR: 1/3 on 7/6  INR Today:  2.5  Current Dose:  Took total of 5 mg of coumadin in the past seven days    ASSESSMENT:  1. Chronic atrial fibrillation (H)    2. Encounter for therapeutic drug monitoring    3. Long term current use of anticoagulant therapy      Therapeutic INR for goal of 2-3    PLAN:   Orders written by provider at facility  New Dose: Coumadin 0.5 mg PO daily    Next INR: 7/12/19    Also, per patient request changing voltaren gel and mucinex to PRN.     Electronically signed by:  BAY Begum CNP           Sincerely,        BAY Begum CNP    "

## 2019-07-12 ENCOUNTER — DISCHARGE SUMMARY NURSING HOME (OUTPATIENT)
Dept: GERIATRICS | Facility: CLINIC | Age: 84
End: 2019-07-12
Payer: MEDICARE

## 2019-07-12 VITALS
DIASTOLIC BLOOD PRESSURE: 78 MMHG | WEIGHT: 147.9 LBS | SYSTOLIC BLOOD PRESSURE: 133 MMHG | TEMPERATURE: 97.4 F | OXYGEN SATURATION: 93 % | RESPIRATION RATE: 20 BRPM | BODY MASS INDEX: 26.21 KG/M2 | HEIGHT: 63 IN | HEART RATE: 104 BPM

## 2019-07-12 DIAGNOSIS — F32.A DEPRESSION, UNSPECIFIED DEPRESSION TYPE: ICD-10-CM

## 2019-07-12 DIAGNOSIS — J44.9 CHRONIC OBSTRUCTIVE PULMONARY DISEASE, UNSPECIFIED COPD TYPE (H): ICD-10-CM

## 2019-07-12 DIAGNOSIS — R19.5 GUAIAC POSITIVE STOOLS: ICD-10-CM

## 2019-07-12 DIAGNOSIS — I48.20 CHRONIC ATRIAL FIBRILLATION (H): ICD-10-CM

## 2019-07-12 DIAGNOSIS — J15.9 COMMUNITY ACQUIRED BACTERIAL PNEUMONIA: Primary | ICD-10-CM

## 2019-07-12 DIAGNOSIS — I50.32 CHRONIC DIASTOLIC CONGESTIVE HEART FAILURE (H): ICD-10-CM

## 2019-07-12 DIAGNOSIS — Z95.0 CARDIAC PACEMAKER IN SITU: ICD-10-CM

## 2019-07-12 DIAGNOSIS — N18.30 CKD (CHRONIC KIDNEY DISEASE) STAGE 3, GFR 30-59 ML/MIN (H): ICD-10-CM

## 2019-07-12 DIAGNOSIS — I25.10 CORONARY ARTERY DISEASE INVOLVING NATIVE CORONARY ARTERY OF NATIVE HEART WITHOUT ANGINA PECTORIS: ICD-10-CM

## 2019-07-12 DIAGNOSIS — M79.672 BILATERAL FOOT PAIN: ICD-10-CM

## 2019-07-12 DIAGNOSIS — R53.81 PHYSICAL DECONDITIONING: ICD-10-CM

## 2019-07-12 DIAGNOSIS — M79.671 BILATERAL FOOT PAIN: ICD-10-CM

## 2019-07-12 PROCEDURE — 99316 NF DSCHRG MGMT 30 MIN+: CPT | Performed by: NURSE PRACTITIONER

## 2019-07-12 RX ORDER — WARFARIN SODIUM 1 MG/1
1 TABLET ORAL DAILY
Start: 2019-07-12 | End: 2019-09-12

## 2019-07-12 RX ORDER — GUAIFENESIN 600 MG/1
1200 TABLET, EXTENDED RELEASE ORAL 2 TIMES DAILY
COMMUNITY
End: 2019-09-12

## 2019-07-12 ASSESSMENT — MIFFLIN-ST. JEOR: SCORE: 1045

## 2019-07-12 NOTE — LETTER
7/12/2019        RE: Leonor Mercado  [address]  [place] [address]        [place] [facility] DISCHARGE SUMMARY  PATIENT'S NAME: Leonor Mercado  YOB: 1926  MEDICAL RECORD NUMBER:  [id number]  Place of Service where encounter took place:  [facility] ([facility]  ([facility]) [[id number]]    PRIMARY CARE PROVIDER AND CLINIC RESPONSIBLE AFTER TRANSFER:   Arnel Garcia MD, MD, [address] [facility]  / [place] [address]    Non-FMG Provider     Transferring providers: BAY Henderson CNP, Chapis Woodard MD  Recent Hospitalization/ED:  Hospital  [facility] Hospital stay 6/19/19 to 6/24/19  Date of SNF Admission: June / 24 / 2019  Date of SNF (anticipated) Discharge: July / 15 / 2019  Discharged to: previous assisted living  Cognitive Scores: SLUMS 28/30  Physical Function: Supervision with ADLs. Ambulates 68 feet with 4WW with CGA      CODE STATUS/ADVANCE DIRECTIVES DISCUSSION:  DNR / DNI     ALLERGIES: Peanuts [nuts]; Amiodarone; Baclofen; Bactrim [sulfamethoxazole w-trimethoprim]; Doxycycline; Levaquin [levofloxacin]; Linzess [linaclotide]; Lorazepam; and Liquid adhesive    DISCHARGE DIAGNOSIS/NURSING FACILITY COURSE:     89 y.o female with PMH afib, pacer for SSS, CAD- h/o 2 stents), HLD, DCHF with pEF, CKD, COPD (O2 dep 2LNC at night only), osteoporosis admitted to hospital as above 2/2 progressie weakness, dyspnea, cough and chills. Dx with left sided CAP. Also with afib with RVR and hemoptysis. Treated with Rocephin and Zirhomax and improved. Some dysphagia noted and esophogram revealed mild presbyesophagus. ST evaled and educated regarding swallow precautions. Transferred to TCU for admission for acute rehab.  ED visit 7/1 for chest pain and edema. Work up essentially negative. Received on dose of IV lasix 40 mg and started on oral lasix. Weights have been trending down in TCU. Was fluid up at admit. Otherwise rehab course proceeded well. Discharge  functional status is as above.     Sepsis (H)  Acute and chronic respiratory failure with hypoxia (H)  Community acquired bacterial pneumonia  - afebrile. Tx with Rocephin and Zithromax inpatient for Left side PNA. Completed 4 more days Cefdinir done 6/27. Respiratory status stable  - weaned off daytime O2- is on chronic NOC O2 2LNC  - continues on Duonebs QID (does at home)  - continues fluticasone-vilanterol and Spiriva     COPD  - chronic, stable  - continues on inhalers, nebs as outlined above    Chronic diastolic CHF  LE edema  - mild exacerbation early on. Oral lasix started as above. Weights trending down toward baseline with decreased LE edema and resp status has been stable.   - continue on low dose lasix  - continue with LE compression during day.  - follow weights    CAD- history of 2 stents   - ED for chest pain 7/1- though 2/2 mild CHF.   - No further reports of chest pain in TCU  - continue on Imdur and prn Nitroglycerine    Atrial fibrillation with RVR (H)  - rate controlled currently  - continue on diltiazem, warfarin with INR goal 2-3  - last INR 7/12 2.2 - will be on 1 mg daily with request for recheck INR 7/16- did have one dark stool and guaiac + stool. (See below)         CKD (chronic kidney disease) stage 3, GFR 30-59 ml/min (H)  Baseline Cr 1.1- 1.4- currently stable in range  - avoid nephrotoxins  - recheck periodic BMP     Bilateral foot pain  - chronic, stable  - continue on acetaminophen     Physical deconditioning-  - 2/2 above  - lives in A/L- returning there  - Home care as outlined     Constipation  - Add Senna S scheduled and prn  - monitor bowel pattern    Hemoptysis  - noted inpatient, none in TCU    Guaiac positive stool  - one dark stool so orders for guaiac and 2/3 were positive. No melena, hematochezia, hemoptysis, n/v or abd pain noted. 2 doses of Coumadin held then resumed. HGB stable  - recommend f/w PCP, report any further s/s and ? Colonoscopy  patient and family decide to  pursue further w/u.        Past Medical History:  has a past medical history of A Fib - chr Coumadin, s/p PPM (H) (5/8/2013), Atrial fibrillation (H), BCC (basal cell carcinoma of skin), CAD - 2 stents in [place] in 2000s.  (1/5/2016), CHF (congestive heart failure) (H), CHF - Chr Diastolic (H) (11/21/2017), Chronic renal insufficiency, COPD (chronic obstructive pulmonary disease) (H), COPD (H) (5/13/2013), Coronary artery disease, Hyperlipidemia, Hypertension, IBS (irritable bowel syndrome), Irritable bowel, Mumps, Osteoporosis, Palpitations, Panic attack, Pulmonary fibrosis (H), Shortness of breath, Sick sinus syndrome - s/p PPM 2003 (H) (12/16/2017), SSS (sick sinus syndrome) (H), and Vertigo. She also has no past medical history of Asymptomatic human immunodeficiency virus (HIV) infection status (H), Cerebral palsy (H), Complication of anesthesia, Congenital renal agenesis and dysgenesis, Difficult intubation, Goiter, Gout, Hernia, abdominal, History of spinal cord injury, History of thrombophlebitis, Horseshoe kidney, Hydrocephalus, Malignant hyperthermia, Parkinsons disease (H), PONV (postoperative nausea and vomiting), Spider veins, Spina bifida (H), Spinal headache, STD (sexually transmitted disease), Tethered cord (H), or Tuberculosis.    Discharge Medications:  Current Outpatient Medications   Medication Sig Dispense Refill     acetaminophen (TYLENOL) 500 MG tablet Take 500 mg by mouth 4 times daily And Q6H PRN BID       albuterol (PROAIR HFA/PROVENTIL HFA/VENTOLIN HFA) 108 (90 Base) MCG/ACT inhaler Inhale 2 puffs into the lungs every 6 hours as needed for shortness of breath / dyspnea or wheezing 18 g 3     ASPIRIN NOT PRESCRIBED (INTENTIONAL) Please choose reason not prescribed, below 0 each 0     calcium carbonate (CALCIUM CARBONATE) 600 MG TABS tablet Take 1 tablet by mouth daily        Cholecalciferol (VITAMIN D PO) Take 800 Units by mouth daily        diclofenac (VOLTAREN) 1 % topical gel Apply 2 g  topically 4 times daily (Patient taking differently: Apply 2 g topically 4 times daily as needed for moderate pain )       diltiazem (DILTIAZEM CD) 240 MG 24 hr capsule Take 1 capsule (240 mg) by mouth daily 90 capsule 3     fluticasone-vilanterol (BREO ELLIPTA) 200-25 MCG/INH oral inhaler Inhale 1 puff into the lungs daily 1 Inhaler 3     furosemide (LASIX) 20 MG tablet Take 1 tablet (20 mg) by mouth daily 90 tablet 3     guaiFENesin (MUCINEX) 600 MG 12 hr tablet Take 1 tablet (600 mg) by mouth 2 times daily (Patient taking differently: Take 600 mg by mouth 2 times daily as needed for congestion )       ipratropium - albuterol 0.5 mg/2.5 mg/3 mL (DUONEB) 0.5-2.5 (3) MG/3ML neb solution Take 1 vial (3 mLs) by nebulization 4 times daily       isosorbide mononitrate (IMDUR) 30 MG 24 hr tablet Take 1 tablet (30 mg) by mouth daily 90 tablet 3     Lactobacillus (PROBIOTIC ACIDOPHILUS) TABS Take 1 tablet by mouth daily        losartan (COZAAR) 25 MG tablet Take 1 tablet (25 mg) by mouth daily 90 tablet 1     magnesium hydroxide (MILK OF MAGNESIA) 400 MG/5ML suspension Take 30 mLs by mouth daily as needed for constipation       MELATONIN PO Take 5 mg by mouth nightly as needed        mirtazapine (REMERON) 15 MG tablet Take 1 tablet (15 mg) by mouth At Bedtime 90 tablet 3     Multiple Vitamins-Minerals (OCUVITE PO) Take 1 capsule by mouth daily.       nitroGLYcerin (NITROSTAT) 0.4 MG sublingual tablet Place 1 tablet (0.4 mg) under the tongue every 5 minutes as needed for chest pain 25 tablet 1     pantoprazole (PROTONIX) 40 MG EC tablet Take 1 tablet (40 mg) by mouth 2 times daily 60 tablet 11     polyethylene glycol (MIRALAX/GLYCOLAX) Packet Take 1 packet by mouth daily        pravastatin (PRAVACHOL) 40 MG tablet TAKE 1 TABLET BY MOUTH ONCE DAILY 90 tablet 3     senna-docusate (SENOKOT-S/PERICOLACE) 8.6-50 MG tablet Take 1 tablet by mouth 2 times daily as needed for constipation (also BID scheduled)        Tiotropium  Bromide Monohydrate (SPIRIVA HANDIHALER IN) Inhale 1 puff into the lungs daily        warfarin (COUMADIN) 2 MG tablet Take 0.5 mg by mouth daily        WARFARIN SODIUM PO PER INR ORDERS         Medication Changes/Rationale:     As above    Controlled medications sent with patient:   not applicable/none     ROS:   4 point ROS including Respiratory, CV, GI and , other than that noted in the HPI,  is negative    Physical Exam:   Vitals: LMP  (LMP Unknown)   BMI= There is no height or weight on file to calculate BMI.    Resp: Effort WNL, LSCTA   CV:VSS- trace bilateral LE edema  Musc- MEEK  Psych- alert, calm, pleasant      SNF labs: Recent labs in [facility] reviewed by me today.   INR Flow sheet at SNF:    DISCHARGE PLAN:    Follow up labs: BMP, CBC 7/15, INR 7/16    Medical Follow Up:      Follow up with primary care provider in 1 week  Follow up with specialist as indicated above    MTM referral needed and placed by this provider: No    Current [place] scheduled appointments:  Next 5 appointments (look out 90 days)    Jul 18, 2019  1:00 PM CDT  Office Visit with Arnel Garcia MD, Ea Rn Pal 3a, EA EXAM ROOM 44  [facility] [address] [place] [address]  [address]  [facility] [address] [phone]           Discharge Services: Home Care:  Occupational Therapy, Physical Therapy, Registered Nurse and Home Health Aide    Discharge Instructions Verbalized to Patient at Discharge:     As above      TOTAL DISCHARGE TIME:   Greater than 30 minutes  Electronically signed by:  BAY Henderson CNP     Home care Face to Face documentation done in [facility] attached to Home care orders for [facility].                       Sincerely,        BAY Henderson CNP

## 2019-07-12 NOTE — PROGRESS NOTES
[place] [facility] DISCHARGE SUMMARY  PATIENT'S NAME: Leonor Mercado  YOB: 1926  MEDICAL RECORD NUMBER:  [id number]  Place of Service where encounter took place:  [facility]  ([facility], [facility]) [[id number]]    PRIMARY CARE PROVIDER AND CLINIC RESPONSIBLE AFTER TRANSFER:   Arnel Garcia MD, MD, [address] [facility]  / [place] [address]    Non-FMG Provider     Transferring providers: BAY Henderson CNP, Chapis Woodard MD  Recent Hospitalization/ED:  [facility] Hospital stay 6/19/19 to 6/24/19  Date of SNF Admission: June / 24 / 2019  Date of SNF (anticipated) Discharge: July / 15 / 2019  Discharged to: previous assisted living  Cognitive Scores: SLUMS 28/30  Physical Function: Supervision with ADLs. Ambulates 68 feet with 4WW with CGA      CODE STATUS/ADVANCE DIRECTIVES DISCUSSION:  DNR / DNI     ALLERGIES: Peanuts [nuts]; Amiodarone; Baclofen; Bactrim [sulfamethoxazole w-trimethoprim]; Doxycycline; Levaquin [levofloxacin]; Linzess [linaclotide]; Lorazepam; and Liquid adhesive    DISCHARGE DIAGNOSIS/NURSING FACILITY COURSE:     89 y.o female with PMH afib, pacer for SSS, CAD- h/o 2 stents), HLD, DCHF with pEF, CKD, COPD (O2 dep 2LNC at night only), osteoporosis admitted to hospital as above 2/2 progressie weakness, dyspnea, cough and chills. Dx with left sided CAP. Also with afib with RVR and hemoptysis. Treated with Rocephin and Zirhomax and improved. Some dysphagia noted and esophogram revealed mild presbyesophagus. ST evaled and educated regarding swallow precautions. Transferred to TCU for admission for acute rehab. ED visit 7/1 for chest pain and edema. Work up essentially negative. Received on dose of IV lasix 40 mg and started on oral lasix. Weights have been trending down in TCU. Was fluid up at admit. Otherwise rehab course proceeded well. Discharge functional status is as above.     Sepsis (H)  Acute and chronic respiratory failure with hypoxia  (H)  Community acquired bacterial pneumonia  - afebrile. Tx with Rocephin and Zithromax inpatient for Left side PNA. Completed 4 more days Cefdinir done 6/27. Respiratory status stable  - weaned off daytime O2- is on chronic NOC O2 2LNC  - continues on Duonebs QID (does at home)  - continues fluticasone-vilanterol and Spiriva     COPD  - chronic, stable  - continues on inhalers, nebs as outlined above    Chronic diastolic CHF  LE edema  - mild exacerbation early on. Oral lasix started as above. Weights trending down toward baseline with decreased LE edema and resp status has been stable.   - continue on low dose lasix and Losartan  - continue with LE compression during day.  - follow weights    CAD- history of 2 stents   - ED for chest pain 7/1- though 2/2 mild CHF.     - No further reports of chest pain in TCU  - continue on Imdur and prn Nitroglycerine    Atrial fibrillation with RVR (H)  - Has pacer, rate controlled  - continue on diltiazem, warfarin with INR goal 2-3  - last INR 7/12 2.2 - will be on 1 mg daily with request for recheck INR 7/16- did have one dark stool and guaiac + stool. (See below)  - f/w cards- device clinic as directed         CKD (chronic kidney disease) stage 3, GFR 30-59 ml/min (H)  Baseline Cr 1.1- 1.4- currently stable in range  - avoid nephrotoxins  - recheck periodic BMP     Bilateral foot pain  - chronic, stable  - continue on acetaminophen     Physical deconditioning-  - 2/2 above  - lives in A/L- returning there  - Home care as outlined     Constipation  - Add Senna S scheduled and prn  - monitor bowel pattern    Hemoptysis  - noted inpatient, none in TCU    Guaiac positive stool  - one dark stool so orders for guaiac and 2/3 were positive. No melena, hematochezia, hemoptysis, n/v or abd pain noted. 2 doses of Coumadin held then resumed. HGB stable  - recommend f/w PCP, report any further s/s and ? Colonoscopy  patient and family decide to pursue further w/u.      Depression  -continue on Remeron       Past Medical History:  has a past medical history of A Fib - chr Coumadin, s/p PPM (H) (5/8/2013), Atrial fibrillation (H), BCC (basal cell carcinoma of skin), CAD - 2 stents in [place] in 2000s.  (1/5/2016), CHF (congestive heart failure) (H), CHF - Chr Diastolic (H) (11/21/2017), Chronic renal insufficiency, COPD (chronic obstructive pulmonary disease) (H), COPD (H) (5/13/2013), Coronary artery disease, Hyperlipidemia, Hypertension, IBS (irritable bowel syndrome), Irritable bowel, Mumps, Osteoporosis, Palpitations, Panic attack, Pulmonary fibrosis (H), Shortness of breath, Sick sinus syndrome - s/p PPM 2003 (H) (12/16/2017), SSS (sick sinus syndrome) (H), and Vertigo. She also has no past medical history of Asymptomatic human immunodeficiency virus (HIV) infection status (H), Cerebral palsy (H), Complication of anesthesia, Congenital renal agenesis and dysgenesis, Difficult intubation, Goiter, Gout, Hernia, abdominal, History of spinal cord injury, History of thrombophlebitis, Horseshoe kidney, Hydrocephalus, Malignant hyperthermia, Parkinsons disease (H), PONV (postoperative nausea and vomiting), Spider veins, Spina bifida (H), Spinal headache, STD (sexually transmitted disease), Tethered cord (H), or Tuberculosis.    Discharge Medications:  Current Outpatient Medications   Medication Sig Dispense Refill     acetaminophen (TYLENOL) 500 MG tablet Take 500 mg by mouth 4 times daily And Q6H PRN BID       albuterol (PROAIR HFA/PROVENTIL HFA/VENTOLIN HFA) 108 (90 Base) MCG/ACT inhaler Inhale 2 puffs into the lungs every 6 hours as needed for shortness of breath / dyspnea or wheezing 18 g 3     ASPIRIN NOT PRESCRIBED (INTENTIONAL) Please choose reason not prescribed, below 0 each 0     calcium carbonate (CALCIUM CARBONATE) 600 MG TABS tablet Take 1 tablet by mouth daily        Cholecalciferol (VITAMIN D PO) Take 800 Units by mouth daily        diclofenac (VOLTAREN) 1 % topical gel  Apply 2 g topically 4 times daily (Patient taking differently: Apply 2 g topically 4 times daily as needed for moderate pain )       diltiazem (DILTIAZEM CD) 240 MG 24 hr capsule Take 1 capsule (240 mg) by mouth daily 90 capsule 3     fluticasone-vilanterol (BREO ELLIPTA) 200-25 MCG/INH oral inhaler Inhale 1 puff into the lungs daily 1 Inhaler 3     furosemide (LASIX) 20 MG tablet Take 1 tablet (20 mg) by mouth daily 90 tablet 3     guaiFENesin (MUCINEX) 600 MG 12 hr tablet Take 1 tablet (600 mg) by mouth 2 times daily (Patient taking differently: Take 600 mg by mouth 2 times daily as needed for congestion )       ipratropium - albuterol 0.5 mg/2.5 mg/3 mL (DUONEB) 0.5-2.5 (3) MG/3ML neb solution Take 1 vial (3 mLs) by nebulization 4 times daily       isosorbide mononitrate (IMDUR) 30 MG 24 hr tablet Take 1 tablet (30 mg) by mouth daily 90 tablet 3     Lactobacillus (PROBIOTIC ACIDOPHILUS) TABS Take 1 tablet by mouth daily        losartan (COZAAR) 25 MG tablet Take 1 tablet (25 mg) by mouth daily 90 tablet 1     magnesium hydroxide (MILK OF MAGNESIA) 400 MG/5ML suspension Take 30 mLs by mouth daily as needed for constipation       MELATONIN PO Take 5 mg by mouth nightly as needed        mirtazapine (REMERON) 15 MG tablet Take 1 tablet (15 mg) by mouth At Bedtime 90 tablet 3     Multiple Vitamins-Minerals (OCUVITE PO) Take 1 capsule by mouth daily.       nitroGLYcerin (NITROSTAT) 0.4 MG sublingual tablet Place 1 tablet (0.4 mg) under the tongue every 5 minutes as needed for chest pain 25 tablet 1     pantoprazole (PROTONIX) 40 MG EC tablet Take 1 tablet (40 mg) by mouth 2 times daily 60 tablet 11     polyethylene glycol (MIRALAX/GLYCOLAX) Packet Take 1 packet by mouth daily        pravastatin (PRAVACHOL) 40 MG tablet TAKE 1 TABLET BY MOUTH ONCE DAILY 90 tablet 3     senna-docusate (SENOKOT-S/PERICOLACE) 8.6-50 MG tablet Take 1 tablet by mouth 2 times daily as needed for constipation (also BID scheduled)         Tiotropium Bromide Monohydrate (SPIRIVA HANDIHALER IN) Inhale 1 puff into the lungs daily        warfarin (COUMADIN) 2 MG tablet Take 0.5 mg by mouth daily        WARFARIN SODIUM PO PER INR ORDERS         Medication Changes/Rationale:     As above    Controlled medications sent with patient:   not applicable/none     ROS:   4 point ROS including Respiratory, CV, GI and , other than that noted in the HPI,  is negative    Physical Exam:   Vitals: LMP  (LMP Unknown)   BMI= There is no height or weight on file to calculate BMI.    Resp: Effort WNL, LSCTA   CV:VSS- trace bilateral LE edema  Musc- MEEK  Psych- alert, calm, pleasant      SNF labs: Recent labs in [facility] reviewed by me today.   INR Flow sheet at SNF:    DISCHARGE PLAN:    Follow up labs: BMP, CBC 7/15, INR 7/16    Medical Follow Up:      Follow up with primary care provider in 1 week  Follow up with specialist as indicated above    MTM referral needed and placed by this provider: No    Current [place] scheduled appointments:  Next 5 appointments (look out 90 days)    Jul 18, 2019  1:00 PM CDT  Office Visit with Arnel Garcia MD, Ea Rn Pal 3a, EA EXAM ROOM 44  [facility] ([facility]) [address] [place] [address]  [address]  [facility] [address] [phone]           Discharge Services: Home Care:  Occupational Therapy, Physical Therapy, Registered Nurse and Home Health Aide    Discharge Instructions Verbalized to Patient at Discharge:     As above      TOTAL DISCHARGE TIME:   Greater than 30 minutes  Electronically signed by:  BAY Henderson CNP     Home care Face to Face documentation done in [facility] attached to Home care orders for [facility].

## 2019-07-13 ENCOUNTER — HOSPITAL LABORATORY (OUTPATIENT)
Dept: OTHER | Facility: CLINIC | Age: 84
End: 2019-07-13

## 2019-07-13 LAB
ANION GAP SERPL CALCULATED.3IONS-SCNC: 7 MMOL/L (ref 3–14)
BUN SERPL-MCNC: 20 MG/DL (ref 7–30)
CALCIUM SERPL-MCNC: 9.3 MG/DL (ref 8.5–10.1)
CHLORIDE SERPL-SCNC: 97 MMOL/L (ref 94–109)
CO2 SERPL-SCNC: 30 MMOL/L (ref 20–32)
CREAT SERPL-MCNC: 1.37 MG/DL (ref 0.52–1.04)
ERYTHROCYTE [DISTWIDTH] IN BLOOD BY AUTOMATED COUNT: 15.1 % (ref 10–15)
GFR SERPL CREATININE-BSD FRML MDRD: 33 ML/MIN/{1.73_M2}
GLUCOSE SERPL-MCNC: 76 MG/DL (ref 70–99)
HCT VFR BLD AUTO: 35.8 % (ref 35–47)
HGB BLD-MCNC: 11.6 G/DL (ref 11.7–15.7)
MCH RBC QN AUTO: 29.2 PG (ref 26.5–33)
MCHC RBC AUTO-ENTMCNC: 32.4 G/DL (ref 31.5–36.5)
MCV RBC AUTO: 90 FL (ref 78–100)
PLATELET # BLD AUTO: 261 10E9/L (ref 150–450)
POTASSIUM SERPL-SCNC: 3.9 MMOL/L (ref 3.4–5.3)
RBC # BLD AUTO: 3.97 10E12/L (ref 3.8–5.2)
SODIUM SERPL-SCNC: 134 MMOL/L (ref 133–144)
WBC # BLD AUTO: 5.4 10E9/L (ref 4–11)

## 2019-07-15 ENCOUNTER — PATIENT OUTREACH (OUTPATIENT)
Dept: CARE COORDINATION | Facility: CLINIC | Age: 84
End: 2019-07-15

## 2019-07-15 NOTE — PROGRESS NOTES
Clinic Care Coordination Contact    Situation: Patient chart reviewed by care coordinator.    Background: Patient residing at [facility] following hospitalization for Pneumonia.     Assessment: Received notification from [facility] that patient will be discharging today to new [facility] with [facility].     Plan/Recommendations: Routing to lead RNCC to follow up with patient in 1-2 business days.     Tanya King RN Care Coordinator  [facility]  Email: [email or web address]  Phone: [phone]

## 2019-07-18 ENCOUNTER — OFFICE VISIT (OUTPATIENT)
Dept: PEDIATRICS | Facility: CLINIC | Age: 84
End: 2019-07-18
Payer: MEDICARE

## 2019-07-18 ENCOUNTER — TELEPHONE (OUTPATIENT)
Dept: PEDIATRICS | Facility: CLINIC | Age: 84
End: 2019-07-18

## 2019-07-18 ENCOUNTER — DOCUMENTATION ONLY (OUTPATIENT)
Dept: PEDIATRICS | Facility: CLINIC | Age: 84
End: 2019-07-18

## 2019-07-18 VITALS
WEIGHT: 148 LBS | HEART RATE: 90 BPM | TEMPERATURE: 98.4 F | DIASTOLIC BLOOD PRESSURE: 60 MMHG | OXYGEN SATURATION: 94 % | BODY MASS INDEX: 26.22 KG/M2 | SYSTOLIC BLOOD PRESSURE: 128 MMHG

## 2019-07-18 DIAGNOSIS — I25.10 CORONARY ARTERY DISEASE INVOLVING NATIVE CORONARY ARTERY OF NATIVE HEART WITHOUT ANGINA PECTORIS: ICD-10-CM

## 2019-07-18 DIAGNOSIS — D64.9 ANEMIA, UNSPECIFIED TYPE: ICD-10-CM

## 2019-07-18 DIAGNOSIS — N18.30 CKD (CHRONIC KIDNEY DISEASE) STAGE 3, GFR 30-59 ML/MIN (H): ICD-10-CM

## 2019-07-18 DIAGNOSIS — J44.9 CHRONIC OBSTRUCTIVE PULMONARY DISEASE, UNSPECIFIED COPD TYPE (H): Primary | ICD-10-CM

## 2019-07-18 DIAGNOSIS — I51.89 DIASTOLIC DYSFUNCTION: ICD-10-CM

## 2019-07-18 DIAGNOSIS — J15.9 COMMUNITY ACQUIRED BACTERIAL PNEUMONIA: ICD-10-CM

## 2019-07-18 DIAGNOSIS — I10 ESSENTIAL HYPERTENSION WITH GOAL BLOOD PRESSURE LESS THAN 140/90: ICD-10-CM

## 2019-07-18 DIAGNOSIS — K22.89 PRESBYESOPHAGUS: ICD-10-CM

## 2019-07-18 DIAGNOSIS — I48.20 CHRONIC ATRIAL FIBRILLATION (H): ICD-10-CM

## 2019-07-18 LAB — INR PPP: 2.6 (ref 0.86–1.14)

## 2019-07-18 PROCEDURE — 36416 COLLJ CAPILLARY BLOOD SPEC: CPT | Performed by: INTERNAL MEDICINE

## 2019-07-18 PROCEDURE — 85610 PROTHROMBIN TIME: CPT | Performed by: INTERNAL MEDICINE

## 2019-07-18 PROCEDURE — 99215 OFFICE O/P EST HI 40 MIN: CPT | Performed by: INTERNAL MEDICINE

## 2019-07-18 RX ORDER — LOSARTAN POTASSIUM 25 MG/1
25 TABLET ORAL DAILY
Qty: 90 TABLET | Refills: 3 | Status: SHIPPED | OUTPATIENT
Start: 2019-07-18 | End: 2020-05-14

## 2019-07-18 NOTE — PROGRESS NOTES
"Subjective     Leonor Mercado is a 89 year old female who presents to clinic today for the following health issues:    HPI       Hospital Follow-up Visit:    Hospital/Nursing Home/IP Rehab Facility: [facility]  Date of Admission: 7/2/19  Date of Discharge: 7/15/19  Reason(s) for Admission: SOB            Problems taking medications regularly:  None       Medication changes since discharge: None       Problems adhering to non-medication therapy:  None    \"...Summary of hospitalization:  [facility] discharge summary reviewed    Transferring providers: Elsa Hoang, BAY OLSON, Chapis Woodard MD  Recent Hospitalization/ED:  [facility] stay 6/19/19 to 6/24/19  Date of SNF Admission: June / 24 / 2019  Date of SNF (anticipated) Discharge: July / 15 / 2019  Discharged to: previous assisted living  Cognitive Scores: SLUMS 28/30  Physical Function: Supervision with ADLs. Ambulates 68 feet with 4WW with CGA     CODE STATUS/ADVANCE DIRECTIVES DISCUSSION:  DNR / DNI      DISCHARGE DIAGNOSIS/NURSING FACILITY COURSE:      89 y.o female with  afib, CAD , DCHF, CKD, COPD (O2 dep 2LNC at night only) admitted for progressive weakness, dyspnea, cough and chills. Dx with left sided CAP, afib with RVR.   Stabilized and transferred to TCU for acute rehab. ED visit 7/1 for chest pain and edema, w/u negative for cardiac ischemia, discharged back to TCU and started on oral lasix. Rehab course since that time proceeded well.  Pt has since returned to her senior apartment; her 2 adult daughters live very close by.     Community acquired bacterial pneumonia  COPD  - Treated with Rocephin and Zithromax inpatient for Left side PNA. Completed 4 more days Cefdinir done 6/27. Respiratory status stable  - weaned off daytime O2- continues on chronic NOC O2 2LNC  - continues on Duonebs QID (does at home)  - continues fluticasone-vilanterol and Spiriva     Chronic diastolic CHF  LE " "edema  - mild exacerbation early on. Oral lasix started as above. Edema and respiratory symptoms have steadily improved       Atrial fibrillation with RVR (H)  -  Rate stable on diltiazem, chronic warfarin      CKD (chronic kidney disease) stage 3, GFR 30-59 ml/min (H)  Lab Results   Component Value Date    CR 1.37 07/13/2019    CR 1.20 07/03/2019        Guaiac positive stool  - one dark stool so orders for guaiac and 2/3 were positive. No melena, hematochezia, hemoptysis, n/v or abd pain noted. 2 doses of Coumadin held then resumed. HGB stable...\"      Diagnostic Tests/Treatments reviewed.  Follow up needed: none  Other Healthcare Providers Involved in Patient s Care:         pulmonary, cardiology  Update since discharge: improved.     Post Discharge Medication Reconciliation: discharge medications reconciled and changed, per note/orders (see AVS).  Plan of care communicated with patient and family     Coding guidelines for this visit:  Type of Medical   Decision Making Face-to-Face Visit       within 7 Days of discharge Face-to-Face Visit        within 14 days of discharge   Moderate Complexity [id number]   High Complexity 57699 [id number]            Patient Active Problem List   Diagnosis     Hyperlipidemia LDL goal <100     Cardiac pacemaker in situ     Senile osteoporosis     Irritable bowel syndrome (IBS)     Anemia     Long term current use of anticoagulant therapy     Degeneration of lumbar intervertebral disc     CKD (chronic kidney disease) stage 3, GFR 30-59 ml/min (H)     Chronic atrial fibrillation (H)     Chronic obstructive pulmonary disease, unspecified COPD type (H)     Coronary artery disease involving native coronary artery of native heart without angina pectoris     Diastolic dysfunction     Sick sinus syndrome (H)     Adjustment disorder, unspecified type     Community acquired bacterial pneumonia     Presbyesophagus     Past Surgical History:   Procedure Laterality Date     APPENDECTOMY  1956     " CARDIAC SURGERY      PPM, 2 STENTS2-05[place]-CAD-taxus stentx2 2.5-12,2.5-12 in LADp and LADm, 80%nonDomRCA-LcxDom-mild,EF60%     CORONARY ANGIOGRAPHY ADULT ORDER  1994    mild CAD,Normal LV function, No Mitral regurgitation, Prox LAD 30% stenosis. RCA prox and mid, 20-30%     ESOPHAGOSCOPY, GASTROSCOPY, DUODENOSCOPY (EGD), COMBINED N/A 2015    Procedure: COMBINED ESOPHAGOSCOPY, GASTROSCOPY, DUODENOSCOPY (EGD), BIOPSY SINGLE OR MULTIPLE;  Surgeon: Genevieve Leon MD;  Location: [facility]     H LEAD REVISION DUAL  2003    Revised in [place]-due to diaphram stim (original pacer placed in [place])     H LEAD REVISION DUAL  2014    Correction of reversal of A and V leads in Header     HEART CATH, ANGIOPLASTY  2005    LEIGH ANN mid and proximal LAD, ongoing 80% RCA; mild circumflex     HERNIA REPAIR Left     LI[facility]     IMPLANT PACEMAKER  2003    Placed in [place] for sick sinus syndrome     REPLACE PACEMAKER GENERATOR  2013    Dual-chamber pacemaker by Dr SETH Pierre       Social History     Tobacco Use     Smoking status: Former Smoker     Types: Cigarettes     Last attempt to quit: 1987     Years since quittin.2     Smokeless tobacco: Never Used   Substance Use Topics     Alcohol use: No     Alcohol/week: 0.0 oz     Family History   Problem Relation Age of Onset     Heart Disease Father          age 84     Neurologic Disorder Mother         dementia     Gastrointestinal Disease Daughter         liver transplant     Crohn's Disease Daughter          Current Outpatient Medications   Medication Sig Dispense Refill     acetaminophen (TYLENOL) 500 MG tablet Take 500 mg by mouth 4 times daily And Q6H PRN BID       albuterol (PROAIR HFA/PROVENTIL HFA/VENTOLIN HFA) 108 (90 Base) MCG/ACT inhaler Inhale 2 puffs into the lungs every 6 hours as needed for shortness of breath / dyspnea or wheezing 18 g 3     ASPIRIN NOT PRESCRIBED (INTENTIONAL) Please choose reason not prescribed, below 0 each 0     calcium  carbonate (CALCIUM CARBONATE) 600 MG TABS tablet Take 1 tablet by mouth daily        Cholecalciferol (VITAMIN D PO) Take 800 Units by mouth daily        diltiazem (DILTIAZEM CD) 240 MG 24 hr capsule Take 1 capsule (240 mg) by mouth daily 90 capsule 3     fluticasone-vilanterol (BREO ELLIPTA) 200-25 MCG/INH oral inhaler Inhale 1 puff into the lungs daily 1 Inhaler 3     furosemide (LASIX) 20 MG tablet Take 1 tablet (20 mg) by mouth daily 90 tablet 3     guaiFENesin (MUCINEX) 600 MG 12 hr tablet Take 1,200 mg by mouth 2 times daily       ipratropium - albuterol 0.5 mg/2.5 mg/3 mL (DUONEB) 0.5-2.5 (3) MG/3ML neb solution Take 1 vial (3 mLs) by nebulization 4 times daily       isosorbide mononitrate (IMDUR) 30 MG 24 hr tablet Take 1 tablet (30 mg) by mouth daily 90 tablet 3     Lactobacillus (PROBIOTIC ACIDOPHILUS) TABS Take 1 tablet by mouth daily        losartan (COZAAR) 25 MG tablet Take 1 tablet (25 mg) by mouth daily 90 tablet 3     MELATONIN PO Take 5 mg by mouth nightly as needed        mirtazapine (REMERON) 15 MG tablet Take 1 tablet (15 mg) by mouth At Bedtime 90 tablet 3     Multiple Vitamins-Minerals (OCUVITE PO) Take 1 capsule by mouth daily.       nitroGLYcerin (NITROSTAT) 0.4 MG sublingual tablet Place 1 tablet (0.4 mg) under the tongue every 5 minutes as needed for chest pain 25 tablet 1     polyethylene glycol (MIRALAX/GLYCOLAX) Packet Take 1 packet by mouth daily        pravastatin (PRAVACHOL) 40 MG tablet TAKE 1 TABLET BY MOUTH ONCE DAILY 90 tablet 3     Tiotropium Bromide Monohydrate (SPIRIVA HANDIHALER IN) Inhale 1 puff into the lungs daily        warfarin (COUMADIN) 1 MG tablet Take 1 tablet (1 mg) by mouth daily Through 7/15 and INR draw 7/16/19       WARFARIN SODIUM PO PER INR ORDERS         ROS: The following systems have been completely reviewed and are negative except as noted in the HPI: CONSTITUTIONAL, HEAD AND NECK, CARDIOVASCULAR, PULMONARY, GASTROINTESTINAL, GENITOURINARY, DERMATOLOGIC,  NEUROLOGIC, ENDOCRINE, PSYCHIATRIC.     OBJECTIVE:                                                    /60   Pulse 90   Temp 98.4  F (36.9  C) (Oral)   Wt 67.1 kg (148 lb)   LMP  (LMP Unknown)   SpO2 94%   BMI 26.22 kg/m   Body mass index is 26.22 kg/m .  GENERAL:  alert,  no distress  NECK: no tenderness, no adenopathy  RESP: lungs clear to auscultation - no rales, no rhonchi, no wheezes  CV: regular rates and rhythm, normal S1 S2, no S3 or S4 and no murmur, no click or rub  MS: extremities- no edema     ASSESSMENT/PLAN:                                                        ICD-10-CM    1. Chronic obstructive pulmonary disease, unspecified COPD type (H) J44.9  stable.  Reviewed current MDI regimen, continue duo nebs.  Chronic home oxygen nightly     2. Community acquired bacterial pneumonia J15.9  completed course of antibiotics for community acquired pneumonia     3. CKD (chronic kidney disease) stage 3, GFR 30-59 ml/min (H) N18.3  stable.       4. Diastolic dysfunction I51.89  heart failure with preserved ejection fraction.  Continue diuretic regimen, stable.     5. Chronic atrial fibrillation (H) I48.2 INR  Rate control-diltiazem.  Chronic warfarin.     6. Coronary artery disease involving native coronary artery of native heart without angina pectoris I25.10  stable coronary disease         7. Essential hypertension with goal blood pressure less than 140/90 I10 losartan (COZAAR) 25 MG tablet  Adequate control continue current regimen.      8. Presbyesophagus K22.8  recent swallow evaluation consistent with presbyesophagus.  Discussed precautions including adequate fluid intake with meals, smaller portions, longer mealtime     9. Anemia, unspecified type D64.9   Anemia and recent guaiac positive stool.  Did not recommend colonoscopy at this age.  Discussed the pros and cons of further work-up and evaluation for colon cancer with patient and her daughter.  Patient declines further work-up unless  hematochezia recurs.        Arnel Garcia MD  [facility]

## 2019-07-18 NOTE — TELEPHONE ENCOUNTER
Notified patient. Gave her the info from .  Rosi, [facility]

## 2019-07-18 NOTE — TELEPHONE ENCOUNTER
Please call.  INR today is at goal  Continue current dosing without changes.  Schedule INR clinic follow-up in 2-3 weeks    Lab Results   Component Value Date    INR 2.60 07/18/2019

## 2019-07-18 NOTE — PROGRESS NOTES
[facility] will be sharing updates with you on Referral requests for home care services.  This is for care coordination purposes and alert you to referral status.    Dear Dr. Arnel Garcia  Thank You for the referral for [facility], for Leonor REBOLLEDO Brendanrogelio; MRN [id number].  At this time, [facility] is experiencing a high volume of referrals and is not able to meet your patient s needs in a safe and timely fashion for Home Care Services.  We have transferred this client to our partnering agency  [facility] [phone],  Fax [phone].      Our Partner Agency will be calling the client to set up an assessment visit within our 48hr window.  They will follow up with the PCP later this week to establish POC orders.  Client is agreeable to this plan.  We have forwarded the appropriate documentation required to initiate home care services to our partner agency.  We have confirmed receipt of the fax and their acceptance of the referral.  Thank you again for the referral,  Faviola Rosales,   [phone]

## 2019-07-21 PROBLEM — K22.89 PRESBYESOPHAGUS: Status: ACTIVE | Noted: 2019-07-21

## 2019-07-22 ENCOUNTER — PATIENT OUTREACH (OUTPATIENT)
Dept: CARE COORDINATION | Facility: CLINIC | Age: 84
End: 2019-07-22

## 2019-07-22 ENCOUNTER — TELEPHONE (OUTPATIENT)
Dept: PEDIATRICS | Facility: CLINIC | Age: 84
End: 2019-07-22

## 2019-07-22 NOTE — TELEPHONE ENCOUNTER
Reason for Call: PT orders needed    Orders are needed for this patient. PT     PT: Evaluation 2x week for 6 weeks     Phone Number Physical Therapist can be reached at: [facility] at [phone]    Can we leave a detailed message on this number? YES    Best Time: Any    Call taken on 7/22/2019 at 4:13 PM by Jesus Torres

## 2019-07-23 NOTE — TELEPHONE ENCOUNTER
Called Fariba PT with [facility] ([phone]). Gave verbal ok per standing order for orders below.     Eli Hinton RN Triage/Clinic Lead RN  [facility]  [phone]

## 2019-08-07 ENCOUNTER — NURSE TRIAGE (OUTPATIENT)
Dept: PEDIATRICS | Facility: CLINIC | Age: 84
End: 2019-08-07

## 2019-08-07 NOTE — TELEPHONE ENCOUNTER
Tiana: [phone]. Physical therapy with [facility].      She went to see patient today and patient complained that this morning she had chest pain for about 15 min.     Triage call done at this time and appears patient maybe having indigestion or anxiety based on attempting to have bowel movement.     Vital signs at this time   HR 80-reg  RR 16  /58 left arm sitting   Temp 97.6 (ear)  02 96% room air.     Home Care nurse to be updated by co-worker on the symptoms patient is having with constipation and indigestion. Nurse will be out to visit tomorrow.     Triaged per Epic Triage Protocol, gave care advice which patient plans to follow.  See Care advice tab for more information.  Patent to call back if further questions or concerns.    Additional Information    Negative: Severe difficulty breathing (e.g., struggling for each breath, speaks in single words)    Negative: Passed out (i.e., fainted, collapsed and was not responding)    Negative: Chest pain lasting longer than 5 minutes and ANY of the following:* Over 50 years old* Over 30 years old and at least one cardiac risk factor (i.e., high blood pressure, diabetes, high cholesterol, obesity, smoker or strong family history of heart disease)* Pain is crushing, pressure-like, or heavy * Took nitroglycerin and chest pain was not relieved* History of heart disease (i.e., angina, heart attack, bypass surgery, angioplasty, CHF)    Negative: Visible sweat on face or sweat dripping down face    Negative: Sounds like a life-threatening emergency to the triager    Negative: Followed an injury to chest    Negative: SEVERE chest pain    Negative: Pain also present in shoulder(s) or arm(s) or jaw    Negative: Difficulty breathing    Negative: Cocaine use within last 3 days    Negative: History of prior 'blood clot' in leg or lungs (i.e., deep vein thrombosis, pulmonary embolism)    Negative: Recent illness requiring prolonged bed rest (i.e.,  "immobilization)    Negative: Hip or leg fracture in past 2 months (e.g, or had cast on leg or ankle)    Negative: Major surgery in the past month    Negative: Recent long-distance travel with prolonged time in car, bus, plane, or train (i.e., within past 2 weeks; 6 or more hours duration)    Negative: Heart beating irregularly or very rapidly    Negative: Chest pain lasting longer than 5 minutes    Negative: Intermittent chest pain and pain has been increasing in severity or frequency    Negative: Dizziness or lightheadedness    Negative: Coughing up blood    Negative: Patient sounds very sick or weak to the triager    Negative: Fever > 100.5 F (38.1 C)    Negative: Intermittent chest pains persist > 3 days    Negative: All other patients with chest pain    Negative: Patient wants to be seen    Intermittent mild chest pain lasting a few seconds each time    Answer Assessment - Initial Assessment Questions  1. LOCATION: \"Where does it hurt?\"        In the middle chest from 9a-9:15. Had not had breakfast.   Had taken medications. Does have Anxiety   2. RADIATION: \"Does the pain go anywhere else?\" (e.g., into neck, jaw, arms, back)     Up the neck a little bit   3. ONSET: \"When did the chest pain begin?\" (Minutes, hours or days)       15min   4. PATTERN \"Does the pain come and go, or has it been constant since it started?\"  \"Does it get worse with exertion?\"   Comes and goes. Has a history of this type of pain.    5. DURATION: \"How long does it last\" (e.g., seconds, minutes, hours)    15 min   6. SEVERITY: \"How bad is the pain?\"  (e.g., Scale 1-10; mild, moderate, or severe)     - MILD (1-3): doesn't interfere with normal activities     7. CARDIAC RISK FACTORS: \"Do you have any history of heart problems or risk factors for heart disease?\" (e.g., prior heart attack, angina; high blood pressure, diabetes, being overweight, high cholesterol, smoking, or strong family history of heart disease)      Yes.   8. PULMONARY RISK " "FACTORS: \"Do you have any history of lung disease?\"  (e.g., blood clots in lung, asthma, emphysema, birth control pills)      Yes. Just got out of hospital for pneumonia  9. CAUSE: \"What do you think is causing the chest pain?\"      Age. Constipation. Hasn't had BM for 2 days.   10. OTHER SYMPTOMS: \"Do you have any other symptoms?\" (e.g., dizziness, nausea, vomiting, sweating, fever, difficulty breathing, cough)        No   11. PREGNANCY: \"Is there any chance you are pregnant?\" \"When was your last menstrual period?\"        NA    Protocols used: CHEST PAIN-A-OH    Jennifer Harrington RN Flex    "

## 2019-08-09 NOTE — TELEPHONE ENCOUNTER
Called Fariba-no answer.  Left a message for her to call the clinic.    Tiffanie Yang RN - Triage  [facility]

## 2019-08-14 NOTE — TELEPHONE ENCOUNTER
Telephone call placed to OT nurse. Left a voice message with request for return call.     Chiquita Ceja RN BSN   [facility]

## 2019-08-15 ENCOUNTER — NURSE TRIAGE (OUTPATIENT)
Dept: NURSING | Facility: CLINIC | Age: 84
End: 2019-08-15

## 2019-08-16 ENCOUNTER — OFFICE VISIT (OUTPATIENT)
Dept: URGENT CARE | Facility: URGENT CARE | Age: 84
End: 2019-08-16
Payer: MEDICARE

## 2019-08-16 VITALS
SYSTOLIC BLOOD PRESSURE: 128 MMHG | HEART RATE: 58 BPM | DIASTOLIC BLOOD PRESSURE: 74 MMHG | BODY MASS INDEX: 26.39 KG/M2 | TEMPERATURE: 98.5 F | WEIGHT: 149 LBS | OXYGEN SATURATION: 97 %

## 2019-08-16 DIAGNOSIS — L30.4 INTERTRIGO: Primary | ICD-10-CM

## 2019-08-16 DIAGNOSIS — N30.00 ACUTE CYSTITIS WITHOUT HEMATURIA: ICD-10-CM

## 2019-08-16 LAB
ALBUMIN UR-MCNC: 30 MG/DL
APPEARANCE UR: ABNORMAL
BACTERIA #/AREA URNS HPF: ABNORMAL /HPF
BILIRUB UR QL STRIP: NEGATIVE
COLOR UR AUTO: YELLOW
GLUCOSE UR STRIP-MCNC: NEGATIVE MG/DL
HGB UR QL STRIP: ABNORMAL
KETONES UR STRIP-MCNC: NEGATIVE MG/DL
LEUKOCYTE ESTERASE UR QL STRIP: ABNORMAL
NITRATE UR QL: NEGATIVE
NON-SQ EPI CELLS #/AREA URNS LPF: ABNORMAL /LPF
PH UR STRIP: 7 PH (ref 5–7)
RBC #/AREA URNS AUTO: ABNORMAL /HPF
SOURCE: ABNORMAL
SP GR UR STRIP: 1.01 (ref 1–1.03)
UROBILINOGEN UR STRIP-ACNC: 0.2 EU/DL (ref 0.2–1)
WBC #/AREA URNS AUTO: >100 /HPF

## 2019-08-16 PROCEDURE — 87086 URINE CULTURE/COLONY COUNT: CPT | Performed by: PHYSICIAN ASSISTANT

## 2019-08-16 PROCEDURE — 87186 SC STD MICRODIL/AGAR DIL: CPT | Performed by: PHYSICIAN ASSISTANT

## 2019-08-16 PROCEDURE — 81001 URINALYSIS AUTO W/SCOPE: CPT | Performed by: PHYSICIAN ASSISTANT

## 2019-08-16 PROCEDURE — 99214 OFFICE O/P EST MOD 30 MIN: CPT | Performed by: PHYSICIAN ASSISTANT

## 2019-08-16 PROCEDURE — 87088 URINE BACTERIA CULTURE: CPT | Performed by: PHYSICIAN ASSISTANT

## 2019-08-16 RX ORDER — CEPHALEXIN 500 MG/1
500 CAPSULE ORAL 3 TIMES DAILY
Qty: 21 CAPSULE | Refills: 0 | Status: SHIPPED | OUTPATIENT
Start: 2019-08-16 | End: 2019-11-11

## 2019-08-16 RX ORDER — CLOTRIMAZOLE AND BETAMETHASONE DIPROPIONATE 10; .64 MG/G; MG/G
CREAM TOPICAL 2 TIMES DAILY
Qty: 45 G | Refills: 1 | Status: SHIPPED | OUTPATIENT
Start: 2019-08-16 | End: 2019-09-12

## 2019-08-16 NOTE — TELEPHONE ENCOUNTER
S: Calling about bleeding.  B: Has a itchy and burning skin issue on  Perineum region.  Tonight the skin started to bleed.  Has tried A&D ointment, Tenna cream, and zinc oxide. s now on Sooth and Cool.  In addition, sometimes has pain with urination. Color of urine is yellow.  No fever and no blood in urine.   A: Patient would like to see a provider.  R: There were no open appointments next week.  Writer told patient she can go to urgent care in Friday.  Daughter will give her a ride.  Maria Isabel Adams RN, [facility]          Reason for Disposition    Urinating more frequently than usual (i.e., frequency)    Additional Information    Negative: Shock suspected (e.g., cold/pale/clammy skin, too weak to stand, low BP, rapid pulse)    Negative: Sounds like a life-threatening emergency to the triager    Negative: [1] Unable to urinate (or only a few drops) > 4 hours AND     [2] bladder feels very full (e.g., palpable bladder or strong urge to urinate)    Negative: [1] Decreased urination and [2] drinking very little AND [2] dehydration suspected (e.g., dark urine, no urine > 12 hours, very dry mouth, very lightheaded)    Negative: Patient sounds very sick or weak to the triager    Negative: Fever > 100.5 F (38.1 C)    Protocols used: URINARY SYMPTOMS-A-AH

## 2019-08-16 NOTE — PROGRESS NOTES
SUBJECTIVE:  Leonor Mercado is a 89 year old female who presents with vaginal itching for the past 5 weeks. Patient reports that she developed itching in her groin area b/l while she was in SNF recovering from pneumonia. She was given a barrier cream to use, which she has not found helpful. She does not have any vaginal discharge.  The rash has now moved into her labia. She has been scratching it so much that she has caused some bleeding. The rash has just been gradually worsening.   She also complains of frequency and burning with urination for the past several weeks. She does not have a history of kidney stones or kidney infection. She has a history of UTIs.   She denies having fever, chills, back pain, n/v or abdominal pain or weakness.       Past Medical History:   Diagnosis Date     A Fib - chr Coumadin, s/p PPM (H) 5/8/2013     Atrial fibrillation (H)     Sick sinus syndrome, PAT, AF     BCC (basal cell carcinoma of skin)     Face     CAD - 2 stents in [place] in 2000s.  1/5/2016     CHF (congestive heart failure) (H)     mild in past     CHF - Chr Diastolic (H) 11/21/2017     Chronic renal insufficiency      COPD (chronic obstructive pulmonary disease) (H)      COPD (H) 5/13/2013     Coronary artery disease     2-05[place]-CAD-taxus stentx2 2.5-12,2.5-12 in LADp and LADm, 80%nonDomRCA-LcxDom-mild,EF60%     Hyperlipidemia      Hypertension      IBS (irritable bowel syndrome)      Irritable bowel      Mumps      Osteoporosis      Palpitations      Panic attack     questionable diagnosis     Pulmonary fibrosis (H)     do not use amiodarone     Shortness of breath      Sick sinus syndrome - s/p PPM 2003 (H) 12/16/2017     SSS (sick sinus syndrome) (H)     DDD pacer (see surgery history section)     Vertigo      Current Outpatient Medications   Medication Sig Dispense Refill     acetaminophen (TYLENOL) 500 MG tablet Take 500 mg by mouth 4 times daily And Q6H PRN BID       albuterol (PROAIR HFA/PROVENTIL  HFA/VENTOLIN HFA) 108 (90 Base) MCG/ACT inhaler Inhale 2 puffs into the lungs every 6 hours as needed for shortness of breath / dyspnea or wheezing 18 g 3     calcium carbonate (CALCIUM CARBONATE) 600 MG TABS tablet Take 1 tablet by mouth daily        cephALEXin (KEFLEX) 500 MG capsule Take 1 capsule (500 mg) by mouth 3 times daily for 7 days 21 capsule 0     Cholecalciferol (VITAMIN D PO) Take 800 Units by mouth daily        clotrimazole-betamethasone (LOTRISONE) 1-0.05 % external cream Apply topically 2 times daily DO not use for greater that 2 weeks at a time. 45 g 1     diltiazem (DILTIAZEM CD) 240 MG 24 hr capsule Take 1 capsule (240 mg) by mouth daily 90 capsule 3     fluticasone-vilanterol (BREO ELLIPTA) 200-25 MCG/INH oral inhaler Inhale 1 puff into the lungs daily 1 Inhaler 3     furosemide (LASIX) 20 MG tablet Take 1 tablet (20 mg) by mouth daily 90 tablet 3     guaiFENesin (MUCINEX) 600 MG 12 hr tablet Take 1,200 mg by mouth 2 times daily       isosorbide mononitrate (IMDUR) 30 MG 24 hr tablet Take 1 tablet (30 mg) by mouth daily 90 tablet 3     Lactobacillus (PROBIOTIC ACIDOPHILUS) TABS Take 1 tablet by mouth daily        losartan (COZAAR) 25 MG tablet Take 1 tablet (25 mg) by mouth daily 90 tablet 3     mirtazapine (REMERON) 15 MG tablet Take 1 tablet (15 mg) by mouth At Bedtime 90 tablet 3     polyethylene glycol (MIRALAX/GLYCOLAX) Packet Take 1 packet by mouth daily        pravastatin (PRAVACHOL) 40 MG tablet TAKE 1 TABLET BY MOUTH ONCE DAILY 90 tablet 3     Tiotropium Bromide Monohydrate (SPIRIVA HANDIHALER IN) Inhale 1 puff into the lungs daily        warfarin (COUMADIN) 1 MG tablet Take 1 tablet (1 mg) by mouth daily Through 7/15 and INR draw 7/16/19       WARFARIN SODIUM PO PER INR ORDERS       ASPIRIN NOT PRESCRIBED (INTENTIONAL) Please choose reason not prescribed, below (Patient not taking: Reported on 8/16/2019) 0 each 0     MELATONIN PO Take 5 mg by mouth nightly as needed        Multiple  Vitamins-Minerals (OCUVITE PO) Take 1 capsule by mouth daily.       Social History     Socioeconomic History     Marital status:      Spouse name: Not on file     Number of children: 3     Years of education: Not on file     Highest education level: Not on file   Occupational History     Employer: RETIRED   Social Needs     Financial resource strain: Not on file     Food insecurity:     Worry: Not on file     Inability: Not on file     Transportation needs:     Medical: Not on file     Non-medical: Not on file   Tobacco Use     Smoking status: Former Smoker     Types: Cigarettes     Last attempt to quit: 1987     Years since quittin.2     Smokeless tobacco: Never Used   Substance and Sexual Activity     Alcohol use: No     Alcohol/week: 0.0 oz     Drug use: No     Sexual activity: Never     Partners: Male   Lifestyle     Physical activity:     Days per week: Not on file     Minutes per session: Not on file     Stress: Not on file   Relationships     Social connections:     Talks on phone: Not on file     Gets together: Not on file     Attends Hoahaoism service: Not on file     Active member of club or organization: Not on file     Attends meetings of clubs or organizations: Not on file     Relationship status: Not on file     Intimate partner violence:     Fear of current or ex partner: Not on file     Emotionally abused: Not on file     Physically abused: Not on file     Forced sexual activity: Not on file   Other Topics Concern     Parent/sibling w/ CABG, MI or angioplasty before 65F 55M? Yes      Service Not Asked     Blood Transfusions Not Asked     Caffeine Concern Yes     Comment: decaf - some 1/2 caff     Occupational Exposure Not Asked     Hobby Hazards Not Asked     Sleep Concern Yes     Comment: nocturia every 2 hours     Stress Concern Not Asked     Weight Concern Not Asked     Special Diet No     Back Care Not Asked     Exercise No     Comment: some walking in the house     Bike  Helmet Not Asked     Seat Belt Not Asked     Self-Exams Not Asked   Social History Narrative     Not on file       ROS:   Review of systems negative except as stated above.    OBJECTIVE:  /74   Pulse 58   Temp 98.5  F (36.9  C) (Tympanic)   Wt 67.6 kg (149 lb)   LMP  (LMP Unknown)   SpO2 97%   BMI 26.39 kg/m    Labia with rash described in skin section. She does not have vaginal discharge or pain.   GENERAL APPEARANCE: healthy, alert and no distress  CV: regular rates and rhythm, normal S1 S2, no murmur noted  ABDOMEN:  soft, nontender, no HSM or masses and bowel sounds normal  BACK: No CVA tenderness  SKIN: Erythematous rash extending from groin b/l to labia.     Results for orders placed or performed in visit on 08/16/19   UA reflex to Microscopic and Culture   Result Value Ref Range    Color Urine Yellow     Appearance Urine Slightly Cloudy     Glucose Urine Negative NEG^Negative mg/dL    Bilirubin Urine Negative NEG^Negative    Ketones Urine Negative NEG^Negative mg/dL    Specific Gravity Urine 1.010 1.003 - 1.035    Blood Urine Small (A) NEG^Negative    pH Urine 7.0 5.0 - 7.0 pH    Protein Albumin Urine 30 (A) NEG^Negative mg/dL    Urobilinogen Urine 0.2 0.2 - 1.0 EU/dL    Nitrite Urine Negative NEG^Negative    Leukocyte Esterase Urine Large (A) NEG^Negative    Source Midstream Urine    Urine Microscopic   Result Value Ref Range    WBC Urine >100 (A) OTO5^0 - 5 /HPF    RBC Urine O - 2 OTO2^O - 2 /HPF    Squamous Epithelial /LPF Urine Many (A) FEW^Few /LPF    Bacteria Urine Moderate (A) NEG^Negative /HPF       ASSESSMENT / PLAN:  1. Intertrigo  Rash consistent with intertrigo, will treat with lotrisone to help with yeast. Additionally, she does have several erythematous macules, so will cover with antibiotic for secondary bacterial infection. She can continue to use barrier cream.   She should note improvement in the next 3-5 days, if not follow-up   - clotrimazole-betamethasone (LOTRISONE) 1-0.05 %  external cream; Apply topically 2 times daily DO not use for greater that 2 weeks at a time.  Dispense: 45 g; Refill: 1  - cephALEXin (KEFLEX) 500 MG capsule; Take 1 capsule (500 mg) by mouth 3 times daily for 7 days  Dispense: 21 capsule; Refill: 0    2. Acute cystitis without hematuria  UTI noted on UA. NO sign of urosepsis or pyelonephritis.   Encouraged fluids and rest  - UA reflex to Microscopic and Culture  - cephALEXin (KEFLEX) 500 MG capsule; Take 1 capsule (500 mg) by mouth 3 times daily for 7 days  Dispense: 21 capsule; Refill: 0  - Urine Microscopic      Diagnosis and treatment plan was reviewed with patient and/or family.   We went over any labs or imaging. Discussed worsening symptoms or little to no relief despite treatment plan to follow-up with PCP or return to clinic.  Patient verbalizes understanding. All questions were addressed and answered.   Laly Barriga PA-C

## 2019-08-19 LAB
BACTERIA SPEC CULT: ABNORMAL
BACTERIA SPEC CULT: ABNORMAL
SPECIMEN SOURCE: ABNORMAL

## 2019-08-25 ENCOUNTER — NURSE TRIAGE (OUTPATIENT)
Dept: NURSING | Facility: CLINIC | Age: 84
End: 2019-08-25

## 2019-08-26 NOTE — TELEPHONE ENCOUNTER
Reason for Disposition    [1] MODERATE-SEVERE local itching (i.e., interferes with work, school, activities) AND [2] not improved after 24 hours of hydrocortisone cream    Additional Information    Negative: Patient sounds very sick or weak to the triager    Negative: Looks infected (redness, red streak, pus)    Protocols used: ITCHING - [id number]

## 2019-08-26 NOTE — TELEPHONE ENCOUNTER
89 y/o female calls about new onset of itching to her ankles, she describes itching, and scratching them as they are so irritating to her.  She cano nly think of on dietary change last night that was se ate gregory candies. Denies fever, no pus reported, doesno look infected.  She does not have any antihistamine, no antihistamine creams, no one to get her anything, no one she can call, all interventions declined or unavailable to caller. RN reviewed protocols and advised her of ice or cold compress and increase fluids, if still itching in the morning, reach out to someone who can get her some anti-histamine and she will need to be seen in 24 hours if it continues.    Sophia Scott RN - [facility] Nurse Advisor  08/25/2019   10:21 PM    Reason for Disposition    [1] MODERATE-SEVERE local itching (i.e., interferes with work, school, activities) AND [2] not improved after 24 hours of hydrocortisone cream    Additional Information    Negative: Patient sounds very sick or weak to the triager    Negative: Looks infected (redness, red streak, pus)    Protocols used: ITCHING - [id number]

## 2019-09-12 ENCOUNTER — OFFICE VISIT (OUTPATIENT)
Dept: PHARMACY | Facility: CLINIC | Age: 84
End: 2019-09-12

## 2019-09-12 VITALS
WEIGHT: 151.5 LBS | DIASTOLIC BLOOD PRESSURE: 70 MMHG | HEART RATE: 69 BPM | SYSTOLIC BLOOD PRESSURE: 118 MMHG | BODY MASS INDEX: 26.84 KG/M2 | OXYGEN SATURATION: 94 %

## 2019-09-12 DIAGNOSIS — I25.10 CORONARY ARTERY DISEASE INVOLVING NATIVE CORONARY ARTERY OF NATIVE HEART WITHOUT ANGINA PECTORIS: ICD-10-CM

## 2019-09-12 DIAGNOSIS — I10 ESSENTIAL HYPERTENSION WITH GOAL BLOOD PRESSURE LESS THAN 140/90: ICD-10-CM

## 2019-09-12 DIAGNOSIS — J44.9 CHRONIC OBSTRUCTIVE PULMONARY DISEASE, UNSPECIFIED COPD TYPE (H): Primary | ICD-10-CM

## 2019-09-12 DIAGNOSIS — G47.00 INSOMNIA, UNSPECIFIED TYPE: ICD-10-CM

## 2019-09-12 DIAGNOSIS — F32.A DEPRESSION, UNSPECIFIED DEPRESSION TYPE: ICD-10-CM

## 2019-09-12 DIAGNOSIS — E63.9 NUTRITIONAL DEFICIENCY: ICD-10-CM

## 2019-09-12 DIAGNOSIS — Z71.85 VACCINE COUNSELING: ICD-10-CM

## 2019-09-12 DIAGNOSIS — K58.9 IRRITABLE BOWEL SYNDROME, UNSPECIFIED TYPE: ICD-10-CM

## 2019-09-12 DIAGNOSIS — J15.9 COMMUNITY ACQUIRED BACTERIAL PNEUMONIA: ICD-10-CM

## 2019-09-12 DIAGNOSIS — E78.5 HYPERLIPIDEMIA LDL GOAL <100: ICD-10-CM

## 2019-09-12 DIAGNOSIS — R60.9 EDEMA, UNSPECIFIED TYPE: ICD-10-CM

## 2019-09-12 DIAGNOSIS — F41.9 ANXIETY: ICD-10-CM

## 2019-09-12 DIAGNOSIS — I48.20 CHRONIC ATRIAL FIBRILLATION (H): ICD-10-CM

## 2019-09-12 PROCEDURE — 99606 MTMS BY PHARM EST 15 MIN: CPT | Performed by: PHARMACIST

## 2019-09-12 PROCEDURE — 99607 MTMS BY PHARM ADDL 15 MIN: CPT | Performed by: PHARMACIST

## 2019-09-12 RX ORDER — SENNOSIDES A AND B 8.6 MG/1
2 TABLET, FILM COATED ORAL
COMMUNITY

## 2019-09-12 RX ORDER — IPRATROPIUM BROMIDE AND ALBUTEROL SULFATE 2.5; .5 MG/3ML; MG/3ML
1 SOLUTION RESPIRATORY (INHALATION) EVERY 6 HOURS PRN
Start: 2019-09-12 | End: 2020-04-09

## 2019-09-12 RX ORDER — MULTIVIT-MIN/IRON/FOLIC ACID/K 18-600-40
1000 CAPSULE ORAL DAILY
COMMUNITY

## 2019-09-12 NOTE — PROGRESS NOTES
SUBJECTIVE/OBJECTIVE:                Leonor Mercado is a 89 year old female coming in for a follow-up visit for Medication Therapy Management.  She was referred to me from Dr. Darby. She is joined with her daughter today.     Chief Complaint: Follow up from our visit on 3/25/19. No concerns    Allergies/ADRs: Reviewed in Epic - pt denied having throat swelling or anaphylaxis to doxycycline. Pt cannot recall what the intolerance symptoms were of doxycycline.  Tobacco: History of tobacco dependence - quit 1987  Alcohol: 1-3 beverages / week    Medication Adherence:   Pt denies any issues besides social stressors (changing living situation,  passed away).  However, has a good support from daughters.    Pneumonia:  Admitted for almost 1 month, discharge 7/15/19 from NH.  Finished PT last week.  Continues with exercises and getting stronger every day.    Hyperlipidemia: Current therapy includes pravastatin 40mg once daily.  Denies any concerns.  Recent Labs   Lab Test 12/11/17  0959 03/15/15  0745   CHOL 161 153   HDL 85 52   LDL 54 75   TRIG 109 129   CHOLHDLRATIO  --  2.9       Depression/Anxiety/Insomnia:  Current medications include: APAP 500 mg (which she believes helps her sleep), mirtazapine 15 mg HS and Melatonin 5 mg HS.    She also took diphenhydramine for red/itching legs after being in TCU and has continued taking nightly to help with sleep.   Pt doesn't feel her mood has changed, but again daughter strongly believes her mood has improved. Prior she was on both sertraline (constipation, nausea) and paroxetine; she was admitted in April for an intentional overdose after swallowing the bottle of paroxetine because she wanted to sleep.   No concerns for side effects.    PHQ-9 SCORE 11/17/2017 4/3/2018 4/20/2018   Total Score - - -   Total Score 12 16 8        COPD: Current medications: Spiriva Handihaler: 1 puff once daily, Breo once daily in the morning, 2-3 Loxygen at night and Albuterol  MDI as needed (using more lately, but still only using 1-2/day). Pt is rinsing mouth after Breo. Previously on Trelegy, but due to cost and no difference in efficacy switch back to 2 inhalers. Recently saw pulmonalogist soon - Dr. Her.  Was prescribed doxycycline and prednisone for PRN exacerbations. Pt has not noticed a difference between Spiriva/Breo combo vs Trelegy switch. Reports Trelegy is not affordable (~$600).  Pt reports the following symptoms: none  COPD Action Plan on file.      IBS-constipation: Currently taking Senna as needed, probiotic daily and Miralax daily. No concerns at this time. Having regular BMs.    Hypertension/afib/edema/CAD: Current medications include losartan 25 mg daily, furosemide 20 mg daily, diltiazem 240 mg daily, warfarin 2 mg daily as directed by anticoagulation clinic.     Recalls most recent INR was 2.4 - this was being monitored by home care nurse but plans to resume with the  anticoagulation clinic.    BP Readings from Last 3 Encounters:   08/16/19 128/74   07/18/19 128/60   07/12/19 133/78     Lab Results   Component Value Date    INR 2.60 07/18/2019    INR 2.11 07/01/2019    INR 2.74 06/24/2019    INR 2.20 06/23/2019    INR 2.08 06/22/2019    INR 2.69 06/21/2019    INR 3.14 06/20/2019    INR 2.25 06/19/2019    INR 1.9 06/13/2019    INR 3.4 05/16/2019     Supplements/Senile osteoporosis: Currently taking Ocuvite daily for eye health, calcium 600 mg + vitamin D 800 units, vitamin D 2000u daily.  No side effects noted.   Diet calcium intake: milk with coffee, leafy greens  Previous osteoporosis agents: tried alendronate [id number], discharge due to renal function.  4/5/2013 DXA:  Lumbar spine T-score = -1.6  Left hip = -2.3    Immunizations:  Due for flu vaccine    Most Recent Immunizations   Administered Date(s) Administered     Influenza (High Dose) 3 valent vaccine 08/28/2018     Influenza (IIV3) PF 10/01/2014     Pneumo Conj 13-V (2010&after) 04/18/2019      Pneumococcal 23 valent 10/15/2012     Tdap (Adacel,Boostrix) 10/15/2012     Zoster vaccine recombinant adjuvanted (SHINGRIX) 08/16/2019       Today's Vitals: /70   Pulse 69   Wt 151 lb 8 oz (68.7 kg)   LMP  (LMP Unknown)   SpO2 94%   BMI 26.84 kg/m     Weight declined      ASSESSMENT:              Current medications were reviewed today as discussed above.      Medication Adherence: fair, no issues identified.     Pneumonia: resolved    Hyperlipidemia: stable, due for lipid labs    Depression/Anxiety/Insomnia:  Avoid daily antihistamine.       COPD: stable    IBS-constipation: stable    Hypertension/afib/edema/CAD: blood pressure at goal.  She will follow-up with anticholinergic clinic.    Supplements/Senile osteoporosis: recommend vitamin D 1000u in addition to calcium/vitamin D supplement.    Immunizations:  Due for flu vaccine   PLAN:                  1. Vitamin D 1000 units once daily plus the calcium   2.  Avoid antihistamine daily for sleep  3. Due for flu vaccine  4.  Due for lipid labs    I spent 60 minutes with this patient today. All changes were made via collaborative practice agreement with Dr. Garcia. A copy of the visit note was provided to the patient's primary care provider.     Will follow up in 6 months.      The patient was given a summary of these recommendations as an after visit summary.      Shakira Blackman , Pharm D  [phone] (phone)  [phone] (pager)  Medication Therapy Management Pharmacist

## 2019-09-12 NOTE — ADDENDUM NOTE
Encounter addended by: Elizabeth Last, SLP on: 9/12/2019 4:06 PM   Actions taken: Sign clinical note, Episode resolved

## 2019-09-12 NOTE — PATIENT INSTRUCTIONS
Recommendations from today's MTM visit:                                                      1. Vitamin D 1000 units once daily plus the calcium supplement.      2.  Avoid antihistamine daily for sleep, you can use this as needed for itching.      3.  Due for flu vaccine    4.  Due for cholesterol labs      It was great to speak with you today.  I value your experience and would be very thankful for your time with providing feedback on our clinic survey. You may receive a survey via email or text message in the next few days.     Next MTM visit: 6 months    To schedule another MTM appointment, please call the clinic directly or you may call the MTM scheduling line at [phone] or toll-free at [phone].     My Clinical Pharmacist's contact information:                                                      It was a pleasure talking with you today!  Please feel free to contact me with any questions or concerns you have.      Shakira Blackman , Pharm D  [phone] (phone)  [phone] (pager)  Medication Therapy Management Pharmacist

## 2019-09-12 NOTE — PROGRESS NOTES
Outpatient Speech Language Pathology Discharge Note     Patient: Leonor Mercado  : 1926    Beginning/End Dates of Reporting Period:  2019 to 2019    Referring Provider: Dr. Arnel Garcia    Therapy Diagnosis: Dysphagia    Client Self Report:  Leonor Mercado is a 89 year old y.o.female with c/o dysphagia.  Please refer to the initial outpatient speech-language pathology evaluation dated 2019 for further background information.  Patient has not been seen for therapy or follow up visits since her evaluation on 19.  At that time it was recommended she complete a videoswallow study and return for therapy if needed. At time of initial evaluation, patient would like to determine why she is having difficulty with swallowing certain solid textures like bread and potatoes.     Objective Measurements: All goals discontinue as patient did not return for therapy after evaluation.(refer to evaluation for details of goals).     Progress Toward Goals:    Not assessed this period.    Plan:  Discharge from therapy.    Discharge:    Reason for Discharge: Patient has failed to schedule further appointments.    Discharge Plan: Please re-consult if swallow function should change.

## 2019-09-18 ENCOUNTER — ANCILLARY PROCEDURE (OUTPATIENT)
Dept: CARDIOLOGY | Facility: CLINIC | Age: 84
End: 2019-09-18
Attending: INTERNAL MEDICINE
Payer: MEDICARE

## 2019-09-18 DIAGNOSIS — Z95.0 CARDIAC PACEMAKER IN SITU: ICD-10-CM

## 2019-09-18 PROCEDURE — 93293 PM PHONE R-STRIP DEVICE EVAL: CPT | Performed by: INTERNAL MEDICINE

## 2019-09-30 LAB
MDC_IDC_PG_IMPLANT_DTM: NORMAL
MDC_IDC_PG_MFG: NORMAL
MDC_IDC_PG_MODEL: NORMAL
MDC_IDC_PG_SERIAL: NORMAL
MDC_IDC_PG_TYPE: NORMAL
MDC_IDC_SESS_CLINIC_NAME: NORMAL
MDC_IDC_SESS_DTM: NORMAL
MDC_IDC_SESS_TYPE: NORMAL

## 2019-10-11 DIAGNOSIS — Z79.01 LONG TERM CURRENT USE OF ANTICOAGULANT THERAPY: ICD-10-CM

## 2019-10-12 NOTE — TELEPHONE ENCOUNTER
"Requested Prescriptions   Pending Prescriptions Disp Refills     warfarin ANTICOAGULANT (COUMADIN) 2 MG tablet [Pharmacy Med Name: WARFARIN 2MG        TAB]  Per pharmacy:  The source prescription has been discontinued.    Last Written Prescription Date:  7/12/19  Last Fill Quantity: NA,  # refills: NA   Last office visit: 7/18/2019 with prescribing provider:  Radha     Future Office Visit:   Next 5 appointments (look out 90 days)    Oct 29, 2019 11:00 AM CDT  Return Visit with Radha Mckeon PA-C  [facility] ([facility]) [address]  [place] [place] [facility] [address]  [phone]   Nov 11, 2019  3:10 PM CST  Office visit with Arnel Garcia MD, EA EXAM ROOM 16  [facility] ([facility]) [address]  [address]  [place] [address]  [phone]          90 tablet 1     Sig: TAKE 1 TABLET BY MOUTH ONCE DAILY OR AS DIRECTED BY INR CLINIC       Vitamin K Antagonists Failed - 10/11/2019  3:15 PM        Failed - INR is within goal in the past 6 weeks     Confirm INR is within goal in the past 6 weeks.     Recent Labs   Lab Test 07/18/19  1352   INR 2.60*                       Failed - Medication is active on med list        Passed - Recent (12 mo) or future (30 days) visit within the authorizing provider's specialty     Patient has had an office visit with the authorizing provider or a provider within the authorizing providers department within the previous 12 mos or has a future within next 30 days. See \"Patient Info\" tab in inbasket, or \"Choose Columns\" in Meds & Orders section of the refill encounter.              Passed - Patient is 18 years of age or older        Passed - Patient is not pregnant        Passed - No positive pregnancy on file in past 12 months        "

## 2019-10-14 NOTE — TELEPHONE ENCOUNTER
"Refill request:  Coumadin 2mg    Med discontinued 9/12/19  Future Office Visit:   Next 5 appointments (look out 90 days)    Oct 29, 2019 11:00 AM CDT  Return Visit with Radha Mckeon PA-C  [facility] ([facility]) [address]  [facility] [address]  [phone]   Nov 11, 2019  3:10 PM CST  Office visit with Arnel Garcia MD, EA EXAM ROOM 16  [facility] ([facility]) [address]  [place] [facility] [address] [phone]         Office note from 7/18/19 with Dr. Garcia mentions \"chronic warfarin\".    Routing to provider to advise on refill.   "

## 2019-10-15 RX ORDER — WARFARIN SODIUM 2 MG/1
TABLET ORAL
Qty: 90 TABLET | Refills: 3 | Status: SHIPPED | OUTPATIENT
Start: 2019-10-15 | End: 2019-12-31

## 2019-10-29 ENCOUNTER — OFFICE VISIT (OUTPATIENT)
Dept: CARDIOLOGY | Facility: CLINIC | Age: 84
End: 2019-10-29
Attending: INTERNAL MEDICINE
Payer: MEDICARE

## 2019-10-29 VITALS
OXYGEN SATURATION: 97 % | SYSTOLIC BLOOD PRESSURE: 118 MMHG | DIASTOLIC BLOOD PRESSURE: 64 MMHG | HEART RATE: 84 BPM | WEIGHT: 149.4 LBS | HEIGHT: 63 IN | BODY MASS INDEX: 26.47 KG/M2

## 2019-10-29 DIAGNOSIS — I50.32 CHRONIC DIASTOLIC HEART FAILURE (H): ICD-10-CM

## 2019-10-29 DIAGNOSIS — I25.9 CHEST PAIN DUE TO MYOCARDIAL ISCHEMIA, UNSPECIFIED ISCHEMIC CHEST PAIN TYPE: ICD-10-CM

## 2019-10-29 DIAGNOSIS — Z95.0 CARDIAC PACEMAKER IN SITU: Primary | ICD-10-CM

## 2019-10-29 LAB
ANION GAP SERPL CALCULATED.3IONS-SCNC: 4 MMOL/L (ref 3–14)
BUN SERPL-MCNC: 26 MG/DL (ref 7–30)
CALCIUM SERPL-MCNC: 9.2 MG/DL (ref 8.5–10.1)
CHLORIDE SERPL-SCNC: 104 MMOL/L (ref 94–109)
CHOLEST SERPL-MCNC: 139 MG/DL
CO2 SERPL-SCNC: 29 MMOL/L (ref 20–32)
CREAT SERPL-MCNC: 1.45 MG/DL (ref 0.52–1.04)
GFR SERPL CREATININE-BSD FRML MDRD: 31 ML/MIN/{1.73_M2}
GLUCOSE SERPL-MCNC: 73 MG/DL (ref 70–99)
HDLC SERPL-MCNC: 78 MG/DL
LDLC SERPL CALC-MCNC: 49 MG/DL
NONHDLC SERPL-MCNC: 61 MG/DL
POTASSIUM SERPL-SCNC: 4.7 MMOL/L (ref 3.4–5.3)
SODIUM SERPL-SCNC: 137 MMOL/L (ref 133–144)
TRIGL SERPL-MCNC: 61 MG/DL

## 2019-10-29 PROCEDURE — 99214 OFFICE O/P EST MOD 30 MIN: CPT | Performed by: PHYSICIAN ASSISTANT

## 2019-10-29 PROCEDURE — 36415 COLL VENOUS BLD VENIPUNCTURE: CPT | Performed by: INTERNAL MEDICINE

## 2019-10-29 PROCEDURE — 80048 BASIC METABOLIC PNL TOTAL CA: CPT | Performed by: INTERNAL MEDICINE

## 2019-10-29 PROCEDURE — 80061 LIPID PANEL: CPT | Performed by: INTERNAL MEDICINE

## 2019-10-29 ASSESSMENT — MIFFLIN-ST. JEOR: SCORE: 1051.8

## 2019-10-29 NOTE — LETTER
10/29/2019    Arnel Garcia MD, MD  [address] [place] [address]    RE: Leonor REBOLLEDO Brendanpablojovanni       Dear Colleague,    I had the pleasure of seeing Leonor Mercado in the [facility].    CARDIOLOGY CLINIC PROGRESS NOTE    DOS: 10/29/2019      Leonor Mercado  : 1926, 89 year old  MRN: [id number]      History:  I am meeting Leonor Mercado today in the cardiology clinic for follow up.  She is a patient of Dr. Horton, though she has seen Dr. Garcia as well.   She is accompanied by her daughter.     Alfredo is a 89 year old woman with a history of coronary artery disease with stenting in  or , exact details unknown, as well as a history of sick sinus syndrome with atrial fibrillation and permanent pacemaker.  She is on long-term anticoagulation.  Records document previous PAT and she has a history of her records suggest some history of pulmonary fibrosis.  In addition she has COPD, mild renal insufficiency, bilateral lower extremity venous stasis, hyperlipidemia, hypertension.        She was last seen 19 by Dr. Horton.  She was having episodes of chest pain suspicious of esophageal spasm.  A GI barium swallow was scheduled.  Dr. oHrton ordered dobutamine stress echo on diltiazem (pt declined lexiscan and angiogram).     19 Stress Echo - normal.      - 19 Hosp Admission at [facility].  She presented with SOB and weakness, and was found to have left sided pneumonia.  She had not been taking her meds due to illness.  She was in afib with RVR, restarted diltiazem, and HR improved.  No cardiology meds changed.  [facility]     19 ED visit. Presented with chest pain and SOB.  EKG and trop ruled out AMI.  She had some mild CHF sxs of LE edema.  She was given IV Lasix then PO Lasix 20 mg x 3 days.     19 NH note, positive guaiac on warfarin for AFib, Hgb stable, no abd pressure, vitals WNL.  PMD advised to hold  warfarin, start protonix.  2 doses of warfarin were held then resumed, Hgb stable.  7/12/19 discharged from [facility].     She has been continued on Lasix.     7/18/19 PMD OV, pt declined GI workup.     9/18/19 device check.  Normal function.         Interval History:   She presents today for follow up.  She thought she was seeing Dr. Horton.   She is back on warfarin and is off the Protonix and no bleeding or melena.  However, she has chest pain/epigastric pain.  This occurred about 1.5 weeks ago and a couple days ago.  It is not exertional.  It occurs at rest.   She is not certain if these are chest pain or indigestion or due to her esophagus.  Once she took TUMS and nitroglycerin and the combination helped.   Right leg swells throughout the day.  By night there is some swelling.       ROS:  Skin:  Negative     Eyes:  Positive for glasses  ENT:  Positive for hearing loss  Respiratory:  Positive for dyspnea on exertion;shortness of breath;cough;wheezing  Cardiovascular:    Positive for;chest pain;edema  Gastroenterology: Positive for poor appetite  Genitourinary:  Positive for nocturia  Musculoskeletal:  Positive for arthritis  Neurologic:  Negative    Psychiatric:  Positive for sleep disturbances  Heme/Lymph/Imm:  Negative    Endocrine:  Negative      PAST MEDICAL HISTORY:  Past Medical History:   Diagnosis Date     A Fib - chr Coumadin, s/p PPM (H) 5/8/2013     Atrial fibrillation (H)     Sick sinus syndrome, PAT, AF     BCC (basal cell carcinoma of skin)     Face     CAD - 2 stents in [place] in 2000s.  1/5/2016     CHF (congestive heart failure) (H)     mild in past     CHF - Chr Diastolic (H) 11/21/2017     Chronic renal insufficiency      COPD (chronic obstructive pulmonary disease) (H)      COPD (H) 5/13/2013     Coronary artery disease     2-05[place]-CAD-taxus stentx2 2.5-12,2.5-12 in LADp and LADm, 80%nonDomRCA-LcxDom-mild,EF60%     Hyperlipidemia      Hypertension      IBS (irritable bowel syndrome)       Irritable bowel      Mumps      Osteoporosis      Palpitations      Panic attack     questionable diagnosis     Pulmonary fibrosis (H)     do not use amiodarone     Shortness of breath      Sick sinus syndrome - s/p PPM 2003 (H) 12/16/2017     SSS (sick sinus syndrome) (H)     DDD pacer (see surgery history section)     Vertigo        PAST SURGICAL HISTORY:  Past Surgical History:   Procedure Laterality Date     APPENDECTOMY  1956     CARDIAC SURGERY      PPM, 2 STENTS2-05[place]-CAD-taxus stentx2 2.5-12,2.5-12 in LADp and LADm, 80%nonDomRCA-LcxDom-mild,EF60%     CORONARY ANGIOGRAPHY ADULT ORDER  7/1994    mild CAD,Normal LV function, No Mitral regurgitation, Prox LAD 30% stenosis. RCA prox and mid, 20-30%     ESOPHAGOSCOPY, GASTROSCOPY, DUODENOSCOPY (EGD), COMBINED N/A 8/19/2015    Procedure: COMBINED ESOPHAGOSCOPY, GASTROSCOPY, DUODENOSCOPY (EGD), BIOPSY SINGLE OR MULTIPLE;  Surgeon: Genevieve Leon MD;  Location:  GI     H LEAD REVISION DUAL  4/2003    Revised in [place]-due to diaphram stim (original pacer placed in [place])     H LEAD REVISION DUAL  7/2/2014    Correction of reversal of A and V leads in Header     HEART CATH, ANGIOPLASTY  02/2005    LEIGH ANN mid and proximal LAD, ongoing 80% RCA; mild circumflex     HERNIA REPAIR Left 1991    LI     IMPLANT PACEMAKER  2/19/2003    Placed in [place] for sick sinus syndrome     REPLACE PACEMAKER GENERATOR  6/27/2013    Dual-chamber pacemaker by Dr SETH Pierre       SOCIAL HISTORY:  Social History     Socioeconomic History     Marital status:      Spouse name: None     Number of children: 3     Years of education: None     Highest education level: None   Occupational History     Employer: RETIRED   Social Needs     Financial resource strain: None     Food insecurity:     Worry: None     Inability: None     Transportation needs:     Medical: None     Non-medical: None   Tobacco Use     Smoking status: Former Smoker     Packs/day: 0.00     Types: Cigarettes     Last  attempt to quit: 1987     Years since quittin.4     Smokeless tobacco: Never Used   Substance and Sexual Activity     Alcohol use: Yes     Alcohol/week: 0.0 standard drinks     Comment: occ     Drug use: No     Sexual activity: Never     Partners: Male   Lifestyle     Physical activity:     Days per week: None     Minutes per session: None     Stress: None   Relationships     Social connections:     Talks on phone: None     Gets together: None     Attends Denominational service: None     Active member of club or organization: None     Attends meetings of clubs or organizations: None     Relationship status: None     Intimate partner violence:     Fear of current or ex partner: None     Emotionally abused: None     Physically abused: None     Forced sexual activity: None   Other Topics Concern     Parent/sibling w/ CABG, MI or angioplasty before 65F 55M? Yes      Service Not Asked     Blood Transfusions Not Asked     Caffeine Concern Yes     Comment: decaf - some 1/2 caff     Occupational Exposure Not Asked     Hobby Hazards Not Asked     Sleep Concern Yes     Comment: nocturia every 2 hours     Stress Concern Not Asked     Weight Concern Not Asked     Special Diet No     Back Care Not Asked     Exercise No     Comment: some walking in the house     Bike Helmet Not Asked     Seat Belt Not Asked     Self-Exams Not Asked   Social History Narrative     None       FAMILY HISTORY:  Family History   Problem Relation Age of Onset     Heart Disease Father          age 84     Neurologic Disorder Mother         dementia     Gastrointestinal Disease Daughter         liver transplant     Crohn's Disease Daughter        MEDS: acetaminophen (TYLENOL) 500 MG tablet, Take 500 mg by mouth 4 times daily And Q6H PRN BID  albuterol (PROAIR HFA/PROVENTIL HFA/VENTOLIN HFA) 108 (90 Base) MCG/ACT inhaler, Inhale 2 puffs into the lungs every 6 hours as needed for shortness of breath / dyspnea or wheezing  calcium carbonate  (CALCIUM CARBONATE) 600 MG TABS tablet, Take 1 tablet by mouth daily   diltiazem (DILTIAZEM CD) 240 MG 24 hr capsule, Take 1 capsule (240 mg) by mouth daily  fluticasone-vilanterol (BREO ELLIPTA) 200-25 MCG/INH oral inhaler, Inhale 1 puff into the lungs daily  furosemide (LASIX) 20 MG tablet, Take 1 tablet (20 mg) by mouth daily  ipratropium - albuterol 0.5 mg/2.5 mg/3 mL (DUONEB) 0.5-2.5 (3) MG/3ML neb solution, Take 1 vial (3 mLs) by nebulization every 6 hours as needed for shortness of breath / dyspnea or wheezing  isosorbide mononitrate (IMDUR) 30 MG 24 hr tablet, Take 1 tablet (30 mg) by mouth daily  Lactobacillus (PROBIOTIC ACIDOPHILUS) TABS, Take 1 tablet by mouth daily   losartan (COZAAR) 25 MG tablet, Take 1 tablet (25 mg) by mouth daily  MELATONIN PO, Take 5 mg by mouth nightly as needed   mirtazapine (REMERON) 15 MG tablet, Take 1 tablet (15 mg) by mouth At Bedtime  Multiple Vitamins-Minerals (OCUVITE PO), Take 1 capsule by mouth daily.  polyethylene glycol (MIRALAX/GLYCOLAX) Packet, Take 1 packet by mouth daily   pravastatin (PRAVACHOL) 40 MG tablet, TAKE 1 TABLET BY MOUTH ONCE DAILY  senna (SENOKOT) 8.6 MG tablet, Take 1 tablet by mouth as needed for constipation  Tiotropium Bromide Monohydrate (SPIRIVA HANDIHALER IN), Inhale 1 puff into the lungs daily   Vitamin D, Cholecalciferol, 1000 units TABS, Take 1,000 Units by mouth daily  warfarin ANTICOAGULANT (COUMADIN) 2 MG tablet, TAKE 1 TABLET BY MOUTH ONCE DAILY OR AS DIRECTED BY INR CLINIC  WARFARIN SODIUM PO, Take 2 mg by mouth daily PER INR ORDERS   ASPIRIN NOT PRESCRIBED (INTENTIONAL), Please choose reason not prescribed, below (Patient not taking: Reported on 10/29/2019)  [] cephALEXin (KEFLEX) 500 MG capsule, Take 1 capsule (500 mg) by mouth 3 times daily for 7 days    No current facility-administered medications on file prior to visit.       ALLERGIES:   Allergies   Allergen Reactions     Peanuts [Nuts] Shortness Of Breath     Amiodarone   "    pulm fibrosis       Baclofen      Confusion      Bactrim [Sulfamethoxazole W-Trimethoprim]      Difficulty swallowing     Doxycycline Other (See Comments)     Multiple complaints; she does not want this again.      Levaquin [Levofloxacin] Other (See Comments)     Multiple complaints; she will not take this again     Linzess [Linaclotide] Diarrhea     Lorazepam        Prolonged drowsiness with ER visit      Liquid Adhesive Itching and Rash     Bleeding when tape removed after pacemaker placement..itched and burned for weeks.       PHYSICAL EXAM:  Vitals: /64 (BP Location: Right arm, Patient Position: Chair, Cuff Size: Adult Regular)   Pulse 84   Ht 1.6 m (5' 3\")   Wt 67.8 kg (149 lb 6.4 oz)   LMP  (LMP Unknown)   SpO2 97%   BMI 26.47 kg/m     Constitutional:  cooperative, alert and oriented, well developed, well nourished, in no acute distress appears younger than stated age      Skin:  warm and dry to the touch, no apparent skin lesions or masses noted        Head:  normocephalic, no masses or lesions        Eyes:  pupils equal and round;conjunctivae and lids unremarkable;sclera white        ENT:  no pallor or cyanosis, dentition good        Neck:  no carotid bruit;JVP normal        Respiratory:      decreased air movement, but no rales or wheezing today    Cardiac:   irregularly irregular rhythm   no presence of murmur            GI:  abdomen soft;BS normoactive        Vascular:                                        Extremities and Musculoskeletal:  no deformities, clubbing, cyanosis, erythema observed;no edema        Neurological:  no gross motor deficits;affect appropriate   uses walker        LABS/DATA:  I reviewed the following:  Component      Latest Ref Rng & Units 10/29/2019   Sodium      133 - 144 mmol/L 137   Potassium      3.4 - 5.3 mmol/L 4.7   Chloride      94 - 109 mmol/L 104   Carbon Dioxide      20 - 32 mmol/L 29   Anion Gap      3 - 14 mmol/L 4   Glucose      70 - 99 mg/dL 73   Urea " Nitrogen      7 - 30 mg/dL 26   Creatinine      0.52 - 1.04 mg/dL 1.45 (H)   GFR Estimate      >60 mL/min/1.73:m2 31 (L)   GFR Estimate If Black      >60 mL/min/1.73:m2 36 (L)   Calcium      8.5 - 10.1 mg/dL 9.2   Cholesterol      <200 mg/dL 139   Triglycerides      <150 mg/dL 61   HDL Cholesterol      >49 mg/dL 78   LDL Cholesterol Calculated      <100 mg/dL 49   Non HDL Cholesterol      <130 mg/dL 61         ASSESSMENT/PLAN:  1.  COPD .  Per pulmonary and PMD.    2.  Peripheral edema.  Adequately controlled with pression stockings, which she uses daily, and Lasix 20 mg.     3.  Atrial fibrillation.   Rate controlled on dilt.  Chronically anticoagulated and unaware of any sense of palpitations.    4.  HFpEF.      - Stable on Lasix 20 mg  - BMP today stable     5.  CAD.    - In 2004 or 2005 in [place], she had some stenting to her LAD proximal and mid, and she had an 80% nondominant right, and the circumflex, we believe, was normal, but we do not have the actual reports.   - Dobutamine stress echo 5/28/19 negative for ischemia  - 7/2019 ruled out for MI  - She has some chest pain or epigastric pain.  She is not sure which.    She does not want an angiogram. She will trial going back on a PPI to see if it helps her sxs.  She will also continue to use SL nitroglycerin prn.  If the PPI does not help, we may trial increasing Imdur.   - Continue Imdur, losartan, pravastatin    6.  H/o GI bleed.   - She has some chest pain or epigastric pain.  She is not sure which.   She will trial going back on a PPI to see if it helps her sxs.  She does not want an angiogram.  If the PPI does not help, we may trial increasing Imdur.     7.  SSS s/p PPM  - Device checks through device clinic      Follow up:  Dr. Horton in 3 months          Thank you for allowing me to participate in the care of your patient.    Sincerely,     Radha Mckeon PA-C     [facility]

## 2019-10-29 NOTE — PATIENT INSTRUCTIONS
Restart the tummy medicine.   If you still have discomfort, we can trial increasing the isosorbide.   You can still use TUMs and the nitroglycerin if you have pain.

## 2019-10-29 NOTE — LETTER
10/29/2019    Arnel Garcia MD, MD  [address] [place] [address]    RE: Leonor REBOLLEDO Brendanpablojovanni       Dear Colleague,    I had the pleasure of seeing Leonor Mercado in the [facility].    CARDIOLOGY CLINIC PROGRESS NOTE    DOS: 10/29/2019      Leonor Mercado  : 1926, 89 year old  MRN: [id number]      History:  I am meeting Leonor Mercado today in the cardiology clinic for follow up.  She is a patient of Dr. Horton, though she has seen Dr. Garcia as well.   She is accompanied by her daughter.     Alfredo is a 89 year old woman with a history of coronary artery disease with stenting in  or , exact details unknown, as well as a history of sick sinus syndrome with atrial fibrillation and permanent pacemaker.  She is on long-term anticoagulation.  Records document previous PAT and she has a history of her records suggest some history of pulmonary fibrosis.  In addition she has COPD, mild renal insufficiency, bilateral lower extremity venous stasis, hyperlipidemia, hypertension.        She was last seen 19 by Dr. Horton.  She was having episodes of chest pain suspicious of esophageal spasm.  A GI barium swallow was scheduled.  Dr. Horton ordered dobutamine stress echo on diltiazem (pt declined lexiscan and angiogram).     19 Stress Echo - normal.      - 19 Hosp Admission at [facility].  She presented with SOB and weakness, and was found to have left sided pneumonia.  She had not been taking her meds due to illness.  She was in afib with RVR, restarted diltiazem, and HR improved.  No cardiology meds changed.  [facility]     19 ED visit. Presented with chest pain and SOB.  EKG and trop ruled out AMI.  She had some mild CHF sxs of LE edema.  She was given IV Lasix then PO Lasix 20 mg x 3 days.     19 NH note, positive guaiac on warfarin for AFib, Hgb stable, no abd pressure, vitals WNL.  PMD advised to hold  warfarin, start protonix.  2 doses of warfarin were held then resumed, Hgb stable.  7/12/19 discharged from [facility] [place] [facility].     She has been continued on Lasix.     7/18/19 PMD OV, pt declined GI workup.     9/18/19 device check.  Normal function.         Interval History:   She presents today for follow up.  She thought she was seeing Dr. Horton.   She is back on warfarin and is off the Protonix and no bleeding or melena.  However, she has chest pain/epigastric pain.  This occurred about 1.5 weeks ago and a couple days ago.  It is not exertional.  It occurs at rest.   She is not certain if these are chest pain or indigestion or due to her esophagus.  Once she took TUMS and nitroglycerin and the combination helped.   Right leg swells throughout the day.  By night there is some swelling.       ROS:  Skin:  Negative     Eyes:  Positive for glasses  ENT:  Positive for hearing loss  Respiratory:  Positive for dyspnea on exertion;shortness of breath;cough;wheezing  Cardiovascular:    Positive for;chest pain;edema  Gastroenterology: Positive for poor appetite  Genitourinary:  Positive for nocturia  Musculoskeletal:  Positive for arthritis  Neurologic:  Negative    Psychiatric:  Positive for sleep disturbances  Heme/Lymph/Imm:  Negative    Endocrine:  Negative      PAST MEDICAL HISTORY:  Past Medical History:   Diagnosis Date     A Fib - chr Coumadin, s/p PPM (H) 5/8/2013     Atrial fibrillation (H)     Sick sinus syndrome, PAT, AF     BCC (basal cell carcinoma of skin)     Face     CAD - 2 stents in [place] in 2000s.  1/5/2016     CHF (congestive heart failure) (H)     mild in past     CHF - Chr Diastolic (H) 11/21/2017     Chronic renal insufficiency      COPD (chronic obstructive pulmonary disease) (H)      COPD (H) 5/13/2013     Coronary artery disease     2-05[place]-CAD-taxus stentx2 2.5-12,2.5-12 in LADp and LADm, 80%nonDomRCA-LcxDom-mild,EF60%     Hyperlipidemia      Hypertension      IBS (irritable bowel syndrome)       Irritable bowel      Mumps      Osteoporosis      Palpitations      Panic attack     questionable diagnosis     Pulmonary fibrosis (H)     do not use amiodarone     Shortness of breath      Sick sinus syndrome - s/p PPM 2003 (H) 12/16/2017     SSS (sick sinus syndrome) (H)     DDD pacer (see surgery history section)     Vertigo        PAST SURGICAL HISTORY:  Past Surgical History:   Procedure Laterality Date     APPENDECTOMY  1956     CARDIAC SURGERY      PPM, 2 STENTS2-05[place]-CAD-taxus stentx2 2.5-12,2.5-12 in LADp and LADm, 80%nonDomRCA-LcxDom-mild,EF60%     CORONARY ANGIOGRAPHY ADULT ORDER  7/1994    mild CAD,Normal LV function, No Mitral regurgitation, Prox LAD 30% stenosis. RCA prox and mid, 20-30%     ESOPHAGOSCOPY, GASTROSCOPY, DUODENOSCOPY (EGD), COMBINED N/A 8/19/2015    Procedure: COMBINED ESOPHAGOSCOPY, GASTROSCOPY, DUODENOSCOPY (EGD), BIOPSY SINGLE OR MULTIPLE;  Surgeon: Genevieve Leon MD;  Location:  GI     H LEAD REVISION DUAL  4/2003    Revised in [place]-due to diaphram stim (original pacer placed in [place])     H LEAD REVISION DUAL  7/2/2014    Correction of reversal of A and V leads in [place]     HEART CATH, ANGIOPLASTY  02/2005    LEIGH ANN mid and proximal LAD, ongoing 80% RCA; mild circumflex     HERNIA REPAIR Left 1991    LI     IMPLANT PACEMAKER  2/19/2003    Placed in [place] for sick sinus syndrome     REPLACE PACEMAKER GENERATOR  6/27/2013    Dual-chamber pacemaker by Dr SETH Pierre       SOCIAL HISTORY:  Social History     Socioeconomic History     Marital status:      Spouse name: None     Number of children: 3     Years of education: None     Highest education level: None   Occupational History     Employer: RETIRED   Social Needs     Financial resource strain: None     Food insecurity:     Worry: None     Inability: None     Transportation needs:     Medical: None     Non-medical: None   Tobacco Use     Smoking status: Former Smoker     Packs/day: 0.00     Types: Cigarettes     Last  attempt to quit: 1987     Years since quittin.4     Smokeless tobacco: Never Used   Substance and Sexual Activity     Alcohol use: Yes     Alcohol/week: 0.0 standard drinks     Comment: occ     Drug use: No     Sexual activity: Never     Partners: Male   Lifestyle     Physical activity:     Days per week: None     Minutes per session: None     Stress: None   Relationships     Social connections:     Talks on phone: None     Gets together: None     Attends Yazdanism service: None     Active member of club or organization: None     Attends meetings of clubs or organizations: None     Relationship status: None     Intimate partner violence:     Fear of current or ex partner: None     Emotionally abused: None     Physically abused: None     Forced sexual activity: None   Other Topics Concern     Parent/sibling w/ CABG, MI or angioplasty before 65F 55M? Yes      Service Not Asked     Blood Transfusions Not Asked     Caffeine Concern Yes     Comment: decaf - some 1/2 caff     Occupational Exposure Not Asked     Hobby Hazards Not Asked     Sleep Concern Yes     Comment: nocturia every 2 hours     Stress Concern Not Asked     Weight Concern Not Asked     Special Diet No     Back Care Not Asked     Exercise No     Comment: some walking in the house     Bike Helmet Not Asked     Seat Belt Not Asked     Self-Exams Not Asked   Social History Narrative     None       FAMILY HISTORY:  Family History   Problem Relation Age of Onset     Heart Disease Father          age 84     Neurologic Disorder Mother         dementia     Gastrointestinal Disease Daughter         liver transplant     Crohn's Disease Daughter        MEDS: acetaminophen (TYLENOL) 500 MG tablet, Take 500 mg by mouth 4 times daily And Q6H PRN BID  albuterol (PROAIR HFA/PROVENTIL HFA/VENTOLIN HFA) 108 (90 Base) MCG/ACT inhaler, Inhale 2 puffs into the lungs every 6 hours as needed for shortness of breath / dyspnea or wheezing  calcium carbonate  (CALCIUM CARBONATE) 600 MG TABS tablet, Take 1 tablet by mouth daily   diltiazem (DILTIAZEM CD) 240 MG 24 hr capsule, Take 1 capsule (240 mg) by mouth daily  fluticasone-vilanterol (BREO ELLIPTA) 200-25 MCG/INH oral inhaler, Inhale 1 puff into the lungs daily  furosemide (LASIX) 20 MG tablet, Take 1 tablet (20 mg) by mouth daily  ipratropium - albuterol 0.5 mg/2.5 mg/3 mL (DUONEB) 0.5-2.5 (3) MG/3ML neb solution, Take 1 vial (3 mLs) by nebulization every 6 hours as needed for shortness of breath / dyspnea or wheezing  isosorbide mononitrate (IMDUR) 30 MG 24 hr tablet, Take 1 tablet (30 mg) by mouth daily  Lactobacillus (PROBIOTIC ACIDOPHILUS) TABS, Take 1 tablet by mouth daily   losartan (COZAAR) 25 MG tablet, Take 1 tablet (25 mg) by mouth daily  MELATONIN PO, Take 5 mg by mouth nightly as needed   mirtazapine (REMERON) 15 MG tablet, Take 1 tablet (15 mg) by mouth At Bedtime  Multiple Vitamins-Minerals (OCUVITE PO), Take 1 capsule by mouth daily.  polyethylene glycol (MIRALAX/GLYCOLAX) Packet, Take 1 packet by mouth daily   pravastatin (PRAVACHOL) 40 MG tablet, TAKE 1 TABLET BY MOUTH ONCE DAILY  senna (SENOKOT) 8.6 MG tablet, Take 1 tablet by mouth as needed for constipation  Tiotropium Bromide Monohydrate (SPIRIVA HANDIHALER IN), Inhale 1 puff into the lungs daily   Vitamin D, Cholecalciferol, 1000 units TABS, Take 1,000 Units by mouth daily  warfarin ANTICOAGULANT (COUMADIN) 2 MG tablet, TAKE 1 TABLET BY MOUTH ONCE DAILY OR AS DIRECTED BY INR CLINIC  WARFARIN SODIUM PO, Take 2 mg by mouth daily PER INR ORDERS   ASPIRIN NOT PRESCRIBED (INTENTIONAL), Please choose reason not prescribed, below (Patient not taking: Reported on 10/29/2019)  [] cephALEXin (KEFLEX) 500 MG capsule, Take 1 capsule (500 mg) by mouth 3 times daily for 7 days    No current facility-administered medications on file prior to visit.       ALLERGIES:   Allergies   Allergen Reactions     Peanuts [Nuts] Shortness Of Breath     Amiodarone   "    pulm fibrosis       Baclofen      Confusion      Bactrim [Sulfamethoxazole W-Trimethoprim]      Difficulty swallowing     Doxycycline Other (See Comments)     Multiple complaints; she does not want this again.      Levaquin [Levofloxacin] Other (See Comments)     Multiple complaints; she will not take this again     Linzess [Linaclotide] Diarrhea     Lorazepam        Prolonged drowsiness with ER visit      Liquid Adhesive Itching and Rash     Bleeding when tape removed after pacemaker placement..itched and burned for weeks.       PHYSICAL EXAM:  Vitals: /64 (BP Location: Right arm, Patient Position: Chair, Cuff Size: Adult Regular)   Pulse 84   Ht 1.6 m (5' 3\")   Wt 67.8 kg (149 lb 6.4 oz)   LMP  (LMP Unknown)   SpO2 97%   BMI 26.47 kg/m     Constitutional:  cooperative, alert and oriented, well developed, well nourished, in no acute distress appears younger than stated age      Skin:  warm and dry to the touch, no apparent skin lesions or masses noted        Head:  normocephalic, no masses or lesions        Eyes:  pupils equal and round;conjunctivae and lids unremarkable;sclera white        ENT:  no pallor or cyanosis, dentition good        Neck:  no carotid bruit;JVP normal        Respiratory:      decreased air movement, but no rales or wheezing today    Cardiac:   irregularly irregular rhythm   no presence of murmur            GI:  abdomen soft;BS normoactive        Vascular:                                        Extremities and Musculoskeletal:  no deformities, clubbing, cyanosis, erythema observed;no edema        Neurological:  no gross motor deficits;affect appropriate   uses walker        LABS/DATA:  I reviewed the following:  Component      Latest Ref Rng & Units 10/29/2019   Sodium      133 - 144 mmol/L 137   Potassium      3.4 - 5.3 mmol/L 4.7   Chloride      94 - 109 mmol/L 104   Carbon Dioxide      20 - 32 mmol/L 29   Anion Gap      3 - 14 mmol/L 4   Glucose      70 - 99 mg/dL 73   Urea " Nitrogen      7 - 30 mg/dL 26   Creatinine      0.52 - 1.04 mg/dL 1.45 (H)   GFR Estimate      >60 mL/min/1.73:m2 31 (L)   GFR Estimate If Black      >60 mL/min/1.73:m2 36 (L)   Calcium      8.5 - 10.1 mg/dL 9.2   Cholesterol      <200 mg/dL 139   Triglycerides      <150 mg/dL 61   HDL Cholesterol      >49 mg/dL 78   LDL Cholesterol Calculated      <100 mg/dL 49   Non HDL Cholesterol      <130 mg/dL 61         ASSESSMENT/PLAN:  1.  COPD .  Per pulmonary and PMD.    2.  Peripheral edema.  Adequately controlled with pression stockings, which she uses daily, and Lasix 20 mg.     3.  Atrial fibrillation.   Rate controlled on dilt.  Chronically anticoagulated and unaware of any sense of palpitations.    4.  HFpEF.      - Stable on Lasix 20 mg  - BMP today stable     5.  CAD.    - In 2004 or 2005 in [place], she had some stenting to her LAD proximal and mid, and she had an 80% nondominant right, and the circumflex, we believe, was normal, but we do not have the actual reports.   - Dobutamine stress echo 5/28/19 negative for ischemia  - 7/2019 ruled out for MI  - She has some chest pain or epigastric pain.  She is not sure which.    She does not want an angiogram. She will trial going back on a PPI to see if it helps her sxs.  She will also continue to use SL nitroglycerin prn.  If the PPI does not help, we may trial increasing Imdur.   - Continue Imdur, losartan, pravastatin    6.  H/o GI bleed.   - She has some chest pain or epigastric pain.  She is not sure which.   She will trial going back on a PPI to see if it helps her sxs.  She does not want an angiogram.  If the PPI does not help, we may trial increasing Imdur.     7.  SSS s/p PPM  - Device checks through device clinic      Follow up:  Dr. Horton in 3 months      Radha Mckeon PA-C    Thank you for allowing me to participate in the care of your patient.      Sincerely,     Radha Mckeon PA-C     [facility]    cc:   Collins MONTANO  MD Clifford  [address]  [place] [id number]

## 2019-10-29 NOTE — PROGRESS NOTES
CARDIOLOGY CLINIC PROGRESS NOTE    DOS: 10/29/2019      Leonor Mercado  : 1926, 89 year old  MRN: [id number]      History:  I am meeting Leonor Mercado today in the cardiology clinic for follow up.  She is a patient of Dr. Horton, though she has seen Dr. Garcia as well.   She is accompanied by her daughter.     Alfredo is a 89 year old woman with a history of coronary artery disease with stenting in  or , exact details unknown, as well as a history of sick sinus syndrome with atrial fibrillation and permanent pacemaker.  She is on long-term anticoagulation.  Records document previous PAT and she has a history of her records suggest some history of pulmonary fibrosis.  In addition she has COPD, mild renal insufficiency, bilateral lower extremity venous stasis, hyperlipidemia, hypertension.        She was last seen 19 by Dr. Horton.  She was having episodes of chest pain suspicious of esophageal spasm.  A GI barium swallow was scheduled.  Dr. Horton ordered dobutamine stress echo on diltiazem (pt declined lexiscan and angiogram).     19 Stress Echo - normal.      - 19 Hosp Admission at [facility].  She presented with SOB and weakness, and was found to have left sided pneumonia.  She had not been taking her meds due to illness.  She was in afib with RVR, restarted diltiazem, and HR improved.  No cardiology meds changed.  Discharged to [facility].     19 ED visit. Presented with chest pain and SOB.  EKG and trop ruled out AMI.  She had some mild CHF sxs of LE edema.  She was given IV Lasix then PO Lasix 20 mg x 3 days.     19 NH note, positive guaiac on warfarin for AFib, Hgb stable, no abd pressure, vitals WNL.  PMD advised to hold warfarin, start protonix.  2 doses of warfarin were held then resumed, Hgb stable.  19 discharged from [facility].     She has been continued on Lasix.     19 PMD OV, pt declined GI workup.     19 device check.  Normal  function.         Interval History:   She presents today for follow up.  She thought she was seeing Dr. Horton.   She is back on warfarin and is off the Protonix and no bleeding or melena.  However, she has chest pain/epigastric pain.  This occurred about 1.5 weeks ago and a couple days ago.  It is not exertional.  It occurs at rest.   She is not certain if these are chest pain or indigestion or due to her esophagus.  Once she took TUMS and nitroglycerin and the combination helped.   Right leg swells throughout the day.  By night there is some swelling.       ROS:  Skin:  Negative     Eyes:  Positive for glasses  ENT:  Positive for hearing loss  Respiratory:  Positive for dyspnea on exertion;shortness of breath;cough;wheezing  Cardiovascular:    Positive for;chest pain;edema  Gastroenterology: Positive for poor appetite  Genitourinary:  Positive for nocturia  Musculoskeletal:  Positive for arthritis  Neurologic:  Negative    Psychiatric:  Positive for sleep disturbances  Heme/Lymph/Imm:  Negative    Endocrine:  Negative      PAST MEDICAL HISTORY:  Past Medical History:   Diagnosis Date     A Fib - chr Coumadin, s/p PPM (H) 5/8/2013     Atrial fibrillation (H)     Sick sinus syndrome, PAT, AF     BCC (basal cell carcinoma of skin)     Face     CAD - 2 stents in [place] in 2000s.  1/5/2016     CHF (congestive heart failure) (H)     mild in past     CHF - Chr Diastolic (H) 11/21/2017     Chronic renal insufficiency      COPD (chronic obstructive pulmonary disease) (H)      COPD (H) 5/13/2013     Coronary artery disease     2-05[place]-CAD-taxus stentx2 2.5-12,2.5-12 in LADp and LADm, 80%nonDomRCA-LcxDom-mild,EF60%     Hyperlipidemia      Hypertension      IBS (irritable bowel syndrome)      Irritable bowel      Mumps      Osteoporosis      Palpitations      Panic attack     questionable diagnosis     Pulmonary fibrosis (H)     do not use amiodarone     Shortness of breath      Sick sinus syndrome - s/p PPM 2003 (H)  2017     SSS (sick sinus syndrome) (H)     DDD pacer (see surgery history section)     Vertigo        PAST SURGICAL HISTORY:  Past Surgical History:   Procedure Laterality Date     APPENDECTOMY       CARDIAC SURGERY      PPM, 2 STENTS2-05[place]-CAD-taxus stentx2 2.5-12,2.5-12 in LADp and LADm, 80%nonDomRCA-LcxDom-mild,EF60%     CORONARY ANGIOGRAPHY ADULT ORDER  1994    mild CAD,Normal LV function, No Mitral regurgitation, Prox LAD 30% stenosis. RCA prox and mid, 20-30%     ESOPHAGOSCOPY, GASTROSCOPY, DUODENOSCOPY (EGD), COMBINED N/A 2015    Procedure: COMBINED ESOPHAGOSCOPY, GASTROSCOPY, DUODENOSCOPY (EGD), BIOPSY SINGLE OR MULTIPLE;  Surgeon: Genevieve Leon MD;  Location: [facility]     H LEAD REVISION DUAL  2003    Revised in [place]-due to diaphram stim (original pacer placed in [place])     H LEAD REVISION DUAL  2014    Correction of reversal of A and V leads in Header     HEART CATH, ANGIOPLASTY  2005    LEIGH ANN mid and proximal LAD, ongoing 80% RCA; mild circumflex     HERNIA REPAIR Left     LIH     IMPLANT PACEMAKER  2003    Placed in [place] for sick sinus syndrome     REPLACE PACEMAKER GENERATOR  2013    Dual-chamber pacemaker by Dr SETH Pierre       SOCIAL HISTORY:  Social History     Socioeconomic History     Marital status:      Spouse name: None     Number of children: 3     Years of education: None     Highest education level: None   Occupational History     Employer: RETIRED   Social Needs     Financial resource strain: None     Food insecurity:     Worry: None     Inability: None     Transportation needs:     Medical: None     Non-medical: None   Tobacco Use     Smoking status: Former Smoker     Packs/day: 0.00     Types: Cigarettes     Last attempt to quit: 1987     Years since quittin.4     Smokeless tobacco: Never Used   Substance and Sexual Activity     Alcohol use: Yes     Alcohol/week: 0.0 standard drinks     Comment: occ     Drug use: No     Sexual  activity: Never     Partners: Male   Lifestyle     Physical activity:     Days per week: None     Minutes per session: None     Stress: None   Relationships     Social connections:     Talks on phone: None     Gets together: None     Attends [employer]al service: None     Active member of club or organization: None     Attends meetings of clubs or organizations: None     Relationship status: None     Intimate partner violence:     Fear of current or ex partner: None     Emotionally abused: None     Physically abused: None     Forced sexual activity: None   Other Topics Concern     Parent/sibling w/ CABG, MI or angioplasty before 65F 55M? Yes      Service Not Asked     Blood Transfusions Not Asked     Caffeine Concern Yes     Comment: decaf - some 1/2 caff     Occupational Exposure Not Asked     Hobby Hazards Not Asked     Sleep Concern Yes     Comment: nocturia every 2 hours     Stress Concern Not Asked     Weight Concern Not Asked     Special Diet No     Back Care Not Asked     Exercise No     Comment: some walking in the house     Bike Helmet Not Asked     Seat Belt Not Asked     Self-Exams Not Asked   Social History Narrative     None       FAMILY HISTORY:  Family History   Problem Relation Age of Onset     Heart Disease Father          age 84     Neurologic Disorder Mother         dementia     Gastrointestinal Disease Daughter         liver transplant     Crohn's Disease Daughter        MEDS: acetaminophen (TYLENOL) 500 MG tablet, Take 500 mg by mouth 4 times daily And Q6H PRN BID  albuterol (PROAIR HFA/PROVENTIL HFA/VENTOLIN HFA) 108 (90 Base) MCG/ACT inhaler, Inhale 2 puffs into the lungs every 6 hours as needed for shortness of breath / dyspnea or wheezing  calcium carbonate (CALCIUM CARBONATE) 600 MG TABS tablet, Take 1 tablet by mouth daily   diltiazem (DILTIAZEM CD) 240 MG 24 hr capsule, Take 1 capsule (240 mg) by mouth daily  fluticasone-vilanterol (BREO ELLIPTA) 200-25 MCG/INH oral inhaler,  Inhale 1 puff into the lungs daily  furosemide (LASIX) 20 MG tablet, Take 1 tablet (20 mg) by mouth daily  ipratropium - albuterol 0.5 mg/2.5 mg/3 mL (DUONEB) 0.5-2.5 (3) MG/3ML neb solution, Take 1 vial (3 mLs) by nebulization every 6 hours as needed for shortness of breath / dyspnea or wheezing  isosorbide mononitrate (IMDUR) 30 MG 24 hr tablet, Take 1 tablet (30 mg) by mouth daily  Lactobacillus (PROBIOTIC ACIDOPHILUS) TABS, Take 1 tablet by mouth daily   losartan (COZAAR) 25 MG tablet, Take 1 tablet (25 mg) by mouth daily  MELATONIN PO, Take 5 mg by mouth nightly as needed   mirtazapine (REMERON) 15 MG tablet, Take 1 tablet (15 mg) by mouth At Bedtime  Multiple Vitamins-Minerals (OCUVITE PO), Take 1 capsule by mouth daily.  polyethylene glycol (MIRALAX/GLYCOLAX) Packet, Take 1 packet by mouth daily   pravastatin (PRAVACHOL) 40 MG tablet, TAKE 1 TABLET BY MOUTH ONCE DAILY  senna (SENOKOT) 8.6 MG tablet, Take 1 tablet by mouth as needed for constipation  Tiotropium Bromide Monohydrate (SPIRIVA HANDIHALER IN), Inhale 1 puff into the lungs daily   Vitamin D, Cholecalciferol, 1000 units TABS, Take 1,000 Units by mouth daily  warfarin ANTICOAGULANT (COUMADIN) 2 MG tablet, TAKE 1 TABLET BY MOUTH ONCE DAILY OR AS DIRECTED BY INR CLINIC  WARFARIN SODIUM PO, Take 2 mg by mouth daily PER INR ORDERS   ASPIRIN NOT PRESCRIBED (INTENTIONAL), Please choose reason not prescribed, below (Patient not taking: Reported on 10/29/2019)  [] cephALEXin (KEFLEX) 500 MG capsule, Take 1 capsule (500 mg) by mouth 3 times daily for 7 days    No current facility-administered medications on file prior to visit.       ALLERGIES:   Allergies   Allergen Reactions     Peanuts [Nuts] Shortness Of Breath     Amiodarone      pulm fibrosis       Baclofen      Confusion      Bactrim [Sulfamethoxazole W-Trimethoprim]      Difficulty swallowing     Doxycycline Other (See Comments)     Multiple complaints; she does not want this again.       "Levaquin [Levofloxacin] Other (See Comments)     Multiple complaints; she will not take this again     Linzess [Linaclotide] Diarrhea     Lorazepam        Prolonged drowsiness with ER visit      Liquid Adhesive Itching and Rash     Bleeding when tape removed after pacemaker placement..itched and burned for weeks.       PHYSICAL EXAM:  Vitals: /64 (BP Location: Right arm, Patient Position: Chair, Cuff Size: Adult Regular)   Pulse 84   Ht 1.6 m (5' 3\")   Wt 67.8 kg (149 lb 6.4 oz)   LMP  (LMP Unknown)   SpO2 97%   BMI 26.47 kg/m    Constitutional:  cooperative, alert and oriented, well developed, well nourished, in no acute distress appears younger than stated age      Skin:  warm and dry to the touch, no apparent skin lesions or masses noted        Head:  normocephalic, no masses or lesions        Eyes:  pupils equal and round;conjunctivae and lids unremarkable;sclera white        ENT:  no pallor or cyanosis, dentition good        Neck:  no carotid bruit;JVP normal        Respiratory:      decreased air movement, but no rales or wheezing today    Cardiac:   irregularly irregular rhythm   no presence of murmur            GI:  abdomen soft;BS normoactive        Vascular:                                        Extremities and Musculoskeletal:  no deformities, clubbing, cyanosis, erythema observed;no edema        Neurological:  no gross motor deficits;affect appropriate   uses walker        LABS/DATA:  I reviewed the following:  Component      Latest Ref Rng & Units 10/29/2019   Sodium      133 - 144 mmol/L 137   Potassium      3.4 - 5.3 mmol/L 4.7   Chloride      94 - 109 mmol/L 104   Carbon Dioxide      20 - 32 mmol/L 29   Anion Gap      3 - 14 mmol/L 4   Glucose      70 - 99 mg/dL 73   Urea Nitrogen      7 - 30 mg/dL 26   Creatinine      0.52 - 1.04 mg/dL 1.45 (H)   GFR Estimate      >60 mL/min/1.73:m2 31 (L)   GFR Estimate If Black      >60 mL/min/1.73:m2 36 (L)   Calcium      8.5 - 10.1 mg/dL 9.2 "   Cholesterol      <200 mg/dL 139   Triglycerides      <150 mg/dL 61   HDL Cholesterol      >49 mg/dL 78   LDL Cholesterol Calculated      <100 mg/dL 49   Non HDL Cholesterol      <130 mg/dL 61         ASSESSMENT/PLAN:  1.  COPD.  Per pulmonary and PMD.    2.  Peripheral edema.  Adequately controlled with pression stockings, which she uses daily, and Lasix 20 mg.     3.  Atrial fibrillation.  Rate controlled on dilt.  Chronically anticoagulated and unaware of any sense of palpitations.    4.  HFpEF.     - Stable on Lasix 20 mg  - BMP today stable     5.  CAD.    - In 2004 or 2005 in [place], she had some stenting to her LAD proximal and mid, and she had an 80% nondominant right, and the circumflex, we believe, was normal, but we do not have the actual reports.   - Dobutamine stress echo 5/28/19 negative for ischemia  - 7/2019 ruled out for MI  - She has some chest pain or epigastric pain.  She is not sure which.    She does not want an angiogram. She will trial going back on a PPI to see if it helps her sxs.  She will also continue to use SL nitroglycerin prn.  If the PPI does not help, we may trial increasing Imdur.   - Continue Imdur, losartan, pravastatin    6.  H/o GI bleed.   - She has some chest pain or epigastric pain.  She is not sure which.   She will trial going back on a PPI to see if it helps her sxs.  She does not want an angiogram.  If the PPI does not help, we may trial increasing Imdur.     7.  SSS s/p PPM  - Device checks through device clinic      Follow up:  Dr. Horton in 3 months      Radha Mckeon PA-C

## 2019-11-06 ENCOUNTER — TELEPHONE (OUTPATIENT)
Dept: PEDIATRICS | Facility: CLINIC | Age: 84
End: 2019-11-06

## 2019-11-06 DIAGNOSIS — Z79.01 LONG TERM CURRENT USE OF ANTICOAGULANT THERAPY: ICD-10-CM

## 2019-11-06 DIAGNOSIS — I48.20 CHRONIC ATRIAL FIBRILLATION (H): ICD-10-CM

## 2019-11-06 DIAGNOSIS — Z79.01 LONG TERM CURRENT USE OF ANTICOAGULANT THERAPY: Primary | ICD-10-CM

## 2019-11-06 NOTE — TELEPHONE ENCOUNTER
Patient no longer has home care.  Last INR done in September. Prior to that, patient was in TCU.  Spoke with patient and reminded her she is overdue for INR. Patient agrees to be seen and already has a PCP appt scheduled for 11/11/2019.  Advised that she have and INR done after appt in lab.  Patient agrees and standing order entered.  Charmaine Rodriguez, RN  Anticoagulation Nurse - [facility] [place]

## 2019-11-11 ENCOUNTER — OFFICE VISIT (OUTPATIENT)
Dept: PEDIATRICS | Facility: CLINIC | Age: 84
End: 2019-11-11
Payer: MEDICARE

## 2019-11-11 VITALS
BODY MASS INDEX: 26.93 KG/M2 | HEART RATE: 76 BPM | WEIGHT: 152 LBS | OXYGEN SATURATION: 94 % | DIASTOLIC BLOOD PRESSURE: 80 MMHG | TEMPERATURE: 98.1 F | SYSTOLIC BLOOD PRESSURE: 134 MMHG | RESPIRATION RATE: 17 BRPM

## 2019-11-11 DIAGNOSIS — Z95.0 CARDIAC PACEMAKER IN SITU: ICD-10-CM

## 2019-11-11 DIAGNOSIS — I48.20 CHRONIC ATRIAL FIBRILLATION (H): Primary | ICD-10-CM

## 2019-11-11 DIAGNOSIS — I50.32 CHRONIC HEART FAILURE WITH PRESERVED EJECTION FRACTION (H): ICD-10-CM

## 2019-11-11 DIAGNOSIS — G47.00 INSOMNIA, UNSPECIFIED TYPE: ICD-10-CM

## 2019-11-11 DIAGNOSIS — N18.30 CKD (CHRONIC KIDNEY DISEASE) STAGE 3, GFR 30-59 ML/MIN (H): ICD-10-CM

## 2019-11-11 DIAGNOSIS — F43.20 ADJUSTMENT DISORDER, UNSPECIFIED TYPE: ICD-10-CM

## 2019-11-11 DIAGNOSIS — I49.5 SICK SINUS SYNDROME (H): ICD-10-CM

## 2019-11-11 DIAGNOSIS — I25.10 CORONARY ARTERY DISEASE INVOLVING NATIVE CORONARY ARTERY OF NATIVE HEART WITHOUT ANGINA PECTORIS: ICD-10-CM

## 2019-11-11 DIAGNOSIS — K22.89 PRESBYESOPHAGUS: ICD-10-CM

## 2019-11-11 DIAGNOSIS — E78.5 HYPERLIPIDEMIA LDL GOAL <100: ICD-10-CM

## 2019-11-11 DIAGNOSIS — J44.9 CHRONIC OBSTRUCTIVE PULMONARY DISEASE, UNSPECIFIED COPD TYPE (H): ICD-10-CM

## 2019-11-11 LAB — INR PPP: 3.2 (ref 0.86–1.14)

## 2019-11-11 PROCEDURE — 99214 OFFICE O/P EST MOD 30 MIN: CPT | Performed by: INTERNAL MEDICINE

## 2019-11-11 PROCEDURE — 36415 COLL VENOUS BLD VENIPUNCTURE: CPT | Performed by: INTERNAL MEDICINE

## 2019-11-11 PROCEDURE — 85610 PROTHROMBIN TIME: CPT | Performed by: INTERNAL MEDICINE

## 2019-11-11 RX ORDER — DILTIAZEM HYDROCHLORIDE 240 MG/1
240 CAPSULE, COATED, EXTENDED RELEASE ORAL DAILY
Qty: 90 CAPSULE | Refills: 3 | Status: SHIPPED | OUTPATIENT
Start: 2019-11-11 | End: 2020-12-03

## 2019-11-11 NOTE — PROGRESS NOTES
SUBJECTIVE:                                                    Leonor Mercado is a 89 year old female who presents to clinic today for the following health issues:    89-year-old woman with a history of  chronic atrial fibrillation, heart failure with preserved ejection fraction, COPD, CAD, sick sinus with prior history of pacemaker, CKD presbyesophagus and hyperlipidemia presents today for follow-up.    Chronic atrial fibrillation.  On long-term anticoagulation.  Rate controlled.  Denies palpitations chest pain shortness of breath or presyncopal symptoms    History of diastolic heart failure.  Patient denies shortness of breath orthopnea or chest pain.  Weight trend:  Wt Readings from Last 4 Encounters:   11/11/19 68.9 kg (152 lb)   10/29/19 67.8 kg (149 lb 6.4 oz)   09/12/19 68.7 kg (151 lb 8 oz)   08/16/19 67.6 kg (149 lb)      CAD, stable.   H/o prior stents 2005    COPD: Stable on current regimen, has been compliant with his nebulizer and MDI's    CKD  Lab Results   Component Value Date    CR 1.45 10/29/2019    CR 1.37 07/13/2019     Presbyesophagus.   Sx stable, dietary modifications    Hyperlipidemia:  Recent Labs   Lab Test 10/29/19  1004 12/11/17  0959 03/15/15  0745   CHOL 139 161 153   HDL 78 85 52   LDL 49 54 75   TRIG 61 109 129   CHOLHDLRATIO  --   --  2.9     Concerns regarding insomnia for the past few months.  Patient is requesting a prescription sleep aid.    Patient Active Problem List   Diagnosis     Hyperlipidemia LDL goal <100     Cardiac pacemaker in situ     Senile osteoporosis     Irritable bowel syndrome (IBS)     Anemia     Long term current use of anticoagulant therapy     Degeneration of lumbar intervertebral disc     CKD (chronic kidney disease) stage 3, GFR 30-59 ml/min (H)     Chronic atrial fibrillation     Chronic obstructive pulmonary disease, unspecified COPD type (H)     Coronary artery disease involving native coronary artery of native heart without angina pectoris      Diastolic dysfunction     Sick sinus syndrome (H)     Adjustment disorder, unspecified type     Presbyesophagus     Chronic heart failure with preserved ejection fraction (H)     Past Surgical History:   Procedure Laterality Date     APPENDECTOMY       CARDIAC SURGERY      PPM, 2 STENTS2-05[place]-CAD-taxus stentx2 2.5-12,2.5-12 in LADp and LADm, 80%nonDomRCA-LcxDom-mild,EF60%     CORONARY ANGIOGRAPHY ADULT ORDER  1994    mild CAD,Normal LV function, No Mitral regurgitation, Prox LAD 30% stenosis. RCA prox and mid, 20-30%     ESOPHAGOSCOPY, GASTROSCOPY, DUODENOSCOPY (EGD), COMBINED N/A 2015    Procedure: COMBINED ESOPHAGOSCOPY, GASTROSCOPY, DUODENOSCOPY (EGD), BIOPSY SINGLE OR MULTIPLE;  Surgeon: Genevieve Leon MD;  Location: [facility]     H LEAD REVISION DUAL  2003    Revised in [place]-due to diaphram stim (original pacer placed in [place])     H LEAD REVISION DUAL  2014    Correction of reversal of A and V leads in Header     HEART CATH, ANGIOPLASTY  2005    LEIGH ANN mid and proximal LAD, ongoing 80% RCA; mild circumflex     HERNIA REPAIR Left     LI     IMPLANT PACEMAKER  2003    Placed in [place] for sick sinus syndrome     REPLACE PACEMAKER GENERATOR  2013    Dual-chamber pacemaker by Dr SETH Pierre       Social History     Tobacco Use     Smoking status: Former Smoker     Packs/day: 0.00     Types: Cigarettes     Last attempt to quit: 1987     Years since quittin.5     Smokeless tobacco: Never Used   Substance Use Topics     Alcohol use: Yes     Alcohol/week: 0.0 standard drinks     Comment: occ     Family History   Problem Relation Age of Onset     Heart Disease Father          age 84     Neurologic Disorder Mother         dementia     Gastrointestinal Disease Daughter         liver transplant     Crohn's Disease Daughter          Current Outpatient Medications   Medication Sig Dispense Refill     acetaminophen (TYLENOL) 500 MG tablet Take 500 mg by mouth 4 times daily And Q6H  PRN BID       albuterol (PROAIR HFA/PROVENTIL HFA/VENTOLIN HFA) 108 (90 Base) MCG/ACT inhaler Inhale 2 puffs into the lungs every 6 hours as needed for shortness of breath / dyspnea or wheezing 18 g 3     ASPIRIN NOT PRESCRIBED (INTENTIONAL) Please choose reason not prescribed, below 0 each 0     calcium carbonate (CALCIUM CARBONATE) 600 MG TABS tablet Take 1 tablet by mouth daily        diltiazem ER COATED BEADS (DILTIAZEM CD) 240 MG 24 hr capsule Take 1 capsule (240 mg) by mouth daily 90 capsule 3     fluticasone-vilanterol (BREO ELLIPTA) 200-25 MCG/INH oral inhaler Inhale 1 puff into the lungs daily 1 Inhaler 3     furosemide (LASIX) 20 MG tablet Take 1 tablet (20 mg) by mouth daily 90 tablet 3     ipratropium - albuterol 0.5 mg/2.5 mg/3 mL (DUONEB) 0.5-2.5 (3) MG/3ML neb solution Take 1 vial (3 mLs) by nebulization every 6 hours as needed for shortness of breath / dyspnea or wheezing       isosorbide mononitrate (IMDUR) 30 MG 24 hr tablet Take 1 tablet (30 mg) by mouth daily 90 tablet 3     Lactobacillus (PROBIOTIC ACIDOPHILUS) TABS Take 1 tablet by mouth daily        losartan (COZAAR) 25 MG tablet Take 1 tablet (25 mg) by mouth daily 90 tablet 3     MELATONIN PO Take 5 mg by mouth nightly as needed        mirtazapine (REMERON) 15 MG tablet Take 1 tablet (15 mg) by mouth At Bedtime 90 tablet 3     Multiple Vitamins-Minerals (OCUVITE PO) Take 1 capsule by mouth daily.       polyethylene glycol (MIRALAX/GLYCOLAX) Packet Take 1 packet by mouth daily        pravastatin (PRAVACHOL) 40 MG tablet TAKE 1 TABLET BY MOUTH ONCE DAILY 90 tablet 3     senna (SENOKOT) 8.6 MG tablet Take 1 tablet by mouth as needed for constipation       Tiotropium Bromide Monohydrate (SPIRIVA HANDIHALER IN) Inhale 1 puff into the lungs daily        Vitamin D, Cholecalciferol, 1000 units TABS Take 1,000 Units by mouth daily       warfarin ANTICOAGULANT (COUMADIN) 2 MG tablet TAKE 1 TABLET BY MOUTH ONCE DAILY OR AS DIRECTED BY INR CLINIC 90  tablet 3     WARFARIN SODIUM PO Take 2 mg by mouth daily PER INR ORDERS          ROS: The following systems have been completely reviewed and are negative except as noted in the HPI: CONSTITUTIONAL,  CARDIOVASCULAR, PULMONARY, GASTROINTESTINAL    OBJECTIVE:                                                    /80 (Cuff Size: Adult Regular)   Pulse 76   Temp 98.1  F (36.7  C) (Oral)   Resp 17   Wt 68.9 kg (152 lb)   LMP  (LMP Unknown)   SpO2 94%   BMI 26.93 kg/m   Body mass index is 26.93 kg/m .  GENERAL:  alert,  no distress  NECK: no tenderness, no adenopathy  RESP: lungs clear to auscultation - no rales, no rhonchi, no wheezes  CV: regular rates and rhythm, normal S1 S2, no S3 or S4 and no murmur, no click or rub   MS: extremities- no edema       ASSESSMENT/PLAN:                                                        ICD-10-CM    1. Chronic atrial fibrillation I48.20 AC: chronic warfarin.  Rate control: diltiazem     Did discuss xarelto or eliquis as alternatives.   Pt and daughter will check coverage and let us know if she would like to stop warfarin     2. Chronic heart failure with preserved ejection fraction (H) I50.32 HFpEF.  Stable.  Did not tolerate trial off furosemide.   Continue losartan     3. Chronic obstructive pulmonary disease, unspecified COPD type (H) J44.9 Stable, continue current rx     Spiriva/breo ellipta/albuterol       4. Coronary artery disease involving native coronary artery of native heart without angina pectoris I25.10 Stable on current rx         5. Sick sinus syndrome (H) I49.5 stable   6. Cardiac pacemaker in situ Z95.0      7. CKD (chronic kidney disease) stage 3, GFR 30-59 ml/min (H) N18.3 Renal function near baseline       8. Presbyesophagus K22.8 Continue dietary measures     9. Adjustment disorder, unspecified type F43.20 Sx well controlled, remeron     10. Hyperlipidemia LDL goal <100 E78.5 Well controlled.  pravachol     11. Insomnia, unspecified type G47.00  Reviewed sleep hygiene, discouraged prescription sleep aids due to fall risk      Arnel Garcia MD  [facility]

## 2019-11-11 NOTE — PATIENT INSTRUCTIONS
"  Patient Education     Tips for Sleep Hygiene  \"Sleep hygiene\" means having good sleep habits.Follow these tips to sleep better at night:     Get on a schedule. Go to bed and get up at about the same time every day.    Listen to your body. Only try to sleep when you actually feel tired or sleepy.    Be patient. If you haven't been able to get to sleep after about 30 minutes or more, get up and do something calming or boring until you feel sleepy. Then return to bed and try again.    Don't have caffeine (coffee, tea, cola drinks, chocolate and some medicines), alcohol or nicotine (cigarettes). These can make it harder for you to fall asleep and stay asleep.    Use your bed for sleeping only. That means no TV, computer or homework in bed, especially during the evening and before bedtime.    Don't nap during the day. If you must nap, make sure it is for less than 20 minutes.    Create sleep rituals that remind your body it is time to sleep. Examples include breathing exercises, stretching or reading a book.    Avoid all electronic media (smart phone, computer, tablet) within 2 hours of bed time. The \"blue light\" in these devices activates the part of the brain that keeps you awake.    Dim the lights at night.    Get early morning sources of light (walk in the sunshine) to help set sleep patterns at night.    Try a bath or shower before bed. Having a warm bath 1 to 2 hours before bedtime can help you feel sleepy. Hot baths can make you alert, so be mindful of the temperature.    Don't watch the clock. Checking the clock during the night can wake you up. It can also lead to negative thoughts such as, \"I will never fall asleep,\" which can increase anxiety and sleeplessness.    Use a sleep diary. Track your sleep schedule to know your sleep patterns and to see where you can improve.    Get regular exercise every day. Try not to do heavy exercise in the 4 hours before bedtime.    Eat a healthy, balanced diet.    Try eating " a light, healthy snack before bed, but avoid eating a heavy meal.    Create the right sleeping area. A cool, dark, quiet room is best. If needed, try earplugs, fans and blackout curtains.    Keep your daytime routine the same even if you have a bad night sleep. Avoiding activities the next day can make it harder to sleep.  For informational purposes only. Not to replace the advice of your health care provider.   Copyright   2013 [employer]. All rights reserved. Adan 616436 - 01/16.  For informational purposes only. Not to replace the advice of your health care provider.  Copyright   2018 [employer]. All rights reserved.           Patient Education     Treating Insomnia     Learning to relax before bedtime can improve your sleep.   Good sleeping habits are a key part of treatment. If needed, some medicines may help you sleep better at first. Making healthy lifestyle changes and learning to relax can improve your sleep. Treating insomnia takes commitment. But trust that your efforts will pay off. Be sure to talk with your healthcare provider before taking any medicine.  Healthy lifestyle  Your lifestyle affects your health and your sleep. Here are some healthy habits:    Keep a regular sleep schedule. Go to bed and get up at the same time each day.    Exercise regularly. It may help you reduce stress. Avoid strenuous exercise for 2 to 4 hours before bedtime.    Avoid or limit naps, especially in the late afternoon.    Use your bed only for sleep and sex.    Don t spend too much time in bed trying to fall asleep. If you can t fall asleep, get up and do something (no electronics) until you become tired and drowsy.    Avoid or limit caffeine and nicotine for up to 6 hours before bedtime. They can keep you awake at night.     Also avoid alcohol for at least 4 to 6 hours before bedtime. It may help you fall asleep at first. But you will have more awakenings during the night. And your sleep  will not be restful.  Before bedtime  To sleep better every night, try these tips:    Have a bedtime routine to let your body and mind know when it s time to sleep.    Think of going to bed as relaxing and enjoyable. Sleep will come sooner.    If your worries don t let you sleep, write them down in a diary. Then close it, and go to bed.    Make sure the room is not too hot or too cold. If it s not dark enough, an eye mask can help. If it s noisy, try using earplugs.    Don't eat a large meal just before bedtime.    Remove noises, bright lights, TVs, cell phones, and computers from your sleeping environment.    Use a comfortable mattress and pillow.  Learn to relax  Stress, anxiety, and body tension may keep you awake at night. To unwind before bedtime, try a warm bath, meditation, or yoga. Also try the following:    Deep breathing. Sit or lie back in a chair. Take a slow, deep breath. Hold it for 5 counts. Then breathe out slowly through your mouth. Keep doing this until you feel relaxed.    Progressive muscle relaxation. Tense and then relax the muscles in your body as you breathe deeply. Start with your feet and work up your body to your neck and face.  Cognitive Behavioral Therapy (CBT)  CBT is the most effective treatment for long-term insomnia. It tries to address the underlying causes of your sleep problems, including your habits and how you think about sleep.  Individual Therapy  Ryan Tobin PsyD, CAREN  Insomnia   [facility]    [facility]: [phone]    [facility]: [phone]  [facility]  Visit [email or web address] or call [phone] to find a clinic close to you.  Suggested resources  The resources below may help you relax. This list is for information only. [facility] is not responsible for the quality of services or the actions of any person or organization. There may be a fee to use some of these  resources.  Insomnia treatment books  Hernan Mind by Georgia Jackson and Sara Jeffery (2013)  Overcoming Insomnia by Robert Emmanuel and Georgia Jackson (2008)  Quiet Your Mind and Get to Sleep: Solutions to Insomnia for Those with Depression, Anxiety or Chronic Pain by Georgia Jackson and Sara Jeffery (2009)  No More Sleepless Nights by Da Lawrence and Ronda Henderson (1996)  Say Hernan to Insomnia by Merrill Pepper (2009)  The Insomnia Workbook by Uzma Gonsalez and Mason Patel (2009)  The Insomnia Answer by Alton Rosales and Darrell Herr (2006)  Stress management and relaxation books  The Relaxation and Stress Reduction Workbook by Mana Best, Marcy Quinones and Tom Dowd (2008)  Stress Management Workbook: Techniques and Self-Assessment Procedures by Lottie Branch and Aristides Jensen (1997)  A Mindfulness-Based Stress Reduction Workbook by Daryl Mckeon and Deepa Goldstein (2010)  The Complete Stress Management Workbook by [place], Luan Sexton and Thomas Redman (1996)  Online programs  www.SHUTi.me (pronounced shut eye). There is a fee for this program.   www.sleepIO.com (pronounced sleep ee oh). There is a fee for this program.  Other online resources  Deep breathing and progressive muscle relaxation (PMR):    http://www.velingo.Drivable  Meditation:    www.fragrantheart.Drivable    www.theSurya Power MagicguidedSurya Power Magicmeditation-site.com  Guided imagery:    http://www.CareCloud    http://Palmer Hargreaves.Drivable  (click on  Resource Library,  then choose  Meditation, Relaxation, Guided Imagery )  Apps for your mobile device:    Autogenic Training Progressive Muscle Relaxation by [employer]    Calm: Meditation and Sleep Stories by [employer]    Deltagen Timer - Meditation Ras by Bunch.  This is not a prescription and these resources are optional. You must pay for any costs when using these resources. Please ask your insurance carrier if you can be  reimbursed for these resources. If so, you are responsible for sending the needed details to your insurance carrier. These resources may also be tax deductible as medical expenses. Check with your .  Date Last Reviewed: 5/18/2018  Clinically reviewed by Ryan Tobin PsyD, LP, [facility], Director of the Insomnia and Behavioral Sleep Health Program, [facility].    8622-7102 [employer]. [address] [place] [address], [place] [address]. All rights reserved. This information is not intended as a substitute for professional medical care. Always follow your healthcare professional's instructions.

## 2019-11-12 ENCOUNTER — TELEPHONE (OUTPATIENT)
Dept: PEDIATRICS | Facility: CLINIC | Age: 84
End: 2019-11-12
Payer: MEDICARE

## 2019-11-12 DIAGNOSIS — Z53.9 ERRONEOUS ENCOUNTER--DISREGARD: Primary | ICD-10-CM

## 2019-11-12 DIAGNOSIS — Z79.01 LONG TERM CURRENT USE OF ANTICOAGULANT THERAPY: ICD-10-CM

## 2019-11-12 DIAGNOSIS — I48.20 CHRONIC ATRIAL FIBRILLATION (H): ICD-10-CM

## 2019-11-12 NOTE — TELEPHONE ENCOUNTER
Left message for patient to call the [facility] regarding INR results and dosage instructions as she no longer has home care. [facility] RN to confirm what dose of warfarin she has been taking.   Uzma Amezquita RN

## 2019-11-12 NOTE — TELEPHONE ENCOUNTER
This patient had her INR done yesterday at lab following an office visit.  PCP did not send result to [facility] and [facility] was closed.  She is done with homecare and will need to come into the clinic at [facility] for her next INR. Please assist her in making an appointment.    Ebony Doshi RN

## 2019-11-13 ENCOUNTER — TRANSFERRED RECORDS (OUTPATIENT)
Dept: HEALTH INFORMATION MANAGEMENT | Facility: CLINIC | Age: 84
End: 2019-11-13

## 2019-11-13 ENCOUNTER — TELEPHONE (OUTPATIENT)
Dept: PEDIATRICS | Facility: CLINIC | Age: 84
End: 2019-11-13

## 2019-11-13 DIAGNOSIS — Z79.01 LONG TERM CURRENT USE OF ANTICOAGULANT THERAPY: ICD-10-CM

## 2019-11-13 DIAGNOSIS — I48.20 CHRONIC ATRIAL FIBRILLATION (H): ICD-10-CM

## 2019-11-13 NOTE — TELEPHONE ENCOUNTER
Consent to communicate given to daughter Cleo. Writer left a message for her today that we are trying to get in touch with her mother.  Uzma Amezquita RN  [facility]  Ph: [phone]

## 2019-11-13 NOTE — TELEPHONE ENCOUNTER
[facility] to manage pt per Dr. Garcia  INR 3.20 on 11/11/19  Left two message for patient with no return call yet.  Uzma Amezquita RN  [facility]  Ph: [phone]

## 2019-11-15 NOTE — TELEPHONE ENCOUNTER
"Patient states Dr. Garcia called her on 11/11 and told her to hold her coumadin dose. She then resume normal dosing there after. She states she is not eating as much Vit K foods as her \"nursing home doesn't give us any salads or greens\".  She has purchased salads no on her own. She will have her daughter setup an INR Clinic Appt for the seek of November 25th.  Charmaine Rodriguez RN  Anticoagulation Nurse - [facility] [place]    "

## 2019-11-17 PROBLEM — J15.9 COMMUNITY ACQUIRED BACTERIAL PNEUMONIA: Status: RESOLVED | Noted: 2019-06-19 | Resolved: 2019-11-17

## 2019-12-07 ENCOUNTER — OFFICE VISIT (OUTPATIENT)
Dept: URGENT CARE | Facility: URGENT CARE | Age: 84
End: 2019-12-07
Payer: MEDICARE

## 2019-12-07 VITALS
BODY MASS INDEX: 26.93 KG/M2 | OXYGEN SATURATION: 98 % | SYSTOLIC BLOOD PRESSURE: 130 MMHG | TEMPERATURE: 97.8 F | DIASTOLIC BLOOD PRESSURE: 78 MMHG | HEART RATE: 83 BPM | RESPIRATION RATE: 18 BRPM | WEIGHT: 152 LBS

## 2019-12-07 DIAGNOSIS — H11.32 SUBCONJUNCTIVAL HEMORRHAGE OF LEFT EYE: Primary | ICD-10-CM

## 2019-12-07 PROCEDURE — 99213 OFFICE O/P EST LOW 20 MIN: CPT | Performed by: FAMILY MEDICINE

## 2019-12-07 NOTE — PATIENT INSTRUCTIONS
Patient Education     Subconjunctival Hemorrhage  A subconjunctival hemorrhage is a result of a broken blood vessel in the white part of the eye. It is usually painless and may be caused by coughing, sneezing, or vomiting. An injury to the eye can cause this. It can also be a sign of high blood pressure (hypertension) or a bleeding disorder.  This can look frightening. But the presence of the blood is not serious. The blood will be reabsorbed without treatment within 2 to 3 weeks.    Home care  You may continue your usual activities.  Follow-up care  Follow up with your healthcare provider, or as advised.  When to seek medical advice  Contact your healthcare provider right away if any of these occur:    Pain in the eye    Change in vision    The blood does not go away within 3 weeks    Increasing redness or swelling of the eye    Severe headache or dizziness    Signs of bruising or bleeding from other parts of your body  Date Last Reviewed: 8/1/2017 2000-2018 The [employer]. [address] [place] [address], [place] [address]. All rights reserved. This information is not intended as a substitute for professional medical care. Always follow your healthcare professional's instructions.

## 2019-12-07 NOTE — PROGRESS NOTES
Subjective     Leonor Mercado is a 89 year old female who presents to clinic today for the following health issues:    HPI   Eye(s) Problem      Duration: 6 days    Description:  Location: left  Pain: no   Redness: YES  Discharge: no     Accompanying signs and symptoms: none     History (Trauma, foreign body exposure,): None    Precipitating or alleviating factors (contact use): None    Therapies tried and outcome: none     On warfarin         Patient Active Problem List   Diagnosis     Hyperlipidemia LDL goal <100     Cardiac pacemaker in situ     Senile osteoporosis     Irritable bowel syndrome (IBS)     Anemia     Long term current use of anticoagulant therapy     Degeneration of lumbar intervertebral disc     CKD (chronic kidney disease) stage 3, GFR 30-59 ml/min (H)     Chronic atrial fibrillation     Chronic obstructive pulmonary disease, unspecified COPD type (H)     Coronary artery disease involving native coronary artery of native heart without angina pectoris     Diastolic dysfunction     Sick sinus syndrome (H)     Adjustment disorder, unspecified type     Presbyesophagus     Chronic heart failure with preserved ejection fraction (H)     Past Surgical History:   Procedure Laterality Date     APPENDECTOMY  1956     CARDIAC SURGERY      PPM, 2 STENTS2-05[place]-CAD-taxus stentx2 2.5-12,2.5-12 in LADp and LADm, 80%nonDomRCA-LcxDom-mild,EF60%     CORONARY ANGIOGRAPHY ADULT ORDER  7/1994    mild CAD,Normal LV function, No Mitral regurgitation, Prox LAD 30% stenosis. RCA prox and mid, 20-30%     ESOPHAGOSCOPY, GASTROSCOPY, DUODENOSCOPY (EGD), COMBINED N/A 8/19/2015    Procedure: COMBINED ESOPHAGOSCOPY, GASTROSCOPY, DUODENOSCOPY (EGD), BIOPSY SINGLE OR MULTIPLE;  Surgeon: Genevieve Leon MD;  Location:  GI     H LEAD REVISION DUAL  4/2003    Revised in [place]-due to diaphram stim (original pacer placed in [place])     H LEAD REVISION DUAL  7/2/2014    Correction of reversal of A and V leads in Header      HEART CATH, ANGIOPLASTY  2005    LEIGH ANN mid and proximal LAD, ongoing 80% RCA; mild circumflex     HERNIA REPAIR Left     LIH     IMPLANT PACEMAKER  2003    Placed in [place] for sick sinus syndrome     REPLACE PACEMAKER GENERATOR  2013    Dual-chamber pacemaker by Dr SETH Pierre       Social History     Tobacco Use     Smoking status: Former Smoker     Packs/day: 0.00     Types: Cigarettes     Last attempt to quit: 1987     Years since quittin.6     Smokeless tobacco: Never Used   Substance Use Topics     Alcohol use: Yes     Alcohol/week: 0.0 standard drinks     Comment: occ     Family History   Problem Relation Age of Onset     Heart Disease Father          age 84     Neurologic Disorder Mother         dementia     Gastrointestinal Disease Daughter         liver transplant     Crohn's Disease Daughter          Current Outpatient Medications   Medication Sig Dispense Refill     acetaminophen (TYLENOL) 500 MG tablet Take 500 mg by mouth 4 times daily And Q6H PRN BID       albuterol (PROAIR HFA/PROVENTIL HFA/VENTOLIN HFA) 108 (90 Base) MCG/ACT inhaler Inhale 2 puffs into the lungs every 6 hours as needed for shortness of breath / dyspnea or wheezing 18 g 3     ASPIRIN NOT PRESCRIBED (INTENTIONAL) Please choose reason not prescribed, below 0 each 0     calcium carbonate (CALCIUM CARBONATE) 600 MG TABS tablet Take 1 tablet by mouth daily        diltiazem ER COATED BEADS (DILTIAZEM CD) 240 MG 24 hr capsule Take 1 capsule (240 mg) by mouth daily 90 capsule 3     fluticasone-vilanterol (BREO ELLIPTA) 200-25 MCG/INH oral inhaler Inhale 1 puff into the lungs daily 1 Inhaler 3     furosemide (LASIX) 20 MG tablet Take 1 tablet (20 mg) by mouth daily 90 tablet 3     ipratropium - albuterol 0.5 mg/2.5 mg/3 mL (DUONEB) 0.5-2.5 (3) MG/3ML neb solution Take 1 vial (3 mLs) by nebulization every 6 hours as needed for shortness of breath / dyspnea or wheezing       isosorbide mononitrate (IMDUR) 30 MG 24 hr  tablet Take 1 tablet (30 mg) by mouth daily 90 tablet 3     Lactobacillus (PROBIOTIC ACIDOPHILUS) TABS Take 1 tablet by mouth daily        losartan (COZAAR) 25 MG tablet Take 1 tablet (25 mg) by mouth daily 90 tablet 3     MELATONIN PO Take 5 mg by mouth nightly as needed        mirtazapine (REMERON) 15 MG tablet Take 1 tablet (15 mg) by mouth At Bedtime 90 tablet 3     Multiple Vitamins-Minerals (OCUVITE PO) Take 1 capsule by mouth daily.       polyethylene glycol (MIRALAX/GLYCOLAX) Packet Take 1 packet by mouth daily        pravastatin (PRAVACHOL) 40 MG tablet TAKE 1 TABLET BY MOUTH ONCE DAILY 90 tablet 3     senna (SENOKOT) 8.6 MG tablet Take 1 tablet by mouth as needed for constipation       Tiotropium Bromide Monohydrate (SPIRIVA HANDIHALER IN) Inhale 1 puff into the lungs daily        Vitamin D, Cholecalciferol, 1000 units TABS Take 1,000 Units by mouth daily       warfarin ANTICOAGULANT (COUMADIN) 2 MG tablet TAKE 1 TABLET BY MOUTH ONCE DAILY OR AS DIRECTED BY INR CLINIC 90 tablet 3     WARFARIN SODIUM PO Take 2 mg by mouth daily PER INR ORDERS        Allergies   Allergen Reactions     Peanuts [Nuts] Shortness Of Breath     Amiodarone      pulm fibrosis       Baclofen      Confusion      Bactrim [Sulfamethoxazole W-Trimethoprim]      Difficulty swallowing     Doxycycline Other (See Comments)     Multiple complaints; she does not want this again.      Levaquin [Levofloxacin] Other (See Comments)     Multiple complaints; she will not take this again     Linzess [Linaclotide] Diarrhea     Lorazepam        Prolonged drowsiness with ER visit      Liquid Adhesive Itching and Rash     Bleeding when tape removed after pacemaker placement..itched and burned for weeks.     Recent Labs   Lab Test 10/29/19  1004 07/13/19  0713  06/19/19  1419  04/24/18  0625  04/16/18  1153  12/11/17  0959 11/15/16  0050  03/15/15  0745 03/14/15  0437  04/30/14  1222   A1C  --   --   --   --   --   --   --   --   --   --   --   --   --    --   --  6.1*   LDL 49  --   --   --   --   --   --   --   --  54  --   --  75  --   --   --    HDL 78  --   --   --   --   --   --   --   --  85  --   --  52  --   --   --    TRIG 61  --   --   --   --   --   --   --   --  109  --   --  129  --   --   --    ALT  --   --   --  20  --   --   --  22  --   --  18   < >  --  16   < >  --    CR 1.45* 1.37*   < > 1.25*   < > 1.17*   < > 1.60*   < >  --  1.17*   < > 1.16*  --    < > 1.60*   GFRESTIMATED 31* 33*   < > 37*   < > 43*   < > 30*   < >  --  43*   < > 44*  --    < > 30*   GFRESTBLACK 36* 38*   < > 43*   < > 52*   < > 37*   < >  --  53*   < > 53*  --    < > 37*   POTASSIUM 4.7 3.9   < > 4.1   < > 3.7   < > 4.2   < >  --  3.8   < > 4.5  --    < > 4.6   TSH  --   --   --   --   --  3.01  --   --   --   --   --   --   --  2.40   < >  --     < > = values in this interval not displayed.      BP Readings from Last 3 Encounters:   12/07/19 130/78   11/11/19 134/80   10/29/19 118/64    Wt Readings from Last 3 Encounters:   12/07/19 68.9 kg (152 lb)   11/11/19 68.9 kg (152 lb)   10/29/19 67.8 kg (149 lb 6.4 oz)               Reviewed and updated as needed this visit by Provider         Review of Systems   ROS COMP: Constitutional, HEENT, cardiovascular, pulmonary, gi and gu systems are negative, except as otherwise noted.      Objective    /78   Pulse 83   Temp 97.8  F (36.6  C) (Tympanic)   Resp 18   Wt 68.9 kg (152 lb)   LMP  (LMP Unknown)   SpO2 98%   BMI 26.93 kg/m    Body mass index is 26.93 kg/m .  Physical Exam   GENERAL: healthy, alert and no distress  EYES: conjunctiva/corneas- subconjunctival hemorrhage left eye, no foreign body visualized, PERRLA  NECK: no adenopathy, no asymmetry, masses, or scars and thyroid normal to palpation  RESP: lungs clear to auscultation - no rales, rhonchi or wheezes  CV: regular rates and rhythm, normal S1 S2, no S3 or S4 and no murmur, click or rub  ABDOMEN: soft, nontender  NEURO: Normal strength and tone, mentation  intact and speech normal      Lab Results   Component Value Date    INR 3.20 11/11/2019    INR 2.60 07/18/2019    INR 2.11 07/01/2019    INR 2.74 06/24/2019    INR 2.20 06/23/2019    INR 2.08 06/22/2019    INR 2.69 06/21/2019    INR 3.14 06/20/2019    INR 2.25 06/19/2019    INR 1.9 06/13/2019    INR 3.4 05/16/2019    INR 3.1 04/18/2019    INR 3.02 04/06/2019    INR 2.4 03/21/2019    INR 2.0 02/18/2019    INR 2.9 01/15/2019    INR 2.3 12/18/2018    INR 3.9 12/04/2018    INR 2.3 11/06/2018    INR 1.30 10/23/2018    INR 2.1 09/25/2018    INR 2.0 08/28/2018    INR 2.4 07/24/2018    INR 3.6 05/25/2018       Assessment & Plan     (H11.32) Subconjunctival hemorrhage of left eye  (primary encounter diagnosis)  Comment: Physical examination remarkable for subconjunctival hemorrhage of left eye.  Reassurance provided.  Suggested warm compresses and artificial tears.  Follow-up as needed.  All questions answered.        Patient Instructions     Patient Education     Subconjunctival Hemorrhage  A subconjunctival hemorrhage is a result of a broken blood vessel in the white part of the eye. It is usually painless and may be caused by coughing, sneezing, or vomiting. An injury to the eye can cause this. It can also be a sign of high blood pressure (hypertension) or a bleeding disorder.  This can look frightening. But the presence of the blood is not serious. The blood will be reabsorbed without treatment within 2 to 3 weeks.    Home care  You may continue your usual activities.  Follow-up care  Follow up with your healthcare provider, or as advised.  When to seek medical advice  Contact your healthcare provider right away if any of these occur:    Pain in the eye    Change in vision    The blood does not go away within 3 weeks    Increasing redness or swelling of the eye    Severe headache or dizziness    Signs of bruising or bleeding from other parts of your body  Date Last Reviewed: 8/1/2017 2000-2018 [employer],  [employer]. [address] [place] [address]. All rights reserved. This information is not intended as a substitute for professional medical care. Always follow your healthcare professional's instructions.             Layton Vega MD  [facility]

## 2019-12-19 ENCOUNTER — ANTICOAGULATION THERAPY VISIT (OUTPATIENT)
Dept: NURSING | Facility: CLINIC | Age: 84
End: 2019-12-19
Payer: MEDICARE

## 2019-12-19 DIAGNOSIS — I48.20 CHRONIC ATRIAL FIBRILLATION (H): ICD-10-CM

## 2019-12-19 DIAGNOSIS — Z79.01 LONG TERM CURRENT USE OF ANTICOAGULANT THERAPY: Primary | ICD-10-CM

## 2019-12-19 LAB — INR POINT OF CARE: 3.5 (ref 0.86–1.14)

## 2019-12-19 PROCEDURE — 85610 PROTHROMBIN TIME: CPT | Mod: QW

## 2019-12-19 PROCEDURE — 36416 COLLJ CAPILLARY BLOOD SPEC: CPT

## 2019-12-19 NOTE — PROGRESS NOTES
ANTICOAGULATION FOLLOW-UP CLINIC VISIT    Patient Name:  Leonor Mercado  Date:  12/19/2019  Contact Type:  Face to Face  Patient is accompanied in the office by her daughter.    SUBJECTIVE:  Patient Findings     Positives:   Extra doses    Comments:   Patient was hospitalized 6/19/19 to 6/24/19.  TCU 6/24/19 to 7/15/19  Had Homecare 7/15/19 to September 2019.  She is now returning to the INR Clinic.    States she has been taking 2 mg daily.  Last dosing was 1 mg Mon; 2 mg all other days.    INR is supratherapeutic today.  She was taking 7.7% more than instructed.   Patient will hold dose today, then take maintenance dose.   Will follow up in 3 weeks.  She is not available in 2 weeks due to the holidays.        Clinical Outcomes     Comments:   Patient was hospitalized 6/19/19 to 6/24/19.  TCU 6/24/19 to 7/15/19  Had Homecare 7/15/19 to September 2019.  She is now returning to the INR Clinic.    States she has been taking 2 mg daily.  Last dosing was 1 mg Mon; 2 mg all other days.    INR is supratherapeutic today.  She was taking 7.7% more than instructed.   Patient will hold dose today, then take maintenance dose.   Will follow up in 3 weeks.  She is not available in 2 weeks due to the holidays.           OBJECTIVE    INR Protime   Date Value Ref Range Status   12/19/2019 3.5 (A) 0.86 - 1.14 Final       ASSESSMENT / PLAN  INR assessment SUPRA    Recheck INR In: 3 WEEKS    INR Location Clinic      Anticoagulation Summary  As of 12/19/2019    INR goal:   2.0-3.0   TTR:   62.2 % (7.9 mo)   INR used for dosing:   3.5! (12/19/2019)   Warfarin maintenance plan:   1 mg (2 mg x 0.5) every Mon; 2 mg (2 mg x 1) all other days   Full warfarin instructions:   12/19: Hold; Otherwise 1 mg every Mon; 2 mg all other days   Weekly warfarin total:   13 mg   Plan last modified:   Ebony Doshi RN (5/16/2019)   Next INR check:   1/9/2020   Priority:   Maintenance   Target end date:       Indications    Chronic atrial  "fibrillation [I48.20]  Long term current use of anticoagulant therapy [Z79.01]             Anticoagulation Episode Summary     INR check location:       Preferred lab:       Send INR reminders to:   SAUL CHANDLER    Comments:   2mg tabs - nancy / 1st name pronounced \"ondray\" /       Anticoagulation Care Providers     Provider Role Specialty Phone number    Arnel Garcia MD  Internal Medicine [phone]            See the Encounter Report to view Anticoagulation Flowsheet and Dosing Calendar (Go to Encounters tab in chart review, and find the Anticoagulation Therapy Visit)    INR is supratherapeutic today.  She was taking 7.7% more than instructed.   Patient will hold dose today, then take maintenance dose.   Will follow up in 3 weeks.    Dosage adjustment made based on physician directed care plan.      Ebony Doshi RN                 "

## 2019-12-24 ENCOUNTER — TELEPHONE (OUTPATIENT)
Dept: PEDIATRICS | Facility: CLINIC | Age: 84
End: 2019-12-24

## 2019-12-24 DIAGNOSIS — Z79.01 LONG TERM CURRENT USE OF ANTICOAGULANT THERAPY: Primary | ICD-10-CM

## 2019-12-24 DIAGNOSIS — I48.20 CHRONIC ATRIAL FIBRILLATION (H): ICD-10-CM

## 2019-12-24 NOTE — TELEPHONE ENCOUNTER
Reimbursement rules require all INR Clinic patients to have a yearly renewal of an INR Clinic Referral order.  Referral must be signed by the PCP for each patient.      Referral is pended. Please complete and sign.     Ebony Doshi RN

## 2019-12-26 DIAGNOSIS — I10 ESSENTIAL HYPERTENSION WITH GOAL BLOOD PRESSURE LESS THAN 140/90: ICD-10-CM

## 2019-12-27 RX ORDER — LOSARTAN POTASSIUM 50 MG/1
TABLET ORAL
Refills: 0 | OUTPATIENT
Start: 2019-12-27

## 2019-12-27 NOTE — TELEPHONE ENCOUNTER
90 day supply with 3 refill sent 7/18/19 to different [facility] pharmacy. Refill request too soon. Refused.     Krystin Gracia, RN, BSN, PHN  [facility]: [place]

## 2019-12-29 DIAGNOSIS — Z79.01 LONG TERM CURRENT USE OF ANTICOAGULANT THERAPY: ICD-10-CM

## 2019-12-31 RX ORDER — WARFARIN SODIUM 2 MG/1
TABLET ORAL
Qty: 90 TABLET | Refills: 3 | Status: SHIPPED | OUTPATIENT
Start: 2019-12-31 | End: 2020-10-02

## 2019-12-31 NOTE — TELEPHONE ENCOUNTER
Prescription approved per [facility] Refill Protocol.    1 mg (2 mg x 0.5) every Mon; 2 mg (2 mg x 1) all other days    Kendal Mcqueen RN   [facility] [place]

## 2020-01-09 ENCOUNTER — ANTICOAGULATION THERAPY VISIT (OUTPATIENT)
Dept: NURSING | Facility: CLINIC | Age: 85
End: 2020-01-09
Payer: MEDICARE

## 2020-01-09 DIAGNOSIS — I48.20 CHRONIC ATRIAL FIBRILLATION (H): ICD-10-CM

## 2020-01-09 DIAGNOSIS — Z79.01 LONG TERM CURRENT USE OF ANTICOAGULANT THERAPY: Primary | ICD-10-CM

## 2020-01-09 LAB — INR POINT OF CARE: 2.5 (ref 0.86–1.14)

## 2020-01-09 PROCEDURE — 85610 PROTHROMBIN TIME: CPT | Mod: QW

## 2020-01-09 PROCEDURE — 36416 COLLJ CAPILLARY BLOOD SPEC: CPT

## 2020-01-09 NOTE — PROGRESS NOTES
"ANTICOAGULATION FOLLOW-UP CLINIC VISIT    Patient Name:  Leonor Mercado  Date:  2020  Contact Type:  Face to Face  Patient is accompanied in the office by her daughter.    SUBJECTIVE:  Patient Findings     Comments:   The patient was assessed for   diet, medication,   missed or extra doses,   bruising or bleeding,   with no problem findings.          Clinical Outcomes     Comments:   The patient was assessed for   diet, medication,   missed or extra doses,   bruising or bleeding,   with no problem findings.             OBJECTIVE    INR Protime   Date Value Ref Range Status   2020 2.5 (A) 0.86 - 1.14 Final       ASSESSMENT / PLAN  INR assessment THER    Recheck INR In: 4 WEEKS    INR Location Clinic      Anticoagulation Summary  As of 2020    INR goal:   2.0-3.0   TTR:   57.8 % (7.9 mo)   Prior goal:   2.0-3.0   INR used for dosin.5 (2020)   Warfarin maintenance plan:   1 mg (2 mg x 0.5) every Mon; 2 mg (2 mg x 1) all other days   Full warfarin instructions:   1 mg every Mon; 2 mg all other days   Weekly warfarin total:   13 mg   No change documented:   Ebony Doshi, RN   Plan last modified:   Ebony Doshi RN (2019)   Next INR check:   2020   Priority:   Maintenance   Target end date:   Indefinite    Indications    Chronic atrial fibrillation [I48.20]  Long term current use of anticoagulant therapy [Z79.01]             Anticoagulation Episode Summary     INR check location:       Preferred lab:       Send INR reminders to:   SAUL BARRETT    Comments:   2mg tabs - nancy / 1st name pronounced \"ondray\" 2019 renewal completed      Anticoagulation Care Providers     Provider Role Specialty Phone number    Arnel Garcia MD Referring Internal Medicine [phone]            See the Encounter Report to view Anticoagulation Flowsheet and Dosing Calendar (Go to Encounters tab in chart review, and find the Anticoagulation Therapy Visit)    INR is therapeutic today. "   Patient will continue same maintenance dose.   Follow up in 4 weeks.        Ebony Doshi RN

## 2020-02-11 ENCOUNTER — ANTICOAGULATION THERAPY VISIT (OUTPATIENT)
Dept: NURSING | Facility: CLINIC | Age: 85
End: 2020-02-11
Payer: MEDICARE

## 2020-02-11 DIAGNOSIS — I48.20 CHRONIC ATRIAL FIBRILLATION (H): ICD-10-CM

## 2020-02-11 DIAGNOSIS — Z79.01 LONG TERM CURRENT USE OF ANTICOAGULANT THERAPY: Primary | ICD-10-CM

## 2020-02-11 LAB — INR POINT OF CARE: 2.2 (ref 0.86–1.14)

## 2020-02-11 PROCEDURE — 36416 COLLJ CAPILLARY BLOOD SPEC: CPT

## 2020-02-11 PROCEDURE — 85610 PROTHROMBIN TIME: CPT | Mod: QW

## 2020-02-11 NOTE — PROGRESS NOTES
"ANTICOAGULATION FOLLOW-UP CLINIC VISIT    Patient Name:  Leonor Mercado  Date:  2020  Contact Type:  Face to Face  Patient is accompanied in the office by her daughter.    SUBJECTIVE:  Patient Findings     Comments:   The patient was assessed for   diet, medication,   missed or extra doses,   bruising or bleeding,   with no problem findings.          Clinical Outcomes     Comments:   The patient was assessed for   diet, medication,   missed or extra doses,   bruising or bleeding,   with no problem findings.             OBJECTIVE    INR Protime   Date Value Ref Range Status   2020 2.2 (A) 0.86 - 1.14 Final       ASSESSMENT / PLAN  INR assessment THER    Recheck INR In: 4 WEEKS    INR Location Clinic      Anticoagulation Summary  As of 2020    INR goal:   2.0-3.0   TTR:   57.8 % (7.9 mo)   INR used for dosin.2 (2020)   Warfarin maintenance plan:   1 mg (2 mg x 0.5) every Mon; 2 mg (2 mg x 1) all other days   Full warfarin instructions:   1 mg every Mon; 2 mg all other days   Weekly warfarin total:   13 mg   No change documented:   Ebony Doshi RN   Plan last modified:   Ebony Doshi RN (2019)   Next INR check:   3/10/2020   Priority:   Maintenance   Target end date:   Indefinite    Indications    Chronic atrial fibrillation [I48.20]  Long term current use of anticoagulant therapy [Z79.01]             Anticoagulation Episode Summary     INR check location:   Anticoagulation Clinic    Preferred lab:       Send INR reminders to:   SAUL BARRETT    Comments:   2mg tabs - nancy / 1st name pronounced \"ondray\" / comes with her daughter      Anticoagulation Care Providers     Provider Role Specialty Phone number    Arnel Garcia MD Referring Internal Medicine [phone]            See the Encounter Report to view Anticoagulation Flowsheet and Dosing Calendar (Go to Encounters tab in chart review, and find the Anticoagulation Therapy Visit)    INR is therapeutic today. "   Patient will continue same maintenance dose.   Follow up in 4 weeks.        Ebony Doshi RN

## 2020-02-12 ENCOUNTER — TRANSFERRED RECORDS (OUTPATIENT)
Dept: HEALTH INFORMATION MANAGEMENT | Facility: CLINIC | Age: 85
End: 2020-02-12

## 2020-02-18 DIAGNOSIS — E78.5 HYPERLIPIDEMIA LDL GOAL <100: ICD-10-CM

## 2020-02-18 DIAGNOSIS — I25.10 CORONARY ARTERY DISEASE INVOLVING NATIVE CORONARY ARTERY OF NATIVE HEART WITHOUT ANGINA PECTORIS: ICD-10-CM

## 2020-02-19 RX ORDER — PRAVASTATIN SODIUM 40 MG
TABLET ORAL
Qty: 90 TABLET | Refills: 2 | Status: SHIPPED | OUTPATIENT
Start: 2020-02-19 | End: 2020-12-03

## 2020-02-19 NOTE — TELEPHONE ENCOUNTER
"    Requested Prescriptions   Pending Prescriptions Disp Refills     PRAVASTATIN 40 MG PO tablet [Pharmacy Med Name: Pravastatin Sodium 40 MG Oral Tablet]  0     Sig: TAKE 1 TABLET BY MOUTH ONCE DAILY       Statins Protocol Passed - 2/18/2020  3:23 PM        Passed - LDL on file in past 12 months     Recent Labs   Lab Test 10/29/19  1004   LDL 49             Passed - No abnormal creatine kinase in past 12 months     No lab results found.             Passed - Recent (12 mo) or future (30 days) visit within the authorizing provider's specialty     Patient has had an office visit with the authorizing provider or a provider within the authorizing providers department within the previous 12 mos or has a future within next 30 days. See \"Patient Info\" tab in inbasket, or \"Choose Columns\" in Meds & Orders section of the refill encounter.              Passed - Medication is active on med list        Passed - Patient is age 18 or older        Passed - No active pregnancy on record        Passed - No positive pregnancy test in past 12 months          "

## 2020-02-20 ENCOUNTER — DOCUMENTATION ONLY (OUTPATIENT)
Dept: CARDIOLOGY | Facility: CLINIC | Age: 85
End: 2020-02-20

## 2020-03-10 ENCOUNTER — ANTICOAGULATION THERAPY VISIT (OUTPATIENT)
Dept: NURSING | Facility: CLINIC | Age: 85
End: 2020-03-10
Payer: MEDICARE

## 2020-03-10 DIAGNOSIS — I48.20 CHRONIC ATRIAL FIBRILLATION (H): ICD-10-CM

## 2020-03-10 DIAGNOSIS — Z79.01 LONG TERM CURRENT USE OF ANTICOAGULANT THERAPY: Primary | ICD-10-CM

## 2020-03-10 LAB — INR POINT OF CARE: 2.4 (ref 0.86–1.14)

## 2020-03-10 PROCEDURE — 36416 COLLJ CAPILLARY BLOOD SPEC: CPT

## 2020-03-10 PROCEDURE — 99207 ZZC NO CHARGE NURSE ONLY: CPT

## 2020-03-10 PROCEDURE — 85610 PROTHROMBIN TIME: CPT | Mod: QW

## 2020-03-10 NOTE — PROGRESS NOTES
"ANTICOAGULATION FOLLOW-UP CLINIC VISIT    Patient Name:  Leonor Mercado  Date:  3/10/2020  Contact Type:  Face to Face    SUBJECTIVE:  Patient Findings     Comments:   The patient was assessed for   diet, medication,   missed or extra doses,   bruising or bleeding,   with no problem findings.          Clinical Outcomes     Comments:   The patient was assessed for   diet, medication,   missed or extra doses,   bruising or bleeding,   with no problem findings.             OBJECTIVE    INR Protime   Date Value Ref Range Status   03/10/2020 2.4 (A) 0.86 - 1.14 Final       ASSESSMENT / PLAN  INR assessment THER    Recheck INR In: 4 WEEKS    INR Location Clinic      Anticoagulation Summary  As of 3/10/2020    INR goal:   2.0-3.0   TTR:   57.8 % (7.9 mo)   INR used for dosin.4 (3/10/2020)   Warfarin maintenance plan:   1 mg (2 mg x 0.5) every Mon; 2 mg (2 mg x 1) all other days   Full warfarin instructions:   1 mg every Mon; 2 mg all other days   Weekly warfarin total:   13 mg   No change documented:   Ebony Doshi RN   Plan last modified:   Ebony Doshi RN (2019)   Next INR check:   2020   Priority:   Maintenance   Target end date:   Indefinite    Indications    Chronic atrial fibrillation [I48.20]  Long term current use of anticoagulant therapy [Z79.01]             Anticoagulation Episode Summary     INR check location:   Anticoagulation Clinic    Preferred lab:       Send INR reminders to:   SAUL BARRETT    Comments:   2mg tabs - nancy / 1st name pronounced \"ondray\" / comes with her daughter      Anticoagulation Care Providers     Provider Role Specialty Phone number    Arnel Garcia MD Referring Internal Medicine [phone]            See the Encounter Report to view Anticoagulation Flowsheet and Dosing Calendar (Go to Encounters tab in chart review, and find the Anticoagulation Therapy Visit)    INR is therapeutic today.   Patient will continue same maintenance dose.   Follow " up in 4 weeks.        Ebony Doshi RN

## 2020-03-27 ENCOUNTER — ANCILLARY PROCEDURE (OUTPATIENT)
Dept: CARDIOLOGY | Facility: CLINIC | Age: 85
End: 2020-03-27
Attending: INTERNAL MEDICINE
Payer: MEDICARE

## 2020-03-27 DIAGNOSIS — Z95.0 CARDIAC PACEMAKER IN SITU: ICD-10-CM

## 2020-03-27 PROCEDURE — 93293 PM PHONE R-STRIP DEVICE EVAL: CPT | Performed by: INTERNAL MEDICINE

## 2020-03-31 ENCOUNTER — VIRTUAL VISIT (OUTPATIENT)
Dept: CARDIOLOGY | Facility: CLINIC | Age: 85
End: 2020-03-31
Payer: MEDICARE

## 2020-03-31 VITALS
DIASTOLIC BLOOD PRESSURE: 78 MMHG | HEIGHT: 63 IN | WEIGHT: 140 LBS | BODY MASS INDEX: 24.8 KG/M2 | SYSTOLIC BLOOD PRESSURE: 138 MMHG

## 2020-03-31 DIAGNOSIS — I49.5 SICK SINUS SYNDROME (H): ICD-10-CM

## 2020-03-31 DIAGNOSIS — I25.10 CORONARY ARTERY DISEASE INVOLVING NATIVE CORONARY ARTERY OF NATIVE HEART WITHOUT ANGINA PECTORIS: Primary | ICD-10-CM

## 2020-03-31 PROCEDURE — 99441 ZZC PHYSICIAN TELEPHONE EVALUATION 5-10 MIN: CPT | Performed by: INTERNAL MEDICINE

## 2020-03-31 ASSESSMENT — MIFFLIN-ST. JEOR: SCORE: 1009.17

## 2020-03-31 NOTE — PROGRESS NOTES
"  Subjective     Leonor Mercado is a 89 year old female who is being evaluated via a billable telephone visit.      The patient has been notified of following:     \"This telephone visit will be conducted via a call between you and your physician/provider. We have found that certain health care needs can be provided without the need for a physical exam.  This service lets us provide the care you need with a short phone conversation.  If a prescription is necessary we can send it directly to your pharmacy.  If lab work is needed we can place an order for that and you can then stop by our lab to have the test done at a later time.    If during the course of the call the physician/provider feels a telephone visit is not appropriate, you will not be charged for this service.\"     Patient has given verbal consent for Telephone visit?  yes    Leonor Mercado complains of No chief complaint on file.      ALLERGIES  Peanuts [nuts]; Amiodarone; Baclofen; Bactrim [sulfamethoxazole w-trimethoprim]; Doxycycline; Levaquin [levofloxacin]; Linzess [linaclotide]; Lorazepam; and Liquid adhesive     Patient reported vitals:  BP:138/78  Heart rate:not recorded  Weight:140    SHowley CMA                          Assessment/Plan:  Spoke to pt over phone  Still sob she thinks its her copd, uses inhaler, nebs and has cough with sputa  Was in hosp 2019 for L pneumonia  Her cardiac donahue within past year was normal stecho and reg echo  Pt declines lexiscan or angiogram (she said she's nearly 89yo)  No chest pain, mild ankle edema at end of day and wears support hose  She reported her first AM bp 138/90 then 138/79  I told her this is ok but she thought was high   I told her she has option of second losartan 25 in pm on days her bp is up  No bleeding on coumadin  Pacer chart shows recent tele visit ok  We will see her in 1 year  rec she see pmd for lipids and bmp in 2020  She said she would ask her pmd for this    No follow-ups on " file.      Phone call duration:  9 minutes    kaylee

## 2020-04-03 ENCOUNTER — OFFICE VISIT (OUTPATIENT)
Dept: URGENT CARE | Facility: URGENT CARE | Age: 85
End: 2020-04-03
Payer: MEDICARE

## 2020-04-03 ENCOUNTER — VIRTUAL VISIT (OUTPATIENT)
Dept: URGENT CARE | Facility: CLINIC | Age: 85
End: 2020-04-03
Payer: MEDICARE

## 2020-04-03 ENCOUNTER — TELEPHONE (OUTPATIENT)
Dept: PEDIATRICS | Facility: CLINIC | Age: 85
End: 2020-04-03

## 2020-04-03 VITALS
HEART RATE: 75 BPM | DIASTOLIC BLOOD PRESSURE: 70 MMHG | SYSTOLIC BLOOD PRESSURE: 122 MMHG | OXYGEN SATURATION: 96 % | TEMPERATURE: 98.9 F

## 2020-04-03 DIAGNOSIS — R06.00 DYSPNEA, UNSPECIFIED TYPE: Primary | ICD-10-CM

## 2020-04-03 DIAGNOSIS — J44.1 COPD EXACERBATION (H): Primary | ICD-10-CM

## 2020-04-03 PROCEDURE — 99214 OFFICE O/P EST MOD 30 MIN: CPT | Performed by: FAMILY MEDICINE

## 2020-04-03 PROCEDURE — 99207 ZZC NO BILLABLE SERVICE THIS VISIT: CPT | Mod: TEL | Performed by: INTERNAL MEDICINE

## 2020-04-03 RX ORDER — IPRATROPIUM BROMIDE AND ALBUTEROL SULFATE 2.5; .5 MG/3ML; MG/3ML
1 SOLUTION RESPIRATORY (INHALATION) EVERY 4 HOURS PRN
Qty: 1 BOX | Refills: 1 | Status: ON HOLD | OUTPATIENT
Start: 2020-04-03 | End: 2021-01-07

## 2020-04-03 NOTE — PROGRESS NOTES
SUBJECTIVE:   Leonor Mercado is a 89 year old female with a history of COPD, HFpEF, Atrial Fibrillation (patient is on Coumadin) presenting with a chief complaint of worsening mild shortness of breath, and increasing production of whitish pale yellow phlegm.  Onset of symptoms was one week ago.  No fevers.   Course of illness is worsening. .    Severity mild  Current and Associated symptoms: as listed above.   Treatment measures tried include Duonebs recently.  She uses supplemental oxygen at night only.    .    Past Medical History:   Diagnosis Date     A Fib - chr Coumadin, s/p PPM (H) 5/8/2013     Atrial fibrillation (H)     Sick sinus syndrome, PAT, AF     BCC (basal cell carcinoma of skin)     Face     CAD - 2 stents in [place] in 2000s.  1/5/2016     CHF (congestive heart failure) (H)     mild in past     CHF - Chr Diastolic (H) 11/21/2017     Chronic renal insufficiency      COPD (chronic obstructive pulmonary disease) (H)      COPD (H) 5/13/2013     Coronary artery disease     2-05[place]-CAD-taxus stentx2 2.5-12,2.5-12 in LADp and LADm, 80%nonDomRCA-LcxDom-mild,EF60%     Hyperlipidemia      Hypertension      IBS (irritable bowel syndrome)      Irritable bowel      Mumps      Osteoporosis      Palpitations      Panic attack     questionable diagnosis     Pulmonary fibrosis (H)     do not use amiodarone     Shortness of breath      Sick sinus syndrome - s/p PPM 2003 (H) 12/16/2017     SSS (sick sinus syndrome) (H)     DDD pacer (see surgery history section)     Vertigo      Current Outpatient Medications   Medication Sig Dispense Refill     acetaminophen (TYLENOL) 500 MG tablet Take 500 mg by mouth 4 times daily And Q6H PRN BID       albuterol (PROAIR HFA/PROVENTIL HFA/VENTOLIN HFA) 108 (90 Base) MCG/ACT inhaler Inhale 2 puffs into the lungs every 6 hours as needed for shortness of breath / dyspnea or wheezing 18 g 3     ASPIRIN NOT PRESCRIBED (INTENTIONAL) Please choose reason not prescribed, below (Patient  not taking: Reported on 3/31/2020) 0 each 0     calcium carbonate (CALCIUM CARBONATE) 600 MG TABS tablet Take 1 tablet by mouth daily        diltiazem ER COATED BEADS (DILTIAZEM CD) 240 MG 24 hr capsule Take 1 capsule (240 mg) by mouth daily 90 capsule 3     Fluticasone-Umeclidin-Vilanterol (TRELEGY ELLIPTA) 100-62.5-25 MCG/INH oral inhaler Inhale 1 puff into the lungs daily       fluticasone-vilanterol (BREO ELLIPTA) 200-25 MCG/INH oral inhaler Inhale 1 puff into the lungs daily (Patient not taking: Reported on 3/31/2020) 1 Inhaler 3     furosemide (LASIX) 20 MG tablet Take 1 tablet (20 mg) by mouth daily 90 tablet 3     ipratropium - albuterol 0.5 mg/2.5 mg/3 mL (DUONEB) 0.5-2.5 (3) MG/3ML neb solution Take 1 vial (3 mLs) by nebulization every 6 hours as needed for shortness of breath / dyspnea or wheezing       isosorbide mononitrate (IMDUR) 30 MG 24 hr tablet Take 1 tablet (30 mg) by mouth daily 90 tablet 3     Lactobacillus (PROBIOTIC ACIDOPHILUS) TABS Take 1 tablet by mouth daily        losartan (COZAAR) 25 MG tablet Take 1 tablet (25 mg) by mouth daily 90 tablet 3     MELATONIN PO Take 5 mg by mouth nightly as needed        mirtazapine (REMERON) 15 MG tablet Take 1 tablet (15 mg) by mouth At Bedtime 90 tablet 3     Multiple Vitamins-Minerals (OCUVITE PO) Take 1 capsule by mouth daily.       polyethylene glycol (MIRALAX/GLYCOLAX) Packet Take 1 packet by mouth daily        PRAVASTATIN 40 MG PO tablet TAKE 1 TABLET BY MOUTH ONCE DAILY 90 tablet 2     senna (SENOKOT) 8.6 MG tablet Take 1 tablet by mouth as needed for constipation       Tiotropium Bromide Monohydrate (SPIRIVA HANDIHALER IN) Inhale 1 puff into the lungs daily        Vitamin D, Cholecalciferol, 1000 units TABS Take 1,000 Units by mouth daily       warfarin ANTICOAGULANT (COUMADIN) 2 MG tablet Take 1 mg (0.5 tab) every Mon; 2 mg tab by mouth all other days, or as directed by INR clinic 90 tablet 3     WARFARIN SODIUM PO Take 2 mg by mouth daily PER  INR ORDERS        Social History     Tobacco Use     Smoking status: Former Smoker     Packs/day: 0.00     Types: Cigarettes     Last attempt to quit: 1987     Years since quittin.9     Smokeless tobacco: Never Used   Substance Use Topics     Alcohol use: Yes     Alcohol/week: 0.0 standard drinks     Comment: occ       ROS:  CONSTITUTIONAL:NEGATIVE for fever, chills, change in weight  INTEGUMENTARY/SKIN: NEGATIVE for cyanosis.   RESP:POSITIVE for cough, shortness of breath.      OBJECTIVE:  BP:  122/70 (left arm).  Pulse:  75     Oxygen Saturation:  96% on room air.  Temp:  98.9 F  GENERAL APPEARANCE: healthy, alert and no distress  Patient can speak in full sentences.  Occasional rough cough is present.   RESP: lungs clear to auscultation - no rales, rhonchi or wheezes  CV: regular rates and mildly irregularly irregular rhythm, normal S1 S2, no murmur noted  SKIN: no cyanosis    ASSESSMENT:  COPD Exacerbation      PLAN:  Prednisone (Patient has 10-mg tablets from home).  Take 4 tablets PO daily for 5 days.     Rx:  Duonebs    Rx:  Augmentin    Go to the emergency room if there is worsening, severe shortness of breath.      Jignesh Araya MD

## 2020-04-03 NOTE — PROGRESS NOTES
"The patient has been notified of following:     \"This telephone visit will be conducted via a call between you and your physician/provider. We have found that certain health care needs can be provided without the need for a physical exam.  This service lets us provide the care you need with a short phone conversation.  If a prescription is necessary we can send it directly to your pharmacy.  If lab work is needed we can place an order for that and you can then stop by our lab to have the test done at a later time.    If during the course of the call the physician/provider feels a telephone visit is not appropriate, you will not be charged for this service.\"     Subjective     CC: Leonor Mercado  is a 89 year old female who presents to clinic today for the following health issues:   Chief Complaint   Patient presents with     Breathing Problem          History:    Has COPD, Afib, HFpEF, SSS s/p pacemaker placement.     Has been having a productive cough for one week. Very dyspneic and having wheezing. Also having sore throat. Symptoms are not improving. Is on oxygen at home.  No fever.     Was hospitalized over the summer for pneumonia.     In the 14 days before your symptoms started, have you had close contact with a COVID-19 (Coronavirus) patient? No    In the 14 days before your symptoms started, have you traveled internationally or to a state with high rates of COVID19? No    Do you smoke? No     Have you ever smoked? I smoked in the past, but quit     Are there people you know with similar symptoms? No    Have you recently been hospitalized? No    Are you pregnant or breastfeeding?: no      Patient Active Problem List   Diagnosis     Hyperlipidemia LDL goal <100     Cardiac pacemaker in situ     Senile osteoporosis     Irritable bowel syndrome (IBS)     Anemia     Long term current use of anticoagulant therapy     Degeneration of lumbar intervertebral disc     CKD (chronic kidney disease) stage 3, GFR " 30-59 ml/min (H)     Chronic atrial fibrillation     Chronic obstructive pulmonary disease, unspecified COPD type (H)     Coronary artery disease involving native coronary artery of native heart without angina pectoris     Diastolic dysfunction     Sick sinus syndrome (H)     Adjustment disorder, unspecified type     Presbyesophagus     Chronic heart failure with preserved ejection fraction (H)     Past Surgical History:   Procedure Laterality Date     APPENDECTOMY       CARDIAC SURGERY      PPM, 2 STENTS2-05[place]-CAD-taxus stentx2 2.5-12,2.5-12 in LADp and LADm, 80%nonDomRCA-LcxDom-mild,EF60%     CORONARY ANGIOGRAPHY ADULT ORDER  1994    mild CAD,Normal LV function, No Mitral regurgitation, Prox LAD 30% stenosis. RCA prox and mid, 20-30%     ESOPHAGOSCOPY, GASTROSCOPY, DUODENOSCOPY (EGD), COMBINED N/A 2015    Procedure: COMBINED ESOPHAGOSCOPY, GASTROSCOPY, DUODENOSCOPY (EGD), BIOPSY SINGLE OR MULTIPLE;  Surgeon: Genevieve Leon MD;  Location: [facility]     H LEAD REVISION DUAL  2003    Revised in [place]-due to diaphram stim (original pacer placed in [place])     H LEAD REVISION DUAL  2014    Correction of reversal of A and V leads in Header     HEART CATH, ANGIOPLASTY  2005    LEIGH ANN mid and proximal LAD, ongoing 80% RCA; mild circumflex     HERNIA REPAIR Left     [facility]     IMPLANT PACEMAKER  2003    Placed in [place] for sick sinus syndrome     REPLACE PACEMAKER GENERATOR  2013    Dual-chamber pacemaker by Dr SETH Pierre       Social History     Tobacco Use     Smoking status: Former Smoker     Packs/day: 0.00     Types: Cigarettes     Last attempt to quit: 1987     Years since quittin.9     Smokeless tobacco: Never Used   Substance Use Topics     Alcohol use: Yes     Alcohol/week: 0.0 standard drinks     Comment: occ     Family History   Problem Relation Age of Onset     Heart Disease Father          age 84     Neurologic Disorder Mother         dementia     Gastrointestinal  Disease Daughter         liver transplant     Crohn's Disease Daughter            -------------------------------------  Reviewed and updated as needed this visit by Provider         Review of Systems   ROS COMP: Constitutional, HEENT, cardiovascular, pulmonary, gi and gu systems are negative, except as otherwise noted.      Objective    Gen: Patient is alert, oriented  Resp: is speaking full sentences, with cough,  without audible shortness of breath, with wheezing  Psych: mentation appears normal and affect normal/bright    Diagnostic Test Results:  Labs reviewed in Epic  none           Assessment/Plan:  1. Dyspnea, unspecified type  -Ddx: COPD exacerbation (most likely) > pneumonia v HF exacerbation. Trigger for COPD exacerbation may be due to viral infection including COVID  -asked the patient to take her albuterol Q4H  -referred to urgent care for further evaluation  -she was given number for urgent care scheduling ([phone]) with instructions to inform the  to state she has been told by a provider to be seen.   -ER warnings for respiratory distress    Phone call duration:  10 minutes    Staffed with Dr. Emanuel    Electronically signed by:  Romulo Moore M.D.   Pager: [phone]  4/3/2020, 4:45 PM      I reviewed the telephone visit encounter and discussed the patient with the resident and agree with the resident s assessment and plan of care as documented.    Tonya Buitrago MD

## 2020-04-03 NOTE — TELEPHONE ENCOUNTER
Call placed to patient.  She reports an increase in her cough and SOB for the last week  Pt has COPD but these sx are worse.  Uses an inhaler, no change in SOB/ cough    No fever. No known exposure to Covid-19  Has not tried cough medicine- does not like the way it tastes  Concerned about pneumonia    Appt made with virtual  provider    John Ramírez RN on 4/3/2020 at 3:26 PM

## 2020-04-03 NOTE — PATIENT INSTRUCTIONS
Start the Prednisone 10-mg tablets.  Take 4 tablets by mouth once a day for the next 5 days.     Continue the Duonebs    Start the Augmentin (an antibiotic)    Go to the emergency room if you develop severe, worsening shortness of breath.

## 2020-04-03 NOTE — TELEPHONE ENCOUNTER
Symptoms  Describe your symptoms: coughing  Any pain: No  How long have you been having symptoms: 1  weeks  Have you been seen for this:  Yes:   Appointment offered?: No  Triage offered?: Yes:   Home remedies tried: na  Requested Pharmacy: [facility], [place]  Okay to leave a detailed message? Yes at Home number on file [phone] (home)

## 2020-04-07 ENCOUNTER — ANTICOAGULATION THERAPY VISIT (OUTPATIENT)
Dept: NURSING | Facility: CLINIC | Age: 85
End: 2020-04-07

## 2020-04-07 DIAGNOSIS — I48.20 CHRONIC ATRIAL FIBRILLATION (H): ICD-10-CM

## 2020-04-07 DIAGNOSIS — I48.20 CHRONIC ATRIAL FIBRILLATION (H): Primary | ICD-10-CM

## 2020-04-07 DIAGNOSIS — Z79.01 LONG TERM CURRENT USE OF ANTICOAGULANT THERAPY: Primary | ICD-10-CM

## 2020-04-07 DIAGNOSIS — Z79.01 LONG TERM CURRENT USE OF ANTICOAGULANT THERAPY: ICD-10-CM

## 2020-04-07 LAB
CAPILLARY BLOOD COLLECTION: NORMAL
INR PPP: 3.6 (ref 0.86–1.14)

## 2020-04-07 PROCEDURE — 85610 PROTHROMBIN TIME: CPT | Performed by: INTERNAL MEDICINE

## 2020-04-07 PROCEDURE — 99207 ZZC NO CHARGE NURSE ONLY: CPT

## 2020-04-07 PROCEDURE — 36416 COLLJ CAPILLARY BLOOD SPEC: CPT | Performed by: INTERNAL MEDICINE

## 2020-04-07 NOTE — PROGRESS NOTES
ANTICOAGULATION FOLLOW-UP     Patient Name:  Leonor Mercado  Date:  4/7/2020  Contact Type:  Telephone    SUBJECTIVE:  Patient Findings     Positives:   Change in health, Change in medications, Change in diet/appetite    Comments:   Eating less green vegetables.  She is at assisted living and their food is delivered to their door.  Her daughter got her some groceries today, so she has some lettuce.    Seen at  on 4/3/20 for COPD Exacerbation.   On day 4 of 10 on Augmentin. Has one day of prednisone left.  She feels much better, but still gets short of breath easily.    INR is supratherapeutic today.   Patient will hold dose today which will decrease weekly total   by 15.4%, then resume maintenance dose.   Will follow up in 2 weeks.          Clinical Outcomes     Comments:   Eating less green vegetables.  She is at assisted living and their food is delivered to their door.  Her daughter got her some groceries today, so she has some lettuce.    Seen at  on 4/3/20 for COPD Exacerbation.   On day 4 of 10 on Augmentin. Has one day of prednisone left.  She feels much better, but still gets short of breath easily.    INR is supratherapeutic today.   Patient will hold dose today which will decrease weekly total   by 15.4%, then resume maintenance dose.   Will follow up in 2 weeks.             OBJECTIVE    INR   Date Value Ref Range Status   04/07/2020 3.60 (H) 0.86 - 1.14 Final     Comment:     This test is intended for monitoring Coumadin therapy.  Results are not   accurate in patients with prolonged INR due to factor deficiency.         ASSESSMENT / PLAN  INR assessment SUPRA    Recheck INR In: 2 WEEKS    INR Location Clinic lab     Anticoagulation Summary  As of 4/7/2020    INR goal:   2.0-3.0   TTR:   52.9 % (7.9 mo)   INR used for dosing:   3.60! (4/7/2020)   Warfarin maintenance plan:   1 mg (2 mg x 0.5) every Mon; 2 mg (2 mg x 1) all other days   Full warfarin instructions:   4/7: Hold; Otherwise 1 mg  "every Mon; 2 mg all other days   Weekly warfarin total:   13 mg   Plan last modified:   Ebony Doshi RN (5/16/2019)   Next INR check:   4/21/2020   Priority:   Maintenance   Target end date:   Indefinite    Indications    Chronic atrial fibrillation [I48.20]  Long term current use of anticoagulant therapy [Z79.01]             Anticoagulation Episode Summary     INR check location:   Anticoagulation Clinic    Preferred lab:       Send INR reminders to:   SAUL BARRETT    Comments:   2mg tabs - nancy / 1st name pronounced \"ondray\" / comes with her daughter      Anticoagulation Care Providers     Provider Role Specialty Phone number    Arnel Garcia MD Referring Internal Medicine [phone]            See the Encounter Report to view Anticoagulation Flowsheet and Dosing Calendar (Go to Encounters tab in chart review, and find the Anticoagulation Therapy Visit)    INR is supratherapeutic today.   Patient will hold dose today which will decrease weekly total   by 15.4%, then resume maintenance dose.   Will follow up in 2 weeks.    Dosage adjustment made based on physician directed care plan.      Ebony Doshi RN                 "

## 2020-04-09 ENCOUNTER — ALLIED HEALTH/NURSE VISIT (OUTPATIENT)
Dept: PHARMACY | Facility: CLINIC | Age: 85
End: 2020-04-09

## 2020-04-09 DIAGNOSIS — J44.9 CHRONIC OBSTRUCTIVE PULMONARY DISEASE, UNSPECIFIED COPD TYPE (H): ICD-10-CM

## 2020-04-09 DIAGNOSIS — K58.9 IRRITABLE BOWEL SYNDROME, UNSPECIFIED TYPE: ICD-10-CM

## 2020-04-09 DIAGNOSIS — G47.00 INSOMNIA, UNSPECIFIED TYPE: ICD-10-CM

## 2020-04-09 DIAGNOSIS — R60.9 EDEMA, UNSPECIFIED TYPE: ICD-10-CM

## 2020-04-09 DIAGNOSIS — I25.10 CORONARY ARTERY DISEASE INVOLVING NATIVE CORONARY ARTERY OF NATIVE HEART WITHOUT ANGINA PECTORIS: ICD-10-CM

## 2020-04-09 DIAGNOSIS — I10 ESSENTIAL HYPERTENSION WITH GOAL BLOOD PRESSURE LESS THAN 140/90: ICD-10-CM

## 2020-04-09 DIAGNOSIS — I48.20 CHRONIC ATRIAL FIBRILLATION (H): ICD-10-CM

## 2020-04-09 DIAGNOSIS — E78.5 HYPERLIPIDEMIA LDL GOAL <100: Primary | ICD-10-CM

## 2020-04-09 DIAGNOSIS — F32.A DEPRESSION, UNSPECIFIED DEPRESSION TYPE: ICD-10-CM

## 2020-04-09 DIAGNOSIS — F41.9 ANXIETY: ICD-10-CM

## 2020-04-09 PROCEDURE — 99607 MTMS BY PHARM ADDL 15 MIN: CPT | Performed by: PHARMACIST

## 2020-04-09 PROCEDURE — 99605 MTMS BY PHARM NP 15 MIN: CPT | Performed by: PHARMACIST

## 2020-04-09 NOTE — PROGRESS NOTES
MTM ENCOUNTER  SUBJECTIVE/OBJECTIVE:                           Leonor Mercado is a 89 year old female called for a follow-up visit. She was referred to me from Dr. Garcia.  Today's visit is a follow-up MTM visit from 9/12/19.  First visit in 2020.     Patient consented to a telehealth visit: yes    Chief Complaint: coughing and runny nose.      Allergies/ADRs: Reviewed in Epic  Tobacco:  reports that she quit smoking about 32 years ago. Her smoking use included cigarettes. She smoked 0.00 packs per day. She has never used smokeless tobacco.  Alcohol: not currently using  PMH: Reviewed in Epic    Medication Adherence/Access: no issues reported  Patient uses pill box(es).      Hyperlipidemia: Current therapy includes pravastatin 40mg once daily.  Denies any concerns.  Recent Labs   Lab Test 10/29/19  1004 12/11/17  0959 03/15/15  0745   CHOL 139 161 153   HDL 78 85 52   LDL 49 54 75   TRIG 61 109 129   CHOLHDLRATIO  --   --  2.9         Depression/Anxiety/Insomnia:  Current medications include: APAP 500 mg (which she believes helps her sleep), mirtazapine 15 mg HS and Melatonin 5 mg HS.  Most nights sleeps real well.  No change in mood.   No concerns for side effects.    PHQ 4/3/2018 4/20/2018 11/30/2018   PHQ-9 Total Score 16 8 14   Q9: Thoughts of better off dead/self-harm past 2 weeks Not at all Not at all Several days       COPD: Current medications: Trelegy daily, Oxygen 2-3L at night and albuterol as needed.  Believe this is causing her cough and runny nose.  Was changed to Breo and Spiriva with minimal change in symptoms so switched back to Trelegy.  From last Pulmonary visit - recommended fluticasone for rhinitis. Patient never tried and not interested today.  Uses Vicks which helps.    Seen in UC on 4/3 for COPD exacerbation.  Taking Augmentin and using Duonebs 1-2 times daily.  Completed five day course of prednisone.  Feeling somewhat better.   Followed by Dr. Her; last visit 2/12/20.    Pt reports  the following symptoms: productive cough, shortness of breath and rhinitis  COPD Action Plan on file.      IBS-constipation: Currently taking Senna as needed, probiotic daily and Miralax daily. No concerns at this time. Having regular BMs.    Hypertension/afib/edema/CAD: Current medications include losartan 25 mg daily, furosemide 20 mg daily, diltiazem 240 mg daily, warfarin daily as directed by anticoagulation clinic.   Dose held for one day after last INR and scheduled for recheck in 2 weeks.  No concerns about bleeding at this time.    BP Readings from Last 3 Encounters:   04/03/20 122/70   03/31/20 138/78   12/07/19 130/78         Lab Results   Component Value Date    INR 3.60 04/07/2020    INR 2.4 03/10/2020    INR 2.2 02/11/2020    INR 2.5 01/09/2020    INR 3.5 12/19/2019    INR 3.20 11/11/2019    INR 2.60 07/18/2019    INR 2.11 07/01/2019    INR 2.74 06/24/2019    INR 2.20 06/23/2019    INR 2.08 06/22/2019    INR 2.69 06/21/2019    INR 3.14 06/20/2019    INR 2.25 06/19/2019    INR 1.9 06/13/2019         Today's Vitals: LMP  (LMP Unknown)  phone visit      ASSESSMENT:                              Medication Adherence: excellent, no issues identified    Hyperlipidemia: stable    Depression/Anxiety/Insomnia:  Stable per patient.  Will repeat PHQ9 at follow-up.    COPD: patient will complete course of antibiotic as planned, reviewed directions to use Duonebs up to QID as needed.  She's not really interested in using them that often.  Not interested in fluticasone at this time.    IBS-constipation: stable    Hypertension/afib/edema/CAD: follow-up with anticoagulation clinic as planned.    PLAN:                            1.  Recommend fluticasone for rhinitis - patient refused  2.  Duonebs QID while recovering from exacerbation    I spent 30 minutes with this patient today. All changes were made via collaborative practice agreement with PCP. A copy of the visit note was provided to the patient's primary care  provider.    Will follow up in 6 months.    The patient declined a summary of these recommendations.     Shakira Blackman Pharm D  [phone] (phone)  [phone] (pager)  Medication Therapy Management Pharmacist

## 2020-04-21 ENCOUNTER — ANTICOAGULATION THERAPY VISIT (OUTPATIENT)
Dept: ANTICOAGULATION | Facility: CLINIC | Age: 85
End: 2020-04-21
Payer: MEDICARE

## 2020-04-21 DIAGNOSIS — Z79.01 LONG TERM CURRENT USE OF ANTICOAGULANT THERAPY: ICD-10-CM

## 2020-04-21 DIAGNOSIS — I48.20 CHRONIC ATRIAL FIBRILLATION (H): ICD-10-CM

## 2020-04-21 LAB
CAPILLARY BLOOD COLLECTION: NORMAL
INR PPP: 3.4 (ref 0.86–1.14)

## 2020-04-21 PROCEDURE — 99207 ZZC NO CHARGE NURSE ONLY: CPT | Performed by: INTERNAL MEDICINE

## 2020-04-21 PROCEDURE — 85610 PROTHROMBIN TIME: CPT | Performed by: INTERNAL MEDICINE

## 2020-04-21 PROCEDURE — 36416 COLLJ CAPILLARY BLOOD SPEC: CPT | Performed by: INTERNAL MEDICINE

## 2020-04-21 NOTE — PROGRESS NOTES
ANTICOAGULATION FOLLOW-UP CLINIC VISIT    Patient Name:  Leonor Mercado  Date:  4/21/2020  Contact Type:  Telephone/ discussed with patient    SUBJECTIVE:  Patient Findings     Positives:   Change in medications (Augmentin completed 8 days ago.  Has been feeling better since then.)    Comments:   The patient was assessed for diet, and activity level changes, missed or extra doses, bruising or bleeding, with no problem findings.  INR continues to be elevated so maintenance dose was lowered by 8% today.  Patient denies any identifiable changes that caused the surpa therapeutic INR.             Clinical Outcomes     Negatives:   Major bleeding event, Thromboembolic event, Anticoagulation-related hospital admission, Anticoagulation-related ED visit, Anticoagulation-related fatality    Comments:   The patient was assessed for diet, and activity level changes, missed or extra doses, bruising or bleeding, with no problem findings.  INR continues to be elevated so maintenance dose was lowered by 8% today.  Patient denies any identifiable changes that caused the surpa therapeutic INR.                OBJECTIVE    INR   Date Value Ref Range Status   04/21/2020 3.40 (H) 0.86 - 1.14 Final     Comment:     This test is intended for monitoring Coumadin therapy.  Results are not   accurate in patients with prolonged INR due to factor deficiency.         ASSESSMENT / PLAN  INR assessment SUPRA    Recheck INR In: 2 WEEKS    INR Location Clinic      Anticoagulation Summary  As of 4/21/2020    INR goal:   2.0-3.0   TTR:   52.9 % (7.9 mo)   INR used for dosing:   3.40! (4/21/2020)   Warfarin maintenance plan:   1 mg (2 mg x 0.5) every Mon, Fri; 2 mg (2 mg x 1) all other days   Full warfarin instructions:   4/21: Hold; Otherwise 1 mg every Mon, Fri; 2 mg all other days   Weekly warfarin total:   12 mg   Plan last modified:   Yoana Waddell RN (4/21/2020)   Next INR check:   5/5/2020   Priority:   Maintenance   Target end date:    "Indefinite    Indications    Chronic atrial fibrillation [I48.20]  Long term current use of anticoagulant therapy [Z79.01]             Anticoagulation Episode Summary     INR check location:   Anticoagulation Clinic    Preferred lab:       Send INR reminders to:   SAUL BARRETT    Comments:   2mg tabs - nancy / 1st name pronounced \"ondray\" / comes with her daughter      Anticoagulation Care Providers     Provider Role Specialty Phone number    Arnel Garcia MD Referring Internal Medicine [phone]            See the Encounter Report to view Anticoagulation Flowsheet and Dosing Calendar (Go to Encounters tab in chart review, and find the Anticoagulation Therapy Visit)    Dosage adjustment made based on physician directed care plan.    Yoana Waddell RN                 "

## 2020-05-13 ENCOUNTER — TELEPHONE (OUTPATIENT)
Dept: PEDIATRICS | Facility: CLINIC | Age: 85
End: 2020-05-13

## 2020-05-13 NOTE — TELEPHONE ENCOUNTER
Leonor AMAURY Mercado is overdue for INR check.      Spoke to Leonor. She states she was already in to check her INR.     No record of recent INR. She asked I call her daughter Cleo.    Called Cleo left message to call back.    If Leonor had INR elsewhere we need to know where to obtain results. If not needs to schedule asap.

## 2020-05-14 ENCOUNTER — VIRTUAL VISIT (OUTPATIENT)
Dept: PEDIATRICS | Facility: CLINIC | Age: 85
End: 2020-05-14
Payer: MEDICARE

## 2020-05-14 ENCOUNTER — TELEPHONE (OUTPATIENT)
Dept: PEDIATRICS | Facility: CLINIC | Age: 85
End: 2020-05-14

## 2020-05-14 VITALS
HEART RATE: 83 BPM | SYSTOLIC BLOOD PRESSURE: 133 MMHG | WEIGHT: 145 LBS | DIASTOLIC BLOOD PRESSURE: 89 MMHG | BODY MASS INDEX: 25.69 KG/M2 | TEMPERATURE: 98.2 F

## 2020-05-14 DIAGNOSIS — F43.20 ADJUSTMENT DISORDER, UNSPECIFIED TYPE: Primary | ICD-10-CM

## 2020-05-14 DIAGNOSIS — I48.20 CHRONIC ATRIAL FIBRILLATION (H): ICD-10-CM

## 2020-05-14 DIAGNOSIS — I50.32 CHRONIC DIASTOLIC CONGESTIVE HEART FAILURE (H): ICD-10-CM

## 2020-05-14 DIAGNOSIS — I25.10 CORONARY ARTERY DISEASE INVOLVING NATIVE CORONARY ARTERY OF NATIVE HEART WITHOUT ANGINA PECTORIS: ICD-10-CM

## 2020-05-14 DIAGNOSIS — I10 ESSENTIAL HYPERTENSION WITH GOAL BLOOD PRESSURE LESS THAN 140/90: ICD-10-CM

## 2020-05-14 DIAGNOSIS — J44.9 CHRONIC OBSTRUCTIVE PULMONARY DISEASE, UNSPECIFIED COPD TYPE (H): ICD-10-CM

## 2020-05-14 DIAGNOSIS — Z95.0 CARDIAC PACEMAKER IN SITU: ICD-10-CM

## 2020-05-14 PROCEDURE — 99442 ZZC PHYSICIAN TELEPHONE EVALUATION 11-20 MIN: CPT | Performed by: INTERNAL MEDICINE

## 2020-05-14 RX ORDER — ISOSORBIDE MONONITRATE 30 MG/1
30 TABLET, EXTENDED RELEASE ORAL DAILY
Qty: 90 TABLET | Refills: 11 | Status: SHIPPED | OUTPATIENT
Start: 2020-05-14 | End: 2021-07-02

## 2020-05-14 RX ORDER — MIRTAZAPINE 15 MG/1
15 TABLET, FILM COATED ORAL AT BEDTIME
Qty: 90 TABLET | Refills: 11 | Status: SHIPPED | OUTPATIENT
Start: 2020-05-14 | End: 2021-07-02

## 2020-05-14 RX ORDER — LOSARTAN POTASSIUM 25 MG/1
25 TABLET ORAL DAILY
Qty: 90 TABLET | Refills: 11 | Status: SHIPPED | OUTPATIENT
Start: 2020-05-14 | End: 2021-06-08

## 2020-05-14 RX ORDER — FUROSEMIDE 20 MG
20 TABLET ORAL DAILY
Qty: 90 TABLET | Refills: 11 | Status: SHIPPED | OUTPATIENT
Start: 2020-05-14 | End: 2020-08-27

## 2020-05-14 ASSESSMENT — PATIENT HEALTH QUESTIONNAIRE - PHQ9: SUM OF ALL RESPONSES TO PHQ QUESTIONS 1-9: 6

## 2020-05-14 NOTE — PATIENT INSTRUCTIONS
Leonor thank you for your time today.    Please let us know if you have any additional questions:    We will see you for a follow-up visit in about 3 months    Have a good day!  Dr Garcia

## 2020-05-14 NOTE — TELEPHONE ENCOUNTER
Called Pt and LVM for callback, pt needs to be scheduled for 3 month follow up (around 8/14/2020) with Dr Garcia. Can be telephone visit.    June Curry CMA on 5/14/2020 at 4:09 PM

## 2020-05-14 NOTE — PROGRESS NOTES
"Leonor Mercado is a 89 year old female who is being evaluated via a billable telephone visit.      The patient has been notified of following:     \"This telephone visit will be conducted via a call between you and your physician/provider. We have found that certain health care needs can be provided without the need for a physical exam.  This service lets us provide the care you need with a short phone conversation.  If a prescription is necessary we can send it directly to your pharmacy.  If lab work is needed we can place an order for that and you can then stop by our lab to have the test done at a later time.    Telephone visits are billed at different rates depending on your insurance coverage. During this emergency period, for some insurers they may be billed the same as an in-person visit.  Please reach out to your insurance provider with any questions.    If during the course of the call the physician/provider feels a telephone visit is not appropriate, you will not be charged for this service.\"    Patient has given verbal consent for Telephone visit?  Yes    What phone number would you like to be contacted at? [phone]    How would you like to obtain your AVS? Mail a copy    Subjective     Leonor Mercado is a 89 year old female who presents to clinic today for the following health issues:    HPI  Adjustment disorder with depressed mood, Followup    How are you doing with your depression since your last visit? Patient stated that she didn't have depression, when talking to patient's daughter last week she stated that she is a completely different person when taking her Mirazapine.     Are you having other symptoms that might be associated with depression? No    Have you had a significant life event?  OTHER: VIRUS, can't go anywhere, no car, [place] and can't walk to other places, children are not allowed in her senior living area     Are you feeling anxious or having panic attacks?   Yes: "  VIRUS, no family near by    Do you have any concerns with your use of alcohol or other drugs? No     How many days per week do you miss taking your medication? 0    Hypertension Follow-up      Do you check your blood pressure regularly outside of the clinic? Yes     Are you following a low salt diet? Yes    Are your blood pressures ever more than 140 on the top number (systolic) OR more   than 90 on the bottom number (diastolic), for example 140/90?    Readings generally 130's/90's    Vascular Disease Follow-up    Prior stents x2 more than 10yrs ago in [place]    How often do you take nitroglycerin? Never    Do you take an aspirin every day? no    COPD Follow-Up    Overall, how are your COPD symptoms since your last clinic visit?  No change    How much fatigue or shortness of breath do you have when you are walking?  Mild, at baseline    How much shortness of breath do you have when you are resting?  no    How often do you cough? None currently    Have you noticed any change in your sputum/phlegm?  No    Have you experienced a recent fever? No    History   Smoking Status     Former Smoker     Packs/day: 0.00     Types: Cigarettes     Quit date: 1987   Smokeless Tobacco     Never Used     Lab Results   Component Value Date    FEV1 0.86 2013    MTK1FMO 0.56 2013       Social History     Tobacco Use     Smoking status: Former Smoker     Packs/day: 0.00     Types: Cigarettes     Last attempt to quit: 1987     Years since quittin.0     Smokeless tobacco: Never Used   Substance Use Topics     Alcohol use: Yes     Alcohol/week: 0.0 standard drinks     Comment: occ     Drug use: No     PHQ 4/3/2018 2018 2018   PHQ-9 Total Score 16 8 14   Q9: Thoughts of better off dead/self-harm past 2 weeks Not at all Not at all Several days     BRIGHT-7 SCORE 2016   Total Score - - -   Total Score 5 11 10     Suicide Assessment Five-step Evaluation and Treatment (SAFE-T)    Patient  Active Problem List   Diagnosis     Hyperlipidemia LDL goal <100     Cardiac pacemaker in situ     Senile osteoporosis     Irritable bowel syndrome (IBS)     Anemia     Long term current use of anticoagulant therapy     Degeneration of lumbar intervertebral disc     CKD (chronic kidney disease) stage 3, GFR 30-59 ml/min (H)     Chronic atrial fibrillation     Chronic obstructive pulmonary disease, unspecified COPD type (H)     Coronary artery disease involving native coronary artery of native heart without angina pectoris     Diastolic dysfunction     Sick sinus syndrome (H)     Adjustment disorder, unspecified type     Presbyesophagus     Chronic heart failure with preserved ejection fraction (H)     Past Surgical History:   Procedure Laterality Date     APPENDECTOMY  1956     CARDIAC SURGERY      PPM, 2 STENTS2-05[place]-CAD-[place] stentx2 2.5-12,2.5-12 in LADp and LADm, 80%nonDomRCA-LcxDom-mild,EF60%     CORONARY ANGIOGRAPHY ADULT ORDER  7/1994    mild CAD,Normal LV function, No Mitral regurgitation, Prox LAD 30% stenosis. RCA prox and mid, 20-30%     ESOPHAGOSCOPY, GASTROSCOPY, DUODENOSCOPY (EGD), COMBINED N/A 8/19/2015    Procedure: COMBINED ESOPHAGOSCOPY, GASTROSCOPY, DUODENOSCOPY (EGD), BIOPSY SINGLE OR MULTIPLE;  Surgeon: Genevieve Leon MD;  Location: [facility]     H LEAD REVISION DUAL  4/2003    Revised in [place]-due to diaphram stim (original pacer placed in [place])     H LEAD REVISION DUAL  7/2/2014    Correction of reversal of A and V leads in Header     HEART CATH, ANGIOPLASTY  02/2005    LEIGH ANN mid and proximal LAD, ongoing 80% RCA; mild circumflex     HERNIA REPAIR Left 1991    [facility]     IMPLANT PACEMAKER  2/19/2003    Placed in [place] for sick sinus syndrome     REPLACE PACEMAKER GENERATOR  6/27/2013    Dual-chamber pacemaker by Dr SETH Pierre       Social History     Tobacco Use     Smoking status: Former Smoker     Packs/day: 0.00     Types: Cigarettes     Last attempt to quit: 5/9/1987     Years since quitting:  33.0     Smokeless tobacco: Never Used   Substance Use Topics     Alcohol use: Yes     Alcohol/week: 0.0 standard drinks     Comment: occ     Family History   Problem Relation Age of Onset     Heart Disease Father          age 84     Neurologic Disorder Mother         dementia     Gastrointestinal Disease Daughter         liver transplant     Crohn's Disease Daughter          Current Outpatient Medications   Medication Sig Dispense Refill     acetaminophen (TYLENOL) 500 MG tablet Take 500 mg by mouth 4 times daily And Q6H PRN BID       albuterol (PROAIR HFA/PROVENTIL HFA/VENTOLIN HFA) 108 (90 Base) MCG/ACT inhaler Inhale 2 puffs into the lungs every 6 hours as needed for shortness of breath / dyspnea or wheezing 18 g 3     ASPIRIN NOT PRESCRIBED (INTENTIONAL) Please choose reason not prescribed, below 0 each 0     calcium carbonate (CALCIUM CARBONATE) 600 MG TABS tablet Take 1 tablet by mouth daily        diltiazem ER COATED BEADS (DILTIAZEM CD) 240 MG 24 hr capsule Take 1 capsule (240 mg) by mouth daily 90 capsule 3     Fluticasone-Umeclidin-Vilanterol (TRELEGY ELLIPTA) 100-62.5-25 MCG/INH oral inhaler Inhale 1 puff into the lungs daily       furosemide (LASIX) 20 MG tablet Take 1 tablet (20 mg) by mouth daily 90 tablet 11     ipratropium - albuterol 0.5 mg/2.5 mg/3 mL (DUONEB) 0.5-2.5 (3) MG/3ML neb solution Take 1 vial (3 mLs) by nebulization every 4 hours as needed for shortness of breath / dyspnea or wheezing 1 Box 1     isosorbide mononitrate (IMDUR) 30 MG 24 hr tablet Take 1 tablet (30 mg) by mouth daily 90 tablet 11     Lactobacillus (PROBIOTIC ACIDOPHILUS) TABS Take 1 tablet by mouth daily        losartan (COZAAR) 25 MG tablet Take 1 tablet (25 mg) by mouth daily 90 tablet 11     MELATONIN PO Take 5 mg by mouth nightly as needed        mirtazapine (REMERON) 15 MG tablet Take 1 tablet (15 mg) by mouth At Bedtime 90 tablet 11     Multiple Vitamins-Minerals (OCUVITE PO) Take 1 capsule by mouth daily.        polyethylene glycol (MIRALAX/GLYCOLAX) Packet Take 1 packet by mouth daily        PRAVASTATIN 40 MG PO tablet TAKE 1 TABLET BY MOUTH ONCE DAILY 90 tablet 2     senna (SENOKOT) 8.6 MG tablet Take 1 tablet by mouth as needed for constipation       Vitamin D, Cholecalciferol, 1000 units TABS Take 1,000 Units by mouth daily       warfarin ANTICOAGULANT (COUMADIN) 2 MG tablet Take 1 mg (0.5 tab) every Mon; 2 mg tab by mouth all other days, or as directed by INR clinic 90 tablet 3     WARFARIN SODIUM PO Take 2 mg by mouth daily PER INR ORDERS          Reviewed and updated as needed this visit by Provider         Review of Systems   Constitutional, HEENT, cardiovascular, pulmonary, gi and gu systems are negative, except as otherwise noted.       Objective   Reported vitals:  LMP  (LMP Unknown)          Assessment/Plan:  1. Adjustment disorder, unspecified type        Mood sx well controlled/continue remeron  - mirtazapine (REMERON) 15 MG tablet; Take 1 tablet (15 mg) by mouth At Bedtime  Dispense: 90 tablet; Refill: 11    2. Coronary artery disease involving native coronary artery of native heart without angina pectoris        Stable coronary disease, continue statin/isosorbide  - isosorbide mononitrate (IMDUR) 30 MG 24 hr tablet; Take 1 tablet (30 mg) by mouth daily  Dispense: 90 tablet; Refill: 11    3. Chronic atrial fibrillation       Chronic warfarin.   Rate controlled    4. Chronic diastolic congestive heart failure (H)      Prior ECHO 2019 reviewed, normal LVH. Stable-- Continue current rx  - furosemide (LASIX) 20 MG tablet; Take 1 tablet (20 mg) by mouth daily  Dispense: 90 tablet; Refill: 11    5. Chronic obstructive pulmonary disease, unspecified COPD type (H)  At baseline, continue Trelegy    6. Essential hypertension with goal blood pressure less than 140/90       Adequate control (diastolic elevation noted), continue current rx  - losartan (COZAAR) 25 MG tablet; Take 1 tablet (25 mg) by mouth daily  Dispense:  90 tablet; Refill: 11    7. Cardiac pacemaker in situ      Recent pacemaker interrogation reviewed.    Plan for clinic visit later this year with fasting labwork    Phone call duration:  15 minutes    Arnel Garcia MD, MD

## 2020-05-15 NOTE — TELEPHONE ENCOUNTER
Called patient to schedule follow-up, patient stated that August was to early to schedule right now and to call closer to appointment time. PAL put in a remind me and will call later.     AVS mailed.    Marisol Reis, EMT at 11:08 AM on May 15, 2020  [facility]   [phone]

## 2020-05-17 ENCOUNTER — TELEPHONE (OUTPATIENT)
Dept: PEDIATRICS | Facility: CLINIC | Age: 85
End: 2020-05-17

## 2020-05-17 DIAGNOSIS — E78.5 HYPERLIPIDEMIA LDL GOAL <100: Primary | ICD-10-CM

## 2020-05-18 NOTE — TELEPHONE ENCOUNTER
Patient is scheduled for an annual appointment in August.    Marisol Reis, EMT at 10:17 AM on May 18, 2020  [facility] Guide   [phone]

## 2020-05-19 NOTE — TELEPHONE ENCOUNTER
Spoke to daughter, Cleo, by phone.  INR appointment scheduled at lab for Thursday 5/21/2020.  She will let her mom know and will drive her to the clinic.    Ebony Doshi RN

## 2020-05-21 ENCOUNTER — ANTICOAGULATION THERAPY VISIT (OUTPATIENT)
Dept: NURSING | Facility: CLINIC | Age: 85
End: 2020-05-21

## 2020-05-21 DIAGNOSIS — Z79.01 LONG TERM CURRENT USE OF ANTICOAGULANT THERAPY: Primary | ICD-10-CM

## 2020-05-21 DIAGNOSIS — I48.20 CHRONIC ATRIAL FIBRILLATION (H): ICD-10-CM

## 2020-05-21 LAB — INR PPP: 2.2 (ref 0.86–1.14)

## 2020-05-21 PROCEDURE — 99207 ZZC NO CHARGE NURSE ONLY: CPT

## 2020-05-21 PROCEDURE — 36416 COLLJ CAPILLARY BLOOD SPEC: CPT | Performed by: INTERNAL MEDICINE

## 2020-05-21 PROCEDURE — 85610 PROTHROMBIN TIME: CPT | Performed by: INTERNAL MEDICINE

## 2020-05-21 NOTE — PROGRESS NOTES
"ANTICOAGULATION FOLLOW-UP     Patient Name:  Leonor Mercado  Date:  2020  Contact Type:  Telephone    SUBJECTIVE:  Patient Findings     Comments:   The patient was assessed for   diet, medication,   missed or extra doses,   bruising or bleeding,   with no problem findings.  No questions or concerns.  Reviewed previous warfarin dosing with patient.    INR is therapeutic today.   Patient will continue same maintenance dose.   Follow up in 4 weeks.          Clinical Outcomes     Comments:   The patient was assessed for   diet, medication,   missed or extra doses,   bruising or bleeding,   with no problem findings.  No questions or concerns.  Reviewed previous warfarin dosing with patient.    INR is therapeutic today.   Patient will continue same maintenance dose.   Follow up in 4 weeks.             OBJECTIVE    Recent labs: (last 7 days)     20  1341   INR 2.20*       ASSESSMENT / PLAN  INR assessment THER    Recheck INR In: 4 WEEKS    INR Location Clinic lab     Anticoagulation Summary  As of 2020    INR goal:   2.0-3.0   TTR:   61.3 % (7.9 mo)   INR used for dosin.20 (2020)   Warfarin maintenance plan:   1 mg (2 mg x 0.5) every Mon, Fri; 2 mg (2 mg x 1) all other days   Full warfarin instructions:   1 mg every Mon, Fri; 2 mg all other days   Weekly warfarin total:   12 mg   No change documented:   Ebony Doshi RN   Plan last modified:   Yoana Waddell RN (2020)   Next INR check:   2020   Priority:   Maintenance   Target end date:   Indefinite    Indications    Chronic atrial fibrillation [I48.20]  Long term current use of anticoagulant therapy [Z79.01]             Anticoagulation Episode Summary     INR check location:   Clinic Lab    Preferred lab:       Send INR reminders to:   SAUL BARRETT    Comments:   2mg tabs - nancy / 1st name pronounced \"ondray\" / sets up her own meds / comes with her daughter      Anticoagulation Care Providers     Provider Role Specialty " Phone number    Arnel Garcia MD Referring Internal Medicine [phone]            See the Encounter Report to view Anticoagulation Flowsheet and Dosing Calendar (Go to Encounters tab in chart review, and find the Anticoagulation Therapy Visit)    INR is therapeutic today.   Patient will continue same maintenance dose.   Follow up in 4 weeks.        Ebony Doshi RN

## 2020-06-18 ENCOUNTER — ANTICOAGULATION THERAPY VISIT (OUTPATIENT)
Dept: NURSING | Facility: CLINIC | Age: 85
End: 2020-06-18

## 2020-06-18 DIAGNOSIS — I48.20 CHRONIC ATRIAL FIBRILLATION (H): ICD-10-CM

## 2020-06-18 DIAGNOSIS — Z79.01 LONG TERM CURRENT USE OF ANTICOAGULANT THERAPY: Primary | ICD-10-CM

## 2020-06-18 LAB
CAPILLARY BLOOD COLLECTION: NORMAL
INR PPP: 2.3 (ref 0.86–1.14)

## 2020-06-18 PROCEDURE — 99207 ZZC NO CHARGE NURSE ONLY: CPT

## 2020-06-18 PROCEDURE — 36416 COLLJ CAPILLARY BLOOD SPEC: CPT | Performed by: INTERNAL MEDICINE

## 2020-06-18 PROCEDURE — 85610 PROTHROMBIN TIME: CPT | Performed by: INTERNAL MEDICINE

## 2020-06-18 NOTE — PROGRESS NOTES
"ANTICOAGULATION FOLLOW-UP     Patient Name:  Leonor Mercado  Date:  2020  Contact Type:  Telephone    SUBJECTIVE:  Patient Findings     Comments:   The patient was assessed for   diet, medication,   missed or extra doses,   bruising or bleeding,   with no problem findings.  No questions or concerns.  Reviewed previous warfarin dosing with patient.    INR is therapeutic today.   Patient will continue same maintenance dose.   Follow up in 4 weeks.        Clinical Outcomes     Comments:   The patient was assessed for   diet, medication,   missed or extra doses,   bruising or bleeding,   with no problem findings.  No questions or concerns.  Reviewed previous warfarin dosing with patient.    INR is therapeutic today.   Patient will continue same maintenance dose.   Follow up in 4 weeks.           OBJECTIVE    Recent labs: (last 7 days)     20  1351   INR 2.30*       ASSESSMENT / PLAN  INR assessment THER    Recheck INR In: 4 WEEKS    INR Location Clinic lab     Anticoagulation Summary  As of 2020    INR goal:   2.0-3.0   TTR:   63.6 % (7.9 mo)   INR used for dosin.30 (2020)   Warfarin maintenance plan:   1 mg (2 mg x 0.5) every Mon, Fri; 2 mg (2 mg x 1) all other days   Full warfarin instructions:   1 mg every Mon, Fri; 2 mg all other days   Weekly warfarin total:   12 mg   No change documented:   Ebony Doshi RN   Plan last modified:   Yoana Waddell RN (2020)   Next INR check:   2020   Priority:   Maintenance   Target end date:   Indefinite    Indications    Chronic atrial fibrillation (H) [I48.20]  Long term current use of anticoagulant therapy [Z79.01]             Anticoagulation Episode Summary     INR check location:   Clinic Lab    Preferred lab:       Send INR reminders to:   SAUL BARRETT    Comments:   2mg tabs - nancy / 1st name pronounced \"ondray\" / sets up her own meds / comes with her daughter      Anticoagulation Care Providers     Provider Role Specialty " Phone number    Arnel Garcia MD Referring Internal Medicine [phone]            See the Encounter Report to view Anticoagulation Flowsheet and Dosing Calendar (Go to Encounters tab in chart review, and find the Anticoagulation Therapy Visit)    INR is therapeutic today.   Patient will continue same maintenance dose.   Follow up in 4 weeks.        Ebony Doshi RN

## 2020-06-22 ENCOUNTER — TELEPHONE (OUTPATIENT)
Dept: CARDIOLOGY | Facility: CLINIC | Age: 85
End: 2020-06-22

## 2020-06-22 DIAGNOSIS — I49.5 SICK SINUS SYNDROME (H): Primary | ICD-10-CM

## 2020-06-22 DIAGNOSIS — Z95.0 CARDIAC PACEMAKER IN SITU: ICD-10-CM

## 2020-06-25 ENCOUNTER — OFFICE VISIT (OUTPATIENT)
Dept: OPTOMETRY | Facility: CLINIC | Age: 85
End: 2020-06-25
Payer: MEDICARE

## 2020-06-25 DIAGNOSIS — H02.054 TRICHIASIS OF LEFT UPPER EYELID: ICD-10-CM

## 2020-06-25 DIAGNOSIS — S05.02XA ABRASION OF LEFT CORNEA, INITIAL ENCOUNTER: Primary | ICD-10-CM

## 2020-06-25 PROCEDURE — 99203 OFFICE O/P NEW LOW 30 MIN: CPT | Performed by: OPTOMETRIST

## 2020-06-25 RX ORDER — NEOMYCIN SULFATE, POLYMYXIN B SULFATE AND DEXAMETHASONE 3.5; 10000; 1 MG/ML; [USP'U]/ML; MG/ML
1 SUSPENSION/ DROPS OPHTHALMIC 4 TIMES DAILY
Qty: 1 BOTTLE | Refills: 0 | Status: SHIPPED | OUTPATIENT
Start: 2020-06-25 | End: 2020-08-27

## 2020-06-25 ASSESSMENT — VISUAL ACUITY
OS_CC: 20/300
METHOD: SNELLEN - LINEAR
OS_PH_CC+: -1
OS_PH_CC: 20/70
OD_CC: 20/40
OD_CC+: -2
CORRECTION_TYPE: GLASSES

## 2020-06-25 ASSESSMENT — CUP TO DISC RATIO: OS_RATIO: 0.25

## 2020-06-25 ASSESSMENT — SLIT LAMP EXAM - LIDS: COMMENTS: 1+ PTOSIS, 1+ DERMATOCHALASIS

## 2020-06-25 ASSESSMENT — CONF VISUAL FIELD
OS_NORMAL: 1
OD_NORMAL: 1

## 2020-06-25 ASSESSMENT — EXTERNAL EXAM - LEFT EYE: OS_EXAM: NORMAL

## 2020-06-25 ASSESSMENT — EXTERNAL EXAM - RIGHT EYE: OD_EXAM: NORMAL

## 2020-06-25 NOTE — PATIENT INSTRUCTIONS
Start Maxitrol eyedrops- 1 drop 4x daily in the left eye for 7-10 days, then discontinue.     Misdirected eyelashes removed from temporal aspect of left lower lid in-office today. Patient tolerated procedure well.     Return to clinic as needed.       Collins Abdi O.D.  [facility]  [address] [place], [place] [address] [phone]

## 2020-06-25 NOTE — LETTER
6/25/2020         RE: Leonor Mercado  [address]  [place] [address]        Dear Colleague,    Thank you for referring your patient, Leonor Mercado, to the [facility]. Please see a copy of my visit note below.    Chief Complaint   Patient presents with     Foreign Body in Eye     Chief Complaint         Foreign Body in Eye   HPI    Foreign Body in Eye      Laterality:  left eye     Duration:  3 days     Associated symptoms:  redness, photophobia, and tearing     Course:  gradually worsening     Treatments tried:  eye drops     Response to treatment:  no improvement   Comments      Patient tried Artificial tears and it did not help.       Eyes: eye pain, redness, tearing  Constitutional: No fevers, chills, or weight changes.        Medical, surgical and family histories reviewed and updated 6/25/2020.       OBJECTIVE: See Ophthalmology exam    ASSESSMENT:    ICD-10-CM    1. Abrasion of left cornea, initial encounter  S05.02XA neomycin-polymyxin-dexamethasone (MAXITROL) 3.5-99732-2.1 SUSP ophthalmic susp   2. 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     Misdirected eyelashes removed from temporal aspect of left lower lid in-office today. Patient tolerated procedure well.     Return to clinic as needed.       Collins Abdi O.D.  [facility]  [address] [place], [place] [address] [phone]           Again, thank you for allowing me to participate in the care of your patient.        Sincerely,        Collins Abdi OD

## 2020-06-25 NOTE — PROGRESS NOTES
Chief Complaint   Patient presents with     Foreign Body in Eye     Chief Complaint         Foreign Body in Eye   HPI    Foreign Body in Eye      Laterality:  left eye     Duration:  3 days     Associated symptoms:  redness, photophobia, and tearing     Course:  gradually worsening     Treatments tried:  eye drops     Response to treatment:  no improvement   Comments      Patient tried Artificial tears and it did not help.       Eyes: eye pain, redness, tearing  Constitutional: No fevers, chills, or weight changes.        Medical, surgical and family histories reviewed and updated 6/25/2020.       OBJECTIVE: See Ophthalmology exam    ASSESSMENT:    ICD-10-CM    1. Abrasion of left cornea, initial encounter  S05.02XA neomycin-polymyxin-dexamethasone (MAXITROL) 3.5-37329-3.1 SUSP ophthalmic susp   2. 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     Misdirected eyelashes removed from temporal aspect of left lower lid in-office today. Patient tolerated procedure well.     Return to clinic as needed.       Collins Abdi O.D.  [facility]  [address] [place] [address]    [phone]

## 2020-07-22 ENCOUNTER — ANTICOAGULATION THERAPY VISIT (OUTPATIENT)
Dept: PEDIATRICS | Facility: CLINIC | Age: 85
End: 2020-07-22

## 2020-07-22 DIAGNOSIS — I48.20 CHRONIC ATRIAL FIBRILLATION (H): ICD-10-CM

## 2020-07-22 DIAGNOSIS — Z79.01 LONG TERM CURRENT USE OF ANTICOAGULANT THERAPY: ICD-10-CM

## 2020-07-22 LAB
CAPILLARY BLOOD COLLECTION: NORMAL
INR PPP: 1.6 (ref 0.86–1.14)

## 2020-07-22 PROCEDURE — 36416 COLLJ CAPILLARY BLOOD SPEC: CPT | Performed by: INTERNAL MEDICINE

## 2020-07-22 PROCEDURE — 85610 PROTHROMBIN TIME: CPT | Performed by: INTERNAL MEDICINE

## 2020-07-22 NOTE — PROGRESS NOTES
ANTICOAGULATION MANAGEMENT     Patient Name:  Leonor Mercado  Date:  7/22/2020    ASSESSMENT /SUBJECTIVE:    Today's INR result of 1.6 is supratherapeutic. Goal INR of 2.0-3.0      Warfarin dose taken: Missed dose(s) may be affecting INR, states she missed 2mg on Tuesday or Wednesday last week, no other missed doses    Diet: No new diet changes affecting INR    Medication changes/ interactions: No new medications/supplements affecting INR    Previous INR: Therapeutic     S/S of bleeding or thromboembolism: No, denies vision changes, weakness, slurred speech    New injury or illness: No, denies f/n/v/d    Upcoming surgery, procedure or cardioversion: No    Additional findings: States she has fallen twice in the last week, she did not hit her head, no injuries but shoulder pain resolving with tylenol (no change in prn use at bedtime) - she did not trip, she did not feel dizzy or lightheaded, she does not know what causes her to fall      PLAN:    Spoke with Leonor regarding INR result and instructed:     Warfarin Dosing Instructions: Take 1 extra tablet today then continue your current warfarin dose of 1 mg every Mon; Fri; 2 mg all other days - Patient prefers to take extra 1/2 tablet today and tomorrow instead of extra 1 tablet today, writer agrees with plan.    Instructed patient to follow up no later than: 2 weeks  Lab visit scheduled    Education provided: Target INR goal and significance of current INR result, Importance of therapeutic range, Monitoring for bleeding signs and symptoms, Monitoring for clotting signs and symptoms and When to seek medical attention/emergency care      Alfredo verbalizes understanding and agrees to warfarin dosing plan.    Instructed to call the Anticoagulation Clinic for any changes, questions or concerns. (#[phone])        Ginger Cortez [facility]      OBJECTIVE:  Recent labs: (last 7 days)     07/22/20  1124   INR 1.60*         No question data found.  Anticoagulation Summary   "As of 2020    INR goal:   2.0-3.0   TTR:   58.3 % (8.4 mo)   INR used for dosin.60! (2020)   Warfarin maintenance plan:   1 mg (2 mg x 0.5) every Mon, Fri; 2 mg (2 mg x 1) all other days   Full warfarin instructions:   : 3 mg; : 3 mg; Otherwise 1 mg every Mon, Fri; 2 mg all other days   Weekly warfarin total:   12 mg   Plan last modified:   Yoana Waddell RN (2020)   Next INR check:   2020   Priority:   Maintenance   Target end date:   Indefinite    Indications    Chronic atrial fibrillation (H) [I48.20]  Long term current use of anticoagulant therapy [Z79.01]             Anticoagulation Episode Summary     INR check location:   Clinic Lab    Preferred lab:       Send INR reminders to:   SAUL BARRETT    Comments:   2mg tabs - nancy / 1st name pronounced \"ondray\" / sets up her own meds / comes with her daughter      Anticoagulation Care Providers     Provider Role Specialty Phone number    Arnel Garcia MD Referring Internal Medicine [phone]           "

## 2020-07-28 ENCOUNTER — TRANSFERRED RECORDS (OUTPATIENT)
Dept: HEALTH INFORMATION MANAGEMENT | Facility: CLINIC | Age: 85
End: 2020-07-28

## 2020-07-30 ENCOUNTER — ANCILLARY PROCEDURE (OUTPATIENT)
Dept: CARDIOLOGY | Facility: CLINIC | Age: 85
End: 2020-07-30
Attending: INTERNAL MEDICINE
Payer: MEDICARE

## 2020-07-30 DIAGNOSIS — I49.5 SICK SINUS SYNDROME (H): Primary | ICD-10-CM

## 2020-07-30 DIAGNOSIS — Z95.0 CARDIAC PACEMAKER IN SITU: ICD-10-CM

## 2020-07-30 PROCEDURE — 93288 INTERROG EVL PM/LDLS PM IP: CPT | Performed by: INTERNAL MEDICINE

## 2020-08-05 ENCOUNTER — ANTICOAGULATION THERAPY VISIT (OUTPATIENT)
Dept: ANTICOAGULATION | Facility: CLINIC | Age: 85
End: 2020-08-05
Payer: MEDICARE

## 2020-08-05 DIAGNOSIS — I48.20 CHRONIC ATRIAL FIBRILLATION (H): ICD-10-CM

## 2020-08-05 DIAGNOSIS — Z79.01 LONG TERM CURRENT USE OF ANTICOAGULANT THERAPY: ICD-10-CM

## 2020-08-05 LAB
CAPILLARY BLOOD COLLECTION: NORMAL
INR PPP: 2.1 (ref 0.86–1.14)

## 2020-08-05 PROCEDURE — 99207 ZZC NO CHARGE NURSE ONLY: CPT | Performed by: INTERNAL MEDICINE

## 2020-08-05 PROCEDURE — 85610 PROTHROMBIN TIME: CPT | Performed by: INTERNAL MEDICINE

## 2020-08-05 PROCEDURE — 36416 COLLJ CAPILLARY BLOOD SPEC: CPT | Performed by: INTERNAL MEDICINE

## 2020-08-05 NOTE — PROGRESS NOTES
"ANTICOAGULATION FOLLOW-UP CLINIC VISIT    Patient Name:  Leonor Mercado  Date:  2020  Contact Type:  Telephone/ discussed with patient    SUBJECTIVE:  Patient Findings     Comments:   The patient was assessed for diet, medication, and activity level changes, missed or extra doses, bruising or bleeding, with no problem findings.          Clinical Outcomes     Negatives:   Major bleeding event, Thromboembolic event, Anticoagulation-related hospital admission, Anticoagulation-related ED visit, Anticoagulation-related fatality    Comments:   The patient was assessed for diet, medication, and activity level changes, missed or extra doses, bruising or bleeding, with no problem findings.             OBJECTIVE    Recent labs: (last 7 days)     20  1331   INR 2.10*       ASSESSMENT / PLAN  INR assessment THER    Recheck INR In: 4 WEEKS    INR Location Clinic      Anticoagulation Summary  As of 2020    INR goal:   2.0-3.0   TTR:   56.3 % (8.9 mo)   INR used for dosin.10 (2020)   Warfarin maintenance plan:   1 mg (2 mg x 0.5) every Mon, Fri; 2 mg (2 mg x 1) all other days   Full warfarin instructions:   1 mg every Mon, Fri; 2 mg all other days   Weekly warfarin total:   12 mg   No change documented:   Yoana Waddell RN   Plan last modified:   Yoana Waddell RN (2020)   Next INR check:   2020   Priority:   Maintenance   Target end date:   Indefinite    Indications    Chronic atrial fibrillation (H) [I48.20]  Long term current use of anticoagulant therapy [Z79.01]             Anticoagulation Episode Summary     INR check location:   Clinic Lab    Preferred lab:       Send INR reminders to:   SAUL BARRETT    Comments:   2mg tabs - nancy / 1st name pronounced \"ondray\" / sets up her own meds / comes with her daughter      Anticoagulation Care Providers     Provider Role Specialty Phone number    Arnel Garcia MD Referring Internal Medicine [phone]            See the Encounter " Report to view Anticoagulation Flowsheet and Dosing Calendar (Go to Encounters tab in chart review, and find the Anticoagulation Therapy Visit)    Dosage adjustment made based on physician directed care plan.    Yoana Waddell RN

## 2020-08-05 NOTE — PROGRESS NOTES
Left message on home phone to call [phone]. Cell phone didn't ring but received message that voicemail not set up. Please transfer call to Dilma at [phone].  Dilma Rider RN

## 2020-08-06 LAB
MDC_IDC_MSMT_BATTERY_DTM: NORMAL
MDC_IDC_MSMT_BATTERY_IMPEDANCE: 654 OHM
MDC_IDC_MSMT_BATTERY_REMAINING_LONGEVITY: 85 MO
MDC_IDC_MSMT_BATTERY_STATUS: NORMAL
MDC_IDC_MSMT_BATTERY_VOLTAGE: 2.79 V
MDC_IDC_MSMT_LEADCHNL_RA_IMPEDANCE_VALUE: 459 OHM
MDC_IDC_MSMT_LEADCHNL_RA_SENSING_INTR_AMPL: 0.35 MV
MDC_IDC_MSMT_LEADCHNL_RV_IMPEDANCE_VALUE: 655 OHM
MDC_IDC_MSMT_LEADCHNL_RV_PACING_THRESHOLD_AMPLITUDE: 1 V
MDC_IDC_MSMT_LEADCHNL_RV_PACING_THRESHOLD_PULSEWIDTH: 0.52 MS
MDC_IDC_MSMT_LEADCHNL_RV_SENSING_INTR_AMPL: 11.2 MV
MDC_IDC_PG_IMPLANT_DTM: NORMAL
MDC_IDC_PG_MFG: NORMAL
MDC_IDC_PG_MODEL: NORMAL
MDC_IDC_PG_SERIAL: NORMAL
MDC_IDC_PG_TYPE: NORMAL
MDC_IDC_SESS_CLINIC_NAME: NORMAL
MDC_IDC_SESS_DTM: NORMAL
MDC_IDC_SESS_TYPE: NORMAL
MDC_IDC_SET_BRADY_LOWRATE: 60 {BEATS}/MIN
MDC_IDC_SET_BRADY_MAX_SENSOR_RATE: 120 {BEATS}/MIN
MDC_IDC_SET_BRADY_MAX_TRACKING_RATE: 105 {BEATS}/MIN
MDC_IDC_SET_BRADY_MODE: NORMAL
MDC_IDC_SET_LEADCHNL_RV_PACING_AMPLITUDE: 2 V
MDC_IDC_SET_LEADCHNL_RV_PACING_CAPTURE_MODE: NORMAL
MDC_IDC_SET_LEADCHNL_RV_PACING_POLARITY: NORMAL
MDC_IDC_SET_LEADCHNL_RV_PACING_PULSEWIDTH: 0.52 MS
MDC_IDC_SET_LEADCHNL_RV_SENSING_POLARITY: NORMAL
MDC_IDC_SET_LEADCHNL_RV_SENSING_SENSITIVITY: 2.8 MV
MDC_IDC_SET_ZONE_DETECTION_INTERVAL: 333.33 MS
MDC_IDC_SET_ZONE_TYPE: NORMAL
MDC_IDC_SET_ZONE_TYPE: NORMAL
MDC_IDC_STAT_AT_BURDEN_PERCENT: 37.9 %
MDC_IDC_STAT_AT_DTM_END: NORMAL
MDC_IDC_STAT_AT_DTM_START: NORMAL
MDC_IDC_STAT_BRADY_DTM_END: NORMAL
MDC_IDC_STAT_BRADY_DTM_START: NORMAL
MDC_IDC_STAT_BRADY_RV_PERCENT_PACED: 15 %
MDC_IDC_STAT_EPISODE_RECENT_COUNT: 0
MDC_IDC_STAT_EPISODE_RECENT_COUNT: 645
MDC_IDC_STAT_EPISODE_RECENT_COUNT_DTM_END: NORMAL
MDC_IDC_STAT_EPISODE_RECENT_COUNT_DTM_END: NORMAL
MDC_IDC_STAT_EPISODE_RECENT_COUNT_DTM_START: NORMAL
MDC_IDC_STAT_EPISODE_RECENT_COUNT_DTM_START: NORMAL
MDC_IDC_STAT_EPISODE_TYPE: NORMAL
MDC_IDC_STAT_EPISODE_TYPE: NORMAL

## 2020-08-11 ENCOUNTER — TELEPHONE (OUTPATIENT)
Dept: CARDIOLOGY | Facility: CLINIC | Age: 85
End: 2020-08-11

## 2020-08-11 DIAGNOSIS — I50.32 CHRONIC HEART FAILURE WITH PRESERVED EJECTION FRACTION (H): ICD-10-CM

## 2020-08-11 DIAGNOSIS — I51.89 DIASTOLIC DYSFUNCTION: Primary | ICD-10-CM

## 2020-08-11 NOTE — TELEPHONE ENCOUNTER
Call out to Pt. DR Horton did review [place] [facility] [place] and did make recommendations;     Collins Horton MD Schwartz, Tammy J, DAMIAN               Increase lasix to 20 bid   In 1-2 weeks get bmp and bnp and OV with NP-to see if she's better and see if bnp <2000 from prior high   She had echo and stress echo about a year ago and nothing extraordinary found   She's in chronic afib and per pacer HR    They can check her HR with childress walk at OV   She has copd - despite pulm note that's still most likely but will try above to determine if HFpEF      Will await Pt's return call. Alycia SALGADO

## 2020-08-12 NOTE — TELEPHONE ENCOUNTER
Spoke with Pt per Dr Horton she Will start lasix 20 mg twice daily, have labs done 8/21/20, and see NP in [place] 8/28/20.   RAIN spear                          h

## 2020-08-17 ENCOUNTER — TELEPHONE (OUTPATIENT)
Dept: CARDIOLOGY | Facility: CLINIC | Age: 85
End: 2020-08-17

## 2020-08-20 ENCOUNTER — TELEPHONE (OUTPATIENT)
Dept: CARDIOLOGY | Facility: CLINIC | Age: 85
End: 2020-08-20

## 2020-08-21 DIAGNOSIS — E78.5 HYPERLIPIDEMIA LDL GOAL <100: ICD-10-CM

## 2020-08-21 DIAGNOSIS — I50.32 CHRONIC HEART FAILURE WITH PRESERVED EJECTION FRACTION (H): ICD-10-CM

## 2020-08-21 LAB
ALBUMIN SERPL-MCNC: 3.9 G/DL (ref 3.4–5)
ALP SERPL-CCNC: 105 U/L (ref 40–150)
ALT SERPL W P-5'-P-CCNC: 24 U/L (ref 0–50)
ANION GAP SERPL CALCULATED.3IONS-SCNC: 3 MMOL/L (ref 3–14)
AST SERPL W P-5'-P-CCNC: 23 U/L (ref 0–45)
BILIRUB SERPL-MCNC: 0.6 MG/DL (ref 0.2–1.3)
BUN SERPL-MCNC: 24 MG/DL (ref 7–30)
CALCIUM SERPL-MCNC: 9.5 MG/DL (ref 8.5–10.1)
CHLORIDE SERPL-SCNC: 104 MMOL/L (ref 94–109)
CHOLEST SERPL-MCNC: 168 MG/DL
CO2 SERPL-SCNC: 30 MMOL/L (ref 20–32)
CREAT SERPL-MCNC: 1.47 MG/DL (ref 0.52–1.04)
GFR SERPL CREATININE-BSD FRML MDRD: 30 ML/MIN/{1.73_M2}
GLUCOSE SERPL-MCNC: 86 MG/DL (ref 70–99)
HDLC SERPL-MCNC: 73 MG/DL
LDLC SERPL CALC-MCNC: 67 MG/DL
NONHDLC SERPL-MCNC: 95 MG/DL
NT-PROBNP SERPL-MCNC: 2861 PG/ML (ref 0–450)
POTASSIUM SERPL-SCNC: 4.5 MMOL/L (ref 3.4–5.3)
PROT SERPL-MCNC: 7.6 G/DL (ref 6.8–8.8)
SODIUM SERPL-SCNC: 137 MMOL/L (ref 133–144)
TRIGL SERPL-MCNC: 140 MG/DL

## 2020-08-21 PROCEDURE — 80061 LIPID PANEL: CPT | Performed by: INTERNAL MEDICINE

## 2020-08-21 PROCEDURE — 36415 COLL VENOUS BLD VENIPUNCTURE: CPT | Performed by: INTERNAL MEDICINE

## 2020-08-21 PROCEDURE — 80053 COMPREHEN METABOLIC PANEL: CPT | Performed by: INTERNAL MEDICINE

## 2020-08-21 PROCEDURE — 83880 ASSAY OF NATRIURETIC PEPTIDE: CPT | Performed by: INTERNAL MEDICINE

## 2020-08-27 ENCOUNTER — TELEPHONE (OUTPATIENT)
Dept: PEDIATRICS | Facility: CLINIC | Age: 85
End: 2020-08-27

## 2020-08-27 ENCOUNTER — OFFICE VISIT (OUTPATIENT)
Dept: PEDIATRICS | Facility: CLINIC | Age: 85
End: 2020-08-27
Payer: MEDICARE

## 2020-08-27 VITALS
HEART RATE: 79 BPM | DIASTOLIC BLOOD PRESSURE: 66 MMHG | SYSTOLIC BLOOD PRESSURE: 113 MMHG | OXYGEN SATURATION: 97 % | BODY MASS INDEX: 27.29 KG/M2 | HEIGHT: 63 IN | TEMPERATURE: 97.5 F | WEIGHT: 154 LBS

## 2020-08-27 DIAGNOSIS — E78.5 HYPERLIPIDEMIA LDL GOAL <100: ICD-10-CM

## 2020-08-27 DIAGNOSIS — I50.32 CHRONIC DIASTOLIC CONGESTIVE HEART FAILURE (H): ICD-10-CM

## 2020-08-27 DIAGNOSIS — I48.20 CHRONIC ATRIAL FIBRILLATION (H): ICD-10-CM

## 2020-08-27 DIAGNOSIS — Z00.00 MEDICARE ANNUAL WELLNESS VISIT, SUBSEQUENT: Primary | ICD-10-CM

## 2020-08-27 DIAGNOSIS — N18.30 CKD (CHRONIC KIDNEY DISEASE) STAGE 3, GFR 30-59 ML/MIN (H): ICD-10-CM

## 2020-08-27 DIAGNOSIS — J44.9 CHRONIC OBSTRUCTIVE PULMONARY DISEASE, UNSPECIFIED COPD TYPE (H): ICD-10-CM

## 2020-08-27 DIAGNOSIS — I25.10 CORONARY ARTERY DISEASE INVOLVING NATIVE CORONARY ARTERY OF NATIVE HEART WITHOUT ANGINA PECTORIS: ICD-10-CM

## 2020-08-27 DIAGNOSIS — Z12.11 SCREENING FOR COLON CANCER: ICD-10-CM

## 2020-08-27 PROCEDURE — 36415 COLL VENOUS BLD VENIPUNCTURE: CPT | Performed by: INTERNAL MEDICINE

## 2020-08-27 PROCEDURE — 83880 ASSAY OF NATRIURETIC PEPTIDE: CPT | Performed by: INTERNAL MEDICINE

## 2020-08-27 PROCEDURE — G0439 PPPS, SUBSEQ VISIT: HCPCS | Performed by: INTERNAL MEDICINE

## 2020-08-27 PROCEDURE — 80048 BASIC METABOLIC PNL TOTAL CA: CPT | Performed by: INTERNAL MEDICINE

## 2020-08-27 RX ORDER — FUROSEMIDE 20 MG
20 TABLET ORAL 2 TIMES DAILY
Qty: 180 TABLET | Refills: 1 | Status: ON HOLD | OUTPATIENT
Start: 2020-08-27 | End: 2021-01-04

## 2020-08-27 ASSESSMENT — ACTIVITIES OF DAILY LIVING (ADL)
CURRENT_FUNCTION: PREPARING MEALS REQUIRES ASSISTANCE
CURRENT_FUNCTION: TRANSPORTATION REQUIRES ASSISTANCE

## 2020-08-27 ASSESSMENT — MIFFLIN-ST. JEOR: SCORE: 1067.67

## 2020-08-27 NOTE — TELEPHONE ENCOUNTER
Alfredo was feeling better today and has been taking 40mg lasix each morning.   Rechecking BMP and BNP.   Continuing lasix at current dose.  Agree that sx due to underlying COPD seems more likely

## 2020-08-27 NOTE — LETTER
September 8, 2020      Leonor REBOLLEDO Rogelio  [address]   [place] [address]        Dear ,    We are writing to inform you of your test results.    Blood chemistries are stable-kidney function test is slightly below normal, but stable.   This change in kidney function is in part secondary to the diuretic medication that you take.  In addition, the BNP lab value is persistently elevated.  An elevated BNP is often seen in the setting of congestive heart failure.  In reviewing your history and recent cardiology notes, your shortness of breath is likely secondary to a combination of underlying diastolic heart failure as well as your underlying chronic obstructive pulmonary disease.  I would not recommend any changes to your medications at this time.  If shortness of breath were to worsen we could consider increasing your diuretic.  (however this does tend to adversely affect your kidney function test, so it would require close follow-up).  Please return for a follow-up visit in about 3 months and call sooner if you have any additional questions.     Resulted Orders   BNP-N terminal pro   Result Value Ref Range    N-Terminal Pro Bnp 3,966 (H) 0 - 450 pg/mL      Comment:         Reference range shown and results flagged as abnormal are for the outpatient,   non acute settings. Establishing a baseline value for each individual patient   is useful for follow-up.  Suggested inpatient cut points for confirming diagnosis of CHF in an acute   setting are:   >450 pg/mL (age 18 to less than 50)   >900 pg/mL (age 50 to less than 75)   >1800 pg/mL (75 yrs and older)  An inpatient or emergency department NT-proPBNP <300 pg/mL effectively rules   out acute CHF, with 99% negative predictive value.      Basic metabolic panel  (Ca, Cl, CO2, Creat, Gluc, K, Na, BUN)   Result Value Ref Range    Sodium 137 133 - 144 mmol/L    Potassium 4.6 3.4 - 5.3 mmol/L    Chloride 102 94 - 109 mmol/L    Carbon Dioxide 32 20 -  32 mmol/L    Anion Gap 3 3 - 14 mmol/L    Glucose 91 70 - 99 mg/dL    Urea Nitrogen 31 (H) 7 - 30 mg/dL    Creatinine 1.79 (H) 0.52 - 1.04 mg/dL    GFR Estimate 24 (L) >60 mL/min/[1.73_m2]      Comment:      Non  GFR Calc  Starting 12/18/2018, serum creatinine based estimated GFR (eGFR) will be   calculated using the Chronic Kidney Disease Epidemiology Collaboration   (CKD-EPI) equation.      GFR Estimate If Black 28 (L) >60 mL/min/[1.73_m2]      Comment:       GFR Calc  Starting 12/18/2018, serum creatinine based estimated GFR (eGFR) will be   calculated using the Chronic Kidney Disease Epidemiology Collaboration   (CKD-EPI) equation.      Calcium 9.1 8.5 - 10.1 mg/dL       If you have any questions or concerns, please call the clinic at the number listed above.       Sincerely,        Arnel Garcia MD, MD

## 2020-08-27 NOTE — TELEPHONE ENCOUNTER
Janette nurse on behave of  Dr. Serrato cardio - called wanting to relay a message to Dr. Garcia, she will send you a message. I will go talk to Dr. Garcia and dayanara to keep an eye out for an urgent message. Natalia Mcfarland, KEVIN on 8/27/2020 at 12:19 PM

## 2020-08-27 NOTE — TELEPHONE ENCOUNTER
Reviewed, will discuss with Alfredo today.  BNP appears to have trended higher--any additional recommendations from Dr Horton?

## 2020-08-27 NOTE — TELEPHONE ENCOUNTER
Received message Pt had canceled her OV that was arranged after increasing her Lasix to 20 mg twice a day per DR Horton. Labs were drawn 8/21 to check BNP per DR Horton, lipids and CMP drawn by PMD.  BNP 2,861, and per CMP Cr looks stable. Did call Pt and she said she cannot make appointment she has with NP because not able to get transpertation, and is seeing PMD TODAY . Will message PMD asking if he can address labs and lasix dosing, and decide if Pt can stay on increased diuretics and follow up with Pt for further needs regarding this, and would appreciate assist. Med list had not been up dated to 20 mg twice daily was waiting on Office visit. RAIN Ceja RN

## 2020-08-27 NOTE — PROGRESS NOTES
SUBJECTIVE:   Leonor Mercado is a 89 year old female who presents for Preventive Visit.    Are you in the first 12 months of your Medicare coverage?  No    Healthy Habits:    In general, how would you rate your overall health?  Fair    Frequency of exercise:  None    Duration of exercise:  Less than 15 minutes    Taking medications regularly:  Yes    Barriers to taking medications:  None    Medication side effects:  None    Ability to successfully perform activities of daily living:  Transportation requires assistance and preparing meals requires assistance    Home Safety:  No safety concerns identified    Hearing Impairment:  No hearing concerns    In the past 6 months, have you been bothered by leaking of urine?  No    In general, how would you rate your overall mental or emotional health?  Good      PHQ-2 Total Score:    Additional concerns today:  No    Do you feel safe in your environment? Yes    Have you ever done Advance Care Planning? (For example, a Health Directive, POLST, or a discussion with a medical provider or your loved ones about your wishes): Yes, advance care planning is on file.      Fall risk  Fallen 2 or more times in the past year?: Screen not completed for medical reason(s)  Any fall with injury in the past year?: Yes    Cognitive Screening   1) Repeat 3 items (Leader, Season, Table)    2) Clock draw: NORMAL  3) 3 item recall: Recalls 2 objects   Results: NORMAL clock, 1-2 items recalled: COGNITIVE IMPAIRMENT LESS LIKELY    Mini-CogTM Copyright S Rebekah. Licensed by the author for use in [facility]; reprinted with permission (barb@.[facility]). All rights reserved.      Do you have sleep apnea, excessive snoring or daytime drowsiness?: no    Reviewed and updated as needed this visit by clinical staff  Tobacco  Allergies  Meds  Med Hx  Surg Hx  Fam Hx  Soc Hx        Reviewed and updated as needed this visit by Provider        Social History     Tobacco Use     Smoking  status: Former Smoker     Packs/day: 0.00     Types: Cigarettes     Last attempt to quit: 1987     Years since quittin.3     Smokeless tobacco: Never Used   Substance Use Topics     Alcohol use: Yes     Alcohol/week: 0.0 standard drinks     Comment: occ     If you drink alcohol do you typically have >3 drinks per day or >7 drinks per week? No    No flowsheet data found.    Current providers sharing in care for this patient include:   Patient Care Team:  Arnel aGrcia MD as PCP - General (Internal Medicine)  Collins Horton MD as MD (Cardiology)  Jennfier Payne (Gastroenterology)  Shakira Blackman [facility] as Pharmacist (Pharmacist)  Arnel Garcia MD as Assigned PCP    The following health maintenance items are reviewed in Epic and correct as of today:  Health Maintenance   Topic Date Due     HF ACTION PLAN  [birth year]26     ANNUAL REVIEW OF HM ORDERS  [birth year]26     MEDICARE ANNUAL WELLNESS VISIT  1991     BRIGHT ASSESSMENT  2019     CBC  2020     INFLUENZA VACCINE (1) 2020     FALL RISK ASSESSMENT  2020     PHQ-9  2020     BMP  2021     ALT  2021     LIPID  2021     DTAP/TDAP/TD IMMUNIZATION (2 - Td) 10/15/2022     ADVANCE CARE PLANNING  2024     TSH W/FREE T4 REFLEX  Completed     SPIROMETRY  Completed     COPD ACTION PLAN  Completed     DEPRESSION ACTION PLAN  Completed     PNEUMOCOCCAL IMMUNIZATION 65+ LOW/MEDIUM RISK  Completed     ZOSTER IMMUNIZATION  Completed     IPV IMMUNIZATION  Aged Out     MENINGITIS IMMUNIZATION  Aged Out     HEPATITIS B IMMUNIZATION  Aged Out     Patient Active Problem List   Diagnosis     Hyperlipidemia LDL goal <100     Cardiac pacemaker in situ     Senile osteoporosis     Irritable bowel syndrome (IBS)     Anemia     Long term current use of anticoagulant therapy     Degeneration of lumbar intervertebral disc     CKD (chronic kidney disease) stage 3, GFR 30-59 ml/min (H)     Chronic atrial  fibrillation (H)     Chronic obstructive pulmonary disease, unspecified COPD type (H)     Coronary artery disease involving native coronary artery of native heart without angina pectoris     Diastolic dysfunction     Sick sinus syndrome (H)     Adjustment disorder, unspecified type     Presbyesophagus     Chronic heart failure with preserved ejection fraction (H)     Past Medical History:   Diagnosis Date     Atrial fibrillation (H)     Sick sinus syndrome, PAT, AF     BCC (basal cell carcinoma of skin)     Face     CHF - Chr Diastolic (H) 11/21/2017     Chronic renal insufficiency      COPD (chronic obstructive pulmonary disease) (H)      Coronary artery disease     2-05[place]-CAD-taxus stentx2 2.5-12,2.5-12 in LADp and LADm, 80%nonDomRCA-LcxDom-mild,EF60%     Hyperlipidemia      Hypertension      IBS (irritable bowel syndrome)      Osteoporosis      Pulmonary fibrosis (H)     do not use amiodarone     Sick sinus syndrome - s/p PPM 2003 (H) 12/16/2017     SSS (sick sinus syndrome) (H)     DDD pacer (see surgery history section)     Vertigo        Past Surgical History:   Procedure Laterality Date     APPENDECTOMY  1956     CARDIAC SURGERY      PPM, 2 STENTS2-05[place]-CAD-taxus stentx2 2.5-12,2.5-12 in LADp and LADm, 80%nonDomRCA-LcxDom-mild,EF60%     CORONARY ANGIOGRAPHY ADULT ORDER  7/1994    mild CAD,Normal LV function, No Mitral regurgitation, Prox LAD 30% stenosis. RCA prox and mid, 20-30%     ESOPHAGOSCOPY, GASTROSCOPY, DUODENOSCOPY (EGD), COMBINED N/A 8/19/2015    Procedure: COMBINED ESOPHAGOSCOPY, GASTROSCOPY, DUODENOSCOPY (EGD), BIOPSY SINGLE OR MULTIPLE;  Surgeon: Genevieve Leon MD;  Location: [facility]     H LEAD REVISION DUAL  4/2003    Revised in [place]-due to diaphram stim (original pacer placed in [place])     H LEAD REVISION DUAL  7/2/2014    Correction of reversal of A and V leads in Header     HEART CATH, ANGIOPLASTY  02/2005    LEIGH ANN mid and proximal LAD, ongoing 80% RCA; mild circumflex     HERNIA  REPAIR Left     LIH     IMPLANT PACEMAKER  2003    Placed in [place] for sick sinus syndrome     REPLACE PACEMAKER GENERATOR  2013    Dual-chamber pacemaker by Dr SETH Pierre       Family History   Problem Relation Age of Onset     Heart Disease Father          age 84     Neurologic Disorder Mother         dementia     Gastrointestinal Disease Daughter         liver transplant     Crohn's Disease Daughter        ALLERGIES     Allergies   Allergen Reactions     Peanuts [Nuts] Shortness Of Breath     Amiodarone      pulm fibrosis       Baclofen      Confusion      Bactrim [Sulfamethoxazole W-Trimethoprim]      Difficulty swallowing     Doxycycline Other (See Comments)     Multiple complaints; she does not want this again.      Levaquin [Levofloxacin] Other (See Comments)     Multiple complaints; she will not take this again     Linzess [Linaclotide] Diarrhea     Lorazepam        Prolonged drowsiness with ER visit      Liquid Adhesive Itching and Rash     Bleeding when tape removed after pacemaker placement..itched and burned for weeks.     Current Outpatient Medications   Medication Sig Dispense Refill     acetaminophen (TYLENOL) 500 MG tablet Take 500 mg by mouth 4 times daily And Q6H PRN BID       albuterol (PROAIR HFA/PROVENTIL HFA/VENTOLIN HFA) 108 (90 Base) MCG/ACT inhaler Inhale 2 puffs into the lungs every 6 hours as needed for shortness of breath / dyspnea or wheezing 18 g 3     ASPIRIN NOT PRESCRIBED (INTENTIONAL) Please choose reason not prescribed, below 0 each 0     calcium carbonate (CALCIUM CARBONATE) 600 MG TABS tablet Take 1 tablet by mouth daily        diltiazem ER COATED BEADS (DILTIAZEM CD) 240 MG 24 hr capsule Take 1 capsule (240 mg) by mouth daily 90 capsule 3     Fluticasone-Umeclidin-Vilanterol (TRELEGY ELLIPTA) 100-62.5-25 MCG/INH oral inhaler Inhale 1 puff into the lungs daily       furosemide (LASIX) 20 MG tablet Take 1 tablet (20 mg) by mouth 2 times daily 180 tablet 1      "ipratropium - albuterol 0.5 mg/2.5 mg/3 mL (DUONEB) 0.5-2.5 (3) MG/3ML neb solution Take 1 vial (3 mLs) by nebulization every 4 hours as needed for shortness of breath / dyspnea or wheezing 1 Box 1     isosorbide mononitrate (IMDUR) 30 MG 24 hr tablet Take 1 tablet (30 mg) by mouth daily 90 tablet 11     Lactobacillus (PROBIOTIC ACIDOPHILUS) TABS Take 1 tablet by mouth daily        losartan (COZAAR) 25 MG tablet Take 1 tablet (25 mg) by mouth daily 90 tablet 11     MELATONIN PO Take 5 mg by mouth nightly as needed        mirtazapine (REMERON) 15 MG tablet Take 1 tablet (15 mg) by mouth At Bedtime 90 tablet 11     Multiple Vitamins-Minerals (OCUVITE PO) Take 1 capsule by mouth daily.       polyethylene glycol (MIRALAX/GLYCOLAX) Packet Take 1 packet by mouth daily        PRAVASTATIN 40 MG PO tablet TAKE 1 TABLET BY MOUTH ONCE DAILY 90 tablet 2     senna (SENOKOT) 8.6 MG tablet Take 1 tablet by mouth as needed for constipation       Vitamin D, Cholecalciferol, 1000 units TABS Take 1,000 Units by mouth daily       warfarin ANTICOAGULANT (COUMADIN) 2 MG tablet Take 1 mg (0.5 tab) every Mon; 2 mg tab by mouth all other days, or as directed by INR clinic 90 tablet 3     WARFARIN SODIUM PO Take 2 mg by mouth daily PER INR ORDERS           Review of Systems  Constitutional, HEENT, cardiovascular, pulmonary, GI, , musculoskeletal, neuro, skin, endocrine and psych systems are negative, except as otherwise noted.    OBJECTIVE:   /66 (Cuff Size: Adult Regular)   Pulse 79   Temp 97.5  F (36.4  C) (Tympanic)   Ht 1.6 m (5' 3\")   Wt 69.9 kg (154 lb)   LMP  (LMP Unknown)   SpO2 97%   BMI 27.28 kg/m   Estimated body mass index is 27.28 kg/m  as calculated from the following:    Height as of this encounter: 1.6 m (5' 3\").    Weight as of this encounter: 69.9 kg (154 lb).  Physical Exam  GENERAL APPEARANCE: alert and no distress  EYES: Eyes grossly normal to inspection, PERRL and conjunctivae and sclerae normal  HENT: " ear canals and TM's normal, nose and mouth without ulcers or lesions, oropharynx clear and oral mucous membranes moist  NECK: no adenopathy, no asymmetry, masses, or scars and thyroid normal to palpation  RESP: lungs clear to auscultation - no rales, rhonchi or wheezes  CV: regular rate and rhythm, normal S1 S2, no S3 or S4, no murmur, click or rub, no peripheral edema and peripheral pulses strong  ABDOMEN: soft, nontender, no hepatosplenomegaly, no masses and bowel sounds normal  MS: no musculoskeletal defects are noted and gait is age appropriate without ataxia  SKIN: no suspicious lesions or rashes  NEURO: Normal strength and tone, sensory exam grossly normal, mentation intact and speech normal  PSYCH: mentation appears normal and affect normal/bright      ASSESSMENT / PLAN:       ICD-10-CM    1. Medicare annual wellness visit, subsequent  Z00.00 brca screening/pt declines mammogram     2. Chronic diastolic congestive heart failure (H)  I50.32 furosemide (LASIX) 20 MG tablet        Continue losartan, furosemide.  Stable/recent cardiology notes reviewed  Recheck BNP        3. Chronic atrial fibrillation (H)  I48.20 Rate control-diltiazem.   Continue warfarin     4. Chronic obstructive pulmonary disease, unspecified COPD type (H)  J44.9 Stable, continue current MDI, neb rx       5. Coronary artery disease involving native coronary artery of native heart without angina pectoris  I25.10 Stable, continue current rx         6. Hyperlipidemia LDL goal <100  E78.5 Recent labwork reviewed, well controlled     7. CKD (chronic kidney disease) stage 3, GFR 30-59 ml/min (H)  N18.3 Recent labwork reviewed, stable      8. Screening for colon cancer  Z12.11 Fecal colorectal cancer screen (FIT)     Pt elects to continue colon ca screening       COUNSELING:  Reviewed preventive health counseling, as reflected in patient instructions       Healthy diet/nutrition       Osteoporosis Prevention/Bone Health       Colon cancer  "screening    Estimated body mass index is 27.28 kg/m  as calculated from the following:    Height as of this encounter: 1.6 m (5' 3\").    Weight as of this encounter: 69.9 kg (154 lb).        She reports that she quit smoking about 33 years ago. Her smoking use included cigarettes. She smoked 0.00 packs per day. She has never used smokeless tobacco.      Appropriate preventive services were discussed with this patient, including applicable screening as appropriate for cardiovascular disease, diabetes, osteopenia/osteoporosis, and glaucoma.  As appropriate for age/gender, discussed screening for colorectal cancer, prostate cancer, breast cancer, and cervical cancer. Checklist reviewing preventive services available has been given to the patient.    Reviewed patients plan of care and provided an AVS. The Basic Care Plan (routine screening as documented in Health Maintenance) for Leonor meets the Care Plan requirement. This Care Plan has been established and reviewed with the Patient.    Counseling Resources:  ATP IV Guidelines  Pooled Cohorts Equation Calculator  Breast Cancer Risk Calculator  Breast Cancer: Medication to Reduce Risk  FRAX Risk Assessment  ICSI Preventive Guidelines  Dietary Guidelines for Americans, 2010  USDA's MyPlate  ASA Prophylaxis  Lung CA Screening    Arnel Garcia MD, MD  [facility]    Identified Health Risks:  "

## 2020-08-27 NOTE — PATIENT INSTRUCTIONS
"  Patient Education   Tips for Sleep Hygiene  \"Sleep hygiene\" means having good sleep habits.Follow these tips to sleep better at night:     Get on a schedule. Go to bed and get up at about the same time every day.    Listen to your body. Only try to sleep when you actually feel tired or sleepy.    Be patient. If you haven't been able to get to sleep after about 30 minutes or more, get up and do something calming or boring until you feel sleepy. Then return to bed and try again.    Don't have caffeine (coffee, tea, cola drinks, chocolate and some medicines), alcohol or nicotine (cigarettes). These can make it harder for you to fall asleep and stay asleep.    Use your bed for sleeping only. That means no TV, computer or homework in bed, especially during the evening and before bedtime.    Don't nap during the day. If you must nap, make sure it is for less than 20 minutes.    Create sleep rituals that remind your body it is time to sleep. Examples include breathing exercises, stretching or reading a book.    Avoid all electronic media (smart phone, computer, tablet) within 2 hours of bed time. The \"blue light\" in these devices activates the part of the brain that keeps you awake.    Dim the lights at night.    Get early morning sources of light (walk in the sunshine) to help set sleep patterns at night.    Try a bath or shower before bed. Having a warm bath 1 to 2 hours before bedtime can help you feel sleepy. Hot baths can make you alert, so be mindful of the temperature.    Don't watch the clock. Checking the clock during the night can wake you up. It can also lead to negative thoughts such as, \"I will never fall asleep,\" which can increase anxiety and sleeplessness.    Use a sleep diary. Track your sleep schedule to know your sleep patterns and to see where you can improve.    Get regular exercise every day. Try not to do heavy exercise in the 4 hours before bedtime.    Eat a healthy, balanced diet.    Try eating a " light, healthy snack before bed, but avoid eating a heavy meal.    Create the right sleeping area. A cool, dark, quiet room is best. If needed, try earplugs, fans and blackout curtains.    Keep your daytime routine the same even if you have a bad night sleep. Avoiding activities the next day can make it harder to sleep.  For informational purposes only. Not to replace the advice of your health care provider.   Copyright   2013 [facility]. All rights reserved. [employer] Watch 376157 - 01/16.       Patient Education     Insomnia  Insomnia is repeated difficulty going to sleep or staying asleep, or both. Whether you have insomnia is not defined by a specific amount of sleep. Different people need different amounts of sleep, and you may need more or less sleep at different times of your life.  There are 3 major types of insomnia: short-term, chronic, and  other.  Short-term, or acute insomnia lasts less than 3 months. The symptoms are temporary and can be linked directly to a stressor, such as the death of a loved one, financial problems, or a new physical problem. Short-term insomnia stops when the stressor resolves or the person adapts to its presence.  Chronic insomnia occurs at least 3 times a week and lasts longer than 3 months. Chronic insomnia can occur when either the cause of the sleeping problem is not clear, or the insomnia does not get better when the stressor is resolved. A number of other criteria are also used to make the diagnosis of chronic insomnia.    Other insomnia  is the third type of insomnia-related sleep disorders. This description applies to people who have problems getting to sleep or staying asleep, but don't meet all of the factors that describe either short-term or chronic insomnia.    Many things cause insomnia. Different people may have different causes. It can be from an underlying medical or psychological condition, or lifestyle. It can also be primary insomnia, which means no  cause can be found.  Causes of insomnia include:    Chronic medical problems- heart disease, gastrointestinal problems, hormonal changes, breathing problems    Anxiety    Stress    Depression    Pain    Work schedule    Sleep apnea    Illegal drugs    Certain medicines  Many different medicines can affect your sleep, such as stimulants, caffeine, alcohol, some decongestants, and diet pills. Other medicines may include some types of blood pressure pills, steroids, asthma medicines, antihistamines, antidepressants, seizure medicines and statins. Not all of these will affect your sleep, and they shouldn t be stopped without talking to your doctor.  Symptoms of insomnia can include:    Lying awake for long periods at night before falling asleep    Waking up several times during the night    Waking up early in the morning and not being able to get back to sleep    Feeling tired and not refreshed by sleep    Not being able to function properly during the day and finding it hard to concentrate    Irritability    Tiredness and fatigue during the day  Home care    Review your medicines with your doctor or pharmacist to find out if they can cause insomnia. Not all medicines will affect your sleep, but they shouldn't be stopped without reviewing them with your doctor. There may be serious side effects and consequences from suddenly stopping your medicines. Not taking them may cause strokes, heart attacks, and many other problems.    Caffeine, smoking, and alcohol also affect sleep. Limit your daily use and don't use these before bedtime. Alcohol may make you sleepy at first, but as its effects wear off, you may awaken a few hours later and have trouble returning to sleep.    Don't exercise, eat or drink large amounts of liquid within 2 hours of your bedtime.    Improve your sleep habits. Have a fixed bed and wake-up time. Try to keep noise, light and heat in your bedroom at a comfortable level. Try using earplugs or eyeshades  if needed.     Don't watch TV in bed.    If you don't fall asleep within 30 minutes, try to relax by reading or listening to soft music.    Limit daytime napping to one 30 minute period, early in the day.    Get regular exercise. Find other ways to lessen your stress level.    If a medicine was prescribed to help reset your sleep patterns, take it as directed. Sleeping pills are intended for short-term use, only. If taken for too long, the effect wears off while the risk of physical addiction and psychological dependence increases.  Sleep diary  If the cause isn t obvious and it is not improving, try keeping a  sleep diary  for a couple of weeks. Include in it:    The time you go to bed    How long it takes to fall asleep    How many times you wake up    What time you wake up    Your meal times and what you eat    What time you drink alcohol and caffeine    Your exercise habits and times  Follow-up care  Follow up with your healthcare provider, or as advised. If an X-ray or CT scan was done, you will be notified if there is a change in the reading, especially if it affects treatment.  Call 911  Call 911 if any of these occur:    Trouble breathing    Confusion or trouble waking    Fainting or loss of consciousness    Rapid heart rate    New chest, arm, shoulder, neck or upper back pain    Trouble with speech or vision, weakness of an arm or leg    Trouble walking or talking, loss of balance, numbness or weakness in one side of your body, facial droop  When to seek medical advice  Call your healthcare provider right away if any of these occur:    Extreme restlessness or irritability    Confusion or hallucinations (seeing or hearing things that are not there)    Anxiety, depression    Several days without sleeping  Date Last Reviewed: 5/1/2018 2000-2019 [employer]. [address] [place] [address], [place] [address]. All rights reserved. This information is not intended as a substitute for professional medical  care. Always follow your healthcare professional's instructions.

## 2020-08-28 LAB
ANION GAP SERPL CALCULATED.3IONS-SCNC: 3 MMOL/L (ref 3–14)
BUN SERPL-MCNC: 31 MG/DL (ref 7–30)
CALCIUM SERPL-MCNC: 9.1 MG/DL (ref 8.5–10.1)
CHLORIDE SERPL-SCNC: 102 MMOL/L (ref 94–109)
CO2 SERPL-SCNC: 32 MMOL/L (ref 20–32)
CREAT SERPL-MCNC: 1.79 MG/DL (ref 0.52–1.04)
GFR SERPL CREATININE-BSD FRML MDRD: 24 ML/MIN/{1.73_M2}
GLUCOSE SERPL-MCNC: 91 MG/DL (ref 70–99)
NT-PROBNP SERPL-MCNC: 3966 PG/ML (ref 0–450)
POTASSIUM SERPL-SCNC: 4.6 MMOL/L (ref 3.4–5.3)
SODIUM SERPL-SCNC: 137 MMOL/L (ref 133–144)

## 2020-08-28 NOTE — TELEPHONE ENCOUNTER
Thanks dr alfaro  I saw slight rising creat past year or so and bnp up  Echo 2019 good lv and rv function still if she has peripheral edema? ? Could be from her copd (abnl pft from [facility] in chart) or diastolic dysfxn  Obviously competing issues here--if increase diuretic to treat edema the creat may worsen  If peripheral edema can use DESHAWN hose but suspect at 89yo hard to get them on  I can see in follow-up if she wants (she's declined further cardio test/visits  However)

## 2020-08-31 DIAGNOSIS — Z12.11 SCREENING FOR COLON CANCER: ICD-10-CM

## 2020-08-31 PROCEDURE — 82274 ASSAY TEST FOR BLOOD FECAL: CPT | Performed by: INTERNAL MEDICINE

## 2020-09-02 ENCOUNTER — ANTICOAGULATION THERAPY VISIT (OUTPATIENT)
Dept: ANTICOAGULATION | Facility: CLINIC | Age: 85
End: 2020-09-02
Payer: MEDICARE

## 2020-09-02 DIAGNOSIS — Z79.01 LONG TERM CURRENT USE OF ANTICOAGULANT THERAPY: ICD-10-CM

## 2020-09-02 DIAGNOSIS — I48.20 CHRONIC ATRIAL FIBRILLATION (H): ICD-10-CM

## 2020-09-02 LAB
CAPILLARY BLOOD COLLECTION: NORMAL
INR PPP: 2.2 (ref 0.86–1.14)

## 2020-09-02 PROCEDURE — 85610 PROTHROMBIN TIME: CPT | Performed by: INTERNAL MEDICINE

## 2020-09-02 PROCEDURE — 36416 COLLJ CAPILLARY BLOOD SPEC: CPT | Performed by: INTERNAL MEDICINE

## 2020-09-02 PROCEDURE — 99207 ZZC NO CHARGE NURSE ONLY: CPT | Performed by: INTERNAL MEDICINE

## 2020-09-02 NOTE — PROGRESS NOTES
Left message on home phone to call [phone]. Attempted to [employer] cell phone, voicemail not set up.  Please transfer call to Dilma at [phone].  Dilma Rider RN

## 2020-09-02 NOTE — PROGRESS NOTES
ANTICOAGULATION FOLLOW-UP CLINIC VISIT    Patient Name:  Leonor Mercado  Date:  2020  Contact Type:  Telephone/ Called patient, she denies any changes. Orders discussed with patient, she agrees with the plan. Lab INR appointment scheduled on 20.    SUBJECTIVE:  Patient Findings     Comments:   The patient was assessed for diet, medication, and activity level changes, missed or extra doses, bruising or bleeding, with no problem findings. Maintenance warfarin dosing was reviewed with patient and will remain on the same dose until next INR check. Patient did not have any questions or concerns.           Clinical Outcomes     Negatives:   Major bleeding event, Thromboembolic event, Anticoagulation-related hospital admission, Anticoagulation-related ED visit, Anticoagulation-related fatality    Comments:   The patient was assessed for diet, medication, and activity level changes, missed or extra doses, bruising or bleeding, with no problem findings. Maintenance warfarin dosing was reviewed with patient and will remain on the same dose until next INR check. Patient did not have any questions or concerns.              OBJECTIVE    Recent labs: (last 7 days)     20  1328   INR 2.20*       ASSESSMENT / PLAN  INR assessment THER    Recheck INR In: 4 WEEKS    INR Location Outside lab      Anticoagulation Summary  As of 2020    INR goal:   2.0-3.0   TTR:   60.4 % (9.8 mo)   INR used for dosin.20 (2020)   Warfarin maintenance plan:   1 mg (2 mg x 0.5) every Mon, Fri; 2 mg (2 mg x 1) all other days   Full warfarin instructions:   1 mg every Mon, Fri; 2 mg all other days   Weekly warfarin total:   12 mg   No change documented:   Dilma Rider RN   Plan last modified:   Yoana Waddell RN (2020)   Next INR check:   2020   Priority:   Maintenance   Target end date:   Indefinite    Indications    Chronic atrial fibrillation (H) [I48.20]  Long term current use of anticoagulant therapy  "[Z79.01]             Anticoagulation Episode Summary     INR check location:   Clinic Lab    Preferred lab:       Send INR reminders to:   SAUL BARRETT    Comments:   2mg tabs - nancy / 1st name pronounced \"ondray\" / sets up her own meds / comes with her daughter      Anticoagulation Care Providers     Provider Role Specialty Phone number    Arnel Garcia MD Referring Internal Medicine [phone]            See the Encounter Report to view Anticoagulation Flowsheet and Dosing Calendar (Go to Encounters tab in chart review, and find the Anticoagulation Therapy Visit)    Dosage adjustment made based on physician directed care plan.    Dilma Rider RN                 "

## 2020-09-09 LAB — HEMOCCULT STL QL IA: NEGATIVE

## 2020-10-02 ENCOUNTER — ANTICOAGULATION THERAPY VISIT (OUTPATIENT)
Dept: PEDIATRICS | Facility: CLINIC | Age: 85
End: 2020-10-02

## 2020-10-02 DIAGNOSIS — I48.20 CHRONIC ATRIAL FIBRILLATION (H): ICD-10-CM

## 2020-10-02 DIAGNOSIS — Z79.01 LONG TERM CURRENT USE OF ANTICOAGULANT THERAPY: ICD-10-CM

## 2020-10-02 LAB
CAPILLARY BLOOD COLLECTION: NORMAL
INR PPP: 2.7 (ref 0.86–1.14)

## 2020-10-02 PROCEDURE — 36416 COLLJ CAPILLARY BLOOD SPEC: CPT | Performed by: INTERNAL MEDICINE

## 2020-10-02 PROCEDURE — 99207 PR NO CHARGE NURSE ONLY: CPT | Performed by: INTERNAL MEDICINE

## 2020-10-02 PROCEDURE — 85610 PROTHROMBIN TIME: CPT | Performed by: INTERNAL MEDICINE

## 2020-10-02 RX ORDER — WARFARIN SODIUM 2 MG/1
TABLET ORAL
Qty: 90 TABLET | Refills: 3
Start: 2020-10-02 | End: 2021-01-20

## 2020-10-02 NOTE — PROGRESS NOTES
"ANTICOAGULATION FOLLOW-UP CLINIC VISIT    Patient Name:  Leonor Mercado  Date:  10/2/2020  Contact Type:  Telephone    SUBJECTIVE:  Patient Findings     Comments:  No changes in diet, activity level, medications (including over the counter), or health. No missed doses of warfarin. Patient took dosing as prescribed. No signs of clots or bleeding concerns. Patient will continue maintenance warfarin dosing.          Clinical Outcomes     Negatives:  Major bleeding event, Thromboembolic event, Anticoagulation-related hospital admission, Anticoagulation-related ED visit, Anticoagulation-related fatality    Comments:  No changes in diet, activity level, medications (including over the counter), or health. No missed doses of warfarin. Patient took dosing as prescribed. No signs of clots or bleeding concerns. Patient will continue maintenance warfarin dosing.             OBJECTIVE    Recent labs: (last 7 days)     10/02/20  1301   INR 2.70*       ASSESSMENT / PLAN  INR assessment THER    Recheck INR In: 5 WEEKS    INR Location Clinic      Anticoagulation Summary  As of 10/2/2020    INR goal:  2.0-3.0   TTR:  64.1 % (10.8 mo)   INR used for dosin.70 (10/2/2020)   Warfarin maintenance plan:  1 mg (2 mg x 0.5) every Mon, Fri; 2 mg (2 mg x 1) all other days   Full warfarin instructions:  1 mg every Mon, Fri; 2 mg all other days   Weekly warfarin total:  12 mg   No change documented:  Rhianna Arreola RN   Plan last modified:  Yoana Waddell RN (2020)   Next INR check:  2020   Priority:  Maintenance   Target end date:  Indefinite    Indications    Chronic atrial fibrillation (H) [I48.20]  Long term current use of anticoagulant therapy [Z79.01]             Anticoagulation Episode Summary     INR check location:  Clinic Lab    Preferred lab:      Send INR reminders to:  SAUL BARRETT    Comments:  2mg tabs - nancy / 1st name pronounced \"ondray\" / sets up her own meds / comes with her daughter    "   Anticoagulation Care Providers     Provider Role Specialty Phone number    Arnel Garcia MD Referring Internal Medicine [phone]            See the Encounter Report to view Anticoagulation Flowsheet and Dosing Calendar (Go to Encounters tab in chart review, and find the Anticoagulation Therapy Visit)        Rhianna Arreola RN

## 2020-10-19 ENCOUNTER — VIRTUAL VISIT (OUTPATIENT)
Dept: PHARMACY | Facility: CLINIC | Age: 85
End: 2020-10-19

## 2020-10-19 ENCOUNTER — TELEPHONE (OUTPATIENT)
Dept: PHARMACY | Facility: CLINIC | Age: 85
End: 2020-10-19

## 2020-10-19 DIAGNOSIS — I25.10 CORONARY ARTERY DISEASE INVOLVING NATIVE CORONARY ARTERY OF NATIVE HEART WITHOUT ANGINA PECTORIS: ICD-10-CM

## 2020-10-19 DIAGNOSIS — I48.20 CHRONIC ATRIAL FIBRILLATION (H): ICD-10-CM

## 2020-10-19 DIAGNOSIS — K58.9 IRRITABLE BOWEL SYNDROME, UNSPECIFIED TYPE: ICD-10-CM

## 2020-10-19 DIAGNOSIS — F32.A DEPRESSION, UNSPECIFIED DEPRESSION TYPE: ICD-10-CM

## 2020-10-19 DIAGNOSIS — E78.5 HYPERLIPIDEMIA LDL GOAL <100: Primary | ICD-10-CM

## 2020-10-19 DIAGNOSIS — F41.9 ANXIETY: ICD-10-CM

## 2020-10-19 DIAGNOSIS — G47.00 INSOMNIA, UNSPECIFIED TYPE: ICD-10-CM

## 2020-10-19 DIAGNOSIS — Z71.85 VACCINE COUNSELING: ICD-10-CM

## 2020-10-19 DIAGNOSIS — J44.9 CHRONIC OBSTRUCTIVE PULMONARY DISEASE, UNSPECIFIED COPD TYPE (H): ICD-10-CM

## 2020-10-19 DIAGNOSIS — R60.9 EDEMA, UNSPECIFIED TYPE: ICD-10-CM

## 2020-10-19 DIAGNOSIS — I10 ESSENTIAL HYPERTENSION WITH GOAL BLOOD PRESSURE LESS THAN 140/90: ICD-10-CM

## 2020-10-19 PROCEDURE — 99606 MTMS BY PHARM EST 15 MIN: CPT | Mod: TEL | Performed by: PHARMACIST

## 2020-10-19 NOTE — PROGRESS NOTES
MTM ENCOUNTER  SUBJECTIVE/OBJECTIVE:                           Leonor Mercado is a 89 year old female called for a follow-up visit. She was referred to me from Dr. Garcia.  Today's visit is a follow-up MTM visit from 4/9/20     Chief Complaint: No concerns, contact for medication review.    Allergies/ADRs: Reviewed in chart  Tobacco: She reports that she quit smoking about 33 years ago. Her smoking use included cigarettes. She smoked 0.00 packs per day. She has never used smokeless tobacco.  Alcohol: Less than 1 beverages / week  Caffeine: 0.5 cups/day of coffee    Medication Adherence/Access: no issues reported      Hyperlipidemia: Current therapy includes pravastatin 40mg once daily.  Denies any concerns.  Recent Labs   Lab Test 08/21/20  1040 10/29/19  1004 03/15/15  0745 03/15/15  0745   CHOL 168 139   < > 153   HDL 73 78   < > 52   LDL 67 49   < > 75   TRIG 140 61   < > 129   CHOLHDLRATIO  --   --   --  2.9    < > = values in this interval not displayed.       Depression/Anxiety/Insomnia:  Current medications include: APAP 500 mg (which she believes helps her sleep), mirtazapine 15 mg HS and Melatonin 10 mg HS.  Has never slept really well; sounds like she gets about 8 hours but it is disrupted sleep.  No change in mood.   No concerns for side effects.    PHQ 4/20/2018 11/30/2018 5/14/2020   PHQ-9 Total Score 8 14 6   Q9: Thoughts of better off dead/self-harm past 2 weeks Not at all Several days Not at all       COPD: Current medications: Trelegy daily, Oxygen 2-3L at night and albuterol as needed (2 times per week), Duonebs at night.  Triggered by cold air.  Followed by Dr. Her; last visit 2/12/20.  Pt reports the following symptoms: productive cough, shortness of breath and rhinitis  COPD Action Plan on file.  Flu vaccine at home in Sept.    IBS-constipation: Currently taking Senna as needed, probiotic daily and Miralax daily. No concerns at this time. Having regular  BMs.    Hypertension/afib/edema/CAD: Current medications include losartan 25 mg daily, furosemide 40 mg daily, diltiazem 240 mg daily, isosorbide 30 mg daily, warfarin daily as directed by anticoagulation clinic.     No concerns about bleeding at this time.  Wearing support hose.  Minimal swelling in her ankles.  Blood pressure at home 120/70.  BP Readings from Last 3 Encounters:   08/27/20 113/66   05/14/20 133/89   04/03/20 122/70     Lab Results   Component Value Date    INR 2.70 10/02/2020    INR 2.20 09/02/2020    INR 2.10 08/05/2020    INR 1.60 07/22/2020    INR 2.30 06/18/2020    INR 2.20 05/21/2020    INR 3.40 04/21/2020         Today's Vitals: LMP  (LMP Unknown)       ASSESSMENT:                              Medication Adherence: No issues identified    Hyperlipidemia: stable    Depression/Anxiety/Insomnia:  stable    COPD: no changes in her symptoms.    IBS-constipation: stable    Hypertension/afib/edema/CAD: stable    Vaccines:  She did get the flu vaccine this month at her home.    PLAN:                            Continue current regimen    I spent 15 minutes with this patient today. All changes were made via collaborative practice agreement with PCP. A copy of the visit note was provided to the patient's primary care provider.    Will follow up in 1 year.    The patient declined a summary of these recommendations.     Shakira Blackman , Pharm D  [phone] (phone)  [phone] (pager)  Medication Therapy Management Pharmacist     Patient consented to a telehealth visit: yes  Telemedicine Visit Details  Type of service:  Telephone visit  Start Time: 130 pm  End Time: 145 pm  Originating Location (pt. Location): Home  Distant Location (provider location):  [facility]  Mode of Communication:  Telephone

## 2020-10-29 ENCOUNTER — ANCILLARY PROCEDURE (OUTPATIENT)
Dept: CARDIOLOGY | Facility: CLINIC | Age: 85
End: 2020-10-29
Attending: INTERNAL MEDICINE
Payer: MEDICARE

## 2020-10-29 DIAGNOSIS — I49.5 SICK SINUS SYNDROME (H): ICD-10-CM

## 2020-10-29 DIAGNOSIS — Z95.0 CARDIAC PACEMAKER IN SITU: ICD-10-CM

## 2020-10-29 PROCEDURE — 93296 REM INTERROG EVL PM/IDS: CPT | Performed by: INTERNAL MEDICINE

## 2020-10-29 PROCEDURE — 93294 REM INTERROG EVL PM/LDLS PM: CPT | Performed by: INTERNAL MEDICINE

## 2020-11-01 LAB
MDC_IDC_MSMT_BATTERY_DTM: NORMAL
MDC_IDC_MSMT_BATTERY_IMPEDANCE: 756 OHM
MDC_IDC_MSMT_BATTERY_REMAINING_LONGEVITY: 89 MO
MDC_IDC_MSMT_BATTERY_STATUS: NORMAL
MDC_IDC_MSMT_BATTERY_VOLTAGE: 2.79 V
MDC_IDC_MSMT_LEADCHNL_RA_IMPEDANCE_VALUE: 67 OHM
MDC_IDC_MSMT_LEADCHNL_RV_IMPEDANCE_VALUE: 723 OHM
MDC_IDC_PG_IMPLANT_DTM: NORMAL
MDC_IDC_PG_MFG: NORMAL
MDC_IDC_PG_MODEL: NORMAL
MDC_IDC_PG_SERIAL: NORMAL
MDC_IDC_PG_TYPE: NORMAL
MDC_IDC_SESS_CLINIC_NAME: NORMAL
MDC_IDC_SESS_DTM: NORMAL
MDC_IDC_SESS_TYPE: NORMAL
MDC_IDC_SET_BRADY_LOWRATE: 60 {BEATS}/MIN
MDC_IDC_SET_BRADY_MAX_SENSOR_RATE: 120 {BEATS}/MIN
MDC_IDC_SET_BRADY_MAX_TRACKING_RATE: 105 {BEATS}/MIN
MDC_IDC_SET_BRADY_MODE: NORMAL
MDC_IDC_SET_LEADCHNL_RV_PACING_AMPLITUDE: 2 V
MDC_IDC_SET_LEADCHNL_RV_PACING_CAPTURE_MODE: NORMAL
MDC_IDC_SET_LEADCHNL_RV_PACING_POLARITY: NORMAL
MDC_IDC_SET_LEADCHNL_RV_PACING_PULSEWIDTH: 0.52 MS
MDC_IDC_SET_LEADCHNL_RV_SENSING_POLARITY: NORMAL
MDC_IDC_SET_LEADCHNL_RV_SENSING_SENSITIVITY: 4 MV
MDC_IDC_SET_ZONE_DETECTION_INTERVAL: 333.33 MS
MDC_IDC_SET_ZONE_TYPE: NORMAL
MDC_IDC_SET_ZONE_TYPE: NORMAL
MDC_IDC_STAT_AT_BURDEN_PERCENT: 0 %
MDC_IDC_STAT_AT_DTM_END: NORMAL
MDC_IDC_STAT_AT_DTM_START: NORMAL
MDC_IDC_STAT_BRADY_DTM_END: NORMAL
MDC_IDC_STAT_BRADY_DTM_START: NORMAL
MDC_IDC_STAT_BRADY_RV_PERCENT_PACED: 73 %
MDC_IDC_STAT_EPISODE_RECENT_COUNT: 0
MDC_IDC_STAT_EPISODE_RECENT_COUNT: 0
MDC_IDC_STAT_EPISODE_RECENT_COUNT_DTM_END: NORMAL
MDC_IDC_STAT_EPISODE_RECENT_COUNT_DTM_END: NORMAL
MDC_IDC_STAT_EPISODE_RECENT_COUNT_DTM_START: NORMAL
MDC_IDC_STAT_EPISODE_RECENT_COUNT_DTM_START: NORMAL
MDC_IDC_STAT_EPISODE_TYPE: NORMAL
MDC_IDC_STAT_EPISODE_TYPE: NORMAL

## 2020-11-06 ENCOUNTER — ANTICOAGULATION THERAPY VISIT (OUTPATIENT)
Dept: NURSING | Facility: CLINIC | Age: 85
End: 2020-11-06

## 2020-11-06 DIAGNOSIS — Z79.01 LONG TERM CURRENT USE OF ANTICOAGULANT THERAPY: ICD-10-CM

## 2020-11-06 DIAGNOSIS — I48.20 CHRONIC ATRIAL FIBRILLATION (H): ICD-10-CM

## 2020-11-06 LAB
CAPILLARY BLOOD COLLECTION: NORMAL
INR PPP: 2.7 (ref 0.86–1.14)

## 2020-11-06 PROCEDURE — 85610 PROTHROMBIN TIME: CPT | Performed by: INTERNAL MEDICINE

## 2020-11-06 PROCEDURE — 36416 COLLJ CAPILLARY BLOOD SPEC: CPT | Performed by: INTERNAL MEDICINE

## 2020-11-06 PROCEDURE — 99207 PR NO CHARGE NURSE ONLY: CPT

## 2020-11-06 NOTE — PROGRESS NOTES
ANTICOAGULATION FOLLOW-UP     Patient Name:  Leonor Mercado  Date:  2020  Contact Type:  Telephone    SUBJECTIVE:  Patient Findings     Comments:  The patient was assessed for   diet, medication,   missed or extra doses,   bruising or bleeding,   with no problem findings.  No questions or concerns.  Reviewed previous warfarin dosing with patient.  She took warfarin as instructed.    INR is therapeutic today.   Patient will continue same maintenance dose.   Follow up in 5 weeks.  Her daughter Cleo (who drives her to the clinic) is going out of town  to 2020.              Clinical Outcomes     Comments:  The patient was assessed for   diet, medication,   missed or extra doses,   bruising or bleeding,   with no problem findings.  No questions or concerns.  Reviewed previous warfarin dosing with patient.  She took warfarin as instructed.    INR is therapeutic today.   Patient will continue same maintenance dose.   Follow up in 5 weeks.  Her daughter Cleo (who drives her to the clinic) is going out of town  to 2020.                 OBJECTIVE    Recent labs: (last 7 days)     20  1350   INR 2.70*       ASSESSMENT / PLAN  INR assessment THER    Recheck INR In: 5 WEEKS    INR Location Clinic lab     Anticoagulation Summary  As of 2020    INR goal:  2.0-3.0   TTR:  67.6 % (1 y)   INR used for dosin.70 (2020)   Warfarin maintenance plan:  1 mg (2 mg x 0.5) every Mon, Fri; 2 mg (2 mg x 1) all other days   Full warfarin instructions:  1 mg every Mon, Fri; 2 mg all other days   Weekly warfarin total:  12 mg   No change documented:  Ebony Doshi RN   Plan last modified:  Yoana Waddell RN (2020)   Next INR check:  12/10/2020   Priority:  Maintenance   Target end date:  Indefinite    Indications    Chronic atrial fibrillation (H) [I48.20]  Long term current use of anticoagulant therapy [Z79.01]             Anticoagulation Episode Summary     INR check location:  Clinic  "Lab    Preferred lab:      Send INR reminders to:  SAUL BARRETT    Comments:  2mg tabs - nancy / 1st name pronounced \"ondray\" / sets up her own meds / comes with her daughter      Anticoagulation Care Providers     Provider Role Specialty Phone number    Arnel Garcia MD Referring Internal Medicine - Pediatrics [phone]            See the Encounter Report to view Anticoagulation Flowsheet and Dosing Calendar (Go to Encounters tab in chart review, and find the Anticoagulation Therapy Visit)        Ebony Doshi RN                 "

## 2020-11-09 ENCOUNTER — OFFICE VISIT (OUTPATIENT)
Dept: URGENT CARE | Facility: URGENT CARE | Age: 85
End: 2020-11-09
Payer: MEDICARE

## 2020-11-09 ENCOUNTER — HOSPITAL ENCOUNTER (EMERGENCY)
Facility: CLINIC | Age: 85
Discharge: LEFT BEFORE DISCHARGE | End: 2020-11-09
Payer: MEDICARE

## 2020-11-09 VITALS
TEMPERATURE: 97 F | WEIGHT: 157.1 LBS | HEART RATE: 81 BPM | DIASTOLIC BLOOD PRESSURE: 88 MMHG | SYSTOLIC BLOOD PRESSURE: 156 MMHG | OXYGEN SATURATION: 95 % | BODY MASS INDEX: 27.83 KG/M2

## 2020-11-09 VITALS
DIASTOLIC BLOOD PRESSURE: 80 MMHG | TEMPERATURE: 98.2 F | OXYGEN SATURATION: 96 % | SYSTOLIC BLOOD PRESSURE: 157 MMHG | RESPIRATION RATE: 20 BRPM | HEART RATE: 84 BPM

## 2020-11-09 DIAGNOSIS — R10.11 RUQ ABDOMINAL PAIN: Primary | ICD-10-CM

## 2020-11-09 PROCEDURE — 99213 OFFICE O/P EST LOW 20 MIN: CPT | Performed by: PHYSICIAN ASSISTANT

## 2020-11-10 NOTE — ED NOTES
Patient stated that her abdominal pain is now completely resolve. Denies any pain. Stated that she wants to go home. Patient advised to stay and be evaluated by MD. Patient decline saying that she would prefer to go home. Verbal return precaution given.

## 2020-11-10 NOTE — ED TRIAGE NOTES
Woke up with right sided abdominal pain. Pain worse after eating. Patient attributes her pain to her history of constipation, but pain worsen tonight. Upon arriving to ED, patient stated pain is improved, hurts to palpate to right upper quadrant. ABCs intact.

## 2020-11-10 NOTE — PROGRESS NOTES
CHIEF COMPLAINT:   Chief Complaint   Patient presents with     RT ABDOMINAL PAIN     ONGOING SINCE THIS MORNING GETTING WORSE BY NOON POSS CONSTIPATION OR GALL BLADDER PER  PT       HPI: Leonor Mercado is a 89 year old female who presents to clinic today for evaluation of abdominal pain. This AM patient had dull pain in her upper abdomen. Shortly after eating lunch she developed worsening right upper quadrant abdominal pain. Pain has been worsening throughout the day. At 6P tonight, she ate an apple and developed 10/10 pain. Currently she rates the pain as a 5/10. Her last bowel movement was yesterday and she reports it as being normal. Denies having fever, chills, chest pain, vomiting, dysuria or hematuria.     Past Medical History:   Diagnosis Date     Atrial fibrillation (H)     Sick sinus syndrome, PAT, AF     BCC (basal cell carcinoma of skin)     Face     CHF - Chr Diastolic (H) 11/21/2017     Chronic renal insufficiency      COPD (chronic obstructive pulmonary disease) (H)      Coronary artery disease     2-05[place]-CAD-taxus stentx2 2.5-12,2.5-12 in LADp and LADm, 80%nonDomRCA-LcxDom-mild,EF60%     Hyperlipidemia      Hypertension      IBS (irritable bowel syndrome)      Osteoporosis      Pulmonary fibrosis (H)     do not use amiodarone     Sick sinus syndrome - s/p PPM 2003 (H) 12/16/2017     SSS (sick sinus syndrome) (H)     DDD pacer (see surgery history section)     Vertigo      Past Surgical History:   Procedure Laterality Date     APPENDECTOMY  1956     CARDIAC SURGERY      PPM, 2 STENTS2-05[place]-CAD-taxus stentx2 2.5-12,2.5-12 in LADp and LADm, 80%nonDomRCA-LcxDom-mild,EF60%     CORONARY ANGIOGRAPHY ADULT ORDER  7/1994    mild CAD,Normal LV function, No Mitral regurgitation, Prox LAD 30% stenosis. RCA prox and mid, 20-30%     ESOPHAGOSCOPY, GASTROSCOPY, DUODENOSCOPY (EGD), COMBINED N/A 8/19/2015    Procedure: COMBINED ESOPHAGOSCOPY, GASTROSCOPY, DUODENOSCOPY (EGD), BIOPSY SINGLE OR MULTIPLE;   Surgeon: Genevieve Leon MD;  Location: [facility]     H LEAD REVISION DUAL  2003    Revised in [place]-due to diaphram stim (original pacer placed in [place])     H LEAD REVISION DUAL  2014    Correction of reversal of A and V leads in Header     HEART CATH, ANGIOPLASTY  2005    LEIGH ANN mid and proximal LAD, ongoing 80% RCA; mild circumflex     HERNIA REPAIR Left     [facility]     IMPLANT PACEMAKER  2003    Placed in [place] for sick sinus syndrome     REPLACE PACEMAKER GENERATOR  2013    Dual-chamber pacemaker by Dr SETH Pierre     Social History     Tobacco Use     Smoking status: Former Smoker     Packs/day: 0.00     Types: Cigarettes     Quit date: 1987     Years since quittin.5     Smokeless tobacco: Never Used   Substance Use Topics     Alcohol use: Yes     Alcohol/week: 0.0 standard drinks     Comment: occ     Current Outpatient Medications   Medication     acetaminophen (TYLENOL) 500 MG tablet     albuterol (PROAIR HFA/PROVENTIL HFA/VENTOLIN HFA) 108 (90 Base) MCG/ACT inhaler     ASPIRIN NOT PRESCRIBED (INTENTIONAL)     calcium carbonate (CALCIUM CARBONATE) 600 MG TABS tablet     diltiazem ER COATED BEADS (DILTIAZEM CD) 240 MG 24 hr capsule     Fluticasone-Umeclidin-Vilanterol (TRELEGY ELLIPTA) 100-62.5-25 MCG/INH oral inhaler     furosemide (LASIX) 20 MG tablet     ipratropium - albuterol 0.5 mg/2.5 mg/3 mL (DUONEB) 0.5-2.5 (3) MG/3ML neb solution     isosorbide mononitrate (IMDUR) 30 MG 24 hr tablet     Lactobacillus (PROBIOTIC ACIDOPHILUS) TABS     losartan (COZAAR) 25 MG tablet     MELATONIN PO     mirtazapine (REMERON) 15 MG tablet     Multiple Vitamins-Minerals (OCUVITE PO)     polyethylene glycol (MIRALAX/GLYCOLAX) Packet     PRAVASTATIN 40 MG PO tablet     senna (SENOKOT) 8.6 MG tablet     Vitamin D, Cholecalciferol, 1000 units TABS     warfarin ANTICOAGULANT (COUMADIN) 2 MG tablet     No current facility-administered medications for this visit.      Allergies   Allergen Reactions      Peanuts [Nuts] Shortness Of Breath     Amiodarone      pulm fibrosis       Baclofen      Confusion      Bactrim [Sulfamethoxazole W-Trimethoprim]      Difficulty swallowing     Doxycycline Other (See Comments)     Multiple complaints; she does not want this again.      Levaquin [Levofloxacin] Other (See Comments)     Multiple complaints; she will not take this again     Linzess [Linaclotide] Diarrhea     Lorazepam        Prolonged drowsiness with ER visit      Liquid Adhesive Itching and Rash     Bleeding when tape removed after pacemaker placement..itched and burned for weeks.       10 point ROS of systems including Constitutional, Eyes, Respiratory, Cardiovascular, Gastroenterology, Genitourinary, Integumentary, Muscularskeletal, Psychiatric were all negative except for pertinent positives noted in my HPI.        Exam:  BP (!) 156/88   Pulse 81   Temp 97  F (36.1  C)   Wt 71.3 kg (157 lb 1.6 oz)   LMP  (LMP Unknown)   SpO2 95%   BMI 27.83 kg/m    Constitutional: alert. She is hunched over. Appears to be in pain.   Head: Normocephalic, atraumatic.  Cardiovascular: RRR  Respiratory: CTA bilaterally, no rhonchi or rales  Gastrointestinal: soft. She has TTP in right upper quadrant. Voluntary guarding.   Skin: no rashes  Neurologic: Speech clear, gait normal. Moves all extremities.      ASSESSMENT/PLAN:  1. RUQ abdominal pain  TTP in right upper quadrant.   Will send to [facility] for further evaluation and treatment.     Laly Barriga PA-C

## 2020-11-11 ENCOUNTER — DOCUMENTATION ONLY (OUTPATIENT)
Dept: PEDIATRICS | Facility: CLINIC | Age: 85
End: 2020-11-11

## 2020-11-11 NOTE — PROGRESS NOTES
ANTICOAGULATION  MANAGEMENT: Discharge Review    Leonor Mercado chart reviewed for anticoagulation continuity of care    Emergency room visit on 11/9/2020 for Right Abdominal Pain.    Discharge disposition: Home    Results:    Recent labs: (last 7 days)     11/06/20  1350   INR 2.70*     Anticoagulation inpatient management:     not applicable     Anticoagulation discharge instructions:     Warfarin dosing: home regimen continued   Bridging: No   INR goal change: No      Medication changes affecting anticoagulation: No    Additional factors affecting anticoagulation: Yes: Pain    Plan     No adjustment to anticoagulation plan needed    Patient not contacted    No adjustment to Anticoagulation Calendar was required    Eleuterio Rivas RN

## 2020-12-01 DIAGNOSIS — E78.5 HYPERLIPIDEMIA LDL GOAL <100: ICD-10-CM

## 2020-12-01 DIAGNOSIS — I25.10 CORONARY ARTERY DISEASE INVOLVING NATIVE CORONARY ARTERY OF NATIVE HEART WITHOUT ANGINA PECTORIS: ICD-10-CM

## 2020-12-02 ENCOUNTER — TELEPHONE (OUTPATIENT)
Dept: PEDIATRICS | Facility: CLINIC | Age: 85
End: 2020-12-02

## 2020-12-02 DIAGNOSIS — I50.32 CHRONIC HEART FAILURE WITH PRESERVED EJECTION FRACTION (H): ICD-10-CM

## 2020-12-02 DIAGNOSIS — M81.0 SENILE OSTEOPOROSIS: ICD-10-CM

## 2020-12-02 DIAGNOSIS — M51.369 DEGENERATION OF LUMBAR INTERVERTEBRAL DISC: Primary | ICD-10-CM

## 2020-12-02 NOTE — TELEPHONE ENCOUNTER
General Call:     Who is calling:  Patient's daughter, Les (consent to communicate on file)    Reason for Call:  New Rollator    What are your questions or concerns:  Patient has had a walker but it's very old and in rough shape. Les wanted to speak to someone to see if patient can get a new rollator.    Okay to leave a detailed message:Yes at Other phone number:  [phone]     Rhianna Lawrence,

## 2020-12-03 RX ORDER — DILTIAZEM HYDROCHLORIDE 240 MG/1
CAPSULE, EXTENDED RELEASE ORAL
Qty: 90 CAPSULE | Refills: 1 | Status: SHIPPED | OUTPATIENT
Start: 2020-12-03 | End: 2021-06-21

## 2020-12-03 RX ORDER — PRAVASTATIN SODIUM 40 MG
TABLET ORAL
Qty: 90 TABLET | Refills: 3 | Status: SHIPPED | OUTPATIENT
Start: 2020-12-03 | End: 2021-12-15

## 2020-12-03 NOTE — TELEPHONE ENCOUNTER
Prescription approved per [facility] Refill Protocol.  Adrianna Rodrigez RN, BSN  Message handled by CLINIC NURSE.

## 2020-12-10 ENCOUNTER — ANTICOAGULATION THERAPY VISIT (OUTPATIENT)
Dept: NURSING | Facility: CLINIC | Age: 85
End: 2020-12-10

## 2020-12-10 DIAGNOSIS — Z79.01 LONG TERM CURRENT USE OF ANTICOAGULANT THERAPY: ICD-10-CM

## 2020-12-10 DIAGNOSIS — I48.20 CHRONIC ATRIAL FIBRILLATION (H): ICD-10-CM

## 2020-12-10 LAB
CAPILLARY BLOOD COLLECTION: NORMAL
INR PPP: 1.9 (ref 0.86–1.14)

## 2020-12-10 PROCEDURE — 99207 PR NO CHARGE NURSE ONLY: CPT

## 2020-12-10 PROCEDURE — 36416 COLLJ CAPILLARY BLOOD SPEC: CPT | Performed by: INTERNAL MEDICINE

## 2020-12-10 PROCEDURE — 85610 PROTHROMBIN TIME: CPT | Performed by: INTERNAL MEDICINE

## 2020-12-10 NOTE — PROGRESS NOTES
"ANTICOAGULATION FOLLOW-UP     Patient Name:  Leonor Mercado  Date:  12/10/2020  Contact Type:  Telephone    SUBJECTIVE:  Patient Findings     Positives:  Missed doses (Missed dose on  (4 days ago)), Change in diet/appetite (She gets a lunch brought to her, but \"sometimes it's so terrible I can't eat it\")    Comments:  The patient was assessed for   diet, medication,   bruising or bleeding,   with no problem findings.  No questions or concerns.    INR is subtherapeutic today at 1.9 due to missed dose.   Patient will continue same maintenance dose.   Follow up in 4 weeks. She did not want to come in sooner.              Clinical Outcomes     Comments:  The patient was assessed for   diet, medication,   bruising or bleeding,   with no problem findings.  No questions or concerns.    INR is subtherapeutic today at 1.9 due to missed dose.   Patient will continue same maintenance dose.   Follow up in 4 weeks. She did not want to come in sooner.                 OBJECTIVE    Recent labs: (last 7 days)     12/10/20  1347   INR 1.90*       ASSESSMENT / PLAN  INR assessment SUB    Recheck INR In: 4 WEEKS    INR Location Clinic lab     Anticoagulation Summary  As of 12/10/2020    INR goal:  2.0-3.0   TTR:  74.8 % (1 y)   INR used for dosin.90 (12/10/2020)   Warfarin maintenance plan:  1 mg (2 mg x 0.5) every Mon, Fri; 2 mg (2 mg x 1) all other days   Full warfarin instructions:  1 mg every Mon, Fri; 2 mg all other days   Weekly warfarin total:  12 mg   No change documented:  Ebony Doshi RN   Plan last modified:  Yoana Waddell RN (2020)   Next INR check:  2021   Priority:  Maintenance   Target end date:  Indefinite    Indications    Chronic atrial fibrillation (H) [I48.20]  Long term current use of anticoagulant therapy [Z79.01]             Anticoagulation Episode Summary     INR check location:  Clinic Lab    Preferred lab:      Send INR reminders to:  SAUL BARRETT    Comments:  2mg tabs - nancy " "/ 1st name pronounced \"ondray\" / sets up her own meds / comes with her daughter      Anticoagulation Care Providers     Provider Role Specialty Phone number    Arnel Garcia MD Referring Internal Medicine - Pediatrics [phone]            See the Encounter Report to view Anticoagulation Flowsheet and Dosing Calendar (Go to Encounters tab in chart review, and find the Anticoagulation Therapy Visit)        Ebony Doshi RN                 "

## 2020-12-22 ENCOUNTER — DOCUMENTATION ONLY (OUTPATIENT)
Dept: PEDIATRICS | Facility: CLINIC | Age: 85
End: 2020-12-22

## 2020-12-22 DIAGNOSIS — Z79.01 LONG TERM CURRENT USE OF ANTICOAGULANT THERAPY: Primary | ICD-10-CM

## 2020-12-22 DIAGNOSIS — I48.20 CHRONIC ATRIAL FIBRILLATION (H): ICD-10-CM

## 2020-12-22 NOTE — PROGRESS NOTES
ANTICOAGULATION MANAGEMENT      Leonor Mercado due for annual renewal of referral to anticoagulation monitoring. Order pended for your review and signature.      ANTICOAGULATION SUMMARY      Warfarin indication(s)     Atrial fibrillation    Heart valve present?  NO       Current goal range   INR: 2.0-3.0     Goal appropriate for indication? Yes, INR 2-3 appropriate for hx of DVT, PE, hypercoagulable state, Afib, LVAD, or bileaflet AVR without risk factors     Current duration of therapy Indefinite/long term therapy   Time in Therapeutic Range (TTR)  (Goal > 60%) 74.8 %       Office visit with referring provider's group within last year yes on 8/27/2020       Ebony Doshi RN

## 2021-01-04 ENCOUNTER — APPOINTMENT (OUTPATIENT)
Dept: CT IMAGING | Facility: CLINIC | Age: 86
DRG: 149 | End: 2021-01-04
Attending: EMERGENCY MEDICINE
Payer: MEDICARE

## 2021-01-04 ENCOUNTER — HOSPITAL ENCOUNTER (INPATIENT)
Facility: CLINIC | Age: 86
LOS: 3 days | Discharge: NURSING, REHAB OR LONG-TERM CARE | DRG: 149 | End: 2021-01-07
Attending: EMERGENCY MEDICINE | Admitting: HOSPITALIST
Payer: MEDICARE

## 2021-01-04 ENCOUNTER — APPOINTMENT (OUTPATIENT)
Dept: CARDIOLOGY | Facility: CLINIC | Age: 86
DRG: 149 | End: 2021-01-04
Attending: HOSPITALIST
Payer: MEDICARE

## 2021-01-04 DIAGNOSIS — J44.9 CHRONIC OBSTRUCTIVE PULMONARY DISEASE, UNSPECIFIED COPD TYPE (H): ICD-10-CM

## 2021-01-04 DIAGNOSIS — R29.898 RIGHT LEG WEAKNESS: ICD-10-CM

## 2021-01-04 DIAGNOSIS — K58.9 IRRITABLE BOWEL SYNDROME, UNSPECIFIED TYPE: ICD-10-CM

## 2021-01-04 DIAGNOSIS — H81.20 ACUTE VESTIBULAR NEURONITIS, UNSPECIFIED LATERALITY: Primary | ICD-10-CM

## 2021-01-04 DIAGNOSIS — R42 VERTIGO: ICD-10-CM

## 2021-01-04 LAB
ANION GAP SERPL CALCULATED.3IONS-SCNC: 3 MMOL/L (ref 3–14)
ANION GAP SERPL CALCULATED.3IONS-SCNC: 5 MMOL/L (ref 3–14)
APTT PPP: 38 SEC (ref 22–37)
BASOPHILS # BLD AUTO: 0 10E9/L (ref 0–0.2)
BASOPHILS NFR BLD AUTO: 0.7 %
BUN SERPL-MCNC: 20 MG/DL (ref 7–30)
BUN SERPL-MCNC: 23 MG/DL (ref 7–30)
CALCIUM SERPL-MCNC: 8.6 MG/DL (ref 8.5–10.1)
CALCIUM SERPL-MCNC: 9 MG/DL (ref 8.5–10.1)
CHLORIDE SERPL-SCNC: 105 MMOL/L (ref 94–109)
CHLORIDE SERPL-SCNC: 108 MMOL/L (ref 94–109)
CO2 SERPL-SCNC: 27 MMOL/L (ref 20–32)
CO2 SERPL-SCNC: 30 MMOL/L (ref 20–32)
CREAT SERPL-MCNC: 1.24 MG/DL (ref 0.52–1.04)
CREAT SERPL-MCNC: 1.27 MG/DL (ref 0.52–1.04)
DIFFERENTIAL METHOD BLD: ABNORMAL
EOSINOPHIL # BLD AUTO: 0.2 10E9/L (ref 0–0.7)
EOSINOPHIL NFR BLD AUTO: 3.5 %
ERYTHROCYTE [DISTWIDTH] IN BLOOD BY AUTOMATED COUNT: 14.6 % (ref 10–15)
GFR SERPL CREATININE-BSD FRML MDRD: 36 ML/MIN/{1.73_M2}
GFR SERPL CREATININE-BSD FRML MDRD: 37 ML/MIN/{1.73_M2}
GLUCOSE BLDC GLUCOMTR-MCNC: 91 MG/DL (ref 70–99)
GLUCOSE SERPL-MCNC: 117 MG/DL (ref 70–99)
GLUCOSE SERPL-MCNC: 98 MG/DL (ref 70–99)
HCT VFR BLD AUTO: 37.7 % (ref 35–47)
HGB BLD-MCNC: 11.6 G/DL (ref 11.7–15.7)
IMM GRANULOCYTES # BLD: 0 10E9/L (ref 0–0.4)
IMM GRANULOCYTES NFR BLD: 0.3 %
INR PPP: 2.22 (ref 0.86–1.14)
INTERPRETATION ECG - MUSE: NORMAL
LABORATORY COMMENT REPORT: NORMAL
LYMPHOCYTES # BLD AUTO: 1.9 10E9/L (ref 0.8–5.3)
LYMPHOCYTES NFR BLD AUTO: 31.8 %
MCH RBC QN AUTO: 29.7 PG (ref 26.5–33)
MCHC RBC AUTO-ENTMCNC: 30.8 G/DL (ref 31.5–36.5)
MCV RBC AUTO: 96 FL (ref 78–100)
MONOCYTES # BLD AUTO: 0.6 10E9/L (ref 0–1.3)
MONOCYTES NFR BLD AUTO: 9.2 %
NEUTROPHILS # BLD AUTO: 3.3 10E9/L (ref 1.6–8.3)
NEUTROPHILS NFR BLD AUTO: 54.5 %
NRBC # BLD AUTO: 0 10*3/UL
NRBC BLD AUTO-RTO: 0 /100
PLATELET # BLD AUTO: 203 10E9/L (ref 150–450)
POTASSIUM SERPL-SCNC: 4.1 MMOL/L (ref 3.4–5.3)
POTASSIUM SERPL-SCNC: 4.4 MMOL/L (ref 3.4–5.3)
RBC # BLD AUTO: 3.91 10E12/L (ref 3.8–5.2)
SARS-COV-2 RNA SPEC QL NAA+PROBE: NEGATIVE
SODIUM SERPL-SCNC: 138 MMOL/L (ref 133–144)
SODIUM SERPL-SCNC: 140 MMOL/L (ref 133–144)
SPECIMEN SOURCE: NORMAL
TROPONIN I SERPL-MCNC: <0.015 UG/L (ref 0–0.04)
WBC # BLD AUTO: 6 10E9/L (ref 4–11)

## 2021-01-04 PROCEDURE — 85610 PROTHROMBIN TIME: CPT | Performed by: EMERGENCY MEDICINE

## 2021-01-04 PROCEDURE — 93306 TTE W/DOPPLER COMPLETE: CPT | Mod: 26 | Performed by: INTERNAL MEDICINE

## 2021-01-04 PROCEDURE — 99223 1ST HOSP IP/OBS HIGH 75: CPT | Mod: AI | Performed by: HOSPITALIST

## 2021-01-04 PROCEDURE — 99221 1ST HOSP IP/OBS SF/LOW 40: CPT | Mod: GC | Performed by: PSYCHIATRY & NEUROLOGY

## 2021-01-04 PROCEDURE — 36415 COLL VENOUS BLD VENIPUNCTURE: CPT | Performed by: HOSPITALIST

## 2021-01-04 PROCEDURE — 255N000002 HC RX 255 OP 636: Performed by: HOSPITALIST

## 2021-01-04 PROCEDURE — 85730 THROMBOPLASTIN TIME PARTIAL: CPT | Performed by: EMERGENCY MEDICINE

## 2021-01-04 PROCEDURE — 70450 CT HEAD/BRAIN W/O DYE: CPT

## 2021-01-04 PROCEDURE — 85025 COMPLETE CBC W/AUTO DIFF WBC: CPT | Performed by: EMERGENCY MEDICINE

## 2021-01-04 PROCEDURE — 250N000009 HC RX 250: Performed by: EMERGENCY MEDICINE

## 2021-01-04 PROCEDURE — 250N000013 HC RX MED GY IP 250 OP 250 PS 637: Performed by: HOSPITALIST

## 2021-01-04 PROCEDURE — C9803 HOPD COVID-19 SPEC COLLECT: HCPCS

## 2021-01-04 PROCEDURE — 87635 SARS-COV-2 COVID-19 AMP PRB: CPT | Performed by: EMERGENCY MEDICINE

## 2021-01-04 PROCEDURE — 99285 EMERGENCY DEPT VISIT HI MDM: CPT | Mod: 25

## 2021-01-04 PROCEDURE — 999N001017 HC STATISTIC GLUCOSE BY METER IP

## 2021-01-04 PROCEDURE — 80048 BASIC METABOLIC PNL TOTAL CA: CPT | Performed by: EMERGENCY MEDICINE

## 2021-01-04 PROCEDURE — 93005 ELECTROCARDIOGRAM TRACING: CPT

## 2021-01-04 PROCEDURE — 120N000001 HC R&B MED SURG/OB

## 2021-01-04 PROCEDURE — 250N000011 HC RX IP 250 OP 636: Performed by: EMERGENCY MEDICINE

## 2021-01-04 PROCEDURE — 84484 ASSAY OF TROPONIN QUANT: CPT | Performed by: EMERGENCY MEDICINE

## 2021-01-04 PROCEDURE — 258N000003 HC RX IP 258 OP 636: Performed by: EMERGENCY MEDICINE

## 2021-01-04 PROCEDURE — 0042T CT HEAD PERFUSION WITH CONTRAST: CPT

## 2021-01-04 PROCEDURE — 999N000208 ECHOCARDIOGRAM COMPLETE

## 2021-01-04 PROCEDURE — 70496 CT ANGIOGRAPHY HEAD: CPT

## 2021-01-04 PROCEDURE — 80048 BASIC METABOLIC PNL TOTAL CA: CPT | Performed by: HOSPITALIST

## 2021-01-04 RX ORDER — FUROSEMIDE 20 MG
40 TABLET ORAL DAILY
COMMUNITY
End: 2021-03-08

## 2021-01-04 RX ORDER — PRAVASTATIN SODIUM 40 MG
40 TABLET ORAL EVERY EVENING
Status: DISCONTINUED | OUTPATIENT
Start: 2021-01-04 | End: 2021-01-07 | Stop reason: HOSPADM

## 2021-01-04 RX ORDER — HYDRALAZINE HYDROCHLORIDE 20 MG/ML
10-20 INJECTION INTRAMUSCULAR; INTRAVENOUS
Status: DISCONTINUED | OUTPATIENT
Start: 2021-01-04 | End: 2021-01-07 | Stop reason: HOSPADM

## 2021-01-04 RX ORDER — BISACODYL 10 MG
10 SUPPOSITORY, RECTAL RECTAL DAILY PRN
Status: DISCONTINUED | OUTPATIENT
Start: 2021-01-04 | End: 2021-01-07 | Stop reason: HOSPADM

## 2021-01-04 RX ORDER — ACETAMINOPHEN 500 MG
500 TABLET ORAL AT BEDTIME
Status: DISCONTINUED | OUTPATIENT
Start: 2021-01-04 | End: 2021-01-07 | Stop reason: HOSPADM

## 2021-01-04 RX ORDER — WARFARIN SODIUM 1 MG/1
1 TABLET ORAL
Status: COMPLETED | OUTPATIENT
Start: 2021-01-04 | End: 2021-01-04

## 2021-01-04 RX ORDER — IOPAMIDOL 755 MG/ML
120 INJECTION, SOLUTION INTRAVASCULAR ONCE
Status: COMPLETED | OUTPATIENT
Start: 2021-01-04 | End: 2021-01-04

## 2021-01-04 RX ORDER — IPRATROPIUM BROMIDE AND ALBUTEROL SULFATE 2.5; .5 MG/3ML; MG/3ML
1 SOLUTION RESPIRATORY (INHALATION) AT BEDTIME
Status: ON HOLD | COMMUNITY
End: 2021-01-07

## 2021-01-04 RX ORDER — ACETAMINOPHEN 325 MG/1
650 TABLET ORAL EVERY 4 HOURS PRN
Status: DISCONTINUED | OUTPATIENT
Start: 2021-01-04 | End: 2021-01-07 | Stop reason: HOSPADM

## 2021-01-04 RX ORDER — AMOXICILLIN 250 MG
2 CAPSULE ORAL 2 TIMES DAILY PRN
Status: DISCONTINUED | OUTPATIENT
Start: 2021-01-04 | End: 2021-01-07 | Stop reason: HOSPADM

## 2021-01-04 RX ORDER — ACETAMINOPHEN 500 MG
500 TABLET ORAL AT BEDTIME
Status: ON HOLD | COMMUNITY
End: 2022-05-03

## 2021-01-04 RX ORDER — ALBUTEROL SULFATE 90 UG/1
2 AEROSOL, METERED RESPIRATORY (INHALATION) EVERY 6 HOURS PRN
Status: DISCONTINUED | OUTPATIENT
Start: 2021-01-04 | End: 2021-01-07 | Stop reason: HOSPADM

## 2021-01-04 RX ORDER — AMOXICILLIN 250 MG
1 CAPSULE ORAL 2 TIMES DAILY PRN
Status: DISCONTINUED | OUTPATIENT
Start: 2021-01-04 | End: 2021-01-07 | Stop reason: HOSPADM

## 2021-01-04 RX ORDER — ISOSORBIDE MONONITRATE 30 MG/1
30 TABLET, EXTENDED RELEASE ORAL DAILY
Status: DISCONTINUED | OUTPATIENT
Start: 2021-01-05 | End: 2021-01-07 | Stop reason: HOSPADM

## 2021-01-04 RX ORDER — LIDOCAINE 40 MG/G
CREAM TOPICAL
Status: DISCONTINUED | OUTPATIENT
Start: 2021-01-04 | End: 2021-01-07 | Stop reason: HOSPADM

## 2021-01-04 RX ORDER — MIRTAZAPINE 15 MG/1
15 TABLET, FILM COATED ORAL AT BEDTIME
Status: DISCONTINUED | OUTPATIENT
Start: 2021-01-04 | End: 2021-01-07 | Stop reason: HOSPADM

## 2021-01-04 RX ADMIN — FLUTICASONE FUROATE AND VILANTEROL TRIFENATATE 1 PUFF: 100; 25 POWDER RESPIRATORY (INHALATION) at 16:33

## 2021-01-04 RX ADMIN — SODIUM CHLORIDE 500 ML: 9 INJECTION, SOLUTION INTRAVENOUS at 07:37

## 2021-01-04 RX ADMIN — IOPAMIDOL 120 ML: 755 INJECTION, SOLUTION INTRAVENOUS at 07:23

## 2021-01-04 RX ADMIN — SODIUM CHLORIDE 100 ML: 9 INJECTION, SOLUTION INTRAVENOUS at 07:23

## 2021-01-04 RX ADMIN — WARFARIN SODIUM 1 MG: 1 TABLET ORAL at 17:45

## 2021-01-04 RX ADMIN — UMECLIDINIUM 1 PUFF: 62.5 AEROSOL, POWDER ORAL at 16:34

## 2021-01-04 RX ADMIN — MIRTAZAPINE 15 MG: 15 TABLET, FILM COATED ORAL at 21:36

## 2021-01-04 RX ADMIN — HUMAN ALBUMIN MICROSPHERES AND PERFLUTREN 9 ML: 10; .22 INJECTION, SOLUTION INTRAVENOUS at 15:46

## 2021-01-04 RX ADMIN — DOCUSATE SODIUM 50 MG AND SENNOSIDES 8.6 MG 1 TABLET: 8.6; 5 TABLET, FILM COATED ORAL at 20:33

## 2021-01-04 RX ADMIN — PRAVASTATIN SODIUM 40 MG: 40 TABLET ORAL at 20:32

## 2021-01-04 RX ADMIN — ACETAMINOPHEN 500 MG: 500 TABLET, FILM COATED ORAL at 21:36

## 2021-01-04 ASSESSMENT — ACTIVITIES OF DAILY LIVING (ADL)
ADLS_ACUITY_SCORE: 18

## 2021-01-04 ASSESSMENT — ENCOUNTER SYMPTOMS
VOMITING: 0
NAUSEA: 0
DIZZINESS: 1
WEAKNESS: 1
COUGH: 0
FEVER: 0

## 2021-01-04 NOTE — PLAN OF CARE
Arrived from ED @10:30am. A&Ox4, forgetful. [place] & vision impaired, did not bring hearing aids or glasses. C/o dizziness with position changes. Neuros intact ex R leg weakness & slow on R side with heel-to-shin touch (improvement from earlier in shift when patient was unable to complete heel-to-shin with R leg). VSS on RA. Denies pain/nausea. Swallows without difficulty, regular diet & tolerating well. PIV SL. NOOB this shift, patient refused & is very fearful of position changes /t dizziness. Purewick in place, good UOP. BS active, no BM. Neurology following, plan for repeat CT tomorrow morning.

## 2021-01-04 NOTE — PROGRESS NOTES
RECEIVING UNIT ED HANDOFF REVIEW    ED Nurse Handoff Report was reviewed by: Dea Berg RN on January 4, 2021 at 10:18 AM

## 2021-01-04 NOTE — PROVIDER NOTIFICATION
MD Notification    Notified Person: MD    Notified Person Name: Lona    Notification Date/Time: 01/04/21, 5:53 PM    Notification Interaction: webpage    Purpose of Notification: pt c/o pain when urinating, did PVR, w/ 390ml, can we get straight orders    Orders Received: MD order intermittent catheterization     Comments:

## 2021-01-04 NOTE — PHARMACY-ANTICOAGULATION SERVICE
Clinical Pharmacy - Warfarin Dosing Consult     Pharmacy has been consulted to manage this patient s warfarin therapy.  Indication: Atrial Fibrillation  Therapy Goal: INR 2-3  Warfarin Prior to Admission: Yes  Warfarin PTA Regimen: 1 mg Mon/Fri, 2 mg ROW  Recent documented change in oral intake/nutrition: Unknown    INR   Date Value Ref Range Status   01/04/2021 2.22 (H) 0.86 - 1.14 Final   12/10/2020 1.90 (H) 0.86 - 1.14 Final     Comment:     This test is intended for monitoring Coumadin therapy.  Results are not   accurate in patients with prolonged INR due to factor deficiency.         Recommend warfarin 1 mg today.  Pharmacy will monitor Leonor REBOLLEDO Rogelio daily and order warfarin doses to achieve specified goal.      Please contact pharmacy as soon as possible if the warfarin needs to be held for a procedure or if the warfarin goals change.

## 2021-01-04 NOTE — ED PROVIDER NOTES
History   Chief Complaint:  Dizziness     HPI   Leonor Mercado is a 89 year old female anticoagulated on warfarin with history of atrial fibrillation, CAD, CHF, COPD, and hypertension with a pacemaker who presents with dizziness. The patient woke up this morning with dizziness. Her dizziness has now resolved. She also has weakness in her right leg and had difficulty getting to the bathroom this morning due to the dizziness. EMS reports she had 92% saturation on arrival, and increased to 98% after being given oxygen. Her daughter told EMS that she only uses oxygen at night.  She has a history of COPD.  She has not had episodes of vertigo per her history, but on chart review vertigo is present as a listed diagnosis.  She reports that she has a pacemaker.  She is not had any headache or pain.  No nausea or vomiting.  No fevers or cough recently.  She denies any recent falls or injuries.  She went to bed last night at 11 PM.  She did not get up in the middle of the night.    Review of Systems   Constitutional: Negative for fever.   Respiratory: Negative for cough.    Gastrointestinal: Negative for nausea and vomiting.   Neurological: Positive for dizziness and weakness.   All other systems reviewed and are negative.      Allergies:  Peanuts   Amiodarone  Baclofen  Bactrim   Doxycycline  Levaquin  Linzess   Lorazepam  Liquid Adhesive    Medications:  Albuterol inhaler  Aspirin  Trelegy ellipta inhaler  Lasix  Douneb  Imdur  Losartan  Remeron  Pravastatin  Miralax  Senna  Warfarin    Past Medical History:    Atrial fibrillation  Basal cell carcinoma  CHF  Chronic renal insufficiency  COPD  CAD  Hyperlipidemia   Hypertension   IBS   Osteoporosis  Vertigo  Sick sinus syndrome  Adjustment disorder     Past Surgical History:    Appendectomy  Cardiac surgery, 2 stents placed  Coronary angiography  EGD  Lead revision dual  Heart cath  Hernia repair  Implant pacemaker  Replace pacemaker generator    Family History:     Heart disease, father  Dementia    Social History:  The patient presents alone. Former smoker.    Physical Exam     Patient Vitals for the past 24 hrs:   BP Temp Temp src Pulse Resp SpO2   01/04/21 0735 131/75 -- -- 73 18 93 %   01/04/21 0725 131/79 -- -- -- 18 94 %   01/04/21 0720 128/70 -- -- -- 18 94 %   01/04/21 0705 129/75 98  F (36.7  C) Oral 85 18 95 %       Physical Exam  Eyes:  The pupils are equal and round    Conjunctivae and sclerae are normal  ENT:    The nose is normal    Pinnae are normal    The oropharynx is normal  Neck:  Normal range of motion    There is no rigidity noted  CV:  Regular rate and rhythm     No edema  Resp:  Normal respiratory effort  GI:  Abdomen is soft, there is no rigidity    No distension    No rebound tenderness   MS:  Normal muscular tone    No asymmetric leg swelling  Skin:  No rash or acute skin lesions noted  Neuro:   Awake, alert, GCS 15    Speech is normal and fluent    Face is symmetric    Moves all extremities    Normal finger-nose-finger     strength equal bilaterally    Equal sensation bilaterally    Right leg weakness - effort against gravity     National Institutes of Health Stroke Scale  Exam Interval: Baseline   Score    Level of consciousness: (0)   Alert, keenly responsive    LOC questions: (0)   Answers both questions correctly    LOC commands: (0)   Performs both tasks correctly    Best gaze: (0)   Normal    Visual: (0)   No visual loss    Facial palsy: (0)   Normal symmetrical movements    Motor arm (left): (0)   No drift    Motor arm (right): (0)   No drift    Motor leg (left): (0)   No drift    Motor leg (right): (2)   Some effort against gravity    Limb ataxia: (0)   Absent    Sensory: (0)   Normal- no sensory loss    Best language: (0)   Normal- no aphasia    Dysarthria: (0)   Normal    Extinction and inattention: (0)   No abnormality        Total Score:  2          Emergency Department Course     ECG:  @ 0710  Vent. Rate 72 bpm. MT interval * ms.  QRS duration 98 ms. QT/QTc 412/451 ms. P-R-T axis 95 -70 94.   Ventricular paced rhythm. Abnormal ECG.    Read @ 0722 by Dr. Doherty.      Imaging:  CT-scan Head w/o contrast:  IMPRESSION: Chronic changes. No evidence for intracranial hemorrhage  or any acute process.  Reading per radiology.     CTA Head Neck w/ contrast:  IMPRESSION:  1. Mild to moderate atherosclerotic disease involving the  brachiocephalic vessels, carotid bifurcations, carotid siphons,  proximal vertebral arteries, and left posterior cerebral artery  without significant stenosis.  2. No evidence for thromboembolism or aneurysm.  Reading per radiology.     CT Head Perfusion w/ Contrast  IMPRESSION: Questionable altered perfusion in the deep medial left  temporal lobe.  Reading per radiology.      Laboratory:  CBC: WBC 6.0, HGB 11.6 (L),     BMP: Creatinine: 1.27 (H), GFR: 36 (L) o/w WNL     INR: 2.22 (H)    Partial thromboplastin time: 38 (H)    0700 Troponin I: <0.015        Asymptomatic COVID-19 Virus by PCR: In process     Emergency Department Course:    Reviewed:  I reviewed nursing notes, vitals and past medical history    Assessments:  0656 I performed an exam of the patient as documented above.  0702 Code stroke called.  0731 I rechecked the patient.   0741 Code stroke deescalated.  0742 I updated the patient.   0813 I rechecked and updated the patient.   0837 I spoke with the patient's daughter.     Consults:   0706 I consulted with stroke neuro regarding the patient's history and presentation here in the emergency department.   0710 I consulted with stroke neuro regarding the patient's history and presentation here in the emergency department.   0740 I consulted with Dr. Eason, radiology, regarding the patient's history and presentation here in the emergency department.  0817  I consulted with Dr. Zamorano of the hospitalist services. They are in agreement to accept the patient for admission.    Interventions:  0737 0.9% Sodium  Chloride BOLUS 500 mLs IV       Disposition:  The patient was admitted to the hospital under the care of Dr. Zamorano.       Impression & Plan   Covid-19  Leonor Mercado was evaluated during a global COVID-19 pandemic, which necessitated consideration that the patient might be at risk for infection with the SARS-CoV-2 virus that causes COVID-19.   Applicable protocols for evaluation were followed during the patient's care.   COVID-19 was considered as part of the patient's evaluation. The plan for testing is:  a test was obtained during this visit.    CMS Diagnoses: The patient has stroke symptoms:         ED Stroke specific documentation           NIHSS PDF     Patient last known well time: 2300 1/3/20  ED Provider first to bedside at: 0656  CT Results received at: 0740    tPA:   Not given due to outside of treatment window, INR>2.    If treating with tPA: Ensure SBP<185 and DBP<105 prior to treatment with IV tPA.  Administering IV tPA after treatment with IV labetalol, hydralazine, or nicardipine is reasonable once BP control is established.    Endovascular Retrieval:  Not initiated due to absence of proximal vessel occlusion    National Institutes of Health Stroke Scale (Baseline)  Time Performed: 0656     Score    Level of consciousness: (0)   Alert, keenly responsive    LOC questions: (0)   Answers both questions correctly    LOC commands: (0)   Performs both tasks correctly    Best gaze: (0)   Normal    Visual: (0)   No visual loss    Facial palsy: (0)   Normal symmetrical movements    Motor arm (left): (0)   No drift    Motor arm (right): (0)   No drift    Motor leg (left): (0)   No drift    Motor leg (right): (2)   Some effort against gravity    Limb ataxia: (0)   Absent    Sensory: (0)   Normal- no sensory loss    Best language: (0)   Normal- no aphasia    Dysarthria: (0)   Normal    Extinction and inattention: (0)   No abnormality        Total Score:  2        Stroke Mimics were considered (including  migraine headache, seizure disorder, hypoglycemia (or hyperglycemia), head or spinal trauma, CNS infection, Toxin ingestion and shock state (e.g. sepsis) .      National Institutes of Health Stroke Scale  Time Performed: 0820      Score    Level of consciousness: (0)   Alert, keenly responsive    LOC questions: (0)   Answers both questions correctly    LOC commands: (0)   Performs both tasks correctly    Best gaze: (0)   Normal    Visual: (0)   No visual loss    Facial palsy: (0)   Normal symmetrical movements    Motor arm (left): (0)   No drift    Motor arm (right): (0)   No drift    Motor leg (left): (0)   No drift    Motor leg (right): (1)   Drift    Limb ataxia: (0)   Absent    Sensory: (0)   Normal- no sensory loss    Best language: (0)   Normal- no aphasia    Dysarthria: (0)   Normal    Extinction and inattention: (0)   No abnormality        Total Score:  1          Medical Decision Making:  Leonor Mercado is a 89 year old female who presents to the emergency department with right-sided leg weakness and dizziness.  She woke up this morning at 5:30 AM with dizziness.  She went to bed last night at 11 PM and did not have any symptoms at that time.  She has a history of atrial fibrillation and is on Coumadin with a pacemaker.  Exam reveals some weakness of her right leg.  Her vertigo has resolved and she has no other focal neurologic findings on exam.  Code stroke was called and she was taken to the CT scanner where no obvious cause of her symptoms was identified.  Discussed with stroke neurology who agreed with de-escalation of stroke.  Plan for repeat head CT scan in 24 hours for further evaluation.  Discussed with hospitalist agreed to accept patient as an admission under observation status.      Diagnosis:    ICD-10-CM    1. Right leg weakness  R29.898    2. Vertigo  R42        Scribe Disclosure:  Oumou ANGEL, am serving as a scribe at 6:56 AM on 1/4/2021 to document services personally  performed by Derrell Doherty MD based on my observations and the provider's statements to me.            Derrell Doherty MD  01/04/21 [id number]

## 2021-01-04 NOTE — PHARMACY-ADMISSION MEDICATION HISTORY
Pharmacy Medication History  Admission medication history interview status for the 1/4/2021  admission is complete. See EPIC admission navigator for prior to admission medications       Medication history sources: Patient, Surescripts and Care Everywhere  Location of interview: Phone  Adherence Assessment: Good    Significant changes made to the medication list:  chged Furosemide from 20mg BID to 40mg daily - sched APAP and Duonebs at HS       Additional medication history information:       Medication reconciliation completed by provider prior to medication history? No    Time spent in this activity: 15 min      Prior to Admission medications    Medication Sig Last Dose Taking? Auth Provider   acetaminophen (TYLENOL) 500 MG tablet Take 500 mg by mouth At Bedtime 1/3/2021 at Unknown time Yes Unknown, Entered By History   albuterol (PROAIR HFA/PROVENTIL HFA/VENTOLIN HFA) 108 (90 Base) MCG/ACT inhaler Inhale 2 puffs into the lungs every 6 hours as needed for shortness of breath / dyspnea or wheezing 1/3/2021 at Unknown time Yes Maria Isabel Rosario APRN CNP   calcium carbonate (CALCIUM CARBONATE) 600 MG TABS tablet Take 600 mg by mouth daily  1/3/2021 at am Yes Unknown, Entered By History   CARTIA  MG 24 hr capsule Take 1 capsule by mouth once daily  Patient taking differently: Take 240 mg by mouth daily  1/3/2021 at am Yes Arnel Garcia MD   Fluticasone-Umeclidin-Vilanterol (TRELEGY ELLIPTA) 100-62.5-25 MCG/INH oral inhaler Inhale 1 puff into the lungs daily 1/3/2021 at am Yes Reported, Patient   furosemide (LASIX) 20 MG tablet Take 40 mg by mouth daily 1/3/2021 at Unknown time Yes Unknown, Entered By History   ipratropium - albuterol 0.5 mg/2.5 mg/3 mL (DUONEB) 0.5-2.5 (3) MG/3ML neb solution Take 1 vial by nebulization At Bedtime 1/3/2021 at Unknown time Yes Unknown, Entered By History   ipratropium - albuterol 0.5 mg/2.5 mg/3 mL (DUONEB) 0.5-2.5 (3) MG/3ML neb solution Take 1 vial (3 mLs) by  nebulization every 4 hours as needed for shortness of breath / dyspnea or wheezing Past Week at Unknown time Yes Jignesh Araya MD   isosorbide mononitrate (IMDUR) 30 MG 24 hr tablet Take 1 tablet (30 mg) by mouth daily 1/3/2021 at Unknown time Yes Arnel Garcia MD   Lactobacillus (PROBIOTIC ACIDOPHILUS) TABS Take 1 tablet by mouth daily  1/3/2021 at Unknown time Yes Reported, Patient   losartan (COZAAR) 25 MG tablet Take 1 tablet (25 mg) by mouth daily 1/3/2021 at Unknown time Yes Arnel Garcia MD   melatonin 5 MG tablet Take 10 mg by mouth nightly as needed for sleep 1/3/2021 at Unknown time Yes Unknown, Entered By History   mirtazapine (REMERON) 15 MG tablet Take 1 tablet (15 mg) by mouth At Bedtime 1/3/2021 at Unknown time Yes Arnel Garcia MD   Multiple Vitamins-Minerals (OCUVITE PO) Take 1 capsule by mouth daily. 1/3/2021 at Unknown time Yes Reported, Patient   polyethylene glycol (MIRALAX/GLYCOLAX) Packet Take 1 packet by mouth daily  1/3/2021 at Unknown time Yes Reported, Patient   pravastatin (PRAVACHOL) 40 MG tablet Take 1 tablet by mouth once daily  Patient taking differently: Take 40 mg by mouth every evening  1/3/2021 at pm Yes Arnel Garcia MD   senna (SENOKOT) 8.6 MG tablet Take 1 tablet by mouth daily as needed for constipation  Past Week at Unknown time Yes Reported, Patient   Vitamin D, Cholecalciferol, 1000 units TABS Take 1,000 Units by mouth daily 1/3/2021 at Unknown time Yes Reported, Patient   warfarin ANTICOAGULANT (COUMADIN) 2 MG tablet Take 1 mg (0.5 tab) every Mon/Fri; 2 mg tab by mouth all other days, or as directed by INR clinic 1/3/2021 at pm Yes Arnel Garcia MD   ASPIRIN NOT PRESCRIBED (INTENTIONAL) Please choose reason not prescribed, below   Arnel Garcia MD       The information provided in this note is only as accurate as the sources available at the time of the update(s).

## 2021-01-04 NOTE — PROGRESS NOTES
RECEIVING UNIT ED HANDOFF REVIEW    ED Nurse Handoff Report was reviewed by: Sarah Del Rio RN on January 4, 2021 at 8:53 AM

## 2021-01-04 NOTE — UTILIZATION REVIEW
Admission Status; Secondary Review Determination       Under the authority of the Utilization Management Committee, the utilization review process indicated a secondary review on the above patient. The review outcome is based on review of the medical records, discussions with staff, and applying clinical experience noted on the date of the review.     (x) Inpatient Status Appropriate - This patient's medical care is consistent with medical management for inpatient care and reasonable inpatient medical practice.     RATIONALE FOR DETERMINATION   89 yo female with atrial fibrillation on chronic anticoagulation, CAD, hypertension, CKD stage 3, COPD on chronic supplemental oxygen, sick sinus syndrome with present of pacemaker who presented with dizziness and lower extremity weakness. Code stroke called in emergency department. Per Dr. Zamorano, the patient still has significant weakness this morning. CT perfusion scan is abnormal but since unable to obtain brain MRI due to presence of pacemaker, the typical process to confirm acute stroke is not possible. Getting TTE with bubble study. Also plan for repeat head CT (+/- perfusion study) tomorrow to compare for changes.     This patient is complicated, has multiple ongoing issues, is at risk for clinical deterioration, requires extensive evaluation and stabilization, will likely require several day hospitalization, and is appropriate for inpatient hospitalization at the time of this review.     At the time of admission with the information available to the attending physician more than 2 nights hospital complex care was anticipated, based on patient risk of adverse outcome if treated as outpatient and complex care required. Inpatient admission is appropriate based on the Medicare guidelines.     This document was produced using voice recognition software.    The information on this document is developed by the utilization review team in order for the business office to  ensure compliance. This only denotes the appropriateness of proper admission status and does not reflect the quality of care rendered.   The definitions of Inpatient Status and Observation Status used in making the determination above are those provided in the CMS Coverage Manual, Chapter 1 and Chapter 6, section 70.4.     Sincerely,   Rhianna Packer MD  Utilization Review  Physician Advisor  [facility].

## 2021-01-04 NOTE — CONSULTS
[facility]    Stroke Consult Note    Reason for Consult: Stroke Code    Chief Complaint: Dizziness      HPI  Leonor Mercado is a 89 year old female with history of atrial fibrillation on warfarin, CAD, CHF, COPD, HTN, and PPM who presents as a stroke code for vertigo on awakening, out of proportion to that reportedly caused by BPPV a couple of years previously, who was found to have RLE drift on initial assessment. She says the vertigo has resolved other than when she changes position, and was not associated with any other symptoms. Now, with movement of the RLE she notes pain in the thigh extending up into the hip.    TPA Treatment   Not given due to DOAC dose within 48 hours or INR > 1.7.    Endovascular Treatment  Not initiated due to absence of proximal vessel occlusion    Impression  1. Episodic vertigo- etiology unclear, possibly consistent with posterior circulation infarct, but less likely central due to unidirectional horizontal nystagmus     2. RLE weakness- isolated hip flexion and knee flexion weakness with preserved knee extension and ankle dorsi/plantarflexion, and weakness informed by hip and leg pain suggest a possible peripheral process, either bony, muscular, or neuropathic/radiculopathic, however L LUBNA territory or deep structure infarct remains on the ddx, which would argue for embolic shower of infarcts in multiple vessel territories, in the setting of a therapeutic INR, which would then raise the question of etiology of warfarin failure.    Recommendations  MRI brain if Medtronic Adapta pacemaker is compatible (from our review it is not, but patient was told she could put a magnet over it and receive an MRI)   [email or web address]  Repeat CT head w/o contrast in 24-48 hours from initial scan  Evaluation of R hip and knee by primary service for alternative  "etiologies of pain and weakness  PT/OT/SLP evals for gait assessment and vestibular rehab  Continue warfarin, INR goal 2-3 for atrial fibrillation stroke risk reduction    Patient Follow-up    - final recommendation pending work-up    Thank you for this consult. We will continue to follow.     The Stroke Staff is Dr. Webb.    Yuridia Salgado MD  Vascular Neurology Fellow  To page me or covering stroke neurology team member, click here: AMCOM   Choose \"On Call\" tab at top, then search dropdown box for \"Neurology Adult\", select location, press Enter, then look for stroke/neuro ICU/telestroke.    ______________________________________________________    Past Medical History   Past Medical History:   Diagnosis Date     Atrial fibrillation (H)     Sick sinus syndrome, PAT, AF     BCC (basal cell carcinoma of skin)     Face     CHF - Chr Diastolic (H) 11/21/2017     Chronic renal insufficiency      COPD (chronic obstructive pulmonary disease) (H)      Coronary artery disease     2-05[place]-CAD-taxus stentx2 2.5-12,2.5-12 in LADp and LADm, 80%nonDomRCA-LcxDom-mild,EF60%     Hyperlipidemia      Hypertension      IBS (irritable bowel syndrome)      Osteoporosis      Pulmonary fibrosis (H)     do not use amiodarone     Sick sinus syndrome - s/p PPM 2003 (H) 12/16/2017     SSS (sick sinus syndrome) (H)     DDD pacer (see surgery history section)     Vertigo      Past Surgical History   Past Surgical History:   Procedure Laterality Date     APPENDECTOMY  1956     CARDIAC SURGERY      PPM, 2 STENTS2-05[place]-CAD-taxus stentx2 2.5-12,2.5-12 in LADp and LADm, 80%nonDomRCA-LcxDom-mild,EF60%     CORONARY ANGIOGRAPHY ADULT ORDER  7/1994    mild CAD,Normal LV function, No Mitral regurgitation, Prox LAD 30% stenosis. RCA prox and mid, 20-30%     ESOPHAGOSCOPY, GASTROSCOPY, DUODENOSCOPY (EGD), COMBINED N/A 8/19/2015    Procedure: COMBINED ESOPHAGOSCOPY, GASTROSCOPY, DUODENOSCOPY (EGD), BIOPSY SINGLE OR MULTIPLE;  Surgeon: Edward, " Genevieve Stover MD;  Location: [facility]     H LEAD REVISION DUAL  4/2003    Revised in [place]-due to diaphram stim (original pacer placed in [place])     H LEAD REVISION DUAL  7/2/2014    Correction of reversal of A and V leads in Header     HEART CATH, ANGIOPLASTY  02/2005    LEIGH ANN mid and proximal LAD, ongoing 80% RCA; mild circumflex     HERNIA REPAIR Left 1991    [facility]     IMPLANT PACEMAKER  2/19/2003    Placed in [place] for sick sinus syndrome     REPLACE PACEMAKER GENERATOR  6/27/2013    Dual-chamber pacemaker by Dr SETH Pierre     Medications   Home Meds  Prior to Admission medications    Medication Sig Start Date End Date Taking? Authorizing Provider   acetaminophen (TYLENOL) 500 MG tablet Take 500 mg by mouth At Bedtime   Yes Unknown, Entered By History   albuterol (PROAIR HFA/PROVENTIL HFA/VENTOLIN HFA) 108 (90 Base) MCG/ACT inhaler Inhale 2 puffs into the lungs every 6 hours as needed for shortness of breath / dyspnea or wheezing 3/26/19  Yes Maria Isabel Rosario APRN CNP   calcium carbonate (CALCIUM CARBONATE) 600 MG TABS tablet Take 600 mg by mouth daily    Yes Unknown, Entered By History   CARTIA  MG 24 hr capsule Take 1 capsule by mouth once daily  Patient taking differently: Take 240 mg by mouth daily  12/3/20  Yes Arnel Garcia MD   Fluticasone-Umeclidin-Vilanterol (TRELEGY ELLIPTA) 100-62.5-25 MCG/INH oral inhaler Inhale 1 puff into the lungs daily   Yes Reported, Patient   furosemide (LASIX) 20 MG tablet Take 40 mg by mouth daily   Yes Unknown, Entered By History   ipratropium - albuterol 0.5 mg/2.5 mg/3 mL (DUONEB) 0.5-2.5 (3) MG/3ML neb solution Take 1 vial by nebulization At Bedtime   Yes Unknown, Entered By History   ipratropium - albuterol 0.5 mg/2.5 mg/3 mL (DUONEB) 0.5-2.5 (3) MG/3ML neb solution Take 1 vial (3 mLs) by nebulization every 4 hours as needed for shortness of breath / dyspnea or wheezing 4/3/20  Yes Jignesh Araya MD   isosorbide mononitrate (IMDUR) 30 MG 24 hr tablet Take 1 tablet  (30 mg) by mouth daily 5/14/20  Yes Arnel Garcia MD   Lactobacillus (PROBIOTIC ACIDOPHILUS) TABS Take 1 tablet by mouth daily    Yes Reported, Patient   losartan (COZAAR) 25 MG tablet Take 1 tablet (25 mg) by mouth daily 5/14/20  Yes Arnel Garcia MD   melatonin 5 MG tablet Take 10 mg by mouth nightly as needed for sleep   Yes Unknown, Entered By History   mirtazapine (REMERON) 15 MG tablet Take 1 tablet (15 mg) by mouth At Bedtime 5/14/20  Yes Arnel Garcia MD   Multiple Vitamins-Minerals (OCUVITE PO) Take 1 capsule by mouth daily.   Yes Reported, Patient   polyethylene glycol (MIRALAX/GLYCOLAX) Packet Take 1 packet by mouth daily    Yes Reported, Patient   pravastatin (PRAVACHOL) 40 MG tablet Take 1 tablet by mouth once daily  Patient taking differently: Take 40 mg by mouth every evening  12/3/20  Yes Arnel Garcia MD   senna (SENOKOT) 8.6 MG tablet Take 1 tablet by mouth daily as needed for constipation    Yes Reported, Patient   Vitamin D, Cholecalciferol, 1000 units TABS Take 1,000 Units by mouth daily   Yes Reported, Patient   warfarin ANTICOAGULANT (COUMADIN) 2 MG tablet Take 1 mg (0.5 tab) every Mon/Fri; 2 mg tab by mouth all other days, or as directed by INR clinic 10/2/20  Yes Arnel Garcia MD   ASPIRIN NOT PRESCRIBED (INTENTIONAL) Please choose reason not prescribed, below 10/28/18   Arnel Garcia MD       Scheduled Meds    acetaminophen  500 mg Oral At Bedtime     fluticasone-vilanterol  1 puff Inhalation Daily     [START ON 1/5/2021] isosorbide mononitrate  30 mg Oral Daily     mirtazapine  15 mg Oral At Bedtime     pravastatin  40 mg Oral QPM     sodium chloride (PF)  3 mL Intracatheter Q8H     umeclidinium  1 puff Inhalation Daily       Infusion Meds    - MEDICATION INSTRUCTIONS -       - MEDICATION INSTRUCTIONS -       - MEDICATION INSTRUCTIONS -       Warfarin Therapy Reminder         PRN Meds  acetaminophen, albuterol, bisacodyl, hydrALAZINE, lidocaine 4%,  lidocaine (buffered or not buffered), magnesium hydroxide, - MEDICATION INSTRUCTIONS -, melatonin, - MEDICATION INSTRUCTIONS -, - MEDICATION INSTRUCTIONS -, senna-docusate **OR** senna-docusate, sodium chloride (PF), Warfarin Therapy Reminder    Allergies   Allergies   Allergen Reactions     Peanuts [Nuts] Shortness Of Breath     Amiodarone      pulm fibrosis       Baclofen      Confusion      Bactrim [Sulfamethoxazole W-Trimethoprim]      Difficulty swallowing     Doxycycline Other (See Comments)     Multiple complaints; she does not want this again.      Levaquin [Levofloxacin] Other (See Comments)     Multiple complaints; she will not take this again     Linzess [Linaclotide] Diarrhea     Lorazepam        Prolonged drowsiness with ER visit      Liquid Adhesive Itching and Rash     Bleeding when tape removed after pacemaker placement..itched and burned for weeks.     Family History   Family History   Problem Relation Age of Onset     Heart Disease Father          age 84     Neurologic Disorder Mother         dementia     Gastrointestinal Disease Daughter         liver transplant     Crohn's Disease Daughter      Social History   Social History     Tobacco Use     Smoking status: Former Smoker     Packs/day: 0.00     Types: Cigarettes     Quit date: 1987     Years since quittin.6     Smokeless tobacco: Never Used   Substance Use Topics     Alcohol use: Yes     Alcohol/week: 0.0 standard drinks     Comment: occ     Drug use: No       Review of Systems   The 10 point Review of Systems is negative other than noted in the HPI or here.        PHYSICAL EXAMINATION  Temp:  [97.1  F (36.2  C)-98  F (36.7  C)] 97.3  F (36.3  C)  Pulse:  [65-85] 69  Resp:  [16-29] 24  BP: (108-147)/(55-83) 147/69  SpO2:  [90 %-98 %] 94 %     General:  patient lying in bed without any acute distress    HEENT:  normocephalic/atraumatic, no oral lesions, no epistaxis   Cardio:  irregularly irregular  Pulmonary:  no respiratory  distress  Abdomen:  soft, non-tender, non-distended  Extremities:  no edema, peripheral pulses palpable  Skin:  intact, warm/dry     Neurologic  Mental Status:  alert, oriented x 3, follows commands, speech clear and fluent, naming and repetition normal  Cranial Nerves:  visual fields intact, PERRL, facial sensation intact and symmetric, facial movements symmetric, hearing not formally tested but intact to conversation, palate elevation symmetric and uvula midline, no dysarthria, shoulder shrug strong bilaterally, tongue protrusion midline, EOMI with bilateral right beating horizontal nystagmus with right lateral gaze  Motor:  normal muscle tone and bulk, no abnormal movements, able to move all limbs spontaneously, no pronator drift, RUE, LUE, LLE 5/5 throughout, RLE hip flex 4/5, knee flex 4/5, knee extension 5/5, ankle dorsi/plantarflexion 5/5, bilateral leg adduction/abduction 5/5  Reflexes:  2+ and symmetric throughout, no clonus, toes down-going  Sensory:  light touch sensation intact and symmetric throughout upper and lower extremities, no extinction on double simultaneous stimulation   Coordination:  normal finger-to-nose and heel-to-shin bilaterally without dysmetria, rapid alternating movements symmetric  Station/Gait:  not tested      Dysphagia Screen  Per Nursing    Stroke Scales    NIHSS  Interval baseline (01/04/21 1100)   Interval Comments     1a. Level of Consciousness 0-->Alert, keenly responsive   1b. LOC Questions 0-->Answers both questions correctly   1c. LOC Commands 0-->Performs both tasks correctly   2.   Best Gaze 0-->Normal   3.   Visual 0-->No visual loss   4.   Facial Palsy 0-->Normal symmetrical movements   5a. Motor Arm, Left 0-->No drift, limb holds 90 (or 45) degrees for full 10 secs   5b. Motor Arm, Right 0-->No drift, limb holds 90 (or 45) degrees for full 10 secs   6a. Motor Leg, Left 0-->No drift, leg holds 30 degree position for full 5 secs   6b. Motor Leg, right 2-->Some effort  against gravity, leg falls to bed by 5 secs, but has some effort against gravity   7.   Limb Ataxia 0-->Absent   8.   Sensory 0-->Normal, no sensory loss   9.   Best Language 0-->No aphasia, normal   10. Dysarthria 0-->Normal   11. Extinction and Inattention  0-->No abnormality   Total 2 (01/04/21 1100)       Imaging  I personally reviewed all imaging; relevant findings per HPI.     Lab Results Data   CBC  Recent Labs   Lab 01/04/21  0700   WBC 6.0   RBC 3.91   HGB 11.6*   HCT 37.7        Basic Metabolic Panel    Recent Labs   Lab 01/04/21  1432 01/04/21  0700    138   POTASSIUM 4.4 4.1   CHLORIDE 108 105   CO2 27 30   BUN 20 23   CR 1.24* 1.27*   * 98   TRI 9.0 8.6     Liver Panel  No results for input(s): PROTTOTAL, ALBUMIN, BILITOTAL, ALKPHOS, AST, ALT, BILIDIRECT in the last 168 hours.  INR    Recent Labs   Lab Test 01/04/21  0700 12/10/20  1347 11/06/20  1350   INR 2.22* 1.90* 2.70*      Lipid Profile    Recent Labs   Lab Test 08/21/20  1040 10/29/19  1004 12/11/17  0959 03/15/15  0745   CHOL 168 139 161 153   HDL 73 78 85 52   LDL 67 49 54 75   TRIG 140 61 109 129   CHOLHDLRATIO  --   --   --  2.9     A1C    Recent Labs   Lab Test 04/30/14  1222   A1C 6.1*     Troponin I    Recent Labs   Lab 01/04/21  0700   TROPI <0.015          Stroke Code / Stroke Consult Data Data   Stroke Code Data  (for stroke code without tele)  Stroke code activated 01/04/21   0703   First stroke provider response                  0706   Last known normal 01/03/21    unknown   Time of discovery   (or onset of symptoms) 01/04/21   0630   Head CT read by me 01/04/21   0730   Was stroke code de-escalated? Yes 01/04/21 0743  presence of contraindications for both intravenous and intra-arterial stroke treatments

## 2021-01-04 NOTE — ED NOTES
Bed: ED01  Expected date: 1/4/21  Expected time: 6:36 AM  Means of arrival:   Comments:  [facility] 89F SOB, Nausea,

## 2021-01-04 NOTE — ED TRIAGE NOTES
Pt woke up at 0530 to go the bathroom, pt was suddenly dizzy on the toilet and had generalized weakness.  Staff at her independent living called ems, pt went to bed  at 11pm 1/3/21 without any symptoms.

## 2021-01-04 NOTE — H&P
[facility]  History and Physical   Hospitalist  Abner Zamorano MD       Leonor Mercado MRN# [id number]   YOB: 1926 Age: 89 year old      Date of Admission:  1/4/2021         Assessment and Plan:   Leonor Mercado is a 89 year old female with PMH significant for CAD, Hypertension, Dyslipidemia, atrial fibrillation on chronic anticoagulation, SSS s/p pacemaker, CHFpEF, COPD (on 4 ts noc oxygen), CKD stage III who presented to ED with dizziness and in ED also noted with right lower extremity weakness    Acute onset dizziness  right lower extremity weakness  -will admit as inpatient  -initially seen as CODE stroke in the ED; on admission CT/CTA mostly unremarkable except for mild to moderate atherosclerotic disease and likely altered perfusion in deep medial left temporal lobe but no acute infarct or hemorrhage  -dizziness is slightly improved, but continues with right lower extremity weakness; could be probable stroke  -INR therapeutic  -unable to obtain MRI because of pacemaker not compatible with MRI  -neurology to follow, suggested repeat head CT in 24 hours  -will monitor on telemetry, will get PT/OT/ SLP eval ,  for disposition  -will get echo with bubble studies    H/o CAD  Hypertension  Dyslipidemia  atrial fibrillation on chronic anticoagulation  SSS s/p pacemaker  - currently denies any chest pain  -will hold off on PTA diltiazem and Cozaar to allow some permissive hypertension in the setting of probable stroke; hydralazine IV PRN  -continue warfarin, pharmacy to dose  -continue PTA Pravachol, Imdur  -monitor on telemetry    CHF with preserved ejection fraction  - currently looks euvolumic   - last Echo from 8/2019 with EF for 55 to 60%, mild to moderate TR  -will hold off on PTA Lasix for now to allow for some passive hydration in the setting of dizziness and probable stroke  -monitor volume status closely and reinitiate Lasix as needed    CKD stage  III  -baseline creatinine around 1.2-1.4, current creatinine 1.27; monitor BMP    COPD  -PTA on 4 L nasal cannula oxygen only at night  -respiratory status stable  -will continue with PTA inhalers and nebulizers    COVID status  -low risk, asymptomatic    Depression/anxiety/insomnia  -continue PTA Remeron    # DVT prophylaxis: on warfarin for a fib  # Code status: DNR DNI           Primary Care Physician:   Arnel Garcia [phone]         Chief Complaint:   Dizziness    History is obtained from the patient         History of Present Illness:     Leonor Mercado is a 89 year old female with PMH significant for CAD, Hypertension, Dyslipidemia, atrial fibrillation on chronic anticoagulation, SSS s/p pacemaker, CHFpEF, COPD (on 4 ts noc oxygen), CKD stage III who presented to ED with dizziness and in ED also noted with right lower extremity weakness. She lives in a senior living, ambulates with the help of Walker. This morning she woke up with dizziness which got worse as he tried to ambulate with the help of Walker to the bathroom. She called for help, EMS was called and she was brought to ED for further evaluation.    The patient denies any fever, chills, rigors, chest pain or shortness of breath.  Denies pain abdomen.  No bowel or bladder disturbances. In the ED she was also noted with right lower extremity weakness, CODE stroke was called and was evaluated by stroke neurology. Initial CT/CTA mostly unremarkable except for mild to moderate atherosclerotic disease and likely altered perfusion in deep medial left temporal lobe but no acute infarct or hemorrhage. Her dizziness is slightly improved, but continues with right lower extremity weakness. Unable to obtain MRI due to pacemaker which is not compatible.    In ED patient was seen by Dr Doherty and hospitalist was requested admission for further evaluation.               Past Medical History:     CAD  Hypertension  Dyslipidemia  atrial fibrillation on  chronic anticoagulation  SSS s/p pacemaker  CHFpEF  COPD (on 4 ts noc oxygen)  CKD stage III          Past Surgical History:     Past Surgical History:   Procedure Laterality Date     APPENDECTOMY  1956     CARDIAC SURGERY      PPM, 2 STENTS2-05[place]-CAD-taxus stentx2 2.5-12,2.5-12 in LADp and LADm, 80%nonDomRCA-LcxDom-mild,EF60%     CORONARY ANGIOGRAPHY ADULT ORDER  7/1994    mild CAD,Normal LV function, No Mitral regurgitation, Prox LAD 30% stenosis. RCA prox and mid, 20-30%     ESOPHAGOSCOPY, GASTROSCOPY, DUODENOSCOPY (EGD), COMBINED N/A 8/19/2015    Procedure: COMBINED ESOPHAGOSCOPY, GASTROSCOPY, DUODENOSCOPY (EGD), BIOPSY SINGLE OR MULTIPLE;  Surgeon: Geenvieve Leon MD;  Location:  GI     H LEAD REVISION DUAL  4/2003    Revised in [place]-due to diaphram stim (original pacer placed in [place])     H LEAD REVISION DUAL  7/2/2014    Correction of reversal of A and V leads in Header     HEART CATH, ANGIOPLASTY  02/2005    LEIGH ANN mid and proximal LAD, ongoing 80% RCA; mild circumflex     HERNIA REPAIR Left 1991    [facility]     IMPLANT PACEMAKER  2/19/2003    Placed in [place] for sick sinus syndrome     REPLACE PACEMAKER GENERATOR  6/27/2013    Dual-chamber pacemaker by Dr SETH Pierre              Home Medications:     Prior to Admission Medications   Prescriptions Last Dose Informant Patient Reported? Taking?   ASPIRIN NOT PRESCRIBED (INTENTIONAL)  Self No No   Sig: Please choose reason not prescribed, below   CARTIA  MG 24 hr capsule   No No   Sig: Take 1 capsule by mouth once daily   Fluticasone-Umeclidin-Vilanterol (TRELEGY ELLIPTA) 100-62.5-25 MCG/INH oral inhaler   Yes No   Sig: Inhale 1 puff into the lungs daily   Lactobacillus (PROBIOTIC ACIDOPHILUS) TABS  Self Yes No   Sig: Take 1 tablet by mouth daily    MELATONIN PO  Self Yes No   Sig: Take 10 mg by mouth nightly as needed    Multiple Vitamins-Minerals (OCUVITE PO)  Self Yes No   Sig: Take 1 capsule by mouth daily.   Vitamin D, Cholecalciferol, 1000 units  TABS   Yes No   Sig: Take 1,000 Units by mouth daily   acetaminophen (TYLENOL) 500 MG tablet   Yes No   Sig: Take 500 mg by mouth 4 times daily And Q6H PRN BID   albuterol (PROAIR HFA/PROVENTIL HFA/VENTOLIN HFA) 108 (90 Base) MCG/ACT inhaler  Self No No   Sig: Inhale 2 puffs into the lungs every 6 hours as needed for shortness of breath / dyspnea or wheezing   calcium carbonate (CALCIUM CARBONATE) 600 MG TABS tablet  Self Yes No   Sig: Take 1 tablet by mouth daily    furosemide (LASIX) 20 MG tablet   No No   Sig: Take 1 tablet (20 mg) by mouth 2 times daily   ipratropium - albuterol 0.5 mg/2.5 mg/3 mL (DUONEB) 0.5-2.5 (3) MG/3ML neb solution   No No   Sig: Take 1 vial (3 mLs) by nebulization every 4 hours as needed for shortness of breath / dyspnea or wheezing   isosorbide mononitrate (IMDUR) 30 MG 24 hr tablet   No No   Sig: Take 1 tablet (30 mg) by mouth daily   losartan (COZAAR) 25 MG tablet   No No   Sig: Take 1 tablet (25 mg) by mouth daily   mirtazapine (REMERON) 15 MG tablet   No No   Sig: Take 1 tablet (15 mg) by mouth At Bedtime   polyethylene glycol (MIRALAX/GLYCOLAX) Packet  Self Yes No   Sig: Take 1 packet by mouth daily    pravastatin (PRAVACHOL) 40 MG tablet   No No   Sig: Take 1 tablet by mouth once daily   senna (SENOKOT) 8.6 MG tablet   Yes No   Sig: Take 1 tablet by mouth as needed for constipation   warfarin ANTICOAGULANT (COUMADIN) 2 MG tablet   No No   Sig: Take 1 mg (0.5 tab) every Mon/Fri; 2 mg tab by mouth all other days, or as directed by INR clinic      Facility-Administered Medications: None            Allergies:     Allergies   Allergen Reactions     Peanuts [Nuts] Shortness Of Breath     Amiodarone      pulm fibrosis       Baclofen      Confusion      Bactrim [Sulfamethoxazole W-Trimethoprim]      Difficulty swallowing     Doxycycline Other (See Comments)     Multiple complaints; she does not want this again.      Levaquin [Levofloxacin] Other (See Comments)     Multiple complaints; she  will not take this again     Linzess [Linaclotide] Diarrhea     Lorazepam        Prolonged drowsiness with ER visit      Liquid Adhesive Itching and Rash     Bleeding when tape removed after pacemaker placement..itched and burned for weeks.            Social History:   Leonor Mercado  reports that she quit smoking about 33 years ago. Her smoking use included cigarettes. She smoked 0.00 packs per day. She has never used smokeless tobacco. She reports current alcohol use. She reports that she does not use drugs.              Family History:   Leonor Mercado family history includes Crohn's Disease in her daughter; Gastrointestinal Disease in her daughter; Heart Disease in her father; Neurologic Disorder in her mother.    Family history was reviewed by myself and not pertinent to current presentation.           Review of Systems:   A10 point Review of Systems was done and were negative other than noted in the HPI.             Physical Exam:   Blood pressure 131/75, pulse 73, temperature 98  F (36.7  C), temperature source Oral, resp. rate 18, SpO2 93 %, not currently breastfeeding.  0 lbs 0 oz        Constitutional: Alert, awake and orienetd X 3; lying comfortably in bed in no apparent distress   HEENT: Pupils equal and reactive to light and accomodation, EOMI intact; neck supple no raised JVD or rigidity    Oral cavity: Moist mucosa   Cardiovascular: Normal s1 s2, regular rate and rhythm, no murmur; pacemaker in place    Lungs: B/l clear to auscultation, no wheezes or crepitations   Abdomen: Soft, nt, nd, no guarding, rigidity or rebound; BS +   LE : No edema   Musculoskeletal:  noted slight weakness and right lower extremity 4/5; power and rest of the extremities 5 x 5    Neuro: No focal neurological deficits noted except for right lower extremity weakness, CN II to XII grossly intact   Psychiatry: normal mood and affect  Skin: No obvious skin rashes or ulcers             Data:   All new lab and imaging  data was reviewed in Epic.   Significant labs and imagings include:    BMP notable for creatinine 1.26, negative troponin  normal CBC except hemoglobin 11.6  EKG reviewed by me shows V paced rhythm  INR 2.22  CT/CTA:  mostly unremarkable except for mild to moderate atherosclerotic disease and likely altered perfusion in deep medial left temporal lobe but no acute infarct or hemorrhage             Abner Zamorano MD  Hospitalist

## 2021-01-04 NOTE — ED NOTES
[facility]  ED Nurse Handoff Report    ED Chief complaint: Dizziness      ED Diagnosis:   Final diagnoses:   Right leg weakness   Vertigo       Code Status: DNR / DNI    Allergies:   Allergies   Allergen Reactions     Peanuts [Nuts] Shortness Of Breath     Amiodarone      pulm fibrosis       Baclofen      Confusion      Bactrim [Sulfamethoxazole W-Trimethoprim]      Difficulty swallowing     Doxycycline Other (See Comments)     Multiple complaints; she does not want this again.      Levaquin [Levofloxacin] Other (See Comments)     Multiple complaints; she will not take this again     Linzess [Linaclotide] Diarrhea     Lorazepam        Prolonged drowsiness with ER visit      Liquid Adhesive Itching and Rash     Bleeding when tape removed after pacemaker placement..itched and burned for weeks.       Patient Story: Pt w/ hx of a-fib, COPD, CAD and CHF comes in via EMS for dizziness that began upon waking this morning. Upon arrival pt was having right leg weakness and code stroke was called. Pt is alert and oriented. C/o Dizziness with lifting head. Afib on monitor with occasional paced beats. BP stable.        Treatments and/or interventions provided: none  Patient's response to treatments and/or interventions:     To be done/followed up on inpatient unit:      Does this patient have any cognitive concerns?: None    Activity level - Baseline/Home:  Independent  Activity Level - Current:   Stand with Assist    Patient's Preferred language: English   Needed?: No    Isolation: None  Infection: Not Applicable  Patient tested for COVID 19 prior to admission: YES  Bariatric?: No    Vital Signs:   Vitals:    01/04/21 0705 01/04/21 0720 01/04/21 0725 01/04/21 0735   BP: 129/75 128/70 131/79 131/75   Pulse: 85   73   Resp: 18 18 18 18   Temp: 98  F (36.7  C)      TempSrc: Oral      SpO2: 95% 94% 94% 93%       Cardiac Rhythm:     Was the PSS-3 completed:   Yes  What interventions are required if any?                Family Comments:   OBS brochure/video discussed/provided to patient/family: Yes              Name of person given brochure if not patient:               Relationship to patient:     For the majority of the shift this patient's behavior was Green.   Behavioral interventions performed were .    ED NURSE PHONE NUMBER:

## 2021-01-05 ENCOUNTER — APPOINTMENT (OUTPATIENT)
Dept: PHYSICAL THERAPY | Facility: CLINIC | Age: 86
DRG: 149 | End: 2021-01-05
Attending: HOSPITALIST
Payer: MEDICARE

## 2021-01-05 ENCOUNTER — APPOINTMENT (OUTPATIENT)
Dept: SPEECH THERAPY | Facility: CLINIC | Age: 86
DRG: 149 | End: 2021-01-05
Attending: HOSPITALIST
Payer: MEDICARE

## 2021-01-05 ENCOUNTER — APPOINTMENT (OUTPATIENT)
Dept: OCCUPATIONAL THERAPY | Facility: CLINIC | Age: 86
DRG: 149 | End: 2021-01-05
Attending: HOSPITALIST
Payer: MEDICARE

## 2021-01-05 ENCOUNTER — APPOINTMENT (OUTPATIENT)
Dept: CT IMAGING | Facility: CLINIC | Age: 86
DRG: 149 | End: 2021-01-05
Attending: HOSPITALIST
Payer: MEDICARE

## 2021-01-05 ENCOUNTER — APPOINTMENT (OUTPATIENT)
Dept: GENERAL RADIOLOGY | Facility: CLINIC | Age: 86
DRG: 149 | End: 2021-01-05
Attending: INTERNAL MEDICINE
Payer: MEDICARE

## 2021-01-05 LAB
ANION GAP SERPL CALCULATED.3IONS-SCNC: 2 MMOL/L (ref 3–14)
BASOPHILS # BLD AUTO: 0 10E9/L (ref 0–0.2)
BASOPHILS NFR BLD AUTO: 0.7 %
BUN SERPL-MCNC: 17 MG/DL (ref 7–30)
CALCIUM SERPL-MCNC: 9.1 MG/DL (ref 8.5–10.1)
CHLORIDE SERPL-SCNC: 105 MMOL/L (ref 94–109)
CO2 SERPL-SCNC: 30 MMOL/L (ref 20–32)
CREAT SERPL-MCNC: 1.13 MG/DL (ref 0.52–1.04)
DIFFERENTIAL METHOD BLD: ABNORMAL
EOSINOPHIL # BLD AUTO: 0.2 10E9/L (ref 0–0.7)
EOSINOPHIL NFR BLD AUTO: 2.9 %
ERYTHROCYTE [DISTWIDTH] IN BLOOD BY AUTOMATED COUNT: 14.6 % (ref 10–15)
GFR SERPL CREATININE-BSD FRML MDRD: 41 ML/MIN/{1.73_M2}
GLUCOSE SERPL-MCNC: 96 MG/DL (ref 70–99)
HCT VFR BLD AUTO: 38.7 % (ref 35–47)
HGB BLD-MCNC: 12 G/DL (ref 11.7–15.7)
IMM GRANULOCYTES # BLD: 0 10E9/L (ref 0–0.4)
IMM GRANULOCYTES NFR BLD: 0.3 %
INR PPP: 2.15 (ref 0.86–1.14)
LYMPHOCYTES # BLD AUTO: 1.1 10E9/L (ref 0.8–5.3)
LYMPHOCYTES NFR BLD AUTO: 18.5 %
MCH RBC QN AUTO: 29.8 PG (ref 26.5–33)
MCHC RBC AUTO-ENTMCNC: 31 G/DL (ref 31.5–36.5)
MCV RBC AUTO: 96 FL (ref 78–100)
MONOCYTES # BLD AUTO: 0.4 10E9/L (ref 0–1.3)
MONOCYTES NFR BLD AUTO: 6.7 %
NEUTROPHILS # BLD AUTO: 4.2 10E9/L (ref 1.6–8.3)
NEUTROPHILS NFR BLD AUTO: 70.9 %
NRBC # BLD AUTO: 0 10*3/UL
NRBC BLD AUTO-RTO: 0 /100
PLATELET # BLD AUTO: 206 10E9/L (ref 150–450)
POTASSIUM SERPL-SCNC: 4.3 MMOL/L (ref 3.4–5.3)
RBC # BLD AUTO: 4.03 10E12/L (ref 3.8–5.2)
SODIUM SERPL-SCNC: 137 MMOL/L (ref 133–144)
WBC # BLD AUTO: 5.9 10E9/L (ref 4–11)

## 2021-01-05 PROCEDURE — 92610 EVALUATE SWALLOWING FUNCTION: CPT | Mod: GN | Performed by: SPEECH-LANGUAGE PATHOLOGIST

## 2021-01-05 PROCEDURE — 97161 PT EVAL LOW COMPLEX 20 MIN: CPT | Mod: GP

## 2021-01-05 PROCEDURE — 70450 CT HEAD/BRAIN W/O DYE: CPT | Mod: MG

## 2021-01-05 PROCEDURE — 80048 BASIC METABOLIC PNL TOTAL CA: CPT | Performed by: HOSPITALIST

## 2021-01-05 PROCEDURE — 97535 SELF CARE MNGMENT TRAINING: CPT | Mod: GO | Performed by: OCCUPATIONAL THERAPIST

## 2021-01-05 PROCEDURE — 120N000001 HC R&B MED SURG/OB

## 2021-01-05 PROCEDURE — 250N000013 HC RX MED GY IP 250 OP 250 PS 637: Performed by: HOSPITALIST

## 2021-01-05 PROCEDURE — 99233 SBSQ HOSP IP/OBS HIGH 50: CPT | Performed by: INTERNAL MEDICINE

## 2021-01-05 PROCEDURE — 99207 PR CDG-EXAM COMPONENT: MEETS DETAILED - UP CODED: CPT | Performed by: INTERNAL MEDICINE

## 2021-01-05 PROCEDURE — 92526 ORAL FUNCTION THERAPY: CPT | Mod: GN | Performed by: SPEECH-LANGUAGE PATHOLOGIST

## 2021-01-05 PROCEDURE — 85610 PROTHROMBIN TIME: CPT | Performed by: HOSPITALIST

## 2021-01-05 PROCEDURE — 97166 OT EVAL MOD COMPLEX 45 MIN: CPT | Mod: GO | Performed by: OCCUPATIONAL THERAPIST

## 2021-01-05 PROCEDURE — 73502 X-RAY EXAM HIP UNI 2-3 VIEWS: CPT

## 2021-01-05 PROCEDURE — 36415 COLL VENOUS BLD VENIPUNCTURE: CPT | Performed by: HOSPITALIST

## 2021-01-05 PROCEDURE — 85025 COMPLETE CBC W/AUTO DIFF WBC: CPT | Performed by: HOSPITALIST

## 2021-01-05 RX ORDER — WARFARIN SODIUM 2 MG/1
2 TABLET ORAL
Status: COMPLETED | OUTPATIENT
Start: 2021-01-05 | End: 2021-01-05

## 2021-01-05 RX ADMIN — ACETAMINOPHEN 500 MG: 500 TABLET, FILM COATED ORAL at 22:00

## 2021-01-05 RX ADMIN — ISOSORBIDE MONONITRATE 30 MG: 30 TABLET, EXTENDED RELEASE ORAL at 09:32

## 2021-01-05 RX ADMIN — MIRTAZAPINE 15 MG: 15 TABLET, FILM COATED ORAL at 22:00

## 2021-01-05 RX ADMIN — WARFARIN SODIUM 2 MG: 2 TABLET ORAL at 17:37

## 2021-01-05 RX ADMIN — PRAVASTATIN SODIUM 40 MG: 40 TABLET ORAL at 22:00

## 2021-01-05 ASSESSMENT — ACTIVITIES OF DAILY LIVING (ADL)
ADLS_ACUITY_SCORE: 22
ADLS_ACUITY_SCORE: 22
PREVIOUS_RESPONSIBILITIES: MEAL PREP;LAUNDRY;MEDICATION MANAGEMENT
ADLS_ACUITY_SCORE: 22
ADLS_ACUITY_SCORE: 18

## 2021-01-05 NOTE — PLAN OF CARE
A&Ox4, [place] & vision impaired. VSS on RA. Neuros intact ex R leg weakness with heel-to-shin touch (unchanged). Continues to complain of dizziness with minor repositions. Noob yet, awaiting recommendations from PT/OT. Purewick in place with good UOP. Skin intact. Regular diet, fair intake. PIV SL. Repeat CT completed today & Hip X-Ray. Discharge pending improvement.

## 2021-01-05 NOTE — PROGRESS NOTES
[facility]    Medicine Progress Note - Hospitalist Service       Date of Admission:  1/4/2021  Assessment & Plan       Leonor Mercado is a 89 year old female with PMH significant for CAD, Hypertension, Dyslipidemia, atrial fibrillation on chronic anticoagulation, SSS s/p pacemaker, CHFpEF, COPD (on 4l/min noc oxygen), CKD stage III who presented to ED with dizziness and in ED also noted with right lower extremity weakness     Acute onset dizziness  Right lower extremity weakness  -initially seen as CODE stroke in the ED; on admission CT/CTA mostly unremarkable except for mild to moderate atherosclerotic disease and likely altered perfusion in deep medial left temporal lobe but no acute infarct or hemorrhage  -unable to obtain MRI because of pacemaker not compatible with MRI  - TTE-LVEF 60-65%, mod-severe TR (mild to mod in 4/6/2019)  - repeat Head CT pending this morning  - will also get right hip xray given complain in her right hip/groin area  - neurology following  - get PT/OT/ SLP eval ,  for disposition  - labs pending this am     H/o CAD  Hypertension  Dyslipidemia  atrial fibrillation on chronic anticoagulation  SSS s/p pacemaker  -currently denies any chest pain  -will hold off on PTA diltiazem and Cozaar to allow some permissive hypertension in the setting of probable stroke; hydralazine IV PRN  -continue warfarin, pharmacy to dose  -continue PTA Pravachol, Imdur  -monitor on telemetry     CHF with preserved ejection fraction  - currently looks euvolumic   - last Echo from 8/2019 with EF for 55 to 60%, mild to moderate TR  - will hold off on PTA Lasix for now to allow for some passive hydration in the setting of dizziness and probable stroke  -monitor volume status closely and reinitiate Lasix as needed    Mod- severe TR  Previous echo in 4/2019 showed mild to mod. Appears worse on current echo.   - will need OP follow up with her cardiologist, previousl seen   Stevenjohn on 3/31     CKD stage III  -baseline creatinine around 1.2-1.4, current creatinine 1.27; monitor BMP     COPD  -PTA on 4 L nasal cannula oxygen only at night  -respiratory status stable  -will continue with PTA inhalers and nebulizers     COVID status  -low risk, asymptomatic     Depression/anxiety/insomnia  -continue PTA Remeron     # DVT prophylaxis: on warfarin for a fib  # Code status: DNR DNI       Diet: Combination Diet Regular Diet Adult    DVT Prophylaxis: warfarin  Nj Catheter: not present  Code Status: No CPR- Do NOT Intubate           Disposition Plan   Expected discharge: 1-2 days, recommended to TCU vs prior living arrangement once improving dizziness, PT/OT eval, furhter neuro recs.  Entered: Marvin Saul MD 01/05/2021, 8:15 AM       The patient's care was discussed with the Bedside Nurse and Patient.    Marvin Saul MD  Hospitalist Service  [facility]  Contact information available via [facility] Paging/Directory    ______________________________________________________________________    Interval History   Still dizzy this morning with head movement. Patient was sitting up in bed at time of visit. States her dizziness mostly worse when she looks down. Denies headache or nausea. Denies shortness of breath.   Also still has right leg weakness. States some pain in her right groin, but could not tell me how long this has been present.    Data reviewed today: I reviewed all medications, new labs and imaging results over the last 24 hours. I personally reviewed no images or EKG's today.    Physical Exam   Vital Signs: Temp: 96.2  F (35.7  C) Temp src: Axillary BP: 133/71 Pulse: 71   Resp: 20 SpO2: 100 % O2 Device: Nasal cannula Oxygen Delivery: 4 LPM  Weight: 0 lbs 0 oz  General Appearance: Alert, awake and no apparent distress  Respiratory: clear to ausculation bilaterally, no wheezing  Cardiovascular: regular rate and rhythm  GI: soft and non-tender  Skin:  warm and dry      Data   Recent Labs   Lab 21  1432 21  0700   WBC  --  6.0   HGB  --  11.6*   MCV  --  96   PLT  --  203   INR  --  2.22*    138   POTASSIUM 4.4 4.1   CHLORIDE 108 105   CO2 27 30   BUN 20 23   CR 1.24* 1.27*   ANIONGAP 5 3   TRI 9.0 8.6   * 98   TROPI  --  <0.015     Recent Results (from the past 24 hour(s))   Echocardiogram Complete - Bubble study    Narrative    [id number]  [id number]  [id number]  [id number]^YOVANI^LEA^PRABHJOT           [facility]  Echocardiography Laboratory  [address] [place] [address]        Name: AILYN MARCUS  MRN: [id number]  : 1926  Study Date: 2021 03:01 PM  Age: 89 yrs  Gender: Female  Patient Location: [facility]  Reason For Study: Dizziness  Ordering Physician: LEA PINA  Referring Physician: Arnel Garcia  Performed By: Jesus Rojas RDCS     BSA: 1.7 m2  Height: 63 in  Weight: 157 lb  HR: 80  BP: 137/76 mmHg  _____________________________________________________________________________  __        Procedure  Complete Portable Bubble Echo Adult. Optison (NDC #[id number]) given  intravenously.  _____________________________________________________________________________  __        Interpretation Summary     The visual ejection fraction is estimated at 60-65%.  Mildly decreased right ventricular systolic function with borderline RV  dilatation.  There is a pacemaker lead in the right ventricle.  There is mod-severe to severe (3-4+) tricuspid regurgitation.  Mild pulmonary hypertension.  Dilation of the inferior vena cava is present with normal respiratory  variation in diameter.     Compared to prior study, TR is worse.  _____________________________________________________________________________  __        Left Ventricle  The left ventricle is normal in size. There is normal left ventricular wall  thickness. The visual ejection fraction is estimated at 60-65%. Diastolic  Doppler findings (E/E' ratio  and/or other parameters) suggest left ventricular  filling pressures are indeterminate. No regional wall motion abnormalities  noted.     Right Ventricle  Borderline right ventricular enlargement. Mildly decreased right ventricular  systolic function. There is a pacemaker lead in the right ventricle.     Atria  The left atrium is mild to moderately dilated. The right atrium is moderately  dilated. A contrast injection (Bubble Study) was performed that was negative  for flow across the interatrial septum.     Mitral Valve  The mitral valve is normal in structure and function. There is trace mitral  regurgitation. There is no mitral valve stenosis.        Tricuspid Valve  There is mod-severe to severe (3-4+) tricuspid regurgitation. The right  ventricular systolic pressure is approximated at 31.8 mmHg plus the right  atrial pressure.     Aortic Valve  The aortic valve is trileaflet with aortic valve sclerosis. No aortic  regurgitation is present. No aortic stenosis is present.     Pulmonic Valve  The pulmonic valve is not well visualized. There is trace to mild pulmonic  valvular regurgitation.     Vessels  The aortic root is normal size. Normal size ascending aorta. IVC diameter and  respiratory changes fall into an intermediate range suggesting an RA pressure  of 8 mmHg. Dilation of the inferior vena cava is present with normal  respiratory variation in diameter.     Pericardium  There is no pericardial effusion.     _____________________________________________________________________________  __  MMode/2D Measurements & Calculations  IVSd: 0.89 cm  LVIDd: 3.8 cm  LVIDs: 2.9 cm  LVPWd: 0.87 cm  FS: 22.7 %  LV mass(C)d: 98.3 grams  LV mass(C)dI: 56.3 grams/m2     Ao root diam: 2.9 cm  LA dimension: 4.2 cm  asc Aorta Diam: 3.3 cm  LA/Ao: 1.5  LA Volume (BP): 65.8 ml  LA Volume Index (BP): 37.8 ml/m2  RWT: 0.45        Doppler Measurements & Calculations  MV E max michel: 95.1 cm/sec  MV dec time: 0.17 sec     PA acc  time: 0.08 sec  TR max michel: 282.2 cm/sec  TR max P.8 mmHg  E/E' avg: 10.9  Lateral E/e': 11.2  Medial E/e': 10.5           _____________________________________________________________________________  __           Report approved by: Carey Vila 2021 04:08 PM        Medications     - MEDICATION INSTRUCTIONS -       - MEDICATION INSTRUCTIONS -       - MEDICATION INSTRUCTIONS -       Warfarin Therapy Reminder         acetaminophen  500 mg Oral At Bedtime     fluticasone-vilanterol  1 puff Inhalation Daily     isosorbide mononitrate  30 mg Oral Daily     mirtazapine  15 mg Oral At Bedtime     pravastatin  40 mg Oral QPM     sodium chloride (PF)  3 mL Intracatheter Q8H     umeclidinium  1 puff Inhalation Daily

## 2021-01-05 NOTE — PLAN OF CARE
Pt A/o, VSS on RA. Pt on 4L NC O2 at night. Tele: NSR.  [place], and vison impaired. C/o dizziness w/ some movements. C/o int pain to upper abd, declines intervention. C/o discomfort to lower abd before urinating, obtained PVR for 390 mL, straight cath for 550. Bladder scan 2200, 100ml. Purewick in place. Neuros intact, ex RLE weak, slow heal-to-shin tough. Denies nausea and vomiting. Regular diet, good appetite, swallows w/out difficulty. PIV Slx2, BUE. Not OOB this shift, pt weary about getting out of bed, awaiting PT/OT to see. Neurolgy following. Plan for head CT in AM, nursing to continue to monitor.

## 2021-01-05 NOTE — PLAN OF CARE
VSS on 4LPM NC (per baseline at night). Not OOB yet, waiting on PT/OT d/t dizziness. Neuro's intact with no changes. Pure wick in place with good UO. Minimal PO intake at night. Planning for PT/OT today.

## 2021-01-05 NOTE — PLAN OF CARE
SLP: orders received and chart review. Attempted to see patient for a bedside swallow evaluation, but she was going down for a CT scan.

## 2021-01-05 NOTE — PROGRESS NOTES
01/05/21 1221   General Information   Onset of Illness/Injury or Date of Surgery 01/04/21   Referring Physician Dr. Zamorano   Patient/Family Therapy Goal Statement (SLP) Patient reports no problems swallowing.    Pertinent History of Current Problem Leonor Mercado is a 89 year old female with PMH significant for CAD, Hypertension, Dyslipidemia, atrial fibrillation on chronic anticoagulation, SSS s/p pacemaker, CHFpEF, COPD (on 4l/min noc oxygen), CKD stage III who presented to ED with dizziness and in ED also noted with right lower extremity weakness   General Observations Pleasant and cooperative speech/language is intact.    Past History of Dysphagia Patient seen in 1/19/19 for swallow evaluation. 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    Type of Evaluation   Type of Evaluation Swallow Evaluation   Oral Motor   Oral Musculature generally intact   Structural Abnormalities none present   Mucosal Quality adequate   Dentition (Oral Motor)   Dentition (Oral Motor) natural dentition;some missing teeth   Facial Symmetry (Oral Motor)   Facial Symmetry (Oral Motor) WNL   Lip Function (Oral Motor)   Lip Range of Motion (Oral Motor) WNL   Tongue Function (Oral Motor)   Tongue ROM (Oral Motor) WNL   Jaw Function (Oral Motor)   Jaw Function (Oral Motor) WNL   Cough/Swallow/Gag Reflex (Oral Motor)   Soft Palate/Velum (Oral Motor) WNL   Volitional Throat Clear/Cough (Oral Motor) WNL   Volitional Swallow (Oral Motor) WNL   Vocal Quality/Secretion Management (Oral Motor)   Vocal Quality (Oral Motor) WFL   General Swallowing Observations   Current Diet/Method of Nutritional Intake (General Swallowing Observations, NIS) regular diet;thin liquids   Respiratory Support (General Swallowing Observations) none   Swallowing Evaluation Clinical swallow evaluation   Clinical Swallow Evaluation   Feeding Assistance no assistance needed   Clinical Swallow Evaluation Textures  Trialed Thin Liquids;Puree Textures;Solid Foods   Clinical Swallow Eval: Thin Liquid Texture Trial   Mode of Presentation, Thin Liquids cup;straw;self-fed   Volume of Liquid or Food Presented 4 oz of water   Oral Phase of Swallow Premature pharyngeal entry   Pharyngeal Phase of Swallow impaired;repeated swallows;coughing/choking   Diagnostic Statement Delayed cough x1 via the straw with consecutive swallows. None with single sips or via the straw.    Clinical Swallow Evaluation: Puree Solid Texture Trial   Mode of Presentation, Puree spoon;self-fed   Volume of Puree Presented apple sauce   Oral Phase, Puree WFL   Pharyngeal Phase, Puree intact   Clinical Swallow Evaluation: Solid Food Texture Trial   Mode of Presentation, Solid self-fed   Volume of Solid Food Presented Potato chips baked   Oral Phase, Solid Poor AP movement;Premature pharyngeal entry   Pharyngeal Phase, Solid impaired;reduction in laryngeal movement;repeated swallows   Diagnostic Statement No overt Sx of aspiration.    Esophageal Phase of Swallow   Patient reports or presents with symptoms of esophageal dysphagia Yes   Esophageal comments Main source of her dysphagia due to tortous esophagus and mild presbyesophagus.    Swallowing Recommendations   Diet Consistency Recommendations regular diet;thin liquids  (Soft moist textures and GERDS precautions. )   Supervision Level for Intake distant supervision needed   Mode of Delivery Recommendations bolus size, small;food moistened;slow rate of intake  (Best to avoid straws but she wants them. )   Postural Recommendations none   Swallowing Maneuver Recommendations alternate food and liquid intake;extra swallow   Monitoring/Assistance Required (Eating/Swallowing) monitor for cough or change in vocal quality with intake   Recommended Feeding/Eating Techniques (Swallow Eval) maintain upright sitting position for eating;maintain upright posture during/after eating for 30 minutes;set-up and prepare tray   SLP  Therapy Assessment/Plan   Criteria for Skilled Therapeutic Interventions Met (SLP Eval) current level of function same as previous level of function   SLP Diagnosis Mild dysphagia   Rehab Potential (SLP Eval) good, to achieve stated therapy goals   Therapy Frequency (SLP Eval) one time eval and treatment only   Comment, Therapy Assessment/Plan (SLP) Patient presents with mild oral and pharyngeal dysphagia at bedside which is likely her baseline. She has a history of esophageal dysfunction impacting swallow function. Oral motor function was WFL. She demonstrated premature entry of thin liquids with cough x1 via the straw with consecutive swallows. She was better able to tolerate via the cup or single sips via the straw with cues. Pureed was WFL. Mastication was sufficient for a solid with mininal to no oral residue after the swallow. This was cleared with a liquid rinse. Given patient's history of esophageal dysfunction a DDL 3 diet was recommend in 1/19, however, she wanted to select soft moist foods per herself. Follow strict GERDS precautions.    Therapy Plan Review/Discharge Plan (SLP)   Therapy Plan Review (SLP) evaluation/treatment results reviewed;care plan/treatment goals reviewed;risks/benefits reviewed;current/potential barriers reviewed;participants voiced agreement with care plan;participants included;patient   SLP Discharge Planning    SLP Discharge Recommendation (DC Rec) home   SLP Rationale for DC Rec Defer discharge to PT needs. No further skilled ST needs.    SLP Brief overview of current status  Patient with mild dysphagia but has esophageal dysfunction impacting overall swallow. Recommend: 1. Continue on the regular diet and thin liquids. 2. Patient to self select soft moist foods, upright or in a chair for all meals and 60 minutes after, small bites/sips, slow rate and alternate liquids solids.     Total Evaluation Time   Total Evaluation Time (Minutes) 15

## 2021-01-05 NOTE — PROGRESS NOTES
01/05/21 1556   Quick Adds   Quick Adds Vestibular Eval   Type of Visit Initial PT Evaluation   Living Environment   People in home alone   Current Living Arrangements apartment  (senior apt)   Home Accessibility no concerns   Transportation Anticipated family or friend will provide   Living Environment Comments Gwen 4WW around unit, lunch meal provided every day. pt manages medications, daughter helps with finances   Self-Care   Usual Activity Tolerance moderate   Current Activity Tolerance poor   Activity/Exercise/Self-Care Comment has a 4WW, cleaning person 2x/month   Disability/Function   Walking or Climbing Stairs Difficulty yes   Walking or Climbing Stairs ambulation difficulty, requires equipment;transferring difficulty, requires equipment   Dressing/Bathing Difficulty yes   Fall history within last six months yes   Number of times patient has fallen within last six months 1   Change in Functional Status Since Onset of Current Illness/Injury yes   General Information   Onset of Illness/Injury or Date of Surgery 01/04/21   Referring Physician Abner Zamorano MD   Patient/Family Therapy Goals Statement (PT) To feel and move better again.    Pertinent History of Current Problem (include personal factors and/or comorbidities that impact the POC) Admit for dizziness with fall (onto R side/hip) with R hip/LE weakness, CT negative for CVA with possibly low L temporal lobe perfusion, no MRI given pacer - neuro consult pending.    Existing Precautions/Restrictions fall   Weight-Bearing Status - LUE full weight-bearing  (all)   Cognition   Affect/Mental Status (Cognition) WFL   Follows Commands (Cognition) WFL   Pain Assessment   Patient Currently in Pain Yes, see Vital Sign flowsheet  (R hip)   Integumentary/Edema   Integumentary/Edema Comments WFL   Posture    Posture Comments Impaired - flexed and unsteady.   Range of Motion (ROM)   ROM Comment WFL, age-related declines.   Strength Comprehensive (MMT)    Comment, General Manual Muscle Testing (MMT) Assessment Hip flx 4R 4+L - fell on R side.    Strength   Strength Comments Grossly deconditioned, worsened R hip.   Bed Mobility   Comment (Bed Mobility) SBA rail HOB up to sit, Total asist to lie down given need for BM, Dizzy, close to EOB.    Transfers   Transfer Safety Comments [place] sit-stand, ModA pivot given LOB/fear-avoidance and progressive motion sensitivity without resting nystagmus, MaxA to sit safeyt, ModA scoot back on high bed wtih step stool.    Gait/Stairs (Locomotion)   Distance in Feet (Required for LE Total Joints) 2' RW ModA per above.    Comment (Gait/Stairs) NA   Balance   Balance Comments High falls risk - Ax1-2, AD needs, falls, dizzy.    Coordination   Coordination Comments WFL   Muscle Tone   Muscle Tone Comments WFL   Cervicogenic Screen   Neck ROM Chronic impairment - as able.    Vertebral Artery Test Normal   Alar Ligament Test Normal   Transverse Ligament Test Normal   Oculomotor Exam   Smooth Pursuit Abnormal   Smooth Pursuit Comment Grossly slowed, asx (could be age-related disuse).   Saccades Abnormal   Saccades Comments Grossly slowed with slips, asx (could be age-related disuse).   VOR Abnormal   VOR Comments (+) B, worse L. Severe dizziness and nausea.    Rapid Head Thrust Corrective Saccade L head thrust   Rapid Head Thrust Comments 4-5 beats with nausea B.    Convergence Testing Normal   Infrared Goggle Exam or Frenzel Lense Exam   Spontaneous Nystagmus Negative   Gaze Evoked Nystagmus Horizontal R   Gaze Evoked Nystagmus Comments 2nd degree.    Positional Testing Comments Hiram held until neuro clears. TIA possibility yet given possibly decreased perfusion to L temporal lobe (suspect clearance given then likely permissive HTN).    TIA/CVA screening: Hi(+)N(+, UL & horizontal)TS(-) + A(1pt)B(1pt)C(2pts)D(0-2pts R LE weak >1hr - from fall?, nausea < 10min with mobility)D(0) - HINTS & ABCD2 inconclusive. Defer to neurology. HiNTS  trending peripheral and towards vestibular neuritis, ABCD2 inconclusive - R hip weakness from falling on that side vs possible TIA.    [facility] Supine Roll Test (Right) Negative   [facility] Supine Roll Test (Left) Other   [facility] Supine Roll Test (Left) Comments  No nystagmus but brief dizziness (motion sensitivity vs BPPV undetermined).   Clinical Impression   Criteria for Skilled Therapeutic Intervention yes, treatment indicated   PT Diagnosis (PT) Impaired mobility   Influenced by the following impairments Impaired strength, balance, activity tolerance, vestib function, R hip pain.   Functional limitations due to impairments Impaired independent mobility & living   Clinical Presentation Evolving/Changing   Clinical Decision Making (Complexity) moderate complexity   Therapy Frequency (PT) Daily   Predicted Duration of Therapy Intervention (days/wks) 3 days   Planned Therapy Interventions (PT) balance training;bed mobility training;gait training;home exercise program;motor coordination training;neuromuscular re-education;patient/family education;postural re-education;stair training;strengthening;transfer training   Anticipated Equipment Needs at Discharge (PT) wheelchair;walker, rolling;commode chair;gait belt;hospital bed  (bedpan)   PT Discharge Planning    PT Discharge Recommendation (DC Rec) Transitional Care Facility   PT Rationale for DC Rec Unsafe to DC home alone. Vestibular & HiNTS testing trending peripheral and towards vestibular neuritis, ABCD2 inconclusive - R hip weakness from falling on that side vs possible TIA. Negative BPPV testing for Roll Tests B, holding Hiram testing until neuro clears pt for inverted positional maneuver given TIA dx inconclusive yet. PT suspects vestibular neuritis: rx is steroids in acute phase and once pt is able to tolerate more vestib & mobility PT.    PT Brief overview of current status  Bed up to MaxA, transfers and 2' RW up to MaxA - severe motion sensitivity.    Total Evaluation  Time   Total Evaluation Time (Minutes) 45

## 2021-01-05 NOTE — PROGRESS NOTES
01/05/21 1349   Quick Adds   Type of Visit Initial Occupational Therapy Evaluation   Living Environment   People in home alone   Current Living Arrangements apartment  (IND Sr apt)   Home Accessibility no concerns   Transportation Anticipated family or friend will provide   Living Environment Comments lunch meal provided every day. pt manages medications, daughter helps with finances   Self-Care   Usual Activity Tolerance moderate   Current Activity Tolerance fair   Regular Exercise Yes   Activity/Exercise Type   (chair exercise)   Exercise Amount/Frequency 2 times/wk   Activity/Exercise/Self-Care Comment has a 4WW, cleaning person 2x/month   Disability/Function   Hearing Difficulty or Deaf yes   Describe hearing loss bilateral hearing loss   Use of hearing assistive devices bilateral hearing aids   Wear Glasses or Blind yes   Vision Management wears glasses   General Information   Onset of Illness/Injury or Date of Surgery 01/04/21   Referring Physician Abner Zamorano MD   Patient/Family Therapy Goal Statement (OT) none stated   Additional Occupational Profile Info/Pertinent History of Current Problem pt admitted after she got up during the night to go to the bathroom and felt dizzy on the toilet with gen weakness. staff at ind apt called EMS. MRI & CT neg for stroke   Performance Patterns (Routines, Roles, Habits) pt was I with ADL's prior to admite   Existing Precautions/Restrictions fall   General Observations and Info pt eating, agreeable to OT eval   Cognitive Status Examination   Orientation Status orientation to person, place and time   Affect/Mental Status (Cognitive) WNL   Follows Commands WNL   Cognitive Status Comments was a little confused about day of the week and how long she had been here, but reoriented and seemed ok. although a few times gathering PLOF she changed her info she had previously given. will continue to monitor   Visual Perception   Visual Impairment/Limitations corrective lenses  full-time   Sensory   Sensory Quick Adds No deficits were identified   Pain Assessment   Patient Currently in Pain No   Integumentary/Edema   Integumentary/Edema no deficits were identifed   Posture   Posture forward head position   Range of Motion Comprehensive   General Range of Motion no range of motion deficits identified   Strength Comprehensive (MMT)   Comment, General Manual Muscle Testing (MMT) Assessment B UE overall 4/5   Muscle Tone Assessment   Muscle Tone Quick Adds No deficits were identified   Muscle Tone Comments when sitting at in standing pt had some strange jerking motion. in her upper and lower body   Coordination   Upper Extremity Coordination No deficits were identified   Bed Mobility   Comment (Bed Mobility) SBA supine to sit, min A with sit to supine   Transfers   Transfer Comments min A with sit to stand   Balance   Balance Comments decreased static standing balance needing CGA with standing and FWW   Activities of Daily Living   BADL Assessment/Intervention feeding   Eating/Self Feeding   [place] Level (Feeding) independent   Instrumental Activities of Daily Living (IADL)   Previous Responsibilities meal prep;laundry;medication management   Clinical Impression   Criteria for Skilled Therapeutic Interventions Met (OT) yes;skilled treatment is necessary   OT Diagnosis decreased I with ADL's and functional mobility   OT Problem List-Impairments impacting ADL problems related to;activity tolerance impaired;balance;mobility;strength   ADL comments/analysis decreased I with dressing, grooming, tilet transfer, tub transfer   Assessment of Occupational Performance 3-5 Performance Deficits   Identified Performance Deficits decreased I with dressing, grooming, tilet transfer, tub transfer   Planned Therapy Interventions (OT) ADL retraining;balance training;bed mobility training;strengthening;transfer training;progressive activity/exercise   Clinical Decision Making Complexity (OT) moderate  complexity   Therapy Frequency (OT) Daily   Predicted Duration of Therapy 1 week   Risks and Benefits of Treatment have been explained. Yes   Patient, Family & other staff in agreement with plan of care Yes   OT Discharge Planning    OT Discharge Recommendation (DC Rec) Transitional Care Facility   OT Rationale for DC Rec pt is significantly below baseline with ADL's and functional mobility due to dizziness. however, pt may progress to home if her dizziness resolves   Total Evaluation Time (Minutes)   Total Evaluation Time (Minutes) 10

## 2021-01-06 ENCOUNTER — APPOINTMENT (OUTPATIENT)
Dept: PHYSICAL THERAPY | Facility: CLINIC | Age: 86
DRG: 149 | End: 2021-01-06
Payer: MEDICARE

## 2021-01-06 ENCOUNTER — APPOINTMENT (OUTPATIENT)
Dept: OCCUPATIONAL THERAPY | Facility: CLINIC | Age: 86
DRG: 149 | End: 2021-01-06
Payer: MEDICARE

## 2021-01-06 LAB — INR PPP: 1.99 (ref 0.86–1.14)

## 2021-01-06 PROCEDURE — 36415 COLL VENOUS BLD VENIPUNCTURE: CPT | Performed by: HOSPITALIST

## 2021-01-06 PROCEDURE — 97112 NEUROMUSCULAR REEDUCATION: CPT | Mod: GP

## 2021-01-06 PROCEDURE — 120N000001 HC R&B MED SURG/OB

## 2021-01-06 PROCEDURE — 250N000012 HC RX MED GY IP 250 OP 636 PS 637: Performed by: INTERNAL MEDICINE

## 2021-01-06 PROCEDURE — 250N000013 HC RX MED GY IP 250 OP 250 PS 637: Performed by: INTERNAL MEDICINE

## 2021-01-06 PROCEDURE — 97535 SELF CARE MNGMENT TRAINING: CPT | Mod: GO | Performed by: OCCUPATIONAL THERAPIST

## 2021-01-06 PROCEDURE — 250N000013 HC RX MED GY IP 250 OP 250 PS 637: Performed by: HOSPITALIST

## 2021-01-06 PROCEDURE — 85610 PROTHROMBIN TIME: CPT | Performed by: HOSPITALIST

## 2021-01-06 PROCEDURE — 99233 SBSQ HOSP IP/OBS HIGH 50: CPT | Performed by: INTERNAL MEDICINE

## 2021-01-06 RX ORDER — PREDNISONE 20 MG/1
40 TABLET ORAL DAILY
Status: DISCONTINUED | OUTPATIENT
Start: 2021-01-11 | End: 2021-01-07 | Stop reason: HOSPADM

## 2021-01-06 RX ORDER — PREDNISONE 20 MG/1
20 TABLET ORAL DAILY
Status: DISCONTINUED | OUTPATIENT
Start: 2021-01-13 | End: 2021-01-07 | Stop reason: HOSPADM

## 2021-01-06 RX ORDER — PREDNISONE 20 MG/1
60 TABLET ORAL DAILY
Status: DISCONTINUED | OUTPATIENT
Start: 2021-01-06 | End: 2021-01-07 | Stop reason: HOSPADM

## 2021-01-06 RX ORDER — PREDNISONE 10 MG/1
10 TABLET ORAL DAILY
Status: DISCONTINUED | OUTPATIENT
Start: 2021-01-14 | End: 2021-01-07 | Stop reason: HOSPADM

## 2021-01-06 RX ORDER — LOSARTAN POTASSIUM 25 MG/1
25 TABLET ORAL DAILY
Status: DISCONTINUED | OUTPATIENT
Start: 2021-01-06 | End: 2021-01-07 | Stop reason: HOSPADM

## 2021-01-06 RX ORDER — DILTIAZEM HYDROCHLORIDE 240 MG/1
240 CAPSULE, COATED, EXTENDED RELEASE ORAL DAILY
Status: DISCONTINUED | OUTPATIENT
Start: 2021-01-06 | End: 2021-01-07 | Stop reason: HOSPADM

## 2021-01-06 RX ORDER — PREDNISONE 5 MG/1
5 TABLET ORAL DAILY
Status: DISCONTINUED | OUTPATIENT
Start: 2021-01-15 | End: 2021-01-07 | Stop reason: HOSPADM

## 2021-01-06 RX ORDER — WARFARIN SODIUM 2 MG/1
2 TABLET ORAL
Status: COMPLETED | OUTPATIENT
Start: 2021-01-06 | End: 2021-01-06

## 2021-01-06 RX ORDER — FUROSEMIDE 40 MG
40 TABLET ORAL DAILY
Status: DISCONTINUED | OUTPATIENT
Start: 2021-01-07 | End: 2021-01-07 | Stop reason: HOSPADM

## 2021-01-06 RX ADMIN — ACETAMINOPHEN 500 MG: 500 TABLET, FILM COATED ORAL at 21:06

## 2021-01-06 RX ADMIN — PRAVASTATIN SODIUM 40 MG: 40 TABLET ORAL at 21:06

## 2021-01-06 RX ADMIN — WARFARIN SODIUM 2 MG: 2 TABLET ORAL at 16:55

## 2021-01-06 RX ADMIN — DILTIAZEM HYDROCHLORIDE 240 MG: 240 CAPSULE, COATED, EXTENDED RELEASE ORAL at 16:56

## 2021-01-06 RX ADMIN — MIRTAZAPINE 15 MG: 15 TABLET, FILM COATED ORAL at 21:06

## 2021-01-06 RX ADMIN — ISOSORBIDE MONONITRATE 30 MG: 30 TABLET, EXTENDED RELEASE ORAL at 09:03

## 2021-01-06 RX ADMIN — FLUTICASONE FUROATE AND VILANTEROL TRIFENATATE 1 PUFF: 100; 25 POWDER RESPIRATORY (INHALATION) at 09:03

## 2021-01-06 RX ADMIN — PREDNISONE 60 MG: 20 TABLET ORAL at 14:14

## 2021-01-06 RX ADMIN — LOSARTAN POTASSIUM 25 MG: 25 TABLET, FILM COATED ORAL at 14:14

## 2021-01-06 RX ADMIN — UMECLIDINIUM 1 PUFF: 62.5 AEROSOL, POWDER ORAL at 09:03

## 2021-01-06 ASSESSMENT — ACTIVITIES OF DAILY LIVING (ADL)
ADLS_ACUITY_SCORE: 18

## 2021-01-06 NOTE — PROGRESS NOTES
[facility]    Medicine Progress Note - Hospitalist Service       Date of Admission:  1/4/2021  Assessment & Plan       Leonor Mercado is a 89 year old female with PMH significant for CAD, Hypertension, Dyslipidemia, atrial fibrillation on chronic anticoagulation, SSS s/p pacemaker, CHFpEF, COPD (on 4l/min noc oxygen), CKD stage III who presented to ED with dizziness and in ED also noted with right lower extremity weakness     Acute onset dizziness,etiology unclear, but possible vestibular neuritis  Initially seen as CODE stroke in the ED; on admission CT/CTA mostly unremarkable except for mild to moderate atherosclerotic disease and likely altered perfusion in deep medial left temporal lobe but no acute infarct or hemorrhage. Unable to obtain MRI because of pacemaker not compatible with MRI. Repeat CT 24 hrs after initial presentation without acute finding. Per neuro note, less likely central due to unidirectional horizontal nystagmus and history of episodic vertigo.   - PT eval completed, negative for BPPV, concern for possible vestibular neuritis  - will start Prednisone 60mg daily with taper over the next 10 days  - need to monitor for confusion/delirum with prednisone, discussed with patient and also her daughter, Les, regarding potential side effects.   - continue physical therapy  - neurology followed, signed off on 1/5  - will need TCU at discharge    Right lower extremity weakness, etiology unclear but improved  Imaging as above negative. Unfortunately MRI cant be completed due to pacemaker. Complained of hip/groin pain with range of motion. Weakness appears isolated to hip flexion and knee flexion, but preserved knee extension and ankle dorsi/plantar flexion. Given this possible peripheral process. No trauma/fall. Xray of hip/pelvis negative for acute fracture. No back pain. This seems to have improved.   - neurology following  - PT/OT eval completed as above, needs  TCU     H/o CAD  Hypertension  Dyslipidemia  atrial fibrillation on chronic anticoagulation  SSS s/p pacemaker  - TTE-LVEF 60-65%, mod-severe TR (mild to mod in 4/6/2019)  -currently denies any chest pain  - resume PTA diltiazem and Cozaar   -continue warfarin, pharmacy to dose  -continue PTA Pravachol, Imdur  -monitor on telemetry     CHF with preserved ejection fraction  - currently looks euvolumic   - last Echo from 8/2019 with EF for 55 to 60%, mild to moderate TR  - since starting back all her other meds today, plan to resume her lasix tomorrow  -monitor volume status closely and reinitiate Lasix as needed    Mod- severe TR  Previous echo in 4/2019 showed mild to mod. Appears worse on current echo.   - will need OP follow up with her cardiologist, previousl seen Dr. Horton on 3/31     CKD stage III  -baseline creatinine around 1.2-1.4, current creatinine 1.27; monitor BMP     COPD  -PTA on 4 L nasal cannula oxygen only at night  -respiratory status stable  -will continue with PTA inhalers and nebulizers     COVID status  -low risk, asymptomatic     Depression/anxiety/insomnia  -continue PTA Remeron     # DVT prophylaxis: on warfarin for a fib  # Code status: DNR DNI       Diet: Combination Diet Regular Diet Adult    DVT Prophylaxis: warfarin  Nj Catheter: not present  Code Status: No CPR- Do NOT Intubate           Disposition Plan   Expected discharge: in 1 day, recommended to transitional care unit once TCU available, improving dizziness/tolerates steroid.  Entered: Marvin Saul MD 01/06/2021, 12:42 PM       The patient's care was discussed with the Bedside Nurse and Patient.  Updated patient's daughter, Les, by phone.     Marvin Saul MD  Hospitalist Service  [facility]  Contact information available via [facility] Paging/Directory    ______________________________________________________________________    Interval History   Patient reports that her dizziness is  improving.   Had PT evaluated and suspecting vestibular neuritis.  Seems to be moving her right leg better today, but does have some groin pain with hip      Data reviewed today: I reviewed all medications, new labs and imaging results over the last 24 hours. I personally reviewed no images or EKG's today.    Physical Exam   Vital Signs: Temp: 95.9  F (35.5  C) Temp src: Oral BP: (!) 149/84 Pulse: 75   Resp: 16 SpO2: 97 % O2 Device: None (Room air) Oxygen Delivery: 4 LPM  Weight: 159 lbs 9.81 oz  General Appearance: Alert, awake and no apparent distress  Respiratory: clear to ausculation bilaterally, no wheezing  Cardiovascular: regular rate and rhythm  GI: soft and non-tender  Skin: warm and dry      Data   Recent Labs   Lab 01/06/21  0906 01/05/21  1404 01/04/21  1432 01/04/21  0700   WBC  --  5.9  --  6.0   HGB  --  12.0  --  11.6*   MCV  --  96  --  96   PLT  --  206  --  203   INR 1.99* 2.15*  --  2.22*   NA  --  137 140 138   POTASSIUM  --  4.3 4.4 4.1   CHLORIDE  --  105 108 105   CO2  --  30 27 30   BUN  --  17 20 23   CR  --  1.13* 1.24* 1.27*   ANIONGAP  --  2* 5 3   TRI  --  9.1 9.0 8.6   GLC  --  96 117* 98   TROPI  --   --   --  <0.015     No results found for this or any previous visit (from the past 24 hour(s)).  Medications     - MEDICATION INSTRUCTIONS -       - MEDICATION INSTRUCTIONS -       - MEDICATION INSTRUCTIONS -       Warfarin Therapy Reminder         acetaminophen  500 mg Oral At Bedtime     fluticasone-vilanterol  1 puff Inhalation Daily     isosorbide mononitrate  30 mg Oral Daily     mirtazapine  15 mg Oral At Bedtime     pravastatin  40 mg Oral QPM     sodium chloride (PF)  3 mL Intracatheter Q8H     umeclidinium  1 puff Inhalation Daily     warfarin ANTICOAGULANT  2 mg Oral ONCE at 18:00

## 2021-01-06 NOTE — PLAN OF CARE
AOx4. [place], pocket talker in use. VSS on RA. On 4 L NC overnight for comfort. Neuro checks q 4, intact ex RLE weakness. Tele SR with occasional pacemaker spike. Regular diet, poor appetite. Not OOB this shift due to dizziness. PT eval completed,cleared by Neurology for further PT vestibular workup/therapy. Purewick in place with good UOP. +1-2 BLE edema. PIV x2 SL. Hip Xray and repeat head CT completed - see results. PT/OT recommending TCU. Discharge pending, continue to monitor.

## 2021-01-06 NOTE — PROGRESS NOTES
MARY Note:    D/I:  SW met with patient to discuss TCU facility choices.  Patient requesting that SW call daughter Les.  Left VM for Les for return call.    ZULMA Sloan, LGSW  [phone]  [facility]

## 2021-01-06 NOTE — PLAN OF CARE
[id number] no acute events overnight. A&O. VSS on 4L (baseline for night) denies pain. Neuros unchanged- not OOB this shift, no dizziness at rest. Tele SR w/ occasional paced. BLE edema +1. Good UOP. Turning well in bed. Discharge pending

## 2021-01-06 NOTE — PROGRESS NOTES
"  [facility]    Brief Stroke Progress Note    Interval Events  CT head obtained >24 hours after symptom onset. No infarct noted. Verified with [employer] that adapta pacemaker is not MRI conditional.    Impression  1. Episodic vertigo- etiology unclear, possibly consistent with posterior circulation infarct, but less likely central due to unidirectional horizontal nystagmus and history of episodic vertigo     2. RLE weakness- isolated hip flexion and knee flexion weakness with preserved knee extension and ankle dorsi/plantarflexion, and weakness informed by hip and leg pain suggest a possible peripheral process, either bony, muscular, or neuropathic/radiculopathic. No infarct noted on follow up CT.    Plan  No change to warfarin for stroke risk reduction in atrial fibrillation, goal INR 2-3  Vestibular rehab  No further stroke evaluation    Vascular Neurology will sign off at this time. Please do not hesitate to contact us with questions or concerns, should they arise.    The Stroke Staff is Dr. Webb.    Yuridia Salgado MD  Vascular Neurology Fellow  To page me or covering stroke neurology team member, click here: [facility]   Choose \"On Call\" tab at top, then search dropdown box for \"Neurology Adult\", select location, press Enter, then look for stroke/neuro ICU/telestroke.  "

## 2021-01-06 NOTE — PLAN OF CARE
Pt A/Ox4. VSS on RA. Denies pain. Neuros intact. Up A1 GB/W, up in chair this afternoon. Tele: SR, occasionally paced. Purewick in place w/ good UOP. PIV SL. PT/OT seen. Pt started on steroids. Discharge pending, possibly to TCU.

## 2021-01-07 ENCOUNTER — APPOINTMENT (OUTPATIENT)
Dept: PHYSICAL THERAPY | Facility: CLINIC | Age: 86
DRG: 149 | End: 2021-01-07
Payer: MEDICARE

## 2021-01-07 ENCOUNTER — PATIENT OUTREACH (OUTPATIENT)
Dept: CARE COORDINATION | Facility: CLINIC | Age: 86
End: 2021-01-07

## 2021-01-07 ENCOUNTER — DOCUMENTATION ONLY (OUTPATIENT)
Dept: PEDIATRICS | Facility: CLINIC | Age: 86
End: 2021-01-07

## 2021-01-07 VITALS
OXYGEN SATURATION: 96 % | BODY MASS INDEX: 28.27 KG/M2 | DIASTOLIC BLOOD PRESSURE: 81 MMHG | WEIGHT: 159.61 LBS | RESPIRATION RATE: 16 BRPM | SYSTOLIC BLOOD PRESSURE: 148 MMHG | HEART RATE: 87 BPM | TEMPERATURE: 96.3 F

## 2021-01-07 DIAGNOSIS — I48.20 CHRONIC ATRIAL FIBRILLATION (H): ICD-10-CM

## 2021-01-07 DIAGNOSIS — Z79.01 LONG TERM CURRENT USE OF ANTICOAGULANT THERAPY: ICD-10-CM

## 2021-01-07 LAB — INR PPP: 2.14 (ref 0.86–1.14)

## 2021-01-07 PROCEDURE — 250N000013 HC RX MED GY IP 250 OP 250 PS 637: Performed by: HOSPITALIST

## 2021-01-07 PROCEDURE — 99239 HOSP IP/OBS DSCHRG MGMT >30: CPT | Performed by: INTERNAL MEDICINE

## 2021-01-07 PROCEDURE — 250N000013 HC RX MED GY IP 250 OP 250 PS 637: Performed by: INTERNAL MEDICINE

## 2021-01-07 PROCEDURE — 97112 NEUROMUSCULAR REEDUCATION: CPT | Mod: GP | Performed by: PHYSICAL THERAPIST

## 2021-01-07 PROCEDURE — 250N000012 HC RX MED GY IP 250 OP 636 PS 637: Performed by: INTERNAL MEDICINE

## 2021-01-07 PROCEDURE — 36415 COLL VENOUS BLD VENIPUNCTURE: CPT | Performed by: HOSPITALIST

## 2021-01-07 PROCEDURE — 85610 PROTHROMBIN TIME: CPT | Performed by: HOSPITALIST

## 2021-01-07 PROCEDURE — 97116 GAIT TRAINING THERAPY: CPT | Mod: GP | Performed by: PHYSICAL THERAPIST

## 2021-01-07 RX ORDER — WARFARIN SODIUM 2 MG/1
2 TABLET ORAL
Status: DISCONTINUED | OUTPATIENT
Start: 2021-01-07 | End: 2021-01-07 | Stop reason: HOSPADM

## 2021-01-07 RX ORDER — PREDNISONE 10 MG/1
10 TABLET ORAL DAILY
DISCHARGE
Start: 2021-01-14 | End: 2021-01-15

## 2021-01-07 RX ORDER — PREDNISONE 20 MG/1
60 TABLET ORAL DAILY
DISCHARGE
Start: 2021-01-08 | End: 2021-01-11

## 2021-01-07 RX ORDER — PREDNISONE 20 MG/1
40 TABLET ORAL DAILY
DISCHARGE
Start: 2021-01-11 | End: 2021-01-12

## 2021-01-07 RX ORDER — FAMOTIDINE 10 MG
10 TABLET ORAL 2 TIMES DAILY
DISCHARGE
Start: 2021-01-07 | End: 2021-01-20

## 2021-01-07 RX ORDER — PREDNISONE 20 MG/1
20 TABLET ORAL DAILY
DISCHARGE
Start: 2021-01-13 | End: 2021-01-14

## 2021-01-07 RX ORDER — PREDNISONE 5 MG/1
5 TABLET ORAL DAILY
DISCHARGE
Start: 2021-01-15 | End: 2021-01-16

## 2021-01-07 RX ORDER — PREDNISONE 10 MG/1
30 TABLET ORAL DAILY
DISCHARGE
Start: 2021-01-12 | End: 2021-01-13

## 2021-01-07 RX ADMIN — FLUTICASONE FUROATE AND VILANTEROL TRIFENATATE 1 PUFF: 100; 25 POWDER RESPIRATORY (INHALATION) at 08:54

## 2021-01-07 RX ADMIN — LOSARTAN POTASSIUM 25 MG: 25 TABLET, FILM COATED ORAL at 08:53

## 2021-01-07 RX ADMIN — UMECLIDINIUM 1 PUFF: 62.5 AEROSOL, POWDER ORAL at 08:54

## 2021-01-07 RX ADMIN — PREDNISONE 60 MG: 20 TABLET ORAL at 08:53

## 2021-01-07 RX ADMIN — FUROSEMIDE 40 MG: 40 TABLET ORAL at 08:54

## 2021-01-07 RX ADMIN — DILTIAZEM HYDROCHLORIDE 240 MG: 240 CAPSULE, COATED, EXTENDED RELEASE ORAL at 08:54

## 2021-01-07 RX ADMIN — ISOSORBIDE MONONITRATE 30 MG: 30 TABLET, EXTENDED RELEASE ORAL at 08:53

## 2021-01-07 ASSESSMENT — ACTIVITIES OF DAILY LIVING (ADL)
DEPENDENT_IADLS:: CLEANING
ADLS_ACUITY_SCORE: 18
ADLS_ACUITY_SCORE: 22

## 2021-01-07 ASSESSMENT — MIFFLIN-ST. JEOR: SCORE: 1075.38

## 2021-01-07 NOTE — PLAN OF CARE
AOx4, [place]. VSS on RA, ex HTN. Up A1 GB/W, up in chair today. Regular diet, tolerating well. Denies pain and N/V. Neuro checks q 4 hr, intact ex RLE weakness. +1 BLE edema. PIV SL. Purewick with good UOP. Plan to restart lasix tomorrow. Discharge to TCU pending, SW following.

## 2021-01-07 NOTE — PROGRESS NOTES
Care Management Discharge Note    Discharge Date: 01/07/21       Discharge Disposition:  [facility] TCU    Discharge Services:  TCU    Discharge DME:      Discharge Transportation:  Wheelchair transport.  Discussed private pay transportation costs with lyric Saldana.     Private pay costs discussed: transportation costs    PAS Confirmation Code:  [id number]  Patient/family educated on Medicare website which has current facility and service quality ratings:  Yes    Education Provided on the Discharge Plan:  Yes  Persons Notified of Discharge Plans: Lyric Saldana  Patient/Family in Agreement with the Plan:  Yes    Handoff Referral Completed: No    Additional Information:  Patient was accepted at [facility].  MARY spoke with lyric Saldana and she would like for patient to discharge to [facility].  Requesting transportation be arranged.  MARY placed call to [facility] and arranged a wheelchair ride at 2:00 today.  Updated daughter and facility.  Paged physician and updated bedside.    Addendum: Faxed Orders.      MARY Sloan

## 2021-01-07 NOTE — PROGRESS NOTES
ANTICOAGULATION  MANAGEMENT: Discharge Review    Leonor Mercado chart reviewed for anticoagulation continuity of care    Hospital Admission on 1/4 to 1/7/21 for Acute onset dizziness, Right lower extremity weakness.    Discharge disposition: TCU    Results:    Recent labs: (last 7 days)     01/04/21  0700 01/05/21  1404 01/06/21  0906 01/07/21  0833   INR 2.22* 2.15* 1.99* 2.14*     Anticoagulation inpatient management:     home regimen continued    Anticoagulation discharge instructions:     Warfarin dosing: per TCU.    Bridging: No   INR goal change: No      Medication changes affecting anticoagulation: Yes: prednisone taper    Additional factors affecting anticoagulation: Yes: COPD    Plan     No adjustment to anticoagulation plan needed   Follow-up INR in 5 days at TCU    ACC will resume monitoring upon discharge from TCU    Anticoagulation Calendar updated    Ebony Doshi RN

## 2021-01-07 NOTE — DISCHARGE SUMMARY
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  The following labs/tests are   recommended: INR check in 5 days.             Unresulted Labs Ordered in the Past 30 Days of this Admission     No orders found from 12/5/2020 to 1/5/2021.          Discharge Disposition   Discharged to short-term care facility  Condition at discharge: Stable    Hospital Course         Leonor Mercado is a 89 year old female with PMH significant for CAD, Hypertension, Dyslipidemia, atrial fibrillation on chronic anticoagulation, SSS s/p pacemaker, CHFpEF, COPD (on 4l/min noc oxygen), CKD stage III who presented to ED with dizziness and in ED also noted with right lower extremity weakness     Acute onset dizziness,etiology unclear, but possible vestibular neuritis  Initially seen as CODE stroke in the ED; on admission CT/CTA mostly unremarkable except for mild to moderate atherosclerotic disease and likely altered perfusion in deep medial left temporal lobe but no acute infarct or hemorrhage. Unable to obtain MRI because of pacemaker not compatible with MRI. Repeat CT 24 hrs after initial presentation without acute finding. Per neuro note, less likely central due to unidirectional horizontal nystagmus and  history of episodic vertigo. PT eval completed, negative for BPPV, concern for possible vestibular neuritis.  Started on prednisone for possible vestibular neuritis.  She is tolerating prednisone at this time.  Her dizziness has improved.  - will continue prednisone taper for the next 8 more days.  -Also provided her with Pepcid twice daily x2 weeks while she is on prednisone since she is anticoagulated for GI prophylaxis  -Patient will discharge to TCU for further rehab  -Neurology followed during hospital stay    Right lower extremity weakness, etiology unclear but improved  Imaging as above negative. Unfortunately MRI cant be completed due to pacemaker. Complained of hip/groin pain with range of motion. Weakness appears isolated to hip flexion and knee flexion, but preserved knee extension and ankle dorsi/plantar flexion. Given this finding possible peripheral process per neurology. No reported trauma/fall. Xray of hip/pelvis negative for acute fracture. No back pain. This seems to have improved.   -Continue with PT at TCU     H/o CAD  Hypertension  Dyslipidemia  atrial fibrillation on chronic anticoagulation  SSS s/p pacemaker  -TTE-LVEF 60-65%, mod-severe TR (mild to mod in 4/6/2019)  -currently denies any chest pain  -Continue PTA diltiazem and Cozaar   -continue PTA Pravachol, Imdur  -INR 2.4 on day of discharge.  Warfarin resumed per PTA regimen  -Follow-up INR in 5 days at TCU     HF with preserved ejection fraction  - currently looks euvolumic   - last Echo from 8/2019 with EF for 55 to 60%, mild to moderate TR  -Resume prior to admission Lasix  -monitor volume status closely and reinitiate Lasix as needed    Mod- severe TR  Previous echo in 4/2019 showed mild to mod. Appears worse on current echo.   - will need OP follow up with her cardiologist, previousl seen Dr. Horton on 3/31     CKD stage III  -baseline creatinine around 1.2-1.4, current creatinine 1.27; monitor BMP     COPD  -PTA on 4 L nasal  cannula oxygen only at night  -respiratory status stable  -will continue with PTA inhalers and nebulizer  Addendum-TCU not able to neb treatments   --->discontinued PTA duo-neb at bedtime and qid prn  ----> ordered Combivent at bedtime and qid prn for discharge  ---> patient should be put back on her PTA nebs upon her discharge from TCU to home       COVID status  -Negative on 1/4/2021     Depression/anxiety/insomnia  -continue PTA Gen     Daughter,  Les, updated by phone.      Consultations This Hospital Stay   NEUROLOGY IP CONSULT  PHYSICAL THERAPY ADULT IP CONSULT  OCCUPATIONAL THERAPY ADULT IP CONSULT  SPEECH LANGUAGE PATH ADULT IP CONSULT  SWALLOW EVAL SPEECH PATH AT BEDSIDE IP CONSULT  SMOKING CESSATION PROGRAM IP CONSULT  PATIENT LEARNING CENTER IP CONSULT  PHARMACY TO DOSE WARFARIN  CARE MANAGEMENT / SOCIAL WORK IP CONSULT  PHYSICAL THERAPY ADULT IP CONSULT  OCCUPATIONAL THERAPY ADULT IP CONSULT    Code Status   No CPR- Do NOT Intubate    Time Spent on this Encounter   I, Marvin Saul MD, personally saw the patient today and spent 35 minutes discharging this patient.       Marvin Saul MD  [place] [id number] [facility]  [address] [place] [address], [address]  [facility] [address]  Phone: [phone]  ______________________________________________________________________    Physical Exam   Vital Signs: Temp: 96.3  F (35.7  C) Temp src: Oral BP: (!) 148/81 Pulse: 87   Resp: 16 SpO2: 96 % O2 Device: None (Room air)    Weight: 159 lbs 9.81 oz  General Appearance: Alert, awake and appropriate  Respiratory: Clear to auscultation bilaterally, no wheezing  Cardiovascular: Regular rate and rhythm  GI: Soft and nontender  Skin: Warm and dry         Primary Care Physician   Arnel Garcia MD    Discharge Orders      CARE COORDINATION REFERRAL      General info for SNF    Length of Stay Estimate: Short Term Care: Estimated # of Days <30  Condition at Discharge: Stable  Level of care:skilled    Rehabilitation Potential: Good  Admission H&P remains valid and up-to-date: Yes  Recent Chemotherapy: N/A  Use Nursing Home Standing Orders: Yes     Mantoux instructions    Give two-step Mantoux (PPD) Per Facility Policy Yes     Reason for your hospital stay    Admitted for dizziness possible vestibular neuritis.     Follow Up and recommended labs and tests    Follow up with [facility] physician.  The following labs/tests are recommended: INR check in 5 days.     Activity - Up with assistive device     Activity - Up with nursing assistance     No CPR- Do NOT Intubate     Physical Therapy Adult Consult    Evaluate and treat as clinically indicated.    Reason:  Dizziness, possible vestibular neuritis. Right leg weakness     Occupational Therapy Adult Consult    Evaluate and treat as clinically indicated.    Reason:  Dizziness possible vestibular neuritis, right leg weakness     Fall precautions     Oxygen Adult/Peds     Advance Diet as Tolerated    Follow this diet upon discharge: Orders Placed This Encounter      Regular Diet Adult       Significant Results and Procedures   Most Recent 3 CBC's:  Recent Labs   Lab Test 01/05/21  1404 01/04/21  0700 07/13/19  0713   WBC 5.9 6.0 5.4   HGB 12.0 11.6* 11.6*   MCV 96 96 90    203 261     Most Recent 3 BMP's:  Recent Labs   Lab Test 01/05/21  1404 01/04/21  1432 01/04/21  0700    140 138   POTASSIUM 4.3 4.4 4.1   CHLORIDE 105 108 105   CO2 30 27 30   BUN 17 20 23   CR 1.13* 1.24* 1.27*   ANIONGAP 2* 5 3   TRI 9.1 9.0 8.6   GLC 96 117* 98     Most Recent INR's and Anticoagulation Dosing History:  Anticoagulation Dose History     Recent Dosing and Labs Latest Ref Rng & Units 10/2/2020 11/6/2020 12/10/2020 1/4/2021 1/5/2021 1/6/2021 1/7/2021    Warfarin 1 mg - - - - 1 mg - - -    Warfarin 2 mg - - - - - 2 mg 2 mg -    INR 0.86 - 1.14 2.70(H) 2.70(H) 1.90(H) 2.22(H) 2.15(H) 1.99(H) 2.14(H)    INR 0.86 - 1.14 - - - - - - -      ,   Results for orders placed or  performed during the hospital encounter of 01/04/21   CT Head w/o Contrast    Narrative    CT SCAN OF THE HEAD WITHOUT CONTRAST January 4, 2021 7:20 AM     HISTORY: Code stroke.    TECHNIQUE: Axial images of the head and coronal reformations without  IV contrast material. Radiation dose for this scan was reduced using  automated exposure control, adjustment of the mA and/or kV according  to patient size, or iterative reconstruction technique.    COMPARISON: 11/15/2016.    FINDINGS: Mild to moderate cerebral atrophy is present. There are some  patchy white matter changes in both hemispheres without mass effect.  There is no evidence for intracranial hemorrhage, mass effect, acute  infarct, or skull fracture. Visualized paranasal sinuses and mastoid  air cells are clear.      Impression    IMPRESSION: Chronic changes. No evidence for intracranial hemorrhage  or any acute process.    TAMARA BRYAN MD   CTA Head Neck with Contrast    Narrative    CTA  HEAD/NECK WITH CONTRAST January 4, 2021 7:23 AM     HISTORY: Code stroke. Dizziness and weakness in right leg.    TECHNIQUE: Axial images were obtained through the head and neck with  intravenous contrast. 70 mL of Isovue-370 was given. Multiplanar  reconstructions were performed. 3-D reconstructions off remote  workstation for CT angiography were also acquired. Carotid stenoses  were evaluated comparing the caliber of the proximal internal carotid  artery to the caliber of the distal internal carotid artery.    FINDINGS:    Brachiocephalic vessels: There is mild to moderate atherosclerotic  plaque involving the proximal brachiocephalic vessels. There is no  stenosis.    Right carotid system: Minimal plaque. No stenosis or dissection.    Left carotid system: Mild calcific plaque. No stenosis or dissection.    Right vertebral artery: Mild calcific plaque is seen proximally. There  is no stenosis or dissection.    Left vertebral artery: Mild calcific plaque is seen  proximally. There  is no stenosis or dissection.    [place]: There is some calcific plaque in the carotid siphon  regions bilaterally without stenosis. The basilar artery is patent.  The proximal anterior, middle, and posterior cerebral arteries are  patent. Incidental note is made of primary origin of the right  posterior cerebral artery off the right internal carotid artery. There  is some mild to moderate stenosis in the left posterior cerebral  artery at the P1 and P2 junction. There is no evidence for proximal  thromboembolism or aneurysm.    Other findings: Some degenerative changes noted in the spine. There is  motion artifact in the chest which limits evaluation of the structures  of the chest.      Impression    IMPRESSION:  1. Mild to moderate atherosclerotic disease involving the  brachiocephalic vessels, carotid bifurcations, carotid siphons,  proximal vertebral arteries, and left posterior cerebral artery  without any high-grade stenosis.  2. No evidence for thromboembolism or aneurysm.    TAMARA BRYAN MD   CT Head Perfusion w Contrast    Narrative    CT HEAD PERFUSION WITH CONTRAST January 4, 2021 7:32 AM     HISTORY: Code stroke. Dizziness and right leg weakness.    TECHNIQUE: Axial images were obtained through the brain with  intravenous contrast for CT brain perfusion imaging. 50 mL of  Isovue-370 was given. Radiation dose for this scan was reduced using  automated exposure control, adjustment of the mA and/or kV according  to patient size, or iterative reconstruction technique.    FINDINGS: Perfusion images show some questionable altered perfusion in  the medial left temporal lobe region. Perfusion images are otherwise  symmetric. No evidence for any infarct.      Impression    IMPRESSION: Questionable altered perfusion in the deep medial left  temporal lobe.    TAMARA BRYAN MD   CT Head w/o Contrast    Narrative    CT SCAN OF THE HEAD WITHOUT CONTRAST   1/5/2021 9:08 AM     HISTORY:  Stroke, follow up.    TECHNIQUE:  Axial images of the head and coronal reformations without  IV contrast material. Radiation dose for this scan was reduced using  automated exposure control, adjustment of the mA and/or kV according  to patient size, or iterative reconstruction technique.    COMPARISON: Head CT 2021    FINDINGS:  Moderate volume loss is present. Patchy and confluent white  matter hypoattenuation likely represents advanced chronic small vessel  ischemic change. Parenchyma is otherwise unremarkable. No evidence of  acute ischemia, hemorrhage, mass, mass effect or hydrocephalus.     The visualized calvarium, tympanic cavities, mastoid cavities, and  paranasal sinuses are unremarkable.      Impression    IMPRESSION:   1. No CT evidence of acute ischemia or hemorrhage.  2. Volume loss and white matter hypoattenuation which likely  represents chronic small vessel ischemic change.    SAM VILLASEÑOR MD   XR Pelvis w Hip Right 1 View    Narrative    XR PELVIS AND HIP RIGHT 1 VIEW 2021 12:39 PM     HISTORY: right hip pain    COMPARISON: None.      Impression    IMPRESSION: Osteopenia. No fractures are evident. The hip joints are  unremarkable. Degenerative changes in the left SI joint and lower  lumbar spine. Atheromatous calcification.    JESUS ARREOLA MD   Echocardiogram Complete - Bubble study    Narrative    [id number]  [id number]  [id number]  [id number]^[email or web address]^LEA^PRABHJOT           [facility]  Echocardiography Laboratory  [address] [place] [address]        Name: AILYN MARCUS  MRN: [id number]  : 1926  Study Date: 2021 03:01 PM  Age: 89 yrs  Gender: Female  Patient Location: [facility]  Reason For Study: Dizziness  Ordering Physician: LEA PINA  Referring Physician: Arnel Garcia  Performed By: Jesus Rojas RDCS     BSA: 1.7 m2  Height: 63 in  Weight: 157 lb  HR: 80  BP: 137/76  mmHg  _____________________________________________________________________________  __        Procedure  Complete Portable Bubble Echo Adult. Optison (NDC #[id number]) given  intravenously.  _____________________________________________________________________________  __        Interpretation Summary     The visual ejection fraction is estimated at 60-65%.  Mildly decreased right ventricular systolic function with borderline RV  dilatation.  There is a pacemaker lead in the right ventricle.  There is mod-severe to severe (3-4+) tricuspid regurgitation.  Mild pulmonary hypertension.  Dilation of the inferior vena cava is present with normal respiratory  variation in diameter.     Compared to prior study, TR is worse.  _____________________________________________________________________________  __        Left Ventricle  The left ventricle is normal in size. There is normal left ventricular wall  thickness. The visual ejection fraction is estimated at 60-65%. Diastolic  Doppler findings (E/E' ratio and/or other parameters) suggest left ventricular  filling pressures are indeterminate. No regional wall motion abnormalities  noted.     Right Ventricle  Borderline right ventricular enlargement. Mildly decreased right ventricular  systolic function. There is a pacemaker lead in the right ventricle.     Atria  The left atrium is mild to moderately dilated. The right atrium is moderately  dilated. A contrast injection (Bubble Study) was performed that was negative  for flow across the interatrial septum.     Mitral Valve  The mitral valve is normal in structure and function. There is trace mitral  regurgitation. There is no mitral valve stenosis.        Tricuspid Valve  There is mod-severe to severe (3-4+) tricuspid regurgitation. The right  ventricular systolic pressure is approximated at 31.8 mmHg plus the right  atrial pressure.     Aortic Valve  The aortic valve is trileaflet with aortic valve sclerosis. No  aortic  regurgitation is present. No aortic stenosis is present.     Pulmonic Valve  The pulmonic valve is not well visualized. There is trace to mild pulmonic  valvular regurgitation.     Vessels  The aortic root is normal size. Normal size ascending aorta. IVC diameter and  respiratory changes fall into an intermediate range suggesting an RA pressure  of 8 mmHg. Dilation of the inferior vena cava is present with normal  respiratory variation in diameter.     Pericardium  There is no pericardial effusion.     _____________________________________________________________________________  __  MMode/2D Measurements & Calculations  IVSd: 0.89 cm  LVIDd: 3.8 cm  LVIDs: 2.9 cm  LVPWd: 0.87 cm  FS: 22.7 %  LV mass(C)d: 98.3 grams  LV mass(C)dI: 56.3 grams/m2     Ao root diam: 2.9 cm  LA dimension: 4.2 cm  asc Aorta Diam: 3.3 cm  LA/Ao: 1.5  LA Volume (BP): 65.8 ml  LA Volume Index (BP): 37.8 ml/m2  RWT: 0.45        Doppler Measurements & Calculations  MV E max michel: 95.1 cm/sec  MV dec time: 0.17 sec     PA acc time: 0.08 sec  TR max michel: 282.2 cm/sec  TR max P.8 mmHg  E/E' avg: 10.9  Lateral E/e': 11.2  Medial E/e': 10.5           _____________________________________________________________________________  __           Report approved by: Carey Vila 2021 04:08 PM            Discharge Medications   Current Discharge Medication List      START taking these medications    Details   famotidine (PEPCID) 10 MG tablet Take 1 tablet (10 mg) by mouth 2 times daily for 14 days    Associated Diagnoses: Irritable bowel syndrome, unspecified type      !! ipratropium-albuterol (COMBIVENT RESPIMAT)  MCG/ACT inhaler Inhale 1 puff into the lungs 4 times daily as needed for shortness of breath / dyspnea or wheezing    Associated Diagnoses: Chronic obstructive pulmonary disease, unspecified COPD type (H)      !! ipratropium-albuterol (COMBIVENT RESPIMAT)  MCG/ACT inhaler Inhale 1 puff into the lungs At  Bedtime    Associated Diagnoses: Chronic obstructive pulmonary disease, unspecified COPD type (H)      !! predniSONE (DELTASONE) 10 MG tablet Take 3 tablets (30 mg) by mouth daily for 1 day    Associated Diagnoses: Acute vestibular neuronitis, unspecified laterality      !! predniSONE (DELTASONE) 10 MG tablet Take 1 tablet (10 mg) by mouth daily for 1 day    Associated Diagnoses: Acute vestibular neuronitis, unspecified laterality      !! predniSONE (DELTASONE) 20 MG tablet Take 3 tablets (60 mg) by mouth daily for 3 days    Associated Diagnoses: Acute vestibular neuronitis, unspecified laterality      !! predniSONE (DELTASONE) 20 MG tablet Take 2 tablets (40 mg) by mouth daily for 1 day    Associated Diagnoses: Acute vestibular neuronitis, unspecified laterality      !! predniSONE (DELTASONE) 20 MG tablet Take 1 tablet (20 mg) by mouth daily for 1 day    Associated Diagnoses: Acute vestibular neuronitis, unspecified laterality      !! predniSONE (DELTASONE) 5 MG tablet Take 1 tablet (5 mg) by mouth daily for 1 day    Associated Diagnoses: Acute vestibular neuronitis, unspecified laterality       !! - Potential duplicate medications found. Please discuss with provider.      CONTINUE these medications which have NOT CHANGED    Details   acetaminophen (TYLENOL) 500 MG tablet Take 500 mg by mouth At Bedtime      albuterol (PROAIR HFA/PROVENTIL HFA/VENTOLIN HFA) 108 (90 Base) MCG/ACT inhaler Inhale 2 puffs into the lungs every 6 hours as needed for shortness of breath / dyspnea or wheezing  Qty: 18 g, Refills: 3    Associated Diagnoses: Chronic obstructive pulmonary disease, unspecified COPD type (H)      calcium carbonate (CALCIUM CARBONATE) 600 MG TABS tablet Take 600 mg by mouth daily       CARTIA  MG 24 hr capsule Take 1 capsule by mouth once daily  Qty: 90 capsule, Refills: 1    Associated Diagnoses: Coronary artery disease involving native coronary artery of native heart without angina pectoris       Fluticasone-Umeclidin-Vilanterol (TRELEGY ELLIPTA) 100-62.5-25 MCG/INH oral inhaler Inhale 1 puff into the lungs daily      furosemide (LASIX) 20 MG tablet Take 40 mg by mouth daily      isosorbide mononitrate (IMDUR) 30 MG 24 hr tablet Take 1 tablet (30 mg) by mouth daily  Qty: 90 tablet, Refills: 11    Associated Diagnoses: Coronary artery disease involving native coronary artery of native heart without angina pectoris      Lactobacillus (PROBIOTIC ACIDOPHILUS) TABS Take 1 tablet by mouth daily       losartan (COZAAR) 25 MG tablet Take 1 tablet (25 mg) by mouth daily  Qty: 90 tablet, Refills: 11    Associated Diagnoses: Essential hypertension with goal blood pressure less than 140/90      melatonin 5 MG tablet Take 10 mg by mouth nightly as needed for sleep      mirtazapine (REMERON) 15 MG tablet Take 1 tablet (15 mg) by mouth At Bedtime  Qty: 90 tablet, Refills: 11    Associated Diagnoses: Adjustment disorder, unspecified type      Multiple Vitamins-Minerals (OCUVITE PO) Take 1 capsule by mouth daily.      polyethylene glycol (MIRALAX/GLYCOLAX) Packet Take 1 packet by mouth daily       pravastatin (PRAVACHOL) 40 MG tablet Take 1 tablet by mouth once daily  Qty: 90 tablet, Refills: 3    Associated Diagnoses: Hyperlipidemia LDL goal <100; Coronary artery disease involving native coronary artery of native heart without angina pectoris      senna (SENOKOT) 8.6 MG tablet Take 1 tablet by mouth daily as needed for constipation       Vitamin D, Cholecalciferol, 1000 units TABS Take 1,000 Units by mouth daily      warfarin ANTICOAGULANT (COUMADIN) 2 MG tablet Take 1 mg (0.5 tab) every Mon/Fri; 2 mg tab by mouth all other days, or as directed by INR clinic  Qty: 90 tablet, Refills: 3    Associated Diagnoses: Long term current use of anticoagulant therapy      ASPIRIN NOT PRESCRIBED (INTENTIONAL) Please choose reason not prescribed, below  Qty: 0 each, Refills: 0    Associated Diagnoses: Coronary artery disease involving  native coronary artery of native heart without angina pectoris         STOP taking these medications       ipratropium - albuterol 0.5 mg/2.5 mg/3 mL (DUONEB) 0.5-2.5 (3) MG/3ML neb solution Comments:   Reason for Stopping:         ipratropium - albuterol 0.5 mg/2.5 mg/3 mL (DUONEB) 0.5-2.5 (3) MG/3ML neb solution Comments:   Reason for Stopping:             Allergies   Allergies   Allergen Reactions     Amiodarone      pulm fibrosis       Baclofen      Confusion      Bactrim [Sulfamethoxazole W-Trimethoprim]      Difficulty swallowing     Doxycycline Other (See Comments)     Multiple complaints; she does not want this again.      Levaquin [Levofloxacin] Other (See Comments)     Multiple complaints; she will not take this again     Linzess [Linaclotide] Diarrhea     Lorazepam        Prolonged drowsiness with ER visit      Liquid Adhesive Itching and Rash     Bleeding when tape removed after pacemaker placement..itched and burned for weeks.

## 2021-01-07 NOTE — PLAN OF CARE
0529-3325 no acute events. A&O. VSS, neuros unchanged. Remains on prednisone taper. Restarting lasix today. Discharge to TCU pending.

## 2021-01-07 NOTE — PLAN OF CARE
Occupational Therapy Discharge Summary    Reason for therapy discharge:    Discharged to transitional care facility.    Progress towards therapy goal(s). See goals on Care Plan in [facility] electronic health record for goal details.  Goals not met.  Barriers to achieving goals:   discharge from facility.    Therapy recommendation(s):    Continued therapy is recommended.  Rationale/Recommendations:  at TCU to improve ind/safety w/ ADL's.

## 2021-01-07 NOTE — CONSULTS
Care Management Initial Consult    General Information  Assessment completed with: Patient, Children, Patient and daughter Les  Type of CM/SW Visit: Initial Assessment    Primary Care Provider verified and updated as needed:     Readmission within the last 30 days:        Reason for Consult: discharge planning, facility placement  Advance Care Planning:    On file, lyric Saldana listed as HCA        Communication Assessment  Patient's communication style: spoken language (English or Bilingual)    Hearing Difficulty or Deaf: yes   Wear Glasses or Blind: yes    Cognitive  Cognitive/Neuro/Behavioral: WDL  Level of Consciousness: alert  Arousal Level: opens eyes spontaneously  Orientation: oriented x 4  Mood/Behavior: calm, cooperative  Best Language: 0 - No aphasia  Speech: clear, spontaneous, logical    Living Environment:   People in home: alone     Current living Arrangements: other (see comments)(Senior Living)      Able to return to prior arrangements: yes  Living Arrangement Comments: Lives in senior apartment    Family/Social Support:  Care provided by: self  Provides care for: no one     Children, Facility resident(s)/Staff          Description of Support System: Supportive, Involved    Support Assessment: Adequate family and caregiver support, Adequate social supports    Current Resources:   Skilled Home Care Services:  None - lives in senior apartment.   Community Resources:    Equipment currently used at home: none  Supplies currently used at home:      Employment/Financial:  Employment Status: retired        Financial Concerns: No concerns identified   Referral to Financial Counselor: No       Lifestyle & Psychosocial Needs:        Socioeconomic History     Marital status:      Spouse name: Not on file     Number of children: 3     Years of education: Not on file     Highest education level: Not on file   Occupational History     Employer: RETIRED     Tobacco Use     Smoking status: Former Smoker      Packs/day: 0.00     Types: Cigarettes     Quit date: 1987     Years since quittin.6     Smokeless tobacco: Never Used   Substance and Sexual Activity     Alcohol use: Yes     Alcohol/week: 0.0 standard drinks     Comment: occ     Drug use: No     Sexual activity: Never     Partners: Male       Functional Status:  Prior to admission patient needed assistance:       Patient resides at a senior apartment where she receives meals from the facility.  Daughters are involved and visit to assist with chores within patient's apartment.        Mental Health Status:      None indicated    Chemical Dependency Status:            None indicated    Values/Beliefs:  Spiritual, Cultural Beliefs, [employer]ism Practices, Values that affect care:                 Additional Information:  Patient is a 89 year old female that was admitted to the hospital on 21 with a primary diagnosis of right leg weakness, vertigo. SW reviewed chart and spoke with patient regarding discharge planning for patient and TCU recommendations upon discharge.  Patient requested that SW call daughter Les to discuss facilities.  SW spoke with Les regarding TCU facility choices who requested referrals be sent to [facility] and [facility].  Daughter okay with referrals also being sent to facilities with in the [facility] area with good ratings.  SW sent referrals via [facility].     MARY Sloan

## 2021-01-07 NOTE — PROGRESS NOTES
Clinic Care Coordination Contact  Care Coordination Transition Communication    Referral Source: IP Handoff    Clinical Data: Patient was hospitalized at [facility] from 01/04/2021 to 01/07/2021 with diagnosis of weakness, dizziness, amongst others.     Transition to Facility:              Facility Name: [facility]              Contact name and phone number/fax: RNCC left voicemail message with call back information for TCU SW, introducing self and CC role and requesting notification of patients discharge and any outstanding care coordination needs.      Plan: RN/SW Care Coordinator will await notification from facility staff informing RN/SW Care Coordinator of patient's discharge plans/needs. RN/SW Care Coordinator will review chart and outreach to facility staff every 4 weeks and as needed.     Tanya King RN Care Coordinator  [facility] [place], [place], [place]  Email: [email or web address]  Phone: [phone]

## 2021-01-07 NOTE — PLAN OF CARE
Patient discharged at 2 PM to [facility] TCU.  IV was discontinued. Pain at time of discharge was 0. Belongings returned to patient.  Discharge instructions and medications reviewed with patient.  Patient verbalized understanding and all questions were answered.  Packet given to transport.  At time of discharge, patient condition was stable and left the unit via wheelchair escorted by EMS transport.

## 2021-01-08 ENCOUNTER — NURSING HOME VISIT (OUTPATIENT)
Dept: GERIATRICS | Facility: CLINIC | Age: 86
End: 2021-01-08
Payer: MEDICARE

## 2021-01-08 VITALS
TEMPERATURE: 96.9 F | WEIGHT: 155.7 LBS | OXYGEN SATURATION: 96 % | HEART RATE: 74 BPM | SYSTOLIC BLOOD PRESSURE: 114 MMHG | HEIGHT: 63 IN | DIASTOLIC BLOOD PRESSURE: 68 MMHG | RESPIRATION RATE: 16 BRPM | BODY MASS INDEX: 27.59 KG/M2

## 2021-01-08 DIAGNOSIS — J44.9 CHRONIC OBSTRUCTIVE PULMONARY DISEASE, UNSPECIFIED COPD TYPE (H): ICD-10-CM

## 2021-01-08 DIAGNOSIS — R29.898 RIGHT LEG WEAKNESS: ICD-10-CM

## 2021-01-08 DIAGNOSIS — Z95.0 CARDIAC PACEMAKER IN SITU: ICD-10-CM

## 2021-01-08 DIAGNOSIS — N18.30 STAGE 3 CHRONIC KIDNEY DISEASE, UNSPECIFIED WHETHER STAGE 3A OR 3B CKD (H): ICD-10-CM

## 2021-01-08 DIAGNOSIS — G47.09 OTHER INSOMNIA: ICD-10-CM

## 2021-01-08 DIAGNOSIS — H81.20 VESTIBULAR NEURONITIS, UNSPECIFIED LATERALITY: Primary | ICD-10-CM

## 2021-01-08 DIAGNOSIS — R53.81 PHYSICAL DECONDITIONING: ICD-10-CM

## 2021-01-08 DIAGNOSIS — I25.10 CORONARY ARTERY DISEASE INVOLVING NATIVE CORONARY ARTERY OF NATIVE HEART WITHOUT ANGINA PECTORIS: ICD-10-CM

## 2021-01-08 DIAGNOSIS — I50.32 CHRONIC DIASTOLIC CONGESTIVE HEART FAILURE (H): ICD-10-CM

## 2021-01-08 DIAGNOSIS — I48.20 CHRONIC ATRIAL FIBRILLATION (H): ICD-10-CM

## 2021-01-08 PROCEDURE — 99310 SBSQ NF CARE HIGH MDM 45: CPT | Performed by: NURSE PRACTITIONER

## 2021-01-08 RX ORDER — TRAZODONE HYDROCHLORIDE 50 MG/1
25 TABLET, FILM COATED ORAL
Start: 2021-01-08 | End: 2021-01-20

## 2021-01-08 NOTE — LETTER
1/8/2021        RE: Leonor Mercado  [address]  [place] [address]        [facility]  PRIMARY CARE PROVIDER AND CLINIC:  Arnel Garcia MD, MD, [address] [facility]  / [place] [address]  Chief Complaint   Patient presents with     Hospital F/U     [facility] Record Number:  [id number]  Place of Service where encounter took place:  [facility] [[id number]]    Leonor Mercado  is a 89 year old  (5/6/1926), admitted to the above facility from  [facility]. Hospital stay 1/4/21 through 1/7/21..  Admitted to this facility for  rehab, medical management and nursing care.    HPI:    HPI information obtained from: facility chart records, facility staff, patient report and [facility] chart review.   Brief Summary of Hospital Course:   PMH of CAD, HTN, HLD, atrial fib on AC, SSS s/p pacemaker, CHF pEF, COPD (on 4L O2 at noc), CKD who presents with dizziness.   Vestibular neuritis: CT/CTA unremarkable, stroke ruled out, BPPV ruled out, started on prednisone with improvment in dizziness. On pepcid for 2 weeks stomach protection  RLE weakness: unclear etiology, MRI cannot be done due to PPM, c/o hip/groin pain with ROM, weakness appears isolated to hip flexion and knee flexion  CAD/CHF: TTE LVEF 60-65%, mod/severe TR, no change in Rx, continues on coumadin , f/u with Dr Horton as OP f/u TR   CKD: baseline creat 1.2-1.4 stable  COPD: on 4 liters N/C at night, continue PTA inhalers, started on combivent at bedtime and prn switched from nebs, will resume nebs when she returns home.     Updates on Status Since Skilled nursing Admission: On exam today patient is sitting up in WC, just finished OT, states dizziness has improved but continues if she moves quickly especially when she turns her head to the left, denies N/V/D, CP, palpitations, she continues to have SOB due to COPD, CALDERON, no increase in SOB, states her legs are weak and left leg has point  tenderness over the greater trochanter which extends into groin when palpated, no increased pain with inversion of left leg, no increased pain when lying on left hip. Patient states she is not sleeping well at night due to prednisone, offered prn sleep aid    CODE STATUS/ADVANCE DIRECTIVES DISCUSSION:   No CPR- Do NOT Intubate  Patient's living condition: lives alone  ALLERGIES: Amiodarone, Baclofen, Bactrim [sulfamethoxazole w-trimethoprim], Doxycycline, Levaquin [levofloxacin], Linzess [linaclotide], Lorazepam, and Liquid adhesive  PAST MEDICAL HISTORY:  has a past medical history of Atrial fibrillation (H), BCC (basal cell carcinoma of skin), CHF - Chr Diastolic (H) (11/21/2017), Chronic renal insufficiency, COPD (chronic obstructive pulmonary disease) (H), Coronary artery disease, Hyperlipidemia, Hypertension, IBS (irritable bowel syndrome), Osteoporosis, Pulmonary fibrosis (H), Sick sinus syndrome - s/p PPM 2003 (H) (12/16/2017), SSS (sick sinus syndrome) (H), and Vertigo. She also has no past medical history of Asymptomatic human immunodeficiency virus (HIV) infection status (H), Cerebral palsy (H), Complication of anesthesia, Congenital renal agenesis and dysgenesis, Difficult intubation, Goiter, Gout, Hernia, abdominal, History of spinal cord injury, History of thrombophlebitis, Horseshoe kidney, Hydrocephalus (H), Malignant hyperthermia, Parkinsons disease (H), PONV (postoperative nausea and vomiting), Spider veins, Spina bifida (H), Spinal headache, STD (sexually transmitted disease), Tethered cord (H), or Tuberculosis.  PAST SURGICAL HISTORY:   has a past surgical history that includes appendectomy (1956); hernia repair (Left, 1991); Cardiac surgery; EP Lead revision dual (4/2003); Replace pacemaker generator (6/27/2013); EP Lead revision dual (7/2/2014); Implant pacemaker (2/19/2003); Coronary Angiography Adult Order (7/1994); Heart Cath, Angioplasty (02/2005); and Esophagoscopy, gastroscopy,  duodenoscopy (EGD), combined (N/A, 8/19/2015).  FAMILY HISTORY: family history includes Crohn's Disease in her daughter; Gastrointestinal Disease in her daughter; Heart Disease in her father; Neurologic Disorder in her mother.  SOCIAL HISTORY:   reports that she quit smoking about 33 years ago. Her smoking use included cigarettes. She smoked 0.00 packs per day. She has never used smokeless tobacco. She reports current alcohol use. She reports that she does not use drugs.    Post Discharge Medication Reconciliation Status: discharge medications reconciled, continue medications without change    Current Outpatient Medications   Medication Sig Dispense Refill     acetaminophen (TYLENOL) 500 MG tablet Take 500 mg by mouth At Bedtime       albuterol (PROAIR HFA/PROVENTIL HFA/VENTOLIN HFA) 108 (90 Base) MCG/ACT inhaler Inhale 2 puffs into the lungs every 6 hours as needed for shortness of breath / dyspnea or wheezing 18 g 3     calcium carbonate (CALCIUM CARBONATE) 600 MG TABS tablet Take 600 mg by mouth daily        CARTIA  MG 24 hr capsule Take 1 capsule by mouth once daily (Patient taking differently: Take 240 mg by mouth daily ) 90 capsule 1     famotidine (PEPCID) 10 MG tablet Take 1 tablet (10 mg) by mouth 2 times daily for 14 days       Fluticasone-Umeclidin-Vilanterol (TRELEGY ELLIPTA) 100-62.5-25 MCG/INH oral inhaler Inhale 1 puff into the lungs daily       furosemide (LASIX) 20 MG tablet Take 40 mg by mouth daily       ipratropium-albuterol (COMBIVENT RESPIMAT)  MCG/ACT inhaler Inhale 1 puff into the lungs 4 times daily as needed for shortness of breath / dyspnea or wheezing       ipratropium-albuterol (COMBIVENT RESPIMAT)  MCG/ACT inhaler Inhale 1 puff into the lungs At Bedtime       isosorbide mononitrate (IMDUR) 30 MG 24 hr tablet Take 1 tablet (30 mg) by mouth daily 90 tablet 11     losartan (COZAAR) 25 MG tablet Take 1 tablet (25 mg) by mouth daily 90 tablet 11     melatonin 5 MG tablet  Take 10 mg by mouth nightly as needed for sleep       mirtazapine (REMERON) 15 MG tablet Take 1 tablet (15 mg) by mouth At Bedtime 90 tablet 11     Multiple Vitamins-Minerals (OCUVITE PO) Take 1 capsule by mouth daily.       polyethylene glycol (MIRALAX/GLYCOLAX) Packet Take 1 packet by mouth daily        pravastatin (PRAVACHOL) 40 MG tablet Take 1 tablet by mouth once daily (Patient taking differently: Take 40 mg by mouth every evening ) 90 tablet 3     predniSONE (DELTASONE) 20 MG tablet Take 3 tablets (60 mg) by mouth daily for 3 days       senna (SENOKOT) 8.6 MG tablet Take 1 tablet by mouth daily as needed for constipation        Vitamin D, Cholecalciferol, 1000 units TABS Take 1,000 Units by mouth daily       ASPIRIN NOT PRESCRIBED (INTENTIONAL) Please choose reason not prescribed, below (Patient not taking: Reported on 1/8/2021) 0 each 0     Lactobacillus (PROBIOTIC ACIDOPHILUS) TABS Take 1 tablet by mouth daily        [START ON 1/12/2021] predniSONE (DELTASONE) 10 MG tablet Take 3 tablets (30 mg) by mouth daily for 1 day (Patient not taking: Reported on 1/8/2021)       [START ON 1/14/2021] predniSONE (DELTASONE) 10 MG tablet Take 1 tablet (10 mg) by mouth daily for 1 day (Patient not taking: Reported on 1/8/2021)       [START ON 1/11/2021] predniSONE (DELTASONE) 20 MG tablet Take 2 tablets (40 mg) by mouth daily for 1 day (Patient not taking: Reported on 1/8/2021)       [START ON 1/13/2021] predniSONE (DELTASONE) 20 MG tablet Take 1 tablet (20 mg) by mouth daily for 1 day (Patient not taking: Reported on 1/8/2021)       [START ON 1/15/2021] predniSONE (DELTASONE) 5 MG tablet Take 1 tablet (5 mg) by mouth daily for 1 day (Patient not taking: Reported on 1/8/2021)       warfarin ANTICOAGULANT (COUMADIN) 2 MG tablet Take 1 mg (0.5 tab) every Mon/Fri; 2 mg tab by mouth all other days, or as directed by INR clinic 90 tablet 3         ROS:  10 point ROS of systems including Constitutional, Eyes, Respiratory,  "Cardiovascular, Gastroenterology, Genitourinary, Integumentary, Musculoskeletal, Psychiatric were all negative except for pertinent positives noted in my HPI.    Vitals:  /68   Pulse 74   Temp 96.9  F (36.1  C)   Resp 16   Ht 1.6 m (5' 3\")   Wt 70.6 kg (155 lb 11.2 oz)   LMP  (LMP Unknown)   SpO2 96%   BMI 27.58 kg/m    Exam:  GENERAL APPEARANCE:  Alert, in no distress  ENT:  Mouth and posterior oropharynx normal, moist mucous membranes, normal hearing acuity  EYES:  EOM, conjunctivae, lids, pupils and irises normal, PERRL  NECK:  .  RESP:  respiratory effort and palpation of chest normal, lungs clear to auscultation , no respiratory distress, diminished breath sounds bases bilaterally  CV:  Palpation and auscultation of heart done , regular rate and rhythm, no murmur, rub, or gallop, no edema  ABDOMEN:  normal bowel sounds, soft, nontender, no hepatosplenomegaly or other masses  M/S:   patient sitting up in WC, able  to move all 4 extremities  SKIN:  Inspection of skin and subcutaneous tissue baseline  NEURO:   speech WNL  PSYCH:  affect and mood normal    Lab/Diagnostic data:    Most Recent 3 CBC's:  Recent Labs   Lab Test 01/05/21  1404 01/04/21  0700 07/13/19  0713   WBC 5.9 6.0 5.4   HGB 12.0 11.6* 11.6*   MCV 96 96 90    203 261     Most Recent 3 BMP's:  Recent Labs   Lab Test 01/05/21  1404 01/04/21  1432 01/04/21  0700    140 138   POTASSIUM 4.3 4.4 4.1   CHLORIDE 105 108 105   CO2 30 27 30   BUN 17 20 23   CR 1.13* 1.24* 1.27*   ANIONGAP 2* 5 3   TRI 9.1 9.0 8.6   GLC 96 117* 98       ASSESSMENT/PLAN:  Vestibular neuronitis, unspecified laterality  Acute/ongoing: Patient on prednisone taper, PT and OT,     Right leg weakness  Physical deconditioning  Acute/ongoing: PT and OT    Coronary artery disease involving native coronary artery of native heart without angina pectoris  Chronic diastolic congestive heart failure (H)  Chronic atrial fibrillation (H)  Cardiac pacemaker in " situ  Stage 3 chronic kidney disease, unspecified whether stage 3a or 3b CKD  Ongoing: vitals daily and prn, BMP follow, continue lasix 40mg QD, cartia XT 240mg QD, Imdur 30mg QD, losartan 25mg QD, pravastatin 40mg QD    Chronic obstructive pulmonary disease, unspecified COPD type (H)  Ongoing: On 4 liter O2 per N/C at night, continue inhalers will resume duonebs when patient returns home     Insomnia:   Acute due to prednisone: start trazodone 25mg qhs prn     Orders written by provider at facility  O2 4 liters per NC at night  BMP and CBC on Monday  Trazodone 25mg qhs prn    Total time spent with patient visit at the [facility] nursing [facility] was 35 min including patient visit and review of past records. Greater than 50% of total time spent with counseling and coordinating care due to discussed medications and POC, education on vestibular neuritis and importance of doing prednisone taper, education on prednisone, patient not sleeping well at night due to prednisone will start trazodone, discussed therapy and discharge planning, [place] at [place] otherwise patient does her own cooking and laundry, daughter shops for patient, patient does not drive. .     Electronically signed by:  Tonya Lynn Haase, APRN CNP                         Sincerely,        Tonya Lynn Haase, APRN CNP

## 2021-01-08 NOTE — PLAN OF CARE
Physical Therapy Discharge Summary    Reason for therapy discharge:    Discharged to transitional care facility.    Progress towards therapy goal(s). See goals on Care Plan in [facility] electronic health record for goal details.  Goals not met.  Barriers to achieving goals:   discharge from facility.    Therapy recommendation(s):    Continued therapy is recommended.  Rationale/Recommendations:  Patient would benefit from continued skilled PT to progress independence, balance and to address vestibular deficits.

## 2021-01-08 NOTE — PROGRESS NOTES
[facility]  PRIMARY CARE PROVIDER AND CLINIC:  Arnel Garcia MD, MD, [address] [facility]  / [place] [address]  Chief Complaint   Patient presents with     Hospital F/U     [facility] Record Number:  [id number]  Place of Service where encounter took place:  [facility] ([facility]) [[id number]]    Leonor Mercado  is a 89 year old  (5/6/1926), admitted to the above facility from  [facility]. Hospital stay 1/4/21 through 1/7/21..  Admitted to this facility for  rehab, medical management and nursing care.    HPI:    HPI information obtained from: facility chart records, facility staff, patient report and [facility] chart review.   Brief Summary of Hospital Course:   PMH of CAD, HTN, HLD, atrial fib on AC, SSS s/p pacemaker, CHF pEF, COPD (on 4L O2 at noc), CKD who presents with dizziness.   Vestibular neuritis: CT/CTA unremarkable, stroke ruled out, BPPV ruled out, started on prednisone with improvment in dizziness. On pepcid for 2 weeks stomach protection  RLE weakness: unclear etiology, MRI cannot be done due to PPM, c/o hip/groin pain with ROM, weakness appears isolated to hip flexion and knee flexion  CAD/CHF: TTE LVEF 60-65%, mod/severe TR, no change in Rx, continues on coumadin , f/u with Dr Horton as OP f/u TR   CKD: baseline creat 1.2-1.4 stable  COPD: on 4 liters N/C at night, continue PTA inhalers, started on combivent at bedtime and prn switched from nebs, will resume nebs when she returns home.     Updates on Status Since Skilled nursing Admission: On exam today patient is sitting up in WC, just finished OT, states dizziness has improved but continues if she moves quickly especially when she turns her head to the left, denies N/V/D, CP, palpitations, she continues to have SOB due to COPD, CALDERON, no increase in SOB, states her legs are weak and left leg has point tenderness over the greater trochanter which extends into groin when palpated, no increased pain  with inversion of left leg, no increased pain when lying on left hip. Patient states she is not sleeping well at night due to prednisone, offered prn sleep aid    CODE STATUS/ADVANCE DIRECTIVES DISCUSSION:   No CPR- Do NOT Intubate  Patient's living condition: lives alone  ALLERGIES: Amiodarone, Baclofen, Bactrim [sulfamethoxazole w-trimethoprim], Doxycycline, Levaquin [levofloxacin], Linzess [linaclotide], Lorazepam, and Liquid adhesive  PAST MEDICAL HISTORY:  has a past medical history of Atrial fibrillation (H), BCC (basal cell carcinoma of skin), CHF - Chr Diastolic (H) (11/21/2017), Chronic renal insufficiency, COPD (chronic obstructive pulmonary disease) (H), Coronary artery disease, Hyperlipidemia, Hypertension, IBS (irritable bowel syndrome), Osteoporosis, Pulmonary fibrosis (H), Sick sinus syndrome - s/p PPM 2003 (H) (12/16/2017), SSS (sick sinus syndrome) (H), and Vertigo. She also has no past medical history of Asymptomatic human immunodeficiency virus (HIV) infection status (H), Cerebral palsy (H), Complication of anesthesia, Congenital renal agenesis and dysgenesis, Difficult intubation, Goiter, Gout, Hernia, abdominal, History of spinal cord injury, History of thrombophlebitis, Horseshoe kidney, Hydrocephalus (H), Malignant hyperthermia, Parkinsons disease (H), PONV (postoperative nausea and vomiting), Spider veins, Spina bifida (H), Spinal headache, STD (sexually transmitted disease), Tethered cord (H), or Tuberculosis.  PAST SURGICAL HISTORY:   has a past surgical history that includes appendectomy (1956); hernia repair (Left, 1991); Cardiac surgery; EP Lead revision dual (4/2003); Replace pacemaker generator (6/27/2013); EP Lead revision dual (7/2/2014); Implant pacemaker (2/19/2003); Coronary Angiography Adult Order (7/1994); Heart Cath, Angioplasty (02/2005); and Esophagoscopy, gastroscopy, duodenoscopy (EGD), combined (N/A, 8/19/2015).  FAMILY HISTORY: family history includes Crohn's Disease in  her daughter; Gastrointestinal Disease in her daughter; Heart Disease in her father; Neurologic Disorder in her mother.  SOCIAL HISTORY:   reports that she quit smoking about 33 years ago. Her smoking use included cigarettes. She smoked 0.00 packs per day. She has never used smokeless tobacco. She reports current alcohol use. She reports that she does not use drugs.    Post Discharge Medication Reconciliation Status: discharge medications reconciled, continue medications without change    Current Outpatient Medications   Medication Sig Dispense Refill     acetaminophen (TYLENOL) 500 MG tablet Take 500 mg by mouth At Bedtime       albuterol (PROAIR HFA/PROVENTIL HFA/VENTOLIN HFA) 108 (90 Base) MCG/ACT inhaler Inhale 2 puffs into the lungs every 6 hours as needed for shortness of breath / dyspnea or wheezing 18 g 3     calcium carbonate (CALCIUM CARBONATE) 600 MG TABS tablet Take 600 mg by mouth daily        CARTIA  MG 24 hr capsule Take 1 capsule by mouth once daily (Patient taking differently: Take 240 mg by mouth daily ) 90 capsule 1     famotidine (PEPCID) 10 MG tablet Take 1 tablet (10 mg) by mouth 2 times daily for 14 days       Fluticasone-Umeclidin-Vilanterol (TRELEGY ELLIPTA) 100-62.5-25 MCG/INH oral inhaler Inhale 1 puff into the lungs daily       furosemide (LASIX) 20 MG tablet Take 40 mg by mouth daily       ipratropium-albuterol (COMBIVENT RESPIMAT)  MCG/ACT inhaler Inhale 1 puff into the lungs 4 times daily as needed for shortness of breath / dyspnea or wheezing       ipratropium-albuterol (COMBIVENT RESPIMAT)  MCG/ACT inhaler Inhale 1 puff into the lungs At Bedtime       isosorbide mononitrate (IMDUR) 30 MG 24 hr tablet Take 1 tablet (30 mg) by mouth daily 90 tablet 11     losartan (COZAAR) 25 MG tablet Take 1 tablet (25 mg) by mouth daily 90 tablet 11     melatonin 5 MG tablet Take 10 mg by mouth nightly as needed for sleep       mirtazapine (REMERON) 15 MG tablet Take 1 tablet (15  mg) by mouth At Bedtime 90 tablet 11     Multiple Vitamins-Minerals (OCUVITE PO) Take 1 capsule by mouth daily.       polyethylene glycol (MIRALAX/GLYCOLAX) Packet Take 1 packet by mouth daily        pravastatin (PRAVACHOL) 40 MG tablet Take 1 tablet by mouth once daily (Patient taking differently: Take 40 mg by mouth every evening ) 90 tablet 3     predniSONE (DELTASONE) 20 MG tablet Take 3 tablets (60 mg) by mouth daily for 3 days       senna (SENOKOT) 8.6 MG tablet Take 1 tablet by mouth daily as needed for constipation        Vitamin D, Cholecalciferol, 1000 units TABS Take 1,000 Units by mouth daily       ASPIRIN NOT PRESCRIBED (INTENTIONAL) Please choose reason not prescribed, below (Patient not taking: Reported on 1/8/2021) 0 each 0     Lactobacillus (PROBIOTIC ACIDOPHILUS) TABS Take 1 tablet by mouth daily        [START ON 1/12/2021] predniSONE (DELTASONE) 10 MG tablet Take 3 tablets (30 mg) by mouth daily for 1 day (Patient not taking: Reported on 1/8/2021)       [START ON 1/14/2021] predniSONE (DELTASONE) 10 MG tablet Take 1 tablet (10 mg) by mouth daily for 1 day (Patient not taking: Reported on 1/8/2021)       [START ON 1/11/2021] predniSONE (DELTASONE) 20 MG tablet Take 2 tablets (40 mg) by mouth daily for 1 day (Patient not taking: Reported on 1/8/2021)       [START ON 1/13/2021] predniSONE (DELTASONE) 20 MG tablet Take 1 tablet (20 mg) by mouth daily for 1 day (Patient not taking: Reported on 1/8/2021)       [START ON 1/15/2021] predniSONE (DELTASONE) 5 MG tablet Take 1 tablet (5 mg) by mouth daily for 1 day (Patient not taking: Reported on 1/8/2021)       warfarin ANTICOAGULANT (COUMADIN) 2 MG tablet Take 1 mg (0.5 tab) every Mon/Fri; 2 mg tab by mouth all other days, or as directed by INR clinic 90 tablet 3         ROS:  10 point ROS of systems including Constitutional, Eyes, Respiratory, Cardiovascular, Gastroenterology, Genitourinary, Integumentary, Musculoskeletal, Psychiatric were all negative  "except for pertinent positives noted in my HPI.    Vitals:  /68   Pulse 74   Temp 96.9  F (36.1  C)   Resp 16   Ht 1.6 m (5' 3\")   Wt 70.6 kg (155 lb 11.2 oz)   LMP  (LMP Unknown)   SpO2 96%   BMI 27.58 kg/m    Exam:  GENERAL APPEARANCE:  Alert, in no distress  ENT:  Mouth and posterior oropharynx normal, moist mucous membranes, normal hearing acuity  EYES:  EOM, conjunctivae, lids, pupils and irises normal, PERRL  NECK:  .  RESP:  respiratory effort and palpation of chest normal, lungs clear to auscultation , no respiratory distress, diminished breath sounds bases bilaterally  CV:  Palpation and auscultation of heart done , regular rate and rhythm, no murmur, rub, or gallop, no edema  ABDOMEN:  normal bowel sounds, soft, nontender, no hepatosplenomegaly or other masses  M/S:   patient sitting up in WC, able  to move all 4 extremities  SKIN:  Inspection of skin and subcutaneous tissue baseline  NEURO:   speech WNL  PSYCH:  affect and mood normal    Lab/Diagnostic data:    Most Recent 3 CBC's:  Recent Labs   Lab Test 01/05/21  1404 01/04/21  0700 07/13/19  0713   WBC 5.9 6.0 5.4   HGB 12.0 11.6* 11.6*   MCV 96 96 90    203 261     Most Recent 3 BMP's:  Recent Labs   Lab Test 01/05/21  1404 01/04/21  1432 01/04/21  0700    140 138   POTASSIUM 4.3 4.4 4.1   CHLORIDE 105 108 105   CO2 30 27 30   BUN 17 20 23   CR 1.13* 1.24* 1.27*   ANIONGAP 2* 5 3   TRI 9.1 9.0 8.6   GLC 96 117* 98       ASSESSMENT/PLAN:  Vestibular neuronitis, unspecified laterality  Acute/ongoing: Patient on prednisone taper, PT and OT,     Right leg weakness  Physical deconditioning  Acute/ongoing: PT and OT    Coronary artery disease involving native coronary artery of native heart without angina pectoris  Chronic diastolic congestive heart failure (H)  Chronic atrial fibrillation (H)  Cardiac pacemaker in situ  Stage 3 chronic kidney disease, unspecified whether stage 3a or 3b CKD  Ongoing: vitals daily and prn, BMP " follow, continue lasix 40mg QD, cartia XT 240mg QD, Imdur 30mg QD, losartan 25mg QD, pravastatin 40mg QD    Chronic obstructive pulmonary disease, unspecified COPD type (H)  Ongoing: On 4 liter O2 per N/C at night, continue inhalers will resume duonebs when patient returns home     Insomnia:   Acute due to prednisone: start trazodone 25mg qhs prn     Orders written by provider at facility  O2 4 liters per NC at night  BMP and CBC on Monday  Trazodone 25mg qhs prn    Total time spent with patient visit at the [facility] nursing [facility] was 35 min including patient visit and review of past records. Greater than 50% of total time spent with counseling and coordinating care due to discussed medications and POC, education on vestibular neuritis and importance of doing prednisone taper, education on prednisone, patient not sleeping well at night due to prednisone will start trazodone, discussed therapy and discharge planning, [place] at [place] otherwise patient does her own cooking and laundry, daughter shops for patient, patient does not drive. .     Electronically signed by:  Tonya Lynn Haase, APRN CNP

## 2021-01-12 ASSESSMENT — MIFFLIN-ST. JEOR: SCORE: 1069.03

## 2021-01-12 NOTE — PROGRESS NOTES
Clinic Care Coordination Contact  Care Team Conversations    RNCC received a voicemail message from [facility] MARY Izquierdo with update that care conference scheduled on 01/11/2021 with call in detail information and invitation for RNCC to attend.   RNCC was out of office and returned call back to [facility] MARY Izquierdo, left voicemail message with call back information and requested a call back to get an update and outcome of care conference.    RNCC will await a return call back.     Tanya King RN Care Coordinator  [facility] [place]  Email: [email or web address]  Phone: [phone]

## 2021-01-13 ENCOUNTER — NURSING HOME VISIT (OUTPATIENT)
Dept: GERIATRICS | Facility: CLINIC | Age: 86
End: 2021-01-13
Payer: MEDICARE

## 2021-01-13 VITALS
HEIGHT: 63 IN | WEIGHT: 154.3 LBS | OXYGEN SATURATION: 96 % | DIASTOLIC BLOOD PRESSURE: 82 MMHG | SYSTOLIC BLOOD PRESSURE: 130 MMHG | HEART RATE: 92 BPM | RESPIRATION RATE: 17 BRPM | TEMPERATURE: 97.3 F | BODY MASS INDEX: 27.34 KG/M2

## 2021-01-13 DIAGNOSIS — Z95.0 CARDIAC PACEMAKER IN SITU: ICD-10-CM

## 2021-01-13 DIAGNOSIS — R53.81 PHYSICAL DECONDITIONING: ICD-10-CM

## 2021-01-13 DIAGNOSIS — I48.20 CHRONIC ATRIAL FIBRILLATION (H): ICD-10-CM

## 2021-01-13 DIAGNOSIS — N18.30 STAGE 3 CHRONIC KIDNEY DISEASE, UNSPECIFIED WHETHER STAGE 3A OR 3B CKD (H): ICD-10-CM

## 2021-01-13 DIAGNOSIS — I25.10 CORONARY ARTERY DISEASE INVOLVING NATIVE CORONARY ARTERY OF NATIVE HEART WITHOUT ANGINA PECTORIS: ICD-10-CM

## 2021-01-13 DIAGNOSIS — H81.20 VESTIBULAR NEURONITIS, UNSPECIFIED LATERALITY: Primary | ICD-10-CM

## 2021-01-13 DIAGNOSIS — R29.898 RIGHT LEG WEAKNESS: ICD-10-CM

## 2021-01-13 DIAGNOSIS — G47.09 OTHER INSOMNIA: ICD-10-CM

## 2021-01-13 DIAGNOSIS — I50.32 CHRONIC DIASTOLIC CONGESTIVE HEART FAILURE (H): ICD-10-CM

## 2021-01-13 DIAGNOSIS — J44.9 CHRONIC OBSTRUCTIVE PULMONARY DISEASE, UNSPECIFIED COPD TYPE (H): ICD-10-CM

## 2021-01-13 LAB — INR PPP: 2.8 (ref 0.9–1.1)

## 2021-01-13 PROCEDURE — 99310 SBSQ NF CARE HIGH MDM 45: CPT | Performed by: NURSE PRACTITIONER

## 2021-01-13 NOTE — LETTER
1/13/2021        RE: Leonor Mercado  [address]  [place] [address]        [facility]  [facility] Medical Record Number:  [id number]  Place of Service where encounter took place:  [facility] [[id number]]  Chief Complaint   Patient presents with     Nursing Home Acute       HPI:    Leonor Mercado  is a 89 year old (5/6/1926), who is being seen today for an episodic care visit.  HPI information obtained from: facility chart records, facility staff, patient report and [facility] chart review. Today's concern is:  Vestibular neuritis: patient states vertigo has resolved, no further concerns, states weakness has improved she is walking up to 150 feet using a 4ww with SBA.   CAD/CHF/atrial fib: weight stable, current weight 153.5lbs, admit weight 155.7lbs, /92, 130/82, 135/80 with HR 70-80 range  CKD: see labs, creat 1.16 on 1/11/21 other labs stable   COPD: no c/o increased SOB, patient wears O2 at night  Insomnia: patient states she is sleeping better  Past Medical and Surgical History reviewed in Epic today.    MEDICATIONS:    Current Outpatient Medications   Medication Sig Dispense Refill     acetaminophen (TYLENOL) 500 MG tablet Take 500 mg by mouth At Bedtime       albuterol (PROAIR HFA/PROVENTIL HFA/VENTOLIN HFA) 108 (90 Base) MCG/ACT inhaler Inhale 2 puffs into the lungs every 6 hours as needed for shortness of breath / dyspnea or wheezing 18 g 3     ASPIRIN NOT PRESCRIBED (INTENTIONAL) Please choose reason not prescribed, below (Patient not taking: Reported on 1/8/2021) 0 each 0     calcium carbonate (CALCIUM CARBONATE) 600 MG TABS tablet Take 600 mg by mouth daily        CARTIA  MG 24 hr capsule Take 1 capsule by mouth once daily (Patient taking differently: Take 240 mg by mouth daily ) 90 capsule 1     famotidine (PEPCID) 10 MG tablet Take 1 tablet (10 mg) by mouth 2 times daily for 14 days       Fluticasone-Umeclidin-Vilanterol (TRELEGY  ELLIPTA) 100-62.5-25 MCG/INH oral inhaler Inhale 1 puff into the lungs daily       furosemide (LASIX) 20 MG tablet Take 40 mg by mouth daily       ipratropium-albuterol (COMBIVENT RESPIMAT)  MCG/ACT inhaler Inhale 1 puff into the lungs 4 times daily as needed for shortness of breath / dyspnea or wheezing       ipratropium-albuterol (COMBIVENT RESPIMAT)  MCG/ACT inhaler Inhale 1 puff into the lungs At Bedtime       isosorbide mononitrate (IMDUR) 30 MG 24 hr tablet Take 1 tablet (30 mg) by mouth daily 90 tablet 11     Lactobacillus (PROBIOTIC ACIDOPHILUS) TABS Take 1 tablet by mouth daily        losartan (COZAAR) 25 MG tablet Take 1 tablet (25 mg) by mouth daily 90 tablet 11     melatonin 5 MG tablet Take 10 mg by mouth nightly as needed for sleep       mirtazapine (REMERON) 15 MG tablet Take 1 tablet (15 mg) by mouth At Bedtime 90 tablet 11     Multiple Vitamins-Minerals (OCUVITE PO) Take 1 capsule by mouth daily.       polyethylene glycol (MIRALAX/GLYCOLAX) Packet Take 1 packet by mouth daily        pravastatin (PRAVACHOL) 40 MG tablet Take 1 tablet by mouth once daily (Patient taking differently: Take 40 mg by mouth every evening ) 90 tablet 3     predniSONE (DELTASONE) 10 MG tablet Take 3 tablets (30 mg) by mouth daily for 1 day (Patient not taking: Reported on 1/8/2021)       [START ON 1/14/2021] predniSONE (DELTASONE) 10 MG tablet Take 1 tablet (10 mg) by mouth daily for 1 day (Patient not taking: Reported on 1/8/2021)       predniSONE (DELTASONE) 20 MG tablet Take 1 tablet (20 mg) by mouth daily for 1 day (Patient not taking: Reported on 1/8/2021)       [START ON 1/15/2021] predniSONE (DELTASONE) 5 MG tablet Take 1 tablet (5 mg) by mouth daily for 1 day (Patient not taking: Reported on 1/8/2021)       senna (SENOKOT) 8.6 MG tablet Take 1 tablet by mouth daily as needed for constipation        traZODone (DESYREL) 50 MG tablet Take 0.5 tablets (25 mg) by mouth nightly as needed for sleep       Vitamin  "D, Cholecalciferol, 1000 units TABS Take 1,000 Units by mouth daily       warfarin ANTICOAGULANT (COUMADIN) 2 MG tablet Take 1 mg (0.5 tab) every Mon/Fri; 2 mg tab by mouth all other days, or as directed by INR clinic 90 tablet 3         REVIEW OF SYSTEMS:  10 point ROS of systems including Constitutional, Eyes, Respiratory, Cardiovascular, Gastroenterology, Genitourinary, Integumentary, Musculoskeletal, Psychiatric were all negative except for pertinent positives noted in my HPI.    Objective:  /82   Pulse 92   Temp 97.3  F (36.3  C)   Resp 17   Ht 1.6 m (5' 3\")   Wt 70 kg (154 lb 4.8 oz)   LMP  (LMP Unknown)   SpO2 96%   BMI 27.33 kg/m    Exam:  GENERAL APPEARANCE:  Alert, in no distress  ENT:  Mouth and posterior oropharynx normal, moist mucous membranes, normal hearing acuity  EYES:  EOM, conjunctivae, lids, pupils and irises normal, PERRL  NECK:  .  RESP:  respiratory effort and palpation of chest normal, lungs clear to auscultation , no respiratory distress, diminished breath sounds bases bilaterally  CV:  Palpation and auscultation of heart done , regular rate and rhythm, no murmur, rub, or gallop, no edema  ABDOMEN:  normal bowel sounds, soft, nontender, no hepatosplenomegaly or other masses  M/S:   patient sitting up in WC, able  to move all 4 extremities  SKIN:  Inspection of skin and subcutaneous tissue baseline  NEURO:   speech WNL  PSYCH:  affect and mood normal    Labs:     Most Recent 3 CBC's:  Recent Labs   Lab Test 01/05/21  1404 01/04/21  0700 07/13/19  0713   WBC 5.9 6.0 5.4   HGB 12.0 11.6* 11.6*   MCV 96 96 90    203 261     Most Recent 3 BMP's:  Recent Labs   Lab Test 01/05/21  1404 01/04/21  1432 01/04/21  0700    140 138   POTASSIUM 4.3 4.4 4.1   CHLORIDE 105 108 105   CO2 30 27 30   BUN 17 20 23   CR 1.13* 1.24* 1.27*   ANIONGAP 2* 5 3   TRI 9.1 9.0 8.6   GLC 96 117* 98       ASSESSMENT/PLAN:  Vestibular neuronitis, unspecified laterality  Acute/ongoing: Patient on " prednisone taper, PT and OT,      Right leg weakness  Physical deconditioning  Acute/ongoing: PT and OT     Coronary artery disease involving native coronary artery of native heart without angina pectoris  Chronic diastolic congestive heart failure (H)  Chronic atrial fibrillation (H)  Cardiac pacemaker in situ  Stage 3 chronic kidney disease, unspecified whether stage 3a or 3b CKD  Ongoing: vitals daily and prn, BMP follow, continue lasix 40mg QD, cartia XT 240mg QD, Imdur 30mg QD, losartan 25mg QD, pravastatin 40mg QD     Chronic obstructive pulmonary disease, unspecified COPD type (H)  Ongoing: On 4 liter O2 per N/C at night, continue inhalers will resume duonebs when patient returns home     Insomnia:   Acute due to prednisone: start trazodone 25mg qhs prn        Orders written by provider at facility  No new orders    Total time spent with patient visit at the [facility] nursing [facility] was 35 min including patient visit and review of past records. Greater than 50% of total time spent with counseling and coordinating care due to discussed medications and prednisone taper, patient sleeping better with trazodone, tolerating prednisone well, states vertigo has resolved, no further concerns. education on vestibular neuritis, will complete prednisone taper, discussed therapy and POC. .  Electronically signed by:  Tonya Lynn Haase, APRN CNP               Sincerely,        Tonya Lynn Haase, APRN CNP

## 2021-01-13 NOTE — PROGRESS NOTES
Clinic Care Coordination Contact  Care Team Conversations    RNCC received a return call back from FRANNIE Izquierdo clarifying care conference is scheduled 01/18/2021 at 1:00 pm. Invites RNCC to attend by phone: #[phone]. Code: [id number]     Scheduled with plans for RNCC to attend by phone.     Tanya King RN Care Coordinator  [facility] [place]  Email: [email or web address]  Phone: [phone]

## 2021-01-13 NOTE — PROGRESS NOTES
[place]  [facility] Record Number:  [id number]  Place of Service where encounter took place:  [facility] (S) [[id number]]  Chief Complaint   Patient presents with     Nursing Home Acute       HPI:    Leonor Mercado  is a 89 year old (5/6/1926), who is being seen today for an episodic care visit.  HPI information obtained from: facility chart records, facility staff, patient report and [facility] chart review. Today's concern is:  Vestibular neuritis: patient states vertigo has resolved, no further concerns, states weakness has improved she is walking up to 150 feet using a 4ww with SBA.   CAD/CHF/atrial fib: weight stable, current weight 153.5lbs, admit weight 155.7lbs, /92, 130/82, 135/80 with HR 70-80 range  CKD: see labs, creat 1.16 on 1/11/21 other labs stable   COPD: no c/o increased SOB, patient wears O2 at night  Insomnia: patient states she is sleeping better  Past Medical and Surgical History reviewed in Epic today.    MEDICATIONS:    Current Outpatient Medications   Medication Sig Dispense Refill     acetaminophen (TYLENOL) 500 MG tablet Take 500 mg by mouth At Bedtime       albuterol (PROAIR HFA/PROVENTIL HFA/VENTOLIN HFA) 108 (90 Base) MCG/ACT inhaler Inhale 2 puffs into the lungs every 6 hours as needed for shortness of breath / dyspnea or wheezing 18 g 3     ASPIRIN NOT PRESCRIBED (INTENTIONAL) Please choose reason not prescribed, below (Patient not taking: Reported on 1/8/2021) 0 each 0     calcium carbonate (CALCIUM CARBONATE) 600 MG TABS tablet Take 600 mg by mouth daily        CARTIA  MG 24 hr capsule Take 1 capsule by mouth once daily (Patient taking differently: Take 240 mg by mouth daily ) 90 capsule 1     famotidine (PEPCID) 10 MG tablet Take 1 tablet (10 mg) by mouth 2 times daily for 14 days       Fluticasone-Umeclidin-Vilanterol (TRELEGY ELLIPTA) 100-62.5-25 MCG/INH oral inhaler Inhale 1 puff into the lungs daily       furosemide  (LASIX) 20 MG tablet Take 40 mg by mouth daily       ipratropium-albuterol (COMBIVENT RESPIMAT)  MCG/ACT inhaler Inhale 1 puff into the lungs 4 times daily as needed for shortness of breath / dyspnea or wheezing       ipratropium-albuterol (COMBIVENT RESPIMAT)  MCG/ACT inhaler Inhale 1 puff into the lungs At Bedtime       isosorbide mononitrate (IMDUR) 30 MG 24 hr tablet Take 1 tablet (30 mg) by mouth daily 90 tablet 11     Lactobacillus (PROBIOTIC ACIDOPHILUS) TABS Take 1 tablet by mouth daily        losartan (COZAAR) 25 MG tablet Take 1 tablet (25 mg) by mouth daily 90 tablet 11     melatonin 5 MG tablet Take 10 mg by mouth nightly as needed for sleep       mirtazapine (REMERON) 15 MG tablet Take 1 tablet (15 mg) by mouth At Bedtime 90 tablet 11     Multiple Vitamins-Minerals (OCUVITE PO) Take 1 capsule by mouth daily.       polyethylene glycol (MIRALAX/GLYCOLAX) Packet Take 1 packet by mouth daily        pravastatin (PRAVACHOL) 40 MG tablet Take 1 tablet by mouth once daily (Patient taking differently: Take 40 mg by mouth every evening ) 90 tablet 3     predniSONE (DELTASONE) 10 MG tablet Take 3 tablets (30 mg) by mouth daily for 1 day (Patient not taking: Reported on 1/8/2021)       [START ON 1/14/2021] predniSONE (DELTASONE) 10 MG tablet Take 1 tablet (10 mg) by mouth daily for 1 day (Patient not taking: Reported on 1/8/2021)       predniSONE (DELTASONE) 20 MG tablet Take 1 tablet (20 mg) by mouth daily for 1 day (Patient not taking: Reported on 1/8/2021)       [START ON 1/15/2021] predniSONE (DELTASONE) 5 MG tablet Take 1 tablet (5 mg) by mouth daily for 1 day (Patient not taking: Reported on 1/8/2021)       senna (SENOKOT) 8.6 MG tablet Take 1 tablet by mouth daily as needed for constipation        traZODone (DESYREL) 50 MG tablet Take 0.5 tablets (25 mg) by mouth nightly as needed for sleep       Vitamin D, Cholecalciferol, 1000 units TABS Take 1,000 Units by mouth daily       warfarin  "ANTICOAGULANT (COUMADIN) 2 MG tablet Take 1 mg (0.5 tab) every Mon/Fri; 2 mg tab by mouth all other days, or as directed by INR clinic 90 tablet 3         REVIEW OF SYSTEMS:  10 point ROS of systems including Constitutional, Eyes, Respiratory, Cardiovascular, Gastroenterology, Genitourinary, Integumentary, Musculoskeletal, Psychiatric were all negative except for pertinent positives noted in my HPI.    Objective:  /82   Pulse 92   Temp 97.3  F (36.3  C)   Resp 17   Ht 1.6 m (5' 3\")   Wt 70 kg (154 lb 4.8 oz)   LMP  (LMP Unknown)   SpO2 96%   BMI 27.33 kg/m    Exam:  GENERAL APPEARANCE:  Alert, in no distress  ENT:  Mouth and posterior oropharynx normal, moist mucous membranes, normal hearing acuity  EYES:  EOM, conjunctivae, lids, pupils and irises normal, PERRL  NECK:  .  RESP:  respiratory effort and palpation of chest normal, lungs clear to auscultation , no respiratory distress, diminished breath sounds bases bilaterally  CV:  Palpation and auscultation of heart done , regular rate and rhythm, no murmur, rub, or gallop, no edema  ABDOMEN:  normal bowel sounds, soft, nontender, no hepatosplenomegaly or other masses  M/S:   patient sitting up in WC, able  to move all 4 extremities  SKIN:  Inspection of skin and subcutaneous tissue baseline  NEURO:   speech WNL  PSYCH:  affect and mood normal    Labs:     Most Recent 3 CBC's:  Recent Labs   Lab Test 01/05/21  1404 01/04/21  0700 07/13/19  0713   WBC 5.9 6.0 5.4   HGB 12.0 11.6* 11.6*   MCV 96 96 90    203 261     Most Recent 3 BMP's:  Recent Labs   Lab Test 01/05/21  1404 01/04/21  1432 01/04/21  0700    140 138   POTASSIUM 4.3 4.4 4.1   CHLORIDE 105 108 105   CO2 30 27 30   BUN 17 20 23   CR 1.13* 1.24* 1.27*   ANIONGAP 2* 5 3   TRI 9.1 9.0 8.6   GLC 96 117* 98       ASSESSMENT/PLAN:  Vestibular neuronitis, unspecified laterality  Acute/ongoing: Patient on prednisone taper, PT and OT,      Right leg weakness  Physical " deconditioning  Acute/ongoing: PT and OT     Coronary artery disease involving native coronary artery of native heart without angina pectoris  Chronic diastolic congestive heart failure (H)  Chronic atrial fibrillation (H)  Cardiac pacemaker in situ  Stage 3 chronic kidney disease, unspecified whether stage 3a or 3b CKD  Ongoing: vitals daily and prn, BMP follow, continue lasix 40mg QD, cartia XT 240mg QD, Imdur 30mg QD, losartan 25mg QD, pravastatin 40mg QD     Chronic obstructive pulmonary disease, unspecified COPD type (H)  Ongoing: On 4 liter O2 per N/C at night, continue inhalers will resume duonebs when patient returns home     Insomnia:   Acute due to prednisone: start trazodone 25mg qhs prn        Orders written by provider at facility  No new orders    Total time spent with patient visit at the [facility] nursing [facility] was 35 min including patient visit and review of past records. Greater than 50% of total time spent with counseling and coordinating care due to discussed medications and prednisone taper, patient sleeping better with trazodone, tolerating prednisone well, states vertigo has resolved, no further concerns. education on vestibular neuritis, will complete prednisone taper, discussed therapy and POC. .  Electronically signed by:  Tonya Lynn Haase, APRN CNP

## 2021-01-15 LAB — INR PPP: 1.9 (ref 0.9–1.1)

## 2021-01-18 ENCOUNTER — PATIENT OUTREACH (OUTPATIENT)
Dept: NURSING | Facility: CLINIC | Age: 86
End: 2021-01-18
Payer: MEDICARE

## 2021-01-18 LAB — INR PPP: 1.7 (ref 0.9–1.1)

## 2021-01-18 NOTE — PROGRESS NOTES
Clinic Care Coordination Contact  Care Conference    Care Coordinator attended the Care Conference   on phone    Patient enrolled in Ambulatory Care Coordination Savanah Renteria from therapy shared that patient's TCU Therapy goal is to re-gain independence/strength. Patient is progressing with this and anticipates patient will be completing therapy at the end of this week. Able to ambulate 300 feet with walker. Provides reminder to patient to lock breaks on the walker when transferring.   Patient's daughter, Les, shares she has an RX for a new walker and went to look at walkers the other day, however was quite overwhelmed and seeking recommendations of a type/brand individual to patient's needs and assistance with insurance coverage for this. Felecia, U SW shares she will connect with TCU therapy and work with daughter to provide additional recommendations and assistance as requested.   Therapy recommends home care physical therapy and HHA for bathing assistance when patient is discharged from the TCU.     Sherine, U nursing shares patients vital signs and labs look good. Patient becomes short of breath when ambulating long distances, however plans activities and takes rest breaks. Oxygen 94-96% on room air. Patient using 4 L of oxygen at night per baseline.   Patient reports dizziness has improved and completed prednisone therapy for this. Has had some troubles with constipation and will be starting stool softener, Senna this evening.   Patient is independent with managing medications. Medications reviewed: Diltiazem, Pravastatin, Calcium, Lasix, Famoditine, IMDUR, Coumadin, Losartan, Remeron, Over the counter: melatonin, miralax, stool softener, probiotic, eye vitamin. Nebulizer at home if/as needed - not offered at TCU, inhalers.   INR was recently low, will monitor at TCU and recommend home care RN to assist in monitoring/managing INR.     Note from dietician who did not attend care conference: Patient is  eating well, 75% of all meals, weights remain stable with regular diet.     Plans for patient to be discharged from TCU to home on Thursday, January 21st at around 11 am, with [facility]. Patient and daughter informed by FRANNIE Izquierdo to anticipate and expect call from home care within 24-48 business hours from TCU discharge to schedule start of home care visit    RNCC introduced self and role in CC. Encouraged recommendation of follow up with Primary Care Provider after TCU discharge and will discuss status of multiple chronic health condition management with provider at this time, (cardiology, pulmonology, neurology other specialist providers).   RNCC will attempt to contact patient on Monday, January, 25th 2021 to complete initial clinic care coordination assessment to identify any ongoing needs, support or assistance from clinic care coordination team.     Tanya King RN Care Coordinator  [facility] [place], [place], [place]  Email: [email or web address]  Phone: [phone]

## 2021-01-19 ASSESSMENT — MIFFLIN-ST. JEOR: SCORE: 1072.21

## 2021-01-20 ENCOUNTER — DISCHARGE SUMMARY NURSING HOME (OUTPATIENT)
Dept: GERIATRICS | Facility: CLINIC | Age: 86
End: 2021-01-20
Payer: MEDICARE

## 2021-01-20 VITALS
HEART RATE: 70 BPM | TEMPERATURE: 97 F | DIASTOLIC BLOOD PRESSURE: 72 MMHG | HEIGHT: 63 IN | RESPIRATION RATE: 16 BRPM | WEIGHT: 155 LBS | OXYGEN SATURATION: 98 % | SYSTOLIC BLOOD PRESSURE: 114 MMHG | BODY MASS INDEX: 27.46 KG/M2

## 2021-01-20 DIAGNOSIS — Z79.01 LONG TERM CURRENT USE OF ANTICOAGULANT THERAPY: ICD-10-CM

## 2021-01-20 DIAGNOSIS — I25.10 CORONARY ARTERY DISEASE INVOLVING NATIVE CORONARY ARTERY OF NATIVE HEART WITHOUT ANGINA PECTORIS: ICD-10-CM

## 2021-01-20 DIAGNOSIS — H81.20 VESTIBULAR NEURONITIS, UNSPECIFIED LATERALITY: Primary | ICD-10-CM

## 2021-01-20 DIAGNOSIS — I50.32 CHRONIC DIASTOLIC CONGESTIVE HEART FAILURE (H): ICD-10-CM

## 2021-01-20 DIAGNOSIS — Z95.0 CARDIAC PACEMAKER IN SITU: ICD-10-CM

## 2021-01-20 DIAGNOSIS — R53.81 PHYSICAL DECONDITIONING: ICD-10-CM

## 2021-01-20 DIAGNOSIS — N18.30 STAGE 3 CHRONIC KIDNEY DISEASE, UNSPECIFIED WHETHER STAGE 3A OR 3B CKD (H): ICD-10-CM

## 2021-01-20 DIAGNOSIS — I48.20 CHRONIC ATRIAL FIBRILLATION (H): ICD-10-CM

## 2021-01-20 DIAGNOSIS — J44.9 CHRONIC OBSTRUCTIVE PULMONARY DISEASE, UNSPECIFIED COPD TYPE (H): ICD-10-CM

## 2021-01-20 DIAGNOSIS — R29.898 RIGHT LEG WEAKNESS: ICD-10-CM

## 2021-01-20 LAB — INR PPP: 1.9 (ref 0.9–1.1)

## 2021-01-20 PROCEDURE — 99316 NF DSCHRG MGMT 30 MIN+: CPT | Performed by: NURSE PRACTITIONER

## 2021-01-20 RX ORDER — WARFARIN SODIUM 2 MG/1
TABLET ORAL
Qty: 90 TABLET | Refills: 3
Start: 2021-01-20 | End: 2021-02-09

## 2021-01-20 NOTE — PROGRESS NOTES
[facility] DISCHARGE SUMMARY  PATIENT'S NAME: Leonor Mercado  YOB: 1926  MEDICAL RECORD NUMBER:  [id number]  Place of Service where encounter took place:  [facility] (S) [[id number]]    PRIMARY CARE PROVIDER AND CLINIC RESPONSIBLE AFTER TRANSFER:   Arnel Garcia MD, MD, [address]  / [place] [address]    [facility] Provider     Transferring providers: B[place] CNP, Dr. Modesto Bahena MD  Recent Hospitalization/ED:  [facility] stay 1/4/21 to 1/7/21.  Date of SNF Admission: January / 07 / 2021  Date of SNF (anticipated) Discharge: January / 21 / 2021  Discharged to: previous independent home  Cognitive Scores: BIMS: 15/15 and SBT 0/28.  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 Patient reports no lightheadedness anymore     (R29.898) Right leg weakness  Comment: unclear etiology, MRI unable d/t PPM, weakness isolated to hip flexion and knee flexion  Plan: worked with Physical Therapy, Occupational Therapy     (R53.81) Physical deconditioning  Comment: 2/2 above, advanced age, COPD, comorbidities  Plan: Home Care Physical Therapy, Occupational Therapy for ongoing needs     (I25.10) Coronary artery disease involving native coronary artery of native heart without angina  pectoris  (I50.32) Chronic diastolic congestive heart failure (H)  Comment: ECHO with EF 60-65%, mod/severe TR, no meds changes  BPs 110-120s/70s  HRs 66-82  Weight stable ~297=816 lbs, no CHF exacerbation  Plan: f/u with Dr Horton for f/u on TR     (I48.20) Chronic atrial fibrillation (H)  (Z95.0) Cardiac pacemaker in situ  Comment: remains on warfarin, Cartia XT   Recent History:  Date INR New Dose/Orders   1/13  0.5 mg   1/14  0.5 mg   1/15  2 mg   1/16  1 mg   1/17  1 mg   1/18  2 mg   1/19  2 mg    1/20 1.9 2 mg      Plan: 2 mg M/W/F, 1 mg AODs (or as directed by INR RN), Recheck INR within next 7 days post TCU discharge     (N18.30) Stage 3 chronic kidney disease, unspecified whether stage 3a or 3b CKD  Comment: Creat BL 1.2-1.4, see most recent lab above  Plan: stable.     (J44.9) Chronic obstructive pulmonary disease, unspecified COPD type (H)  Comment: BL 4 LPM O2 NOC, PTA inhalers, Nebs changed to Combivent while in TCU  Plan: can change Combiven tback to Nebs when returning to home if desired. Continue PTA meds, O2 NOC     Past Medical History:  has a past medical history of Atrial fibrillation (H), BCC (basal cell carcinoma of skin), CHF - Chr Diastolic (H) (11/21/2017), Chronic renal insufficiency, COPD (chronic obstructive pulmonary disease) (H), Coronary artery disease, Hyperlipidemia, Hypertension, IBS (irritable bowel syndrome), Osteoporosis, Pulmonary fibrosis (H), Sick sinus syndrome - s/p PPM 2003 (H) (12/16/2017), SSS (sick sinus syndrome) (H), and Vertigo. She also has no past medical history of Asymptomatic human immunodeficiency virus (HIV) infection status (H), Cerebral palsy (H), Complication of anesthesia, Congenital renal agenesis and dysgenesis, Difficult intubation, Goiter, Gout, Hernia, abdominal, History of spinal cord injury, History of thrombophlebitis, Horseshoe kidney, Hydrocephalus (H), Malignant hyperthermia, Parkinsons disease (H), PONV (postoperative nausea and vomiting),  Spider veins, Spina bifida (H), Spinal headache, STD (sexually transmitted disease), Tethered cord (H), or Tuberculosis.    Discharge Medications:    Current Outpatient Medications   Medication Sig Dispense Refill     acetaminophen (TYLENOL) 500 MG tablet Take 500 mg by mouth At Bedtime       albuterol (PROAIR HFA/PROVENTIL HFA/VENTOLIN HFA) 108 (90 Base) MCG/ACT inhaler Inhale 2 puffs into the lungs every 6 hours as needed for shortness of breath / dyspnea or wheezing 18 g 3     calcium carbonate (CALCIUM CARBONATE) 600 MG TABS tablet Take 600 mg by mouth daily        CARTIA  MG 24 hr capsule Take 1 capsule by mouth once daily (Patient taking differently: Take 240 mg by mouth daily ) 90 capsule 1     Fluticasone-Umeclidin-Vilanterol (TRELEGY ELLIPTA) 100-62.5-25 MCG/INH oral inhaler Inhale 1 puff into the lungs daily       furosemide (LASIX) 20 MG tablet Take 40 mg by mouth daily       ipratropium-albuterol (COMBIVENT RESPIMAT)  MCG/ACT inhaler Inhale 1 puff into the lungs 4 times daily as needed for shortness of breath / dyspnea or wheezing       ipratropium-albuterol (COMBIVENT RESPIMAT)  MCG/ACT inhaler Inhale 1 puff into the lungs At Bedtime       isosorbide mononitrate (IMDUR) 30 MG 24 hr tablet Take 1 tablet (30 mg) by mouth daily 90 tablet 11     Lactobacillus (PROBIOTIC ACIDOPHILUS) TABS Take 1 tablet by mouth daily        losartan (COZAAR) 25 MG tablet Take 1 tablet (25 mg) by mouth daily 90 tablet 11     melatonin 5 MG tablet Take 10 mg by mouth nightly as needed for sleep       mirtazapine (REMERON) 15 MG tablet Take 1 tablet (15 mg) by mouth At Bedtime 90 tablet 11     Multiple Vitamins-Minerals (OCUVITE PO) Take 1 capsule by mouth daily.       polyethylene glycol (MIRALAX/GLYCOLAX) Packet Take 1 packet by mouth daily        pravastatin (PRAVACHOL) 40 MG tablet Take 1 tablet by mouth once daily (Patient taking differently: Take 40 mg by mouth every evening ) 90 tablet 3     senna  "(SENOKOT) 8.6 MG tablet Take 2 tablets by mouth At Bedtime        Vitamin D, Cholecalciferol, 1000 units TABS Take 1,000 Units by mouth daily       warfarin ANTICOAGULANT (COUMADIN) 2 MG tablet As directed by INR clinic 90 tablet 3     ASPIRIN NOT PRESCRIBED (INTENTIONAL) Please choose reason not prescribed, below (Patient not taking: Reported on 1/8/2021) 0 each 0     Medication Changes/Rationale:     Nebs changed to Combivent d/t COVID risk of aerosolization    Prednisone taper (done now)    Famotidine for GI PPX (last dose 1/21/21 AM)     Controlled medications sent with patient:   not applicable/none     ROS:   10 point ROS of systems including Constitutional, Eyes, Respiratory, Cardiovascular, Gastroenterology, Genitourinary, Integumentary, Musculoskeletal, Psychiatric were all negative except for pertinent positives noted in my HPI.    Physical Exam:   Vitals: /72   Pulse 70   Temp 97  F (36.1  C)   Resp 16   Ht 1.6 m (5' 3\")   Wt 70.3 kg (155 lb)   LMP  (LMP Unknown)   SpO2 98%   BMI 27.46 kg/m    BMI= Body mass index is 27.46 kg/m .  GENERAL APPEARANCE:  Alert, in no distress, elderly woman  RESP:  respiratory effort mildly increased, palpation of chest normal, auscultation of lungs with end expiratory wheeze (baseline patient reports) , no respiratory distress, on RA at visit  CV:  Palpation and auscultation of heart done , rate and rhythm regular, no murmur, no LE peripheral edema  ABDOMEN:  normal bowel sounds, soft, nontender, no hepatosplenomegaly or other masses  M/S:   Gait and station ambulatory, Digits and nails with arthritic changes, reduced muscle mass  SKIN:  Inspection and Palpation of skin and subcutaneous tissue pale,   PSYCH:  insight and judgement, memory intact , affect and mood normal,  follows commands readily     SNF labs: 1/11/21:  BMP: Na 139, K 4.5, Cl 101, CO2 28, Anion Gap 10, calcium 8.9, bun 33, creatinine 1.16, GFR 40, glucose 103, WBC 7.8, hemoglobin " 11.9      DISCHARGE PLAN:    Follow up labs: INR due within 7 days of TCU discahrge to be drawn by home care with results to INR clinic    Medical Follow Up:      Follow up with primary care provider in 1 weeks    MTM referral needed and placed by this provider: No    Current [place] scheduled appointments:     Future Appointments   Date Time Provider Department Center   2/3/2021  9:05 AM Arnel Garcia MD [facility]    2/11/2021 12:00 [facility]         Discharge Services: Home Care:  Occupational Therapy, Physical Therapy, Registered Nurse and Home Health Aide    Discharge Instructions Verbalized to Patient at Discharge:     Weigh yourself daily in the morning and keep a record. Call your primary clinic: a) if you are more short of breath, or b) if your weight changes more than 3 pounds in one day or more than 5 pounds in one week.       TOTAL DISCHARGE TIME:   Greater than 30 minutes  Electronically signed by:  BAY [place] CNP

## 2021-01-20 NOTE — LETTER
1/20/2021        RE: Leonor Mercado  [address]  [place] [address]        [facility] DISCHARGE SUMMARY  PATIENT'S NAME: Leonor Mercado  YOB: 1926  MEDICAL RECORD NUMBER:  [id number]  Place of Service where encounter took place:  [facility] ([facility]) [[id number]]    PRIMARY CARE PROVIDER AND CLINIC RESPONSIBLE AFTER TRANSFER:   Arnel Garcia MD, MD, [address] [facility]  / [place] [address]    [facility] Provider     Transferring providers: BAY Hills CNP, Dr. Modesto Bahena MD  Recent Hospitalization/ED:  [facility] Hospital stay 1/4/21 to 1/7/21.  Date of SNF Admission: January / 07 / 2021  Date of SNF (anticipated) Discharge: January / 21 / 2021  Discharged to: previous independent home  Cognitive Scores: BIMS: 15/15 and SBT 0/28.  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 Patient reports no lightheadedness anymore     (R29.898) Right leg weakness  Comment: unclear etiology, MRI unable d/t PPM, weakness isolated to hip flexion and knee flexion  Plan: worked with Physical Therapy, Occupational Therapy     (R53.81) Physical deconditioning  Comment: 2/2 above, advanced age, COPD, comorbidities  Plan: Home Care Physical Therapy, Occupational Therapy for ongoing needs      (I25.10) Coronary artery disease involving native coronary artery of native heart without angina pectoris  (I50.32) Chronic diastolic congestive heart failure (H)  Comment: ECHO with EF 60-65%, mod/severe TR, no meds changes  BPs 110-120s/70s  HRs 66-82  Weight stable ~011=457 lbs, no CHF exacerbation  Plan: f/u with Dr Horton for f/u on TR     (I48.20) Chronic atrial fibrillation (H)  (Z95.0) Cardiac pacemaker in situ  Comment: remains on warfarin, Cartia XT   Recent History:  Date INR New Dose/Orders   1/13  0.5 mg   1/14  0.5 mg   1/15  2 mg   1/16  1 mg   1/17  1 mg   1/18  2 mg   1/19  2 mg    1/20 1.9 2 mg      Plan: 2 mg M/W/F, 1 mg AODs (or as directed by INR RN), Recheck INR within next 7 days post TCU discharge     (N18.30) Stage 3 chronic kidney disease, unspecified whether stage 3a or 3b CKD  Comment: Creat BL 1.2-1.4, see most recent lab above  Plan: stable.     (J44.9) Chronic obstructive pulmonary disease, unspecified COPD type (H)  Comment: BL 4 LPM O2 NOC, PTA inhalers, Nebs changed to Combivent while in TCU  Plan: can change Combiven tback to Nebs when returning to home if desired. Continue PTA meds, O2 NOC     Past Medical History:  has a past medical history of Atrial fibrillation (H), BCC (basal cell carcinoma of skin), CHF - Chr Diastolic (H) (11/21/2017), Chronic renal insufficiency, COPD (chronic obstructive pulmonary disease) (H), Coronary artery disease, Hyperlipidemia, Hypertension, IBS (irritable bowel syndrome), Osteoporosis, Pulmonary fibrosis (H), Sick sinus syndrome - s/p PPM 2003 (H) (12/16/2017), SSS (sick sinus syndrome) (H), and Vertigo. She also has no past medical history of Asymptomatic human immunodeficiency virus (HIV) infection status (H), Cerebral palsy (H), Complication of anesthesia, Congenital renal agenesis and dysgenesis, Difficult intubation, Goiter, Gout, Hernia, abdominal, History of spinal cord injury, History of thrombophlebitis, Horseshoe kidney,  Hydrocephalus (H), Malignant hyperthermia, Parkinsons disease (H), PONV (postoperative nausea and vomiting), Spider veins, Spina bifida (H), Spinal headache, STD (sexually transmitted disease), Tethered cord (H), or Tuberculosis.    Discharge Medications:    Current Outpatient Medications   Medication Sig Dispense Refill     acetaminophen (TYLENOL) 500 MG tablet Take 500 mg by mouth At Bedtime       albuterol (PROAIR HFA/PROVENTIL HFA/VENTOLIN HFA) 108 (90 Base) MCG/ACT inhaler Inhale 2 puffs into the lungs every 6 hours as needed for shortness of breath / dyspnea or wheezing 18 g 3     calcium carbonate (CALCIUM CARBONATE) 600 MG TABS tablet Take 600 mg by mouth daily        CARTIA  MG 24 hr capsule Take 1 capsule by mouth once daily (Patient taking differently: Take 240 mg by mouth daily ) 90 capsule 1     Fluticasone-Umeclidin-Vilanterol (TRELEGY ELLIPTA) 100-62.5-25 MCG/INH oral inhaler Inhale 1 puff into the lungs daily       furosemide (LASIX) 20 MG tablet Take 40 mg by mouth daily       ipratropium-albuterol (COMBIVENT RESPIMAT)  MCG/ACT inhaler Inhale 1 puff into the lungs 4 times daily as needed for shortness of breath / dyspnea or wheezing       ipratropium-albuterol (COMBIVENT RESPIMAT)  MCG/ACT inhaler Inhale 1 puff into the lungs At Bedtime       isosorbide mononitrate (IMDUR) 30 MG 24 hr tablet Take 1 tablet (30 mg) by mouth daily 90 tablet 11     Lactobacillus (PROBIOTIC ACIDOPHILUS) TABS Take 1 tablet by mouth daily        losartan (COZAAR) 25 MG tablet Take 1 tablet (25 mg) by mouth daily 90 tablet 11     melatonin 5 MG tablet Take 10 mg by mouth nightly as needed for sleep       mirtazapine (REMERON) 15 MG tablet Take 1 tablet (15 mg) by mouth At Bedtime 90 tablet 11     Multiple Vitamins-Minerals (OCUVITE PO) Take 1 capsule by mouth daily.       polyethylene glycol (MIRALAX/GLYCOLAX) Packet Take 1 packet by mouth daily        pravastatin (PRAVACHOL) 40 MG tablet Take 1 tablet by  "mouth once daily (Patient taking differently: Take 40 mg by mouth every evening ) 90 tablet 3     senna (SENOKOT) 8.6 MG tablet Take 2 tablets by mouth At Bedtime        Vitamin D, Cholecalciferol, 1000 units TABS Take 1,000 Units by mouth daily       warfarin ANTICOAGULANT (COUMADIN) 2 MG tablet As directed by INR clinic 90 tablet 3     ASPIRIN NOT PRESCRIBED (INTENTIONAL) Please choose reason not prescribed, below (Patient not taking: Reported on 1/8/2021) 0 each 0     Medication Changes/Rationale:     Nebs changed to Combivent d/t COVID risk of aerosolization    Prednisone taper (done now)    Famotidine for GI PPX (last dose 1/21/21 AM)     Controlled medications sent with patient:   not applicable/none     ROS:   10 point ROS of systems including Constitutional, Eyes, Respiratory, Cardiovascular, Gastroenterology, Genitourinary, Integumentary, Musculoskeletal, Psychiatric were all negative except for pertinent positives noted in my HPI.    Physical Exam:   Vitals: /72   Pulse 70   Temp 97  F (36.1  C)   Resp 16   Ht 1.6 m (5' 3\")   Wt 70.3 kg (155 lb)   LMP  (LMP Unknown)   SpO2 98%   BMI 27.46 kg/m    BMI= Body mass index is 27.46 kg/m .  GENERAL APPEARANCE:  Alert, in no distress, elderly woman  RESP:  respiratory effort mildly increased, palpation of chest normal, auscultation of lungs with end expiratory wheeze (baseline patient reports) , no respiratory distress, on RA at visit  CV:  Palpation and auscultation of heart done , rate and rhythm regular, no murmur, no LE peripheral edema  ABDOMEN:  normal bowel sounds, soft, nontender, no hepatosplenomegaly or other masses  M/S:   Gait and station ambulatory, Digits and nails with arthritic changes, reduced muscle mass  SKIN:  Inspection and Palpation of skin and subcutaneous tissue pale,   PSYCH:  insight and judgement, memory intact , affect and mood normal,  follows commands readily     SNF labs: 1/11/21:  BMP: Na 139, K 4.5, Cl 101, CO2 28, " Anion Gap 10, calcium 8.9, bun 33, creatinine 1.16, GFR 40, glucose 103, WBC 7.8, hemoglobin 11.9      DISCHARGE PLAN:    Follow up labs: INR due within 7 days of TCU discahrge to be drawn by home care with results to INR clinic    Medical Follow Up:      Follow up with primary care provider in 1 weeks    MTM referral needed and placed by this provider: No    Current [place] scheduled appointments:     Future Appointments   Date Time Provider Department Center   2/3/2021  9:05 AM Arnel Garcia MD [facility]    2/11/2021 12:[address] [facility]         Discharge Services: Home Care:  Occupational Therapy, Physical Therapy, Registered Nurse and Home Health Aide    Discharge Instructions Verbalized to Patient at Discharge:     Weigh yourself daily in the morning and keep a record. Call your primary clinic: a) if you are more short of breath, or b) if your weight changes more than 3 pounds in one day or more than 5 pounds in one week.       TOTAL DISCHARGE TIME:   Greater than 30 minutes  Electronically signed by:  BAY Hills CNP                         Sincerely,        BAY Hills CNP

## 2021-01-22 ENCOUNTER — TELEPHONE (OUTPATIENT)
Dept: PEDIATRICS | Facility: CLINIC | Age: 86
End: 2021-01-22

## 2021-01-22 NOTE — TELEPHONE ENCOUNTER
Please contact patient for TCU Discharge follow up.  [phone] (home) [phone] (work)    Visit date: 012121  Diagnosis listed: Vestibular Neuronitis, Unspecified Laterality  Number of visits in past 12 months: 1 ED / 1 IP

## 2021-01-24 ENCOUNTER — NURSE TRIAGE (OUTPATIENT)
Dept: NURSING | Facility: CLINIC | Age: 86
End: 2021-01-24

## 2021-01-24 LAB — INR PPP: 3.6 (ref 0.9–1.1)

## 2021-01-24 NOTE — TELEPHONE ENCOUNTER
Home Care RN calling and patient just got out of [facility] TCU on Thursday.  INR draw requested. Today it was 3.6mg and new dosing needed.    Chronic A-Fib.  Took 2mg on 1/20/21 per TCU discharge note and INR was 1.9 at that time.    Plan was 2mg M/W/F and 1mg AOD.    Paged on-call Dr. Laly More at 2:30pm  Skip dose tonight and then route to [facility] clinic for urgent follow-up tomorrow.    Called Susan SALGADO back and provided instructions for dosing for today.  No further questions.      Tita Calderon RN on 1/24/2021 at 2:43 PM    Reason for Disposition    Lab or radiology calling with CRITICAL test results    Additional Information    Negative: Lab calling with strep throat test results and triager can call in prescription    Negative: Lab calling with urinalysis test results and triager can call in prescription    Negative: Medication questions    Negative: Physician call to PCP    Negative: ED call to PCP    Protocols used: PCP CALL - NO TRIAGE-A-

## 2021-01-25 ENCOUNTER — ANTICOAGULATION THERAPY VISIT (OUTPATIENT)
Dept: NURSING | Facility: CLINIC | Age: 86
End: 2021-01-25

## 2021-01-25 DIAGNOSIS — I48.20 CHRONIC ATRIAL FIBRILLATION (H): ICD-10-CM

## 2021-01-25 DIAGNOSIS — Z53.9 ERRONEOUS ENCOUNTER--DISREGARD: Primary | ICD-10-CM

## 2021-01-25 DIAGNOSIS — Z79.01 LONG TERM CURRENT USE OF ANTICOAGULANT THERAPY: ICD-10-CM

## 2021-01-25 NOTE — CONFIDENTIAL NOTE
"Maribel, Home Care nurse called for warfarin orders on this patient.  She is not sure if patient was taking new warfarin dose or not.  Patient told her \"that is not the right dose. I've been taking the same dose for years\".  Patient was not happy to have Homecare nurse in her house.    Instructed Maribel to have patient take 1 mg today, 2 mg Tues, Wed.  Recheck INR on Thursday.  Call INR result to [facility] [phone].    Anticoagulation calendar updated.   Unknown what dose of warfarin patient was given 1/7 - 1/12/21.    Ebony Doshi RN    "

## 2021-01-26 ENCOUNTER — PATIENT OUTREACH (OUTPATIENT)
Dept: NURSING | Facility: CLINIC | Age: 86
End: 2021-01-26
Payer: MEDICARE

## 2021-01-26 NOTE — LETTER
[place] CARE COORDINATION  [facility]  January 27, 2021      Leonor Mercado  [address]   [place] [address]      Dear Leonor,    I am a clinic community health worker who works with Arnel Garcia MD, MD at the [facility]. I wanted to thank you for spending the time to talk with me.  Below is a description of clinic care coordination and how I can further assist you.      The clinic care coordination team is made up of a registered nurse,  and community health worker who understand the health care system. The goal of clinic care coordination is to help you manage your health and improve access to the health care system in the most efficient manner. The team can assist you in meeting your health care goals by providing education, coordinating services, strengthening the communication among your providers and supporting you with any resource needs.    Please feel free to contact me at [phone] with any questions or concerns. We are focused on providing you with the highest-quality healthcare experience possible and that all starts with you.     Sincerely,     MARIO Valadez, B.S. Public Health  Clinic Care Coordination  [facility]:  [place], [place] and [place]  Phone: [phone]

## 2021-01-26 NOTE — PROGRESS NOTES
Clinic Care Coordination Contact  Community Health Worker Initial Outreach  Spoke with patient     Chart Review: Patient previously discharged from TCU, per RNCC outreach on 1/18 CHW followed up on behalf of RN[facility] being on BELKIS.     CHW introduced self and role with CC  Patient states she is doing well since being home, she does not have any questions or concerns.   CHW inquired if patient is receiving home care, patient states she is not and does not need any assistance in her home. She feels that everything is going well.  CHW inquired if patient is in need of any additional support or resources however patient declines.    CHW provided contact information for patient to contact with any future needs or concerns.     CHW contacted patients daughter, discussed any outstanding needs. She is wondering what the status is on home care PT. Patient knows she needs this to gain strength. She feels patient has been feeling down since being home, hoping to get things back to normal.     CHW contacted [facility] care at [phone] from note on 1/20. Found out that [facility] care is private pay. Doesn't give PT OT or HHA.   Called Randi Schirmers RN  Cell: [phone]. Not the right number or name of organization. Looked up correct agency and number [facility]- [phone]. She is checking on orders. Found that patient is open to services and they have the correct orders, rehab should be going out soon.    CHW will call patients lyric Saldana back with correct information and instructions to contact [facility]- [phone] if therapy does not come out. Les is with patient who states that the nurse is coming tomorrow for an INR. They will contact home care if PT does not call them by tomorrow.  Patient/daughter have no outstanding needs or concerns at this time.     Patient accepts CC: No, patient declines CC support or resources at this time. Patient will be sent Care  Coordination introduction letter for future reference.     MARIO Valadez, B.S. [facility]  Clinic Care Coordination  [facility]:  [place], [place] and [place]  Phone: [phone]

## 2021-01-28 ENCOUNTER — ANTICOAGULATION THERAPY VISIT (OUTPATIENT)
Dept: PEDIATRICS | Facility: CLINIC | Age: 86
End: 2021-01-28

## 2021-01-28 DIAGNOSIS — Z79.01 LONG TERM CURRENT USE OF ANTICOAGULANT THERAPY: ICD-10-CM

## 2021-01-28 DIAGNOSIS — I48.20 CHRONIC ATRIAL FIBRILLATION (H): ICD-10-CM

## 2021-01-28 LAB — INR PPP: 3.8 (ref 0.9–1.1)

## 2021-01-28 NOTE — PROGRESS NOTES
ANTICOAGULATION MANAGEMENT     Patient Name:  Leonor Mercado  Date:  1/28/2021    ASSESSMENT /SUBJECTIVE:    Today's INR result of 3.8 is supratherapeutic. Goal INR of 2.0-3.0      Warfarin dose taken: Warfarin taken as instructed    Diet: No new diet changes affecting INR    Medication changes/ interactions: No new medications/supplements affecting INR    Previous INR: supraTherapeutic     S/S of bleeding or thromboembolism: No    New injury or illness: No    Upcoming surgery, procedure or cardioversion: No    Additional findings: inflammation/pain      PLAN:    Telephone call with home care nurse Maribel regarding INR result and instructed:     Warfarin Dosing Instructions: Hold tonight then continue your current warfarin dose of 1 mg on MOn/Fri and 2 mg all other days    Instructed patient to follow up no later than: 1 week  Orders given to  Homecare nurse/facility to recheck    Education provided: Monitoring for bleeding signs and symptoms      Maribel, home care, verbalizes understanding and agrees to warfarin dosing plan.    Instructed to call the Anticoagulation Clinic for any changes, questions or concerns. (#[phone])        Yaz Gonzalez RN      OBJECTIVE:  Recent labs: (last 7 days)     01/24/21 01/28/21   INR 3.6* 3.8*         No question data found.  Anticoagulation Summary  As of 1/28/2021    INR goal:  2.0-3.0   TTR:  74.3 % (11.9 mo)   INR used for dosing:  3.8 (1/28/2021)   Warfarin maintenance plan:  1 mg (2 mg x 0.5) every Mon, Fri; 2 mg (2 mg x 1) all other days   Full warfarin instructions:  1/28: Hold; Otherwise 1 mg every Mon, Fri; 2 mg all other days   Weekly warfarin total:  12 mg   Plan last modified:  Yoana Waddell RN (4/21/2020)   Next INR check:     Priority:  Maintenance   Target end date:  Indefinite    Indications    Chronic atrial fibrillation (H) [I48.20]  Long term current use of anticoagulant therapy [Z79.01]             Anticoagulation Episode Summary     INR check  "location:  Clinic Lab    Preferred lab:      Send INR reminders to:  SAUL BARRETT    Comments:  2mg tabs - nancy / 1st name pronounced \"ondray\" / sets up her own meds / comes with her daughter      Anticoagulation Care Providers     Provider Role Specialty Phone number    Arnel Garcia MD Referring Internal Medicine - Pediatrics [phone]    Marisabel Guido MD Referring Internal Medicine - Pediatrics [phone]         "

## 2021-01-28 NOTE — PROGRESS NOTES
Anticoagulation Management    Unable to reach [facility] home care, today.    Today's INR result of 3.8 is supratherapeutic (goal INR of 2.0-3.0).  Result received from: Home Care    Follow up required to confirm warfarin dose taken and assess for changes    Left message to hold warfarin tonight.      Anticoagulation clinic to follow up    Yaz Gonzalez RN

## 2021-02-01 ENCOUNTER — MEDICAL CORRESPONDENCE (OUTPATIENT)
Dept: HEALTH INFORMATION MANAGEMENT | Facility: CLINIC | Age: 86
End: 2021-02-01

## 2021-02-03 ENCOUNTER — VIRTUAL VISIT (OUTPATIENT)
Dept: PEDIATRICS | Facility: CLINIC | Age: 86
End: 2021-02-03
Payer: MEDICARE

## 2021-02-03 DIAGNOSIS — I50.32 CHRONIC HEART FAILURE WITH PRESERVED EJECTION FRACTION (H): ICD-10-CM

## 2021-02-03 DIAGNOSIS — H81.20 VESTIBULAR NEURONITIS, UNSPECIFIED LATERALITY: Primary | ICD-10-CM

## 2021-02-03 DIAGNOSIS — I25.10 CORONARY ARTERY DISEASE INVOLVING NATIVE CORONARY ARTERY OF NATIVE HEART WITHOUT ANGINA PECTORIS: ICD-10-CM

## 2021-02-03 DIAGNOSIS — I48.20 CHRONIC ATRIAL FIBRILLATION (H): ICD-10-CM

## 2021-02-03 DIAGNOSIS — N18.30 STAGE 3 CHRONIC KIDNEY DISEASE, UNSPECIFIED WHETHER STAGE 3A OR 3B CKD (H): ICD-10-CM

## 2021-02-03 DIAGNOSIS — J96.11 CHRONIC RESPIRATORY FAILURE WITH HYPOXIA (H): ICD-10-CM

## 2021-02-03 DIAGNOSIS — J44.9 CHRONIC OBSTRUCTIVE PULMONARY DISEASE, UNSPECIFIED COPD TYPE (H): ICD-10-CM

## 2021-02-03 PROCEDURE — 99442 PR PHYSICIAN TELEPHONE EVALUATION 11-20 MIN: CPT | Mod: 95 | Performed by: INTERNAL MEDICINE

## 2021-02-03 NOTE — PROGRESS NOTES
Alfredo is a 89 year old who is being evaluated via a billable telephone visit.      What phone number would you like to be contacted at? [phone]   How would you like to obtain your AVS? Mail a copy  Assessment & Plan     Vestibular neuronitis, unspecified laterality  Symptoms have resolved at this point, completed course of prednisone.  Recent imaging reviewed with patient.    Chronic obstructive pulmonary disease, unspecified COPD type (H)  Chronic respiratory failure with hypoxia (H)  History of oxygen dependent COPD.  Continues Trelegy.  Duo nebs were on hold at [facility] and Combivent was substituted.  Resume home DuoNeb Rx    Chronic heart failure with preserved ejection fraction (H)  Adequate control.  Continue current medications    Coronary artery disease involving native coronary artery of native heart without angina pectoris  Adequate control.  Continue current medication.     Chronic atrial fibrillation (H)  Rate controlled, chronic warfarin    Stage 3 chronic kidney disease, unspecified whether stage 3a or 3b CKD  Lab Results   Component Value Date    CR 1.13 01/05/2021    CR 1.24 01/04/2021   Stable        Return in about 3 months (around 5/3/2021) for follow-up visit.    Arnel Garcia MD, MD  [facility]    Cristina Fuentes is a 89 year old who presents to clinic today for the following health issues    HPI         Hospital Follow-up Visit:    Hospital/Nursing Home/IP Rehab Facility: [facility]  Date of Admission: 1/4/21  Date of Discharge: 1/7/21  Reason(s) for Admission: vertigo       Was your hospitalization related to COVID-19? No   Problems taking medications regularly:  None  Medication changes since discharge: None  Problems adhering to non-medication therapy:  None    Summary of hospitalization:  [facility] discharge summary reviewed  89-year-old with a history of CAD, hypertension, chronic atrial fibrillation CHF and oxygen dependent COPD who presents  today for hospital follow-up.  Initially presented to the emergency department with complaints of dizziness and lower extremity weakness.    Dizziness: CT/CTA obtained demonstrated moderate atherosclerotic disease without evidence of acute CVA or bleed, no evidence of vascular stenosis.  Patient was seen by neurology and presentation felt to be consistent with vestibular neuritis.   Started treatment with prednisone.  Patient was discharged on course of prednisone and dizziness gradually improved.  Completed a rehab stay and physical [facility] was continued [facility] with plan to continue on an outpatient basis.    CHF: Remained stable during hospital course.  Echocardiogram done during the course:  Interpretation Summary  The visual ejection fraction is estimated at 60-65%.  Mildly decreased right ventricular systolic function with borderline RV  dilatation.  There is a pacemaker lead in the right ventricle.  There is mod-severe to severe (3-4+) tricuspid regurgitation.  Mild pulmonary hypertension.  Dilation of the inferior vena cava is present with normal respiratory  variation in diameter.    COPD: Continued 4 L nasal cannula oxygen each evening and continued inhaler and nebulizer regimen during hospital course.    Covid testing on 1/4 was negative.  Diagnostic Tests/Treatments reviewed.  Follow up needed: none    Other Healthcare Providers Involved in Patient s Care:         cardiology  Update since discharge: stable, due for INR       Post Discharge Medication Reconciliation: discharge medications reconciled, continue medications without change.  Plan of care communicated with patient              Patient Active Problem List   Diagnosis     Hyperlipidemia LDL goal <100     Cardiac pacemaker in situ     Senile osteoporosis     Irritable bowel syndrome (IBS)     Anemia     Long term current use of anticoagulant therapy     Degeneration of lumbar intervertebral disc     CKD (chronic kidney disease) stage 3, GFR 30-59  ml/min     Chronic atrial fibrillation (H)     Chronic obstructive pulmonary disease, unspecified COPD type (H)     Coronary artery disease involving native coronary artery of native heart without angina pectoris     Diastolic dysfunction     Sick sinus syndrome (H)     Adjustment disorder, unspecified type     Presbyesophagus     Chronic heart failure with preserved ejection fraction (H)     Right leg weakness     Vertigo     Current Outpatient Medications   Medication Sig Dispense Refill     acetaminophen (TYLENOL) 500 MG tablet Take 500 mg by mouth At Bedtime       albuterol (PROAIR HFA/PROVENTIL HFA/VENTOLIN HFA) 108 (90 Base) MCG/ACT inhaler Inhale 2 puffs into the lungs every 6 hours as needed for shortness of breath / dyspnea or wheezing 18 g 3     ASPIRIN NOT PRESCRIBED (INTENTIONAL) Please choose reason not prescribed, below 0 each 0     calcium carbonate (CALCIUM CARBONATE) 600 MG TABS tablet Take 600 mg by mouth daily        CARTIA  MG 24 hr capsule Take 1 capsule by mouth once daily (Patient taking differently: Take 240 mg by mouth daily ) 90 capsule 1     Fluticasone-Umeclidin-Vilanterol (TRELEGY ELLIPTA) 100-62.5-25 MCG/INH oral inhaler Inhale 1 puff into the lungs daily       furosemide (LASIX) 20 MG tablet Take 40 mg by mouth daily       ipratropium-albuterol (COMBIVENT RESPIMAT)  MCG/ACT inhaler Inhale 1 puff into the lungs 4 times daily as needed for shortness of breath / dyspnea or wheezing       ipratropium-albuterol (COMBIVENT RESPIMAT)  MCG/ACT inhaler Inhale 1 puff into the lungs At Bedtime       isosorbide mononitrate (IMDUR) 30 MG 24 hr tablet Take 1 tablet (30 mg) by mouth daily 90 tablet 11     Lactobacillus (PROBIOTIC ACIDOPHILUS) TABS Take 1 tablet by mouth daily        losartan (COZAAR) 25 MG tablet Take 1 tablet (25 mg) by mouth daily 90 tablet 11     melatonin 5 MG tablet Take 10 mg by mouth nightly as needed for sleep       mirtazapine (REMERON) 15 MG tablet Take 1  tablet (15 mg) by mouth At Bedtime 90 tablet 11     Multiple Vitamins-Minerals (OCUVITE PO) Take 1 capsule by mouth daily.       polyethylene glycol (MIRALAX/GLYCOLAX) Packet Take 1 packet by mouth daily        pravastatin (PRAVACHOL) 40 MG tablet Take 1 tablet by mouth once daily (Patient taking differently: Take 40 mg by mouth every evening ) 90 tablet 3     senna (SENOKOT) 8.6 MG tablet Take 2 tablets by mouth At Bedtime        Vitamin D, Cholecalciferol, 1000 units TABS Take 1,000 Units by mouth daily       warfarin ANTICOAGULANT (COUMADIN) 2 MG tablet As directed by INR clinic 90 tablet 3         Review of Systems   Constitutional, HEENT, cardiovascular, pulmonary, gi and gu systems are negative, except as otherwise noted.      Objective           Vitals:  No vitals were obtained today due to virtual visit.    Physical Exam     PSYCH: Alert and oriented times 3; coherent speech, normal   rate and volume,   Her affect is normal  RESP: No cough, no audible wheezing, able to talk in full sentences  Remainder of exam unable to be completed due to telephone visits    Phone call duration: 15 minutes

## 2021-02-04 ENCOUNTER — ANTICOAGULATION THERAPY VISIT (OUTPATIENT)
Dept: PEDIATRICS | Facility: CLINIC | Age: 86
End: 2021-02-04

## 2021-02-04 ENCOUNTER — TELEPHONE (OUTPATIENT)
Dept: PEDIATRICS | Facility: CLINIC | Age: 86
End: 2021-02-04

## 2021-02-04 DIAGNOSIS — I48.20 CHRONIC ATRIAL FIBRILLATION (H): ICD-10-CM

## 2021-02-04 DIAGNOSIS — Z79.01 LONG TERM CURRENT USE OF ANTICOAGULANT THERAPY: ICD-10-CM

## 2021-02-04 LAB — INR PPP: 2.9 (ref 0.9–1.1)

## 2021-02-04 NOTE — TELEPHONE ENCOUNTER
1 x 1  2 x 4  1 X 2  Strengthening neuro re-ed gait training following hospital stay.     Rl Rosenberg  . Brie Vargas RN on 2/4/2021 at 5:29 PM

## 2021-02-04 NOTE — PROGRESS NOTES
ANTICOAGULATION MANAGEMENT     Patient Name:  Leonor Mercado  Date:  2021    ASSESSMENT /SUBJECTIVE:    Today's INR result of 2.9 is therapeutic. Goal INR of 2.0-3.0      Warfarin dose taken: Warfarin taken as instructed    Diet: No new diet changes affecting INR    Medication changes/ interactions: No new medications/supplements affecting INR    Previous INR: Supratherapeutic     S/S of bleeding or thromboembolism: No    New injury or illness: No    Upcoming surgery, procedure or cardioversion: No    Additional findings: None      PLAN:    Detailed message left for home care nurse Susan regarding INR result and instructed:     Warfarin Dosing Instructions: Change your warfarin dose to 1 mg MWF; 2 mg all other days  . (8 % change)    Instructed patient to follow up no later than: 1 week  Orders given to  Homecare nurse/facility to recheck    Education provided: Please call back if any changes to your diet, medications or how you've been taking warfarin    Instructed to call the Anticoagulation Clinic for any changes, questions or concerns. (#[phone])        Charmaine Rodriguez RN      OBJECTIVE:  Recent labs: (last 7 days)     21   INR 2.9*         No question data found.  Anticoagulation Summary  As of 2021    INR goal:  2.0-3.0   TTR:  72.6 % (11.9 mo)   INR used for dosin.9 (2021)   Warfarin maintenance plan:  1 mg (2 mg x 0.5) every Mon, Wed, Fri; 2 mg (2 mg x 1) all other days   Full warfarin instructions:  1 mg every Mon, Wed, Fri; 2 mg all other days   Weekly warfarin total:  11 mg   Plan last modified:  Charmaine Rodriguez RN (2021)   Next INR check:  2021   Priority:  Maintenance   Target end date:  Indefinite    Indications    Chronic atrial fibrillation (H) [I48.20]  Long term current use of anticoagulant therapy [Z79.01]             Anticoagulation Episode Summary     INR check location:  Clinic Lab    Preferred lab:      Send INR reminders to:  SAUL BARRETT     "Comments:  2mg tabs - nancy / 1st name pronounced \"ondray\" / sets up her own meds / comes with her daughter      Anticoagulation Care Providers     Provider Role Specialty Phone number    Arnel Garcia MD Referring Internal Medicine - Pediatrics [phone]    Marisabel Guido MD Referring Internal Medicine - Pediatrics [phone]           "

## 2021-02-07 PROBLEM — I50.32 CHRONIC HEART FAILURE WITH PRESERVED EJECTION FRACTION (H): Status: ACTIVE | Noted: 2019-11-17

## 2021-02-07 PROBLEM — J96.11 CHRONIC RESPIRATORY FAILURE WITH HYPOXIA (H): Status: ACTIVE | Noted: 2021-02-07

## 2021-02-07 PROBLEM — J44.9 CHRONIC OBSTRUCTIVE PULMONARY DISEASE, UNSPECIFIED COPD TYPE (H): Status: ACTIVE | Noted: 2018-07-31

## 2021-02-07 PROBLEM — I51.89 DIASTOLIC DYSFUNCTION: Status: RESOLVED | Noted: 2018-07-31 | Resolved: 2021-02-07

## 2021-02-07 PROBLEM — I25.10 CORONARY ARTERY DISEASE INVOLVING NATIVE CORONARY ARTERY OF NATIVE HEART WITHOUT ANGINA PECTORIS: Status: ACTIVE | Noted: 2018-07-31

## 2021-02-07 PROBLEM — R29.898 RIGHT LEG WEAKNESS: Status: RESOLVED | Noted: 2021-01-04 | Resolved: 2021-02-07

## 2021-02-08 DIAGNOSIS — Z79.01 LONG TERM CURRENT USE OF ANTICOAGULANT THERAPY: ICD-10-CM

## 2021-02-09 RX ORDER — WARFARIN SODIUM 2 MG/1
TABLET ORAL
Qty: 68 TABLET | Refills: 0 | Status: SHIPPED | OUTPATIENT
Start: 2021-02-09 | End: 2021-04-28

## 2021-02-09 NOTE — TELEPHONE ENCOUNTER
Routing to [facility] pool.     Sara Adam, RN   [facility] -- Triage Nurse

## 2021-02-09 NOTE — TELEPHONE ENCOUNTER
Warfarin refill approved per [facility] protocol. Larissa Spence, BSN, RN

## 2021-02-11 ENCOUNTER — ANTICOAGULATION THERAPY VISIT (OUTPATIENT)
Dept: PEDIATRICS | Facility: CLINIC | Age: 86
End: 2021-02-11

## 2021-02-11 DIAGNOSIS — Z79.01 LONG TERM CURRENT USE OF ANTICOAGULANT THERAPY: ICD-10-CM

## 2021-02-11 DIAGNOSIS — I48.20 CHRONIC ATRIAL FIBRILLATION (H): ICD-10-CM

## 2021-02-11 LAB — INR PPP: 2.4 (ref 0.9–1.1)

## 2021-02-11 NOTE — PROGRESS NOTES
ANTICOAGULATION MANAGEMENT     Patient Name:  Leonor Mercado  Date:  2021    ASSESSMENT /SUBJECTIVE:    Today's INR result of 2.4 is therapeutic. Goal INR of 2.0-3.0      Warfarin dose taken: Warfarin taken as instructed    Diet: No new diet changes affecting INR    Medication changes/ interactions: No new medications/supplements affecting INR    Previous INR: Therapeutic     S/S of bleeding or thromboembolism: No    New injury or illness: No    Upcoming surgery, procedure or cardioversion: No    Additional findings: None      PLAN:    Telephone call with home care nurse Maribel regarding INR result and instructed:     Warfarin Dosing Instructions: Continue your current warfarin dose 1 mg on Mon/wed/Fri and 2 mg all other days    Instructed patient to follow up no later than: 2 weeks  Patient to recheck with home meter    Education provided: None required      Maribel verbalizes understanding and agrees to warfarin dosing plan.    Instructed to call the Anticoagulation Clinic for any changes, questions or concerns. (#[phone])        Yaz Gonzalez RN      OBJECTIVE:  Recent labs: (last 7 days)     21   INR 2.4*         No question data found.  Anticoagulation Summary  As of 2021    INR goal:  2.0-3.0   TTR:  72.6 % (11.9 mo)   INR used for dosin.4 (2021)   Warfarin maintenance plan:  1 mg (2 mg x 0.5) every Mon, Wed, Fri; 2 mg (2 mg x 1) all other days   Full warfarin instructions:  1 mg every Mon, Wed, Fri; 2 mg all other days   Weekly warfarin total:  11 mg   No change documented:  Yaz Gonzalez RN   Plan last modified:  Charmaine Rodriguez RN (2021)   Next INR check:  2021   Priority:  Maintenance   Target end date:  Indefinite    Indications    Chronic atrial fibrillation (H) [I48.20]  Long term current use of anticoagulant therapy [Z79.01]             Anticoagulation Episode Summary     INR check location:  Clinic Lab    Preferred lab:      Send INR reminders to:   "SAUL BARRETT    Comments:  2mg tabs - nancy / 1st name pronounced \"ondray\" / sets up her own meds / comes with her daughter      Anticoagulation Care Providers     Provider Role Specialty Phone number    Arnel Garcia MD Referring Internal Medicine - Pediatrics [phone]    Marisabel Guido MD Referring Internal Medicine - Pediatrics [phone]         "

## 2021-02-12 ENCOUNTER — ANCILLARY PROCEDURE (OUTPATIENT)
Dept: CARDIOLOGY | Facility: CLINIC | Age: 86
End: 2021-02-12
Attending: INTERNAL MEDICINE
Payer: MEDICARE

## 2021-02-12 DIAGNOSIS — I49.5 SICK SINUS SYNDROME (H): Primary | ICD-10-CM

## 2021-02-12 DIAGNOSIS — Z95.0 CARDIAC PACEMAKER IN SITU: ICD-10-CM

## 2021-02-12 PROCEDURE — 93294 REM INTERROG EVL PM/LDLS PM: CPT | Performed by: INTERNAL MEDICINE

## 2021-02-12 PROCEDURE — 93296 REM INTERROG EVL PM/IDS: CPT | Performed by: INTERNAL MEDICINE

## 2021-02-17 LAB
MDC_IDC_MSMT_BATTERY_DTM: NORMAL
MDC_IDC_MSMT_BATTERY_IMPEDANCE: 833 OHM
MDC_IDC_MSMT_BATTERY_REMAINING_LONGEVITY: 85 MO
MDC_IDC_MSMT_BATTERY_STATUS: NORMAL
MDC_IDC_MSMT_BATTERY_VOLTAGE: 2.78 V
MDC_IDC_MSMT_LEADCHNL_RA_IMPEDANCE_VALUE: 67 OHM
MDC_IDC_MSMT_LEADCHNL_RV_IMPEDANCE_VALUE: 702 OHM
MDC_IDC_MSMT_LEADCHNL_RV_SENSING_INTR_AMPL: 16 MV
MDC_IDC_PG_IMPLANT_DTM: NORMAL
MDC_IDC_PG_MFG: NORMAL
MDC_IDC_PG_MODEL: NORMAL
MDC_IDC_PG_SERIAL: NORMAL
MDC_IDC_PG_TYPE: NORMAL
MDC_IDC_SESS_CLINIC_NAME: NORMAL
MDC_IDC_SESS_DTM: NORMAL
MDC_IDC_SESS_TYPE: NORMAL
MDC_IDC_SET_BRADY_LOWRATE: 60 {BEATS}/MIN
MDC_IDC_SET_BRADY_MAX_SENSOR_RATE: 120 {BEATS}/MIN
MDC_IDC_SET_BRADY_MAX_TRACKING_RATE: 105 {BEATS}/MIN
MDC_IDC_SET_BRADY_MODE: NORMAL
MDC_IDC_SET_LEADCHNL_RV_PACING_AMPLITUDE: 2 V
MDC_IDC_SET_LEADCHNL_RV_PACING_CAPTURE_MODE: NORMAL
MDC_IDC_SET_LEADCHNL_RV_PACING_POLARITY: NORMAL
MDC_IDC_SET_LEADCHNL_RV_PACING_PULSEWIDTH: 0.52 MS
MDC_IDC_SET_LEADCHNL_RV_SENSING_POLARITY: NORMAL
MDC_IDC_SET_LEADCHNL_RV_SENSING_SENSITIVITY: 4 MV
MDC_IDC_SET_ZONE_DETECTION_INTERVAL: 333.33 MS
MDC_IDC_SET_ZONE_TYPE: NORMAL
MDC_IDC_SET_ZONE_TYPE: NORMAL
MDC_IDC_STAT_AT_BURDEN_PERCENT: 0 %
MDC_IDC_STAT_AT_DTM_END: NORMAL
MDC_IDC_STAT_AT_DTM_START: NORMAL
MDC_IDC_STAT_BRADY_DTM_END: NORMAL
MDC_IDC_STAT_BRADY_DTM_START: NORMAL
MDC_IDC_STAT_BRADY_RV_PERCENT_PACED: 70 %
MDC_IDC_STAT_EPISODE_RECENT_COUNT: 0
MDC_IDC_STAT_EPISODE_RECENT_COUNT: 0
MDC_IDC_STAT_EPISODE_RECENT_COUNT_DTM_END: NORMAL
MDC_IDC_STAT_EPISODE_RECENT_COUNT_DTM_END: NORMAL
MDC_IDC_STAT_EPISODE_RECENT_COUNT_DTM_START: NORMAL
MDC_IDC_STAT_EPISODE_RECENT_COUNT_DTM_START: NORMAL
MDC_IDC_STAT_EPISODE_TYPE: NORMAL
MDC_IDC_STAT_EPISODE_TYPE: NORMAL

## 2021-03-04 ENCOUNTER — TELEPHONE (OUTPATIENT)
Dept: PEDIATRICS | Facility: CLINIC | Age: 86
End: 2021-03-04

## 2021-03-04 NOTE — TELEPHONE ENCOUNTER
ANTICOAGULATION     Leonor Mercado is overdue for INR check.      Left message  reminding patient to check INR with their home meter and call results to the home monitoring company as soon as possible.     Tita Ha RN

## 2021-03-07 DIAGNOSIS — I50.32 CHRONIC HEART FAILURE WITH PRESERVED EJECTION FRACTION (H): Primary | ICD-10-CM

## 2021-03-08 ENCOUNTER — TELEPHONE (OUTPATIENT)
Dept: PEDIATRICS | Facility: CLINIC | Age: 86
End: 2021-03-08

## 2021-03-08 RX ORDER — FUROSEMIDE 20 MG
TABLET ORAL
Qty: 180 TABLET | Refills: 3 | Status: SHIPPED | OUTPATIENT
Start: 2021-03-08 | End: 2022-03-17

## 2021-03-08 NOTE — TELEPHONE ENCOUNTER
Called Shakira PT with [facility] back ([phone]) .   Gave verbal ok per standing order for on her voicemail for the orders below.     Eli Hinton, Clinic Lead RN, ILEANA for Dr. Marisabel Guido & Dr. Arnel Garcia  [facility]  Phone: [phone] Fax: [phone]  [email or web address]

## 2021-03-08 NOTE — TELEPHONE ENCOUNTER
Order/Referral Request:    Who is requesting: Shakira from [facility]    Orders being requested:   Balance & Strength  2x wk for 4wks  1x wk for  1wk    Reason service is needed/diagnosis:   To  fall risk    When are orders needed by: asap    Has this been discussed with Provider: Yes    Does patient have a preference on a Group/Provider/Facility? no    Does patient have an appointment scheduled: No    Where to send Orders: call PT Shakira [phone]. Will fax orders once gotten verbally     Okay to leave detailed message?  Yes at Other phone number:      Routing

## 2021-03-08 NOTE — PLAN OF CARE
Problem: Patient Care Overview  Goal: Plan of Care/Patient Progress Review  Discharge Planner OT   Patient plan for discharge: See PT note  Current status: Following chart review and consult with staff and PT, pt has no OT needs at this time. Pt is able to complete ADLs at or near baseline, pt is OBS status, PT provided discharge recommendations.   Barriers to return to prior living situation: See PT note  Recommendations for discharge: See PT note  Rationale for recommendations: OBS needs met with PT, no OT needs identified, Orders completed.        Entered by: Laly Guo 04/29/2018 12:08 PM            Reviewed OPTIONS including AVASTIN/EYLEA/LUCENTIS/BEOVU and patient wants to proceed with AVASTIN treatment AND REPEAT EVERY 29 DAYS X 2 DOI.

## 2021-03-08 NOTE — TELEPHONE ENCOUNTER
Routing refill request to provider for review/approval because:  Labs out of range:    Normal serum creatinine on file in past 12 months        Recent Labs   Lab Test 01/05/21  1404   CR 1.13*           Jennifer Harrington RN Flex

## 2021-03-11 ENCOUNTER — TELEPHONE (OUTPATIENT)
Dept: PEDIATRICS | Facility: CLINIC | Age: 86
End: 2021-03-11

## 2021-03-11 NOTE — TELEPHONE ENCOUNTER
ANTICOAGULATION     Leonor REBOLLEDO Rogelio is overdue for INR check.      Spoke with Alfredo who states she usually has INRs done at the lab but is having mobility issues. In home labs is not an option as pt is on [employer]. She will check with her daughter and [employer] to see if she can set up a ride to come into the clinic. If Alfredo calls back, please set up INR appt.    Phu Ramos RN

## 2021-03-18 ENCOUNTER — ANTICOAGULATION THERAPY VISIT (OUTPATIENT)
Dept: NURSING | Facility: CLINIC | Age: 86
End: 2021-03-18

## 2021-03-18 DIAGNOSIS — I48.20 CHRONIC ATRIAL FIBRILLATION (H): ICD-10-CM

## 2021-03-18 DIAGNOSIS — Z79.01 LONG TERM CURRENT USE OF ANTICOAGULANT THERAPY: ICD-10-CM

## 2021-03-18 LAB
CAPILLARY BLOOD COLLECTION: NORMAL
INR PPP: 2 (ref 0.86–1.14)

## 2021-03-18 PROCEDURE — 85610 PROTHROMBIN TIME: CPT | Performed by: INTERNAL MEDICINE

## 2021-03-18 PROCEDURE — 36416 COLLJ CAPILLARY BLOOD SPEC: CPT | Performed by: INTERNAL MEDICINE

## 2021-03-18 PROCEDURE — 99207 PR NO CHARGE NURSE ONLY: CPT

## 2021-03-18 NOTE — PROGRESS NOTES
"ANTICOAGULATION FOLLOW-UP CLINIC VISIT    Patient Name:  Leonor Mercado  Date:  3/18/2021  Contact Type:  Telephone    SUBJECTIVE:  Patient Findings     Positives:  Missed doses (Pt states she missed 1 dose about 2 weeks ago.)    Comments:            Clinical Outcomes     Negatives:  Major bleeding event, Thromboembolic event, Anticoagulation-related hospital admission, Anticoagulation-related ED visit, Anticoagulation-related fatality    Comments:               OBJECTIVE    Recent labs: (last 7 days)     21  1419   INR 2.00*       ASSESSMENT / PLAN  INR assessment THER    Recheck INR In: 4 WEEKS    INR Location Clinic      Anticoagulation Summary  As of 3/18/2021    INR goal:  2.0-3.0   TTR:  72.6 % (11.9 mo)   INR used for dosin.00 (3/18/2021)   Warfarin maintenance plan:  1 mg (2 mg x 0.5) every Mon, Wed, Fri; 2 mg (2 mg x 1) all other days   Full warfarin instructions:  1 mg every Mon, Wed, Fri; 2 mg all other days   Weekly warfarin total:  11 mg   Plan last modified:  Mariola Valenzuela RN (3/18/2021)   Next INR check:  4/15/2021   Priority:  Maintenance   Target end date:  Indefinite    Indications    Chronic atrial fibrillation (H) [I48.20]  Long term current use of anticoagulant therapy [Z79.01]             Anticoagulation Episode Summary     INR check location:  Home Draw    Preferred lab:      Send INR reminders to:  SAUL BARRETT    Comments:  2mg tabs - nancy / 1st name pronounced \"ondray\" / sets up her own meds / comes with her daughter      Anticoagulation Care Providers     Provider Role Specialty Phone number    Arnel Garcia MD Referring Internal Medicine - Pediatrics [phone]    Marisabel Guido MD Referring Internal Medicine - Pediatrics [phone]            See the Encounter Report to view Anticoagulation Flowsheet and Dosing Calendar (Go to Encounters tab in chart review, and find the Anticoagulation Therapy Visit)        Mariola Valenzuela RN                 "

## 2021-04-15 ENCOUNTER — ANTICOAGULATION THERAPY VISIT (OUTPATIENT)
Dept: ANTICOAGULATION | Facility: CLINIC | Age: 86
End: 2021-04-15

## 2021-04-15 DIAGNOSIS — I48.20 CHRONIC ATRIAL FIBRILLATION (H): ICD-10-CM

## 2021-04-15 DIAGNOSIS — Z79.01 LONG TERM CURRENT USE OF ANTICOAGULANT THERAPY: ICD-10-CM

## 2021-04-15 LAB
CAPILLARY BLOOD COLLECTION: NORMAL
INR PPP: 1.9 (ref 0.86–1.14)

## 2021-04-15 PROCEDURE — 36416 COLLJ CAPILLARY BLOOD SPEC: CPT | Performed by: INTERNAL MEDICINE

## 2021-04-15 PROCEDURE — 85610 PROTHROMBIN TIME: CPT | Performed by: INTERNAL MEDICINE

## 2021-04-15 NOTE — PROGRESS NOTES
ANTICOAGULATION FOLLOW-UP CLINIC VISIT    Patient Name:  Leonor Mercado  Date:  4/15/2021  Contact Type:  Telephone/ Called patient, she denies any changes or missed warfarin doses. Orders discussed with the patient, she agrees with the plan. Lab INR appointment scheduled on 21.    SUBJECTIVE:  Patient Findings     Comments:  The patient was assessed for diet, medication, and activity level changes, missed or extra doses, bruising or bleeding, with no problem findings. Patient denies any identifiable changes that caused the 4/15/2021  therapeutic INR.           Clinical Outcomes     Negatives:  Major bleeding event, Thromboembolic event, Anticoagulation-related hospital admission, Anticoagulation-related ED visit, Anticoagulation-related fatality    Comments:  The patient was assessed for diet, medication, and activity level changes, missed or extra doses, bruising or bleeding, with no problem findings. Patient denies any identifiable changes that caused the 4/15/2021  therapeutic INR.              OBJECTIVE    Recent labs: (last 7 days)     04/15/21  1407   INR 1.90*       ASSESSMENT / PLAN  INR assessment SUB    Recheck INR In: 2 WEEKS    INR Location Outside lab      Anticoagulation Summary  As of 4/15/2021    INR goal:  2.0-3.0   TTR:  70.8 % (11.9 mo)   INR used for dosin.90 (4/15/2021)   Warfarin maintenance plan:  1 mg (2 mg x 0.5) every Mon, Wed, Fri; 2 mg (2 mg x 1) all other days   Full warfarin instructions:  1 mg every Mon, Wed, Fri; 2 mg all other days   Weekly warfarin total:  11 mg   No change documented:  Dilma Rider RN   Plan last modified:  Mariola Valenzuela RN (3/18/2021)   Next INR check:  2021   Priority:  Maintenance   Target end date:  Indefinite    Indications    Chronic atrial fibrillation (H) [I48.20]  Long term current use of anticoagulant therapy [Z79.01]             Anticoagulation Episode Summary     INR check location:  Home Draw    Preferred lab:      Send INR  "reminders to:  SAUL BARRETT    Comments:  2mg tabs - nancy / 1st name pronounced \"ondray\" / sets up her own meds / comes with her daughter      Anticoagulation Care Providers     Provider Role Specialty Phone number    Arnel Garcia MD Referring Internal Medicine - Pediatrics [phone]    Marisabel Guido MD Referring Internal Medicine - Pediatrics [phone]            See the Encounter Report to view Anticoagulation Flowsheet and Dosing Calendar (Go to Encounters tab in chart review, and find the Anticoagulation Therapy Visit)    Dosage adjustment made based on physician directed care plan.    Dilma Rider RN                 "

## 2021-04-29 ENCOUNTER — ANTICOAGULATION THERAPY VISIT (OUTPATIENT)
Dept: NURSING | Facility: CLINIC | Age: 86
End: 2021-04-29

## 2021-04-29 DIAGNOSIS — I48.20 CHRONIC ATRIAL FIBRILLATION (H): ICD-10-CM

## 2021-04-29 DIAGNOSIS — Z79.01 LONG TERM CURRENT USE OF ANTICOAGULANT THERAPY: ICD-10-CM

## 2021-04-29 LAB
CAPILLARY BLOOD COLLECTION: NORMAL
INR PPP: 2.5 (ref 0.86–1.14)

## 2021-04-29 PROCEDURE — 85610 PROTHROMBIN TIME: CPT | Performed by: INTERNAL MEDICINE

## 2021-04-29 PROCEDURE — 99207 PR NO CHARGE NURSE ONLY: CPT

## 2021-04-29 PROCEDURE — 36416 COLLJ CAPILLARY BLOOD SPEC: CPT | Performed by: INTERNAL MEDICINE

## 2021-04-29 NOTE — PROGRESS NOTES
ANTICOAGULATION FOLLOW-UP     Patient Name:  Leonor Mercado  Date:  2021  Contact Type:  Telephone    SUBJECTIVE:  Patient Findings     Comments:  The patient was assessed for   diet, medication,   missed or extra doses,   bruising or bleeding,   with no problem findings.  No questions or concerns.  She will be 89 years old in 1 week.  Reviewed previous warfarin dosing with patient.  She took warfarin as instructed.    INR is therapeutic today.   Patient will continue same maintenance dose.   Follow up in 4 weeks.              Clinical Outcomes     Comments:  The patient was assessed for   diet, medication,   missed or extra doses,   bruising or bleeding,   with no problem findings.  No questions or concerns.  She will be 89 years old in 1 week.  Reviewed previous warfarin dosing with patient.  She took warfarin as instructed.    INR is therapeutic today.   Patient will continue same maintenance dose.   Follow up in 4 weeks.                 OBJECTIVE    Recent labs: (last 7 days)     21  1403   INR 2.50*       ASSESSMENT / PLAN  INR assessment THER    Recheck INR In: 4 WEEKS    INR Location Clinic lab     Anticoagulation Summary  As of 2021    INR goal:  2.0-3.0   TTR:  74.1 % (11.9 mo)   INR used for dosin.50 (2021)   Warfarin maintenance plan:  1 mg (2 mg x 0.5) every Mon, Wed, Fri; 2 mg (2 mg x 1) all other days   Full warfarin instructions:  1 mg every Mon, Wed, Fri; 2 mg all other days   Weekly warfarin total:  11 mg   No change documented:  Ebony Doshi RN   Plan last modified:  Mariola Valenzuela RN (3/18/2021)   Next INR check:  2021   Priority:  Maintenance   Target end date:  Indefinite    Indications    Chronic atrial fibrillation (H) [I48.20]  Long term current use of anticoagulant therapy [Z79.01]             Anticoagulation Episode Summary     INR check location:  Home Draw    Preferred lab:      Send INR reminders to:  SAUL BARRETT    Comments:  2mg tabs - nancy /  "1st name pronounced \"ondray\" / sets up her own meds / comes with her daughter      Anticoagulation Care Providers     Provider Role Specialty Phone number    Arnel Garcia MD Referring Internal Medicine - Pediatrics [phone]    Marisabel Guido MD Referring Internal Medicine - Pediatrics [phone]            See the Encounter Report to view Anticoagulation Flowsheet and Dosing Calendar (Go to Encounters tab in chart review, and find the Anticoagulation Therapy Visit)        Ebony Doshi RN                 "

## 2021-05-24 ENCOUNTER — ANCILLARY PROCEDURE (OUTPATIENT)
Dept: CARDIOLOGY | Facility: CLINIC | Age: 86
End: 2021-05-24
Attending: INTERNAL MEDICINE
Payer: MEDICARE

## 2021-05-24 DIAGNOSIS — I49.5 SICK SINUS SYNDROME (H): ICD-10-CM

## 2021-05-24 PROCEDURE — 93294 REM INTERROG EVL PM/LDLS PM: CPT | Performed by: INTERNAL MEDICINE

## 2021-05-24 PROCEDURE — 93296 REM INTERROG EVL PM/IDS: CPT | Performed by: INTERNAL MEDICINE

## 2021-05-27 LAB
MDC_IDC_MSMT_BATTERY_DTM: NORMAL
MDC_IDC_MSMT_BATTERY_IMPEDANCE: 938 OHM
MDC_IDC_MSMT_BATTERY_REMAINING_LONGEVITY: 80 MO
MDC_IDC_MSMT_BATTERY_STATUS: NORMAL
MDC_IDC_MSMT_BATTERY_VOLTAGE: 2.78 V
MDC_IDC_MSMT_LEADCHNL_RA_IMPEDANCE_VALUE: 67 OHM
MDC_IDC_MSMT_LEADCHNL_RV_IMPEDANCE_VALUE: 695 OHM
MDC_IDC_PG_IMPLANT_DTM: NORMAL
MDC_IDC_PG_MFG: NORMAL
MDC_IDC_PG_MODEL: NORMAL
MDC_IDC_PG_SERIAL: NORMAL
MDC_IDC_PG_TYPE: NORMAL
MDC_IDC_SESS_CLINIC_NAME: NORMAL
MDC_IDC_SESS_DTM: NORMAL
MDC_IDC_SESS_TYPE: NORMAL
MDC_IDC_SET_BRADY_LOWRATE: 60 {BEATS}/MIN
MDC_IDC_SET_BRADY_MAX_SENSOR_RATE: 120 {BEATS}/MIN
MDC_IDC_SET_BRADY_MAX_TRACKING_RATE: 105 {BEATS}/MIN
MDC_IDC_SET_BRADY_MODE: NORMAL
MDC_IDC_SET_LEADCHNL_RV_PACING_AMPLITUDE: 2 V
MDC_IDC_SET_LEADCHNL_RV_PACING_CAPTURE_MODE: NORMAL
MDC_IDC_SET_LEADCHNL_RV_PACING_POLARITY: NORMAL
MDC_IDC_SET_LEADCHNL_RV_PACING_PULSEWIDTH: 0.52 MS
MDC_IDC_SET_LEADCHNL_RV_SENSING_POLARITY: NORMAL
MDC_IDC_SET_LEADCHNL_RV_SENSING_SENSITIVITY: 2.8 MV
MDC_IDC_SET_ZONE_DETECTION_INTERVAL: 333.33 MS
MDC_IDC_SET_ZONE_TYPE: NORMAL
MDC_IDC_SET_ZONE_TYPE: NORMAL
MDC_IDC_STAT_AT_BURDEN_PERCENT: 0 %
MDC_IDC_STAT_AT_DTM_END: NORMAL
MDC_IDC_STAT_AT_DTM_START: NORMAL
MDC_IDC_STAT_BRADY_DTM_END: NORMAL
MDC_IDC_STAT_BRADY_DTM_START: NORMAL
MDC_IDC_STAT_BRADY_RV_PERCENT_PACED: 69 %
MDC_IDC_STAT_EPISODE_RECENT_COUNT: 0
MDC_IDC_STAT_EPISODE_RECENT_COUNT: 1
MDC_IDC_STAT_EPISODE_RECENT_COUNT_DTM_END: NORMAL
MDC_IDC_STAT_EPISODE_RECENT_COUNT_DTM_END: NORMAL
MDC_IDC_STAT_EPISODE_RECENT_COUNT_DTM_START: NORMAL
MDC_IDC_STAT_EPISODE_RECENT_COUNT_DTM_START: NORMAL
MDC_IDC_STAT_EPISODE_TYPE: NORMAL
MDC_IDC_STAT_EPISODE_TYPE: NORMAL

## 2021-06-06 DIAGNOSIS — I10 ESSENTIAL HYPERTENSION WITH GOAL BLOOD PRESSURE LESS THAN 140/90: ICD-10-CM

## 2021-06-08 RX ORDER — LOSARTAN POTASSIUM 25 MG/1
TABLET ORAL
Qty: 90 TABLET | Refills: 3 | Status: SHIPPED | OUTPATIENT
Start: 2021-06-08 | End: 2022-03-17

## 2021-06-08 NOTE — TELEPHONE ENCOUNTER
Routing refill request to provider for review/approval because:  Labs out of range:  Creatinine    Suri Reid RN

## 2021-06-11 ENCOUNTER — TELEPHONE (OUTPATIENT)
Dept: PEDIATRICS | Facility: CLINIC | Age: 86
End: 2021-06-11

## 2021-06-11 NOTE — TELEPHONE ENCOUNTER
ANTICOAGULATION     Leonor Mercado is overdue for INR check.      Spoke with Leonor and scheduled lab appointment on 06/15/21    Mariola Valenzuela RN

## 2021-06-15 ENCOUNTER — ANTICOAGULATION THERAPY VISIT (OUTPATIENT)
Dept: ANTICOAGULATION | Facility: CLINIC | Age: 86
End: 2021-06-15

## 2021-06-15 DIAGNOSIS — Z79.01 LONG TERM CURRENT USE OF ANTICOAGULANT THERAPY: ICD-10-CM

## 2021-06-15 DIAGNOSIS — I48.20 CHRONIC ATRIAL FIBRILLATION (H): ICD-10-CM

## 2021-06-15 LAB
CAPILLARY BLOOD COLLECTION: NORMAL
INR PPP: 1.5 (ref 0.86–1.14)

## 2021-06-15 PROCEDURE — 36416 COLLJ CAPILLARY BLOOD SPEC: CPT | Performed by: INTERNAL MEDICINE

## 2021-06-15 PROCEDURE — 85610 PROTHROMBIN TIME: CPT | Performed by: INTERNAL MEDICINE

## 2021-06-15 NOTE — PROGRESS NOTES
"ANTICOAGULATION FOLLOW-UP CLINIC VISIT    Patient Name:  Leonor Mercado  Date:  6/15/2021  Contact Type:  Telephone/ Called patient, she denies any changes or missed warfarin doses. Orders discussed with the patient, she agrees with the plan. Patient repeated dosing instruction to [facility] RN. Lab INR appointment scheduled on 21.    SUBJECTIVE:  Patient Findings     Comments:  The patient was assessed for diet, medication, and activity level changes, missed or extra doses, bruising or bleeding, with no problem findings.  Patient denies any identifiable changes that caused the subtherapeutic INR.           Clinical Outcomes     Comments:  The patient was assessed for diet, medication, and activity level changes, missed or extra doses, bruising or bleeding, with no problem findings.  Patient denies any identifiable changes that caused the subtherapeutic INR.              OBJECTIVE    Recent labs: (last 7 days)     06/15/21  1426   INR 1.50*       ASSESSMENT / PLAN  INR assessment SUB    Recheck INR In: 10 DAYS    INR Location Outside lab      Anticoagulation Summary  As of 6/15/2021    INR goal:  2.0-3.0   TTR:  68.2 % (11.9 mo)   INR used for dosin.50 (6/15/2021)   Warfarin maintenance plan:  1 mg (2 mg x 0.5) every Mon, Wed, Fri; 2 mg (2 mg x 1) all other days   Full warfarin instructions:  6/15: 4 mg; : 2 mg; Otherwise 1 mg every Mon, Wed, Fri; 2 mg all other days   Weekly warfarin total:  11 mg   Plan last modified:  Mariola Valenzuela RN (3/18/2021)   Next INR check:  2021   Priority:  Maintenance   Target end date:  Indefinite    Indications    Chronic atrial fibrillation (H) [I48.20]  Long term current use of anticoagulant therapy [Z79.01]             Anticoagulation Episode Summary     INR check location:  Home Draw    Preferred lab:      Send INR reminders to:  SAUL BARRETT    Comments:  2mg tabs - nancy / 1st name pronounced \"ondray\" / sets up her own meds / comes with her daughter    "   Anticoagulation Care Providers     Provider Role Specialty Phone number    Arnel Garcia MD Referring Internal Medicine - Pediatrics [phone]    Marisabel Guido MD Referring Internal Medicine - Pediatrics [phone]            See the Encounter Report to view Anticoagulation Flowsheet and Dosing Calendar (Go to Encounters tab in chart review, and find the Anticoagulation Therapy Visit)    Dosage adjustment made based on physician directed care plan.    Dilma Rider RN

## 2021-06-18 DIAGNOSIS — I25.10 CORONARY ARTERY DISEASE INVOLVING NATIVE CORONARY ARTERY OF NATIVE HEART WITHOUT ANGINA PECTORIS: ICD-10-CM

## 2021-06-21 ENCOUNTER — TELEPHONE (OUTPATIENT)
Dept: PEDIATRICS | Facility: CLINIC | Age: 86
End: 2021-06-21

## 2021-06-21 RX ORDER — DILTIAZEM HYDROCHLORIDE 240 MG/1
CAPSULE, COATED, EXTENDED RELEASE ORAL
Qty: 90 CAPSULE | Refills: 3 | Status: SHIPPED | OUTPATIENT
Start: 2021-06-21 | End: 2022-05-19

## 2021-06-21 NOTE — TELEPHONE ENCOUNTER
Routing refill request to provider for review/approval because:  Labs out of range:    Creatinine   Date Value Ref Range Status   01/05/2021 1.13 (H) 0.52 - 1.04 mg/dL Final

## 2021-06-21 NOTE — TELEPHONE ENCOUNTER
"Received call on RN PAL VM on Friday, after clinic hours from patient phone number. Patient did not leave details but stated she needed to \"talk to someone today about a medication\"    Attempted to call, left VM with correct PAL (Eli) call back number.   "

## 2021-06-23 ENCOUNTER — TELEPHONE (OUTPATIENT)
Dept: PEDIATRICS | Facility: CLINIC | Age: 86
End: 2021-06-23

## 2021-06-23 DIAGNOSIS — I48.20 CHRONIC ATRIAL FIBRILLATION (H): ICD-10-CM

## 2021-06-23 DIAGNOSIS — Z79.01 LONG TERM CURRENT USE OF ANTICOAGULANT THERAPY: ICD-10-CM

## 2021-06-23 NOTE — TELEPHONE ENCOUNTER
Last INR 6/15/21: Subtherapeutic at 1.5. Patient denied any reasons for subtherapeutic INR. Advised to take 4 mg on 6/15, 2 mg 6/16, then return to maintenance dosing of 1 mg Mon, Wed, Fri; and 2 mg ROW. Next INR scheduled 6/25/21 at 2:10 in [place].    Called Latha back (c2c on file): She just wanted to verify dosing and next INR since Alfredo didn't have her next INR written on the calendar.     Ethel ALLEN RN  Anticoagulation Team

## 2021-06-23 NOTE — TELEPHONE ENCOUNTER
1:09 Pt's daughter Latha Crockett ([phone]) called and would like to speak to an INR nurse regarding warfarin dosing and scheduling an appointment for an INR check for her mom. Please call when able.

## 2021-06-24 ENCOUNTER — TELEPHONE (OUTPATIENT)
Dept: PEDIATRICS | Facility: CLINIC | Age: 86
End: 2021-06-24

## 2021-06-24 DIAGNOSIS — H61.20 IMPACTED CERUMEN, UNSPECIFIED LATERALITY: Primary | ICD-10-CM

## 2021-06-24 NOTE — TELEPHONE ENCOUNTER
The patient's daughter called and stated that the patient's disability parking pass is  and they are needing the form to be completed in order for them to renew it.      Form printed and placed in Dr. Radha mcknight.    Blanca Murphy    2021 at 10:54 AM

## 2021-06-24 NOTE — TELEPHONE ENCOUNTER
The patient's daughter called and stated that the patient has an appointment scheduled for an office visit on 7/27 to get her ears cleaned out.  She stated that the patient was seen last year for this and we were unable to clear them out.  Les is wondering if Dr. Garcia would be willing to place a referral for ENT so they can skip the hassle of coming into the clinic and having it happen again.      Les can be reached at [phone]    Routing to PCP to advise.  Referral pended.     Blanca Murphy    June 24, 2021 at 10:15 AM

## 2021-06-24 NOTE — TELEPHONE ENCOUNTER
Called the patient's daughter and provided the ENT information.      Blanca Murphy    June 24, 2021 at 11:03 AM

## 2021-06-24 NOTE — TELEPHONE ENCOUNTER
Patient's daughter is requesting a permanent disability pass. Once the form is complete, it can be mailed to the patient for her to sign and bring to the [employer].       Blanca Murphy    June 24, 2021 at 11:00 AM

## 2021-06-24 NOTE — TELEPHONE ENCOUNTER
Forms mailed to the patient.  Called patient's daughter, Les and informed her that they were mailed and also asked her to double check the form was accurate with the patient NOT driving.  Les was asked to call the clinic back if the patient does drive still so we can fill out a new form.     Blanca Murphy    June 24, 2021 at 2:06 PM

## 2021-06-25 ENCOUNTER — ANTICOAGULATION THERAPY VISIT (OUTPATIENT)
Dept: ANTICOAGULATION | Facility: CLINIC | Age: 86
End: 2021-06-25

## 2021-06-25 DIAGNOSIS — Z79.01 LONG TERM CURRENT USE OF ANTICOAGULANT THERAPY: ICD-10-CM

## 2021-06-25 DIAGNOSIS — I48.20 CHRONIC ATRIAL FIBRILLATION (H): ICD-10-CM

## 2021-06-25 LAB
CAPILLARY BLOOD COLLECTION: NORMAL
INR PPP: 1.9 (ref 0.86–1.14)

## 2021-06-25 PROCEDURE — 36416 COLLJ CAPILLARY BLOOD SPEC: CPT | Performed by: INTERNAL MEDICINE

## 2021-06-25 PROCEDURE — 85610 PROTHROMBIN TIME: CPT | Performed by: INTERNAL MEDICINE

## 2021-06-25 NOTE — PROGRESS NOTES
ANTICOAGULATION MANAGEMENT     Leonor Mercado 89 year old female is on warfarin with subtherapeutic INR result. (Goal INR 2.0-3.0)    Recent labs: (last 7 days)     06/25/21  1418   INR 1.90*       ASSESSMENT     Source(s): Patient/Caregiver Call       Warfarin doses taken: Warfarin taken as instructed    Diet: No new diet changes identified    New illness, injury, or hospitalization: No    Medication/supplement changes: None noted    Signs or symptoms of bleeding or clotting: No    Previous INR: Subtherapeutic    Additional findings: None     PLAN     Recommended plan for no diet, medication or health factor changes affecting INR     Dosing Instructions:  Increase your warfarin dose (9.1% change) with next INR in 1 week       Summary  As of 6/25/2021    Full warfarin instructions:  1 mg every Mon, Fri; 2 mg all other days   Next INR check:  7/2/2021             Telephone call with Leonor whom agrees to plan and repeated back plan correctly    Lab visit scheduled    Education provided: None required    Plan made per [facility] anticoagulation protocol    Yoana Waddell RN  Anticoagulation Clinic  6/25/2021    _______________________________________________________________________     Anticoagulation Episode Summary     Current INR goal:  2.0-3.0   TTR:  65.4 % (11.9 mo)   Target end date:  Indefinite   Send INR reminders to:  [facility]    Indications    Chronic atrial fibrillation (H) [I48.20]  Long term current use of anticoagulant therapy [Z79.01]           Comments:           Anticoagulation Care Providers     Provider Role Specialty Phone number    Arnel Garcia MD Referring Internal Medicine - Pediatrics [phone]    Marisabel Guido MD Referring Internal Medicine - Pediatrics [phone]

## 2021-07-01 ENCOUNTER — TRANSFERRED RECORDS (OUTPATIENT)
Dept: HEALTH INFORMATION MANAGEMENT | Facility: CLINIC | Age: 86
End: 2021-07-01

## 2021-07-01 DIAGNOSIS — F43.20 ADJUSTMENT DISORDER, UNSPECIFIED TYPE: ICD-10-CM

## 2021-07-01 DIAGNOSIS — I25.10 CORONARY ARTERY DISEASE INVOLVING NATIVE CORONARY ARTERY OF NATIVE HEART WITHOUT ANGINA PECTORIS: ICD-10-CM

## 2021-07-02 ENCOUNTER — ANTICOAGULATION THERAPY VISIT (OUTPATIENT)
Dept: NURSING | Facility: CLINIC | Age: 86
End: 2021-07-02

## 2021-07-02 DIAGNOSIS — Z79.01 LONG TERM CURRENT USE OF ANTICOAGULANT THERAPY: ICD-10-CM

## 2021-07-02 DIAGNOSIS — I48.20 CHRONIC ATRIAL FIBRILLATION (H): ICD-10-CM

## 2021-07-02 DIAGNOSIS — I48.20 CHRONIC ATRIAL FIBRILLATION (H): Primary | ICD-10-CM

## 2021-07-02 LAB
CAPILLARY BLOOD COLLECTION: NORMAL
INR PPP: 2.3 (ref 0.86–1.14)

## 2021-07-02 PROCEDURE — 85610 PROTHROMBIN TIME: CPT | Performed by: INTERNAL MEDICINE

## 2021-07-02 PROCEDURE — 36416 COLLJ CAPILLARY BLOOD SPEC: CPT | Performed by: INTERNAL MEDICINE

## 2021-07-02 RX ORDER — ISOSORBIDE MONONITRATE 30 MG/1
TABLET, EXTENDED RELEASE ORAL
Qty: 90 TABLET | Refills: 0 | Status: SHIPPED | OUTPATIENT
Start: 2021-07-02 | End: 2021-10-08

## 2021-07-02 RX ORDER — MIRTAZAPINE 15 MG/1
TABLET, FILM COATED ORAL
Qty: 90 TABLET | Refills: 0 | Status: SHIPPED | OUTPATIENT
Start: 2021-07-02 | End: 2021-11-16

## 2021-07-02 NOTE — PROGRESS NOTES
Patient lvm at 330 pm for Ebony.   Please callback when available.   Larissa Spence, ADELAIDAN, RN

## 2021-07-02 NOTE — PROGRESS NOTES
ANTICOAGULATION MANAGEMENT     Leonor Vivasfranco 89 year old female is on warfarin with therapeutic INR result. (Goal INR 2.0-3.0)    Recent labs: (last 7 days)     07/02/21  1354   INR 2.30*       ASSESSMENT     Source(s): Chart Review and Patient/Caregiver Call       Warfarin doses taken: Warfarin taken as instructed    Diet: No new diet changes identified    New illness, injury, or hospitalization: No    Medication/supplement changes: None noted    Signs or symptoms of bleeding or clotting: No    Previous INR: Subtherapeutic    Additional findings: None     PLAN     Recommended plan for no diet, medication or health factor changes affecting INR     Dosing Instructions: Continue your current warfarin dose with next INR in 2 weeks       Summary  As of 7/2/2021    Full warfarin instructions:  1 mg every Mon, Fri; 2 mg all other days   Next INR check:               Telephone call with Leonor who agrees to plan and repeated back plan correctly    Lab visit scheduled    Education provided: Importance of therapeutic range    Plan made per [facility] anticoagulation protocol    Ebony Doshi RN  Anticoagulation Clinic  7/2/2021    _______________________________________________________________________     Anticoagulation Episode Summary     Current INR goal:  2.0-3.0   TTR:  64.9 % (11.9 mo)   Target end date:  Indefinite   Send INR reminders to:  [facility]    Indications    Chronic atrial fibrillation (H) [I48.20]  Long term current use of anticoagulant therapy [Z79.01]           Comments:           Anticoagulation Care Providers     Provider Role Specialty Phone number    Arnel Garcia MD Referring Internal Medicine - Pediatrics [phone]    Marisabel Guido MD Referring Internal Medicine - Pediatrics [phone]

## 2021-07-02 NOTE — PROGRESS NOTES
Attempted to reach patient by phone.  Left message to call INR Clinic. Ebony [phone]    Ebony Doshi RN

## 2021-07-16 ENCOUNTER — LAB (OUTPATIENT)
Dept: LAB | Facility: CLINIC | Age: 86
End: 2021-07-16
Payer: MEDICARE

## 2021-07-16 ENCOUNTER — TELEPHONE (OUTPATIENT)
Dept: PEDIATRICS | Facility: CLINIC | Age: 86
End: 2021-07-16

## 2021-07-16 ENCOUNTER — ANTICOAGULATION THERAPY VISIT (OUTPATIENT)
Dept: ANTICOAGULATION | Facility: CLINIC | Age: 86
End: 2021-07-16

## 2021-07-16 DIAGNOSIS — J44.9 CHRONIC OBSTRUCTIVE PULMONARY DISEASE, UNSPECIFIED COPD TYPE (H): ICD-10-CM

## 2021-07-16 DIAGNOSIS — I48.20 CHRONIC ATRIAL FIBRILLATION (H): ICD-10-CM

## 2021-07-16 DIAGNOSIS — I50.32 CHRONIC HEART FAILURE WITH PRESERVED EJECTION FRACTION (H): ICD-10-CM

## 2021-07-16 DIAGNOSIS — Z79.01 LONG TERM CURRENT USE OF ANTICOAGULANT THERAPY: ICD-10-CM

## 2021-07-16 DIAGNOSIS — E78.5 HYPERLIPIDEMIA LDL GOAL <100: ICD-10-CM

## 2021-07-16 DIAGNOSIS — I25.10 CORONARY ARTERY DISEASE INVOLVING NATIVE CORONARY ARTERY OF NATIVE HEART WITHOUT ANGINA PECTORIS: Primary | ICD-10-CM

## 2021-07-16 DIAGNOSIS — I48.20 CHRONIC ATRIAL FIBRILLATION (H): Primary | ICD-10-CM

## 2021-07-16 LAB — INR BLD: 2.4 (ref 0.9–1.1)

## 2021-07-16 PROCEDURE — 36416 COLLJ CAPILLARY BLOOD SPEC: CPT

## 2021-07-16 PROCEDURE — 85610 PROTHROMBIN TIME: CPT

## 2021-07-16 NOTE — TELEPHONE ENCOUNTER
Reason for Call:  Other Request    Detailed comments: Pt's daughter, Cleo, called (consent) stating that it is difficult for her mom to remember the information that is told to her after her INR appts. Cleo is wondering if she could be the one called, so they make sure she is doing everything correctly?    Phone Number Patient can be reached at: Other phone number:  [phone]    Best Time: any    Can we leave a detailed message on this number? NO    Call taken on 7/16/2021 at 11:15 AM by Staci Ivy

## 2021-07-16 NOTE — PROGRESS NOTES
ANTICOAGULATION MANAGEMENT     Leonor Mercado 89 year old female is on warfarin with therapeutic INR result. (Goal INR 2.0-3.0)    Recent labs: (last 7 days)     07/16/21  1408   INR 2.4*       ASSESSMENT     Source(s): Patient/Caregiver Call       Warfarin doses taken: Warfarin taken as instructed    Diet: No new diet changes identified    New illness, injury, or hospitalization: No    Medication/supplement changes: None noted    Signs or symptoms of bleeding or clotting: No    Previous INR: Therapeutic last visit; previously outside of goal range    Additional findings: None     PLAN     Recommended plan for no diet, medication or health factor changes affecting INR     Dosing Instructions: Continue your current warfarin dose with next INR in 3 weeks       Summary  As of 7/16/2021    Full warfarin instructions:  1 mg every Mon, Fri; 2 mg all other days   Next INR check:  8/6/2021             Telephone call with patient's daughter Cleo.  She verbalized understanding of plan.    Lab visit scheduled    Education provided: Please call back if any changes to your diet, medications or how you've been taking warfarin and Contact [phone]  with any changes, questions or concerns.     Plan made per [facility] anticoagulation protocol    Yoana Waddell RN  Anticoagulation Clinic  7/16/2021    _______________________________________________________________________     Anticoagulation Episode Summary     Current INR goal:  2.0-3.0   TTR:  68.5 % (11.9 mo)   Target end date:  Indefinite   Send INR reminders to:  [facility]    Indications    Chronic atrial fibrillation (H) [I48.20]  Long term current use of anticoagulant therapy [Z79.01]           Comments:           Anticoagulation Care Providers     Provider Role Specialty Phone number    Arnel Garcia MD Referring Internal Medicine - Pediatrics [phone]    Marisabel Guido MD Referring Internal Medicine - Pediatrics [phone]

## 2021-07-16 NOTE — TELEPHONE ENCOUNTER
Reason for Call:  Other Referral    Detailed comments: Pt's daughter, Cleo, called (consent) stating that at her old clinic pt did MTM appts. Cleo is wondering if they'll need to be referred again to start seeing MTM?    Phone Number Patient can be reached at: Other phone number:  [phone]    Best Time: any    Can we leave a detailed message on this number? NO    Call taken on 7/16/2021 at 11:19 AM by Staci Ivy

## 2021-07-22 ENCOUNTER — TELEPHONE (OUTPATIENT)
Dept: CARDIOLOGY | Facility: CLINIC | Age: 86
End: 2021-07-22

## 2021-07-22 NOTE — TELEPHONE ENCOUNTER
Daughter called requesting date and time change for OV. Did find new appt in September for Samson Ceja RN

## 2021-08-06 ENCOUNTER — ANTICOAGULATION THERAPY VISIT (OUTPATIENT)
Dept: ANTICOAGULATION | Facility: CLINIC | Age: 86
End: 2021-08-06

## 2021-08-06 ENCOUNTER — TELEPHONE (OUTPATIENT)
Dept: PEDIATRICS | Facility: CLINIC | Age: 86
End: 2021-08-06

## 2021-08-06 ENCOUNTER — LAB (OUTPATIENT)
Dept: LAB | Facility: CLINIC | Age: 86
End: 2021-08-06
Payer: MEDICARE

## 2021-08-06 DIAGNOSIS — Z79.01 LONG TERM CURRENT USE OF ANTICOAGULANTS WITH INR GOAL OF 2.0-3.0: ICD-10-CM

## 2021-08-06 DIAGNOSIS — I48.20 CHRONIC ATRIAL FIBRILLATION (H): ICD-10-CM

## 2021-08-06 DIAGNOSIS — Z79.01 LONG TERM CURRENT USE OF ANTICOAGULANT THERAPY: ICD-10-CM

## 2021-08-06 DIAGNOSIS — I48.20 CHRONIC ATRIAL FIBRILLATION (H): Primary | ICD-10-CM

## 2021-08-06 LAB — INR BLD: 3.1 (ref 0.9–1.1)

## 2021-08-06 PROCEDURE — 36416 COLLJ CAPILLARY BLOOD SPEC: CPT

## 2021-08-06 PROCEDURE — 85610 PROTHROMBIN TIME: CPT

## 2021-08-06 RX ORDER — WARFARIN SODIUM 2 MG/1
TABLET ORAL
Qty: 90 TABLET | Refills: 1 | Status: SHIPPED | OUTPATIENT
Start: 2021-08-06 | End: 2022-01-24

## 2021-08-06 NOTE — TELEPHONE ENCOUNTER
Reason for Call:  Request for results:    Name of test or procedure: INR     Date of test of procedure: 8/6/21    Location of the test or procedure: [facility]     OK to leave the result message on voice mail or with a family member? YES    Phone number Patient can be reached at:  Home number on file [phone] (home)    Additional comments: Patient needing results of INR for medication dosage.     Call taken on 8/6/2021 at 4:25 PM by Anita Sorenson

## 2021-08-06 NOTE — TELEPHONE ENCOUNTER
I called patient, INR was discussed with daughter, Cleo, per request in chart.  Warfarin dosing reviewed with patient, she denies any bleeding concerns.    Mariola Valenzuela RN

## 2021-08-06 NOTE — PROGRESS NOTES
ANTICOAGULATION MANAGEMENT     Leonor Mercado 89 year old female is on warfarin with supratherapeutic INR result. (Goal INR 2.0-3.0)    Recent labs: (last 7 days)     08/06/21  1352   INR 3.1*       ASSESSMENT     Source(s): Patient/Caregiver Call       Warfarin doses taken: Warfarin taken as instructed    Diet: No new diet changes identified--Cleo states Alfredo eats greens a few times per week    New illness, injury, or hospitalization: No    Medication/supplement changes: None noted--patient taking Remeron QHS    Signs or symptoms of bleeding or clotting: No    Previous INR: Therapeutic last 2(+) visits    Additional findings: Pt's COPD has been flaring with poor air quality and has not been sleeping well     Patient has MTM visit on 8/16/21    Daughter, Cleo, helps Leonor set up medications every other Sat.     PLAN     Recommended plan for no diet, medication or health factor changes affecting INR     Dosing Instructions: Continue your current warfarin dose with next INR in 10 days       Summary  As of 8/6/2021    Full warfarin instructions:  1 mg every Mon, Fri; 2 mg all other days   Next INR check:  8/16/2021             Telephone call with  daughter, Cleo who verbalizes understanding and agrees to plan    Lab visit scheduled    Education provided: Importance of consistent vitamin K intake    Plan made per [facility] anticoagulation protocol    Mariola Valenzuela, RN  Anticoagulation Clinic  8/6/2021    _______________________________________________________________________     Anticoagulation Episode Summary     Current INR goal:  2.0-3.0   TTR:  72.6 % (11.9 mo)   Target end date:  Indefinite   Send INR reminders to:  [facility]    Indications    Chronic atrial fibrillation (H) [I48.20]  Long term current use of anticoagulant therapy [Z79.01]           Comments:           Anticoagulation Care Providers     Provider Role Specialty Phone number    Arnel Garcia MD Referring Internal Medicine - Pediatrics  [phone]    Marisabel Guido MD Referring Internal Medicine - Pediatrics [phone]

## 2021-08-10 ENCOUNTER — TELEPHONE (OUTPATIENT)
Dept: ANTICOAGULATION | Facility: CLINIC | Age: 86
End: 2021-08-10

## 2021-08-10 NOTE — TELEPHONE ENCOUNTER
Cleo, patient's daughter called confirm warfarin dosing as warfarin Rx bottle sigs were different. Patient may have been taking warfarin differently than instructed. Please call daughter Cleo with warfarin dosing instruction.  Dilma Rider RN, BSN  Anticoagulation Clinic

## 2021-08-15 NOTE — PROGRESS NOTES
Medication Therapy Management (MTM) Encounter    ASSESSMENT:                            Medication Adherence/Access: No issues identified    Pain: stable    Hyperlipidemia: Stable.     Depression/Anxiety/Insomnia:  did not complete BRIGHT/PHQ-9, would be difficult to do this over the telephone today, patient had hard time hearing me, consider getting when she has visit in clinic     COPD: Stable    IBS-constipation: stable     Hypertension/afib/HFp/CAD:  Stable. Patient is meeting blood pressure goal of < 140/90mmHg.  Due to see Clifford Thomason in September, due for blood pressure check and CMP.  INR not at goal, due to talk to anticoagulation clinic today for plan.  Would be helpful to review heart failure action plan in the future in person.     Vaccines:  up to date    Supplements/Osteopenia: recommend calcium goal of 1200mg per day, getting some calcium from diet, would benefit from changing to combination calcium product with larger amount of vitamin D to allow for discontinuing separate vitamin D supplement and to get more calcium in.    PLAN:                            Supplements/Osteopenia:  Change the calcium/magnesium/zinc/vitamin D to Calcium +D take 1 tablet twice daily and stop vitamin D    Due to get comprehensive metabolic panel when you see cardiology in September, order placed    Talk to anticoagulation clinic today to discuss warfarin dose.     Make an appointment to see  Dr. Garcia for kidney symptoms, difficulty peeing.    Future consideration for in person visit:  BRIGHT/PHQ9, HF action plan    Follow-up: Return in about 1 year (around 8/16/2022) for MTM Pharmacist Visit, clinic visit.    SUBJECTIVE/OBJECTIVE:                          Leonor Mercado is a 89 year old female called for an initial visit for 2021. She was referred to me from Arnel Garcia.  Previously seen by Shakira Blackman, CelinaD on 10/19/21. Cleo daughter is on the phone  ]    Reason for visit: She reports working with Shakira and it  was really helpful in the past although doing visit on telephone is different.      Allergies/ADRs: Reviewed in chart  Past Medical History: Reviewed in chart Hearing loss  Tobacco: She reports that she quit smoking about 34 years ago. Her smoking use included cigarettes. She smoked 0.00 packs per day. She has never used smokeless tobacco.  She has never used smokeless tobacco.  Alcohol: Less than 1 beverages / week  Caffeine: 0.5 cups/day of coffee     Medication Adherence/Access:  Patient uses pill box(es), 2 weeks at a time.  Daughter, Cleo, helps Alfredo set up medications every other Saturday  Patient takes medications 2 time(s) per day.  Per patient, misses medication 0 times per week.   Medication barriers: none.   The patient fills medications at [facility]: NO, fills medications at [facility].     Pain:  Current mediction is acetaminophen 500mg bedtime.  Sleeps better at night for the pain.  Side effects: none  She is getting cramps when she pees    Hyperlipidemia: Current therapy includes pravastatin 40mg once daily.  Denies any concerns.  Recent Labs   Lab Test 08/21/20  1040 10/29/19  1004 03/15/15  0745   CHOL 168 139 153   HDL 73 78 52   LDL 67 49 75   TRIG 140 61 129   CHOLHDLRATIO  --   --  2.9        Depression/Anxiety/Insomnia:  Current medications include: APAP 500 mg (which she believes helps her sleep due to controlling pain), mirtazapine 15 mg bedtime and Melatonin 10 mg bedtime (1000mcg lemon balm).  Has never slept really well; sounds like she gets about 8 hours but it is disrupted sleep.  No change in mood.  She falls asleep most of the time.   No concerns for side effects.    PHQ 4/20/2018 11/30/2018 5/14/2020   PHQ-9 Total Score 8 14 6   Q9: Thoughts of better off dead/self-harm past 2 weeks Not at all Several days Not at all     COPD: Current medications: Trelegy 1 puff once daily, Oxygen 2-3L at night (did not specifically talk about this one today) and albuterol as needed (she feels  this works).  No side effects.  Triggered by cold air and wild fires.  Followed by Dr. Her; last visit 7/28/20.  Pt reports the following symptoms: none  COPD Action Plan on file.     IBS-constipation: Currently taking Senna as needed not every day, probiotic daily take at night and Miralax 1 scoopful daily. No concerns at this time. Having regular BMs, every 1-2 days.     Hypertension/afib/HFp/CAD: Current medications include losartan 25 mg daily in AM, furosemide 40 mg daily, diltiazem 240 mg daily, isosorbide ER 30 mg daily, warfarin daily as directed by anticoagulation clinic (1mg every Monday and Friday, 2mg other days). Next INR check 8/16/21   No concerns about bleeding at this time.  Wearing support hose.  Minimal swelling in her ankles at the end of the day.  Blood pressure at home not done recently  Cardiologist: Dr. Horton  BP Readings from Last 3 Encounters:   01/19/21 114/72   01/12/21 130/82   01/08/21 114/68     Creatinine   Date Value Ref Range Status   01/05/2021 1.13 (H) 0.52 - 1.04 mg/dL Final     Potassium   Date Value Ref Range Status   01/05/2021 4.3 3.4 - 5.3 mmol/L Final     Lab Results   Component Value Date    INR 3.1 08/06/2021    INR 2.4 07/16/2021    INR 2.30 07/02/2021    INR 1.90 06/25/2021     INR   Date Value Ref Range Status   08/16/2021 3.1 (H) 0.9 - 1.1 Final   07/02/2021 2.30 (H) 0.86 - 1.14 Final     Comment:     This test is intended for monitoring Coumadin therapy.  Results are not   accurate in patients with prolonged INR due to factor deficiency.        Most Recent Immunizations   Administered Date(s) Administered     COVID-19,PF,Moderna 03/09/2021     Influenza (High Dose) 3 valent vaccine 10/01/2019     Influenza (IIV3) PF 10/01/2014     Influenza Vaccine, 6+MO IM (QUADRIVALENT W/PRESERVATIVES) 08/02/2018     Mantoux Tuberculin Skin Test 07/04/2019     Pneumo Conj 13-V (2010&after) 04/18/2019     Pneumococcal 23 valent 10/15/2012     Tdap (Adacel,Boostrix) 10/15/2012      Zoster vaccine recombinant adjuvanted (SHINGRIX) 08/16/2019     Supplements: Currently taking probiotic every day, Ocuvite 1 capsule every day, vitamin D 1000 units every day, calcium 1000mg/magnesium 400/zinc/vitamin D 5mcg every day in morning. No reported issues at this time. .  DEXA 2013 Osteopenia  Vitamin D Deficiency Screening Results:  No results found for: VITDT    Today's Vitals: none  ----------------    I spent 39 minutes with this patient today. All changes were made via collaborative practice agreement with Arnel Garcia MD. A copy of the visit note was provided to the patient's primary care provider.    The patient was mailed a summary of these recommendations.     Yoana Peacock, PharmD [facility]  Medication Therapy Management Practitioner   #[phone]    Telemedicine Visit Details  Type of service:  Telephone visit  Start Time: 2:45pm  End Time: 3:24 PM  Originating Location (patient location): [place]  Distant Location (provider location):  [facility]     Medication Therapy Recommendations  Chronic heart failure with preserved ejection fraction (H)    Current Medication: furosemide (LASIX) 20 MG tablet   Rationale: Medication requires monitoring - Needs additional monitoring - Safety   Recommendation: Order Lab - due for BP check and BMP   Status: Accepted per CPA         Senile osteoporosis    Current Medication: Multiple Minerals-Vitamins (CALCIUM-MAGNESIUM-ZINC-D3) TABS (Discontinued)   Rationale: More effective medication available - Ineffective medication - Effectiveness   Recommendation: Change Medication - CALCIUM + D PO   Status: Accepted - no CPA Needed

## 2021-08-16 ENCOUNTER — ANTICOAGULATION THERAPY VISIT (OUTPATIENT)
Dept: ANTICOAGULATION | Facility: CLINIC | Age: 86
End: 2021-08-16

## 2021-08-16 ENCOUNTER — VIRTUAL VISIT (OUTPATIENT)
Dept: PHARMACY | Facility: CLINIC | Age: 86
End: 2021-08-16
Attending: INTERNAL MEDICINE
Payer: COMMERCIAL

## 2021-08-16 ENCOUNTER — LAB (OUTPATIENT)
Dept: LAB | Facility: CLINIC | Age: 86
End: 2021-08-16
Payer: MEDICARE

## 2021-08-16 DIAGNOSIS — I10 ESSENTIAL HYPERTENSION WITH GOAL BLOOD PRESSURE LESS THAN 140/90: ICD-10-CM

## 2021-08-16 DIAGNOSIS — Z79.01 LONG TERM CURRENT USE OF ANTICOAGULANT THERAPY: ICD-10-CM

## 2021-08-16 DIAGNOSIS — I25.10 CORONARY ARTERY DISEASE INVOLVING NATIVE CORONARY ARTERY OF NATIVE HEART WITHOUT ANGINA PECTORIS: ICD-10-CM

## 2021-08-16 DIAGNOSIS — E78.5 HYPERLIPIDEMIA LDL GOAL <100: ICD-10-CM

## 2021-08-16 DIAGNOSIS — Z71.85 VACCINE COUNSELING: ICD-10-CM

## 2021-08-16 DIAGNOSIS — J44.9 CHRONIC OBSTRUCTIVE PULMONARY DISEASE, UNSPECIFIED COPD TYPE (H): ICD-10-CM

## 2021-08-16 DIAGNOSIS — F32.A DEPRESSION, UNSPECIFIED DEPRESSION TYPE: ICD-10-CM

## 2021-08-16 DIAGNOSIS — F41.9 ANXIETY: ICD-10-CM

## 2021-08-16 DIAGNOSIS — I48.20 CHRONIC ATRIAL FIBRILLATION (H): Primary | ICD-10-CM

## 2021-08-16 DIAGNOSIS — M81.0 SENILE OSTEOPOROSIS: ICD-10-CM

## 2021-08-16 DIAGNOSIS — M19.90 ARTHRITIS PAIN: Primary | ICD-10-CM

## 2021-08-16 DIAGNOSIS — I50.32 CHRONIC HEART FAILURE WITH PRESERVED EJECTION FRACTION (H): ICD-10-CM

## 2021-08-16 DIAGNOSIS — Z78.9 TAKES DIETARY SUPPLEMENTS: ICD-10-CM

## 2021-08-16 DIAGNOSIS — I48.20 CHRONIC ATRIAL FIBRILLATION (H): ICD-10-CM

## 2021-08-16 DIAGNOSIS — G47.00 INSOMNIA, UNSPECIFIED TYPE: ICD-10-CM

## 2021-08-16 DIAGNOSIS — K58.9 IRRITABLE BOWEL SYNDROME, UNSPECIFIED TYPE: ICD-10-CM

## 2021-08-16 LAB — INR BLD: 3.1 (ref 0.9–1.1)

## 2021-08-16 PROCEDURE — 99607 MTMS BY PHARM ADDL 15 MIN: CPT | Performed by: PHARMACIST

## 2021-08-16 PROCEDURE — 99605 MTMS BY PHARM NP 15 MIN: CPT | Performed by: PHARMACIST

## 2021-08-16 PROCEDURE — 85610 PROTHROMBIN TIME: CPT

## 2021-08-16 PROCEDURE — 36416 COLLJ CAPILLARY BLOOD SPEC: CPT

## 2021-08-16 RX ORDER — MULTIVIT,IRON,MINERALS/LUTEIN
1 TABLET ORAL DAILY
COMMUNITY
End: 2021-08-16 | Stop reason: ALTCHOICE

## 2021-08-16 NOTE — LETTER
August 16, 2021      Leonor Mercado  [address]   [place] [address]        Dear Leonor,     Nice to talk to you on the telephone!  Enclosed is what we discussed.      Sincerely,        Yoana Peacock [facility]

## 2021-08-16 NOTE — PROGRESS NOTES
ANTICOAGULATION MANAGEMENT     Leonor Mercado 89 year old female is on warfarin with supratherapeutic INR result. (Goal INR 2.0-3.0)    Recent labs: (last 7 days)     08/16/21  1400   INR 3.1*       ASSESSMENT     Source(s): Chart Review and Patient/Caregiver Call       Warfarin doses taken: Warfarin taken as instructed    Diet: No new diet changes identified. She will try to add half a cup broccoli a week.    New illness, injury, or hospitalization: No    Medication/supplement changes: None noted    Signs or symptoms of bleeding or clotting: No    Previous INR: Supratherapeutic    Additional findings: None     PLAN     Recommended plan for temporary change(s) affecting INR     Dosing Instructions: Partial hold then continue your current warfarin dose with next INR in 10 days.   When she was taking 1 mg less a week in the past, she consistently ran under 2.0, so reluctant to make a maintenance dose change.       Summary  As of 8/16/2021    Full warfarin instructions:  8/17: 1 mg; Otherwise 1 mg every Mon, Fri; 2 mg all other days   Next INR check:  8/26/2021             Telephone call with  daughter, Cleo, who agrees to plan and repeated back plan correctly. Patient was sitting near by.    Lab visit scheduled    Education provided: Please call back if any changes to your diet, medications or how you've been taking warfarin, Importance of consistent vitamin K intake, Impact of vitamin K foods on INR, Target INR goal and significance of current INR result and Monitoring for bleeding signs and symptoms    Plan made per [facility] anticoagulation protocol    Ebony Doshi RN  Anticoagulation Clinic  8/16/2021    _______________________________________________________________________     Anticoagulation Episode Summary     Current INR goal:  2.0-3.0   TTR:  69.8 % (11.9 mo)   Target end date:  Indefinite   Send INR reminders to:  [facility]    Indications    Chronic atrial fibrillation (H) [I48.20]  Long term  current use of anticoagulant therapy [Z79.01]           Comments:           Anticoagulation Care Providers     Provider Role Specialty Phone number    Arnel Garcia MD Referring Internal Medicine - Pediatrics [phone]    Marisabel Guido MD Referring Internal Medicine - Pediatrics [phone]

## 2021-08-16 NOTE — PATIENT INSTRUCTIONS
Recommendations from today's MTM visit:                                                    MTM (medication therapy management) is a service provided by a clinical pharmacist designed to help you get the most of out of your medicines.     Supplements/Osteopenia:  Change the calcium/magnesium/zinc/vitamin D to Calcium +D take 1 tablet twice daily and stop vitamin D    Due to get comprehensive metabolic panel when you see cardiology in September, order placed    Talk to anticoagulation clinic today to discuss warfarin dose.     Make an appointment to see  Dr. Garcia for kidney symptoms, difficulty peeing.    Follow-up: Return in about 1 year (around 8/16/2022) for MTM Pharmacist Visit, clinic visit.    To schedule another MTM appointment, please call the clinic directly or you may call the MTM scheduling line at [phone] or toll-free at [phone].     My Clinical Pharmacist's contact information:                                                      It was a pleasure seeing you today!  Please feel free to contact me with any questions or concerns you have.      Yoana Peacock, PharmD [facility]  Medication Therapy Management Practitioner   #[phone]    It was great to speak with you today.  I value your experience and would be very thankful for your time with providing feedback on our clinic survey. You may receive a survey via email or text message in the next few days.

## 2021-08-25 ENCOUNTER — ANCILLARY PROCEDURE (OUTPATIENT)
Dept: CARDIOLOGY | Facility: CLINIC | Age: 86
End: 2021-08-25
Attending: INTERNAL MEDICINE
Payer: MEDICARE

## 2021-08-25 DIAGNOSIS — I49.5 SICK SINUS SYNDROME (H): ICD-10-CM

## 2021-08-25 DIAGNOSIS — Z95.0 CARDIAC PACEMAKER IN SITU: Primary | ICD-10-CM

## 2021-08-25 PROCEDURE — 93280 PM DEVICE PROGR EVAL DUAL: CPT | Performed by: INTERNAL MEDICINE

## 2021-08-26 ENCOUNTER — LAB (OUTPATIENT)
Dept: LAB | Facility: CLINIC | Age: 86
End: 2021-08-26
Payer: MEDICARE

## 2021-08-26 ENCOUNTER — ANTICOAGULATION THERAPY VISIT (OUTPATIENT)
Dept: ANTICOAGULATION | Facility: CLINIC | Age: 86
End: 2021-08-26

## 2021-08-26 DIAGNOSIS — Z79.01 LONG TERM CURRENT USE OF ANTICOAGULANT THERAPY: ICD-10-CM

## 2021-08-26 DIAGNOSIS — I48.20 CHRONIC ATRIAL FIBRILLATION (H): Primary | ICD-10-CM

## 2021-08-26 DIAGNOSIS — I48.20 CHRONIC ATRIAL FIBRILLATION (H): ICD-10-CM

## 2021-08-26 LAB
INR BLD: 2.4 (ref 0.9–1.1)
MDC_IDC_MSMT_BATTERY_DTM: NORMAL
MDC_IDC_MSMT_BATTERY_IMPEDANCE: 937 OHM
MDC_IDC_MSMT_BATTERY_REMAINING_LONGEVITY: 80 MO
MDC_IDC_MSMT_BATTERY_STATUS: NORMAL
MDC_IDC_MSMT_BATTERY_VOLTAGE: 2.78 V
MDC_IDC_MSMT_LEADCHNL_RA_IMPEDANCE_VALUE: 67 OHM
MDC_IDC_MSMT_LEADCHNL_RV_IMPEDANCE_VALUE: 678 OHM
MDC_IDC_MSMT_LEADCHNL_RV_PACING_THRESHOLD_AMPLITUDE: 1.25 V
MDC_IDC_MSMT_LEADCHNL_RV_PACING_THRESHOLD_PULSEWIDTH: 0.52 MS
MDC_IDC_MSMT_LEADCHNL_RV_SENSING_INTR_AMPL: 8 MV
MDC_IDC_PG_IMPLANT_DTM: NORMAL
MDC_IDC_PG_MFG: NORMAL
MDC_IDC_PG_MODEL: NORMAL
MDC_IDC_PG_SERIAL: NORMAL
MDC_IDC_PG_TYPE: NORMAL
MDC_IDC_SESS_CLINIC_NAME: NORMAL
MDC_IDC_SESS_DTM: NORMAL
MDC_IDC_SESS_TYPE: NORMAL
MDC_IDC_SET_BRADY_LOWRATE: 60 {BEATS}/MIN
MDC_IDC_SET_BRADY_MAX_SENSOR_RATE: 120 {BEATS}/MIN
MDC_IDC_SET_BRADY_MAX_TRACKING_RATE: 105 {BEATS}/MIN
MDC_IDC_SET_BRADY_MODE: NORMAL
MDC_IDC_SET_LEADCHNL_RV_PACING_AMPLITUDE: 2.5 V
MDC_IDC_SET_LEADCHNL_RV_PACING_CAPTURE_MODE: NORMAL
MDC_IDC_SET_LEADCHNL_RV_PACING_POLARITY: NORMAL
MDC_IDC_SET_LEADCHNL_RV_PACING_PULSEWIDTH: 0.52 MS
MDC_IDC_SET_LEADCHNL_RV_SENSING_POLARITY: NORMAL
MDC_IDC_SET_LEADCHNL_RV_SENSING_SENSITIVITY: 2.8 MV
MDC_IDC_SET_ZONE_DETECTION_INTERVAL: 333.33 MS
MDC_IDC_SET_ZONE_TYPE: NORMAL
MDC_IDC_SET_ZONE_TYPE: NORMAL
MDC_IDC_STAT_AT_BURDEN_PERCENT: 0 %
MDC_IDC_STAT_AT_DTM_END: NORMAL
MDC_IDC_STAT_AT_DTM_START: NORMAL
MDC_IDC_STAT_BRADY_DTM_END: NORMAL
MDC_IDC_STAT_BRADY_DTM_START: NORMAL
MDC_IDC_STAT_BRADY_RV_PERCENT_PACED: 68 %
MDC_IDC_STAT_EPISODE_RECENT_COUNT: 0
MDC_IDC_STAT_EPISODE_RECENT_COUNT: 1
MDC_IDC_STAT_EPISODE_RECENT_COUNT_DTM_END: NORMAL
MDC_IDC_STAT_EPISODE_RECENT_COUNT_DTM_END: NORMAL
MDC_IDC_STAT_EPISODE_RECENT_COUNT_DTM_START: NORMAL
MDC_IDC_STAT_EPISODE_RECENT_COUNT_DTM_START: NORMAL
MDC_IDC_STAT_EPISODE_TYPE: NORMAL
MDC_IDC_STAT_EPISODE_TYPE: NORMAL

## 2021-08-26 PROCEDURE — 85610 PROTHROMBIN TIME: CPT

## 2021-08-26 PROCEDURE — 36416 COLLJ CAPILLARY BLOOD SPEC: CPT

## 2021-08-26 NOTE — PROGRESS NOTES
ANTICOAGULATION MANAGEMENT     Leonor Mercado 89 year old female is on warfarin with therapeutic INR result. (Goal INR 2.0-3.0)    Recent labs: (last 7 days)     08/26/21  1354   INR 2.4*       ASSESSMENT     Source(s): Chart Review and Patient/Caregiver Call       Warfarin doses taken: Warfarin taken as instructed    Diet: No new diet changes identified    New illness, injury, or hospitalization: No    Medication/supplement changes: None noted    Signs or symptoms of bleeding or clotting: No    Previous INR: Supratherapeutic    Additional findings: None     PLAN     Recommended plan for no diet, medication or health factor changes affecting INR     Dosing Instructions: Continue your current warfarin dose with next INR in 3 weeks       Summary  As of 8/26/2021    Full warfarin instructions:  1 mg every Mon, Fri; 2 mg all other days   Next INR check:  9/16/2021             Telephone call with  daughter, Cleo, who verbalizes understanding and agrees to plan    Lab visit scheduled    Education provided: Importance of consistent vitamin K intake and Impact of vitamin K foods on INR    Plan made per [facility] anticoagulation protocol    Ebony Doshi RN  Anticoagulation Clinic  8/26/2021    _______________________________________________________________________     Anticoagulation Episode Summary     Current INR goal:  2.0-3.0   TTR:  69.4 % (11.9 mo)   Target end date:  Indefinite   Send INR reminders to:  [facility]    Indications    Chronic atrial fibrillation (H) [I48.20]  Long term current use of anticoagulant therapy [Z79.01]           Comments:           Anticoagulation Care Providers     Provider Role Specialty Phone number    Arnel Garcia MD Referring Internal Medicine - Pediatrics [phone]    Marisabel Guido MD Referring Internal Medicine - Pediatrics [phone]

## 2021-09-13 ENCOUNTER — OFFICE VISIT (OUTPATIENT)
Dept: CARDIOLOGY | Facility: CLINIC | Age: 86
End: 2021-09-13
Payer: MEDICARE

## 2021-09-13 VITALS
BODY MASS INDEX: 25.62 KG/M2 | SYSTOLIC BLOOD PRESSURE: 114 MMHG | OXYGEN SATURATION: 96 % | WEIGHT: 150.1 LBS | DIASTOLIC BLOOD PRESSURE: 62 MMHG | HEIGHT: 64 IN | HEART RATE: 82 BPM

## 2021-09-13 DIAGNOSIS — I25.10 CORONARY ARTERY DISEASE INVOLVING NATIVE CORONARY ARTERY OF NATIVE HEART WITHOUT ANGINA PECTORIS: ICD-10-CM

## 2021-09-13 DIAGNOSIS — I48.21 PERMANENT ATRIAL FIBRILLATION (H): Primary | ICD-10-CM

## 2021-09-13 DIAGNOSIS — I07.9 TRICUSPID VALVE DISEASE: ICD-10-CM

## 2021-09-13 PROCEDURE — 99213 OFFICE O/P EST LOW 20 MIN: CPT | Performed by: INTERNAL MEDICINE

## 2021-09-13 ASSESSMENT — MIFFLIN-ST. JEOR: SCORE: 1052.91

## 2021-09-13 NOTE — PROGRESS NOTES
HPI and Plan:   See dictation    Orders Placed This Encounter   Procedures     Basic metabolic panel     Lipid Profile     Follow-Up with Cardiologist     Echocardiogram Complete     No orders of the defined types were placed in this encounter.    There are no discontinued medications.      Encounter Diagnoses   Name Primary?     Permanent atrial fibrillation (H) Yes     Coronary artery disease involving native coronary artery of native heart without angina pectoris      Tricuspid valve disease        CURRENT MEDICATIONS:  Current Outpatient Medications   Medication Sig Dispense Refill     acetaminophen (TYLENOL) 500 MG tablet Take 500 mg by mouth At Bedtime       albuterol (PROAIR HFA/PROVENTIL HFA/VENTOLIN HFA) 108 (90 Base) MCG/ACT inhaler Inhale 2 puffs into the lungs every 6 hours as needed for shortness of breath / dyspnea or wheezing 18 g 3     Calcium Carbonate-Vitamin D (CALCIUM 500+D HIGH POTENCY PO) Take 1 tablet by mouth 2 times daily       diltiazem ER COATED BEADS (CARDIZEM CD/CARTIA XT) 240 MG 24 hr capsule Take 1 capsule by mouth once daily 90 capsule 3     Fluticasone-Umeclidin-Vilanterol (TRELEGY ELLIPTA) 100-62.5-25 MCG/INH oral inhaler Inhale 1 puff into the lungs daily       furosemide (LASIX) 20 MG tablet Take 1 tablet by mouth twice daily (Patient taking differently: Take 40 mg by mouth daily ) 180 tablet 3     isosorbide mononitrate (IMDUR) 30 MG 24 hr tablet Take 1 tablet by mouth once daily 90 tablet 0     Lactobacillus (PROBIOTIC ACIDOPHILUS) TABS Take 1 tablet by mouth daily        losartan (COZAAR) 25 MG tablet Take 1 tablet by mouth once daily 90 tablet 3     melatonin 5 MG tablet Take 10 mg by mouth nightly as needed for sleep       mirtazapine (REMERON) 15 MG tablet TAKE 1 TABLET BY MOUTH AT BEDTIME 90 tablet 0     Multiple Vitamins-Minerals (OCUVITE PO) Take 1 capsule by mouth daily.       polyethylene glycol (MIRALAX/GLYCOLAX) Packet Take by mouth daily 1 scoopful once daily        pravastatin (PRAVACHOL) 40 MG tablet Take 1 tablet by mouth once daily (Patient taking differently: Take 40 mg by mouth every evening ) 90 tablet 3     senna (SENOKOT) 8.6 MG tablet Take 2 tablets by mouth nightly as needed for constipation        Vitamin D, Cholecalciferol, 1000 units TABS Take 1,000 Units by mouth daily       warfarin ANTICOAGULANT (COUMADIN) 2 MG tablet TAKE 1/2 TABLET (1MG) ON MON & FRI / TAKE 1 TABLET (2MG) ALL OTHER DAYS OR AS DIRECTED BY [facility] 90 tablet 1     ASPIRIN NOT PRESCRIBED (INTENTIONAL) Please choose reason not prescribed, below (Patient not taking: Reported on 8/16/2021) 0 each 0       ALLERGIES     Allergies   Allergen Reactions     Amiodarone      pulm fibrosis       Baclofen      Confusion      Bactrim [Sulfamethoxazole W-Trimethoprim]      Difficulty swallowing     Doxycycline Other (See Comments)     Multiple complaints; she does not want this again.      Levaquin [Levofloxacin] Other (See Comments)     Multiple complaints; she will not take this again     Linzess [Linaclotide] Diarrhea     Lorazepam        Prolonged drowsiness with ER visit      Liquid Adhesive Itching and Rash     Bleeding when tape removed after pacemaker placement..itched and burned for weeks.       PAST MEDICAL HISTORY:  Past Medical History:   Diagnosis Date     Atrial fibrillation (H)     Sick sinus syndrome, PAT, AF     BCC (basal cell carcinoma of skin)     Face     CHF - Chr Diastolic (H) 11/21/2017     Chronic renal insufficiency      COPD (chronic obstructive pulmonary disease) (H)      Coronary artery disease     2-05[place]-CAD-taxus stentx2 2.5-12,2.5-12 in LADp and LADm, 80%nonDomRCA-LcxDom-mild,EF60%     Hyperlipidemia      Hypertension      IBS (irritable bowel syndrome)      Osteoporosis      Pulmonary fibrosis (H)     do not use amiodarone     Sick sinus syndrome - s/p PPM 2003 (H) 12/16/2017     SSS (sick sinus syndrome) (H)     DDD pacer (see surgery history section)     Vertigo        PAST  SURGICAL HISTORY:  Past Surgical History:   Procedure Laterality Date     APPENDECTOMY       CARDIAC SURGERY      PPM, 2 STENTS2-05[place]-CAD-taxus stentx2 2.5-12,2.5-12 in LADp and LADm, 80%nonDomRCA-LcxDom-mild,EF60%     CORONARY ANGIOGRAPHY ADULT ORDER  1994    mild CAD,Normal LV function, No Mitral regurgitation, Prox LAD 30% stenosis. RCA prox and mid, 20-30%     ESOPHAGOSCOPY, GASTROSCOPY, DUODENOSCOPY (EGD), COMBINED N/A 2015    Procedure: COMBINED ESOPHAGOSCOPY, GASTROSCOPY, DUODENOSCOPY (EGD), BIOPSY SINGLE OR MULTIPLE;  Surgeon: Genevieve Leon MD;  Location: [facility]     H LEAD REVISION DUAL  2003    Revised in [place]-due to diaphram stim (original pacer placed in [place])     H LEAD REVISION DUAL  2014    Correction of reversal of A and V leads in Header     HEART CATH, ANGIOPLASTY  2005    LEIGH ANN mid and proximal LAD, ongoing 80% RCA; mild circumflex     HERNIA REPAIR Left     [facility]     IMPLANT PACEMAKER  2003    Placed in [place] for sick sinus syndrome     REPLACE PACEMAKER GENERATOR  2013    Dual-chamber pacemaker by Dr SETH Pierre       FAMILY HISTORY:  Family History   Problem Relation Age of Onset     Heart Disease Father          age 84     Neurologic Disorder Mother         dementia     Gastrointestinal Disease Daughter         liver transplant     Crohn's Disease Daughter        SOCIAL HISTORY:  Social History     Socioeconomic History     Marital status:      Spouse name: None     Number of children: 3     Years of education: None     Highest education level: None   Occupational History     Employer: RETIRED   Tobacco Use     Smoking status: Former Smoker     Packs/day: 0.00     Types: Cigarettes     Quit date: 1987     Years since quittin.3     Smokeless tobacco: Never Used   Substance and Sexual Activity     Alcohol use: Yes     Alcohol/week: 0.0 standard drinks     Comment: occ     Drug use: No     Sexual activity: Never     Partners: Male   Other  Topics Concern     Parent/sibling w/ CABG, MI or angioplasty before 65F 55M? Yes      Service Not Asked     Blood Transfusions Not Asked     Caffeine Concern Yes     Comment: decaf - some 1/2 caff     Occupational Exposure Not Asked     Hobby Hazards Not Asked     Sleep Concern Yes     Comment: nocturia every 2 hours     Stress Concern Not Asked     Weight Concern Not Asked     Special Diet No     Back Care Not Asked     Exercise No     Comment: some walking in the house     Bike Helmet Not Asked     Seat Belt Not Asked     Self-Exams Not Asked   Social History Narrative     None     Social Determinants of Health     Financial Resource Strain:      Difficulty of Paying Living Expenses:    Food Insecurity:      Worried About Running Out of Food in the Last Year:      Ran Out of Food in the Last Year:    Transportation Needs:      Lack of Transportation (Medical):      Lack of Transportation (Non-Medical):    Physical Activity:      Days of Exercise per Week:      Minutes of Exercise per Session:    Stress:      Feeling of Stress :    Social Connections:      Frequency of Communication with Friends and Family:      Frequency of Social Gatherings with Friends and Family:      Attends [employer] Services:      Active Member of Clubs or Organizations:      Attends Club or Organization Meetings:      Marital Status:    Intimate Partner Violence:      Fear of Current or Ex-Partner:      Emotionally Abused:      Physically Abused:      Sexually Abused:        Review of Systems:  Skin:  Negative itching   Eyes:  Positive for glasses  ENT:  Positive for hearing loss  Respiratory:  Positive for dyspnea on exertion;shortness of breath;cough;wheezing  Cardiovascular:  Negative Positive for;edema  Gastroenterology: Positive for poor appetite;constipation  Genitourinary:  Positive for nocturia  Musculoskeletal:  Positive for arthritis  Neurologic:  Negative headaches  Psychiatric:  Positive for sleep  "disturbances  Heme/Lymph/Imm:  Negative easy bruising  Endocrine:  Negative      Physical Exam:  Vitals: /62 (BP Location: Right arm, Patient Position: Sitting, Cuff Size: Adult Regular)   Pulse 82   Ht 1.613 m (5' 3.5\")   Wt 68.1 kg (150 lb 1.6 oz)   LMP  (LMP Unknown)   SpO2 96%   BMI 26.17 kg/m      Constitutional:  cooperative, alert and oriented, well developed, well nourished, in no acute distress        Skin:  warm and dry to the touch, no apparent skin lesions or masses noted          Head:  normocephalic, no masses or lesions        Eyes:  pupils equal and round, conjunctivae and lids unremarkable, sclera white, no xanthalasma, EOMS intact, no nystagmus        Lymph:No Cervical lymphadenopathy present;No thyromegaly     ENT:           Neck:  carotid pulses are full and equal bilaterally, JVP normal, no carotid bruit   Mild JVP increase with possible V wave    Respiratory:       soft wheeze on R lung and overall decreased air movement, no rales    Cardiac:   irregularly irregular rhythm     early systolic murmur                                                 GI:  BS normoactive        Extremities and Muscular Skeletal:  no deformities, clubbing, cyanosis, erythema observed     RLE edema;trace        Neurological:  no gross motor deficits   [place]    Psych:  Alert and Oriented x 3      Recent Lab Results:  LIPID RESULTS:  Lab Results   Component Value Date    CHOL 168 08/21/2020    HDL 73 08/21/2020    LDL 67 08/21/2020    TRIG 140 08/21/2020    CHOLHDLRATIO 2.9 03/15/2015       LIVER ENZYME RESULTS:  Lab Results   Component Value Date    AST 23 08/21/2020    ALT 24 08/21/2020       CBC RESULTS:  Lab Results   Component Value Date    WBC 5.9 01/05/2021    RBC 4.03 01/05/2021    HGB 12.0 01/05/2021    HCT 38.7 01/05/2021    MCV 96 01/05/2021    MCH 29.8 01/05/2021    MCHC 31.0 (L) 01/05/2021    RDW 14.6 01/05/2021     01/05/2021       BMP RESULTS:  Lab Results   Component Value Date     " 01/05/2021    POTASSIUM 4.3 01/05/2021    CHLORIDE 105 01/05/2021    CO2 30 01/05/2021    ANIONGAP 2 (L) 01/05/2021    GLC 96 01/05/2021    BUN 17 01/05/2021    CR 1.13 (H) 01/05/2021    GFRESTIMATED 41 (L) 01/05/2021    GFRESTBLACK 48 (L) 01/05/2021    TRI 9.1 01/05/2021        A1C RESULTS:  Lab Results   Component Value Date    A1C 6.1 (H) 04/30/2014       INR RESULTS:  Lab Results   Component Value Date    INR 2.4 (H) 08/26/2021    INR 3.1 (H) 08/16/2021    INR 2.30 (H) 07/02/2021    INR 1.90 (H) 06/25/2021           CC  No referring provider defined for this encounter.

## 2021-09-13 NOTE — PROGRESS NOTES
Service Date: 09/13/2021    HISTORY OF PRESENT ILLNESS:  Leonor Mercado returns for followup.  She is a 89-year-old and for 89 she is doing very well.  She is accompanied by her daughter.  She has told us clearly that she no longer wants any advanced testing.  She does not want a stress test because she is not going to have an angiogram again.  She does not really want any type of heart surgery procedures.  Historically, she has coronary disease and had stents placed mostly in [place].  Her last echocardiogram was done on 01/04/2021.  The LV size and systolic function were normal with normal wall motion.  What is abnormal is it has gotten progressively worse.  It is that she now has 3-4+ tricuspid valve insufficiency and with this mild pulmonary hypertension and borderline RV enlargement with mild decreased RV systolic function.  I reviewed with the family the full echo from 01/2021, 04/06/2019 and 08/08/2017.  In 2017, she had trace TR, normal RV size and function and no pulmonary hypertension.  She does suffer from COPD.  She is on multiple inhalers.  With this, she notes she still has some wheezing and brings up some mucus with difficulty and gets short of breath if she exercises too far.  She has surprisingly little ankle edema, but I did tell the family that is going to be one of the #1 symptoms of worsening tricuspid insufficiency and RV dysfunction.  There is no real point in doing a right heart catheterization.  She does not want one, but I am going to ask if she would come back in 6 months for a repeat echo, so I can get an idea if the RV function is getting worse or not and maybe we might need to intensify her diuretic.  Overall, her ankles show only mild edema now, but she tells me that of course at the end of the day it is much worse.  She reports no anginal symptoms.  No dizziness.  She does use a walker.  She is past due for lipid profile.  My plan is to have her come back in 6 months.  We will do  lipids, electrolytes and an echocardiogram.  We did review her pacer chart today.  She is 100% AFib and she is pacing about 68% of the time.  No ventricular arrhythmias were seen.  The family passed on that she is having some pain along her left flank area and occasional blood in her urine.  She is on Coumadin because of atrial fibrillation.  There is no blood now.  They were thinking of seeing a nephrologist, but I told her that they might want to actually skip right to a urologist to make sure there is not some obstructing lesion given that there is some pain and perhaps some blood in the past.  She does not have a known history of nephrolithiasis and her renal insufficiency, at least based on her last creatinine in January, was relatively stable.    Today's visit was 26 minutes.  We will see her back in 6 months for blood work and an echocardiogram.  She will continue in our [facility] and her INR is being run through [place].  Apparently, it got out of control and I suggested that they might want to consider going to one of the newer agents if that happens again.    Collins Horton MD    cc:  Arnel Garcia MD  [address], [address]    Collins Horton MD        D: 2021   T: 2021   MT: nima    Name:     AILYN MARCUS  MRN:      [id number]        Account:      [id number]   :      1926           Service Date: 2021       Document: [id number]

## 2021-09-13 NOTE — LETTER
9/13/2021    Arnel Garcia MD  [address] [place] [address]    RE: Leonor Mercado       Dear Colleague,    I had the pleasure of seeing Leonro Mercado in the [facility].    HPI and Plan:   See dictation    Orders Placed This Encounter   Procedures     Basic metabolic panel     Lipid Profile     Follow-Up with Cardiologist     Echocardiogram Complete     No orders of the defined types were placed in this encounter.    There are no discontinued medications.      Encounter Diagnoses   Name Primary?     Permanent atrial fibrillation (H) Yes     Coronary artery disease involving native coronary artery of native heart without angina pectoris      Tricuspid valve disease        CURRENT MEDICATIONS:  Current Outpatient Medications   Medication Sig Dispense Refill     acetaminophen (TYLENOL) 500 MG tablet Take 500 mg by mouth At Bedtime       albuterol (PROAIR HFA/PROVENTIL HFA/VENTOLIN HFA) 108 (90 Base) MCG/ACT inhaler Inhale 2 puffs into the lungs every 6 hours as needed for shortness of breath / dyspnea or wheezing 18 g 3     Calcium Carbonate-Vitamin D (CALCIUM 500+D HIGH POTENCY PO) Take 1 tablet by mouth 2 times daily       diltiazem ER COATED BEADS (CARDIZEM CD/CARTIA XT) 240 MG 24 hr capsule Take 1 capsule by mouth once daily 90 capsule 3     Fluticasone-Umeclidin-Vilanterol (TRELEGY ELLIPTA) 100-62.5-25 MCG/INH oral inhaler Inhale 1 puff into the lungs daily       furosemide (LASIX) 20 MG tablet Take 1 tablet by mouth twice daily (Patient taking differently: Take 40 mg by mouth daily ) 180 tablet 3     isosorbide mononitrate (IMDUR) 30 MG 24 hr tablet Take 1 tablet by mouth once daily 90 tablet 0     Lactobacillus (PROBIOTIC ACIDOPHILUS) TABS Take 1 tablet by mouth daily        losartan (COZAAR) 25 MG tablet Take 1 tablet by mouth once daily 90 tablet 3     melatonin 5 MG tablet Take 10 mg by mouth nightly as needed for sleep        mirtazapine (REMERON) 15 MG tablet TAKE 1 TABLET BY MOUTH AT BEDTIME 90 tablet 0     Multiple Vitamins-Minerals (OCUVITE PO) Take 1 capsule by mouth daily.       polyethylene glycol (MIRALAX/GLYCOLAX) Packet Take by mouth daily 1 scoopful once daily       pravastatin (PRAVACHOL) 40 MG tablet Take 1 tablet by mouth once daily (Patient taking differently: Take 40 mg by mouth every evening ) 90 tablet 3     senna (SENOKOT) 8.6 MG tablet Take 2 tablets by mouth nightly as needed for constipation        Vitamin D, Cholecalciferol, 1000 units TABS Take 1,000 Units by mouth daily       warfarin ANTICOAGULANT (COUMADIN) 2 MG tablet TAKE 1/2 TABLET (1MG) ON MON & FRI / TAKE 1 TABLET (2MG) ALL OTHER DAYS OR AS DIRECTED BY [facility] 90 tablet 1     ASPIRIN NOT PRESCRIBED (INTENTIONAL) Please choose reason not prescribed, below (Patient not taking: Reported on 8/16/2021) 0 each 0       ALLERGIES     Allergies   Allergen Reactions     Amiodarone      pulm fibrosis       Baclofen      Confusion      Bactrim [Sulfamethoxazole W-Trimethoprim]      Difficulty swallowing     Doxycycline Other (See Comments)     Multiple complaints; she does not want this again.      Levaquin [Levofloxacin] Other (See Comments)     Multiple complaints; she will not take this again     Linzess [Linaclotide] Diarrhea     Lorazepam        Prolonged drowsiness with ER visit      Liquid Adhesive Itching and Rash     Bleeding when tape removed after pacemaker placement..itched and burned for weeks.       PAST MEDICAL HISTORY:  Past Medical History:   Diagnosis Date     Atrial fibrillation (H)     Sick sinus syndrome, PAT, AF     BCC (basal cell carcinoma of skin)     Face     CHF - Chr Diastolic (H) 11/21/2017     Chronic renal insufficiency      COPD (chronic obstructive pulmonary disease) (H)      Coronary artery disease     2-05[place]-CAD-taxus stentx2 2.5-12,2.5-12 in LADp and LADm, 80%nonDomRCA-LcxDom-mild,EF60%     Hyperlipidemia      Hypertension       IBS (irritable bowel syndrome)      Osteoporosis      Pulmonary fibrosis (H)     do not use amiodarone     Sick sinus syndrome - s/p PPM  (H) 2017     SSS (sick sinus syndrome) (H)     DDD pacer (see surgery history section)     Vertigo        PAST SURGICAL HISTORY:  Past Surgical History:   Procedure Laterality Date     APPENDECTOMY       CARDIAC SURGERY      PPM, 2 STENTS2-05[place]-CAD-taxus stentx2 2.5-12,2.5-12 in LADp and LADm, 80%nonDomRCA-LcxDom-mild,EF60%     CORONARY ANGIOGRAPHY ADULT ORDER  1994    mild CAD,Normal LV function, No Mitral regurgitation, Prox LAD 30% stenosis. RCA prox and mid, 20-30%     ESOPHAGOSCOPY, GASTROSCOPY, DUODENOSCOPY (EGD), COMBINED N/A 2015    Procedure: COMBINED ESOPHAGOSCOPY, GASTROSCOPY, DUODENOSCOPY (EGD), BIOPSY SINGLE OR MULTIPLE;  Surgeon: Genevieve Leon MD;  Location:  GI     H LEAD REVISION DUAL  2003    Revised in [place]-due to diaphram stim (original pacer placed in [place])     H LEAD REVISION DUAL  2014    Correction of reversal of A and V leads in Header     HEART CATH, ANGIOPLASTY  2005    LEIGH ANN mid and proximal LAD, ongoing 80% RCA; mild circumflex     HERNIA REPAIR Left     [facility]     IMPLANT PACEMAKER  2003    Placed in [place] for sick sinus syndrome     REPLACE PACEMAKER GENERATOR  2013    Dual-chamber pacemaker by Dr SETH Pierre       FAMILY HISTORY:  Family History   Problem Relation Age of Onset     Heart Disease Father          age 84     Neurologic Disorder Mother         dementia     Gastrointestinal Disease Daughter         liver transplant     Crohn's Disease Daughter        SOCIAL HISTORY:  Social History     Socioeconomic History     Marital status:      Spouse name: None     Number of children: 3     Years of education: None     Highest education level: None   Occupational History     Employer: RETIRED   Tobacco Use     Smoking status: Former Smoker     Packs/day: 0.00     Types: Cigarettes     Quit  date: 1987     Years since quittin.3     Smokeless tobacco: Never Used   Substance and Sexual Activity     Alcohol use: Yes     Alcohol/week: 0.0 standard drinks     Comment: occ     Drug use: No     Sexual activity: Never     Partners: Male   Other Topics Concern     Parent/sibling w/ CABG, MI or angioplasty before 65F 55M? Yes      Service Not Asked     Blood Transfusions Not Asked     Caffeine Concern Yes     Comment: decaf - some 1/2 caff     Occupational Exposure Not Asked     Hobby Hazards Not Asked     Sleep Concern Yes     Comment: nocturia every 2 hours     Stress Concern Not Asked     Weight Concern Not Asked     Special Diet No     Back Care Not Asked     Exercise No     Comment: some walking in the house     Bike Helmet Not Asked     Seat Belt Not Asked     Self-Exams Not Asked   Social History Narrative     None     Social Determinants of Health     Financial Resource Strain:      Difficulty of Paying Living Expenses:    Food Insecurity:      Worried About Running Out of Food in the Last Year:      Ran Out of Food in the Last Year:    Transportation Needs:      Lack of Transportation (Medical):      Lack of Transportation (Non-Medical):    Physical Activity:      Days of Exercise per Week:      Minutes of Exercise per Session:    Stress:      Feeling of Stress :    Social Connections:      Frequency of Communication with Friends and Family:      Frequency of Social Gatherings with Friends and Family:      Attends [employer] Services:      Active Member of Clubs or Organizations:      Attends Club or Organization Meetings:      Marital Status:    Intimate Partner Violence:      Fear of Current or Ex-Partner:      Emotionally Abused:      Physically Abused:      Sexually Abused:        Review of Systems:  Skin:  Negative itching   Eyes:  Positive for glasses  ENT:  Positive for hearing loss  Respiratory:  Positive for dyspnea on exertion;shortness of breath;cough;wheezing  Cardiovascular:   "Negative Positive for;edema  Gastroenterology: Positive for poor appetite;constipation  Genitourinary:  Positive for nocturia  Musculoskeletal:  Positive for arthritis  Neurologic:  Negative headaches  Psychiatric:  Positive for sleep disturbances  Heme/Lymph/Imm:  Negative easy bruising  Endocrine:  Negative      Physical Exam:  Vitals: /62 (BP Location: Right arm, Patient Position: Sitting, Cuff Size: Adult Regular)   Pulse 82   Ht 1.613 m (5' 3.5\")   Wt 68.1 kg (150 lb 1.6 oz)   LMP  (LMP Unknown)   SpO2 96%   BMI 26.17 kg/m      Constitutional:  cooperative, alert and oriented, well developed, well nourished, in no acute distress        Skin:  warm and dry to the touch, no apparent skin lesions or masses noted          Head:  normocephalic, no masses or lesions        Eyes:  pupils equal and round, conjunctivae and lids unremarkable, sclera white, no xanthalasma, EOMS intact, no nystagmus        Lymph:No Cervical lymphadenopathy present;No thyromegaly     ENT:           Neck:  carotid pulses are full and equal bilaterally, JVP normal, no carotid bruit   Mild JVP increase with possible V wave    Respiratory:       soft wheeze on R lung and overall decreased air movement, no rales    Cardiac:   irregularly irregular rhythm     early systolic murmur                                                 GI:  BS normoactive        Extremities and Muscular Skeletal:  no deformities, clubbing, cyanosis, erythema observed     RLE edema;trace        Neurological:  no gross motor deficits   [place]    Psych:  Alert and Oriented x 3      Recent Lab Results:  LIPID RESULTS:  Lab Results   Component Value Date    CHOL 168 08/21/2020    HDL 73 08/21/2020    LDL 67 08/21/2020    TRIG 140 08/21/2020    CHOLHDLRATIO 2.9 03/15/2015       LIVER ENZYME RESULTS:  Lab Results   Component Value Date    AST 23 08/21/2020    ALT 24 08/21/2020       CBC RESULTS:  Lab Results   Component Value Date    WBC 5.9 01/05/2021    RBC 4.03 " 01/05/2021    HGB 12.0 01/05/2021    HCT 38.7 01/05/2021    MCV 96 01/05/2021    MCH 29.8 01/05/2021    MCHC 31.0 (L) 01/05/2021    RDW 14.6 01/05/2021     01/05/2021       BMP RESULTS:  Lab Results   Component Value Date     01/05/2021    POTASSIUM 4.3 01/05/2021    CHLORIDE 105 01/05/2021    CO2 30 01/05/2021    ANIONGAP 2 (L) 01/05/2021    GLC 96 01/05/2021    BUN 17 01/05/2021    CR 1.13 (H) 01/05/2021    GFRESTIMATED 41 (L) 01/05/2021    GFRESTBLACK 48 (L) 01/05/2021    TRI 9.1 01/05/2021        A1C RESULTS:  Lab Results   Component Value Date    A1C 6.1 (H) 04/30/2014       INR RESULTS:  Lab Results   Component Value Date    INR 2.4 (H) 08/26/2021    INR 3.1 (H) 08/16/2021    INR 2.30 (H) 07/02/2021    INR 1.90 (H) 06/25/2021           CC  No referring provider defined for this encounter.      Thank you for allowing me to participate in the care of your patient.      Sincerely,     Collins Horton MD     [facility]  cc:   No referring provider defined for this encounter.

## 2021-09-16 ENCOUNTER — LAB (OUTPATIENT)
Dept: LAB | Facility: CLINIC | Age: 86
End: 2021-09-16
Payer: MEDICARE

## 2021-09-16 ENCOUNTER — ANTICOAGULATION THERAPY VISIT (OUTPATIENT)
Dept: ANTICOAGULATION | Facility: CLINIC | Age: 86
End: 2021-09-16

## 2021-09-16 DIAGNOSIS — I10 ESSENTIAL HYPERTENSION WITH GOAL BLOOD PRESSURE LESS THAN 140/90: ICD-10-CM

## 2021-09-16 DIAGNOSIS — Z79.01 LONG TERM CURRENT USE OF ANTICOAGULANT THERAPY: ICD-10-CM

## 2021-09-16 DIAGNOSIS — I48.20 CHRONIC ATRIAL FIBRILLATION (H): ICD-10-CM

## 2021-09-16 DIAGNOSIS — I48.20 CHRONIC ATRIAL FIBRILLATION (H): Primary | ICD-10-CM

## 2021-09-16 DIAGNOSIS — I25.10 CORONARY ARTERY DISEASE INVOLVING NATIVE CORONARY ARTERY OF NATIVE HEART WITHOUT ANGINA PECTORIS: ICD-10-CM

## 2021-09-16 DIAGNOSIS — I50.32 CHRONIC HEART FAILURE WITH PRESERVED EJECTION FRACTION (H): ICD-10-CM

## 2021-09-16 LAB — INR BLD: 3.1 (ref 0.9–1.1)

## 2021-09-16 PROCEDURE — 85610 PROTHROMBIN TIME: CPT

## 2021-09-16 PROCEDURE — 80053 COMPREHEN METABOLIC PANEL: CPT

## 2021-09-16 PROCEDURE — 36415 COLL VENOUS BLD VENIPUNCTURE: CPT

## 2021-09-16 NOTE — PROGRESS NOTES
ANTICOAGULATION MANAGEMENT     Leonor Mercado 89 year old female is on warfarin with supratherapeutic INR result. (Goal INR 2.0-3.0)    Recent labs: (last 7 days)     09/16/21  1332   INR 3.1*       ASSESSMENT     Source(s): Patient/Caregiver Call       Warfarin doses taken: Warfarin taken as instructed    Diet: No new diet changes identified; however, vitamin K intake may be inconsistent, patient's daughter will have Alfredo add broccoli back in to her weekly routine. Cleo bought fresh green beans so she will suggest Alfredo eat those tonight.    New illness, injury, or hospitalization: No    Medication/supplement changes: None noted    Signs or symptoms of bleeding or clotting: No    Previous INR: Therapeutic last visit; previously outside of goal range    Additional findings: None     PLAN     Recommended plan for temporary change(s) affecting INR     Dosing Instructions: Continue your current warfarin dose with next INR in 2 weeks       Summary  As of 9/16/2021    Full warfarin instructions:  1 mg every Mon, Fri; 2 mg all other days   Next INR check:  9/30/2021             Telephone call with  daughter, Cleo who verbalizes understanding and agrees to plan    Lab visit scheduled    Education provided: Importance of consistent vitamin K intake    Plan made per [facility] anticoagulation protocol    Mariola Valenzuela, RN  Anticoagulation Clinic  9/16/2021    _______________________________________________________________________     Anticoagulation Episode Summary     Current INR goal:  2.0-3.0   TTR:  68.6 % (11.9 mo)   Target end date:  Indefinite   Send INR reminders to:  [facility]    Indications    Chronic atrial fibrillation (H) [I48.20]  Long term current use of anticoagulant therapy [Z79.01]           Comments:           Anticoagulation Care Providers     Provider Role Specialty Phone number    Arnel Garcia MD Referring Internal Medicine - Pediatrics [phone]    Marisabel Guido MD Referring  Internal Medicine - Pediatrics [phone]

## 2021-09-16 NOTE — PROGRESS NOTES
Anticoagulation Management    Unable to reach Cleo, patient's daughter, today.    Today's INR result of 3.1 is supratherapeutic (goal INR of 2.0-3.0).  Result received from: Clinic Lab    Follow up required to confirm warfarin dose taken and assess for changes       Left VM to call Regulo with transfer to Mariola Garnett:[phone] OR transfer to:[facility]      Anticoagulation clinic to follow up    Mariola Valenzuela RN

## 2021-09-17 LAB
ALBUMIN SERPL-MCNC: 3.9 G/DL (ref 3.4–5)
ALP SERPL-CCNC: 93 U/L (ref 40–150)
ALT SERPL W P-5'-P-CCNC: 19 U/L (ref 0–50)
ANION GAP SERPL CALCULATED.3IONS-SCNC: 9 MMOL/L (ref 3–14)
AST SERPL W P-5'-P-CCNC: 19 U/L (ref 0–45)
BILIRUB SERPL-MCNC: 0.6 MG/DL (ref 0.2–1.3)
BUN SERPL-MCNC: 26 MG/DL (ref 7–30)
CALCIUM SERPL-MCNC: 9.8 MG/DL (ref 8.5–10.1)
CHLORIDE BLD-SCNC: 102 MMOL/L (ref 94–109)
CO2 SERPL-SCNC: 28 MMOL/L (ref 20–32)
CREAT SERPL-MCNC: 1.64 MG/DL (ref 0.52–1.04)
GFR SERPL CREATININE-BSD FRML MDRD: 26 ML/MIN/1.73M2
GLUCOSE BLD-MCNC: 86 MG/DL (ref 70–99)
POTASSIUM BLD-SCNC: 4.5 MMOL/L (ref 3.4–5.3)
PROT SERPL-MCNC: 7.3 G/DL (ref 6.8–8.8)
SODIUM SERPL-SCNC: 139 MMOL/L (ref 133–144)

## 2021-09-30 ENCOUNTER — LAB (OUTPATIENT)
Dept: LAB | Facility: CLINIC | Age: 86
End: 2021-09-30
Payer: MEDICARE

## 2021-09-30 ENCOUNTER — ANTICOAGULATION THERAPY VISIT (OUTPATIENT)
Dept: ANTICOAGULATION | Facility: CLINIC | Age: 86
End: 2021-09-30

## 2021-09-30 DIAGNOSIS — Z79.01 LONG TERM CURRENT USE OF ANTICOAGULANT THERAPY: ICD-10-CM

## 2021-09-30 DIAGNOSIS — I48.20 CHRONIC ATRIAL FIBRILLATION (H): Primary | ICD-10-CM

## 2021-09-30 DIAGNOSIS — I48.20 CHRONIC ATRIAL FIBRILLATION (H): ICD-10-CM

## 2021-09-30 LAB — INR BLD: 2.7 (ref 0.9–1.1)

## 2021-09-30 PROCEDURE — 36416 COLLJ CAPILLARY BLOOD SPEC: CPT

## 2021-09-30 PROCEDURE — 85610 PROTHROMBIN TIME: CPT

## 2021-09-30 NOTE — PROGRESS NOTES
ANTICOAGULATION MANAGEMENT     Leonor Mercado 89 year old female is on warfarin with therapeutic INR result. (Goal INR 2.0-3.0)    Recent labs: (last 7 days)     09/30/21  1329   INR 2.7*       ASSESSMENT     Source(s): Chart Review and Patient/Caregiver Call       Warfarin doses taken: Warfarin taken as instructed    Diet: No new diet changes identified. Daughters are making sure she eats broccoli once a week    New illness, injury, or hospitalization: No    Medication/supplement changes: None noted    Signs or symptoms of bleeding or clotting: No    Previous INR: Supratherapeutic at 3.1    Additional findings: None     PLAN     Recommended plan for no diet, medication or health factor changes affecting INR     Dosing Instructions: Continue your current warfarin dose with next INR in 3 weeks       Summary  As of 9/30/2021    Full warfarin instructions:  1 mg every Mon, Fri; 2 mg all other days   Next INR check:  10/21/2021             Telephone call with  daughter, Cleo who agrees to plan and repeated back plan correctly    Lab visit scheduled    Education provided: Please call back if any changes to your diet, medications or how you've been taking warfarin    Plan made per [facility] anticoagulation protocol    Ebony Doshi RN  Anticoagulation Clinic  9/30/2021    _______________________________________________________________________     Anticoagulation Episode Summary     Current INR goal:  2.0-3.0   TTR:  67.6 % (11.9 mo)   Target end date:  Indefinite   Send INR reminders to:  [facility]    Indications    Chronic atrial fibrillation (H) [I48.20]  Long term current use of anticoagulant therapy [Z79.01]           Comments:           Anticoagulation Care Providers     Provider Role Specialty Phone number    Arnel Garcia MD Referring Internal Medicine - Pediatrics [phone]    Marisabel Guido MD Referring Internal Medicine - Pediatrics [phone]

## 2021-10-05 DIAGNOSIS — I25.10 CORONARY ARTERY DISEASE INVOLVING NATIVE CORONARY ARTERY OF NATIVE HEART WITHOUT ANGINA PECTORIS: ICD-10-CM

## 2021-10-08 RX ORDER — ISOSORBIDE MONONITRATE 30 MG/1
TABLET, EXTENDED RELEASE ORAL
Qty: 90 TABLET | Refills: 1 | Status: SHIPPED | OUTPATIENT
Start: 2021-10-08 | End: 2022-04-07

## 2021-10-22 ENCOUNTER — ANTICOAGULATION THERAPY VISIT (OUTPATIENT)
Dept: ANTICOAGULATION | Facility: CLINIC | Age: 86
End: 2021-10-22

## 2021-10-22 ENCOUNTER — LAB (OUTPATIENT)
Dept: LAB | Facility: CLINIC | Age: 86
End: 2021-10-22
Payer: MEDICARE

## 2021-10-22 DIAGNOSIS — I48.20 CHRONIC ATRIAL FIBRILLATION (H): Primary | ICD-10-CM

## 2021-10-22 DIAGNOSIS — Z79.01 LONG TERM CURRENT USE OF ANTICOAGULANT THERAPY: ICD-10-CM

## 2021-10-22 DIAGNOSIS — I48.20 CHRONIC ATRIAL FIBRILLATION (H): ICD-10-CM

## 2021-10-22 LAB — INR BLD: 3.8 (ref 0.9–1.1)

## 2021-10-22 PROCEDURE — 36415 COLL VENOUS BLD VENIPUNCTURE: CPT

## 2021-10-22 PROCEDURE — 85610 PROTHROMBIN TIME: CPT

## 2021-10-22 NOTE — PROGRESS NOTES
ANTICOAGULATION MANAGEMENT     Leonor REBOLLEDO Brendanpablojovanni 89 year old female is on warfarin with supratherapeutic INR result. (Goal INR 2.0-3.0)    Recent labs: (last 7 days)     10/22/21  1519   INR 3.8*       ASSESSMENT     Source(s): Chart Review and Patient/Caregiver Call       Warfarin doses taken: Warfarin taken as instructed    Diet: Decreased greens/vitamin K in diet; plans to resume previous intake    New illness, injury, or hospitalization: No    Medication/supplement changes: None noted    Signs or symptoms of bleeding or clotting: No    Previous INR: Therapeutic last visit; previously outside of goal range    Additional findings: None     PLAN     Recommended plan for temporary change(s) affecting INR     Dosing Instructions: Continue your current warfarin dose with next INR in 2 weeks       Summary  As of 10/22/2021    Full warfarin instructions:  10/22: Hold; Otherwise 1 mg every Mon, Wed, Fri; 2 mg all other days   Next INR check:  11/4/2021             Telephone call with Leonor who agrees to plan and repeated back plan correctly    Lab visit scheduled    Education provided: Please call back if any changes to your diet, medications or how you've been taking warfarin    Plan made per [facility] anticoagulation protocol    Yoana Waddell RN  Anticoagulation Clinic  10/22/2021    _______________________________________________________________________     Anticoagulation Episode Summary     Current INR goal:  2.0-3.0   TTR:  63.1 % (11.9 mo)   Target end date:  Indefinite   Send INR reminders to:  [facility]    Indications    Chronic atrial fibrillation (H) [I48.20]  Long term current use of anticoagulant therapy [Z79.01]           Comments:           Anticoagulation Care Providers     Provider Role Specialty Phone number    Arnel Garcia MD Referring Internal Medicine - Pediatrics [phone]    Marisabel Guido MD Referring Internal Medicine - Pediatrics [phone]

## 2021-11-04 ENCOUNTER — LAB (OUTPATIENT)
Dept: LAB | Facility: CLINIC | Age: 86
End: 2021-11-04
Payer: MEDICARE

## 2021-11-04 ENCOUNTER — ANTICOAGULATION THERAPY VISIT (OUTPATIENT)
Dept: ANTICOAGULATION | Facility: CLINIC | Age: 86
End: 2021-11-04

## 2021-11-04 DIAGNOSIS — I48.20 CHRONIC ATRIAL FIBRILLATION (H): Primary | ICD-10-CM

## 2021-11-04 DIAGNOSIS — Z79.01 LONG TERM CURRENT USE OF ANTICOAGULANT THERAPY: ICD-10-CM

## 2021-11-04 DIAGNOSIS — I48.20 CHRONIC ATRIAL FIBRILLATION (H): ICD-10-CM

## 2021-11-04 LAB — INR BLD: 3.1 (ref 0.9–1.1)

## 2021-11-04 PROCEDURE — 85610 PROTHROMBIN TIME: CPT

## 2021-11-04 PROCEDURE — 36415 COLL VENOUS BLD VENIPUNCTURE: CPT

## 2021-11-04 NOTE — PROGRESS NOTES
Anticoagulation Management    Unable to reach Alfredo's daughter, Cleo today.    Today's INR result of 3.1 is supratherapeutic (goal INR of 2.0-3.0).  Result received from: Clinic Lab    Follow up required to confirm warfarin dose taken and assess for changes.  May need to consider lowering maintenance dose if she does not report any changes since several of the INR checks recently have been supra therapeutic.     Left message to continue current dose of warfarin 2 mg tonight.      Anticoagulation clinic to follow up    Yoana Waddell RN

## 2021-11-04 NOTE — PROGRESS NOTES
Reason for Call:  Other call back    Detailed comments: Cleo returned call but no answer at ph# left. Cleo stated if she doesn't hear back today she will call back tomorrow.    Phone Number Patient can be reached at: Other phone number:  [phone]    Best Time: any    Can we leave a detailed message on this number? YES    Call taken on 11/4/2021 at 3:54 PM by Staci Ivy

## 2021-11-05 NOTE — PROGRESS NOTES
ANTICOAGULATION MANAGEMENT     Leonor Mercado 89 year old female is on warfarin with supratherapeutic INR result. (Goal INR 2.0-3.0)    Recent labs: (last 7 days)     11/04/21  1335   INR 3.1*       ASSESSMENT     Source(s): Chart Review and Patient/Caregiver Call       Warfarin doses taken: Warfarin taken as instructed    Diet: No new diet changes identified    New illness, injury, or hospitalization: No    Medication/supplement changes: None noted    Signs or symptoms of bleeding or clotting: No    Previous INR: Supratherapeutic.  INR has been supra therapeutic 3 out of the last 4 INR checks, so maintenance dose was adjusted today.    Additional findings: none     PLAN     Recommended plan for no diet, medication or health factor changes affecting INR     Dosing Instructions:  Decrease your warfarin dose (9% change) with next INR in 2 weeks       Summary  As of 11/4/2021    Full warfarin instructions:  2 mg every Sun, Tue, Thu; 1 mg all other days   Next INR check:  11/17/2021             Telephone call with Leonor's daughter, Cleo who agrees to plan and repeated back plan correctly    Lab visit scheduled    Education provided: Please call back if any changes to your diet, medications or how you've been taking warfarin    Plan made per [facility] anticoagulation protocol    Yoana Waddell RN  Anticoagulation Clinic  11/5/2021    _______________________________________________________________________     Anticoagulation Episode Summary     Current INR goal:  2.0-3.0   TTR:  59.4 % (11.8 mo)   Target end date:  Indefinite   Send INR reminders to:  [facility]    Indications    Chronic atrial fibrillation (H) [I48.20]  Long term current use of anticoagulant therapy [Z79.01]           Comments:           Anticoagulation Care Providers     Provider Role Specialty Phone number    Arnel Garcia MD Referring Internal Medicine - Pediatrics [phone]    Marisabel Guido MD Referring Internal Medicine -  Pediatrics [phone]

## 2021-11-17 ENCOUNTER — LAB (OUTPATIENT)
Dept: LAB | Facility: CLINIC | Age: 86
End: 2021-11-17
Payer: MEDICARE

## 2021-11-17 ENCOUNTER — ANTICOAGULATION THERAPY VISIT (OUTPATIENT)
Dept: ANTICOAGULATION | Facility: CLINIC | Age: 86
End: 2021-11-17

## 2021-11-17 ENCOUNTER — DOCUMENTATION ONLY (OUTPATIENT)
Dept: ANTICOAGULATION | Facility: CLINIC | Age: 86
End: 2021-11-17

## 2021-11-17 DIAGNOSIS — I48.20 CHRONIC ATRIAL FIBRILLATION (H): ICD-10-CM

## 2021-11-17 DIAGNOSIS — Z79.01 LONG TERM CURRENT USE OF ANTICOAGULANT THERAPY: ICD-10-CM

## 2021-11-17 DIAGNOSIS — Z79.01 LONG TERM CURRENT USE OF ANTICOAGULANT THERAPY: Primary | ICD-10-CM

## 2021-11-17 DIAGNOSIS — I48.20 CHRONIC ATRIAL FIBRILLATION (H): Primary | ICD-10-CM

## 2021-11-17 LAB — INR BLD: 2 (ref 0.9–1.1)

## 2021-11-17 PROCEDURE — 85610 PROTHROMBIN TIME: CPT

## 2021-11-17 PROCEDURE — 36416 COLLJ CAPILLARY BLOOD SPEC: CPT

## 2021-11-17 NOTE — PROGRESS NOTES
ANTICOAGULATION MANAGEMENT      Leonor Mercado due for annual renewal of referral to anticoagulation monitoring. Order pended for your review and signature.      ANTICOAGULATION SUMMARY      Warfarin indication(s)     Atrial fibrillation    Heart valve present?  NO       Current goal range   INR: 2.0-3.0     Goal appropriate for indication? Yes, INR 2-3 appropriate for hx of DVT, PE, hypercoagulable state, Afib, LVAD, or bileaflet AVR without risk factors     Current duration of therapy Indefinite/long term therapy   Time in Therapeutic Range (TTR)  (Goal > 60%) 59.2%       Office visit with referring provider's group within last year yes on 2/3/21       Yoana Waddell RN

## 2021-11-17 NOTE — PROGRESS NOTES
ANTICOAGULATION MANAGEMENT     Leonor Mercado 89 year old female is on warfarin with therapeutic INR result. (Goal INR 2.0-3.0)    Recent labs: (last 7 days)     11/17/21  1543   INR 2.0*       ASSESSMENT     Source(s): Chart Review and Patient/Caregiver Call       Warfarin doses taken: Warfarin taken as instructed    Diet: No new diet changes identified    New illness, injury, or hospitalization: No    Medication/supplement changes: None noted    Signs or symptoms of bleeding or clotting: No    Previous INR: Supratherapeutic.  Maintenance dose was adjusted at last visit.    Additional findings: will have 3rd COVID booster on 11/21.       PLAN     Recommended plan for no diet, medication or health factor changes affecting INR     Dosing Instructions: Continue your current warfarin dose with next INR in 2 weeks       Summary  As of 11/17/2021    Full warfarin instructions:  2 mg every Sun, Tue, Thu; 1 mg all other days   Next INR check:  12/1/2021             Telephone call with Leonor and daughters, Cloe and eLs who agrees to plan and repeated back plan correctly    Lab visit scheduled    Education provided: Please call back if any changes to your diet, medications or how you've been taking warfarin and Impact of vitamin K foods on INR    Plan made per [facility] anticoagulation protocol    Yoana Waddell RN  Anticoagulation Clinic  11/17/2021    _______________________________________________________________________     Anticoagulation Episode Summary     Current INR goal:  2.0-3.0   TTR:  59.2 % (11.9 mo)   Target end date:  Indefinite   Send INR reminders to:  [facility]    Indications    Chronic atrial fibrillation (H) [I48.20]  Long term current use of anticoagulant therapy [Z79.01]           Comments:           Anticoagulation Care Providers     Provider Role Specialty Phone number    Arnel Garcia MD Referring Internal Medicine - Pediatrics [phone]    Marisabel Guido MD Referring  Internal Medicine - Pediatrics [phone]

## 2021-12-01 ENCOUNTER — LAB (OUTPATIENT)
Dept: LAB | Facility: CLINIC | Age: 86
End: 2021-12-01
Payer: MEDICARE

## 2021-12-01 ENCOUNTER — ANTICOAGULATION THERAPY VISIT (OUTPATIENT)
Dept: ANTICOAGULATION | Facility: CLINIC | Age: 86
End: 2021-12-01

## 2021-12-01 DIAGNOSIS — I48.20 CHRONIC ATRIAL FIBRILLATION (H): Primary | ICD-10-CM

## 2021-12-01 DIAGNOSIS — I48.20 CHRONIC ATRIAL FIBRILLATION (H): ICD-10-CM

## 2021-12-01 DIAGNOSIS — Z79.01 LONG TERM CURRENT USE OF ANTICOAGULANT THERAPY: ICD-10-CM

## 2021-12-01 LAB — INR BLD: 2 (ref 0.9–1.1)

## 2021-12-01 PROCEDURE — 36416 COLLJ CAPILLARY BLOOD SPEC: CPT

## 2021-12-01 PROCEDURE — 85610 PROTHROMBIN TIME: CPT

## 2021-12-01 NOTE — PROGRESS NOTES
ANTICOAGULATION MANAGEMENT     Leonor Mercado 89 year old female is on warfarin with therapeutic INR result. (Goal INR 2.0-3.0)    Recent labs: (last 7 days)     12/01/21  1557   INR 2.0*       ASSESSMENT     Source(s): Chart Review and Patient/Caregiver Call       Warfarin doses taken: Warfarin taken as instructed    Diet: No new diet changes identified    New illness, injury, or hospitalization: No    Medication/supplement changes: None noted    Signs or symptoms of bleeding or clotting: No    Previous INR: Therapeutic last visit; previously outside of goal range    Additional findings: None     PLAN     Recommended plan for no diet, medication or health factor changes affecting INR     Dosing Instructions: Continue your current warfarin dose with next INR in 3 weeks       Summary  As of 12/1/2021    Full warfarin instructions:  2 mg every Sun, Tue, Thu; 1 mg all other days   Next INR check:  12/22/2021             Telephone call with Leonor and daughter Les who agrees to plan and repeated back plan correctly    Lab visit scheduled    Education provided: Please call back if any changes to your diet, medications or how you've been taking warfarin    Plan made per [facility] anticoagulation protocol    Yoana Waddell RN  Anticoagulation Clinic  12/1/2021    _______________________________________________________________________     Anticoagulation Episode Summary     Current INR goal:  2.0-3.0   TTR:  59.2 % (11.9 mo)   Target end date:  Indefinite   Send INR reminders to:  [facility]    Indications    Chronic atrial fibrillation (H) [I48.20]  Long term current use of anticoagulant therapy [Z79.01]           Comments:           Anticoagulation Care Providers     Provider Role Specialty Phone number    Arnel Garcia MD Referring Internal Medicine - Pediatrics [phone]    Marisabel Guido MD Referring Internal Medicine - Pediatrics [phone]

## 2021-12-02 ENCOUNTER — TELEPHONE (OUTPATIENT)
Dept: PEDIATRICS | Facility: CLINIC | Age: 86
End: 2021-12-02

## 2021-12-02 ENCOUNTER — ANCILLARY PROCEDURE (OUTPATIENT)
Dept: CARDIOLOGY | Facility: CLINIC | Age: 86
End: 2021-12-02
Attending: INTERNAL MEDICINE
Payer: MEDICARE

## 2021-12-02 DIAGNOSIS — Z95.0 CARDIAC PACEMAKER IN SITU: ICD-10-CM

## 2021-12-02 DIAGNOSIS — I49.5 SICK SINUS SYNDROME (H): ICD-10-CM

## 2021-12-02 PROCEDURE — 93294 REM INTERROG EVL PM/LDLS PM: CPT | Performed by: INTERNAL MEDICINE

## 2021-12-02 PROCEDURE — 93296 REM INTERROG EVL PM/IDS: CPT | Performed by: INTERNAL MEDICINE

## 2021-12-02 NOTE — TELEPHONE ENCOUNTER
Patient's daughter called and stated the [employer] will not take the handicap forms that were completed by Dr. Garcia. Stated it was altered and something was crossed out so not they won't use it.     Stated there shouldn't be a date if permanent.     Informed her that our company policy is a three day turn around.     Partially completed form and placed in PCP's in-basket.     Marisol Reis, CHG  [phone]

## 2021-12-02 NOTE — TELEPHONE ENCOUNTER
Called and informed patient's daughter that forms were completed. Les requested that they be mailed to patient. Placed in station out mail.     Marisol Reis, CHBINDU  [phone]

## 2021-12-07 LAB
MDC_IDC_MSMT_BATTERY_DTM: NORMAL
MDC_IDC_MSMT_BATTERY_IMPEDANCE: 1177 OHM
MDC_IDC_MSMT_BATTERY_REMAINING_LONGEVITY: 62 MO
MDC_IDC_MSMT_BATTERY_STATUS: NORMAL
MDC_IDC_MSMT_BATTERY_VOLTAGE: 2.77 V
MDC_IDC_MSMT_LEADCHNL_RA_IMPEDANCE_VALUE: 67 OHM
MDC_IDC_MSMT_LEADCHNL_RV_IMPEDANCE_VALUE: 690 OHM
MDC_IDC_PG_IMPLANT_DTM: NORMAL
MDC_IDC_PG_MFG: NORMAL
MDC_IDC_PG_MODEL: NORMAL
MDC_IDC_PG_SERIAL: NORMAL
MDC_IDC_PG_TYPE: NORMAL
MDC_IDC_SESS_CLINIC_NAME: NORMAL
MDC_IDC_SESS_DTM: NORMAL
MDC_IDC_SESS_TYPE: NORMAL
MDC_IDC_SET_BRADY_LOWRATE: 60 {BEATS}/MIN
MDC_IDC_SET_BRADY_MAX_SENSOR_RATE: 120 {BEATS}/MIN
MDC_IDC_SET_BRADY_MAX_TRACKING_RATE: 105 {BEATS}/MIN
MDC_IDC_SET_BRADY_MODE: NORMAL
MDC_IDC_SET_LEADCHNL_RV_PACING_AMPLITUDE: 2.5 V
MDC_IDC_SET_LEADCHNL_RV_PACING_CAPTURE_MODE: NORMAL
MDC_IDC_SET_LEADCHNL_RV_PACING_POLARITY: NORMAL
MDC_IDC_SET_LEADCHNL_RV_PACING_PULSEWIDTH: 0.52 MS
MDC_IDC_SET_LEADCHNL_RV_SENSING_POLARITY: NORMAL
MDC_IDC_SET_LEADCHNL_RV_SENSING_SENSITIVITY: 2.8 MV
MDC_IDC_SET_ZONE_DETECTION_INTERVAL: 333.33 MS
MDC_IDC_SET_ZONE_TYPE: NORMAL
MDC_IDC_SET_ZONE_TYPE: NORMAL
MDC_IDC_STAT_AT_BURDEN_PERCENT: 0 %
MDC_IDC_STAT_AT_DTM_END: NORMAL
MDC_IDC_STAT_AT_DTM_START: NORMAL
MDC_IDC_STAT_BRADY_DTM_END: NORMAL
MDC_IDC_STAT_BRADY_DTM_START: NORMAL
MDC_IDC_STAT_BRADY_RV_PERCENT_PACED: 62 %
MDC_IDC_STAT_EPISODE_RECENT_COUNT: 0
MDC_IDC_STAT_EPISODE_RECENT_COUNT: 0
MDC_IDC_STAT_EPISODE_RECENT_COUNT_DTM_END: NORMAL
MDC_IDC_STAT_EPISODE_RECENT_COUNT_DTM_END: NORMAL
MDC_IDC_STAT_EPISODE_RECENT_COUNT_DTM_START: NORMAL
MDC_IDC_STAT_EPISODE_RECENT_COUNT_DTM_START: NORMAL
MDC_IDC_STAT_EPISODE_TYPE: NORMAL
MDC_IDC_STAT_EPISODE_TYPE: NORMAL

## 2021-12-13 DIAGNOSIS — E78.5 HYPERLIPIDEMIA LDL GOAL <100: ICD-10-CM

## 2021-12-13 DIAGNOSIS — I25.10 CORONARY ARTERY DISEASE INVOLVING NATIVE CORONARY ARTERY OF NATIVE HEART WITHOUT ANGINA PECTORIS: ICD-10-CM

## 2021-12-16 RX ORDER — PRAVASTATIN SODIUM 40 MG
TABLET ORAL
Qty: 90 TABLET | Refills: 3 | Status: SHIPPED | OUTPATIENT
Start: 2021-12-16 | End: 2022-05-19

## 2021-12-30 ENCOUNTER — TELEPHONE (OUTPATIENT)
Dept: ANTICOAGULATION | Facility: CLINIC | Age: 86
End: 2021-12-30
Payer: MEDICARE

## 2021-12-30 NOTE — TELEPHONE ENCOUNTER
ANTICOAGULATION     Leonor Mercado is overdue for INR check.      Spoke with daughter Cleo and scheduled lab appointment on 1/5/22    Mariola Valenzuela RN

## 2022-01-05 ENCOUNTER — LAB (OUTPATIENT)
Dept: LAB | Facility: CLINIC | Age: 87
End: 2022-01-05
Payer: MEDICARE

## 2022-01-05 ENCOUNTER — ANTICOAGULATION THERAPY VISIT (OUTPATIENT)
Dept: ANTICOAGULATION | Facility: CLINIC | Age: 87
End: 2022-01-05

## 2022-01-05 DIAGNOSIS — I48.20 CHRONIC ATRIAL FIBRILLATION (H): ICD-10-CM

## 2022-01-05 DIAGNOSIS — Z79.01 LONG TERM CURRENT USE OF ANTICOAGULANT THERAPY: ICD-10-CM

## 2022-01-05 DIAGNOSIS — I48.20 CHRONIC ATRIAL FIBRILLATION (H): Primary | ICD-10-CM

## 2022-01-05 LAB — INR BLD: 2.9 (ref 0.9–1.1)

## 2022-01-05 PROCEDURE — 85610 PROTHROMBIN TIME: CPT

## 2022-01-05 PROCEDURE — 36416 COLLJ CAPILLARY BLOOD SPEC: CPT

## 2022-01-05 NOTE — PROGRESS NOTES
ANTICOAGULATION MANAGEMENT     Leonor Mercado 89 year old female is on warfarin with therapeutic INR result. (Goal INR 2.0-3.0)    Recent labs: (last 7 days)     01/05/22  1456   INR 2.9*       ASSESSMENT     Source(s): Chart Review and Patient/Caregiver Call       Warfarin doses taken: Warfarin taken as instructed    Diet: Decreased greens/vitamin K in diet; plans to resume previous intake    New illness, injury, or hospitalization: No    Medication/supplement changes: None noted    Signs or symptoms of bleeding or clotting: No    Previous INR: Therapeutic last 2 visits    Additional findings: None     PLAN     Recommended plan for no diet, medication or health factor changes affecting INR     Dosing Instructions: Continue your current warfarin dose with next INR in 3 weeks       Summary  As of 1/5/2022    Full warfarin instructions:  2 mg every Sun, Tue, Thu; 1 mg all other days   Next INR check:  1/26/2022             Telephone call with  patient and Cleo, patient's daughter who verbalizes understanding and agrees to plan    Lab visit scheduled    Education provided: Please call back if any changes to your diet, medications or how you've been taking warfarin and Contact [phone]  with any changes, questions or concerns.     Plan made per [facility]agulation protocol    Dilma Rider RN  Anticoagulation Clinic  1/5/2022    _______________________________________________________________________     Anticoagulation Episode Summary     Current INR goal:  2.0-3.0   TTR:  62.6 % (11.9 mo)   Target end date:  Indefinite   Send INR reminders to:  [facility]    Indications    Chronic atrial fibrillation (H) [I48.20]  Long term current use of anticoagulant therapy [Z79.01]           Comments:           Anticoagulation Care Providers     Provider Role Specialty Phone number    Arnel Garcia MD Referring Internal Medicine - Pediatrics [phone]    Marisabel Guido MD Referring Internal Medicine  - Pediatrics [phone]

## 2022-01-24 DIAGNOSIS — I48.20 CHRONIC ATRIAL FIBRILLATION (H): ICD-10-CM

## 2022-01-24 DIAGNOSIS — Z79.01 LONG TERM CURRENT USE OF ANTICOAGULANTS WITH INR GOAL OF 2.0-3.0: ICD-10-CM

## 2022-01-24 RX ORDER — WARFARIN SODIUM 2 MG/1
TABLET ORAL
Qty: 60 TABLET | Refills: 0 | Status: SHIPPED | OUTPATIENT
Start: 2022-01-24 | End: 2022-05-06

## 2022-01-24 NOTE — TELEPHONE ENCOUNTER
Last INR: 1/5/22=2.9  Next INR: 1/26/22  INR referral and OV up-to-date: Due for OV (note placed on refill); referral up-to-date      Prescription approved per [facility] Ref[facility] Protocol.    Mariola Valenzuela RN          ]

## 2022-01-26 ENCOUNTER — ANTICOAGULATION THERAPY VISIT (OUTPATIENT)
Dept: ANTICOAGULATION | Facility: CLINIC | Age: 87
End: 2022-01-26

## 2022-01-26 ENCOUNTER — LAB (OUTPATIENT)
Dept: LAB | Facility: CLINIC | Age: 87
End: 2022-01-26
Payer: MEDICARE

## 2022-01-26 DIAGNOSIS — Z79.01 LONG TERM CURRENT USE OF ANTICOAGULANT THERAPY: ICD-10-CM

## 2022-01-26 DIAGNOSIS — I48.20 CHRONIC ATRIAL FIBRILLATION (H): ICD-10-CM

## 2022-01-26 DIAGNOSIS — I48.20 CHRONIC ATRIAL FIBRILLATION (H): Primary | ICD-10-CM

## 2022-01-26 LAB — INR BLD: 5.6 (ref 0.9–1.1)

## 2022-01-26 PROCEDURE — 85610 PROTHROMBIN TIME: CPT

## 2022-01-26 PROCEDURE — 36416 COLLJ CAPILLARY BLOOD SPEC: CPT

## 2022-01-26 NOTE — PROGRESS NOTES
Encounter routed to PCP as an FYI due to critical results.    ANTICOAGULATION MANAGEMENT     Leonor Mercado 89 year old female is on warfarin with sub therapeutic INR result. (Goal INR 2.0-3.0)    Recent labs: (last 7 days)     01/26/22  1338   INR 5.6*       ASSESSMENT     Source(s): Chart Review and Patient/Caregiver Call       Warfarin doses taken: Warfarin taken as instructed    Diet: Decreased greens/vitamin K in diet; plans to resume previous intake (due to cold symptoms)    New illness, injury, or hospitalization: Yes: cold symptoms. Symptoms have slowly improved.    Medication/supplement changes: None noted    Signs or symptoms of bleeding or clotting: No    Previous INR: Therapeutic last 2(+) visits    Additional findings: None     PLAN     Recommended plan for temporary change(s) affecting INR     Dosing Instructions: Hold 2 doses then Decrease your warfarin dose (10% change) with next INR in 2 days       Summary  As of 1/26/2022    Full warfarin instructions:  1/26: Hold; 1/27: Hold; Otherwise 2 mg every Sun, Thu; 1 mg all other days   Next INR check:  1/28/2022             Telephone call with Alfredo's daughter, Cleo who agrees to plan and repeated back plan correctly    Lab visit scheduled    Education provided: Please call back if any changes to your diet, medications or how you've been taking warfarin, Impact of vitamin K foods on INR and Monitoring for bleeding signs and symptoms    Plan made per [facility] anticoagulation protocol    Yoana Waddell RN  Anticoagulation Clinic  1/26/2022    _______________________________________________________________________     Anticoagulation Episode Summary     Current INR goal:  2.0-3.0   TTR:  60.3 % (1 y)   Target end date:  Indefinite   Send INR reminders to:  [facility]    Indications    Chronic atrial fibrillation (H) [I48.20]  Long term current use of anticoagulant therapy [Z79.01]           Comments:           Anticoagulation Care Providers     Provider  Role Specialty Phone number    Arnel Garcia MD Referring Internal Medicine - Pediatrics [phone]    Marisabel Guido MD Referring Internal Medicine - Pediatrics [phone]

## 2022-01-28 ENCOUNTER — LAB (OUTPATIENT)
Dept: LAB | Facility: CLINIC | Age: 87
End: 2022-01-28
Payer: MEDICARE

## 2022-01-28 ENCOUNTER — ANTICOAGULATION THERAPY VISIT (OUTPATIENT)
Dept: ANTICOAGULATION | Facility: CLINIC | Age: 87
End: 2022-01-28

## 2022-01-28 DIAGNOSIS — Z79.01 LONG TERM CURRENT USE OF ANTICOAGULANT THERAPY: ICD-10-CM

## 2022-01-28 DIAGNOSIS — I48.20 CHRONIC ATRIAL FIBRILLATION (H): ICD-10-CM

## 2022-01-28 DIAGNOSIS — I48.20 CHRONIC ATRIAL FIBRILLATION (H): Primary | ICD-10-CM

## 2022-01-28 LAB — INR BLD: 3.4 (ref 0.9–1.1)

## 2022-01-28 PROCEDURE — 85610 PROTHROMBIN TIME: CPT

## 2022-01-28 PROCEDURE — 36415 COLL VENOUS BLD VENIPUNCTURE: CPT

## 2022-01-28 NOTE — PROGRESS NOTES
ANTICOAGULATION MANAGEMENT     Leonor Mercado 89 year old female is on warfarin with supratherapeutic INR result. (Goal INR 2.0-3.0)    Recent labs: (last 7 days)     01/28/22  1249   INR 3.4*       ASSESSMENT     Source(s): Chart Review and Patient/Caregiver Call       Warfarin doses taken: Warfarin taken as instructed    Diet: Per daughter, she has not been eating much over the past week or so. Her appetite is picking up now.    New illness, injury, or hospitalization: Yes: had cold symptoms earlier in the week    Medication/supplement changes: None noted    Signs or symptoms of bleeding or clotting: No    Previous INR: Supratherapeutic    Additional findings: None     PLAN     Recommended plan for temporary change(s) affecting INR     Dosing Instructions: Hold dose then continue your current warfarin dose with next INR in 2 weeks       Summary  As of 1/28/2022    Full warfarin instructions:  1/28: Hold; Otherwise 2 mg every Sun, Thu; 1 mg all other days   Next INR check:  2/10/2022             Telephone call with  daughter, Cleo, who agrees to plan and repeated back plan correctly    Lab visit scheduled    Education provided: Importance of consistent vitamin K intake, Impact of vitamin K foods on INR, Monitoring for bleeding signs and symptoms and Contact [phone]  with any changes, questions or concerns.     Plan made per [facility] anticoagulation protocol    Ebony Doshi RN  Anticoagulation Clinic  1/28/2022    _______________________________________________________________________     Anticoagulation Episode Summary     Current INR goal:  2.0-3.0   TTR:  60.3 % (1 y)   Target end date:  Indefinite   Send INR reminders to:  [facility]    Indications    Chronic atrial fibrillation (H) [I48.20]  Long term current use of anticoagulant therapy [Z79.01]           Comments:           Anticoagulation Care Providers     Provider Role Specialty Phone number    Arnel Garcia MD Referring Internal  Medicine - Pediatrics [phone]    Marisabel Guido MD Referring Internal Medicine - Pediatrics [phone]

## 2022-02-09 ENCOUNTER — TRANSFERRED RECORDS (OUTPATIENT)
Dept: HEALTH INFORMATION MANAGEMENT | Facility: CLINIC | Age: 87
End: 2022-02-09
Payer: MEDICARE

## 2022-02-10 ENCOUNTER — ANTICOAGULATION THERAPY VISIT (OUTPATIENT)
Dept: ANTICOAGULATION | Facility: CLINIC | Age: 87
End: 2022-02-10

## 2022-02-10 ENCOUNTER — LAB (OUTPATIENT)
Dept: LAB | Facility: CLINIC | Age: 87
End: 2022-02-10
Payer: MEDICARE

## 2022-02-10 DIAGNOSIS — I48.20 CHRONIC ATRIAL FIBRILLATION (H): ICD-10-CM

## 2022-02-10 DIAGNOSIS — I48.20 CHRONIC ATRIAL FIBRILLATION (H): Primary | ICD-10-CM

## 2022-02-10 DIAGNOSIS — Z79.01 LONG TERM CURRENT USE OF ANTICOAGULANT THERAPY: ICD-10-CM

## 2022-02-10 LAB — INR BLD: 1.9 (ref 0.9–1.1)

## 2022-02-10 PROCEDURE — 85610 PROTHROMBIN TIME: CPT

## 2022-02-10 PROCEDURE — 36416 COLLJ CAPILLARY BLOOD SPEC: CPT

## 2022-02-10 NOTE — PROGRESS NOTES
ANTICOAGULATION MANAGEMENT     Leonor Mercado 89 year old female is on warfarin with subtherapeutic INR result. (Goal INR 2.0-3.0)    Recent labs: (last 7 days)     02/10/22  1314   INR 1.9*       ASSESSMENT     Source(s): Chart Review and Patient/Caregiver Call       Warfarin doses taken: Warfarin taken as instructed    Diet: Increased greens/vitamin K in diet; plans to resume previous intake    New illness, injury, or hospitalization: No    Medication/supplement changes: None noted    Signs or symptoms of bleeding or clotting: No    Previous INR: Supratherapeutic    Additional findings: None     PLAN     Recommended plan for Temporary changes affecting INR     Dosing Instructions: Continue your current warfarin dose with next INR in 2 weeks       Summary  As of 2/10/2022    Full warfarin instructions:  2 mg every Sun, Thu; 1 mg all other days   Next INR check:  2/23/2022             Telephone call with Leonor's daughter Cleo who agrees to plan and repeated back plan correctly    Lab visit scheduled    Education provided: Please call back if any changes to your diet, medications or how you've been taking warfarin and Importance of consistent vitamin K intake    Plan made per [facility]agulation protocol    Yoana Waddell RN  Anticoagulation Clinic  2/10/2022    _______________________________________________________________________     Anticoagulation Episode Summary     Current INR goal:  2.0-3.0   TTR:  60.8 % (1 y)   Target end date:  Indefinite   Send INR reminders to:  [facility]    Indications    Chronic atrial fibrillation (H) [I48.20]  Long term current use of anticoagulant therapy [Z79.01]           Comments:           Anticoagulation Care Providers     Provider Role Specialty Phone number    Arnel Garcia MD Referring Internal Medicine - Pediatrics [phone]    Marisabel Guido MD Referring Internal Medicine - Pediatrics [phone]

## 2022-02-23 ENCOUNTER — ANTICOAGULATION THERAPY VISIT (OUTPATIENT)
Dept: ANTICOAGULATION | Facility: CLINIC | Age: 87
End: 2022-02-23

## 2022-02-23 ENCOUNTER — LAB (OUTPATIENT)
Dept: LAB | Facility: CLINIC | Age: 87
End: 2022-02-23
Payer: MEDICARE

## 2022-02-23 DIAGNOSIS — Z79.01 LONG TERM CURRENT USE OF ANTICOAGULANT THERAPY: ICD-10-CM

## 2022-02-23 DIAGNOSIS — I48.20 CHRONIC ATRIAL FIBRILLATION (H): ICD-10-CM

## 2022-02-23 DIAGNOSIS — I48.20 CHRONIC ATRIAL FIBRILLATION (H): Primary | ICD-10-CM

## 2022-02-23 LAB — INR BLD: 2 (ref 0.9–1.1)

## 2022-02-23 PROCEDURE — 85610 PROTHROMBIN TIME: CPT

## 2022-02-23 PROCEDURE — 36416 COLLJ CAPILLARY BLOOD SPEC: CPT

## 2022-02-23 NOTE — PROGRESS NOTES
ANTICOAGULATION MANAGEMENT     Leonor Mercado 89 year old female is on warfarin with therapeutic INR result. (Goal INR 2.0-3.0)    Recent labs: (last 7 days)     02/23/22  1341   INR 2.0*       ASSESSMENT     Source(s): Chart Review and Patient/Caregiver Call       Warfarin doses taken: Warfarin taken as instructed    Diet: No new diet changes identified    New illness, injury, or hospitalization: No    Medication/supplement changes: None noted    Signs or symptoms of bleeding or clotting: No    Previous INR: Subtherapeutic    Additional findings: None     PLAN     Recommended plan for no diet, medication or health factor changes affecting INR     Dosing Instructions: Continue your current warfarin dose with next INR in 3 weeks       Summary  As of 2/23/2022    Full warfarin instructions:  2 mg every Sun, Thu; 1 mg all other days   Next INR check:  3/16/2022             Telephone call with  daughter, Cleo, who agrees to plan and repeated back plan correctly    Lab visit scheduled    Education provided: Contact [phone]  with any changes, questions or concerns.     Plan made per [facility]agulation protocol    Ebony Doshi RN  Anticoagulation Clinic  2/23/2022    _______________________________________________________________________     Anticoagulation Episode Summary     Current INR goal:  2.0-3.0   TTR:  57.3 % (1 y)   Target end date:  Indefinite   Send INR reminders to:  [facility]    Indications    Chronic atrial fibrillation (H) [I48.20]  Long term current use of anticoagulant therapy [Z79.01]           Comments:           Anticoagulation Care Providers     Provider Role Specialty Phone number    Arnel Garcia MD Referring Internal Medicine - Pediatrics [phone]    Marisabel Guido MD Referring Internal Medicine - Pediatrics [phone]

## 2022-03-10 ENCOUNTER — TELEPHONE (OUTPATIENT)
Dept: CARDIOLOGY | Facility: CLINIC | Age: 87
End: 2022-03-10
Payer: MEDICARE

## 2022-03-10 NOTE — TELEPHONE ENCOUNTER
Chart reviewed. Patient does not need an in clinic device check. She missed her remote transmission on 3/4/22. Rescheduled her remote.

## 2022-03-10 NOTE — TELEPHONE ENCOUNTER
[facility] Call Center    Phone Message    May a detailed message be left on voicemail: yes     Reason for Call: Other: Pt called in and scheduled appt with Brie and needs a device check prior. Please order and schedule for 3:00 on 3/31/22 if possible. Pt would also like a letter w/ her appts mailed out to her at address on file     Action Taken: Message routed to:  Other: [facility]    Travel Screening: Not Applicable

## 2022-03-14 DIAGNOSIS — I10 ESSENTIAL HYPERTENSION WITH GOAL BLOOD PRESSURE LESS THAN 140/90: ICD-10-CM

## 2022-03-14 DIAGNOSIS — I50.32 CHRONIC HEART FAILURE WITH PRESERVED EJECTION FRACTION (H): ICD-10-CM

## 2022-03-15 ENCOUNTER — ANCILLARY PROCEDURE (OUTPATIENT)
Dept: CARDIOLOGY | Facility: CLINIC | Age: 87
End: 2022-03-15
Attending: INTERNAL MEDICINE
Payer: MEDICARE

## 2022-03-15 DIAGNOSIS — Z95.0 CARDIAC PACEMAKER IN SITU: ICD-10-CM

## 2022-03-15 PROCEDURE — 93296 REM INTERROG EVL PM/IDS: CPT | Performed by: INTERNAL MEDICINE

## 2022-03-15 PROCEDURE — 93294 REM INTERROG EVL PM/LDLS PM: CPT | Performed by: INTERNAL MEDICINE

## 2022-03-16 ENCOUNTER — ANTICOAGULATION THERAPY VISIT (OUTPATIENT)
Dept: ANTICOAGULATION | Facility: CLINIC | Age: 87
End: 2022-03-16

## 2022-03-16 ENCOUNTER — LAB (OUTPATIENT)
Dept: LAB | Facility: CLINIC | Age: 87
End: 2022-03-16
Payer: MEDICARE

## 2022-03-16 DIAGNOSIS — I48.20 CHRONIC ATRIAL FIBRILLATION (H): Primary | ICD-10-CM

## 2022-03-16 DIAGNOSIS — Z79.01 LONG TERM CURRENT USE OF ANTICOAGULANT THERAPY: ICD-10-CM

## 2022-03-16 DIAGNOSIS — I48.20 CHRONIC ATRIAL FIBRILLATION (H): ICD-10-CM

## 2022-03-16 LAB — INR BLD: 1.8 (ref 0.9–1.1)

## 2022-03-16 PROCEDURE — 36416 COLLJ CAPILLARY BLOOD SPEC: CPT

## 2022-03-16 PROCEDURE — 85610 PROTHROMBIN TIME: CPT

## 2022-03-16 NOTE — TELEPHONE ENCOUNTER
Routing refill request to provider for review/approval because:  Labs not current:  creatinine  Patient needs to be seen because it has been more than 1 year since last office visit with PCP  Pt has cardiology appointment scheduled  Last office visit: 8/27/2020 with prescribing provider:     Future Office Visit:      Adrianna Rodrigez RN, BSN  [facility]

## 2022-03-16 NOTE — PROGRESS NOTES
ANTICOAGULATION MANAGEMENT     Leonor Mercado 89 year old female is on warfarin with subtherapeutic INR result. (Goal INR 2.0-3.0)    Recent labs: (last 7 days)     03/16/22  1331   INR 1.8*       ASSESSMENT       Source(s): Chart Review and Patient/Caregiver Call       Warfarin doses taken: Missed dose(s) may be affecting INR    Diet: No new diet changes identified    New illness, injury, or hospitalization: No    Medication/supplement changes: None noted    Signs or symptoms of bleeding or clotting: No    Previous INR: Therapeutic last visit; previously outside of goal range    Additional findings: None       PLAN     Recommended plan for temporary change(s) affecting INR     Dosing Instructions: Booster dose then continue your current warfarin dose with next INR in 2 weeks       Summary  As of 3/16/2022    Full warfarin instructions:  3/16: 2 mg; Otherwise 2 mg every Sun, Thu; 1 mg all other days   Next INR check:  3/30/2022             Telephone call with  Cleo who verbalizes understanding and agrees to plan    Lab visit scheduled    Education provided: Please call back if any changes to your diet, medications or how you've been taking warfarin    Plan made per [facility] anticoagulation protocol    Alden Martin RN  Anticoagulation Clinic  3/16/2022    _______________________________________________________________________     Anticoagulation Episode Summary     Current INR goal:  2.0-3.0   TTR:  51.5 % (1 y)   Target end date:  Indefinite   Send INR reminders to:  [facility]    Indications    Chronic atrial fibrillation (H) [I48.20]  Long term current use of anticoagulant therapy [Z79.01]           Comments:           Anticoagulation Care Providers     Provider Role Specialty Phone number    Arnel Garcia MD Referring Internal Medicine - Pediatrics [phone]    Marisabel Guido MD Referring Internal Medicine - Pediatrics [phone]

## 2022-03-17 RX ORDER — LOSARTAN POTASSIUM 25 MG/1
TABLET ORAL
Qty: 90 TABLET | Refills: 3 | Status: SHIPPED | OUTPATIENT
Start: 2022-03-17 | End: 2023-03-28

## 2022-03-17 RX ORDER — FUROSEMIDE 20 MG
TABLET ORAL
Qty: 180 TABLET | Refills: 3 | Status: SHIPPED | OUTPATIENT
Start: 2022-03-17 | End: 2023-05-31

## 2022-03-21 ENCOUNTER — TELEPHONE (OUTPATIENT)
Dept: PEDIATRICS | Facility: CLINIC | Age: 87
End: 2022-03-21
Payer: MEDICARE

## 2022-03-21 NOTE — TELEPHONE ENCOUNTER
Called patient to schedule physical, no answer. LVM requesting a call back directly to PAL extension.     Marisol Reis, [facility]  [phone]

## 2022-03-24 NOTE — TELEPHONE ENCOUNTER
Patient returned call and scheduled physical at next available afternoon appointment.     ALIVIA Bustamante  [phone]

## 2022-03-30 ENCOUNTER — ANTICOAGULATION THERAPY VISIT (OUTPATIENT)
Dept: ANTICOAGULATION | Facility: CLINIC | Age: 87
End: 2022-03-30

## 2022-03-30 ENCOUNTER — LAB (OUTPATIENT)
Dept: LAB | Facility: CLINIC | Age: 87
End: 2022-03-30
Payer: MEDICARE

## 2022-03-30 DIAGNOSIS — I48.20 CHRONIC ATRIAL FIBRILLATION (H): ICD-10-CM

## 2022-03-30 DIAGNOSIS — Z79.01 LONG TERM CURRENT USE OF ANTICOAGULANT THERAPY: ICD-10-CM

## 2022-03-30 DIAGNOSIS — I48.20 CHRONIC ATRIAL FIBRILLATION (H): Primary | ICD-10-CM

## 2022-03-30 LAB — INR BLD: 2.4 (ref 0.9–1.1)

## 2022-03-30 PROCEDURE — 85610 PROTHROMBIN TIME: CPT

## 2022-03-30 PROCEDURE — 36416 COLLJ CAPILLARY BLOOD SPEC: CPT

## 2022-03-30 NOTE — PROGRESS NOTES
ANTICOAGULATION MANAGEMENT     Leonor Mercado 89 year old female is on warfarin with therapeutic INR result. (Goal INR 2.0-3.0)    Recent labs: (last 7 days)     03/30/22  1446   INR 2.4*       ASSESSMENT       Source(s): Chart Review and Patient/Caregiver Call       Warfarin doses taken: Warfarin taken as instructed    Diet: No new diet changes identified    New illness, injury, or hospitalization: No    Medication/supplement changes: None noted    Signs or symptoms of bleeding or clotting: No    Previous INR: Subtherapeutic    Additional findings: None       PLAN     Recommended plan for no diet, medication or health factor changes affecting INR     Dosing Instructions: continue your current warfarin dose with next INR in 3 weeks       Summary  As of 3/30/2022    Full warfarin instructions:  2 mg every Sun, Thu; 1 mg all other days   Next INR check:  4/20/2022             Telephone call with  daughter, Cleo, who agrees to plan and repeated back plan correctly    Lab visit scheduled    Education provided: Contact [phone]  with any changes, questions or concerns.     Plan made per [facility] anticoagulation protocol    Ebony Doshi RN  Anticoagulation Clinic  3/30/2022    _______________________________________________________________________     Anticoagulation Episode Summary     Current INR goal:  2.0-3.0   TTR:  53.4 % (1 y)   Target end date:  Indefinite   Send INR reminders to:  [facility]    Indications    Chronic atrial fibrillation (H) [I48.20]  Long term current use of anticoagulant therapy [Z79.01]           Comments:           Anticoagulation Care Providers     Provider Role Specialty Phone number    Arnel Garcia MD Referring Internal Medicine - Pediatrics [phone]    Marisabel Guido MD Referring Internal Medicine - Pediatrics [phone]

## 2022-03-31 ENCOUNTER — HOSPITAL ENCOUNTER (OUTPATIENT)
Dept: CARDIOLOGY | Facility: CLINIC | Age: 87
Discharge: HOME | End: 2022-03-31
Attending: INTERNAL MEDICINE | Admitting: INTERNAL MEDICINE
Payer: MEDICARE

## 2022-03-31 ENCOUNTER — LAB (OUTPATIENT)
Dept: LAB | Facility: CLINIC | Age: 87
End: 2022-03-31
Payer: MEDICARE

## 2022-03-31 DIAGNOSIS — I48.21 PERMANENT ATRIAL FIBRILLATION (H): ICD-10-CM

## 2022-03-31 DIAGNOSIS — I25.10 CORONARY ARTERY DISEASE INVOLVING NATIVE CORONARY ARTERY OF NATIVE HEART WITHOUT ANGINA PECTORIS: ICD-10-CM

## 2022-03-31 DIAGNOSIS — I07.9 TRICUSPID VALVE DISEASE: ICD-10-CM

## 2022-03-31 LAB
ANION GAP SERPL CALCULATED.3IONS-SCNC: 3 MMOL/L (ref 3–14)
BUN SERPL-MCNC: 21 MG/DL (ref 7–30)
CALCIUM SERPL-MCNC: 9.4 MG/DL (ref 8.5–10.1)
CHLORIDE BLD-SCNC: 105 MMOL/L (ref 94–109)
CHOLEST SERPL-MCNC: 139 MG/DL
CO2 SERPL-SCNC: 29 MMOL/L (ref 20–32)
CREAT SERPL-MCNC: 1.39 MG/DL (ref 0.52–1.04)
FASTING STATUS PATIENT QL REPORTED: NO
GFR SERPL CREATININE-BSD FRML MDRD: 35 ML/MIN/1.73M2
GLUCOSE BLD-MCNC: 90 MG/DL (ref 70–99)
HDLC SERPL-MCNC: 72 MG/DL
LDLC SERPL CALC-MCNC: 41 MG/DL
LVEF ECHO: NORMAL
MDC_IDC_MSMT_BATTERY_DTM: NORMAL
MDC_IDC_MSMT_BATTERY_IMPEDANCE: 1285 OHM
MDC_IDC_MSMT_BATTERY_REMAINING_LONGEVITY: 58 MO
MDC_IDC_MSMT_BATTERY_STATUS: NORMAL
MDC_IDC_MSMT_BATTERY_VOLTAGE: 2.77 V
MDC_IDC_MSMT_LEADCHNL_RA_IMPEDANCE_VALUE: 67 OHM
MDC_IDC_MSMT_LEADCHNL_RV_IMPEDANCE_VALUE: 688 OHM
MDC_IDC_PG_IMPLANT_DTM: NORMAL
MDC_IDC_PG_MFG: NORMAL
MDC_IDC_PG_MODEL: NORMAL
MDC_IDC_PG_SERIAL: NORMAL
MDC_IDC_PG_TYPE: NORMAL
MDC_IDC_SESS_CLINIC_NAME: NORMAL
MDC_IDC_SESS_DTM: NORMAL
MDC_IDC_SESS_TYPE: NORMAL
MDC_IDC_SET_BRADY_LOWRATE: 60 {BEATS}/MIN
MDC_IDC_SET_BRADY_MAX_SENSOR_RATE: 120 {BEATS}/MIN
MDC_IDC_SET_BRADY_MAX_TRACKING_RATE: 105 {BEATS}/MIN
MDC_IDC_SET_BRADY_MODE: NORMAL
MDC_IDC_SET_LEADCHNL_RV_PACING_AMPLITUDE: 2.5 V
MDC_IDC_SET_LEADCHNL_RV_PACING_CAPTURE_MODE: NORMAL
MDC_IDC_SET_LEADCHNL_RV_PACING_POLARITY: NORMAL
MDC_IDC_SET_LEADCHNL_RV_PACING_PULSEWIDTH: 0.52 MS
MDC_IDC_SET_LEADCHNL_RV_SENSING_POLARITY: NORMAL
MDC_IDC_SET_LEADCHNL_RV_SENSING_SENSITIVITY: 2.8 MV
MDC_IDC_SET_ZONE_DETECTION_INTERVAL: 333.33 MS
MDC_IDC_SET_ZONE_TYPE: NORMAL
MDC_IDC_SET_ZONE_TYPE: NORMAL
MDC_IDC_STAT_AT_BURDEN_PERCENT: 0 %
MDC_IDC_STAT_AT_DTM_END: NORMAL
MDC_IDC_STAT_AT_DTM_START: NORMAL
MDC_IDC_STAT_BRADY_DTM_END: NORMAL
MDC_IDC_STAT_BRADY_DTM_START: NORMAL
MDC_IDC_STAT_BRADY_RV_PERCENT_PACED: 67 %
MDC_IDC_STAT_EPISODE_RECENT_COUNT: 0
MDC_IDC_STAT_EPISODE_RECENT_COUNT: 0
MDC_IDC_STAT_EPISODE_RECENT_COUNT_DTM_END: NORMAL
MDC_IDC_STAT_EPISODE_RECENT_COUNT_DTM_END: NORMAL
MDC_IDC_STAT_EPISODE_RECENT_COUNT_DTM_START: NORMAL
MDC_IDC_STAT_EPISODE_RECENT_COUNT_DTM_START: NORMAL
MDC_IDC_STAT_EPISODE_TYPE: NORMAL
MDC_IDC_STAT_EPISODE_TYPE: NORMAL
NONHDLC SERPL-MCNC: 67 MG/DL
POTASSIUM BLD-SCNC: 4.3 MMOL/L (ref 3.4–5.3)
SODIUM SERPL-SCNC: 137 MMOL/L (ref 133–144)
TRIGL SERPL-MCNC: 129 MG/DL

## 2022-03-31 PROCEDURE — 93306 TTE W/DOPPLER COMPLETE: CPT | Mod: 26 | Performed by: INTERNAL MEDICINE

## 2022-03-31 PROCEDURE — 93306 TTE W/DOPPLER COMPLETE: CPT

## 2022-03-31 PROCEDURE — 80061 LIPID PANEL: CPT | Performed by: INTERNAL MEDICINE

## 2022-03-31 PROCEDURE — 80048 BASIC METABOLIC PNL TOTAL CA: CPT | Performed by: INTERNAL MEDICINE

## 2022-03-31 PROCEDURE — 36415 COLL VENOUS BLD VENIPUNCTURE: CPT | Performed by: INTERNAL MEDICINE

## 2022-04-06 ENCOUNTER — OFFICE VISIT (OUTPATIENT)
Dept: CARDIOLOGY | Facility: CLINIC | Age: 87
End: 2022-04-06
Payer: MEDICARE

## 2022-04-06 VITALS
DIASTOLIC BLOOD PRESSURE: 72 MMHG | HEART RATE: 84 BPM | SYSTOLIC BLOOD PRESSURE: 128 MMHG | HEIGHT: 64 IN | BODY MASS INDEX: 24.38 KG/M2 | WEIGHT: 142.8 LBS

## 2022-04-06 DIAGNOSIS — I48.21 PERMANENT ATRIAL FIBRILLATION (H): Primary | ICD-10-CM

## 2022-04-06 DIAGNOSIS — I25.10 CORONARY ARTERY DISEASE INVOLVING NATIVE CORONARY ARTERY OF NATIVE HEART WITHOUT ANGINA PECTORIS: ICD-10-CM

## 2022-04-06 DIAGNOSIS — I50.812 CHRONIC RIGHT-SIDED HEART FAILURE (H): ICD-10-CM

## 2022-04-06 DIAGNOSIS — I49.5 SICK SINUS SYNDROME (H): ICD-10-CM

## 2022-04-06 PROCEDURE — 99213 OFFICE O/P EST LOW 20 MIN: CPT | Performed by: NURSE PRACTITIONER

## 2022-04-06 NOTE — PROGRESS NOTES
HISTORY OF PRESENT ILLNESS:    This is a 89 year old female who follows with Dr Horton at [facility]  Her past medical history includes:  Coronary artery disease, atrial fibrillation, SSS s/p PPM, hypertension, tricuspid regurgitation, sleep apnea, COPD, hyperlipidemia, chronic renal insufficiency.    Ms Mercado has a long history of atrial fibrillation and has been on chronic Warfarin.  She developed sick sinus syndrome and received a pacemaker (2003) with a generator change done in (2013)  She underwent stenting to her proximal and mid LAD (2005) done in [place].     ECHO (1/2021) showed LVEF 60-65%, mild RV dysfunction/enlargement, 3-4+ TR, mild pulmonary hypertension    When seen in clinic last fall, she was fairly stable without any overt heart failure symptoms.  The patient and family do not want advanced testing. She has remained on low dose Lasix.    She returns for a 6 month assessment, labs and repeat ECHO    Ms Mercado comes into clinic today with her daughter who assists with her history and symptoms  She tells me that for the past 6 months, she has felt quite stable  She gets winded with minimal walking  She uses O2 support only at night.  She recently saw the pulmonologist who states that her testing there was fairly stable and her inhalers were not adjusted  She uses her Albuterol 1-2 times a day  She was given a prn antibiotic and steroid in case she needs.  She is very inactive  She notes some leg swelling towards the end of the day which resolves by morning.  She denies any chest pain, palpitations, orthopnea, or lightheadedness.  She has noted some weight loss that she attributes to a poor appetite.    Patient and family would like to keep office visits to a minimum.      Device interrogation (3/15/22) showed permanent A-fib  V-paced 67%  Adequate rates  No Ventricular arrhytmias    I reviewed her ECHO (3/31/22)  This shows LVEF 60-65%, flattened septum consistent with RV  pressure overload, mild RV dysfunction, 3-4+ TR, mild pulmonary hypertension, RVSP 25 mmHg, 1+ MR  (no significant change)    I reviewed her labs (3/31/22)  Total Cholesterol: 139  Triglycerides:  129  HDL: 72  LDL: 41  Sodium: 137  Potassium: 4.3  BUN: 21  Creatinine: 1.39      VITAL SIGNS:  BP: 128/72  Pulse: 84  Weight:  142 lbs (down 8 lbs in 6 months)  BMI: 24    IMPRESSION AND PLAN:    Chronic R-sided Heart Failure:  -ECHO shows mild RV dysfunction, RV overload, 3-4+ TR  -on Lasix 20 mg/day  -weight actually down 8 lbs in 6 months, minimal edema  -may need more diuretic in future, they will call with significant weight gain or more dyspnea    Coronary Artery Disease:  -s/p prox and mid LAD stent (2005)  -denies angina and does not want to pursue further CV testing    Permanent Atrial Fibrillation:  -s/p PPM for SSS  -Pacing 67% of time  Adequate HRs  -chronic Warfarin    Hypertension:  -on Diltiazem  mg, Imdur 30 mg, Lasix 20 mg bid, Losartan 25 mg, Imdur 30 mg  -BP controlled    Hyperlipidemia:  -on Pravastatin 40 mg  -LDL 41    COPD and Sleep Apnea  -follows with Dr Her  Wears O2 at night    The total time for the visit today was 20 minutes which includes patient visit, reviewing of records, discussion, and placing of orders of the outpatient coordination of cardiovascular care as described.  The level of medical decision making during this visit was of moderate complexity.  Thank you for allowing me to participate in their care.      No orders of the defined types were placed in this encounter.      No orders of the defined types were placed in this encounter.      There are no discontinued medications.      Encounter Diagnoses   Name Primary?     Permanent atrial fibrillation (H) Yes     Chronic right-sided heart failure (H)      Coronary artery disease involving native coronary artery of native heart without angina pectoris      Sick sinus syndrome (H)        CURRENT MEDICATIONS:  Current  Outpatient Medications   Medication Sig Dispense Refill     acetaminophen (TYLENOL) 500 MG tablet Take 500 mg by mouth At Bedtime       albuterol (PROAIR HFA/PROVENTIL HFA/VENTOLIN HFA) 108 (90 Base) MCG/ACT inhaler Inhale 2 puffs into the lungs every 6 hours as needed for shortness of breath / dyspnea or wheezing 18 g 3     Calcium Carbonate-Vitamin D (CALCIUM 500+D HIGH POTENCY PO) Take 1 tablet by mouth 2 times daily       diltiazem ER COATED BEADS (CARDIZEM CD/CARTIA XT) 240 MG 24 hr capsule Take 1 capsule by mouth once daily 90 capsule 3     Fluticasone-Umeclidin-Vilanterol (TRELEGY ELLIPTA) 100-62.5-25 MCG/INH oral inhaler Inhale 1 puff into the lungs daily       furosemide (LASIX) 20 MG tablet Take 1 tablet by mouth twice daily 180 tablet 3     isosorbide mononitrate (IMDUR) 30 MG 24 hr tablet Take 1 tablet by mouth once daily 90 tablet 1     Lactobacillus (PROBIOTIC ACIDOPHILUS) TABS Take 1 tablet by mouth daily        losartan (COZAAR) 25 MG tablet Take 1 tablet by mouth once daily 90 tablet 3     melatonin 5 MG tablet Take 10 mg by mouth nightly as needed for sleep       mirtazapine (REMERON) 15 MG tablet TAKE 1 TABLET BY MOUTH AT BEDTIME 90 tablet 0     Multiple Vitamins-Minerals (OCUVITE PO) Take 1 capsule by mouth daily.       polyethylene glycol (MIRALAX/GLYCOLAX) Packet Take by mouth daily 1 scoopful once daily       pravastatin (PRAVACHOL) 40 MG tablet Take 1 tablet by mouth once daily 90 tablet 3     senna (SENOKOT) 8.6 MG tablet Take 2 tablets by mouth nightly as needed for constipation        Vitamin D, Cholecalciferol, 1000 units TABS Take 1,000 Units by mouth daily       warfarin ANTICOAGULANT (COUMADIN) 2 MG tablet Take 2mg every Sun, Tu, & Thurs / Take 1mg all other days OR as instructed by INR clinic 60 tablet 0     ASPIRIN NOT PRESCRIBED (INTENTIONAL) Please choose reason not prescribed, below (Patient not taking: Reported on 8/16/2021) 0 each 0       ALLERGIES     Allergies   Allergen  Reactions     Amiodarone      pulm fibrosis       Baclofen      Confusion      Bactrim [Sulfamethoxazole W-Trimethoprim]      Difficulty swallowing     Doxycycline Other (See Comments)     Multiple complaints; she does not want this again.      Levaquin [Levofloxacin] Other (See Comments)     Multiple complaints; she will not take this again     Linzess [Linaclotide] Diarrhea     Lorazepam        Prolonged drowsiness with ER visit      Liquid Adhesive Itching and Rash     Bleeding when tape removed after pacemaker placement..itched and burned for weeks.       PAST MEDICAL HISTORY:  Past Medical History:   Diagnosis Date     Atrial fibrillation (H)     Sick sinus syndrome, PAT, AF     BCC (basal cell carcinoma of skin)     Face     CHF - Chr Diastolic (H) 11/21/2017     Chronic renal insufficiency      COPD (chronic obstructive pulmonary disease) (H)      Coronary artery disease     2-05[place]-CAD-taxus stentx2 2.5-12,2.5-12 in LADp and LADm, 80%nonDomRCA-LcxDom-mild,EF60%     Hyperlipidemia      Hypertension      IBS (irritable bowel syndrome)      Osteoporosis      Pulmonary fibrosis (H)     do not use amiodarone     Sick sinus syndrome - s/p PPM 2003 (H) 12/16/2017     SSS (sick sinus syndrome) (H)     DDD pacer (see surgery history section)     Vertigo        PAST SURGICAL HISTORY:  Past Surgical History:   Procedure Laterality Date     APPENDECTOMY  1956     CARDIAC SURGERY      PPM, 2 STENTS2-05[place]-CAD-taxus stentx2 2.5-12,2.5-12 in LADp and LADm, 80%nonDomRCA-LcxDom-mild,EF60%     CORONARY ANGIOGRAPHY ADULT ORDER  7/1994    mild CAD,Normal LV function, No Mitral regurgitation, Prox LAD 30% stenosis. RCA prox and mid, 20-30%     ESOPHAGOSCOPY, GASTROSCOPY, DUODENOSCOPY (EGD), COMBINED N/A 8/19/2015    Procedure: COMBINED ESOPHAGOSCOPY, GASTROSCOPY, DUODENOSCOPY (EGD), BIOPSY SINGLE OR MULTIPLE;  Surgeon: Genevieve Leon MD;  Location:  GI     H LEAD REVISION DUAL  4/2003    Revised in [place]-due to  diaphram stim (original pacer placed in [place])     H LEAD REVISION DUAL  2014    Correction of reversal of A and V leads in Header     HEART CATH, ANGIOPLASTY  2005    LEIGH ANN mid and proximal LAD, ongoing 80% RCA; mild circumflex     HERNIA REPAIR Left     LIH     IMPLANT PACEMAKER  2003    Placed in [place] for sick sinus syndrome     REPLACE PACEMAKER GENERATOR  2013    Dual-chamber pacemaker by Dr SETH Pierre       FAMILY HISTORY:  Family History   Problem Relation Age of Onset     Heart Disease Father          age 84     Neurologic Disorder Mother         dementia     Gastrointestinal Disease Daughter         liver transplant     Crohn's Disease Daughter        SOCIAL HISTORY:  Social History     Socioeconomic History     Marital status:      Spouse name: None     Number of children: 3     Years of education: None     Highest education level: None   Occupational History     Employer: RETIRED   Tobacco Use     Smoking status: Former Smoker     Packs/day: 0.00     Types: Cigarettes     Quit date: 1987     Years since quittin.9     Smokeless tobacco: Never Used   Substance and Sexual Activity     Alcohol use: Never     Drug use: No     Sexual activity: Never     Partners: Male   Other Topics Concern     Parent/sibling w/ CABG, MI or angioplasty before 65F 55M? Yes      Service Not Asked     Blood Transfusions Not Asked     Caffeine Concern Yes     Comment: decaf - some 1/2 caff     Occupational Exposure Not Asked     Hobby Hazards Not Asked     Sleep Concern Yes     Comment: nocturia every 2 hours     Stress Concern Not Asked     Weight Concern Not Asked     Special Diet No     Back Care Not Asked     Exercise No     Comment: some walking in the house     Bike Helmet Not Asked     Seat Belt Not Asked     Self-Exams Not Asked   Social History Narrative     None     Social Determinants of Health     Financial Resource Strain: Not on file   Food Insecurity: Not on file  "  Transportation Needs: Not on file   Physical Activity: Not on file   Stress: Not on file   Social Connections: Not on file   Intimate Partner Violence: Not on file   Housing Stability: Not on file       Review of Systems:  Skin:  Negative       Eyes:  Positive for glasses    ENT:  Positive for hearing loss hearing aids  Respiratory:  Positive for dyspnea on exertion;dyspnea at rest;cough;wheezing COPD, pulmonary fibrosis; dyspnea w/ minimal exertion; using oxygen at night   Cardiovascular:    Positive for;palpitations    Gastroenterology: Positive for poor appetite;constipation    Genitourinary:  Positive for nocturia possible passed a kidney stone last night  Musculoskeletal:  Positive for arthritis knees, hips  Neurologic:  Negative      Psychiatric:  Positive for sleep disturbances    Heme/Lymph/Imm:  Positive for allergies RX  Endocrine:  Negative        Physical Exam:  Vitals: /72   Pulse 84   Ht 1.613 m (5' 3.5\")   Wt 64.8 kg (142 lb 12.8 oz)   LMP  (LMP Unknown)   BMI 24.90 kg/m      Constitutional:  cooperative        Skin:  warm and dry to the touch          Head:  normocephalic        Eyes:  pupils equal and round        Lymph:      ENT:           Neck:      Mild JVP increase with possible V wave    Respiratory:  clear to auscultation prolonged expiration  no wheezing    Cardiac:   irregularly irregular rhythm distant heart sounds   early systolic murmur        pulses full and equal                                        GI:  BS normoactive        Extremities and Muscular Skeletal:        RLE edema;trace        Neurological:  no gross motor deficits   [place], uses walker    Psych:  Alert and Oriented x 3          CC  No referring provider defined for this encounter.                  "

## 2022-04-06 NOTE — LETTER
4/6/2022    Arnel Garcia MD  [address] [place] [address]    RE: Leonor REBOLLEDO Rogelio       Dear Colleague,     I had the pleasure of seeing Leonor Mercado in the [facility].  HISTORY OF PRESENT ILLNESS:    This is a 89 year old female who follows with Dr Horton at [facility]  Her past medical history includes:  Coronary artery disease, atrial fibrillation, SSS s/p PPM, hypertension, tricuspid regurgitation, sleep apnea, COPD, hyperlipidemia, chronic renal insufficiency.    Ms Mercado has a long history of atrial fibrillation and has been on chronic Warfarin.  She developed sick sinus syndrome and received a pacemaker (2003) with a generator change done in (2013)  She underwent stenting to her proximal and mid LAD (2005) done in [place].     ECHO (1/2021) showed LVEF 60-65%, mild RV dysfunction/enlargement, 3-4+ TR, mild pulmonary hypertension    When seen in clinic last fall, she was fairly stable without any overt heart failure symptoms.  The patient and family do not want advanced testing. She has remained on low dose Lasix.    She returns for a 6 month assessment, labs and repeat ECHO    Ms Mercado comes into clinic today with her daughter who assists with her history and symptoms  She tells me that for the past 6 months, she has felt quite stable  She gets winded with minimal walking  She uses O2 support only at night.  She recently saw the pulmonologist who states that her testing there was fairly stable and her inhalers were not adjusted  She uses her Albuterol 1-2 times a day  She was given a prn antibiotic and steroid in case she needs.  She is very inactive  She notes some leg swelling towards the end of the day which resolves by morning.  She denies any chest pain, palpitations, orthopnea, or lightheadedness.  She has noted some weight loss that she attributes to a poor appetite.    Patient and family would like to keep office visits to a  minimum.      Device interrogation (3/15/22) showed permanent A-fib  V-paced 67%  Adequate rates  No Ventricular arrhytmias    I reviewed her ECHO (3/31/22)  This shows LVEF 60-65%, flattened septum consistent with RV pressure overload, mild RV dysfunction, 3-4+ TR, mild pulmonary hypertension, RVSP 25 mmHg, 1+ MR  (no significant change)    I reviewed her labs (3/31/22)  Total Cholesterol: 139  Triglycerides:  129  HDL: 72  LDL: 41  Sodium: 137  Potassium: 4.3  BUN: 21  Creatinine: 1.39      VITAL SIGNS:  BP: 128/72  Pulse: 84  Weight:  142 lbs (down 8 lbs in 6 months)  BMI: 24    IMPRESSION AND PLAN:    Chronic R-sided Heart Failure:  -ECHO shows mild RV dysfunction, RV overload, 3-4+ TR  -on Lasix 20 mg/day  -weight actually down 8 lbs in 6 months, minimal edema  -may need more diuretic in future, they will call with significant weight gain or more dyspnea    Coronary Artery Disease:  -s/p prox and mid LAD stent (2005)  -denies angina and does not want to pursue further CV testing    Permanent Atrial Fibrillation:  -s/p PPM for SSS  -Pacing 67% of time  Adequate HRs  -chronic Warfarin    Hypertension:  -on Diltiazem  mg, Imdur 30 mg, Lasix 20 mg bid, Losartan 25 mg, Imdur 30 mg  -BP controlled    Hyperlipidemia:  -on Pravastatin 40 mg  -LDL 41    COPD and Sleep Apnea  -follows with Dr Her  Wears O2 at night    The total time for the visit today was 20 minutes which includes patient visit, reviewing of records, discussion, and placing of orders of the outpatient coordination of cardiovascular care as described.  The level of medical decision making during this visit was of moderate complexity.  Thank you for allowing me to participate in their care.      No orders of the defined types were placed in this encounter.      No orders of the defined types were placed in this encounter.      There are no discontinued medications.      Encounter Diagnoses   Name Primary?     Permanent atrial fibrillation (H) Yes      Chronic right-sided heart failure (H)      Coronary artery disease involving native coronary artery of native heart without angina pectoris      Sick sinus syndrome (H)        CURRENT MEDICATIONS:  Current Outpatient Medications   Medication Sig Dispense Refill     acetaminophen (TYLENOL) 500 MG tablet Take 500 mg by mouth At Bedtime       albuterol (PROAIR HFA/PROVENTIL HFA/VENTOLIN HFA) 108 (90 Base) MCG/ACT inhaler Inhale 2 puffs into the lungs every 6 hours as needed for shortness of breath / dyspnea or wheezing 18 g 3     Calcium Carbonate-Vitamin D (CALCIUM 500+D HIGH POTENCY PO) Take 1 tablet by mouth 2 times daily       diltiazem ER COATED BEADS (CARDIZEM CD/CARTIA XT) 240 MG 24 hr capsule Take 1 capsule by mouth once daily 90 capsule 3     Fluticasone-Umeclidin-Vilanterol (TRELEGY ELLIPTA) 100-62.5-25 MCG/INH oral inhaler Inhale 1 puff into the lungs daily       furosemide (LASIX) 20 MG tablet Take 1 tablet by mouth twice daily 180 tablet 3     isosorbide mononitrate (IMDUR) 30 MG 24 hr tablet Take 1 tablet by mouth once daily 90 tablet 1     Lactobacillus (PROBIOTIC ACIDOPHILUS) TABS Take 1 tablet by mouth daily        losartan (COZAAR) 25 MG tablet Take 1 tablet by mouth once daily 90 tablet 3     melatonin 5 MG tablet Take 10 mg by mouth nightly as needed for sleep       mirtazapine (REMERON) 15 MG tablet TAKE 1 TABLET BY MOUTH AT BEDTIME 90 tablet 0     Multiple Vitamins-Minerals (OCUVITE PO) Take 1 capsule by mouth daily.       polyethylene glycol (MIRALAX/GLYCOLAX) Packet Take by mouth daily 1 scoopful once daily       pravastatin (PRAVACHOL) 40 MG tablet Take 1 tablet by mouth once daily 90 tablet 3     senna (SENOKOT) 8.6 MG tablet Take 2 tablets by mouth nightly as needed for constipation        Vitamin D, Cholecalciferol, 1000 units TABS Take 1,000 Units by mouth daily       warfarin ANTICOAGULANT (COUMADIN) 2 MG tablet Take 2mg every Sun, Tu, & Thurs / Take 1mg all other days OR as instructed  by INR clinic 60 tablet 0     ASPIRIN NOT PRESCRIBED (INTENTIONAL) Please choose reason not prescribed, below (Patient not taking: Reported on 8/16/2021) 0 each 0       ALLERGIES     Allergies   Allergen Reactions     Amiodarone      pulm fibrosis       Baclofen      Confusion      Bactrim [Sulfamethoxazole W-Trimethoprim]      Difficulty swallowing     Doxycycline Other (See Comments)     Multiple complaints; she does not want this again.      Levaquin [Levofloxacin] Other (See Comments)     Multiple complaints; she will not take this again     Linzess [Linaclotide] Diarrhea     Lorazepam        Prolonged drowsiness with ER visit      Liquid Adhesive Itching and Rash     Bleeding when tape removed after pacemaker placement..itched and burned for weeks.       PAST MEDICAL HISTORY:  Past Medical History:   Diagnosis Date     Atrial fibrillation (H)     Sick sinus syndrome, PAT, AF     BCC (basal cell carcinoma of skin)     Face     CHF - Chr Diastolic (H) 11/21/2017     Chronic renal insufficiency      COPD (chronic obstructive pulmonary disease) (H)      Coronary artery disease     2-05[place]-CAD-taxus stentx2 2.5-12,2.5-12 in LADp and LADm, 80%nonDomRCA-LcxDom-mild,EF60%     Hyperlipidemia      Hypertension      IBS (irritable bowel syndrome)      Osteoporosis      Pulmonary fibrosis (H)     do not use amiodarone     Sick sinus syndrome - s/p PPM 2003 (H) 12/16/2017     SSS (sick sinus syndrome) (H)     DDD pacer (see surgery history section)     Vertigo        PAST SURGICAL HISTORY:  Past Surgical History:   Procedure Laterality Date     APPENDECTOMY  1956     CARDIAC SURGERY      PPM, 2 STENTS2-05[place]-CAD-taxus stentx2 2.5-12,2.5-12 in LADp and LADm, 80%nonDomRCA-LcxDom-mild,EF60%     CORONARY ANGIOGRAPHY ADULT ORDER  7/1994    mild CAD,Normal LV function, No Mitral regurgitation, Prox LAD 30% stenosis. RCA prox and mid, 20-30%     ESOPHAGOSCOPY, GASTROSCOPY, DUODENOSCOPY (EGD), COMBINED N/A 8/19/2015     Procedure: COMBINED ESOPHAGOSCOPY, GASTROSCOPY, DUODENOSCOPY (EGD), BIOPSY SINGLE OR MULTIPLE;  Surgeon: Genevieve Leon MD;  Location: [facility]     H LEAD REVISION DUAL  2003    Revised in [place]-due to diaphram stim (original pacer placed in [place])     H LEAD REVISION DUAL  2014    Correction of reversal of A and V leads in Header     HEART CATH, ANGIOPLASTY  2005    LEIGH ANN mid and proximal LAD, ongoing 80% RCA; mild circumflex     HERNIA REPAIR Left     LIH     IMPLANT PACEMAKER  2003    Placed in [place] for sick sinus syndrome     REPLACE PACEMAKER GENERATOR  2013    Dual-chamber pacemaker by Dr SETH Pierre       FAMILY HISTORY:  Family History   Problem Relation Age of Onset     Heart Disease Father          age 84     Neurologic Disorder Mother         dementia     Gastrointestinal Disease Daughter         liver transplant     Crohn's Disease Daughter        SOCIAL HISTORY:  Social History     Socioeconomic History     Marital status:      Spouse name: None     Number of children: 3     Years of education: None     Highest education level: None   Occupational History     Employer: RETIRED   Tobacco Use     Smoking status: Former Smoker     Packs/day: 0.00     Types: Cigarettes     Quit date: 1987     Years since quittin.9     Smokeless tobacco: Never Used   Substance and Sexual Activity     Alcohol use: Never     Drug use: No     Sexual activity: Never     Partners: Male   Other Topics Concern     Parent/sibling w/ CABG, MI or angioplasty before 65F 55M? Yes      Service Not Asked     Blood Transfusions Not Asked     Caffeine Concern Yes     Comment: decaf - some 1/2 caff     Occupational Exposure Not Asked     Hobby Hazards Not Asked     Sleep Concern Yes     Comment: nocturia every 2 hours     Stress Concern Not Asked     Weight Concern Not Asked     Special Diet No     Back Care Not Asked     Exercise No     Comment: some walking in the house     Bike Helmet Not  "Asked     Seat Belt Not Asked     Self-Exams Not Asked   Social History Narrative     None     Social Determinants of Health     Financial Resource Strain: Not on file   Food Insecurity: Not on file   Transportation Needs: Not on file   Physical Activity: Not on file   Stress: Not on file   Social Connections: Not on file   Intimate Partner Violence: Not on file   Housing Stability: Not on file       Review of Systems:  Skin:  Negative       Eyes:  Positive for glasses    ENT:  Positive for hearing loss hearing aids  Respiratory:  Positive for dyspnea on exertion;dyspnea at rest;cough;wheezing COPD, pulmonary fibrosis; dyspnea w/ minimal exertion; using oxygen at night   Cardiovascular:    Positive for;palpitations    Gastroenterology: Positive for poor appetite;constipation    Genitourinary:  Positive for nocturia possible passed a kidney stone last night  Musculoskeletal:  Positive for arthritis knees, hips  Neurologic:  Negative      Psychiatric:  Positive for sleep disturbances    Heme/Lymph/Imm:  Positive for allergies RX  Endocrine:  Negative        Physical Exam:  Vitals: /72   Pulse 84   Ht 1.613 m (5' 3.5\")   Wt 64.8 kg (142 lb 12.8 oz)   LMP  (LMP Unknown)   BMI 24.90 kg/m      Constitutional:  cooperative        Skin:  warm and dry to the touch          Head:  normocephalic        Eyes:  pupils equal and round        Lymph:      ENT:           Neck:      Mild JVP increase with possible V wave    Respiratory:  clear to auscultation prolonged expiration  no wheezing    Cardiac:   irregularly irregular rhythm distant heart sounds   early systolic murmur        pulses full and equal                                        GI:  BS normoactive        Extremities and Muscular Skeletal:        RLE edema;trace        Neurological:  no gross motor deficits   [place], uses walker    Psych:  Alert and Oriented x 3          CC  No referring provider defined for this encounter.    Thank you for allowing me to " participate in the care of your patient.      Sincerely,     BAY Jewell [facility]

## 2022-04-07 RX ORDER — ISOSORBIDE MONONITRATE 30 MG/1
TABLET, EXTENDED RELEASE ORAL
Qty: 90 TABLET | Refills: 0 | Status: SHIPPED | OUTPATIENT
Start: 2022-04-07 | End: 2022-05-19

## 2022-04-07 NOTE — TELEPHONE ENCOUNTER
3 month cassandra refill approved.     Further refills will be addressed at scheduled visit in 1 month.     Prabha Yañez RN on 4/7/2022 at 11:38 AM

## 2022-04-20 ENCOUNTER — ANTICOAGULATION THERAPY VISIT (OUTPATIENT)
Dept: ANTICOAGULATION | Facility: CLINIC | Age: 87
End: 2022-04-20

## 2022-04-20 ENCOUNTER — LAB (OUTPATIENT)
Dept: LAB | Facility: CLINIC | Age: 87
End: 2022-04-20
Payer: MEDICARE

## 2022-04-20 DIAGNOSIS — I48.20 CHRONIC ATRIAL FIBRILLATION (H): ICD-10-CM

## 2022-04-20 DIAGNOSIS — Z79.01 LONG TERM CURRENT USE OF ANTICOAGULANT THERAPY: ICD-10-CM

## 2022-04-20 DIAGNOSIS — I48.20 CHRONIC ATRIAL FIBRILLATION (H): Primary | ICD-10-CM

## 2022-04-20 LAB — INR BLD: 1.6 (ref 0.9–1.1)

## 2022-04-20 PROCEDURE — 36416 COLLJ CAPILLARY BLOOD SPEC: CPT

## 2022-04-20 PROCEDURE — 85610 PROTHROMBIN TIME: CPT

## 2022-04-20 NOTE — PROGRESS NOTES
ANTICOAGULATION MANAGEMENT     Leonor Mercado 89 year old female is on warfarin with subtherapeutic INR result. (Goal INR 2.0-3.0)    Recent labs: (last 7 days)     04/20/22  1423   INR 1.6*       ASSESSMENT       Source(s): Chart Review and Patient/Caregiver Call       Warfarin doses taken: She doesn't think she missed any doses, but she can't say for sure.    Diet: No new diet changes identified    New illness, injury, or hospitalization: No    Medication/supplement changes: None noted    Signs or symptoms of bleeding or clotting: No    Previous INR: Therapeutic last visit; previously outside of goal range    Additional findings: None       PLAN     Recommended plan for no diet, medication or health factor changes affecting INR     Dosing Instructions: booster dose then continue your current warfarin dose with next INR in 2 weeks       Summary  As of 4/20/2022    Full warfarin instructions:  4/20: 2 mg; Otherwise 2 mg every Sun, Thu; 1 mg all other days   Next INR check:  5/4/2022             Telephone call with  Cleo who agrees to plan and repeated back plan correctly. Patient was sitting beside her.    Lab visit scheduled    Education provided: Importance of taking warfarin as instructed and Contact [phone]  with any changes, questions or concerns.     Plan made per [facility] anticoagulation protocol    Ebony Doshi RN  Anticoagulation Clinic  4/20/2022    _______________________________________________________________________     Anticoagulation Episode Summary     Current INR goal:  2.0-3.0   TTR:  55.6 % (1 y)   Target end date:  Indefinite   Send INR reminders to:  [facility]    Indications    Chronic atrial fibrillation (H) [I48.20]  Long term current use of anticoagulant therapy [Z79.01]           Comments:           Anticoagulation Care Providers     Provider Role Specialty Phone number    Arnel Garcia MD Referring Internal Medicine - Pediatrics [phone]    Marisabel Guido,  MD Referring Internal Medicine - Pediatrics [phone]

## 2022-05-01 ENCOUNTER — APPOINTMENT (OUTPATIENT)
Dept: CT IMAGING | Facility: CLINIC | Age: 87
End: 2022-05-01
Attending: EMERGENCY MEDICINE
Payer: MEDICARE

## 2022-05-01 ENCOUNTER — HOSPITAL ENCOUNTER (OUTPATIENT)
Facility: CLINIC | Age: 87
Setting detail: OBSERVATION
Discharge: HOME HEALTH | End: 2022-05-03
Attending: EMERGENCY MEDICINE | Admitting: HOSPITALIST
Payer: MEDICARE

## 2022-05-01 DIAGNOSIS — J90 PLEURAL EFFUSION: ICD-10-CM
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
## 2022-05-01 DIAGNOSIS — S22.42XA CLOSED FRACTURE OF MULTIPLE RIBS OF LEFT SIDE, INITIAL ENCOUNTER: ICD-10-CM

## 2022-05-01 LAB
ALBUMIN UR-MCNC: NEGATIVE MG/DL
ANION GAP SERPL CALCULATED.3IONS-SCNC: 2 MMOL/L (ref 3–14)
APPEARANCE UR: CLEAR
BASOPHILS # BLD AUTO: 0.1 10E3/UL (ref 0–0.2)
BASOPHILS NFR BLD AUTO: 1 %
BILIRUB UR QL STRIP: NEGATIVE
BUN SERPL-MCNC: 31 MG/DL (ref 7–30)
CALCIUM SERPL-MCNC: 9.3 MG/DL (ref 8.5–10.1)
CHLORIDE BLD-SCNC: 104 MMOL/L (ref 94–109)
CO2 SERPL-SCNC: 29 MMOL/L (ref 20–32)
COLOR UR AUTO: ABNORMAL
CREAT SERPL-MCNC: 1.32 MG/DL (ref 0.52–1.04)
EOSINOPHIL # BLD AUTO: 0.3 10E3/UL (ref 0–0.7)
EOSINOPHIL NFR BLD AUTO: 5 %
ERYTHROCYTE [DISTWIDTH] IN BLOOD BY AUTOMATED COUNT: 14.1 % (ref 10–15)
GFR SERPL CREATININE-BSD FRML MDRD: 37 ML/MIN/1.73M2
GLUCOSE BLD-MCNC: 96 MG/DL (ref 70–99)
GLUCOSE UR STRIP-MCNC: NEGATIVE MG/DL
HCT VFR BLD AUTO: 42 % (ref 35–47)
HGB BLD-MCNC: 13.3 G/DL (ref 11.7–15.7)
HGB UR QL STRIP: NEGATIVE
HOLD SPECIMEN: NORMAL
IMM GRANULOCYTES # BLD: 0 10E3/UL
IMM GRANULOCYTES NFR BLD: 1 %
INR PPP: 1.54 (ref 0.85–1.15)
KETONES UR STRIP-MCNC: NEGATIVE MG/DL
LEUKOCYTE ESTERASE UR QL STRIP: ABNORMAL
LYMPHOCYTES # BLD AUTO: 2 10E3/UL (ref 0.8–5.3)
LYMPHOCYTES NFR BLD AUTO: 31 %
MCH RBC QN AUTO: 30 PG (ref 26.5–33)
MCHC RBC AUTO-ENTMCNC: 31.7 G/DL (ref 31.5–36.5)
MCV RBC AUTO: 95 FL (ref 78–100)
MONOCYTES # BLD AUTO: 0.5 10E3/UL (ref 0–1.3)
MONOCYTES NFR BLD AUTO: 8 %
NEUTROPHILS # BLD AUTO: 3.4 10E3/UL (ref 1.6–8.3)
NEUTROPHILS NFR BLD AUTO: 54 %
NITRATE UR QL: NEGATIVE
NRBC # BLD AUTO: 0 10E3/UL
NRBC BLD AUTO-RTO: 0 /100
PH UR STRIP: 7 [PH] (ref 5–7)
PLATELET # BLD AUTO: 194 10E3/UL (ref 150–450)
POTASSIUM BLD-SCNC: 3.7 MMOL/L (ref 3.4–5.3)
RBC # BLD AUTO: 4.43 10E6/UL (ref 3.8–5.2)
RBC URINE: 1 /HPF
SARS-COV-2 RNA RESP QL NAA+PROBE: POSITIVE
SODIUM SERPL-SCNC: 135 MMOL/L (ref 133–144)
SP GR UR STRIP: 1.01 (ref 1–1.03)
SQUAMOUS EPITHELIAL: 2 /HPF
UROBILINOGEN UR STRIP-MCNC: NORMAL MG/DL
WBC # BLD AUTO: 6.3 10E3/UL (ref 4–11)
WBC URINE: 8 /HPF

## 2022-05-01 PROCEDURE — 99285 EMERGENCY DEPT VISIT HI MDM: CPT | Mod: 25

## 2022-05-01 PROCEDURE — 96374 THER/PROPH/DIAG INJ IV PUSH: CPT

## 2022-05-01 PROCEDURE — U0003 INFECTIOUS AGENT DETECTION BY NUCLEIC ACID (DNA OR RNA); SEVERE ACUTE RESPIRATORY SYNDROME CORONAVIRUS 2 (SARS-COV-2) (CORONAVIRUS DISEASE [COVID-19]), AMPLIFIED PROBE TECHNIQUE, MAKING USE OF HIGH THROUGHPUT TECHNOLOGIES AS DESCRIBED BY CMS-2020-01-R: HCPCS | Performed by: EMERGENCY MEDICINE

## 2022-05-01 PROCEDURE — 36415 COLL VENOUS BLD VENIPUNCTURE: CPT | Performed by: EMERGENCY MEDICINE

## 2022-05-01 PROCEDURE — 93005 ELECTROCARDIOGRAM TRACING: CPT

## 2022-05-01 PROCEDURE — 71250 CT THORAX DX C-: CPT | Mod: MG

## 2022-05-01 PROCEDURE — G1004 CDSM NDSC: HCPCS

## 2022-05-01 PROCEDURE — 99223 1ST HOSP IP/OBS HIGH 75: CPT | Mod: AI | Performed by: HOSPITALIST

## 2022-05-01 PROCEDURE — 85025 COMPLETE CBC W/AUTO DIFF WBC: CPT | Performed by: EMERGENCY MEDICINE

## 2022-05-01 PROCEDURE — 250N000011 HC RX IP 250 OP 636: Performed by: EMERGENCY MEDICINE

## 2022-05-01 PROCEDURE — 250N000013 HC RX MED GY IP 250 OP 250 PS 637: Performed by: EMERGENCY MEDICINE

## 2022-05-01 PROCEDURE — 96361 HYDRATE IV INFUSION ADD-ON: CPT

## 2022-05-01 PROCEDURE — 250N000013 HC RX MED GY IP 250 OP 250 PS 637: Performed by: STUDENT IN AN ORGANIZED HEALTH CARE EDUCATION/TRAINING PROGRAM

## 2022-05-01 PROCEDURE — 80048 BASIC METABOLIC PNL TOTAL CA: CPT | Performed by: EMERGENCY MEDICINE

## 2022-05-01 PROCEDURE — 81001 URINALYSIS AUTO W/SCOPE: CPT | Performed by: EMERGENCY MEDICINE

## 2022-05-01 PROCEDURE — C9803 HOPD COVID-19 SPEC COLLECT: HCPCS

## 2022-05-01 PROCEDURE — 120N000001 HC R&B MED SURG/OB

## 2022-05-01 PROCEDURE — 85610 PROTHROMBIN TIME: CPT | Performed by: EMERGENCY MEDICINE

## 2022-05-01 PROCEDURE — 258N000003 HC RX IP 258 OP 636: Performed by: EMERGENCY MEDICINE

## 2022-05-01 PROCEDURE — 250N000013 HC RX MED GY IP 250 OP 250 PS 637: Performed by: HOSPITALIST

## 2022-05-01 RX ORDER — ALBUTEROL SULFATE 0.83 MG/ML
2.5 SOLUTION RESPIRATORY (INHALATION) AT BEDTIME
COMMUNITY

## 2022-05-01 RX ORDER — ISOSORBIDE MONONITRATE 30 MG/1
30 TABLET, EXTENDED RELEASE ORAL DAILY
Status: DISCONTINUED | OUTPATIENT
Start: 2022-05-01 | End: 2022-05-03 | Stop reason: HOSPADM

## 2022-05-01 RX ORDER — WARFARIN SODIUM 2 MG/1
2 TABLET ORAL
Status: COMPLETED | OUTPATIENT
Start: 2022-05-01 | End: 2022-05-01

## 2022-05-01 RX ORDER — ALBUTEROL SULFATE 90 UG/1
2 AEROSOL, METERED RESPIRATORY (INHALATION) EVERY 4 HOURS PRN
Status: DISCONTINUED | OUTPATIENT
Start: 2022-05-01 | End: 2022-05-03 | Stop reason: HOSPADM

## 2022-05-01 RX ORDER — GABAPENTIN 100 MG/1
100 CAPSULE ORAL 3 TIMES DAILY
Status: DISCONTINUED | OUTPATIENT
Start: 2022-05-01 | End: 2022-05-03 | Stop reason: HOSPADM

## 2022-05-01 RX ORDER — MIRTAZAPINE 7.5 MG/1
7.5 TABLET, FILM COATED ORAL AT BEDTIME
Status: DISCONTINUED | OUTPATIENT
Start: 2022-05-01 | End: 2022-05-03 | Stop reason: HOSPADM

## 2022-05-01 RX ORDER — ALBUTEROL SULFATE 0.83 MG/ML
2.5 SOLUTION RESPIRATORY (INHALATION) EVERY 4 HOURS PRN
Status: DISCONTINUED | OUTPATIENT
Start: 2022-05-01 | End: 2022-05-01

## 2022-05-01 RX ORDER — BISACODYL 10 MG
10 SUPPOSITORY, RECTAL RECTAL DAILY PRN
Status: DISCONTINUED | OUTPATIENT
Start: 2022-05-01 | End: 2022-05-03 | Stop reason: HOSPADM

## 2022-05-01 RX ORDER — NALOXONE HYDROCHLORIDE 0.4 MG/ML
0.2 INJECTION, SOLUTION INTRAMUSCULAR; INTRAVENOUS; SUBCUTANEOUS
Status: DISCONTINUED | OUTPATIENT
Start: 2022-05-01 | End: 2022-05-03 | Stop reason: HOSPADM

## 2022-05-01 RX ORDER — OXYCODONE HCL 5 MG/5 ML
2 SOLUTION, ORAL ORAL ONCE
Status: COMPLETED | OUTPATIENT
Start: 2022-05-01 | End: 2022-05-01

## 2022-05-01 RX ORDER — METHOCARBAMOL 500 MG/1
500 TABLET, FILM COATED ORAL EVERY 6 HOURS PRN
Status: DISCONTINUED | OUTPATIENT
Start: 2022-05-01 | End: 2022-05-03 | Stop reason: HOSPADM

## 2022-05-01 RX ORDER — LIDOCAINE 40 MG/G
CREAM TOPICAL
Status: DISCONTINUED | OUTPATIENT
Start: 2022-05-01 | End: 2022-05-03 | Stop reason: HOSPADM

## 2022-05-01 RX ORDER — POLYETHYLENE GLYCOL 3350 17 G/17G
8.5 POWDER, FOR SOLUTION ORAL DAILY
Status: DISCONTINUED | OUTPATIENT
Start: 2022-05-01 | End: 2022-05-03 | Stop reason: HOSPADM

## 2022-05-01 RX ORDER — SENNOSIDES 8.6 MG
2 TABLET ORAL
Status: DISCONTINUED | OUTPATIENT
Start: 2022-05-01 | End: 2022-05-03

## 2022-05-01 RX ORDER — FUROSEMIDE 40 MG
40 TABLET ORAL DAILY
Status: DISCONTINUED | OUTPATIENT
Start: 2022-05-01 | End: 2022-05-03 | Stop reason: HOSPADM

## 2022-05-01 RX ORDER — VITAMIN B COMPLEX
1000 TABLET ORAL DAILY
Status: DISCONTINUED | OUTPATIENT
Start: 2022-05-01 | End: 2022-05-03 | Stop reason: HOSPADM

## 2022-05-01 RX ORDER — NALOXONE HYDROCHLORIDE 0.4 MG/ML
0.4 INJECTION, SOLUTION INTRAMUSCULAR; INTRAVENOUS; SUBCUTANEOUS
Status: DISCONTINUED | OUTPATIENT
Start: 2022-05-01 | End: 2022-05-03 | Stop reason: HOSPADM

## 2022-05-01 RX ORDER — MORPHINE SULFATE 4 MG/ML
2 INJECTION, SOLUTION INTRAMUSCULAR; INTRAVENOUS ONCE
Status: COMPLETED | OUTPATIENT
Start: 2022-05-01 | End: 2022-05-01

## 2022-05-01 RX ORDER — ALBUTEROL SULFATE 0.83 MG/ML
2.5 SOLUTION RESPIRATORY (INHALATION) EVERY 4 HOURS PRN
COMMUNITY

## 2022-05-01 RX ORDER — ACETAMINOPHEN 325 MG/1
650 TABLET ORAL ONCE
Status: COMPLETED | OUTPATIENT
Start: 2022-05-01 | End: 2022-05-01

## 2022-05-01 RX ORDER — DILTIAZEM HYDROCHLORIDE 240 MG/1
240 CAPSULE, COATED, EXTENDED RELEASE ORAL DAILY
Status: DISCONTINUED | OUTPATIENT
Start: 2022-05-01 | End: 2022-05-03 | Stop reason: HOSPADM

## 2022-05-01 RX ORDER — LACTOBACILLUS RHAMNOSUS GG 10B CELL
1 CAPSULE ORAL DAILY
Status: DISCONTINUED | OUTPATIENT
Start: 2022-05-01 | End: 2022-05-03 | Stop reason: HOSPADM

## 2022-05-01 RX ORDER — ACETAMINOPHEN 325 MG/1
975 TABLET ORAL EVERY 8 HOURS
Status: DISCONTINUED | OUTPATIENT
Start: 2022-05-01 | End: 2022-05-03 | Stop reason: HOSPADM

## 2022-05-01 RX ORDER — LIDOCAINE 4 G/G
1 PATCH TOPICAL EVERY 24 HOURS
Status: DISCONTINUED | OUTPATIENT
Start: 2022-05-01 | End: 2022-05-03 | Stop reason: HOSPADM

## 2022-05-01 RX ORDER — LOSARTAN POTASSIUM 25 MG/1
25 TABLET ORAL DAILY
Status: DISCONTINUED | OUTPATIENT
Start: 2022-05-01 | End: 2022-05-03 | Stop reason: HOSPADM

## 2022-05-01 RX ORDER — MORPHINE SULFATE 2 MG/ML
2 INJECTION, SOLUTION INTRAMUSCULAR; INTRAVENOUS EVERY 30 MIN PRN
Status: DISCONTINUED | OUTPATIENT
Start: 2022-05-01 | End: 2022-05-01

## 2022-05-01 RX ORDER — ALBUTEROL SULFATE 0.83 MG/ML
2.5 SOLUTION RESPIRATORY (INHALATION) AT BEDTIME
Status: DISCONTINUED | OUTPATIENT
Start: 2022-05-01 | End: 2022-05-01

## 2022-05-01 RX ORDER — PRAVASTATIN SODIUM 20 MG
40 TABLET ORAL DAILY
Status: DISCONTINUED | OUTPATIENT
Start: 2022-05-01 | End: 2022-05-03 | Stop reason: HOSPADM

## 2022-05-01 RX ADMIN — OXYCODONE HYDROCHLORIDE 2 MG: 5 SOLUTION ORAL at 08:15

## 2022-05-01 RX ADMIN — FUROSEMIDE 40 MG: 40 TABLET ORAL at 16:53

## 2022-05-01 RX ADMIN — WARFARIN SODIUM 2 MG: 2 TABLET ORAL at 18:48

## 2022-05-01 RX ADMIN — POLYETHYLENE GLYCOL 3350 8.5 G: 17 POWDER, FOR SOLUTION ORAL at 16:56

## 2022-05-01 RX ADMIN — PRAVASTATIN SODIUM 40 MG: 20 TABLET ORAL at 16:54

## 2022-05-01 RX ADMIN — ISOSORBIDE MONONITRATE 30 MG: 30 TABLET, EXTENDED RELEASE ORAL at 17:25

## 2022-05-01 RX ADMIN — ACETAMINOPHEN 975 MG: 325 TABLET, FILM COATED ORAL at 16:53

## 2022-05-01 RX ADMIN — SODIUM CHLORIDE, POTASSIUM CHLORIDE, SODIUM LACTATE AND CALCIUM CHLORIDE 500 ML: 600; 310; 30; 20 INJECTION, SOLUTION INTRAVENOUS at 08:55

## 2022-05-01 RX ADMIN — Medication 1 CAPSULE: at 16:53

## 2022-05-01 RX ADMIN — GABAPENTIN 100 MG: 100 CAPSULE ORAL at 22:43

## 2022-05-01 RX ADMIN — Medication 1 TABLET: at 16:53

## 2022-05-01 RX ADMIN — LIDOCAINE 1 PATCH: 246 PATCH TOPICAL at 18:48

## 2022-05-01 RX ADMIN — ACETAMINOPHEN 975 MG: 325 TABLET, FILM COATED ORAL at 22:43

## 2022-05-01 RX ADMIN — MIRTAZAPINE 7.5 MG: 7.5 TABLET, FILM COATED ORAL at 22:43

## 2022-05-01 RX ADMIN — BISACODYL 10 MG: 10 SUPPOSITORY RECTAL at 20:05

## 2022-05-01 RX ADMIN — LOSARTAN POTASSIUM 25 MG: 25 TABLET, FILM COATED ORAL at 16:53

## 2022-05-01 RX ADMIN — MORPHINE SULFATE 2 MG: 4 INJECTION INTRAVENOUS at 10:16

## 2022-05-01 RX ADMIN — Medication 1000 UNITS: at 16:53

## 2022-05-01 RX ADMIN — FLUTICASONE FUROATE AND VILANTEROL TRIFENATATE 1 PUFF: 100; 25 POWDER RESPIRATORY (INHALATION) at 17:25

## 2022-05-01 RX ADMIN — ACETAMINOPHEN 650 MG: 325 TABLET, FILM COATED ORAL at 08:15

## 2022-05-01 RX ADMIN — UMECLIDINIUM 1 PUFF: 62.5 AEROSOL, POWDER ORAL at 17:25

## 2022-05-01 RX ADMIN — GABAPENTIN 100 MG: 100 CAPSULE ORAL at 16:53

## 2022-05-01 RX ADMIN — DILTIAZEM HYDROCHLORIDE 240 MG: 240 CAPSULE, COATED, EXTENDED RELEASE ORAL at 16:54

## 2022-05-01 ASSESSMENT — ENCOUNTER SYMPTOMS
FEVER: 0
APPETITE CHANGE: 0
DIFFICULTY URINATING: 0
FREQUENCY: 0
NECK PAIN: 0
ARTHRALGIAS: 1
COUGH: 0
HEMATURIA: 0
DIARRHEA: 0
DYSURIA: 0

## 2022-05-01 ASSESSMENT — ACTIVITIES OF DAILY LIVING (ADL)
WEAR_GLASSES_OR_BLIND: YES
DOING_ERRANDS_INDEPENDENTLY_DIFFICULTY: NO
CHANGE_IN_FUNCTIONAL_STATUS_SINCE_ONSET_OF_CURRENT_ILLNESS/INJURY: NO
DRESS: 0-->ASSISTANCE NEEDED (DEVELOPMENTALLY APPROPRIATE)
DRESS: 0-->INDEPENDENT
EQUIPMENT_CURRENTLY_USED_AT_HOME: WALKER, ROLLING
ADLS_ACUITY_SCORE: 14
ADLS_ACUITY_SCORE: 15
ADLS_ACUITY_SCORE: 14
ADLS_ACUITY_SCORE: 15
BATHING: 1-->ASSISTANCE NEEDED
TOILETING_ISSUES: NO
DRESSING/BATHING: BATHING DIFFICULTY, ASSISTANCE 1 PERSON
NUMBER_OF_TIMES_PATIENT_HAS_FALLEN_WITHIN_LAST_SIX_MONTHS: 1
FALL_HISTORY_WITHIN_LAST_SIX_MONTHS: YES
TRANSFERRING: 1-->ASSISTANCE (EQUIPMENT/PERSON) NEEDED
ADLS_ACUITY_SCORE: 15
ADLS_ACUITY_SCORE: 9
DIFFICULTY_EATING/SWALLOWING: NO
VISION_MANAGEMENT: PRESCRIPTION
ADLS_ACUITY_SCORE: 14
ADLS_ACUITY_SCORE: 14
WALKING_OR_CLIMBING_STAIRS: AMBULATION DIFFICULTY, ASSISTANCE 1 PERSON
ADLS_ACUITY_SCORE: 15
CONCENTRATING,_REMEMBERING_OR_MAKING_DECISIONS_DIFFICULTY: NO
WALKING_OR_CLIMBING_STAIRS_DIFFICULTY: YES
TRANSFERRING: 1-->ASSISTANCE (EQUIPMENT/PERSON) NEEDED (NOT DEVELOPMENTALLY APPROPRIATE)
ADLS_ACUITY_SCORE: 14
ADLS_ACUITY_SCORE: 14
DRESSING/BATHING_DIFFICULTY: NO

## 2022-05-01 NOTE — PHARMACY-ANTICOAGULATION SERVICE
Clinical Pharmacy - Warfarin Dosing Consult     Pharmacy has been consulted to manage this patient s warfarin therapy.  Indication: Atrial Fibrillation  Therapy Goal: INR 2-3  Warfarin Prior to Admission: Yes  Warfarin PTA Regimen: Take 2mg every Sun & Thurs / Take 1mg all other days.  Recent documented change in oral intake/nutrition: Unknown    INR   Date Value Ref Range Status   05/01/2022 1.54 (H) 0.85 - 1.15 Final   04/20/2022 1.6 (H) 0.9 - 1.1 Final       Recommend warfarin 2 mg today.  Pharmacy will monitor Leonor Mercado daily and order warfarin doses to achieve specified goal.      Please contact pharmacy as soon as possible if the warfarin needs to be held for a procedure or if the warfarin goals change.

## 2022-05-01 NOTE — ED TRIAGE NOTES
Patient presents with complaints of fall on the toilet this morning. Patient may have hit her head and she is on thinners. Patient also having left sided rib pain as well. ABC intact without need for intervention at this time.

## 2022-05-01 NOTE — H&P
"[facility]    History and Physical  Hospitalist     Date of Admission:  5/1/2022  Date of Service (when I saw the patient): 05/01/22  Provider: Jaron Brennan MD      Chief Complaint   Chest pain after fall     History is obtained from the patient, electronic health record and emergency department physician    History of Present Illness   Leonor Mercado is a 89 year old female who has a complex PMH as note below. She is a robust and sharp senior living independently in [facility]. She presents with left sided chest pain after accidental fall from the toilette seat and sustained trauma against left chest wall. Denies LOC or prodromal symptoms \" I just fell\".  She has a history of atrial fibrillation and anticoagulation with warfarin, on presentation she is subtherapeutic.  No head trauma had been documented on the exam however a CT of the head was done and did not show any acute intracranial event.  She denies nausea, vomiting or diarrhea.  She denies lower urinary symptoms.  She denies chest pain or shortness of breath.  No dizziness.  Her past medical history is also significant for essential hypertension, hyperlipidemia.  She is a former smoker that quit in 1987: She has COPD but she is not on oxygen at home.  She has coronary artery disease and  SSS for which she carries a pacemaker.  I have discussed her case with Dr Ramírez, ED physician.   Her lab work-up is notable for normal CBC.  Chemistry with BUN 3 1, creatinine 1.32, GFR 3 7, anion gap less than 2 but all other values are normal.  Glycemia 96.  INR 1.54.  UA shows trace of leukocyte esterase and white count more than 8.  Covid 19 test positive. She is fully vaccinated and had a booster 2 weeks ago.     Imaging   CT of the head  IMPRESSION:   1. No CT evidence of acute ischemia or hemorrhage.  2. Volume loss and white matter hypoattenuation which likely  represents chronic small vessel ischemic change.  XR pelvis&hip " right  IMPRESSION: Osteopenia. No fractures are evident. The hip joints are  unremarkable. Degenerative changes in the left SI joint and lower  lumbar spine. Atheromatous calcification.   CT of the C/A/P  IMPRESSION:  1.  Nondisplaced lateral left rib fractures.  2.  There is a loculated pleural effusion anterior inferior left  hemithorax not seen on the prior study.  NCHCT  IMPRESSION:    1.  No evidence of acute intracranial hemorrhage or mass effect.  2.  Mild nonspecific white matter changes.  3.  Moderate brain parenchymal volume loss.    Past Medical History    I have reviewed this patient's medical history and updated it with pertinent information if needed.   Past Medical History:   Diagnosis Date     Atrial fibrillation (H)     Sick sinus syndrome, PAT, AF     BCC (basal cell carcinoma of skin)     Face     CHF - Chr Diastolic (H) 11/21/2017     Chronic renal insufficiency      COPD (chronic obstructive pulmonary disease) (H)      Coronary artery disease     2-05[place]-CAD-taxus stentx2 2.5-12,2.5-12 in LADp and LADm, 80%nonDomRCA-LcxDom-mild,EF60%     Hyperlipidemia      Hypertension      IBS (irritable bowel syndrome)      Osteoporosis      Pulmonary fibrosis (H)     do not use amiodarone     Sick sinus syndrome - s/p PPM 2003 (H) 12/16/2017     SSS (sick sinus syndrome) (H)     DDD pacer (see surgery history section)     Vertigo          Assessment & Plan   Leonor Mercado is a 89 year old female who presents with left-sided chest pain and broken ribs after mechanical fall at home when the patient was in the toilet seat.  Circumstances surrounding the fall are not clear but she denies loss of consciousness or preceding symptoms.  She apparently had been in her normal state of health.  Incidentally she has been found COVID-19 PCR test positive, she patient is asymptomatic and fully vaccinated with booster 2 weeks ago.    Unwitnessed mechanical fall at home, unclear circumstances.  Chest trauma  secondary to mechanical fall.  Left-sided ribs fracture secondary to chest trauma.  Patient does not have hypoxemia, fractures are not displaced, parenchyma seems to be intact.  She has pleuritic pain exacerbated with deep breathing and cough but overall she has good tolerance.  Incidental finding of loculated anteroinferior pleural effusion is incidental, according to radiologist density of the fluid is not compatible with blood, therefore hemothorax is unlikely.  - Admit to inpatient.  - 2 g sodium, low-fat diet.  - Pulse oximetry.  POC checks  - Pain control as needed  - Incentive spirometry  - Treatment of rib fracture per protocol    COVID PCR test positive.  -Asymptomatic, fully vaccinated and boosted in the last 2 weeks  -Isolation precaution.    History of coronary artery disease, status post a stent placement and pacemaker placement.  History of sick sinus syndrome.  -Resume home medication.  -Pending pacemaker interrogation.    Chronic atrial fibrillation/paced rhythm/warfarin anticoagulation.  - Subtherapeutic on admission.  -Pharmacy consult for dosing and follow-up    CKD stage IIIb.  Stable.  -Close follow-up with daily BMP.    Essential hypertension.  - On Cardizem, losartan, furosemide    Hyperlipidemia.  -Resume pravastatin    COPD, not in exacerbation.  - Albuterol nebs and Trelegy Ellipta    Osteoporosis.  -Continue calcium and vitamin D supplementation.    UA suspicious for infection.  - No lower urinary symptoms, wait until urine culture available, no antibiotic at this point.        Clinically Significant Risk Factors Present on Admission               # Coagulation Defect: home medication list includes an anticoagulant medication            Code Status   DNR / DNI    Primary Care Physician   Arnel Garcia      Past Surgical History   I have reviewed this patient's surgical history and updated it with pertinent information if needed.  Past Surgical History:   Procedure Laterality Date      APPENDECTOMY  1956     CARDIAC SURGERY      PPM, 2 STENTS2-05[place]-CAD-taxus stentx2 2.5-12,2.5-12 in LADp and LADm, 80%nonDomRCA-LcxDom-mild,EF60%     CORONARY ANGIOGRAPHY ADULT ORDER  7/1994    mild CAD,Normal LV function, No Mitral regurgitation, Prox LAD 30% stenosis. RCA prox and mid, 20-30%     ESOPHAGOSCOPY, GASTROSCOPY, DUODENOSCOPY (EGD), COMBINED N/A 8/19/2015    Procedure: COMBINED ESOPHAGOSCOPY, GASTROSCOPY, DUODENOSCOPY (EGD), BIOPSY SINGLE OR MULTIPLE;  Surgeon: Genevieve Leon MD;  Location: [facility]     H LEAD REVISION DUAL  4/2003    Revised in [place]-due to diaphram stim (original pacer placed in [place])     H LEAD REVISION DUAL  7/2/2014    Correction of reversal of A and V leads in [place]     HEART CATH, ANGIOPLASTY  02/2005    LEIGH ANN mid and proximal LAD, ongoing 80% RCA; mild circumflex     HERNIA REPAIR Left 1991    [facility]     IMPLANT PACEMAKER  2/19/2003    Placed in [place] for sick sinus syndrome     REPLACE PACEMAKER GENERATOR  6/27/2013    Dual-chamber pacemaker by Dr SETH Pierre       Prior to Admission Medications   Prior to Admission Medications   Prescriptions Last Dose Informant Patient Reported? Taking?   Calcium Carbonate-Vitamin D (CALCIUM 500+D HIGH POTENCY PO) 4/30/2022 at AM  Yes Yes   Sig: Take 1 tablet by mouth daily   Fluticasone-Umeclidin-Vilanterol (TRELEGY ELLIPTA) 100-62.5-25 MCG/INH oral inhaler 4/30/2022 at AM Self Yes Yes   Sig: Inhale 1 puff into the lungs daily   Lactobacillus (PROBIOTIC ACIDOPHILUS) TABS 4/30/2022 at AM Self Yes Yes   Sig: Take 1 tablet by mouth daily    Multiple Vitamins-Minerals (OCUVITE PO) 4/30/2022 at AM Self Yes Yes   Sig: Take 1 capsule by mouth daily.   Vitamin D, Cholecalciferol, 1000 units TABS 4/30/2022 at AM Self Yes Yes   Sig: Take 1,000 Units by mouth daily   acetaminophen (TYLENOL) 500 MG tablet 4/30/2022 at PM Self Yes Yes   Sig: Take 500 mg by mouth At Bedtime   albuterol (PROAIR HFA/PROVENTIL HFA/VENTOLIN HFA) 108 (90 Base) MCG/ACT inhaler  Past Week at Unknown time Self No Yes   Sig: Inhale 2 puffs into the lungs every 6 hours as needed for shortness of breath / dyspnea or wheezing   albuterol (PROVENTIL) (2.5 MG/3ML) 0.083% neb solution 4/30/2022 at PM  Yes Yes   Sig: Take 2.5 mg by nebulization At Bedtime   albuterol (PROVENTIL) (2.5 MG/3ML) 0.083% neb solution Past Week at Unknown time  Yes Yes   Sig: Take 2.5 mg by nebulization every 4 hours as needed for shortness of breath / dyspnea or wheezing MAX of 4 doses/ 24 hours   diltiazem ER COATED BEADS (CARDIZEM CD/CARTIA XT) 240 MG 24 hr capsule 4/30/2022 at AM  No Yes   Sig: Take 1 capsule by mouth once daily   furosemide (LASIX) 20 MG tablet 4/30/2022 at AM  No Yes   Sig: Take 1 tablet by mouth twice daily   Patient taking differently: Take 40 mg by mouth daily   isosorbide mononitrate (IMDUR) 30 MG 24 hr tablet 4/30/2022 at AM  No Yes   Sig: Take 1 tablet by mouth once daily   losartan (COZAAR) 25 MG tablet 4/30/2022 at AM  No Yes   Sig: Take 1 tablet by mouth once daily   melatonin 5 MG tablet Past Month at Unknown time Self Yes Yes   Sig: Take 10 mg by mouth nightly as needed for sleep   mirtazapine (REMERON) 15 MG tablet Past Week at Unknown time  No Yes   Sig: TAKE 1 TABLET BY MOUTH AT BEDTIME   Patient taking differently: Take 15 mg by mouth nightly as needed   polyethylene glycol (MIRALAX/GLYCOLAX) Packet 4/30/2022 at AM Self Yes Yes   Sig: Take by mouth daily 1 scoopful once daily   pravastatin (PRAVACHOL) 40 MG tablet 4/30/2022 at AM  No Yes   Sig: Take 1 tablet by mouth once daily   senna (SENOKOT) 8.6 MG tablet 4/30/2022 at PM Self Yes Yes   Sig: Take 2 tablets by mouth nightly as needed for constipation    warfarin ANTICOAGULANT (COUMADIN) 2 MG tablet 4/30/2022 at AM  No Yes   Sig: Take 2mg every Sun, Tu, & Thurs / Take 1mg all other days OR as instructed by INR clinic   Patient taking differently: Take 2mg every Sun & Thurs / Take 1mg all other days. Next INR 5/4/22       Facility-Administered Medications: None     Allergies   Allergies   Allergen Reactions     Amiodarone      pulm fibrosis       Baclofen      Confusion      Bactrim [Sulfamethoxazole W-Trimethoprim]      Difficulty swallowing     Doxycycline Other (See Comments)     Multiple complaints; she does not want this again.      Levaquin [Levofloxacin] Other (See Comments)     Multiple complaints; she will not take this again     Linzess [Linaclotide] Diarrhea     Lorazepam        Prolonged drowsiness with ER visit      Liquid Adhesive Itching and Rash     Bleeding when tape removed after pacemaker placement..itched and burned for weeks.       Social History   I have personally reviewed the social history with the patient showing.  Social History     Tobacco Use     Smoking status: Former Smoker     Packs/day: 0.00     Types: Cigarettes     Quit date: 1987     Years since quittin.0     Smokeless tobacco: Never Used   Substance Use Topics     Alcohol use: Never       Family History   I have reviewed this patient's family history and it is not contributory to the admission .        Review of Systems   Except as noted in the HPI, a 12-system Review of Systems was found to be negative.     Physical Exam   Vital Signs with Ranges  Temp:  [98.3  F (36.8  C)] 98.3  F (36.8  C)  Pulse:  [59-91] 66  Resp:  [16] 16  BP: (156-179)/() 166/92  SpO2:  [90 %-99 %] 95 %  0 lbs 0 oz    GEN:[place].  Alert, oriented x 3, appears comfortable, NAD.  HEENT:  Normocephalic/atraumatic, no scleral icterus, no nasal discharge, mouth moist.  CV:  Regular paced rhythm, no murmur heard or JVD seen.       LUNGS:  Clear to auscultation bilaterally without rales/rhonchi/wheezing/retractions.  Symmetric chest rise on inhalation noted.  ABD:  Active bowel sounds, soft, non-tender/non-distended.  No rebound/guarding/rigidity.  EXT:  No edema or cyanosis.  No joint synovitis noted.  SKIN:  Dry to touch, no exanthems noted in the visualized  areas.       Data   I personally reviewed the EKG tracing showing Paced rhythm, rate 70's.  Results for orders placed or performed during the hospital encounter of 05/01/22 (from the past 24 hour(s))   EKG 12-lead, tracing only   Result Value Ref Range    Systolic Blood Pressure  mmHg    Diastolic Blood Pressure  mmHg    Ventricular Rate 73 BPM    Atrial Rate 78 BPM    OK Interval 376 ms    QRS Duration 142 ms     ms    QTc 484 ms    P Axis  degrees    R AXIS -68 degrees    T Axis 115 degrees    Interpretation ECG       Atrial-sensed ventricular-paced rhythm with prolonged AV conduction with occasional sinus complexes  Abnormal ECG  When compared with ECG of 04-JAN-2021 07:10,  No significant change was found     Extra Tube ([place] Draw)    Narrative    The following orders were created for panel order Extra Tube ([place] Draw).  Procedure                               Abnormality         Status                     ---------                               -----------         ------                     Extra Blue Top Tube[[id number]]                              Final result               Extra Green Top (Lithium...[[id number]]                      Final result               Extra Green Top (Lithium...[[id number]]                      Final result               Extra Purple Top Tube[[id number]]                            Final result                 Please view results for these tests on the individual orders.   Extra Blue Top Tube   Result Value Ref Range    Hold Specimen JIC    Extra Green Top (Lithium Heparin) Tube   Result Value Ref Range    Hold Specimen JIC    Extra Green Top (Lithium Heparin) Tube   Result Value Ref Range    Hold Specimen JIC    Extra Purple Top Tube   Result Value Ref Range    Hold Specimen JIC    CBC with platelets differential    Narrative    The following orders were created for panel order CBC with platelets differential.  Procedure                               Abnormality          Status                     ---------                               -----------         ------                     CBC with platelets and d...[[id number]]                      Final result                 Please view results for these tests on the individual orders.   Basic metabolic panel   Result Value Ref Range    Sodium 135 133 - 144 mmol/L    Potassium 3.7 3.4 - 5.3 mmol/L    Chloride 104 94 - 109 mmol/L    Carbon Dioxide (CO2) 29 20 - 32 mmol/L    Anion Gap 2 (L) 3 - 14 mmol/L    Urea Nitrogen 31 (H) 7 - 30 mg/dL    Creatinine 1.32 (H) 0.52 - 1.04 mg/dL    Calcium 9.3 8.5 - 10.1 mg/dL    Glucose 96 70 - 99 mg/dL    GFR Estimate 37 (L) >60 mL/min/1.73m2   INR   Result Value Ref Range    INR 1.54 (H) 0.85 - 1.15   CBC with platelets and differential   Result Value Ref Range    WBC Count 6.3 4.0 - 11.0 10e3/uL    RBC Count 4.43 3.80 - 5.20 10e6/uL    Hemoglobin 13.3 11.7 - 15.7 g/dL    Hematocrit 42.0 35.0 - 47.0 %    MCV 95 78 - 100 fL    MCH 30.0 26.5 - 33.0 pg    MCHC 31.7 31.5 - 36.5 g/dL    RDW 14.1 10.0 - 15.0 %    Platelet Count 194 150 - 450 10e3/uL    % Neutrophils 54 %    % Lymphocytes 31 %    % Monocytes 8 %    % Eosinophils 5 %    % Basophils 1 %    % Immature Granulocytes 1 %    NRBCs per 100 WBC 0 <1 /100    Absolute Neutrophils 3.4 1.6 - 8.3 10e3/uL    Absolute Lymphocytes 2.0 0.8 - 5.3 10e3/uL    Absolute Monocytes 0.5 0.0 - 1.3 10e3/uL    Absolute Eosinophils 0.3 0.0 - 0.7 10e3/uL    Absolute Basophils 0.1 0.0 - 0.2 10e3/uL    Absolute Immature Granulocytes 0.0 <=0.4 10e3/uL    Absolute NRBCs 0.0 10e3/uL   CT Chest Abdomen Pelvis w/o Contrast    Narrative    CT CHEST ABDOMEN PELVIS W/O CONTRAST 5/1/2022 8:49 AM    CLINICAL HISTORY: Fall, left chest and abdominal pain    TECHNIQUE: CT scan of the chest, abdomen, and pelvis was performed  without IV contrast. Multiplanar reformats were obtained. Dose  reduction techniques were used.   CONTRAST: None.    COMPARISON: 4/17/2018    FINDINGS:   LUNGS AND  PLEURA: 5 mm nodule in the inferior right middle lobe  (series 4, image 194). There is a loculated fluid collection in the  anterior inferior left hemithorax that measures up to 10.6 x 6.0 cm  (series 2, image 31).    MEDIASTINUM/AXILLAE: Left-sided pacer device. Normal caliber thoracic  aorta. No mediastinal or hilar adenopathy.    CORONARY ARTERY CALCIFICATION: Previous intervention (stents or CABG).    HEPATOBILIARY: Normal.    PANCREAS: Normal.    SPLEEN: Normal.    ADRENAL GLANDS: Normal.    KIDNEYS/BLADDER: No worrisome renal lesion or hydronephrosis. Urinary  bladder unremarkable.    BOWEL: No bowel obstruction or inflammatory change. Small bowel loop  extends into a right inguinal hernia but there is no obstruction.  Diastases recti noted.    LYMPH NODES: Normal.    VASCULATURE: Non-aneurysmal aortoiliac atherosclerosis.    PELVIC ORGANS: Normal.    OTHER: No ascites.    MUSCULOSKELETAL: Nondisplaced fractures noted LEFT ribs 9 and 10  laterally.      Impression    IMPRESSION:  1.  Nondisplaced lateral left rib fractures.  2.  There is a loculated pleural effusion anterior inferior left  hemithorax not seen on the prior study.    RAHUL BARTON MD         SYSTEM ID:  [id number]   CT Head w/o Contrast    Narrative    EXAM: CT HEAD W/O CONTRAST  LOCATION: [facility]  DATE/TIME: 5/1/2022 8:32 AM    INDICATION: fall, hit head, on warfarin  COMPARISON: None.  TECHNIQUE: Routine CT Head without IV contrast. Multiplanar reformats. Dose reduction techniques were used.    FINDINGS:  INTRACRANIAL CONTENTS: No evidence of acute intracranial hemorrhage or mass effect. Scattered foci of decreased attenuation within the cerebral hemispheric white matter which are nonspecific, though most commonly ascribed to chronic small vessel ischemic   disease. The ventricles and sulci are prominent consistent with moderate brain parenchymal volume loss. Gray-white matter differentiation is maintained. The  basilar cisterns are patent.    VISUALIZED ORBITS/SINUSES/MASTOIDS: Bilateral cataract surgery. The partially imaged globes are otherwise unremarkable. The partially imaged paranasal sinuses, mastoid air cells and middle ear cavities are unremarkable.     BONES/SOFT TISSUES: The visualized skull base and calvarium are unremarkable.      Impression    IMPRESSION:    1.  No evidence of acute intracranial hemorrhage or mass effect.  2.  Mild nonspecific white matter changes.  3.  Moderate brain parenchymal volume loss.   UA with Microscopic reflex to Culture    Specimen: Urine, Clean Catch   Result Value Ref Range    Color Urine Straw Colorless, Straw, Light Yellow, Yellow    Appearance Urine Clear Clear    Glucose Urine Negative Negative mg/dL    Bilirubin Urine Negative Negative    Ketones Urine Negative Negative mg/dL    Specific Gravity Urine 1.011 1.003 - 1.035    Blood Urine Negative Negative    pH Urine 7.0 5.0 - 7.0    Protein Albumin Urine Negative Negative mg/dL    Urobilinogen Urine Normal Normal, 2.0 mg/dL    Nitrite Urine Negative Negative    Leukocyte Esterase Urine Trace (A) Negative    RBC Urine 1 <=2 /HPF    WBC Urine 8 (H) <=5 /HPF    Squamous Epithelials Urine 2 (H) <=1 /HPF    Narrative    Urine Culture not indicated     *Note: Due to a large number of results and/or encounters for the requested time period, some results have not been displayed. A complete set of results can be found in Results Review.       Securely message with the Vocera Web Console (learn more here)  Text page via [employer] Paging/Directory        Disclaimer: This note consists of symbols derived from keyboarding, dictation and/or voice recognition software. As a result, there may be errors in the script that have gone undetected. Please consider this when interpreting information found in this chart.

## 2022-05-01 NOTE — CONSULTS
Consult Date: 2022    REASON FOR CONSULTATION:  Fall with two left-sided rib fractures.    HISTORY OF PRESENT ILLNESS:  Ms. Mercado is a 89-year-old female who lives in senior living, is chronically anticoagulated, and reports a fall from her toilet today, striking her left chest and her head.  She did not lose consciousness or have any neuro deficits.  She denies any significant respiratory symptoms either.  She currently states that her left chest feels quite sore, but otherwise has no concerns.  Workup in our ER showed her to have a negative head CT.  Subsequent chest scan showed two, nondisplaced, lateral, left-sided rib fractures as well as a collection in the pleural space, not consistent with a hemothorax.    PAST MEDICAL AND SURGICAL HISTORY:  Includes atrial fibrillation, CHF, renal insufficiency, COPD, coronary artery disease, hyperlipidemia, hypertension, pulmonary fibrosis, vertigo.  She has had a previous appendectomy, pacer placement, heart catheterization, hernia repair.    CURRENT MEDICATIONS:  At the time of evaluation in the ER, the patient was on:  1.  Tylenol.  2.  Albuterol.  3.  Calcium and vitamin D.  4.  Diltiazem.  6.  Trelegy Ellipta inhaler.   7.  Lasix.   8.  Imdur.   9.  Lactobacillus.   10.  Losartan.   11.  Melatonin.   12.  Mirtazapine.   13.  Multivitamin.    14.  MiraLax.   15.  Pravastatin.   16.  Senna.   17.  Vitamin D.   18.  Coumadin.    ALLERGIES:  The patient has multiple drug allergies, includin.  AMIODARONE, which caused pulmonary fibrosis.  2.  BACLOFEN, which caused confusion.  3.  BACTRIM, which caused difficulty swallowing.  4.  DICLOXACILLIN, which caused multiple concerns and did not want the medication again.  This is true for Levaquin as well.   5.  She has had diarrhea with LINZESS.  6.  Prolonged drowsiness with ATIVAN.  7.  Itching/rash with LIQUID BANDAGE.    SOCIAL HISTORY:  The patient lives in a senior living facility.  She is quite functional.   She is not currently smoking.    PHYSICAL EXAMINATION:    VITAL SIGNS:  Ms. Mercado is afebrile.  Temperature is 98.4.  Pulse 71 with blood pressure of 160/80.  Respiratory rate is 22 with 92% saturations on room air.  GENERAL:  She is generally alert and appropriate, nontoxic, although slightly hard of hearing.  CARDIOVASCULAR:  Her heart sounds are regular.  LUNGS:  Breath sounds are clear.   CHEST WALL:  She has some mild tenderness to the left chest.  There is no bruising or crepitus on this side.  ABDOMEN:  Benign.   EXTREMITIES:  Without deformity or trauma.    LABORATORY EXAMINATION:  Notable for white blood cell count of 6.3 and hemoglobin 13.3.  Electrolytes are unremarkable.  Creatinine is 1.3.  INR is 1.54.  Asymptomatic COVID swab was incidentally positive.      IMAGING:  I reviewed the patient's CT scan of her head done in the ED, which revealed no acute intracranial hemorrhage.  Chest, abdomen, and pelvis films show two, nondisplaced, left sided, lateral rib fractures.  There is a fluid collection in the hemithorax on the left measuring upwards of 10 cm, which does not have the density of blood per report.  There is an incidental right inguinal hernia as well as diastasis.  There are no solid organ injuries or other abnormalities noted.    IMPRESSION AND RECOMMENDATIONS:  A 89-year-old female who sustained a fall from her toilet this morning with two, nondisplaced rib fractures and fluid in the pleural space.  This does not seem to be an associated hemothorax.  She has had the appropriate rib fracture protocol initiated and seems relatively comfortable at rest.  Her asymptomatic COVID swab does not seem to represent active infection given her recent booster and lack of symptoms, but may be convalescence from an asymptomatic event within the last several months.  She seems to have all of the appropriate cares and care plan written.  Please call us if there are any questions or concerns that we may  be of assistance with.    Johnny Vernon MD        D: 2022   T: 2022   MT: BHANU    Name:     AILYN MARCUSBubba  MRN:      [id number]        Account:      [id number]   :      1926           Consult Date: 2022     Document: [id number]

## 2022-05-01 NOTE — ED NOTES
Called patient's daughter Les per patient request. She was updated that patient was here in the Emergency Room. She states she is on her way to ER to see patient at this time. Patient was updated in this.

## 2022-05-01 NOTE — ED NOTES
"Pt used bedpan to provide urine sample. C/o increased left rib pain, states \"can't you tape them up?\" Explained to pt that we will try to manage her pain. MD ordered IV morphine and given. ABCs intact. Pt A&OX4.   "

## 2022-05-01 NOTE — LETTER
Home care RN/PT.  covid +.  Discharge today.  dtg staying with her.  Xiomara Sanchez RN BSN   Inpatient Care Coordination  [facility]  [phone]

## 2022-05-01 NOTE — PHARMACY-ADMISSION MEDICATION HISTORY
Admission medication history interview status for this patient is complete. See [facility] admission navigator for allergy information, prior to admission medications and immunization status.     Medication history interview done, indicate source(s): Patient and Family - daughter (daughters fill up patient's med box)  Medication history resources (including written lists, pill bottles, clinic record): SureScripts and Care Everywhere  Pharmacy: [facility]    Changes made to PTA medication list:  Added: albuterol neb  Changed: calcium-vit D3 BID --> every day. Lasix 20mg BID --> 40mg daily. Mirtazapine 15mg at bedtime --> at bedtime prn (how pt taking). Warfarin dose updated per 4/20 INR=1.6  Reported as Not Taking: -  Removed: -    Actions taken by pharmacist (provider contacted, etc):None     Additional medication history information: Patient only takes meds once daily. Won't take twice daily (except for occasional bedtime meds)    Medication reconciliation/reorder completed by provider prior to medication history?  N   (Y/N)     Prior to Admission medications    Medication Sig Last Dose Taking? Auth Provider   acetaminophen (TYLENOL) 500 MG tablet Take 500 mg by mouth At Bedtime 4/30/2022 at PM Yes Unknown, Entered By History   albuterol (PROAIR HFA/PROVENTIL HFA/VENTOLIN HFA) 108 (90 Base) MCG/ACT inhaler Inhale 2 puffs into the lungs every 6 hours as needed for shortness of breath / dyspnea or wheezing Past Week at Unknown time Yes Maria Isabel Rosario APRN CNP   albuterol (PROVENTIL) (2.5 MG/3ML) 0.083% neb solution Take 2.5 mg by nebulization At Bedtime 4/30/2022 at PM Yes Unknown, Entered By History   albuterol (PROVENTIL) (2.5 MG/3ML) 0.083% neb solution Take 2.5 mg by nebulization every 4 hours as needed for shortness of breath / dyspnea or wheezing MAX of 4 doses/ 24 hours Past Week at Unknown time Yes Unknown, Entered By History   Calcium Carbonate-Vitamin D (CALCIUM 500+D HIGH POTENCY PO) Take 1  tablet by mouth daily 4/30/2022 at AM Yes Reported, Patient   diltiazem ER COATED BEADS (CARDIZEM CD/CARTIA XT) 240 MG 24 hr capsule Take 1 capsule by mouth once daily 4/30/2022 at AM Yes Arnel Garcia MD   Fluticasone-Umeclidin-Vilanterol (TRELEGY ELLIPTA) 100-62.5-25 MCG/INH oral inhaler Inhale 1 puff into the lungs daily 4/30/2022 at AM Yes Reported, Patient   furosemide (LASIX) 20 MG tablet Take 1 tablet by mouth twice daily  Patient taking differently: Take 40 mg by mouth daily 4/30/2022 at AM Yes Arnel Garcia MD   isosorbide mononitrate (IMDUR) 30 MG 24 hr tablet Take 1 tablet by mouth once daily 4/30/2022 at AM Yes Arnel Garcia MD   Lactobacillus (PROBIOTIC ACIDOPHILUS) TABS Take 1 tablet by mouth daily  4/30/2022 at AM Yes Reported, Patient   losartan (COZAAR) 25 MG tablet Take 1 tablet by mouth once daily 4/30/2022 at AM Yes Arnel Garcia MD   melatonin 5 MG tablet Take 10 mg by mouth nightly as needed for sleep Past Month at Unknown time Yes Unknown, Entered By History   mirtazapine (REMERON) 15 MG tablet TAKE 1 TABLET BY MOUTH AT BEDTIME  Patient taking differently: Take 15 mg by mouth nightly as needed Past Week at Unknown time Yes Arnel Garcia MD   Multiple Vitamins-Minerals (OCUVITE PO) Take 1 capsule by mouth daily. 4/30/2022 at AM Yes Reported, Patient   polyethylene glycol (MIRALAX/GLYCOLAX) Packet Take by mouth daily 1 scoopful once daily 4/30/2022 at AM Yes Reported, Patient   pravastatin (PRAVACHOL) 40 MG tablet Take 1 tablet by mouth once daily 4/30/2022 at AM Yes Arnel Garcia MD   senna (SENOKOT) 8.6 MG tablet Take 2 tablets by mouth nightly as needed for constipation  4/30/2022 at PM Yes Reported, Patient   Vitamin D, Cholecalciferol, 1000 units TABS Take 1,000 Units by mouth daily 4/30/2022 at AM Yes Reported, Patient   warfarin ANTICOAGULANT (COUMADIN) 2 MG tablet Take 2mg every Sun, Tu, & Thurs / Take 1mg all other days OR as instructed by INR  clinic  Patient taking differently: Take 2mg every Sun & Thurs / Take 1mg all other days. Next INR 5/4/22 4/30/2022 at AM Yes Arnel Garcia MD

## 2022-05-01 NOTE — ED NOTES
[facility]  ED Nurse Handoff Report    Leonor Mercado is a 89 year old female   ED Chief complaint: Fall  . ED Diagnosis:   Final diagnoses:   Fall, initial encounter   Closed fracture of multiple ribs of left side, initial encounter   Pleural effusion     Allergies:   Allergies   Allergen Reactions     Amiodarone      pulm fibrosis       Baclofen      Confusion      Bactrim [Sulfamethoxazole W-Trimethoprim]      Difficulty swallowing     Doxycycline Other (See Comments)     Multiple complaints; she does not want this again.      Levaquin [Levofloxacin] Other (See Comments)     Multiple complaints; she will not take this again     Linzess [Linaclotide] Diarrhea     Lorazepam        Prolonged drowsiness with ER visit      Liquid Adhesive Itching and Rash     Bleeding when tape removed after pacemaker placement..itched and burned for weeks.       Code Status: not on file  Activity level - Baseline/Home:  Stand by Assist. Activity Level - Current:   Unable to Assess.- have not yet got out of bed, pt states in too much pain to stand Lift room needed: No. Bariatric: No   Needed: No   Isolation: Yes . Infection: Covid +, pts daughter has been taking care of her and she just tested positive for covid.  Pt has all her vaccines and states only symptom is her chronic cough is worse    Vital Signs:   Vitals:    05/01/22 1045 05/01/22 1100 05/01/22 1130 05/01/22 1200   BP:  (!) 162/98 (!) 164/93 (!) 166/92   Pulse:  65 62 66   Resp:       Temp:       SpO2: 94% 94% 95% 95%       Cardiac Rhythm:  ,      Pain level:    Patient confused: Forgetful. Patient Falls Risk: Yes.   Elimination Status: Has voided - using pure wick  Patient Report - Initial Complaint: fall. Focused Assessment:  Leonor Mercado is a 89 year old female, on Coumadin, with history of atrial fibrillation, hypertension, hyperlipidemia, anemia, osteoporosis, coronary artery disease who presents via EMS with left-sided rib pain  following a fall while on the toilet this morning. She is unsure what caused the fall or if loss of consciousness occurred. She is unsure if she hit her head. No appetite changes. Denies neck pain, diarrhea, fever, cough, urinary symptoms or cold symptoms.  * Rib pain with movement however denies need for more pain meds at this time  Tests Performed:   Labs Ordered and Resulted from Time of ED Arrival to Time of ED Departure   BASIC METABOLIC PANEL - Abnormal       Result Value    Sodium 135      Potassium 3.7      Chloride 104      Carbon Dioxide (CO2) 29      Anion Gap 2 (*)     Urea Nitrogen 31 (*)     Creatinine 1.32 (*)     Calcium 9.3      Glucose 96      GFR Estimate 37 (*)    ROUTINE UA WITH MICROSCOPIC REFLEX TO CULTURE - Abnormal    Color Urine Straw      Appearance Urine Clear      Glucose Urine Negative      Bilirubin Urine Negative      Ketones Urine Negative      Specific Gravity Urine 1.011      Blood Urine Negative      pH Urine 7.0      Protein Albumin Urine Negative      Urobilinogen Urine Normal      Nitrite Urine Negative      Leukocyte Esterase Urine Trace (*)     RBC Urine 1      WBC Urine 8 (*)     Squamous Epithelials Urine 2 (*)    INR - Abnormal    INR 1.54 (*)    CBC WITH PLATELETS AND DIFFERENTIAL    WBC Count 6.3      RBC Count 4.43      Hemoglobin 13.3      Hematocrit 42.0      MCV 95      MCH 30.0      MCHC 31.7      RDW 14.1      Platelet Count 194      % Neutrophils 54      % Lymphocytes 31      % Monocytes 8      % Eosinophils 5      % Basophils 1      % Immature Granulocytes 1      NRBCs per 100 WBC 0      Absolute Neutrophils 3.4      Absolute Lymphocytes 2.0      Absolute Monocytes 0.5      Absolute Eosinophils 0.3      Absolute Basophils 0.1      Absolute Immature Granulocytes 0.0      Absolute NRBCs 0.0     COVID-19 VIRUS (CORONAVIRUS) BY PCR     CT Head w/o Contrast   Final Result   IMPRESSION:     1.  No evidence of acute intracranial hemorrhage or mass effect.   2.  Mild  nonspecific white matter changes.   3.  Moderate brain parenchymal volume loss.      CT Chest Abdomen Pelvis w/o Contrast   Final Result   IMPRESSION:   1.  Nondisplaced lateral left rib fractures.   2.  There is a loculated pleural effusion anterior inferior left   hemithorax not seen on the prior study.      RAHUL BARTON MD            SYSTEM ID:  [id number]          Treatments provided: See MAR  Family Comments: daughter at bedside  OBS brochure/video discussed/provided to patient:  No  ED Medications:   Medications   morphine (PF) injection 2 mg (has no administration in time range)   acetaminophen (TYLENOL) tablet 650 mg (650 mg Oral Given 5/1/22 0815)   oxyCODONE (ROXICODONE) solution 2 mg (2 mg Oral Given 5/1/22 0815)   lactated ringers BOLUS 500 mL (0 mLs Intravenous Stopped 5/1/22 0955)   morphine (PF) injection 2 mg (2 mg Intravenous Given 5/1/22 1016)     Drips infusing:  No  For the majority of the shift, the patient's behavior Green. Interventions performed were rounding.    Sepsis treatment initiated: No     Patient tested for COVID 19 prior to admission: YES    ED Nurse Name/Phone Number: Felecia Eden RN,   12:42 PM    RECEIVING UNIT ED HANDOFF REVIEW    Above ED Nurse Handoff Report was reviewed: Yes  Reviewed by: Anushka Honeycutt RN on May 1, 2022 at 2:05 PM

## 2022-05-01 NOTE — ED PROVIDER NOTES
History   Chief Complaint:  Fall       The history is provided by the patient.      Leonor Mercado is a 89 year old female, on Coumadin, with history of atrial fibrillation, hypertension, hyperlipidemia, anemia, osteoporosis, coronary artery disease who presents via EMS with left-sided rib pain following a fall while on the toilet this morning. She is unsure what caused the fall or if loss of consciousness occurred. She is unsure if she hit her head. No appetite changes. Denies neck pain, diarrhea, fever, cough, urinary symptoms or cold symptoms.    Review of Systems   Constitutional: Negative for appetite change and fever.   Respiratory: Negative for cough.    Gastrointestinal: Negative for diarrhea.   Genitourinary: Negative for difficulty urinating, dysuria, frequency, hematuria and urgency.   Musculoskeletal: Positive for arthralgias (left-sided rib). Negative for neck pain.   All other systems reviewed and are negative.      Allergies:  Amiodarone  Baclofen  Bactrim [Sulfamethoxazole W-Trimethoprim]  Doxycycline  Levaquin [Levofloxacin]  Linzess [Linaclotide]  Lorazepam  Liquid Adhesive    Medications:  acetaminophen (TYLENOL) 500 MG tablet  albuterol (PROAIR HFA/PROVENTIL HFA/VENTOLIN HFA) 108 (90 Base) MCG/ACT inhaler  albuterol (PROVENTIL) (2.5 MG/3ML) 0.083% neb solution  albuterol (PROVENTIL) (2.5 MG/3ML) 0.083% neb solution  Calcium Carbonate-Vitamin D (CALCIUM 500+D HIGH POTENCY PO)  diltiazem ER COATED BEADS (CARDIZEM CD/CARTIA XT) 240 MG 24 hr capsule  Fluticasone-Umeclidin-Vilanterol (TRELEGY ELLIPTA) 100-62.5-25 MCG/INH oral inhaler  furosemide (LASIX) 20 MG tablet  isosorbide mononitrate (IMDUR) 30 MG 24 hr tablet  Lactobacillus (PROBIOTIC ACIDOPHILUS) TABS  losartan (COZAAR) 25 MG tablet  melatonin 5 MG tablet  mirtazapine (REMERON) 15 MG tablet  Multiple Vitamins-Minerals (OCUVITE PO)  polyethylene glycol (MIRALAX/GLYCOLAX) Packet  pravastatin (PRAVACHOL) 40 MG tablet  senna (SENOKOT) 8.6  MG tablet  Vitamin D, Cholecalciferol, 1000 units TABS  warfarin ANTICOAGULANT (COUMADIN) 2 MG tablet      Past Medical History:     Atrial fibrillation  BCC  CHF, chronic diastolic  Chronic renal insuffcency  COPD  CAD  Hyperlipidemia  Hypertension  IBS  Pulmonary fibrosis  Osteoporosis  Sick sinus syndrome  Vertigo  Anemia  Long term current use of anticoagulant therapy  Degeneration of lumbar intervertebral disc  CKD, stage 3  Adjustment disorder  Presbyesophagus  Chronic respiratory failure with hypoxia      Past Surgical History:    Appendectomy  Cardiac surgery  Coronary angiography  ECG, combined  Lead revision dual x2  Heart cath, angioplasty  Hernia repair  Implant pacemaker  Replace pacemaker generator     Family History:    Father - heart disease  Mother - dementia  Daughter - GI disease, Crohn's disease    Social History:  The patient presents to the ED via EMS alone.    Physical Exam     Patient Vitals for the past 24 hrs:   BP Temp Pulse Resp SpO2   05/01/22 1230 (!) 169/97 -- 61 -- 95 %   05/01/22 1200 (!) 166/92 -- 66 -- 95 %   05/01/22 1130 (!) 164/93 -- 62 -- 95 %   05/01/22 1100 (!) 162/98 -- 65 -- 94 %   05/01/22 1045 -- -- -- -- 94 %   05/01/22 1030 (!) 161/92 -- 59 -- 96 %   05/01/22 1021 -- -- -- -- 99 %   05/01/22 1000 (!) 165/93 -- 74 -- 90 %   05/01/22 0945 -- -- -- -- 95 %   05/01/22 0930 (!) 156/84 -- 59 -- 95 %   05/01/22 0915 -- -- -- -- 95 %   05/01/22 0900 (!) 157/79 -- 59 -- 97 %   05/01/22 0845 (!) 165/81 -- -- -- --   05/01/22 0830 -- -- 64 -- 97 %   05/01/22 0800 (!) 166/90 -- 70 -- 98 %   05/01/22 0750 (!) 179/102 98.3  F (36.8  C) 91 16 98 %       Physical Exam    HENT:  mmm, no rhinorrhea, atraumatic  Eyes: periorbital tissues and sclera normal   Neck: supple, no abnormal swelling, no posterior midline tenderness, painless range of motion  Lungs:  CTAB,  no resp distress, tenderness palpation left chest wall mid axillary line inferior margin no crepitus  CV: rrr, no m/r/g,  ppi  Abd: soft, nontender, nondistended, no rebound/masses/guarding/hsm  Ext: no peripheral edema, no bony ttp on skeletal survey, no palpable deformities, no major joint effusions, full ROM major joints    Skin: warm, dry, well perfused, no rashes/bruising/lesions on exposed skin  Neuro: alert, MAEE, no gross motor or sensory deficits, gcs 15  Psych: Normal mood, normal affect      Emergency Department Course   ECG  ECG obtained at 0752, ECG read at 0802  Atrial sensed ventricular paced rhythm with prolonged AV conduction with occasional sinus complexes. Abnormal ECG.  Rate 78 bpm. MS interval 376 ms. QRS duration 376 ms. QT/QTc 440/484 ms. P-R-T axes * -68 115.     Imaging:  CT Head w/o Contrast   Final Result   IMPRESSION:     1.  No evidence of acute intracranial hemorrhage or mass effect.   2.  Mild nonspecific white matter changes.   3.  Moderate brain parenchymal volume loss.      CT Chest Abdomen Pelvis w/o Contrast   Final Result   IMPRESSION:   1.  Nondisplaced lateral left rib fractures.   2.  There is a loculated pleural effusion anterior inferior left   hemithorax not seen on the prior study.      RAHUL BARTON MD            SYSTEM ID:  [id number]        Report per radiology    Laboratory:  Labs Ordered and Resulted from Time of ED Arrival to Time of ED Departure   BASIC METABOLIC PANEL - Abnormal       Result Value    Sodium 135      Potassium 3.7      Chloride 104      Carbon Dioxide (CO2) 29      Anion Gap 2 (*)     Urea Nitrogen 31 (*)     Creatinine 1.32 (*)     Calcium 9.3      Glucose 96      GFR Estimate 37 (*)    ROUTINE UA WITH MICROSCOPIC REFLEX TO CULTURE - Abnormal    Color Urine Straw      Appearance Urine Clear      Glucose Urine Negative      Bilirubin Urine Negative      Ketones Urine Negative      Specific Gravity Urine 1.011      Blood Urine Negative      pH Urine 7.0      Protein Albumin Urine Negative      Urobilinogen Urine Normal      Nitrite Urine Negative      Leukocyte  Esterase Urine Trace (*)     RBC Urine 1      WBC Urine 8 (*)     Squamous Epithelials Urine 2 (*)    INR - Abnormal    INR 1.54 (*)    COVID-19 VIRUS (CORONAVIRUS) BY PCR - Abnormal    SARS CoV2 PCR Positive (*)    CBC WITH PLATELETS AND DIFFERENTIAL    WBC Count 6.3      RBC Count 4.43      Hemoglobin 13.3      Hematocrit 42.0      MCV 95      MCH 30.0      MCHC 31.7      RDW 14.1      Platelet Count 194      % Neutrophils 54      % Lymphocytes 31      % Monocytes 8      % Eosinophils 5      % Basophils 1      % Immature Granulocytes 1      NRBCs per 100 WBC 0      Absolute Neutrophils 3.4      Absolute Lymphocytes 2.0      Absolute Monocytes 0.5      Absolute Eosinophils 0.3      Absolute Basophils 0.1      Absolute Immature Granulocytes 0.0      Absolute NRBCs 0.0          Procedures  None    Emergency Department Course:       Reviewed:  I reviewed nursing notes, vitals, past medical history and Care Everywhere    Assessments:  0752 I obtained history and examined the patient as noted above.     1140 I rechecked the patient and explained findings to her and her family.    Consults:  1155 I spoke to Dr. Fortune, Interventional Radiology, regarding the patient.  1212 I spoke to Dr. Brennan, hospitalist, who accepts the patient.    Interventions:  0815 Tylenol 650 mg PO  0815 Roxicodone 2 mg PO  0855 Lactated ringers bolus 500 mL IV  1016 Morphine 2 mg IV    Disposition:  The patient was admitted to the hospital under the care of Dr. Brennan.     Impression & Plan         Medical Decision Makin-year-old female here after accidental fall off of the toilet.  Trauma work-up here notable for 2 left-sided rib fractures.  Nondisplaced, no associated pneumothorax.  She does have a left-sided pleural effusion noted on CT.  Discussed with interventional radiology the Hounsfield units do not suggest that this is a blood.  She is a stable from a respiratory standpoint not hypoxic this can be followed clinically likely  is not related to her traumatic injuries today.  Skeletal survey is otherwise unremarkable.  She is chronically on warfarin so head CT was done and is negative.  Cervical spine clinically cleared in my opinion.  Given her age needing for opiate analgesics for pain will admit for symptomatic management and safe mobilization.    Incidentally noted to have COVID no complaints of new fever cough shortness of breath no significant pulmonary involvement seen on CT today.        Diagnosis:    ICD-10-CM    1. Fall, initial encounter  W19.XXXA    2. Closed fracture of multiple ribs of left side, initial encounter  S22.42XA    3. Pleural effusion  J90        Scribe Disclosure:  I, Estephanie Smith, am serving as a scribe at 7:56 AM on 5/1/2022 to document services personally performed by Dayton Ramírez MD based on my observations and the provider's statements to me.              Dayton Ramírez MD  05/01/22 0524

## 2022-05-02 ENCOUNTER — APPOINTMENT (OUTPATIENT)
Dept: OCCUPATIONAL THERAPY | Facility: CLINIC | Age: 87
End: 2022-05-02
Attending: HOSPITALIST
Payer: MEDICARE

## 2022-05-02 ENCOUNTER — APPOINTMENT (OUTPATIENT)
Dept: GENERAL RADIOLOGY | Facility: CLINIC | Age: 87
End: 2022-05-02
Attending: HOSPITALIST
Payer: MEDICARE

## 2022-05-02 LAB
ANION GAP SERPL CALCULATED.3IONS-SCNC: 3 MMOL/L (ref 3–14)
ATRIAL RATE - MUSE: 78 BPM
BUN SERPL-MCNC: 24 MG/DL (ref 7–30)
CALCIUM SERPL-MCNC: 9.9 MG/DL (ref 8.5–10.1)
CHLORIDE BLD-SCNC: 103 MMOL/L (ref 94–109)
CO2 SERPL-SCNC: 32 MMOL/L (ref 20–32)
CREAT SERPL-MCNC: 1.21 MG/DL (ref 0.52–1.04)
DIASTOLIC BLOOD PRESSURE - MUSE: NORMAL MMHG
ERYTHROCYTE [DISTWIDTH] IN BLOOD BY AUTOMATED COUNT: 14.1 % (ref 10–15)
GFR SERPL CREATININE-BSD FRML MDRD: 41 ML/MIN/1.73M2
GLUCOSE BLD-MCNC: 90 MG/DL (ref 70–99)
HCT VFR BLD AUTO: 42.7 % (ref 35–47)
HGB BLD-MCNC: 13.7 G/DL (ref 11.7–15.7)
INR PPP: 1.87 (ref 0.85–1.15)
INTERPRETATION ECG - MUSE: NORMAL
MAGNESIUM SERPL-MCNC: 2.1 MG/DL (ref 1.6–2.3)
MCH RBC QN AUTO: 29.9 PG (ref 26.5–33)
MCHC RBC AUTO-ENTMCNC: 32.1 G/DL (ref 31.5–36.5)
MCV RBC AUTO: 93 FL (ref 78–100)
P AXIS - MUSE: NORMAL DEGREES
PHOSPHATE SERPL-MCNC: 3.8 MG/DL (ref 2.5–4.5)
PLATELET # BLD AUTO: 212 10E3/UL (ref 150–450)
POTASSIUM BLD-SCNC: 3.6 MMOL/L (ref 3.4–5.3)
PR INTERVAL - MUSE: 376 MS
QRS DURATION - MUSE: 142 MS
QT - MUSE: 440 MS
QTC - MUSE: 484 MS
R AXIS - MUSE: -68 DEGREES
RBC # BLD AUTO: 4.58 10E6/UL (ref 3.8–5.2)
SODIUM SERPL-SCNC: 138 MMOL/L (ref 133–144)
SYSTOLIC BLOOD PRESSURE - MUSE: NORMAL MMHG
T AXIS - MUSE: 115 DEGREES
VENTRICULAR RATE- MUSE: 73 BPM
WBC # BLD AUTO: 7 10E3/UL (ref 4–11)

## 2022-05-02 PROCEDURE — 36415 COLL VENOUS BLD VENIPUNCTURE: CPT | Performed by: HOSPITALIST

## 2022-05-02 PROCEDURE — 250N000013 HC RX MED GY IP 250 OP 250 PS 637: Performed by: HOSPITALIST

## 2022-05-02 PROCEDURE — 97535 SELF CARE MNGMENT TRAINING: CPT | Mod: GO | Performed by: OCCUPATIONAL THERAPIST

## 2022-05-02 PROCEDURE — 99218 PR INITIAL OBSERVATION CARE,LEVEL I: CPT | Performed by: CLINICAL NURSE SPECIALIST

## 2022-05-02 PROCEDURE — 97530 THERAPEUTIC ACTIVITIES: CPT | Mod: GO | Performed by: OCCUPATIONAL THERAPIST

## 2022-05-02 PROCEDURE — 97166 OT EVAL MOD COMPLEX 45 MIN: CPT | Mod: GO | Performed by: OCCUPATIONAL THERAPIST

## 2022-05-02 PROCEDURE — 71045 X-RAY EXAM CHEST 1 VIEW: CPT

## 2022-05-02 PROCEDURE — 80048 BASIC METABOLIC PNL TOTAL CA: CPT | Performed by: HOSPITALIST

## 2022-05-02 PROCEDURE — 250N000013 HC RX MED GY IP 250 OP 250 PS 637: Performed by: CLINICAL NURSE SPECIALIST

## 2022-05-02 PROCEDURE — 83735 ASSAY OF MAGNESIUM: CPT | Performed by: HOSPITALIST

## 2022-05-02 PROCEDURE — 84100 ASSAY OF PHOSPHORUS: CPT | Performed by: HOSPITALIST

## 2022-05-02 PROCEDURE — 99225 PR SUBSEQUENT OBSERVATION CARE,LEVEL II: CPT | Performed by: HOSPITALIST

## 2022-05-02 PROCEDURE — G0378 HOSPITAL OBSERVATION PER HR: HCPCS

## 2022-05-02 PROCEDURE — 85027 COMPLETE CBC AUTOMATED: CPT | Performed by: HOSPITALIST

## 2022-05-02 PROCEDURE — 85610 PROTHROMBIN TIME: CPT | Performed by: HOSPITALIST

## 2022-05-02 PROCEDURE — 999N000157 HC STATISTIC RCP TIME EA 10 MIN

## 2022-05-02 PROCEDURE — 94640 AIRWAY INHALATION TREATMENT: CPT | Mod: 76

## 2022-05-02 RX ORDER — WARFARIN SODIUM 2 MG/1
2 TABLET ORAL
Status: COMPLETED | OUTPATIENT
Start: 2022-05-02 | End: 2022-05-02

## 2022-05-02 RX ADMIN — FUROSEMIDE 40 MG: 40 TABLET ORAL at 10:09

## 2022-05-02 RX ADMIN — Medication 1 CAPSULE: at 10:09

## 2022-05-02 RX ADMIN — GABAPENTIN 100 MG: 100 CAPSULE ORAL at 21:39

## 2022-05-02 RX ADMIN — PRAVASTATIN SODIUM 40 MG: 20 TABLET ORAL at 10:09

## 2022-05-02 RX ADMIN — WARFARIN SODIUM 2 MG: 2 TABLET ORAL at 18:26

## 2022-05-02 RX ADMIN — POLYETHYLENE GLYCOL 3350 8.5 G: 17 POWDER, FOR SOLUTION ORAL at 10:17

## 2022-05-02 RX ADMIN — DILTIAZEM HYDROCHLORIDE 240 MG: 240 CAPSULE, COATED, EXTENDED RELEASE ORAL at 10:09

## 2022-05-02 RX ADMIN — LOSARTAN POTASSIUM 25 MG: 25 TABLET, FILM COATED ORAL at 10:10

## 2022-05-02 RX ADMIN — ACETAMINOPHEN 975 MG: 325 TABLET, FILM COATED ORAL at 10:07

## 2022-05-02 RX ADMIN — Medication 1 TABLET: at 10:09

## 2022-05-02 RX ADMIN — GABAPENTIN 100 MG: 100 CAPSULE ORAL at 17:09

## 2022-05-02 RX ADMIN — MAGNESIUM CITRATE 286 ML: 1.75 LIQUID ORAL at 14:27

## 2022-05-02 RX ADMIN — Medication 1000 UNITS: at 10:10

## 2022-05-02 RX ADMIN — LIDOCAINE 1 PATCH: 246 PATCH TOPICAL at 10:21

## 2022-05-02 RX ADMIN — FLUTICASONE FUROATE AND VILANTEROL TRIFENATATE 1 PUFF: 100; 25 POWDER RESPIRATORY (INHALATION) at 20:05

## 2022-05-02 RX ADMIN — ISOSORBIDE MONONITRATE 30 MG: 30 TABLET, EXTENDED RELEASE ORAL at 10:10

## 2022-05-02 RX ADMIN — MIRTAZAPINE 7.5 MG: 7.5 TABLET, FILM COATED ORAL at 21:39

## 2022-05-02 RX ADMIN — UMECLIDINIUM 1 PUFF: 62.5 AEROSOL, POWDER ORAL at 20:04

## 2022-05-02 RX ADMIN — GABAPENTIN 100 MG: 100 CAPSULE ORAL at 10:09

## 2022-05-02 RX ADMIN — ACETAMINOPHEN 975 MG: 325 TABLET, FILM COATED ORAL at 17:08

## 2022-05-02 ASSESSMENT — ACTIVITIES OF DAILY LIVING (ADL)
ADLS_ACUITY_SCORE: 16
PREVIOUS_RESPONSIBILITIES: MEAL PREP;HOUSEKEEPING;LAUNDRY;MEDICATION MANAGEMENT
ADLS_ACUITY_SCORE: 16
ADLS_ACUITY_SCORE: 15
ADLS_ACUITY_SCORE: 16
ADLS_ACUITY_SCORE: 15
ADLS_ACUITY_SCORE: 16

## 2022-05-02 NOTE — PROGRESS NOTES
[facility]    Hospitalist Progress Note    Date of Service (when I saw the patient): 05/02/2022  Provider:  Jaron Brennan MD   Text Page  7am - 6PM       Assessment & Plan   Leonor Mercado is a 89 year old female who presents with left-sided chest pain and broken ribs after mechanical fall at home when the patient was in the toilet seat.  Circumstances surrounding the fall are not clear but she denies loss of consciousness or preceding symptoms.  She apparently had been in her normal state of health.  Incidentally she has been found COVID-19 PCR test positive, she patient is asymptomatic and fully vaccinated with booster 2 weeks ago.     Unwitnessed mechanical fall at home, unclear circumstances.  Chest trauma secondary to mechanical fall.  Left-sided ribs fracture secondary to chest trauma.  Patient does not have hypoxemia, fractures are not displaced, parenchyma seems to be intact.  She has pleuritic pain exacerbated with deep breathing and cough but overall she has good tolerance.  Incidental finding of loculated anteroinferior pleural effusion is incidental, according to radiologist density of the fluid is not compatible with blood, therefore hemothorax is unlikely.  - Admit to observation.  - 2 g sodium, low-fat diet.  - Pulse oximetry.  POC checks  - Pain control as needed  - Incentive spirometry  - Treatment of rib fracture per protocol     COVID PCR test positive.  -Asymptomatic, fully vaccinated and boosted in the last 2 weeks  -Isolation precaution.     History of coronary artery disease, status post a stent placement and pacemaker placement.  History of sick sinus syndrome.  -Resume home medication.  -Pending pacemaker interrogation.     Chronic atrial fibrillation/paced rhythm/warfarin anticoagulation.  - Subtherapeutic on admission.  -Pharmacy consult for dosing and follow-up  INR   Date Value Ref Range Status   05/02/2022 1.87 (H) 0.85 - 1.15 Final   07/02/2021 2.30 (H)  0.86 - 1.14 Final     Comment:     This test is intended for monitoring Coumadin therapy.  Results are not   accurate in patients with prolonged INR due to factor deficiency.             CKD stage IIIb.  Stable.  -Close follow-up with daily BMP.     Essential hypertension.  - On Cardizem, losartan, furosemide     Hyperlipidemia.  -Resume pravastatin     COPD, not in exacerbation.  - Albuterol nebs and Trelegy Ellipta     Osteoporosis.  -Continue calcium and vitamin D supplementation.     UA suspicious for infection.  - No lower urinary symptoms, wait until urine culture available, no antibiotic at this point.    DVT Prophylaxis: Warfarin  Code Status: No CPR- Do NOT Intubate    Disposition: Expected discharge in 1-2 days if not complications.    Interval History   Patient is having pain in left chest wall with deep inspiration, cough on palpation. Weakness. No other issues    -Data reviewed today: I reviewed all new labs and imaging results over the last 24 hours. I personally reviewed the EKG tracing showing Paced rhythm.    Physical Exam   Temp: 97.5  F (36.4  C) Temp src: Oral BP: (Abnormal) 148/78 Pulse: 89   Resp: 16 SpO2: 94 % O2 Device: None (Room air)    Vitals:    05/01/22 1449   Weight: 61.4 kg (135 lb 6.4 oz)     Vital Signs with Ranges  Temp:  [97.5  F (36.4  C)-98.4  F (36.9  C)] 97.5  F (36.4  C)  Pulse:  [70-89] 89  Resp:  [16-22] 16  BP: (123-160)/(69-84) 148/78  SpO2:  [91 %-94 %] 94 %  No intake/output data recorded.    GEN:  Alert, oriented x 3, appears comfortable, NAD.  HEENT:  Normocephalic/atraumatic, no scleral icterus, no nasal discharge, mouth moist.  CV:  Paced rhythm, no murmur or JVD.  S1 + S2 noted, no S3 or S4.  LUNGS:  Clear to auscultation bilaterally without rales/rhonchi/wheezing/retractions.  Symmetric chest rise on inhalation noted.  ABD:  Active bowel sounds, soft, non-tender/non-distended.  No rebound/guarding/rigidity.  EXT:  No edema or cyanosis.  No joint synovitis  noted.  SKIN:  Dry to touch, no exanthems noted in the visualized areas.       Medications     - MEDICATION INSTRUCTIONS -       Warfarin Therapy Reminder         acetaminophen  975 mg Oral Q8H     calcium carbonate-vitamin D  1 tablet Oral Daily     diltiazem ER COATED BEADS  240 mg Oral Daily     enema compound (docusate/mag cit/mineral oil/NaPhos)  286 mL Rectal Once     fluticasone-vilanterol  1 puff Inhalation Daily    And     umeclidinium  1 puff Inhalation Daily     furosemide  40 mg Oral Daily     gabapentin  100 mg Oral TID     isosorbide mononitrate  30 mg Oral Daily     lactobacillus rhamnosus (GG)  1 capsule Oral Daily     lidocaine  1 patch Transdermal Q24H     lidocaine   Transdermal Q8H     losartan  25 mg Oral Daily     mirtazapine  7.5 mg Oral At Bedtime     polyethylene glycol  8.5 g Oral Daily     pravastatin  40 mg Oral Daily     sodium chloride (PF)  3 mL Intracatheter Q8H     Vitamin D3  1,000 Units Oral Daily     warfarin ANTICOAGULANT  2 mg Oral ONCE at 18:00       Data   Recent Labs   Lab 05/02/22  0928 05/02/22  0927 05/01/22  0754   WBC 7.0  --  6.3   HGB 13.7  --  13.3   MCV 93  --  95     --  194   INR  --  1.87* 1.54*     --  135   POTASSIUM 3.6  --  3.7   CHLORIDE 103  --  104   CO2 32  --  29   BUN 24  --  31*   CR 1.21*  --  1.32*   ANIONGAP 3  --  2*   TRI 9.9  --  9.3   GLC 90  --  96       Recent Results (from the past 24 hour(s))   XR Chest Port 1 View    Narrative    EXAM: XR CHEST PORT 1 VIEW  LOCATION: [facility]  DATE/TIME: 5/2/2022 5:34 AM    INDICATION: Trauma Patient with Rib Fracture(s)  COMPARISON: 07/01/2019      Impression    IMPRESSION: Cardiac enlargement shallow inspiration. Trace left effusion. Rib fractures better seen on CT. Mild atelectasis or infiltrate right lower lung. Cardiac leads. No visible pneumothorax. Thoracic dextrocurvature and degenerative change osseous   structures per         Securely message with the [employer]  Web Console (learn more here)  Text page via [facility] Paging/Directory        Disclaimer: This note consists of symbols derived from keyboarding, dictation and/or voice recognition software. As a result, there may be errors in the script that have gone undetected. Please consider this when interpreting information found in this chart.

## 2022-05-02 NOTE — PLAN OF CARE
Goal Outcome Evaluation:  For vital signs and complete assessments, please see documentation flowsheets.     Pertinent assessments: Pt A&O. Vss and on RA. Had some pain to the left rib and schd pain meds given and patch in place. Ambulates with walker and gait belt. Had a good appetite this shift. No coughing or sob noted. LS diminished. IS in the room and pt used it with constant reminders. Had constipation and supp given with no results yet. Has bilateral hearing aids. On tele and reading V paced.    Major Shift Events :Admitted to the Unit today.    Treatment Plan: Pain mangement, monitor respiratory symptom.

## 2022-05-02 NOTE — CONSULTS
Care Management Initial Consult    General Information  Assessment completed with: Patient, Children,    Type of CM/SW Visit: Initial Assessment    Primary Care Provider verified and updated as needed: Yes   Readmission within the last 30 days: no previous admission in last 30 days      Reason for Consult: care coordination/care conference, discharge planning    Communication Assessment  Patient's communication style: spoken language (English or Bilingual)    Hearing Difficulty or Deaf: yes (Has bilateral hearing aids.)   Wear Glasses or Blind: yes    Cognitive  Cognitive/Neuro/Behavioral: WDL                      Living Environment:   People in home: alone     Current living Arrangements: apartment      Able to return to prior arrangements: yes       Family/Social Support:  Care provided by: self, child(moriah)  Provides care for: no one     Children          Description of Support System: Supportive         Current Resources:   Patient receiving home care services: No     Community Resources: None  Equipment currently used at home: grab bar, toilet, grab bar, tub/shower, shower chair, walker, rolling  Supplies currently used at home:      Employment/Financial:  Employment Status: retired        Financial Concerns: No concerns identified           Lifestyle & Psychosocial Needs:  Social Determinants of Health     Tobacco Use: Medium Risk     Smoking Tobacco Use: Former Smoker     Smokeless Tobacco Use: Never Used   Alcohol Use: Not on file   Financial Resource Strain: Not on file   Food Insecurity: Not on file   Transportation Needs: Not on file   Physical Activity: Not on file   Stress: Not on file   Social Connections: Not on file   Intimate Partner Violence: Not on file   Depression: Not at risk     PHQ-2 Score: 2   Housing Stability: Not on file       Functional Status:  Prior to admission patient needed assistance:    no    Mental Health Status:  Mental Health Status: No Current Concerns       Chemical Dependency  Status:  Chemical Dependency Status: No Current Concerns           Additional Information:  Pt lives at [facility].  She gets one meal a day.  She has a cane and walker for DME.  Pt is Covid +.  Dtg Cleo said she lives down the road 2 miles away and can be supportive if needed.  She can also stay with her Mother if needed.  Cleo said they can add [employer] services as well if needed.  Cleo oversees medication management, but Pt does administer them.  Will follow along for PT recommendations.  Family can provide transport.  Pt doesn't anticipate that she will have needs at discharge.    Xiomara Sanchez RN BSN   Inpatient Care Coordination  [facility]  [phone]      Dea Sanchez, RN

## 2022-05-02 NOTE — CONSULTS
"[facility]  Pain Service Consultation   Text Page    Date of Admission:  5/1/2022    Assessment & Plan   Leonor Mercado is a 89 year old female who was admitted on 5/1/2022. I was asked by Hospitalist Jaron Brennan MD to see the patient for rib fracture.    Met Leonor as she was resting in bed. Able to give good history, yet her interest is \"I need to go potty and I need to go home.\" She does not recall what pain medications she may have taken in the past. Given her advanced age and renal insufficiency would avoid use of Oxycodone and use Dilaudid for pain PRN. History of illus versus SBO, reasonable to try Pink Lady for her current complaint of constipation.     PLAN:   1)  Opioids:  Dilaudid 1 mg every 3 hours prn    Opioids Treatment Goal:   -Improvement in function  -Participate in PT  -Management of acute pain during hospitalization, expected 3-7 days needed at DC    2)  Non-opioid multimodal medication therapy  -Topical:  Lidocaine Patch 4% 1 patch every 24 hours   -N-SAIDS: Avoid due to advanced age and renal insuffiencey.  -Muscle Relaxants:  Methocarbamol 500 mg every 6 hours PRN   -Adjuvants: Acetaminophen 975 mg TID and Gabapentin 100 mg TID  -Antidepressants/anxiolytics:  Chronic Mirtazapine 7.5 mg at bedtime    3)  Non-medication interventions  Positioning, Heating pad PRN, Relaxation, Meditation, Distraction, therapy is consulted     4)  Constipation Prophylaxis  Yashira lax daily and Senokot 2 tablets at bedtime; Bisacodyl PRN     5) Medication Risk reduction strategies   - monitor for sedation   - Capnography per protocol   - narcan for opioid reversal     6)  Pain Education  -Opioid safe use, storage and disposal information included in DC AVS    7)  DC Planning   Discussed goal of opioid therapy as above with patient and bedside nurse Ramona Forrester RN and Hospitalist Jaron Brennan MD.  Length of therapy is less than 10 days, opioids may be stopped without " taper.  Continued outpatient management of pain per PCP  Disposition: TBD - lives in senior living facility  Support systems: Daughters and son  Outpatient Referrals: None needed from pain perspective  The following risk factors have been identified for unintentional overdose: patient is > 65 years old, patient has lung disease and patient has compromised kidney or liver function . Discharge with intra-nasal naloxone if discharged to home with opioids  >40 mg MME/day.  Plan for education prior to discharge.    ASSESSMENT  1)  Acute left chest wall pain due to rib fractures    2)  Patient does not have history of chronic pain, and is NOT on chronic opioid therapy  Baseline 0 mg Daily Morphine Equivalent as dispensed and as reported daily use.  Patient has no expected opioid tolerance.     Patient's opioid use in past 24 hours: 2 mg IV Morphine and 2 mg Oxycodone = 9 mg Daily Morphine Equivalent    3)  Risk factors for opioid related harms  - Renal insufficiency  - Age > 65 years old  - Depression  - 4/24/2018 intentionally overdosed on Paxil and history of right eye at age 35 years after birth of her last child    4)  Opioid induced side-effects:  -Constipation - YES, as she has not had a bowel movement since last Friday (3 days ago)  -Nausea/Vomit- No/denies  -Sedation - No/denies  -Urinary Retention - No/denies    5)  Other/Related:   - Anxiety/Depression with history of hospitalization for Paxil overdose 4/24/2018  - Pulmonary fibrosis, COPD   - Renal insufficiency  - Deconditioning    - 6/1/2016 admission for illus versus SBO    Time Spent on this Encounter   Total unit/floor time 10:10 AM until 10:50 AM minutes, time consisted of the following, examination of the patient, reviewing the record and completing documentation. >50% of time spent in counseling and coordination of care.  Time spend counseling with patient consisted of the following topics, symptom management.  Time spent in coordination of care with  "Bedside Nurse Ramona Forrester RN and Hospitalist Jaron Brennan MD.     Maria Isabel Tolliver MS, RN, CNS, APRN, ACHPN, FAACVPR  Pain and Palliative Care  Pager [phone]  Office [phone]       Primary Care Physician   Primary Care Physician:Arnel Garcia  Pain Specialist: None    Chief Complaint   Fall    History is obtained from the patient and electronic health record    History of Present Illness   Leonor Mercado is a 89 year old female who was admitted 5/1/2022 following a fall causing 2 left-sided rib fractures. She resides at [facility] and sustained a fall from a toilet. Her medical history is significant for CHF, CAD, atrial fibrillation (on warfarin) and SSS status post PPM, hyperlipidemia, hypertension, pulmonary fibrosis, COPD, renal insufficiency. She was evaluated by Surgeon Johnny Menezes MD.    CURRENT PAIN:  Her pain is located in the left chest wall  It is described as Sharp and painful  She rates it as ranging between 2/10 and 8/10  The average is 5/10 on a scale of 0-10  Currently it is rated as 2/10  It improves by \"I don't know. It just hurts\"  It worsens by \"I know they want me to take a big breath, but that hurts.\"   She has been compliant with the recommendations while in the hospital.      PAST PAIN TREATMENT:   Medications: Tylenol, Motrin (do not recommend),   Non-pharmacologic modalities: heating-pad   Previous interventions/surgeries: None    [place] Board of Pharmacy Data Base Reviewed:    YES; As expected, no concern for misuse/abuse of controlled medications based on this report. No prescriptions in the past year  OPIOID RISK SCORE= 000    Past Medical History   I have reviewed this patient's medical history and updated it with pertinent information if needed.   Past Medical History:   Diagnosis Date     Atrial fibrillation (H)     Sick sinus syndrome, PAT, AF     BCC (basal cell carcinoma of skin)     Face     CHF - Chr Diastolic (H) 11/21/2017     Chronic renal " insufficiency      COPD (chronic obstructive pulmonary disease) (H)      Coronary artery disease     2-05[place]-CAD-taxus stentx2 2.5-12,2.5-12 in LADp and LADm, 80%nonDomRCA-LcxDom-mild,EF60%     Hyperlipidemia      Hypertension      IBS (irritable bowel syndrome)      Osteoporosis      Pulmonary fibrosis (H)     do not use amiodarone     Sick sinus syndrome - s/p PPM 2003 (H) 12/16/2017     SSS (sick sinus syndrome) (H)     DDD pacer (see surgery history section)     Vertigo        Past Surgical History   I have reviewed this patient's surgical history and updated it with pertinent information if needed.  Past Surgical History:   Procedure Laterality Date     APPENDECTOMY  1956     CARDIAC SURGERY      PPM, 2 STENTS2-05[place]-CAD-taxus stentx2 2.5-12,2.5-12 in LADp and LADm, 80%nonDomRCA-LcxDom-mild,EF60%     CORONARY ANGIOGRAPHY ADULT ORDER  7/1994    mild CAD,Normal LV function, No Mitral regurgitation, Prox LAD 30% stenosis. RCA prox and mid, 20-30%     ESOPHAGOSCOPY, GASTROSCOPY, DUODENOSCOPY (EGD), COMBINED N/A 8/19/2015    Procedure: COMBINED ESOPHAGOSCOPY, GASTROSCOPY, DUODENOSCOPY (EGD), BIOPSY SINGLE OR MULTIPLE;  Surgeon: Genevieve Leon MD;  Location: [facility]     H LEAD REVISION DUAL  4/2003    Revised in [place]-due to diaphram stim (original pacer placed in [place])     H LEAD REVISION DUAL  7/2/2014    Correction of reversal of A and V leads in Header     HEART CATH, ANGIOPLASTY  02/2005    LEIGH ANN mid and proximal LAD, ongoing 80% RCA; mild circumflex     HERNIA REPAIR Left 1991    [facility]     IMPLANT PACEMAKER  2/19/2003    Placed in [place] for sick sinus syndrome     REPLACE PACEMAKER GENERATOR  6/27/2013    Dual-chamber pacemaker by Dr SETH Pierre         Prior to Admission Medications   Prior to Admission Medications   Prescriptions Last Dose Informant Patient Reported? Taking?   Calcium Carbonate-Vitamin D (CALCIUM 500+D HIGH POTENCY PO) 4/30/2022 at AM  Yes Yes   Sig: Take 1 tablet by mouth daily    Fluticasone-Umeclidin-Vilanterol (TRELEGY ELLIPTA) 100-62.5-25 MCG/INH oral inhaler 4/30/2022 at AM Self Yes Yes   Sig: Inhale 1 puff into the lungs daily   Lactobacillus (PROBIOTIC ACIDOPHILUS) TABS 4/30/2022 at AM Self Yes Yes   Sig: Take 1 tablet by mouth daily    Multiple Vitamins-Minerals (OCUVITE PO) 4/30/2022 at AM Self Yes Yes   Sig: Take 1 capsule by mouth daily.   Vitamin D, Cholecalciferol, 1000 units TABS 4/30/2022 at AM Self Yes Yes   Sig: Take 1,000 Units by mouth daily   acetaminophen (TYLENOL) 500 MG tablet 4/30/2022 at PM Self Yes Yes   Sig: Take 500 mg by mouth At Bedtime   albuterol (PROAIR HFA/PROVENTIL HFA/VENTOLIN HFA) 108 (90 Base) MCG/ACT inhaler Past Week at Unknown time Self No Yes   Sig: Inhale 2 puffs into the lungs every 6 hours as needed for shortness of breath / dyspnea or wheezing   albuterol (PROVENTIL) (2.5 MG/3ML) 0.083% neb solution 4/30/2022 at PM  Yes Yes   Sig: Take 2.5 mg by nebulization At Bedtime   albuterol (PROVENTIL) (2.5 MG/3ML) 0.083% neb solution Past Week at Unknown time  Yes Yes   Sig: Take 2.5 mg by nebulization every 4 hours as needed for shortness of breath / dyspnea or wheezing MAX of 4 doses/ 24 hours   diltiazem ER COATED BEADS (CARDIZEM CD/CARTIA XT) 240 MG 24 hr capsule 4/30/2022 at AM  No Yes   Sig: Take 1 capsule by mouth once daily   furosemide (LASIX) 20 MG tablet 4/30/2022 at AM  No Yes   Sig: Take 1 tablet by mouth twice daily   Patient taking differently: Take 40 mg by mouth daily   isosorbide mononitrate (IMDUR) 30 MG 24 hr tablet 4/30/2022 at AM  No Yes   Sig: Take 1 tablet by mouth once daily   losartan (COZAAR) 25 MG tablet 4/30/2022 at AM  No Yes   Sig: Take 1 tablet by mouth once daily   melatonin 5 MG tablet Past Month at Unknown time Self Yes Yes   Sig: Take 10 mg by mouth nightly as needed for sleep   mirtazapine (REMERON) 15 MG tablet Past Week at Unknown time  No Yes   Sig: TAKE 1 TABLET BY MOUTH AT BEDTIME   Patient taking differently:  Take 15 mg by mouth nightly as needed   polyethylene glycol (MIRALAX/GLYCOLAX) Packet 2022 at AM Self Yes Yes   Sig: Take by mouth daily 1 scoopful once daily   pravastatin (PRAVACHOL) 40 MG tablet 2022 at AM  No Yes   Sig: Take 1 tablet by mouth once daily   senna (SENOKOT) 8.6 MG tablet 2022 at PM Self Yes Yes   Sig: Take 2 tablets by mouth nightly as needed for constipation    warfarin ANTICOAGULANT (COUMADIN) 2 MG tablet 2022 at AM  No Yes   Sig: Take 2mg every Sun, , & Thurs / Take 1mg all other days OR as instructed by INR clinic   Patient taking differently: Take 2mg every Sun & Thurs / Take 1mg all other days. Next INR 22      Facility-Administered Medications: None     Allergies   Allergies   Allergen Reactions     Amiodarone      pulm fibrosis       Baclofen      Confusion      Bactrim [Sulfamethoxazole W-Trimethoprim]      Difficulty swallowing     Doxycycline Other (See Comments)     Multiple complaints; she does not want this again.      Levaquin [Levofloxacin] Other (See Comments)     Multiple complaints; she will not take this again     Linzess [Linaclotide] Diarrhea     Lorazepam        Prolonged drowsiness with ER visit      Liquid Adhesive Itching and Rash     Bleeding when tape removed after pacemaker placement..itched and burned for weeks.       Social History   I have reviewed this patient's social history and updated it with pertinent information if needed. Leonor AMAURY Mercado  reports that she quit smoking about 35 years ago. Her smoking use included cigarettes. She smoked 0.00 packs per day. She has never used smokeless tobacco. She reports that she does not drink alcohol and does not use drugs.    Family History   I have reviewed this patient's family history and updated it with pertinent information if needed.   Family History   Problem Relation Age of Onset     Heart Disease Father          age 84     Neurologic Disorder Mother         dementia      Gastrointestinal Disease Daughter         liver transplant     Crohn's Disease Daughter      Family history of addiction - None    Review of Systems   The 10 point Review of Systems is negative other than noted in the HPI or here.    Denies bladder dysfunction and complains of constipation    Physical Exam   Temp:  [97.5  F (36.4  C)-98.4  F (36.9  C)] 97.5  F (36.4  C)  Pulse:  [59-89] 89  Resp:  [16-22] 16  BP: (123-174)/(69-98) 148/78  SpO2:  [91 %-99 %] 94 %  135 lbs 6.4 oz  GEN:  Elderly frail  female. Alert, oriented x 3, appears comfortable, no apparent distress.  HEENT:  Normocephalic/atraumatic, no scleral icterus, no nasal discharge, mouth moist.  CV:  RRR, S1, S2; no murmurs or other irregularities noted.  +3 DP/PT pulses bilaterally; no edema bilateral lower extremities.  RESP:  Clear to auscultation bilaterally without rales/rhonchi/wheezing/retractions.  Symmetric chest rise on inhalation noted.  Normal respiratory effort.  ABD:  Rounded, soft, non-tender/non-distended.  +BS  EXT:  Edema & pulses as noted above.  Color, moisture and sensation intact x 4.     M/S:   Left chest wall is tender to palpation.    SKIN:  Warm and dry to touch, no exanthems noted in the visualized areas.    NEURO: Symmetric strength +5/5.  Sensation to touch intact all extremities.   There is no area of allodynia or hyperesthesia.  PAIN BEHAVIOR: Cooperative  Psych:  Normal affect.  Calm, cooperative, conversant appropriately.       Data   Results for orders placed or performed during the hospital encounter of 05/01/22   CT Head w/o Contrast     Status: None    Narrative    EXAM: CT HEAD W/O CONTRAST  LOCATION: [facility]  DATE/TIME: 5/1/2022 8:32 AM    INDICATION: fall, hit head, on warfarin  COMPARISON: None.  TECHNIQUE: Routine CT Head without IV contrast. Multiplanar reformats. Dose reduction techniques were used.    FINDINGS:  INTRACRANIAL CONTENTS: No evidence of acute intracranial  hemorrhage or mass effect. Scattered foci of decreased attenuation within the cerebral hemispheric white matter which are nonspecific, though most commonly ascribed to chronic small vessel ischemic   disease. The ventricles and sulci are prominent consistent with moderate brain parenchymal volume loss. Gray-white matter differentiation is maintained. The basilar cisterns are patent.    VISUALIZED ORBITS/SINUSES/MASTOIDS: Bilateral cataract surgery. The partially imaged globes are otherwise unremarkable. The partially imaged paranasal sinuses, mastoid air cells and middle ear cavities are unremarkable.     BONES/SOFT TISSUES: The visualized skull base and calvarium are unremarkable.      Impression    IMPRESSION:    1.  No evidence of acute intracranial hemorrhage or mass effect.  2.  Mild nonspecific white matter changes.  3.  Moderate brain parenchymal volume loss.   CT Chest Abdomen Pelvis w/o Contrast     Status: None    Narrative    CT CHEST ABDOMEN PELVIS W/O CONTRAST 5/1/2022 8:49 AM    CLINICAL HISTORY: Fall, left chest and abdominal pain    TECHNIQUE: CT scan of the chest, abdomen, and pelvis was performed  without IV contrast. Multiplanar reformats were obtained. Dose  reduction techniques were used.   CONTRAST: None.    COMPARISON: 4/17/2018    FINDINGS:   LUNGS AND PLEURA: 5 mm nodule in the inferior right middle lobe  (series 4, image 194). There is a loculated fluid collection in the  anterior inferior left hemithorax that measures up to 10.6 x 6.0 cm  (series 2, image 31).    MEDIASTINUM/AXILLAE: Left-sided pacer device. Normal caliber thoracic  aorta. No mediastinal or hilar adenopathy.    CORONARY ARTERY CALCIFICATION: Previous intervention (stents or CABG).    HEPATOBILIARY: Normal.    PANCREAS: Normal.    SPLEEN: Normal.    ADRENAL GLANDS: Normal.    KIDNEYS/BLADDER: No worrisome renal lesion or hydronephrosis. Urinary  bladder unremarkable.    BOWEL: No bowel obstruction or inflammatory change.  Small bowel loop  extends into a right inguinal hernia but there is no obstruction.  Diastases recti noted.    LYMPH NODES: Normal.    VASCULATURE: Non-aneurysmal aortoiliac atherosclerosis.    PELVIC ORGANS: Normal.    OTHER: No ascites.    MUSCULOSKELETAL: Nondisplaced fractures noted LEFT ribs 9 and 10  laterally.      Impression    IMPRESSION:  1.  Nondisplaced lateral left rib fractures.  2.  There is a loculated pleural effusion anterior inferior left  hemithorax not seen on the prior study.    RAHUL BARTON MD         SYSTEM ID:  [id number]   XR Chest Port 1 View     Status: None    Narrative    EXAM: XR CHEST PORT 1 VIEW  LOCATION: [facility]  DATE/TIME: 5/2/2022 5:34 AM    INDICATION: Trauma Patient with Rib Fracture(s)  COMPARISON: 07/01/2019      Impression    IMPRESSION: Cardiac enlargement shallow inspiration. Trace left effusion. Rib fractures better seen on CT. Mild atelectasis or infiltrate right lower lung. Cardiac leads. No visible pneumothorax. Thoracic dextrocurvature and degenerative change osseous   structures per   Extra Tube ([place] Draw)     Status: None    Narrative    The following orders were created for panel order Extra Tube ([place] Draw).  Procedure                               Abnormality         Status                     ---------                               -----------         ------                     Extra Blue Top Tube[[id number]]                              Final result               Extra Green Top (Lithium...[[id number]]                      Final result               Extra Green Top (Lithium...[[id number]]                      Final result               Extra Purple Top Tube[[id number]]                            Final result                 Please view results for these tests on the individual orders.   Extra Blue Top Tube     Status: None   Result Value Ref Range    Hold Specimen JIC    Extra Green Top (Lithium Heparin) Tube     Status: None    Result Value Ref Range    Hold Specimen JI    Extra Green Top (Lithium Heparin) Tube     Status: None   Result Value Ref Range    Hold Specimen JI    Extra Purple Top Tube     Status: None   Result Value Ref Range    Hold Specimen [facility]    Basic metabolic panel     Status: Abnormal   Result Value Ref Range    Sodium 135 133 - 144 mmol/L    Potassium 3.7 3.4 - 5.3 mmol/L    Chloride 104 94 - 109 mmol/L    Carbon Dioxide (CO2) 29 20 - 32 mmol/L    Anion Gap 2 (L) 3 - 14 mmol/L    Urea Nitrogen 31 (H) 7 - 30 mg/dL    Creatinine 1.32 (H) 0.52 - 1.04 mg/dL    Calcium 9.3 8.5 - 10.1 mg/dL    Glucose 96 70 - 99 mg/dL    GFR Estimate 37 (L) >60 mL/min/1.73m2   UA with Microscopic reflex to Culture     Status: Abnormal    Specimen: Urine, Clean Catch   Result Value Ref Range    Color Urine Straw Colorless, Straw, Light Yellow, Yellow    Appearance Urine Clear Clear    Glucose Urine Negative Negative mg/dL    Bilirubin Urine Negative Negative    Ketones Urine Negative Negative mg/dL    Specific Gravity Urine 1.011 1.003 - 1.035    Blood Urine Negative Negative    pH Urine 7.0 5.0 - 7.0    Protein Albumin Urine Negative Negative mg/dL    Urobilinogen Urine Normal Normal, 2.0 mg/dL    Nitrite Urine Negative Negative    Leukocyte Esterase Urine Trace (A) Negative    RBC Urine 1 <=2 /HPF    WBC Urine 8 (H) <=5 /HPF    Squamous Epithelials Urine 2 (H) <=1 /HPF    Narrative    Urine Culture not indicated   INR     Status: Abnormal   Result Value Ref Range    INR 1.54 (H) 0.85 - 1.15   CBC with platelets and differential     Status: None   Result Value Ref Range    WBC Count 6.3 4.0 - 11.0 10e3/uL    RBC Count 4.43 3.80 - 5.20 10e6/uL    Hemoglobin 13.3 11.7 - 15.7 g/dL    Hematocrit 42.0 35.0 - 47.0 %    MCV 95 78 - 100 fL    MCH 30.0 26.5 - 33.0 pg    MCHC 31.7 31.5 - 36.5 g/dL    RDW 14.1 10.0 - 15.0 %    Platelet Count 194 150 - 450 10e3/uL    % Neutrophils 54 %    % Lymphocytes 31 %    % Monocytes 8 %    % Eosinophils 5 %     % Basophils 1 %    % Immature Granulocytes 1 %    NRBCs per 100 WBC 0 <1 /100    Absolute Neutrophils 3.4 1.6 - 8.3 10e3/uL    Absolute Lymphocytes 2.0 0.8 - 5.3 10e3/uL    Absolute Monocytes 0.5 0.0 - 1.3 10e3/uL    Absolute Eosinophils 0.3 0.0 - 0.7 10e3/uL    Absolute Basophils 0.1 0.0 - 0.2 10e3/uL    Absolute Immature Granulocytes 0.0 <=0.4 10e3/uL    Absolute NRBCs 0.0 10e3/uL   Asymptomatic COVID-19 Virus (Coronavirus) by PCR Nasopharyngeal     Status: Abnormal    Specimen: Nasopharyngeal; Swab   Result Value Ref Range    SARS CoV2 PCR Positive (A) Negative    Narrative    Testing was performed using the [employer] Xpress SARS-CoV-2 Assay on the   [employer]. Additional information about   this Emergency Use Authorization (EUA) assay can be found via the Lab   Guide. This test should be ordered for the detection of SARS-CoV-2 in   individuals who meet SARS-CoV-2 clinical and/or epidemiological   criteria. Test performance is unknown in asymptomatic patients. This   test is for in vitro diagnostic use under the FDA EUA for   laboratories certified under CLIA to perform high complexity testing.   This test has not been FDA cleared or approved. A negative result   does not rule out the presence of PCR inhibitors in the specimen or   target RNA in concentration below the limit of detection for the   assay. The possibility of a false negative should be considered if   the patient's recent exposure or clinical presentation suggests   COVID-19. This test was validated by the [facility]. This laboratory is certified under the Clinical Laboratory Improvement Amendments of 1988 (CLIA-88) as qualified to perform high complexity laboratory testing.     CBC with platelets     Status: Normal   Result Value Ref Range    WBC Count 7.0 4.0 - 11.0 10e3/uL    RBC Count 4.58 3.80 - 5.20 10e6/uL    Hemoglobin 13.7 11.7 - 15.7 g/dL    Hematocrit 42.7 35.0 - 47.0 %    MCV 93  78 - 100 fL    MCH 29.9 26.5 - 33.0 pg    MCHC 32.1 31.5 - 36.5 g/dL    RDW 14.1 10.0 - 15.0 %    Platelet Count 212 150 - 450 10e3/uL   INR     Status: Abnormal   Result Value Ref Range    INR 1.87 (H) 0.85 - 1.15   EKG 12-lead, tracing only     Status: None   Result Value Ref Range    Systolic Blood Pressure  mmHg    Diastolic Blood Pressure  mmHg    Ventricular Rate 73 BPM    Atrial Rate 78 BPM    GA Interval 376 ms    QRS Duration 142 ms     ms    QTc 484 ms    P Axis  degrees    R AXIS -68 degrees    T Axis 115 degrees    Interpretation ECG       Atrial-sensed ventricular-paced rhythm with prolonged AV conduction with occasional sinus complexes  Abnormal ECG  When compared with ECG of 04-JAN-2021 07:10,  No significant change was found     CBC with platelets differential     Status: None    Narrative    The following orders were created for panel order CBC with platelets differential.  Procedure                               Abnormality         Status                     ---------                               -----------         ------                     CBC with platelets and d...[[id number]]                      Final result                 Please view results for these tests on the individual orders.

## 2022-05-02 NOTE — PROGRESS NOTES
SPIRITUAL HEALTH SERVICES: Tele-Encounter  Patient Location:  Med/Surg 5  Spoke with: Patient    Referral Source: Contacted pt Alfredo per an admission request.    Called Alfredo's bedside phone and introduced myself.  Alfredo deferred  support at this time.    PLAN: This author and other chaplains remain available per pt/family request.    Chaplain Tylor Mars M.Div., [facility]  Staff   Phone [phone]    ______________________________    Type of service:  Telephone Visit     has received verbal consent for a TelephoneVisit from the patient? No    Distance Provider Location: designated [place] office or home office (secure setting)    Mode of Communication: telephone (via [employer] phone or [employer] tele-call-number ([phone]))

## 2022-05-02 NOTE — PROGRESS NOTES
05/02/22 1416   Quick Adds   Type of Visit Initial Occupational Therapy Evaluation   Living Environment   People in Home alone   Current Living Arrangements assisted living   Home Accessibility no concerns   Transportation Anticipated family or friend will provide   Living Environment Comments Pt lives in [facility], with lunch provided in DR; dtr assists with errands and can stay at dc   Self-Care   Usual Activity Tolerance good   Current Activity Tolerance fair   Equipment Currently Used at Home grab bar, toilet;grab bar, tub/shower;shower chair;walker, rolling   Fall history within last six months yes   Number of times patient has fallen within last six months 1   Activity/Exercise/Self-Care Comment pt reports indep with I/ADLs at baseline; pt uses 4WW at baseline; dtr assists with errands; pt has lunch in DR daily   Instrumental Activities of Daily Living (IADL)   Previous Responsibilities meal prep;housekeeping;laundry;medication management   General Information   Onset of Illness/Injury or Date of Surgery 05/01/22   Referring Physician Jaron Brennan   Patient/Family Therapy Goal Statement (OT) home tomorrow   Additional Occupational Profile Info/Pertinent History of Current Problem 89 year old female who has a complex PMH as note below. She is a robust and sharp senior living independently in [facility]. She presents with left sided chest pain after accidental fall from the toilette seat and sustained trauma against left chest wall.   Existing Precautions/Restrictions fall   Left Upper Extremity (Weight-bearing Status) full weight-bearing (FWB)   Right Upper Extremity (Weight-bearing Status) full weight-bearing (FWB)   Left Lower Extremity (Weight-bearing Status) full weight-bearing (FWB)   Right Lower Extremity (Weight-bearing Status) full weight-bearing (FWB)   General Observations and Info activity order: up with assist prn   Cognitive Status Examination   Orientation Status orientation to person, place and time    Affect/Mental Status (Cognitive) WNL   Follows Commands follows one-step commands;over 90% accuracy   Cognitive Status Comments pt appears intact   Visual Perception   Visual Impairment/Limitations corrective lenses for distance;corrective lenses for reading   Pain Assessment   Patient Currently in Pain Yes, see Vital Sign flowsheet   Posture   Posture protracted shoulders;forward head position   Range of Motion Comprehensive   Comment, General Range of Motion BUE WFL/BLE WFL   Strength Comprehensive (MMT)   Comment, General Manual Muscle Testing (MMT) Assessment BUE WFL, not MMT due to rib fracture and limited by pain; BLE WFL   Coordination   Coordination Comments intact coordination, but limited by pain from rib fractures   Bed Mobility   Bed Mobility supine-sit   Supine-Sit Twilight (Bed Mobility) contact guard;verbal cues   Assistive Device (Bed Mobility) bed rails   Transfers   Transfers sit-stand transfer;bed-chair transfer;toilet transfer;shower transfer   Transfer Skill: Bed to Chair/Chair to Bed   Bed-Chair Twilight (Transfers) contact guard;verbal cues   Assistive Device (Bed-Chair Transfers) standard walker   Sit-Stand Transfer   Sit-Stand Twilight (Transfers) contact guard;verbal cues   Assistive Device (Sit-Stand Transfers) walker, standard   Shower Transfer   Shower Transfer Comments tub/shower with grab bars and shower seat   Toilet Transfer   Type (Toilet Transfer) sit-stand;stand-sit   Twilight Level (Toilet Transfer) contact guard;verbal cues   Assistive Device (Toilet Transfer) grab bars/safety frame;walker, standard   Balance   Balance Comments good seated; good ambulating in room with 2WW   Activities of Daily Living   BADL Assessment/Intervention lower body dressing;toileting   Lower Body Dressing Assessment/Training   Position (Lower Body Dressing) edge of bed sitting   Twilight Level (Lower Body Dressing) contact guard assist   Toileting   Twilight Level (Toileting)  independent   Clinical Impression   Criteria for Skilled Therapeutic Interventions Met (OT) Yes, treatment indicated   OT Diagnosis impaired ADLs   OT Problem List-Impairments impacting ADL problems related to;activity tolerance impaired;range of motion (ROM);strength;pain   Assessment of Occupational Performance 3-5 Performance Deficits   Identified Performance Deficits dressing, bathing, toileting, home chores   Planned Therapy Interventions (OT) ADL retraining;bed mobility training;strengthening;transfer training   Clinical Decision Making Complexity (OT) moderate complexity   Risk & Benefits of therapy have been explained evaluation/treatment results reviewed;care plan/treatment goals reviewed;participants included;patient   OT Discharge Planning   OT Discharge Recommendation (DC Rec) home;home with assist;home with home care occupational therapy   OT Rationale for DC Rec Pt close to baseline, limited by pain.  She is able to complete ADLs, but with decreased tolerance due to pain.  She would benefit from continued therapy as IP, and in home setting, to faciliate increased safety and independence with I/ADLs.   OT Brief overview of current status CGA dressing, CGA toilet transfer, CGA ambulation with 2WW   Therapy Certification   Start of Care Date 05/02/22   Certification date from 05/02/22   Certification date to 05/02/22   Medical Diagnosis rib fracture   Total Evaluation Time (Minutes)   Total Evaluation Time (Minutes) 10

## 2022-05-02 NOTE — PLAN OF CARE
Pertinent assessments: A&Ox4. VSS on room air, afebrile. Denies pain overnight. No cough or SOB reported. LS dim. Heat pack applied to L abdominal area. No BM overnight. Assist x1 with walker and belt.     Major Shift Events: None    Treatment Plan: Pain mangement, monitor respiratory symptoms

## 2022-05-02 NOTE — PLAN OF CARE
PRIMARY DIAGNOSIS: ACUTE PAIN  OUTPATIENT/OBSERVATION GOALS TO BE MET BEFORE DISCHARGE:  1. Pain Status: Improved with use of alternative comfort measures i.e.: heat and positioning     2. Return to near baseline physical activity: No    3. Cleared for discharge by consultants (if involved): No    Discharge Planner Nurse   Safe discharge environment identified: Yes   Barriers to discharge: No       Entered by: Ramona Forrester RN 05/02/2022 2:47 PM     Please review provider order for any additional goals.   Nurse to notify provider when observation goals have been met and patient is ready for discharge.Goal Outcome Evaluation:    Plan of Care Reviewed With: patient      PT A/O able pain with actively, say is able to manage with scheduled tylenol- Sats 92-95% -800L  Pink lady given no with no results.    Assist of 1 with walker and transfer belt.   Eating and voiding well.

## 2022-05-02 NOTE — PLAN OF CARE
Eval orders received, chart reviewed. Per discussion with OT, pt mobilizing close to baseline, currently requiring CGA. No skilled IP PT needs, defer mobility during IP stay to OT. Will complete PT orders

## 2022-05-02 NOTE — UTILIZATION REVIEW
"Admission Status; Secondary Review Determination     Under the authority of the Utilization Management Committee, the utilization review process indicated a secondary review on the above patient.  The review outcome is based on review of the medical records, discussions with staff, and applying clinical experience noted on the date of the review.       (x) Observation Status Appropriate - This patient does not meet hospital inpatient criteria and is placed in observation status. If this patient's primary payer is Medicare and was admitted as an inpatient, Condition Code 44 should be used and patient status changed to \"observation\".     RATIONALE FOR DETERMINATION:  89-year-old female who lives independently had an accidental fall from the toilet seat sustaining 2 nondisplaced lateral left rib fractures.  There is a loculated pleural effusion that does not seem to be a hemithorax.  Patient has history of anticoagulation.  Patient without hypoxemia and therefore observation care appropriate for initial pain control and arrange safe disposition.  If patient develops progressive worsening respiratory symptoms or has escalating pain needs for mobility then at that time patient would be appropriate to advance to inpatient care.      The severity of illness, intensity of service provided, expected LOS and risk for adverse outcome make the care appropriate for further observation; however, doesn't meet criteria for hospital inpatient admission. This was discussed with attending physician who concurred with this determination.    The information on this document is developed by the utilization review team in order for the business office to ensure compliance.  This only denotes the appropriateness of proper admission status and does not reflect the quality of care rendered.         The definitions of Inpatient Status and Observation Status used in making the determination above are those provided in the CMS Coverage Manual, " Chapter 1 and Chapter 6, section 70.4.      Sincerely,     Zackary Horne MD    Physician Advisor  Utilization Review/ Case Management  [facility].

## 2022-05-03 ENCOUNTER — APPOINTMENT (OUTPATIENT)
Dept: GENERAL RADIOLOGY | Facility: CLINIC | Age: 87
End: 2022-05-03
Attending: HOSPITALIST
Payer: MEDICARE

## 2022-05-03 ENCOUNTER — APPOINTMENT (OUTPATIENT)
Dept: OCCUPATIONAL THERAPY | Facility: CLINIC | Age: 87
End: 2022-05-03
Payer: MEDICARE

## 2022-05-03 ENCOUNTER — ANTICOAGULATION THERAPY VISIT (OUTPATIENT)
Dept: PEDIATRICS | Facility: CLINIC | Age: 87
End: 2022-05-03
Payer: MEDICARE

## 2022-05-03 VITALS
RESPIRATION RATE: 20 BRPM | SYSTOLIC BLOOD PRESSURE: 111 MMHG | DIASTOLIC BLOOD PRESSURE: 58 MMHG | HEART RATE: 70 BPM | BODY MASS INDEX: 23.12 KG/M2 | OXYGEN SATURATION: 96 % | WEIGHT: 135.4 LBS | TEMPERATURE: 97.7 F | HEIGHT: 64 IN

## 2022-05-03 DIAGNOSIS — I48.20 CHRONIC ATRIAL FIBRILLATION (H): Primary | ICD-10-CM

## 2022-05-03 DIAGNOSIS — Z79.01 LONG TERM CURRENT USE OF ANTICOAGULANT THERAPY: ICD-10-CM

## 2022-05-03 LAB
ANION GAP SERPL CALCULATED.3IONS-SCNC: 3 MMOL/L (ref 3–14)
BUN SERPL-MCNC: 32 MG/DL (ref 7–30)
CALCIUM SERPL-MCNC: 9.6 MG/DL (ref 8.5–10.1)
CHLORIDE BLD-SCNC: 101 MMOL/L (ref 94–109)
CO2 SERPL-SCNC: 32 MMOL/L (ref 20–32)
CREAT SERPL-MCNC: 1.48 MG/DL (ref 0.52–1.04)
GFR SERPL CREATININE-BSD FRML MDRD: 32 ML/MIN/1.73M2
GLUCOSE BLD-MCNC: 93 MG/DL (ref 70–99)
INR PPP: 2.37 (ref 0.85–1.15)
MAGNESIUM SERPL-MCNC: 1.9 MG/DL (ref 1.6–2.3)
PHOSPHATE SERPL-MCNC: 4.1 MG/DL (ref 2.5–4.5)
POTASSIUM BLD-SCNC: 3.3 MMOL/L (ref 3.4–5.3)
SODIUM SERPL-SCNC: 136 MMOL/L (ref 133–144)

## 2022-05-03 PROCEDURE — 84100 ASSAY OF PHOSPHORUS: CPT | Performed by: HOSPITALIST

## 2022-05-03 PROCEDURE — 99217 PR OBSERVATION CARE DISCHARGE: CPT | Performed by: HOSPITALIST

## 2022-05-03 PROCEDURE — 97530 THERAPEUTIC ACTIVITIES: CPT | Mod: GO | Performed by: REHABILITATION PRACTITIONER

## 2022-05-03 PROCEDURE — 83735 ASSAY OF MAGNESIUM: CPT | Performed by: HOSPITALIST

## 2022-05-03 PROCEDURE — 71045 X-RAY EXAM CHEST 1 VIEW: CPT

## 2022-05-03 PROCEDURE — 250N000013 HC RX MED GY IP 250 OP 250 PS 637: Performed by: HOSPITALIST

## 2022-05-03 PROCEDURE — 85610 PROTHROMBIN TIME: CPT | Performed by: HOSPITALIST

## 2022-05-03 PROCEDURE — 36415 COLL VENOUS BLD VENIPUNCTURE: CPT | Performed by: HOSPITALIST

## 2022-05-03 PROCEDURE — 250N000013 HC RX MED GY IP 250 OP 250 PS 637: Performed by: CLINICAL NURSE SPECIALIST

## 2022-05-03 PROCEDURE — G0378 HOSPITAL OBSERVATION PER HR: HCPCS

## 2022-05-03 PROCEDURE — 99225 PR SUBSEQUENT OBSERVATION CARE,LEVEL II: CPT | Performed by: CLINICAL NURSE SPECIALIST

## 2022-05-03 PROCEDURE — 80048 BASIC METABOLIC PNL TOTAL CA: CPT | Performed by: HOSPITALIST

## 2022-05-03 RX ORDER — POTASSIUM CHLORIDE 1.5 G/1.58G
20 POWDER, FOR SOLUTION ORAL ONCE
Status: COMPLETED | OUTPATIENT
Start: 2022-05-03 | End: 2022-05-03

## 2022-05-03 RX ORDER — WARFARIN SODIUM 1 MG/1
1 TABLET ORAL
Status: DISCONTINUED | OUTPATIENT
Start: 2022-05-03 | End: 2022-05-03 | Stop reason: HOSPADM

## 2022-05-03 RX ORDER — ACETAMINOPHEN 500 MG
1000 TABLET ORAL EVERY 8 HOURS PRN
COMMUNITY
Start: 2022-05-03

## 2022-05-03 RX ORDER — SENNOSIDES 8.6 MG
2 TABLET ORAL 2 TIMES DAILY
Status: DISCONTINUED | OUTPATIENT
Start: 2022-05-03 | End: 2022-05-03 | Stop reason: HOSPADM

## 2022-05-03 RX ORDER — LIDOCAINE 4 G/G
1 PATCH TOPICAL EVERY 24 HOURS
Qty: 15 PATCH | Refills: 0 | Status: SHIPPED | OUTPATIENT
Start: 2022-05-03 | End: 2022-05-18

## 2022-05-03 RX ADMIN — LOSARTAN POTASSIUM 25 MG: 25 TABLET, FILM COATED ORAL at 09:52

## 2022-05-03 RX ADMIN — POLYETHYLENE GLYCOL 3350 8.5 G: 17 POWDER, FOR SOLUTION ORAL at 09:52

## 2022-05-03 RX ADMIN — ISOSORBIDE MONONITRATE 30 MG: 30 TABLET, EXTENDED RELEASE ORAL at 09:51

## 2022-05-03 RX ADMIN — DILTIAZEM HYDROCHLORIDE 240 MG: 240 CAPSULE, COATED, EXTENDED RELEASE ORAL at 09:52

## 2022-05-03 RX ADMIN — STANDARDIZED SENNA CONCENTRATE 2 TABLET: 8.6 TABLET ORAL at 11:45

## 2022-05-03 RX ADMIN — ACETAMINOPHEN 975 MG: 325 TABLET, FILM COATED ORAL at 09:51

## 2022-05-03 RX ADMIN — LIDOCAINE 1 PATCH: 246 PATCH TOPICAL at 09:43

## 2022-05-03 RX ADMIN — PRAVASTATIN SODIUM 40 MG: 20 TABLET ORAL at 09:51

## 2022-05-03 RX ADMIN — POTASSIUM CHLORIDE 20 MEQ: 1.5 POWDER, FOR SOLUTION ORAL at 11:50

## 2022-05-03 RX ADMIN — Medication 1000 UNITS: at 09:51

## 2022-05-03 RX ADMIN — GABAPENTIN 100 MG: 100 CAPSULE ORAL at 09:51

## 2022-05-03 RX ADMIN — Medication 1 CAPSULE: at 09:52

## 2022-05-03 RX ADMIN — FUROSEMIDE 40 MG: 40 TABLET ORAL at 09:51

## 2022-05-03 RX ADMIN — Medication 1 TABLET: at 09:52

## 2022-05-03 NOTE — PLAN OF CARE
Occupational Therapy Discharge Summary    Reason for therapy discharge:    Discharged to home with home therapy.    Progress towards therapy goal(s). See goals on Care Plan in [facility] electronic health record for goal details.  Goals partially met.  Barriers to achieving goals:   limited tolerance for therapy and discharge from facility.    Therapy recommendation(s):    Continued therapy is recommended.  Rationale/Recommendations:  Pt close to baseline, limited by pain and general fatigue.  She is able to complete basic  ADLs, but with decreased tolerance due to pain and unsteadiness.  Home with superivision and A as needed for  basic mobility is recommended. Per CM, dtr reports she can stay with patient, CM to connect with dtr. She would benefit from continued therapy as IP, and in home setting, to faciliate increased safety and independence with I/ADLs. Recommend both HHOT/PT/RN at discharge, Home therapies recommended as leaving the home would be taxing effort.

## 2022-05-03 NOTE — PHARMACY-ANTICOAGULATION SERVICE
Clinical Pharmacy- Warfarin Discharge Note    Patient is discharging on prior to admission warfarin dose.    Anticoagulation Dose History     Recent Dosing and Labs Latest Ref Rng & Units 2/23/2022 3/16/2022 3/30/2022 4/20/2022 5/1/2022 5/2/2022 5/3/2022    Warfarin 2 mg - - - - - 2 mg 2 mg -    INR 0.85 - 1.15 2.0(H) 1.8(H) 2.4(H) 1.6(H) 1.54(H) 1.87(H) 2.37(H)

## 2022-05-03 NOTE — PLAN OF CARE
Patient's After Visit Summary was reviewed with patient and daughter.   Patient verbalized understanding of After Visit Summary, recommended follow up and was given an opportunity to ask questions.   Discharge medications sent home with patient/family: YES   Discharged with daughter           OBSERVATION patient END time: 2::31      Goal Outcome Evaluation:    Plan of Care Reviewed With: patient, daughter

## 2022-05-03 NOTE — PLAN OF CARE
Assessments: Alert, [place]. VSS on room air, afebrile. Infrequent cough, no SOB. Denies any pain/discomfort. Up SBA     Treatment Plan: Pain mangement, monitor respiratory symptoms

## 2022-05-03 NOTE — PLAN OF CARE
PRIMARY DIAGNOSIS: ACUTE PAIN  OUTPATIENT/OBSERVATION GOALS TO BE MET BEFORE DISCHARGE:  1. Pain Status: Improved with use of alternative comfort measures i.e.: heat and lido patch    2. Return to near baseline physical activity: Yes    3. Cleared for discharge by consultants (if involved): No    Discharge Planner Nurse   Safe discharge environment identified: Yes  Barriers to discharge: No       Entered by: Ramona Forrester RN 05/03/2022 10:42 AM     Please review provider order for any additional goals.   Nurse to notify provider when observation goals have been met and patient is ready for discharge.                  Goal Outcome Evaluation:    Plan of Care Reviewed With: patient

## 2022-05-03 NOTE — PROGRESS NOTES
Care Management Follow Up    Length of Stay (days): 1    Expected Discharge Date: 5/3/22     Concerns to be Addressed:      Home care  Patient plan of care discussed at interdisciplinary rounds: Yes    Anticipated Discharge Disposition: Home     Anticipated Discharge Services: RN/PT  Anticipated Discharge DME:  none    Education Provided on the Discharge Plan:  Pt and Dtg  Patient/Family in Agreement with the Plan:  yes    Referrals Placed by CM/SW:  Home care    Additional Information:  Home care RN/PT is recommended.  Pt is agreeable that I send a referral to [facility].  Supervision for patient is also recommended . Pt didn't know if her Dtg would be available to stay with her.  I Left Cleo a VM to confirm this.      ADDENDUM 1300:    Dtg Les will stay with Pt.  She can pick pt up at 1430.  She will bring in clothes for the patient.      [facility] full. lifesprk full. [facility] will assess. Dtg agreeable.  Fax: [phone]. Dtg updated that lidoderm OOP around $10.      [facility] has accepted.  dtg aware     Xiomara Sanchez RN BSN   Inpatient Care Coordination  [facility]  [phone]        Dea Sanchez, RN

## 2022-05-03 NOTE — PROGRESS NOTES
Patient has been assessed for Home Oxygen needs. Oxygen readings:    *Pulse oximetry (SpO2) = 96% on room air at rest while awake.    *SpO2 improved to N/A% on N/Aliters/minute at rest.    *SpO2 = 93% on room air during activity/with exercise.    *SpO2 improved to N/A% on N/Aliters/minute during activity/with exercise.

## 2022-05-03 NOTE — PROGRESS NOTES
DEVONTE---Received VM from patient daughter, Les. Patient had fall 5/1Left-sided ribs fracture secondary to chest trauma-tested covid+ when inpatient. Admitted 5/1-5/3. Subtherapeutic on admission. Taking Tylenol and lidocaine patches for pain control. Appetite good.    Patient INR today 2.37  Les asking for dosing instructions. Patient to continue maintenance. Daughter declined sooner appt than Tuesday 5/10. Of note, patient has two appt. With providers scheduled on 5/5 and 5/10-Les flustered that the appt. Made w/o her knowledge and she stated she is going to reschedule them most likely as patient is not moving well currently.     Appt. For INR made at [facility] on 5/10 at 2:00    Les: [phone]    Thanks! Yoana Ruiz RN

## 2022-05-03 NOTE — DISCHARGE SUMMARY
[facility]    Discharge Summary  Hospitalist    Date of Admission:  5/1/2022  Date of Discharge:  5/3/2022  Provider:  Jaron Brennan MD  Date of Service (when I last saw the patient): 05/03/22    Discharge Diagnoses   Unwitnessed mechanical fall at home, unclear circumstances.  Chest trauma secondary to mechanical fall.  Left-sided ribs fracture secondary to chest trauma.  COVID PCR test positive, asymptomatic.  History of coronary artery disease, status post a stent placement and pacemaker placement.  History of sick sinus syndrome.  Chronic atrial fibrillation/paced rhythm/warfarin anticoagulation.  CKD stage IIIb.  Creat 1.7    Essential hypertension.  Hyperlipidemia.  COPD, not in exacerbation.  Osteoporosis.  BMI 23.2     Other medical issues:  Past Medical History:   Diagnosis Date     Atrial fibrillation (H)     Sick sinus syndrome, PAT, AF     BCC (basal cell carcinoma of skin)     Face     CHF - Chr Diastolic (H) 11/21/2017     Chronic renal insufficiency      COPD (chronic obstructive pulmonary disease) (H)      Coronary artery disease     2-05[place]-CAD-taxus stentx2 2.5-12,2.5-12 in LADp and LADm, 80%nonDomRCA-LcxDom-mild,EF60%     Hyperlipidemia      Hypertension      IBS (irritable bowel syndrome)      Osteoporosis      Pulmonary fibrosis (H)     do not use amiodarone     Sick sinus syndrome - s/p PPM 2003 (H) 12/16/2017     SSS (sick sinus syndrome) (H)     DDD pacer (see surgery history section)     Vertigo        History of Present Illness   Leonor Mercado is an 89 year old female who presented with fall and ribs fracture.  Please see the admission history and physical for full details.    Hospital Course   Leonor Mercado is a 89 year old female who presents with left-sided chest pain and broken ribs after mechanical fall at home when the patient was in the toilet seat.  Circumstances surrounding the fall are not clear but she denies loss of consciousness or  preceding symptoms.  She apparently had been in her normal state of health.  Incidentally she has been found COVID-19 PCR test positive, she patient is asymptomatic and fully vaccinated with booster 2 weeks ago.     Unwitnessed mechanical fall at home, unclear circumstances.  Chest trauma secondary to mechanical fall.  Left-sided ribs fracture secondary to chest trauma.  Patient does not have hypoxemia, fractures are not displaced, parenchyma seems to be intact.  She has pleuritic pain exacerbated with deep breathing and cough but overall she has good tolerance.  Incidental finding of loculated anteroinferior pleural effusion is incidental, according to radiologist density of the fluid is not compatible with blood, therefore hemothorax is unlikely.  - Admit to observation.  - 2 g sodium, low-fat diet.  - Pulse oximetry.  POC checks  - Pain control as needed  - Incentive spirometry  - Treatment of rib fracture per protocol     COVID PCR test positive.  -Asymptomatic, fully vaccinated and boosted in the last 2 weeks  -Isolation precaution.     History of coronary artery disease, status post a stent placement and pacemaker placement.  History of sick sinus syndrome.  -Resume home medication.     Chronic atrial fibrillation/paced rhythm/warfarin anticoagulation.  - Subtherapeutic on admission.  -Pharmacy consulted for dosing and follow-up  INR   Date Value Ref Range Status   05/03/2022 2.37 (H) 0.85 - 1.15 Final   07/02/2021 2.30 (H) 0.86 - 1.14 Final     Comment:     This test is intended for monitoring Coumadin therapy.  Results are not   accurate in patients with prolonged INR due to factor deficiency.                      CKD stage IIIb.  Stable.  -Close follow-up with daily BMP.    Essential hypertension.  - On Cardizem, losartan, furosemide     Hyperlipidemia.  -Resume pravastatin     COPD, not in exacerbation.  - Albuterol nebs and Trelegy Ellipta     Osteoporosis.  -Continue calcium and vitamin D  supplementation.     UA suspicious for infection.  - No lower urinary symptoms, no reflex to culture available, no antibiotic needed      # Discharge Pain Plan:    - Patient currently has NO PAIN and is not being prescribed pain medications on discharge.      Significant Results and Procedures   See below     Pending Results      Unresulted Labs Ordered in the Past 30 Days of this Admission     No orders found from 4/1/2022 to 5/2/2022.          Code Status   DNR / DNI       Primary Care Physician   Arnel Garcia    GEN:  Alert, cooperative, appears comfortable, NAD.  HEENT:  Normocephalic/atraumatic, no scleral icterus, no nasal discharge, mouth moist.  CV:  Regular rate and rhythm, no murmur or JVD.  S1 + S2 noted, no S3 or S4.  LUNGS:  Clear to auscultation bilaterally without rales/rhonchi/wheezing/retractions.  Symmetric chest rise on inhalation noted.  ABD:  Active bowel sounds, soft, non-tender/non-distended.  No rebound/guarding/rigidity.  EXT:  No edema or cyanosis.  No joint synovitis noted.  SKIN:  Dry to touch, no exanthems noted in the visualized areas.     Discharge Disposition   Discharged to home w/[facility]    Consultations This Hospital Stay   ANESTHESIOLOGY IP CONSULT  PHYSICAL THERAPY ADULT IP CONSULT  CARE MANAGEMENT / SOCIAL WORK IP CONSULT  PHYSICAL THERAPY ADULT IP CONSULT  OCCUPATIONAL THERAPY ADULT IP CONSULT  PAIN MANAGEMENT ADULT IP CONSULT  PHARMACY TO DOSE WARFARIN  OCCUPATIONAL THERAPY ADULT IP CONSULT    Time Spent on this Encounter   I, Jaron Brennan MD, personally saw the patient today and spent greater than 30 minutes discharging this patient.     Discharge Orders   No discharge procedures on file.  Discharge Medications   Current Discharge Medication List      CONTINUE these medications which have NOT CHANGED    Details   acetaminophen (TYLENOL) 500 MG tablet Take 500 mg by mouth At Bedtime      albuterol (PROAIR HFA/PROVENTIL HFA/VENTOLIN HFA) 108 (90 Base) MCG/ACT inhaler Inhale 2  puffs into the lungs every 6 hours as needed for shortness of breath / dyspnea or wheezing  Qty: 18 g, Refills: 3    Associated Diagnoses: Chronic obstructive pulmonary disease, unspecified COPD type (H)      !! albuterol (PROVENTIL) (2.5 MG/3ML) 0.083% neb solution Take 2.5 mg by nebulization At Bedtime      !! albuterol (PROVENTIL) (2.5 MG/3ML) 0.083% neb solution Take 2.5 mg by nebulization every 4 hours as needed for shortness of breath / dyspnea or wheezing MAX of 4 doses/ 24 hours      Calcium Carbonate-Vitamin D (CALCIUM 500+D HIGH POTENCY PO) Take 1 tablet by mouth daily      diltiazem ER COATED BEADS (CARDIZEM CD/CARTIA XT) 240 MG 24 hr capsule Take 1 capsule by mouth once daily  Qty: 90 capsule, Refills: 3    Associated Diagnoses: Coronary artery disease involving native coronary artery of native heart without angina pectoris      Fluticasone-Umeclidin-Vilanterol (TRELEGY ELLIPTA) 100-62.5-25 MCG/INH oral inhaler Inhale 1 puff into the lungs daily      furosemide (LASIX) 20 MG tablet Take 1 tablet by mouth twice daily  Qty: 180 tablet, Refills: 3    Associated Diagnoses: Chronic heart failure with preserved ejection fraction (H)      isosorbide mononitrate (IMDUR) 30 MG 24 hr tablet Take 1 tablet by mouth once daily  Qty: 90 tablet, Refills: 0    Associated Diagnoses: Coronary artery disease involving native coronary artery of native heart without angina pectoris      Lactobacillus (PROBIOTIC ACIDOPHILUS) TABS Take 1 tablet by mouth daily       losartan (COZAAR) 25 MG tablet Take 1 tablet by mouth once daily  Qty: 90 tablet, Refills: 3    Associated Diagnoses: Essential hypertension with goal blood pressure less than 140/90      melatonin 5 MG tablet Take 10 mg by mouth nightly as needed for sleep      mirtazapine (REMERON) 15 MG tablet TAKE 1 TABLET BY MOUTH AT BEDTIME  Qty: 90 tablet, Refills: 0    Associated Diagnoses: Adjustment disorder, unspecified type      Multiple Vitamins-Minerals (OCUVITE PO)  Take 1 capsule by mouth daily.      polyethylene glycol (MIRALAX/GLYCOLAX) Packet Take by mouth daily 1 scoopful once daily      pravastatin (PRAVACHOL) 40 MG tablet Take 1 tablet by mouth once daily  Qty: 90 tablet, Refills: 3    Associated Diagnoses: Hyperlipidemia LDL goal <100; Coronary artery disease involving native coronary artery of native heart without angina pectoris      senna (SENOKOT) 8.6 MG tablet Take 2 tablets by mouth nightly as needed for constipation       Vitamin D, Cholecalciferol, 1000 units TABS Take 1,000 Units by mouth daily      warfarin ANTICOAGULANT (COUMADIN) 2 MG tablet Take 2mg every Sun, Tu, & Thurs / Take 1mg all other days OR as instructed by INR clinic  Qty: 60 tablet, Refills: 0    Comments: MOST CURRENT DOSE /  PATIENT DUE FOR OFFICE VISIT WITH PCP / #60 = 90 day supply  Associated Diagnoses: Chronic atrial fibrillation (H); Long term current use of anticoagulants with INR goal of 2.0-3.0       !! - Potential duplicate medications found. Please discuss with provider.        Allergies   Allergies   Allergen Reactions     Amiodarone      pulm fibrosis       Baclofen      Confusion      Bactrim [Sulfamethoxazole W-Trimethoprim]      Difficulty swallowing     Doxycycline Other (See Comments)     Multiple complaints; she does not want this again.      Levaquin [Levofloxacin] Other (See Comments)     Multiple complaints; she will not take this again     Linzess [Linaclotide] Diarrhea     Lorazepam        Prolonged drowsiness with ER visit      Liquid Adhesive Itching and Rash     Bleeding when tape removed after pacemaker placement..itched and burned for weeks.     Data   Most Recent 3 CBC's:Recent Labs   Lab Test 05/02/22  0928 05/01/22  0754 01/05/21  1404   WBC 7.0 6.3 5.9   HGB 13.7 13.3 12.0   MCV 93 95 96    194 206      Most Recent 3 BMP's:  Recent Labs   Lab Test 05/03/22  0724 05/02/22  0928 05/01/22  0754    138 135   POTASSIUM 3.3* 3.6 3.7   CHLORIDE 101 103 104    CO2 32 32 29   BUN 32* 24 31*   CR 1.48* 1.21* 1.32*   ANIONGAP 3 3 2*   TRI 9.6 9.9 9.3   GLC 93 90 96     Most Recent 2 LFT's:  Recent Labs   Lab Test 09/16/21  1332 08/21/20  1040   AST 19 23   ALT 19 24   ALKPHOS 93 105   BILITOTAL 0.6 0.6     Most Recent INR's and Anticoagulation Dosing History:  Anticoagulation Dose History     Recent Dosing and Labs Latest Ref Rng & Units 2/23/2022 3/16/2022 3/30/2022 4/20/2022 5/1/2022 5/2/2022 5/3/2022    Warfarin 2 mg - - - - - 2 mg 2 mg -    INR 0.85 - 1.15 2.0(H) 1.8(H) 2.4(H) 1.6(H) 1.54(H) 1.87(H) 2.37(H)        Most Recent 3 Troponin's:  Recent Labs   Lab Test 01/04/21  0700 07/01/19  0858 04/06/19  1144 03/14/15  0437 03/13/15  2234   TROPI <0.015 <0.015 <0.015   < >  --    TROPONIN  --   --   --   --  0.01    < > = values in this interval not displayed.     Most Recent Cholesterol Panel:  Recent Labs   Lab Test 03/31/22  1429   CHOL 139   LDL 41   HDL 72   TRIG 129     Most Recent 6 Bacteria Isolates From Any Culture (See EPIC Reports for Culture Details):  Recent Labs   Lab Test 08/16/19  1049 06/19/19  1723 06/19/19  1457 06/19/19  1419 10/05/18  1330 07/31/18  1033   CULT >100,000 colonies/mL  Enterococcus faecalis  *  <10,000 colonies/mL  urogenital dwaine   No growth No growth No growth 50,000 to 100,000 colonies/mL  mixed urogenital dwaine   50,000 to 100,000 colonies/mL  mixed urogenital dwaine  Susceptibility testing not routinely done       Most Recent TSH, T4 and A1c Labs:  Recent Labs   Lab Test 04/24/18  0625 10/11/14  0906 04/30/14  1222   TSH 3.01   < >  --    A1C  --   --  6.1*    < > = values in this interval not displayed.     Results for orders placed or performed during the hospital encounter of 05/01/22   CT Head w/o Contrast    Narrative    EXAM: CT HEAD W/O CONTRAST  LOCATION: [facility]  DATE/TIME: 5/1/2022 8:32 AM    INDICATION: fall, hit head, on warfarin  COMPARISON: None.  TECHNIQUE: Routine CT Head without IV  contrast. Multiplanar reformats. Dose reduction techniques were used.    FINDINGS:  INTRACRANIAL CONTENTS: No evidence of acute intracranial hemorrhage or mass effect. Scattered foci of decreased attenuation within the cerebral hemispheric white matter which are nonspecific, though most commonly ascribed to chronic small vessel ischemic   disease. The ventricles and sulci are prominent consistent with moderate brain parenchymal volume loss. Gray-white matter differentiation is maintained. The basilar cisterns are patent.    VISUALIZED ORBITS/SINUSES/MASTOIDS: Bilateral cataract surgery. The partially imaged globes are otherwise unremarkable. The partially imaged paranasal sinuses, mastoid air cells and middle ear cavities are unremarkable.     BONES/SOFT TISSUES: The visualized skull base and calvarium are unremarkable.      Impression    IMPRESSION:    1.  No evidence of acute intracranial hemorrhage or mass effect.  2.  Mild nonspecific white matter changes.  3.  Moderate brain parenchymal volume loss.   CT Chest Abdomen Pelvis w/o Contrast    Narrative    CT CHEST ABDOMEN PELVIS W/O CONTRAST 5/1/2022 8:49 AM    CLINICAL HISTORY: Fall, left chest and abdominal pain    TECHNIQUE: CT scan of the chest, abdomen, and pelvis was performed  without IV contrast. Multiplanar reformats were obtained. Dose  reduction techniques were used.   CONTRAST: None.    COMPARISON: 4/17/2018    FINDINGS:   LUNGS AND PLEURA: 5 mm nodule in the inferior right middle lobe  (series 4, image 194). There is a loculated fluid collection in the  anterior inferior left hemithorax that measures up to 10.6 x 6.0 cm  (series 2, image 31).    MEDIASTINUM/AXILLAE: Left-sided pacer device. Normal caliber thoracic  aorta. No mediastinal or hilar adenopathy.    CORONARY ARTERY CALCIFICATION: Previous intervention (stents or CABG).    HEPATOBILIARY: Normal.    PANCREAS: Normal.    SPLEEN: Normal.    ADRENAL GLANDS: Normal.    KIDNEYS/BLADDER: No  worrisome renal lesion or hydronephrosis. Urinary  bladder unremarkable.    BOWEL: No bowel obstruction or inflammatory change. Small bowel loop  extends into a right inguinal hernia but there is no obstruction.  Diastases recti noted.    LYMPH NODES: Normal.    VASCULATURE: Non-aneurysmal aortoiliac atherosclerosis.    PELVIC ORGANS: Normal.    OTHER: No ascites.    MUSCULOSKELETAL: Nondisplaced fractures noted LEFT ribs 9 and 10  laterally.      Impression    IMPRESSION:  1.  Nondisplaced lateral left rib fractures.  2.  There is a loculated pleural effusion anterior inferior left  hemithorax not seen on the prior study.    RAHUL BARTON MD         SYSTEM ID:  [id number]   XR Chest Port 1 View    Narrative    EXAM: XR CHEST PORT 1 VIEW  LOCATION: [facility]  DATE/TIME: 5/2/2022 5:34 AM    INDICATION: Trauma Patient with Rib Fracture(s)  COMPARISON: 07/01/2019      Impression    IMPRESSION: Cardiac enlargement shallow inspiration. Trace left effusion. Rib fractures better seen on CT. Mild atelectasis or infiltrate right lower lung. Cardiac leads. No visible pneumothorax. Thoracic dextrocurvature and degenerative change osseous   structures per   XR Chest Port 1 View    Narrative    EXAM: XR CHEST PORT 1 VIEW  LOCATION: [facility]  DATE/TIME: 5/3/2022 6:21 AM    INDICATION: Trauma Patient with Rib Fracture(s)  COMPARISON: 5/2/2022.    FINDINGS: Left subclavian cardiac device. No pneumothorax. The heart is enlarged. There is no pulmonary edema. The thoracic aorta is calcified. There is a left pleural effusion. Mild left basilar atelectasis or scarring. The lungs are otherwise clear.      Impression    IMPRESSION: Left pleural effusion. No pneumothorax.     *Note: Due to a large number of results and/or encounters for the requested time period, some results have not been displayed. A complete set of results can be found in Results Review.         Disclaimer: This note  consists of symbols derived from keyboarding, dictation and/or voice recognition software. As a result, there may be errors in the script that have gone undetected. Please consider this when interpreting information found in this chart.

## 2022-05-03 NOTE — PROGRESS NOTES
[facility]  Pain Management Progress Note  Text Page     Assessment & Plan   Leonor Mercado is a 89 year old female who was admitted on 5/1/2022.     Appreciate input of Physical Therapist Karen Montalvo, PT as patient setting in a chair after therapy session when I arrived. Leonor denied dyspnea, but offered concern for continued constipation even after a Pink Lady enema yesterday.      PLAN:   1)  Opioids:  Dilaudid 1 mg every 3 hours prn     Opioids Treatment Goal:   -Improvement in function  -Participate in PT  -Management of acute pain during hospitalization, expected 3-7 days needed at MO     2)  Non-opioid multimodal medication therapy  -Topical:  Lidocaine Patch 4% 1 patch every 24 hours   -N-SAIDS: Avoid due to advanced age and renal insuffiencey.  -Muscle Relaxants:  Methocarbamol 500 mg every 6 hours PRN   -Adjuvants: Acetaminophen 975 mg TID and Gabapentin 100 mg TID  -Antidepressants/anxiolytics:  Chronic Mirtazapine 7.5 mg at bedtime     3)  Non-medication interventions  Positioning, Heating pad PRN, Relaxation, Meditation, Distraction  Appreciate input of Occupational Therapist Mena Grimes, OT and Physical Therapist Kristina Espinosa, PT.  Appreciate input of DUSTY Haas.     4)  Constipation Prophylaxis  Yashira lax daily and increase Senokot 2 tablets BID as she has not had effective results after Bisacodyl and Pink Lady Enema yesterday (last effective stool was last Friday 4 days ago)     5) Medication Risk reduction strategies   - monitor for sedation   - Capnography per protocol   - narcan for opioid reversal      6)  Pain Education  -Opioid safe use, storage and disposal information included in DC AVS     7)  DC Planning   Discussed goal of opioid therapy as above with patient, bedside nurse Ramona Forrester RN and Hospitalist Jaron Brennan MD.  Length of therapy is less than 10 days, opioids may be stopped without taper.  Continued  outpatient management of pain per PCP  Disposition: [facility] - Appreciate input of  Xiomara Sanchez RN.   Support systems: Daughters and son  Outpatient Referrals: None needed from pain perspective  The following risk factors have been identified for unintentional overdose: patient is > 65 years old, patient has lung disease and patient has compromised kidney or liver function . Discharge with intra-nasal naloxone if discharged to home with opioids  >40 mg MME/day.  Plan for education prior to discharge.     ASSESSMENT  1)  Acute left chest wall pain due to rib fractures     2)  Patient does not have history of chronic pain, and is NOT on chronic opioid therapy  Baseline 0 mg Daily Morphine Equivalent as dispensed and as reported daily use.  Patient has no expected opioid tolerance.      Patient's opioid use in past 24 hours: None = 0 mg Daily Morphine Equivalent     3)  Risk factors for opioid related harms  - Renal insufficiency  - Age > 65 years old  - Depression  - 4/24/2018 intentionally overdosed on Paxil and history of right eye at age 35 years after birth of her last child     4)  Opioid induced side-effects:  -Constipation - YES, as she has not had a bowel movement since last Friday (4 days ago)  -Nausea/Vomit- No/denies  -Sedation - No/denies  -Urinary Retention - No/denies     5)  Other/Related:   - Anxiety/Depression with history of hospitalization for Paxil overdose 4/24/2018  - Pulmonary fibrosis, COPD   - Renal insufficiency  - Deconditioning    - 6/1/2016 admission for illus versus SBO    Maria Isabel Tolliver MS, RN, CNS, APRN, ACHPN, FAACVPR  Pain and Palliative Care  Pager [phone]  Office [phone]       Time Spent on this Encounter   Total unit/floor time 11 AM until 11:30 AM, time consisted of the following, examination of the patient, reviewing the record and completing documentation. >50% of time spent in counseling and coordination of care.  Time spend  counseling with patient consisted of the following topics, symptom management.  Time spent in coordination of care with Bedside Nurse Ramona Forrester RN, Hospitalist Jaron Brennan MD and Physical Therapist Karen Montalvo, PT.       Interval History   Chart reviewed - no new complaint, et continues to be constipated    Review of Systems    CONSTITUTIONAL: NEGATIVE for fever, chills, change in weight  ENT/MOUTH: NEGATIVE for ear, mouth and throat problems  RESP: NEGATIVE for significant cough or SOB  CV: NEGATIVE for chest pain, palpitations or peripheral edema    Physical Exam   Temp:  [97.7  F (36.5  C)-99.2  F (37.3  C)] 99.2  F (37.3  C)  Pulse:  [61-75] 70  Resp:  [16-18] 18  BP: ()/(55-62) 130/60  SpO2:  [92 %-94 %] 93 %  135 lbs 6.4 oz  GEN:  Alert, oriented x 3, appears comfortable, NAD.  HEENT:  Normocephalic/atraumatic, no scleral icterus, no nasal discharge, mouth moist.  CV:  Tachycardic and regular at 102, S1, S2; no murmurs or other irregularities noted.  +3 DP/PT pulses bilaterally; no edema BLE.  RESP:  Clear to auscultation bilaterally without rales/rhonchi/wheezing/retractions.  Symmetric chest rise on inhalation noted.  Normal respiratory effort.  ABD:  Rounded, soft, non-tender/non-distended.  +BS  EXT:  CMS intact x 4.     M/S:   Left rib cage is slightly tender to palpation.    SKIN:  Dry to touch, no exanthems noted in the visualized areas.    PAIN BEHAVIOR: Cooperative  Psych:  Normal affect.  Calm, cooperative, conversant appropriately.    Medications     - MEDICATION INSTRUCTIONS -       Warfarin Therapy Reminder         acetaminophen  975 mg Oral Q8H     calcium carbonate-vitamin D  1 tablet Oral Daily     diltiazem ER COATED BEADS  240 mg Oral Daily     fluticasone-vilanterol  1 puff Inhalation Daily    And     umeclidinium  1 puff Inhalation Daily     furosemide  40 mg Oral Daily     gabapentin  100 mg Oral TID     isosorbide mononitrate  30 mg Oral Daily     lactobacillus rhamnosus  (GG)  1 capsule Oral Daily     lidocaine  1 patch Transdermal Q24H     lidocaine   Transdermal Q8H     losartan  25 mg Oral Daily     mirtazapine  7.5 mg Oral At Bedtime     polyethylene glycol  8.5 g Oral Daily     pravastatin  40 mg Oral Daily     sodium chloride (PF)  3 mL Intracatheter Q8H     Vitamin D3  1,000 Units Oral Daily       Data   Results for orders placed or performed during the hospital encounter of 05/01/22 (from the past 24 hour(s))   XR Chest Port 1 View    Narrative    EXAM: XR CHEST PORT 1 VIEW  LOCATION: [facility]  DATE/TIME: 5/3/2022 6:21 AM    INDICATION: Trauma Patient with Rib Fracture(s)  COMPARISON: 5/2/2022.    FINDINGS: Left subclavian cardiac device. No pneumothorax. The heart is enlarged. There is no pulmonary edema. The thoracic aorta is calcified. There is a left pleural effusion. Mild left basilar atelectasis or scarring. The lungs are otherwise clear.      Impression    IMPRESSION: Left pleural effusion. No pneumothorax.   Basic metabolic panel   Result Value Ref Range    Sodium 136 133 - 144 mmol/L    Potassium 3.3 (L) 3.4 - 5.3 mmol/L    Chloride 101 94 - 109 mmol/L    Carbon Dioxide (CO2) 32 20 - 32 mmol/L    Anion Gap 3 3 - 14 mmol/L    Urea Nitrogen 32 (H) 7 - 30 mg/dL    Creatinine 1.48 (H) 0.52 - 1.04 mg/dL    Calcium 9.6 8.5 - 10.1 mg/dL    Glucose 93 70 - 99 mg/dL    GFR Estimate 32 (L) >60 mL/min/1.73m2   Magnesium   Result Value Ref Range    Magnesium 1.9 1.6 - 2.3 mg/dL   Phosphorus   Result Value Ref Range    Phosphorus 4.1 2.5 - 4.5 mg/dL   INR   Result Value Ref Range    INR 2.37 (H) 0.85 - 1.15     *Note: Due to a large number of results and/or encounters for the requested time period, some results have not been displayed. A complete set of results can be found in Results Review.

## 2022-05-04 ENCOUNTER — PATIENT OUTREACH (OUTPATIENT)
Dept: CARE COORDINATION | Facility: CLINIC | Age: 87
End: 2022-05-04
Payer: MEDICARE

## 2022-05-04 ENCOUNTER — DOCUMENTATION ONLY (OUTPATIENT)
Dept: PEDIATRICS | Facility: CLINIC | Age: 87
End: 2022-05-04
Payer: MEDICARE

## 2022-05-04 DIAGNOSIS — Z79.01 LONG TERM CURRENT USE OF ANTICOAGULANT THERAPY: ICD-10-CM

## 2022-05-04 DIAGNOSIS — I48.20 CHRONIC ATRIAL FIBRILLATION (H): Primary | ICD-10-CM

## 2022-05-04 DIAGNOSIS — Z71.89 OTHER SPECIFIED COUNSELING: ICD-10-CM

## 2022-05-04 NOTE — PROGRESS NOTES
"Clinic Care Coordination Contact  [facility]: Post-Discharge Note  SITUATION                                                      Admission:    Admission Date: 05/01/22   Reason for Admission: Fall, initial encounter  Discharge:   Discharge Date: 05/03/22  Discharge Diagnosis: Closed fracture of multiple ribs of left side    BACKGROUND                                                      Per hospital discharge summary and inpatient provider notes:   Leonor Mercado is a 89 year old female who presents with left-sided chest pain and broken ribs after mechanical fall at home when the patient was in the toilet seat.  Circumstances surrounding the fall are not clear but she denies loss of consciousness or preceding symptoms.  She apparently had been in her normal state of health.  Incidentally she has been found COVID-19 PCR test positive, she patient is asymptomatic and fully vaccinated with booster 2 weeks ago.      ASSESSMENT      Enrollment  Primary Care Care Coordination Status: Declined    Discharge Assessment  How are you doing now that you are home?: \" I'm ok \"  How are your symptoms? (Red Flag symptoms escalate to triage hotline per guidelines): Improved  Do you feel your condition is stable enough to be safe at home until your provider visit?: Yes  Does the patient have their discharge instructions? : Yes  Does the patient have questions regarding their discharge instructions? : No  Were you started on any new medications or were there changes to any of your previous medications? : Yes  Does the patient have all of their medications?: Yes  Do you have questions regarding any of your medications? : No  Do you have all of your needed medical supplies or equipment (DME)?  (i.e. oxygen tank, CPAP, cane, etc.): Yes  Discharge follow-up appointment scheduled within 14 calendar days? : Yes  Discharge Follow Up Appointment Date: 05/19/22  Discharge Follow Up Appointment Scheduled with?: Primary Care " Provider    Post-op ([facility] Only)  If the patient had a surgery or procedure, do they have any questions for a nurse?: No             PLAN                                                      Outpatient Plan:Follow-up and recommended labs and tests  Follow up with primary care provider, Arnel Garcia, within 7 days for  hospital follow- up. No follow up labs or test are needed.     Future Appointments   Date Time Provider Department Center   5/10/2022  2:00 PM [facility]    5/19/2022  1:30 PM Arnel Garcia MD [facility]    6/21/2022 12:00 AM [facility] [address] [place] [address] [facility]         For any urgent concerns, please contact our 24 hour nurse triage line: [phone] ([phone])         Espinoza Rashid

## 2022-05-04 NOTE — PROGRESS NOTES
ANTICOAGULATION  MANAGEMENT: Discharge Review    Leonor Mercado chart reviewed for anticoagulation continuity of care    Hospital Admission on 5/1-5/3/22 for fall and closed fracture of multiple ribs on left side.    Discharge disposition: Home with Home Care    Results:    Recent labs: (last 7 days)     05/01/22  0754 05/02/22  0927 05/03/22  0724   INR 1.54* 1.87* 2.37*     Anticoagulation inpatient management:     more warfarin administered than maintenance regimen     Anticoagulation discharge instructions:     Warfarin dosing: home regimen continued   Bridging: No   INR goal change: No      Medication changes affecting anticoagulation: No    Additional factors affecting anticoagulation: Yes: rib fractures, inflammation      Plan     No adjustment to anticoagulation plan needed    Patient not contacted. See encounter note from 5/3/22    Anticoagulation Calendar updated    Ebony Doshi RN

## 2022-05-05 ENCOUNTER — MEDICAL CORRESPONDENCE (OUTPATIENT)
Dept: PEDIATRICS | Facility: CLINIC | Age: 87
End: 2022-05-05

## 2022-05-05 ENCOUNTER — ANTICOAGULATION THERAPY VISIT (OUTPATIENT)
Dept: ANTICOAGULATION | Facility: CLINIC | Age: 87
End: 2022-05-05
Payer: MEDICARE

## 2022-05-05 ENCOUNTER — TELEPHONE (OUTPATIENT)
Dept: PEDIATRICS | Facility: CLINIC | Age: 87
End: 2022-05-05
Payer: MEDICARE

## 2022-05-05 DIAGNOSIS — Z79.01 LONG TERM CURRENT USE OF ANTICOAGULANT THERAPY: ICD-10-CM

## 2022-05-05 DIAGNOSIS — I48.20 CHRONIC ATRIAL FIBRILLATION (H): ICD-10-CM

## 2022-05-05 DIAGNOSIS — Z79.01 LONG TERM CURRENT USE OF ANTICOAGULANTS WITH INR GOAL OF 2.0-3.0: ICD-10-CM

## 2022-05-05 DIAGNOSIS — I48.20 CHRONIC ATRIAL FIBRILLATION (H): Primary | ICD-10-CM

## 2022-05-05 LAB — INR (EXTERNAL): 2.8 (ref 0.9–1.1)

## 2022-05-05 NOTE — PROGRESS NOTES
ANTICOAGULATION MANAGEMENT     Leonor Mercado 89 year old female is on warfarin with therapeutic INR result. (Goal INR 2.0-3.0)    Recent labs: (last 7 days)     05/03/22  0724   INR 2.37*       ASSESSMENT       Source(s): Chart Review and Patient/Caregiver Call       Warfarin doses taken: Warfarin taken as instructed    Diet: Decreased greens/vitamin K in diet; plans to resume previous intake, will try Ensure since decreased appetite    New illness, injury, or hospitalization: Yes: 5/1/22 admitted to the hospital, patient had a fall, left sided chest trauma, fractured ribs, discharged home with homecare.    Medication/supplement changes: None noted    Signs or symptoms of bleeding or clotting: No    Previous INR: Subtherapeutic    Additional findings: None       PLAN     Recommended plan for temporary change(s) affecting INR, INR was close to a rapid rise, partial hold today.     Dosing Instructions: partial hold then continue your current warfarin dose with next INR in 5 days       Summary  As of 5/5/2022    Full warfarin instructions:  5/5: 1 mg; Otherwise 2 mg every Sun, Thu; 1 mg all other days   Next INR check:  5/10/2022             Telephone call with [facility] home care/facility nurse who agrees to plan and repeated back plan correctly    Orders given to  Homecare nurse/facility to recheck    Education provided: Please call back if any changes to your diet, medications or how you've been taking warfarin and Contact [phone]  with any changes, questions or concerns.     Plan made per [facility] anticoagulation protocol    Dilma Rider RN  Anticoagulation Clinic  5/5/2022    _______________________________________________________________________     Anticoagulation Episode Summary     Current INR goal:  2.0-3.0   TTR:  52.4 % (1 y)   Target end date:  Indefinite   Send INR reminders to:  [facility]    Indications    Chronic atrial fibrillation (H) [I48.20]  Long term current use of anticoagulant therapy  [Z79.01]           Comments:           Anticoagulation Care Providers     Provider Role Specialty Phone number    Arnel Garcia MD Referring Internal Medicine - Pediatrics [phone]    Marisabel Guido MD Referring Internal Medicine - Pediatrics [phone]

## 2022-05-05 NOTE — TELEPHONE ENCOUNTER
The Home Care/Assisted Living/Nursing Facility is calling regarding an established patient.  Has the patient seen Home Care in the past or is currently residing in Assisted Living or Nursing Facility? No.     Medardo calling from [facility] requesting the following orders that are NOT within the Home Care, Assisted Living or Nursing Home Eval and Treatment standing order and must be ordered by a Licensed Practitioner.    Preferred Call Back Number: [phone]    Skilled nursing one time a week for 5 weeks for INR monitoring and health condition management.     Need vital sign parameters specifically for reporting for patient. Today patient's BP was 94/55.     Routing to Licensed Practitioner (Provider) to review request and provide approval or recommendation.    Writer has verified Requestor will send fax to have orders signed.    Prabha Yañez RN on 5/5/2022 at 4:02 PM

## 2022-05-06 RX ORDER — WARFARIN SODIUM 2 MG/1
TABLET ORAL
Qty: 60 TABLET | Refills: 0 | Status: SHIPPED | OUTPATIENT
Start: 2022-05-06 | End: 2022-05-19

## 2022-05-06 NOTE — TELEPHONE ENCOUNTER
Called [facility] and relayed provider message below. No further questions at this time.     Kris ALCAZAR RN

## 2022-05-06 NOTE — TELEPHONE ENCOUNTER
ANTICOAGULATION MANAGEMENT:  Medication Refill    Anticoagulation Summary  As of 5/5/2022    Warfarin maintenance plan:  2 mg (2 mg x 1) every Sun, Thu; 1 mg (2 mg x 0.5) all other days   Next INR check:  5/10/2022   Target end date:  Indefinite    Indications    Chronic atrial fibrillation (H) [I48.20]  Long term current use of anticoagulant therapy [Z79.01]             Anticoagulation Care Providers     Provider Role Specialty Phone number    Arnel Garcia MD Referring Internal Medicine - Pediatrics [phone]    Marisabel Guido MD Referring Internal Medicine - Pediatrics [phone]          Visit with referring provider/group within last year: No, last visit date: 2020.   Next 5 appointments (look out 90 days)    May 19, 2022  1:30 PM  (Arrive by 1:10 PM)  Annual Wellness Visit with Arnel Garcia MD  [facility] ([facility] ) [address] [place] [id number] [phone]          [facility] referral signed within last year: Yes    Leonor meets all criteria for refill (current [facility] referral, office visit with referring provider/group in last year, lab monitoring up to date or not exceeding 2 weeks overdue). Rx instructions and quantity match patient's current dosing plan. Warfarin 90 day supply with 1 refill granted per [facility] protocol     oYana Waddell RN  Anticoagulation Clinic

## 2022-05-06 NOTE — TELEPHONE ENCOUNTER
Reviewed, agree.  Call for SBP less than 90 or greater than 160, HR greater than 110 or less than 60

## 2022-05-12 ENCOUNTER — ANTICOAGULATION THERAPY VISIT (OUTPATIENT)
Dept: ANTICOAGULATION | Facility: CLINIC | Age: 87
End: 2022-05-12
Payer: MEDICARE

## 2022-05-12 DIAGNOSIS — I48.20 CHRONIC ATRIAL FIBRILLATION (H): Primary | ICD-10-CM

## 2022-05-12 DIAGNOSIS — Z79.01 LONG TERM CURRENT USE OF ANTICOAGULANT THERAPY: ICD-10-CM

## 2022-05-12 LAB — INR (EXTERNAL): 1.6 (ref 0.9–1.1)

## 2022-05-12 NOTE — PROGRESS NOTES
ANTICOAGULATION MANAGEMENT     Leonor Mercado 89 year old female is on warfarin with subtherapeutic INR result. (Goal INR 2.0-3.0)    Recent labs: (last 7 days)     05/12/22  1506   INR 1.6*       ASSESSMENT       Source(s): Chart Review and Home Care/Facility Nurse       Warfarin doses taken: patient notes does not remember what she takes.    Diet: No new diet changes identified,ensure at home but she does not like    New illness, injury, or hospitalization: No    Medication/supplement changes: None noted    Signs or symptoms of bleeding or clotting: No    Previous INR: Therapeutic last 2(+) visits    Additional findings: None       PLAN     Recommended plan for no diet, medication or health factor changes affecting INR     Dosing Instructions: Increase your warfarin dose (11.1% change) with next INR in 1 week       Summary  As of 5/12/2022    Full warfarin instructions:  2 mg every Mon, Thu, Sat; 1 mg all other days   Next INR check:  5/19/2022             Telephone call with [facility] home [facility] home care/facility nurse who verbalizes understanding and agrees to plan, notes he will set up pill box. He will also call and let daughter know.    Orders given to  Homecare nurse/facility to recheck    Education provided: Monitoring for clotting signs and symptoms, Medardo will review with the patient.     Plan made per [facility] anticoagulation protocol    Rhianna Austin, RN  Anticoagulation Clinic  5/12/2022    _______________________________________________________________________     Anticoagulation Episode Summary     Current INR goal:  2.0-3.0   TTR:  51.7 % (1 y)   Target end date:  Indefinite   Send INR reminders to:  [facility]    Indications    Chronic atrial fibrillation (H) [I48.20]  Long term current use of anticoagulant therapy [Z79.01]           Comments:           Anticoagulation Care Providers     Provider Role Specialty Phone number    Arnel Garcia MD Referring Internal Medicine -  Pediatrics [phone]    Marisabel Guido MD Referring Internal Medicine - Pediatrics [phone]

## 2022-05-19 ENCOUNTER — ANTICOAGULATION THERAPY VISIT (OUTPATIENT)
Dept: ANTICOAGULATION | Facility: CLINIC | Age: 87
End: 2022-05-19

## 2022-05-19 ENCOUNTER — OFFICE VISIT (OUTPATIENT)
Dept: PEDIATRICS | Facility: CLINIC | Age: 87
End: 2022-05-19
Payer: MEDICARE

## 2022-05-19 VITALS
TEMPERATURE: 97.5 F | SYSTOLIC BLOOD PRESSURE: 104 MMHG | HEART RATE: 73 BPM | DIASTOLIC BLOOD PRESSURE: 60 MMHG | WEIGHT: 137.6 LBS | BODY MASS INDEX: 23.62 KG/M2 | OXYGEN SATURATION: 98 %

## 2022-05-19 DIAGNOSIS — Z95.0 PACEMAKER: ICD-10-CM

## 2022-05-19 DIAGNOSIS — N18.30 STAGE 3 CHRONIC KIDNEY DISEASE, UNSPECIFIED WHETHER STAGE 3A OR 3B CKD (H): ICD-10-CM

## 2022-05-19 DIAGNOSIS — I73.9 PERIPHERAL VASCULAR DISEASE (H): ICD-10-CM

## 2022-05-19 DIAGNOSIS — E78.5 HYPERLIPIDEMIA LDL GOAL <100: ICD-10-CM

## 2022-05-19 DIAGNOSIS — I48.20 CHRONIC ATRIAL FIBRILLATION (H): Primary | ICD-10-CM

## 2022-05-19 DIAGNOSIS — I25.10 CORONARY ARTERY DISEASE INVOLVING NATIVE CORONARY ARTERY OF NATIVE HEART WITHOUT ANGINA PECTORIS: ICD-10-CM

## 2022-05-19 DIAGNOSIS — J44.9 CHRONIC OBSTRUCTIVE PULMONARY DISEASE, UNSPECIFIED COPD TYPE (H): ICD-10-CM

## 2022-05-19 DIAGNOSIS — Z79.01 LONG TERM CURRENT USE OF ANTICOAGULANTS WITH INR GOAL OF 2.0-3.0: ICD-10-CM

## 2022-05-19 DIAGNOSIS — S22.42XA CLOSED FRACTURE OF MULTIPLE RIBS OF LEFT SIDE, INITIAL ENCOUNTER: ICD-10-CM

## 2022-05-19 DIAGNOSIS — I50.32 CHRONIC HEART FAILURE WITH PRESERVED EJECTION FRACTION (H): ICD-10-CM

## 2022-05-19 DIAGNOSIS — Z00.00 ENCOUNTER FOR MEDICARE ANNUAL WELLNESS EXAM: Primary | ICD-10-CM

## 2022-05-19 DIAGNOSIS — I48.20 CHRONIC ATRIAL FIBRILLATION (H): ICD-10-CM

## 2022-05-19 DIAGNOSIS — Z79.01 LONG TERM CURRENT USE OF ANTICOAGULANT THERAPY: ICD-10-CM

## 2022-05-19 DIAGNOSIS — J96.11 CHRONIC RESPIRATORY FAILURE WITH HYPOXIA (H): ICD-10-CM
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
## 2022-05-19 LAB — INR (EXTERNAL): 1.5 (ref 0.9–1.1)

## 2022-05-19 PROCEDURE — G0439 PPPS, SUBSEQ VISIT: HCPCS | Performed by: INTERNAL MEDICINE

## 2022-05-19 PROCEDURE — 36415 COLL VENOUS BLD VENIPUNCTURE: CPT | Performed by: INTERNAL MEDICINE

## 2022-05-19 PROCEDURE — 80048 BASIC METABOLIC PNL TOTAL CA: CPT | Performed by: INTERNAL MEDICINE

## 2022-05-19 RX ORDER — ISOSORBIDE MONONITRATE 30 MG/1
30 TABLET, EXTENDED RELEASE ORAL DAILY
Qty: 90 TABLET | Refills: 11 | Status: SHIPPED | OUTPATIENT
Start: 2022-05-19 | End: 2023-07-24

## 2022-05-19 RX ORDER — PRAVASTATIN SODIUM 40 MG
40 TABLET ORAL DAILY
Qty: 90 TABLET | Refills: 11 | Status: SHIPPED | OUTPATIENT
Start: 2022-05-19 | End: 2023-07-24

## 2022-05-19 RX ORDER — DILTIAZEM HYDROCHLORIDE 240 MG/1
CAPSULE, COATED, EXTENDED RELEASE ORAL
Qty: 90 CAPSULE | Refills: 11 | Status: SHIPPED | OUTPATIENT
Start: 2022-05-19 | End: 2023-05-23

## 2022-05-19 RX ORDER — WARFARIN SODIUM 2 MG/1
TABLET ORAL
Qty: 90 TABLET | Refills: 11 | Status: SHIPPED | OUTPATIENT
Start: 2022-05-19 | End: 2022-07-26

## 2022-05-19 ASSESSMENT — ANXIETY QUESTIONNAIRES
GAD7 TOTAL SCORE: 4
3. WORRYING TOO MUCH ABOUT DIFFERENT THINGS: NOT AT ALL
GAD7 TOTAL SCORE: 4
IF YOU CHECKED OFF ANY PROBLEMS ON THIS QUESTIONNAIRE, HOW DIFFICULT HAVE THESE PROBLEMS MADE IT FOR YOU TO DO YOUR WORK, TAKE CARE OF THINGS AT HOME, OR GET ALONG WITH OTHER PEOPLE: NOT DIFFICULT AT ALL
5. BEING SO RESTLESS THAT IT IS HARD TO SIT STILL: NOT AT ALL
6. BECOMING EASILY ANNOYED OR IRRITABLE: NOT AT ALL
2. NOT BEING ABLE TO STOP OR CONTROL WORRYING: SEVERAL DAYS
7. FEELING AFRAID AS IF SOMETHING AWFUL MIGHT HAPPEN: SEVERAL DAYS
1. FEELING NERVOUS, ANXIOUS, OR ON EDGE: SEVERAL DAYS

## 2022-05-19 ASSESSMENT — ACTIVITIES OF DAILY LIVING (ADL)
CURRENT_FUNCTION: SHOPPING REQUIRES ASSISTANCE
CURRENT_FUNCTION: HOUSEWORK REQUIRES ASSISTANCE
CURRENT_FUNCTION: TRANSPORTATION REQUIRES ASSISTANCE
CURRENT_FUNCTION: MEDICATION ADMINISTRATION REQUIRES ASSISTANCE

## 2022-05-19 ASSESSMENT — PATIENT HEALTH QUESTIONNAIRE - PHQ9
5. POOR APPETITE OR OVEREATING: SEVERAL DAYS
SUM OF ALL RESPONSES TO PHQ QUESTIONS 1-9: 11

## 2022-05-19 NOTE — PATIENT INSTRUCTIONS
Patient Education   Personalized Prevention Plan  You are due for the preventive services outlined below.  Your care team is available to assist you in scheduling these services.  If you have already completed any of these items, please share that information with your care team to update in your medical record.  Health Maintenance Due   Topic Date Due     Heart Failure Action Plan  Never done     Kidney Microalbumin Urine Test  Never done     ANNUAL REVIEW OF HM ORDERS  Never done     Anxiety Assessment  04/20/2019     Depression Assessment  11/14/2020     Annual Wellness Visit  08/27/2021     FALL RISK ASSESSMENT  08/27/2021        At your visit today, we discussed your risk for falls and preventive options.    Fall Prevention  Falls often occur due to slipping, tripping or losing your balance. Millions of people fall every year and injure themselves. Here are ways to reduce your risk of falling again.     Think about your fall, was there anything that caused your fall that can be fixed, removed, or replaced?    Make your home safe by keeping walkways clear of objects you may trip over, such as electric cords.    Use non-slip pads under rugs. Don't use area rugs or small throw rugs.    Use non-slip mats in bathtubs and showers.    Install handrails and lights on staircases. The handrails should be on both sides of the stairs.    Don't walk in poorly lit areas.    Don't stand on chairs or wobbly ladders.    Use caution when reaching overhead or looking upward. This position can cause a loss of balance.    Be sure your shoes fit properly, have non-slip bottoms and are in good condition.     Wear shoes both inside and out. Don't go barefoot or wear slippers.    Be cautious when going up and down stairs, curbs, and when walking on uneven sidewalks.    If your balance is poor, consider using a cane or walker.    If your fall was related to alcohol use, stop or limit alcohol intake.     If your fall was related to use  of sleeping medicines, talk to your healthcare provider about this. You may need to reduce your dosage at bedtime if you awaken during the night to go to the bathroom.      To reduce the need for nighttime bathroom trips:  ? Don't drink fluids for several hours before going to bed  ? Empty your bladder before going to bed  ? Men can keep a urinal at the bedside    Stay as active as you can. Balance, flexibility, strength, and endurance all come from exercise. They all play a role in preventing falls. Ask your healthcare provider which types of activity are right for you.    Get your vision checked on a regular basis.    If you have pets, know where they are before you stand up or walk so you don't trip over them.    Use night lights.    Go over all your medicines with a pharmacist or other healthcare provider to see if any of them could make you more likely to fall.  [employer] last reviewed this educational content on 4/1/2018 2000-2021 [employer]. All rights reserved. This information is not intended as a substitute for professional medical care. Always follow your healthcare professional's instructions.

## 2022-05-19 NOTE — PROGRESS NOTES
"SUBJECTIVE:   Leonor Mercado is a 89 year old female who presents for Preventive Visit with her daughter.     Patient has been advised of split billing requirements and indicates understanding: Yes  Are you in the first 12 months of your Medicare coverage?  No    Healthy Habits:    In general, how would you rate your overall health?  Good    Frequency of exercise:  None    Duration of exercise:  Other    Do you usually eat at least 4 servings of fruit and vegetables a day, include whole grains    & fiber and avoid regularly eating high fat or \"junk\" foods?  No    Taking medications regularly:  Yes    Barriers to taking medications:  Not applicable    Medication side effects:  None    Ability to successfully perform activities of daily living:  Housework requires assistance, medication administration requires assistance, shopping requires assistance and transportation requires assistance    Home Safety:  No safety concerns identified    Hearing Impairment:  Difficulty following a conversation in a noisy restaurant or crowded room, feel that people are mumbling or not speaking clearly, difficulty understanding soft or whispered speech and need to ask people to speak up or repeat themselves    In the past 6 months, have you been bothered by leaking of urine? Yes    In general, how would you rate your overall mental or emotional health?  Good      PHQ-2 Total Score:    Additional concerns today:  No     Do you feel safe in your environment? Yes    Have you ever done Advance Care Planning? (For example, a Health Directive, POLST, or a discussion with a medical provider or your loved ones about your wishes): Yes, advance care planning is on file.         Fall risk  Fallen 2 or more times in the past year?: Yes  Any fall with injury in the past year?: Yes    Cognitive Screening   1) Repeat 3 items (Leader, Season, Table)   2) Clock draw: NORMAL  3) 3 item recall: Recalls 1 object   Results: NORMAL clock, 1-2 items " recalled: COGNITIVE IMPAIRMENT LESS LIKELY    Mini-CogTM Copyright LUNA Welch. Licensed by the author for use in [facility]; reprinted with permission (barb@[facility]). All rights reserved.      Do you have sleep apnea, excessive snoring or daytime drowsiness?: yes, some drowsiness during the day        Social History     Tobacco Use     Smoking status: Former Smoker     Packs/day: 0.00     Types: Cigarettes     Quit date: 1987     Years since quittin.0     Smokeless tobacco: Never Used   Substance Use Topics     Alcohol use: Never     If you drink alcohol do you typically have >3 drinks per day or >7 drinks per week? Not applicable    Alcohol Use 2022   Prescreen: >3 drinks/day or >7 drinks/week? Not Applicable       Current providers sharing in care for this patient include:   Patient Care Team:  Arnel Garcia MD as PCP - General (Internal Medicine)  Collins Horton MD as MD (Cardiology)  Jennifer Payne MD (Gastroenterology)  Arnel Garcia MD as Assigned PCP  Yoana Peacock [facility] as Pharmacist (Pharmacotherapy)  Marisol Reis as Personal Advocate & Liaison (PAL)  Ana Paula Reis APRN CNP as Assigned Heart and Vascular Provider    The following health maintenance items are reviewed in Epic and correct as of today:  Health Maintenance Due   Topic Date Due     HF ACTION PLAN  Never done     ANNUAL REVIEW OF  ORDERS  Never done     Patient Active Problem List   Diagnosis     Hyperlipidemia LDL goal <100     Cardiac pacemaker in situ     Senile osteoporosis     Anemia     Long term current use of anticoagulant therapy     Degeneration of lumbar intervertebral disc     CKD (chronic kidney disease) stage 3, GFR 30-59 ml/min (H)     Chronic atrial fibrillation (H)     Chronic obstructive pulmonary disease, unspecified COPD type (H)     Coronary artery disease involving native coronary artery of native heart without angina pectoris     Sick sinus syndrome (H)      Adjustment disorder, unspecified type     Presbyesophagus     Chronic heart failure with preserved ejection fraction (H)     Vertigo     Chronic respiratory failure with hypoxia (H)     Pleural effusion     Closed fracture of multiple ribs of left side, initial encounter     Peripheral vascular disease (H)     Current Outpatient Medications   Medication Sig Dispense Refill     acetaminophen (TYLENOL) 500 MG tablet Take 2 tablets (1,000 mg) by mouth every 8 hours as needed for pain       albuterol (PROAIR HFA/PROVENTIL HFA/VENTOLIN HFA) 108 (90 Base) MCG/ACT inhaler Inhale 2 puffs into the lungs every 6 hours as needed for shortness of breath / dyspnea or wheezing 18 g 3     albuterol (PROVENTIL) (2.5 MG/3ML) 0.083% neb solution Take 2.5 mg by nebulization At Bedtime       albuterol (PROVENTIL) (2.5 MG/3ML) 0.083% neb solution Take 2.5 mg by nebulization every 4 hours as needed for shortness of breath / dyspnea or wheezing MAX of 4 doses/ 24 hours       Calcium Carbonate-Vitamin D (CALCIUM 500+D HIGH POTENCY PO) Take 1 tablet by mouth daily       diltiazem ER COATED BEADS (CARDIZEM CD/CARTIA XT) 240 MG 24 hr capsule Take 1 capsule by mouth once daily 90 capsule 11     Fluticasone-Umeclidin-Vilanterol (TRELEGY ELLIPTA) 100-62.5-25 MCG/INH oral inhaler Inhale 1 puff into the lungs daily       furosemide (LASIX) 20 MG tablet Take 1 tablet by mouth twice daily (Patient taking differently: Take 40 mg by mouth daily) 180 tablet 3     isosorbide mononitrate (IMDUR) 30 MG 24 hr tablet Take 1 tablet (30 mg) by mouth daily 90 tablet 11     Lactobacillus (PROBIOTIC ACIDOPHILUS) TABS Take 1 tablet by mouth daily        losartan (COZAAR) 25 MG tablet Take 1 tablet by mouth once daily 90 tablet 3     melatonin 5 MG tablet Take 10 mg by mouth nightly as needed for sleep       mirtazapine (REMERON) 15 MG tablet TAKE 1 TABLET BY MOUTH AT BEDTIME (Patient taking differently: Take 15 mg by mouth nightly as needed) 90 tablet 0      "Multiple Vitamins-Minerals (OCUVITE PO) Take 1 capsule by mouth daily.       polyethylene glycol (MIRALAX/GLYCOLAX) Packet Take by mouth daily 1 scoopful once daily       pravastatin (PRAVACHOL) 40 MG tablet Take 1 tablet (40 mg) by mouth daily 90 tablet 11     senna (SENOKOT) 8.6 MG tablet Take 2 tablets by mouth nightly as needed for constipation        Vitamin D, Cholecalciferol, 1000 units TABS Take 1,000 Units by mouth daily       warfarin ANTICOAGULANT (COUMADIN) 2 MG tablet Take a half tablet (1 mg) by mouth daily except take 1 tablet (2 mg) Sun, Th or as directed by INR Clinic 90 tablet 11          Mammogram Screening - Patient over age 75, has elected to discontinue screenings.  Pertinent mammograms are reviewed under the imaging tab.    Review of Systems  Constitutional, HEENT, cardiovascular, pulmonary, GI, , musculoskeletal, neuro, skin, endocrine and psych systems are negative, except as otherwise noted.    OBJECTIVE:   /60 (BP Location: Right arm, Patient Position: Sitting, Cuff Size: Adult Regular)   Pulse 73   Temp 97.5  F (36.4  C) (Tympanic)   Wt 62.4 kg (137 lb 9.6 oz)   LMP  (LMP Unknown)   SpO2 98%   BMI 23.62 kg/m   Estimated body mass index is 23.62 kg/m  as calculated from the following:    Height as of 5/3/22: 1.626 m (5' 4\").    Weight as of this encounter: 62.4 kg (137 lb 9.6 oz).  Physical Exam  GENERAL APPEARANCE: alert and no distress  EYES: Eyes grossly normal to inspection, PERRL and conjunctivae and sclerae normal  HENT: ear canals and TM's normal, nose and mouth without ulcers or lesions, oropharynx clear and oral mucous membranes moist  NECK: no adenopathy, no asymmetry, masses, or scars and thyroid normal to palpation  RESP: lungs clear to auscultation - no rales, rhonchi or wheezes  CV: regular rate and rhythm, normal S1 S2, no S3 or S4, no murmur, click or rub  ABDOMEN: soft, nontender, no hepatosplenomegaly, no masses and bowel sounds normal  MS: no " musculoskeletal defects are noted and gait is age appropriate without ataxia  SKIN: no suspicious lesions or rashes  NEURO: Normal strength and tone, sensory exam grossly normal, mentation intact and speech normal  PSYCH: mentation appears normal and affect normal/bright    ASSESSMENT / PLAN:   (Z00.00) Encounter for Medicare annual wellness exam  (primary encounter diagnosis)    (I25.10) Coronary artery disease involving native coronary artery of native heart without angina pectoris  Comment: 2 prior LAD stents 2005, stable.  continue isosorbide, statin  Plan: diltiazem ER COATED BEADS (CARDIZEM CD/CARTIA         XT) 240 MG 24 hr capsule, isosorbide         mononitrate (IMDUR) 30 MG 24 hr tablet,         pravastatin (PRAVACHOL) 40 MG tablet          (I50.32) Chronic heart failure with preserved ejection fraction (H)  Comment:   Plan: echo 3/22: EF 60-65%, RV dilation/RV systolic function mildly reduced, mod-severe TR  Stable continue losartan, furosemide.t    (Z95.0) Pacemaker  Comment:   Plan: placed 2003, sick sinus    (J44.9) Chronic obstructive pulmonary disease, unspecified COPD type (H)  (J96.11) Chronic respiratory failure with hypoxia (H)  Comment:   Plan: recent follow-up with Pulmonary-stable/continue Trelegy.  Continues NCO2 QHS    (I48.20) Chronic atrial fibrillation (H)  Comment:  Continue diltiazem  Plan: warfarin ANTICOAGULANT (COUMADIN) 2 MG tablet          (I73.9) Peripheral vascular disease (H)  Comment:   Plan: stable, denies claudication symptoms    (N18.30) Stage 3 chronic kidney disease, unspecified whether stage 3a or 3b CKD (H)  Comment:   Plan: Basic metabolic panel  (Ca, Cl, CO2, Creat,         Gluc, K, Na, BUN)          (E78.5) Hyperlipidemia LDL goal <100  Comment:   Plan: pravastatin (PRAVACHOL) 40 MG tablet          (S22.42XA) Closed fracture of multiple ribs of left side, initial encounter  Comment:   Plan: rib fractures following fall at home/ not using analgesics,  "resolving.  Presents with daughter today-continues to live independently/2 adult daughters live nearby and involved with her care    (Z79.01) Long term current use of anticoagulants with INR goal of 2.0-3.0  Comment:   Plan: warfarin ANTICOAGULANT (COUMADIN) 2 MG tablet            COUNSELING:  Reviewed preventive health counseling, as reflected in patient instructions       Healthy diet/nutrition    Estimated body mass index is 23.62 kg/m  as calculated from the following:    Height as of 5/3/22: 1.626 m (5' 4\").    Weight as of this encounter: 62.4 kg (137 lb 9.6 oz).        She reports that she quit smoking about 35 years ago. Her smoking use included cigarettes. She smoked 0.00 packs per day. She has never used smokeless tobacco.      Appropriate preventive services were discussed with this patient, including applicable screening as appropriate for cardiovascular disease, diabetes, osteopenia/osteoporosis, and glaucoma.  As appropriate for age/gender, discussed screening for colorectal cancer, prostate cancer, breast cancer, and cervical cancer. Checklist reviewing preventive services available has been given to the patient.    Reviewed patients plan of care and provided an AVS. The Basic Care Plan (routine screening as documented in Health Maintenance) for Leonor meets the Care Plan requirement. This Care Plan has been established and reviewed with the Patient.    Counseling Resources:  ATP IV Guidelines  Pooled Cohorts Equation Calculator  Breast Cancer Risk Calculator  Breast Cancer: Medication to Reduce Risk  FRAX Risk Assessment  ICSI Preventive Guidelines  Dietary Guidelines for Americans, 2010  [employer]'s MyPlate  ASA Prophylaxis  Lung CA Screening    Arnel Garcia MD  [facility]    Identified Health Risks:  "

## 2022-05-19 NOTE — COMMUNITY RESOURCES LIST (ENGLISH)
05/19/2022   [facility] - Outpatient Clinics  Lety Ford  For questions about this resource list or additional care needs, please contact your primary care clinic or care manager.  Phone: [phone]   Email: N/A   Address: [address] [place] [address]   Hours: N/A        Hotlines and Helplines       Hotline - Crisis help  1  [facility] - Child Protection - [facility] Crisis Response Unit Distance: 5.88 miles      COVID-19 Status: Phone/Virtual   [address] [place] [address]  Language: English  Hours: Mon - Sun Open 24 Hours   Phone: (634) 444-6529 Email: social.services@[facility]. Website: [email or web address]/[email or web address]     2  [facility] Crisis Response Unit - Suicide and Crisis Response Hotline Distance: 7.44 miles      COVID-19 Status: Phone/Virtual   [address] [place] [address]  Language: English  Hours: Mon - Sun Open 24 Hours   Phone: (456) 259-4867 Email: [email or web address] Website: [email or web address]/HealthFamily/HandlingEmergencies/Help          Mental Health       Individual counseling  3  [facility] Distance: 1.9 miles      COVID-19 Status: Phone/Virtual   [address] [place] [address]  Language: English, Swedish  Hours: Mon - Fri 7:30 AM - 6:00 PM  Fees: Insurance, Self Pay   Phone: (170) 129-4187 Email: [email or web address] Website: http://www.[email or web address]/     4  [facility] - [place] Distance: 1.99 miles      COVID-19 Status: Phone/Virtual   [address] [place] [address]  Language: English, Uzbek, Swedish  Hours: Mon - Thu 8:30 AM - 5:00 PM , Fri 8:30 AM - 4:00 PM  Fees: Insurance, Self Pay, Sliding Fee   Phone: (527) 514-8955 Website: [email or web address]     Mental health crisis care  5  [facility] Distance: 2.98 miles      COVID-19 Status:  Regular Operations, COVID-19 Status: Phone/Virtual   [address] [place] [address]  Language: English  Hours: Mon - Thu 8:30 AM - 9:00 PM , Fri 8:30 AM - 5:00 PM , Sat 8:00 AM - 4:00 PM  Fees: Insurance, Self Pay, Sliding Fee   Phone: (642) 793-4174 Email: devante@[email or web address] Website: [email or web address]/[email or web address]     6  [employer] - 24-Hour Mental Health Practitioner Distance: 5.92 miles      COVID-19 Status: Regular Operations, COVID-19 Status: Phone/Virtual   [address] [place] [address]  Language: English, Hmong  Hours: Mon - Fri 8:00 AM - 4:30 PM  Fees: Insurance, Self Pay   Phone: (680) 799-1958 Email: info@[employer] Website: http://www.[employer]     Mental health support group  7  [facility] Distance: 2.2 miles      COVID-19 Status: Phone/Virtual   [address] [address] [place] [address]  Language: English  Hours: Mon - Fri 9:00 AM - 5:00 PM Appt. Only  Fees: Free   Phone: (895) 471-1145 Email: Pan[employer] Website: http://www.[employer].org/     8  [facility] (Main Office) Distance: 3.81 miles      COVID-19 Status: Phone/Virtual   [address] [place] [address]  Language: English  Hours: Mon - Fri 9:00 AM - 5:00 PM  Fees: Free   Phone: (346) 347-4020 Email: info@[employer].org Website: http://[employer].org          Important Numbers & Websites       Emergency Services   911  City Services   311  Poison Control   (804) 235-7578  Suicide Prevention Lifeline   (399) 576-9202 (TALK)  Child Abuse Hotline   (977) 850-2974 (4-A-Child)  Sexual Assault Hotline   (145) 513-8305 (HOPE)  National Runaway Safeline   (334) 607-4916 (RUNAWAY)  All-Options Talkline   (847) 385-2320  Substance Abuse Referral   (183) 678-7160 (HELP)

## 2022-05-19 NOTE — PROGRESS NOTES
ANTICOAGULATION MANAGEMENT     Leonor Mercado 89 year old female is on warfarin with subtherapeutic INR result. (Goal INR 2.0-3.0)    Recent labs: (last 7 days)     05/19/22  1544   INR 1.5*       ASSESSMENT       Source(s): Chart Review and Home Care/Facility Nurse       Warfarin doses taken: Warfarin taken as instructed    Diet: No new diet changes identified    New illness, injury, or hospitalization: No    Medication/supplement changes: None noted    Signs or symptoms of bleeding or clotting: No    Previous INR: Subtherapeutic, no change despite dose increase last week    Additional findings: None       PLAN     Recommended plan for no diet, medication or health factor changes affecting INR     Dosing Instructions: booster dose then Increase your warfarin dose (10% change) with next INR in 1 week       Summary  As of 5/19/2022    Full warfarin instructions:  5/19: 3 mg; Otherwise 1 mg every Mon, Wed, Sat; 2 mg all other days   Next INR check:  5/26/2022             Telephone call with [facility] home care/facility nurse who verbalizes understanding and agrees to plan    Orders given to  Homecare nurse/facility to recheck    Education provided: None required    Plan made per [facility] anticoagulation protocol    Joellen Lynch, RN  Anticoagulation Clinic  5/19/2022    _______________________________________________________________________     Anticoagulation Episode Summary     Current INR goal:  2.0-3.0   TTR:  49.8 % (1 y)   Target end date:  Indefinite   Send INR reminders to:  BOB GERALDINE    Indications    Chronic atrial fibrillation (H) [I48.20]  Long term current use of anticoagulant therapy [Z79.01]           Comments:           Anticoagulation Care Providers     Provider Role Specialty Phone number    Arnel Garcia MD Referring Internal Medicine - Pediatrics [phone]    Marisabel Guido MD Referring Internal Medicine - Pediatrics [phone]

## 2022-05-19 NOTE — LETTER
May 23, 2022      Alfredo REBOLLEDO Rogelio  [address]   [place] [address]        Dear ,    We are writing to inform you of your test results.    The blood chemistries and kidney function tests are stable.         Resulted Orders   Basic metabolic panel  (Ca, Cl, CO2, Creat, Gluc, K, Na, BUN)   Result Value Ref Range    Sodium 137 133 - 144 mmol/L    Potassium 4.7 3.4 - 5.3 mmol/L    Chloride 102 94 - 109 mmol/L    Carbon Dioxide (CO2) 29 20 - 32 mmol/L    Anion Gap 6 3 - 14 mmol/L    Urea Nitrogen 27 7 - 30 mg/dL    Creatinine 1.40 (H) 0.52 - 1.04 mg/dL    Calcium 9.4 8.5 - 10.1 mg/dL    Glucose 72 70 - 99 mg/dL    GFR Estimate 34 (L) >60 mL/min/1.73m2      Comment:      Effective December 21, 2021 eGFRcr in adults is calculated using the 2021 CKD-EPI creatinine equation which includes age and gender (Isis et al., NEJM, DOI: 10.1056/UOUDfe8510082)       If you have any questions or concerns, please call the clinic at the number listed above.       Sincerely,      Arnel Garcia MD

## 2022-05-21 LAB
ANION GAP SERPL CALCULATED.3IONS-SCNC: 6 MMOL/L (ref 3–14)
BUN SERPL-MCNC: 27 MG/DL (ref 7–30)
CALCIUM SERPL-MCNC: 9.4 MG/DL (ref 8.5–10.1)
CHLORIDE BLD-SCNC: 102 MMOL/L (ref 94–109)
CO2 SERPL-SCNC: 29 MMOL/L (ref 20–32)
CREAT SERPL-MCNC: 1.4 MG/DL (ref 0.52–1.04)
GFR SERPL CREATININE-BSD FRML MDRD: 34 ML/MIN/1.73M2
GLUCOSE BLD-MCNC: 72 MG/DL (ref 70–99)
POTASSIUM BLD-SCNC: 4.7 MMOL/L (ref 3.4–5.3)
SODIUM SERPL-SCNC: 137 MMOL/L (ref 133–144)

## 2022-05-23 ENCOUNTER — MEDICAL CORRESPONDENCE (OUTPATIENT)
Dept: PEDIATRICS | Facility: CLINIC | Age: 87
End: 2022-05-23

## 2022-05-26 ENCOUNTER — ANTICOAGULATION THERAPY VISIT (OUTPATIENT)
Dept: ANTICOAGULATION | Facility: CLINIC | Age: 87
End: 2022-05-26
Payer: MEDICARE

## 2022-05-26 DIAGNOSIS — Z79.01 LONG TERM CURRENT USE OF ANTICOAGULANT THERAPY: ICD-10-CM

## 2022-05-26 DIAGNOSIS — I48.20 CHRONIC ATRIAL FIBRILLATION (H): Primary | ICD-10-CM

## 2022-05-26 LAB — INR (EXTERNAL): 1.5 (ref 2–3)

## 2022-05-26 NOTE — PROGRESS NOTES
ANTICOAGULATION MANAGEMENT     Lenoor Mercado 89 year old female is on warfarin with subtherapeutic INR result. (Goal INR 2.0-3.0)    Recent labs: (last 7 days)     05/26/22  1450   INR 1.5*       ASSESSMENT       Source(s): Chart Review and Home Care/Facility Nurse       Warfarin doses taken: Missed dose(s) may be affecting INR    Diet: No new diet changes identified    New illness, injury, or hospitalization: No    Medication/supplement changes: None noted    Signs or symptoms of bleeding or clotting: No    Previous INR: Subtherapeutic    Additional findings: None       PLAN     Recommended plan for temporary change(s) affecting INR     Dosing Instructions: booster dose then continue your current warfarin dose with next INR in 1 week       Summary  As of 5/26/2022    Full warfarin instructions:  5/26: 4 mg; Otherwise 1 mg every Mon, Wed, Sat; 2 mg all other days   Next INR check:  6/2/2022             Telephone call with [facility]  home care/facility nurse who verbalizes understanding and agrees to plan and who agrees to plan and repeated back plan correctly    Orders given to  Homecare nurse/facility to recheck    Education provided: Please call back if any changes to your diet, medications or how you've been taking warfarin    Plan made per [facility] anticoagulation protocol    Yoana Ruiz, RN  Anticoagulation Clinic  5/26/2022    _______________________________________________________________________     Anticoagulation Episode Summary     Current INR goal:  2.0-3.0   TTR:  48.8 % (1 y)   Target end date:  Indefinite   Send INR reminders to:  [facility]    Indications    Chronic atrial fibrillation (H) [I48.20]  Long term current use of anticoagulant therapy [Z79.01]           Comments:           Anticoagulation Care Providers     Provider Role Specialty Phone number    Arnel Garcia MD Referring Internal Medicine - Pediatrics [phone]    Marisabel Guido MD Referring Internal  Medicine - Pediatrics [phone]

## 2022-05-27 ENCOUNTER — TELEPHONE (OUTPATIENT)
Dept: PEDIATRICS | Facility: CLINIC | Age: 87
End: 2022-05-27
Payer: MEDICARE

## 2022-05-27 NOTE — TELEPHONE ENCOUNTER
Reason for Call:  Home Health Care     [facility] Nurse with  Homecare called regarding (reason for call):     Orders are needed for this patient. nursing    PT:     OT:     Skilled Nursing: INR management extension services     Pt Provider: Dr Garcia    Phone Number Homecare Nurse can be reached at: [phone]    Can we leave a detailed message on this number? YES    Phone number patient can be reached at:     Best Time: any    Call taken on 5/27/2022 at 7:42 AM by Radha Esquivel

## 2022-05-31 NOTE — TELEPHONE ENCOUNTER
Called and left message for home care agency requesting a call back due to being unclear if voicemail was secure or not.     Prabha Yañez RN on 5/31/2022 at 11:31 AM

## 2022-06-02 ENCOUNTER — ANTICOAGULATION THERAPY VISIT (OUTPATIENT)
Dept: ANTICOAGULATION | Facility: CLINIC | Age: 87
End: 2022-06-02
Payer: MEDICARE

## 2022-06-02 DIAGNOSIS — Z79.01 LONG TERM CURRENT USE OF ANTICOAGULANT THERAPY: ICD-10-CM

## 2022-06-02 DIAGNOSIS — I48.20 CHRONIC ATRIAL FIBRILLATION (H): Primary | ICD-10-CM

## 2022-06-02 LAB — INR (EXTERNAL): 2.6 (ref 0.9–1.1)

## 2022-06-02 NOTE — PROGRESS NOTES
ANTICOAGULATION MANAGEMENT     Leonor Mercado 89 year old female is on warfarin with therapeutic INR result. (Goal INR 2.0-3.0)    Recent labs: (last 7 days)     06/02/22  0000   INR 2.6*       ASSESSMENT       Source(s): Chart Review and Home Care/Facility Nurse       Warfarin doses taken: Warfarin taken as instructed    Diet: No new diet changes identified    New illness, injury, or hospitalization: No    Medication/supplement changes: None noted    Signs or symptoms of bleeding or clotting: No    Previous INR: Subtherapeutic    Additional findings: None       PLAN     Recommended plan for no diet, medication or health factor changes affecting INR     Dosing Instructions: continue your current warfarin dose with next INR in 1 week       Summary  As of 6/2/2022    Full warfarin instructions:  1 mg every Mon, Wed, Sat; 2 mg all other days   Next INR check:  6/9/2022             Telephone call with [facility] home care/facility nurse who verbalizes understanding and agrees to plan    Orders given to  Homecare nurse/facility to recheck    Education provided: None required    Plan made per [facility] anticoagulation protocol    Neftali Oshea RN  Anticoagulation Clinic  6/2/2022    _______________________________________________________________________     Anticoagulation Episode Summary     Current INR goal:  2.0-3.0   TTR:  49.9 % (1 y)   Target end date:  Indefinite   Send INR reminders to:  B[facility] GERALDINE    Indications    Chronic atrial fibrillation (H) [I48.20]  Long term current use of anticoagulant therapy [Z79.01]           Comments:           Anticoagulation Care Providers     Provider Role Specialty Phone number    Arnel Garcia MD Referring Internal Medicine - Pediatrics [phone]    Marisabel Guido MD Referring Internal Medicine - Pediatrics [phone]

## 2022-06-09 ENCOUNTER — ANTICOAGULATION THERAPY VISIT (OUTPATIENT)
Dept: ANTICOAGULATION | Facility: CLINIC | Age: 87
End: 2022-06-09
Payer: MEDICARE

## 2022-06-09 DIAGNOSIS — I48.20 CHRONIC ATRIAL FIBRILLATION (H): Primary | ICD-10-CM

## 2022-06-09 DIAGNOSIS — Z79.01 LONG TERM CURRENT USE OF ANTICOAGULANT THERAPY: ICD-10-CM

## 2022-06-09 LAB — INR (EXTERNAL): 2.2 (ref 0.9–1.1)

## 2022-06-09 NOTE — PROGRESS NOTES
ANTICOAGULATION MANAGEMENT     Leonor Mercado 89 year old female is on warfarin with therapeutic INR result. (Goal INR 2.0-3.0)    Recent labs: (last 7 days)     06/09/22  0000   INR 2.2*       ASSESSMENT       Source(s): Chart Review and Home Care/Facility Nurse       Warfarin doses taken: Warfarin taken as instructed    Diet: No new diet changes identified    New illness, injury, or hospitalization: No    Medication/supplement changes: None noted    Signs or symptoms of bleeding or clotting: No    Previous INR: Therapeutic last visit; previously outside of goal range    Additional findings: None       PLAN     Recommended plan for no diet, medication or health factor changes affecting INR     Dosing Instructions: continue your current warfarin dose with next INR in 1 week       Summary  As of 6/9/2022    Full warfarin instructions:  1 mg every Mon, Wed, Sat; 2 mg all other days   Next INR check:  6/16/2022             Telephone call with [facility] home care/facility nurse who verbalizes understanding and agrees to plan    Orders given to  Homecare nurse/facility to recheck    Education provided: Goal range and significance of current result and Importance of therapeutic range    Plan made per [facility] anticoagulation protocol    Neftali Oshea RN  Anticoagulation Clinic  6/9/2022    _______________________________________________________________________     Anticoagulation Episode Summary     Current INR goal:  2.0-3.0   TTR:  51.7 % (1 y)   Target end date:  Indefinite   Send INR reminders to:  [facility]    Indications    Chronic atrial fibrillation (H) [I48.20]  Long term current use of anticoagulant therapy [Z79.01]           Comments:           Anticoagulation Care Providers     Provider Role Specialty Phone number    Arnel Garcia MD Referring Internal Medicine - Pediatrics [phone]    Marisabel Guido MD Referring Internal Medicine - Pediatrics [phone]

## 2022-06-16 ENCOUNTER — ANTICOAGULATION THERAPY VISIT (OUTPATIENT)
Dept: ANTICOAGULATION | Facility: CLINIC | Age: 87
End: 2022-06-16
Payer: MEDICARE

## 2022-06-16 DIAGNOSIS — Z79.01 LONG TERM CURRENT USE OF ANTICOAGULANT THERAPY: ICD-10-CM

## 2022-06-16 DIAGNOSIS — I48.20 CHRONIC ATRIAL FIBRILLATION (H): Primary | ICD-10-CM

## 2022-06-16 LAB — INR (EXTERNAL): 1.9 (ref 0.9–1.1)

## 2022-06-16 NOTE — PROGRESS NOTES
ANTICOAGULATION MANAGEMENT     Leonor Mercado 89 year old female is on warfarin with subtherapeutic INR result. (Goal INR 2.0-3.0)    Recent labs: (last 7 days)     06/16/22  1343   INR 1.9*       ASSESSMENT       Source(s): Chart Review and Home Care/Facility Nurse       Warfarin doses taken: Less warfarin taken than planned which may be affecting INR    Diet: No new diet changes identified    New illness, injury, or hospitalization: No    Medication/supplement changes: None noted    Signs or symptoms of bleeding or clotting: No    Previous INR: Therapeutic last 2(+) visits    Additional findings: None       PLAN     Recommended plan for temporary change(s) affecting INR     Dosing Instructions: booster dose then continue your current warfarin dose with next INR in 1 week       Summary  As of 6/16/2022    Full warfarin instructions:  1 mg every Mon, Wed, Sat; 2 mg all other days   Next INR check:               Telephone call with [facility] home care/facility nurse who agrees to plan and repeated back plan correctly    Orders given to  Homecare nurse/facility to recheck    Education provided: Please call back if any changes to your diet, medications or how you've been taking warfarin, Monitoring for bleeding signs and symptoms and Monitoring for clotting signs and symptoms    Plan made per [facility] anticoagulation protocol    Adrianna Harper RN  Anticoagulation Clinic  6/16/2022    _______________________________________________________________________     Anticoagulation Episode Summary     Current INR goal:  2.0-3.0   TTR:  53.1 % (1 y)   Target end date:  Indefinite   Send INR reminders to:  [facility]    Indications    Chronic atrial fibrillation (H) [I48.20]  Long term current use of anticoagulant therapy [Z79.01]           Comments:           Anticoagulation Care Providers     Provider Role Specialty Phone number    Arnel Garcia MD Referring Internal Medicine - Pediatrics [phone]    Marisabel Guido  MD [facility] Internal Medicine - Pediatrics [phone]

## 2022-06-23 ENCOUNTER — ANCILLARY PROCEDURE (OUTPATIENT)
Dept: CARDIOLOGY | Facility: CLINIC | Age: 87
End: 2022-06-23
Attending: INTERNAL MEDICINE
Payer: MEDICARE

## 2022-06-23 ENCOUNTER — ANTICOAGULATION THERAPY VISIT (OUTPATIENT)
Dept: ANTICOAGULATION | Facility: CLINIC | Age: 87
End: 2022-06-23

## 2022-06-23 DIAGNOSIS — Z79.01 LONG TERM CURRENT USE OF ANTICOAGULANT THERAPY: ICD-10-CM

## 2022-06-23 DIAGNOSIS — Z95.0 CARDIAC PACEMAKER IN SITU: ICD-10-CM

## 2022-06-23 DIAGNOSIS — I48.20 CHRONIC ATRIAL FIBRILLATION (H): Primary | ICD-10-CM

## 2022-06-23 LAB — INR (EXTERNAL): 2.2 (ref 2–3)

## 2022-06-23 PROCEDURE — 93296 REM INTERROG EVL PM/IDS: CPT | Performed by: INTERNAL MEDICINE

## 2022-06-23 PROCEDURE — 93294 REM INTERROG EVL PM/LDLS PM: CPT | Performed by: INTERNAL MEDICINE

## 2022-06-23 NOTE — PROGRESS NOTES
ANTICOAGULATION MANAGEMENT     Leonor Mercado 89 year old female is on warfarin with therapeutic INR result. (Goal INR 2.0-3.0)    Recent labs: (last 7 days)     06/23/22  1326   INR 2.2       ASSESSMENT       Source(s): Chart Review and Home Care/Facility Nurse       Warfarin doses taken: Warfarin taken as instructed    Diet: No new diet changes identified    New illness, injury, or hospitalization: No    Medication/supplement changes: None noted    Signs or symptoms of bleeding or clotting: No    Previous INR: Therapeutic last visit; previously outside of goal range    Additional findings: Next week HC states they will be discharging       PLAN     Recommended plan for no diet, medication or health factor changes affecting INR     Dosing Instructions: continue your current warfarin dose with next INR in 1 week       Summary  As of 6/23/2022    Full warfarin instructions:  1 mg every Mon, Wed, Sat; 2 mg all other days   Next INR check:  6/30/2022             Telephone call with [facility] home care/facility nurse who verbalizes understanding and agrees to plan and who agrees to plan and repeated back plan correctly    Orders given to  Homecare nurse/facility to recheck    Education provided: Please call back if any changes to your diet, medications or how you've been taking warfarin    Plan made per [facility] anticoagulation protocol    Yoana Ruiz, RN  Anticoagulation Clinic  6/23/2022    _______________________________________________________________________     Anticoagulation Episode Summary     Current INR goal:  2.0-3.0   TTR:  54.4 % (1 y)   Target end date:  Indefinite   Send INR reminders to:  [facility]    Indications    Chronic atrial fibrillation (H) [I48.20]  Long term current use of anticoagulant therapy [Z79.01]           Comments:           Anticoagulation Care Providers     Provider Role Specialty Phone number    Arnel Garcia MD Referring Internal Medicine - Pediatrics [phone]     Marisabel Guido MD Referring Internal Medicine - Pediatrics [phone]

## 2022-06-24 DIAGNOSIS — I49.5 SICK SINUS SYNDROME (H): ICD-10-CM

## 2022-06-24 DIAGNOSIS — Z95.0 CARDIAC PACEMAKER IN SITU: Primary | ICD-10-CM

## 2022-06-30 ENCOUNTER — ANTICOAGULATION THERAPY VISIT (OUTPATIENT)
Dept: ANTICOAGULATION | Facility: CLINIC | Age: 87
End: 2022-06-30

## 2022-06-30 DIAGNOSIS — I48.20 CHRONIC ATRIAL FIBRILLATION (H): Primary | ICD-10-CM

## 2022-06-30 DIAGNOSIS — Z79.01 LONG TERM CURRENT USE OF ANTICOAGULANT THERAPY: ICD-10-CM

## 2022-06-30 LAB — INR (EXTERNAL): 4.1 (ref 0.9–1.1)

## 2022-06-30 NOTE — PROGRESS NOTES
- Improving. Maintaining oxygen sats >90% on 2L of O2 via NC.   - ABG showed respiratory acidosis. On daily Spiriva and Duo nebs as needed.  - Continue Prednisone 40 mg qd x 5 doses.   - Start Doxy 100 mg bid.      ANTICOAGULATION MANAGEMENT     Leonor Mercado 89 year old female is on warfarin with supratherapeutic INR result. (Goal INR 2.0-3.0)    Recent labs: (last 7 days)     06/30/22  1351   INR 4.1*       ASSESSMENT       Source(s): Chart Review and Home Care/Facility Nurse       Warfarin doses taken: More warfarin taken than planned which may be affecting INR. Home care normally sets up patient's medications, however, patient forgot that last week on Thursday and Friday and self-dosed. Patient is not sure how much warfarin she took on 6/23 & 6/24. Since patient reports no other changes, it is likely that patient took more warfarin than directed. After 6/24/22, patient remembered that home care had set up her medications, and then began taking them as directed.    Diet: No new diet changes identified    New illness, injury, or hospitalization: No    Medication/supplement changes: None noted    Signs or symptoms of bleeding or clotting: No    Previous INR: Subtherapeutic    Additional findings: Patient is discharging from home care today     PLAN     Recommended plan for temporary change(s) affecting INR     Dosing Instructions: hold dose then continue your current warfarin dose with next INR in 1 week       Summary  As of 6/30/2022    Full warfarin instructions:  6/30: Hold; Otherwise 1 mg every Mon, Wed, Sat; 2 mg all other days   Next INR check:               Telephone call with [facility] home care/facility nurse who agrees to plan and repeated back plan correctly    Lab visit scheduled    Education provided: Please call back if any changes to your diet, medications or how you've been taking warfarin, Importance of therapeutic range, Importance of following up at instructed interval, Monitoring for bleeding signs and symptoms, Monitoring for clotting signs and symptoms and Importance of notifying clinic for changes in medications; a sooner lab recheck maybe needed.    Plan made per [facility] anticoagulation protocol    Adrianna  RIZWAN Harper RN  Anticoagulation Clinic  6/30/2022    _______________________________________________________________________     Anticoagulation Episode Summary     Current INR goal:  2.0-3.0   TTR:  54.4 % (1 y)   Target end date:  Indefinite   Send INR reminders to:  [facility]    Indications    Chronic atrial fibrillation (H) [I48.20]  Long term current use of anticoagulant therapy [Z79.01]           Comments:           Anticoagulation Care Providers     Provider Role Specialty Phone number    Arnel Garcia MD Referring Internal Medicine - Pediatrics [phone]    Marisabel Guido MD Referring Internal Medicine - Pediatrics [phone]

## 2022-07-01 NOTE — PROGRESS NOTES
"  ANTICOAGULATION FOLLOW-UP CLINIC VISIT    Patient Name:  Leonor Mercado  Date:  7/18/2017  Contact Type:  Face to Face    SUBJECTIVE:     Patient Findings     Positives Change in diet/appetite (Pt reports eating more greens, will resume usual diet.)           OBJECTIVE    INR Protime   Date Value Ref Range Status   07/18/2017 1.8 (A) 0.86 - 1.14 Final       ASSESSMENT / PLAN  INR assessment SUB    Recheck INR In: 4 WEEKS    INR Location Clinic      Anticoagulation Summary as of 7/18/2017     INR goal 2.0-3.0   Today's INR 1.8!   Maintenance plan 2 mg (2 mg x 1) every day   Full instructions 7/18: 3 mg; Otherwise 2 mg every day   Weekly total 14 mg   Plan last modified Yoana Waddell RN (12/15/2016)   Next INR check 8/15/2017   Priority INR   Target end date     Indications   Long-term (current) use of anticoagulants [Z79.01] [Z79.01]  Chronic atrial fibrillation (H) [I48.2]         Anticoagulation Episode Summary     INR check location     Preferred lab     Send INR reminders to [facility]    Comments Pt's 1st name is pronounced \"ondray\"      Anticoagulation Care Providers     Provider Role Specialty Phone number    Dannielle Darby MD Responsible Internal Medicine [phone]            See the Encounter Report to view Anticoagulation Flowsheet and Dosing Calendar (Go to Encounters tab in chart review, and find the Anticoagulation Therapy Visit)    Dosage adjustment made based on physician directed care plan.    Dilma Rider RN               " show

## 2022-07-07 ENCOUNTER — LAB (OUTPATIENT)
Dept: LAB | Facility: CLINIC | Age: 87
End: 2022-07-07
Payer: MEDICARE

## 2022-07-07 ENCOUNTER — ANTICOAGULATION THERAPY VISIT (OUTPATIENT)
Dept: ANTICOAGULATION | Facility: CLINIC | Age: 87
End: 2022-07-07

## 2022-07-07 DIAGNOSIS — I48.20 CHRONIC ATRIAL FIBRILLATION (H): ICD-10-CM

## 2022-07-07 DIAGNOSIS — I48.20 CHRONIC ATRIAL FIBRILLATION (H): Primary | ICD-10-CM

## 2022-07-07 DIAGNOSIS — Z79.01 LONG TERM CURRENT USE OF ANTICOAGULANT THERAPY: ICD-10-CM

## 2022-07-07 LAB — INR BLD: 2.1 (ref 0.9–1.1)

## 2022-07-07 PROCEDURE — 36416 COLLJ CAPILLARY BLOOD SPEC: CPT

## 2022-07-07 PROCEDURE — 85610 PROTHROMBIN TIME: CPT

## 2022-07-07 NOTE — PROGRESS NOTES
ANTICOAGULATION MANAGEMENT     Leonor Mercado 89 year old female is on warfarin with therapeutic INR result. (Goal INR 2.0-3.0)    Recent labs: (last 7 days)     07/07/22  1034   INR 2.1*       ASSESSMENT       Source(s): Chart Review and Patient/Caregiver Call       Warfarin doses taken: Warfarin recently held for supra INR on 6/30/22 which may be affecting INR    Diet: No new diet changes identified    New illness, injury, or hospitalization: No    Medication/supplement changes: None noted    Signs or symptoms of bleeding or clotting: No    Previous INR: Supratherapeutic due to patient taking extra warfarin, in error.    Additional findings: Daughter, Cleo, states that [facility] still sets up her own pills every 2 weeks.  Cleo thinks she may have them set up wrong so she is going to [facility] today to switch the pill boxes to match current warfarin maintenance dose.       PLAN     Recommended plan for temporary change(s) affecting INR     Dosing Instructions: continue your current warfarin dose with next INR in 12 days       Summary  As of 7/7/2022    Full warfarin instructions:  1 mg every Mon, Wed, Sat; 2 mg all other days   Next INR check:  7/19/2022             Telephone call with  mazin Gallo who agrees to plan and repeated back plan correctly    Lab visit scheduled    Education provided: Contact [phone]  with any changes, questions or concerns.     Plan made per [facility] anticoagulation protocol    Mariola Valenzuela, RN  Anticoagulation Clinic  7/7/2022    _______________________________________________________________________     Anticoagulation Episode Summary     Current INR goal:  2.0-3.0   TTR:  53.3 % (1 y)   Target end date:  Indefinite   Send INR reminders to:  [facility]    Indications    Chronic atrial fibrillation (H) [I48.20]  Long term current use of anticoagulant therapy [Z79.01]           Comments:           Anticoagulation Care Providers     Provider Role Specialty Phone number    Arnel Garcia  MD Tj Referring Internal Medicine - Pediatrics [phone]    Marisabel Guido MD Referring Internal Medicine - Pediatrics [phone]

## 2022-07-19 ENCOUNTER — ANTICOAGULATION THERAPY VISIT (OUTPATIENT)
Dept: ANTICOAGULATION | Facility: CLINIC | Age: 87
End: 2022-07-19

## 2022-07-19 ENCOUNTER — LAB (OUTPATIENT)
Dept: LAB | Facility: CLINIC | Age: 87
End: 2022-07-19
Payer: MEDICARE

## 2022-07-19 DIAGNOSIS — Z79.01 LONG TERM CURRENT USE OF ANTICOAGULANT THERAPY: ICD-10-CM

## 2022-07-19 DIAGNOSIS — I48.20 CHRONIC ATRIAL FIBRILLATION (H): Primary | ICD-10-CM

## 2022-07-19 DIAGNOSIS — I48.20 CHRONIC ATRIAL FIBRILLATION (H): ICD-10-CM

## 2022-07-19 DIAGNOSIS — Z79.01 LONG TERM CURRENT USE OF ANTICOAGULANTS WITH INR GOAL OF 2.0-3.0: ICD-10-CM

## 2022-07-19 LAB — INR BLD: 1.6 (ref 0.9–1.1)

## 2022-07-19 PROCEDURE — 36416 COLLJ CAPILLARY BLOOD SPEC: CPT

## 2022-07-19 PROCEDURE — 85610 PROTHROMBIN TIME: CPT

## 2022-07-19 NOTE — PROGRESS NOTES
ANTICOAGULATION MANAGEMENT     Leonor Mercado 89 year old female is on warfarin with subtherapeutic INR result. (Goal INR 2.0-3.0)    Recent labs: (last 7 days)     07/19/22  1332   INR 1.6*       ASSESSMENT       Source(s): Chart Review and Patient/Caregiver Call       Warfarin doses taken: Missed dose(s) may be affecting INR    Diet: No new diet changes identified    New illness, injury, or hospitalization: No    Medication/supplement changes: None noted    Signs or symptoms of bleeding or clotting: No    Previous INR: Therapeutic last visit; previously outside of goal range    Additional findings: None       PLAN     Recommended plan for temporary change(s) affecting INR     Dosing Instructions: booster dose then continue your current warfarin dose with next INR in 2 weeks       Summary  As of 7/19/2022    Full warfarin instructions:  7/19: 3 mg; Otherwise 1 mg every Mon, Wed, Sat; 2 mg all other days   Next INR check:  8/2/2022             Telephone call with  Cleo who verbalizes understanding and agrees to plan    Lab visit scheduled    Education provided: Please call back if any changes to your diet, medications or how you've been taking warfarin    Plan made per [facility] anticoagulation protocol    Alden Martin RN  Anticoagulation Clinic  7/19/2022    _______________________________________________________________________     Anticoagulation Episode Summary     Current INR goal:  2.0-3.0   TTR:  50.7 % (1 y)   Target end date:  Indefinite   Send INR reminders to:  [facility]    Indications    Chronic atrial fibrillation (H) [I48.20]  Long term current use of anticoagulant therapy [Z79.01]           Comments:           Anticoagulation Care Providers     Provider Role Specialty Phone number    Arnel Garcia MD Referring Internal Medicine - Pediatrics [phone]    Marisabel Guido MD Referring Internal Medicine - Pediatrics [phone]

## 2022-07-21 RX ORDER — WARFARIN SODIUM 2 MG/1
TABLET ORAL
Qty: 60 TABLET | Refills: 0 | OUTPATIENT
Start: 2022-07-21

## 2022-07-22 DIAGNOSIS — Z79.01 LONG TERM CURRENT USE OF ANTICOAGULANTS WITH INR GOAL OF 2.0-3.0: ICD-10-CM

## 2022-07-22 DIAGNOSIS — I48.20 CHRONIC ATRIAL FIBRILLATION (H): ICD-10-CM

## 2022-07-22 LAB
MDC_IDC_MSMT_BATTERY_DTM: NORMAL
MDC_IDC_MSMT_BATTERY_IMPEDANCE: 1396 OHM
MDC_IDC_MSMT_BATTERY_REMAINING_LONGEVITY: 55 MO
MDC_IDC_MSMT_BATTERY_STATUS: NORMAL
MDC_IDC_MSMT_BATTERY_VOLTAGE: 2.77 V
MDC_IDC_MSMT_LEADCHNL_RA_IMPEDANCE_VALUE: 67 OHM
MDC_IDC_MSMT_LEADCHNL_RV_IMPEDANCE_VALUE: 697 OHM
MDC_IDC_PG_IMPLANT_DTM: NORMAL
MDC_IDC_PG_MFG: NORMAL
MDC_IDC_PG_MODEL: NORMAL
MDC_IDC_PG_SERIAL: NORMAL
MDC_IDC_PG_TYPE: NORMAL
MDC_IDC_SESS_CLINIC_NAME: NORMAL
MDC_IDC_SESS_DTM: NORMAL
MDC_IDC_SESS_TYPE: NORMAL
MDC_IDC_SET_BRADY_LOWRATE: 60 {BEATS}/MIN
MDC_IDC_SET_BRADY_MAX_SENSOR_RATE: 120 {BEATS}/MIN
MDC_IDC_SET_BRADY_MAX_TRACKING_RATE: 105 {BEATS}/MIN
MDC_IDC_SET_BRADY_MODE: NORMAL
MDC_IDC_SET_LEADCHNL_RV_PACING_AMPLITUDE: 2.5 V
MDC_IDC_SET_LEADCHNL_RV_PACING_CAPTURE_MODE: NORMAL
MDC_IDC_SET_LEADCHNL_RV_PACING_POLARITY: NORMAL
MDC_IDC_SET_LEADCHNL_RV_PACING_PULSEWIDTH: 0.52 MS
MDC_IDC_SET_LEADCHNL_RV_SENSING_POLARITY: NORMAL
MDC_IDC_SET_LEADCHNL_RV_SENSING_SENSITIVITY: 2.8 MV
MDC_IDC_SET_ZONE_DETECTION_INTERVAL: 333.33 MS
MDC_IDC_SET_ZONE_TYPE: NORMAL
MDC_IDC_SET_ZONE_TYPE: NORMAL
MDC_IDC_STAT_AT_BURDEN_PERCENT: 0 %
MDC_IDC_STAT_AT_DTM_END: NORMAL
MDC_IDC_STAT_AT_DTM_START: NORMAL
MDC_IDC_STAT_BRADY_DTM_END: NORMAL
MDC_IDC_STAT_BRADY_DTM_START: NORMAL
MDC_IDC_STAT_BRADY_RV_PERCENT_PACED: 70 %
MDC_IDC_STAT_EPISODE_RECENT_COUNT: 0
MDC_IDC_STAT_EPISODE_RECENT_COUNT: 0
MDC_IDC_STAT_EPISODE_RECENT_COUNT_DTM_END: NORMAL
MDC_IDC_STAT_EPISODE_RECENT_COUNT_DTM_END: NORMAL
MDC_IDC_STAT_EPISODE_RECENT_COUNT_DTM_START: NORMAL
MDC_IDC_STAT_EPISODE_RECENT_COUNT_DTM_START: NORMAL
MDC_IDC_STAT_EPISODE_TYPE: NORMAL
MDC_IDC_STAT_EPISODE_TYPE: NORMAL

## 2022-07-26 RX ORDER — WARFARIN SODIUM 2 MG/1
TABLET ORAL
Qty: 70 TABLET | Refills: 1 | Status: SHIPPED | OUTPATIENT
Start: 2022-07-26 | End: 2022-08-09 | Stop reason: ALTCHOICE

## 2022-07-26 NOTE — TELEPHONE ENCOUNTER
ANTICOAGULATION MANAGEMENT:  Medication Refill    Anticoagulation Summary  As of 7/19/2022    Warfarin maintenance plan:  1 mg (2 mg x 0.5) every Mon, Wed, Sat; 2 mg (2 mg x 1) all other days   Next INR check:  8/2/2022   Target end date:  Indefinite    Indications    Chronic atrial fibrillation (H) [I48.20]  Long term current use of anticoagulant therapy [Z79.01]             Anticoagulation Care Providers     Provider Role Specialty Phone number    Arnel Garcia MD Referring Internal Medicine - Pediatrics [phone]    Marisabel Guido MD Referring Internal Medicine - Pediatrics [phone]          Visit with referring provider/group within last year: Yes    [facility] referral signed within last year: Yes    Leonor meets all criteria for refill (current [facility] referral, office visit with referring provider/group in last year, lab monitoring up to date or not exceeding 2 weeks overdue). Rx instructions and quantity supplied updated to match patient's current dosing plan. Warfarin 90 day supply with 1 refill granted per [facility] protocol     Mariola Valenzuela RN  Anticoagulation Clinic

## 2022-08-04 ENCOUNTER — ANTICOAGULATION THERAPY VISIT (OUTPATIENT)
Dept: ANTICOAGULATION | Facility: CLINIC | Age: 87
End: 2022-08-04

## 2022-08-04 ENCOUNTER — LAB (OUTPATIENT)
Dept: LAB | Facility: CLINIC | Age: 87
End: 2022-08-04
Payer: MEDICARE

## 2022-08-04 DIAGNOSIS — I48.20 CHRONIC ATRIAL FIBRILLATION (H): Primary | ICD-10-CM

## 2022-08-04 DIAGNOSIS — Z79.01 LONG TERM CURRENT USE OF ANTICOAGULANT THERAPY: ICD-10-CM

## 2022-08-04 DIAGNOSIS — I48.20 CHRONIC ATRIAL FIBRILLATION (H): ICD-10-CM

## 2022-08-04 LAB — INR BLD: 1.3 (ref 0.9–1.1)

## 2022-08-04 PROCEDURE — 36416 COLLJ CAPILLARY BLOOD SPEC: CPT

## 2022-08-04 PROCEDURE — 85610 PROTHROMBIN TIME: CPT

## 2022-08-04 NOTE — PROGRESS NOTES
ANTICOAGULATION MANAGEMENT     Leonor Mercado 89 year old female is on warfarin with subtherapeutic INR result. (Goal INR 2.0-3.0)    Recent labs: (last 7 days)     08/04/22  1330   INR 1.3*       ASSESSMENT       Source(s): Chart Review and Patient/Caregiver Call       Warfarin doses taken: Less warfarin taken than planned which may be affecting INR, missed 8/3 denies any other missed warfarin doses in the last 7 days    Diet: No new diet changes identified    New illness, injury, or hospitalization: Yes, foot pain since Tuesday morning, denies any injury    Medication/supplement changes: None noted    Signs or symptoms of bleeding or clotting: No    Previous INR: Subtherapeutic    Additional findings: None       PLAN     Recommended plan for temporary change(s) affecting INR     Dosing Instructions: booster dose then continue your current warfarin dose with next INR in 5 days       Summary  As of 8/4/2022    Full warfarin instructions:  8/4: 4 mg; Otherwise 1 mg every Mon, Wed, Sat; 2 mg all other days   Next INR check:  8/9/2022             Telephone call with  Cleo, patient's daughter, who verbalizes understanding and agrees to plan    Lab visit scheduled    Education provided: Please call back if any changes to your diet, medications or how you've been taking warfarin and Contact [phone]  with any changes, questions or concerns.     Plan made per [facility] anticoagulation protocol    Dilma Rider RN  Anticoagulation Clinic  8/4/2022    _______________________________________________________________________     Anticoagulation Episode Summary     Current INR goal:  2.0-3.0   TTR:  46.4 % (1 y)   Target end date:  Indefinite   Send INR reminders to:  [facility]    Indications    Chronic atrial fibrillation (H) [I48.20]  Long term current use of anticoagulant therapy [Z79.01]           Comments:           Anticoagulation Care Providers     Provider Role Specialty Phone number    Arnel Garcia MD  Referring Internal Medicine - Pediatrics [phone]    Marisabel Guido MD Referring Internal Medicine - Pediatrics [phone]

## 2022-08-09 ENCOUNTER — LAB (OUTPATIENT)
Dept: LAB | Facility: CLINIC | Age: 87
End: 2022-08-09
Payer: MEDICARE

## 2022-08-09 ENCOUNTER — ANTICOAGULATION THERAPY VISIT (OUTPATIENT)
Dept: ANTICOAGULATION | Facility: CLINIC | Age: 87
End: 2022-08-09

## 2022-08-09 ENCOUNTER — APPOINTMENT (OUTPATIENT)
Dept: GENERAL RADIOLOGY | Facility: CLINIC | Age: 87
End: 2022-08-09
Attending: PHYSICIAN ASSISTANT
Payer: MEDICARE

## 2022-08-09 ENCOUNTER — APPOINTMENT (OUTPATIENT)
Dept: ULTRASOUND IMAGING | Facility: CLINIC | Age: 87
End: 2022-08-09
Attending: PHYSICIAN ASSISTANT
Payer: MEDICARE

## 2022-08-09 ENCOUNTER — HOSPITAL ENCOUNTER (EMERGENCY)
Facility: CLINIC | Age: 87
Discharge: HOME | End: 2022-08-09
Attending: PHYSICIAN ASSISTANT | Admitting: PHYSICIAN ASSISTANT
Payer: MEDICARE

## 2022-08-09 ENCOUNTER — OFFICE VISIT (OUTPATIENT)
Dept: URGENT CARE | Facility: URGENT CARE | Age: 87
End: 2022-08-09
Payer: MEDICARE

## 2022-08-09 VITALS
HEART RATE: 68 BPM | DIASTOLIC BLOOD PRESSURE: 67 MMHG | OXYGEN SATURATION: 98 % | TEMPERATURE: 98 F | RESPIRATION RATE: 20 BRPM | SYSTOLIC BLOOD PRESSURE: 121 MMHG

## 2022-08-09 VITALS
WEIGHT: 147 LBS | DIASTOLIC BLOOD PRESSURE: 68 MMHG | HEIGHT: 64 IN | HEART RATE: 85 BPM | TEMPERATURE: 98 F | BODY MASS INDEX: 25.1 KG/M2 | SYSTOLIC BLOOD PRESSURE: 116 MMHG | OXYGEN SATURATION: 93 % | RESPIRATION RATE: 20 BRPM

## 2022-08-09 DIAGNOSIS — I48.20 CHRONIC ATRIAL FIBRILLATION (H): ICD-10-CM

## 2022-08-09 DIAGNOSIS — I48.20 CHRONIC ATRIAL FIBRILLATION (H): Primary | ICD-10-CM

## 2022-08-09 DIAGNOSIS — Z79.01 LONG TERM CURRENT USE OF ANTICOAGULANT THERAPY: ICD-10-CM

## 2022-08-09 DIAGNOSIS — M79.605 PAIN AND SWELLING OF LEFT LOWER EXTREMITY: Primary | ICD-10-CM

## 2022-08-09 DIAGNOSIS — I82.5Y2 CHRONIC DEEP VEIN THROMBOSIS (DVT) OF PROXIMAL VEIN OF LEFT LOWER EXTREMITY (H): ICD-10-CM

## 2022-08-09 DIAGNOSIS — M79.89 PAIN AND SWELLING OF LEFT LOWER EXTREMITY: Primary | ICD-10-CM

## 2022-08-09 DIAGNOSIS — M79.672 LEFT FOOT PAIN: ICD-10-CM

## 2022-08-09 LAB
ANION GAP SERPL CALCULATED.3IONS-SCNC: 11 MMOL/L (ref 7–15)
BASOPHILS # BLD AUTO: 0 10E3/UL (ref 0–0.2)
BASOPHILS NFR BLD AUTO: 1 %
BUN SERPL-MCNC: 33.5 MG/DL (ref 8–23)
CALCIUM SERPL-MCNC: 9.5 MG/DL (ref 8.2–9.6)
CHLORIDE SERPL-SCNC: 99 MMOL/L (ref 98–107)
CREAT SERPL-MCNC: 1.53 MG/DL (ref 0.51–0.95)
CRP SERPL-MCNC: 14.47 MG/L
DEPRECATED HCO3 PLAS-SCNC: 29 MMOL/L (ref 22–29)
EOSINOPHIL # BLD AUTO: 0.2 10E3/UL (ref 0–0.7)
EOSINOPHIL NFR BLD AUTO: 3 %
ERYTHROCYTE [DISTWIDTH] IN BLOOD BY AUTOMATED COUNT: 14.4 % (ref 10–15)
GFR SERPL CREATININE-BSD FRML MDRD: 31 ML/MIN/1.73M2
GLUCOSE SERPL-MCNC: 95 MG/DL (ref 70–99)
HCT VFR BLD AUTO: 39.9 % (ref 35–47)
HGB BLD-MCNC: 12.4 G/DL (ref 11.7–15.7)
IMM GRANULOCYTES # BLD: 0 10E3/UL
IMM GRANULOCYTES NFR BLD: 0 %
INR BLD: 1.4 (ref 0.9–1.1)
LYMPHOCYTES # BLD AUTO: 1.6 10E3/UL (ref 0.8–5.3)
LYMPHOCYTES NFR BLD AUTO: 21 %
MCH RBC QN AUTO: 29.8 PG (ref 26.5–33)
MCHC RBC AUTO-ENTMCNC: 31.1 G/DL (ref 31.5–36.5)
MCV RBC AUTO: 96 FL (ref 78–100)
MONOCYTES # BLD AUTO: 0.7 10E3/UL (ref 0–1.3)
MONOCYTES NFR BLD AUTO: 9 %
NEUTROPHILS # BLD AUTO: 4.9 10E3/UL (ref 1.6–8.3)
NEUTROPHILS NFR BLD AUTO: 66 %
NRBC # BLD AUTO: 0 10E3/UL
NRBC BLD AUTO-RTO: 0 /100
PLATELET # BLD AUTO: 202 10E3/UL (ref 150–450)
POTASSIUM SERPL-SCNC: 4.6 MMOL/L (ref 3.4–5.3)
RBC # BLD AUTO: 4.16 10E6/UL (ref 3.8–5.2)
SODIUM SERPL-SCNC: 139 MMOL/L (ref 136–145)
URATE SERPL-MCNC: 9.2 MG/DL (ref 2.4–5.7)
WBC # BLD AUTO: 7.5 10E3/UL (ref 4–11)

## 2022-08-09 PROCEDURE — 73630 X-RAY EXAM OF FOOT: CPT | Mod: LT

## 2022-08-09 PROCEDURE — 80048 BASIC METABOLIC PNL TOTAL CA: CPT | Performed by: PHYSICIAN ASSISTANT

## 2022-08-09 PROCEDURE — 86140 C-REACTIVE PROTEIN: CPT | Performed by: PHYSICIAN ASSISTANT

## 2022-08-09 PROCEDURE — 36416 COLLJ CAPILLARY BLOOD SPEC: CPT

## 2022-08-09 PROCEDURE — 85025 COMPLETE CBC W/AUTO DIFF WBC: CPT | Performed by: PHYSICIAN ASSISTANT

## 2022-08-09 PROCEDURE — 250N000013 HC RX MED GY IP 250 OP 250 PS 637: Performed by: PHYSICIAN ASSISTANT

## 2022-08-09 PROCEDURE — 36415 COLL VENOUS BLD VENIPUNCTURE: CPT | Performed by: PHYSICIAN ASSISTANT

## 2022-08-09 PROCEDURE — 93971 EXTREMITY STUDY: CPT | Mod: LT

## 2022-08-09 PROCEDURE — 84550 ASSAY OF BLOOD/URIC ACID: CPT | Performed by: PHYSICIAN ASSISTANT

## 2022-08-09 PROCEDURE — 99285 EMERGENCY DEPT VISIT HI MDM: CPT | Mod: 25

## 2022-08-09 PROCEDURE — 99215 OFFICE O/P EST HI 40 MIN: CPT | Performed by: PHYSICIAN ASSISTANT

## 2022-08-09 PROCEDURE — 85610 PROTHROMBIN TIME: CPT

## 2022-08-09 RX ORDER — CEPHALEXIN 500 MG/1
500 CAPSULE ORAL 2 TIMES DAILY
Qty: 14 CAPSULE | Refills: 0 | Status: SHIPPED | OUTPATIENT
Start: 2022-08-09 | End: 2022-08-16

## 2022-08-09 RX ORDER — CEPHALEXIN 500 MG/1
500 CAPSULE ORAL ONCE
Status: COMPLETED | OUTPATIENT
Start: 2022-08-09 | End: 2022-08-09

## 2022-08-09 RX ORDER — PREDNISONE 20 MG/1
TABLET ORAL
Qty: 10 TABLET | Refills: 0 | Status: SHIPPED | OUTPATIENT
Start: 2022-08-09 | End: 2022-08-16

## 2022-08-09 RX ADMIN — CEPHALEXIN 500 MG: 500 CAPSULE ORAL at 19:31

## 2022-08-09 RX ADMIN — APIXABAN 2.5 MG: 2.5 TABLET, FILM COATED ORAL at 19:31

## 2022-08-09 ASSESSMENT — ENCOUNTER SYMPTOMS
WEAKNESS: 0
FEVER: 0
VOMITING: 0
ARTHRALGIAS: 1
COLOR CHANGE: 1
SHORTNESS OF BREATH: 0
NUMBNESS: 0

## 2022-08-09 ASSESSMENT — ACTIVITIES OF DAILY LIVING (ADL)
ADLS_ACUITY_SCORE: 37
ADLS_ACUITY_SCORE: 39

## 2022-08-09 NOTE — PROGRESS NOTES
Patient was seen in urgent care and then sent to ER to rule out DVT.  Will need to watch chart for discharge from ER to discuss sub-therapeutic INR today.    Mariola Valenzuela RN  Anticoagulation Clinic

## 2022-08-09 NOTE — PROGRESS NOTES
ASSESSMENT/PLAN:      (M79.605,  M79.89) Pain and swelling of left lower extremity  (primary encounter diagnosis)    MDM: 89 year old female, with a past medical history that includes chronic A-Fib, CHF, CKD, CAD, PVD, Pulmonary fibrosis, and at least 3 sub- therapeutic INR's over the past several weeks (please see below for full past medical history), with unprovoked/unexplained pain, swelling, redness and heat of the left foot, ankle and left lower leg. DVT is in differential and persistent sub-therapeutic INR's raise some potential increased suspicion. If evaluation for DVT is negative, cellulitis, osteomyelitis, gout and other inflammatory arthritis remain in differential.           Plan:     All above medical decision making is reviewed with patient and daughter in layperson terms. They both verbalized understanding of and agree to this plan.     The below, along with subtherapeutic INR lab results, is provided for patient and daughter to hand carry to ER now. They had not decided on discharge but were leaning toward either [facility] or [facility].   ----------------------------    Patient discharge summary:     Go to ER now for further evaluation for your one week of left lower foot/ankle and lower leg pain and redness:     Because your INR is low, a blood clot in your leg is one possible explanation and you should be screened for this in the emergency room.     Please share the below INRs with the ER doctor.       Component      Latest Ref Rng & Units 7/19/2022 8/4/2022 8/9/2022   INR Point of Care      0.9 - 1.1 1.6 (H) 1.3 (H) 1.4 (H)       If you do not have a blood clot, your symptoms my ultimately be found to be caused by infection or gout (although you tell me you have not had gout before)     (I48.20) Chronic atrial fibrillation (H)    (Z79.01) Long term current use of anticoagulant therapy    ---------------------------  Leonor Mercado is a 89 year old female, with a past medical history that  includes chronic A-Fib, CHF, CKD, CAD, PVD, Pulmonary fibrosis, and at least 3 sub- therapeutic INR's over the past several weeks (please see below for full past medical history), presenting to urgent care (along with daughter) for evaluation of unprovoked/unexplained pain, swelling, redness and heat of the left foot, ankle and left lower leg.  Patient and daughter report it is slowing worsening.     No prior history of gout by patient and daughter report. No prior history of DVT or PE by patient and daughter report.         ROS:   CONSTITUTIONAL: No acute onset fever,chills or severe weakenss  CARDIAC: No acute onset chest pain. No acute change in longstanding baseline shortness of breath  RESP: No acute onset cough or chest pain. No acute change in longstanding baseline shortness of breath  GI: No acute onset abdominal pain. No acute onset nausea, vomiting or diarrhea   SKIN: No skin wounds on foot or lower leg prior to redness and swelling. positive as per HPI. No acute  rash or lesions elsewhere           Past Medical History:   Diagnosis Date     Atrial fibrillation (H)     Sick sinus syndrome, PAT, AF     BCC (basal cell carcinoma of skin)     Face     CHF - Chr Diastolic (H) 11/21/2017     Chronic renal insufficiency      COPD (chronic obstructive pulmonary disease) (H)      Coronary artery disease     2-05[place]-CAD-taxus stentx2 2.5-12,2.5-12 in LADp and LADm, 80%nonDomRCA-LcxDom-mild,EF60%     Hyperlipidemia      Hypertension      IBS (irritable bowel syndrome)      Osteoporosis      Pulmonary fibrosis (H)     do not use amiodarone     Sick sinus syndrome - s/p PPM 2003 (H) 12/16/2017     SSS (sick sinus syndrome) (H)     DDD pacer (see surgery history section)     Vertigo      Patient Active Problem List   Diagnosis     Hyperlipidemia LDL goal <100     Cardiac pacemaker in situ     Senile osteoporosis     Anemia     Long term current use of anticoagulant therapy     Degeneration of lumbar intervertebral  disc     CKD (chronic kidney disease) stage 3, GFR 30-59 ml/min (H)     Chronic atrial fibrillation (H)     Chronic obstructive pulmonary disease, unspecified COPD type (H)     Coronary artery disease involving native coronary artery of native heart without angina pectoris     Sick sinus syndrome (H)     Adjustment disorder, unspecified type     Presbyesophagus     Chronic heart failure with preserved ejection fraction (H)     Vertigo     Chronic respiratory failure with hypoxia (H)     Pleural effusion     Closed fracture of multiple ribs of left side, initial encounter     Peripheral vascular disease (H)     Current Outpatient Medications   Medication     acetaminophen (TYLENOL) 500 MG tablet     albuterol (PROAIR HFA/PROVENTIL HFA/VENTOLIN HFA) 108 (90 Base) MCG/ACT inhaler     albuterol (PROVENTIL) (2.5 MG/3ML) 0.083% neb solution     albuterol (PROVENTIL) (2.5 MG/3ML) 0.083% neb solution     Calcium Carbonate-Vitamin D (CALCIUM 500+D HIGH POTENCY PO)     diltiazem ER COATED BEADS (CARDIZEM CD/CARTIA XT) 240 MG 24 hr capsule     Fluticasone-Umeclidin-Vilanterol (TRELEGY ELLIPTA) 100-62.5-25 MCG/INH oral inhaler     furosemide (LASIX) 20 MG tablet     isosorbide mononitrate (IMDUR) 30 MG 24 hr tablet     Lactobacillus (PROBIOTIC ACIDOPHILUS) TABS     losartan (COZAAR) 25 MG tablet     melatonin 5 MG tablet     mirtazapine (REMERON) 15 MG tablet     Multiple Vitamins-Minerals (OCUVITE PO)     polyethylene glycol (MIRALAX/GLYCOLAX) Packet     pravastatin (PRAVACHOL) 40 MG tablet     senna (SENOKOT) 8.6 MG tablet     Vitamin D, Cholecalciferol, 1000 units TABS     warfarin ANTICOAGULANT (COUMADIN) 2 MG tablet     No current facility-administered medications for this visit.     OBJECTIVE  /67   Pulse 68   Temp 98  F (36.7  C)   Resp 20   LMP  (LMP Unknown)   SpO2 98%     GENERAL:  Alert. NAD  LLE: Left foot and ankle positive for erythema and swelling. Patient has pain on palpation of left calf, ankle and  foot on exam. No overt breeches in skin.   Cardiac: Heart sounds are distant. Irregularly irregular rhythm. I am unable to appreciate any murmurs or extra heart sounds.   RLE: No swelling, redness or pain   RESP: No increased work of breathing at rest. Able to speak in full sentences today. Positive for decreased breath sounds throughout. No rales, rhonchi or wheezing  NEURO: Alert and oriented. Able to answer all questions today on interview. Able to move all 4 limbs independently         LAB Review:     Component      Latest Ref Rng & Units 7/19/2022 8/4/2022 8/9/2022   INR Point of Care      0.9 - 1.1 1.6 (H) 1.3 (H) 1.4 (H)       Component      Latest Ref Rng & Units 5/19/2022   Sodium      133 - 144 mmol/L 137   Potassium      3.4 - 5.3 mmol/L 4.7   Chloride      94 - 109 mmol/L 102   Carbon Dioxide      20 - 32 mmol/L 29   Anion Gap      3 - 14 mmol/L 6   Urea Nitrogen      7 - 30 mg/dL 27   Creatinine      0.52 - 1.04 mg/dL 1.40 (H)   Calcium      8.5 - 10.1 mg/dL 9.4   Glucose      70 - 99 mg/dL 72   GFR Estimate      >60 mL/min/1.73m2 34 (L)

## 2022-08-09 NOTE — PATIENT INSTRUCTIONS
August 9, 2022 Urgent Care Plan:       Go to ER now for further evaluation for your one week of left lower foot/ankle and lower leg pain and redness:     Because your INR is low, a blood clot in your leg is one possible explanation and you should be screened for this in the emergency room.     Please share the below INRs with the ER doctor.       Component      Latest Ref Rng & Units 7/19/2022 8/4/2022 8/9/2022   INR Point of Care      0.9 - 1.1 1.6 (H) 1.3 (H) 1.4 (H)       If you do not have a blood clot, your symptoms my ultimately be found to be caused by infection or gout (although you tell me you have not had gout before)

## 2022-08-09 NOTE — ED PROVIDER NOTES
History     Chief Complaint:  Foot Pain     89 y/o female presents with her daughter at the request of outside [facility] for further evaluation of left foot pain/reddness and swelling that has been present for the past week.  Primary concern from [facility] was subtherapeutic INR and concern for possible DVT.  Pt denies injury to foot and no Hx of gout.  Pain/redness is located primarily over 1st MTP.  Pain is worse with palpation of the area.      Denies F/C  Denies feeling ill  Denies CP  No new dyspnea (has a baseline from COPD)  Denies N/V  Denies groin pain or generalized leg pain/swelling  Denies calf pain/swelling  Denies N/T/W to foot    No Hx of DM  Coumadin/Afib  COPD  No Hx of gout  HTN  CHF    Local resident  Daughter at   PCP established         The history is provided by the patient, a relative and medical records. No  was used.      ROS:  Review of Systems   Constitutional: Negative for fever.   Respiratory: Negative for shortness of breath.    Cardiovascular: Negative for chest pain.   Gastrointestinal: Negative for vomiting.   Musculoskeletal: Positive for arthralgias.   Skin: Positive for color change.   Neurological: Negative for weakness and numbness.   All other systems reviewed and are negative.    Allergies:  Amiodarone  Baclofen  Bactrim [Sulfamethoxazole W-Trimethoprim]  Doxycycline  Levaquin [Levofloxacin]  Linzess [Linaclotide]  Lorazepam  Liquid Adhesive     Medications:    acetaminophen (TYLENOL) 500 MG tablet  albuterol (PROAIR HFA/PROVENTIL HFA/VENTOLIN HFA) 108 (90 Base) MCG/ACT inhaler  albuterol (PROVENTIL) (2.5 MG/3ML) 0.083% neb solution  albuterol (PROVENTIL) (2.5 MG/3ML) 0.083% neb solution  Calcium Carbonate-Vitamin D (CALCIUM 500+D HIGH POTENCY PO)  diltiazem ER COATED BEADS (CARDIZEM CD/CARTIA XT) 240 MG 24 hr capsule  Fluticasone-Umeclidin-Vilanterol (TRELEGY ELLIPTA) 100-62.5-25 MCG/INH oral inhaler  furosemide (LASIX) 20 MG tablet  isosorbide mononitrate (IMDUR)  30 MG 24 hr tablet  Lactobacillus (PROBIOTIC ACIDOPHILUS) TABS  losartan (COZAAR) 25 MG tablet  melatonin 5 MG tablet  mirtazapine (REMERON) 15 MG tablet  Multiple Vitamins-Minerals (OCUVITE PO)  polyethylene glycol (MIRALAX/GLYCOLAX) Packet  pravastatin (PRAVACHOL) 40 MG tablet  senna (SENOKOT) 8.6 MG tablet  Vitamin D, Cholecalciferol, 1000 units TABS  warfarin ANTICOAGULANT (COUMADIN) 2 MG tablet      Past Medical History:    Past Medical History:   Diagnosis Date     Atrial fibrillation (H)      BCC (basal cell carcinoma of skin)      CHF - Chr Diastolic (H) 11/21/2017     Chronic renal insufficiency      COPD (chronic obstructive pulmonary disease) (H)      Coronary artery disease      Hyperlipidemia      Hypertension      IBS (irritable bowel syndrome)      Osteoporosis      Pulmonary fibrosis (H)      Sick sinus syndrome - s/p PPM 2003 (H) 12/16/2017     SSS (sick sinus syndrome) (H)      Vertigo      Past Surgical History:    Past Surgical History:   Procedure Laterality Date     APPENDECTOMY  1956     CARDIAC SURGERY      PPM, 2 STENTS2-05[place]-CAD-taxus stentx2 2.5-12,2.5-12 in LADp and LADm, 80%nonDomRCA-LcxDom-mild,EF60%     CORONARY ANGIOGRAPHY ADULT ORDER  7/1994    mild CAD,Normal LV function, No Mitral regurgitation, Prox LAD 30% stenosis. RCA prox and mid, 20-30%     ESOPHAGOSCOPY, GASTROSCOPY, DUODENOSCOPY (EGD), COMBINED N/A 8/19/2015    Procedure: COMBINED ESOPHAGOSCOPY, GASTROSCOPY, DUODENOSCOPY (EGD), BIOPSY SINGLE OR MULTIPLE;  Surgeon: Genevieve Leon MD;  Location: [facility]     H LEAD REVISION DUAL  4/2003    Revised in [place]-due to diaphram stim (original pacer placed in [place])     H LEAD REVISION DUAL  7/2/2014    Correction of reversal of A and V leads in Header     HEART CATH, ANGIOPLASTY  02/2005    LEIGH ANN mid and proximal LAD, ongoing 80% RCA; mild circumflex     HERNIA REPAIR Left 1991    LI     IMPLANT PACEMAKER  2/19/2003    Placed in [place] for sick sinus syndrome     REPLACE  "PACEMAKER GENERATOR  6/27/2013    Dual-chamber pacemaker by Dr SETH Pierre      Family History:    family history includes Crohn's Disease in her daughter; Gastrointestinal Disease in her daughter; Heart Disease in her father; Neurologic Disorder in her mother.    Social History:   reports that she quit smoking about 35 years ago. Her smoking use included cigarettes. She smoked 0.00 packs per day. She has never used smokeless tobacco. She reports that she does not drink alcohol and does not use drugs.  PCP: Arnel Garcia     Physical Exam     Patient Vitals for the past 24 hrs:   BP Temp Temp src Pulse Resp SpO2 Height Weight   08/09/22 1507 116/68 98  F (36.7  C) Oral 85 20 93 % 1.626 m (5' 4\") 66.7 kg (147 lb)      Physical Exam  Vitals and nursing note reviewed.   Constitutional:       General: She is not in acute distress.     Appearance: Normal appearance. She is not ill-appearing, toxic-appearing or diaphoretic.   HENT:      Head: Normocephalic.      Right Ear: External ear normal.      Left Ear: External ear normal.      Mouth/Throat:      Comments: Mask in place, clear speech.   Eyes:      Conjunctiva/sclera: Conjunctivae normal.   Cardiovascular:      Rate and Rhythm: Normal rate and regular rhythm.      Pulses: Normal pulses.      Heart sounds: Normal heart sounds.   Pulmonary:      Effort: Pulmonary effort is normal. No respiratory distress.      Breath sounds: Normal breath sounds.   Abdominal:      Palpations: Abdomen is soft.      Tenderness: There is no abdominal tenderness.   Musculoskeletal:         General: Swelling and tenderness present.      Cervical back: Normal range of motion and neck supple.      Comments: LLE: see pic. Erythema with mild swelling over dorsum of foot, erythema appears most concentrated over 1st MTP.  No open wound.  TTP over this joint area.  Can tolerate passive ROM of joint.  No lymphangitis.  Ranging ankle with ease.  No calf swelling/TTP.  No upper thigh TTP or erythema " or groin LAD.  Sensation grossly intact to light touch to foot and has palpable DP pulse.     Skin:     Capillary Refill: Capillary refill takes less than 2 seconds.      Findings: Erythema present.   Neurological:      General: No focal deficit present.      Mental Status: She is alert.   Psychiatric:         Mood and Affect: Mood normal.         Behavior: Behavior normal.         Thought Content: Thought content normal.         Judgment: Judgment normal.           Emergency Department Course   Imaging:  US Lower Extremity Venous Duplex Left   Final Result   IMPRESSION:   1.  Nonocclusive chronic appearing thrombus in the left popliteal vein.      Findings in this case were discussed with an emergency medicine provider by Dr. Primo Wen via telephone on 8/9/2022.      Foot XR, G/E 3 views, left   Final Result   IMPRESSION: No calcified tophi. No erosions are evident. Osteopenia. Bunion deformity of the great toe. Degenerative changes in the first MTP joint and the TMT joints. Atheromatous calcification.         Report per radiology    Laboratory:  Labs Ordered and Resulted from Time of ED Arrival to Time of ED Departure   BASIC METABOLIC PANEL - Abnormal       Result Value    Creatinine 1.53 (*)     Sodium 139      Potassium 4.6      Urea Nitrogen 33.5 (*)     Chloride 99      Carbon Dioxide (CO2) 29      Anion Gap 11      Glucose 95      GFR Estimate 31 (*)     Calcium 9.5     URIC ACID - Abnormal    Uric Acid 9.2 (*)    CRP INFLAMMATION - Abnormal    CRP Inflammation 14.47 (*)    CBC WITH PLATELETS AND DIFFERENTIAL - Abnormal    WBC Count 7.5      RBC Count 4.16      Hemoglobin 12.4      Hematocrit 39.9      MCV 96      MCH 29.8      MCHC 31.1 (*)     RDW 14.4      Platelet Count 202      % Neutrophils 66      % Lymphocytes 21      % Monocytes 9      % Eosinophils 3      % Basophils 1      % Immature Granulocytes 0      NRBCs per 100 WBC 0      Absolute Neutrophils 4.9      Absolute Lymphocytes 1.6       Absolute Monocytes 0.7      Absolute Eosinophils 0.2      Absolute Basophils 0.0      Absolute Immature Granulocytes 0.0      Absolute NRBCs 0.0        Emergency Department Course:     Reviewed:  I reviewed nursing notes, vitals and past medical history    Assessments/Consults:  : I obtained history and examined the patient as noted above.   : Labs and Xray noted.  Await U/S results. Pt and daughter updated.   : U/S noted. D/W EM attending (Dr. Fritz) at length regarding clinical picture and treatment plan and ED pharmacist at length regarding treatment options given age and renal function. Discussed results and dispo plan.     Interventions:  Medications   apixaban ANTICOAGULANT (ELIQUIS) tablet 2.5 mg (2.5 mg Oral Given 22)   cephALEXin (KEFLEX) capsule 500 mg (500 mg Oral Given 22)      Disposition:  The patient was discharged to home.     Impression & Plan      Medical Decision Makin y/o female presents with her daughter at the request of outside [facility] for further evaluation of left foot pain/reddness and swelling that has been present for the past week.  Primary concern from [facility] was subtherapeutic INR and concern for possible DVT.  Pt denies injury to foot and no Hx of gout.  Pain/redness is located primarily over 1st MTP.  Pain is worse with palpation of the area.     Exam as above.  S/S susp for gout given location and clinical picture although pt has no Hx of gout.  Will check U/S for reassurance against DVT.  Not suspected this is limb ischemia as has palpable pulse and foot appears perfused.  This is felt less likely infectious but consider cellulitis.  This is felt less likely septic joint of 1st MTP or osteomyelitis (no DM Hx).  Will check CBC for leukocytosis/penia, anemia, and abnl platelets.  BMP to assess electrolytes and renal function.  CRP to help assess inflammation.  Uric acid as well.  Will check xray foot for reassurance against Fx or overt vahe destructive  change. They are happy with plan. [place] 1[place]4 [place] [facility] [place]     Update: CRP elevated as is uric acid and no leukocytosis. No change in exam on re-eval.  Discussed this may be gout but will cover for cellulitis as well.  I do not suspect sepsis with no lymphangitis, pt does not feel ill, etc.  Tolerates passive ROM of first MTP with ease, not suspected this is septic joint.      Does have chronic appearing DVT to popliteal vein.  This may be incidental and not related to pts foot pain/swelling. That said, with historically subtherapeutic INR and Hx of Afib as well, she needs better anticoagulation.  Spoke with ED pharmacist at length for best option due to her age and renal function and it is recommended she do apixaban 2.5 mg BID.  D/W pt and daughter and pt has been on coumadin for decades due to afib.  I do not suspect PE at this time with no new dyspnea or CP and again DVT appears chronic.      Discussed at length with pt and daughter that she is felt stable for discharge but recommend close F/U with established PCP before end of this week and renal function will need to be monitored.  Keflex dose was also renally adjusted.   They are aware to STOP coumadin.     Pt and daughter at  educated on S/S that should prompt ED re-eval.  Questions answered. Verbalized understanding. Comfortable with plan and appreciative.     Diagnosis:    ICD-10-CM    1. Left foot pain  M79.672    2. Chronic deep vein thrombosis (DVT) of proximal vein of left lower extremity (H)  I82.5Y2         Discharge Medications:  Discharge Medication List as of 8/9/2022  7:37 PM      START taking these medications    Details   apixaban ANTICOAGULANT (ELIQUIS) 2.5 MG tablet Take 1 tablet (2.5 mg) by mouth 2 times daily for 30 days, Disp-60 tablet, R-0, E-Prescribe      cephALEXin (KEFLEX) 500 MG capsule Take 1 capsule (500 mg) by mouth 2 times daily for 7 days, Disp-14 capsule, R-0, E-Prescribe      predniSONE (DELTASONE) 20 MG tablet Take two  tablets (= 40mg) each day for 5 (five) days, Disp-10 tablet, R-0, E-Prescribe              8/9/2022   Alexandria Chou PA-C [employer], Leah M, PA-C  08/09/22 2056

## 2022-08-09 NOTE — ED TRIAGE NOTES
Pt presents for evaluation of left foot pain, swelling and erythema for last week. No known injury. No known hx of gout. Slight pain with ambulation. Went to , had an INR (1.4) and was sent here for further evaluation. Short of breath at baseline, no changes. Denies chest pain.       
Former smoker

## 2022-08-10 ENCOUNTER — TELEPHONE (OUTPATIENT)
Dept: PEDIATRICS | Facility: CLINIC | Age: 87
End: 2022-08-10

## 2022-08-10 DIAGNOSIS — F43.20 ADJUSTMENT DISORDER, UNSPECIFIED TYPE: ICD-10-CM

## 2022-08-10 NOTE — DISCHARGE INSTRUCTIONS
-The provider evaluated your foot pain and redness.  Possible this is GOUT which is being treated with oral steroid (prednisone) to help with pain and swelling.  Also possible this is cellulitis (bacterial skin infection).  This is being treated with oral antibiotic (keflex).  -You have a blood clot to your left leg.  This is suspected to have been there for some time (chronic) but due to seeing the clot and historically your INRs have been subtherapautic (lower than wanted), we are changing you to a different blood thinning medication that will help prevent clotting to your leg and in regards to your known AFIB.  STOP the COUMADIN/WARFARIN.  Only take this new blood thinning medication (Eliquis/Apixaban).     Very important you follow-up with your primary care provider before the end of this week as your kidney function should be monitored and your foot should be rechecked.   -Keep your foot elevated above your heart when able  -Return to the ER if you develop fever, worsening foot redness/pain, or chest pain or trouble breathing, feel dizzy or faint, confusion or any other concerns.

## 2022-08-10 NOTE — TELEPHONE ENCOUNTER
Talked with daughter and is very happy with the plan, She has lab apt Monday and will see PCP on tues. Knows to contact us if they have any other questions or issues.     Tita Vazquez, EMT at 11:56 AM on August 10, 2022  [facility] Guide  [phone]

## 2022-08-10 NOTE — PROGRESS NOTES
ANTICOAGULATION MANAGEMENT     Leonor Mercado 89 year old female is on warfarin with subtherapeutic INR result. (Goal INR 2.0-3.0)    Recent labs: (last 7 days)     08/09/22  1333   INR 1.4*       ASSESSMENT       Source(s): Chart Review and Patient/Caregiver Call       Warfarin doses taken: Warfarin was held last night since patient had been instructed to start Eliquis and discontinue warfarin.  However, Eliquis is very expensive for patient so she is planning on staying on the warfarin.  She did receive a dose of Eliquis in the ER last evening.  Patient plans to discuss options at her next appointment and is hoping to be seen within the next week.    Diet: No new diet changes identified    New illness, injury, or hospitalization: Yes: foot pain.  Was evaluated for DVT.  US showed a clot that was most likely chronic.  ER suspected gout or cellulitis      Medication/supplement changes: Keflex 8/10-8/8/17, prednisone 40 mg for 5 days    Signs or symptoms of bleeding or clotting: No    Previous INR: Subtherapeutic    Additional findings: None       PLAN     Recommended plan for temporary change(s) affecting INR     Dosing Instructions: Boost dose today, then also increase dose on 8/13 with next INR in 5 days       Summary  As of 8/9/2022    Full warfarin instructions:  8/9: Hold; 8/10: 3 mg; 8/13: 2 mg; Otherwise 1 mg every Mon, Wed, Sat; 2 mg all other days   Next INR check:  8/15/2022             Telephone call with Alfredo's daughter, Cleo who agrees to plan and repeated back plan correctly    Lab visit scheduled    Education provided: Please call back if any changes to your diet, medications or how you've been taking warfarin and Potential interaction between warfarin and Keflex & prednisone    Plan made with [facility] Pharmacist Alexandria Waddell, DAMIAN  Anticoagulation Clinic  8/10/2022    _______________________________________________________________________     Anticoagulation Episode Summary      Current INR goal:  2.0-3.0   TTR:  46.5 % (1 y)   Target end date:  Indefinite   Send INR reminders to:  [facility]    Indications    Chronic atrial fibrillation (H) [I48.20]  Long term current use of anticoagulant therapy [Z79.01]           Comments:           Anticoagulation Care Providers     Provider Role Specialty Phone number    Arnel Garcia MD Referring Internal Medicine - Pediatrics [phone]    Marisabel Guido MD Referring Internal Medicine - Pediatrics [phone]

## 2022-08-10 NOTE — TELEPHONE ENCOUNTER
Daughter, Cleo Crockett left message    Spent 8 hours at [facility] yesterday, patient has a bad foot. Did a lot of testing, and adjusted some of her meds. ED wants her seen by the end of the week.  Daughter want's her fit in with Dr. Garcia somewhere, she will take any time.    Callback for daughter, [phone]    Thank you  Jensen ALY

## 2022-08-15 ENCOUNTER — ANTICOAGULATION THERAPY VISIT (OUTPATIENT)
Dept: ANTICOAGULATION | Facility: CLINIC | Age: 87
End: 2022-08-15

## 2022-08-15 ENCOUNTER — LAB (OUTPATIENT)
Dept: LAB | Facility: CLINIC | Age: 87
End: 2022-08-15
Payer: MEDICARE

## 2022-08-15 DIAGNOSIS — I48.20 CHRONIC ATRIAL FIBRILLATION (H): ICD-10-CM

## 2022-08-15 DIAGNOSIS — I48.20 CHRONIC ATRIAL FIBRILLATION (H): Primary | ICD-10-CM

## 2022-08-15 DIAGNOSIS — Z79.01 LONG TERM CURRENT USE OF ANTICOAGULANT THERAPY: ICD-10-CM

## 2022-08-15 LAB — INR BLD: 2.6 (ref 0.9–1.1)

## 2022-08-15 PROCEDURE — 85610 PROTHROMBIN TIME: CPT

## 2022-08-15 PROCEDURE — 36416 COLLJ CAPILLARY BLOOD SPEC: CPT

## 2022-08-15 RX ORDER — MIRTAZAPINE 15 MG/1
TABLET, FILM COATED ORAL
Qty: 90 TABLET | Refills: 0 | Status: SHIPPED | OUTPATIENT
Start: 2022-08-15 | End: 2022-11-07

## 2022-08-15 NOTE — PROGRESS NOTES
ANTICOAGULATION MANAGEMENT     Leonor Mercado 89 year old female is on warfarin with therapeutic INR result. (Goal INR 2.0-3.0)    Recent labs: (last 7 days)     08/15/22  1347   INR 2.6*       ASSESSMENT       Source(s): Chart Review    Previous INR was Subtherapeutic, last 3    Medication, diet, health changes since last INR    Patient seen in ED 8/9/22, gout, given Rx for Eliquis, not using, too expensive    Patient sees PCP 8/16/22 for ED follow up            PLAN     Unable to reach Cleo, patient's daughter, today.    Left message to continue current dose of warfarin 1 mg tonight. Request call back for assessment. Left message to call [phone].       Follow up required to confirm warfarin dose taken and assess for changes    Dilma Rider RN  Anticoagulation Clinic  8/15/2022

## 2022-08-15 NOTE — TELEPHONE ENCOUNTER
Prescription approved per [facility] Refill Protocol.    Katie SANTIAGO RN   Patient Advocate Liaison (PAL)  [facility]

## 2022-08-15 NOTE — PROGRESS NOTES
ANTICOAGULATION MANAGEMENT     Leonor Mercado 89 year old female is on warfarin with therapeutic INR result. (Goal INR 2.0-3.0)    Recent labs: (last 7 days)     08/15/22  1347   INR 2.6*       ASSESSMENT       Source(s): Chart Review and Patient/Caregiver Call       Warfarin doses taken: Warfarin taken as instructed, warfarin held 8/9/22 since patient had an Eliquis while in the hospital    Diet: No new diet changes identified    New illness, injury, or hospitalization: Yes: gout is improving, swelling has decreased and is not as red    Medication/supplement changes: Cleo reports Keflex and prednisone are completed    Signs or symptoms of bleeding or clotting: No    Previous INR: Subtherapeutic    Additional findings: None       PLAN     Recommended plan for temporary change(s) affecting INR     Dosing Instructions: Continue your current warfarin dose with next INR in 1 week       Summary  As of 8/15/2022    Full warfarin instructions:  1 mg every Mon, Wed, Sat; 2 mg all other days   Next INR check:  8/22/2022             Telephone call with  Cleo, patient's daughter who verbalizes understanding and agrees to plan    Lab visit scheduled    Education provided: Please call back if any changes to your diet, medications or how you've been taking warfarin and Contact [phone]  with any changes, questions or concerns.     Plan made per [facility] anticoagulation protocol    Dilma Rider RN  Anticoagulation Clinic  8/15/2022    _______________________________________________________________________     Anticoagulation Episode Summary     Current INR goal:  2.0-3.0   TTR:  47.2 % (1 y)   Target end date:  Indefinite   Send INR reminders to:  [facility]    Indications    Chronic atrial fibrillation (H) [I48.20]  Long term current use of anticoagulant therapy [Z79.01]           Comments:           Anticoagulation Care Providers     Provider Role Specialty Phone number    Arnel Garcia MD Referring Internal  Medicine - Pediatrics [phone]    Marisabel Guido MD Referring Internal Medicine - Pediatrics [phone]

## 2022-08-16 ENCOUNTER — OFFICE VISIT (OUTPATIENT)
Dept: PEDIATRICS | Facility: CLINIC | Age: 87
End: 2022-08-16
Payer: MEDICARE

## 2022-08-16 VITALS
DIASTOLIC BLOOD PRESSURE: 70 MMHG | BODY MASS INDEX: 24.02 KG/M2 | TEMPERATURE: 97.7 F | SYSTOLIC BLOOD PRESSURE: 120 MMHG | WEIGHT: 140.7 LBS | HEART RATE: 83 BPM | HEIGHT: 64 IN | OXYGEN SATURATION: 97 %

## 2022-08-16 DIAGNOSIS — M10.00 IDIOPATHIC GOUT, UNSPECIFIED CHRONICITY, UNSPECIFIED SITE: Primary | ICD-10-CM

## 2022-08-16 DIAGNOSIS — I82.532 CHRONIC DEEP VEIN THROMBOSIS (DVT) OF LEFT POPLITEAL VEIN (H): ICD-10-CM

## 2022-08-16 DIAGNOSIS — F43.20 ADJUSTMENT DISORDER, UNSPECIFIED TYPE: ICD-10-CM

## 2022-08-16 DIAGNOSIS — I48.20 CHRONIC ATRIAL FIBRILLATION (H): ICD-10-CM

## 2022-08-16 PROCEDURE — 99214 OFFICE O/P EST MOD 30 MIN: CPT | Performed by: INTERNAL MEDICINE

## 2022-08-16 RX ORDER — MIRTAZAPINE 15 MG/1
TABLET, FILM COATED ORAL
Qty: 90 TABLET | Refills: 0 | OUTPATIENT
Start: 2022-08-16

## 2022-08-16 RX ORDER — ALLOPURINOL 100 MG/1
100 TABLET ORAL DAILY
Qty: 90 TABLET | Refills: 3 | Status: SHIPPED | OUTPATIENT
Start: 2022-08-16 | End: 2023-08-04

## 2022-08-16 RX ORDER — PREDNISONE 20 MG/1
TABLET ORAL
Qty: 10 TABLET | Refills: 0 | Status: SHIPPED | OUTPATIENT
Start: 2022-08-16 | End: 2022-08-22

## 2022-08-16 ASSESSMENT — PAIN SCALES - GENERAL: PAINLEVEL: SEVERE PAIN (6)

## 2022-08-16 ASSESSMENT — PATIENT HEALTH QUESTIONNAIRE - PHQ9
10. IF YOU CHECKED OFF ANY PROBLEMS, HOW DIFFICULT HAVE THESE PROBLEMS MADE IT FOR YOU TO DO YOUR WORK, TAKE CARE OF THINGS AT HOME, OR GET ALONG WITH OTHER PEOPLE: NOT DIFFICULT AT ALL
SUM OF ALL RESPONSES TO PHQ QUESTIONS 1-9: 2
SUM OF ALL RESPONSES TO PHQ QUESTIONS 1-9: 2

## 2022-08-16 NOTE — TELEPHONE ENCOUNTER
Prescription refilled yesterday. Refusing refill request and closing encounter.     Prabha Yañez RN on 8/16/2022 at 4:38 PM

## 2022-08-16 NOTE — PROGRESS NOTES
Assessment & Plan     (M10.00) Idiopathic gout, unspecified chronicity, unspecified site  (primary encounter diagnosis)  Comment: Acute left foot pain, swelling with elevated uric acid, exam and history characteristic for gout.  First episode.  Recommended repeat 5-day course of prednisone.  Did not start indomethacin secondary to anticoagulation.  Hyperuricemia- once acute pain and swelling resolves start allopurinol 100 mg and return for follow-up lab work after 2 months.  Plan: predniSONE (DELTASONE) 20 MG tablet,         allopurinol (ZYLOPRIM) 100 MG tablet, **Uric         acid FUTURE 2mo, CBC with platelets and         differential          (I48.20) Chronic atrial fibrillation (H)  Comment: Chronic atrial fibrillation-daughter is present express concern regarding INR which has been difficult to keep within goal through INR clinic.  We discussed alternatives.  Did discuss discontinuing anticoagulation and the risks/benefits.  Patient elects to continue anticoagulation.  Apixaban was too expensive, sent prescription for Xarelto.   If Xarelto is too expensive, recommended resuming warfarin and plan for follow-up with INR clinic, goal INR 2-3.  Plan: rivaroxaban ANTICOAGULANT (XARELTO) 20 MG TABS         tablet          (I82.532) Chronic deep vein thrombosis (DVT) of left popliteal vein (H)  Comment:   Plan: Incidental chronic appearing left popliteal DVT.  Continue anticoagulation.    Arnel Garcia MD  [facility] [place] Fuentes is a 89 year old accompanied by her daughters, presenting for the following health issues:  ER F/U and Foot Pain      HPI     ED Followup:    Facility:  [facility]  Date of visit: 8/9/22  Reason for visit: Left foot pain, Chronic deep vein thrombosis (DVT) of proximal vein of left lower extremity (H)    89-year-old woman with a history of chronic atrial fibrillation, CHF, CKD, COPD, CAD and hypertension presents for follow-up of recent emergency department visit on  8/9 for evaluation of left foot pain and swelling.  Patient developed acute onset of left foot pain and swelling primarily over her first MTP which occurred prior to the ER visit/no associated trauma.  Emergency department work-up: Lower extremity Doppler demonstrated nonocclusive chronic appearing thrombus left popliteal vein.  X-ray left foot demonstrated osteopenia with bunion deformity and degenerative changes of the first MTP.  Lab work: Creatinine 1.5, uric acid 9.2, CRP 14.4.  WBC 7.5, hemoglobin 12.4, platelets 202.  Patient was treated with a course of cephalexin and 5-day course of prednisone.    Presents with both daughters today, states that foot pain is significantly better after the course of prednisone/keflex but still some soreness.  Daughters are concerned regarding erratic INR past few months.  Emergency room physician prescribed apixaban alternative however this prescription was $500 per month/family declined.  Family has questions about other options for anticoagulation.       Answers for HPI/ROS submitted by the patient on 8/16/2022  If you checked off any problems, how difficult have these problems made it for you to do your work, take care of things at home, or get along with other people?: Not difficult at all  PHQ9 TOTAL SCORE: 2    Patient Active Problem List   Diagnosis     Hyperlipidemia LDL goal <100     Cardiac pacemaker in situ     Senile osteoporosis     Anemia     Long term current use of anticoagulant therapy     Degeneration of lumbar intervertebral disc     CKD (chronic kidney disease) stage 3, GFR 30-59 ml/min (H)     Chronic atrial fibrillation (H)     Chronic obstructive pulmonary disease, unspecified COPD type (H)     Coronary artery disease involving native coronary artery of native heart without angina pectoris     Sick sinus syndrome (H)     Adjustment disorder, unspecified type     Presbyesophagus     Chronic heart failure with preserved ejection fraction (H)     Vertigo      Chronic respiratory failure with hypoxia (H)     Pleural effusion     Closed fracture of multiple ribs of left side, initial encounter     Peripheral vascular disease (H)     Current Outpatient Medications   Medication Sig Dispense Refill     acetaminophen (TYLENOL) 500 MG tablet Take 2 tablets (1,000 mg) by mouth every 8 hours as needed for pain       albuterol (PROAIR HFA/PROVENTIL HFA/VENTOLIN HFA) 108 (90 Base) MCG/ACT inhaler Inhale 2 puffs into the lungs every 6 hours as needed for shortness of breath / dyspnea or wheezing 18 g 3     albuterol (PROVENTIL) (2.5 MG/3ML) 0.083% neb solution Take 2.5 mg by nebulization At Bedtime       albuterol (PROVENTIL) (2.5 MG/3ML) 0.083% neb solution Take 2.5 mg by nebulization every 4 hours as needed for shortness of breath / dyspnea or wheezing MAX of 4 doses/ 24 hours               Calcium Carbonate-Vitamin D (CALCIUM 500+D HIGH POTENCY PO) Take 1 tablet by mouth daily       diltiazem ER COATED BEADS (CARDIZEM CD/CARTIA XT) 240 MG 24 hr capsule Take 1 capsule by mouth once daily 90 capsule 11     Fluticasone-Umeclidin-Vilanterol (TRELEGY ELLIPTA) 100-62.5-25 MCG/INH oral inhaler Inhale 1 puff into the lungs daily       furosemide (LASIX) 20 MG tablet Take 1 tablet by mouth twice daily (Patient taking differently: Take 40 mg by mouth daily) 180 tablet 3     isosorbide mononitrate (IMDUR) 30 MG 24 hr tablet Take 1 tablet (30 mg) by mouth daily 90 tablet 11     Lactobacillus (PROBIOTIC ACIDOPHILUS) TABS Take 1 tablet by mouth daily        losartan (COZAAR) 25 MG tablet Take 1 tablet by mouth once daily 90 tablet 3     melatonin 5 MG tablet Take 10 mg by mouth nightly as needed for sleep       mirtazapine (REMERON) 15 MG tablet TAKE 1 TABLET BY MOUTH AT BEDTIME 90 tablet 0     Multiple Vitamins-Minerals (OCUVITE PO) Take 1 capsule by mouth daily.       polyethylene glycol (MIRALAX/GLYCOLAX) Packet Take by mouth daily 1 scoopful once daily       pravastatin (PRAVACHOL) 40  "MG tablet Take 1 tablet (40 mg) by mouth daily 90 tablet 11                     senna (SENOKOT) 8.6 MG tablet Take 2 tablets by mouth nightly as needed for constipation        Vitamin D, Cholecalciferol, 1000 units TABS Take 1,000 Units by mouth daily              Objective    /70 (BP Location: Right arm, Patient Position: Sitting, Cuff Size: Adult Regular)   Pulse 83   Temp 97.7  F (36.5  C) (Tympanic)   Ht 1.626 m (5' 4\")   Wt 63.8 kg (140 lb 11.2 oz)   LMP  (LMP Unknown)   SpO2 97%   BMI 24.15 kg/m    Body mass index is 24.15 kg/m .  Physical Exam   GENERAL: healthy, alert and no distress  RESP: lungs clear to auscultation - no rales, rhonchi or wheezes  CV: regular rate and rhythm, normal S1 S2, no S3 or S4, no murmur  MS: Prominent bunion deformity left foot  Mild erythema over first MTP without edema or significant effusion.  SKIN: As above  PSYCH: mentation appears normal, affect normal/bright    "

## 2022-08-16 NOTE — PATIENT INSTRUCTIONS
Foot pain: Likely gout flare.  Repeat course of prednisone-take twice daily for 5 days.  Once the foot pain and swelling has resolved, start allopurinol 100 mg daily.    Allopurinol will reduce levels of uric acid in the blood and reduce the risk of recurrent gout flares.    Return for a follow-up nonfasting lab draw to recheck uric acid level in 2-3 months    Atrial fibrillation:   Have sent a prescription for Xarelto 20 mg to be taken once daily (blood thinner).  This will substitute for warfarin.     If you go to  Xarelto and it is too expensive, then do not  the prescription, resume warfarin and schedule a follow-up visit with INR clinic.

## 2022-08-22 ENCOUNTER — LAB (OUTPATIENT)
Dept: LAB | Facility: CLINIC | Age: 87
End: 2022-08-22
Payer: MEDICARE

## 2022-08-22 ENCOUNTER — ANTICOAGULATION THERAPY VISIT (OUTPATIENT)
Dept: ANTICOAGULATION | Facility: CLINIC | Age: 87
End: 2022-08-22

## 2022-08-22 DIAGNOSIS — Z79.01 LONG TERM CURRENT USE OF ANTICOAGULANT THERAPY: ICD-10-CM

## 2022-08-22 DIAGNOSIS — I48.20 CHRONIC ATRIAL FIBRILLATION (H): ICD-10-CM

## 2022-08-22 DIAGNOSIS — I48.20 CHRONIC ATRIAL FIBRILLATION (H): Primary | ICD-10-CM

## 2022-08-22 LAB — INR BLD: 1.9 (ref 0.9–1.1)

## 2022-08-22 PROCEDURE — 85610 PROTHROMBIN TIME: CPT

## 2022-08-22 PROCEDURE — 36415 COLL VENOUS BLD VENIPUNCTURE: CPT

## 2022-08-22 NOTE — PROGRESS NOTES
Also, PCP prescribed Xarelto to see if it was more affordable than Apixaban but it was not so patient did not fill it and I removed from her medication list.    Mariola Valenzuela RN  Anticoagulation Clinic

## 2022-09-01 NOTE — TELEPHONE ENCOUNTER
Will route refill request to new PCP to fill.    Yoana Waddell RN     Niacinamide Counseling: I recommended taking niacin or niacinamide, also know as vitamin B3, twice daily. Recent evidence suggests that taking vitamin B3 (500 mg twice daily) can reduce the risk of actinic keratoses and non-melanoma skin cancers. Side effects of vitamin B3 include flushing and headache.

## 2022-09-02 ENCOUNTER — ANTICOAGULATION THERAPY VISIT (OUTPATIENT)
Dept: ANTICOAGULATION | Facility: CLINIC | Age: 87
End: 2022-09-02

## 2022-09-02 ENCOUNTER — LAB (OUTPATIENT)
Dept: LAB | Facility: CLINIC | Age: 87
End: 2022-09-02
Payer: MEDICARE

## 2022-09-02 DIAGNOSIS — I48.20 CHRONIC ATRIAL FIBRILLATION (H): Primary | ICD-10-CM

## 2022-09-02 DIAGNOSIS — Z79.01 LONG TERM CURRENT USE OF ANTICOAGULANT THERAPY: ICD-10-CM

## 2022-09-02 DIAGNOSIS — I48.20 CHRONIC ATRIAL FIBRILLATION (H): ICD-10-CM

## 2022-09-02 LAB — INR BLD: 2.5 (ref 0.9–1.1)

## 2022-09-02 PROCEDURE — 36416 COLLJ CAPILLARY BLOOD SPEC: CPT

## 2022-09-02 PROCEDURE — 85610 PROTHROMBIN TIME: CPT

## 2022-09-02 NOTE — PROGRESS NOTES
ANTICOAGULATION MANAGEMENT     Leonor Mercado 89 year old female is on warfarin with therapeutic INR result. (Goal INR 2.0-3.0)    Recent labs: (last 7 days)     09/02/22  1351   INR 2.5*       ASSESSMENT       Source(s): Chart Review and Patient/Caregiver Call       Warfarin doses taken: Warfarin taken as instructed    Diet: No new diet changes identified    New illness, injury, or hospitalization: No    Medication/supplement changes: Allopurinol ~ which may be increasing INR today    Signs or symptoms of bleeding or clotting: No    Previous INR: Subtherapeutic    Additional findings: Cleo is the one setting up patient's pills every other Sat. and she going tomorrow.       PLAN     Recommended plan for ongoing change(s) affecting INR     Dosing Instructions: Continue your current warfarin dose with next INR in 2 weeks or sooner if signs and symptoms of bleeding.    Summary  As of 9/2/2022    Full warfarin instructions:  1 mg every Mon, Wed, Sat; 2 mg all other days   Next INR check:  9/16/2022             Telephone call with  patient's dtr. Cleo who verbalizes understanding and agrees to plan    Lab visit scheduled    Education provided: Importance of therapeutic range, Importance of following up at instructed interval, Importance of taking warfarin as instructed, Potential interaction between warfarin and allopurinol and Monitoring for bleeding signs and symptoms    Plan made per [facility] anticoagulation protocol    Adrianna Teresa RN  Anticoagulation Clinic  9/2/2022    _______________________________________________________________________     Anticoagulation Episode Summary     Current INR goal:  2.0-3.0   TTR:  47.3 % (1 y)   Target end date:  Indefinite   Send INR reminders to:  [facility]    Indications    Chronic atrial fibrillation (H) [I48.20]  Long term current use of anticoagulant therapy [Z79.01]           Comments:           Anticoagulation Care Providers     Provider Role Specialty Phone number     Arnel Garcia MD Referring Internal Medicine - Pediatrics [phone]    Marisabel Guido MD Referring Internal Medicine - Pediatrics [phone]

## 2022-09-06 NOTE — CONSULTS
Stroke class not needed. Patient did not have a stroke.    Yinka West RN  [facility]   None

## 2022-09-15 ENCOUNTER — ANTICOAGULATION THERAPY VISIT (OUTPATIENT)
Dept: ANTICOAGULATION | Facility: CLINIC | Age: 87
End: 2022-09-15

## 2022-09-15 ENCOUNTER — LAB (OUTPATIENT)
Dept: LAB | Facility: CLINIC | Age: 87
End: 2022-09-15
Payer: MEDICARE

## 2022-09-15 DIAGNOSIS — I48.20 CHRONIC ATRIAL FIBRILLATION (H): ICD-10-CM

## 2022-09-15 DIAGNOSIS — Z79.01 LONG TERM CURRENT USE OF ANTICOAGULANT THERAPY: ICD-10-CM

## 2022-09-15 DIAGNOSIS — I48.20 CHRONIC ATRIAL FIBRILLATION (H): Primary | ICD-10-CM

## 2022-09-15 LAB — INR BLD: 1.3 (ref 0.9–1.1)

## 2022-09-15 PROCEDURE — 85610 PROTHROMBIN TIME: CPT

## 2022-09-15 PROCEDURE — 36416 COLLJ CAPILLARY BLOOD SPEC: CPT

## 2022-09-15 NOTE — PROGRESS NOTES
ANTICOAGULATION MANAGEMENT     Leonor Mercado 89 year old female is on warfarin with subtherapeutic INR result. (Goal INR 2.0-3.0)    Recent labs: (last 7 days)     09/15/22  1341   INR 1.3*       ASSESSMENT       Source(s): Chart Review and Patient/Caregiver Call       Warfarin doses taken: Warfarin taken as instructed, per Cleo, pill box was empty     Diet: Per Cleo, only saw patient have a few crackers and some water 9/14/22    New illness, injury, or hospitalization: Yes: Per Cleo, patient was not feeling well, per Cleo, gout has resolved     Medication/supplement changes: None noted    Signs or symptoms of bleeding or clotting: No    Previous INR: Therapeutic last visit; previously outside of goal range    Additional findings: None       PLAN     Recommended plan for temporary change(s) affecting INR     Dosing Instructions: booster dose then continue your current warfarin dose with next INR in 5 days       Summary  As of 9/15/2022    Full warfarin instructions:  9/15: 3 mg; Otherwise 1 mg every Mon, Wed, Sat; 2 mg all other days   Next INR check:  9/20/2022             Telephone call with Cleo who agrees to plan and repeated back plan correctly    Lab visit scheduled    Education provided: Please call back if any changes to your diet, medications or how you've been taking warfarin and Contact [phone]  with any changes, questions or concerns.     Plan made per [facility] anticoagulation protocol    Dilma Rider RN  Anticoagulation Clinic  9/15/2022    _______________________________________________________________________     Anticoagulation Episode Summary     Current INR goal:  2.0-3.0   TTR:  45.6 % (1 y)   Target end date:  Indefinite   Send INR reminders to:  [facility]    Indications    Chronic atrial fibrillation (H) [I48.20]  Long term current use of anticoagulant therapy [Z79.01]           Comments:           Anticoagulation Care Providers     Provider Role Specialty Phone number    Arnel Garcia  MD Tj Referring Internal Medicine - Pediatrics [phone]    Marisabel Guido MD Referring Internal Medicine - Pediatrics [phone]

## 2022-09-20 ENCOUNTER — ANTICOAGULATION THERAPY VISIT (OUTPATIENT)
Dept: ANTICOAGULATION | Facility: CLINIC | Age: 87
End: 2022-09-20

## 2022-09-20 ENCOUNTER — LAB (OUTPATIENT)
Dept: LAB | Facility: CLINIC | Age: 87
End: 2022-09-20
Payer: MEDICARE

## 2022-09-20 DIAGNOSIS — I48.20 CHRONIC ATRIAL FIBRILLATION (H): ICD-10-CM

## 2022-09-20 DIAGNOSIS — Z79.01 LONG TERM CURRENT USE OF ANTICOAGULANT THERAPY: ICD-10-CM

## 2022-09-20 DIAGNOSIS — I48.20 CHRONIC ATRIAL FIBRILLATION (H): Primary | ICD-10-CM

## 2022-09-20 LAB — INR BLD: 1.7 (ref 0.9–1.1)

## 2022-09-20 PROCEDURE — 36416 COLLJ CAPILLARY BLOOD SPEC: CPT

## 2022-09-20 PROCEDURE — 85610 PROTHROMBIN TIME: CPT

## 2022-09-20 NOTE — PROGRESS NOTES
ANTICOAGULATION MANAGEMENT     Leonor Mercado 89 year old female is on warfarin with subtherapeutic INR result. (Goal INR 2.0-3.0)    Recent labs: (last 7 days)     09/20/22  1407   INR 1.7*       ASSESSMENT       Source(s): Chart Review    Previous INR was Subtherapeutic    Medication, diet, health changes since last INR chart reviewed; none identified     Will need to verify it patient has been eating better now or if she continues to not feel well.           PLAN     Unable to reach patient's daughter, Cleo today.    Left message to continue current dose of warfarin 2 mg tonight. Request call back for assessment.    Follow up required to confirm warfarin dose taken and assess for changes    Yoana Waddell RN  Anticoagulation Clinic  9/20/2022

## 2022-09-21 NOTE — PROGRESS NOTES
ANTICOAGULATION MANAGEMENT     Leonor Mercado 89 year old female is on warfarin with subtherapeutic INR result. (Goal INR 2.0-3.0)    Recent labs: (last 7 days)     09/20/22  1407   INR 1.7*       ASSESSMENT       Source(s): Chart Review and Patient/Caregiver Call       Warfarin doses taken: Warfarin taken as instructed    Diet: Has ensure yesterday and saturday. Feeling a little better and eating a little better    New illness, injury, or hospitalization: No    Medication/supplement changes: None noted    Signs or symptoms of bleeding or clotting: No    Previous INR: Subtherapeutic    Additional findings: None       PLAN     Recommended plan for ongoing change(s) affecting INR     Dosing Instructions: Increase your warfarin dose (9% change) with next INR in 1 week       Summary  As of 9/20/2022    Full warfarin instructions:  1 mg every Mon; 2 mg all other days   Next INR check:  9/27/2022             Telephone call with  Cleo who verbalizes understanding and agrees to plan    Lab visit scheduled    Education provided: Please call back if any changes to your diet, medications or how you've been taking warfarin    Plan made per [facility] anticoagulation protocol    Alden Martin RN  Anticoagulation Clinic  9/21/2022    _______________________________________________________________________     Anticoagulation Episode Summary     Current INR goal:  2.0-3.0   TTR:  45.4 % (1 y)   Target end date:  Indefinite   Send INR reminders to:  [facility]    Indications    Chronic atrial fibrillation (H) [I48.20]  Long term current use of anticoagulant therapy [Z79.01]           Comments:           Anticoagulation Care Providers     Provider Role Specialty Phone number    Arnel Garcia MD Referring Internal Medicine - Pediatrics [phone]    Marisabel Guido MD Referring Internal Medicine - Pediatrics [phone]

## 2022-09-27 ENCOUNTER — LAB (OUTPATIENT)
Dept: LAB | Facility: CLINIC | Age: 87
End: 2022-09-27
Payer: MEDICARE

## 2022-09-27 ENCOUNTER — ANTICOAGULATION THERAPY VISIT (OUTPATIENT)
Dept: ANTICOAGULATION | Facility: CLINIC | Age: 87
End: 2022-09-27

## 2022-09-27 DIAGNOSIS — Z79.01 LONG TERM CURRENT USE OF ANTICOAGULANT THERAPY: ICD-10-CM

## 2022-09-27 DIAGNOSIS — I48.20 CHRONIC ATRIAL FIBRILLATION (H): Primary | ICD-10-CM

## 2022-09-27 DIAGNOSIS — I48.20 CHRONIC ATRIAL FIBRILLATION (H): ICD-10-CM

## 2022-09-27 LAB — INR BLD: 2.2 (ref 0.9–1.1)

## 2022-09-27 PROCEDURE — 36415 COLL VENOUS BLD VENIPUNCTURE: CPT

## 2022-09-27 PROCEDURE — 85610 PROTHROMBIN TIME: CPT

## 2022-09-27 NOTE — PROGRESS NOTES
ANTICOAGULATION MANAGEMENT     Leonor Mercado 89 year old female is on warfarin with therapeutic INR result. (Goal INR 2.0-3.0)    Recent labs: (last 7 days)     09/27/22  1341   INR 2.2*       ASSESSMENT       Source(s): Chart Review and Patient/Caregiver Call       Warfarin doses taken: Warfarin taken as instructed    Diet: No new diet changes identified    New illness, injury, or hospitalization: No    Medication/supplement changes: None noted    Signs or symptoms of bleeding or clotting: No    Previous INR: Subtherapeutic    Additional findings: None       PLAN     Recommended plan for no diet, medication or health factor changes affecting INR     Dosing Instructions: Continue your current warfarin dose with next INR in 10 days      Summary  As of 9/27/2022    Full warfarin instructions:  1 mg every Mon; 2 mg all other days   Next INR check:  10/7/2022             Telephone call with  Cleo, Alfredo's daughter, who verbalizes understanding and agrees to plan    Lab visit scheduled    Education provided: Please call back if any changes to your diet, medications or how you've been taking warfarin    Plan made per [facility] anticoagulation protocol    Francesca Rider RN  Anticoagulation Clinic  9/27/2022    _______________________________________________________________________     Anticoagulation Episode Summary     Current INR goal:  2.0-3.0   TTR:  44.4 % (1 y)   Target end date:  Indefinite   Send INR reminders to:  [facility]    Indications    Chronic atrial fibrillation (H) [I48.20]  Long term current use of anticoagulant therapy [Z79.01]           Comments:           Anticoagulation Care Providers     Provider Role Specialty Phone number    Arnel Garcia MD Referring Internal Medicine - Pediatrics [phone]    Marisabel Guido MD Referring Internal Medicine - Pediatrics [phone]

## 2022-10-04 ENCOUNTER — ANCILLARY PROCEDURE (OUTPATIENT)
Dept: CARDIOLOGY | Facility: CLINIC | Age: 87
End: 2022-10-04
Attending: INTERNAL MEDICINE
Payer: MEDICARE

## 2022-10-04 DIAGNOSIS — Z95.0 CARDIAC PACEMAKER IN SITU: ICD-10-CM

## 2022-10-04 DIAGNOSIS — Z95.0 CARDIAC PACEMAKER IN SITU: Primary | ICD-10-CM

## 2022-10-04 DIAGNOSIS — I49.5 SICK SINUS SYNDROME (H): ICD-10-CM

## 2022-10-04 PROCEDURE — 93296 REM INTERROG EVL PM/IDS: CPT | Performed by: INTERNAL MEDICINE

## 2022-10-04 PROCEDURE — 93294 REM INTERROG EVL PM/LDLS PM: CPT | Performed by: INTERNAL MEDICINE

## 2022-10-07 ENCOUNTER — LAB (OUTPATIENT)
Dept: LAB | Facility: CLINIC | Age: 87
End: 2022-10-07
Payer: MEDICARE

## 2022-10-07 ENCOUNTER — DOCUMENTATION ONLY (OUTPATIENT)
Dept: ANTICOAGULATION | Facility: CLINIC | Age: 87
End: 2022-10-07

## 2022-10-07 ENCOUNTER — ANTICOAGULATION THERAPY VISIT (OUTPATIENT)
Dept: ANTICOAGULATION | Facility: CLINIC | Age: 87
End: 2022-10-07

## 2022-10-07 DIAGNOSIS — Z79.01 LONG TERM CURRENT USE OF ANTICOAGULANT THERAPY: ICD-10-CM

## 2022-10-07 DIAGNOSIS — Z79.01 LONG TERM CURRENT USE OF ANTICOAGULANTS WITH INR GOAL OF 2.0-3.0: ICD-10-CM

## 2022-10-07 DIAGNOSIS — I48.20 CHRONIC ATRIAL FIBRILLATION (H): ICD-10-CM

## 2022-10-07 DIAGNOSIS — I48.20 CHRONIC ATRIAL FIBRILLATION (H): Primary | ICD-10-CM

## 2022-10-07 LAB — INR BLD: 2.8 (ref 0.9–1.1)

## 2022-10-07 PROCEDURE — 85610 PROTHROMBIN TIME: CPT

## 2022-10-07 PROCEDURE — 36416 COLLJ CAPILLARY BLOOD SPEC: CPT

## 2022-10-07 NOTE — PROGRESS NOTES
ANTICOAGULATION CLINIC REFERRAL RENEWAL REQUEST       An annual renewal order is required for all patients referred to [facility].?  Please review and sign the pended referral order for Leonor Mercado.       ANTICOAGULATION SUMMARY      Warfarin indication(s)   Atrial Fibrillation    Mechanical heart valve present?  NO       Current goal range   INR: 2.0-3.0     Goal appropriate for indication? Goal INR 2-3, standard for indication(s) above     Time in Therapeutic Range (TTR)  (Goal > 60%) 44.6%       Office visit with referring provider's group within last year yes on 8/16/22       Tiffanie Flaherty RN  [facility]

## 2022-10-07 NOTE — PROGRESS NOTES
ANTICOAGULATION MANAGEMENT     Leonor Mercado 89 year old female is on warfarin with therapeutic INR result. (Goal INR 2.0-3.0)    Recent labs: (last 7 days)     10/07/22  1346   INR 2.8*       ASSESSMENT       Source(s): Chart Review and Patient/Caregiver Call       Warfarin doses taken: Warfarin taken as instructed    Diet: No new diet changes identified    New illness, injury, or hospitalization: No    Medication/supplement changes: None noted    Signs or symptoms of bleeding or clotting: No    Previous INR: Therapeutic last visit; previously outside of goal range    Additional findings: None       PLAN     Recommended plan for no diet, medication or health factor changes affecting INR     Dosing Instructions: Continue your current warfarin dose with next INR in 2 weeks       Summary  As of 10/7/2022    Full warfarin instructions:  1 mg every Mon; 2 mg all other days   Next INR check:  10/20/2022             Telephone call with  daughter, Cleo who verbalizes understanding and agrees to plan    Lab visit scheduled    Education provided: Goal range and significance of current result and Contact [phone]  with any changes, questions or concerns.     Plan made per [facility] anticoagulation protocol    Tiffanie Flaherty RN  Anticoagulation Clinic  10/7/2022    _______________________________________________________________________     Anticoagulation Episode Summary     Current INR goal:  2.0-3.0   TTR:  44.6 % (1 y)   Target end date:  Indefinite   Send INR reminders to:  [facility]    Indications    Chronic atrial fibrillation (H) [I48.20]  Long term current use of anticoagulant therapy [Z79.01]           Comments:           Anticoagulation Care Providers     Provider Role Specialty Phone number    Arnel Garcia MD Referring Internal Medicine - Pediatrics [phone]    Marisabel Guido MD Referring Internal Medicine - Pediatrics [phone]

## 2022-10-13 LAB
MDC_IDC_MSMT_BATTERY_DTM: NORMAL
MDC_IDC_MSMT_BATTERY_IMPEDANCE: 1478 OHM
MDC_IDC_MSMT_BATTERY_REMAINING_LONGEVITY: 52 MO
MDC_IDC_MSMT_BATTERY_STATUS: NORMAL
MDC_IDC_MSMT_BATTERY_VOLTAGE: 2.76 V
MDC_IDC_MSMT_LEADCHNL_RA_IMPEDANCE_VALUE: 67 OHM
MDC_IDC_MSMT_LEADCHNL_RV_IMPEDANCE_VALUE: 676 OHM
MDC_IDC_PG_IMPLANT_DTM: NORMAL
MDC_IDC_PG_MFG: NORMAL
MDC_IDC_PG_MODEL: NORMAL
MDC_IDC_PG_SERIAL: NORMAL
MDC_IDC_PG_TYPE: NORMAL
MDC_IDC_SESS_CLINIC_NAME: NORMAL
MDC_IDC_SESS_DTM: NORMAL
MDC_IDC_SESS_TYPE: NORMAL
MDC_IDC_SET_BRADY_LOWRATE: 60 {BEATS}/MIN
MDC_IDC_SET_BRADY_MAX_SENSOR_RATE: 120 {BEATS}/MIN
MDC_IDC_SET_BRADY_MAX_TRACKING_RATE: 105 {BEATS}/MIN
MDC_IDC_SET_BRADY_MODE: NORMAL
MDC_IDC_SET_LEADCHNL_RV_PACING_AMPLITUDE: 2.5 V
MDC_IDC_SET_LEADCHNL_RV_PACING_CAPTURE_MODE: NORMAL
MDC_IDC_SET_LEADCHNL_RV_PACING_POLARITY: NORMAL
MDC_IDC_SET_LEADCHNL_RV_PACING_PULSEWIDTH: 0.52 MS
MDC_IDC_SET_LEADCHNL_RV_SENSING_POLARITY: NORMAL
MDC_IDC_SET_LEADCHNL_RV_SENSING_SENSITIVITY: 2.8 MV
MDC_IDC_SET_ZONE_DETECTION_INTERVAL: 333.33 MS
MDC_IDC_SET_ZONE_TYPE: NORMAL
MDC_IDC_SET_ZONE_TYPE: NORMAL
MDC_IDC_STAT_AT_BURDEN_PERCENT: 0 %
MDC_IDC_STAT_AT_DTM_END: NORMAL
MDC_IDC_STAT_AT_DTM_START: NORMAL
MDC_IDC_STAT_BRADY_DTM_END: NORMAL
MDC_IDC_STAT_BRADY_DTM_START: NORMAL
MDC_IDC_STAT_BRADY_RV_PERCENT_PACED: 71 %
MDC_IDC_STAT_EPISODE_RECENT_COUNT: 0
MDC_IDC_STAT_EPISODE_RECENT_COUNT: 0
MDC_IDC_STAT_EPISODE_RECENT_COUNT_DTM_END: NORMAL
MDC_IDC_STAT_EPISODE_RECENT_COUNT_DTM_END: NORMAL
MDC_IDC_STAT_EPISODE_RECENT_COUNT_DTM_START: NORMAL
MDC_IDC_STAT_EPISODE_RECENT_COUNT_DTM_START: NORMAL
MDC_IDC_STAT_EPISODE_TYPE: NORMAL
MDC_IDC_STAT_EPISODE_TYPE: NORMAL

## 2022-10-20 ENCOUNTER — LAB (OUTPATIENT)
Dept: LAB | Facility: CLINIC | Age: 87
End: 2022-10-20
Payer: MEDICARE

## 2022-10-20 ENCOUNTER — ANTICOAGULATION THERAPY VISIT (OUTPATIENT)
Dept: ANTICOAGULATION | Facility: CLINIC | Age: 87
End: 2022-10-20

## 2022-10-20 DIAGNOSIS — Z79.01 LONG TERM CURRENT USE OF ANTICOAGULANTS WITH INR GOAL OF 2.0-3.0: ICD-10-CM

## 2022-10-20 DIAGNOSIS — Z79.01 LONG TERM CURRENT USE OF ANTICOAGULANT THERAPY: ICD-10-CM

## 2022-10-20 DIAGNOSIS — I48.20 CHRONIC ATRIAL FIBRILLATION (H): ICD-10-CM

## 2022-10-20 DIAGNOSIS — I48.20 CHRONIC ATRIAL FIBRILLATION (H): Primary | ICD-10-CM

## 2022-10-20 LAB — INR BLD: 1.9 (ref 0.9–1.1)

## 2022-10-20 PROCEDURE — 36416 COLLJ CAPILLARY BLOOD SPEC: CPT

## 2022-10-20 PROCEDURE — 85610 PROTHROMBIN TIME: CPT

## 2022-10-20 NOTE — PROGRESS NOTES
ANTICOAGULATION MANAGEMENT     Leonor Mercado 89 year old female is on warfarin with subtherapeutic INR result. (Goal INR 2.0-3.0)    Recent labs: (last 7 days)     10/20/22  1412   INR 1.9*       ASSESSMENT       Source(s): Chart Review and Patient/Caregiver Call       Warfarin doses taken: Missed dose(s) may be affecting INR    Diet: No new diet changes identified    New illness, injury, or hospitalization: No    Medication/supplement changes: None noted    Signs or symptoms of bleeding or clotting: No    Previous INR: Therapeutic last 2(+) visits    Additional findings: None       PLAN     Recommended plan for temporary changes affecting INR     Dosing Instructions: Continue your current warfarin dose with next INR in 2 weeks       Summary  As of 10/20/2022    Full warfarin instructions:  1 mg every Mon; 2 mg all other days; Starting 10/20/2022   Next INR check:  11/3/2022             Telephone call with Alfredo's daughter, Cleo, who agrees to plan and repeated back plan correctly    Lab visit scheduled    Education provided:     Please call back if any changes to your diet, medications or how you've been taking warfarin    Plan made per [facility] anticoagulation protocol    Yoana Waddell RN  Anticoagulation Clinic  10/20/2022    _______________________________________________________________________     Anticoagulation Episode Summary     Current INR goal:  2.0-3.0   TTR:  47.7 % (1 y)   Target end date:  Indefinite   Send INR reminders to:  [facility]    Indications    Chronic atrial fibrillation (H) [I48.20]  Long term current use of anticoagulant therapy [Z79.01]  Long term current use of anticoagulants with INR goal of 2.0-3.0 [Z79.01]           Comments:           Anticoagulation Care Providers     Provider Role Specialty Phone number    Arnel Garcia MD Referring Internal Medicine - Pediatrics [phone]    Marisabel Guido MD Referring Internal Medicine - Pediatrics [phone]

## 2022-11-02 ENCOUNTER — DOCUMENTATION ONLY (OUTPATIENT)
Dept: PHARMACY | Facility: CLINIC | Age: 87
End: 2022-11-02

## 2022-11-02 NOTE — PROGRESS NOTES
This patient is due for MTM follow-up.    Patient is not reachable after several attempts. We will stop reaching out to the patient at this time. Please let us know if we can assist in this patient's care in the future. Routed to PCP as NICOLE Peacock, [facility]D [facility]  Medication Therapy Management Practitioner   #[phone]

## 2022-11-05 DIAGNOSIS — F43.20 ADJUSTMENT DISORDER, UNSPECIFIED TYPE: ICD-10-CM

## 2022-11-07 RX ORDER — MIRTAZAPINE 15 MG/1
TABLET, FILM COATED ORAL
Qty: 90 TABLET | Refills: 0 | Status: SHIPPED | OUTPATIENT
Start: 2022-11-07 | End: 2023-02-17

## 2022-11-10 ENCOUNTER — TELEPHONE (OUTPATIENT)
Dept: ANTICOAGULATION | Facility: CLINIC | Age: 87
End: 2022-11-10

## 2022-11-10 NOTE — TELEPHONE ENCOUNTER
ANTICOAGULATION     Leonor Mercado is overdue for INR check.      Spoke with daughter Cleo and scheduled lab appointment on 11/11/22    Mariola Valenzuela RN

## 2022-11-11 ENCOUNTER — LAB (OUTPATIENT)
Dept: LAB | Facility: CLINIC | Age: 87
End: 2022-11-11
Payer: MEDICARE

## 2022-11-11 ENCOUNTER — ANTICOAGULATION THERAPY VISIT (OUTPATIENT)
Dept: ANTICOAGULATION | Facility: CLINIC | Age: 87
End: 2022-11-11

## 2022-11-11 DIAGNOSIS — Z79.01 LONG TERM CURRENT USE OF ANTICOAGULANTS WITH INR GOAL OF 2.0-3.0: ICD-10-CM

## 2022-11-11 DIAGNOSIS — I48.20 CHRONIC ATRIAL FIBRILLATION (H): ICD-10-CM

## 2022-11-11 DIAGNOSIS — Z79.01 LONG TERM CURRENT USE OF ANTICOAGULANT THERAPY: ICD-10-CM

## 2022-11-11 DIAGNOSIS — I48.20 CHRONIC ATRIAL FIBRILLATION (H): Primary | ICD-10-CM

## 2022-11-11 LAB — INR BLD: 2.2 (ref 0.9–1.1)

## 2022-11-11 PROCEDURE — 85610 PROTHROMBIN TIME: CPT

## 2022-11-11 PROCEDURE — 36416 COLLJ CAPILLARY BLOOD SPEC: CPT

## 2022-11-11 NOTE — PROGRESS NOTES
ANTICOAGULATION MANAGEMENT     Leonor Mercado 89 year old female is on warfarin with therapeutic INR result. (Goal INR 2.0-3.0)    Recent labs: (last 7 days)     11/11/22  1259   INR 2.2*       ASSESSMENT       Source(s): Chart Review    Previous INR was Subtherapeutic    Medication, diet, health changes since last INR chart reviewed; none identified           PLAN     Recommended plan for no diet, medication or health factor changes affecting INR     Dosing Instructions: Continue your current warfarin dose with next INR in 4 weeks       Summary  As of 11/11/2022    Full warfarin instructions:  1 mg every Mon; 2 mg all other days; Starting 11/11/2022   Next INR check:  12/9/2022             Detailed voice message left for Alfredo's daughter, Cleo, with dosing instructions and follow up date.     Contact [phone]  to schedule and with any changes, questions or concerns.     Education provided:     Please call back if any changes to your diet, medications or how you've been taking warfarin    Plan made per [facility] anticoagulation protocol    Yoana Waddell RN  Anticoagulation Clinic  11/11/2022    _______________________________________________________________________     Anticoagulation Episode Summary     Current INR goal:  2.0-3.0   TTR:  50.2 % (1 y)   Target end date:  Indefinite   Send INR reminders to:  [facility]    Indications    Chronic atrial fibrillation (H) [I48.20]  Long term current use of anticoagulant therapy [Z79.01]  Long term current use of anticoagulants with INR goal of 2.0-3.0 [Z79.01]           Comments:           Anticoagulation Care Providers     Provider Role Specialty Phone number    Arnel Garcia MD Referring Internal Medicine - Pediatrics [phone]    Marisabel Guido MD Referring Internal Medicine - Pediatrics [phone]

## 2022-12-01 ENCOUNTER — TELEPHONE (OUTPATIENT)
Dept: PEDIATRICS | Facility: CLINIC | Age: 87
End: 2022-12-01

## 2022-12-01 NOTE — TELEPHONE ENCOUNTER
Patient's daughter calling on behalf of patient to schedule lab only appointment for INR. Patient broke a couple of teeth (where she had a partial), patient is needing dental surgery 12/07/22. Per dentist, patient needs INR within 24 hours prior to dental surgery. Scheduled for lab only 12/06/22. There are standing orders in chart. Routing to [facility] team to update.     Kris ALCAZAR RN 12/1/2022 at 1:25 PM

## 2022-12-01 NOTE — TELEPHONE ENCOUNTER
Noted, will contact patient and daughter with INR result on 12/6/22.    Mariola Valenzuela RN  Anticoagulation Clinic

## 2022-12-06 ENCOUNTER — LAB (OUTPATIENT)
Dept: LAB | Facility: CLINIC | Age: 87
End: 2022-12-06
Payer: MEDICARE

## 2022-12-06 ENCOUNTER — ANTICOAGULATION THERAPY VISIT (OUTPATIENT)
Dept: ANTICOAGULATION | Facility: CLINIC | Age: 87
End: 2022-12-06

## 2022-12-06 DIAGNOSIS — Z79.01 LONG TERM CURRENT USE OF ANTICOAGULANTS WITH INR GOAL OF 2.0-3.0: ICD-10-CM

## 2022-12-06 DIAGNOSIS — I48.20 CHRONIC ATRIAL FIBRILLATION (H): ICD-10-CM

## 2022-12-06 DIAGNOSIS — I48.20 CHRONIC ATRIAL FIBRILLATION (H): Primary | ICD-10-CM

## 2022-12-06 DIAGNOSIS — Z79.01 LONG TERM CURRENT USE OF ANTICOAGULANT THERAPY: ICD-10-CM

## 2022-12-06 LAB — INR BLD: 2.1 (ref 0.9–1.1)

## 2022-12-06 PROCEDURE — 85610 PROTHROMBIN TIME: CPT

## 2022-12-06 PROCEDURE — 36416 COLLJ CAPILLARY BLOOD SPEC: CPT

## 2022-12-06 NOTE — PROGRESS NOTES
ANTICOAGULATION MANAGEMENT     Leonor Mercado 89 year old female is on warfarin with therapeutic INR result. (Goal INR 2.0-3.0)    Recent labs: (last 7 days)     12/06/22  1639   INR 2.1*       ASSESSMENT       Source(s): Chart Review and Patient/Caregiver Call       Warfarin doses taken: Warfarin taken as instructed    Diet: No new diet changes identified    New illness, injury, or hospitalization: No    Medication/supplement changes: None noted    Signs or symptoms of bleeding or clotting: No    Previous INR: Therapeutic last visit; previously outside of goal range    Additional findings: Upcoming surgery/procedure Oral surgery 12/7/22, both top front teeth broke off. Cleo will call INR clinic if patient receives an antibiotic.       PLAN     Recommended plan for no diet, medication or health factor changes affecting INR     Dosing Instructions: Continue your current warfarin dose with next INR in 4 weeks       Summary  As of 12/6/2022    Full warfarin instructions:  1 mg every Mon; 2 mg all other days; Starting 12/6/2022   Next INR check:  1/3/2023             Telephone call with  Cleo, patient's daughter, who verbalizes understanding and agrees to plan    Lab visit scheduled    Education provided:     Please call back if any changes to your diet, medications or how you've been taking warfarin    Contact [phone]  with any changes, questions or concerns.     Plan made per [facility] anticoagulation protocol    Dilma Rider RN  Anticoagulation Clinic  12/6/2022    _______________________________________________________________________     Anticoagulation Episode Summary     Current INR goal:  2.0-3.0   TTR:  50.3 % (1 y)   Target end date:  Indefinite   Send INR reminders to:  [facility]    Indications    Chronic atrial fibrillation (H) [I48.20]  Long term current use of anticoagulant therapy [Z79.01]  Long term current use of anticoagulants with INR goal of 2.0-3.0 [Z79.01]           Comments:            Anticoagulation Care Providers     Provider Role Specialty Phone number    Arnel Gracia MD Referring Internal Medicine - Pediatrics [phone]    Marisabel Guido MD Referring Internal Medicine - Pediatrics [phone]

## 2022-12-12 ENCOUNTER — TRANSFERRED RECORDS (OUTPATIENT)
Dept: HEALTH INFORMATION MANAGEMENT | Facility: CLINIC | Age: 87
End: 2022-12-12

## 2023-01-05 ENCOUNTER — ANTICOAGULATION THERAPY VISIT (OUTPATIENT)
Dept: ANTICOAGULATION | Facility: CLINIC | Age: 88
End: 2023-01-05

## 2023-01-05 ENCOUNTER — LAB (OUTPATIENT)
Dept: LAB | Facility: CLINIC | Age: 88
End: 2023-01-05
Payer: MEDICARE

## 2023-01-05 DIAGNOSIS — I48.20 CHRONIC ATRIAL FIBRILLATION (H): Primary | ICD-10-CM

## 2023-01-05 DIAGNOSIS — I48.20 CHRONIC ATRIAL FIBRILLATION (H): ICD-10-CM

## 2023-01-05 DIAGNOSIS — Z79.01 LONG TERM CURRENT USE OF ANTICOAGULANTS WITH INR GOAL OF 2.0-3.0: ICD-10-CM

## 2023-01-05 DIAGNOSIS — Z79.01 LONG TERM CURRENT USE OF ANTICOAGULANT THERAPY: ICD-10-CM

## 2023-01-05 LAB — INR BLD: 1.7 (ref 0.9–1.1)

## 2023-01-05 PROCEDURE — 85610 PROTHROMBIN TIME: CPT

## 2023-01-05 PROCEDURE — 36415 COLL VENOUS BLD VENIPUNCTURE: CPT

## 2023-01-05 NOTE — PROGRESS NOTES
ANTICOAGULATION MANAGEMENT     Leonor Mercado 89 year old female is on warfarin with subtherapeutic INR result. (Goal INR 2.0-3.0)    Recent labs: (last 7 days)     01/05/23  1426   INR 1.7*       ASSESSMENT       Source(s): Chart Review and Patient/Caregiver Call       Warfarin doses taken: Missed dose(s) may be affecting INR    Diet: No new diet changes identified    New illness, injury, or hospitalization: No    Medication/supplement changes: None noted    Signs or symptoms of bleeding or clotting: No    Previous INR: Therapeutic last 2(+) visits    Additional findings: None       PLAN     Recommended plan for temporary change(s) affecting INR     Dosing Instructions: Continue your current warfarin dose with next INR in 2 weeks       Summary  As of 1/5/2023    Full warfarin instructions:  1/5: 4 mg; Otherwise 1 mg every Mon; 2 mg all other days   Next INR check:  1/17/2023             Telephone call with  daughter, Cleo, who verbalizes understanding and agrees to plan    Lab visit scheduled    Education provided:     Please call back if any changes to your diet, medications or how you've been taking warfarin    Contact [phone]  with any changes, questions or concerns.     Plan made per [facility] anticoagulation protocol    Ethel Cooley, RN  Anticoagulation Clinic  1/5/2023    _______________________________________________________________________     Anticoagulation Episode Summary     Current INR goal:  2.0-3.0   TTR:  44.1 % (1 y)   Target end date:  Indefinite   Send INR reminders to:  [facility]    Indications    Chronic atrial fibrillation (H) [I48.20]  Long term current use of anticoagulant therapy [Z79.01]  Long term current use of anticoagulants with INR goal of 2.0-3.0 [Z79.01]           Comments:           Anticoagulation Care Providers     Provider Role Specialty Phone number    Arnel Garcia MD Referring Internal Medicine - Pediatrics [phone]    aMrisabel Guido MD  [facility] Internal Medicine - Pediatrics [phone]

## 2023-01-09 ENCOUNTER — NURSE TRIAGE (OUTPATIENT)
Dept: PEDIATRICS | Facility: CLINIC | Age: 88
End: 2023-01-09

## 2023-01-09 ENCOUNTER — NURSE TRIAGE (OUTPATIENT)
Dept: NURSING | Facility: CLINIC | Age: 88
End: 2023-01-09

## 2023-01-09 ENCOUNTER — TELEPHONE (OUTPATIENT)
Dept: NURSING | Facility: CLINIC | Age: 88
End: 2023-01-09

## 2023-01-09 NOTE — TELEPHONE ENCOUNTER
"S-(situation): Patient calling requesting prescription for antibiotics \"for infected bunion\".     B-(background): Patient has not been seen for this previously.     A-(assessment): Pain started yesterday, located on great toe on left foot next to bunion. Top of foot is swollen. Entire left foot hurts. Area is timbo. No pus drainage. Tender to touch. Rates pain as 7/10, \"it aches just sitting here doing nothing\". \"I can't touch it, it hurts too bad\". No fever. No numbness.     R-(recommendations): RN advised visit is needed in order to prescribe medication. Patient declines visit. Patient states she cannot come into clinic, has no one to contact for a ride, declines contacting number on back of insurance cards to arrange transportation. Patient wants message sent to PCP.     Reason for Disposition    Swollen foot  (Exceptions: Localized bump from bunions, calluses, insect bite, sting.)    Additional Information    Negative: Looks like a boil, infected sore, deep ulcer, or other infected rash (spreading redness, pus)    Negative: SEVERE pain (e.g., excruciating, unable to do any normal activities)    Negative: Red area or streak and fever    Negative: Swollen foot and fever    Negative: Patient sounds very sick or weak to the triager    Negative: Entire foot is cool or blue in comparison to other foot    Negative: Purple or black skin on foot or toe    Negative: Followed an ankle or foot injury    Negative: Toe pain is main symptom    Negative: Ankle pain is main symptom    Protocols used: FOOT PAIN-STEVIE-ALDAIR ALCAZAR RN 1/9/2023 at 3:03 PM    "

## 2023-01-10 ENCOUNTER — OFFICE VISIT (OUTPATIENT)
Dept: PEDIATRICS | Facility: CLINIC | Age: 88
End: 2023-01-10
Payer: MEDICARE

## 2023-01-10 VITALS
RESPIRATION RATE: 22 BRPM | TEMPERATURE: 97.4 F | HEIGHT: 64 IN | HEART RATE: 83 BPM | SYSTOLIC BLOOD PRESSURE: 130 MMHG | OXYGEN SATURATION: 97 % | WEIGHT: 146.8 LBS | BODY MASS INDEX: 25.06 KG/M2 | DIASTOLIC BLOOD PRESSURE: 64 MMHG

## 2023-01-10 DIAGNOSIS — Z79.01 LONG TERM CURRENT USE OF ANTICOAGULANTS WITH INR GOAL OF 2.0-3.0: ICD-10-CM

## 2023-01-10 DIAGNOSIS — N18.32 STAGE 3B CHRONIC KIDNEY DISEASE (H): ICD-10-CM

## 2023-01-10 DIAGNOSIS — M10.9 ACUTE GOUT OF LEFT FOOT, UNSPECIFIED CAUSE: Primary | ICD-10-CM

## 2023-01-10 PROCEDURE — 99214 OFFICE O/P EST MOD 30 MIN: CPT | Performed by: PHYSICIAN ASSISTANT

## 2023-01-10 RX ORDER — PREDNISONE 10 MG/1
30 TABLET ORAL DAILY
Qty: 15 TABLET | Refills: 0 | Status: SHIPPED | OUTPATIENT
Start: 2023-01-10 | End: 2023-01-15

## 2023-01-10 RX ORDER — WARFARIN SODIUM 1 MG/1
TABLET ORAL
Qty: 100 TABLET | Refills: 0 | COMMUNITY
Start: 2023-01-10 | End: 2023-02-03

## 2023-01-10 ASSESSMENT — PAIN SCALES - GENERAL: PAINLEVEL: EXTREME PAIN (9)

## 2023-01-10 NOTE — TELEPHONE ENCOUNTER
Needs visit--not clear if infected or other  (?gout)  ?any family member that can bring her in to clinic  Ok for ADRIANA or extender

## 2023-01-10 NOTE — PROGRESS NOTES
"  Assessment & Plan   (M10.9) Acute gout of left foot, unspecified cause  (primary encounter diagnosis)  Comment: begin oral prednisone as directed.   Plan: predniSONE (DELTASONE) 10 MG tablet          (N18.32) Stage 3b chronic kidney disease (H)  Comment:   Plan:     (Z79.01) Long term current use of anticoagulants with INR goal of 2.0-3.0  Comment: recheck INR in one week.  Plan:     SAVANNAH [facility] Fuentes is a 89 year old accompanied by her daughter, presenting for the following health issues:  Musculoskeletal Problem      HPI   Pain History:  When did you first notice your pain? - Acute Pain   Have you seen anyone else for your pain? No  Where in your body do you have pain? Musculoskeletal problem/pain  Onset/Duration: 3-4 days  Description  Location: foot - left  Joint Swelling: YES  Redness: YES  Pain: YES  Warmth: YES  Intensity:  severe  Progression of Symptoms:  worsening  Accompanying signs and symptoms:   Fevers: No  Numbness/tingling/weakness: No  History  Trauma to the area: No  History of gout. On allopurinol.   On coumadin and next INR in one week. Was subtherapeutic last week.  On lasix for CHF  Recent illness:  No  Previous similar problem: YES  Previous evaluation:  No  Precipitating or alleviating factors:  Aggravating factors include: standing, walking, climbing stairs, exercise and overuse  Therapies tried and outcome: nothing    Review of Systems   Constitutional, HEENT, cardiovascular, pulmonary, gi and gu systems are negative, except as otherwise noted.      Objective    /64 (BP Location: Right arm, Patient Position: Sitting, Cuff Size: Adult Regular)   Pulse 83   Temp 97.4  F (36.3  C) (Tympanic)   Resp 22   Ht 1.613 m (5' 3.5\")   Wt 66.6 kg (146 lb 12.8 oz)   LMP  (LMP Unknown)   SpO2 97%   BMI 25.60 kg/m    Body mass index is 25.6 kg/m .  Physical Exam   GENERAL: alert and no distress  FOOT: inspection of the left " foot reveals a large bunion present. There is diffuse edema of the foot. Erythema, warm and localized tenderness over the 1st MTP joint.

## 2023-01-10 NOTE — TELEPHONE ENCOUNTER
Called and spoke with patient's daughter Cleo [[phone]], per patient request in routing comment to contact in order to arrange appointment. Scheduled for OV 1/10/23 for evaluation of left foot. Daughter will bring patient at 2:10pm today.     Kris ALCAZAR RN 1/10/2023 at 7:14 AM

## 2023-01-10 NOTE — TELEPHONE ENCOUNTER
Call was a warm red transfer. Pt hung up before FNA was able to speak with her. Appears she was already triaged for foot pain earlier today and told she needed to be seen but she declined. Per  she was calling again in regards to foot pain       Kaila Tellez RN [facility] January 9, 2023 7:09 PM

## 2023-01-16 ENCOUNTER — TELEPHONE (OUTPATIENT)
Dept: CARDIOLOGY | Facility: CLINIC | Age: 88
End: 2023-01-16

## 2023-01-16 ENCOUNTER — NURSE TRIAGE (OUTPATIENT)
Dept: PEDIATRICS | Facility: CLINIC | Age: 88
End: 2023-01-16
Payer: MEDICARE

## 2023-01-16 DIAGNOSIS — M10.9 ACUTE GOUT OF LEFT FOOT, UNSPECIFIED CAUSE: Primary | ICD-10-CM

## 2023-01-16 NOTE — TELEPHONE ENCOUNTER
----- Message from Felecia So sent at 1/16/2023 10:47 AM CST -----  Regarding: Remote Device Check  Good Morning,  This is regarding pt's in clinic device check that is due.    I spoke with pt's daughter Cleo, I got pt scheduled for an annual followup in May with Dr. Horton. Per Cleo, the daughter, due to pt's age of 89, she is not going to have her mom go out in the colder weather.    Can you please schedule a remote device check for this pt. Can you also let Cleo, the daughter know of this appt date. Her phone # is [phone].    Thank  you!    Felecia      Received above message from Felecia in scheduling.  Called and spoke with patient's daughter, Cleo, and remote device check scheduled for 1/17/23.      DAMIAN Gifford

## 2023-01-16 NOTE — TELEPHONE ENCOUNTER
"Pt calls about continued pain in her foot  Pt is unsure that she had been taking the allopurinol  Call placed to pt's daughter Cleo- she sets up the meds and sees her every day    [facility]    LOV: 1/10/2023 Gout flare up   Plan: predniSONE (DELTASONE) 20 MG tablet,         allopurinol (ZYLOPRIM) 100 MG tablet, **Uric         acid FUTURE 2mo, CBC with platelets and         differential    Per daughter last time this happened it took 2 weeks of prednisone to work    Please advise    Thank you  John Ramírez RN on 1/16/2023 at 4:49 PM    Answer Assessment - Initial Assessment Questions  1. ONSET: \"When did the pain start?\"       Last week was seen for gout  2. LOCATION: \"Where is the pain located?\"       Left foot has gout  3. PAIN: \"How bad is the pain?\"    (Scale 1-10; or mild, moderate, severe)   - MILD (1-3): doesn't interfere with normal activities.    - MODERATE (4-7): interferes with normal activities (e.g., work or school) or awakens from sleep, limping.    - SEVERE (8-10): excruciating pain, unable to do any normal activities, unable to walk.       moderate  4. WORK OR EXERCISE: \"Has there been any recent work or exercise that involved this part of the body?\"       no  5. CAUSE: \"What do you think is causing the foot pain?\"      gout  6. OTHER SYMPTOMS: \"Do you have any other symptoms?\" (e.g., leg pain, rash, fever, numbness)      Throbbing and swollen  7. PREGNANCY: \"Is there any chance you are pregnant?\" \"When was your last menstrual period?\"      NA    Protocols used: FOOT PAIN-A-OH      "

## 2023-01-17 ENCOUNTER — ANTICOAGULATION THERAPY VISIT (OUTPATIENT)
Dept: ANTICOAGULATION | Facility: CLINIC | Age: 88
End: 2023-01-17

## 2023-01-17 ENCOUNTER — ANCILLARY PROCEDURE (OUTPATIENT)
Dept: CARDIOLOGY | Facility: CLINIC | Age: 88
End: 2023-01-17
Attending: INTERNAL MEDICINE
Payer: MEDICARE

## 2023-01-17 ENCOUNTER — LAB (OUTPATIENT)
Dept: LAB | Facility: CLINIC | Age: 88
End: 2023-01-17
Payer: MEDICARE

## 2023-01-17 DIAGNOSIS — Z79.01 LONG TERM CURRENT USE OF ANTICOAGULANTS WITH INR GOAL OF 2.0-3.0: ICD-10-CM

## 2023-01-17 DIAGNOSIS — I48.20 CHRONIC ATRIAL FIBRILLATION (H): Primary | ICD-10-CM

## 2023-01-17 DIAGNOSIS — Z79.01 LONG TERM CURRENT USE OF ANTICOAGULANT THERAPY: ICD-10-CM

## 2023-01-17 DIAGNOSIS — I48.20 CHRONIC ATRIAL FIBRILLATION (H): ICD-10-CM

## 2023-01-17 DIAGNOSIS — I49.5 SICK SINUS SYNDROME (H): ICD-10-CM

## 2023-01-17 DIAGNOSIS — Z95.0 CARDIAC PACEMAKER IN SITU: ICD-10-CM

## 2023-01-17 LAB — INR BLD: 4.1 (ref 0.9–1.1)

## 2023-01-17 PROCEDURE — 85610 PROTHROMBIN TIME: CPT

## 2023-01-17 PROCEDURE — 93294 REM INTERROG EVL PM/LDLS PM: CPT | Performed by: INTERNAL MEDICINE

## 2023-01-17 PROCEDURE — 36416 COLLJ CAPILLARY BLOOD SPEC: CPT

## 2023-01-17 PROCEDURE — 93296 REM INTERROG EVL PM/IDS: CPT | Performed by: INTERNAL MEDICINE

## 2023-01-17 RX ORDER — PREDNISONE 10 MG/1
TABLET ORAL
Qty: 20 TABLET | Refills: 0 | Status: SHIPPED | OUTPATIENT
Start: 2023-01-17 | End: 2023-05-09

## 2023-01-17 NOTE — PROGRESS NOTES
ANTICOAGULATION MANAGEMENT     Leonor Mercado 89 year old female is on warfarin with supratherapeutic INR result. (Goal INR 2.0-3.0)    Recent labs: (last 7 days)     01/17/23  1340   INR 4.1*       ASSESSMENT       Source(s): Chart Review and Patient/Caregiver Call       Warfarin doses taken: Warfarin taken as instructed    Diet: Decreased greens/vitamin K in diet; plans to resume previous intake    New illness, injury, or hospitalization: Yes: foot pain.  Most likely gout symptoms for 1 week.  These symptoms have not improved.    Medication/supplement changes: Prednisone from 1/10-1/15/23.  Patient has requested another rx for prednisone. Request is pending MD's approval.    Signs or symptoms of bleeding or clotting: No    Previous INR: Subtherapeutic    Additional findings: None       PLAN     Recommended plan for temporary change(s) affecting INR     Dosing Instructions: hold dose then continue your current warfarin dose with next INR in 1 week.  Advised her to notify INR nurse if prednisone is prescribed again since warfarin dose may need to be adjusted further.       Summary  As of 1/17/2023    Full warfarin instructions:  1/17: Hold; Otherwise 1 mg every Mon; 2 mg all other days   Next INR check:  1/24/2023             Telephone call with Alfredo's daughter, Cleo, who agrees to plan and repeated back plan correctly    Lab visit scheduled    Education provided:     Please call back if any changes to your diet, medications or how you've been taking warfarin    Goal range and lab monitoring: goal range and significance of current result    Interaction IS anticipated between warfarin and prednisone    Symptom monitoring: monitoring for bleeding signs and symptoms    Plan made per [facility] anticoagulation protocol and assistance of Alexandria Berman [facility]    Yoana Waddell, DAMIAN  Anticoagulation Clinic  1/17/2023    _______________________________________________________________________     Anticoagulation Episode Summary      Current INR goal:  2.0-3.0   TTR:  45.2 % (1 y)   Target end date:  Indefinite   Send INR reminders to:  [facility]    Indications    Chronic atrial fibrillation (H) [I48.20]  Long term current use of anticoagulant therapy [Z79.01]  Long term current use of anticoagulants with INR goal of 2.0-3.0 [Z79.01]           Comments:           Anticoagulation Care Providers     Provider Role Specialty Phone number    Arnel Garcia MD Referring Internal Medicine - Pediatrics [phone]    Marisabel Guido MD Referring Internal Medicine - Pediatrics [phone]

## 2023-01-17 NOTE — TELEPHONE ENCOUNTER
Called and spoke with patient's daughter, Cleo. RN relayed message regarding prednisone prescription sent to pharmacy 1/17/23.     Per INR notes from visit today 1/17/23: Recommended plan for temporary change(s) affecting INR      Dosing Instructions: hold dose then continue your current warfarin dose with next INR in 1 week.  Advised her to notify INR nurse if prednisone is prescribed again since warfarin dose may need to be adjusted further.             Summary  As of 1/17/2023     Full warfarin instructions:  1/17: Hold; Otherwise 1 mg every Mon; 2 mg all other days   Next INR check:  1/24/2023          Routing to anti-coag team to advise if earlier INR appointment needed.     Kris ALCAZAR RN 1/17/2023 at 4:31 PM

## 2023-01-17 NOTE — TELEPHONE ENCOUNTER
New pred taper given. Patient needs to have INR check in three days. Call patient to make INR appointment.     Pipo Nelson PA-C       30-Mar-2020

## 2023-01-17 NOTE — TELEPHONE ENCOUNTER
"Called and spoke with patient's daughter. Patient has had very little improvement with the prednisone \"it's just kind of kicking in now\". Still in a lot of pain, \"now she's not eating properly cause she's in pain, so now her INR is off a little too\". Daughter notes it took a second course of prednisone before it helped when this happened last year. There has been a little bit of improvement in swelling and redness, \"but not much\". \"It still looks pretty angry\". Patient has been elevating her foot and icing.     Would prefer call back to [phone] to Cleo.     Kris ALCAZAR RN 1/17/2023 at 3:40 PM    "

## 2023-01-17 NOTE — TELEPHONE ENCOUNTER
Did patient have any improvement with the prednisone?   Is the swelling and redness still the same?  Has she been icing and elevating?   Pipo Nelson PA-C

## 2023-01-17 NOTE — TELEPHONE ENCOUNTER
Please also route encounter to INR clinic if prednisone is prescribed again since warfarin dose may need to be adjusted further.

## 2023-01-18 NOTE — PROGRESS NOTES
Patient was prescribed a prednisone taper 40 mg for 2 days, then 30 mg for 2 days, then 20 mg for 2 days then 10 mg for 2 days.  Consulted Alexandria Berman RPH for dosing recommendations due to possible warfarin and prednisone interaction.  She recommended decreasing warfarin to 1 mg on Wed, and Friday this week, and continuing maintenance dose on all other days with a recheck on Tuesday (when patient was already scheduled and had transportion).  May need to consider adjusting maintenance dose at next visit if foot pain is ongoing.

## 2023-01-24 ENCOUNTER — LAB (OUTPATIENT)
Dept: LAB | Facility: CLINIC | Age: 88
End: 2023-01-24
Payer: MEDICARE

## 2023-01-24 ENCOUNTER — ANTICOAGULATION THERAPY VISIT (OUTPATIENT)
Dept: ANTICOAGULATION | Facility: CLINIC | Age: 88
End: 2023-01-24

## 2023-01-24 DIAGNOSIS — Z79.01 LONG TERM CURRENT USE OF ANTICOAGULANTS WITH INR GOAL OF 2.0-3.0: ICD-10-CM

## 2023-01-24 DIAGNOSIS — I48.20 CHRONIC ATRIAL FIBRILLATION (H): ICD-10-CM

## 2023-01-24 DIAGNOSIS — I48.20 CHRONIC ATRIAL FIBRILLATION (H): Primary | ICD-10-CM

## 2023-01-24 DIAGNOSIS — Z79.01 LONG TERM CURRENT USE OF ANTICOAGULANT THERAPY: ICD-10-CM

## 2023-01-24 LAB — INR BLD: 3.6 (ref 0.9–1.1)

## 2023-01-24 PROCEDURE — 36416 COLLJ CAPILLARY BLOOD SPEC: CPT

## 2023-01-24 PROCEDURE — 85610 PROTHROMBIN TIME: CPT

## 2023-01-24 NOTE — PROGRESS NOTES
ANTICOAGULATION MANAGEMENT     Leonor Mercado 89 year old female is on warfarin with supratherapeutic INR result. (Goal INR 2.0-3.0)    Recent labs: (last 7 days)     01/24/23  1338   INR 3.6*       ASSESSMENT       Source(s): Chart Review and Patient/Caregiver Call       Warfarin doses taken: Warfarin taken as instructed, Warfarin was held 1/17/23, decreased doses on 1/18/23 and 1/20/23 due to supratherapeutic INR and Prednisone taper.    Diet: No new diet changes identified    New illness, injury, or hospitalization: No, Cleo reports patient's foot has improved, continues to have swelling, pain and redness, Cleo reports patient is now able to put her foot into a shoe today, was not able to even wear a slipper previously.    Medication/supplement changes: Last dose of prednisone was today (1/24/23)    Signs or symptoms of bleeding or clotting: No    Previous INR: Supratherapeutic    Additional findings: None       PLAN     Recommended plan for temporary change(s) affecting INR     Dosing Instructions: Hold warfarin dose 1/24/23 and partial hold 1/25/23 then continue your current warfarin dose with next INR in 1 week       Summary  As of 1/24/2023    Full warfarin instructions:  1/24: Hold; 1/25: 1 mg; Otherwise 1 mg every Mon; 2 mg all other days   Next INR check:  1/31/2023             Telephone call with  Cleo, patient's daughter, who verbalizes understanding and agrees to plan    Lab visit scheduled    Education provided:     Please call back if any changes to your diet, medications or how you've been taking warfarin    Contact [phone]  with any changes, questions or concerns.     Plan made per [facility] anticoagulation protocol    Dilma Rider RN  Anticoagulation Clinic  1/24/2023    _______________________________________________________________________     Anticoagulation Episode Summary     Current INR goal:  2.0-3.0   TTR:  45.2 % (1 y)   Target end date:  Indefinite   Send INR reminders to:  [facility]    Indications    Chronic atrial fibrillation (H) [I48.20]  Long term current use of anticoagulant therapy [Z79.01]  Long term current use of anticoagulants with INR goal of 2.0-3.0 [Z79.01]           Comments:           Anticoagulation Care Providers     Provider Role Specialty Phone number    Arnel Garcia MD Referring Internal Medicine - Pediatrics [phone]    Marisabel Guido MD Referring Internal Medicine - Pediatrics [phone]

## 2023-01-31 ENCOUNTER — LAB (OUTPATIENT)
Dept: LAB | Facility: CLINIC | Age: 88
End: 2023-01-31
Payer: MEDICARE

## 2023-01-31 ENCOUNTER — ANTICOAGULATION THERAPY VISIT (OUTPATIENT)
Dept: ANTICOAGULATION | Facility: CLINIC | Age: 88
End: 2023-01-31

## 2023-01-31 DIAGNOSIS — Z79.01 LONG TERM CURRENT USE OF ANTICOAGULANT THERAPY: ICD-10-CM

## 2023-01-31 DIAGNOSIS — Z79.01 LONG TERM CURRENT USE OF ANTICOAGULANTS WITH INR GOAL OF 2.0-3.0: ICD-10-CM

## 2023-01-31 DIAGNOSIS — I48.20 CHRONIC ATRIAL FIBRILLATION (H): Primary | ICD-10-CM

## 2023-01-31 DIAGNOSIS — I48.20 CHRONIC ATRIAL FIBRILLATION (H): ICD-10-CM

## 2023-01-31 LAB — INR BLD: 1.7 (ref 0.9–1.1)

## 2023-01-31 PROCEDURE — 85610 PROTHROMBIN TIME: CPT

## 2023-01-31 PROCEDURE — 36416 COLLJ CAPILLARY BLOOD SPEC: CPT

## 2023-01-31 NOTE — PROGRESS NOTES
ANTICOAGULATION MANAGEMENT     Leonor Mercado 89 year old female is on warfarin with subtherapeutic INR result. (Goal INR 2.0-3.0)    Recent labs: (last 7 days)     01/31/23  1315   INR 1.7*       ASSESSMENT       Source(s): Chart Review and Patient/Caregiver Call       Warfarin doses taken: Held for elevated INR last week  recently which may be affecting INR    Diet: appetite was decreased d/t pain, now back to baseline    New illness, injury, or hospitalization: gout symptoms improved following prednisone    Medication/supplement changes: completed prednisone last week    Signs or symptoms of bleeding or clotting: No    Previous INR: Supratherapeutic    Additional findings: None       PLAN     Recommended plan for temporary change(s) affecting INR     Dosing Instructions: Continue your current warfarin dose with next INR in 1 week       Summary  As of 1/31/2023    Full warfarin instructions:  1 mg every Mon; 2 mg all other days   Next INR check:  2/7/2023             Telephone call with  daughter Cleo who verbalizes understanding and agrees to plan    Lab visit scheduled    Education provided:     Goal range and lab monitoring: goal range and significance of current result    Plan made per [facility] anticoagulation protocol    Joellen Lynch RN  Anticoagulation Clinic  1/31/2023    _______________________________________________________________________     Anticoagulation Episode Summary     Current INR goal:  2.0-3.0   TTR:  46.2 % (1 y)   Target end date:  Indefinite   Send INR reminders to:  [facility]    Indications    Chronic atrial fibrillation (H) [I48.20]  Long term current use of anticoagulant therapy [Z79.01]  Long term current use of anticoagulants with INR goal of 2.0-3.0 [Z79.01]           Comments:           Anticoagulation Care Providers     Provider Role Specialty Phone number    Arnel Garcia MD Referring Internal Medicine - Pediatrics [phone]    Marisabel Guido MD  [facility] Internal Medicine - Pediatrics [phone]

## 2023-02-03 ENCOUNTER — TELEPHONE (OUTPATIENT)
Dept: PEDIATRICS | Facility: CLINIC | Age: 88
End: 2023-02-03
Payer: MEDICARE

## 2023-02-03 DIAGNOSIS — Z79.01 LONG TERM CURRENT USE OF ANTICOAGULANT THERAPY: Primary | ICD-10-CM

## 2023-02-03 RX ORDER — WARFARIN SODIUM 2 MG/1
TABLET ORAL
Qty: 90 TABLET | Refills: 1 | Status: SHIPPED | OUTPATIENT
Start: 2023-02-03 | End: 2023-08-03

## 2023-02-03 NOTE — TELEPHONE ENCOUNTER
Returned call to Cleo. Needs new Rx for warfarin sent to pharmacy, patient is running out of warfarin.    ANTICOAGULATION MANAGEMENT:  Medication Refill    Anticoagulation Summary  As of 1/31/2023    Warfarin maintenance plan:  1 mg (2 mg x 0.5) every Mon; 2 mg (2 mg x 1) all other days   Next INR check:  2/7/2023   Target end date:  Indefinite    Indications    Chronic atrial fibrillation (H) [I48.20]  Long term current use of anticoagulant therapy [Z79.01]  Long term current use of anticoagulants with INR goal of 2.0-3.0 [Z79.01]             Anticoagulation Care Providers     Provider Role Specialty Phone number    Arnel Garcia MD Referring Internal Medicine - Pediatrics [phone]    Marisabel Guido MD Referring Internal Medicine - Pediatrics [phone]          Visit with referring provider/group within last year: Yes    [facility] referral signed within last year: Yes    Leonor meets all criteria for refill (current [facility] referral, office visit with referring provider/group in last year, lab monitoring up to date or not exceeding 2 weeks overdue). Rx instructions and quantity supplied updated to match patient's current dosing plan. Warfarin 90 day supply with 1 refill granted per [facility] protocol     Dilma Rider RN  Anticoagulation Clinic

## 2023-02-03 NOTE — TELEPHONE ENCOUNTER
Patient Returning Call    Reason for call:  Has questions regarding her mom's INR    Information relayed to patient:  Let daughter know that I would get the message over to the INR nurses and someone will be in contact with her    Patient has additional questions:  No        Okay to leave a detailed message?: Yes at Other phone number:  [phone]

## 2023-02-05 LAB
MDC_IDC_MSMT_BATTERY_DTM: NORMAL
MDC_IDC_MSMT_BATTERY_IMPEDANCE: 1593 OHM
MDC_IDC_MSMT_BATTERY_REMAINING_LONGEVITY: 48 MO
MDC_IDC_MSMT_BATTERY_STATUS: NORMAL
MDC_IDC_MSMT_BATTERY_VOLTAGE: 2.76 V
MDC_IDC_MSMT_LEADCHNL_RA_IMPEDANCE_VALUE: 67 OHM
MDC_IDC_MSMT_LEADCHNL_RV_IMPEDANCE_VALUE: 643 OHM
MDC_IDC_PG_IMPLANT_DTM: NORMAL
MDC_IDC_PG_MFG: NORMAL
MDC_IDC_PG_MODEL: NORMAL
MDC_IDC_PG_SERIAL: NORMAL
MDC_IDC_PG_TYPE: NORMAL
MDC_IDC_SESS_CLINIC_NAME: NORMAL
MDC_IDC_SESS_DTM: NORMAL
MDC_IDC_SESS_TYPE: NORMAL
MDC_IDC_SET_BRADY_LOWRATE: 60 {BEATS}/MIN
MDC_IDC_SET_BRADY_MAX_SENSOR_RATE: 120 {BEATS}/MIN
MDC_IDC_SET_BRADY_MAX_TRACKING_RATE: 105 {BEATS}/MIN
MDC_IDC_SET_BRADY_MODE: NORMAL
MDC_IDC_SET_LEADCHNL_RV_PACING_AMPLITUDE: 2.5 V
MDC_IDC_SET_LEADCHNL_RV_PACING_CAPTURE_MODE: NORMAL
MDC_IDC_SET_LEADCHNL_RV_PACING_POLARITY: NORMAL
MDC_IDC_SET_LEADCHNL_RV_PACING_PULSEWIDTH: 0.52 MS
MDC_IDC_SET_LEADCHNL_RV_SENSING_POLARITY: NORMAL
MDC_IDC_SET_LEADCHNL_RV_SENSING_SENSITIVITY: 2.8 MV
MDC_IDC_SET_ZONE_DETECTION_INTERVAL: 333.33 MS
MDC_IDC_SET_ZONE_TYPE: NORMAL
MDC_IDC_SET_ZONE_TYPE: NORMAL
MDC_IDC_STAT_AT_BURDEN_PERCENT: 0 %
MDC_IDC_STAT_AT_DTM_END: NORMAL
MDC_IDC_STAT_AT_DTM_START: NORMAL
MDC_IDC_STAT_BRADY_DTM_END: NORMAL
MDC_IDC_STAT_BRADY_DTM_START: NORMAL
MDC_IDC_STAT_BRADY_RV_PERCENT_PACED: 73 %
MDC_IDC_STAT_EPISODE_RECENT_COUNT: 0
MDC_IDC_STAT_EPISODE_RECENT_COUNT: 0
MDC_IDC_STAT_EPISODE_RECENT_COUNT_DTM_END: NORMAL
MDC_IDC_STAT_EPISODE_RECENT_COUNT_DTM_END: NORMAL
MDC_IDC_STAT_EPISODE_RECENT_COUNT_DTM_START: NORMAL
MDC_IDC_STAT_EPISODE_RECENT_COUNT_DTM_START: NORMAL
MDC_IDC_STAT_EPISODE_TYPE: NORMAL
MDC_IDC_STAT_EPISODE_TYPE: NORMAL

## 2023-02-07 ENCOUNTER — ANTICOAGULATION THERAPY VISIT (OUTPATIENT)
Dept: ANTICOAGULATION | Facility: CLINIC | Age: 88
End: 2023-02-07

## 2023-02-07 ENCOUNTER — LAB (OUTPATIENT)
Dept: LAB | Facility: CLINIC | Age: 88
End: 2023-02-07
Payer: MEDICARE

## 2023-02-07 DIAGNOSIS — Z79.01 LONG TERM CURRENT USE OF ANTICOAGULANT THERAPY: ICD-10-CM

## 2023-02-07 DIAGNOSIS — I48.20 CHRONIC ATRIAL FIBRILLATION (H): ICD-10-CM

## 2023-02-07 DIAGNOSIS — Z79.01 LONG TERM CURRENT USE OF ANTICOAGULANTS WITH INR GOAL OF 2.0-3.0: ICD-10-CM

## 2023-02-07 DIAGNOSIS — I48.20 CHRONIC ATRIAL FIBRILLATION (H): Primary | ICD-10-CM

## 2023-02-07 LAB — INR BLD: 2.2 (ref 0.9–1.1)

## 2023-02-07 PROCEDURE — 36416 COLLJ CAPILLARY BLOOD SPEC: CPT

## 2023-02-07 PROCEDURE — 85610 PROTHROMBIN TIME: CPT

## 2023-02-07 NOTE — PROGRESS NOTES
ANTICOAGULATION MANAGEMENT     Leonor Mercado 89 year old female is on warfarin with therapeutic INR result. (Goal INR 2.0-3.0)    Recent labs: (last 7 days)     02/07/23  1327   INR 2.2*       ASSESSMENT       Source(s): Chart Review and Patient/Caregiver Call       Warfarin doses taken: Warfarin taken as instructed    Diet: No new diet changes identified, improving with less pain    New illness, injury, or hospitalization: No - gout has improved, still has some pain when wearing shoes    Medication/supplement changes: None noted    Signs or symptoms of bleeding or clotting: No    Previous INR: Subtherapeutic    Additional findings: None       PLAN     Recommended plan for no diet, medication or health factor changes affecting INR     Dosing Instructions: Continue your current warfarin dose with next INR in 2 weeks       Summary  As of 2/7/2023    Full warfarin instructions:  1 mg every Mon; 2 mg all other days   Next INR check:  2/21/2023             Telephone call with  Cleo, patient's daughter, who verbalizes understanding and agrees to plan    Lab visit scheduled    Education provided:     Please call back if any changes to your diet, medications or how you've been taking warfarin    Contact [phone]  with any changes, questions or concerns.     Plan made per [facility] anticoagulation protocol    Dilma Rider RN  Anticoagulation Clinic  2/7/2023    _______________________________________________________________________     Anticoagulation Episode Summary     Current INR goal:  2.0-3.0   TTR:  45.2 % (1 y)   Target end date:  Indefinite   Send INR reminders to:  [facility]    Indications    Chronic atrial fibrillation (H) [I48.20]  Long term current use of anticoagulant therapy [Z79.01]  Long term current use of anticoagulants with INR goal of 2.0-3.0 [Z79.01]           Comments:           Anticoagulation Care Providers     Provider Role Specialty Phone number    Arnel Garcia MD Referring  Internal Medicine - Pediatrics [phone]    Marisabel Guido MD Referring Internal Medicine - Pediatrics [phone]

## 2023-02-21 ENCOUNTER — LAB (OUTPATIENT)
Dept: LAB | Facility: CLINIC | Age: 88
End: 2023-02-21
Payer: MEDICARE

## 2023-02-21 ENCOUNTER — ANTICOAGULATION THERAPY VISIT (OUTPATIENT)
Dept: ANTICOAGULATION | Facility: CLINIC | Age: 88
End: 2023-02-21

## 2023-02-21 DIAGNOSIS — Z79.01 LONG TERM CURRENT USE OF ANTICOAGULANTS WITH INR GOAL OF 2.0-3.0: ICD-10-CM

## 2023-02-21 DIAGNOSIS — I48.20 CHRONIC ATRIAL FIBRILLATION (H): Primary | ICD-10-CM

## 2023-02-21 DIAGNOSIS — I48.20 CHRONIC ATRIAL FIBRILLATION (H): ICD-10-CM

## 2023-02-21 DIAGNOSIS — Z79.01 LONG TERM CURRENT USE OF ANTICOAGULANT THERAPY: ICD-10-CM

## 2023-02-21 LAB — INR BLD: 2.4 (ref 0.9–1.1)

## 2023-02-21 PROCEDURE — 85610 PROTHROMBIN TIME: CPT

## 2023-02-21 PROCEDURE — 36416 COLLJ CAPILLARY BLOOD SPEC: CPT

## 2023-02-21 NOTE — PROGRESS NOTES
ANTICOAGULATION MANAGEMENT     Leonor Mercado 89 year old female is on warfarin with therapeutic INR result. (Goal INR 2.0-3.0)    Recent labs: (last 7 days)     02/21/23  1340   INR 2.4*       ASSESSMENT       Source(s): Chart Review and Patient/Caregiver Call       Warfarin doses taken: Warfarin taken as instructed - Cleo has been working very closely with her mom to ensure there are no missed doses    Diet: No new diet changes identified    New illness, injury, or hospitalization: No    Medication/supplement changes: None noted    Signs or symptoms of bleeding or clotting: No    Previous INR: Therapeutic last visit; previously outside of goal range    Additional findings: None       PLAN     Recommended plan for no diet, medication or health factor changes affecting INR     Dosing Instructions: Continue your current warfarin dose with next INR in 3 weeks       Summary  As of 2/21/2023    Full warfarin instructions:  1 mg every Mon; 2 mg all other days   Next INR check:  3/14/2023             Telephone call with  Cleo who verbalizes understanding and agrees to plan    Lab visit scheduled    Education provided:     Please call back if any changes to your diet, medications or how you've been taking warfarin    Plan made per [facility] anticoagulation protocol    Francesca Rider RN  Anticoagulation Clinic  2/21/2023    _______________________________________________________________________     Anticoagulation Episode Summary     Current INR goal:  2.0-3.0   TTR:  48.4 % (1 y)   Target end date:  Indefinite   Send INR reminders to:  [facility]    Indications    Chronic atrial fibrillation (H) [I48.20]  Long term current use of anticoagulant therapy [Z79.01]  Long term current use of anticoagulants with INR goal of 2.0-3.0 [Z79.01]           Comments:           Anticoagulation Care Providers     Provider Role Specialty Phone number    Arnel Garcia MD Referring Internal Medicine - Pediatrics  [phone]    Marisabel Guido MD Referring Internal Medicine - Pediatrics [phone]

## 2023-03-14 ENCOUNTER — ANTICOAGULATION THERAPY VISIT (OUTPATIENT)
Dept: ANTICOAGULATION | Facility: CLINIC | Age: 88
End: 2023-03-14

## 2023-03-14 ENCOUNTER — LAB (OUTPATIENT)
Dept: LAB | Facility: CLINIC | Age: 88
End: 2023-03-14
Payer: MEDICARE

## 2023-03-14 DIAGNOSIS — I48.20 CHRONIC ATRIAL FIBRILLATION (H): ICD-10-CM

## 2023-03-14 DIAGNOSIS — I48.20 CHRONIC ATRIAL FIBRILLATION (H): Primary | ICD-10-CM

## 2023-03-14 DIAGNOSIS — Z79.01 LONG TERM CURRENT USE OF ANTICOAGULANT THERAPY: ICD-10-CM

## 2023-03-14 DIAGNOSIS — Z79.01 LONG TERM CURRENT USE OF ANTICOAGULANTS WITH INR GOAL OF 2.0-3.0: ICD-10-CM

## 2023-03-14 LAB — INR BLD: 3 (ref 0.9–1.1)

## 2023-03-14 PROCEDURE — 36416 COLLJ CAPILLARY BLOOD SPEC: CPT

## 2023-03-14 PROCEDURE — 85610 PROTHROMBIN TIME: CPT

## 2023-03-14 NOTE — PROGRESS NOTES
ANTICOAGULATION MANAGEMENT     Leonor Mercado 89 year old female is on warfarin with therapeutic INR result. (Goal INR 2.0-3.0)    Recent labs: (last 7 days)     03/14/23  1345   INR 3.0*       ASSESSMENT       Source(s): Chart Review    Previous INR was Therapeutic last 2(+) visits    Medication, diet, health changes since last INR chart reviewed; none identified             PLAN     Recommended plan for no diet, medication or health factor changes affecting INR     Dosing Instructions: Continue your current warfarin dose with next INR in 3 weeks       Summary  As of 3/14/2023    Full warfarin instructions:  1 mg every Mon; 2 mg all other days   Next INR check:  4/4/2023             Detailed voice message left for Alfredo's daughter, Cleo, with dosing instructions and follow up date.     Contact [phone]  to schedule and with any changes, questions or concerns.     Education provided:     None required    Plan made per [facility] anticoagulation protocol    Yoana Waddell RN  Anticoagulation Clinic  3/14/2023    _______________________________________________________________________     Anticoagulation Episode Summary     Current INR goal:  2.0-3.0   TTR:  54.1 % (1 y)   Target end date:  Indefinite   Send INR reminders to:  [facility]    Indications    Chronic atrial fibrillation (H) [I48.20]  Long term current use of anticoagulant therapy [Z79.01]  Long term current use of anticoagulants with INR goal of 2.0-3.0 [Z79.01]           Comments:           Anticoagulation Care Providers     Provider Role Specialty Phone number    Arnel Garcia MD Referring Internal Medicine - Pediatrics [phone]    Marisabel Guido MD Referring Internal Medicine - Pediatrics [phone]

## 2023-04-04 ENCOUNTER — LAB (OUTPATIENT)
Dept: LAB | Facility: CLINIC | Age: 88
End: 2023-04-04
Payer: MEDICARE

## 2023-04-04 ENCOUNTER — ANTICOAGULATION THERAPY VISIT (OUTPATIENT)
Dept: ANTICOAGULATION | Facility: CLINIC | Age: 88
End: 2023-04-04

## 2023-04-04 DIAGNOSIS — Z79.01 LONG TERM CURRENT USE OF ANTICOAGULANTS WITH INR GOAL OF 2.0-3.0: ICD-10-CM

## 2023-04-04 DIAGNOSIS — I48.20 CHRONIC ATRIAL FIBRILLATION (H): ICD-10-CM

## 2023-04-04 DIAGNOSIS — I48.20 CHRONIC ATRIAL FIBRILLATION (H): Primary | ICD-10-CM

## 2023-04-04 DIAGNOSIS — Z79.01 LONG TERM CURRENT USE OF ANTICOAGULANT THERAPY: ICD-10-CM

## 2023-04-04 LAB — INR BLD: 2.5 (ref 0.9–1.1)

## 2023-04-04 PROCEDURE — 36415 COLL VENOUS BLD VENIPUNCTURE: CPT

## 2023-04-04 PROCEDURE — 85610 PROTHROMBIN TIME: CPT

## 2023-04-04 NOTE — PROGRESS NOTES
ANTICOAGULATION MANAGEMENT     Leonor Mercado 89 year old female is on warfarin with therapeutic INR result. (Goal INR 2.0-3.0)    Recent labs: (last 7 days)     04/04/23  1407   INR 2.5*       ASSESSMENT       Source(s): Chart Review and Patient/Caregiver Call       Warfarin doses taken: Warfarin taken as instructed    Diet: No new diet changes identified    New illness, injury, or hospitalization: No    Medication/supplement changes: None noted    Signs or symptoms of bleeding or clotting: No    Previous INR: Therapeutic last 2(+) visits    Additional findings: None         PLAN     Recommended plan for no diet, medication or health factor changes affecting INR     Dosing Instructions: Continue your current warfarin dose with next INR in 4 weeks       Summary  As of 4/4/2023    Full warfarin instructions:  1 mg every Mon; 2 mg all other days   Next INR check:  5/2/2023             Telephone call with Alfredo who verbalizes understanding and agrees to plan    Lab visit scheduled    Education provided:     Please call back if any changes to your diet, medications or how you've been taking warfarin    Contact [phone]  with any changes, questions or concerns.     Plan made per [facility] anticoagulation protocol    Dilma Rider RN  Anticoagulation Clinic  4/4/2023    _______________________________________________________________________     Anticoagulation Episode Summary     Current INR goal:  2.0-3.0   TTR:  56.1 % (1 y)   Target end date:  Indefinite   Send INR reminders to:  [facility]    Indications    Chronic atrial fibrillation (H) [I48.20]  Long term current use of anticoagulant therapy [Z79.01]  Long term current use of anticoagulants with INR goal of 2.0-3.0 [Z79.01]           Comments:           Anticoagulation Care Providers     Provider Role Specialty Phone number    Arnel Garcia MD Referring Internal Medicine - Pediatrics [phone]    Marisabel Guido MD Referring Internal  Medicine - Pediatrics [phone]

## 2023-05-03 ENCOUNTER — TELEPHONE (OUTPATIENT)
Dept: CARDIOLOGY | Facility: CLINIC | Age: 88
End: 2023-05-03
Payer: MEDICARE

## 2023-05-03 NOTE — TELEPHONE ENCOUNTER
Pt had called and left a message wanting a call back.  Called pt back with no answer.  Left VM with clinic number.

## 2023-05-09 ENCOUNTER — ANTICOAGULATION THERAPY VISIT (OUTPATIENT)
Dept: ANTICOAGULATION | Facility: CLINIC | Age: 88
End: 2023-05-09

## 2023-05-09 ENCOUNTER — LAB (OUTPATIENT)
Dept: LAB | Facility: CLINIC | Age: 88
End: 2023-05-09
Payer: MEDICARE

## 2023-05-09 DIAGNOSIS — Z79.01 LONG TERM CURRENT USE OF ANTICOAGULANT THERAPY: ICD-10-CM

## 2023-05-09 DIAGNOSIS — Z79.01 LONG TERM CURRENT USE OF ANTICOAGULANTS WITH INR GOAL OF 2.0-3.0: ICD-10-CM

## 2023-05-09 DIAGNOSIS — I48.20 CHRONIC ATRIAL FIBRILLATION (H): ICD-10-CM

## 2023-05-09 DIAGNOSIS — I48.20 CHRONIC ATRIAL FIBRILLATION (H): Primary | ICD-10-CM

## 2023-05-09 LAB — INR BLD: 3.2 (ref 0.9–1.1)

## 2023-05-09 PROCEDURE — 85610 PROTHROMBIN TIME: CPT

## 2023-05-09 PROCEDURE — 36416 COLLJ CAPILLARY BLOOD SPEC: CPT

## 2023-05-09 NOTE — PROGRESS NOTES
"ANTICOAGULATION MANAGEMENT     Leonor Mercado 89 year old female is on warfarin with supratherapeutic INR result. (Goal INR 2.0-3.0)    Recent labs: (last 7 days)     05/09/23  1347   INR 3.2*       ASSESSMENT       Source(s): Chart Review and Patient/Caregiver Call       Warfarin doses taken: Warfarin taken as instructed--as far as daughter Cleo knows    Diet: daughter has been out of town for 11 days so uncertain if Alfredo has kept up with \"her favorite\" green beans and Ensure.    Medication/supplement changes: None noted    New illness, injury, or hospitalization: No    Signs or symptoms of bleeding or clotting: No    Previous result: Therapeutic last 2(+) visits    Additional findings: None         PLAN     Recommended plan for temporary change(s) affecting INR     Dosing Instructions: Continue your current warfarin dose with next INR in 2 weeks . Cleo will encourage Alfredo to eat greens tonight     Summary  As of 5/9/2023    Full warfarin instructions:  1 mg every Mon; 2 mg all other days   Next INR check:  5/23/2023             Telephone call with  daughter Cleo who verbalizes understanding and agrees to plan    Lab visit scheduled    Education provided:     Dietary considerations: importance of consistent vitamin K intake and impact of vitamin K foods on INR    Plan made per [facility] anticoagulation protocol    Mariola Valenzuela, RN  Anticoagulation Clinic  5/9/2023    _______________________________________________________________________     Anticoagulation Episode Summary     Current INR goal:  2.0-3.0   TTR:  59.5 % (1 y)   Target end date:  Indefinite   Send INR reminders to:  [facility]    Indications    Chronic atrial fibrillation (H) [I48.20]  Long term current use of anticoagulant therapy [Z79.01]  Long term current use of anticoagulants with INR goal of 2.0-3.0 [Z79.01]           Comments:           Anticoagulation Care Providers     Provider Role Specialty Phone number    Arnel Garcia MD " Referring Internal Medicine - Pediatrics [phone]    Marisabel Guido MD Referring Internal Medicine - Pediatrics [phone]

## 2023-05-17 ENCOUNTER — ANCILLARY PROCEDURE (OUTPATIENT)
Dept: CARDIOLOGY | Facility: CLINIC | Age: 88
End: 2023-05-17
Attending: INTERNAL MEDICINE
Payer: MEDICARE

## 2023-05-17 DIAGNOSIS — I49.5 SICK SINUS SYNDROME (H): ICD-10-CM

## 2023-05-17 DIAGNOSIS — Z95.0 CARDIAC PACEMAKER IN SITU: ICD-10-CM

## 2023-05-17 PROCEDURE — 93280 PM DEVICE PROGR EVAL DUAL: CPT | Performed by: INTERNAL MEDICINE

## 2023-05-19 LAB
MDC_IDC_MSMT_BATTERY_DTM: NORMAL
MDC_IDC_MSMT_BATTERY_IMPEDANCE: 1796 OHM
MDC_IDC_MSMT_BATTERY_REMAINING_LONGEVITY: 44 MO
MDC_IDC_MSMT_BATTERY_STATUS: NORMAL
MDC_IDC_MSMT_BATTERY_VOLTAGE: 2.76 V
MDC_IDC_MSMT_LEADCHNL_RA_IMPEDANCE_VALUE: 67 OHM
MDC_IDC_MSMT_LEADCHNL_RV_IMPEDANCE_VALUE: 680 OHM
MDC_IDC_MSMT_LEADCHNL_RV_PACING_THRESHOLD_AMPLITUDE: 0.5 V
MDC_IDC_MSMT_LEADCHNL_RV_PACING_THRESHOLD_PULSEWIDTH: 0.5 MS
MDC_IDC_MSMT_LEADCHNL_RV_SENSING_INTR_AMPL: 8 MV
MDC_IDC_PG_IMPLANT_DTM: NORMAL
MDC_IDC_PG_MFG: NORMAL
MDC_IDC_PG_MODEL: NORMAL
MDC_IDC_PG_SERIAL: NORMAL
MDC_IDC_PG_TYPE: NORMAL
MDC_IDC_SESS_CLINIC_NAME: NORMAL
MDC_IDC_SESS_DTM: NORMAL
MDC_IDC_SESS_TYPE: NORMAL
MDC_IDC_SET_BRADY_LOWRATE: 60 {BEATS}/MIN
MDC_IDC_SET_BRADY_MAX_SENSOR_RATE: 120 {BEATS}/MIN
MDC_IDC_SET_BRADY_MAX_TRACKING_RATE: 105 {BEATS}/MIN
MDC_IDC_SET_BRADY_MODE: NORMAL
MDC_IDC_SET_LEADCHNL_RV_PACING_AMPLITUDE: 2.5 V
MDC_IDC_SET_LEADCHNL_RV_PACING_CAPTURE_MODE: NORMAL
MDC_IDC_SET_LEADCHNL_RV_PACING_POLARITY: NORMAL
MDC_IDC_SET_LEADCHNL_RV_PACING_PULSEWIDTH: 0.52 MS
MDC_IDC_SET_LEADCHNL_RV_SENSING_POLARITY: NORMAL
MDC_IDC_SET_LEADCHNL_RV_SENSING_SENSITIVITY: 2.8 MV
MDC_IDC_SET_ZONE_DETECTION_INTERVAL: 333.33 MS
MDC_IDC_SET_ZONE_TYPE: NORMAL
MDC_IDC_SET_ZONE_TYPE: NORMAL
MDC_IDC_STAT_AT_BURDEN_PERCENT: 0 %
MDC_IDC_STAT_AT_DTM_END: NORMAL
MDC_IDC_STAT_AT_DTM_START: NORMAL
MDC_IDC_STAT_BRADY_DTM_END: NORMAL
MDC_IDC_STAT_BRADY_DTM_START: NORMAL
MDC_IDC_STAT_BRADY_RV_PERCENT_PACED: 74 %
MDC_IDC_STAT_EPISODE_RECENT_COUNT: 0
MDC_IDC_STAT_EPISODE_RECENT_COUNT: 0
MDC_IDC_STAT_EPISODE_RECENT_COUNT_DTM_END: NORMAL
MDC_IDC_STAT_EPISODE_RECENT_COUNT_DTM_END: NORMAL
MDC_IDC_STAT_EPISODE_RECENT_COUNT_DTM_START: NORMAL
MDC_IDC_STAT_EPISODE_RECENT_COUNT_DTM_START: NORMAL
MDC_IDC_STAT_EPISODE_TYPE: NORMAL
MDC_IDC_STAT_EPISODE_TYPE: NORMAL

## 2023-05-23 ENCOUNTER — ANTICOAGULATION THERAPY VISIT (OUTPATIENT)
Dept: ANTICOAGULATION | Facility: CLINIC | Age: 88
End: 2023-05-23

## 2023-05-23 ENCOUNTER — LAB (OUTPATIENT)
Dept: LAB | Facility: CLINIC | Age: 88
End: 2023-05-23
Payer: MEDICARE

## 2023-05-23 DIAGNOSIS — Z79.01 LONG TERM CURRENT USE OF ANTICOAGULANTS WITH INR GOAL OF 2.0-3.0: ICD-10-CM

## 2023-05-23 DIAGNOSIS — I25.10 CORONARY ARTERY DISEASE INVOLVING NATIVE CORONARY ARTERY OF NATIVE HEART WITHOUT ANGINA PECTORIS: ICD-10-CM

## 2023-05-23 DIAGNOSIS — I48.20 CHRONIC ATRIAL FIBRILLATION (H): ICD-10-CM

## 2023-05-23 DIAGNOSIS — Z79.01 LONG TERM CURRENT USE OF ANTICOAGULANT THERAPY: ICD-10-CM

## 2023-05-23 DIAGNOSIS — I48.20 CHRONIC ATRIAL FIBRILLATION (H): Primary | ICD-10-CM

## 2023-05-23 LAB — INR BLD: 1.8 (ref 0.9–1.1)

## 2023-05-23 PROCEDURE — 36416 COLLJ CAPILLARY BLOOD SPEC: CPT

## 2023-05-23 PROCEDURE — 85610 PROTHROMBIN TIME: CPT

## 2023-05-23 RX ORDER — DILTIAZEM HYDROCHLORIDE 240 MG/1
CAPSULE, COATED, EXTENDED RELEASE ORAL
Qty: 90 CAPSULE | Refills: 0 | Status: SHIPPED | OUTPATIENT
Start: 2023-05-23 | End: 2023-08-17

## 2023-05-23 NOTE — TELEPHONE ENCOUNTER
Medication is being filled for 1 time refill only due to:  Patient needs to be seen because due for labs and visit with PCP this summer (aug?).

## 2023-05-23 NOTE — PROGRESS NOTES
ANTICOAGULATION MANAGEMENT     Leonor Mercado 89 year old female is on warfarin with subtherapeutic INR result. (Goal INR 2.0-3.0)    Recent labs: (last 7 days)     05/23/23  1334   INR 1.8*       ASSESSMENT       Source(s): Chart Review and Patient/Caregiver Call       Warfarin doses taken: Warfarin taken as instructed    Diet: Increased greens/vitamin K in diet; plans to resume previous intake--Cleo had Alfredo drink more Ensure since last INR was supratherapeutic, she will have her resume her usual regimen of 1/2 can every other day.    Medication/supplement changes: None noted    New illness, injury, or hospitalization: No    Signs or symptoms of bleeding or clotting: No    Previous result: Supratherapeutic    Additional findings: None         PLAN     Recommended plan for temporary change(s) affecting INR     Dosing Instructions: booster dose then continue your current warfarin dose with next INR in 2 weeks       Summary  As of 5/23/2023    Full warfarin instructions:  5/23: 3 mg; Otherwise 1 mg every Mon; 2 mg all other days   Next INR check:  6/6/2023             Telephone call with  daughter Cleo who agrees to plan and repeated back plan correctly    Lab visit scheduled    Education provided:     Dietary considerations: importance of consistent vitamin K intake    Plan made per [facility] anticoagulation protocol    Mariola Valenzuela, RN  Anticoagulation Clinic  5/23/2023    _______________________________________________________________________     Anticoagulation Episode Summary     Current INR goal:  2.0-3.0   TTR:  62.0 % (1 y)   Target end date:  Indefinite   Send INR reminders to:  [facility]    Indications    Chronic atrial fibrillation (H) [I48.20]  Long term current use of anticoagulant therapy [Z79.01]  Long term current use of anticoagulants with INR goal of 2.0-3.0 [Z79.01]           Comments:           Anticoagulation Care Providers     Provider Role Specialty Phone number    Arnel Garcia MD  Referring Internal Medicine - Pediatrics [phone]    Marisabel Guido MD Referring Internal Medicine - Pediatrics [phone]

## 2023-06-06 ENCOUNTER — ANTICOAGULATION THERAPY VISIT (OUTPATIENT)
Dept: ANTICOAGULATION | Facility: CLINIC | Age: 88
End: 2023-06-06

## 2023-06-06 ENCOUNTER — LAB (OUTPATIENT)
Dept: LAB | Facility: CLINIC | Age: 88
End: 2023-06-06
Payer: MEDICARE

## 2023-06-06 DIAGNOSIS — I48.20 CHRONIC ATRIAL FIBRILLATION (H): ICD-10-CM

## 2023-06-06 DIAGNOSIS — Z79.01 LONG TERM CURRENT USE OF ANTICOAGULANTS WITH INR GOAL OF 2.0-3.0: ICD-10-CM

## 2023-06-06 DIAGNOSIS — Z79.01 LONG TERM CURRENT USE OF ANTICOAGULANT THERAPY: ICD-10-CM

## 2023-06-06 DIAGNOSIS — I48.20 CHRONIC ATRIAL FIBRILLATION (H): Primary | ICD-10-CM

## 2023-06-06 LAB — INR BLD: 2.5 (ref 0.9–1.1)

## 2023-06-06 PROCEDURE — 36416 COLLJ CAPILLARY BLOOD SPEC: CPT

## 2023-06-06 PROCEDURE — 85610 PROTHROMBIN TIME: CPT

## 2023-06-06 NOTE — PROGRESS NOTES
ANTICOAGULATION MANAGEMENT     Leonor Mercado 89 year old female is on warfarin with therapeutic INR result. (Goal INR 2.0-3.0)    Recent labs: (last 7 days)     06/06/23  1328   INR 2.5*       ASSESSMENT       Source(s): Chart Review    Previous INR was Subtherapeutic    Medication, diet, health changes since last INR chart reviewed; none identified             PLAN     Recommended plan for no diet, medication or health factor changes affecting INR per chart review    Dosing Instructions: Continue your current warfarin dose with next INR in 3 weeks       Summary  As of 6/6/2023    Full warfarin instructions:  1 mg every Mon; 2 mg all other days   Next INR check:  6/27/2023             Detailed voice message left for  Cleo, patient's daughter with dosing instructions and follow up date.     Contact [phone]  to schedule and with any changes, questions or concerns.     Education provided:     Please call back if any changes to your diet, medications or how you've been taking warfarin    Contact [phone]  with any changes, questions or concerns.      Plan made per [facility] anticoagulation protocol    Dilma Rider RN  Anticoagulation Clinic  6/6/2023    _______________________________________________________________________     Anticoagulation Episode Summary     Current INR goal:  2.0-3.0   TTR:  62.6 % (1 y)   Target end date:  Indefinite   Send INR reminders to:  [facility]    Indications    Chronic atrial fibrillation (H) [I48.20]  Long term current use of anticoagulant therapy [Z79.01]  Long term current use of anticoagulants with INR goal of 2.0-3.0 [Z79.01]           Comments:           Anticoagulation Care Providers     Provider Role Specialty Phone number    Arnel Garcia MD Referring Internal Medicine - Pediatrics [phone]    Marisabel Guido MD Referring Internal Medicine - Pediatrics [phone]

## 2023-07-06 ENCOUNTER — ANTICOAGULATION THERAPY VISIT (OUTPATIENT)
Dept: ANTICOAGULATION | Facility: CLINIC | Age: 88
End: 2023-07-06

## 2023-07-06 ENCOUNTER — LAB (OUTPATIENT)
Dept: LAB | Facility: CLINIC | Age: 88
End: 2023-07-06
Payer: MEDICARE

## 2023-07-06 DIAGNOSIS — Z79.01 LONG TERM CURRENT USE OF ANTICOAGULANTS WITH INR GOAL OF 2.0-3.0: ICD-10-CM

## 2023-07-06 DIAGNOSIS — Z79.01 LONG TERM CURRENT USE OF ANTICOAGULANT THERAPY: ICD-10-CM

## 2023-07-06 DIAGNOSIS — I48.20 CHRONIC ATRIAL FIBRILLATION (H): Primary | ICD-10-CM

## 2023-07-06 DIAGNOSIS — I48.20 CHRONIC ATRIAL FIBRILLATION (H): ICD-10-CM

## 2023-07-06 LAB — INR BLD: 2.1 (ref 0.9–1.1)

## 2023-07-06 PROCEDURE — 85610 PROTHROMBIN TIME: CPT

## 2023-07-06 PROCEDURE — 36416 COLLJ CAPILLARY BLOOD SPEC: CPT

## 2023-07-06 NOTE — PROGRESS NOTES
ANTICOAGULATION MANAGEMENT     Leonor Mercado 89 year old female is on warfarin with therapeutic INR result. (Goal INR 2.0-3.0)    Recent labs: (last 7 days)     07/06/23  1329   INR 2.1*       ASSESSMENT       Source(s): Chart Review and Patient/Caregiver Call       Warfarin doses taken: Warfarin taken as instructed    Diet: No new diet changes identified    Medication/supplement changes: None noted    New illness, injury, or hospitalization: No    Signs or symptoms of bleeding or clotting: No    Previous result: Therapeutic last visit; previously outside of goal range    Additional findings: None         PLAN     Recommended plan for no diet, medication or health factor changes affecting INR     Dosing Instructions: Continue your current warfarin dose with next INR in 4 weeks       Summary  As of 7/6/2023    Full warfarin instructions:  1 mg every Mon; 2 mg all other days   Next INR check:  8/1/2023             Telephone call with daughter Cleo who verbalizes understanding and agrees to plan    Lab visit scheduled    Education provided:     Please call back if any changes to your diet, medications or how you've been taking warfarin    Symptom monitoring: monitoring for bleeding signs and symptoms and monitoring for clotting signs and symptoms    Plan made per [facility] anticoagulation protocol    Larissa Spence RN  Anticoagulation Clinic  7/6/2023    _______________________________________________________________________     Anticoagulation Episode Summary     Current INR goal:  2.0-3.0   TTR:  66.1 % (1 y)   Target end date:  Indefinite   Send INR reminders to:  [facility]    Indications    Chronic atrial fibrillation (H) [I48.20]  Long term current use of anticoagulant therapy [Z79.01]  Long term current use of anticoagulants with INR goal of 2.0-3.0 [Z79.01]           Comments:           Anticoagulation Care Providers     Provider Role Specialty Phone number    Arnel Garcia MD Referring Internal  Medicine - Pediatrics [phone]    Marisabel Guido MD Referring Internal Medicine - Pediatrics [phone]

## 2023-07-20 DIAGNOSIS — I25.10 CORONARY ARTERY DISEASE INVOLVING NATIVE CORONARY ARTERY OF NATIVE HEART WITHOUT ANGINA PECTORIS: ICD-10-CM

## 2023-07-20 DIAGNOSIS — E78.5 HYPERLIPIDEMIA LDL GOAL <100: ICD-10-CM

## 2023-07-24 RX ORDER — PRAVASTATIN SODIUM 40 MG
TABLET ORAL
Qty: 90 TABLET | Refills: 0 | Status: SHIPPED | OUTPATIENT
Start: 2023-07-24 | End: 2023-08-17

## 2023-07-24 RX ORDER — ISOSORBIDE MONONITRATE 30 MG/1
TABLET, EXTENDED RELEASE ORAL
Qty: 90 TABLET | Refills: 0 | Status: SHIPPED | OUTPATIENT
Start: 2023-07-24 | End: 2023-08-17

## 2023-08-01 ENCOUNTER — LAB (OUTPATIENT)
Dept: LAB | Facility: CLINIC | Age: 88
End: 2023-08-01
Payer: MEDICARE

## 2023-08-01 ENCOUNTER — ANTICOAGULATION THERAPY VISIT (OUTPATIENT)
Dept: ANTICOAGULATION | Facility: CLINIC | Age: 88
End: 2023-08-01

## 2023-08-01 DIAGNOSIS — Z79.01 LONG TERM CURRENT USE OF ANTICOAGULANTS WITH INR GOAL OF 2.0-3.0: ICD-10-CM

## 2023-08-01 DIAGNOSIS — Z79.01 LONG TERM CURRENT USE OF ANTICOAGULANT THERAPY: ICD-10-CM

## 2023-08-01 DIAGNOSIS — I48.20 CHRONIC ATRIAL FIBRILLATION (H): Primary | ICD-10-CM

## 2023-08-01 DIAGNOSIS — M10.00 IDIOPATHIC GOUT, UNSPECIFIED CHRONICITY, UNSPECIFIED SITE: ICD-10-CM

## 2023-08-01 DIAGNOSIS — I48.20 CHRONIC ATRIAL FIBRILLATION (H): ICD-10-CM

## 2023-08-01 LAB
BASOPHILS # BLD AUTO: 0 10E3/UL (ref 0–0.2)
BASOPHILS NFR BLD AUTO: 0 %
EOSINOPHIL # BLD AUTO: 0.2 10E3/UL (ref 0–0.7)
EOSINOPHIL NFR BLD AUTO: 3 %
ERYTHROCYTE [DISTWIDTH] IN BLOOD BY AUTOMATED COUNT: 14.9 % (ref 10–15)
HCT VFR BLD AUTO: 38.7 % (ref 35–47)
HGB BLD-MCNC: 12.1 G/DL (ref 11.7–15.7)
IMM GRANULOCYTES # BLD: 0 10E3/UL
IMM GRANULOCYTES NFR BLD: 0 %
INR BLD: 1.8 (ref 0.9–1.1)
LYMPHOCYTES # BLD AUTO: 1.4 10E3/UL (ref 0.8–5.3)
LYMPHOCYTES NFR BLD AUTO: 20 %
MCH RBC QN AUTO: 30.5 PG (ref 26.5–33)
MCHC RBC AUTO-ENTMCNC: 31.3 G/DL (ref 31.5–36.5)
MCV RBC AUTO: 98 FL (ref 78–100)
MONOCYTES # BLD AUTO: 0.4 10E3/UL (ref 0–1.3)
MONOCYTES NFR BLD AUTO: 6 %
NEUTROPHILS # BLD AUTO: 4.7 10E3/UL (ref 1.6–8.3)
NEUTROPHILS NFR BLD AUTO: 70 %
PLATELET # BLD AUTO: 187 10E3/UL (ref 150–450)
RBC # BLD AUTO: 3.97 10E6/UL (ref 3.8–5.2)
WBC # BLD AUTO: 6.7 10E3/UL (ref 4–11)

## 2023-08-01 PROCEDURE — 36415 COLL VENOUS BLD VENIPUNCTURE: CPT

## 2023-08-01 PROCEDURE — 85025 COMPLETE CBC W/AUTO DIFF WBC: CPT

## 2023-08-01 PROCEDURE — 85610 PROTHROMBIN TIME: CPT

## 2023-08-01 NOTE — PROGRESS NOTES
ANTICOAGULATION MANAGEMENT     Leonor Mercado 89 year old female is on warfarin with subtherapeutic INR result. (Goal INR 2.0-3.0)    Recent labs: (last 7 days)     08/01/23  1336   INR 1.8*       ASSESSMENT     Source(s): Chart Review and Patient/Caregiver Call     Warfarin doses taken: Warfarin taken as instructed  Diet: Increased greens/vitamin K in diet; plans to resume previous intake-Cleo reports that Alfredo may have had more Ensure in the past week but states she doesn't really like it so will go back to just 1-2 cans per week.  Medication/supplement changes: None noted  New illness, injury, or hospitalization: No  Signs or symptoms of bleeding or clotting: No  Previous result: Therapeutic last 2(+) visits  Additional findings: None       PLAN     Recommended plan for temporary change(s) affecting INR     Dosing Instructions: booster dose then continue your current warfarin dose with next INR in 2 weeks       Summary  As of 8/1/2023      Full warfarin instructions:  8/1: 3 mg; Otherwise 1 mg every Mon; 2 mg all other days   Next INR check:  8/16/2023               Telephone call with daughter Lceo who agrees to plan and repeated back plan correctly    Check at provider office visit    Education provided:   Dietary considerations: importance of consistent vitamin K intake and impact of vitamin K foods on INR    Plan made per [facility] anticoagulation protocol    Mariola Valenzuela RN  Anticoagulation Clinic  8/1/2023    _______________________________________________________________________     Anticoagulation Episode Summary       Current INR goal:  2.0-3.0   TTR:  67.4 % (1 y)   Target end date:  Indefinite   Send INR reminders to:  [facility]    Indications    Chronic atrial fibrillation (H) [I48.20]  Long term current use of anticoagulant therapy [Z79.01]  Long term current use of anticoagulants with INR goal of 2.0-3.0 [Z79.01]             Comments:               Anticoagulation Care Providers       Provider  Role Specialty Phone number    Arnel Garcia MD Referring Internal Medicine - Pediatrics [phone]    Marisabel Guido MD Referring Internal Medicine - Pediatrics [phone]

## 2023-08-02 DIAGNOSIS — M10.00 IDIOPATHIC GOUT, UNSPECIFIED CHRONICITY, UNSPECIFIED SITE: ICD-10-CM

## 2023-08-03 DIAGNOSIS — Z79.01 LONG TERM CURRENT USE OF ANTICOAGULANT THERAPY: ICD-10-CM

## 2023-08-04 RX ORDER — ALLOPURINOL 100 MG/1
TABLET ORAL
Qty: 90 TABLET | Refills: 0 | Status: SHIPPED | OUTPATIENT
Start: 2023-08-04 | End: 2023-08-17

## 2023-08-04 NOTE — TELEPHONE ENCOUNTER
Routing refill request to provider for review/approval because:  Labs out of range: Uric Acid and Creatinine   Labs not current: ALT     Lab Results   Component Value Date    ALT 19 09/16/2021    ALT 24 08/21/2020     Uric Acid   Date Value Ref Range Status   08/09/2022 9.2 (H) 2.4 - 5.7 mg/dL Final   05/15/2012 5.1 2.6 - 7.2 mg/dL Final     Creatinine   Date Value Ref Range Status   08/09/2022 1.53 (H) 0.51 - 0.95 mg/dL Final   01/05/2021 1.13 (H) 0.52 - 1.04 mg/dL Final     Katie SANTIAGO RN   [facility]

## 2023-08-07 RX ORDER — WARFARIN SODIUM 2 MG/1
TABLET ORAL
Qty: 90 TABLET | Refills: 1 | Status: SHIPPED | OUTPATIENT
Start: 2023-08-07 | End: 2023-08-17

## 2023-08-07 NOTE — TELEPHONE ENCOUNTER
ANTICOAGULATION MANAGEMENT:  Medication Refill    Anticoagulation Summary  As of 8/1/2023      Warfarin maintenance plan:  1 mg (2 mg x 0.5) every Mon; 2 mg (2 mg x 1) all other days   Next INR check:  8/16/2023   Target end date:  Indefinite    Indications    Chronic atrial fibrillation (H) [I48.20]  Long term current use of anticoagulant therapy [Z79.01]  Long term current use of anticoagulants with INR goal of 2.0-3.0 [Z79.01]                 Anticoagulation Care Providers       Provider Role Specialty Phone number    Arnel Garcia MD Referring Internal Medicine - Pediatrics [phone]    Marisabel Guido MD Referring Internal Medicine - Pediatrics [phone]            Refill Criteria    Visit with referring provider/group: Meets criteria: office visit within referring provider group in the last 1 year on 1/10/23    [facility] referral signed last signed: 10/07/2022; yes    Lab monitoring not exceeding 2 weeks overdue:   yes    Leonor meets all criteria for refill. Rx instructions and quantity supplied updated to match patient's current dosing plan. Warfarin 90 day supply with 1 refill granted per [facility] protocol     Alden Martin RN  Anticoagulation Clinic

## 2023-08-08 NOTE — LETTER
7/11/2017    Dannielle Darby MD  [facility]   [address] [facility] [address]    RE: Leonor Mercado       Dear Colleague,    I had the pleasure of seeing Leonor Mercado in the [facility].    HISTORY:    Leonor Mercado is a pleasant 89-year-old female accompanied by her daughter today. She has a history of coronary artery disease with 2 stents placed in 2004 while living in [place]. Details unknown. She also has a history of sick sinus syndrome with paroxysmal atrial fibrillation for which she has long-term anticoagulation and a pacemaker which was placed in 2003 initially. She also has had intermittent PAT as well as pulmonary fibrosis and a history of use of amiodarone. Other medical problems include chronic renal insufficiency, bilateral lower extremity venous stasis, hyperlipidemia, hypertension, and COPD. She hasn't asked smoke or having quit 30 years ago    Since our last visit nearly 2 months ago, the patient's , who had been severely ill, has moved to a nursing home. This has been hard on the patient since she was his primary caregiver and dedicated most of her time to his care.    Mrs. Mercado's main complaint is that she continues to feel short of breath with activity. It sounds like much of the time she can remain active but particularly when she gets up to go to the bathroom at night she gets short of breath when she comes back. She denies any chest discomfort.  I reviewed the results of her nuclear stress test with her today. It shows an ejection fraction of 77% with normal perfusion, no ischemia or infarct.    Patient also complains of ankle edema, worse on the right than the left. She asked a doesn't notice the left but she states that the ankle edema on the right is new. This is a dependent edema and resolves overnight and then bothers her toward the end of the day. She doesn't recall ever noticing this  Outreach attempt was made to schedule a Medicare Wellness Visit. This was the second attempt. Contact was not made, left message.2   "before. She also complains of having to get up to go to the bathroom every 2 hours at night.    The patient complained of having spells of unresponsiveness in the past. She says that she stills has spells in which she feels \"funny from the waist up with numbness\". With these spells she feels shaky and they tend to last about one to 2 minutes. She has these relatively frequently, up to 4-5 times per day. She underwent a 7 day monitor and had a couple of episodes which she reported and are noted on that study to have a normal rhythm. I reviewed the 7 day monitor with her in detail. There were multiple episodes of brief SVT which were not significantly symptomatic      ASSESSMENT/PLAN:    1.  Spells. These appear to have improved. She previously complained of disorientation, pressure in the chest with radiation to the arms and associated shortness of breath. She currently describes it as feeling funny from the waist up with a sense of numbness and shakiness. She had these spells while she was monitored in the were no associated arrhythmias. These do not appear to be of cardiac origin although I cannot definitively say what is the cause.  2. SVT. Episodes of SVT are noted on monitor but are asymptomatic. The patient denies significant palpitations. I don't think we should pursue these further at this time but certainly can consider treatment if they become symptomatic.  3. Chest pain. The patient had previously complained of chest pain but currently denies it. She had a negative nuclear stress test confirming adequate perfusion of her myocardium. No further plans are made for coronary artery disease evaluation.  4. Hyperlipidemia. Continue Pravachol indefinitely  5. Hypertension under good control, continue current medications  6. Paroxysmal atrial fibrillation. The patient is currently anticoagulated which is appropriate. No medication changes needed  7. Dyspnea on exertion. The patient is a somewhat poor historian and " mixes up various symptoms but it sounds like she is having problems with shortness of breath, and I will have an echocardiogram checked to make sure she didn't has not developed worsening of her previously documented mild diastolic dysfunction, or other cardiac abnormalities that might explain these symptoms.    I appreciate the opportunity of dissipating in Mrs. Mercado's care. She does not appear to have any active cardiology issues but I will have her back in 3 months. Her echocardiogram will be done and I will contact her by phone and adjust therapy if necessary.        Orders Placed This Encounter   Procedures     Follow-Up with Cardiologist     Echocardiogram     Orders Placed This Encounter   Medications     LORazepam (ATIVAN) 0.5 MG tablet     Sig: Take 0.5 mg by mouth every 6 hours as needed for anxiety     There are no discontinued medications.      Encounter Diagnosis   Name Primary?     Shortness of breath Yes       CURRENT MEDICATIONS:  Current Outpatient Prescriptions   Medication Sig Dispense Refill     LORazepam (ATIVAN) 0.5 MG tablet Take 0.5 mg by mouth every 6 hours as needed for anxiety       umeclidinium (INCRUSE ELLIPTA) 62.5 MCG/INH oral inhaler Inhale 1 puff into the lungs daily 1 Inhaler 11     pravastatin (PRAVACHOL) 40 MG tablet TAKE ONE TABLET BY MOUTH ONCE DAILY. 30 tablet 3     albuterol (PROAIR HFA/PROVENTIL HFA/VENTOLIN HFA) 108 (90 BASE) MCG/ACT Inhaler Inhale 2 puffs into the lungs every 6 hours as needed for shortness of breath / dyspnea or wheezing 1 Inhaler 1     warfarin (COUMADIN) 2 MG tablet Take one tablet (2 mg) daily or as directed by INR Clinic 102 tablet 1     levalbuterol (XOPENEX) 0.63 MG/3ML neb solution Take 3 mLs (0.63 mg) by nebulization every 6 hours as needed for shortness of breath / dyspnea or wheezing 120 mL 5     losartan (COZAAR) 50 MG tablet Take 1 tablet (50 mg) by mouth daily 90 tablet 1     Acetaminophen (TYLENOL PO) Take 1,000 mg by mouth At Bedtime        DOCUSATE SODIUM PO Take 100 mg by mouth At Bedtime       menthol (ICY HOT) 5 % PADS Apply 1 patch topically every 8 hours as needed for muscle soreness  0     polyethylene glycol (MIRALAX/GLYCOLAX) packet Take 1 packet by mouth 2 times daily        diltiazem 240 MG 24 hr ER capsule Take 1 capsule (240 mg) by mouth daily 90 capsule 3     Misc Natural Products (TART CHERRY ADVANCED) CAPS Cherry tart liquid       ZOLPIDEM TARTRATE PO Take 5 mg by mouth nightly as needed for sleep       ASPIRIN NOT PRESCRIBED, INTENTIONAL, Antiplatelet medication not prescribed intentionally due to Current anticoagulant therapy (warfarin/enoxaparin)  0     nitroglycerin (NITROSTAT) 0.4 MG SL tablet Place 1 tablet (0.4 mg) under the tongue every 5 minutes as needed 25 tablet 1     calcium carbonate (CALCIUM CARBONATE) 600 MG TABS tablet Take 1 tablet by mouth daily        folic acid (FOLVITE) 400 MCG tablet Take 400 mcg by mouth daily       Cholecalciferol (VITAMIN D) 2000 UNITS tablet Take 2,000 Units by mouth daily       ascorbic acid (VITAMIN C) 500 MG tablet Take 500 mg by mouth daily       Probiotic Product (PROBIOTIC & ACIDOPHILUS EX ST PO) Take 1 tablet by mouth daily.       Multiple Vitamins-Minerals (OCUVITE PO) Take 1 capsule by mouth daily.         ALLERGIES     Allergies   Allergen Reactions     Chocolate Shortness Of Breath     Peanuts [Nuts] Shortness Of Breath     Amiodarone      pulm fibrosis       Baclofen      Confusion      Bactrim [Sulfamethoxazole W-Trimethoprim]      Difficulty swallowing     Doxycycline Other (See Comments)     Multiple complaints; she does not want this again.      Levaquin [Levofloxacin] Other (See Comments)     Multiple complaints; she will not take this again     Linzess [Linaclotide] Diarrhea     Liquid Adhesive Itching and Rash     Bleeding when tape removed after pacemaker placement..itched and burned for weeks.       PAST MEDICAL HISTORY:  Past Medical History:   Diagnosis Date      Atrial fibrillation (H)     Sick sinus syndrome, PAT, AF     BCC (basal cell carcinoma of skin)     Face     CHF (congestive heart failure) (H)     mild in past     Chronic renal insufficiency      COPD (chronic obstructive pulmonary disease) (H)      Coronary artery disease     2-05[place]-CAD-taxus stentx2 2.5-12,2.5-12 in LADp and LADm, 80%nonDomRCA-LcxDom-mild,EF60%     Hyperlipidemia      Hypertension      IBS (irritable bowel syndrome)      Irritable bowel      Osteoporosis      Panic attack     questionable diagnosis     Pulmonary fibrosis (H)     do not use amiodarone     SSS (sick sinus syndrome) (H)     DDD pacer (see surgery history section)     Vertigo        PAST SURGICAL HISTORY:  Past Surgical History:   Procedure Laterality Date     APPENDECTOMY       CARDIAC SURGERY      PPM, 2 STENTS2-05[place]-CAD-taxus stentx2 2.5-12,2.5-12 in LADp and LADm, 80%nonDomRCA-LcxDom-mild,EF60%     CORONARY ANGIOGRAPHY ADULT ORDER  1994    mild CAD,Normal LV function, No Mitral regurgitation, Prox LAD 30% stenosis. RCA prox and mid, 20-30%     ESOPHAGOSCOPY, GASTROSCOPY, DUODENOSCOPY (EGD), COMBINED N/A 2015    Procedure: COMBINED ESOPHAGOSCOPY, GASTROSCOPY, DUODENOSCOPY (EGD), BIOPSY SINGLE OR MULTIPLE;  Surgeon: Genevieve Leon MD;  Location: [facility]     H LEAD REVISION DUAL  2003    Revised in [place]-due to diaphram stim (original pacer placed in [place])     H LEAD REVISION DUAL  2014    Correction of reversal of A and V leads in Header     HEART CATH, ANGIOPLASTY  2005    LEIGH ANN mid and proximal LAD, ongoing 80% RCA; mild circumflex     HERNIA REPAIR Left     [facility]     IMPLANT PACEMAKER  2003    Placed in [place] for sick sinus syndrome     REPLACE PACEMAKER GENERATOR  2013    Dual-chamber pacemaker by Dr SETH Pierre       FAMILY HISTORY:  Family History   Problem Relation Age of Onset     HEART DISEASE Father       age 84     Neurologic Disorder Mother      dementia     GASTROINTESTINAL  "DISEASE Daughter      liver transplant     Crohn Disease Daughter        SOCIAL HISTORY:  Social History     Social History     Marital status:      Spouse name: N/A     Number of children: 3     Years of education: N/A     Occupational History      Retired     Social History Main Topics     Smoking status: Former Smoker     Quit date: 5/9/1987     Smokeless tobacco: Never Used     Alcohol use 0.0 oz/week     0 Standard drinks or equivalent per week      Comment: 1 wkly     Drug use: No     Sexual activity: No     Other Topics Concern     Parent/Sibling W/ Cabg, Mi Or Angioplasty Before 65f 55m? Yes     Caffeine Concern Yes     decaf - some 1/2 caff     Sleep Concern Yes     nocturia every 2 hours     Special Diet No     Exercise No     some walking in the house     Social History Narrative       Review of Systems:  Skin:  Positive for lumps or bumps;itching   Eyes:  Positive for glasses  ENT:  Positive for hearing loss  Respiratory:  Positive for shortness of breath;dyspnea on exertion;wheezing;cough  Cardiovascular:    edema;Positive for  Gastroenterology: Positive for constipation  Genitourinary:  Positive for urinary frequency  Musculoskeletal:  Positive for joint pain;back pain;neck pain  Neurologic:  Negative    Psychiatric:  Positive for sleep disturbances;anxiety;depression;excessive stress  Heme/Lymph/Imm:  Negative    Endocrine:  Negative      Physical Exam:  Vitals: /64 (BP Location: Left arm, Patient Position: Sitting, Cuff Size: Adult Regular)  Pulse 68  Ht 1.6 m (5' 3\")  Wt 61.9 kg (136 lb 6.4 oz)  LMP  (LMP Unknown)  Breastfeeding? No  BMI 24.16 kg/m2    Constitutional:  cooperative frail      Skin:  warm and dry to the touch        Head:  normocephalic        Eyes:  no xanthalasma        ENT:  no pallor or cyanosis        Neck:  carotid pulses are full and equal bilaterally, JVP normal, no carotid bruit, no thyromegaly        Chest:  normal breath sounds, clear to auscultation, " normal A-P diameter, normal symmetry, normal respiratory excursion, no use of accessory muscles        Cardiac: regular rhythm;normal S1 and S2;no S3 or S4;apical impulse not displaced irregular rhythm     systolic murmur;grade 1          Abdomen:  abdomen soft, non-tender, BS normoactive, no mass, no HSM, no bruits        Vascular:                                   reduced PT pulses bilateral    Extremities and Back:           Neurological:  affect appropriate, oriented to time, person and place;no gross motor deficits          Recent Lab Results:  LIPID RESULTS:  Lab Results   Component Value Date    CHOL 153 03/15/2015    HDL 52 03/15/2015    LDL 75 03/15/2015    TRIG 129 03/15/2015    CHOLHDLRATIO 2.9 03/15/2015       LIVER ENZYME RESULTS:  Lab Results   Component Value Date    AST 18 11/15/2016    ALT 18 11/15/2016       CBC RESULTS:  Lab Results   Component Value Date    WBC 6.6 11/15/2016    RBC 4.31 11/15/2016    HGB 12.7 11/15/2016    HCT 39.1 11/15/2016    MCV 91 11/15/2016    MCH 29.5 11/15/2016    MCHC 32.5 11/15/2016    RDW 14.0 11/15/2016     11/15/2016       BMP RESULTS:  Lab Results   Component Value Date     11/15/2016    POTASSIUM 3.8 11/15/2016    CHLORIDE 104 11/15/2016    CO2 28 11/15/2016    ANIONGAP 6 11/15/2016    GLC 98 11/15/2016    BUN 24 11/15/2016    CR 1.17 (H) 11/15/2016    GFRESTIMATED 43 (L) 11/15/2016    GFRESTBLACK 53 (L) 11/15/2016    TRI 8.9 11/15/2016        A1C RESULTS:  Lab Results   Component Value Date    A1C 6.1 (H) 04/30/2014       INR RESULTS:  Lab Results   Component Value Date    INR 2.8 (A) 06/27/2017    INR 2.1 (A) 05/30/2017    INR 2.60 (H) 11/15/2016    INR 3.25 (H) 11/03/2016     Thank you for allowing me to participate in the care of your patient.    Sincerely,     Bryan Garcia MD     [facility]

## 2023-08-16 ENCOUNTER — OFFICE VISIT (OUTPATIENT)
Dept: PEDIATRICS | Facility: CLINIC | Age: 88
End: 2023-08-16
Payer: MEDICARE

## 2023-08-16 VITALS
OXYGEN SATURATION: 95 % | HEIGHT: 61 IN | RESPIRATION RATE: 20 BRPM | SYSTOLIC BLOOD PRESSURE: 112 MMHG | DIASTOLIC BLOOD PRESSURE: 60 MMHG | TEMPERATURE: 97.8 F | WEIGHT: 144 LBS | HEART RATE: 83 BPM | BODY MASS INDEX: 27.19 KG/M2

## 2023-08-16 DIAGNOSIS — F43.20 ADJUSTMENT DISORDER, UNSPECIFIED TYPE: ICD-10-CM

## 2023-08-16 DIAGNOSIS — E78.5 HYPERLIPIDEMIA LDL GOAL <100: ICD-10-CM

## 2023-08-16 DIAGNOSIS — J96.11 CHRONIC RESPIRATORY FAILURE WITH HYPOXIA (H): ICD-10-CM

## 2023-08-16 DIAGNOSIS — I25.10 CORONARY ARTERY DISEASE INVOLVING NATIVE CORONARY ARTERY OF NATIVE HEART WITHOUT ANGINA PECTORIS: ICD-10-CM

## 2023-08-16 DIAGNOSIS — J44.9 CHRONIC OBSTRUCTIVE PULMONARY DISEASE, UNSPECIFIED COPD TYPE (H): ICD-10-CM

## 2023-08-16 DIAGNOSIS — N18.30 STAGE 3 CHRONIC KIDNEY DISEASE, UNSPECIFIED WHETHER STAGE 3A OR 3B CKD (H): ICD-10-CM

## 2023-08-16 DIAGNOSIS — I50.32 CHRONIC HEART FAILURE WITH PRESERVED EJECTION FRACTION (H): ICD-10-CM

## 2023-08-16 DIAGNOSIS — M10.00 IDIOPATHIC GOUT, UNSPECIFIED CHRONICITY, UNSPECIFIED SITE: ICD-10-CM

## 2023-08-16 DIAGNOSIS — Z79.01 LONG TERM CURRENT USE OF ANTICOAGULANT THERAPY: ICD-10-CM

## 2023-08-16 DIAGNOSIS — I73.9 PERIPHERAL VASCULAR DISEASE (H): ICD-10-CM

## 2023-08-16 DIAGNOSIS — Z00.00 ENCOUNTER FOR MEDICARE ANNUAL WELLNESS EXAM: Primary | ICD-10-CM

## 2023-08-16 DIAGNOSIS — I48.20 CHRONIC ATRIAL FIBRILLATION (H): ICD-10-CM

## 2023-08-16 PROBLEM — S22.42XA CLOSED FRACTURE OF MULTIPLE RIBS OF LEFT SIDE, INITIAL ENCOUNTER: Status: RESOLVED | Noted: 2022-05-01 | Resolved: 2023-08-16

## 2023-08-16 LAB
ALBUMIN SERPL BCG-MCNC: 4.4 G/DL (ref 3.5–5.2)
ALP SERPL-CCNC: 97 U/L (ref 35–104)
ALT SERPL W P-5'-P-CCNC: 14 U/L (ref 0–50)
ANION GAP SERPL CALCULATED.3IONS-SCNC: 11 MMOL/L (ref 7–15)
AST SERPL W P-5'-P-CCNC: 29 U/L (ref 0–45)
BILIRUB SERPL-MCNC: 0.7 MG/DL
BUN SERPL-MCNC: 29.3 MG/DL (ref 8–23)
CALCIUM SERPL-MCNC: 9.7 MG/DL (ref 8.2–9.6)
CHLORIDE SERPL-SCNC: 101 MMOL/L (ref 98–107)
CHOLEST SERPL-MCNC: 145 MG/DL
CREAT SERPL-MCNC: 1.61 MG/DL (ref 0.51–0.95)
DEPRECATED HCO3 PLAS-SCNC: 30 MMOL/L (ref 22–29)
GFR SERPL CREATININE-BSD FRML MDRD: 29 ML/MIN/1.73M2
GLUCOSE SERPL-MCNC: 71 MG/DL (ref 70–99)
HDLC SERPL-MCNC: 65 MG/DL
INR PPP: 2.09 (ref 0.85–1.15)
LDLC SERPL CALC-MCNC: 59 MG/DL
NONHDLC SERPL-MCNC: 80 MG/DL
POTASSIUM SERPL-SCNC: 4.5 MMOL/L (ref 3.4–5.3)
PROT SERPL-MCNC: 6.9 G/DL (ref 6.4–8.3)
SODIUM SERPL-SCNC: 142 MMOL/L (ref 136–145)
TRIGL SERPL-MCNC: 105 MG/DL
URATE SERPL-MCNC: 6 MG/DL (ref 2.4–5.7)

## 2023-08-16 PROCEDURE — G0439 PPPS, SUBSEQ VISIT: HCPCS | Performed by: INTERNAL MEDICINE

## 2023-08-16 PROCEDURE — 99214 OFFICE O/P EST MOD 30 MIN: CPT | Mod: 25 | Performed by: INTERNAL MEDICINE

## 2023-08-16 PROCEDURE — 84550 ASSAY OF BLOOD/URIC ACID: CPT | Performed by: INTERNAL MEDICINE

## 2023-08-16 PROCEDURE — 36415 COLL VENOUS BLD VENIPUNCTURE: CPT | Performed by: INTERNAL MEDICINE

## 2023-08-16 PROCEDURE — 80053 COMPREHEN METABOLIC PANEL: CPT | Performed by: INTERNAL MEDICINE

## 2023-08-16 PROCEDURE — 85610 PROTHROMBIN TIME: CPT | Performed by: INTERNAL MEDICINE

## 2023-08-16 PROCEDURE — 80061 LIPID PANEL: CPT | Performed by: INTERNAL MEDICINE

## 2023-08-16 SDOH — ECONOMIC STABILITY: TRANSPORTATION INSECURITY
IN THE PAST 12 MONTHS, HAS THE LACK OF TRANSPORTATION KEPT YOU FROM MEDICAL APPOINTMENTS OR FROM GETTING MEDICATIONS?: NO

## 2023-08-16 SDOH — ECONOMIC STABILITY: INCOME INSECURITY: IN THE LAST 12 MONTHS, WAS THERE A TIME WHEN YOU WERE NOT ABLE TO PAY THE MORTGAGE OR RENT ON TIME?: NO

## 2023-08-16 SDOH — ECONOMIC STABILITY: FOOD INSECURITY: WITHIN THE PAST 12 MONTHS, THE FOOD YOU BOUGHT JUST DIDN'T LAST AND YOU DIDN'T HAVE MONEY TO GET MORE.: NEVER TRUE

## 2023-08-16 SDOH — ECONOMIC STABILITY: TRANSPORTATION INSECURITY
IN THE PAST 12 MONTHS, HAS LACK OF TRANSPORTATION KEPT YOU FROM MEETINGS, WORK, OR FROM GETTING THINGS NEEDED FOR DAILY LIVING?: NO

## 2023-08-16 SDOH — ECONOMIC STABILITY: FOOD INSECURITY: WITHIN THE PAST 12 MONTHS, YOU WORRIED THAT YOUR FOOD WOULD RUN OUT BEFORE YOU GOT MONEY TO BUY MORE.: NEVER TRUE

## 2023-08-16 SDOH — HEALTH STABILITY: PHYSICAL HEALTH: ON AVERAGE, HOW MANY MINUTES DO YOU ENGAGE IN EXERCISE AT THIS LEVEL?: 0 MIN

## 2023-08-16 SDOH — HEALTH STABILITY: PHYSICAL HEALTH: ON AVERAGE, HOW MANY DAYS PER WEEK DO YOU ENGAGE IN MODERATE TO STRENUOUS EXERCISE (LIKE A BRISK WALK)?: 0 DAYS

## 2023-08-16 SDOH — ECONOMIC STABILITY: INCOME INSECURITY: HOW HARD IS IT FOR YOU TO PAY FOR THE VERY BASICS LIKE FOOD, HOUSING, MEDICAL CARE, AND HEATING?: NOT VERY HARD

## 2023-08-16 ASSESSMENT — ENCOUNTER SYMPTOMS
MYALGIAS: 0
HEMATOCHEZIA: 0
FEVER: 0
PARESTHESIAS: 0
SHORTNESS OF BREATH: 1
ARTHRALGIAS: 0
CHILLS: 0
DIZZINESS: 0
BREAST MASS: 0
FREQUENCY: 1
HEARTBURN: 0
WEAKNESS: 1
HEMATURIA: 0
HEADACHES: 0
JOINT SWELLING: 0
COUGH: 1
DIARRHEA: 0
PALPITATIONS: 1
EYE PAIN: 0
DYSURIA: 0
ABDOMINAL PAIN: 0
CONSTIPATION: 1
NAUSEA: 0
SORE THROAT: 0
NERVOUS/ANXIOUS: 1

## 2023-08-16 ASSESSMENT — SOCIAL DETERMINANTS OF HEALTH (SDOH)
HOW OFTEN DO YOU ATTEND CHURCH OR RELIGIOUS SERVICES?: NEVER
DO YOU BELONG TO ANY CLUBS OR ORGANIZATIONS SUCH AS CHURCH GROUPS UNIONS, FRATERNAL OR ATHLETIC GROUPS, OR SCHOOL GROUPS?: NO
HOW OFTEN DO YOU GET TOGETHER WITH FRIENDS OR RELATIVES?: THREE TIMES A WEEK
IN A TYPICAL WEEK, HOW MANY TIMES DO YOU TALK ON THE PHONE WITH FAMILY, FRIENDS, OR NEIGHBORS?: MORE THAN THREE TIMES A WEEK

## 2023-08-16 ASSESSMENT — LIFESTYLE VARIABLES
HOW MANY STANDARD DRINKS CONTAINING ALCOHOL DO YOU HAVE ON A TYPICAL DAY: 1 OR 2
SKIP TO QUESTIONS 9-10: 1
HOW OFTEN DO YOU HAVE SIX OR MORE DRINKS ON ONE OCCASION: NEVER
HOW OFTEN DO YOU HAVE A DRINK CONTAINING ALCOHOL: 2-4 TIMES A MONTH
AUDIT-C TOTAL SCORE: 2

## 2023-08-16 ASSESSMENT — PAIN SCALES - GENERAL: PAINLEVEL: NO PAIN (0)

## 2023-08-16 ASSESSMENT — ANXIETY QUESTIONNAIRES
4. TROUBLE RELAXING: NOT AT ALL
5. BEING SO RESTLESS THAT IT IS HARD TO SIT STILL: NOT AT ALL
GAD7 TOTAL SCORE: 0
6. BECOMING EASILY ANNOYED OR IRRITABLE: NOT AT ALL
1. FEELING NERVOUS, ANXIOUS, OR ON EDGE: NOT AT ALL
2. NOT BEING ABLE TO STOP OR CONTROL WORRYING: NOT AT ALL
7. FEELING AFRAID AS IF SOMETHING AWFUL MIGHT HAPPEN: NOT AT ALL
GAD7 TOTAL SCORE: 0
3. WORRYING TOO MUCH ABOUT DIFFERENT THINGS: NOT AT ALL
IF YOU CHECKED OFF ANY PROBLEMS ON THIS QUESTIONNAIRE, HOW DIFFICULT HAVE THESE PROBLEMS MADE IT FOR YOU TO DO YOUR WORK, TAKE CARE OF THINGS AT HOME, OR GET ALONG WITH OTHER PEOPLE: NOT DIFFICULT AT ALL

## 2023-08-16 ASSESSMENT — PATIENT HEALTH QUESTIONNAIRE - PHQ9
SUM OF ALL RESPONSES TO PHQ QUESTIONS 1-9: 6
SUM OF ALL RESPONSES TO PHQ QUESTIONS 1-9: 6
10. IF YOU CHECKED OFF ANY PROBLEMS, HOW DIFFICULT HAVE THESE PROBLEMS MADE IT FOR YOU TO DO YOUR WORK, TAKE CARE OF THINGS AT HOME, OR GET ALONG WITH OTHER PEOPLE: NOT DIFFICULT AT ALL

## 2023-08-16 ASSESSMENT — ACTIVITIES OF DAILY LIVING (ADL): CURRENT_FUNCTION: NO ASSISTANCE NEEDED

## 2023-08-16 NOTE — PATIENT INSTRUCTIONS
Patient Education   Personalized Prevention Plan  You are due for the preventive services outlined below.  Your care team is available to assist you in scheduling these services.  If you have already completed any of these items, please share that information with your care team to update in your medical record.  Health Maintenance Due   Topic Date Due     Heart Failure Action Plan  Never done     ANNUAL REVIEW OF HM ORDERS  Never done     Liver Monitoring Lab  09/16/2022     Diptheria Tetanus Pertussis (DTAP/TDAP/TD) Vaccine (2 - Td or Tdap) 10/15/2022     Basic Metabolic Panel  02/09/2023     COVID-19 Vaccine (6 - Moderna series) 02/24/2023     Cholesterol Lab  03/31/2023     Preventive Health Recommendations    See your health care provider every year to  Review health changes.   Discuss preventive care.    Review your medicines if your doctor has prescribed any.  You no longer need a yearly Pap test unless you've had an abnormal Pap test in the past 10 years. If you have vaginal symptoms, such as bleeding or discharge, be sure to talk with your provider about a Pap test.  Every 1 to 2 years, have a mammogram.  If you are over 69, talk with your health care provider about whether or not you want to continue having screening mammograms.  Every 10 years, have a colonoscopy. Or, have a yearly FIT test (stool test). These exams will check for colon cancer.   Have a cholesterol test every 5 years, or more often if your doctor advises it.   Have a diabetes test (fasting glucose) every three years. If you are at risk for diabetes, you should have this test more often.   At age 65, have a bone density scan (DEXA) to check for osteoporosis (brittle bone disease).    Shots:  Get a flu shot each year.  Get a tetanus shot every 10 years.  Talk to your doctor about your pneumonia vaccines. There are now two you should receive - Pneumovax (PPSV 23) and Prevnar (PCV 13).  Talk to your pharmacist about the shingles  vaccine.  Talk to your doctor about the hepatitis B vaccine.    Nutrition:   Eat at least 5 servings of fruits and vegetables each day.  Eat whole-grain bread, whole-wheat pasta and brown rice instead of white grains and rice.  Get adequate Calcium and Vitamin D.     Lifestyle  Exercise at least 150 minutes a week (30 minutes a day, 5 days a week). This will help you control your weight and prevent disease.  Limit alcohol to one drink per day.  No smoking.   Wear sunscreen to prevent skin cancer.   See your dentist twice a year for an exam and cleaning.  See your eye doctor every 1 to 2 years to screen for conditions such as glaucoma, macular degeneration and cataracts.    Personalized Prevention Plan  You are due for the preventive services outlined below.  Your care team is available to assist you in scheduling these services.  If you have already completed any of these items, please share that information with your care team to update in your medical record.  Health Maintenance   Topic Date Due     HF ACTION PLAN  Never done     ANNUAL REVIEW OF HM ORDERS  Never done     ALT  09/16/2022     DTAP/TDAP/TD IMMUNIZATION (2 - Td or Tdap) 10/15/2022     BMP  02/09/2023     COVID-19 Vaccine (6 - Moderna series) 02/24/2023     LIPID  03/31/2023     INFLUENZA VACCINE (1) 09/01/2023     PHQ-9  02/16/2024     CBC  08/01/2024     HEMOGLOBIN  08/01/2024     MEDICARE ANNUAL WELLNESS VISIT  08/16/2024     BRIGHT ASSESSMENT  08/16/2024     FALL RISK ASSESSMENT  08/16/2024     ADVANCE CARE PLANNING  08/16/2028     TSH W/FREE T4 REFLEX  Completed     SPIROMETRY  Completed     COPD ACTION PLAN  Completed     Pneumococcal Vaccine: 65+ Years  Completed     URINALYSIS  Completed     ZOSTER IMMUNIZATION  Completed     IPV IMMUNIZATION  Aged Out     MENINGITIS IMMUNIZATION  Aged Out     MICROALBUMIN  Discontinued

## 2023-08-16 NOTE — PROGRESS NOTES
"SUBJECTIVE:   Alfredo is a 89 year old who presents for Preventive Visit.      8/16/2023     2:00 PM   Additional Questions   Roomed by Halie   Accompanied by Les and Cleo (daughters)         8/16/2023     2:00 PM   Patient Reported Additional Medications   Patient reports taking the following new medications none       Are you in the first 12 months of your Medicare coverage?  No    Healthy Habits:     In general, how would you rate your overall health?  Good    Frequency of exercise:  None    Do you usually eat at least 4 servings of fruit and vegetables a day, include whole grains    & fiber and avoid regularly eating high fat or \"junk\" foods?  Yes    Taking medications regularly:  Yes    Medication side effects:  None    Ability to successfully perform activities of daily living:  No assistance needed    Home Safety:  No safety concerns identified    Hearing Impairment:  Need to ask people to speak up or repeat themselves    In the past 6 months, have you been bothered by leaking of urine? Yes    In general, how would you rate your overall mental or emotional health?  Good    Additional concerns today:  No        Have you ever done Advance Care Planning? (For example, a Health Directive, POLST, or a discussion with a medical provider or your loved ones about your wishes): Yes, advance care planning is on file.       Fall risk  Fallen 2 or more times in the past year?: No  Any fall with injury in the past year?: No    Cognitive Screening   1) Repeat 3 items (Leader, Season, Table)    2) Clock draw: NORMAL  3) 3 item recall: Recalls 1 object   Results: NORMAL clock, 1-2 items recalled: COGNITIVE IMPAIRMENT LESS LIKELY    Mini-CogTM Copyright LUNA Welch. Licensed by the author for use in [facility]; reprinted with permission (barb@.[facility]). All rights reserved.      Do you have sleep apnea, excessive snoring or daytime drowsiness? : daytime drowsiness    Reviewed and updated as needed this visit by " clinical staff   Tobacco  Allergies               Reviewed and updated as needed this visit by Provider                 Social History     Tobacco Use    Smoking status: Former     Packs/day: 0.00     Types: Cigarettes     Quit date: 1987     Years since quittin.2    Smokeless tobacco: Never   Substance Use Topics    Alcohol use: Never             2023     1:48 PM   Alcohol Use   Prescreen: >3 drinks/day or >7 drinks/week? No     Do you use any other controlled substances or medications that are not prescribed by a provider? None      Current providers sharing in care for this patient include:   Patient Care Team:  Arnel Garcia MD as PCP - General (Internal Medicine)  Collins Horton MD as MD (Cardiology)  Jennifer Payne MD (Gastroenterology)  Arnel Garcia MD as Assigned PCP  Ana Paula Reis APRN CNP as Assigned Heart and Vascular Provider  Tita Vazquez as Personal Advocate & Liaison (PAL)    The following health maintenance items are reviewed in Epic and correct as of today:  Health Maintenance   Topic Date Due    HF ACTION PLAN  Never done    ANNUAL REVIEW OF HM ORDERS  Never done    ALT  2022    DTAP/TDAP/TD IMMUNIZATION (2 - Td or Tdap) 10/15/2022    BMP  2023    COVID-19 Vaccine (6 - Moderna series) 2023    LIPID  2023    MEDICARE ANNUAL WELLNESS VISIT  2023    INFLUENZA VACCINE (1) 2023    PHQ-9  2024    CBC  2024    HEMOGLOBIN  2024    BRIGHT ASSESSMENT  2024    FALL RISK ASSESSMENT  2024    ADVANCE CARE PLANNING  2027    TSH W/FREE T4 REFLEX  Completed    SPIROMETRY  Completed    COPD ACTION PLAN  Completed    Pneumococcal Vaccine: 65+ Years  Completed    URINALYSIS  Completed    ZOSTER IMMUNIZATION  Completed    IPV IMMUNIZATION  Aged Out    MENINGITIS IMMUNIZATION  Aged Out    MICROALBUMIN  Discontinued     Patient Active Problem List   Diagnosis    Hyperlipidemia LDL goal <100    Cardiac  pacemaker in situ    Senile osteoporosis    Anemia    Degeneration of lumbar intervertebral disc    CKD (chronic kidney disease) stage 3, GFR 30-59 ml/min (H)    Chronic atrial fibrillation (H)    Chronic obstructive pulmonary disease, unspecified COPD type (H)    Coronary artery disease involving native coronary artery of native heart without angina pectoris    Sick sinus syndrome (H)    Adjustment disorder, unspecified type    Presbyesophagus    Chronic heart failure with preserved ejection fraction (H)    Vertigo    Chronic respiratory failure with hypoxia (H)    Pleural effusion    Peripheral vascular disease (H)    Long term current use of anticoagulants with INR goal of 2.0-3.0     Past Surgical History:   Procedure Laterality Date    APPENDECTOMY      CARDIAC SURGERY      PPM, 2 STENTS2-05[place]-CAD-taxus stentx2 2.5-12,2.5-12 in [place] and [place]AD[place], 80%nonDomRCA-LcxDom-mild,EF60%    CORONARY ANGIOGRAPHY ADULT ORDER  1994    mild CAD,Normal LV function, No Mitral regurgitation, Prox LAD 30% stenosis. RCA prox and mid, 20-30%    ESOPHAGOSCOPY, GASTROSCOPY, DUODENOSCOPY (EGD), COMBINED N/A 2015    Procedure: COMBINED ESOPHAGOSCOPY, GASTROSCOPY, DUODENOSCOPY (EGD), BIOPSY SINGLE OR MULTIPLE;  Surgeon: Genevieve Leon MD;  Location: [facility]    H LEAD REVISION DUAL  2003    Revised in [place]-due to diaphram stim (original pacer placed in [place])    H LEAD REVISION DUAL  2014    Correction of reversal of A and V leads in Header    HEART CATH, ANGIOPLASTY  2005    LEIGH ANN mid and proximal LAD, ongoing 80% RCA; mild circumflex    HERNIA REPAIR Left     [facility]    IMPLANT PACEMAKER  2003    Placed in [place] for sick sinus syndrome    REPLACE PACEMAKER GENERATOR  2013    Dual-chamber pacemaker by Dr SETH Pierre       Social History     Tobacco Use    Smoking status: Former     Packs/day: 0.00     Types: Cigarettes     Quit date: 1987     Years since quittin.2    Smokeless tobacco: Never    Substance Use Topics    Alcohol use: Never     Family History   Problem Relation Age of Onset    Heart Disease Father          age 84    Neurologic Disorder Mother         dementia    Gastrointestinal Disease Daughter         liver transplant    Crohn's Disease Daughter          Current Outpatient Medications   Medication Sig Dispense Refill    acetaminophen (TYLENOL) 500 MG tablet Take 2 tablets (1,000 mg) by mouth every 8 hours as needed for pain      albuterol (PROAIR HFA/PROVENTIL HFA/VENTOLIN HFA) 108 (90 Base) MCG/ACT inhaler Inhale 2 puffs into the lungs every 6 hours as needed for shortness of breath / dyspnea or wheezing 18 g 3    albuterol (PROVENTIL) (2.5 MG/3ML) 0.083% neb solution Take 2.5 mg by nebulization At Bedtime      albuterol (PROVENTIL) (2.5 MG/3ML) 0.083% neb solution Take 2.5 mg by nebulization every 4 hours as needed for shortness of breath / dyspnea or wheezing MAX of 4 doses/ 24 hours      allopurinol (ZYLOPRIM) 100 MG tablet Take 1 tablet by mouth once daily 90 tablet 0    Calcium Carbonate-Vitamin D (CALCIUM 500+D HIGH POTENCY PO) Take 1 tablet by mouth daily      diltiazem ER COATED BEADS (CARDIZEM CD/CARTIA XT) 240 MG 24 hr capsule Take 1 capsule by mouth once daily 90 capsule 0    Fluticasone-Umeclidin-Vilanterol (TRELEGY ELLIPTA) 100-62.5-25 MCG/INH oral inhaler Inhale 1 puff into the lungs daily      furosemide (LASIX) 20 MG tablet Take 1 tablet by mouth twice daily 180 tablet 3    isosorbide mononitrate (IMDUR) 30 MG 24 hr tablet Take 1 tablet by mouth once daily 90 tablet 0    Lactobacillus (PROBIOTIC ACIDOPHILUS) TABS Take 1 tablet by mouth daily       losartan (COZAAR) 25 MG tablet Take 1 tablet by mouth once daily 90 tablet 11    melatonin 5 MG tablet Take 10 mg by mouth nightly as needed for sleep      mirtazapine (REMERON) 15 MG tablet TAKE 1 TABLET BY MOUTH AT BEDTIME 90 tablet 1    Multiple Vitamins-Minerals (OCUVITE PO) Take 1 capsule by mouth daily.       "polyethylene glycol (MIRALAX/GLYCOLAX) Packet Take by mouth daily 1 scoopful once daily      pravastatin (PRAVACHOL) 40 MG tablet Take 1 tablet by mouth once daily 90 tablet 0    senna (SENOKOT) 8.6 MG tablet Take 2 tablets by mouth nightly as needed for constipation       Vitamin D, Cholecalciferol, 1000 units TABS Take 1,000 Units by mouth daily      warfarin ANTICOAGULANT (COUMADIN) 2 MG tablet Take 1/2 tablet (1 mg) by mouth every Monday. Take 1 tablet (2 mg) all other days of the week or as instructed by the INR Clinic. 90 tablet 1         Mammogram Screening - Mammography discussed and declined  Pertinent mammograms are reviewed under the imaging tab.    Review of Systems   Constitutional:  Negative for chills and fever.   HENT:  Positive for hearing loss. Negative for ear pain and sore throat.    Eyes:  Positive for visual disturbance. Negative for pain.   Respiratory:  Positive for shortness of breath.    Cardiovascular:  Negative for chest pain and peripheral edema.   Gastrointestinal:  Positive for constipation. Negative for abdominal pain, diarrhea, heartburn, hematochezia and nausea.   Breasts:  Negative for tenderness, breast mass and discharge.   Genitourinary:  Positive for frequency. Negative for dysuria, genital sores, hematuria, pelvic pain, vaginal bleeding and vaginal discharge.   Musculoskeletal:  Negative for arthralgias, joint swelling and myalgias.   Skin:  Negative for rash.   Neurological:  Negative for dizziness, headaches and paresthesias.   Psychiatric/Behavioral:  Positive for mood changes. The patient is nervous/anxious.          OBJECTIVE:   /60 (BP Location: Right arm, Patient Position: Sitting, Cuff Size: Adult Large)   Pulse 83   Temp 97.8  F (36.6  C) (Tympanic)   Resp 20   Ht 1.549 m (5' 1\")   Wt 65.3 kg (144 lb)   LMP  (LMP Unknown)   SpO2 95%   BMI 27.21 kg/m   Estimated body mass index is 27.21 kg/m  as calculated from the following:    Height as of this " "encounter: 1.549 m (5' 1\").    Weight as of this encounter: 65.3 kg (144 lb).  Physical Exam  GENERAL APPEARANCE: alert and no distress  EYES: Eyes grossly normal to inspection, PERRL and conjunctivae and sclerae normal  HENT: ear canals and TM's normal, nose and mouth without ulcers or lesions, oropharynx clear and oral mucous membranes moist  NECK: no adenopathy, no asymmetry, masses, or scars and thyroid normal to palpation  RESP: lungs clear to auscultation - no rales, rhonchi or wheezes  CV: regular rate and rhythm, normal S1 S2, no S3 or S4, no murmur  ABDOMEN: soft, nontender, no hepatosplenomegaly, no masses and bowel sounds normal  SKIN: no suspicious lesions or rashes  NEURO: Normal strength and tone, sensory exam grossly normal, mentation intact and speech normal  PSYCH: mentation appears normal and affect normal/bright        ASSESSMENT / PLAN:   (Z00.00) Encounter for Medicare annual wellness exam  (primary encounter diagnosis)  Philip returns today for an annual wellness visit.  She presents with her 2 adult daughters who are her primary support.  She currently resides in a senior living facility, continues to live there independently.  The facility has the option of assisted living and long-term care as well.  Both daughters are involved with her care and check on her often throughout the week.  Current living situation is working well, no need for increased level of support.    (I25.10) Coronary artery disease involving native coronary artery of native heart without angina pectoris  Comment:     Stable coronary disease, asymptomatic.  Plan: Lipid panel reflex to direct LDL Non-fasting,         diltiazem ER COATED BEADS (CARDIZEM CD/CARTIA         XT) 240 MG 24 hr capsule, isosorbide         mononitrate (IMDUR) 30 MG 24 hr tablet,         pravastatin (PRAVACHOL) 40 MG tablet          (I50.32) Chronic heart failure with preserved ejection fraction (H)  Comment:   Plan: Echo 3/22: EF 60-65%, borderline " RV dilation/RV function mildly reduced.  Moderate to severe TR, mild pulmonary hypertension.  Stable.  Continue losartan, furosemide.    (J96.11) Chronic respiratory failure with hypoxia (H  (J44.9) Chronic obstructive pulmonary disease, unspecified COPD type (H)  Comment:   Plan: History of COPD, continues nasal cannula O2 nightly.  Continue Trelegy.  Recommended influenza , COVID and RSV immunizations this fall.    (I48.20) Chronic atrial fibrillation (H)  Comment:   Plan: INR.  Rate control-diltiazem.  Chronic warfarin.  Again reviewed the pros and cons of continued anticoagulation.  In discussion with patient and her daughters, patient elects to continue warfarin at this time          (E78.5) Hyperlipidemia LDL goal <100  Comment:   Well-controlled continue Pravachol  Lab Results   Component Value Date    LDL 59 08/16/2023          Plan: Comprehensive metabolic panel (BMP + Alb, Alk         Phos, ALT, AST, Total. Bili, TP), pravastatin         (PRAVACHOL) 40 MG tablet          (N18.30) Stage 3 chronic kidney disease, unspecified whether stage 3a or 3b CKD (H)  Comment:   Lab Results   Component Value Date    CR 1.61 08/16/2023    CR 1.53 08/09/2022   Plan: stable    (I73.9) Peripheral vascular disease (H)  Comment:   Plan: Denies claudication symptoms.  Stable.    (M10.00) Idiopathic gout, unspecified chronicity, unspecified site  Comment: Continue allopurinol, due for follow-up lab work.  Plan: Uric acid, allopurinol (ZYLOPRIM) 100 MG tablet          (F43.20) Adjustment disorder, unspecified type  Comment: Adjustment disorder with anxiety/depression.  Symptoms have improved on mirtazapine-continue.  Plan: mirtazapine (REMERON) 15 MG tablet          (Z79.01) Long term current use of anticoagulant therapy  Comment:   Plan: warfarin ANTICOAGULANT (COUMADIN) 2 MG tablet            COUNSELING:  Reviewed preventive health counseling, as reflected in patient instructions       Healthy diet/nutrition       Fall risk  "prevention      BMI:   Estimated body mass index is 27.21 kg/m  as calculated from the following:    Height as of this encounter: 1.549 m (5' 1\").    Weight as of this encounter: 65.3 kg (144 lb).         She reports that she quit smoking about 36 years ago. Her smoking use included cigarettes. She has never used smokeless tobacco.      Appropriate preventive services were discussed with this patient, including applicable screening as appropriate for cardiovascular disease, diabetes, osteopenia/osteoporosis, and glaucoma.  As appropriate for age/gender, discussed screening for colorectal cancer, prostate cancer, breast cancer, and cervical cancer. Checklist reviewing preventive services available has been given to the patient.    Reviewed patients plan of care and provided an AVS. The Basic Care Plan (routine screening as documented in Health Maintenance) for Leonor meets the Care Plan requirement. This Care Plan has been established and reviewed with the Patient and daughter.          Arnel Garcia MD  [facility]    Identified Health Risks:  I have reviewed Opioid Use Disorder and Substance Use Disorder risk factors and made any needed referrals. Answers submitted by the patient for this visit:  Patient Health Questionnaire (Submitted on 8/16/2023)  If you checked off any problems, how difficult have these problems made it for you to do your work, take care of things at home, or get along with other people?: Not difficult at all  PHQ9 TOTAL SCORE: 6  BRIGHT-7 (Submitted on 8/16/2023)  BRIGHT 7 TOTAL SCORE: 0    "

## 2023-08-16 NOTE — LETTER
August 21, 2023      Alfredo REBOLLEDO Rogelio  [address]   [place] [address]        Dear ,    We are writing to inform you of your test results.    Your lab work is stable.  Please continue your current medications without changes.  It was good to see you!     Resulted Orders   Lipid panel reflex to direct LDL Non-fasting   Result Value Ref Range    Cholesterol 145 <200 mg/dL    Triglycerides 105 <150 mg/dL    Direct Measure HDL 65 >=50 mg/dL    LDL Cholesterol Calculated 59 <=100 mg/dL    Non HDL Cholesterol 80 <130 mg/dL    Narrative    Cholesterol  Desirable:  <200 mg/dL    Triglycerides  Normal:  Less than 150 mg/dL  Borderline High:  150-199 mg/dL  High:  200-499 mg/dL  Very High:  Greater than or equal to 500 mg/dL    Direct Measure HDL  Female:  Greater than or equal to 50 mg/dL   Male:  Greater than or equal to 40 mg/dL    LDL Cholesterol  Desirable:  <100mg/dL  Above Desirable:  100-129 mg/dL   Borderline High:  130-159 mg/dL   High:  160-189 mg/dL   Very High:  >= 190 mg/dL    Non HDL Cholesterol  Desirable:  130 mg/dL  Above Desirable:  130-159 mg/dL  Borderline High:  160-189 mg/dL  High:  190-219 mg/dL  Very High:  Greater than or equal to 220 mg/dL   Comprehensive metabolic panel (BMP + Alb, Alk Phos, ALT, AST, Total. Bili, TP)   Result Value Ref Range    Sodium 142 136 - 145 mmol/L    Potassium 4.5 3.4 - 5.3 mmol/L    Chloride 101 98 - 107 mmol/L    Carbon Dioxide (CO2) 30 (H) 22 - 29 mmol/L    Anion Gap 11 7 - 15 mmol/L    Urea Nitrogen 29.3 (H) 8.0 - 23.0 mg/dL    Creatinine 1.61 (H) 0.51 - 0.95 mg/dL    Calcium 9.7 (H) 8.2 - 9.6 mg/dL    Glucose 71 70 - 99 mg/dL    Alkaline Phosphatase 97 35 - 104 U/L    AST 29 0 - 45 U/L      Comment:      Reference intervals for this test were updated on 6/12/2023 to more accurately reflect our healthy population. There may be differences in the flagging of prior results with similar values performed with this method. Interpretation of those  prior results can be made in the context of the updated reference intervals.    ALT 14 0 - 50 U/L      Comment:      Reference intervals for this test were updated on 6/12/2023 to more accurately reflect our healthy population. There may be differences in the flagging of prior results with similar values performed with this method. Interpretation of those prior results can be made in the context of the updated reference intervals.      Protein Total 6.9 6.4 - 8.3 g/dL    Albumin 4.4 3.5 - 5.2 g/dL    Bilirubin Total 0.7 <=1.2 mg/dL    GFR Estimate 29 (L) >60 mL/min/1.73m2   Uric acid   Result Value Ref Range    Uric Acid 6.0 (H) 2.4 - 5.7 mg/dL       If you have any questions or concerns, please call the clinic at the number listed above.       Sincerely,      Arnel Garcia MD

## 2023-08-17 ENCOUNTER — ANTICOAGULATION THERAPY VISIT (OUTPATIENT)
Dept: ANTICOAGULATION | Facility: CLINIC | Age: 88
End: 2023-08-17
Payer: MEDICARE

## 2023-08-17 DIAGNOSIS — Z79.01 LONG TERM CURRENT USE OF ANTICOAGULANTS WITH INR GOAL OF 2.0-3.0: ICD-10-CM

## 2023-08-17 DIAGNOSIS — I48.20 CHRONIC ATRIAL FIBRILLATION (H): Primary | ICD-10-CM

## 2023-08-17 RX ORDER — MIRTAZAPINE 15 MG/1
15 TABLET, FILM COATED ORAL AT BEDTIME
Qty: 90 TABLET | Refills: 11 | Status: SHIPPED | OUTPATIENT
Start: 2023-08-17 | End: 2024-08-28

## 2023-08-17 RX ORDER — MIRTAZAPINE 15 MG/1
TABLET, FILM COATED ORAL
Qty: 90 TABLET | Refills: 0 | OUTPATIENT
Start: 2023-08-17

## 2023-08-17 RX ORDER — DILTIAZEM HYDROCHLORIDE 240 MG/1
240 CAPSULE, COATED, EXTENDED RELEASE ORAL DAILY
Qty: 90 CAPSULE | Refills: 11 | Status: SHIPPED | OUTPATIENT
Start: 2023-08-17 | End: 2024-08-28

## 2023-08-17 RX ORDER — ALLOPURINOL 100 MG/1
100 TABLET ORAL DAILY
Qty: 90 TABLET | Refills: 11 | Status: SHIPPED | OUTPATIENT
Start: 2023-08-17 | End: 2024-08-28

## 2023-08-17 RX ORDER — ISOSORBIDE MONONITRATE 30 MG/1
30 TABLET, EXTENDED RELEASE ORAL DAILY
Qty: 90 TABLET | Refills: 11 | Status: SHIPPED | OUTPATIENT
Start: 2023-08-17 | End: 2024-08-28

## 2023-08-17 RX ORDER — PRAVASTATIN SODIUM 40 MG
40 TABLET ORAL DAILY
Qty: 90 TABLET | Refills: 11 | Status: SHIPPED | OUTPATIENT
Start: 2023-08-17 | End: 2024-08-28

## 2023-08-17 RX ORDER — WARFARIN SODIUM 2 MG/1
TABLET ORAL
Qty: 90 TABLET | Refills: 6 | Status: SHIPPED | OUTPATIENT
Start: 2023-08-17 | End: 2024-08-28

## 2023-08-17 NOTE — PROGRESS NOTES
ANTICOAGULATION MANAGEMENT     Leonor Mercado 89 year old female is on warfarin with therapeutic INR result. (Goal INR 2.0-3.0)    Recent labs: (last 7 days)     08/16/23  1453   INR 2.09*       ASSESSMENT     Source(s): Chart Review and Patient/Caregiver Call     Warfarin doses taken: Warfarin taken as instructed  Diet: No new diet changes identified  Medication/supplement changes: None noted  New illness, injury, or hospitalization: No  Signs or symptoms of bleeding or clotting: No  Previous result: Subtherapeutic  Additional findings: PCP visit yesterday.  No changes to care plan noted.       PLAN     Recommended plan for no diet, medication or health factor changes affecting INR     Dosing Instructions: Continue your current warfarin dose with next INR in 3 weeks       Summary  As of 8/17/2023      Full warfarin instructions:  1 mg every Mon; 2 mg all other days   Next INR check:  9/5/2023               Telephone call with Alfredo's daughter, Cleo, who agrees to plan and repeated back plan correctly    Lab visit scheduled    Education provided:   Please call back if any changes to your diet, medications or how you've been taking warfarin  Information on home INR monitoring.  They are not interested in trying home monitoring at this time, but may consider home monitoring this winter if she needs to come in frequently for INR checks.    Plan made per [facility] anticoagulation protocol    Yoana Waddell RN  Anticoagulation Clinic  8/17/2023    _______________________________________________________________________     Anticoagulation Episode Summary       Current INR goal:  2.0-3.0   TTR:  67.6 % (1 y)   Target end date:  Indefinite   Send INR reminders to:  [facility] [place]    Indications    Chronic atrial fibrillation (H) [I48.20]  Long term current use of anticoagulants with INR goal of 2.0-3.0 [Z79.01]             Comments:               Anticoagulation Care Providers       Provider Role Specialty Phone number     Arnel Garcia MD Referring Internal Medicine - Pediatrics [phone]    Marisabel Guido MD Referring Internal Medicine - Pediatrics [phone]

## 2023-08-18 ENCOUNTER — ANCILLARY PROCEDURE (OUTPATIENT)
Dept: CARDIOLOGY | Facility: CLINIC | Age: 88
End: 2023-08-18
Attending: INTERNAL MEDICINE
Payer: MEDICARE

## 2023-08-18 DIAGNOSIS — I49.5 SICK SINUS SYNDROME (H): ICD-10-CM

## 2023-08-18 DIAGNOSIS — Z95.0 CARDIAC PACEMAKER IN SITU: ICD-10-CM

## 2023-08-18 PROCEDURE — 93294 REM INTERROG EVL PM/LDLS PM: CPT | Performed by: INTERNAL MEDICINE

## 2023-08-18 PROCEDURE — 93296 REM INTERROG EVL PM/IDS: CPT | Performed by: INTERNAL MEDICINE

## 2023-08-21 LAB
MDC_IDC_MSMT_BATTERY_DTM: NORMAL
MDC_IDC_MSMT_BATTERY_IMPEDANCE: 1945 OHM
MDC_IDC_MSMT_BATTERY_REMAINING_LONGEVITY: 40 MO
MDC_IDC_MSMT_BATTERY_STATUS: NORMAL
MDC_IDC_MSMT_BATTERY_VOLTAGE: 2.76 V
MDC_IDC_MSMT_LEADCHNL_RA_IMPEDANCE_VALUE: 67 OHM
MDC_IDC_MSMT_LEADCHNL_RV_IMPEDANCE_VALUE: 675 OHM
MDC_IDC_PG_IMPLANT_DTM: NORMAL
MDC_IDC_PG_MFG: NORMAL
MDC_IDC_PG_MODEL: NORMAL
MDC_IDC_PG_SERIAL: NORMAL
MDC_IDC_PG_TYPE: NORMAL
MDC_IDC_SESS_CLINIC_NAME: NORMAL
MDC_IDC_SESS_DTM: NORMAL
MDC_IDC_SESS_TYPE: NORMAL
MDC_IDC_SET_BRADY_LOWRATE: 60 {BEATS}/MIN
MDC_IDC_SET_BRADY_MAX_SENSOR_RATE: 120 {BEATS}/MIN
MDC_IDC_SET_BRADY_MAX_TRACKING_RATE: 105 {BEATS}/MIN
MDC_IDC_SET_BRADY_MODE: NORMAL
MDC_IDC_SET_LEADCHNL_RV_PACING_AMPLITUDE: 2.5 V
MDC_IDC_SET_LEADCHNL_RV_PACING_CAPTURE_MODE: NORMAL
MDC_IDC_SET_LEADCHNL_RV_PACING_POLARITY: NORMAL
MDC_IDC_SET_LEADCHNL_RV_PACING_PULSEWIDTH: 0.52 MS
MDC_IDC_SET_LEADCHNL_RV_SENSING_POLARITY: NORMAL
MDC_IDC_SET_LEADCHNL_RV_SENSING_SENSITIVITY: 2.8 MV
MDC_IDC_SET_ZONE_DETECTION_INTERVAL: 333.33 MS
MDC_IDC_SET_ZONE_TYPE: NORMAL
MDC_IDC_SET_ZONE_TYPE: NORMAL
MDC_IDC_STAT_AT_BURDEN_PERCENT: 0 %
MDC_IDC_STAT_AT_DTM_END: NORMAL
MDC_IDC_STAT_AT_DTM_START: NORMAL
MDC_IDC_STAT_BRADY_DTM_END: NORMAL
MDC_IDC_STAT_BRADY_DTM_START: NORMAL
MDC_IDC_STAT_BRADY_RV_PERCENT_PACED: 81 %
MDC_IDC_STAT_EPISODE_RECENT_COUNT: 0
MDC_IDC_STAT_EPISODE_RECENT_COUNT: 0
MDC_IDC_STAT_EPISODE_RECENT_COUNT_DTM_END: NORMAL
MDC_IDC_STAT_EPISODE_RECENT_COUNT_DTM_END: NORMAL
MDC_IDC_STAT_EPISODE_RECENT_COUNT_DTM_START: NORMAL
MDC_IDC_STAT_EPISODE_RECENT_COUNT_DTM_START: NORMAL
MDC_IDC_STAT_EPISODE_TYPE: NORMAL
MDC_IDC_STAT_EPISODE_TYPE: NORMAL

## 2023-08-30 NOTE — PROGRESS NOTES
[facility]     Progress Note    Patient Name: Carol Abarca  : 1970  MRN: [id number]  Primary Care Physician:  Sonia Mosquera APRN  Date of admission: 2023    Subjective   Subjective     Chief Complaint: Follow-up angioedema    History of Present Illness  Patient Reports she still has some face swelling  Does not have any apparent tongue swelling    Review of Systems  Review of Systems   Constitutional:  Positive for activity change.   HENT:  Positive for drooling, facial swelling, trouble swallowing and voice change.         Facial swelling   Eyes: Negative.    Respiratory: Negative.     Cardiovascular: Negative.    Gastrointestinal: Negative.    Endocrine: Negative.    Objective   Objective     Vitals:   Temp:  [97.9 °F (36.6 °C)-98.8 °F (37.1 °C)] 98.4 °F (36.9 °C)  Heart Rate:  [69-87] 70  Resp:  [18-21] 19  BP: (144-163)/() 161/101    Physical Exam   Physical Exam  Pleasant female resting comfortably in bed in no acute distress  She is alert and oriented she is able to answer questions appropriately during the interview no focal deficits  Patient does have some swelling about her face with some mild swelling of her upper lip  Her tongue is no longer swollen  Her speech is not affected  Her lungs are clear no wheezes rales or rhonchi  She has no rashes  Result Review    Result Review:  I have personally reviewed the results from the time of this admission to 2023 16:27 EDT and agree with these findings:  []  Laboratory list / accordion  []  Microbiology  []  Radiology  []  EKG/Telemetry   []  Cardiology/Vascular   []  Pathology  []  Old records  []  Other:  Most notable findings include: Elevated ESR      Assessment & Plan   Assessment / Plan     Brief Patient Summary:  Carol Abarca is a 52 y.o. female who found to have angioedema    Active Hospital Problems:  Active Hospital Problems    Diagnosis     **Dysphasia     Seizures     Weakness     Unsteady gait when walking     Type 2  "  ANTICOAGULATION FOLLOW-UP CLINIC VISIT    Patient Name:  Leonor Mercado  Date:  7/3/2018  Contact Type:  Face to Face    SUBJECTIVE:     Patient Findings     Positives Change in diet/appetite (Pt reports has not been eating any greens. )           OBJECTIVE    INR Protime   Date Value Ref Range Status   07/03/2018 3.3 (A) 0.86 - 1.14 Final       ASSESSMENT / PLAN  INR assessment SUPRA    Recheck INR In: 3 WEEKS    INR Location Clinic      Anticoagulation Summary as of 7/3/2018     INR goal 2.0-3.0   Today's INR 3.3!   Warfarin maintenance plan 2 mg (2 mg x 1) every day   Full warfarin instructions 2 mg every day   Weekly warfarin total 14 mg   No change documented Dilma Rider RN   Plan last modified Yoana Waddell RN (12/15/2016)   Next INR check 7/24/2018   Priority INR   Target end date     Indications   Long-term (current) use of anticoagulants [Z79.01] [Z79.01]  A Fib - chr Coumadin  s/p PPM (H) [I48.2]         Anticoagulation Episode Summary     INR check location     Preferred lab     Send INR reminders to [facility]    Comments Pt's 1st name is pronounced \"ondray\"  Pt does not want print out, appt card only      Anticoagulation Care Providers     Provider Role Specialty Phone number    Dannielle Darby MD [facility] Internal Medicine [phone]            See the Encounter Report to view Anticoagulation Flowsheet and Dosing Calendar (Go to Encounters tab in chart review, and find the Anticoagulation Therapy Visit)    Dosage adjustment made based on physician directed care plan.    Dilma Rider, DAMIAN               " diabetes mellitus     B12 deficiency     Obstructive sleep apnea      Plan:   Connective tissue disease panel pending    IgE pending along with alpha gal    C1 esterase pending    No airway issues at this time or significant facial swelling    We will continue to follow from a distance    DVT prophylaxis:  Medical and mechanical DVT prophylaxis orders are present.    CODE STATUS:    Code Status (Patient has no pulse and is not breathing): CPR (Attempt to Resuscitate)  Medical Interventions (Patient has pulse or is breathing): Full Support      Robert Bethea DO    Electronically signed by Robert Bethea DO, 08/30/23, 4:28 PM EDT.

## 2023-09-05 ENCOUNTER — LAB (OUTPATIENT)
Dept: LAB | Facility: CLINIC | Age: 88
End: 2023-09-05
Payer: MEDICARE

## 2023-09-05 ENCOUNTER — ANTICOAGULATION THERAPY VISIT (OUTPATIENT)
Dept: ANTICOAGULATION | Facility: CLINIC | Age: 88
End: 2023-09-05

## 2023-09-05 DIAGNOSIS — Z79.01 LONG TERM CURRENT USE OF ANTICOAGULANTS WITH INR GOAL OF 2.0-3.0: ICD-10-CM

## 2023-09-05 DIAGNOSIS — I48.20 CHRONIC ATRIAL FIBRILLATION (H): Primary | ICD-10-CM

## 2023-09-05 DIAGNOSIS — I48.20 CHRONIC ATRIAL FIBRILLATION (H): ICD-10-CM

## 2023-09-05 LAB — INR BLD: 2.9 (ref 0.9–1.1)

## 2023-09-05 PROCEDURE — 85610 PROTHROMBIN TIME: CPT

## 2023-09-05 PROCEDURE — 36416 COLLJ CAPILLARY BLOOD SPEC: CPT

## 2023-09-05 NOTE — PROGRESS NOTES
ANTICOAGULATION MANAGEMENT     Leonor Mercado 89 year old female is on warfarin with therapeutic INR result. (Goal INR 2.0-3.0)    Recent labs: (last 7 days)     09/05/23  1335   INR 2.9*       ASSESSMENT     Source(s): Chart Review and Patient/Caregiver Call     Warfarin doses taken: Warfarin taken as instructed  Diet: No new diet changes identified  Medication/supplement changes: None noted  New illness, injury, or hospitalization: No  Signs or symptoms of bleeding or clotting: No  Previous result: Therapeutic last visit; previously outside of goal range  Additional findings: None       PLAN     Recommended plan for no diet, medication or health factor changes affecting INR     Dosing Instructions: Continue your current warfarin dose with next INR in 4 weeks       Summary  As of 9/5/2023      Full warfarin instructions:  1 mg every Mon; 2 mg all other days   Next INR check:  10/3/2023               Telephone call with daughter, Cleo who verbalizes understanding and agrees to plan    Lab visit scheduled    Education provided:   Contact [phone]  with any changes, questions or concerns.     Plan made per [facility] anticoagulation protocol    Yael Grider RN  Anticoagulation Clinic  9/5/2023    _______________________________________________________________________     Anticoagulation Episode Summary       Current INR goal:  2.0-3.0   TTR:  68.5 % (1 y)   Target end date:  Indefinite   Send INR reminders to:  [facility]    Indications    Chronic atrial fibrillation (H) [I48.20]  Long term current use of anticoagulants with INR goal of 2.0-3.0 [Z79.01]             Comments:               Anticoagulation Care Providers       Provider Role Specialty Phone number    Arnel Garcia MD Referring Internal Medicine - Pediatrics [phone]    Marisabel Guido MD Referring Internal Medicine - Pediatrics [phone]

## 2023-09-22 ENCOUNTER — DOCUMENTATION ONLY (OUTPATIENT)
Dept: ANTICOAGULATION | Facility: CLINIC | Age: 88
End: 2023-09-22
Payer: MEDICARE

## 2023-09-22 DIAGNOSIS — I48.20 CHRONIC ATRIAL FIBRILLATION (H): Primary | ICD-10-CM

## 2023-09-22 NOTE — PROGRESS NOTES
ANTICOAGULATION CLINIC REFERRAL RENEWAL REQUEST       An annual renewal order is required for all patients referred to [facility].?  Please review and sign the pended referral order for Leonor Mercado.       ANTICOAGULATION SUMMARY      Warfarin indication(s)   Atrial Fibrillation    Mechanical heart valve present?  NO       Current goal range   INR: 2.0-3.0     Goal appropriate for indication? Goal INR 2-3, standard for indication(s) above     Time in Therapeutic Range (TTR)  (Goal > 60%) 68%       Office visit with referring provider's group within last year yes on 08/16/2023       Mariola Valenzuela RN  [facility]
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
## 2023-10-03 ENCOUNTER — LAB (OUTPATIENT)
Dept: LAB | Facility: CLINIC | Age: 88
End: 2023-10-03
Payer: MEDICARE

## 2023-10-03 ENCOUNTER — ANTICOAGULATION THERAPY VISIT (OUTPATIENT)
Dept: ANTICOAGULATION | Facility: CLINIC | Age: 88
End: 2023-10-03

## 2023-10-03 DIAGNOSIS — I48.20 CHRONIC ATRIAL FIBRILLATION (H): ICD-10-CM

## 2023-10-03 DIAGNOSIS — Z79.01 LONG TERM CURRENT USE OF ANTICOAGULANTS WITH INR GOAL OF 2.0-3.0: ICD-10-CM

## 2023-10-03 DIAGNOSIS — I48.20 CHRONIC ATRIAL FIBRILLATION (H): Primary | ICD-10-CM

## 2023-10-03 LAB — INR BLD: 2.1 (ref 0.9–1.1)

## 2023-10-03 PROCEDURE — 36416 COLLJ CAPILLARY BLOOD SPEC: CPT

## 2023-10-03 PROCEDURE — 85610 PROTHROMBIN TIME: CPT

## 2023-10-03 NOTE — PROGRESS NOTES
ANTICOAGULATION MANAGEMENT     Leonor Mercado 89 year old female is on warfarin with therapeutic INR result. (Goal INR 2.0-3.0)    Recent labs: (last 7 days)     10/03/23  1341   INR 2.1*       ASSESSMENT     Source(s): Chart Review and Patient/Caregiver Call     Warfarin doses taken: Warfarin taken as instructed  Diet: No new diet changes identified  Medication/supplement changes: None noted  New illness, injury, or hospitalization: No  Signs or symptoms of bleeding or clotting: No  Previous result: Therapeutic last 2(+) visits  Additional findings: None       PLAN     Recommended plan for no diet, medication or health factor changes affecting INR     Dosing Instructions: Continue your current warfarin dose with next INR in 5 weeks       Summary  As of 10/3/2023      Full warfarin instructions:  1 mg every Mon; 2 mg all other days   Next INR check:  11/7/2023               Telephone call with Cleo who verbalizes understanding and agrees to plan    Lab visit scheduled    Education provided:   Please call back if any changes to your diet, medications or how you've been taking warfarin    Plan made per [facility] anticoagulation protocol    Alden Martin RN  Anticoagulation Clinic  10/3/2023    _______________________________________________________________________     Anticoagulation Episode Summary       Current INR goal:  2.0-3.0   TTR:  73.1 % (1 y)   Target end date:  Indefinite   Send INR reminders to:  [facility]    Indications    Chronic atrial fibrillation (H) [I48.20]  Long term current use of anticoagulants with INR goal of 2.0-3.0 [Z79.01]             Comments:               Anticoagulation Care Providers       Provider Role Specialty Phone number    Arnel Garcia MD Referring Internal Medicine - Pediatrics [phone]

## 2023-11-07 ENCOUNTER — ANTICOAGULATION THERAPY VISIT (OUTPATIENT)
Dept: ANTICOAGULATION | Facility: CLINIC | Age: 88
End: 2023-11-07

## 2023-11-07 ENCOUNTER — LAB (OUTPATIENT)
Dept: LAB | Facility: CLINIC | Age: 88
End: 2023-11-07
Payer: MEDICARE

## 2023-11-07 DIAGNOSIS — I48.20 CHRONIC ATRIAL FIBRILLATION (H): ICD-10-CM

## 2023-11-07 DIAGNOSIS — I48.20 CHRONIC ATRIAL FIBRILLATION (H): Primary | ICD-10-CM

## 2023-11-07 DIAGNOSIS — Z79.01 LONG TERM CURRENT USE OF ANTICOAGULANTS WITH INR GOAL OF 2.0-3.0: ICD-10-CM

## 2023-11-07 LAB — INR BLD: 1.4 (ref 0.9–1.1)

## 2023-11-07 PROCEDURE — 36415 COLL VENOUS BLD VENIPUNCTURE: CPT

## 2023-11-07 PROCEDURE — 85610 PROTHROMBIN TIME: CPT

## 2023-11-07 NOTE — PROGRESS NOTES
ANTICOAGULATION MANAGEMENT     Leonor Mercado 89 year old female is on warfarin with subtherapeutic INR result. (Goal INR 2.0-3.0)    Recent labs: (last 7 days)     11/07/23  1335   INR 1.4*       ASSESSMENT     Source(s): Chart Review and Patient/Caregiver Call     Warfarin doses taken: Missed dose(s) may be affecting INR  Diet: No new diet changes identified  Medication/supplement changes: None noted  New illness, injury, or hospitalization: No  Signs or symptoms of bleeding or clotting: No  Previous result: Therapeutic last 2(+) visits  Additional findings: None       PLAN     Recommended plan for temporary change(s) affecting INR     Dosing Instructions: booster dose then continue your current warfarin dose with next INR in 1 week       Summary  As of 11/7/2023      Full warfarin instructions:  11/7: 3 mg; Otherwise 1 mg every Mon; 2 mg all other days   Next INR check:  11/14/2023               Telephone call with daughter Cleo who verbalizes understanding and agrees to plan and who agrees to plan and repeated back plan correctly    Lab visit scheduled    Education provided:   Cleo reports Alfredo missed all of her evening medications on the 3 days listed on the calendar. I inquired if Alfredo wants to change the time she takes her warfarin but Cleo declined stating best not to make any changes at this time.    Plan made per [facility] anticoagulation protocol    Mariola Valenzuela RN  Anticoagulation Clinic  11/7/2023    _______________________________________________________________________     Anticoagulation Episode Summary       Current INR goal:  2.0-3.0   TTR:  67.3% (1 y)   Target end date:  Indefinite   Send INR reminders to:  [facility]    Indications    Chronic atrial fibrillation (H) [I48.20]  Long term current use of anticoagulants with INR goal of 2.0-3.0 [Z79.01]             Comments:               Anticoagulation Care Providers       Provider Role Specialty Phone number    Arnel Garcia MD  [facility] Internal Medicine - Pediatrics [phone]

## 2023-11-10 NOTE — LETTER
[facility]  [address], [address]  [place], [place]  [address]                                            TEL:[phone]  FAX:[phone]      Leonor Mercado  [address]    [facility] [address]      October 23, 2018    Dear Leonor     We have received a refill request from your pharmacy, however, we were only able to provide a one time fill because you will be due for a fasting lab appointment in December 2018. Please call [phone] to schedule an appointment before you are due for your next refill.      Thank you,     DAMIAN Baird     No

## 2023-11-14 ENCOUNTER — LAB (OUTPATIENT)
Dept: LAB | Facility: CLINIC | Age: 88
End: 2023-11-14
Payer: MEDICARE

## 2023-11-14 ENCOUNTER — ANTICOAGULATION THERAPY VISIT (OUTPATIENT)
Dept: ANTICOAGULATION | Facility: CLINIC | Age: 88
End: 2023-11-14

## 2023-11-14 DIAGNOSIS — I48.20 CHRONIC ATRIAL FIBRILLATION (H): ICD-10-CM

## 2023-11-14 DIAGNOSIS — Z79.01 LONG TERM CURRENT USE OF ANTICOAGULANTS WITH INR GOAL OF 2.0-3.0: ICD-10-CM

## 2023-11-14 DIAGNOSIS — I48.20 CHRONIC ATRIAL FIBRILLATION (H): Primary | ICD-10-CM

## 2023-11-14 LAB — INR BLD: 3.1 (ref 0.9–1.1)

## 2023-11-14 PROCEDURE — 85610 PROTHROMBIN TIME: CPT

## 2023-11-14 PROCEDURE — 36416 COLLJ CAPILLARY BLOOD SPEC: CPT

## 2023-11-14 NOTE — PROGRESS NOTES
ANTICOAGULATION MANAGEMENT     Leonor Mercado 89 year old female is on warfarin with supratherapeutic INR result. (Goal INR 2.0-3.0)    Recent labs: (last 7 days)     11/14/23  1439   INR 3.1*       ASSESSMENT     Source(s): Chart Review and Patient/Caregiver Call     Warfarin doses taken: Warfarin taken as instructed  Diet: No new diet changes identified  Medication/supplement changes: None noted  New illness, injury, or hospitalization: No  Signs or symptoms of bleeding or clotting: No  Previous result: Subtherapeutic  Additional findings: None       PLAN     Recommended plan for no diet, medication or health factor changes affecting INR     Dosing Instructions: Continue your current warfarin dose with next INR in 2 weeks       Summary  As of 11/14/2023      Full warfarin instructions:  1 mg every Mon; 2 mg all other days   Next INR check:  11/28/2023               Telephone call with Alfredo's daughter, Cleo, who agrees to plan and repeated back plan correctly    Lab visit scheduled    Education provided:   Please call back if any changes to your diet, medications or how you've been taking warfarin    Plan made per [facility] anticoagulation protocol    Yoana Waddell RN  Anticoagulation Clinic  11/14/2023    _______________________________________________________________________     Anticoagulation Episode Summary       Current INR goal:  2.0-3.0   TTR:  66.5% (1 y)   Target end date:  Indefinite   Send INR reminders to:  [facility]    Indications    Chronic atrial fibrillation (H) [I48.20]  Long term current use of anticoagulants with INR goal of 2.0-3.0 [Z79.01]             Comments:               Anticoagulation Care Providers       Provider Role Specialty Phone number    Arnel Garcia MD Referring Internal Medicine - Pediatrics [phone]

## 2023-11-28 ENCOUNTER — ANTICOAGULATION THERAPY VISIT (OUTPATIENT)
Dept: ANTICOAGULATION | Facility: CLINIC | Age: 88
End: 2023-11-28

## 2023-11-28 ENCOUNTER — LAB (OUTPATIENT)
Dept: LAB | Facility: CLINIC | Age: 88
End: 2023-11-28
Payer: MEDICARE

## 2023-11-28 DIAGNOSIS — I48.20 CHRONIC ATRIAL FIBRILLATION (H): Primary | ICD-10-CM

## 2023-11-28 DIAGNOSIS — Z79.01 LONG TERM CURRENT USE OF ANTICOAGULANTS WITH INR GOAL OF 2.0-3.0: ICD-10-CM

## 2023-11-28 DIAGNOSIS — I48.20 CHRONIC ATRIAL FIBRILLATION (H): ICD-10-CM

## 2023-11-28 LAB — INR BLD: 4.3 (ref 0.9–1.1)

## 2023-11-28 PROCEDURE — 36416 COLLJ CAPILLARY BLOOD SPEC: CPT

## 2023-11-28 PROCEDURE — 85610 PROTHROMBIN TIME: CPT

## 2023-11-28 NOTE — PROGRESS NOTES
ANTICOAGULATION MANAGEMENT     Leonor Mercado 89 year old female is on warfarin with supratherapeutic INR result. (Goal INR 2.0-3.0)    Recent labs: (last 7 days)     11/28/23  1333   INR 4.3*       ASSESSMENT     Source(s): Chart Review and Patient/Caregiver Call     Warfarin doses taken: Warfarin taken as instructed  Diet: Less appetite overall probably. No likely changes to Vit K intake although her main meal of the day is at her facility to not entirely in her control. Rare Boost/Ensure intake but this is not a change. The did purchase some frozen broccoli and brussels sprouts, she will have those tonight.   Medication/supplement changes: None noted  New illness, injury, or hospitalization: No  Signs or symptoms of bleeding or clotting: No  Previous result: Supratherapeutic  Additional findings: None       PLAN     Recommended plan for no diet, medication or health factor changes affecting INR     Dosing Instructions: hold dose then decrease your warfarin dose (8% change) with next INR in 10 days       Summary  As of 11/28/2023      Full warfarin instructions:  11/28: Hold; Otherwise 1 mg every Mon, Thu; 2 mg all other days   Next INR check:  12/7/2023               Telephone call with daughter Cleo who verbalizes understanding and agrees to plan    Lab visit scheduled    Education provided:   Goal range and lab monitoring: goal range and significance of current result  Dietary considerations: importance of consistent vitamin K intake  Contact [phone]  with any changes, questions or concerns.     Plan made per [facility] anticoagulation protocol    Joellen Lynch RN  Anticoagulation Clinic  11/28/2023    _______________________________________________________________________     Anticoagulation Episode Summary       Current INR goal:  2.0-3.0   TTR:  62.7% (1 y)   Target end date:  Indefinite   Send INR reminders to:  [facility]    Indications    Chronic atrial fibrillation (H) [I48.20]  Long term  current use of anticoagulants with INR goal of 2.0-3.0 [Z79.01]             Comments:               Anticoagulation Care Providers       Provider Role Specialty Phone number    Arnel Garcia MD Referring Internal Medicine - Pediatrics [phone]

## 2023-12-13 ENCOUNTER — ANTICOAGULATION THERAPY VISIT (OUTPATIENT)
Dept: ANTICOAGULATION | Facility: CLINIC | Age: 88
End: 2023-12-13

## 2023-12-13 ENCOUNTER — LAB (OUTPATIENT)
Dept: LAB | Facility: CLINIC | Age: 88
End: 2023-12-13
Payer: MEDICARE

## 2023-12-13 ENCOUNTER — ANCILLARY PROCEDURE (OUTPATIENT)
Dept: CARDIOLOGY | Facility: CLINIC | Age: 88
End: 2023-12-13
Attending: INTERNAL MEDICINE
Payer: MEDICARE

## 2023-12-13 DIAGNOSIS — Z79.01 LONG TERM CURRENT USE OF ANTICOAGULANTS WITH INR GOAL OF 2.0-3.0: ICD-10-CM

## 2023-12-13 DIAGNOSIS — I48.20 CHRONIC ATRIAL FIBRILLATION (H): Primary | ICD-10-CM

## 2023-12-13 DIAGNOSIS — I49.5 SICK SINUS SYNDROME (H): ICD-10-CM

## 2023-12-13 DIAGNOSIS — Z95.0 CARDIAC PACEMAKER IN SITU: ICD-10-CM

## 2023-12-13 DIAGNOSIS — I48.20 CHRONIC ATRIAL FIBRILLATION (H): ICD-10-CM

## 2023-12-13 DIAGNOSIS — Z95.0 CARDIAC PACEMAKER IN SITU: Primary | ICD-10-CM

## 2023-12-13 LAB — INR BLD: 2.2 (ref 0.9–1.1)

## 2023-12-13 PROCEDURE — 93294 REM INTERROG EVL PM/LDLS PM: CPT | Performed by: INTERNAL MEDICINE

## 2023-12-13 PROCEDURE — 93296 REM INTERROG EVL PM/IDS: CPT | Performed by: INTERNAL MEDICINE

## 2023-12-13 PROCEDURE — 85610 PROTHROMBIN TIME: CPT

## 2023-12-13 PROCEDURE — 36416 COLLJ CAPILLARY BLOOD SPEC: CPT

## 2023-12-13 NOTE — PROGRESS NOTES
ANTICOAGULATION MANAGEMENT     Leonor Mercado 89 year old female is on warfarin with therapeutic INR result. (Goal INR 2.0-3.0)    Recent labs: (last 7 days)     12/13/23  1325   INR 2.2*     Warfarin maintenance dose was decreased 8% at last visit    ASSESSMENT     Source(s): Chart Review and Patient/Caregiver Call     Warfarin doses taken: Warfarin taken as instructed  Diet: No new diet changes identified  Medication/supplement changes: None noted  New illness, injury, or hospitalization: No  Signs or symptoms of bleeding or clotting: No  Previous result: Supratherapeutic  Additional findings: None       PLAN     Recommended plan for no diet, medication or health factor changes affecting INR     Dosing Instructions: Continue your current warfarin dose with next INR in 3 weeks       Summary  As of 12/13/2023      Full warfarin instructions:  1 mg every Mon, Thu; 2 mg all other days   Next INR check:  1/2/2024               Telephone call with daughter Cleo who verbalizes understanding and agrees to plan and who agrees to plan and repeated back plan correctly    Lab visit scheduled    Education provided:   Taking warfarin: importance of following [facility] instructions vs instructions on the prescription bottle and Importance of taking warfarin as instructed    Plan made per [facility] anticoagulation protocol    Mariola Valenzuela RN  Anticoagulation Clinic  12/13/2023    _______________________________________________________________________     Anticoagulation Episode Summary       Current INR goal:  2.0-3.0   TTR:  60.1% (1 y)   Target end date:  Indefinite   Send INR reminders to:  [facility]    Indications    Chronic atrial fibrillation (H) [I48.20]  Long term current use of anticoagulants with INR goal of 2.0-3.0 [Z79.01]             Comments:               Anticoagulation Care Providers       Provider Role Specialty Phone number    Arnel Garcia MD Referring Internal Medicine - Pediatrics [phone]

## 2023-12-14 LAB
MDC_IDC_MSMT_BATTERY_DTM: NORMAL
MDC_IDC_MSMT_BATTERY_IMPEDANCE: 2036 OHM
MDC_IDC_MSMT_BATTERY_REMAINING_LONGEVITY: 38 MO
MDC_IDC_MSMT_BATTERY_STATUS: NORMAL
MDC_IDC_MSMT_BATTERY_VOLTAGE: 2.75 V
MDC_IDC_MSMT_LEADCHNL_RA_IMPEDANCE_VALUE: 67 OHM
MDC_IDC_MSMT_LEADCHNL_RV_IMPEDANCE_VALUE: 689 OHM
MDC_IDC_PG_IMPLANT_DTM: NORMAL
MDC_IDC_PG_MFG: NORMAL
MDC_IDC_PG_MODEL: NORMAL
MDC_IDC_PG_SERIAL: NORMAL
MDC_IDC_PG_TYPE: NORMAL
MDC_IDC_SESS_CLINIC_NAME: NORMAL
MDC_IDC_SESS_DTM: NORMAL
MDC_IDC_SESS_TYPE: NORMAL
MDC_IDC_SET_BRADY_LOWRATE: 60 {BEATS}/MIN
MDC_IDC_SET_BRADY_MAX_SENSOR_RATE: 120 {BEATS}/MIN
MDC_IDC_SET_BRADY_MAX_TRACKING_RATE: 105 {BEATS}/MIN
MDC_IDC_SET_BRADY_MODE: NORMAL
MDC_IDC_SET_LEADCHNL_RV_PACING_AMPLITUDE: 2.5 V
MDC_IDC_SET_LEADCHNL_RV_PACING_CAPTURE_MODE: NORMAL
MDC_IDC_SET_LEADCHNL_RV_PACING_POLARITY: NORMAL
MDC_IDC_SET_LEADCHNL_RV_PACING_PULSEWIDTH: 0.52 MS
MDC_IDC_SET_LEADCHNL_RV_SENSING_POLARITY: NORMAL
MDC_IDC_SET_LEADCHNL_RV_SENSING_SENSITIVITY: 4 MV
MDC_IDC_SET_ZONE_DETECTION_INTERVAL: 333.33 MS
MDC_IDC_SET_ZONE_STATUS: NORMAL
MDC_IDC_SET_ZONE_STATUS: NORMAL
MDC_IDC_SET_ZONE_TYPE: NORMAL
MDC_IDC_SET_ZONE_TYPE: NORMAL
MDC_IDC_SET_ZONE_VENDOR_TYPE: NORMAL
MDC_IDC_SET_ZONE_VENDOR_TYPE: NORMAL
MDC_IDC_STAT_AT_BURDEN_PERCENT: 0 %
MDC_IDC_STAT_AT_DTM_END: NORMAL
MDC_IDC_STAT_AT_DTM_START: NORMAL
MDC_IDC_STAT_BRADY_DTM_END: NORMAL
MDC_IDC_STAT_BRADY_DTM_START: NORMAL
MDC_IDC_STAT_BRADY_RV_PERCENT_PACED: 80 %
MDC_IDC_STAT_EPISODE_RECENT_COUNT: 0
MDC_IDC_STAT_EPISODE_RECENT_COUNT: 0
MDC_IDC_STAT_EPISODE_RECENT_COUNT_DTM_END: NORMAL
MDC_IDC_STAT_EPISODE_RECENT_COUNT_DTM_END: NORMAL
MDC_IDC_STAT_EPISODE_RECENT_COUNT_DTM_START: NORMAL
MDC_IDC_STAT_EPISODE_RECENT_COUNT_DTM_START: NORMAL
MDC_IDC_STAT_EPISODE_TYPE: NORMAL
MDC_IDC_STAT_EPISODE_TYPE: NORMAL

## 2024-01-02 ENCOUNTER — LAB (OUTPATIENT)
Dept: LAB | Facility: CLINIC | Age: 89
End: 2024-01-02
Payer: MEDICARE

## 2024-01-02 ENCOUNTER — ANTICOAGULATION THERAPY VISIT (OUTPATIENT)
Dept: ANTICOAGULATION | Facility: CLINIC | Age: 89
End: 2024-01-02

## 2024-01-02 DIAGNOSIS — Z79.01 LONG TERM CURRENT USE OF ANTICOAGULANTS WITH INR GOAL OF 2.0-3.0: ICD-10-CM

## 2024-01-02 DIAGNOSIS — I48.20 CHRONIC ATRIAL FIBRILLATION (H): ICD-10-CM

## 2024-01-02 DIAGNOSIS — I48.20 CHRONIC ATRIAL FIBRILLATION (H): Primary | ICD-10-CM

## 2024-01-02 LAB — INR BLD: 1.9 (ref 0.9–1.1)

## 2024-01-02 PROCEDURE — 36416 COLLJ CAPILLARY BLOOD SPEC: CPT

## 2024-01-02 PROCEDURE — 85610 PROTHROMBIN TIME: CPT

## 2024-01-02 NOTE — PROGRESS NOTES
ANTICOAGULATION MANAGEMENT     Leonor Mercado 89 year old female is on warfarin with subtherapeutic INR result. (Goal INR 2.0-3.0)    Recent labs: (last 7 days)     01/02/24  1336   INR 1.9*       ASSESSMENT     Source(s): Chart Review and Patient/Caregiver Call     Warfarin doses taken: Missed dose(s) may be affecting INR; however, that occurred 9 days ago.  Diet: No new diet changes identified  Medication/supplement changes: None noted  New illness, injury, or hospitalization: No  Signs or symptoms of bleeding or clotting: No  Previous result: Therapeutic last visit; previously outside of goal range  Additional findings: Alfredo got both covid and flu vaccines today       PLAN     Recommended plan for temporary change(s) affecting INR     Dosing Instructions: Continue your current warfarin dose with next INR in 2 weeks       Summary  As of 1/2/2024      Full warfarin instructions:  1 mg every Mon, Thu; 2 mg all other days   Next INR check:  1/16/2024               Telephone call with daughter Cleo who verbalizes understanding and agrees to plan    Lab visit scheduled    Education provided:   Contact [phone]  with any changes, questions or concerns.     Plan made per [facility] anticoagulation protocol    Mariola Valenzuela, RN  Anticoagulation Clinic  1/2/2024    _______________________________________________________________________     Anticoagulation Episode Summary       Current INR goal:  2.0-3.0   TTR:  63.5% (1 y)   Target end date:  Indefinite   Send INR reminders to:  [facility]    Indications    Chronic atrial fibrillation (H) [I48.20]  Long term current use of anticoagulants with INR goal of 2.0-3.0 [Z79.01]             Comments:               Anticoagulation Care Providers       Provider Role Specialty Phone number    Arnel Garcia MD Referring Internal Medicine - Pediatrics [phone]

## 2024-01-23 ENCOUNTER — ANTICOAGULATION THERAPY VISIT (OUTPATIENT)
Dept: ANTICOAGULATION | Facility: CLINIC | Age: 89
End: 2024-01-23

## 2024-01-23 ENCOUNTER — LAB (OUTPATIENT)
Dept: LAB | Facility: CLINIC | Age: 89
End: 2024-01-23
Payer: MEDICARE

## 2024-01-23 DIAGNOSIS — I48.20 CHRONIC ATRIAL FIBRILLATION (H): Primary | ICD-10-CM

## 2024-01-23 DIAGNOSIS — Z79.01 LONG TERM CURRENT USE OF ANTICOAGULANTS WITH INR GOAL OF 2.0-3.0: ICD-10-CM

## 2024-01-23 DIAGNOSIS — I48.20 CHRONIC ATRIAL FIBRILLATION (H): ICD-10-CM

## 2024-01-23 LAB — INR BLD: 2.7 (ref 0.9–1.1)

## 2024-01-23 PROCEDURE — 85610 PROTHROMBIN TIME: CPT

## 2024-01-23 PROCEDURE — 36416 COLLJ CAPILLARY BLOOD SPEC: CPT

## 2024-01-23 NOTE — PROGRESS NOTES
ANTICOAGULATION MANAGEMENT     Leonor Mercado 89 year old female is on warfarin with therapeutic INR result. (Goal INR 2.0-3.0)    Recent labs: (last 7 days)     01/23/24  1344   INR 2.7*       ASSESSMENT     Source(s): Chart Review and Patient/Caregiver Call     Warfarin doses taken: Warfarin taken as instructed  Diet: No new diet changes identified  Medication/supplement changes: None noted  New illness, injury, or hospitalization: No  Signs or symptoms of bleeding or clotting: No  Previous result: Subtherapeutic  Additional findings: None       PLAN     Recommended plan for no diet, medication or health factor changes affecting INR     Dosing Instructions: Continue your current warfarin dose with next INR in 3 weeks       Summary  As of 1/23/2024      Full warfarin instructions:  1 mg every Mon, Thu; 2 mg all other days   Next INR check:  2/13/2024               Telephone call with daughter Cleo who verbalizes understanding and agrees to plan    Lab visit scheduled    Education provided:   Contact [phone]  with any changes, questions or concerns.     Plan made per [facility] anticoagulation protocol    Joellen Lynch RN  Anticoagulation Clinic  1/23/2024    _______________________________________________________________________     Anticoagulation Episode Summary       Current INR goal:  2.0-3.0   TTR:  67.1% (1 y)   Target end date:  Indefinite   Send INR reminders to:  [facility]    Indications    Chronic atrial fibrillation (H) [I48.20]  Long term current use of anticoagulants with INR goal of 2.0-3.0 [Z79.01]             Comments:               Anticoagulation Care Providers       Provider Role Specialty Phone number    Arnel Garcia MD Referring Internal Medicine - Pediatrics [phone]

## 2024-02-08 ENCOUNTER — TELEPHONE (OUTPATIENT)
Dept: PEDIATRICS | Facility: CLINIC | Age: 89
End: 2024-02-08
Payer: MEDICARE

## 2024-02-08 NOTE — TELEPHONE ENCOUNTER
Called to clarify message below. Daughter, Cleo, is asking if ok for patient to take 500mg tylenol in the AM and PM for her ongoing hip pain. Daughter notes patient has had hip pain in the winter in the past, seems to be worse as she's getting older. Daughter is feeling like any way to reduce her daily pain is her goal as patient is 89 years old.       Kris ALCAZAR RN 2/8/2024 at 4:35 PM

## 2024-02-08 NOTE — TELEPHONE ENCOUNTER
New Medication Request    Contacts         Type Contact Phone/Fax    02/08/2024 01:58 PM CST Phone (Incoming) Cleo Crockett (Emergency Contact) [phone]            What medication are you requesting?: Tylenol 500mg, AM and PM(may have been 250MG before)    Reason for medication request: Lots of hip pain     Have you taken this medication before?: Yes: OTC med bought and taken as PRN    Controlled Substance Agreement on file:   CSA -- Patient Level:    CSA: None found at the patient level.         Patient offered an appointment? No    Preferred Pharmacy:   Is willing to purchase as OTC, is just looking for clarification that this is okay to do. Please call back with a response      Okay to leave a detailed message?: Yes at Other phone number:  [phone]

## 2024-02-12 NOTE — TELEPHONE ENCOUNTER
RN called and spoke with Cleo. Relayed provider message. Patient's daughter was given an opportunity to ask questions, verbalized understanding of plan, and is agreeable.    aMri PICKETT RN on 2/12/2024 at 12:33 PM

## 2024-02-13 ENCOUNTER — LAB (OUTPATIENT)
Dept: LAB | Facility: CLINIC | Age: 89
End: 2024-02-13
Payer: MEDICARE

## 2024-02-13 ENCOUNTER — ANTICOAGULATION THERAPY VISIT (OUTPATIENT)
Dept: ANTICOAGULATION | Facility: CLINIC | Age: 89
End: 2024-02-13

## 2024-02-13 DIAGNOSIS — Z79.01 LONG TERM CURRENT USE OF ANTICOAGULANTS WITH INR GOAL OF 2.0-3.0: ICD-10-CM

## 2024-02-13 DIAGNOSIS — I48.20 CHRONIC ATRIAL FIBRILLATION (H): Primary | ICD-10-CM

## 2024-02-13 DIAGNOSIS — I48.20 CHRONIC ATRIAL FIBRILLATION (H): ICD-10-CM

## 2024-02-13 LAB — INR BLD: 2.1 (ref 0.9–1.1)

## 2024-02-13 PROCEDURE — 36416 COLLJ CAPILLARY BLOOD SPEC: CPT

## 2024-02-13 PROCEDURE — 85610 PROTHROMBIN TIME: CPT

## 2024-02-13 NOTE — PROGRESS NOTES
ANTICOAGULATION MANAGEMENT     Leonor Mercado 89 year old female is on warfarin with therapeutic INR result. (Goal INR 2.0-3.0)    Recent labs: (last 7 days)     02/13/24  1414   INR 2.1*       ASSESSMENT     Source(s): Chart Review and Patient/Caregiver Call     Warfarin doses taken: Warfarin taken as instructed  Diet: No new diet changes identified  Medication/supplement changes: None noted  New illness, injury, or hospitalization: No  Signs or symptoms of bleeding or clotting: No  Previous result: Therapeutic last visit; previously outside of goal range  Additional findings: None       PLAN     Recommended plan for no diet, medication or health factor changes affecting INR     Dosing Instructions: Continue your current warfarin dose with next INR in 4 weeks       Summary  As of 2/13/2024      Full warfarin instructions:  1 mg every Mon, Thu; 2 mg all other days   Next INR check:  3/12/2024               Telephone call with Alfredo who verbalizes understanding and agrees to plan    Lab visit scheduled    Education provided:   None required    Plan made per [facility] anticoagulation protocol    Mariola Valenzuela RN  Anticoagulation Clinic  2/13/2024    _______________________________________________________________________     Anticoagulation Episode Summary       Current INR goal:  2.0-3.0   TTR:  69.6% (1 y)   Target end date:  Indefinite   Send INR reminders to:  [facility]    Indications    Chronic atrial fibrillation (H) [I48.20]  Long term current use of anticoagulants with INR goal of 2.0-3.0 [Z79.01]             Comments:               Anticoagulation Care Providers       Provider Role Specialty Phone number    Arnel Garcia MD Referring Internal Medicine - Pediatrics [phone]

## 2024-03-12 ENCOUNTER — ANTICOAGULATION THERAPY VISIT (OUTPATIENT)
Dept: ANTICOAGULATION | Facility: CLINIC | Age: 89
End: 2024-03-12

## 2024-03-12 ENCOUNTER — LAB (OUTPATIENT)
Dept: LAB | Facility: CLINIC | Age: 89
End: 2024-03-12
Payer: MEDICARE

## 2024-03-12 DIAGNOSIS — Z79.01 LONG TERM CURRENT USE OF ANTICOAGULANTS WITH INR GOAL OF 2.0-3.0: ICD-10-CM

## 2024-03-12 DIAGNOSIS — I48.20 CHRONIC ATRIAL FIBRILLATION (H): ICD-10-CM

## 2024-03-12 DIAGNOSIS — I48.20 CHRONIC ATRIAL FIBRILLATION (H): Primary | ICD-10-CM

## 2024-03-12 LAB — INR BLD: 1 (ref 0.9–1.1)

## 2024-03-12 PROCEDURE — 36416 COLLJ CAPILLARY BLOOD SPEC: CPT

## 2024-03-12 PROCEDURE — 85610 PROTHROMBIN TIME: CPT

## 2024-03-12 NOTE — PROGRESS NOTES
ANTICOAGULATION MANAGEMENT     Leonor Mercado 89 year old female is on warfarin with subtherapeutic INR result. (Goal INR 2.0-3.0)    Recent labs: (last 7 days)     03/12/24  1335   INR 1.0       ASSESSMENT     Source(s): Chart Review and Patient/Caregiver Call     Warfarin doses taken: Missed dose(s) may be affecting INR  Diet: No new diet changes identified  Medication/supplement changes: None noted  New illness, injury, or hospitalization: No  Signs or symptoms of bleeding or clotting: No  Previous result: Therapeutic last 2(+) visits  Additional findings: None       PLAN     Recommended plan for temporary change(s) affecting INR     Dosing Instructions: booster dose then continue your current warfarin dose with next INR in 5-7 days,Cleo elected to schedule in 7 days       Summary  As of 3/12/2024      Full warfarin instructions:  3/12: 4 mg; Otherwise 1 mg every Mon, Thu; 2 mg all other days   Next INR check:  3/19/2024               Telephone call with Cleo, patient's daughter, who verbalizes understanding and agrees to plan    Lab visit scheduled    Education provided:   Please call back if any changes to your diet, medications or how you've been taking warfarin  Contact [phone]  with any changes, questions or concerns.     Plan made per [facility] anticoagulation protocol    Dilma Rider RN  Anticoagulation Clinic  3/12/2024    _______________________________________________________________________     Anticoagulation Episode Summary       Current INR goal:  2.0-3.0   TTR:  62.6% (1 y)   Target end date:  Indefinite   Send INR reminders to:  [facility]    Indications    Chronic atrial fibrillation (H) [I48.20]  Long term current use of anticoagulants with INR goal of 2.0-3.0 [Z79.01]             Comments:               Anticoagulation Care Providers       Provider Role Specialty Phone number    Arnel Garcia MD Referring Internal Medicine - Pediatrics [phone]

## 2024-03-19 ENCOUNTER — ANTICOAGULATION THERAPY VISIT (OUTPATIENT)
Dept: ANTICOAGULATION | Facility: CLINIC | Age: 89
End: 2024-03-19

## 2024-03-19 ENCOUNTER — LAB (OUTPATIENT)
Dept: LAB | Facility: CLINIC | Age: 89
End: 2024-03-19
Payer: MEDICARE

## 2024-03-19 DIAGNOSIS — Z79.01 LONG TERM CURRENT USE OF ANTICOAGULANTS WITH INR GOAL OF 2.0-3.0: ICD-10-CM

## 2024-03-19 DIAGNOSIS — I48.20 CHRONIC ATRIAL FIBRILLATION (H): ICD-10-CM

## 2024-03-19 DIAGNOSIS — I48.20 CHRONIC ATRIAL FIBRILLATION (H): Primary | ICD-10-CM

## 2024-03-19 LAB — INR BLD: 2.3 (ref 0.9–1.1)

## 2024-03-19 PROCEDURE — 36416 COLLJ CAPILLARY BLOOD SPEC: CPT

## 2024-03-19 PROCEDURE — 85610 PROTHROMBIN TIME: CPT

## 2024-03-19 NOTE — PROGRESS NOTES
ANTICOAGULATION MANAGEMENT     Leonor Mercado 89 year old female is on warfarin with therapeutic INR result. (Goal INR 2.0-3.0)    Recent labs: (last 7 days)     03/19/24  1359   INR 2.3*       ASSESSMENT     Source(s): Chart Review and Patient/Caregiver Call     Warfarin doses taken: Booster dose(s) recently taken which may be affecting INR  Diet: No new diet changes identified  Medication/supplement changes: None noted  New illness, injury, or hospitalization: No  Signs or symptoms of bleeding or clotting: No  Previous result: Subtherapeutic  Additional findings: None       PLAN     Recommended plan for temporary change(s) affecting INR     Dosing Instructions: Continue your current warfarin dose with next INR in 2 weeks       Summary  As of 3/19/2024      Full warfarin instructions:  1 mg every Mon, Thu; 2 mg all other days   Next INR check:  4/2/2024               Telephone call with Cleo, patient's daughter who verbalizes understanding and agrees to plan    Lab visit scheduled    Education provided:   Please call back if any changes to your diet, medications or how you've been taking warfarin  Contact [phone]  with any changes, questions or concerns.     Plan made per [facility] anticoagulation protocol    Dilma Rider RN  Anticoagulation Clinic  3/19/2024    _______________________________________________________________________     Anticoagulation Episode Summary       Current INR goal:  2.0-3.0   TTR:  61.2% (1 y)   Target end date:  Indefinite   Send INR reminders to:  [facility]    Indications    Chronic atrial fibrillation (H) [I48.20]  Long term current use of anticoagulants with INR goal of 2.0-3.0 [Z79.01]             Comments:               Anticoagulation Care Providers       Provider Role Specialty Phone number    Arnel Garcia MD Referring Internal Medicine - Pediatrics [phone]

## 2024-04-09 ENCOUNTER — LAB (OUTPATIENT)
Dept: LAB | Facility: CLINIC | Age: 89
End: 2024-04-09
Payer: MEDICARE

## 2024-04-09 ENCOUNTER — ANTICOAGULATION THERAPY VISIT (OUTPATIENT)
Dept: ANTICOAGULATION | Facility: CLINIC | Age: 89
End: 2024-04-09

## 2024-04-09 DIAGNOSIS — I48.20 CHRONIC ATRIAL FIBRILLATION (H): Primary | ICD-10-CM

## 2024-04-09 DIAGNOSIS — I48.20 CHRONIC ATRIAL FIBRILLATION (H): ICD-10-CM

## 2024-04-09 DIAGNOSIS — Z79.01 LONG TERM CURRENT USE OF ANTICOAGULANTS WITH INR GOAL OF 2.0-3.0: ICD-10-CM

## 2024-04-09 LAB — INR BLD: 1.2 (ref 0.9–1.1)

## 2024-04-09 PROCEDURE — 85610 PROTHROMBIN TIME: CPT

## 2024-04-09 PROCEDURE — 36416 COLLJ CAPILLARY BLOOD SPEC: CPT

## 2024-04-09 NOTE — PROGRESS NOTES
ANTICOAGULATION MANAGEMENT     Leonor Mercado 89 year old female is on warfarin with subtherapeutic INR result. (Goal INR 2.0-3.0)    Recent labs: (last 7 days)     04/09/24  1419   INR 1.2*       ASSESSMENT     Source(s): Chart Review and Patient/Caregiver Call     Warfarin doses taken: Missed dose(s) may be affecting INR  Diet: No new diet changes identified  Medication/supplement changes: None noted  New illness, injury, or hospitalization: No  Signs or symptoms of bleeding or clotting: No  Previous result: Therapeutic last visit; previously outside of goal range  Additional findings: None       PLAN     Recommended plan for no diet, medication or health factor changes affecting INR     Dosing Instructions: booster dose then continue your current warfarin dose with next INR in 1 week       Summary  As of 4/9/2024      Full warfarin instructions:  4/9: 4 mg; Otherwise 1 mg every Mon, Thu; 2 mg all other days   Next INR check:  4/16/2024               Telephone call with Alfredo's daughter, Cleo, who agrees to plan and repeated back plan correctly    Lab visit scheduled    Education provided:   Please call back if any changes to your diet, medications or how you've been taking warfarin    Plan made per [facility]agulation protocol    Yoana Waddell RN  Anticoagulation Clinic  4/9/2024    _______________________________________________________________________     Anticoagulation Episode Summary       Current INR goal:  2.0-3.0   TTR:  57.0% (1 y)   Target end date:  Indefinite   Send INR reminders to:  [facility]    Indications    Chronic atrial fibrillation (H) [I48.20]  Long term current use of anticoagulants with INR goal of 2.0-3.0 [Z79.01]             Comments:               Anticoagulation Care Providers       Provider Role Specialty Phone number    Arnel Garcia MD Referring Internal Medicine - Pediatrics [phone]

## 2024-04-10 ENCOUNTER — ANCILLARY PROCEDURE (OUTPATIENT)
Dept: CARDIOLOGY | Facility: CLINIC | Age: 89
End: 2024-04-10
Attending: INTERNAL MEDICINE
Payer: MEDICARE

## 2024-04-10 DIAGNOSIS — Z95.0 CARDIAC PACEMAKER IN SITU: ICD-10-CM

## 2024-04-10 DIAGNOSIS — I49.5 SICK SINUS SYNDROME (H): ICD-10-CM

## 2024-04-10 PROCEDURE — 93296 REM INTERROG EVL PM/IDS: CPT | Performed by: INTERNAL MEDICINE

## 2024-04-10 PROCEDURE — 93294 REM INTERROG EVL PM/LDLS PM: CPT | Performed by: INTERNAL MEDICINE

## 2024-04-11 LAB
MDC_IDC_MSMT_BATTERY_DTM: NORMAL
MDC_IDC_MSMT_BATTERY_IMPEDANCE: 2223 OHM
MDC_IDC_MSMT_BATTERY_REMAINING_LONGEVITY: 36 MO
MDC_IDC_MSMT_BATTERY_STATUS: NORMAL
MDC_IDC_MSMT_BATTERY_VOLTAGE: 2.75 V
MDC_IDC_MSMT_LEADCHNL_RA_IMPEDANCE_VALUE: 67 OHM
MDC_IDC_MSMT_LEADCHNL_RV_IMPEDANCE_VALUE: 662 OHM
MDC_IDC_PG_IMPLANT_DTM: NORMAL
MDC_IDC_PG_MFG: NORMAL
MDC_IDC_PG_MODEL: NORMAL
MDC_IDC_PG_SERIAL: NORMAL
MDC_IDC_PG_TYPE: NORMAL
MDC_IDC_SESS_CLINIC_NAME: NORMAL
MDC_IDC_SESS_DTM: NORMAL
MDC_IDC_SESS_TYPE: NORMAL
MDC_IDC_SET_BRADY_LOWRATE: 60 {BEATS}/MIN
MDC_IDC_SET_BRADY_MAX_SENSOR_RATE: 120 {BEATS}/MIN
MDC_IDC_SET_BRADY_MAX_TRACKING_RATE: 105 {BEATS}/MIN
MDC_IDC_SET_BRADY_MODE: NORMAL
MDC_IDC_SET_LEADCHNL_RV_PACING_AMPLITUDE: 2.5 V
MDC_IDC_SET_LEADCHNL_RV_PACING_CAPTURE_MODE: NORMAL
MDC_IDC_SET_LEADCHNL_RV_PACING_POLARITY: NORMAL
MDC_IDC_SET_LEADCHNL_RV_PACING_PULSEWIDTH: 0.52 MS
MDC_IDC_SET_LEADCHNL_RV_SENSING_POLARITY: NORMAL
MDC_IDC_SET_LEADCHNL_RV_SENSING_SENSITIVITY: 2.8 MV
MDC_IDC_SET_ZONE_DETECTION_INTERVAL: 333.33 MS
MDC_IDC_SET_ZONE_STATUS: NORMAL
MDC_IDC_SET_ZONE_STATUS: NORMAL
MDC_IDC_SET_ZONE_TYPE: NORMAL
MDC_IDC_SET_ZONE_TYPE: NORMAL
MDC_IDC_SET_ZONE_VENDOR_TYPE: NORMAL
MDC_IDC_SET_ZONE_VENDOR_TYPE: NORMAL
MDC_IDC_STAT_AT_BURDEN_PERCENT: 0 %
MDC_IDC_STAT_AT_DTM_END: NORMAL
MDC_IDC_STAT_AT_DTM_START: NORMAL
MDC_IDC_STAT_BRADY_DTM_END: NORMAL
MDC_IDC_STAT_BRADY_DTM_START: NORMAL
MDC_IDC_STAT_BRADY_RV_PERCENT_PACED: 79 %
MDC_IDC_STAT_EPISODE_RECENT_COUNT: 0
MDC_IDC_STAT_EPISODE_RECENT_COUNT: 0
MDC_IDC_STAT_EPISODE_RECENT_COUNT_DTM_END: NORMAL
MDC_IDC_STAT_EPISODE_RECENT_COUNT_DTM_END: NORMAL
MDC_IDC_STAT_EPISODE_RECENT_COUNT_DTM_START: NORMAL
MDC_IDC_STAT_EPISODE_RECENT_COUNT_DTM_START: NORMAL
MDC_IDC_STAT_EPISODE_TYPE: NORMAL
MDC_IDC_STAT_EPISODE_TYPE: NORMAL

## 2024-04-17 ENCOUNTER — ANTICOAGULATION THERAPY VISIT (OUTPATIENT)
Dept: ANTICOAGULATION | Facility: CLINIC | Age: 89
End: 2024-04-17

## 2024-04-17 ENCOUNTER — LAB (OUTPATIENT)
Dept: LAB | Facility: CLINIC | Age: 89
End: 2024-04-17
Payer: MEDICARE

## 2024-04-17 DIAGNOSIS — I48.20 CHRONIC ATRIAL FIBRILLATION (H): ICD-10-CM

## 2024-04-17 DIAGNOSIS — I48.20 CHRONIC ATRIAL FIBRILLATION (H): Primary | ICD-10-CM

## 2024-04-17 DIAGNOSIS — Z79.01 LONG TERM CURRENT USE OF ANTICOAGULANTS WITH INR GOAL OF 2.0-3.0: ICD-10-CM

## 2024-04-17 LAB — INR BLD: 2.1 (ref 0.9–1.1)

## 2024-04-17 PROCEDURE — 85610 PROTHROMBIN TIME: CPT

## 2024-04-17 PROCEDURE — 36416 COLLJ CAPILLARY BLOOD SPEC: CPT

## 2024-04-17 NOTE — PROGRESS NOTES
ANTICOAGULATION MANAGEMENT     Leonor Mercado 89 year old female is on warfarin with therapeutic INR result. (Goal INR 2.0-3.0)    Recent labs: (last 7 days)     04/17/24  1406   INR 2.1*       ASSESSMENT     Source(s): Chart Review and Patient/Caregiver Call     Warfarin doses taken: Missed dose(s) may be affecting INR  Diet: No new diet changes identified  Medication/supplement changes: None noted  New illness, injury, or hospitalization: No  Signs or symptoms of bleeding or clotting: No  Previous result: Subtherapeutic  Additional findings: Cleo reports she did call Alfredo every evening for about 3 weeks to remind her to take her warfarin; however, Alfredo did not like this so she discontinued, no other pill reminder system in place at this time.       PLAN     Recommended plan for temporary change(s) affecting INR     Dosing Instructions: Continue your current warfarin dose with next INR in 2 weeks       Summary  As of 4/17/2024      Full warfarin instructions:  1 mg every Mon, Thu; 2 mg all other days   Next INR check:  5/1/2024               Telephone call with daughter Cleo who verbalizes understanding and agrees to plan    Lab visit scheduled    Education provided:   Taking warfarin: Importance of taking warfarin as instructed    Plan made per [facility]agulation protocol    Mariola Valenzuela, RN  Anticoagulation Clinic  4/17/2024    _______________________________________________________________________     Anticoagulation Episode Summary       Current INR goal:  2.0-3.0   TTR:  55.0% (1 y)   Target end date:  Indefinite   Send INR reminders to:  [facility]    Indications    Chronic atrial fibrillation (H) [I48.20]  Long term current use of anticoagulants with INR goal of 2.0-3.0 [Z79.01]             Comments:               Anticoagulation Care Providers       Provider Role Specialty Phone number    Arnel Garcia MD Referring Internal Medicine - Pediatrics [phone]

## 2024-05-01 ENCOUNTER — ANTICOAGULATION THERAPY VISIT (OUTPATIENT)
Dept: ANTICOAGULATION | Facility: CLINIC | Age: 89
End: 2024-05-01

## 2024-05-01 ENCOUNTER — LAB (OUTPATIENT)
Dept: LAB | Facility: CLINIC | Age: 89
End: 2024-05-01
Payer: MEDICARE

## 2024-05-01 DIAGNOSIS — I48.20 CHRONIC ATRIAL FIBRILLATION (H): Primary | ICD-10-CM

## 2024-05-01 DIAGNOSIS — Z79.01 LONG TERM CURRENT USE OF ANTICOAGULANTS WITH INR GOAL OF 2.0-3.0: ICD-10-CM

## 2024-05-01 DIAGNOSIS — I48.20 CHRONIC ATRIAL FIBRILLATION (H): ICD-10-CM

## 2024-05-01 LAB — INR BLD: 1.2 (ref 0.9–1.1)

## 2024-05-01 PROCEDURE — 85610 PROTHROMBIN TIME: CPT

## 2024-05-01 PROCEDURE — 36416 COLLJ CAPILLARY BLOOD SPEC: CPT

## 2024-05-01 NOTE — PROGRESS NOTES
ANTICOAGULATION MANAGEMENT     Leonor Mercado 89 year old female is on warfarin with subtherapeutic INR result. (Goal INR 2.0-3.0)    Recent labs: (last 7 days)     05/01/24  1341   INR 1.2*       ASSESSMENT     Source(s): Chart Review and Patient/Caregiver Call     Warfarin doses taken: Missed dose(s) may be affecting INR.  Several missed doses within the last week.    Diet: No new diet changes identified  Medication/supplement changes: None noted  New illness, injury, or hospitalization: No  Signs or symptoms of bleeding or clotting: No  Previous result: Therapeutic last visit; previously outside of goal range  Additional findings: patient has had difficulty remembering to take her evening pills if she doesn't get regular reminders from her daughter. Discussed the option of switching the warfarin dose to the morning to help with compliance since she is more alert in the morning and doesn't tend to miss those doses as often. Patient's daughter plans to discuss this an an option with patient and will let anticoagulation clinic know of decision at next visit.       PLAN     Recommended plan for temporary change(s) affecting INR     Dosing Instructions: booster dose then continue your current warfarin dose with next INR in 1 week       Summary  As of 5/1/2024      Full warfarin instructions:  5/1: 4 mg; Otherwise 1 mg every Mon, Thu; 2 mg all other days   Next INR check:  5/9/2024               Telephone call with Alfredo's daughter, Cleo, who agrees to plan and repeated back plan correctly    Lab visit scheduled    Education provided:   Please call back if any changes to your diet, medications or how you've been taking warfarin    Plan made per [facility] anticoagulation protocol    Yoana Waddell RN  Anticoagulation Clinic  5/1/2024    _______________________________________________________________________     Anticoagulation Episode Summary       Current INR goal:  2.0-3.0   TTR:  52.3% (1 y)   Target end date:   Indefinite   Send INR reminders to:  [facility]    Indications    Chronic atrial fibrillation (H) [I48.20]  Long term current use of anticoagulants with INR goal of 2.0-3.0 [Z79.01]             Comments:               Anticoagulation Care Providers       Provider Role Specialty Phone number    Arnel Garcia MD Referring Internal Medicine - Pediatrics [phone]

## 2024-05-01 NOTE — PROGRESS NOTES
ANTICOAGULATION MANAGEMENT     Leonor Mercado 89 year old female is on warfarin with therapeutic INR result. (Goal INR 2.0-3.0)    Recent labs: (last 7 days)     05/01/24  1341   INR 1.2*       ASSESSMENT     Source(s): Chart Review  Previous INR was Therapeutic last visit; previously outside of goal range  Medication, diet, health changes since last INR chart reviewed; none identified         PLAN     Unable to reach Alfredo's daughter, Cleo, today.    Left message to take a booster dose of warfarin,  4 mg tonight. Request call back for assessment.    Follow up required to confirm warfarin dose taken and assess for changes    Yoana Waddell, DAMIAN  Anticoagulation Clinic  5/1/2024

## 2024-05-09 ENCOUNTER — ANTICOAGULATION THERAPY VISIT (OUTPATIENT)
Dept: ANTICOAGULATION | Facility: CLINIC | Age: 89
End: 2024-05-09

## 2024-05-09 ENCOUNTER — LAB (OUTPATIENT)
Dept: LAB | Facility: CLINIC | Age: 89
End: 2024-05-09
Payer: MEDICARE

## 2024-05-09 DIAGNOSIS — I48.20 CHRONIC ATRIAL FIBRILLATION (H): Primary | ICD-10-CM

## 2024-05-09 DIAGNOSIS — I48.20 CHRONIC ATRIAL FIBRILLATION (H): ICD-10-CM

## 2024-05-09 DIAGNOSIS — Z79.01 LONG TERM CURRENT USE OF ANTICOAGULANTS WITH INR GOAL OF 2.0-3.0: ICD-10-CM

## 2024-05-09 LAB — INR BLD: 3.4 (ref 0.9–1.1)

## 2024-05-09 PROCEDURE — 85610 PROTHROMBIN TIME: CPT

## 2024-05-09 PROCEDURE — 36415 COLL VENOUS BLD VENIPUNCTURE: CPT

## 2024-05-09 NOTE — PROGRESS NOTES
ANTICOAGULATION MANAGEMENT     Leonor Mercado 89 year old female is on warfarin with supratherapeutic INR result. (Goal INR 2.0-3.0)    Recent labs: (last 7 days)     05/09/24  1337   INR 3.4*       ASSESSMENT     Source(s): Chart Review and Patient/Caregiver Call     Warfarin doses taken: Warfarin taken as instructed, however they did switch to morning dosing, as discussed last week and it seems to be working well - no missed doses. Cleo would like to continue on same dose and let Alfredo settle into this before making any changes to the maintenance dose.   Diet: No new diet changes identified  Medication/supplement changes: None noted  New illness, injury, or hospitalization: No  Signs or symptoms of bleeding or clotting: No  Previous result: Subtherapeutic  Additional findings: None       PLAN     Recommended plan for ongoing change(s) affecting INR. [facility] RN advised lower weekly dose, but Cleo would like to wait another 2 weeks.     Dosing Instructions: Alfredo already took today's dose, so for tomorrow, partial hold then continue your current warfarin dose with next INR in 2 weeks       Summary  As of 5/9/2024      Full warfarin instructions:  5/10: 1 mg; Otherwise 1 mg every Mon, Thu; 2 mg all other days   Next INR check:  5/23/2024               Telephone call with daughter, Cleo, who agrees to plan and repeated back plan correctly    Lab visit scheduled    Education provided:   Please call back if any changes to your diet, medications or how you've been taking warfarin  Contact [phone]  with any changes, questions or concerns.     Plan made per [facility] anticoagulation protocol    Ethel Cooley, RN  Anticoagulation Clinic  5/9/2024    _______________________________________________________________________     Anticoagulation Episode Summary       Current INR goal:  2.0-3.0   TTR:  53.3% (1 y)   Target end date:  Indefinite   Send INR reminders to:  [facility]    Indications    Chronic atrial  fibrillation (H) [I48.20]  Long term current use of anticoagulants with INR goal of 2.0-3.0 [Z79.01]             Comments:               Anticoagulation Care Providers       Provider Role Specialty Phone number    Arnel Garcia MD Referring Internal Medicine - Pediatrics [phone]

## 2024-05-20 DIAGNOSIS — I50.32 CHRONIC HEART FAILURE WITH PRESERVED EJECTION FRACTION (H): ICD-10-CM

## 2024-05-20 RX ORDER — FUROSEMIDE 20 MG
TABLET ORAL
Qty: 180 TABLET | Refills: 3 | Status: SHIPPED | OUTPATIENT
Start: 2024-05-20 | End: 2024-08-28

## 2024-05-21 ENCOUNTER — TELEPHONE (OUTPATIENT)
Dept: PEDIATRICS | Facility: CLINIC | Age: 89
End: 2024-05-21
Payer: MEDICARE

## 2024-05-21 NOTE — TELEPHONE ENCOUNTER
Cleo, daughter calling to confirm lab appt.  [facility] on file.  States she has one day written down and Leonor has another.  Confirmed appt of 5/23/24 Thursday at 1:15 pm.  Suri Reid RN

## 2024-05-23 ENCOUNTER — ANTICOAGULATION THERAPY VISIT (OUTPATIENT)
Dept: ANTICOAGULATION | Facility: CLINIC | Age: 89
End: 2024-05-23

## 2024-05-23 ENCOUNTER — LAB (OUTPATIENT)
Dept: LAB | Facility: CLINIC | Age: 89
End: 2024-05-23
Payer: MEDICARE

## 2024-05-23 DIAGNOSIS — Z79.01 LONG TERM CURRENT USE OF ANTICOAGULANTS WITH INR GOAL OF 2.0-3.0: ICD-10-CM

## 2024-05-23 DIAGNOSIS — I48.20 CHRONIC ATRIAL FIBRILLATION (H): Primary | ICD-10-CM

## 2024-05-23 DIAGNOSIS — I48.20 CHRONIC ATRIAL FIBRILLATION (H): ICD-10-CM

## 2024-05-23 LAB — INR BLD: 4.2 (ref 0.9–1.1)

## 2024-05-23 PROCEDURE — 36416 COLLJ CAPILLARY BLOOD SPEC: CPT

## 2024-05-23 PROCEDURE — 85610 PROTHROMBIN TIME: CPT

## 2024-05-23 NOTE — PROGRESS NOTES
ANTICOAGULATION MANAGEMENT     Leonor Mercado 89 year old female is on warfarin with supratherapeutic INR result. (Goal INR 2.0-3.0)    Recent labs: (last 7 days)     05/23/24  1315   INR 4.2*       ASSESSMENT     Source(s): Chart Review and Patient/Caregiver Call     Warfarin doses taken: Warfarin taken as instructed, continues to take in morning now, was missing a lot of doses when taking at night.  Diet: No new diet changes identified  Medication/supplement changes: None noted  New illness, injury, or hospitalization: No  Signs or symptoms of bleeding or clotting: No  Previous result: Supratherapeutic  Additional findings: None       PLAN     Recommended plan for ongoing supratherapeutic INRs no diet, medication or health factor changes affecting INR     Dosing Instructions: hold dose then decrease your warfarin dose (8.3% change) with next INR in 6 days. If INR still elevated this day will be a good day to reduce, so only fill pill box through Tuesday, so warfarin can be dosed that day after speaking with [facility] RN. Cleo stated understanding and is in agreement with plan.         Summary  As of 5/23/2024      Full warfarin instructions:  5/24: Hold; Otherwise 1 mg every Mon, Thu, Sat; 2 mg all other days   Next INR check:  5/29/2024               Telephone call with daughter, Cleo, who agrees to plan and repeated back plan correctly    Lab visit scheduled    Education provided:   Please call back if any changes to your diet, medications or how you've been taking warfarin  Symptom monitoring: monitoring for bleeding signs and symptoms and when to seek medical attention/emergency care  Contact [phone]  with any changes, questions or concerns.     Plan made per [facility] anticoagulation protocol    Ethel Cooley, RN  Anticoagulation Clinic  5/23/2024    _______________________________________________________________________     Anticoagulation Episode Summary       Current INR goal:  2.0-3.0   TTR:  50.6% (1 y)    Target end date:  Indefinite   Send INR reminders to:  [facility]    Indications    Chronic atrial fibrillation (H) [I48.20]  Long term current use of anticoagulants with INR goal of 2.0-3.0 [Z79.01]             Comments:               Anticoagulation Care Providers       Provider Role Specialty Phone number    Arnel Garcia MD Referring Internal Medicine - Pediatrics [phone]

## 2024-05-29 ENCOUNTER — LAB (OUTPATIENT)
Dept: LAB | Facility: CLINIC | Age: 89
End: 2024-05-29
Payer: MEDICARE

## 2024-05-29 ENCOUNTER — ANTICOAGULATION THERAPY VISIT (OUTPATIENT)
Dept: ANTICOAGULATION | Facility: CLINIC | Age: 89
End: 2024-05-29

## 2024-05-29 DIAGNOSIS — Z79.01 LONG TERM CURRENT USE OF ANTICOAGULANTS WITH INR GOAL OF 2.0-3.0: ICD-10-CM

## 2024-05-29 DIAGNOSIS — I48.20 CHRONIC ATRIAL FIBRILLATION (H): ICD-10-CM

## 2024-05-29 DIAGNOSIS — I48.20 CHRONIC ATRIAL FIBRILLATION (H): Primary | ICD-10-CM

## 2024-05-29 LAB — INR BLD: 1.8 (ref 0.9–1.1)

## 2024-05-29 PROCEDURE — 36416 COLLJ CAPILLARY BLOOD SPEC: CPT

## 2024-05-29 PROCEDURE — 85610 PROTHROMBIN TIME: CPT

## 2024-05-29 NOTE — PROGRESS NOTES
ANTICOAGULATION MANAGEMENT     Leonor Mercado 89 year old female is on warfarin with subtherapeutic INR result. (Goal INR 2.0-3.0)    Recent labs: (last 7 days)     05/29/24  1500   INR 1.8*         ASSESSMENT     Source(s): Chart Review and Patient/Caregiver Call     Warfarin doses taken: Held for 1 dose  recently which may be affecting INR  Diet: No new diet changes identified  Medication/supplement changes: None noted  New illness, injury, or hospitalization: No  Signs or symptoms of bleeding or clotting: No  Previous result: Supratherapeutic  Additional findings: Warfarin maintenance dose was decreased 8% at last visit       PLAN     Recommended plan for temporary change(s) affecting INR     Dosing Instructions: Continue your current warfarin dose with next INR in 9 days       Summary  As of 5/29/2024      Full warfarin instructions:  1 mg every Mon, Thu, Sat; 2 mg all other days   Next INR check:  6/7/2024               Telephone call with daughter Cleo who verbalizes understanding and agrees to plan and who agrees to plan and repeated back plan correctly    Lab visit scheduled    Education provided:   None required    Plan made per [facility] anticoagulation protocol    Mariola Valenzuela, RN  Anticoagulation Clinic  5/29/2024    _______________________________________________________________________     Anticoagulation Episode Summary       Current INR goal:  2.0-3.0   TTR:  50.6% (1 y)   Target end date:  Indefinite   Send INR reminders to:  [facility]    Indications    Chronic atrial fibrillation (H) [I48.20]  Long term current use of anticoagulants with INR goal of 2.0-3.0 [Z79.01]             Comments:               Anticoagulation Care Providers       Provider Role Specialty Phone number    Arnel Garcia MD Referring Internal Medicine - Pediatrics [phone]

## 2024-06-05 DIAGNOSIS — I10 ESSENTIAL HYPERTENSION WITH GOAL BLOOD PRESSURE LESS THAN 140/90: ICD-10-CM

## 2024-06-05 RX ORDER — LOSARTAN POTASSIUM 25 MG/1
TABLET ORAL
Qty: 90 TABLET | Refills: 3 | Status: SHIPPED | OUTPATIENT
Start: 2024-06-05 | End: 2024-08-28

## 2024-06-07 ENCOUNTER — ANTICOAGULATION THERAPY VISIT (OUTPATIENT)
Dept: ANTICOAGULATION | Facility: CLINIC | Age: 89
End: 2024-06-07

## 2024-06-07 ENCOUNTER — LAB (OUTPATIENT)
Dept: LAB | Facility: CLINIC | Age: 89
End: 2024-06-07
Payer: MEDICARE

## 2024-06-07 DIAGNOSIS — I48.20 CHRONIC ATRIAL FIBRILLATION (H): Primary | ICD-10-CM

## 2024-06-07 DIAGNOSIS — I48.20 CHRONIC ATRIAL FIBRILLATION (H): ICD-10-CM

## 2024-06-07 DIAGNOSIS — Z79.01 LONG TERM CURRENT USE OF ANTICOAGULANTS WITH INR GOAL OF 2.0-3.0: ICD-10-CM

## 2024-06-07 LAB — INR BLD: 1.7 (ref 0.9–1.1)

## 2024-06-07 PROCEDURE — 36416 COLLJ CAPILLARY BLOOD SPEC: CPT

## 2024-06-07 PROCEDURE — 85610 PROTHROMBIN TIME: CPT

## 2024-06-07 NOTE — PROGRESS NOTES
ANTICOAGULATION MANAGEMENT     Leonor Mercado 89 year old female is on warfarin with subtherapeutic INR result. (Goal INR 2.0-3.0)    Recent labs: (last 7 days)     06/07/24  1406   INR 1.7*       ASSESSMENT     Source(s): Chart Review and Patient/Caregiver Call     Warfarin doses taken: Warfarin taken as instructed  Diet: No new diet changes identified  Medication/supplement changes: None noted  New illness, injury, or hospitalization: No  Signs or symptoms of bleeding or clotting: No  Previous result: Subtherapeutic  Additional findings: With today's smallest possible dosing increase, returning Alfredo to a prior MD that produced elevated INR recently (12 mg/week). Cleo was open to return to this dosing and OK with changing to 1 mg tablet if unable to achieve therapeutic INR with this dosing adjustment.        PLAN     Recommended plan for no diet, medication or health factor changes affecting INR     Dosing Instructions: Increase your warfarin dose (9.1% change) with next INR in 7-10 days, declined and elected for 11 days as Tuesdays work best for Alfredo       Summary  As of 6/7/2024      Full warfarin instructions:  1 mg every Mon, Thu; 2 mg all other days   Next INR check:  6/18/2024               Telephone call with Cleo (daughter) who verbalizes understanding and agrees to plan    Lab visit scheduled    Education provided:   Please call back if any changes to your diet, medications or how you've been taking warfarin    Plan made per [facility] anticoagulation protocol    Francesca Rider RN  Anticoagulation Clinic  6/7/2024    _______________________________________________________________________     Anticoagulation Episode Summary       Current INR goal:  2.0-3.0   TTR:  48.1% (1 y)   Target end date:  Indefinite   Send INR reminders to:  [facility]    Indications    Chronic atrial fibrillation (H) [I48.20]  Long term current use of anticoagulants with INR goal of 2.0-3.0 [Z79.01]              Comments:               Anticoagulation Care Providers       Provider Role Specialty Phone number    Arnel Garcia MD Referring Internal Medicine - Pediatrics [phone]

## 2024-06-18 ENCOUNTER — LAB (OUTPATIENT)
Dept: LAB | Facility: CLINIC | Age: 89
End: 2024-06-18
Payer: MEDICARE

## 2024-06-18 ENCOUNTER — ANTICOAGULATION THERAPY VISIT (OUTPATIENT)
Dept: ANTICOAGULATION | Facility: CLINIC | Age: 89
End: 2024-06-18

## 2024-06-18 DIAGNOSIS — I48.20 CHRONIC ATRIAL FIBRILLATION (H): ICD-10-CM

## 2024-06-18 DIAGNOSIS — I48.20 CHRONIC ATRIAL FIBRILLATION (H): Primary | ICD-10-CM

## 2024-06-18 DIAGNOSIS — Z79.01 LONG TERM CURRENT USE OF ANTICOAGULANTS WITH INR GOAL OF 2.0-3.0: ICD-10-CM

## 2024-06-18 LAB — INR BLD: 1.3 (ref 0.9–1.1)

## 2024-06-18 PROCEDURE — 85610 PROTHROMBIN TIME: CPT

## 2024-06-18 PROCEDURE — 36415 COLL VENOUS BLD VENIPUNCTURE: CPT

## 2024-06-18 NOTE — PROGRESS NOTES
ANTICOAGULATION MANAGEMENT     Leonor Mercado 89 year old female is on warfarin with subtherapeutic INR result. (Goal INR 2.0-3.0)    Recent labs: (last 7 days)     06/18/24  1425   INR 1.3*       ASSESSMENT     Source(s): Chart Review and Patient/Caregiver Call     Warfarin doses taken: Less warfarin taken than planned which may be affecting INR.  Missed dose on Sunday this week (2 mg).  Also did not incrase dose on Saturdays as previously instructed.  Diet: No new diet changes identified  Medication/supplement changes: None noted  New illness, injury, or hospitalization: No  Signs or symptoms of bleeding or clotting: No  Previous result: Subtherapeutic  Additional findings: None       PLAN     Recommended plan for temporary change(s) affecting INR     Dosing Instructions: booster dose then Increase your warfarin dose (8% change) with next INR in 1 week       Summary  As of 6/18/2024      Full warfarin instructions:  6/18: 4 mg; Otherwise 1 mg every Mon, Sat; 2 mg all other days   Next INR check:  6/25/2024               Telephone call with Alfredo's daughter, Cleo, who agrees to plan and repeated back plan correctly    Lab visit scheduled    Education provided:   Please call back if any changes to your diet, medications or how you've been taking warfarin    Plan made per [facility] anticoagulation protocol    Yoana Waddell RN  Anticoagulation Clinic  6/18/2024    _______________________________________________________________________     Anticoagulation Episode Summary       Current INR goal:  2.0-3.0   TTR:  45.1% (1 y)   Target end date:  Indefinite   Send INR reminders to:  [facility]    Indications    Chronic atrial fibrillation (H) [I48.20]  Long term current use of anticoagulants with INR goal of 2.0-3.0 [Z79.01]             Comments:               Anticoagulation Care Providers       Provider Role Specialty Phone number    Arnel Garcia MD Referring Internal Medicine - Pediatrics [phone]

## 2024-06-25 ENCOUNTER — ANTICOAGULATION THERAPY VISIT (OUTPATIENT)
Dept: ANTICOAGULATION | Facility: CLINIC | Age: 89
End: 2024-06-25

## 2024-06-25 ENCOUNTER — LAB (OUTPATIENT)
Dept: LAB | Facility: CLINIC | Age: 89
End: 2024-06-25
Payer: MEDICARE

## 2024-06-25 DIAGNOSIS — Z79.01 LONG TERM CURRENT USE OF ANTICOAGULANTS WITH INR GOAL OF 2.0-3.0: ICD-10-CM

## 2024-06-25 DIAGNOSIS — I48.20 CHRONIC ATRIAL FIBRILLATION (H): Primary | ICD-10-CM

## 2024-06-25 DIAGNOSIS — I48.20 CHRONIC ATRIAL FIBRILLATION (H): ICD-10-CM

## 2024-06-25 LAB — INR BLD: 2 (ref 0.9–1.1)

## 2024-06-25 PROCEDURE — 85610 PROTHROMBIN TIME: CPT

## 2024-06-25 PROCEDURE — 36416 COLLJ CAPILLARY BLOOD SPEC: CPT

## 2024-06-25 NOTE — PROGRESS NOTES
ANTICOAGULATION MANAGEMENT     Leonor Mercado 89 year old female is on warfarin with therapeutic INR result. (Goal INR 2.0-3.0)    Recent labs: (last 7 days)     06/25/24  1333   INR 2.0*       ASSESSMENT     Source(s): Chart Review and Patient/Caregiver Call     Warfarin doses taken: Warfarin taken as instructed confirmed  Diet: No new diet changes identified  Medication/supplement changes: None noted  New illness, injury, or hospitalization: No  Signs or symptoms of bleeding or clotting: No  Previous result: Subtherapeutic  Additional findings: None       PLAN     Recommended plan for no diet, medication or health factor changes affecting INR     Dosing Instructions: Continue your current warfarin dose with next INR in 2 weeks       Summary  As of 6/25/2024      Full warfarin instructions:  1 mg every Mon, Sat; 2 mg all other days   Next INR check:  7/9/2024               Telephone call with Cleo who verbalizes understanding and agrees to plan    Lab visit scheduled    Education provided:   Please call back if any changes to your diet, medications or how you've been taking warfarin    Plan made per [facility] anticoagulation protocol    Mago Gutierrez RN  Anticoagulation Clinic  6/25/2024    _______________________________________________________________________     Anticoagulation Episode Summary       Current INR goal:  2.0-3.0   TTR:  43.2% (1 y)   Target end date:  Indefinite   Send INR reminders to:  [facility]    Indications    Chronic atrial fibrillation (H) [I48.20]  Long term current use of anticoagulants with INR goal of 2.0-3.0 [Z79.01]             Comments:               Anticoagulation Care Providers       Provider Role Specialty Phone number    Arnel Garcia MD Referring Internal Medicine - Pediatrics [phone]

## 2024-07-11 ENCOUNTER — LAB (OUTPATIENT)
Dept: LAB | Facility: CLINIC | Age: 89
End: 2024-07-11
Payer: MEDICARE

## 2024-07-11 ENCOUNTER — ANTICOAGULATION THERAPY VISIT (OUTPATIENT)
Dept: ANTICOAGULATION | Facility: CLINIC | Age: 89
End: 2024-07-11

## 2024-07-11 DIAGNOSIS — I48.20 CHRONIC ATRIAL FIBRILLATION (H): ICD-10-CM

## 2024-07-11 LAB — INR BLD: 1.2 (ref 0.9–1.1)

## 2024-07-11 PROCEDURE — 85610 PROTHROMBIN TIME: CPT

## 2024-07-11 PROCEDURE — 36415 COLL VENOUS BLD VENIPUNCTURE: CPT

## 2024-07-11 NOTE — PROGRESS NOTES
ANTICOAGULATION MANAGEMENT     Leonor Mercado 89 year old female is on warfarin with subtherapeutic INR result. (Goal INR 2.0-3.0)    Recent labs: (last 7 days)     07/11/24  1443   INR 1.2*       ASSESSMENT     Source(s): Chart Review and Patient/Caregiver Call     Warfarin doses taken: Missed dose(s) may be affecting INR  Diet: No new diet changes identified  Medication/supplement changes: None noted  New illness, injury, or hospitalization: No  Signs or symptoms of bleeding or clotting: No  Previous result: Therapeutic last visit; previously outside of goal range  Additional findings: Spoke with daughter Cleo. She reports that she had a migraine and was not able to help her mom for a couple days so pt missed three days of warfarin. MD was increased on 6/18/24 and INR was in range on 6/25/24.       PLAN     Recommended plan for ongoing change(s) affecting INR     Dosing Instructions: booster dose then continue your current warfarin dose with next INR in 1 week       Summary  As of 7/11/2024      Full warfarin instructions:  7/11: 4 mg; Otherwise 1 mg every Mon, Sat; 2 mg all other days   Next INR check:  7/18/2024               Telephone call with Cleo (daughter) who verbalizes understanding and agrees to plan    Lab visit scheduled    Education provided: Contact [phone] with any changes, questions or concerns.     Plan made per [facility] anticoagulation protocol    Yun Landis RN  Anticoagulation Clinic  7/11/2024    _______________________________________________________________________     Anticoagulation Episode Summary       Current INR goal:  2.0-3.0   TTR:  38.8% (1 y)   Target end date:  Indefinite   Send INR reminders to:  [facility]    Indications    Chronic atrial fibrillation (H) [I48.20]  Long term current use of anticoagulants with INR goal of 2.0-3.0 [Z79.01]             Comments:               Anticoagulation Care Providers       Provider Role Specialty Phone number    Arnel Garcia  MD Tj Referring Internal Medicine - Pediatrics [phone]

## 2024-07-23 ENCOUNTER — ANTICOAGULATION THERAPY VISIT (OUTPATIENT)
Dept: ANTICOAGULATION | Facility: CLINIC | Age: 89
End: 2024-07-23

## 2024-07-23 ENCOUNTER — LAB (OUTPATIENT)
Dept: LAB | Facility: CLINIC | Age: 89
End: 2024-07-23
Payer: MEDICARE

## 2024-07-23 DIAGNOSIS — I48.20 CHRONIC ATRIAL FIBRILLATION (H): ICD-10-CM

## 2024-07-23 DIAGNOSIS — Z79.01 LONG TERM CURRENT USE OF ANTICOAGULANTS WITH INR GOAL OF 2.0-3.0: ICD-10-CM

## 2024-07-23 DIAGNOSIS — I48.20 CHRONIC ATRIAL FIBRILLATION (H): Primary | ICD-10-CM

## 2024-07-23 LAB — INR BLD: 3 (ref 0.9–1.1)

## 2024-07-23 PROCEDURE — 36416 COLLJ CAPILLARY BLOOD SPEC: CPT

## 2024-07-23 PROCEDURE — 85610 PROTHROMBIN TIME: CPT

## 2024-07-23 NOTE — PROGRESS NOTES
ANTICOAGULATION MANAGEMENT     Leonor Mercado 89 year old female is on warfarin with therapeutic INR result. (Goal INR 2.0-3.0)    Recent labs: (last 7 days)     07/23/24  1322   INR 3.0*       ASSESSMENT     Source(s): Chart Review and Patient/Caregiver Call     Warfarin doses taken: Warfarin taken as instructed  Diet: No new diet changes identified, Cleo is going to try to make sure patient has green veggies 1-2 times a week  Medication/supplement changes: None noted  New illness, injury, or hospitalization: No, however, Cleo reports patient has increased hip pain  Signs or symptoms of bleeding or clotting: No  Previous result: Subtherapeutic  Additional findings: None       PLAN     Recommended plan for no diet, medication or health factor changes affecting INR     Dosing Instructions: Continue your current warfarin dose with next INR in 2 weeks       Summary  As of 7/23/2024      Full warfarin instructions:  1 mg every Mon, Sat; 2 mg all other days   Next INR check:  8/6/2024               Telephone call with Cleo, patient's daughter, who verbalizes understanding and agrees to plan    Lab visit scheduled    Education provided: Please call back if any changes to your diet, medications or how you've been taking warfarin  Dietary considerations: importance of consistent vitamin K intake  Contact [phone] with any changes, questions or concerns.     Plan made per [facility] anticoagulation protocol    Dilma Rider RN  Anticoagulation Clinic  7/23/2024    _______________________________________________________________________     Anticoagulation Episode Summary       Current INR goal:  2.0-3.0   TTR:  39.8% (1 y)   Target end date:  Indefinite   Send INR reminders to:  [facility]    Indications    Chronic atrial fibrillation (H) [I48.20]  Long term current use of anticoagulants with INR goal of 2.0-3.0 [Z79.01]             Comments:               Anticoagulation Care Providers       Provider Role Specialty  Phone number    Arnel Garcia MD Referring Internal Medicine - Pediatrics [phone]

## 2024-08-01 ENCOUNTER — ANCILLARY PROCEDURE (OUTPATIENT)
Dept: CARDIOLOGY | Facility: CLINIC | Age: 89
End: 2024-08-01
Attending: INTERNAL MEDICINE
Payer: MEDICARE

## 2024-08-01 DIAGNOSIS — Z95.0 CARDIAC PACEMAKER IN SITU: ICD-10-CM

## 2024-08-01 DIAGNOSIS — I49.5 SICK SINUS SYNDROME (H): ICD-10-CM

## 2024-08-01 PROCEDURE — 93296 REM INTERROG EVL PM/IDS: CPT | Performed by: INTERNAL MEDICINE

## 2024-08-01 PROCEDURE — 93294 REM INTERROG EVL PM/LDLS PM: CPT | Performed by: INTERNAL MEDICINE

## 2024-08-06 ENCOUNTER — LAB (OUTPATIENT)
Dept: LAB | Facility: CLINIC | Age: 89
End: 2024-08-06
Payer: MEDICARE

## 2024-08-06 ENCOUNTER — ANTICOAGULATION THERAPY VISIT (OUTPATIENT)
Dept: ANTICOAGULATION | Facility: CLINIC | Age: 89
End: 2024-08-06

## 2024-08-06 DIAGNOSIS — I48.20 CHRONIC ATRIAL FIBRILLATION (H): Primary | ICD-10-CM

## 2024-08-06 DIAGNOSIS — Z79.01 LONG TERM CURRENT USE OF ANTICOAGULANTS WITH INR GOAL OF 2.0-3.0: ICD-10-CM

## 2024-08-06 DIAGNOSIS — I48.20 CHRONIC ATRIAL FIBRILLATION (H): ICD-10-CM

## 2024-08-06 LAB — INR BLD: 1.8 (ref 0.9–1.1)

## 2024-08-06 PROCEDURE — 36416 COLLJ CAPILLARY BLOOD SPEC: CPT

## 2024-08-06 PROCEDURE — 85610 PROTHROMBIN TIME: CPT

## 2024-08-06 NOTE — PROGRESS NOTES
ANTICOAGULATION MANAGEMENT     Leonor Mercado 89 year old female is on warfarin with subtherapeutic INR result. (Goal INR 2.0-3.0)    Recent labs: (last 7 days)     08/06/24  1344   INR 1.8*       ASSESSMENT     Source(s): Chart Review and Patient/Caregiver Call     Warfarin doses taken: Warfarin taken as instructed - doesn't think so but Cleo says it's not impossible  Diet: No new diet changes identified - hasn't had any Boost/Ensure. Doesn't like the taste.  Medication/supplement changes: None noted - same tylenol usage for the past month  New illness, injury, or hospitalization: No  Signs or symptoms of bleeding or clotting: No  Previous result: Therapeutic last visit; previously outside of goal range  Additional findings: May want to consider 1 mg tablets at time of next refill. Cleo is OK with this plan.       PLAN     Recommended plan for no diet, medication or health factor changes affecting INR     Dosing Instructions: Increase your warfarin dose (8.3% change) with next INR in 2 weeks       Summary  As of 8/6/2024      Full warfarin instructions:  1 mg every Mon; 2 mg all other days   Next INR check:  8/20/2024               Telephone call with daughter, Cleo, who verbalizes understanding and agrees to plan    Lab visit scheduled    Education provided: Please call back if any changes to your diet, medications or how you've been taking warfarin    Plan made per [facility] anticoagulation protocol    Francesca Rider RN  Anticoagulation Clinic  8/6/2024    _______________________________________________________________________     Anticoagulation Episode Summary       Current INR goal:  2.0-3.0   TTR:  43.0% (1 y)   Target end date:  Indefinite   Send INR reminders to:  [facility]    Indications    Chronic atrial fibrillation (H) [I48.20]  Long term current use of anticoagulants with INR goal of 2.0-3.0 [Z79.01]             Comments:               Anticoagulation Care Providers       Provider Role  Specialty Phone number    Arnel Garcia MD Referring Internal Medicine - Pediatrics [phone]

## 2024-08-14 LAB
MDC_IDC_MSMT_BATTERY_DTM: NORMAL
MDC_IDC_MSMT_BATTERY_IMPEDANCE: 2377 OHM
MDC_IDC_MSMT_BATTERY_REMAINING_LONGEVITY: 34 MO
MDC_IDC_MSMT_BATTERY_STATUS: NORMAL
MDC_IDC_MSMT_BATTERY_VOLTAGE: 2.75 V
MDC_IDC_MSMT_LEADCHNL_RA_IMPEDANCE_VALUE: 67 OHM
MDC_IDC_MSMT_LEADCHNL_RV_IMPEDANCE_VALUE: 682 OHM
MDC_IDC_PG_IMPLANT_DTM: NORMAL
MDC_IDC_PG_MFG: NORMAL
MDC_IDC_PG_MODEL: NORMAL
MDC_IDC_PG_SERIAL: NORMAL
MDC_IDC_PG_TYPE: NORMAL
MDC_IDC_SESS_CLINIC_NAME: NORMAL
MDC_IDC_SESS_DTM: NORMAL
MDC_IDC_SESS_TYPE: NORMAL
MDC_IDC_SET_BRADY_LOWRATE: 60 {BEATS}/MIN
MDC_IDC_SET_BRADY_MAX_SENSOR_RATE: 120 {BEATS}/MIN
MDC_IDC_SET_BRADY_MAX_TRACKING_RATE: 105 {BEATS}/MIN
MDC_IDC_SET_BRADY_MODE: NORMAL
MDC_IDC_SET_LEADCHNL_RV_PACING_AMPLITUDE: 2.5 V
MDC_IDC_SET_LEADCHNL_RV_PACING_CAPTURE_MODE: NORMAL
MDC_IDC_SET_LEADCHNL_RV_PACING_POLARITY: NORMAL
MDC_IDC_SET_LEADCHNL_RV_PACING_PULSEWIDTH: 0.52 MS
MDC_IDC_SET_LEADCHNL_RV_SENSING_POLARITY: NORMAL
MDC_IDC_SET_LEADCHNL_RV_SENSING_SENSITIVITY: 4 MV
MDC_IDC_SET_ZONE_DETECTION_INTERVAL: 333.33 MS
MDC_IDC_SET_ZONE_STATUS: NORMAL
MDC_IDC_SET_ZONE_STATUS: NORMAL
MDC_IDC_SET_ZONE_TYPE: NORMAL
MDC_IDC_SET_ZONE_TYPE: NORMAL
MDC_IDC_SET_ZONE_VENDOR_TYPE: NORMAL
MDC_IDC_SET_ZONE_VENDOR_TYPE: NORMAL
MDC_IDC_STAT_AT_BURDEN_PERCENT: 0 %
MDC_IDC_STAT_AT_DTM_END: NORMAL
MDC_IDC_STAT_AT_DTM_START: NORMAL
MDC_IDC_STAT_BRADY_DTM_END: NORMAL
MDC_IDC_STAT_BRADY_DTM_START: NORMAL
MDC_IDC_STAT_BRADY_RV_PERCENT_PACED: 79 %
MDC_IDC_STAT_EPISODE_RECENT_COUNT: 0
MDC_IDC_STAT_EPISODE_RECENT_COUNT: 0
MDC_IDC_STAT_EPISODE_RECENT_COUNT_DTM_END: NORMAL
MDC_IDC_STAT_EPISODE_RECENT_COUNT_DTM_END: NORMAL
MDC_IDC_STAT_EPISODE_RECENT_COUNT_DTM_START: NORMAL
MDC_IDC_STAT_EPISODE_RECENT_COUNT_DTM_START: NORMAL
MDC_IDC_STAT_EPISODE_TYPE: NORMAL
MDC_IDC_STAT_EPISODE_TYPE: NORMAL

## 2024-08-19 ENCOUNTER — HOSPITAL ENCOUNTER (EMERGENCY)
Facility: CLINIC | Age: 89
Discharge: HOME | End: 2024-08-19
Attending: STUDENT IN AN ORGANIZED HEALTH CARE EDUCATION/TRAINING PROGRAM | Admitting: STUDENT IN AN ORGANIZED HEALTH CARE EDUCATION/TRAINING PROGRAM
Payer: MEDICARE

## 2024-08-19 ENCOUNTER — APPOINTMENT (OUTPATIENT)
Dept: CT IMAGING | Facility: CLINIC | Age: 89
End: 2024-08-19
Attending: STUDENT IN AN ORGANIZED HEALTH CARE EDUCATION/TRAINING PROGRAM
Payer: MEDICARE

## 2024-08-19 ENCOUNTER — TELEPHONE (OUTPATIENT)
Dept: ANTICOAGULATION | Facility: CLINIC | Age: 89
End: 2024-08-19

## 2024-08-19 VITALS
HEIGHT: 64 IN | WEIGHT: 150 LBS | RESPIRATION RATE: 18 BRPM | BODY MASS INDEX: 25.61 KG/M2 | DIASTOLIC BLOOD PRESSURE: 89 MMHG | SYSTOLIC BLOOD PRESSURE: 157 MMHG | HEART RATE: 111 BPM | OXYGEN SATURATION: 97 % | TEMPERATURE: 98.6 F

## 2024-08-19 DIAGNOSIS — R06.02 SHORTNESS OF BREATH: ICD-10-CM

## 2024-08-19 DIAGNOSIS — I48.20 CHRONIC ATRIAL FIBRILLATION (H): Primary | ICD-10-CM

## 2024-08-19 DIAGNOSIS — R79.1 SUBTHERAPEUTIC INTERNATIONAL NORMALIZED RATIO (INR): ICD-10-CM

## 2024-08-19 DIAGNOSIS — I50.33 ACUTE ON CHRONIC DIASTOLIC CONGESTIVE HEART FAILURE (H): ICD-10-CM

## 2024-08-19 DIAGNOSIS — J44.1 COPD EXACERBATION (H): ICD-10-CM

## 2024-08-19 DIAGNOSIS — Z79.01 LONG TERM CURRENT USE OF ANTICOAGULANTS WITH INR GOAL OF 2.0-3.0: ICD-10-CM

## 2024-08-19 LAB
ANION GAP SERPL CALCULATED.3IONS-SCNC: 14 MMOL/L (ref 7–15)
BASOPHILS # BLD AUTO: 0 10E3/UL (ref 0–0.2)
BASOPHILS NFR BLD AUTO: 1 %
BUN SERPL-MCNC: 20.4 MG/DL (ref 8–23)
CALCIUM SERPL-MCNC: 9.7 MG/DL (ref 8.8–10.4)
CHLORIDE SERPL-SCNC: 103 MMOL/L (ref 98–107)
CREAT SERPL-MCNC: 1.32 MG/DL (ref 0.51–0.95)
EGFRCR SERPLBLD CKD-EPI 2021: 36 ML/MIN/1.73M2
EOSINOPHIL # BLD AUTO: 0.1 10E3/UL (ref 0–0.7)
EOSINOPHIL NFR BLD AUTO: 1 %
ERYTHROCYTE [DISTWIDTH] IN BLOOD BY AUTOMATED COUNT: 14.9 % (ref 10–15)
GLUCOSE SERPL-MCNC: 95 MG/DL (ref 70–99)
HCO3 SERPL-SCNC: 24 MMOL/L (ref 22–29)
HCT VFR BLD AUTO: 39.7 % (ref 35–47)
HGB BLD-MCNC: 12.6 G/DL (ref 11.7–15.7)
IMM GRANULOCYTES # BLD: 0 10E3/UL
IMM GRANULOCYTES NFR BLD: 0 %
INR PPP: 1.04 (ref 0.85–1.15)
LYMPHOCYTES # BLD AUTO: 1.7 10E3/UL (ref 0.8–5.3)
LYMPHOCYTES NFR BLD AUTO: 28 %
MCH RBC QN AUTO: 30.8 PG (ref 26.5–33)
MCHC RBC AUTO-ENTMCNC: 31.7 G/DL (ref 31.5–36.5)
MCV RBC AUTO: 97 FL (ref 78–100)
MONOCYTES # BLD AUTO: 0.4 10E3/UL (ref 0–1.3)
MONOCYTES NFR BLD AUTO: 6 %
NEUTROPHILS # BLD AUTO: 3.9 10E3/UL (ref 1.6–8.3)
NEUTROPHILS NFR BLD AUTO: 64 %
NRBC # BLD AUTO: 0 10E3/UL
NRBC BLD AUTO-RTO: 0 /100
NT-PROBNP SERPL-MCNC: 3690 PG/ML (ref 0–1800)
PLATELET # BLD AUTO: 186 10E3/UL (ref 150–450)
POTASSIUM SERPL-SCNC: 4.2 MMOL/L (ref 3.4–5.3)
RBC # BLD AUTO: 4.09 10E6/UL (ref 3.8–5.2)
SARS-COV-2 RNA RESP QL NAA+PROBE: NEGATIVE
SODIUM SERPL-SCNC: 141 MMOL/L (ref 135–145)
WBC # BLD AUTO: 6.1 10E3/UL (ref 4–11)

## 2024-08-19 PROCEDURE — 94640 AIRWAY INHALATION TREATMENT: CPT

## 2024-08-19 PROCEDURE — 250N000011 HC RX IP 250 OP 636: Performed by: STUDENT IN AN ORGANIZED HEALTH CARE EDUCATION/TRAINING PROGRAM

## 2024-08-19 PROCEDURE — 250N000009 HC RX 250: Performed by: STUDENT IN AN ORGANIZED HEALTH CARE EDUCATION/TRAINING PROGRAM

## 2024-08-19 PROCEDURE — 96374 THER/PROPH/DIAG INJ IV PUSH: CPT | Mod: XU

## 2024-08-19 PROCEDURE — 71275 CT ANGIOGRAPHY CHEST: CPT | Mod: MF

## 2024-08-19 PROCEDURE — 36415 COLL VENOUS BLD VENIPUNCTURE: CPT | Performed by: STUDENT IN AN ORGANIZED HEALTH CARE EDUCATION/TRAINING PROGRAM

## 2024-08-19 PROCEDURE — 99285 EMERGENCY DEPT VISIT HI MDM: CPT | Mod: 25

## 2024-08-19 PROCEDURE — 83880 ASSAY OF NATRIURETIC PEPTIDE: CPT | Performed by: STUDENT IN AN ORGANIZED HEALTH CARE EDUCATION/TRAINING PROGRAM

## 2024-08-19 PROCEDURE — 87635 SARS-COV-2 COVID-19 AMP PRB: CPT | Performed by: STUDENT IN AN ORGANIZED HEALTH CARE EDUCATION/TRAINING PROGRAM

## 2024-08-19 PROCEDURE — 85025 COMPLETE CBC W/AUTO DIFF WBC: CPT | Performed by: STUDENT IN AN ORGANIZED HEALTH CARE EDUCATION/TRAINING PROGRAM

## 2024-08-19 PROCEDURE — 82374 ASSAY BLOOD CARBON DIOXIDE: CPT | Performed by: STUDENT IN AN ORGANIZED HEALTH CARE EDUCATION/TRAINING PROGRAM

## 2024-08-19 PROCEDURE — 85610 PROTHROMBIN TIME: CPT | Performed by: STUDENT IN AN ORGANIZED HEALTH CARE EDUCATION/TRAINING PROGRAM

## 2024-08-19 RX ORDER — AMOXICILLIN 250 MG
2 CAPSULE ORAL 2 TIMES DAILY PRN
Status: CANCELLED | OUTPATIENT
Start: 2024-08-19

## 2024-08-19 RX ORDER — IPRATROPIUM BROMIDE AND ALBUTEROL SULFATE 2.5; .5 MG/3ML; MG/3ML
6 SOLUTION RESPIRATORY (INHALATION) ONCE
Status: COMPLETED | OUTPATIENT
Start: 2024-08-19 | End: 2024-08-19

## 2024-08-19 RX ORDER — IOPAMIDOL 755 MG/ML
500 INJECTION, SOLUTION INTRAVASCULAR ONCE
Status: COMPLETED | OUTPATIENT
Start: 2024-08-19 | End: 2024-08-19

## 2024-08-19 RX ORDER — ONDANSETRON 4 MG/1
4 TABLET, ORALLY DISINTEGRATING ORAL EVERY 6 HOURS PRN
Status: CANCELLED | OUTPATIENT
Start: 2024-08-19

## 2024-08-19 RX ORDER — PREDNISONE 20 MG/1
TABLET ORAL
Qty: 10 TABLET | Refills: 0 | Status: SHIPPED | OUTPATIENT
Start: 2024-08-19 | End: 2024-08-28

## 2024-08-19 RX ORDER — FUROSEMIDE 10 MG/ML
40 INJECTION INTRAMUSCULAR; INTRAVENOUS ONCE
Status: COMPLETED | OUTPATIENT
Start: 2024-08-19 | End: 2024-08-19

## 2024-08-19 RX ORDER — AMOXICILLIN 250 MG
1 CAPSULE ORAL 2 TIMES DAILY PRN
Status: CANCELLED | OUTPATIENT
Start: 2024-08-19

## 2024-08-19 RX ORDER — LIDOCAINE 40 MG/G
CREAM TOPICAL
Status: CANCELLED | OUTPATIENT
Start: 2024-08-19

## 2024-08-19 RX ORDER — CALCIUM CARBONATE 500 MG/1
1000 TABLET, CHEWABLE ORAL 4 TIMES DAILY PRN
Status: CANCELLED | OUTPATIENT
Start: 2024-08-19

## 2024-08-19 RX ORDER — ONDANSETRON 2 MG/ML
4 INJECTION INTRAMUSCULAR; INTRAVENOUS EVERY 6 HOURS PRN
Status: CANCELLED | OUTPATIENT
Start: 2024-08-19

## 2024-08-19 RX ORDER — ACETAMINOPHEN 325 MG/1
650 TABLET ORAL EVERY 4 HOURS PRN
Status: CANCELLED | OUTPATIENT
Start: 2024-08-19

## 2024-08-19 RX ORDER — ACETAMINOPHEN 650 MG/1
650 SUPPOSITORY RECTAL EVERY 4 HOURS PRN
Status: CANCELLED | OUTPATIENT
Start: 2024-08-19

## 2024-08-19 RX ADMIN — IPRATROPIUM BROMIDE AND ALBUTEROL SULFATE 6 ML: .5; 3 SOLUTION RESPIRATORY (INHALATION) at 14:07

## 2024-08-19 RX ADMIN — SODIUM CHLORIDE 76 ML: 9 INJECTION, SOLUTION INTRAVENOUS at 15:21

## 2024-08-19 RX ADMIN — IOPAMIDOL 55 ML: 755 INJECTION, SOLUTION INTRAVENOUS at 15:21

## 2024-08-19 RX ADMIN — FUROSEMIDE 40 MG: 10 INJECTION, SOLUTION INTRAMUSCULAR; INTRAVENOUS at 15:52

## 2024-08-19 ASSESSMENT — ACTIVITIES OF DAILY LIVING (ADL)
ADLS_ACUITY_SCORE: 40

## 2024-08-19 ASSESSMENT — COLUMBIA-SUICIDE SEVERITY RATING SCALE - C-SSRS
6. HAVE YOU EVER DONE ANYTHING, STARTED TO DO ANYTHING, OR PREPARED TO DO ANYTHING TO END YOUR LIFE?: NO
2. HAVE YOU ACTUALLY HAD ANY THOUGHTS OF KILLING YOURSELF IN THE PAST MONTH?: NO
1. IN THE PAST MONTH, HAVE YOU WISHED YOU WERE DEAD OR WISHED YOU COULD GO TO SLEEP AND NOT WAKE UP?: NO

## 2024-08-19 NOTE — ED TRIAGE NOTES
Pt presents via EMS from independent living facility for complaint of shortness of breath. Hx of COPD. EMS reports the patient has not been med compliment for the past 2 days. EMS states giving 1 duo neb and 125mg IV solumedrol enroute. Pt states shortness of breath mildly improved from earlier today. ABC intact, A&Ox4.     Triage Assessment (Adult)       Row Name 08/19/24 7855          Triage Assessment    Airway WDL WDL        Respiratory WDL    Respiratory WDL rhythm/pattern;all     Rhythm/Pattern, Respiratory shortness of breath        Skin Circulation/Temperature WDL    Skin Circulation/Temperature WDL WDL        Cardiac WDL    Cardiac WDL WDL        Peripheral/Neurovascular WDL    Peripheral Neurovascular WDL WDL        Cognitive/Neuro/Behavioral WDL    Cognitive/Neuro/Behavioral WDL WDL

## 2024-08-19 NOTE — ED PROVIDER NOTES
Emergency Department Note      History of Present Illness     Chief Complaint   Shortness of Breath      HPI   Leonor Mercado is a 89 year old female with a history of COPD, CKD, heart failure with preserved ejection fraction, coronary artery disease, chronic atrial fibrillation on chronic warfarin who presents with shortness of breath.  Patient resides at independent living facility and has been complaining of shortness of breath.  She states that symptoms started last night.  EMS reported giving 1 DuoNeb and 125 mg IV Solu-Medrol.  Patient endorsed mild improvement in symptoms however does endorse ongoing shortness of breath.  She states that she is not on any medication and does not take any medication regularly.    Independent Historian   None    Review of External Notes   INR checked on 8/6/2024 was subtherapeutic at 1.8  Echo completed 3/31/22  Interpretation Summary  Left ventricular systolic function is normal. The visual ejection fraction is  60-65%.  Flattened septum is consistent with RV pressure/volume overload.  Right ventricle is borderline dilated. The right ventricular systolic function  is mildly reduced. There is a pacemaker lead in the right ventricle.  Mod-severe to severe (3-4+) tricuspid regurgitation.  Mild pulmonary hypertension. The right ventricular systolic pressure is  approximated at 25mmHg plus the right atrial pressure.  Mild (1+) mitral regurgitation.  IVC diameter >2.1 cm collapsing <50% with sniff suggests a high RA pressure  estimated at 15 mmHg or greater.    Past Medical History     Medical History and Problem List   Past Medical History:   Diagnosis Date    Atrial fibrillation (H)     BCC (basal cell carcinoma of skin)     CHF - Chr Diastolic (H) 11/21/2017    Chronic renal insufficiency     COPD (chronic obstructive pulmonary disease) (H)     Coronary artery disease     Hyperlipidemia     Hypertension     IBS (irritable bowel syndrome)     Osteoporosis     Pulmonary  fibrosis (H)     Sick sinus syndrome - s/p PPM 2003 (H) 12/16/2017    SSS (sick sinus syndrome) (H)     Vertigo        Medications   predniSONE (DELTASONE) 20 MG tablet  acetaminophen (TYLENOL) 500 MG tablet  albuterol (PROAIR HFA/PROVENTIL HFA/VENTOLIN HFA) 108 (90 Base) MCG/ACT inhaler  albuterol (PROVENTIL) (2.5 MG/3ML) 0.083% neb solution  albuterol (PROVENTIL) (2.5 MG/3ML) 0.083% neb solution  allopurinol (ZYLOPRIM) 100 MG tablet  Calcium Carbonate-Vitamin D (CALCIUM 500+D HIGH POTENCY PO)  diltiazem ER COATED BEADS (CARDIZEM CD/CARTIA XT) 240 MG 24 hr capsule  Fluticasone-Umeclidin-Vilanterol (TRELEGY ELLIPTA) 100-62.5-25 MCG/INH oral inhaler  furosemide (LASIX) 20 MG tablet  isosorbide mononitrate (IMDUR) 30 MG 24 hr tablet  Lactobacillus (PROBIOTIC ACIDOPHILUS) TABS  losartan (COZAAR) 25 MG tablet  melatonin 5 MG tablet  mirtazapine (REMERON) 15 MG tablet  Multiple Vitamins-Minerals (OCUVITE PO)  polyethylene glycol (MIRALAX/GLYCOLAX) Packet  pravastatin (PRAVACHOL) 40 MG tablet  senna (SENOKOT) 8.6 MG tablet  Vitamin D, Cholecalciferol, 1000 units TABS  warfarin ANTICOAGULANT (COUMADIN) 2 MG tablet        Surgical History   Past Surgical History:   Procedure Laterality Date    APPENDECTOMY  1956    CARDIAC SURGERY      PPM, 2 STENTS2-05[place]-CAD-taxus stentx2 2.5-12,2.5-12 in LADp and LADm, 80%nonDomRCA-LcxDom-mild,EF60%    CORONARY ANGIOGRAPHY ADULT ORDER  7/1994    mild CAD,Normal LV function, No Mitral regurgitation, Prox LAD 30% stenosis. RCA prox and mid, 20-30%    ESOPHAGOSCOPY, GASTROSCOPY, DUODENOSCOPY (EGD), COMBINED N/A 8/19/2015    Procedure: COMBINED ESOPHAGOSCOPY, GASTROSCOPY, DUODENOSCOPY (EGD), BIOPSY SINGLE OR MULTIPLE;  Surgeon: Genevieve Leon MD;  Location:  GI    H LEAD REVISION DUAL  4/2003    Revised in [place]-due to diaphram stim (original pacer placed in [place])    H LEAD REVISION DUAL  7/2/2014    Correction of reversal of A and V leads in Header    HEART CATH, ANGIOPLASTY   "02/2005    LEIGH ANN mid and proximal LAD, ongoing 80% RCA; mild circumflex    HERNIA REPAIR Left 1991    [facility]    IMPLANT PACEMAKER  2/19/2003    Placed in [place] for sick sinus syndrome    REPLACE PACEMAKER GENERATOR  6/27/2013    Dual-chamber pacemaker by Dr SEHT Pierre       Physical Exam     Patient Vitals for the past 24 hrs:   BP Temp Temp src Pulse Resp SpO2 Height Weight   08/19/24 1335 (!) 162/88 98.6  F (37  C) Oral 89 18 95 % 1.626 m (5' 4\") 68 kg (150 lb)     Physical Exam  General: Alert and cooperative with exam.  Hard of hearing.  Patient in no apparent distress. Normal mentation.  Head:  Scalp is NC/AT  Eyes:  No scleral icterus, PERRL  ENT:  The external nose and ears are normal.   Neck:  Normal range of motion without rigidity.  CV:  Regular rate and rhythm    No pathologic murmur   Resp:  Limited inspiration, wheezing noted to upper lung fields  GI:  Abdomen is soft, no distension, no tenderness. No peritoneal signs  MS:  Mild bilateral lower extremity edema  Skin:  Warm and dry, No rash or lesions noted.  Neuro:  Oriented x 3. No gross motor deficits.      Diagnostics     Lab Results   Labs Ordered and Resulted from Time of ED Arrival to Time of ED Departure   BASIC METABOLIC PANEL - Abnormal       Result Value    Sodium 141      Potassium 4.2      Chloride 103      Carbon Dioxide (CO2) 24      Anion Gap 14      Urea Nitrogen 20.4      Creatinine 1.32 (*)     GFR Estimate 36 (*)     Calcium 9.7      Glucose 95     NT PROBNP INPATIENT - Abnormal    N terminal Pro BNP Inpatient 3,690 (*)    INR - Normal    INR 1.04     COVID-19 VIRUS (CORONAVIRUS) BY PCR - Normal    SARS CoV2 PCR Negative     CBC WITH PLATELETS AND DIFFERENTIAL    WBC Count 6.1      RBC Count 4.09      Hemoglobin 12.6      Hematocrit 39.7      MCV 97      MCH 30.8      MCHC 31.7      RDW 14.9      Platelet Count 186      % Neutrophils 64      % Lymphocytes 28      % Monocytes 6      % Eosinophils 1      % Basophils 1      % Immature Granulocytes " 0      NRBCs per 100 WBC 0      Absolute Neutrophils 3.9      Absolute Lymphocytes 1.7      Absolute Monocytes 0.4      Absolute Eosinophils 0.1      Absolute Basophils 0.0      Absolute Immature Granulocytes 0.0      Absolute NRBCs 0.0         Imaging   CT Chest Pulmonary Embolism w Contrast   Final Result   IMPRESSION:   1.  No evidence of pulmonary embolus. No acute findings or significant   change from previous.      PORTIA MCLEAN MD            SYSTEM ID:  [id number]          Independent Interpretation   None    ED Course      Medications Administered   Medications   ipratropium - albuterol 0.5 mg/2.5 mg/3 mL (DUONEB) neb solution 6 mL (6 mLs Nebulization $Given 8/19/24 1407)   CT Scan Flush (76 mLs Intravenous $Given 8/19/24 1521)   iopamidol (ISOVUE-370) solution 500 mL (55 mLs Intravenous $Given 8/19/24 1521)   furosemide (LASIX) injection 40 mg (40 mg Intravenous $Given 8/19/24 1552)       Procedures   Procedures     Discussion of Management   None    ED Course        Additional Documentation  None    Medical Decision Making / Diagnosis     CMS Diagnoses: None    MIPS   CT for PE was ordered because the patient is high risk for pulmonary embolism.    JENNY Mercado is a 89 year old female with a history of CHF, COPD, chronically anticoagulated on warfarin for chronic atrial fibrillation who presents with shortness of breath.  See HPI for further details.  Patient has problems with medication compliance.  Upon arrival to ER, she is limited inspiration with wheezing noted to upper lung fields.  Oxygen has remained stable, ranging from 93 to 95% on room air.  No tachypnea present.  No other acute concerns.  Patient was provided with DuoNeb and Solu-Medrol by EMS, 2 more DuoNebs provided in the ER.  INR subtherapeutic today, 1.04.  BNP also elevated at 3690 however this is likely chronically elevated.  Labs otherwise unremarkable.  Given her subtherapeutic INR, elevated concern for possible PE  contributing to her symptoms.  CT PE study completed does not show any evidence of pulmonary embolism, along with no evidence of effusion or pneumothorax or other contributory cause to her symptoms.  Mild lower extremity edema on exam.   Provided patient with IV Lasix in the ER.  Given her subtherapeutic INR, difficulty breathing, and COPD exacerbation, discussed option of observation admission versus outpatient management.  Family is against observation admission unfortunately patient does not qualify for true admission at this time.  Family and patient feel comfortable discharge home with close follow-up with primary care provider.  I will send patient home on course of steroids for COPD exacerbation and encouraged her to continue nebulizer use every 4 hours for ongoing shortness of breath.  Also discussed need to take warfarin exactly as prescribed.  She will follow-up with primary care provider and INR clinic within the next week for reassessment and to ensure that breathing symptoms are improving the INR is well-controlled.  Instructed family to immediately return to ER if any worsening symptoms develop.  All questions answered.  Patient's daughter and her feel comfortable with discharge home.    Disposition   The patient was discharged.     Diagnosis     ICD-10-CM    1. Acute on chronic diastolic congestive heart failure (H)  I50.33       2. COPD exacerbation (H)  J44.1       3. Subtherapeutic international normalized ratio (INR)  R79.1       4. Shortness of breath  R06.02            Discharge Medications   New Prescriptions    PREDNISONE (DELTASONE) 20 MG TABLET    Take two tablets (= 40mg) each day for 5 (five) days         SAVANNAH Chan Lauren R, PA-C  08/19/24 1816

## 2024-08-19 NOTE — TELEPHONE ENCOUNTER
"ANTICOAGULATION  MANAGEMENT: Discharge Review    Leonor Mercado chart reviewed for anticoagulation continuity of care    Emergency room visit on 8/19/24 for SOB & COPD exacerbation.    Discharge disposition: Home, however, observation admission was offered    Results:    Recent labs: (last 7 days)     08/19/24  1401   INR 1.04     Anticoagulation inpatient management:     not applicable     Anticoagulation discharge instructions:     Warfarin dosing: home regimen continued   Bridging: No   INR goal change: No      Medication changes affecting anticoagulation: Yes: prednisone 5 day course (8/19/24-8/24/24) - which may increase INR level    Additional factors affecting anticoagulation: Yes: \"CT PE study completed does not show any evidence of pulmonary embolism\"    **Previous [facility] visit - MD was increased by 8.3%. Daughter Cleo could not provide exact missed doses, but confident there were several last week and knows patient did not take 8/18/24 and 8/19/24 doses.      PLAN     Recommend to adjust dose to 3 mg x 2 days (8/19/24 & 8/20/24) given several missed doses (see [facility] tracker for full details). Recheck INR Thursday 8/22/24.    Spoke with daughter Cleo    Anticoagulation Calendar updated    Larissa Spence RN    "

## 2024-08-19 NOTE — DISCHARGE INSTRUCTIONS
I suspect symptoms are likely due to COPD exacerbation.  Please begin prednisone course for the next 5 days.  I also encourage you to ensure she is using her nebulizer every 4 hours for ongoing shortness of breath.    Please ensure she is taking her warfarin as prescribed.    Follow-up with primary care provider and INR clinic for reassessment and to ensure that breathing symptoms are improving and that INR is well-controlled.    If worsening symptoms develop and patient has any difficulty breathing that cannot be controlled, return to ER.

## 2024-08-20 ENCOUNTER — PATIENT OUTREACH (OUTPATIENT)
Dept: CARE COORDINATION | Facility: CLINIC | Age: 89
End: 2024-08-20

## 2024-08-20 NOTE — PROGRESS NOTES
Clinic Care Coordination Contact  Community Health Worker Initial Outreach    CHW Initial Information Gathering:  Referral Source: ED Follow-Up  Current living arrangement:: Not Assessed  CHW Additional Questions  If ED/Hospital discharge, follow-up appointment scheduled as recommended?: Yes  Medication changes made following ED/Hospital discharge?: No  MyChart active?: No  Patient agreeable to assistance with activating MyChart?: No    Patient accepts CC: No, Patient's Daughter (Cleo) expressed no additional support is needed at this time. Unable to send care coordination introduction letter since MyChart is not active.  Clinic Care Coordination services remain available via referral if needed.        Radha Vazquez  Community Health Worker    Connected Care Resources   [facility]     *Connected Care Resources Team does NOT   follow patient ongoing. Referrals are identified   based on internal discharge report and the outreach is to ensure   Patient has understanding of their discharge instructions.

## 2024-08-22 ENCOUNTER — LAB (OUTPATIENT)
Dept: LAB | Facility: CLINIC | Age: 89
End: 2024-08-22
Payer: MEDICARE

## 2024-08-22 ENCOUNTER — ANTICOAGULATION THERAPY VISIT (OUTPATIENT)
Dept: ANTICOAGULATION | Facility: CLINIC | Age: 89
End: 2024-08-22

## 2024-08-22 DIAGNOSIS — I48.20 CHRONIC ATRIAL FIBRILLATION (H): ICD-10-CM

## 2024-08-22 DIAGNOSIS — I48.20 CHRONIC ATRIAL FIBRILLATION (H): Primary | ICD-10-CM

## 2024-08-22 DIAGNOSIS — Z79.01 LONG TERM CURRENT USE OF ANTICOAGULANTS WITH INR GOAL OF 2.0-3.0: ICD-10-CM

## 2024-08-22 LAB — INR BLD: 2 (ref 0.9–1.1)

## 2024-08-22 PROCEDURE — 85610 PROTHROMBIN TIME: CPT

## 2024-08-22 PROCEDURE — 36416 COLLJ CAPILLARY BLOOD SPEC: CPT

## 2024-08-22 NOTE — PROGRESS NOTES
ANTICOAGULATION MANAGEMENT     Leonor Mercado 89 year old female is on warfarin with therapeutic INR result. (Goal INR 2.0-3.0)    Recent labs: (last 7 days)     08/22/24  1346   INR 2.0*       ASSESSMENT     Source(s): Chart Review and Patient/Caregiver Call     Warfarin doses taken: Booster dose(s) recently taken which may be affecting INR  Diet: No new diet changes identified  Medication/supplement changes:  Still on prednisone until 8/24/24  New illness, injury, or hospitalization: Yes: Recent ED visit for SOB and COPD  Signs or symptoms of bleeding or clotting: No  Previous result: Subtherapeutic  Additional findings: None       PLAN     Recommended plan for temporary change(s) affecting INR     Dosing Instructions: Continue your current warfarin dose with next INR in 4 -6 days. Advised Monday due to prednisone but daughter said they have a Dr appt on Wed so will just do it then      Summary  As of 8/22/2024      Full warfarin instructions:  1 mg every Mon; 2 mg all other days   Next INR check:  8/28/2024               Telephone call with Daughter Cleo who verbalizes understanding and agrees to plan and who agrees to plan and repeated back plan correctly    Lab visit scheduled    Education provided: Goal range and lab monitoring: goal range and significance of current result, Importance of therapeutic range, and Importance of following up at instructed interval  Symptom monitoring: monitoring for bleeding signs and symptoms, when to seek medical attention/emergency care, and if you hit your head or have a bad fall seek emergency care  Contact [phone] with any changes, questions or concerns.     Plan made per [facility] anticoagulation protocol    Marino Ariza RN  Anticoagulation Clinic  8/22/2024    _______________________________________________________________________     Anticoagulation Episode Summary       Current INR goal:  2.0-3.0   TTR:  39.9% (1 y)   Target end date:  Indefinite   Send INR  reminders to:  [facility]    Indications    Chronic atrial fibrillation (H) [I48.20]  Long term current use of anticoagulants with INR goal of 2.0-3.0 [Z79.01]             Comments:               Anticoagulation Care Providers       Provider Role Specialty Phone number    Arnel Garcia MD Referring Internal Medicine - Pediatrics [phone]

## 2024-08-28 ENCOUNTER — OFFICE VISIT (OUTPATIENT)
Dept: PEDIATRICS | Facility: CLINIC | Age: 89
End: 2024-08-28
Payer: MEDICARE

## 2024-08-28 VITALS
SYSTOLIC BLOOD PRESSURE: 110 MMHG | DIASTOLIC BLOOD PRESSURE: 60 MMHG | OXYGEN SATURATION: 98 % | TEMPERATURE: 97.6 F | WEIGHT: 136.4 LBS | BODY MASS INDEX: 23.29 KG/M2 | HEIGHT: 64 IN | HEART RATE: 86 BPM | RESPIRATION RATE: 16 BRPM

## 2024-08-28 DIAGNOSIS — E78.5 HYPERLIPIDEMIA LDL GOAL <100: ICD-10-CM

## 2024-08-28 DIAGNOSIS — I48.20 CHRONIC ATRIAL FIBRILLATION (H): ICD-10-CM

## 2024-08-28 DIAGNOSIS — N18.32 STAGE 3B CHRONIC KIDNEY DISEASE (H): ICD-10-CM

## 2024-08-28 DIAGNOSIS — I73.9 PERIPHERAL VASCULAR DISEASE (H): ICD-10-CM

## 2024-08-28 DIAGNOSIS — M10.00 IDIOPATHIC GOUT, UNSPECIFIED CHRONICITY, UNSPECIFIED SITE: ICD-10-CM

## 2024-08-28 DIAGNOSIS — J96.11 CHRONIC RESPIRATORY FAILURE WITH HYPOXIA (H): ICD-10-CM

## 2024-08-28 DIAGNOSIS — I10 ESSENTIAL HYPERTENSION WITH GOAL BLOOD PRESSURE LESS THAN 140/90: ICD-10-CM

## 2024-08-28 DIAGNOSIS — I50.32 CHRONIC HEART FAILURE WITH PRESERVED EJECTION FRACTION (H): ICD-10-CM

## 2024-08-28 DIAGNOSIS — I25.10 CORONARY ARTERY DISEASE INVOLVING NATIVE CORONARY ARTERY OF NATIVE HEART WITHOUT ANGINA PECTORIS: Primary | ICD-10-CM

## 2024-08-28 DIAGNOSIS — F43.20 ADJUSTMENT DISORDER, UNSPECIFIED TYPE: ICD-10-CM

## 2024-08-28 DIAGNOSIS — J44.9 CHRONIC OBSTRUCTIVE PULMONARY DISEASE, UNSPECIFIED COPD TYPE (H): ICD-10-CM

## 2024-08-28 PROCEDURE — 80048 BASIC METABOLIC PNL TOTAL CA: CPT | Performed by: INTERNAL MEDICINE

## 2024-08-28 PROCEDURE — 99214 OFFICE O/P EST MOD 30 MIN: CPT | Performed by: INTERNAL MEDICINE

## 2024-08-28 PROCEDURE — 36415 COLL VENOUS BLD VENIPUNCTURE: CPT | Performed by: INTERNAL MEDICINE

## 2024-08-28 RX ORDER — MIRTAZAPINE 15 MG/1
15 TABLET, FILM COATED ORAL AT BEDTIME
Qty: 90 TABLET | Refills: 11 | Status: SHIPPED | OUTPATIENT
Start: 2024-08-28

## 2024-08-28 RX ORDER — ISOSORBIDE MONONITRATE 30 MG/1
30 TABLET, EXTENDED RELEASE ORAL DAILY
Qty: 90 TABLET | Refills: 11 | Status: SHIPPED | OUTPATIENT
Start: 2024-08-28

## 2024-08-28 RX ORDER — ALBUTEROL SULFATE 90 UG/1
2 AEROSOL, METERED RESPIRATORY (INHALATION) EVERY 6 HOURS PRN
Qty: 36 G | Refills: 11 | Status: SHIPPED | OUTPATIENT
Start: 2024-08-28

## 2024-08-28 RX ORDER — WARFARIN SODIUM 2 MG/1
TABLET ORAL
Qty: 90 TABLET | Refills: 6 | Status: SHIPPED | OUTPATIENT
Start: 2024-08-28

## 2024-08-28 RX ORDER — DILTIAZEM HYDROCHLORIDE 240 MG/1
240 CAPSULE, COATED, EXTENDED RELEASE ORAL DAILY
Qty: 90 CAPSULE | Refills: 11 | Status: SHIPPED | OUTPATIENT
Start: 2024-08-28

## 2024-08-28 RX ORDER — IPRATROPIUM BROMIDE AND ALBUTEROL SULFATE 2.5; .5 MG/3ML; MG/3ML
1 SOLUTION RESPIRATORY (INHALATION) EVERY 6 HOURS PRN
Qty: 90 ML | Refills: 11 | Status: SHIPPED | OUTPATIENT
Start: 2024-08-28

## 2024-08-28 RX ORDER — ALLOPURINOL 100 MG/1
100 TABLET ORAL DAILY
Qty: 90 TABLET | Refills: 11 | Status: SHIPPED | OUTPATIENT
Start: 2024-08-28

## 2024-08-28 RX ORDER — LOSARTAN POTASSIUM 25 MG/1
25 TABLET ORAL DAILY
Qty: 90 TABLET | Refills: 11 | Status: SHIPPED | OUTPATIENT
Start: 2024-08-28

## 2024-08-28 RX ORDER — FUROSEMIDE 20 MG
20 TABLET ORAL 2 TIMES DAILY
Qty: 180 TABLET | Refills: 11 | Status: SHIPPED | OUTPATIENT
Start: 2024-08-28

## 2024-08-28 RX ORDER — PRAVASTATIN SODIUM 40 MG
40 TABLET ORAL DAILY
Qty: 90 TABLET | Refills: 11 | Status: SHIPPED | OUTPATIENT
Start: 2024-08-28

## 2024-08-28 ASSESSMENT — ANXIETY QUESTIONNAIRES
GAD7 TOTAL SCORE: 0
IF YOU CHECKED OFF ANY PROBLEMS ON THIS QUESTIONNAIRE, HOW DIFFICULT HAVE THESE PROBLEMS MADE IT FOR YOU TO DO YOUR WORK, TAKE CARE OF THINGS AT HOME, OR GET ALONG WITH OTHER PEOPLE: NOT DIFFICULT AT ALL
4. TROUBLE RELAXING: NOT AT ALL
5. BEING SO RESTLESS THAT IT IS HARD TO SIT STILL: NOT AT ALL
7. FEELING AFRAID AS IF SOMETHING AWFUL MIGHT HAPPEN: NOT AT ALL
7. FEELING AFRAID AS IF SOMETHING AWFUL MIGHT HAPPEN: NOT AT ALL
2. NOT BEING ABLE TO STOP OR CONTROL WORRYING: NOT AT ALL
6. BECOMING EASILY ANNOYED OR IRRITABLE: NOT AT ALL
8. IF YOU CHECKED OFF ANY PROBLEMS, HOW DIFFICULT HAVE THESE MADE IT FOR YOU TO DO YOUR WORK, TAKE CARE OF THINGS AT HOME, OR GET ALONG WITH OTHER PEOPLE?: NOT DIFFICULT AT ALL
3. WORRYING TOO MUCH ABOUT DIFFERENT THINGS: NOT AT ALL
1. FEELING NERVOUS, ANXIOUS, OR ON EDGE: NOT AT ALL

## 2024-08-28 ASSESSMENT — PAIN SCALES - GENERAL: PAINLEVEL: NO PAIN (0)

## 2024-08-28 NOTE — PROGRESS NOTES
Assessment & Plan     (I25.10) Coronary artery disease involving native coronary artery of native heart without angina pectoris  (primary encounter diagnosis)  Comment: 2 prior stents.  Stable-continue pravastatin, isosorbide  Plan: diltiazem ER COATED BEADS (CARDIZEM CD/CARTIA         XT) 240 MG 24 hr capsule, isosorbide         mononitrate (IMDUR) 30 MG 24 hr tablet          (I50.32) Chronic heart failure with preserved ejection fraction (H)  Comment:     Plan: furosemide (LASIX) 20 MG tablet        Echo 2022: EF 60 to 65%, moderate-severe TR, mild pulmonary hypertension, mild MR.  Stable.    (J96.11) Chronic respiratory failure with hypoxia (H)  Comment:   Plan: Continues home oxygen nightly    (J44.9) Chronic obstructive pulmonary disease, unspecified COPD type (H)  Comment: Recent COPD exacerbation.  Reinforced consistent MDI use, completed course of prednisone  Plan: albuterol (PROAIR HFA/PROVENTIL HFA/VENTOLIN         HFA) 108 (90 Base) MCG/ACT inhaler,         Fluticasone-Umeclidin-Vilant (TRELEGY ELLIPTA)         100-62.5-25 MCG/ACT oral inhaler, ipratropium -        albuterol 0.5 mg/2.5 mg/3 mL (DUONEB) 0.5-2.5         (3) MG/3ML neb solution, Nebulizer and Supplies        Order for DME - ONLY FOR DME          (I48.20) Chronic atrial fibrillation (H)  Comment: Adequate rate control.  Chronic anticoagulation-patient and family elect to continue warfarin.  Upcoming visit with INR clinic/family will request home INR machine to reduce trips to clinic.  Declined DOAC secondary to cost  Plan: warfarin ANTICOAGULANT (COUMADIN) 2 MG tablet          (I10) Essential hypertension with goal blood pressure less than 140/90  Comment: Mild orthostatic symptoms-reduce losartan from 25 to 12.5 mg daily.  Family will follow-up by Alok in the next 2 weeks if symptoms persist  Plan: losartan (COZAAR) 25 MG tablet          (I73.9) Peripheral vascular disease (H24)  Comment:   Plan: Stable, without claudication or other  symptoms.  Continue statin, not on APA secondary to bleeding risk    (N18.32) Stage 3b chronic kidney disease (H)  Comment:   Plan: Basic metabolic panel  (Ca, Cl, CO2, Creat,         Gluc, K, Na, BUN)        \  (M10.00) Idiopathic gout, unspecified chronicity, unspecified site  Comment:   Stable, continue allopurinol  Plan: allopurinol (ZYLOPRIM) 100 MG tablet          (F43.20) Adjustment disorder, unspecified type  Comment: Mood stable, mirtazapine has helped with anxiety and sleep  Plan: mirtazapine (REMERON) 15 MG tablet          (E78.5) Hyperlipidemia LDL goal <100  Comment:    Adequate control continue pravastatin  Lab Results   Component Value Date    LDL 59 08/16/2023    LDL 67 08/21/2020    Plan: pravastatin (PRAVACHOL) 40 MG tablet                Cristina Fuentes is a 89 year old, presenting for the following health issues:  Hospital F/U        8/28/2024    12:45 PM   Additional Questions   Roomed by Halie   Accompanied by Les and Cleo (daughters)         8/28/2024    12:45 PM   Patient Reported Additional Medications   Patient reports taking the following new medications no     HPI       ED/UC Followup:    Facility:  [facility] Emergency Dept  Date of visit: 8/19/24  Reason for visit: SOB  Current Status: Feeling better    89-year-old with history of CHF, COPD, chronic atrial fibrillation presents today for follow-up of recent ER visit on 8/19/2024 for evaluation of shortness of breath.   Currently lives in a senior facility in independent living, both of her daughters are local and involved with her care/present today in clinic.  Noted increasing shortness of breath which prompted the ED visit.  ED visit notes significant for wheezing at presentation.  Treated with Atrovent/albuterol nebs and Solu-Medrol and subsequent repeat nebulizers in the ED.  Lab work-BN P was elevated, CBC normal, INR subtherapeutic, COVID swab negative, BMP normal apart from creatinine of 1.3.  CT chest PE  protocol was negative for PE and negative for acute infiltrate.   Patient was offered observation admission which patient and family declined.  Ultimately discharged on a course of prednisone for COPD exacerbation.  On presentation today states that her breathing has returned to normal.  Daughter is present today states that she had not been using her inhalers as prescribed.    Patient Active Problem List   Diagnosis    Hyperlipidemia LDL goal <100    Cardiac pacemaker in situ    Senile osteoporosis    Anemia    Degeneration of lumbar intervertebral disc    Stage 3b chronic kidney disease (H)    Chronic atrial fibrillation (H)    Chronic obstructive pulmonary disease, unspecified COPD type (H)    Coronary artery disease involving native coronary artery of native heart without angina pectoris    Sick sinus syndrome (H)    Adjustment disorder, unspecified type    Presbyesophagus    Chronic heart failure with preserved ejection fraction (H)    Vertigo    Chronic respiratory failure with hypoxia (H)    Pleural effusion    Peripheral vascular disease (H24)    Long term current use of anticoagulants with INR goal of 2.0-3.0     Past Surgical History:   Procedure Laterality Date    APPENDECTOMY  1956    CARDIAC SURGERY      PPM, 2 STENTS2-05[place]-CAD-taxus stentx2 2.5-12,2.5-12 in LADp and LADm, 80%nonDomRCA-LcxDom-mild,EF60%    CORONARY ANGIOGRAPHY ADULT ORDER  7/1994    mild CAD,Normal LV function, No Mitral regurgitation, Prox LAD 30% stenosis. RCA prox and mid, 20-30%    ESOPHAGOSCOPY, GASTROSCOPY, DUODENOSCOPY (EGD), COMBINED N/A 8/19/2015    Procedure: COMBINED ESOPHAGOSCOPY, GASTROSCOPY, DUODENOSCOPY (EGD), BIOPSY SINGLE OR MULTIPLE;  Surgeon: Genevieve Leon MD;  Location:  GI    H LEAD REVISION DUAL  4/2003    Revised in [place]-due to diaphram stim (original pacer placed in [place])    H LEAD REVISION DUAL  7/2/2014    Correction of reversal of A and V leads in Header    HEART CATH, ANGIOPLASTY  02/2005     LEIGH ANN mid and proximal LAD, ongoing 80% RCA; mild circumflex    HERNIA REPAIR Left 1991    [facility]    IMPLANT PACEMAKER  2/19/2003    Placed in [place] for sick sinus syndrome    REPLACE PACEMAKER GENERATOR  6/27/2013    Dual-chamber pacemaker by Dr SETH Pierre        Current Outpatient Medications   Medication Sig Dispense Refill    acetaminophen (TYLENOL) 500 MG tablet Take 2 tablets (1,000 mg) by mouth every 8 hours as needed for pain      albuterol (PROAIR HFA/PROVENTIL HFA/VENTOLIN HFA) 108 (90 Base) MCG/ACT inhaler Inhale 2 puffs into the lungs every 6 hours as needed for shortness of breath or wheezing. 36 g 11    albuterol (PROVENTIL) (2.5 MG/3ML) 0.083% neb solution Take 2.5 mg by nebulization At Bedtime      albuterol (PROVENTIL) (2.5 MG/3ML) 0.083% neb solution Take 2.5 mg by nebulization every 4 hours as needed for shortness of breath / dyspnea or wheezing MAX of 4 doses/ 24 hours      allopurinol (ZYLOPRIM) 100 MG tablet Take 1 tablet (100 mg) by mouth daily. 90 tablet 11    Calcium Carbonate-Vitamin D (CALCIUM 500+D HIGH POTENCY PO) Take 1 tablet by mouth daily      diltiazem ER COATED BEADS (CARDIZEM CD/CARTIA XT) 240 MG 24 hr capsule Take 1 capsule (240 mg) by mouth daily. 90 capsule 11    Fluticasone-Umeclidin-Vilant (TRELEGY ELLIPTA) 100-62.5-25 MCG/ACT oral inhaler Inhale 1 puff into the lungs daily. 60 each 11    furosemide (LASIX) 20 MG tablet Take 1 tablet (20 mg) by mouth 2 times daily. 180 tablet 11    ipratropium - albuterol 0.5 mg/2.5 mg/3 mL (DUONEB) 0.5-2.5 (3) MG/3ML neb solution Take 1 vial (3 mLs) by nebulization every 6 hours as needed for shortness of breath, wheezing or cough. 90 mL 11    isosorbide mononitrate (IMDUR) 30 MG 24 hr tablet Take 1 tablet (30 mg) by mouth daily. 90 tablet 11    Lactobacillus (PROBIOTIC ACIDOPHILUS) TABS Take 1 tablet by mouth daily       losartan (COZAAR) 25 MG tablet Take 1 tablet (25 mg) by mouth daily. 90 tablet 11    melatonin 5 MG tablet Take 10 mg by mouth  "nightly as needed for sleep      mirtazapine (REMERON) 15 MG tablet Take 1 tablet (15 mg) by mouth at bedtime. 90 tablet 11    Multiple Vitamins-Minerals (OCUVITE PO) Take 1 capsule by mouth daily.      polyethylene glycol (MIRALAX/GLYCOLAX) Packet Take by mouth daily 1 scoopful once daily      pravastatin (PRAVACHOL) 40 MG tablet Take 1 tablet (40 mg) by mouth daily. 90 tablet 11    senna (SENOKOT) 8.6 MG tablet Take 2 tablets by mouth nightly as needed for constipation       Vitamin D, Cholecalciferol, 1000 units TABS Take 1,000 Units by mouth daily      warfarin ANTICOAGULANT (COUMADIN) 2 MG tablet Take 1/2 tablet (1 mg) by mouth every Monday. Take 1 tablet (2 mg) all other days of the week or as instructed by the INR Clinic. 90 tablet 6                  Objective    /60 (BP Location: Right arm, Patient Position: Sitting, Cuff Size: Adult Regular)   Pulse 86   Temp 97.6  F (36.4  C) (Tympanic)   Resp 16   Ht 1.626 m (5' 4\")   Wt 61.9 kg (136 lb 6.4 oz)   LMP  (LMP Unknown)   SpO2 98%   BMI 23.41 kg/m    Body mass index is 23.41 kg/m .  Physical Exam   GENERAL: alert and no distress  EYES: Eyes grossly normal to inspection, PERRL and conjunctivae and sclerae normal  RESP: lungs clear to auscultation - no rales, rhonchi or wheezes  CV: irregular rate and rhythm, normal S1 S2, no S3 or S4, no murmur  MS:  no edema  SKIN: no suspicious lesions or rashes  PSYCH: mentation appears normal, affect normal/bright            Signed Electronically by: Arnel Garcia MD    Answers submitted by the patient for this visit:  Patient Health Questionnaire (G7) (Submitted on 8/28/2024)  BRIGHT 7 TOTAL SCORE: 0    "

## 2024-08-28 NOTE — PATIENT INSTRUCTIONS
Reduce losartan from 25 to 12.5mg daily (half tablet)  Follow-up in 2 weeks if symptoms persist

## 2024-08-28 NOTE — LETTER
August 28, 2024      Leonor Mercado  [address]   [place] [address]    Current Outpatient Medications   Medication Sig Dispense Refill    acetaminophen (TYLENOL) 500 MG tablet Take 2 tablets (1,000 mg) by mouth every 8 hours as needed for pain      albuterol (PROAIR HFA/PROVENTIL HFA/VENTOLIN HFA) 108 (90 Base) MCG/ACT inhaler Inhale 2 puffs into the lungs every 6 hours as needed for shortness of breath or wheezing. 36 g 11    albuterol (PROVENTIL) (2.5 MG/3ML) 0.083% neb solution Take 2.5 mg by nebulization At Bedtime      albuterol (PROVENTIL) (2.5 MG/3ML) 0.083% neb solution Take 2.5 mg by nebulization every 4 hours as needed for shortness of breath / dyspnea or wheezing MAX of 4 doses/ 24 hours      allopurinol (ZYLOPRIM) 100 MG tablet Take 1 tablet (100 mg) by mouth daily. 90 tablet 11    Calcium Carbonate-Vitamin D (CALCIUM 500+D HIGH POTENCY PO) Take 1 tablet by mouth daily      diltiazem ER COATED BEADS (CARDIZEM CD/CARTIA XT) 240 MG 24 hr capsule Take 1 capsule (240 mg) by mouth daily. 90 capsule 11    Fluticasone-Umeclidin-Vilant (TRELEGY ELLIPTA) 100-62.5-25 MCG/ACT oral inhaler Inhale 1 puff into the lungs daily. 60 each 11    furosemide (LASIX) 20 MG tablet Take 1 tablet (20 mg) by mouth 2 times daily. 180 tablet 11    ipratropium - albuterol 0.5 mg/2.5 mg/3 mL (DUONEB) 0.5-2.5 (3) MG/3ML neb solution Take 1 vial (3 mLs) by nebulization every 6 hours as needed for shortness of breath, wheezing or cough. 90 mL 11    isosorbide mononitrate (IMDUR) 30 MG 24 hr tablet Take 1 tablet (30 mg) by mouth daily. 90 tablet 11    Lactobacillus (PROBIOTIC ACIDOPHILUS) TABS Take 1 tablet by mouth daily       losartan (COZAAR) 25 MG tablet Take 1 tablet (25 mg) by mouth daily. 90 tablet 11    melatonin 5 MG tablet Take 10 mg by mouth nightly as needed for sleep      mirtazapine (REMERON) 15 MG tablet Take 1 tablet (15 mg) by mouth at bedtime. 90 tablet 11    Multiple Vitamins-Minerals (OCUVITE PO)  Take 1 capsule by mouth daily.      polyethylene glycol (MIRALAX/GLYCOLAX) Packet Take by mouth daily 1 scoopful once daily      pravastatin (PRAVACHOL) 40 MG tablet Take 1 tablet (40 mg) by mouth daily. 90 tablet 11    senna (SENOKOT) 8.6 MG tablet Take 2 tablets by mouth nightly as needed for constipation       Vitamin D, Cholecalciferol, 1000 units TABS Take 1,000 Units by mouth daily      warfarin ANTICOAGULANT (COUMADIN) 2 MG tablet Take 1/2 tablet (1 mg) by mouth every Monday. Take 1 tablet (2 mg) all other days of the week or as instructed by the INR Clinic. 90 tablet 6

## 2024-08-29 ENCOUNTER — TELEPHONE (OUTPATIENT)
Dept: ANTICOAGULATION | Facility: CLINIC | Age: 89
End: 2024-08-29
Payer: MEDICARE

## 2024-08-29 ENCOUNTER — TELEPHONE (OUTPATIENT)
Dept: ANTICOAGULATION | Facility: CLINIC | Age: 89
End: 2024-08-29

## 2024-08-29 LAB
ANION GAP SERPL CALCULATED.3IONS-SCNC: 13 MMOL/L (ref 7–15)
BUN SERPL-MCNC: 33.5 MG/DL (ref 8–23)
CALCIUM SERPL-MCNC: 11.2 MG/DL (ref 8.8–10.4)
CHLORIDE SERPL-SCNC: 97 MMOL/L (ref 98–107)
CREAT SERPL-MCNC: 1.72 MG/DL (ref 0.51–0.95)
EGFRCR SERPLBLD CKD-EPI 2021: 26 ML/MIN/1.73M2
GLUCOSE SERPL-MCNC: 98 MG/DL (ref 70–99)
HCO3 SERPL-SCNC: 29 MMOL/L (ref 22–29)
POTASSIUM SERPL-SCNC: 4.4 MMOL/L (ref 3.4–5.3)
SODIUM SERPL-SCNC: 139 MMOL/L (ref 135–145)

## 2024-08-29 NOTE — TELEPHONE ENCOUNTER
ANTICOAGULATION     Leonor Mercado is overdue for an INR check.     Spoke with Alfredo's daughter, Cleo, who declined to schedule a lab appointment at this time. If calling back please schedule as soon as possible. She will call back tomorrow to schedule.    Yoana Waddell RN

## 2024-08-29 NOTE — TELEPHONE ENCOUNTER
HOME INR MONITORING REQUEST    REQUEST TYPE: NEW    ELIGIBILITY:    Current plan for long term/indefinite anticoagulation: Yes     >= 3 months of warfarin therapy may be required prior to receiving meter (order may be started in advance)  [facility] episode start date: 6/18/2002  Episode start date < 90 days: No    Insurance approved indication for anticoagulation required to have a home monitor covered; not all indications for warfarin are currently covered     Leonor with commonly covered diagnoses: I48.21    Permanent atrial fibrillation      PROGRAM REQUIREMENTS:     Patient or caregiver must agree to the below terms and testing requirements for home INR monitor order to be placed    Patient or designated caregiver have skills necessary to perform the self test: Yes    Testing Requirements: Yes  Every 1 week testing is required by many insurance companies including [employer]; some companies may allow every 2 weeks, but not longer.  Testing should be performed during [facility] business days (M-F)  In clinic lab visits may be periodically required if a home result is an error, INR  > 8, or testing supplies are not available.    Primary coverage: [facility]-MEDICARE  Secondary coverage:     Must use a [employer] approved service provider: Yes   Home meters, testing supplies, meter training, and reporting of INR results done through the approved outside company.   [employer] will continue to receive and manage INR results.    Patient/Caregiver will be contacted by home monitoring company to review insurance coverage with home monitoring company prior to enrolling. Please watch for call or voicemail from 1-800 number from [employer]), [employer], or [employer]    Best person to contact to discuss coverage:  Patient's daughter, Cleo  at Home number on file [phone] (home)     Home monitoring application often takes 4-6 weeks. Patient should continue to follow up with recommended INR  monitoring in clinic until receives monitor and training completed: Yes    ___________________________________________________________________________________________________________________________    Patient/Caregiver agree to all of the above terms, request routed to [facility] coordinator to obtain provider orders    [facility] Referring provider: Dr. Arnel Robert

## 2024-09-02 ENCOUNTER — TELEPHONE (OUTPATIENT)
Dept: PEDIATRICS | Facility: CLINIC | Age: 89
End: 2024-09-02
Payer: MEDICARE

## 2024-09-02 DIAGNOSIS — R68.89 ABNORMAL CLINICAL FINDING: Primary | ICD-10-CM

## 2024-09-02 NOTE — TELEPHONE ENCOUNTER
Please call pt.   recent lab work reviewed-kidney function test was slightly worse than her baseline-likely due to the diuretic she is currently taking along with  decreased fluid intake.  Recommend increasing daily fluid intake.  In addition, serum calcium level was a bit high.  Please have pt return for repeat nonfasting labdraw to recheck  in the next week or 2 .    Component      Latest Ref Rng 8/19/2024  2:01 PM 8/28/2024  1:55 PM   Sodium      135 - 145 mmol/L 141  139    Potassium      3.4 - 5.3 mmol/L 4.2  4.4    Chloride      98 - 107 mmol/L 103  97 (L)    Carbon Dioxide (CO2)      22 - 29 mmol/L 24  29    Anion Gap      7 - 15 mmol/L 14  13    Urea Nitrogen      8.0 - 23.0 mg/dL 20.4  33.5 (H)    Creatinine      0.51 - 0.95 mg/dL 1.32 (H)  1.72 (H)    GFR Estimate      >60 mL/min/1.73m2 36 (L)  26 (L)    Calcium      8.8 - 10.4 mg/dL 9.7  11.2 (H)    Glucose      70 - 99 mg/dL 95  98       Legend:  (H) High  (L) Low

## 2024-09-03 NOTE — TELEPHONE ENCOUNTER
Notified daughter, Cleo,  of provider's message.     Lab Only scheduled 9/18/24    Person was given an opportunity to ask questions, verbalized understanding of plan, and is agreeable.     Georgia Rider RN

## 2024-09-04 ENCOUNTER — DOCUMENTATION ONLY (OUTPATIENT)
Dept: ANTICOAGULATION | Facility: CLINIC | Age: 89
End: 2024-09-04

## 2024-09-04 ENCOUNTER — ANTICOAGULATION THERAPY VISIT (OUTPATIENT)
Dept: ANTICOAGULATION | Facility: CLINIC | Age: 89
End: 2024-09-04

## 2024-09-04 ENCOUNTER — LAB (OUTPATIENT)
Dept: LAB | Facility: CLINIC | Age: 89
End: 2024-09-04
Payer: MEDICARE

## 2024-09-04 DIAGNOSIS — Z79.01 LONG TERM CURRENT USE OF ANTICOAGULANTS WITH INR GOAL OF 2.0-3.0: ICD-10-CM

## 2024-09-04 DIAGNOSIS — I48.20 CHRONIC ATRIAL FIBRILLATION (H): ICD-10-CM

## 2024-09-04 DIAGNOSIS — I48.20 CHRONIC ATRIAL FIBRILLATION (H): Primary | ICD-10-CM

## 2024-09-04 LAB — INR BLD: 2.9 (ref 0.9–1.1)

## 2024-09-04 PROCEDURE — 36416 COLLJ CAPILLARY BLOOD SPEC: CPT

## 2024-09-04 PROCEDURE — 85610 PROTHROMBIN TIME: CPT

## 2024-09-04 NOTE — PROGRESS NOTES
ANTICOAGULATION CLINIC REFERRAL RENEWAL REQUEST       An annual renewal order is required for all patients referred to [facility].?  Please review and sign the pended referral order for Leonor Mercado.       ANTICOAGULATION SUMMARY      Warfarin indication(s)   Atrial Fibrillation    Mechanical heart valve present?  NO       Current goal range   INR: 2.0-3.0     Goal appropriate for indication? Goal INR 2-3, standard for indication(s) above     Time in Therapeutic Range (TTR)  (Goal > 60%) 39.9%       Office visit with referring provider's group within last year yes on 8/28/24       Dilma Rider RN  [facility]

## 2024-09-04 NOTE — PROGRESS NOTES
ANTICOAGULATION MANAGEMENT     Leonor Mercado 89 year old female is on warfarin with therapeutic INR result. (Goal INR 2.0-3.0)    Recent labs: (last 7 days)     09/04/24  1430   INR 2.9*       ASSESSMENT     Source(s): Chart Review  Previous INR was Therapeutic last visit; previously outside of goal range  Medication, diet, health changes since last INR   Losartan dose decreased 8/28/24, no interaction with warfarin anticipated         PLAN     Unable to reach Cleo, patient's daughter, today.    Left message to continue current dose of warfarin 2 mg tonight. Request call back for assessment.    Follow up required to confirm warfarin dose taken and assess for changes Left message to call [phone].       Dilma Rider RN  Anticoagulation Clinic  9/4/2024

## 2024-09-06 NOTE — PROGRESS NOTES
Left VM to call [phone] with transfer to [facility] OR elissa Valenzuela RN  Anticoagulation Clinic

## 2024-09-06 NOTE — PROGRESS NOTES
ANTICOAGULATION MANAGEMENT     Leonor Mercado 89 year old female is on warfarin with therapeutic INR result. (Goal INR 2.0-3.0)    Recent labs: (last 7 days)     09/04/24  1430   INR 2.9*       ASSESSMENT     Source(s): Chart Review and Patient/Caregiver Call     Warfarin doses taken: Warfarin taken as instructed  Diet: No new diet changes identified  Medication/supplement changes:  finished 5 day Prednisone mid August.  New illness, injury, or hospitalization: No  Signs or symptoms of bleeding or clotting: No  Previous result: Therapeutic last visit; previously outside of goal range  Additional findings: Daughter Cleo will be in [place] at time of next INR, please call lyric Saldana with next INR result.       PLAN     Recommended plan for temporary change(s) affecting INR     Dosing Instructions: Continue your current warfarin dose with next INR in 2 weeks       Summary  As of 9/4/2024      Full warfarin instructions:  1 mg every Mon; 2 mg all other days   Next INR check:  9/18/2024               Telephone call with daughter Cleo who verbalizes understanding and agrees to plan    Lyric Saldana will call back to schedule next INR as Cleo will be in [place]    Education provided: Contact [phone] with any changes, questions or concerns.     Plan made per [facility] anticoagulation protocol    Mariola Valenzuela, RN  Anticoagulation Clinic  9/6/2024    _______________________________________________________________________     Anticoagulation Episode Summary       Current INR goal:  2.0-3.0   TTR:  39.6% (1 y)   Target end date:  Indefinite   Send INR reminders to:  [facility] [place]    Indications    Chronic atrial fibrillation (H) [I48.20]  Long term current use of anticoagulants with INR goal of 2.0-3.0 [Z79.01]             Comments:               Anticoagulation Care Providers       Provider Role Specialty Phone number    Arnel Garcia MD Referring Internal Medicine - Pediatrics [phone]

## 2024-09-12 NOTE — TELEPHONE ENCOUNTER
Patients daughter, Les (C2C) calls to request update regarding INR home monitor system. RN advised Les to call INR team to discuss further. INR number provided.     Yasemin SAMANIEGO RN, BSN  [facility]

## 2024-09-12 NOTE — TELEPHONE ENCOUNTER
Returned call to micheal Saldana order for home monitor is in process and it takes 4-6 weeks. Les verbalized understanding.  Dilma Rider RN, BSN  Anticoagulation Clinic

## 2024-09-18 ENCOUNTER — LAB (OUTPATIENT)
Dept: LAB | Facility: CLINIC | Age: 89
End: 2024-09-18
Payer: MEDICARE

## 2024-09-18 ENCOUNTER — ANTICOAGULATION THERAPY VISIT (OUTPATIENT)
Dept: ANTICOAGULATION | Facility: CLINIC | Age: 89
End: 2024-09-18

## 2024-09-18 DIAGNOSIS — I48.20 CHRONIC ATRIAL FIBRILLATION (H): Primary | ICD-10-CM

## 2024-09-18 DIAGNOSIS — I48.20 CHRONIC ATRIAL FIBRILLATION (H): ICD-10-CM

## 2024-09-18 DIAGNOSIS — Z79.01 LONG TERM CURRENT USE OF ANTICOAGULANTS WITH INR GOAL OF 2.0-3.0: ICD-10-CM

## 2024-09-18 DIAGNOSIS — R68.89 ABNORMAL CLINICAL FINDING: ICD-10-CM

## 2024-09-18 LAB — INR BLD: 3.7 (ref 0.9–1.1)

## 2024-09-18 PROCEDURE — 36415 COLL VENOUS BLD VENIPUNCTURE: CPT

## 2024-09-18 PROCEDURE — 36416 COLLJ CAPILLARY BLOOD SPEC: CPT

## 2024-09-18 PROCEDURE — 85610 PROTHROMBIN TIME: CPT

## 2024-09-18 PROCEDURE — 82310 ASSAY OF CALCIUM: CPT

## 2024-09-18 NOTE — PROGRESS NOTES
ANTICOAGULATION MANAGEMENT     Leonor Mercado 89 year old female is on warfarin with supratherapeutic INR result. (Goal INR 2.0-3.0)    Recent labs: (last 7 days)     09/18/24  1552   INR 3.7*       ASSESSMENT     Source(s): Chart Review and Patient/Caregiver Call     Warfarin doses taken: Warfarin taken as instructed  Diet: Decreased greens/vitamin K in diet; plans to resume previous intake  Medication/supplement changes: None noted  New illness, injury, or hospitalization: No, pain in hip has increased, per Les, patient has been complaining more about hip pain  Signs or symptoms of bleeding or clotting: No  Previous result: Therapeutic last 2(+) visits  Additional findings:  awaiting to hear from home monitoring, order was signed by the provider 9/12/24       PLAN     Recommended plan for temporary change(s) affecting INR     Dosing Instructions: booster dose then continue your current warfarin dose with next INR in 2 weeks       Summary  As of 9/18/2024      Full warfarin instructions:  9/19: Hold; Otherwise 1 mg every Mon; 2 mg all other days   Next INR check:  10/2/2024               Telephone call with Les, patient's daughter, who agrees to plan and repeated back plan correctly    Lab visit scheduled    Education provided: Please call back if any changes to your diet, medications or how you've been taking warfarin  Contact [phone] with any changes, questions or concerns.     Plan made per [facility] anticoagulation protocol    Dilma Rider RN  9/18/2024  Anticoagulation Clinic  [employer] for routing messages: tom BARRETT  [facility] patient phone line: [phone]        _______________________________________________________________________     Anticoagulation Episode Summary       Current INR goal:  2.0-3.0   TTR:  36.6% (1 y)   Target end date:  Indefinite   Send INR reminders to:  SAUL BARRETT    Indications    Chronic atrial fibrillation (H) [I48.20]  Long term current use of anticoagulants  with INR goal of 2.0-3.0 [Z79.01]             Comments:               Anticoagulation Care Providers       Provider Role Specialty Phone number    Arnel Garcia MD Referring Internal Medicine - Pediatrics [phone]

## 2024-09-19 LAB — CALCIUM SERPL-MCNC: 9.2 MG/DL (ref 8.8–10.4)

## 2024-10-02 ENCOUNTER — LAB (OUTPATIENT)
Dept: LAB | Facility: CLINIC | Age: 89
End: 2024-10-02
Payer: MEDICARE

## 2024-10-02 ENCOUNTER — ANTICOAGULATION THERAPY VISIT (OUTPATIENT)
Dept: ANTICOAGULATION | Facility: CLINIC | Age: 89
End: 2024-10-02

## 2024-10-02 DIAGNOSIS — I48.20 CHRONIC ATRIAL FIBRILLATION (H): Primary | ICD-10-CM

## 2024-10-02 DIAGNOSIS — I48.20 CHRONIC ATRIAL FIBRILLATION (H): ICD-10-CM

## 2024-10-02 DIAGNOSIS — Z79.01 LONG TERM CURRENT USE OF ANTICOAGULANTS WITH INR GOAL OF 2.0-3.0: ICD-10-CM

## 2024-10-02 LAB — INR BLD: 3.3 (ref 0.9–1.1)

## 2024-10-02 PROCEDURE — 36416 COLLJ CAPILLARY BLOOD SPEC: CPT

## 2024-10-02 PROCEDURE — 85610 PROTHROMBIN TIME: CPT

## 2024-10-02 NOTE — PROGRESS NOTES
ANTICOAGULATION MANAGEMENT     Leonor Mercado 89 year old female is on warfarin with supratherapeutic INR result. (Goal INR 2.0-3.0)    Recent labs: (last 7 days)     10/02/24  1408   INR 3.3*       ASSESSMENT     Source(s): Chart Review and Patient/Caregiver Call     Warfarin doses taken: Warfarin taken as instructed  Diet: No new diet changes identified  Medication/supplement changes: None noted  New illness, injury, or hospitalization: No  Signs or symptoms of bleeding or clotting: No  Previous result: Supratherapeutic  Additional findings: patient's daughter, Cleo, talked to [facility] yesterday. Home meter will be shipped to them in 7-10 days.  Then training will need to be set up.  Patient will continue to utilize [facility] until home meter training is completed.       PLAN     Recommended plan for no diet, medication or health factor changes affecting INR     Dosing Instructions: decrease your warfarin dose (8% change) with next INR in 2 weeks       Summary  As of 10/2/2024      Full warfarin instructions:  1 mg every Mon, Thu; 2 mg all other days   Next INR check:  10/17/2024               Telephone call with Alfredo's daughter, Cleo, who agrees to plan and repeated back plan correctly    Lab visit scheduled    Education provided: Please call back if any changes to your diet, medications or how you've been taking warfarin    Plan made per [facility] anticoagulation protocol    Yoana Waddell RN  10/2/2024  Anticoagulation Clinic  [employer] for routing messages: tom BARRETT  [facility] patient phone line: [phone]        _______________________________________________________________________     Anticoagulation Episode Summary       Current INR goal:  2.0-3.0   TTR:  32.8% (1 y)   Target end date:  Indefinite   Send INR reminders to:  SAUL BARRETT    Indications    Chronic atrial fibrillation (H) [I48.20]  Long term current use of anticoagulants with INR goal of 2.0-3.0 [Z79.01]             Comments:                Anticoagulation Care Providers       Provider Role Specialty Phone number    Arnel Garcia MD Referring Internal Medicine - Pediatrics [phone]

## 2024-10-15 ENCOUNTER — ANTICOAGULATION THERAPY VISIT (OUTPATIENT)
Dept: ANTICOAGULATION | Facility: CLINIC | Age: 89
End: 2024-10-15
Payer: MEDICARE

## 2024-10-15 DIAGNOSIS — I48.20 CHRONIC ATRIAL FIBRILLATION (H): Primary | ICD-10-CM

## 2024-10-15 DIAGNOSIS — Z79.01 LONG TERM CURRENT USE OF ANTICOAGULANTS WITH INR GOAL OF 2.0-3.0: ICD-10-CM

## 2024-10-15 LAB — INR HOME MONITORING: 1.1 RATIO (ref 2–3)

## 2024-10-15 NOTE — PROGRESS NOTES
"ANTICOAGULATION MANAGEMENT     Leonor Mercado 89 year old female is on warfarin with subtherapeutic INR result. (Goal INR 2.0-3.0)    Recent labs: (last 7 days)     10/15/24  1551   INR 1.1*       ASSESSMENT     Source(s): Chart Review and Patient/Caregiver Call     Warfarin doses taken: Missed dose(s) may be affecting INR, Per Cleo, missed warfarin dose 10/13/24 for sure, unsure of what other days that were missed  Diet: No new diet changes identified  Medication/supplement changes: None noted  New illness, injury, or hospitalization: No  Signs or symptoms of bleeding or clotting: No  Previous result: Supratherapeutic  Additional findings: None       PLAN     Recommended plan for temporary change(s) affecting INR     Dosing Instructions: booster dose then continue your current warfarin dose with next INR in 8 days, daughter will be checking the INR on Wednesdays.       Summary  As of 10/15/2024      Full warfarin instructions:  10/15: 4 mg; Otherwise 1 mg every Mon, Thu; 2 mg all other days   Next INR check:  10/23/2024               Telephone call with Cleo, patient's daughter, who agrees to plan and repeated back plan correctly    Patient to recheck with home meter    Education provided: [employer] result received. Reviewed home monitoring testing expectations:   Ordered INR testing frequency is:  weekly  INR should be tested Monday-Friday prior to 2 pm and submitted directly to home monitoring company on day of testing  [facility] will continue to receive and manage INR results  In clinic lab visits may be periodically required if a home result is an error, INR  > 8, or testing supplies are not available.  Home monitoring services may be discontinue if testing requirements of insurance/home monitoring company are not met.  Responsible group changed to [facility] monitoring. Will receive future result management calls from this RN team  [facility] Health Maintenance Modifier: \"Anticoagulation: " "Home Monitoring\" added to chart       Plan made per [facility] anticoagulation protocol    Dilma Rider RN  10/15/2024  Anticoagulation Clinic  [employer] for routing messages: tom BARRETT  [facility] patient phone line: [phone]        _______________________________________________________________________     Anticoagulation Episode Summary       Current INR goal:  2.0-3.0   TTR:  32.9% (1 y)   Target end date:  Indefinite   Send INR reminders to:  SAUL BARRETT    Indications    Chronic atrial fibrillation (H) [I48.20]  Long term current use of anticoagulants with INR goal of 2.0-3.0 [Z79.01]             Comments:               Anticoagulation Care Providers       Provider Role Specialty Phone number    Arnel Garcia MD Referring Internal Medicine - Pediatrics [phone]            "

## 2024-10-23 ENCOUNTER — ANTICOAGULATION THERAPY VISIT (OUTPATIENT)
Dept: ANTICOAGULATION | Facility: CLINIC | Age: 89
End: 2024-10-23
Payer: MEDICARE

## 2024-10-23 DIAGNOSIS — I48.20 CHRONIC ATRIAL FIBRILLATION (H): Primary | ICD-10-CM

## 2024-10-23 DIAGNOSIS — Z79.01 LONG TERM CURRENT USE OF ANTICOAGULANTS WITH INR GOAL OF 2.0-3.0: ICD-10-CM

## 2024-10-23 LAB — INR HOME MONITORING: 2.2 RATIO (ref 2–3)

## 2024-10-23 NOTE — PROGRESS NOTES
ANTICOAGULATION MANAGEMENT     Leonor Mercado 89 year old female is on warfarin with therapeutic INR result. (Goal INR 2.0-3.0)    Recent labs: (last 7 days)     10/23/24  1808   INR 2.2       ASSESSMENT     Source(s): Chart Review and Patient/Caregiver Call     Warfarin doses taken: Booster dose(s) recently taken which may be affecting INR-taken 8 days ago. Cleo sets up medications, sister Les wasn't certain of current warfarin maintenance dose.  Diet: No new diet changes identified  Medication/supplement changes: None noted  New illness, injury, or hospitalization: No  Signs or symptoms of bleeding or clotting: No  Previous result: Subtherapeutic  Additional findings: Cleo had bronchoscopy today, she requested writer call sister Les only for today's home meter result.   Les informed writer she had difficulty getting first home reading, went through 3 test strips. Writer gave pointers on proper technique.       PLAN     Recommended plan for temporary change(s) affecting INR     Dosing Instructions: Continue your current warfarin dose with next INR in 1 week       Summary  As of 10/23/2024      Full warfarin instructions:  1 mg every Mon, Thu; 2 mg all other days   Next INR check:  10/30/2024               Telephone call with sister Les who verbalizes understanding and agrees to plan and who agrees to plan and repeated back plan correctly    Patient to recheck with home meter    Education provided: Taking warfarin: Importance of taking warfarin as instructed    Plan made per [facility] anticoagulation protocol    Mariola Valenzuela RN  10/23/2024  Anticoagulation Clinic  [employer] for routing messages: p ANTICOAG HOME MONITORING  [facility] patient phone line: [phone]        _______________________________________________________________________     Anticoagulation Episode Summary       Current INR goal:  2.0-3.0   TTR:  33.2% (1 y)   Target end date:  Indefinite   Send INR reminders to:  [facility]IT[facility]     Indications    Chronic atrial fibrillation (H) [I48.20]  Long term current use of anticoagulants with INR goal of 2.0-3.0 [Z79.01]             Comments:  --             Anticoagulation Care Providers       Provider Role Specialty Phone number    Arnel Garcia MD Referring Internal Medicine - Pediatrics [phone]

## 2024-10-29 DIAGNOSIS — I48.20 CHRONIC ATRIAL FIBRILLATION (H): ICD-10-CM

## 2024-10-29 RX ORDER — WARFARIN SODIUM 2 MG/1
TABLET ORAL
Qty: 100 TABLET | Refills: 1 | Status: SHIPPED | OUTPATIENT
Start: 2024-10-29

## 2024-10-29 NOTE — TELEPHONE ENCOUNTER
ANTICOAGULATION MANAGEMENT:  Medication Refill    Anticoagulation Summary  As of 10/23/2024      Warfarin maintenance plan:  1 mg (2 mg x 0.5) every Mon, Thu; 2 mg (2 mg x 1) all other days   Next INR check:  10/30/2024   Target end date:  Indefinite    Indications    Chronic atrial fibrillation (H) [I48.20]  Long term current use of anticoagulants with INR goal of 2.0-3.0 [Z79.01]                 Anticoagulation Care Providers       Provider Role Specialty Phone number    Arnel Garcia MD Referring Internal Medicine - Pediatrics [phone]            Refill Criteria    Visit with referring provider/group: Meets criteria: visit within referring provider group in the last 15 months on 8/28/24    [facility] referral last signed: 09/05/2024; within last year: Yes    Lab monitoring not exceeding 2 weeks overdue: Yes    Leonor meets all criteria for refill. Rx instructions and quantity supplied updated to match patient's current dosing plan. Warfarin 90 day supply with 1 refill granted per [facility] protocol     Charmaine Rodriguez RN  Anticoagulation Clinic

## 2024-10-30 ENCOUNTER — ANTICOAGULATION THERAPY VISIT (OUTPATIENT)
Dept: ANTICOAGULATION | Facility: CLINIC | Age: 89
End: 2024-10-30
Payer: MEDICARE

## 2024-10-30 LAB — INR HOME MONITORING: 2.4 RATIO (ref 2–3)

## 2024-10-30 NOTE — PROGRESS NOTES
ANTICOAGULATION MANAGEMENT     Leonor Mercado 89 year old female is on warfarin with therapeutic INR result. (Goal INR 2.0-3.0)    Recent labs: (last 7 days)     10/30/24  1834   INR 2.4       ASSESSMENT     Source(s): Chart Review and Patient/Caregiver Call     Warfarin doses taken: Warfarin taken as instructed  Diet: No new diet changes identified  Medication/supplement changes: None noted  New illness, injury, or hospitalization: No  Signs or symptoms of bleeding or clotting: No  Previous result: Therapeutic last visit; previously outside of goal range  Additional findings: None       PLAN     Dosing Instructions: Continue your current warfarin dose with next INR in 1 week       Summary  As of 10/30/2024      Full warfarin instructions:  1 mg every Mon, Thu; 2 mg all other days   Next INR check:  11/6/2024               Telephone call with Cleo (daughter) who verbalizes understanding and agrees to plan    Patient to recheck with home meter    Education provided: Contact [phone] with any changes, questions or concerns.     Plan made per [facility] anticoagulation protocol    Yun Landis RN  10/30/2024  Anticoagulation Clinic  [employer] for routing messages: p ANTICOAG HOME MONITORING  [facility] patient phone line: [phone]        _______________________________________________________________________     Anticoagulation Episode Summary       Current INR goal:  2.0-3.0   TTR:  35.2% (1 y)   Target end date:  Indefinite   Send INR reminders to:  [facility]IT[facility]    Indications    Chronic atrial fibrillation (H) [I48.20]  Long term current use of anticoagulants with INR goal of 2.0-3.0 [Z79.01]             Comments:  --             Anticoagulation Care Providers       Provider Role Specialty Phone number    Arnel Garcia MD Referring Internal Medicine - Pediatrics [phone]

## 2024-11-05 ENCOUNTER — ANCILLARY PROCEDURE (OUTPATIENT)
Dept: CARDIOLOGY | Facility: CLINIC | Age: 89
End: 2024-11-05
Attending: INTERNAL MEDICINE
Payer: MEDICARE

## 2024-11-05 DIAGNOSIS — Z95.0 CARDIAC PACEMAKER IN SITU: ICD-10-CM

## 2024-11-05 DIAGNOSIS — I49.5 SICK SINUS SYNDROME (H): ICD-10-CM

## 2024-11-05 PROCEDURE — 93294 REM INTERROG EVL PM/LDLS PM: CPT | Performed by: INTERNAL MEDICINE

## 2024-11-05 PROCEDURE — 93296 REM INTERROG EVL PM/IDS: CPT | Performed by: INTERNAL MEDICINE

## 2024-11-06 ENCOUNTER — ANTICOAGULATION THERAPY VISIT (OUTPATIENT)
Dept: ANTICOAGULATION | Facility: CLINIC | Age: 89
End: 2024-11-06
Payer: MEDICARE

## 2024-11-06 LAB — INR HOME MONITORING: 1.8 RATIO (ref 2–3)

## 2024-11-06 NOTE — PROGRESS NOTES
ANTICOAGULATION MANAGEMENT     Leonor Mercado 89 year old female is on warfarin with subtherapeutic INR result. (Goal INR 2.0-3.0)    Recent labs: (last 7 days)     11/06/24  1816   INR 1.8*       ASSESSMENT     Source(s): Chart Review and Patient/Caregiver Call     Warfarin doses taken: Missed dose(s) may be affecting INR  Diet: No new diet changes identified  Medication/supplement changes: None noted  New illness, injury, or hospitalization: No  Signs or symptoms of bleeding or clotting: No  Previous result: Therapeutic last 2(+) visits  Additional findings: None       PLAN       Dosing Instructions: booster dose then continue your current warfarin dose with next INR in 1 week       Summary  As of 11/6/2024      Full warfarin instructions:  11/6: 3 mg; Otherwise 1 mg every Mon, Thu; 2 mg all other days   Next INR check:  11/13/2024               Telephone call with Cleo (daughter) who agrees to plan and repeated back plan correctly    Patient to recheck with home meter    Education provided: Contact [phone] with any changes, questions or concerns.     Plan made per [facility] anticoagulation protocol    Yun Landis RN  11/6/2024  Anticoagulation Clinic  [employer] for routing messages: p ANTICOAG HOME MONITORING  [facility] patient phone line: [phone]        _______________________________________________________________________     Anticoagulation Episode Summary       Current INR goal:  2.0-3.0   TTR:  36.5% (1 y)   Target end date:  Indefinite   Send INR reminders to:  [facility]AG HOME MONITOR[facility]    Indications    Chronic atrial fibrillation (H) [I48.20]  Long term current use of anticoagulants with INR goal of 2.0-3.0 [Z79.01]             Comments:  --             Anticoagulation Care Providers       Provider Role Specialty Phone number    Arnel Garcia MD Referring Internal Medicine - Pediatrics [phone]

## 2024-11-13 ENCOUNTER — ANTICOAGULATION THERAPY VISIT (OUTPATIENT)
Dept: ANTICOAGULATION | Facility: CLINIC | Age: 89
End: 2024-11-13
Payer: MEDICARE

## 2024-11-13 DIAGNOSIS — I48.20 CHRONIC ATRIAL FIBRILLATION (H): Primary | ICD-10-CM

## 2024-11-13 DIAGNOSIS — Z79.01 LONG TERM CURRENT USE OF ANTICOAGULANTS WITH INR GOAL OF 2.0-3.0: ICD-10-CM

## 2024-11-13 LAB — INR HOME MONITORING: 2.4 RATIO (ref 2–3)

## 2024-11-13 NOTE — PROGRESS NOTES
ANTICOAGULATION MANAGEMENT     Leonor Mercado 89 year old female is on warfarin with therapeutic INR result. (Goal INR 2.0-3.0)    Recent labs: (last 7 days)     11/13/24  1632   INR 2.4       ASSESSMENT     Source(s): Chart Review and Patient/Caregiver Call     Warfarin doses taken: Warfarin taken as instructed confirmed that patient took the booster dose as instructed.  Diet:  eating better now that family is with her as she transition to her new place may be affecting diet and INR  Medication/supplement changes: None noted  New illness, injury, or hospitalization: No  Signs or symptoms of bleeding or clotting: No  Previous result: Subtherapeutic  Additional findings:  Moving into a different  2 bedroom to a one bedroom -in the same place.       PLAN     Recommended plan for temporary change(s) affecting INR     Dosing Instructions: Continue your current warfarin dose with next INR in 1 week       Summary  As of 11/13/2024      Full warfarin instructions:  1 mg every Mon, Thu; 2 mg all other days   Next INR check:  11/20/2024               Telephone call with patient's dtr Cleo who verbalizes understanding and agrees to plan    Patient to recheck with home meter    Education provided: Contact [phone] with any changes, questions or concerns.     Plan made per [facility] anticoagulation protocol    Adrianna Teresa RN  11/13/2024  Anticoagulation Clinic  [email or web address] for routing messages: p ANTICOAG HOME MONITORING  [facility] patient phone line: [phone]        _______________________________________________________________________     Anticoagulation Episode Summary       Current INR goal:  2.0-3.0   TTR:  36.7% (1 y)   Target end date:  Indefinite   Send INR reminders to:  ANTICOAG HOME MONITORING    Indications    Chronic atrial fibrillation (H) [I48.20]  Long term current use of anticoagulants with INR goal of 2.0-3.0 [Z79.01]             Comments:  --             Anticoagulation Care Providers       Provider Role  Specialty Phone number    Arnel Garcia MD Referring Internal Medicine - Pediatrics [phone]

## 2024-11-14 LAB
MDC_IDC_MSMT_BATTERY_DTM: NORMAL
MDC_IDC_MSMT_BATTERY_IMPEDANCE: 2476 OHM
MDC_IDC_MSMT_BATTERY_REMAINING_LONGEVITY: 33 MO
MDC_IDC_MSMT_BATTERY_STATUS: NORMAL
MDC_IDC_MSMT_BATTERY_VOLTAGE: 2.74 V
MDC_IDC_MSMT_LEADCHNL_RA_IMPEDANCE_VALUE: 67 OHM
MDC_IDC_MSMT_LEADCHNL_RV_IMPEDANCE_VALUE: 695 OHM
MDC_IDC_PG_IMPLANT_DTM: NORMAL
MDC_IDC_PG_MFG: NORMAL
MDC_IDC_PG_MODEL: NORMAL
MDC_IDC_PG_SERIAL: NORMAL
MDC_IDC_PG_TYPE: NORMAL
MDC_IDC_SESS_CLINIC_NAME: NORMAL
MDC_IDC_SESS_DTM: NORMAL
MDC_IDC_SESS_TYPE: NORMAL
MDC_IDC_SET_BRADY_LOWRATE: 60 {BEATS}/MIN
MDC_IDC_SET_BRADY_MAX_SENSOR_RATE: 120 {BEATS}/MIN
MDC_IDC_SET_BRADY_MAX_TRACKING_RATE: 105 {BEATS}/MIN
MDC_IDC_SET_BRADY_MODE: NORMAL
MDC_IDC_SET_LEADCHNL_RV_PACING_AMPLITUDE: 2.5 V
MDC_IDC_SET_LEADCHNL_RV_PACING_CAPTURE_MODE: NORMAL
MDC_IDC_SET_LEADCHNL_RV_PACING_POLARITY: NORMAL
MDC_IDC_SET_LEADCHNL_RV_PACING_PULSEWIDTH: 0.52 MS
MDC_IDC_SET_LEADCHNL_RV_SENSING_POLARITY: NORMAL
MDC_IDC_SET_LEADCHNL_RV_SENSING_SENSITIVITY: 2.8 MV
MDC_IDC_SET_ZONE_DETECTION_INTERVAL: 333.33 MS
MDC_IDC_SET_ZONE_STATUS: NORMAL
MDC_IDC_SET_ZONE_STATUS: NORMAL
MDC_IDC_SET_ZONE_TYPE: NORMAL
MDC_IDC_SET_ZONE_TYPE: NORMAL
MDC_IDC_SET_ZONE_VENDOR_TYPE: NORMAL
MDC_IDC_SET_ZONE_VENDOR_TYPE: NORMAL
MDC_IDC_STAT_AT_BURDEN_PERCENT: 0 %
MDC_IDC_STAT_AT_DTM_END: NORMAL
MDC_IDC_STAT_AT_DTM_START: NORMAL
MDC_IDC_STAT_BRADY_DTM_END: NORMAL
MDC_IDC_STAT_BRADY_DTM_START: NORMAL
MDC_IDC_STAT_BRADY_RV_PERCENT_PACED: 76 %
MDC_IDC_STAT_EPISODE_RECENT_COUNT: 0
MDC_IDC_STAT_EPISODE_RECENT_COUNT: 0
MDC_IDC_STAT_EPISODE_RECENT_COUNT_DTM_END: NORMAL
MDC_IDC_STAT_EPISODE_RECENT_COUNT_DTM_END: NORMAL
MDC_IDC_STAT_EPISODE_RECENT_COUNT_DTM_START: NORMAL
MDC_IDC_STAT_EPISODE_RECENT_COUNT_DTM_START: NORMAL
MDC_IDC_STAT_EPISODE_TYPE: NORMAL
MDC_IDC_STAT_EPISODE_TYPE: NORMAL

## 2024-11-20 ENCOUNTER — ANTICOAGULATION THERAPY VISIT (OUTPATIENT)
Dept: ANTICOAGULATION | Facility: CLINIC | Age: 89
End: 2024-11-20
Payer: MEDICARE

## 2024-11-20 DIAGNOSIS — I48.20 CHRONIC ATRIAL FIBRILLATION (H): Primary | ICD-10-CM

## 2024-11-20 DIAGNOSIS — Z79.01 LONG TERM CURRENT USE OF ANTICOAGULANTS WITH INR GOAL OF 2.0-3.0: ICD-10-CM

## 2024-11-20 LAB — INR HOME MONITORING: 3.9 RATIO (ref 2–3)

## 2024-11-21 NOTE — PROGRESS NOTES
ANTICOAGULATION MANAGEMENT     Leonor Mercado 89 year old female is on warfarin with supratherapeutic INR result. (Goal INR 2.0-3.0)    Recent labs: (last 7 days)     11/20/24  1844   INR 3.9*       ASSESSMENT     Source(s): Chart Review  Previous INR was Therapeutic last visit; previously outside of goal range  Medication, diet, health changes since last INR chart reviewed; none identified         PLAN     Unable to reach Cleo today.    Left message to hold dose in the AM-noted patient usually takes in the morning    Follow up required to confirm warfarin dose taken and assess for changes and discuss out of range result     Tita Ha, RN  11/20/2024  Anticoagulation Clinic  [facility] for routing messages: tom [facility]  [facility] patient phone line: [phone]

## 2024-11-21 NOTE — PROGRESS NOTES
ANTICOAGULATION MANAGEMENT     Leonor Mercado 89 year old female is on warfarin with supratherapeutic INR result. (Goal INR 2.0-3.0)    Recent labs: (last 7 days)     11/20/24  1844   INR 3.9*       ASSESSMENT     Source(s): Chart Review and Patient/Caregiver Call     Warfarin doses taken: Warfarin taken as instructed  Diet: Decreased greens/vitamin K in diet; plans to resume previous intake  Medication/supplement changes: None noted  New illness, injury, or hospitalization: No  Signs or symptoms of bleeding or clotting: No  Previous result: Therapeutic last visit; previously outside of goal range  Additional findings:  Patient has not moved yet into her new apartment and per daughter the patient is under a lot of stress due to the move and downsizing.  Cleo did get message and had patient hold dose this morning       PLAN     Recommended plan for temporary change(s) affecting INR     Dosing Instructions: hold dose then continue your current warfarin dose with next INR in 1 week       Summary  As of 11/20/2024      Full warfarin instructions:  11/21: Hold; Otherwise 1 mg every Mon, Thu; 2 mg all other days   Next INR check:  11/27/2024               Telephone call with Cleo who verbalizes understanding and agrees to plan    Patient to recheck with home meter    Education provided: Dietary considerations: importance of consistent vitamin K intake and impact of vitamin K foods on INR    Plan made per [facility] anticoagulation protocol    Tita Ha RN  11/21/2024  Anticoagulation Clinic  [facility] for routing messages: p ANTICOAG HOME MONITORING  [facility] patient phone line: [phone]        _______________________________________________________________________     Anticoagulation Episode Summary       Current INR goal:  2.0-3.0   TTR:  37.4% (1 y)   Target end date:  Indefinite   Send INR reminders to:  ANTICOAG HOME MONITORING    Indications    Chronic atrial fibrillation (H) [I48.20]  Long term current use of  anticoagulants with INR goal of 2.0-3.0 [Z79.01]             Comments:  --             Anticoagulation Care Providers       Provider Role Specialty Phone number    Arnel Garcia MD Referring Internal Medicine - Pediatrics [phone]

## 2024-12-04 ENCOUNTER — ANTICOAGULATION THERAPY VISIT (OUTPATIENT)
Dept: ANTICOAGULATION | Facility: CLINIC | Age: 89
End: 2024-12-04
Payer: MEDICARE

## 2024-12-04 DIAGNOSIS — Z79.01 LONG TERM CURRENT USE OF ANTICOAGULANTS WITH INR GOAL OF 2.0-3.0: ICD-10-CM

## 2024-12-04 DIAGNOSIS — I48.20 CHRONIC ATRIAL FIBRILLATION (H): Primary | ICD-10-CM

## 2024-12-04 LAB — INR HOME MONITORING: 5.3 RATIO (ref 2–3)

## 2024-12-09 ENCOUNTER — ANTICOAGULATION THERAPY VISIT (OUTPATIENT)
Dept: ANTICOAGULATION | Facility: CLINIC | Age: 89
End: 2024-12-09
Payer: MEDICARE

## 2024-12-09 DIAGNOSIS — I48.20 CHRONIC ATRIAL FIBRILLATION (H): Primary | ICD-10-CM

## 2024-12-09 DIAGNOSIS — Z79.01 LONG TERM CURRENT USE OF ANTICOAGULANTS WITH INR GOAL OF 2.0-3.0: ICD-10-CM

## 2024-12-09 LAB — INR HOME MONITORING: 7.5 RATIO (ref 2–3)

## 2024-12-09 NOTE — PROGRESS NOTES
ANTICOAGULATION MANAGEMENT     Leonor Mercado 89 year old female is on warfarin with supratherapeutic INR result. (Goal INR 2.0-3.0)    Recent labs: (last 7 days)     12/09/24  1553   INR 7.5*       ASSESSMENT     Source(s): Chart Review and Patient/Caregiver Call     Warfarin doses taken: Held for INR 4.2  recently which may be affecting INR  Diet:  still adjusting but not as much different from last Fr may be affecting diet and INR  Medication/supplement changes: None noted  New illness, injury, or hospitalization: No  Signs or symptoms of bleeding or clotting: No  Previous result: Supratherapeutic  Additional findings:  Will update referring MD of critical Inr today       PLAN     Recommended plan for temporary change(s) affecting INR     Dosing Instructions: hold today and tomorrow doses then decrease your warfarin dose (20% change) with next INR in 2 days       Summary  As of 12/9/2024      Full warfarin instructions:  12/9: Hold; 12/10: Hold; Otherwise 2 mg every Tue; 1 mg all other days   Next INR check:  12/11/2024               Telephone call with patient's dtr. Cleo who verbalizes understanding and agrees to plan and who agrees to plan and repeated back plan correctly    Patient to recheck with home meter    Education provided: Symptom monitoring: monitoring for bleeding signs and symptoms, when to seek medical attention/emergency care, and if you hit your head or have a bad fall seek emergency care  Contact [phone] with any changes, questions or concerns.     Plan made per [facility] anticoagulation protocol    Adrianna Teresa RN  12/9/2024  Anticoagulation Clinic  [facility] for routing messages: tom ANTICOARPIT HOME MONITORING  [facility] patient phone line: [phone]        _______________________________________________________________________     Anticoagulation Episode Summary       Current INR goal:  2.0-3.0   TTR:  36.8% (1 y)   Target end date:  Indefinite   Send INR reminders to:  [facility]    Indications    Chronic atrial fibrillation (H) [I48.20]  Long term current use of anticoagulants with INR goal of 2.0-3.0 [Z79.01]             Comments:  --             Anticoagulation Care Providers       Provider Role Specialty Phone number    Arnel Garcia MD Referring Internal Medicine - Pediatrics [phone]

## 2024-12-11 ENCOUNTER — ANTICOAGULATION THERAPY VISIT (OUTPATIENT)
Dept: ANTICOAGULATION | Facility: CLINIC | Age: 89
End: 2024-12-11
Payer: MEDICARE

## 2024-12-11 DIAGNOSIS — Z79.01 LONG TERM CURRENT USE OF ANTICOAGULANTS WITH INR GOAL OF 2.0-3.0: ICD-10-CM

## 2024-12-11 DIAGNOSIS — I48.20 CHRONIC ATRIAL FIBRILLATION (H): Primary | ICD-10-CM

## 2024-12-11 LAB — INR HOME MONITORING: 8 RATIO (ref 2–3)

## 2024-12-11 RX ORDER — PHYTONADIONE (VIT K1) 1000 MCG
3 CAPSULE ORAL ONCE
Qty: 3 CAPSULE | Refills: 0 | Status: SHIPPED | OUTPATIENT
Start: 2024-12-11 | End: 2024-12-11

## 2024-12-11 NOTE — PROGRESS NOTES
ANTICOAGULATION MANAGEMENT     Leonor Mercado 89 year old female is on warfarin with supratherapeutic INR result. (Goal INR 2.0-3.0)    Recent labs: (last 7 days)     12/11/24  1745   INR 8*       ASSESSMENT     Source(s): Chart Review and Patient/Caregiver Call     Warfarin doses taken: Held for 7.5 INR  recently which may be affecting INR, daughter confirms patient has the 2 mg lavendar colored tablets that they cut in half to make the 1 mg tablet size on lower dose days, she sets up medications for patient and reports she took the medications out of the pill box on 12/9 so she knows patient hasn't taken any extra.  Diet: Increased greens/vitamin K in diet; plans to resume previous intake, daughter reports no alcohol/tobacco/THC/CBD in diet, no cranberries, no change in coffee intake. No boost/ensure. Patient diet is off a little with intake as she is stressed from her apartment move.  Medication/supplement changes: None  New illness, injury, or hospitalization: No  Signs or symptoms of bleeding or clotting: No  Previous result: Supratherapeutic  Additional findings: Sent rx to [facility] pharmacy for vitamin K 3 mg, advised that patient take all 3 capsules for total dose of 3 mg. Daughter will do her best to get the medication form pharmacy tonight and at latest tomorrow morning first thing and give to patient.  Daughter reports that patient has been very upset and stressed, they moved her to an apartment in the same [place] but closer walking distance to rec room and dining room, patient is not adjusting to the change well. She reports her stress level is high and she is having a hard time getting back into her routines as she adjusts to the new location.       PLAN     Recommended plan for ongoing change(s) affecting INR     Dosing Instructions: hold 2 doses then continue your current warfarin dose with next INR in 2 days       Summary  As of 12/11/2024      Full warfarin instructions:  12/11: Hold; 12/12:  Hold; Otherwise 2 mg every Tue; 1 mg all other days   Next INR check:  12/13/2024               Telephone call with Cleo,daughter, who verbalizes understanding and agrees to plan    Patient to recheck with home meter    Education provided: Please call back if any changes to your diet, medications or how you've been taking warfarin  Symptom monitoring: monitoring for bleeding signs and symptoms, when to seek medical attention/emergency care, and if you hit your head or have a bad fall seek emergency care    Plan made with [facility] Pharmacist Eli Gonzalez RN  12/11/2024  Anticoagulation Clinic  [facility] for routing messages: p ANTICOAG HOME MONITORING  [facility] patient phone line: [phone]        _______________________________________________________________________     Anticoagulation Episode Summary       Current INR goal:  2.0-3.0   TTR:  36.2% (1 y)   Target end date:  Indefinite   Send INR reminders to:  ANTICOAG HOME MONITORING    Indications    Chronic atrial fibrillation (H) [I48.20]  Long term current use of anticoagulants with INR goal of 2.0-3.0 [Z79.01]             Comments:  --             Anticoagulation Care Providers       Provider Role Specialty Phone number    Arnel Garcia MD Referring Internal Medicine - Pediatrics [phone]

## 2024-12-18 ENCOUNTER — ANTICOAGULATION THERAPY VISIT (OUTPATIENT)
Dept: ANTICOAGULATION | Facility: CLINIC | Age: 89
End: 2024-12-18
Payer: MEDICARE

## 2024-12-18 DIAGNOSIS — I48.20 CHRONIC ATRIAL FIBRILLATION (H): Primary | ICD-10-CM

## 2024-12-18 DIAGNOSIS — Z79.01 LONG TERM CURRENT USE OF ANTICOAGULANTS WITH INR GOAL OF 2.0-3.0: ICD-10-CM

## 2024-12-18 LAB — INR HOME MONITORING: 1.9 RATIO (ref 2–3)

## 2024-12-18 NOTE — PROGRESS NOTES
ANTICOAGULATION MANAGEMENT     Leonor Mercado 89 year old female is on warfarin with subtherapeutic INR result. (Goal INR 2.0-3.0)    Recent labs: (last 7 days)     12/18/24  1835   INR 1.9*       ASSESSMENT     Source(s): Chart Review and Patient/Caregiver Call     Warfarin doses taken: Warfarin taken as instructed Patient held warfarin x4 days last week which may be effecting INR.  Diet: No new diet changes identified  Medication/supplement changes: None noted  New illness, injury, or hospitalization: Yes: patient moved and was feeling a lot of stress last week. Daughter states patient is starting to feel settled now.  Signs or symptoms of bleeding or clotting: No  Previous result: Subtherapeutic  Additional findings: None       PLAN     Recommended plan for no diet, medication or health factor changes affecting INR     Dosing Instructions: Continue your current warfarin dose with next INR in 1 week       Summary  As of 12/18/2024      Full warfarin instructions:  1 mg every day   Next INR check:  12/25/2024               Telephone call with Cleo who verbalizes understanding and agrees to plan    Patient to recheck with home meter    Education provided: Please call back if any changes to your diet, medications or how you've been taking warfarin  Symptom monitoring: monitoring for bleeding signs and symptoms, monitoring for clotting signs and symptoms, and monitoring for stroke signs and symptoms    Plan made with [facility] Pharmacist Eli Harper RN  12/18/2024  Anticoagulation Clinic  [facility] for routing messages: p ANTICOAG HOME MONITORING  [facility] patient phone line: [phone]        _______________________________________________________________________     Anticoagulation Episode Summary       Current INR goal:  2.0-3.0   TTR:  34.4% (1 y)   Target end date:  Indefinite   Send INR reminders to:  ANTICOAG HOME MONITORING    Indications    Chronic atrial fibrillation (H) [I48.20]  Long term  current use of anticoagulants with INR goal of 2.0-3.0 [Z79.01]             Comments:  --             Anticoagulation Care Providers       Provider Role Specialty Phone number    Arnel Garcia MD Referring Internal Medicine - Pediatrics [phone]

## 2024-12-21 ENCOUNTER — HEALTH MAINTENANCE LETTER (OUTPATIENT)
Age: 89
End: 2024-12-21

## 2024-12-26 ENCOUNTER — TELEPHONE (OUTPATIENT)
Dept: PEDIATRICS | Facility: CLINIC | Age: 89
End: 2024-12-26
Payer: MEDICARE

## 2024-12-26 ENCOUNTER — ANTICOAGULATION THERAPY VISIT (OUTPATIENT)
Dept: ANTICOAGULATION | Facility: CLINIC | Age: 89
End: 2024-12-26
Payer: MEDICARE

## 2024-12-26 DIAGNOSIS — I48.20 CHRONIC ATRIAL FIBRILLATION (H): Primary | ICD-10-CM

## 2024-12-26 DIAGNOSIS — Z79.01 LONG TERM CURRENT USE OF ANTICOAGULANTS WITH INR GOAL OF 2.0-3.0: ICD-10-CM

## 2024-12-26 LAB — INR HOME MONITORING: 1.4 RATIO (ref 2–3)

## 2024-12-26 NOTE — TELEPHONE ENCOUNTER
Symptoms    Describe your symptoms: Severe pain in lower right hip. Very difficult moving and at this point barely can walk.     Any pain: Yes: Severe pain.    How long have you been having symptoms: 2  months    Have you been seen for this:  No    Appointment offered?: Yes: Wants to hear back first from the care team because it will be difficult to get her to the clinic because of the pain she is experiencing.      Triage offered?: No    Home remedies tried: Bio sprays, sports sprays, and Aspercreme, Voltaren.     Preferred Pharmacy:   [facility] [address] [place] [address]  [address] [place] [id number]  Phone: [phone] Fax: [phone]      Could we send this information to you in [facility] or would you prefer to receive a phone call?:   Patient would prefer a phone call   Okay to leave a detailed message?: Yes at Other phone number:  [phone]

## 2024-12-26 NOTE — PROGRESS NOTES
ANTICOAGULATION MANAGEMENT     Leonor Mercado 89 year old female is on warfarin with subtherapeutic INR result. (Goal INR 2.0-3.0)    Recent labs: (last 7 days)     12/26/24  1610   INR 1.4*       ASSESSMENT     Source(s): Chart Review and Patient/Caregiver Call     Warfarin doses taken: Warfarin taken as instructed  Diet:  appetite does seem to be slowly improving  Medication/supplement changes: None noted  New illness, injury, or hospitalization: spoke with RN triage today about her hip pain. This is not a new issue, has been going on for several months but they are wondering about getting a cortisone injection.  Signs or symptoms of bleeding or clotting: No  Previous result: Subtherapeutic following several holds for critically elevated INR. Now has trended too low on reduced maintenance dose.   Additional findings: None       PLAN     Recommended plan for ongoing change(s) affecting INR     Dosing Instructions: Increase your warfarin dose (14% change) with next INR in 1 week       Summary  As of 12/26/2024      Full warfarin instructions:  2 mg every Fri; 1 mg all other days   Next INR check:  1/2/2025               Telephone call with daughter who verbalizes understanding and agrees to plan    Patient to recheck with home meter    Education provided: Contact [phone] with any changes, questions or concerns.     Plan made per [facility] anticoagulation protocol    Joellen Lynch RN  12/26/2024  Anticoagulation Clinic  [facility] for routing messages: p ANTICOAG HOME MONITORING  [facility] patient phone line: [phone]        _______________________________________________________________________     Anticoagulation Episode Summary       Current INR goal:  2.0-3.0   TTR:  32.2% (1 y)   Target end date:  Indefinite   Send INR reminders to:  ANTICOAG HOME MONITORING    Indications    Chronic atrial fibrillation (H) [I48.20]  Long term current use of anticoagulants with INR goal of 2.0-3.0 [Z79.01]              Comments:  --             Anticoagulation Care Providers       Provider Role Specialty Phone number    Arnel Garcia MD Referring Internal Medicine - Pediatrics [phone]

## 2024-12-27 NOTE — TELEPHONE ENCOUNTER
RN attempted to contact patient at [phone], patient's daughter Cleo answered (CTC on file). Cleo in not with the patient right now, will call back when she is with patient to triage hip pain.    Soraya Beckett RN

## 2024-12-30 NOTE — TELEPHONE ENCOUNTER
RN called and spoke with patient's daughter, Cleo. Cleo is planning to take patient to [facility] in [place] to try and get everything done in one visit. RN advised to contact clinic if any questions or concerns.     Kris ALCAZAR RN 12/30/2024 at 10:46 AM

## 2025-01-02 ENCOUNTER — ANTICOAGULATION THERAPY VISIT (OUTPATIENT)
Dept: ANTICOAGULATION | Facility: CLINIC | Age: OVER 89
End: 2025-01-02
Payer: MEDICARE

## 2025-01-02 DIAGNOSIS — I48.20 CHRONIC ATRIAL FIBRILLATION (H): Primary | ICD-10-CM

## 2025-01-02 DIAGNOSIS — Z79.01 LONG TERM CURRENT USE OF ANTICOAGULANTS WITH INR GOAL OF 2.0-3.0: ICD-10-CM

## 2025-01-02 LAB — INR HOME MONITORING: 1.4 RATIO (ref 2–3)

## 2025-01-02 NOTE — PROGRESS NOTES
ANTICOAGULATION MANAGEMENT     Leonor Mercado 89 year old female is on warfarin with subtherapeutic INR result. (Goal INR 2.0-3.0)    Recent labs: (last 7 days)     01/02/25  1329   INR 1.4*       ASSESSMENT     Source(s): Chart Review and Patient/Caregiver Call     Warfarin doses taken: Warfarin taken as instructed. Daughter sets up patient's medication in a pill strip. Daughter states the pill strip is empty as if she has taken all of her medications.   Diet: No new diet changes identified  Medication/supplement changes: None noted  New illness, injury, or hospitalization: No  Signs or symptoms of bleeding or clotting: No  Previous result: Subtherapeutic  Additional findings: None       PLAN     Recommended plan for no diet, medication or health factor changes affecting INR     Dosing Instructions: booster dose then Increase your warfarin dose (13% change) with next INR in 1 week (patient prefers Wednesday checks)       Summary  As of 1/2/2025      Full warfarin instructions:  1/2: 2 mg; Otherwise 2 mg every Tue, Fri; 1 mg all other days   Next INR check:  1/8/2025               Telephone call with Cleo who verbalizes understanding and agrees to plan    Patient to recheck with home meter    Education provided: Please call back if any changes to your diet, medications or how you've been taking warfarin  Symptom monitoring: monitoring for bleeding signs and symptoms and monitoring for clotting signs and symptoms    Plan made per [facility] anticoagulation protocol    Adrianna Harper RN  1/2/2025  Anticoagulation Clinic  [email or web address] for routing messages: p ANTICOAG HOME MONITORING  [facility] patient phone line: [phone]        _______________________________________________________________________     Anticoagulation Episode Summary       Current INR goal:  2.0-3.0   TTR:  32.2% (1 y)   Target end date:  Indefinite   Send INR reminders to:  ANTICOAG HOME MONITORING    Indications    Chronic atrial fibrillation (H)  [I48.20]  Long term current use of anticoagulants with INR goal of 2.0-3.0 [Z79.01]             Comments:  --             Anticoagulation Care Providers       Provider Role Specialty Phone number    Arnel Garcia MD Referring Internal Medicine - Pediatrics [phone]

## 2025-01-08 ENCOUNTER — ANTICOAGULATION THERAPY VISIT (OUTPATIENT)
Dept: ANTICOAGULATION | Facility: CLINIC | Age: OVER 89
End: 2025-01-08
Payer: MEDICARE

## 2025-01-08 DIAGNOSIS — Z79.01 LONG TERM CURRENT USE OF ANTICOAGULANTS WITH INR GOAL OF 2.0-3.0: ICD-10-CM

## 2025-01-08 DIAGNOSIS — I48.20 CHRONIC ATRIAL FIBRILLATION (H): Primary | ICD-10-CM

## 2025-01-08 LAB — INR HOME MONITORING: 2.2 RATIO (ref 2–3)

## 2025-01-08 NOTE — PROGRESS NOTES
"ANTICOAGULATION MANAGEMENT     Leonor Mercado 89 year old female is on warfarin with therapeutic INR result. (Goal INR 2.0-3.0)    Recent labs: (last 7 days)     01/08/25  1610   INR 2.2       ASSESSMENT     Source(s): Chart Review and Patient/Caregiver Call     Warfarin doses taken: Warfarin taken as instructed  Diet: No new diet changes identified.   Medication/supplement changes: None noted  New illness, injury, or hospitalization: No. Daughter reports that patient's facility is currently in \"lock down\" due to a upper respiratory infection that is going around. Thus far, patient is feeling fine.   Signs or symptoms of bleeding or clotting: No  Previous result: Subtherapeutic  Additional findings: None       PLAN     Recommended plan for no diet, medication or health factor changes affecting INR     Dosing Instructions: Continue your current warfarin dose with next INR in 1 week       Summary  As of 1/8/2025      Full warfarin instructions:  2 mg every Tue, Fri; 1 mg all other days   Next INR check:  1/15/2025               Telephone call with Cleo who verbalizes understanding and agrees to plan    Patient to recheck with home meter    Education provided: Please call back if any changes to your diet, medications or how you've been taking warfarin  Symptom monitoring: monitoring for bleeding signs and symptoms and monitoring for clotting signs and symptoms    Plan made per [facility] anticoagulation protocol    Adrianna Harper RN  1/8/2025  Anticoagulation Clinic  [employer] for routing messages: tom ANTICOAG HOME MONITORING  [facility] patient phone line: [phone]        _______________________________________________________________________     Anticoagulation Episode Summary       Current INR goal:  2.0-3.0   TTR:  31.6% (1 y)   Target end date:  Indefinite   Send INR reminders to:  ANTICOAG HOME MONITORING    Indications    Chronic atrial fibrillation (H) [I48.20]  Long term current use of anticoagulants with INR goal of " 2.0-3.0 [Z79.01]             Comments:  --             Anticoagulation Care Providers       Provider Role Specialty Phone number    Arnel Garcia MD Referring Internal Medicine - Pediatrics [phone]

## 2025-01-15 ENCOUNTER — ANTICOAGULATION THERAPY VISIT (OUTPATIENT)
Dept: ANTICOAGULATION | Facility: CLINIC | Age: OVER 89
End: 2025-01-15
Payer: MEDICARE

## 2025-01-15 DIAGNOSIS — I48.20 CHRONIC ATRIAL FIBRILLATION (H): Primary | ICD-10-CM

## 2025-01-15 DIAGNOSIS — Z79.01 LONG TERM CURRENT USE OF ANTICOAGULANTS WITH INR GOAL OF 2.0-3.0: ICD-10-CM

## 2025-01-15 LAB — INR HOME MONITORING: 1.3 RATIO (ref 2–3)

## 2025-01-15 NOTE — PROGRESS NOTES
ANTICOAGULATION MANAGEMENT     Leonor Mercado 89 year old female is on warfarin with subtherapeutic INR result. (Goal INR 2.0-3.0)    Recent labs: (last 7 days)     01/15/25  1442   INR 1.3*       ASSESSMENT     Source(s): Chart Review and Patient/Caregiver Call     Warfarin doses taken: Less warfarin taken than planned which may be affecting INR  Diet: No new diet changes identified  Medication/supplement changes: None noted  New illness, injury, or hospitalization: No  Signs or symptoms of bleeding or clotting: No  Previous result: Therapeutic last visit; previously outside of goal range  Additional findings: None       PLAN     Recommended plan for temporary change(s) affecting INR     Dosing Instructions: booster dose then continue your current warfarin dose with next INR in 1 week       Summary  As of 1/15/2025      Full warfarin instructions:  1/15: 3 mg; Otherwise 2 mg every Tue, Fri; 1 mg all other days   Next INR check:  1/22/2025               Telephone call with Cleo , daughter,  who verbalizes understanding and agrees to plan    Patient to recheck with home meter    Education provided: None required    Plan made per [facility] anticoagulation protocol    Rhianna Austin RN  1/15/2025  Anticoagulation Clinic  [employer] for routing messages: p ANTICOAG HOME MONITORING  [facility] patient phone line: [phone]        _______________________________________________________________________     Anticoagulation Episode Summary       Current INR goal:  2.0-3.0   TTR:  30.1% (1 y)   Target end date:  Indefinite   Send INR reminders to:  ANTICOAG HOME MONITORING    Indications    Chronic atrial fibrillation (H) [I48.20]  Long term current use of anticoagulants with INR goal of 2.0-3.0 [Z79.01]             Comments:  --             Anticoagulation Care Providers       Provider Role Specialty Phone number    Arnel Garcia MD Referring Internal Medicine - Pediatrics [phone]

## 2025-01-22 ENCOUNTER — ANTICOAGULATION THERAPY VISIT (OUTPATIENT)
Dept: ANTICOAGULATION | Facility: CLINIC | Age: OVER 89
End: 2025-01-22
Payer: MEDICARE

## 2025-01-22 DIAGNOSIS — Z79.01 LONG TERM CURRENT USE OF ANTICOAGULANTS WITH INR GOAL OF 2.0-3.0: ICD-10-CM

## 2025-01-22 DIAGNOSIS — I48.20 CHRONIC ATRIAL FIBRILLATION (H): Primary | ICD-10-CM

## 2025-01-22 LAB — INR HOME MONITORING: 2 RATIO (ref 2–3)

## 2025-01-22 NOTE — PROGRESS NOTES
ANTICOAGULATION MANAGEMENT     Leonor Mercado 89 year old female is on warfarin with therapeutic INR result. (Goal INR 2.0-3.0)    Recent labs: (last 7 days)     01/22/25  1545   INR 2       ASSESSMENT     Source(s): Chart Review and Patient/Caregiver Call     Warfarin doses taken: Warfarin taken as instructed  Diet: No new diet changes identified  Medication/supplement changes: None noted  New illness, injury, or hospitalization: No  Signs or symptoms of bleeding or clotting: No  Previous result: Subtherapeutic  Additional findings: None       PLAN     Recommended plan for no diet, medication or health factor changes affecting INR     Dosing Instructions: Continue your current warfarin dose with next INR in 1 week       Summary  As of 1/22/2025      Full warfarin instructions:  2 mg every Tue, Fri; 1 mg all other days   Next INR check:  1/29/2025               Telephone call with Cleo, daughter,  who verbalizes understanding and agrees to plan    Patient to recheck with home meter    Education provided: None required    Plan made per [facility] anticoagulation protocol    Rhianna Austin RN  1/22/2025  Anticoagulation Clinic  [employer] for routing messages: p ANTICOAG HOME MONITORING  [facility] patient phone line: [phone]        _______________________________________________________________________     Anticoagulation Episode Summary       Current INR goal:  2.0-3.0   TTR:  28.2% (1 y)   Target end date:  Indefinite   Send INR reminders to:  ANTICOAG HOME MONITORING    Indications    Chronic atrial fibrillation (H) [I48.20]  Long term current use of anticoagulants with INR goal of 2.0-3.0 [Z79.01]             Comments:  --             Anticoagulation Care Providers       Provider Role Specialty Phone number    Arnel Garcia MD Referring Internal Medicine - Pediatrics [phone]

## 2025-01-29 ENCOUNTER — ANTICOAGULATION THERAPY VISIT (OUTPATIENT)
Dept: ANTICOAGULATION | Facility: CLINIC | Age: OVER 89
End: 2025-01-29
Payer: MEDICARE

## 2025-01-29 DIAGNOSIS — I48.20 CHRONIC ATRIAL FIBRILLATION (H): Primary | ICD-10-CM

## 2025-01-29 DIAGNOSIS — Z79.01 LONG TERM CURRENT USE OF ANTICOAGULANTS WITH INR GOAL OF 2.0-3.0: ICD-10-CM

## 2025-01-29 LAB — INR HOME MONITORING: 1.7 RATIO (ref 2–3)

## 2025-01-29 NOTE — PROGRESS NOTES
ANTICOAGULATION MANAGEMENT     Leonor Mercado 89 year old female is on warfarin with subtherapeutic INR result. (Goal INR 2.0-3.0)    Recent labs: (last 7 days)     01/29/25  1536   INR 1.7*       ASSESSMENT     Source(s): Chart Review and Patient/Caregiver Call     Warfarin doses taken: Warfarin taken as instructed  Diet:  Possibly was eating a bit different this past week. Her facility was on lock down for respiratory virus so she was taking her meals in her room. One meal per day provided by facility and Alfredo  supplements otherwise with her own food. Facility is no longer on lockdown as of today  Medication/supplement changes: None noted  New illness, injury, or hospitalization: No  Signs or symptoms of bleeding or clotting: No  Previous result: Therapeutic last visit; previously outside of goal range  Additional findings: None       PLAN     Recommended plan for temporary change(s) affecting INR     Dosing Instructions: booster dose then continue your current warfarin dose with next INR in 1 week       Summary  As of 1/29/2025      Full warfarin instructions:  1/29: 2 mg; Otherwise 2 mg every Tue, Fri; 1 mg all other days   Next INR check:  2/5/2025               Telephone call with daughter Cleo who verbalizes understanding and agrees to plan    Patient to recheck with home meter    Education provided: Contact [phone] with any changes, questions or concerns.     Plan made per [facility] anticoagulation protocol    Joellen Lynch RN  1/29/2025  Anticoagulation Clinic  [employer] for routing messages: p ANTICOAG HOME MONITORING  [facility] patient phone line: [phone]        _______________________________________________________________________     Anticoagulation Episode Summary       Current INR goal:  2.0-3.0   TTR:  26.3% (1 y)   Target end date:  Indefinite   Send INR reminders to:  ANTICOAG HOME MONITORING    Indications    Chronic atrial fibrillation (H) [I48.20]  Long term current use of  anticoagulants with INR goal of 2.0-3.0 [Z79.01]             Comments:  --             Anticoagulation Care Providers       Provider Role Specialty Phone number    Arnel Garcia MD Referring Internal Medicine - Pediatrics [phone]

## 2025-02-05 ENCOUNTER — ANTICOAGULATION THERAPY VISIT (OUTPATIENT)
Dept: ANTICOAGULATION | Facility: CLINIC | Age: OVER 89
End: 2025-02-05
Payer: MEDICARE

## 2025-02-05 DIAGNOSIS — I48.20 CHRONIC ATRIAL FIBRILLATION (H): Primary | ICD-10-CM

## 2025-02-05 DIAGNOSIS — Z79.01 LONG TERM CURRENT USE OF ANTICOAGULANTS WITH INR GOAL OF 2.0-3.0: ICD-10-CM

## 2025-02-05 LAB — INR HOME MONITORING: 1.7 RATIO (ref 2–3)

## 2025-02-05 NOTE — PROGRESS NOTES
ANTICOAGULATION MANAGEMENT     Leonor Mercado 89 year old female is on warfarin with subtherapeutic INR result. (Goal INR 2.0-3.0)    Recent labs: (last 7 days)     02/05/25  1614   INR 1.7*       ASSESSMENT     Source(s): Chart Review and Patient/Caregiver Call     Warfarin doses taken: Warfarin taken as instructed  Diet: No new diet changes identified  Medication/supplement changes: None noted  New illness, injury, or hospitalization: No  Signs or symptoms of bleeding or clotting: No  Previous result: Subtherapeutic  Additional findings: None  Notes mother would like to eat some beets.       PLAN     Recommended plan for no diet, medication or health factor changes affecting INR     Dosing Instructions: Increase your warfarin dose (22.2% change) with next INR in 1 week       Summary  As of 2/5/2025      Full warfarin instructions:  1 mg every Tue, Thu, Sat; 2 mg all other days   Next INR check:  2/12/2025               Telephone call with Cleo, daughter, who agrees to plan and repeated back plan correctly    Patient to recheck with home meter    Education provided: None required    Plan made with [facility] Pharmacist Eli Austin RN  2/5/2025  Anticoagulation Clinic  [employer] for routing messages: p ANTICOAG HOME MONITORING  [facility] patient phone line: [phone]        _______________________________________________________________________     Anticoagulation Episode Summary       Current INR goal:  2.0-3.0   TTR:  24.4% (1 y)   Target end date:  Indefinite   Send INR reminders to:  ANTICOAG HOME MONITORING    Indications    Chronic atrial fibrillation (H) [I48.20]  Long term current use of anticoagulants with INR goal of 2.0-3.0 [Z79.01]             Comments:  --             Anticoagulation Care Providers       Provider Role Specialty Phone number    Arnel Garcia MD Referring Internal Medicine - Pediatrics [phone]

## 2025-02-11 ENCOUNTER — ANCILLARY PROCEDURE (OUTPATIENT)
Dept: CARDIOLOGY | Facility: CLINIC | Age: OVER 89
End: 2025-02-11
Attending: INTERNAL MEDICINE
Payer: MEDICARE

## 2025-02-11 DIAGNOSIS — Z95.0 CARDIAC PACEMAKER IN SITU: ICD-10-CM

## 2025-02-11 DIAGNOSIS — I49.5 SICK SINUS SYNDROME (H): ICD-10-CM

## 2025-02-11 LAB
MDC_IDC_MSMT_BATTERY_DTM: NORMAL
MDC_IDC_MSMT_BATTERY_IMPEDANCE: 2584 OHM
MDC_IDC_MSMT_BATTERY_REMAINING_LONGEVITY: 32 MO
MDC_IDC_MSMT_BATTERY_STATUS: NORMAL
MDC_IDC_MSMT_BATTERY_VOLTAGE: 2.74 V
MDC_IDC_MSMT_LEADCHNL_RA_IMPEDANCE_VALUE: 67 OHM
MDC_IDC_MSMT_LEADCHNL_RV_IMPEDANCE_VALUE: 691 OHM
MDC_IDC_PG_IMPLANT_DTM: NORMAL
MDC_IDC_PG_MFG: NORMAL
MDC_IDC_PG_MODEL: NORMAL
MDC_IDC_PG_SERIAL: NORMAL
MDC_IDC_PG_TYPE: NORMAL
MDC_IDC_SESS_CLINIC_NAME: NORMAL
MDC_IDC_SESS_DTM: NORMAL
MDC_IDC_SESS_TYPE: NORMAL
MDC_IDC_SET_BRADY_LOWRATE: 60 {BEATS}/MIN
MDC_IDC_SET_BRADY_MAX_SENSOR_RATE: 120 {BEATS}/MIN
MDC_IDC_SET_BRADY_MAX_TRACKING_RATE: 105 {BEATS}/MIN
MDC_IDC_SET_BRADY_MODE: NORMAL
MDC_IDC_SET_LEADCHNL_RV_PACING_AMPLITUDE: 2.5 V
MDC_IDC_SET_LEADCHNL_RV_PACING_CAPTURE_MODE: NORMAL
MDC_IDC_SET_LEADCHNL_RV_PACING_POLARITY: NORMAL
MDC_IDC_SET_LEADCHNL_RV_PACING_PULSEWIDTH: 0.52 MS
MDC_IDC_SET_LEADCHNL_RV_SENSING_POLARITY: NORMAL
MDC_IDC_SET_LEADCHNL_RV_SENSING_SENSITIVITY: 2.8 MV
MDC_IDC_SET_ZONE_DETECTION_INTERVAL: 333.33 MS
MDC_IDC_SET_ZONE_STATUS: NORMAL
MDC_IDC_SET_ZONE_STATUS: NORMAL
MDC_IDC_SET_ZONE_TYPE: NORMAL
MDC_IDC_SET_ZONE_TYPE: NORMAL
MDC_IDC_SET_ZONE_VENDOR_TYPE: NORMAL
MDC_IDC_SET_ZONE_VENDOR_TYPE: NORMAL
MDC_IDC_STAT_AT_BURDEN_PERCENT: 0 %
MDC_IDC_STAT_AT_DTM_END: NORMAL
MDC_IDC_STAT_AT_DTM_START: NORMAL
MDC_IDC_STAT_BRADY_DTM_END: NORMAL
MDC_IDC_STAT_BRADY_DTM_START: NORMAL
MDC_IDC_STAT_BRADY_RV_PERCENT_PACED: 74 %
MDC_IDC_STAT_EPISODE_RECENT_COUNT: 0
MDC_IDC_STAT_EPISODE_RECENT_COUNT: 0
MDC_IDC_STAT_EPISODE_RECENT_COUNT_DTM_END: NORMAL
MDC_IDC_STAT_EPISODE_RECENT_COUNT_DTM_END: NORMAL
MDC_IDC_STAT_EPISODE_RECENT_COUNT_DTM_START: NORMAL
MDC_IDC_STAT_EPISODE_RECENT_COUNT_DTM_START: NORMAL
MDC_IDC_STAT_EPISODE_TYPE: NORMAL
MDC_IDC_STAT_EPISODE_TYPE: NORMAL

## 2025-02-11 PROCEDURE — 93296 REM INTERROG EVL PM/IDS: CPT | Performed by: INTERNAL MEDICINE

## 2025-02-11 PROCEDURE — 93294 REM INTERROG EVL PM/LDLS PM: CPT | Performed by: INTERNAL MEDICINE

## 2025-02-12 ENCOUNTER — PATIENT OUTREACH (OUTPATIENT)
Dept: CARE COORDINATION | Facility: CLINIC | Age: OVER 89
End: 2025-02-12
Payer: MEDICARE

## 2025-02-12 ENCOUNTER — ANTICOAGULATION THERAPY VISIT (OUTPATIENT)
Dept: ANTICOAGULATION | Facility: CLINIC | Age: OVER 89
End: 2025-02-12
Payer: MEDICARE

## 2025-02-12 DIAGNOSIS — I48.20 CHRONIC ATRIAL FIBRILLATION (H): Primary | ICD-10-CM

## 2025-02-12 DIAGNOSIS — Z79.01 LONG TERM CURRENT USE OF ANTICOAGULANTS WITH INR GOAL OF 2.0-3.0: ICD-10-CM

## 2025-02-12 LAB — INR HOME MONITORING: 2.1 RATIO (ref 2–3)

## 2025-02-12 NOTE — PROGRESS NOTES
ANTICOAGULATION MANAGEMENT     Leonor Mercado 89 year old female is on warfarin with therapeutic INR result. (Goal INR 2.0-3.0)    Recent labs: (last 7 days)     02/12/25  1542   INR 2.1       ASSESSMENT     Source(s): Chart Review and Patient/Caregiver Call - Cleo, daughter    Warfarin doses taken: Warfarin taken as instructed  Diet: No new diet changes identified  Medication/supplement changes: None noted  New illness, injury, or hospitalization: No  Signs or symptoms of bleeding or clotting: No  Previous result: Subtherapeutic  Additional findings: None       PLAN     Recommended plan for no diet, medication or health factor changes affecting INR     Dosing Instructions: Continue your current warfarin dose with next INR in 1 week       Summary  As of 2/12/2025      Full warfarin instructions:  1 mg every Tue, Thu, Sat; 2 mg all other days   Next INR check:  2/19/2025               Telephone call with Cleo, daughter,  who verbalizes understanding and agrees to plan    Patient to recheck with home meter    Education provided: Please call back if any changes to your diet, medications or how you've been taking warfarin    Plan made per [facility] anticoagulation protocol    Georgia Monteiro RN  2/12/2025  Anticoagulation Clinic  [employer] for routing messages: p ANTICOAG HOME MONITORING  [facility] patient phone line: [phone]        _______________________________________________________________________     Anticoagulation Episode Summary       Current INR goal:  2.0-3.0   TTR:  22.9% (1 y)   Target end date:  Indefinite   Send INR reminders to:  ANTICOAG HOME MONITORING    Indications    Chronic atrial fibrillation (H) [I48.20]  Long term current use of anticoagulants with INR goal of 2.0-3.0 [Z79.01]             Comments:  --             Anticoagulation Care Providers       Provider Role Specialty Phone number    Anrel Garcia MD Referring Internal Medicine - Pediatrics [phone]

## 2025-02-19 ENCOUNTER — ANTICOAGULATION THERAPY VISIT (OUTPATIENT)
Dept: ANTICOAGULATION | Facility: CLINIC | Age: OVER 89
End: 2025-02-19
Payer: MEDICARE

## 2025-02-19 DIAGNOSIS — Z79.01 LONG TERM CURRENT USE OF ANTICOAGULANTS WITH INR GOAL OF 2.0-3.0: ICD-10-CM

## 2025-02-19 DIAGNOSIS — I48.20 CHRONIC ATRIAL FIBRILLATION (H): Primary | ICD-10-CM

## 2025-02-19 LAB — INR HOME MONITORING: 1.6 RATIO (ref 2–3)

## 2025-02-19 NOTE — PROGRESS NOTES
ANTICOAGULATION MANAGEMENT     Leonor Mercado 89 year old female is on warfarin with subtherapeutic INR result. (Goal INR 2.0-3.0)    Recent labs: (last 7 days)     02/19/25  1459   INR 1.6*       ASSESSMENT     Source(s): Chart Review and Patient/Caregiver Call     Warfarin doses taken: Warfarin taken as instructed  Diet: No new diet changes identified  Medication/supplement changes: None noted  New illness, injury, or hospitalization: No  Signs or symptoms of bleeding or clotting: No  Previous result: Therapeutic last visit; previously outside of goal range  Additional findings: None       PLAN     Recommended plan for temporary change(s) affecting INR     Dosing Instructions: booster dose then continue your current warfarin dose with next INR in 1 week. Cleo preferred the booster dose tomorrow since warfarin has already been taken today.      Summary  As of 2/19/2025      Full warfarin instructions:  2/20: 2 mg; Otherwise 1 mg every Tue, Thu, Sat; 2 mg all other days   Next INR check:  2/26/2025               Telephone call with Cleo, daughter,  who verbalizes understanding and agrees to plan    Patient to recheck with home meter    Education provided: None required    Plan made per [facility] anticoagulation protocol    Rhianna Austin RN  2/19/2025  Anticoagulation Clinic  [employer] for routing messages: p ANTICOAG HOME MONITORING  [facility] patient phone line: [phone]        _______________________________________________________________________     Anticoagulation Episode Summary       Current INR goal:  2.0-3.0   TTR:  22.5% (1 y)   Target end date:  Indefinite   Send INR reminders to:  ANTICOAG HOME MONITORING    Indications    Chronic atrial fibrillation (H) [I48.20]  Long term current use of anticoagulants with INR goal of 2.0-3.0 [Z79.01]             Comments:  --             Anticoagulation Care Providers       Provider Role Specialty Phone number    Arnel Garcia MD Referring Internal Medicine  - Pediatrics [phone]

## 2025-02-26 ENCOUNTER — ANTICOAGULATION THERAPY VISIT (OUTPATIENT)
Dept: ANTICOAGULATION | Facility: CLINIC | Age: OVER 89
End: 2025-02-26
Payer: MEDICARE

## 2025-02-26 DIAGNOSIS — I48.20 CHRONIC ATRIAL FIBRILLATION (H): Primary | ICD-10-CM

## 2025-02-26 DIAGNOSIS — Z79.01 LONG TERM CURRENT USE OF ANTICOAGULANTS WITH INR GOAL OF 2.0-3.0: ICD-10-CM

## 2025-02-26 LAB — INR HOME MONITORING: 1.7 RATIO (ref 2–3)

## 2025-02-26 NOTE — PROGRESS NOTES
ANTICOAGULATION MANAGEMENT     Leonor Mercado 89 year old female is on warfarin with subtherapeutic INR result. (Goal INR 2.0-3.0)    Recent labs: (last 7 days)     02/26/25  1546   INR 1.7*       ASSESSMENT     Source(s): Chart Review and Patient/Caregiver Call     Warfarin doses taken: Missed dose(s) may be affecting INR  Diet: No new diet changes identified  Medication/supplement changes: None noted  New illness, injury, or hospitalization: No  Signs or symptoms of bleeding or clotting: No  Previous result: Subtherapeutic  Additional findings: None       PLAN     Recommended plan for temporary change(s) affecting INR     Dosing Instructions: booster dose then continue your current warfarin dose with next INR in 1 week       Summary  As of 2/26/2025      Full warfarin instructions:  2/26: 3 mg; Otherwise 1 mg every Tue, Thu, Sat; 2 mg all other days   Next INR check:  3/5/2025               Telephone call with daughter Cleo who verbalizes understanding and agrees to plan    Patient to recheck with home meter    Education provided: Goal range and lab monitoring: goal range and significance of current result and Importance of therapeutic range    Plan made per [facility] anticoagulation protocol    Neftali Oseha, RN  2/26/2025  Anticoagulation Clinic  [employer] for routing messages: p ANTICOAG HOME MONITORING  [facility] patient phone line: [phone]        _______________________________________________________________________     Anticoagulation Episode Summary       Current INR goal:  2.0-3.0   TTR:  22.5% (1 y)   Target end date:  Indefinite   Send INR reminders to:  ANTICOAG HOME MONITORING    Indications    Chronic atrial fibrillation (H) [I48.20]  Long term current use of anticoagulants with INR goal of 2.0-3.0 [Z79.01]             Comments:  --             Anticoagulation Care Providers       Provider Role Specialty Phone number    Arnel Garcia MD Referring Internal Medicine - Pediatrics [phone]

## 2025-03-04 ENCOUNTER — TELEPHONE (OUTPATIENT)
Dept: PEDIATRICS | Facility: CLINIC | Age: OVER 89
End: 2025-03-04
Payer: MEDICARE

## 2025-03-04 NOTE — TELEPHONE ENCOUNTER
Returned call to Cleo, advised ok to check pt INR on Thursday, 3/6/25 due to weather. Pt will stay on current maintenance dose of warfarin until INR recheck.     Neftali Oshea RN

## 2025-03-04 NOTE — TELEPHONE ENCOUNTER
General Call      Reason for Call:  ANTICOAG DRAW    What are your questions or concerns:  SHOULD THE DRAW BE DONE TODAY OR THURSDAY, FERNANDA WILL NOT BE ABLE TO GET THERE ON WEDNESDAY DUE TO WEATHER. PLEASE CALL FERNANDA AND ADVISE    Date of last appointment with provider: N/A    Could we send this information to you in [employer] or would you prefer to receive a phone call?:   Patient would prefer a phone call   Okay to leave a detailed message?: Yes at Home number on file [phone] (home)

## 2025-03-06 ENCOUNTER — ANTICOAGULATION THERAPY VISIT (OUTPATIENT)
Dept: ANTICOAGULATION | Facility: CLINIC | Age: OVER 89
End: 2025-03-06
Payer: MEDICARE

## 2025-03-06 DIAGNOSIS — Z79.01 LONG TERM CURRENT USE OF ANTICOAGULANTS WITH INR GOAL OF 2.0-3.0: ICD-10-CM

## 2025-03-06 DIAGNOSIS — I48.20 CHRONIC ATRIAL FIBRILLATION (H): Primary | ICD-10-CM

## 2025-03-06 LAB — INR HOME MONITORING: 2 RATIO (ref 2–3)

## 2025-03-06 NOTE — PROGRESS NOTES
ANTICOAGULATION MANAGEMENT     Leonor Mercado 89 year old female is on warfarin with therapeutic INR result. (Goal INR 2.0-3.0)    Recent labs: (last 7 days)     03/06/25  1629   INR 2       ASSESSMENT     Source(s): Chart Review and Patient/Caregiver Call     Warfarin doses taken: Warfarin taken as instructed  Diet: No new diet changes identified  Medication/supplement changes: None noted  New illness, injury, or hospitalization: No  Signs or symptoms of bleeding or clotting: No  Previous result: Subtherapeutic  Additional findings: None       PLAN     Recommended plan for no diet, medication or health factor changes affecting INR     Dosing Instructions: Continue your current warfarin dose with next INR in 1 week       Summary  As of 3/6/2025      Full warfarin instructions:  1 mg every Tue, Thu, Sat; 2 mg all other days   Next INR check:  3/12/2025               Telephone call with Cleo, daughter, who verbalizes understanding and agrees to plan    Patient to recheck with home meter    Education provided: None required    Plan made per [facility] anticoagulation protocol    Rhianna Austin RN  3/6/2025  Anticoagulation Clinic  [employer] for routing messages: p ANTICOAG HOME MONITORING  [facility] patient phone line: [phone]        _______________________________________________________________________     Anticoagulation Episode Summary       Current INR goal:  2.0-3.0   TTR:  22.5% (1 y)   Target end date:  Indefinite   Send INR reminders to:  ANTICOAG HOME MONITORING    Indications    Chronic atrial fibrillation (H) [I48.20]  Long term current use of anticoagulants with INR goal of 2.0-3.0 [Z79.01]             Comments:  --             Anticoagulation Care Providers       Provider Role Specialty Phone number    Arnel Garcia MD Referring Internal Medicine - Pediatrics [phone]

## 2025-03-06 NOTE — PLAN OF CARE
Problem: Patient Care Overview  Goal: Plan of Care/Patient Progress Review  Outcome: No Change  Care Plan Summary Note: vss, alert x4, denied pain. +BS, lungs clear, denied abd pain. SM soft BM x1, voiding well. PIV patent and SL. VPM in place, working properly. Pt calls appropriately. SBA x1 with GB/WK. Ambulated in unit with staff, tolerated well, but stated feeling weaker. Pending d/c pending placement. Monitor.          Detail Level: Generalized Continue Regimen: Sunscreens SPF 30 or higher and re-apply every 2 hours in the sun.

## 2025-03-12 ENCOUNTER — ANTICOAGULATION THERAPY VISIT (OUTPATIENT)
Dept: ANTICOAGULATION | Facility: CLINIC | Age: OVER 89
End: 2025-03-12
Payer: MEDICARE

## 2025-03-12 DIAGNOSIS — Z79.01 LONG TERM CURRENT USE OF ANTICOAGULANTS WITH INR GOAL OF 2.0-3.0: ICD-10-CM

## 2025-03-12 DIAGNOSIS — I48.20 CHRONIC ATRIAL FIBRILLATION (H): Primary | ICD-10-CM

## 2025-03-12 LAB — INR HOME MONITORING: 1.4 RATIO (ref 2–3)

## 2025-03-12 NOTE — PROGRESS NOTES
ANTICOAGULATION MANAGEMENT     Leonor Mercado 89 year old female is on warfarin with subtherapeutic INR result. (Goal INR 2.0-3.0)    Recent labs: (last 7 days)     03/12/25  1606   INR 1.4*       ASSESSMENT     Source(s): Chart Review and Patient/Caregiver Call     Warfarin doses taken: Warfarin taken differently, but did not change total weekly dose  Diet: No new diet changes identified-patient has one meal a day delivered which might have a vegetable, usually eats most of the meal  Medication/supplement changes:  Tylenol 500 mg BID-for about the past 6 months.  New illness, injury, or hospitalization: No  Signs or symptoms of bleeding or clotting: No  Previous result: Therapeutic last visit; previously outside of goal range  Additional findings: None       PLAN     Recommended plan for no diet, medication or health factor changes affecting INR     Dosing Instructions: booster dose then Increase your warfarin dose (18.2% change) with next INR in 1 week       Summary  As of 3/12/2025      Full warfarin instructions:  3/12: 4 mg; Otherwise 1 mg every Sat; 2 mg all other days   Next INR check:  3/19/2025               Telephone call with Cleo who verbalizes understanding and agrees to plan    Patient to recheck with home meter    Education provided: Dietary considerations: importance of consistent vitamin K intake and impact of vitamin K foods on INR    Plan made per [facility] anticoagulation protocol    Tita Ha RN  3/12/2025  Anticoagulation Clinic  [email or web address] for routing messages: p ANTICOAG HOME MONITORING  [facility] patient phone line: [phone]        _______________________________________________________________________     Anticoagulation Episode Summary       Current INR goal:  2.0-3.0   TTR:  22.5% (1 y)   Target end date:  Indefinite   Send INR reminders to:  ANTICOAG HOME MONITORING    Indications    Chronic atrial fibrillation (H) [I48.20]  Long term current use of anticoagulants with INR goal of  2.0-3.0 [Z79.01]             Comments:  --             Anticoagulation Care Providers       Provider Role Specialty Phone number    Arnel Garica MD Referring Internal Medicine - Pediatrics [phone]

## 2025-03-19 ENCOUNTER — ANTICOAGULATION THERAPY VISIT (OUTPATIENT)
Dept: ANTICOAGULATION | Facility: CLINIC | Age: OVER 89
End: 2025-03-19
Payer: MEDICARE

## 2025-03-19 DIAGNOSIS — I48.20 CHRONIC ATRIAL FIBRILLATION (H): Primary | ICD-10-CM

## 2025-03-19 DIAGNOSIS — Z79.01 LONG TERM CURRENT USE OF ANTICOAGULANTS WITH INR GOAL OF 2.0-3.0: ICD-10-CM

## 2025-03-19 LAB — INR HOME MONITORING: 5.5 RATIO (ref 2–3)

## 2025-03-19 NOTE — PROGRESS NOTES
ANTICOAGULATION MANAGEMENT     Leonor Mercado 89 year old female is on warfarin with supratherapeutic INR result. (Goal INR 2.0-3.0)    Recent labs: (last 7 days)     03/19/25  1612   INR 5.5*       ASSESSMENT     Source(s): Chart Review and Patient/Caregiver Call     Warfarin doses taken: Warfarin taken as instructed, verified color and strength of the tablets on hand. Notes there was a third bottle of warfarin that she got rid of.  Diet: No new diet changes identified  Medication/supplement changes: None noted  New illness, injury, or hospitalization: No  Signs or symptoms of bleeding or clotting: No  Previous result: Subtherapeutic  Additional findings: None       PLAN     Recommended plan for no diet, medication or health factor changes affecting INR     Dosing Instructions: hold 2 doses then continue your current warfarin dose with next INR in 2 days       Summary  As of 3/19/2025      Full warfarin instructions:  3/19: Hold; 3/20: Hold; Otherwise 1 mg every Sat; 2 mg all other days   Next INR check:  3/21/2025               Telephone call with Cleo, daughter,  who verbalizes understanding and agrees to plan and who agrees to plan and repeated back plan correctly    Patient to recheck with home meter    Education provided: None required    Plan made per [facility] anticoagulation protocol    Rhianna Austin RN  3/19/2025  Anticoagulation Clinic  [employer] for routing messages: p ANTICOAG HOME MONITORING  [facility] patient phone line: [phone]        _______________________________________________________________________     Anticoagulation Episode Summary       Current INR goal:  2.0-3.0   TTR:  22.1% (1 y)   Target end date:  Indefinite   Send INR reminders to:  ANTICOAG HOME MONITORING    Indications    Chronic atrial fibrillation (H) [I48.20]  Long term current use of anticoagulants with INR goal of 2.0-3.0 [Z79.01]             Comments:  --             Anticoagulation Care Providers       Provider Role  Specialty Phone number    Arnel Garcia MD Referring Internal Medicine - Pediatrics [phone]

## 2025-03-24 ENCOUNTER — ANTICOAGULATION THERAPY VISIT (OUTPATIENT)
Dept: ANTICOAGULATION | Facility: CLINIC | Age: OVER 89
End: 2025-03-24
Payer: MEDICARE

## 2025-03-24 DIAGNOSIS — Z79.01 LONG TERM CURRENT USE OF ANTICOAGULANTS WITH INR GOAL OF 2.0-3.0: ICD-10-CM

## 2025-03-24 DIAGNOSIS — I48.20 CHRONIC ATRIAL FIBRILLATION (H): Primary | ICD-10-CM

## 2025-03-24 LAB — INR HOME MONITORING: 1.8 RATIO (ref 2–3)

## 2025-03-24 NOTE — PROGRESS NOTES
ANTICOAGULATION MANAGEMENT     Leonor Mercado 89 year old female is on warfarin with subtherapeutic INR result. (Goal INR 2.0-3.0)    Recent labs: (last 7 days)     03/24/25  1604   INR 1.8*       ASSESSMENT     Source(s): Chart Review and Patient/Caregiver Call     Warfarin doses taken: Held for high inr  recently which may be affecting INR patient only had 7 mg this past week due to held doses on 3/19, 20 and 3/21  Diet:  If she is tired she does not eat , usually gets one meal from the facility may be affecting diet and INR  Medication/supplement changes: None noted  New illness, injury, or hospitalization: No  Signs or symptoms of bleeding or clotting: No  Previous result: Supratherapeutic  Additional findings: None       PLAN     Recommended plan for temporary change(s) and ongoing change(s) affecting INR     Dosing Instructions: Continue your current warfarin dose with next INR in 4 days       Summary  As of 3/24/2025      Full warfarin instructions:  1 mg every Mon, Wed, Sat; 2 mg all other days   Next INR check:  3/28/2025               Telephone call with patients' dtr.  who verbalizes understanding and agrees to plan and who agrees to plan and repeated back plan correctly    Patient to recheck with home meter    Education provided: Taking warfarin: Importance of taking warfarin as instructed  Contact [phone] with any changes, questions or concerns.     Plan made per [facility] anticoagulation protocol    Adrianna Teresa RN  3/24/2025  Anticoagulation Clinic  [employer] for routing messages: p ANTICOAG HOME MONITORING  [facility] patient phone line: [phone]        _______________________________________________________________________     Anticoagulation Episode Summary       Current INR goal:  2.0-3.0   TTR:  21.6% (1 y)   Target end date:  Indefinite   Send INR reminders to:  ANTICOAG HOME MONITORING    Indications    Chronic atrial fibrillation (H) [I48.20]  Long term current use of anticoagulants with  INR goal of 2.0-3.0 [Z79.01]             Comments:  --             Anticoagulation Care Providers       Provider Role Specialty Phone number    Arnel Garcia MD Referring Internal Medicine - Pediatrics [phone]

## 2025-04-02 ENCOUNTER — ANTICOAGULATION THERAPY VISIT (OUTPATIENT)
Dept: ANTICOAGULATION | Facility: CLINIC | Age: OVER 89
End: 2025-04-02
Payer: MEDICARE

## 2025-04-02 DIAGNOSIS — I48.20 CHRONIC ATRIAL FIBRILLATION (H): Primary | ICD-10-CM

## 2025-04-02 DIAGNOSIS — Z79.01 LONG TERM CURRENT USE OF ANTICOAGULANTS WITH INR GOAL OF 2.0-3.0: ICD-10-CM

## 2025-04-02 LAB — INR HOME MONITORING: 2.8 RATIO (ref 2–3)

## 2025-04-02 NOTE — PROGRESS NOTES
ANTICOAGULATION MANAGEMENT     Leonor Mercado 89 year old female is on warfarin with therapeutic INR result. (Goal INR 2.0-3.0)    Recent labs: (last 7 days)     04/02/25  1614   INR 2.8       ASSESSMENT     Source(s): Chart Review and Patient/Caregiver Call     Warfarin doses taken: Warfarin taken as instructed - confirmed that patient took the 4 mg booster dose last Friday.  Diet: No new diet changes identified  Medication/supplement changes: None noted  New illness, injury, or hospitalization: No  Signs or symptoms of bleeding or clotting: No  Previous result: Subtherapeutic  Additional findings: None       PLAN     Recommended plan for temporary change(s) affecting INR     Dosing Instructions: Continue your current warfarin dose with next INR in 1 week       Summary  As of 4/2/2025      Full warfarin instructions:  1 mg every Mon, Wed, Sat; 2 mg all other days   Next INR check:  4/9/2025               Telephone call with patient's dtr. Cleo who verbalizes understanding and agrees to plan and who agrees to plan and repeated back plan correctly    Patient to recheck with home meter    Education provided: Contact [phone] with any changes, questions or concerns.     Plan made with [facility] Pharmacist Eli Teresa RN  4/2/2025  Anticoagulation Clinic  [employer] for routing messages: p ANTICOAG HOME MONITORING  [facility] patient phone line: [phone]        _______________________________________________________________________     Anticoagulation Episode Summary       Current INR goal:  2.0-3.0   TTR:  21.8% (1 y)   Target end date:  Indefinite   Send INR reminders to:  ANTICOAG HOME MONITORING    Indications    Chronic atrial fibrillation (H) [I48.20]  Long term current use of anticoagulants with INR goal of 2.0-3.0 [Z79.01]             Comments:  --             Anticoagulation Care Providers       Provider Role Specialty Phone number    Arnel Garcia MD Referring Internal Medicine -  Pediatrics [phone]

## 2025-04-09 ENCOUNTER — ANTICOAGULATION THERAPY VISIT (OUTPATIENT)
Dept: ANTICOAGULATION | Facility: CLINIC | Age: OVER 89
End: 2025-04-09
Payer: MEDICARE

## 2025-04-09 DIAGNOSIS — Z79.01 LONG TERM CURRENT USE OF ANTICOAGULANTS WITH INR GOAL OF 2.0-3.0: ICD-10-CM

## 2025-04-09 DIAGNOSIS — I48.20 CHRONIC ATRIAL FIBRILLATION (H): Primary | ICD-10-CM

## 2025-04-09 LAB — INR HOME MONITORING: 1.6 RATIO (ref 2–3)

## 2025-04-09 NOTE — PROGRESS NOTES
ANTICOAGULATION MANAGEMENT     Leonor Mercado 89 year old female is on warfarin with subtherapeutic INR result. (Goal INR 2.0-3.0)    Recent labs: (last 7 days)     04/09/25  1329   INR 1.6*       ASSESSMENT     Source(s): Chart Review and Patient/Caregiver Call     Warfarin doses taken: Missed dose(s) may be affecting INR  Diet: No new diet changes identified  Medication/supplement changes: None noted  New illness, injury, or hospitalization: No  Signs or symptoms of bleeding or clotting: No  Previous result: Therapeutic last visit; previously outside of goal range  Additional findings: None       PLAN     Recommended plan for temporary change(s) affecting INR     Dosing Instructions: booster dose then continue your current warfarin dose with next INR in 1 week       Summary  As of 4/9/2025      Full warfarin instructions:  1 mg every Mon, Wed, Sat; 2 mg all other days   Next INR check:  --               Telephone call with daughter Cleo who verbalizes understanding and agrees to plan    Patient to recheck with home meter    Education provided: Goal range and lab monitoring: goal range and significance of current result and Importance of therapeutic range    Plan made per [facility] anticoagulation protocol    Neftali Oshea RN  4/9/2025  Anticoagulation Clinic  [employer] for routing messages: p ANTICOAG HOME MONITORING  [facility] patient phone line: [phone]        _______________________________________________________________________     Anticoagulation Episode Summary       Current INR goal:  2.0-3.0   TTR:  23.1% (1 y)   Target end date:  Indefinite   Send INR reminders to:  ANTICOAG HOME MONITORING    Indications    Chronic atrial fibrillation (H) [I48.20]  Long term current use of anticoagulants with INR goal of 2.0-3.0 [Z79.01]             Comments:  --             Anticoagulation Care Providers       Provider Role Specialty Phone number    Arnel Garcia MD Referring Internal Medicine - Pediatrics  [phone]

## 2025-04-16 ENCOUNTER — TELEPHONE (OUTPATIENT)
Dept: PEDIATRICS | Facility: CLINIC | Age: OVER 89
End: 2025-04-16
Payer: MEDICARE

## 2025-04-16 ENCOUNTER — ANTICOAGULATION THERAPY VISIT (OUTPATIENT)
Dept: ANTICOAGULATION | Facility: CLINIC | Age: OVER 89
End: 2025-04-16
Payer: MEDICARE

## 2025-04-16 DIAGNOSIS — I48.20 CHRONIC ATRIAL FIBRILLATION (H): Primary | ICD-10-CM

## 2025-04-16 DIAGNOSIS — Z79.01 LONG TERM CURRENT USE OF ANTICOAGULANTS WITH INR GOAL OF 2.0-3.0: ICD-10-CM

## 2025-04-16 LAB — INR HOME MONITORING: 1.1 RATIO (ref 2–3)

## 2025-04-16 NOTE — TELEPHONE ENCOUNTER
FYI - Status Update    Who is Calling: MD INR    Update: 1.1 inr level for today    Does caller want a call/response back: Yes     Could we send this information to you in [employer] or would you prefer to receive a phone call?:   Patient would prefer a phone call   Okay to leave a detailed message?: Yes at Cell number on file:    Telephone Information:   Mobile [phone]

## 2025-04-16 NOTE — PROGRESS NOTES
ANTICOAGULATION MANAGEMENT     Leonor Mercado 89 year old female is on warfarin with subtherapeutic INR result. (Goal INR 2.0-3.0)    Recent labs: (last 7 days)     04/16/25  1426   INR 1.1*       ASSESSMENT     Source(s): Chart Review and Patient/Caregiver Call     Warfarin doses taken: Warfarin taken as instructed-Cleo could not verify the exact dose because she didn't have the piece of paper with dosing instructions but states the patient did not miss any doses   Diet:  patient may have had some greens last week as her daughter found half a salad in her refridgerator  Medication/supplement changes: None noted  New illness, injury, or hospitalization: No  Signs or symptoms of bleeding or clotting: No  Previous result: Subtherapeutic  Additional findings: None       PLAN     Recommended plan for no diet, medication or health factor changes affecting INR     Dosing Instructions: booster dose then Increase your warfarin dose (18.2% change) with next INR in 1 week       Summary  As of 4/16/2025      Full warfarin instructions:  4/16: 3 mg; Otherwise 1 mg every Mon; 2 mg all other days   Next INR check:  4/23/2025               Telephone call with Cleo, daughter who agrees to plan and repeated back plan correctly    Patient to recheck with home meter    Education provided: Symptom monitoring: monitoring for clotting signs and symptoms, monitoring for stroke signs and symptoms, and when to seek medical attention/emergency care    Plan made with [facility] Pharmacist Soraya Ha, DAMIAN  4/16/2025  Anticoagulation Clinic  [employer] for routing messages: p ANTICOAG HOME MONITORING  [facility] patient phone line: [phone]        _______________________________________________________________________     Anticoagulation Episode Summary       Current INR goal:  2.0-3.0   TTR:  23.1% (1 y)   Target end date:  Indefinite   Send INR reminders to:  ANTICOAG HOME MONITORING    Indications    Chronic atrial  fibrillation (H) [I48.20]  Long term current use of anticoagulants with INR goal of 2.0-3.0 [Z79.01]             Comments:  --             Anticoagulation Care Providers       Provider Role Specialty Phone number    Arnel Garcia MD Referring Internal Medicine - Pediatrics [phone]

## 2025-04-23 ENCOUNTER — ANTICOAGULATION THERAPY VISIT (OUTPATIENT)
Dept: ANTICOAGULATION | Facility: CLINIC | Age: OVER 89
End: 2025-04-23
Payer: MEDICARE

## 2025-04-23 DIAGNOSIS — I48.20 CHRONIC ATRIAL FIBRILLATION (H): ICD-10-CM

## 2025-04-23 DIAGNOSIS — Z79.01 LONG TERM CURRENT USE OF ANTICOAGULANTS WITH INR GOAL OF 2.0-3.0: ICD-10-CM

## 2025-04-23 DIAGNOSIS — I48.20 CHRONIC ATRIAL FIBRILLATION (H): Primary | ICD-10-CM

## 2025-04-23 LAB — INR HOME MONITORING: 1.8 RATIO (ref 2–3)

## 2025-04-23 NOTE — PROGRESS NOTES
ANTICOAGULATION MANAGEMENT     Leonor Mercado 89 year old female is on warfarin with subtherapeutic INR result. (Goal INR 2.0-3.0)    Recent labs: (last 7 days)     04/23/25  1531   INR 1.8*       ASSESSMENT     Source(s): Chart Review and Patient/Caregiver Call     Warfarin doses taken: Warfarin taken as instructed  Diet: No new diet changes identified  Medication/supplement changes: None noted  New illness, injury, or hospitalization: No  Signs or symptoms of bleeding or clotting: No  Previous result: Subtherapeutic  Additional findings: None       PLAN     Recommended plan for no diet, medication or health factor changes affecting INR     Dosing Instructions: booster dose then Increase your warfarin dose (7.7% change) with next INR in 1 week       Summary  As of 4/23/2025      Full warfarin instructions:  4/23: 3 mg; Otherwise 2 mg every day   Next INR check:  4/30/2025               Telephone call with Cleo, daughter who agrees to plan and repeated back plan correctly    Patient to recheck with home meter    Education provided: None required    Plan made per [facility] anticoagulation protocol    Rhianna Austin RN  4/23/2025  Anticoagulation Clinic  [employer] for routing messages: p ANTICOAG HOME MONITORING  [facility] patient phone line: [phone]        _______________________________________________________________________     Anticoagulation Episode Summary       Current INR goal:  2.0-3.0   TTR:  22.4% (1 y)   Target end date:  Indefinite   Send INR reminders to:  ANTICOAG HOME MONITORING    Indications    Chronic atrial fibrillation (H) [I48.20]  Long term current use of anticoagulants with INR goal of 2.0-3.0 [Z79.01]             Comments:  --             Anticoagulation Care Providers       Provider Role Specialty Phone number    Arnel Garcia MD Referring Internal Medicine - Pediatrics [phone]

## 2025-04-24 RX ORDER — WARFARIN SODIUM 2 MG/1
2 TABLET ORAL EVERY EVENING
Qty: 100 TABLET | Refills: 1 | Status: SHIPPED | OUTPATIENT
Start: 2025-04-24

## 2025-04-24 NOTE — TELEPHONE ENCOUNTER
ANTICOAGULATION MANAGEMENT:  Medication Refill    Anticoagulation Summary  As of 4/23/2025      Warfarin maintenance plan:  2 mg (2 mg x 1) every day   Next INR check:  4/30/2025   Target end date:  Indefinite    Indications    Chronic atrial fibrillation (H) [I48.20]  Long term current use of anticoagulants with INR goal of 2.0-3.0 [Z79.01]                 Anticoagulation Care Providers       Provider Role Specialty Phone number    Arnel Garcia MD Referring Internal Medicine - Pediatrics [phone]            Refill Criteria    Visit with referring provider/group: Meets criteria: visit within referring provider group in the last 15 months on 8/28/24    [facility] referral last signed: 09/05/2024; within last year:  Yes    Lab monitoring is up to date (not exceeding 2 weeks overdue): Yes    Leonor meets all criteria for refill. Rx instructions and quantity supplied updated to match patient's current dosing plan. Warfarin 90 day supply with 1 refill granted per [facility] protocol     Charmaine Rodriguez RN  Anticoagulation Clinic

## 2025-04-30 ENCOUNTER — ANTICOAGULATION THERAPY VISIT (OUTPATIENT)
Dept: ANTICOAGULATION | Facility: CLINIC | Age: OVER 89
End: 2025-04-30
Payer: MEDICARE

## 2025-04-30 DIAGNOSIS — I48.20 CHRONIC ATRIAL FIBRILLATION (H): Primary | ICD-10-CM

## 2025-04-30 DIAGNOSIS — Z79.01 LONG TERM CURRENT USE OF ANTICOAGULANTS WITH INR GOAL OF 2.0-3.0: ICD-10-CM

## 2025-04-30 LAB — INR HOME MONITORING: 2.1 RATIO (ref 2–3)

## 2025-04-30 NOTE — PROGRESS NOTES
ANTICOAGULATION MANAGEMENT     Leonor Mercado 89 year old female is on warfarin with therapeutic INR result. (Goal INR 2.0-3.0)    Recent labs: (last 7 days)     04/30/25  1645   INR 2.1       ASSESSMENT     Source(s): Chart Review and Patient/Caregiver Call     Warfarin doses taken: Booster dose(s) recently taken which may be affecting INR  Diet: No new diet changes identified  Medication/supplement changes: None noted  New illness, injury, or hospitalization: No  Signs or symptoms of bleeding or clotting: No  Previous result: Subtherapeutic  Additional findings: None       PLAN     Recommended plan for no diet, medication or health factor changes affecting INR     Dosing Instructions: Continue your current warfarin dose with next INR in 1 week       Summary  As of 4/30/2025      Full warfarin instructions:  2 mg every day   Next INR check:  5/7/2025               Telephone call with daughter, Cleo who agrees to plan and repeated back plan correctly    Patient to recheck with home meter    Education provided: Please call back if any changes to your diet, medications or how you've been taking warfarin    Plan made per [facility] anticoagulation protocol    Melba Ivory RN  4/30/2025  Anticoagulation Clinic  [employer] for routing messages: p ANTICOAG HOME MONITORING  [facility] patient phone line: [phone]        _______________________________________________________________________     Anticoagulation Episode Summary       Current INR goal:  2.0-3.0   TTR:  23.1% (1 y)   Target end date:  Indefinite   Send INR reminders to:  ANTICOAG HOME MONITORING    Indications    Chronic atrial fibrillation (H) [I48.20]  Long term current use of anticoagulants with INR goal of 2.0-3.0 [Z79.01]             Comments:  --             Anticoagulation Care Providers       Provider Role Specialty Phone number    Arnel Garcia MD Referring Internal Medicine - Pediatrics [phone]

## 2025-05-07 ENCOUNTER — ANTICOAGULATION THERAPY VISIT (OUTPATIENT)
Dept: ANTICOAGULATION | Facility: CLINIC | Age: OVER 89
End: 2025-05-07
Payer: MEDICARE

## 2025-05-07 ENCOUNTER — RESULTS FOLLOW-UP (OUTPATIENT)
Dept: ANTICOAGULATION | Facility: CLINIC | Age: OVER 89
End: 2025-05-07
Payer: MEDICARE

## 2025-05-07 DIAGNOSIS — Z79.01 LONG TERM CURRENT USE OF ANTICOAGULANTS WITH INR GOAL OF 2.0-3.0: ICD-10-CM

## 2025-05-07 DIAGNOSIS — I48.20 CHRONIC ATRIAL FIBRILLATION (H): Primary | ICD-10-CM

## 2025-05-07 LAB — INR HOME MONITORING: 2.3 RATIO (ref 2–3)

## 2025-05-07 NOTE — PROGRESS NOTES
ANTICOAGULATION MANAGEMENT     Leonor Mercado 89 year old female is on warfarin with therapeutic INR result. (Goal INR 2.0-3.0)    Recent labs: (last 7 days)     05/07/25  1558   INR 2.3       ASSESSMENT     Source(s): Chart Review and Patient/Caregiver Call     Warfarin doses taken: Warfarin taken as instructed  Diet: No new diet changes identified  Medication/supplement changes: None noted  New illness, injury, or hospitalization: No  Signs or symptoms of bleeding or clotting: No  Previous result: Therapeutic last visit; previously outside of goal range  Additional findings: None       PLAN     Recommended plan for no diet, medication or health factor changes affecting INR     Dosing Instructions: Continue your current warfarin dose with next INR in 1 week       Summary  As of 5/7/2025      Full warfarin instructions:  2 mg every day   Next INR check:  5/14/2025               Telephone call with Cleo (Daughter) who verbalizes understanding and agrees to plan and who agrees to plan and repeated back plan correctly    Patient to recheck with home meter    Education provided: Please call back if any changes to your diet, medications or how you've been taking warfarin    Plan made per [facility] anticoagulation protocol    Bryan Rubalcava, RN  5/7/2025  Anticoagulation Clinic  [employer] for routing messages: p ANTICOAG HOME MONITORING  [facility] patient phone line: [phone]        _______________________________________________________________________     Anticoagulation Episode Summary       Current INR goal:  2.0-3.0   TTR:  24.3% (1 y)   Target end date:  Indefinite   Send INR reminders to:  ANTICOAG HOME MONITORING    Indications    Chronic atrial fibrillation (H) [I48.20]  Long term current use of anticoagulants with INR goal of 2.0-3.0 [Z79.01]             Comments:  --             Anticoagulation Care Providers       Provider Role Specialty Phone number    Arnel Garcia MD Referring Internal Medicine -  Pediatrics [phone]

## 2025-05-14 ENCOUNTER — ANTICOAGULATION THERAPY VISIT (OUTPATIENT)
Dept: ANTICOAGULATION | Facility: CLINIC | Age: OVER 89
End: 2025-05-14
Payer: MEDICARE

## 2025-05-14 ENCOUNTER — RESULTS FOLLOW-UP (OUTPATIENT)
Dept: ANTICOAGULATION | Facility: CLINIC | Age: OVER 89
End: 2025-05-14
Payer: MEDICARE

## 2025-05-14 DIAGNOSIS — I48.20 CHRONIC ATRIAL FIBRILLATION (H): Primary | ICD-10-CM

## 2025-05-14 DIAGNOSIS — Z79.01 LONG TERM CURRENT USE OF ANTICOAGULANTS WITH INR GOAL OF 2.0-3.0: ICD-10-CM

## 2025-05-14 LAB — INR HOME MONITORING: 1.7 RATIO (ref 2–3)

## 2025-05-14 NOTE — PROGRESS NOTES
-- DO NOT REPLY / DO NOT REPLY ALL --  -- Message is from the [facility]--    General Patient Message      Reason for Call: The  indomethacin (INDOCIN) 50 MG capsule is not covered. Insurance prefers ibuprofen or nabunetone.    Caller Information       Type Contact Phone    01/31/2020 10:11 AM Phone (Incoming) [id number] - [place] [address] AT [facility] (Pharmacy) [phone]          Alternative phone number: na    Turnaround time given to caller:   \"This message will be sent to [state Provider's name]. The clinical team will fulfill your request as soon as they review your message.\"}     ANTICOAGULATION MANAGEMENT     Leonor Mercado 89 year old female is on warfarin with subtherapeutic INR result. (Goal INR 2.0-3.0)    Recent labs: (last 7 days)     05/14/25  1633   INR 1.7*       ASSESSMENT     Source(s): Chart Review and Patient/Caregiver Call     Warfarin doses taken: Warfarin taken as instructed  Diet: Increased greens/vitamin K in diet; plans to resume previous intake. Eating out more often because of birthday recently. Diet has been much different.   Medication/supplement changes: None noted  New illness, injury, or hospitalization: No  Signs or symptoms of bleeding or clotting: No  Previous result: Therapeutic last 2(+) visits  Additional findings: Takes pills in the morning. Will take boost tomorrow morning.        PLAN     Recommended plan for temporary change(s) affecting INR     Dosing Instructions: booster dose then continue your current warfarin dose with next INR in 1 week       Summary  As of 5/14/2025      Full warfarin instructions:  5/15: 4 mg; Otherwise 2 mg every day   Next INR check:  5/21/2025               Telephone call with Cleo, Daughter, who verbalizes understanding and agrees to plan    Patient to recheck with home meter    Education provided: Please call back if any changes to your diet, medications or how you've been taking warfarin  Contact [phone] with any changes, questions or concerns.     Plan made per [facility] anticoagulation protocol    Bryan Rubalcava RN  5/14/2025  Anticoagulation Clinic  [facility] for routing messages: p ANTICOAG HOME MONITORING  [facility] patient phone line: [phone]        _______________________________________________________________________     Anticoagulation Episode Summary       Current INR goal:  2.0-3.0   TTR:  24.9% (1 y)   Target end date:  Indefinite   Send INR reminders to:  ANTICOAG HOME MONITORING    Indications    Chronic atrial fibrillation (H) [I48.20]  Long term current use of anticoagulants with INR goal of 2.0-3.0  [Z79.01]             Comments:  --             Anticoagulation Care Providers       Provider Role Specialty Phone number    Arnel Garcia MD Referring Internal Medicine - Pediatrics [phone]

## 2025-05-14 NOTE — PROGRESS NOTES
Erroneous encounter - opened by mistake. See other [facility] encounter dated 5/14/25. Larissa Spence, BSN, RN

## 2025-05-21 ENCOUNTER — RESULTS FOLLOW-UP (OUTPATIENT)
Dept: ANTICOAGULATION | Facility: CLINIC | Age: OVER 89
End: 2025-05-21

## 2025-05-21 ENCOUNTER — ANTICOAGULATION THERAPY VISIT (OUTPATIENT)
Dept: ANTICOAGULATION | Facility: CLINIC | Age: OVER 89
End: 2025-05-21
Payer: MEDICARE

## 2025-05-21 DIAGNOSIS — I48.20 CHRONIC ATRIAL FIBRILLATION (H): Primary | ICD-10-CM

## 2025-05-21 DIAGNOSIS — Z79.01 LONG TERM CURRENT USE OF ANTICOAGULANTS WITH INR GOAL OF 2.0-3.0: ICD-10-CM

## 2025-05-21 LAB — INR HOME MONITORING: 1.5 RATIO (ref 2–3)

## 2025-05-21 NOTE — PROGRESS NOTES
ANTICOAGULATION MANAGEMENT     Leonor Mercado 89 year old female is on warfarin with subtherapeutic INR result. (Goal INR 2.0-3.0)    Recent labs: (last 7 days)     05/21/25  1603   INR 1.5*       ASSESSMENT     Source(s): Chart Review and Patient/Caregiver Call     Warfarin doses taken: Warfarin taken as instructed  Diet: No new diet changes identified  Medication/supplement changes: None noted  New illness, injury, or hospitalization: No  Signs or symptoms of bleeding or clotting: No  Previous result: Subtherapeutic  Additional findings: None       PLAN     Recommended plan for no diet, medication or health factor changes affecting INR     Dosing Instructions: booster dose then Increase your warfarin dose (14.3% change) with next INR in 1 week       Summary  As of 5/21/2025      Full warfarin instructions:  5/21: 4 mg; Otherwise 3 mg every Sun, Wed; 2 mg all other days   Next INR check:  5/28/2025               Telephone call with Cleo who verbalizes understanding and agrees to plan    Patient to recheck with home meter    Education provided: Please call back if any changes to your diet, medications or how you've been taking warfarin    Plan made per [facility] anticoagulation protocol    Tita Ha, RN  5/21/2025  Anticoagulation Clinic  [employer] for routing messages: p ANTICOAG HOME MONITORING  [facility] patient phone line: [phone]        _______________________________________________________________________     Anticoagulation Episode Summary       Current INR goal:  2.0-3.0   TTR:  24.9% (1 y)   Target end date:  Indefinite   Send INR reminders to:  ANTICOAG HOME MONITORING    Indications    Chronic atrial fibrillation (H) [I48.20]  Long term current use of anticoagulants with INR goal of 2.0-3.0 [Z79.01]             Comments:  --             Anticoagulation Care Providers       Provider Role Specialty Phone number    Arnel Garcia MD Referring Internal Medicine - Pediatrics [phone]

## 2025-05-28 ENCOUNTER — ANTICOAGULATION THERAPY VISIT (OUTPATIENT)
Dept: ANTICOAGULATION | Facility: CLINIC | Age: OVER 89
End: 2025-05-28
Payer: MEDICARE

## 2025-05-28 ENCOUNTER — RESULTS FOLLOW-UP (OUTPATIENT)
Dept: ANTICOAGULATION | Facility: CLINIC | Age: OVER 89
End: 2025-05-28
Payer: MEDICARE

## 2025-05-28 DIAGNOSIS — Z79.01 LONG TERM CURRENT USE OF ANTICOAGULANTS WITH INR GOAL OF 2.0-3.0: ICD-10-CM

## 2025-05-28 DIAGNOSIS — I48.20 CHRONIC ATRIAL FIBRILLATION (H): Primary | ICD-10-CM

## 2025-05-28 LAB — INR HOME MONITORING: 1.4 RATIO (ref 2–3)

## 2025-05-28 NOTE — PROGRESS NOTES
ANTICOAGULATION MANAGEMENT     Leonor Mercado 89 year old female is on warfarin with subtherapeutic INR result. (Goal INR 2.0-3.0)    Recent labs: (last 7 days)     05/28/25  1744   INR 1.4*       ASSESSMENT     Source(s): Chart Review and Patient/Caregiver Call     Warfarin doses taken: Warfarin taken as instructed  Diet: No new diet changes identified  Medication/supplement changes: None noted  New illness, injury, or hospitalization: No  Signs or symptoms of bleeding or clotting: No  Previous result: Subtherapeutic  Additional findings: None       PLAN     Recommended plan for no diet, medication or health factor changes affecting INR     Dosing Instructions: booster dose then Increase your warfarin dose (12.5% change) with next INR in 1 week       Summary  As of 5/28/2025      Full warfarin instructions:  5/28: 6 mg; Otherwise 2 mg every Mon, Wed, Fri; 3 mg all other days   Next INR check:  6/4/2025               Telephone call with Cleo (daughter) who verbalizes understanding and agrees to plan and who agrees to plan and repeated back plan correctly    Patient to recheck with home meter    Education provided: Symptom monitoring: monitoring for clotting signs and symptoms, monitoring for stroke signs and symptoms, and when to seek medical attention/emergency care  Contact [phone] with any changes, questions or concerns.     Plan made per [facility] anticoagulation protocol    Bryan Rubalcava RN  5/28/2025  Anticoagulation Clinic  [facility] for routing messages: p ANTICOAG HOME MONITORING  [facility] patient phone line: [phone]        _______________________________________________________________________     Anticoagulation Episode Summary       Current INR goal:  2.0-3.0   TTR:  24.6% (1 y)   Target end date:  Indefinite   Send INR reminders to:  ANTICOAG HOME MONITORING    Indications    Chronic atrial fibrillation (H) [I48.20]  Long term current use of anticoagulants with INR goal of 2.0-3.0 [Z79.01]              Comments:  --             Anticoagulation Care Providers       Provider Role Specialty Phone number    Arnel Garcia MD Referring Internal Medicine - Pediatrics [phone]

## 2025-06-04 ENCOUNTER — RESULTS FOLLOW-UP (OUTPATIENT)
Dept: ANTICOAGULATION | Facility: CLINIC | Age: OVER 89
End: 2025-06-04

## 2025-06-04 ENCOUNTER — ANTICOAGULATION THERAPY VISIT (OUTPATIENT)
Dept: ANTICOAGULATION | Facility: CLINIC | Age: OVER 89
End: 2025-06-04
Payer: MEDICARE

## 2025-06-04 DIAGNOSIS — Z79.01 LONG TERM CURRENT USE OF ANTICOAGULANTS WITH INR GOAL OF 2.0-3.0: ICD-10-CM

## 2025-06-04 DIAGNOSIS — I48.20 CHRONIC ATRIAL FIBRILLATION (H): Primary | ICD-10-CM

## 2025-06-04 LAB — INR HOME MONITORING: 2.3 RATIO (ref 2–3)

## 2025-06-04 NOTE — PROGRESS NOTES
ANTICOAGULATION MANAGEMENT     Leonor Mercado 89 year old female is on warfarin with therapeutic INR result. (Goal INR 2.0-3.0)    Recent labs: (last 7 days)     06/04/25  1621   INR 2.3       ASSESSMENT     Source(s): Chart Review and Patient/Caregiver Call     Warfarin doses taken: Warfarin taken as instructed  Diet: No new diet changes identified  Medication/supplement changes: None noted  New illness, injury, or hospitalization: No  Signs or symptoms of bleeding or clotting: No  Previous result: Subtherapeutic  Additional findings: None       PLAN     Recommended plan for no diet, medication or health factor changes affecting INR     Dosing Instructions: Continue your current warfarin dose with next INR in 1 week       Summary  As of 6/4/2025      Full warfarin instructions:  2 mg every Mon, Wed, Fri; 3 mg all other days   Next INR check:  6/11/2025               Telephone call with daughter, Cleo who agrees to plan and repeated back plan correctly    Patient to recheck with home meter    Education provided: Please call back if any changes to your diet, medications or how you've been taking warfarin    Plan made per [facility] anticoagulation protocol    Melba Ivory RN  6/4/2025  Anticoagulation Clinic  [employer] for routing messages: p ANTICOAG HOME MONITORING  [facility] patient phone line: [phone]        _______________________________________________________________________     Anticoagulation Episode Summary       Current INR goal:  2.0-3.0   TTR:  24.9% (1 y)   Target end date:  Indefinite   Send INR reminders to:  ANTICOAG HOME MONITORING    Indications    Chronic atrial fibrillation (H) [I48.20]  Long term current use of anticoagulants with INR goal of 2.0-3.0 [Z79.01]             Comments:  --             Anticoagulation Care Providers       Provider Role Specialty Phone number    Arnel Garcia MD Referring Internal Medicine - Pediatrics [phone]

## 2025-06-11 ENCOUNTER — RESULTS FOLLOW-UP (OUTPATIENT)
Dept: NURSING | Facility: CLINIC | Age: OVER 89
End: 2025-06-11

## 2025-06-11 ENCOUNTER — ANTICOAGULATION THERAPY VISIT (OUTPATIENT)
Dept: ANTICOAGULATION | Facility: CLINIC | Age: OVER 89
End: 2025-06-11
Payer: MEDICARE

## 2025-06-11 DIAGNOSIS — Z79.01 LONG TERM CURRENT USE OF ANTICOAGULANTS WITH INR GOAL OF 2.0-3.0: ICD-10-CM

## 2025-06-11 DIAGNOSIS — I48.20 CHRONIC ATRIAL FIBRILLATION (H): Primary | ICD-10-CM

## 2025-06-11 LAB — INR HOME MONITORING: 2.8 RATIO (ref 2–3)

## 2025-06-11 NOTE — PROGRESS NOTES
ANTICOAGULATION MANAGEMENT     Leonor Mercado 89 year old female is on warfarin with therapeutic INR result. (Goal INR 2.0-3.0)    Recent labs: (last 7 days)     06/11/25  1755   INR 2.8       ASSESSMENT     Source(s): Chart Review and Patient/Caregiver Call     Warfarin doses taken: Warfarin taken as instructed  Diet: No new diet changes identified  Medication/supplement changes: None noted  New illness, injury, or hospitalization: No  Signs or symptoms of bleeding or clotting: No  Previous result: Therapeutic last visit; previously outside of goal range  Additional findings: None       PLAN     Recommended plan for no diet, medication or health factor changes affecting INR     Dosing Instructions: Continue your current warfarin dose with next INR in 1 week       Summary  As of 6/11/2025      Full warfarin instructions:  2 mg every Mon, Wed, Fri; 3 mg all other days   Next INR check:  6/18/2025               Telephone call with Cleo, daughter, who verbalizes understanding and agrees to plan    Patient to recheck with home meter    Education provided: Please call back if any changes to your diet, medications or how you've been taking warfarin    Plan made per [facility] anticoagulation protocol    Yaz Gonzalez RN  6/11/2025  Anticoagulation Clinic  [employer] for routing messages: p ANTICOAG HOME MONITORING  [facility] patient phone line: [phone]        _______________________________________________________________________     Anticoagulation Episode Summary       Current INR goal:  2.0-3.0   TTR:  26.8% (1 y)   Target end date:  Indefinite   Send INR reminders to:  ANTICOAG HOME MONITORING    Indications    Chronic atrial fibrillation (H) [I48.20]  Long term current use of anticoagulants with INR goal of 2.0-3.0 [Z79.01]             Comments:  --             Anticoagulation Care Providers       Provider Role Specialty Phone number    Arnel Garcia MD Referring Internal Medicine - Pediatrics [phone]

## 2025-06-18 ENCOUNTER — TELEPHONE (OUTPATIENT)
Dept: NURSING | Facility: CLINIC | Age: OVER 89
End: 2025-06-18
Payer: MEDICARE

## 2025-06-18 LAB — INR HOME MONITORING: 1.3 RATIO (ref 2–3)

## 2025-06-18 NOTE — TELEPHONE ENCOUNTER
Date/time of call received from lab: 06/18/25 at 6:55 PM.    Lab test:  INR 1823 6/18/25    Lab value:  1.3    Ordering provider name: Arnel Garcia    Ordering provider department:     Primary Care Provider: Arnel Garcia     Result managed by: After Hours: [facility] Nurse Advisor Does patient have an [facility] PCP? Yes, patient has [facility] PCP. On-call provider for PCP will be paged.     Action taken: RN will page on-call provider, following paging standard work process.         [facility] Primary Care Provider consult indicated.    Reason for page: Critical Lab    Specialty Group number: [id number]   Specialty Group: IM- Internal Medicine    Initial page sent to Dr. Gorman by RN at 1900.     [facility] Primary Care Provider, Dr. Gorman, returning page to Nurse Advisors at 1901    Provider recommended plan of care:   -Contact patient to verify no change in symptoms  -Have patient contact Anticoagulant Team in the morning    Provider Recommendation Follow Up:   Reached patient/caregiver. Informed of provider's recommendations. Patient verbalized understanding and agrees with the plan. Discussed results with patient's daughter, Latha (CTC located in chart). She denies any changes in patient's condition today. She is requesting anticoagulant team  contacts her tomorrow. Routing note to PCP and Anticoagulant Team.          Uzma Lee RN  06/18/25 7:27 PM  [facility] Nurse Advisor

## 2025-06-19 ENCOUNTER — ANTICOAGULATION THERAPY VISIT (OUTPATIENT)
Dept: ANTICOAGULATION | Facility: CLINIC | Age: OVER 89
End: 2025-06-19
Payer: MEDICARE

## 2025-06-19 ENCOUNTER — RESULTS FOLLOW-UP (OUTPATIENT)
Dept: ANTICOAGULATION | Facility: CLINIC | Age: OVER 89
End: 2025-06-19

## 2025-06-19 DIAGNOSIS — I48.20 CHRONIC ATRIAL FIBRILLATION (H): Primary | ICD-10-CM

## 2025-06-19 DIAGNOSIS — Z79.01 LONG TERM CURRENT USE OF ANTICOAGULANTS WITH INR GOAL OF 2.0-3.0: ICD-10-CM

## 2025-06-19 NOTE — PROGRESS NOTES
ANTICOAGULATION MANAGEMENT     Leonor Mercado 89 year old female is on warfarin with subtherapeutic INR result. (Goal INR 2.0-3.0)    Recent labs: (last 7 days)     06/18/25 1923   INR 1.3*       ASSESSMENT     Source(s): Chart Review and Patient/Caregiver Call     Warfarin doses taken: Warfarin taken as instructed, however primary care giver was out of town and unsure if all doses taken.  Diet: No new diet changes identified  Medication/supplement changes: None noted  New illness, injury, or hospitalization: No  Signs or symptoms of bleeding or clotting: No  Previous result: Therapeutic last 2(+) visits  Additional findings: None and Cleo was gone last week. Sometimes Alfredo can get upset when Cleo is gone. Thinks she may have not taken since out of her normal routine.        PLAN     Recommended plan for temporary change(s) affecting INR     Dosing Instructions: booster dose then continue your current warfarin dose with next INR in 1 week       Summary  As of 6/19/2025      Full warfarin instructions:  6/19: 6 mg; Otherwise 2 mg every Mon, Wed, Fri; 3 mg all other days   Next INR check:  6/25/2025               Telephone call with Cleo, daughter, who agrees to plan and repeated back plan correctly    Patient to recheck with home meter    Education provided: None required    Plan made per [facility] anticoagulation protocol    Rhianna Austin, RN  6/19/2025  Anticoagulation Clinic  [employer] for routing messages: p ANTICOAG HOME MONITORING  [facility] patient phone line: [phone]        _______________________________________________________________________     Anticoagulation Episode Summary       Current INR goal:  2.0-3.0   TTR:  27.9% (1 y)   Target end date:  Indefinite   Send INR reminders to:  ANTICOAG HOME MONITORING    Indications    Chronic atrial fibrillation (H) [I48.20]  Long term current use of anticoagulants with INR goal of 2.0-3.0 [Z79.01]             Comments:  --             Anticoagulation Care  Providers       Provider Role Specialty Phone number    Arnel Garcia MD Referring Internal Medicine - Pediatrics [phone]

## 2025-06-19 NOTE — TELEPHONE ENCOUNTER
Please see the June 19, 2025 Anticoagulation encounter for further information.    Rhianna Austin RN    [facility] Anticoagulation Clinic

## 2025-06-25 ENCOUNTER — ANTICOAGULATION THERAPY VISIT (OUTPATIENT)
Dept: ANTICOAGULATION | Facility: CLINIC | Age: OVER 89
End: 2025-06-25
Payer: MEDICARE

## 2025-06-25 DIAGNOSIS — Z79.01 LONG TERM CURRENT USE OF ANTICOAGULANTS WITH INR GOAL OF 2.0-3.0: ICD-10-CM

## 2025-06-25 DIAGNOSIS — I48.20 CHRONIC ATRIAL FIBRILLATION (H): Primary | ICD-10-CM

## 2025-06-25 LAB — INR HOME MONITORING: 1.8 RATIO (ref 2–3)

## 2025-06-25 NOTE — PROGRESS NOTES
ANTICOAGULATION MANAGEMENT     Leonor Mercado 89 year old female is on warfarin with subtherapeutic INR result. (Goal INR 2.0-3.0)    Recent labs: (last 7 days)     06/25/25  1635   INR 1.8*       ASSESSMENT     Source(s): Chart Review  Previous INR was Subtherapeutic  Medication, diet, health changes since last INR: chart reviewed; none identified         PLAN     Unable to reach daughter Cleo today.    Left message to take a booster dose of warfarin,  3 mg tonight. Request call back for assessment.    Follow up required to confirm warfarin dose taken and assess for changes and discuss dosing instructions and confirm understanding of instructions    Melba Ivory RN  6/25/2025  Anticoagulation Clinic  [facility] for routing messages: tom HUGHES HOME MONITORING  [facility] patient phone line: [phone]

## 2025-06-26 ENCOUNTER — RESULTS FOLLOW-UP (OUTPATIENT)
Dept: ANTICOAGULATION | Facility: CLINIC | Age: OVER 89
End: 2025-06-26

## 2025-06-26 NOTE — PROGRESS NOTES
ANTICOAGULATION MANAGEMENT     Leonor Mercado 89 year old female is on warfarin with subtherapeutic INR result. (Goal INR 2.0-3.0)    Recent labs: (last 7 days)     06/25/25  1635   INR 1.8*       ASSESSMENT     Source(s): Chart Review and Patient/Caregiver Call     Warfarin doses taken: Warfarin taken as instructed  Diet: No new diet changes identified  Medication/supplement changes: None noted  New illness, injury, or hospitalization: No  Signs or symptoms of bleeding or clotting: No  Previous result: Subtherapeutic  Additional findings: None       PLAN     Recommended plan for no diet, medication or health factor changes affecting INR     Dosing Instructions: Increase your warfarin dose (5.6% change) with next INR in 1 week       Summary  As of 6/25/2025      Full warfarin instructions:  2 mg every Sun, Wed; 3 mg all other days   Next INR check:  7/2/2025               Telephone call with daughter, Cleo who verbalizes understanding and agrees to plan    Patient to recheck with home meter    Education provided: Please call back if any changes to your diet, medications or how you've been taking warfarin    Plan made per [facility] anticoagulation protocol    Melba Ivory RN  6/26/2025  Anticoagulation Clinic  [employer] for routing messages: p ANTICOAG HOME MONITORING  [facility] patient phone line: [phone]        _______________________________________________________________________     Anticoagulation Episode Summary       Current INR goal:  2.0-3.0   TTR:  27.9% (1 y)   Target end date:  Indefinite   Send INR reminders to:  ANTICOAG HOME MONITORING    Indications    Chronic atrial fibrillation (H) [I48.20]  Long term current use of anticoagulants with INR goal of 2.0-3.0 [Z79.01]             Comments:  --             Anticoagulation Care Providers       Provider Role Specialty Phone number    Arnel Garcia MD Referring Internal Medicine - Pediatrics [phone]

## 2025-07-02 ENCOUNTER — ANTICOAGULATION THERAPY VISIT (OUTPATIENT)
Dept: ANTICOAGULATION | Facility: CLINIC | Age: OVER 89
End: 2025-07-02
Payer: MEDICARE

## 2025-07-02 ENCOUNTER — RESULTS FOLLOW-UP (OUTPATIENT)
Dept: ANTICOAGULATION | Facility: CLINIC | Age: OVER 89
End: 2025-07-02

## 2025-07-02 DIAGNOSIS — Z79.01 LONG TERM CURRENT USE OF ANTICOAGULANTS WITH INR GOAL OF 2.0-3.0: ICD-10-CM

## 2025-07-02 DIAGNOSIS — I48.20 CHRONIC ATRIAL FIBRILLATION (H): Primary | ICD-10-CM

## 2025-07-02 LAB — INR HOME MONITORING: 1.4 RATIO (ref 2–3)

## 2025-07-02 NOTE — PROGRESS NOTES
ANTICOAGULATION MANAGEMENT     Leonor Mercado 89 year old female is on warfarin with subtherapeutic INR result. (Goal INR 2.0-3.0)    Recent labs: (last 7 days)     07/02/25  1656   INR 1.4*       ASSESSMENT     Source(s): Chart Review and Patient/Caregiver Call Cleo, daughter     Warfarin doses taken: Missed dose(s) may be affecting INR  Diet: No new diet changes identified  Medication/supplement changes: None noted  New illness, injury, or hospitalization: No  Signs or symptoms of bleeding or clotting: No  Previous result: Subtherapeutic  Additional findings: None       PLAN     Recommended plan for temporary change(s) affecting INR     Dosing Instructions: booster dose then continue your current warfarin dose with next INR in 1 week       Summary  As of 7/2/2025      Full warfarin instructions:  7/2: 4 mg; Otherwise 2 mg every Sun, Wed; 3 mg all other days   Next INR check:  7/9/2025               Telephone call with Cleo, daughter,  who verbalizes understanding and agrees to plan    Patient to recheck with home meter    Education provided: Please call back if any changes to your diet, medications or how you've been taking warfarin  Goal range and lab monitoring: goal range and significance of current result, Importance of therapeutic range, and Importance of following up at instructed interval  Symptom monitoring: monitoring for clotting signs and symptoms and monitoring for stroke signs and symptoms    Plan made per [facility] anticoagulation protocol    Georgia Monteiro RN  7/2/2025  Anticoagulation Clinic  [employer] for routing messages: p ANTICOAG HOME MONITORING  [facility] patient phone line: [phone]        _______________________________________________________________________     Anticoagulation Episode Summary       Current INR goal:  2.0-3.0   TTR:  27.8% (1 y)   Target end date:  Indefinite   Send INR reminders to:  ANTICOAG HOME MONITORING    Indications    Chronic atrial fibrillation (H) [I48.20]  Long  term current use of anticoagulants with INR goal of 2.0-3.0 [Z79.01]             Comments:  --             Anticoagulation Care Providers       Provider Role Specialty Phone number    Arnel Garcia MD Referring Internal Medicine - Pediatrics [phone]

## 2025-07-09 ENCOUNTER — ANTICOAGULATION THERAPY VISIT (OUTPATIENT)
Dept: ANTICOAGULATION | Facility: CLINIC | Age: OVER 89
End: 2025-07-09
Payer: MEDICARE

## 2025-07-09 DIAGNOSIS — Z79.01 LONG TERM CURRENT USE OF ANTICOAGULANTS WITH INR GOAL OF 2.0-3.0: ICD-10-CM

## 2025-07-09 DIAGNOSIS — I48.20 CHRONIC ATRIAL FIBRILLATION (H): Primary | ICD-10-CM

## 2025-07-09 LAB — INR HOME MONITORING: 4 RATIO (ref 2–3)

## 2025-07-09 NOTE — PROGRESS NOTES
ANTICOAGULATION MANAGEMENT     Leonor Mercado 89 year old female is on warfarin with supratherapeutic INR result. (Goal INR 2.0-3.0)    Recent labs: (last 7 days)     07/09/25  1717   INR 4*       ASSESSMENT     Source(s): Chart Review and Patient/Caregiver Call -Cleo, daughter     Warfarin doses taken: Warfarin taken as instructed  Diet: No new diet changes identified  Medication/supplement changes: None noted  New illness, injury, or hospitalization: No  Signs or symptoms of bleeding or clotting: No  Previous result: Subtherapeutic  Additional findings: None       PLAN     Recommended plan for no diet, medication or health factor changes affecting INR     Dosing Instructions: partial hold then continue your current warfarin dose with next INR in 1 week       Summary  As of 7/9/2025      Full warfarin instructions:  7/9: 1 mg; Otherwise 2 mg every Sun, Wed; 3 mg all other days   Next INR check:  7/16/2025               Telephone call with Cleo, daughter,  who verbalizes understanding and agrees to plan    Patient to recheck with home meter    Education provided: Please call back if any changes to your diet, medications or how you've been taking warfarin  Goal range and lab monitoring: goal range and significance of current result, Importance of therapeutic range, and Importance of following up at instructed interval  Symptom monitoring: monitoring for bleeding signs and symptoms    Plan made per [facility] anticoagulation protocol    Georgia Monteiro RN  7/9/2025  Anticoagulation Clinic  [employer] for routing messages: p ANTICOAG HOME MONITOR[facility]  [facility] patient phone line: [phone]        _______________________________________________________________________     Anticoagulation Episode Summary       Current INR goal:  2.0-3.0   TTR:  28.6% (1 y)   Target end date:  Indefinite   Send INR reminders to:  ANT[facility]IT[facility]    Indications    Chronic atrial fibrillation (H) [I48.20]  Long term current use of  anticoagulants with INR goal of 2.0-3.0 [Z79.01]             Comments:  --             Anticoagulation Care Providers       Provider Role Specialty Phone number    Arnel Garcia MD Referring Internal Medicine - Pediatrics [phone]

## 2025-07-16 ENCOUNTER — ANTICOAGULATION THERAPY VISIT (OUTPATIENT)
Dept: ANTICOAGULATION | Facility: CLINIC | Age: OVER 89
End: 2025-07-16
Payer: MEDICARE

## 2025-07-16 DIAGNOSIS — I48.20 CHRONIC ATRIAL FIBRILLATION (H): Primary | ICD-10-CM

## 2025-07-16 DIAGNOSIS — Z79.01 LONG TERM CURRENT USE OF ANTICOAGULANTS WITH INR GOAL OF 2.0-3.0: ICD-10-CM

## 2025-07-16 LAB — INR HOME MONITORING: 4.1 RATIO (ref 2–3)

## 2025-07-16 NOTE — PROGRESS NOTES
ANTICOAGULATION MANAGEMENT     Leonor Mercado 89 year old female is on warfarin with supratherapeutic INR result. (Goal INR 2.0-3.0)    Recent labs: (last 7 days)     07/16/25  1730   INR 4.1*       ASSESSMENT     Source(s): Chart Review and Patient/Caregiver Call     Warfarin doses taken: Warfarin taken as instructed  Diet: No new diet changes identified  Medication/supplement changes: None noted  New illness, injury, or hospitalization: No  Signs or symptoms of bleeding or clotting: No  Previous result: Supratherapeutic  Additional findings: None       PLAN     Recommended plan for no diet, medication or health factor changes affecting INR     Dosing Instructions: already took warfarin today, tomorrow hold dose then decrease your warfarin dose (10.5% change) with next INR in 1 week       Summary  As of 7/16/2025      Full warfarin instructions:  7/17: Hold; Otherwise 3 mg every Tue, Thu, Sat; 2 mg all other days   Next INR check:  7/23/2025               Telephone call with daughter, Cleo who verbalizes understanding and agrees to plan    Patient to recheck with home meter    Education provided: Please call back if any changes to your diet, medications or how you've been taking warfarin  Symptom monitoring: monitoring for bleeding signs and symptoms    Plan made per [facility] anticoagulation protocol    Melba Ivory RN  7/16/2025  Anticoagulation Clinic  [employer] for routing messages: p ANTICOAG HOME MONITORING  [facility] patient phone line: [phone]        _______________________________________________________________________     Anticoagulation Episode Summary       Current INR goal:  2.0-3.0   TTR:  28.6% (1 y)   Target end date:  Indefinite   Send INR reminders to:  [facility]IT[facility]    Indications    Chronic atrial fibrillation (H) [I48.20]  Long term current use of anticoagulants with INR goal of 2.0-3.0 [Z79.01]             Comments:  --             Anticoagulation Care Providers       Provider Role  Specialty Phone number    Arnel Garcia MD Referring Internal Medicine - Pediatrics [phone]

## 2025-07-23 ENCOUNTER — RESULTS FOLLOW-UP (OUTPATIENT)
Dept: ANTICOAGULATION | Facility: CLINIC | Age: OVER 89
End: 2025-07-23
Payer: MEDICARE

## 2025-07-23 ENCOUNTER — DOCUMENTATION ONLY (OUTPATIENT)
Dept: ANTICOAGULATION | Facility: CLINIC | Age: OVER 89
End: 2025-07-23
Payer: MEDICARE

## 2025-07-23 ENCOUNTER — ANTICOAGULATION THERAPY VISIT (OUTPATIENT)
Dept: ANTICOAGULATION | Facility: CLINIC | Age: OVER 89
End: 2025-07-23
Payer: MEDICARE

## 2025-07-23 DIAGNOSIS — Z79.01 LONG TERM CURRENT USE OF ANTICOAGULANTS WITH INR GOAL OF 2.0-3.0: ICD-10-CM

## 2025-07-23 DIAGNOSIS — I48.20 CHRONIC ATRIAL FIBRILLATION (H): Primary | ICD-10-CM

## 2025-07-23 LAB — INR HOME MONITORING: 2 RATIO (ref 2–3)

## 2025-07-23 NOTE — PROGRESS NOTES
ANTICOAGULATION CLINIC REFERRAL RENEWAL REQUEST       An annual renewal order is required for all patients referred to [facility].?  Please review and sign the pended referral order for Leonor Mercado.       ANTICOAGULATION SUMMARY      Warfarin indication(s)   Atrial Fibrillation    Mechanical heart valve present?  NO       Current goal range   INR: 2.0-3.0     Goal appropriate for indication? Goal INR 2-3, standard for indication(s) above     Time in Therapeutic Range (TTR)  (Goal > 60%) 27.8%       Office visit with referring provider's group within last year yes on 8/28/24   Additional standing orders None       Charmaine Rodriguez, RN  [facility]

## 2025-07-23 NOTE — PROGRESS NOTES
ANTICOAGULATION MANAGEMENT     Leonor Mercado 89 year old female is on warfarin with therapeutic INR result. (Goal INR 2.0-3.0)    Recent labs: (last 7 days)     07/23/25  1749   INR 2       ASSESSMENT     Source(s): Chart Review and Patient/Caregiver Call     Warfarin doses taken: Warfarin taken as instructed  Diet: No new diet changes identified  Medication/supplement changes: None noted  New illness, injury, or hospitalization: No  Signs or symptoms of bleeding or clotting: No  Previous result: Supratherapeutic  Additional findings: None       PLAN     Recommended plan for no diet, medication or health factor changes affecting INR     Dosing Instructions: Continue your current warfarin dose with next INR in 1 week       Summary  As of 7/23/2025      Full warfarin instructions:  3 mg every Tue, Thu, Sat; 2 mg all other days   Next INR check:  7/30/2025               Telephone call with Cleo who verbalizes understanding and agrees to plan and who agrees to plan and repeated back plan correctly    Patient to recheck with home meter    Education provided: Please call back if any changes to your diet, medications or how you've been taking warfarin    Plan made per [facility] anticoagulation protocol    Bryan Rubalcava RN  7/23/2025  Anticoagulation Clinic  [employer] for routing messages: p ANTICOAG HOME MONITOR[facility]  [facility] patient phone line: [phone]        _______________________________________________________________________     Anticoagulation Episode Summary       Current INR goal:  2.0-3.0   TTR:  27.8% (1 y)   Target end date:  Indefinite   Send INR reminders to:  ANT[facility]IT[facility]    Indications    Chronic atrial fibrillation (H) [I48.20]  Long term current use of anticoagulants with INR goal of 2.0-3.0 [Z79.01]             Comments:  --             Anticoagulation Care Providers       Provider Role Specialty Phone number    Arnel Garcia MD Referring Internal Medicine - Pediatrics [phone]

## 2025-07-30 ENCOUNTER — RESULTS FOLLOW-UP (OUTPATIENT)
Dept: ANTICOAGULATION | Facility: CLINIC | Age: OVER 89
End: 2025-07-30
Payer: MEDICARE

## 2025-07-30 ENCOUNTER — ANTICOAGULATION THERAPY VISIT (OUTPATIENT)
Dept: ANTICOAGULATION | Facility: CLINIC | Age: OVER 89
End: 2025-07-30
Payer: MEDICARE

## 2025-07-30 DIAGNOSIS — Z79.01 LONG TERM CURRENT USE OF ANTICOAGULANTS WITH INR GOAL OF 2.0-3.0: ICD-10-CM

## 2025-07-30 DIAGNOSIS — I48.20 CHRONIC ATRIAL FIBRILLATION (H): Primary | ICD-10-CM

## 2025-07-30 LAB — INR HOME MONITORING: 2.7 RATIO (ref 2–3)

## 2025-07-30 NOTE — PROGRESS NOTES
ANTICOAGULATION MANAGEMENT     Leonor Mercado 89 year old female is on warfarin with therapeutic INR result. (Goal INR 2.0-3.0)    Recent labs: (last 7 days)     07/30/25  1704   INR 2.7       ASSESSMENT     Source(s): Chart Review and Patient/Caregiver Call     Warfarin doses taken: Warfarin taken as instructed  Diet: No new diet changes identified  Medication/supplement changes: None noted  New illness, injury, or hospitalization: No  Signs or symptoms of bleeding or clotting: No  Previous result: Therapeutic last visit; previously outside of goal range  Additional findings: None       PLAN     Recommended plan for no diet, medication or health factor changes affecting INR     Dosing Instructions: Continue your current warfarin dose with next INR in 1 week       Summary  As of 7/30/2025      Full warfarin instructions:  3 mg every Tue, Thu, Sat; 2 mg all other days   Next INR check:  8/6/2025               Telephone call with Cleo who verbalizes understanding and agrees to plan and who agrees to plan and repeated back plan correctly    Patient to recheck with home meter    Education provided: Please call back if any changes to your diet, medications or how you've been taking warfarin    Plan made per [facility] anticoagulation protocol    Bryan Rubalcava RN  7/30/2025  Anticoagulation Clinic  [employer] for routing messages: p ANTICOAG HOME MONITORING  [facility] patient phone line: [phone]        _______________________________________________________________________     Anticoagulation Episode Summary       Current INR goal:  2.0-3.0   TTR:  27.8% (1 y)   Target end date:  Indefinite   Send INR reminders to:  ANTICOAG HOME [facility]IT[facility]    Indications    Chronic atrial fibrillation (H) [I48.20]  Long term current use of anticoagulants with INR goal of 2.0-3.0 [Z79.01]             Comments:  --             Anticoagulation Care Providers       Provider Role Specialty Phone number    Arnel Garcia MD Referring  Internal Medicine - Pediatrics [phone]

## 2025-08-06 ENCOUNTER — RESULTS FOLLOW-UP (OUTPATIENT)
Dept: ANTICOAGULATION | Facility: CLINIC | Age: OVER 89
End: 2025-08-06
Payer: MEDICARE

## 2025-08-06 ENCOUNTER — ANTICOAGULATION THERAPY VISIT (OUTPATIENT)
Dept: ANTICOAGULATION | Facility: CLINIC | Age: OVER 89
End: 2025-08-06
Payer: MEDICARE

## 2025-08-06 DIAGNOSIS — I48.20 CHRONIC ATRIAL FIBRILLATION (H): Primary | ICD-10-CM

## 2025-08-06 DIAGNOSIS — Z79.01 LONG TERM CURRENT USE OF ANTICOAGULANTS WITH INR GOAL OF 2.0-3.0: ICD-10-CM

## 2025-08-06 LAB — INR HOME MONITORING: 4.5 RATIO (ref 2–3)

## 2025-08-13 ENCOUNTER — ANTICOAGULATION THERAPY VISIT (OUTPATIENT)
Dept: ANTICOAGULATION | Facility: CLINIC | Age: OVER 89
End: 2025-08-13
Payer: MEDICARE

## 2025-08-13 DIAGNOSIS — I48.20 CHRONIC ATRIAL FIBRILLATION (H): Primary | ICD-10-CM

## 2025-08-13 DIAGNOSIS — Z79.01 LONG TERM CURRENT USE OF ANTICOAGULANTS WITH INR GOAL OF 2.0-3.0: ICD-10-CM

## 2025-08-13 LAB — INR HOME MONITORING: 1.4 RATIO (ref 2–3)

## 2025-08-20 ENCOUNTER — ANTICOAGULATION THERAPY VISIT (OUTPATIENT)
Dept: ANTICOAGULATION | Facility: CLINIC | Age: OVER 89
End: 2025-08-20
Payer: MEDICARE

## 2025-08-20 ENCOUNTER — RESULTS FOLLOW-UP (OUTPATIENT)
Dept: ANTICOAGULATION | Facility: CLINIC | Age: OVER 89
End: 2025-08-20
Payer: MEDICARE

## 2025-08-20 DIAGNOSIS — I48.20 CHRONIC ATRIAL FIBRILLATION (H): Primary | ICD-10-CM

## 2025-08-20 DIAGNOSIS — Z79.01 LONG TERM CURRENT USE OF ANTICOAGULANTS WITH INR GOAL OF 2.0-3.0: ICD-10-CM

## 2025-08-20 LAB — INR HOME MONITORING: 4.3 RATIO (ref 2–3)

## 2025-08-27 ENCOUNTER — ANTICOAGULATION THERAPY VISIT (OUTPATIENT)
Dept: ANTICOAGULATION | Facility: CLINIC | Age: OVER 89
End: 2025-08-27
Payer: MEDICARE

## 2025-08-27 DIAGNOSIS — Z79.01 LONG TERM CURRENT USE OF ANTICOAGULANTS WITH INR GOAL OF 2.0-3.0: ICD-10-CM

## 2025-08-27 DIAGNOSIS — I48.20 CHRONIC ATRIAL FIBRILLATION (H): Primary | ICD-10-CM

## 2025-08-27 LAB — INR HOME MONITORING: 3.4 RATIO (ref 2–3)

## 2025-09-03 ENCOUNTER — RESULTS FOLLOW-UP (OUTPATIENT)
Dept: ANTICOAGULATION | Facility: CLINIC | Age: OVER 89
End: 2025-09-03
Payer: MEDICARE

## 2025-09-03 ENCOUNTER — ANTICOAGULATION THERAPY VISIT (OUTPATIENT)
Dept: ANTICOAGULATION | Facility: CLINIC | Age: OVER 89
End: 2025-09-03
Payer: MEDICARE

## 2025-09-03 DIAGNOSIS — I48.20 CHRONIC ATRIAL FIBRILLATION (H): Primary | ICD-10-CM

## 2025-09-03 DIAGNOSIS — Z79.01 LONG TERM CURRENT USE OF ANTICOAGULANTS WITH INR GOAL OF 2.0-3.0: ICD-10-CM

## 2025-09-03 LAB — INR HOME MONITORING: 3.5 RATIO (ref 2–3)

## (undated) RX ORDER — DOBUTAMINE HYDROCHLORIDE 200 MG/100ML
INJECTION INTRAVENOUS
Status: DISPENSED
Start: 2019-05-28